# Patient Record
Sex: MALE | Race: WHITE | NOT HISPANIC OR LATINO | URBAN - METROPOLITAN AREA
[De-identification: names, ages, dates, MRNs, and addresses within clinical notes are randomized per-mention and may not be internally consistent; named-entity substitution may affect disease eponyms.]

---

## 2017-01-02 ENCOUNTER — INPATIENT (INPATIENT)
Facility: HOSPITAL | Age: 62
LOS: 2 days | Discharge: HOME | DRG: 392 | End: 2017-01-05
Attending: INTERNAL MEDICINE | Admitting: INTERNAL MEDICINE
Payer: MEDICARE

## 2017-01-02 VITALS
HEART RATE: 81 BPM | RESPIRATION RATE: 19 BRPM | HEIGHT: 77 IN | WEIGHT: 315 LBS | SYSTOLIC BLOOD PRESSURE: 142 MMHG | DIASTOLIC BLOOD PRESSURE: 88 MMHG | TEMPERATURE: 98 F | OXYGEN SATURATION: 96 %

## 2017-01-02 DIAGNOSIS — G47.33 OBSTRUCTIVE SLEEP APNEA (ADULT) (PEDIATRIC): ICD-10-CM

## 2017-01-02 DIAGNOSIS — Z96.651 PRESENCE OF RIGHT ARTIFICIAL KNEE JOINT: Chronic | ICD-10-CM

## 2017-01-02 DIAGNOSIS — M1A.9XX0 CHRONIC GOUT, UNSPECIFIED, WITHOUT TOPHUS (TOPHI): ICD-10-CM

## 2017-01-02 DIAGNOSIS — Z98.89 OTHER SPECIFIED POSTPROCEDURAL STATES: Chronic | ICD-10-CM

## 2017-01-02 DIAGNOSIS — K52.9 NONINFECTIVE GASTROENTERITIS AND COLITIS, UNSPECIFIED: ICD-10-CM

## 2017-01-02 DIAGNOSIS — K56.69 OTHER INTESTINAL OBSTRUCTION: ICD-10-CM

## 2017-01-02 DIAGNOSIS — M51.16 INTERVERTEBRAL DISC DISORDERS WITH RADICULOPATHY, LUMBAR REGION: ICD-10-CM

## 2017-01-02 LAB
ALBUMIN SERPL ELPH-MCNC: 4.1 G/DL — SIGNIFICANT CHANGE UP (ref 3.3–5)
ALP SERPL-CCNC: 86 U/L — SIGNIFICANT CHANGE UP (ref 40–120)
ALT FLD-CCNC: 30 U/L RC — SIGNIFICANT CHANGE UP (ref 10–45)
ANION GAP SERPL CALC-SCNC: 16 MMOL/L — SIGNIFICANT CHANGE UP (ref 5–17)
APPEARANCE UR: ABNORMAL
AST SERPL-CCNC: 31 U/L — SIGNIFICANT CHANGE UP (ref 10–40)
BASOPHILS # BLD AUTO: 0 K/UL — SIGNIFICANT CHANGE UP (ref 0–0.2)
BASOPHILS NFR BLD AUTO: 0.5 % — SIGNIFICANT CHANGE UP (ref 0–2)
BILIRUB SERPL-MCNC: 0.4 MG/DL — SIGNIFICANT CHANGE UP (ref 0.2–1.2)
BILIRUB UR-MCNC: NEGATIVE — SIGNIFICANT CHANGE UP
BUN SERPL-MCNC: 13 MG/DL — SIGNIFICANT CHANGE UP (ref 7–23)
CALCIUM SERPL-MCNC: 9.2 MG/DL — SIGNIFICANT CHANGE UP (ref 8.4–10.5)
CHLORIDE SERPL-SCNC: 102 MMOL/L — SIGNIFICANT CHANGE UP (ref 96–108)
CO2 SERPL-SCNC: 21 MMOL/L — LOW (ref 22–31)
COLOR SPEC: YELLOW — SIGNIFICANT CHANGE UP
COMMENT - URINE: SIGNIFICANT CHANGE UP
CREAT SERPL-MCNC: 0.7 MG/DL — SIGNIFICANT CHANGE UP (ref 0.5–1.3)
DIFF PNL FLD: ABNORMAL
EOSINOPHIL # BLD AUTO: 0.1 K/UL — SIGNIFICANT CHANGE UP (ref 0–0.5)
EOSINOPHIL NFR BLD AUTO: 1.6 % — SIGNIFICANT CHANGE UP (ref 0–6)
EPI CELLS # UR: SIGNIFICANT CHANGE UP /HPF
GAS PNL BLDV: SIGNIFICANT CHANGE UP
GLUCOSE SERPL-MCNC: 131 MG/DL — HIGH (ref 70–99)
GLUCOSE UR QL: NEGATIVE — SIGNIFICANT CHANGE UP
HCT VFR BLD CALC: 45.2 % — SIGNIFICANT CHANGE UP (ref 39–50)
HGB BLD-MCNC: 16.4 G/DL — SIGNIFICANT CHANGE UP (ref 13–17)
KETONES UR-MCNC: NEGATIVE — SIGNIFICANT CHANGE UP
LEUKOCYTE ESTERASE UR-ACNC: NEGATIVE — SIGNIFICANT CHANGE UP
LYMPHOCYTES # BLD AUTO: 1.9 K/UL — SIGNIFICANT CHANGE UP (ref 1–3.3)
LYMPHOCYTES # BLD AUTO: 22.6 % — SIGNIFICANT CHANGE UP (ref 13–44)
MCHC RBC-ENTMCNC: 31.9 PG — SIGNIFICANT CHANGE UP (ref 27–34)
MCHC RBC-ENTMCNC: 36.3 GM/DL — HIGH (ref 32–36)
MCV RBC AUTO: 87.8 FL — SIGNIFICANT CHANGE UP (ref 80–100)
MONOCYTES # BLD AUTO: 0.7 K/UL — SIGNIFICANT CHANGE UP (ref 0–0.9)
MONOCYTES NFR BLD AUTO: 8.5 % — SIGNIFICANT CHANGE UP (ref 2–14)
NEUTROPHILS # BLD AUTO: 5.6 K/UL — SIGNIFICANT CHANGE UP (ref 1.8–7.4)
NEUTROPHILS NFR BLD AUTO: 66.8 % — SIGNIFICANT CHANGE UP (ref 43–77)
NITRITE UR-MCNC: NEGATIVE — SIGNIFICANT CHANGE UP
PH UR: 6.5 — SIGNIFICANT CHANGE UP (ref 4.8–8)
PLATELET # BLD AUTO: 195 K/UL — SIGNIFICANT CHANGE UP (ref 150–400)
POTASSIUM SERPL-MCNC: 4.1 MMOL/L — SIGNIFICANT CHANGE UP (ref 3.5–5.3)
POTASSIUM SERPL-SCNC: 4.1 MMOL/L — SIGNIFICANT CHANGE UP (ref 3.5–5.3)
PROT SERPL-MCNC: 7 G/DL — SIGNIFICANT CHANGE UP (ref 6–8.3)
PROT UR-MCNC: 30 MG/DL
RBC # BLD: 5.15 M/UL — SIGNIFICANT CHANGE UP (ref 4.2–5.8)
RBC # FLD: 12.5 % — SIGNIFICANT CHANGE UP (ref 10.3–14.5)
RBC CASTS # UR COMP ASSIST: SIGNIFICANT CHANGE UP /HPF (ref 0–2)
SODIUM SERPL-SCNC: 139 MMOL/L — SIGNIFICANT CHANGE UP (ref 135–145)
SP GR SPEC: >1.03 — HIGH (ref 1.01–1.02)
UROBILINOGEN FLD QL: NEGATIVE — SIGNIFICANT CHANGE UP
WBC # BLD: 8.4 K/UL — SIGNIFICANT CHANGE UP (ref 3.8–10.5)
WBC # FLD AUTO: 8.4 K/UL — SIGNIFICANT CHANGE UP (ref 3.8–10.5)
WBC UR QL: SIGNIFICANT CHANGE UP /HPF (ref 0–5)

## 2017-01-02 PROCEDURE — 99285 EMERGENCY DEPT VISIT HI MDM: CPT | Mod: 25

## 2017-01-02 PROCEDURE — 74177 CT ABD & PELVIS W/CONTRAST: CPT | Mod: 26

## 2017-01-02 RX ORDER — ONDANSETRON 8 MG/1
4 TABLET, FILM COATED ORAL EVERY 6 HOURS
Qty: 0 | Refills: 0 | Status: DISCONTINUED | OUTPATIENT
Start: 2017-01-02 | End: 2017-01-05

## 2017-01-02 RX ORDER — SODIUM CHLORIDE 9 MG/ML
1000 INJECTION INTRAMUSCULAR; INTRAVENOUS; SUBCUTANEOUS ONCE
Qty: 0 | Refills: 0 | Status: COMPLETED | OUTPATIENT
Start: 2017-01-02 | End: 2017-01-02

## 2017-01-02 RX ORDER — MORPHINE SULFATE 50 MG/1
2 CAPSULE, EXTENDED RELEASE ORAL EVERY 4 HOURS
Qty: 0 | Refills: 0 | Status: DISCONTINUED | OUTPATIENT
Start: 2017-01-02 | End: 2017-01-05

## 2017-01-02 RX ORDER — INFLUENZA VIRUS VACCINE 15; 15; 15; 15 UG/.5ML; UG/.5ML; UG/.5ML; UG/.5ML
0.5 SUSPENSION INTRAMUSCULAR ONCE
Qty: 0 | Refills: 0 | Status: DISCONTINUED | OUTPATIENT
Start: 2017-01-02 | End: 2017-01-05

## 2017-01-02 RX ORDER — ACETAMINOPHEN 500 MG
1000 TABLET ORAL ONCE
Qty: 0 | Refills: 0 | Status: COMPLETED | OUTPATIENT
Start: 2017-01-02 | End: 2017-01-02

## 2017-01-02 RX ORDER — SODIUM CHLORIDE 9 MG/ML
2000 INJECTION, SOLUTION INTRAVENOUS
Qty: 0 | Refills: 0 | Status: DISCONTINUED | OUTPATIENT
Start: 2017-01-02 | End: 2017-01-03

## 2017-01-02 RX ORDER — METRONIDAZOLE 500 MG
TABLET ORAL
Qty: 0 | Refills: 0 | Status: DISCONTINUED | OUTPATIENT
Start: 2017-01-02 | End: 2017-01-05

## 2017-01-02 RX ORDER — METRONIDAZOLE 500 MG
500 TABLET ORAL EVERY 8 HOURS
Qty: 0 | Refills: 0 | Status: DISCONTINUED | OUTPATIENT
Start: 2017-01-02 | End: 2017-01-05

## 2017-01-02 RX ORDER — ENOXAPARIN SODIUM 100 MG/ML
40 INJECTION SUBCUTANEOUS EVERY 24 HOURS
Qty: 0 | Refills: 0 | Status: DISCONTINUED | OUTPATIENT
Start: 2017-01-02 | End: 2017-01-05

## 2017-01-02 RX ORDER — ONDANSETRON 8 MG/1
4 TABLET, FILM COATED ORAL ONCE
Qty: 0 | Refills: 0 | Status: COMPLETED | OUTPATIENT
Start: 2017-01-02 | End: 2017-01-02

## 2017-01-02 RX ORDER — METRONIDAZOLE 500 MG
500 TABLET ORAL ONCE
Qty: 0 | Refills: 0 | Status: COMPLETED | OUTPATIENT
Start: 2017-01-02 | End: 2017-01-02

## 2017-01-02 RX ORDER — GABAPENTIN 400 MG/1
1 CAPSULE ORAL
Qty: 0 | Refills: 0 | COMMUNITY

## 2017-01-02 RX ADMIN — ONDANSETRON 4 MILLIGRAM(S): 8 TABLET, FILM COATED ORAL at 08:07

## 2017-01-02 RX ADMIN — SODIUM CHLORIDE 1000 MILLILITER(S): 9 INJECTION INTRAMUSCULAR; INTRAVENOUS; SUBCUTANEOUS at 08:07

## 2017-01-02 RX ADMIN — SODIUM CHLORIDE 1000 MILLILITER(S): 9 INJECTION INTRAMUSCULAR; INTRAVENOUS; SUBCUTANEOUS at 11:12

## 2017-01-02 RX ADMIN — SODIUM CHLORIDE 100 MILLILITER(S): 9 INJECTION, SOLUTION INTRAVENOUS at 18:53

## 2017-01-02 RX ADMIN — ENOXAPARIN SODIUM 40 MILLIGRAM(S): 100 INJECTION SUBCUTANEOUS at 22:23

## 2017-01-02 RX ADMIN — Medication 100 MILLIGRAM(S): at 22:23

## 2017-01-02 RX ADMIN — Medication 100 MILLIGRAM(S): at 15:33

## 2017-01-02 RX ADMIN — Medication 400 MILLIGRAM(S): at 11:00

## 2017-01-02 NOTE — H&P ADULT. - HISTORY OF PRESENT ILLNESS
60 yo man with PMH of obesity, ASIYA on CPAP, chronic back pain a/w diarrhea and abdominal pain.  Pt reports an episode of bronchitis about 2 weeks ago for which he took an known antibiotic for 7 days.  Then 6 days ago started having diarrhea upwards of 6-7 times a day, a/w intermittent mid abdominal pain.  Has nausea but no vomiting.  Diarrhea is watery, nonbloody.  3 sons had diarrhea for 1-2 days and then pt got it.  No recent travel.  Denies fevers, chills.

## 2017-01-02 NOTE — ED ADULT NURSE NOTE - OBJECTIVE STATEMENT
Pt  morbid obese C/O diarrhoea X few days S/P pt taking antibiotics  for recent bronchitis. Pt C/O intermittent abdominal cramps . Ifeoma fever chills afebrile here denies hematuria dysuria

## 2017-01-02 NOTE — H&P ADULT. - PSH
Cholecystitis  cholecycstectomy 2011  H/O lithotripsy    Hernia  repair, left inguinal 2010  History of Total Hip Replacement    S/P hip replacement  left 2009, right 2009  S/P knee replacement  rigth 10/11  S/P Left Inguinal Hernia Repair    S/P lumbar discectomy  L3,,L4,L5  Aug 2013  S/P revision of total knee, right  12/2012  S/P tonsillectomy and adenoidectomy  as child

## 2017-01-02 NOTE — ED ADULT NURSE REASSESSMENT NOTE - NS ED NURSE REASSESS COMMENT FT1
1400 Admitting team R/O for Cdiff. Pt had no bowel  after the CT Abdomen. Ifeoma pain N/V . Pt admitted awaiting for the bed   1900 Report given to Ramana Barber in [facility]
pt states since ct no longer having diarrhea

## 2017-01-02 NOTE — ED ADULT NURSE NOTE - PMH
Gout  last attack 6 years ago, 2006  H/O: osteoarthritis    History of Obesity    Hx of insomnia    Lumbar disc disease with radiculopathy    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  Obstructive sleep apnea  severe, used CPAP few years ago and lost weight  Renal calculi

## 2017-01-02 NOTE — H&P ADULT. - ASSESSMENT
60 yo man with PMH of obesity, ASIYA, gout, HTN, chronic back pain a/w enteritis.  With recent abx use will start empiric flagyl pending Cdiff sample, though may be viral given pt's sons also had diarrhea.  Obstruction is low grade - pt looks well, abdominal exam normal - no need for NGT.

## 2017-01-02 NOTE — H&P ADULT. - PMH
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

## 2017-01-02 NOTE — H&P ADULT. - PROBLEM SELECTOR PLAN 1
Viral vs C.diff  Start on empiric flagyl - can d/c if C.diff comes back negative  C.diff sample ordered  NPO  IV fluids

## 2017-01-02 NOTE — ED PROVIDER NOTE - OBJECTIVE STATEMENT
Attending [place]: 60 y/o male s/p cholecystecomy presenting with diarrhea. pt states recently treated for bronchitis on unclear abx presenting with diarrhea. symptoms first started a few days ago. pt states has crampy abdominal pain associated with multiple episodes of diarrhea. symptoms last few days and then resolved and then began again over the weekend. no vomiting but does complain of nausea. no fevers. sick contacts at home with diarrhea. no difficulty voiding

## 2017-01-02 NOTE — ED PROVIDER NOTE - MEDICAL DECISION MAKING DETAILS
Attending [place]: 62 y/o male presenting with abdominal pain and diarrhea. no fevers. ct scan showed enteritis with dilated loops of bowel without visible obstruction. no fevers and enteritis most likely infectious. will send stool cultures. will admit for serial abdominal exams, IVF.

## 2017-01-02 NOTE — H&P ADULT. - FAMILY HISTORY
<<-----Click on this checkbox to enter Family History Family history of coronary artery disease, HLD/Angina. Fatal MI age 73.     Family history of diabetes mellitus type II, Hyperlipidemia and Hypertension. Age 86.     Family history of coronary artery disease, brother- age 50+- Coronary Artery Stents     Sibling  Still living? No  Family history of pancreatic cancer, Age at diagnosis: Age Unknown

## 2017-01-03 LAB
ANION GAP SERPL CALC-SCNC: 13 MMOL/L — SIGNIFICANT CHANGE UP (ref 5–17)
BASOPHILS # BLD AUTO: 0.02 K/UL — SIGNIFICANT CHANGE UP (ref 0–0.2)
BASOPHILS NFR BLD AUTO: 0.3 % — SIGNIFICANT CHANGE UP (ref 0–2)
BUN SERPL-MCNC: 10 MG/DL — SIGNIFICANT CHANGE UP (ref 7–23)
CALCIUM SERPL-MCNC: 8.5 MG/DL — SIGNIFICANT CHANGE UP (ref 8.4–10.5)
CHLORIDE SERPL-SCNC: 107 MMOL/L — SIGNIFICANT CHANGE UP (ref 96–108)
CO2 SERPL-SCNC: 22 MMOL/L — SIGNIFICANT CHANGE UP (ref 22–31)
CREAT SERPL-MCNC: 0.6 MG/DL — SIGNIFICANT CHANGE UP (ref 0.5–1.3)
EOSINOPHIL # BLD AUTO: 0.17 K/UL — SIGNIFICANT CHANGE UP (ref 0–0.5)
EOSINOPHIL NFR BLD AUTO: 2.7 % — SIGNIFICANT CHANGE UP (ref 0–6)
GLUCOSE SERPL-MCNC: 109 MG/DL — HIGH (ref 70–99)
HCT VFR BLD CALC: 42.6 % — SIGNIFICANT CHANGE UP (ref 39–50)
HGB BLD-MCNC: 14.4 G/DL — SIGNIFICANT CHANGE UP (ref 13–17)
IMM GRANULOCYTES NFR BLD AUTO: 0.5 % — SIGNIFICANT CHANGE UP (ref 0–1.5)
LYMPHOCYTES # BLD AUTO: 1.56 K/UL — SIGNIFICANT CHANGE UP (ref 1–3.3)
LYMPHOCYTES # BLD AUTO: 24.8 % — SIGNIFICANT CHANGE UP (ref 13–44)
MAGNESIUM SERPL-MCNC: 1.8 MG/DL — SIGNIFICANT CHANGE UP (ref 1.6–2.6)
MCHC RBC-ENTMCNC: 29.4 PG — SIGNIFICANT CHANGE UP (ref 27–34)
MCHC RBC-ENTMCNC: 33.8 GM/DL — SIGNIFICANT CHANGE UP (ref 32–36)
MCV RBC AUTO: 87.1 FL — SIGNIFICANT CHANGE UP (ref 80–100)
MONOCYTES # BLD AUTO: 0.51 K/UL — SIGNIFICANT CHANGE UP (ref 0–0.9)
MONOCYTES NFR BLD AUTO: 8.1 % — SIGNIFICANT CHANGE UP (ref 2–14)
NEUTROPHILS # BLD AUTO: 4 K/UL — SIGNIFICANT CHANGE UP (ref 1.8–7.4)
NEUTROPHILS NFR BLD AUTO: 63.6 % — SIGNIFICANT CHANGE UP (ref 43–77)
PLATELET # BLD AUTO: 192 K/UL — SIGNIFICANT CHANGE UP (ref 150–400)
POTASSIUM SERPL-MCNC: 3.5 MMOL/L — SIGNIFICANT CHANGE UP (ref 3.5–5.3)
POTASSIUM SERPL-SCNC: 3.5 MMOL/L — SIGNIFICANT CHANGE UP (ref 3.5–5.3)
RBC # BLD: 4.89 M/UL — SIGNIFICANT CHANGE UP (ref 4.2–5.8)
RBC # FLD: 12.7 % — SIGNIFICANT CHANGE UP (ref 10.3–14.5)
SODIUM SERPL-SCNC: 142 MMOL/L — SIGNIFICANT CHANGE UP (ref 135–145)
WBC # BLD: 6.29 K/UL — SIGNIFICANT CHANGE UP (ref 3.8–10.5)
WBC # FLD AUTO: 6.29 K/UL — SIGNIFICANT CHANGE UP (ref 3.8–10.5)

## 2017-01-03 RX ORDER — SODIUM CHLORIDE 9 MG/ML
1000 INJECTION, SOLUTION INTRAVENOUS
Qty: 0 | Refills: 0 | Status: DISCONTINUED | OUTPATIENT
Start: 2017-01-03 | End: 2017-01-05

## 2017-01-03 RX ADMIN — SODIUM CHLORIDE 100 MILLILITER(S): 9 INJECTION, SOLUTION INTRAVENOUS at 05:03

## 2017-01-03 RX ADMIN — ENOXAPARIN SODIUM 40 MILLIGRAM(S): 100 INJECTION SUBCUTANEOUS at 22:54

## 2017-01-03 RX ADMIN — Medication 100 MILLIGRAM(S): at 05:00

## 2017-01-03 RX ADMIN — ONDANSETRON 4 MILLIGRAM(S): 8 TABLET, FILM COATED ORAL at 19:48

## 2017-01-03 RX ADMIN — ONDANSETRON 4 MILLIGRAM(S): 8 TABLET, FILM COATED ORAL at 05:00

## 2017-01-03 RX ADMIN — ONDANSETRON 4 MILLIGRAM(S): 8 TABLET, FILM COATED ORAL at 12:32

## 2017-01-03 RX ADMIN — Medication 100 MILLIGRAM(S): at 22:53

## 2017-01-03 RX ADMIN — Medication 100 MILLIGRAM(S): at 14:02

## 2017-01-03 RX ADMIN — SODIUM CHLORIDE 100 MILLILITER(S): 9 INJECTION, SOLUTION INTRAVENOUS at 22:49

## 2017-01-04 ENCOUNTER — TRANSCRIPTION ENCOUNTER (OUTPATIENT)
Age: 62
End: 2017-01-04

## 2017-01-04 LAB
ANION GAP SERPL CALC-SCNC: 13 MMOL/L — SIGNIFICANT CHANGE UP (ref 5–17)
BAKER'S YEAST IGA QN IA: 9.3 UNITS — SIGNIFICANT CHANGE UP
BAKER'S YEAST IGA QN IA: NEGATIVE — SIGNIFICANT CHANGE UP
BAKER'S YEAST IGG QN IA: 35.3 UNITS — HIGH
BAKER'S YEAST IGG QN IA: POSITIVE
BASOPHILS # BLD AUTO: 0.02 K/UL — SIGNIFICANT CHANGE UP (ref 0–0.2)
BASOPHILS NFR BLD AUTO: 0.3 % — SIGNIFICANT CHANGE UP (ref 0–2)
BUN SERPL-MCNC: 8 MG/DL — SIGNIFICANT CHANGE UP (ref 7–23)
C DIFF BY PCR RESULT: SIGNIFICANT CHANGE UP
C DIFF TOX GENS STL QL NAA+PROBE: SIGNIFICANT CHANGE UP
CALCIUM SERPL-MCNC: 8.3 MG/DL — LOW (ref 8.4–10.5)
CHLORIDE SERPL-SCNC: 107 MMOL/L — SIGNIFICANT CHANGE UP (ref 96–108)
CO2 SERPL-SCNC: 25 MMOL/L — SIGNIFICANT CHANGE UP (ref 22–31)
CREAT SERPL-MCNC: 0.56 MG/DL — SIGNIFICANT CHANGE UP (ref 0.5–1.3)
CULTURE RESULTS: SIGNIFICANT CHANGE UP
EOSINOPHIL # BLD AUTO: 0.15 K/UL — SIGNIFICANT CHANGE UP (ref 0–0.5)
EOSINOPHIL NFR BLD AUTO: 2.3 % — SIGNIFICANT CHANGE UP (ref 0–6)
GLUCOSE SERPL-MCNC: 114 MG/DL — HIGH (ref 70–99)
HCT VFR BLD CALC: 40.8 % — SIGNIFICANT CHANGE UP (ref 39–50)
HGB BLD-MCNC: 13.9 G/DL — SIGNIFICANT CHANGE UP (ref 13–17)
IMM GRANULOCYTES NFR BLD AUTO: 0.5 % — SIGNIFICANT CHANGE UP (ref 0–1.5)
LYMPHOCYTES # BLD AUTO: 1.63 K/UL — SIGNIFICANT CHANGE UP (ref 1–3.3)
LYMPHOCYTES # BLD AUTO: 24.8 % — SIGNIFICANT CHANGE UP (ref 13–44)
MAGNESIUM SERPL-MCNC: 1.9 MG/DL — SIGNIFICANT CHANGE UP (ref 1.6–2.6)
MCHC RBC-ENTMCNC: 29.7 PG — SIGNIFICANT CHANGE UP (ref 27–34)
MCHC RBC-ENTMCNC: 34.1 GM/DL — SIGNIFICANT CHANGE UP (ref 32–36)
MCV RBC AUTO: 87.2 FL — SIGNIFICANT CHANGE UP (ref 80–100)
MONOCYTES # BLD AUTO: 0.47 K/UL — SIGNIFICANT CHANGE UP (ref 0–0.9)
MONOCYTES NFR BLD AUTO: 7.1 % — SIGNIFICANT CHANGE UP (ref 2–14)
NEUTROPHILS # BLD AUTO: 4.28 K/UL — SIGNIFICANT CHANGE UP (ref 1.8–7.4)
NEUTROPHILS NFR BLD AUTO: 65 % — SIGNIFICANT CHANGE UP (ref 43–77)
OB PNL STL: NEGATIVE — SIGNIFICANT CHANGE UP
PHOSPHATE SERPL-MCNC: 2.6 MG/DL — SIGNIFICANT CHANGE UP (ref 2.5–4.5)
PLATELET # BLD AUTO: 191 K/UL — SIGNIFICANT CHANGE UP (ref 150–400)
POTASSIUM SERPL-MCNC: 3.5 MMOL/L — SIGNIFICANT CHANGE UP (ref 3.5–5.3)
POTASSIUM SERPL-SCNC: 3.5 MMOL/L — SIGNIFICANT CHANGE UP (ref 3.5–5.3)
RBC # BLD: 4.68 M/UL — SIGNIFICANT CHANGE UP (ref 4.2–5.8)
RBC # FLD: 12.7 % — SIGNIFICANT CHANGE UP (ref 10.3–14.5)
SODIUM SERPL-SCNC: 145 MMOL/L — SIGNIFICANT CHANGE UP (ref 135–145)
SPECIMEN SOURCE: SIGNIFICANT CHANGE UP
WBC # BLD: 6.58 K/UL — SIGNIFICANT CHANGE UP (ref 3.8–10.5)
WBC # FLD AUTO: 6.58 K/UL — SIGNIFICANT CHANGE UP (ref 3.8–10.5)

## 2017-01-04 RX ORDER — AMLODIPINE BESYLATE 2.5 MG/1
5 TABLET ORAL DAILY
Qty: 0 | Refills: 0 | Status: DISCONTINUED | OUTPATIENT
Start: 2017-01-04 | End: 2017-01-05

## 2017-01-04 RX ORDER — GABAPENTIN 400 MG/1
300 CAPSULE ORAL DAILY
Qty: 0 | Refills: 0 | Status: DISCONTINUED | OUTPATIENT
Start: 2017-01-04 | End: 2017-01-05

## 2017-01-04 RX ADMIN — Medication 100 MILLIGRAM(S): at 21:46

## 2017-01-04 RX ADMIN — Medication 100 MILLIGRAM(S): at 05:38

## 2017-01-04 RX ADMIN — ENOXAPARIN SODIUM 40 MILLIGRAM(S): 100 INJECTION SUBCUTANEOUS at 21:46

## 2017-01-04 RX ADMIN — ONDANSETRON 4 MILLIGRAM(S): 8 TABLET, FILM COATED ORAL at 10:22

## 2017-01-04 RX ADMIN — GABAPENTIN 300 MILLIGRAM(S): 400 CAPSULE ORAL at 15:03

## 2017-01-04 RX ADMIN — Medication 100 MILLIGRAM(S): at 16:05

## 2017-01-04 RX ADMIN — SODIUM CHLORIDE 100 MILLILITER(S): 9 INJECTION, SOLUTION INTRAVENOUS at 16:07

## 2017-01-04 RX ADMIN — AMLODIPINE BESYLATE 5 MILLIGRAM(S): 2.5 TABLET ORAL at 15:04

## 2017-01-04 NOTE — DISCHARGE NOTE ADULT - MEDICATION SUMMARY - MEDICATIONS TO STOP TAKING
I will STOP taking the medications listed below when I get home from the hospital:    triamterene-hydroCHLOROthiazide 37.5mg-25mg oral capsule  -- 1 cap(s) by mouth once a day

## 2017-01-04 NOTE — DISCHARGE NOTE ADULT - PATIENT PORTAL LINK FT
“You can access the [facility] Patient Portal, offered by [facility], by registering with the following website: [email or web address]”

## 2017-01-04 NOTE — DISCHARGE NOTE ADULT - HOSPITAL COURSE
Attending to complete 60 yo man with PMH of obesity, ASIYA on CPAP, chronic back pain a/w diarrhea and abdominal pain.  Pt reports an episode of bronchitis about 2 weeks ago for which he took an known antibiotic for 7 days.  Then 6 days ago started having diarrhea upwards of 6-7 times a day, a/w intermittent mid abdominal pain.  Has nausea but no vomiting.  Diarrhea is watery, nonbloody.  3 sons had diarrhea for 1-2 days and then pt got it.  No recent travel.  Denies fevers, chills.  Imaging personally reviewed - CT A/P with mild enteritis and low grade SBO.	  WBC 8k  NPO, IVF, flagyl  GI and surgery consult   stool c dif neg  CXR normal   pt was dc home on PO flagyl.  f/u PCP    dx enteritis, SBO

## 2017-01-04 NOTE — DISCHARGE NOTE ADULT - SECONDARY DIAGNOSIS.
Partial small bowel obstruction Chronic gout without tophus, unspecified cause, unspecified site Essential hypertension Kidney lesion Neuropathy Obstructive sleep apnea

## 2017-01-04 NOTE — DISCHARGE NOTE ADULT - CARE PLAN
Principal Discharge DX:	Enteritis  Goal:	Enteritis resolves  Instructions for follow-up, activity and diet:	Complete course of flagyl  Low fiber diet  Follow up with gastro enterology in 1 week  Secondary Diagnosis:	Partial small bowel obstruction  Instructions for follow-up, activity and diet:	resolving  If increasing abdominal pain, worsening diarrhea, nausea/vomitig see your Gi specialist immediately  Secondary Diagnosis:	Chronic gout without tophus, unspecified cause, unspecified site  Instructions for follow-up, activity and diet:	continue Allopurinol  Secondary Diagnosis:	Essential hypertension  Instructions for follow-up, activity and diet:	continue Norvasc  Stop taking HCTZ/triamterene for now  Follow up with PMD early next week for rechecking BP and determine restarting medication  Secondary Diagnosis:	Kidney lesion  Instructions for follow-up, activity and diet:	Follow up with PMD for repeat imaging of L kidney in 6 months  Secondary Diagnosis:	Neuropathy  Instructions for follow-up, activity and diet:	continue gabapentin  Secondary Diagnosis:	Obstructive sleep apnea  Instructions for follow-up, activity and diet:	Continue CPAP at night

## 2017-01-04 NOTE — DISCHARGE NOTE ADULT - MEDICATION SUMMARY - MEDICATIONS TO TAKE
I will START or STAY ON the medications listed below when I get home from the hospital:    metroNIDAZOLE 500 mg oral tablet  -- 1 tab(s) by mouth every 8 hours  -- Indication: For Enteritis    aspirin 81 mg oral tablet  -- 1 tab(s) by mouth once a day  -- Indication: For prevention    gabapentin 300 mg oral capsule  -- 1 cap(s) by mouth once a day  -- Indication: For Neuropathy    allopurinol 100 mg oral tablet  -- 1 tab(s) by mouth once a day  -- Indication: For gout    amLODIPine 5 mg oral tablet  -- 1 tab(s) by mouth once a day  -- Indication: For Hypertension

## 2017-01-04 NOTE — DISCHARGE NOTE ADULT - PLAN OF CARE
Enteritis resolves Complete course of flagyl  Low fiber diet  Follow up with gastro enterology in 1 week resolving  If increasing abdominal pain, worsening diarrhea, nausea/vomitig see your Gi specialist immediately continue Allopurinol continue Norvasc  Stop taking HCTZ/triamterene for now  Follow up with PMD early next week for rechecking BP and determine restarting medication Follow up with PMD for repeat imaging of L kidney in 6 months continue gabapentin Continue CPAP at night

## 2017-01-04 NOTE — DISCHARGE NOTE ADULT - CARE PROVIDER_API CALL
Art Neely (DO), Internal Medicine  [address] [place] [address]  Phone: [phone]  Fax: [phone]

## 2017-01-05 VITALS
HEART RATE: 62 BPM | SYSTOLIC BLOOD PRESSURE: 156 MMHG | RESPIRATION RATE: 18 BRPM | TEMPERATURE: 98 F | DIASTOLIC BLOOD PRESSURE: 88 MMHG | OXYGEN SATURATION: 98 %

## 2017-01-05 LAB
ANION GAP SERPL CALC-SCNC: 15 MMOL/L — SIGNIFICANT CHANGE UP (ref 5–17)
AUTO DIFF PNL BLD: NEGATIVE — SIGNIFICANT CHANGE UP
BUN SERPL-MCNC: 8 MG/DL — SIGNIFICANT CHANGE UP (ref 7–23)
C-ANCA SER-ACNC: NEGATIVE — SIGNIFICANT CHANGE UP
CALCIUM SERPL-MCNC: 8.4 MG/DL — SIGNIFICANT CHANGE UP (ref 8.4–10.5)
CHLORIDE SERPL-SCNC: 108 MMOL/L — SIGNIFICANT CHANGE UP (ref 96–108)
CO2 SERPL-SCNC: 21 MMOL/L — LOW (ref 22–31)
CREAT SERPL-MCNC: 0.64 MG/DL — SIGNIFICANT CHANGE UP (ref 0.5–1.3)
CULTURE RESULTS: SIGNIFICANT CHANGE UP
GLUCOSE SERPL-MCNC: 105 MG/DL — HIGH (ref 70–99)
HCT VFR BLD CALC: 40.3 % — SIGNIFICANT CHANGE UP (ref 39–50)
HGB BLD-MCNC: 14.1 G/DL — SIGNIFICANT CHANGE UP (ref 13–17)
MCHC RBC-ENTMCNC: 30 PG — SIGNIFICANT CHANGE UP (ref 27–34)
MCHC RBC-ENTMCNC: 35 GM/DL — SIGNIFICANT CHANGE UP (ref 32–36)
MCV RBC AUTO: 85.7 FL — SIGNIFICANT CHANGE UP (ref 80–100)
P-ANCA SER-ACNC: NEGATIVE — SIGNIFICANT CHANGE UP
PLATELET # BLD AUTO: 178 K/UL — SIGNIFICANT CHANGE UP (ref 150–400)
POTASSIUM SERPL-MCNC: 3.6 MMOL/L — SIGNIFICANT CHANGE UP (ref 3.5–5.3)
POTASSIUM SERPL-SCNC: 3.6 MMOL/L — SIGNIFICANT CHANGE UP (ref 3.5–5.3)
RBC # BLD: 4.7 M/UL — SIGNIFICANT CHANGE UP (ref 4.2–5.8)
RBC # FLD: 12.8 % — SIGNIFICANT CHANGE UP (ref 10.3–14.5)
SODIUM SERPL-SCNC: 144 MMOL/L — SIGNIFICANT CHANGE UP (ref 135–145)
SPECIMEN SOURCE: SIGNIFICANT CHANGE UP
WBC # BLD: 8.15 K/UL — SIGNIFICANT CHANGE UP (ref 3.8–10.5)
WBC # FLD AUTO: 8.15 K/UL — SIGNIFICANT CHANGE UP (ref 3.8–10.5)

## 2017-01-05 PROCEDURE — 83605 ASSAY OF LACTIC ACID: CPT

## 2017-01-05 PROCEDURE — 87177 OVA AND PARASITES SMEARS: CPT

## 2017-01-05 PROCEDURE — 82330 ASSAY OF CALCIUM: CPT

## 2017-01-05 PROCEDURE — 99285 EMERGENCY DEPT VISIT HI MDM: CPT | Mod: 25

## 2017-01-05 PROCEDURE — 87045 FECES CULTURE AEROBIC BACT: CPT

## 2017-01-05 PROCEDURE — 86036 ANCA SCREEN EACH ANTIBODY: CPT

## 2017-01-05 PROCEDURE — 96374 THER/PROPH/DIAG INJ IV PUSH: CPT

## 2017-01-05 PROCEDURE — 87493 C DIFF AMPLIFIED PROBE: CPT

## 2017-01-05 PROCEDURE — 82272 OCCULT BLD FECES 1-3 TESTS: CPT

## 2017-01-05 PROCEDURE — 85027 COMPLETE CBC AUTOMATED: CPT

## 2017-01-05 PROCEDURE — 85014 HEMATOCRIT: CPT

## 2017-01-05 PROCEDURE — 82435 ASSAY OF BLOOD CHLORIDE: CPT

## 2017-01-05 PROCEDURE — 71010: CPT | Mod: 26

## 2017-01-05 PROCEDURE — 80053 COMPREHEN METABOLIC PANEL: CPT

## 2017-01-05 PROCEDURE — 84132 ASSAY OF SERUM POTASSIUM: CPT

## 2017-01-05 PROCEDURE — 84100 ASSAY OF PHOSPHORUS: CPT

## 2017-01-05 PROCEDURE — 83520 IMMUNOASSAY QUANT NOS NONAB: CPT

## 2017-01-05 PROCEDURE — 82947 ASSAY GLUCOSE BLOOD QUANT: CPT

## 2017-01-05 PROCEDURE — 87046 STOOL CULTR AEROBIC BACT EA: CPT

## 2017-01-05 PROCEDURE — 83735 ASSAY OF MAGNESIUM: CPT

## 2017-01-05 PROCEDURE — 80048 BASIC METABOLIC PNL TOTAL CA: CPT

## 2017-01-05 PROCEDURE — 74177 CT ABD & PELVIS W/CONTRAST: CPT

## 2017-01-05 PROCEDURE — 84295 ASSAY OF SERUM SODIUM: CPT

## 2017-01-05 PROCEDURE — 82803 BLOOD GASES ANY COMBINATION: CPT

## 2017-01-05 PROCEDURE — 71045 X-RAY EXAM CHEST 1 VIEW: CPT

## 2017-01-05 PROCEDURE — 81001 URINALYSIS AUTO W/SCOPE: CPT

## 2017-01-05 PROCEDURE — 94660 CPAP INITIATION&MGMT: CPT

## 2017-01-05 RX ORDER — AMLODIPINE BESYLATE 2.5 MG/1
1 TABLET ORAL
Qty: 0 | Refills: 0 | COMMUNITY

## 2017-01-05 RX ORDER — GABAPENTIN 400 MG/1
1 CAPSULE ORAL
Qty: 0 | Refills: 0 | COMMUNITY

## 2017-01-05 RX ORDER — GABAPENTIN 400 MG/1
1 CAPSULE ORAL
Qty: 0 | Refills: 0 | COMMUNITY
Start: 2017-01-05

## 2017-01-05 RX ORDER — AMLODIPINE BESYLATE 2.5 MG/1
1 TABLET ORAL
Qty: 0 | Refills: 0 | COMMUNITY
Start: 2017-01-05

## 2017-01-05 RX ORDER — METRONIDAZOLE 500 MG
1 TABLET ORAL
Qty: 21 | Refills: 0 | OUTPATIENT
Start: 2017-01-05 | End: 2017-01-12

## 2017-01-05 RX ADMIN — AMLODIPINE BESYLATE 5 MILLIGRAM(S): 2.5 TABLET ORAL at 05:33

## 2017-01-05 RX ADMIN — Medication 100 MILLIGRAM(S): at 05:27

## 2017-01-05 RX ADMIN — GABAPENTIN 300 MILLIGRAM(S): 400 CAPSULE ORAL at 12:44

## 2017-07-19 ENCOUNTER — EMERGENCY (EMERGENCY)
Facility: HOSPITAL | Age: 62
LOS: 0 days | Discharge: HOME | End: 2017-07-20
Attending: EMERGENCY MEDICINE
Payer: COMMERCIAL

## 2017-07-19 VITALS
DIASTOLIC BLOOD PRESSURE: 86 MMHG | SYSTOLIC BLOOD PRESSURE: 155 MMHG | HEIGHT: 77 IN | TEMPERATURE: 98 F | WEIGHT: 315 LBS | RESPIRATION RATE: 19 BRPM | OXYGEN SATURATION: 94 % | HEART RATE: 91 BPM

## 2017-07-19 DIAGNOSIS — Z96.651 PRESENCE OF RIGHT ARTIFICIAL KNEE JOINT: Chronic | ICD-10-CM

## 2017-07-19 DIAGNOSIS — Z98.89 OTHER SPECIFIED POSTPROCEDURAL STATES: Chronic | ICD-10-CM

## 2017-07-19 PROCEDURE — 99284 EMERGENCY DEPT VISIT MOD MDM: CPT

## 2017-07-20 VITALS
WEIGHT: 315 LBS | RESPIRATION RATE: 16 BRPM | TEMPERATURE: 97 F | OXYGEN SATURATION: 97 % | DIASTOLIC BLOOD PRESSURE: 70 MMHG | SYSTOLIC BLOOD PRESSURE: 145 MMHG | HEIGHT: 77 IN | HEART RATE: 76 BPM

## 2017-07-20 VITALS
OXYGEN SATURATION: 98 % | DIASTOLIC BLOOD PRESSURE: 80 MMHG | SYSTOLIC BLOOD PRESSURE: 135 MMHG | HEART RATE: 77 BPM | RESPIRATION RATE: 17 BRPM | TEMPERATURE: 98 F

## 2017-07-20 DIAGNOSIS — S82.892S OTHER FRACTURE OF LEFT LOWER LEG, SEQUELA: ICD-10-CM

## 2017-07-20 DIAGNOSIS — Z96.651 PRESENCE OF RIGHT ARTIFICIAL KNEE JOINT: Chronic | ICD-10-CM
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
## 2017-07-20 DIAGNOSIS — M10.9 GOUT, UNSPECIFIED: ICD-10-CM

## 2017-07-20 DIAGNOSIS — M25.572 PAIN IN LEFT ANKLE AND JOINTS OF LEFT FOOT: ICD-10-CM

## 2017-07-20 DIAGNOSIS — I10 ESSENTIAL (PRIMARY) HYPERTENSION: ICD-10-CM

## 2017-07-20 DIAGNOSIS — Z79.82 LONG TERM (CURRENT) USE OF ASPIRIN: ICD-10-CM

## 2017-07-20 DIAGNOSIS — Z87.442 PERSONAL HISTORY OF URINARY CALCULI: ICD-10-CM

## 2017-07-20 DIAGNOSIS — Z98.89 OTHER SPECIFIED POSTPROCEDURAL STATES: Chronic | ICD-10-CM

## 2017-07-20 PROCEDURE — 93971 EXTREMITY STUDY: CPT | Mod: 26

## 2017-07-20 PROCEDURE — 99283 EMERGENCY DEPT VISIT LOW MDM: CPT | Mod: 57

## 2017-07-20 PROCEDURE — 73610 X-RAY EXAM OF ANKLE: CPT | Mod: 26,LT

## 2017-07-20 RX ORDER — IBUPROFEN 200 MG
600 TABLET ORAL ONCE
Qty: 0 | Refills: 0 | Status: COMPLETED | OUTPATIENT
Start: 2017-07-20 | End: 2017-07-20

## 2017-07-20 RX ADMIN — Medication 600 MILLIGRAM(S): at 04:55

## 2017-07-20 RX ADMIN — Medication 600 MILLIGRAM(S): at 05:28

## 2017-07-20 NOTE — ED ADULT NURSE NOTE - ADDITIONAL PRINTED INSTRUCTIONS GIVEN
discharged home, instructed to follow up with Dr Mayers, verbalized understanding of instructions discharged home, instructions given by SLADE Berry

## 2017-07-20 NOTE — ED PROCEDURE NOTE - NS ED PERI VASCULAR NEG
no cyanosis of extremity/no swelling/capillary refill time < 2 seconds/no paresthesia/fingers/toes warm to touch

## 2017-07-20 NOTE — ED PROCEDURE NOTE - CPROC ED POST PROC CARE GUIDE1
Keep the cast/splint/dressing clean and dry./Instructed patient/caregiver to follow-up with primary care physician./Verbal/written post procedure instructions were given to patient/caregiver./Instructed patient/caregiver regarding signs and symptoms of infection./Elevate the injured extremity as instructed.

## 2017-07-20 NOTE — ED ADULT NURSE NOTE - OBJECTIVE STATEMENT
Patient stated that the was here last night, discharged this morning and received a call to come back because the left ankle is broken.  Patient stated that he is unable to feel pain since previous surgeries.

## 2017-07-20 NOTE — ED PROVIDER NOTE - OBJECTIVE STATEMENT
61 years old male is called back for abnormal xray of left ankle which was done this morning. Pt sts he was here last night for left ankle pain had xray of left ankle xray and discharged without splint this morning. Pt sts he started pain and swelling of left ankle for about 6 to 7 days but he does not remember any trauma to the left ankle. Xray of left ankle showed subacute avulsion fx of tibial styloid with soft tissue swelling.

## 2017-07-20 NOTE — ED PROVIDER NOTE - MEDICAL DECISION MAKING DETAILS
Patient with marked arthritis of left ankle, no acute fracture or dislocation, US neg for DVT, recommend repeat in 1 week.  Will also refer to additional ortho and podiatry.  Patient declined pain med in ER and outpatient.  Does not want cane or crutches, able to walk.  Discussed results and outcome of today's visit with the patient.  Patient advised to please follow up with their primary care doctor within the next 24 hours and return to the Emergency Department for worsening symptoms or any other concerns.  Patient advised that their doctor may call  to follow up on the specific results of the tests performed today in the emergency department.   Patient appears well on discharge. Ortho and podiatry follow ups given. Patient with marked arthritis of left ankle, no acute fracture or dislocation (discussed with radiology on call, medial ankle appears to have callous formation, possibly old fracture), US neg for DVT, recommend repeat in 1 week.  Will also refer to additional ortho and podiatry.  Patient declined pain med in ER and outpatient.  Does not want cane or crutches, able to walk.  Discussed results and outcome of today's visit with the patient.  Patient advised to please follow up with their primary care doctor within the next 24 hours and return to the Emergency Department for worsening symptoms or any other concerns.  Patient advised that their doctor may call  to follow up on the specific results of the tests performed today in the emergency department.   Patient appears well on discharge. Ortho and podiatry follow ups given.

## 2017-07-20 NOTE — ED PROVIDER NOTE - MEDICAL DECISION MAKING DETAILS
hx, exam, x ray of left ankle done 5:00 am this morning, posterior left ankle splint and crutches hx, exam, x ray of left ankle done 5:00 am this morning, posterior left ankle splint and crutches. ( SLADE Eaton did not see the pt ) hx, exam, x ray of left ankle done 5:00 am this morning, posterior left ankle splint and crutches. ( SLADE Eaton did not see the pt ) Distal left fib lateral mal.

## 2017-07-20 NOTE — ED PROVIDER NOTE - CONSTITUTIONAL, MLM
normal... Well appearing, well nourished, awake, alert, oriented to person, place, time/situation and in no apparent distress. Speaking in clear full sentences

## 2017-07-20 NOTE — ED PROVIDER NOTE - OBJECTIVE STATEMENT
Pertinent PMH/PSH/FHx/SHx and Review of Systems contained within:  Patient presents to the ED for left ankle and calf pain x6 days.  Significant history of arthritis as described below.  Patient reports sensation of tightness in the left calf since the pain in ankle started.  Denies any falls or injuries, patient is ambulatory and has not taken any medication for pain.  Patient already talked to his orthopedist who explained that he does not do ankles.    Relevant PMHx/SHx/SOCHx/FAMH:  Arthritis, gout (no flares for years), HTN, obesity, BL hip replacements, right knee replacements (3x in 2 years), abdominal hernia repair  PMD: Dr. Jaime, orthopedist Dr Kiran  Smokes cigars daily    ROS: No fever/chills, No headache/photophobia/eye pain/changes in vision, No ear pain/sore throat/dysphagia, No chest pain/palpitations, no SOB/cough/wheeze/stridor, No abdominal pain, No N/V/D/melena, no dysuria/frequency/discharge, No neck/back pain, no rash, no changes in neurological status/function.

## 2017-07-21 DIAGNOSIS — M19.90 UNSPECIFIED OSTEOARTHRITIS, UNSPECIFIED SITE: ICD-10-CM

## 2017-07-21 DIAGNOSIS — Z79.82 LONG TERM (CURRENT) USE OF ASPIRIN: ICD-10-CM

## 2017-07-21 DIAGNOSIS — S82.892S OTHER FRACTURE OF LEFT LOWER LEG, SEQUELA: ICD-10-CM
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
## 2017-07-21 DIAGNOSIS — M25.572 PAIN IN LEFT ANKLE AND JOINTS OF LEFT FOOT: ICD-10-CM

## 2017-07-21 DIAGNOSIS — M51.9 UNSPECIFIED THORACIC, THORACOLUMBAR AND LUMBOSACRAL INTERVERTEBRAL DISC DISORDER: ICD-10-CM

## 2017-07-21 DIAGNOSIS — Z96.643 PRESENCE OF ARTIFICIAL HIP JOINT, BILATERAL: ICD-10-CM

## 2017-07-21 DIAGNOSIS — I10 ESSENTIAL (PRIMARY) HYPERTENSION: ICD-10-CM
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
## 2017-07-21 DIAGNOSIS — N20.0 CALCULUS OF KIDNEY: ICD-10-CM

## 2017-07-21 DIAGNOSIS — M10.9 GOUT, UNSPECIFIED: ICD-10-CM

## 2017-07-21 DIAGNOSIS — G62.9 POLYNEUROPATHY, UNSPECIFIED: ICD-10-CM

## 2017-07-21 DIAGNOSIS — G47.33 OBSTRUCTIVE SLEEP APNEA (ADULT) (PEDIATRIC): ICD-10-CM

## 2017-08-28 ENCOUNTER — OUTPATIENT (OUTPATIENT)
Dept: OUTPATIENT SERVICES | Facility: HOSPITAL | Age: 62
LOS: 1 days | Discharge: ANOTHER HOSPITAL | End: 2017-08-28
Payer: COMMERCIAL

## 2017-08-28 ENCOUNTER — INPATIENT (INPATIENT)
Facility: HOSPITAL | Age: 62
LOS: 7 days | Discharge: HOME | DRG: 236 | End: 2017-09-05
Attending: THORACIC SURGERY (CARDIOTHORACIC VASCULAR SURGERY) | Admitting: THORACIC SURGERY (CARDIOTHORACIC VASCULAR SURGERY)
Payer: MEDICARE

## 2017-08-28 VITALS
OXYGEN SATURATION: 96 % | HEART RATE: 68 BPM | DIASTOLIC BLOOD PRESSURE: 85 MMHG | WEIGHT: 315 LBS | TEMPERATURE: 98 F | HEIGHT: 77 IN | RESPIRATION RATE: 20 BRPM | SYSTOLIC BLOOD PRESSURE: 139 MMHG

## 2017-08-28 DIAGNOSIS — Z98.89 OTHER SPECIFIED POSTPROCEDURAL STATES: Chronic | ICD-10-CM

## 2017-08-28 DIAGNOSIS — Z96.651 PRESENCE OF RIGHT ARTIFICIAL KNEE JOINT: Chronic | ICD-10-CM

## 2017-08-28 DIAGNOSIS — I25.10 ATHEROSCLEROTIC HEART DISEASE OF NATIVE CORONARY ARTERY WITHOUT ANGINA PECTORIS: ICD-10-CM

## 2017-08-28 DIAGNOSIS — S82.899A OTHER FRACTURE OF UNSPECIFIED LOWER LEG, INITIAL ENCOUNTER FOR CLOSED FRACTURE: ICD-10-CM

## 2017-08-28 DIAGNOSIS — R94.31 ABNORMAL ELECTROCARDIOGRAM [ECG] [EKG]: ICD-10-CM

## 2017-08-28 LAB
ALBUMIN SERPL ELPH-MCNC: 4 G/DL — SIGNIFICANT CHANGE UP (ref 3.3–5)
ALP SERPL-CCNC: 68 U/L — SIGNIFICANT CHANGE UP (ref 40–120)
ALT FLD-CCNC: 21 U/L RC — SIGNIFICANT CHANGE UP (ref 10–45)
ANION GAP SERPL CALC-SCNC: 13 MMOL/L — SIGNIFICANT CHANGE UP (ref 5–17)
APPEARANCE UR: CLEAR — SIGNIFICANT CHANGE UP
APTT BLD: 27.8 SEC — SIGNIFICANT CHANGE UP (ref 27.5–37.4)
AST SERPL-CCNC: 16 U/L — SIGNIFICANT CHANGE UP (ref 10–40)
BILIRUB SERPL-MCNC: 0.3 MG/DL — SIGNIFICANT CHANGE UP (ref 0.2–1.2)
BILIRUB UR-MCNC: NEGATIVE — SIGNIFICANT CHANGE UP
BLD GP AB SCN SERPL QL: NEGATIVE — SIGNIFICANT CHANGE UP
BUN SERPL-MCNC: 12 MG/DL — SIGNIFICANT CHANGE UP (ref 7–23)
BUN SERPL-MCNC: 12 MG/DL — SIGNIFICANT CHANGE UP (ref 7–23)
CALCIUM SERPL-MCNC: 9.2 MG/DL — SIGNIFICANT CHANGE UP (ref 8.4–10.5)
CALCIUM SERPL-MCNC: 9.3 MG/DL — SIGNIFICANT CHANGE UP (ref 8.4–10.5)
CHLORIDE SERPL-SCNC: 102 MMOL/L — SIGNIFICANT CHANGE UP (ref 96–108)
CHLORIDE SERPL-SCNC: 104 MMOL/L — SIGNIFICANT CHANGE UP (ref 98–107)
CO2 SERPL-SCNC: 26 MMOL/L — SIGNIFICANT CHANGE UP (ref 22–31)
CO2 SERPL-SCNC: 29 MMOL/L — SIGNIFICANT CHANGE UP (ref 22–31)
COLOR SPEC: YELLOW — SIGNIFICANT CHANGE UP
CREAT SERPL-MCNC: 0.61 MG/DL — SIGNIFICANT CHANGE UP (ref 0.5–1.3)
CREAT SERPL-MCNC: 0.63 MG/DL — SIGNIFICANT CHANGE UP (ref 0.5–1.3)
DIFF PNL FLD: ABNORMAL
GLUCOSE SERPL-MCNC: 111 MG/DL — HIGH (ref 70–99)
GLUCOSE SERPL-MCNC: 114 MG/DL — HIGH (ref 70–99)
GLUCOSE UR QL: NEGATIVE — SIGNIFICANT CHANGE UP
HBA1C BLD-MCNC: 5.9 % — HIGH (ref 4–5.6)
HCT VFR BLD CALC: 44.8 % — SIGNIFICANT CHANGE UP (ref 39–50)
HCT VFR BLD CALC: 45 % — SIGNIFICANT CHANGE UP (ref 39–50)
HGB BLD-MCNC: 15.4 G/DL — SIGNIFICANT CHANGE UP (ref 13–17)
HGB BLD-MCNC: 15.6 G/DL — SIGNIFICANT CHANGE UP (ref 13–17)
INR BLD: 1.06 RATIO — SIGNIFICANT CHANGE UP (ref 0.88–1.16)
KETONES UR-MCNC: NEGATIVE — SIGNIFICANT CHANGE UP
LEUKOCYTE ESTERASE UR-ACNC: NEGATIVE — SIGNIFICANT CHANGE UP
MCHC RBC-ENTMCNC: 29.1 PG — SIGNIFICANT CHANGE UP (ref 27–34)
MCHC RBC-ENTMCNC: 30.8 PG — SIGNIFICANT CHANGE UP (ref 27–34)
MCHC RBC-ENTMCNC: 34.2 % — SIGNIFICANT CHANGE UP (ref 32–36)
MCHC RBC-ENTMCNC: 34.9 GM/DL — SIGNIFICANT CHANGE UP (ref 32–36)
MCV RBC AUTO: 85.1 FL — SIGNIFICANT CHANGE UP (ref 80–100)
MCV RBC AUTO: 88.1 FL — SIGNIFICANT CHANGE UP (ref 80–100)
NITRITE UR-MCNC: NEGATIVE — SIGNIFICANT CHANGE UP
NRBC # FLD: 0 — SIGNIFICANT CHANGE UP
NT-PROBNP SERPL-SCNC: 71 PG/ML — SIGNIFICANT CHANGE UP (ref 0–300)
PH UR: 7 — SIGNIFICANT CHANGE UP (ref 5–8)
PLATELET # BLD AUTO: 179 K/UL — SIGNIFICANT CHANGE UP (ref 150–400)
PLATELET # BLD AUTO: 202 K/UL — SIGNIFICANT CHANGE UP (ref 150–400)
PMV BLD: 9.8 FL — SIGNIFICANT CHANGE UP (ref 7–13)
POTASSIUM SERPL-MCNC: 3.7 MMOL/L — SIGNIFICANT CHANGE UP (ref 3.5–5.3)
POTASSIUM SERPL-MCNC: 4.2 MMOL/L — SIGNIFICANT CHANGE UP (ref 3.5–5.3)
POTASSIUM SERPL-SCNC: 3.7 MMOL/L — SIGNIFICANT CHANGE UP (ref 3.5–5.3)
POTASSIUM SERPL-SCNC: 4.2 MMOL/L — SIGNIFICANT CHANGE UP (ref 3.5–5.3)
PROT SERPL-MCNC: 6.8 G/DL — SIGNIFICANT CHANGE UP (ref 6–8.3)
PROT UR-MCNC: SIGNIFICANT CHANGE UP
PROTHROM AB SERPL-ACNC: 11.6 SEC — SIGNIFICANT CHANGE UP (ref 9.8–12.7)
RBC # BLD: 5.09 M/UL — SIGNIFICANT CHANGE UP (ref 4.2–5.8)
RBC # BLD: 5.29 M/UL — SIGNIFICANT CHANGE UP (ref 4.2–5.8)
RBC # FLD: 12.2 % — SIGNIFICANT CHANGE UP (ref 10.3–14.5)
RBC # FLD: 12.7 % — SIGNIFICANT CHANGE UP (ref 10.3–14.5)
RH IG SCN BLD-IMP: POSITIVE — SIGNIFICANT CHANGE UP
SODIUM SERPL-SCNC: 141 MMOL/L — SIGNIFICANT CHANGE UP (ref 135–145)
SODIUM SERPL-SCNC: 146 MMOL/L — HIGH (ref 135–145)
SP GR SPEC: 1.02 — SIGNIFICANT CHANGE UP (ref 1.01–1.02)
UROBILINOGEN FLD QL: NEGATIVE — SIGNIFICANT CHANGE UP
WBC # BLD: 7.4 K/UL — SIGNIFICANT CHANGE UP (ref 3.8–10.5)
WBC # BLD: 9.4 K/UL — SIGNIFICANT CHANGE UP (ref 3.8–10.5)
WBC # FLD AUTO: 7.4 K/UL — SIGNIFICANT CHANGE UP (ref 3.8–10.5)
WBC # FLD AUTO: 9.4 K/UL — SIGNIFICANT CHANGE UP (ref 3.8–10.5)

## 2017-08-28 PROCEDURE — 93010 ELECTROCARDIOGRAM REPORT: CPT

## 2017-08-28 PROCEDURE — 93880 EXTRACRANIAL BILAT STUDY: CPT | Mod: 26

## 2017-08-28 RX ORDER — CHLORHEXIDINE GLUCONATE 213 G/1000ML
1 SOLUTION TOPICAL ONCE
Qty: 0 | Refills: 0 | Status: COMPLETED | OUTPATIENT
Start: 2017-08-28 | End: 2017-08-28

## 2017-08-28 RX ORDER — AMLODIPINE BESYLATE 2.5 MG/1
10 TABLET ORAL DAILY
Qty: 0 | Refills: 0 | Status: DISCONTINUED | OUTPATIENT
Start: 2017-08-28 | End: 2017-08-29

## 2017-08-28 RX ORDER — SODIUM CHLORIDE 9 MG/ML
3 INJECTION INTRAMUSCULAR; INTRAVENOUS; SUBCUTANEOUS EVERY 8 HOURS
Qty: 0 | Refills: 0 | Status: DISCONTINUED | OUTPATIENT
Start: 2017-08-28 | End: 2017-08-29

## 2017-08-28 RX ORDER — CHLORHEXIDINE GLUCONATE 213 G/1000ML
30 SOLUTION TOPICAL ONCE
Qty: 0 | Refills: 0 | Status: COMPLETED | OUTPATIENT
Start: 2017-08-28 | End: 2017-08-29

## 2017-08-28 RX ORDER — ASPIRIN/CALCIUM CARB/MAGNESIUM 324 MG
81 TABLET ORAL DAILY
Qty: 0 | Refills: 0 | Status: DISCONTINUED | OUTPATIENT
Start: 2017-08-28 | End: 2017-08-29

## 2017-08-28 RX ORDER — ALLOPURINOL 300 MG
100 TABLET ORAL DAILY
Qty: 0 | Refills: 0 | Status: DISCONTINUED | OUTPATIENT
Start: 2017-08-28 | End: 2017-08-29

## 2017-08-28 RX ORDER — METOPROLOL TARTRATE 50 MG
25 TABLET ORAL
Qty: 0 | Refills: 0 | Status: DISCONTINUED | OUTPATIENT
Start: 2017-08-28 | End: 2017-08-29

## 2017-08-28 RX ORDER — SODIUM CHLORIDE 9 MG/ML
3 INJECTION INTRAMUSCULAR; INTRAVENOUS; SUBCUTANEOUS EVERY 8 HOURS
Qty: 0 | Refills: 0 | Status: DISCONTINUED | OUTPATIENT
Start: 2017-08-28 | End: 2017-09-12

## 2017-08-28 RX ORDER — CEFUROXIME AXETIL 250 MG
1500 TABLET ORAL ONCE
Qty: 0 | Refills: 0 | Status: DISCONTINUED | OUTPATIENT
Start: 2017-08-28 | End: 2017-08-29

## 2017-08-28 RX ADMIN — Medication 25 MILLIGRAM(S): at 18:37

## 2017-08-28 RX ADMIN — SODIUM CHLORIDE 3 MILLILITER(S): 9 INJECTION INTRAMUSCULAR; INTRAVENOUS; SUBCUTANEOUS at 22:31

## 2017-08-28 RX ADMIN — CHLORHEXIDINE GLUCONATE 1 APPLICATION(S): 213 SOLUTION TOPICAL at 22:30

## 2017-08-28 NOTE — H&P ADULT - NSHPREVIEWOFSYSTEMS_GEN_ALL_CORE
Review of Systems  GENERAL:  Fevers[] chills[] sweats[] fatigue[] weight loss[] weight gain []                                        NEURO:  parathesias [] seizures []  syncope []  confusion []                                                                                  EYES: glasses[]  blurry vision[]  discharge[] pain[] glaucoma []                                                                            ENMT:  difficulty hearing []  vertigo[]  dysphagia[] epistaxis[] recent dental work []                                      CV:  chest pain[] palpitations[] STEVENS [] diaphoresis [] edema[]                                                                                             RESPIRATORY:  wheezing[] SOB[] cough [] sputum[] hemoptysis[]                                                                    GI:  nausea[]  vomiting []  diarrhea[] constipation [] melena []                                                                        : hematuria[ ]  dysuria[ ] urgency[] incontinence[]                                                                                              MUSCULOSKELETAL  arthritis[ ]  joint swelling [ ] muscle weakness [ ]                                                                  SKIN/BREAST:  rash[ ] itching [ ]  hair loss[ ] masses[ ]                                                                                                PSYCH:  dementia [ ] depression [ ] anxiety[ ]                                                                                                                  HEME/LYMPH:  bruises easily[ ] enlarged lymph nodes[ ] tender lymph nodes[ ]                                                 ENDOCRINE:  cold intolerance[ ] heat intolerance[ ] polydipsia[ ] Review of Systems  GENERAL:  Fevers[] chills[] sweats[] fatigue[] weight loss[] weight gain []                                        NEURO:  parathesias [X] seizures []  syncope []  confusion []                                                                                  EYES: glasses[]  blurry vision[]  discharge[] pain[] glaucoma []                                                                            ENMT:  difficulty hearing []  vertigo[]  dysphagia[] epistaxis[] recent dental work []                                      CV:  chest pain[] palpitations[] STEVENS [] diaphoresis [] edema[]                                                                                             RESPIRATORY:  wheezing[] SOB[] cough [] sputum[] hemoptysis[]                                                                    GI:  nausea[]  vomiting []  diarrhea[] constipation [] melena []                                                                        : hematuria[ ]  dysuria[ ] urgency[] incontinence[]                                                                                              MUSCULOSKELETAL  arthritis[ ]  joint swelling [ ] muscle weakness [ ]                                                                  SKIN/BREAST:  rash[ ] itching [ ]  hair loss[ ] masses[ ]                                                                                                PSYCH:  dementia [ ] depression [ ] anxiety[ ]                                                                                                                  HEME/LYMPH:  bruises easily[ ] enlarged lymph nodes[ ] tender lymph nodes[ ]                                                 ENDOCRINE:  cold intolerance[ ] heat intolerance[ ] polydipsia[ ]

## 2017-08-28 NOTE — H&P ADULT - NSHPLABSRESULTS_GEN_ALL_CORE
LABS:                        15.4   7.40  )-----------( 179      ( 28 Aug 2017 10:00 )             45.0     08-28    146<H>  |  104  |  12  ----------------------------<  111<H>  4.2   |  29  |  0.63    Ca    9.2      28 Aug 2017 10:00    Cardiac Cath:

## 2017-08-28 NOTE — H&P CARDIOLOGY - ATTENDING COMMENTS
Patient seen and examined.  Agree with above.   -Cath with multivessel CAD  -Transfer to [facility] for CABG evaluation    Raeann Peacock MD  [facility]  [address], [address]  [place] [address]  office: [phone]  pager: [phone]

## 2017-08-28 NOTE — H&P ADULT - PROBLEM SELECTOR PLAN 1
[facility]   2. Cardiac surgery Pre op Work up   3. TTE  4. Carotid Duplex Study   5. Continue with BB   6. Continue with ASA   7. NPO after midnight   8. Plan for Cardiac Surgery in AM with Dr. Lopez

## 2017-08-28 NOTE — H&P CARDIOLOGY - NEGATIVE CARDIOVASCULAR SYMPTOMS
no claudication/no peripheral edema/no paroxysmal nocturnal dyspnea/no palpitations/no orthopnea/no chest pain/no dyspnea on exertion

## 2017-08-28 NOTE — H&P ADULT - NSHPSOCIALHISTORY_GEN_ALL_CORE
Smoker: [ ] Yes  [ ] No        PACK YEARS:                         WHEN QUIT?  ETOH use: [ ] Yes  [ ] No              FREQUENCY / QUANTITY:  Ilicit Drug use:  [ ] Yes  [ ] No  Occupation:  Live with:  Assist device use: Smoker: [ X] Yes  [ ] No        PACK YEARS: 3-5 cigarets per day             WHEN QUIT?  ETOH use: [ ] Yes  [ ] No              FREQUENCY / QUANTITY:  Ilicit Drug use:  [ ] Yes  [ ] No  Occupation:  Live with:  Assist device use: Smoker: [ X] Yes  [ ] No        PACK YEARS: 3-5 cigars per day X 43 yrs            WHEN QUIT?  ETOH use: [X ] Yes  [ ] No              FREQUENCY / QUANTITY:  Ilicit Drug use:  [ ] Yes  [X ] No  Occupation: Retired    Live with: Spouse   Assist device use: Ambulating with Crutches Smoker: [ X] Yes  [ ] No        PACK YEARS: 3-5 cigars per day X 43 yrs            WHEN QUIT?  ETOH use: [X ] Yes  [ ] No              FREQUENCY / QUANTITY:  Ilicit Drug use:  [ ] Yes  [X ] No  Occupation: Retired    Live with: Spouse   Assist device use: Ambulating with Crutches / LLE cast in place

## 2017-08-28 NOTE — H&P CARDIOLOGY - HISTORY OF PRESENT ILLNESS
62 y/o M w/ PMH of HTN, HLD and ASIYA on CPAP presents for cardiac catheretization. Pt's echocardiogram shows dilate LV size and normal LV function(EF 54%). Pt states that he has been asymptomatic. Pt will have cardiac angiogram to r/o CAD. Pt denies N/V/D, fevers, chills, cough, palpitations, chest pain, substernal distress, syncope, dyspnea on exertion, orthopnea, nocturnal paroxysmal dyspnea, edema, cyanosis, heart murmurs, varicosities, phlebitis, claudication.

## 2017-08-28 NOTE — H&P ADULT - NSHPPHYSICALEXAM_GEN_ALL_CORE
PHYSICAL EXAM  Vital Signs Last 24 Hrs  T(C): 36.8 (28 Aug 2017 16:40), Max: 36.8 (28 Aug 2017 16:40)  T(F): 98.3 (28 Aug 2017 16:40), Max: 98.3 (28 Aug 2017 16:40)  HR: 68 (28 Aug 2017 16:40) (68 - 68)  BP: 139/85 (28 Aug 2017 16:40) (139/85 - 139/85)  BP(mean): --  RR: 20 (28 Aug 2017 16:40) (20 - 20)  SpO2: 96% (28 Aug 2017 16:40) (96% - 96%)    General: Well nourished, well developed, no acute distress.                                                         Neuro: Normal exam oriented to person/place & time with no focal motor or sensory  deficits.                    Eyes: Normal exam of conjunctiva & lids, pupils equally reactive.   ENT: Normal exam of nasal/oral mucosa with absence of cyanosis.   Neck: Normal exam of jugular veins, trachea & thyroid.   Chest: Normal lung exam with good air movement absence of wheezes, rales, or rhonchi:                                                                          CV:  Auscultation: normal [ ] S3[ ] S4[ ] Irregular [ ] Rub[ ] Clicks[ ]  Murmurs none:[ ]systolic [ ]  diastolic [ ] holosystolic [ ]  Carotids: No Bruits[ ] Other____________ Abdominal Aorta: normal [ ] nonpalpable[ ]                                                                         GI: Normal exam of abdomen, liver & spleen with no noted masses or tenderness.  (+) BS X 4 Quadrants, Nontender / Non Distended.                   Extremities: Normal no evidence of cyanosis or deformity Edema: none[ ]trace[ ]1+[ ]2+[ ]3+[ ]4+[ ]  Lower Extremity Pulses: Right[ ] Left[ ]Varicosities[ ]  SKIN : Normal exam to inspection & palpation. PHYSICAL EXAM  Vital Signs Last 24 Hrs  T(C): 36.8 (28 Aug 2017 16:40), Max: 36.8 (28 Aug 2017 16:40)  T(F): 98.3 (28 Aug 2017 16:40), Max: 98.3 (28 Aug 2017 16:40)  HR: 68 (28 Aug 2017 16:40) (68 - 68)  BP: 139/85 (28 Aug 2017 16:40) (139/85 - 139/85)  BP(mean): --  RR: 20 (28 Aug 2017 16:40) (20 - 20)  SpO2: 96% (28 Aug 2017 16:40) (96% - 96%)    General: Well nourished, well developed, no acute distress.                                                         Neuro: Normal exam oriented to person/place & time with no focal motor or sensory deficits.                    Eyes: Normal exam of conjunctiva & lids, pupils equally reactive.   ENT: Normal exam of nasal/oral mucosa with absence of cyanosis.   Neck: Normal exam of jugular veins, trachea & thyroid.   Chest: Normal lung exam with good air movement absence of wheezes, rales, or rhonchi:                                                                          CV:  Auscultation: normal [X ] S3[ ] S4[ ] Irregular [ ] Rub[ ] Clicks[ ]  Murmurs none:[ ]systolic [ ]  diastolic [ ] holosystolic [ ]  Carotids: No Bruits[X ] Other____________ Abdominal Aorta: normal [+ ] nonpalpable[ ]  + Abdominal hernia present                                             GI: Normal exam of abdomen, liver & spleen with no noted masses or tenderness.  (+) BS X 4 Quadrants, Nontender / Non Distended.                   Extremities: Normal no evidence of cyanosis or deformity Edema: none[X]trace[ ]1+[ ]2+[ ]3+[ ]4+[ ]; LLE with cast in place   Lower Extremity Pulses: Right[+2 ] Left[ GASTON ]Varicosities[x ]  SKIN : Normal exam to inspection & palpation.

## 2017-08-28 NOTE — H&P CARDIOLOGY - RS GEN PE MLT RESP DETAILS PC
clear to auscultation bilaterally/no chest wall tenderness/respirations non-labored/good air movement/breath sounds equal/airway patent

## 2017-08-28 NOTE — H&P ADULT - PMH
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

## 2017-08-28 NOTE — H&P ADULT - HISTORY OF PRESENT ILLNESS
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 History of Present Illness:  62 y/o M w/ PMH of HTN, HLD, current smoker and ASIYA on CPAP presented to [facility] 8/28 for cardiac catheretization 2/2 to echocardiogram finding of dilate LV size and normal LV function (EF 54%). Pt states that he has been asymptomatic. Pt will have cardiac angiogram to r/o CAD. Pt denies N/V/D, fevers, chills, cough, palpitations, chest pain, substernal distress, syncope, dyspnea on exertion, orthopnea, nocturnal paroxysmal dyspnea, edema, cyanosis, heart murmurs, varicosities, phlebitis, claudication. Cardiac Cath finding revealed multivessel CAD.  Patient transferred to [facility] 8/28 forfurther management and cardiac surgery evaluation with Dr. Lopez History of Present Illness:  60 y/o M w/ PMH of HTN, HLD, current smoker and ASIYA on CPAP, Left ankle fx,  presented to [facility] 8/28 for cardiac catheretization 2/2 to echocardiogram finding of dilate LV size and normal LV function (EF 54%). Pt states that he has been asymptomatic. Pt will have cardiac angiogram to r/o CAD. Pt denies N/V/D, fevers, chills, cough, palpitations, chest pain, substernal distress, syncope, dyspnea on exertion, orthopnea, nocturnal paroxysmal dyspnea, edema, cyanosis, heart murmurs, varicosities, phlebitis, claudication. Cardiac Cath finding revealed multivessel CAD.  Patient transferred to [facility] 8/28 for further management and cardiac surgery evaluation with Dr. Lopez History of Present Illness:  62 y/o M w/ PMH of HTN, HLD, current smoker and ASIYA on CPAP, Left ankle fx w/ LLE cast (7/20/17), presented to [facility] 8/28 for cardiac catheretization 2/2 to echocardiogram finding of dilate LV size and normal LV function (EF 54%). Pt states that he has been asymptomatic. Pt will have cardiac angiogram to r/o CAD. Pt denies N/V/D, fevers, chills, cough, palpitations, chest pain, substernal distress, syncope, dyspnea on exertion, orthopnea, nocturnal paroxysmal dyspnea, edema, cyanosis, heart murmurs, varicosities, phlebitis, claudication. Cardiac Cath finding revealed multivessel CAD.  Patient transferred to [facility] 8/28 for further management and cardiac surgery evaluation with Dr. Lopez

## 2017-08-28 NOTE — H&P ADULT - PROBLEM SELECTOR PLAN 2
1. Ortho Consult Called for LLE cast; As per PA to call [facility] [phone] for possible cast removal post op.   2. Pain management

## 2017-08-29 ENCOUNTER — APPOINTMENT (OUTPATIENT)
Dept: CARDIOTHORACIC SURGERY | Facility: HOSPITAL | Age: 62
End: 2017-08-29

## 2017-08-29 LAB
ALBUMIN SERPL ELPH-MCNC: 3.6 G/DL — SIGNIFICANT CHANGE UP (ref 3.3–5)
ALP SERPL-CCNC: 67 U/L — SIGNIFICANT CHANGE UP (ref 40–120)
ALT FLD-CCNC: 74 U/L RC — HIGH (ref 10–45)
ANION GAP SERPL CALC-SCNC: 13 MMOL/L — SIGNIFICANT CHANGE UP (ref 5–17)
APTT BLD: 24.7 SEC — LOW (ref 27.5–37.4)
AST SERPL-CCNC: 150 U/L — HIGH (ref 10–40)
BASOPHILS # BLD AUTO: 0 K/UL — SIGNIFICANT CHANGE UP (ref 0–0.2)
BASOPHILS NFR BLD AUTO: 0 % — SIGNIFICANT CHANGE UP (ref 0–2)
BILIRUB SERPL-MCNC: 1 MG/DL — SIGNIFICANT CHANGE UP (ref 0.2–1.2)
BUN SERPL-MCNC: 12 MG/DL — SIGNIFICANT CHANGE UP (ref 7–23)
CALCIUM SERPL-MCNC: 8.3 MG/DL — LOW (ref 8.4–10.5)
CHLORIDE SERPL-SCNC: 103 MMOL/L — SIGNIFICANT CHANGE UP (ref 96–108)
CK MB BLD-MCNC: 7.8 % — HIGH (ref 0–3.5)
CK MB CFR SERPL CALC: 32.6 NG/ML — HIGH (ref 0–6.7)
CK SERPL-CCNC: 419 U/L — HIGH (ref 30–200)
CO2 SERPL-SCNC: 26 MMOL/L — SIGNIFICANT CHANGE UP (ref 22–31)
CREAT SERPL-MCNC: 0.57 MG/DL — SIGNIFICANT CHANGE UP (ref 0.5–1.3)
EOSINOPHIL # BLD AUTO: 0.1 K/UL — SIGNIFICANT CHANGE UP (ref 0–0.5)
EOSINOPHIL NFR BLD AUTO: 0.7 % — SIGNIFICANT CHANGE UP (ref 0–6)
FIBRINOGEN PPP-MCNC: 591 MG/DL — HIGH (ref 310–510)
GAS PNL BLDA: SIGNIFICANT CHANGE UP
GLUCOSE SERPL-MCNC: 122 MG/DL — HIGH (ref 70–99)
HCT VFR BLD CALC: 41 % — SIGNIFICANT CHANGE UP (ref 39–50)
HGB BLD-MCNC: 14.4 G/DL — SIGNIFICANT CHANGE UP (ref 13–17)
INR BLD: 1.15 RATIO — SIGNIFICANT CHANGE UP (ref 0.88–1.16)
LYMPHOCYTES # BLD AUTO: 1 K/UL — SIGNIFICANT CHANGE UP (ref 1–3.3)
LYMPHOCYTES # BLD AUTO: 6.7 % — LOW (ref 13–44)
MCHC RBC-ENTMCNC: 30.9 PG — SIGNIFICANT CHANGE UP (ref 27–34)
MCHC RBC-ENTMCNC: 35.1 GM/DL — SIGNIFICANT CHANGE UP (ref 32–36)
MCV RBC AUTO: 88.3 FL — SIGNIFICANT CHANGE UP (ref 80–100)
MONOCYTES # BLD AUTO: 1.2 K/UL — HIGH (ref 0–0.9)
MONOCYTES NFR BLD AUTO: 7.8 % — SIGNIFICANT CHANGE UP (ref 2–14)
MRSA PCR RESULT.: SIGNIFICANT CHANGE UP
NEUTROPHILS # BLD AUTO: 12.9 K/UL — HIGH (ref 1.8–7.4)
NEUTROPHILS NFR BLD AUTO: 84.7 % — HIGH (ref 43–77)
PLATELET # BLD AUTO: 141 K/UL — LOW (ref 150–400)
POTASSIUM SERPL-MCNC: 4.1 MMOL/L — SIGNIFICANT CHANGE UP (ref 3.5–5.3)
POTASSIUM SERPL-SCNC: 4.1 MMOL/L — SIGNIFICANT CHANGE UP (ref 3.5–5.3)
PROT SERPL-MCNC: 5.7 G/DL — LOW (ref 6–8.3)
PROTHROM AB SERPL-ACNC: 12.5 SEC — SIGNIFICANT CHANGE UP (ref 9.8–12.7)
RBC # BLD: 4.65 M/UL — SIGNIFICANT CHANGE UP (ref 4.2–5.8)
RBC # FLD: 12.1 % — SIGNIFICANT CHANGE UP (ref 10.3–14.5)
S AUREUS DNA NOSE QL NAA+PROBE: DETECTED
SODIUM SERPL-SCNC: 142 MMOL/L — SIGNIFICANT CHANGE UP (ref 135–145)
TROPONIN T SERPL-MCNC: 0.29 NG/ML — HIGH (ref 0–0.06)
TSH SERPL-MCNC: 1.3 UIU/ML — SIGNIFICANT CHANGE UP (ref 0.27–4.2)
WBC # BLD: 15.2 K/UL — HIGH (ref 3.8–10.5)
WBC # FLD AUTO: 15.2 K/UL — HIGH (ref 3.8–10.5)

## 2017-08-29 PROCEDURE — 71010: CPT | Mod: 26

## 2017-08-29 PROCEDURE — 93306 TTE W/DOPPLER COMPLETE: CPT | Mod: 26

## 2017-08-29 PROCEDURE — 33508 ENDOSCOPIC VEIN HARVEST: CPT

## 2017-08-29 PROCEDURE — 33533 CABG ARTERIAL SINGLE: CPT

## 2017-08-29 PROCEDURE — 94010 BREATHING CAPACITY TEST: CPT | Mod: 26

## 2017-08-29 PROCEDURE — 93010 ELECTROCARDIOGRAM REPORT: CPT

## 2017-08-29 PROCEDURE — 33518 CABG ARTERY-VEIN TWO: CPT

## 2017-08-29 RX ORDER — PANTOPRAZOLE SODIUM 20 MG/1
40 TABLET, DELAYED RELEASE ORAL DAILY
Qty: 0 | Refills: 0 | Status: DISCONTINUED | OUTPATIENT
Start: 2017-08-29 | End: 2017-08-30

## 2017-08-29 RX ORDER — ALBUMIN HUMAN 25 %
250 VIAL (ML) INTRAVENOUS ONCE
Qty: 0 | Refills: 0 | Status: COMPLETED | OUTPATIENT
Start: 2017-08-29 | End: 2017-08-29

## 2017-08-29 RX ORDER — POTASSIUM CHLORIDE 20 MEQ
10 PACKET (EA) ORAL
Qty: 0 | Refills: 0 | Status: DISCONTINUED | OUTPATIENT
Start: 2017-08-29 | End: 2017-08-30

## 2017-08-29 RX ORDER — SODIUM CHLORIDE 9 MG/ML
1000 INJECTION, SOLUTION INTRAVENOUS ONCE
Qty: 0 | Refills: 0 | Status: COMPLETED | OUTPATIENT
Start: 2017-08-29 | End: 2017-08-29

## 2017-08-29 RX ORDER — MEPERIDINE HYDROCHLORIDE 50 MG/ML
25 INJECTION INTRAMUSCULAR; INTRAVENOUS; SUBCUTANEOUS ONCE
Qty: 0 | Refills: 0 | Status: DISCONTINUED | OUTPATIENT
Start: 2017-08-29 | End: 2017-08-29

## 2017-08-29 RX ORDER — HYDROMORPHONE HYDROCHLORIDE 2 MG/ML
0.5 INJECTION INTRAMUSCULAR; INTRAVENOUS; SUBCUTANEOUS ONCE
Qty: 0 | Refills: 0 | Status: DISCONTINUED | OUTPATIENT
Start: 2017-08-29 | End: 2017-08-29

## 2017-08-29 RX ORDER — DEXMEDETOMIDINE HYDROCHLORIDE IN 0.9% SODIUM CHLORIDE 4 UG/ML
0.2 INJECTION INTRAVENOUS
Qty: 200 | Refills: 0 | Status: DISCONTINUED | OUTPATIENT
Start: 2017-08-29 | End: 2017-08-29

## 2017-08-29 RX ORDER — POTASSIUM CHLORIDE 20 MEQ
10 PACKET (EA) ORAL
Qty: 0 | Refills: 0 | Status: COMPLETED | OUTPATIENT
Start: 2017-08-29 | End: 2017-08-29

## 2017-08-29 RX ORDER — ASPIRIN/CALCIUM CARB/MAGNESIUM 324 MG
325 TABLET ORAL ONCE
Qty: 0 | Refills: 0 | Status: COMPLETED | OUTPATIENT
Start: 2017-08-29 | End: 2017-08-29

## 2017-08-29 RX ORDER — ACETAMINOPHEN 500 MG
1000 TABLET ORAL ONCE
Qty: 0 | Refills: 0 | Status: COMPLETED | OUTPATIENT
Start: 2017-08-29 | End: 2017-08-29

## 2017-08-29 RX ORDER — SODIUM CHLORIDE 9 MG/ML
500 INJECTION, SOLUTION INTRAVENOUS ONCE
Qty: 0 | Refills: 0 | Status: COMPLETED | OUTPATIENT
Start: 2017-08-29 | End: 2017-08-29

## 2017-08-29 RX ORDER — INSULIN HUMAN 100 [IU]/ML
2 INJECTION, SOLUTION SUBCUTANEOUS
Qty: 100 | Refills: 0 | Status: DISCONTINUED | OUTPATIENT
Start: 2017-08-29 | End: 2017-08-30

## 2017-08-29 RX ORDER — NOREPINEPHRINE BITARTRATE/D5W 8 MG/250ML
0.02 PLASTIC BAG, INJECTION (ML) INTRAVENOUS
Qty: 8 | Refills: 0 | Status: DISCONTINUED | OUTPATIENT
Start: 2017-08-29 | End: 2017-08-30

## 2017-08-29 RX ORDER — ASPIRIN/CALCIUM CARB/MAGNESIUM 324 MG
325 TABLET ORAL DAILY
Qty: 0 | Refills: 0 | Status: DISCONTINUED | OUTPATIENT
Start: 2017-08-30 | End: 2017-09-05

## 2017-08-29 RX ORDER — ASPIRIN/CALCIUM CARB/MAGNESIUM 324 MG
325 TABLET ORAL DAILY
Qty: 0 | Refills: 0 | Status: DISCONTINUED | OUTPATIENT
Start: 2017-08-29 | End: 2017-08-29

## 2017-08-29 RX ORDER — POTASSIUM CHLORIDE 20 MEQ
10 PACKET (EA) ORAL ONCE
Qty: 0 | Refills: 0 | Status: DISCONTINUED | OUTPATIENT
Start: 2017-08-29 | End: 2017-08-30

## 2017-08-29 RX ORDER — CEFUROXIME AXETIL 250 MG
1500 TABLET ORAL EVERY 8 HOURS
Qty: 0 | Refills: 0 | Status: COMPLETED | OUTPATIENT
Start: 2017-08-29 | End: 2017-08-31

## 2017-08-29 RX ORDER — SODIUM CHLORIDE 9 MG/ML
1000 INJECTION, SOLUTION INTRAVENOUS
Qty: 0 | Refills: 0 | Status: DISCONTINUED | OUTPATIENT
Start: 2017-08-29 | End: 2017-09-04

## 2017-08-29 RX ORDER — METOCLOPRAMIDE HCL 10 MG
10 TABLET ORAL EVERY 8 HOURS
Qty: 0 | Refills: 0 | Status: COMPLETED | OUTPATIENT
Start: 2017-08-29 | End: 2017-08-30

## 2017-08-29 RX ADMIN — CHLORHEXIDINE GLUCONATE 30 MILLILITER(S): 213 SOLUTION TOPICAL at 07:23

## 2017-08-29 RX ADMIN — SODIUM CHLORIDE 2000 MILLILITER(S): 9 INJECTION, SOLUTION INTRAVENOUS at 21:00

## 2017-08-29 RX ADMIN — Medication 125 MILLILITER(S): at 21:16

## 2017-08-29 RX ADMIN — Medication 5 MICROGRAM(S)/KG/MIN: at 22:27

## 2017-08-29 RX ADMIN — Medication 100 MILLIEQUIVALENT(S): at 22:00

## 2017-08-29 RX ADMIN — HYDROMORPHONE HYDROCHLORIDE 0.5 MILLIGRAM(S): 2 INJECTION INTRAMUSCULAR; INTRAVENOUS; SUBCUTANEOUS at 21:00

## 2017-08-29 RX ADMIN — Medication 125 MILLILITER(S): at 22:27

## 2017-08-29 RX ADMIN — HYDROMORPHONE HYDROCHLORIDE 0.5 MILLIGRAM(S): 2 INJECTION INTRAMUSCULAR; INTRAVENOUS; SUBCUTANEOUS at 19:25

## 2017-08-29 RX ADMIN — Medication 400 MILLIGRAM(S): at 19:08

## 2017-08-29 RX ADMIN — Medication 1000 MILLIGRAM(S): at 19:25

## 2017-08-29 RX ADMIN — SODIUM CHLORIDE 3000 MILLILITER(S): 9 INJECTION, SOLUTION INTRAVENOUS at 19:11

## 2017-08-29 RX ADMIN — Medication 100 MILLIEQUIVALENT(S): at 22:40

## 2017-08-29 RX ADMIN — AMLODIPINE BESYLATE 10 MILLIGRAM(S): 2.5 TABLET ORAL at 05:20

## 2017-08-29 RX ADMIN — HYDROMORPHONE HYDROCHLORIDE 0.5 MILLIGRAM(S): 2 INJECTION INTRAMUSCULAR; INTRAVENOUS; SUBCUTANEOUS at 19:08

## 2017-08-29 RX ADMIN — PANTOPRAZOLE SODIUM 40 MILLIGRAM(S): 20 TABLET, DELAYED RELEASE ORAL at 22:28

## 2017-08-29 RX ADMIN — Medication 25 MILLIGRAM(S): at 05:20

## 2017-08-29 RX ADMIN — Medication 10 MILLIGRAM(S): at 22:28

## 2017-08-29 RX ADMIN — Medication 100 MILLIEQUIVALENT(S): at 17:30

## 2017-08-29 RX ADMIN — Medication 100 MILLIEQUIVALENT(S): at 17:06

## 2017-08-29 RX ADMIN — HYDROMORPHONE HYDROCHLORIDE 0.5 MILLIGRAM(S): 2 INJECTION INTRAMUSCULAR; INTRAVENOUS; SUBCUTANEOUS at 20:45

## 2017-08-29 RX ADMIN — SODIUM CHLORIDE 3 MILLILITER(S): 9 INJECTION INTRAMUSCULAR; INTRAVENOUS; SUBCUTANEOUS at 05:17

## 2017-08-29 RX ADMIN — Medication 100 MILLIGRAM(S): at 22:27

## 2017-08-29 NOTE — AIRWAY REMOVAL NOTE  ADULT & PEDS - ARTIFICAL AIRWAY REMOVAL COMMENTS
Written order for extubation verified. The patient was identified by full name and birth date compared to the identification band. Present during the procedure was Yecenia Richmond RN.

## 2017-08-30 DIAGNOSIS — G47.33 OBSTRUCTIVE SLEEP APNEA (ADULT) (PEDIATRIC): ICD-10-CM

## 2017-08-30 DIAGNOSIS — Z95.1 PRESENCE OF AORTOCORONARY BYPASS GRAFT: ICD-10-CM

## 2017-08-30 DIAGNOSIS — I25.10 ATHEROSCLEROTIC HEART DISEASE OF NATIVE CORONARY ARTERY WITHOUT ANGINA PECTORIS: ICD-10-CM

## 2017-08-30 DIAGNOSIS — F17.200 NICOTINE DEPENDENCE, UNSPECIFIED, UNCOMPLICATED: ICD-10-CM

## 2017-08-30 DIAGNOSIS — I10 ESSENTIAL (PRIMARY) HYPERTENSION: ICD-10-CM

## 2017-08-30 DIAGNOSIS — J95.89 OTHER POSTPROCEDURAL COMPLICATIONS AND DISORDERS OF RESPIRATORY SYSTEM, NOT ELSEWHERE CLASSIFIED: ICD-10-CM

## 2017-08-30 LAB
ALBUMIN SERPL ELPH-MCNC: 3.6 G/DL — SIGNIFICANT CHANGE UP (ref 3.3–5)
ALP SERPL-CCNC: 65 U/L — SIGNIFICANT CHANGE UP (ref 40–120)
ALT FLD-CCNC: 63 U/L RC — HIGH (ref 10–45)
ANION GAP SERPL CALC-SCNC: 11 MMOL/L — SIGNIFICANT CHANGE UP (ref 5–17)
APTT BLD: 23.2 SEC — LOW (ref 27.5–37.4)
AST SERPL-CCNC: 80 U/L — HIGH (ref 10–40)
BILIRUB SERPL-MCNC: 0.8 MG/DL — SIGNIFICANT CHANGE UP (ref 0.2–1.2)
BUN SERPL-MCNC: 12 MG/DL — SIGNIFICANT CHANGE UP (ref 7–23)
CALCIUM SERPL-MCNC: 8.3 MG/DL — LOW (ref 8.4–10.5)
CHLORIDE SERPL-SCNC: 102 MMOL/L — SIGNIFICANT CHANGE UP (ref 96–108)
CK MB BLD-MCNC: 2.1 % — SIGNIFICANT CHANGE UP (ref 0–3.5)
CK MB BLD-MCNC: 3.2 % — SIGNIFICANT CHANGE UP (ref 0–3.5)
CK MB CFR SERPL CALC: 36.6 NG/ML — HIGH (ref 0–6.7)
CK MB CFR SERPL CALC: 39.6 NG/ML — HIGH (ref 0–6.7)
CK SERPL-CCNC: 1241 U/L — HIGH (ref 30–200)
CK SERPL-CCNC: 1759 U/L — HIGH (ref 30–200)
CO2 SERPL-SCNC: 25 MMOL/L — SIGNIFICANT CHANGE UP (ref 22–31)
CREAT SERPL-MCNC: 0.56 MG/DL — SIGNIFICANT CHANGE UP (ref 0.5–1.3)
GAS PNL BLDA: SIGNIFICANT CHANGE UP
GLUCOSE SERPL-MCNC: 131 MG/DL — HIGH (ref 70–99)
HCT VFR BLD CALC: 38.8 % — LOW (ref 39–50)
HGB BLD-MCNC: 13.4 G/DL — SIGNIFICANT CHANGE UP (ref 13–17)
INR BLD: 1.14 RATIO — SIGNIFICANT CHANGE UP (ref 0.88–1.16)
MCHC RBC-ENTMCNC: 30.6 PG — SIGNIFICANT CHANGE UP (ref 27–34)
MCHC RBC-ENTMCNC: 34.6 GM/DL — SIGNIFICANT CHANGE UP (ref 32–36)
MCV RBC AUTO: 88.5 FL — SIGNIFICANT CHANGE UP (ref 80–100)
PHOSPHATE SERPL-MCNC: 3.4 MG/DL — SIGNIFICANT CHANGE UP (ref 2.5–4.5)
PLATELET # BLD AUTO: 144 K/UL — LOW (ref 150–400)
POTASSIUM SERPL-MCNC: 4.5 MMOL/L — SIGNIFICANT CHANGE UP (ref 3.5–5.3)
POTASSIUM SERPL-SCNC: 4.5 MMOL/L — SIGNIFICANT CHANGE UP (ref 3.5–5.3)
PROT SERPL-MCNC: 5.8 G/DL — LOW (ref 6–8.3)
PROTHROM AB SERPL-ACNC: 12.4 SEC — SIGNIFICANT CHANGE UP (ref 9.8–12.7)
RBC # BLD: 4.39 M/UL — SIGNIFICANT CHANGE UP (ref 4.2–5.8)
RBC # FLD: 12.2 % — SIGNIFICANT CHANGE UP (ref 10.3–14.5)
SODIUM SERPL-SCNC: 138 MMOL/L — SIGNIFICANT CHANGE UP (ref 135–145)
TROPONIN T SERPL-MCNC: 0.29 NG/ML — HIGH (ref 0–0.06)
TROPONIN T SERPL-MCNC: 0.41 NG/ML — HIGH (ref 0–0.06)
WBC # BLD: 10.7 K/UL — HIGH (ref 3.8–10.5)
WBC # FLD AUTO: 10.7 K/UL — HIGH (ref 3.8–10.5)

## 2017-08-30 PROCEDURE — 71010: CPT | Mod: 26,77

## 2017-08-30 PROCEDURE — 93010 ELECTROCARDIOGRAM REPORT: CPT

## 2017-08-30 PROCEDURE — 71010: CPT | Mod: 26

## 2017-08-30 PROCEDURE — 99233 SBSQ HOSP IP/OBS HIGH 50: CPT

## 2017-08-30 RX ORDER — ACETAMINOPHEN 500 MG
1000 TABLET ORAL ONCE
Qty: 0 | Refills: 0 | Status: COMPLETED | OUTPATIENT
Start: 2017-08-30 | End: 2017-08-30

## 2017-08-30 RX ORDER — PANTOPRAZOLE SODIUM 20 MG/1
40 TABLET, DELAYED RELEASE ORAL
Qty: 0 | Refills: 0 | Status: DISCONTINUED | OUTPATIENT
Start: 2017-08-31 | End: 2017-09-05

## 2017-08-30 RX ORDER — ALBUMIN HUMAN 25 %
250 VIAL (ML) INTRAVENOUS ONCE
Qty: 0 | Refills: 0 | Status: COMPLETED | OUTPATIENT
Start: 2017-08-30 | End: 2017-08-30

## 2017-08-30 RX ORDER — INSULIN LISPRO 100/ML
VIAL (ML) SUBCUTANEOUS
Qty: 0 | Refills: 0 | Status: DISCONTINUED | OUTPATIENT
Start: 2017-08-30 | End: 2017-09-04

## 2017-08-30 RX ORDER — HYDROMORPHONE HYDROCHLORIDE 2 MG/ML
0.5 INJECTION INTRAMUSCULAR; INTRAVENOUS; SUBCUTANEOUS ONCE
Qty: 0 | Refills: 0 | Status: DISCONTINUED | OUTPATIENT
Start: 2017-08-30 | End: 2017-08-30

## 2017-08-30 RX ORDER — ATORVASTATIN CALCIUM 80 MG/1
40 TABLET, FILM COATED ORAL AT BEDTIME
Qty: 0 | Refills: 0 | Status: DISCONTINUED | OUTPATIENT
Start: 2017-08-30 | End: 2017-09-05

## 2017-08-30 RX ORDER — GLUCAGON INJECTION, SOLUTION 0.5 MG/.1ML
1 INJECTION, SOLUTION SUBCUTANEOUS ONCE
Qty: 0 | Refills: 0 | Status: DISCONTINUED | OUTPATIENT
Start: 2017-08-30 | End: 2017-09-04

## 2017-08-30 RX ORDER — INSULIN LISPRO 100/ML
VIAL (ML) SUBCUTANEOUS AT BEDTIME
Qty: 0 | Refills: 0 | Status: DISCONTINUED | OUTPATIENT
Start: 2017-08-30 | End: 2017-09-04

## 2017-08-30 RX ORDER — POTASSIUM CHLORIDE 20 MEQ
10 PACKET (EA) ORAL
Qty: 0 | Refills: 0 | Status: COMPLETED | OUTPATIENT
Start: 2017-08-30 | End: 2017-08-30

## 2017-08-30 RX ORDER — ENOXAPARIN SODIUM 100 MG/ML
60 INJECTION SUBCUTANEOUS ONCE
Qty: 0 | Refills: 0 | Status: COMPLETED | OUTPATIENT
Start: 2017-08-30 | End: 2017-08-30

## 2017-08-30 RX ORDER — ALLOPURINOL 300 MG
100 TABLET ORAL DAILY
Qty: 0 | Refills: 0 | Status: DISCONTINUED | OUTPATIENT
Start: 2017-08-30 | End: 2017-09-05

## 2017-08-30 RX ORDER — OXYCODONE AND ACETAMINOPHEN 5; 325 MG/1; MG/1
2 TABLET ORAL EVERY 4 HOURS
Qty: 0 | Refills: 0 | Status: DISCONTINUED | OUTPATIENT
Start: 2017-08-30 | End: 2017-09-05

## 2017-08-30 RX ORDER — ENOXAPARIN SODIUM 100 MG/ML
30 INJECTION SUBCUTANEOUS EVERY 12 HOURS
Qty: 0 | Refills: 0 | Status: DISCONTINUED | OUTPATIENT
Start: 2017-08-31 | End: 2017-09-02

## 2017-08-30 RX ORDER — DEXTROSE 50 % IN WATER 50 %
12.5 SYRINGE (ML) INTRAVENOUS ONCE
Qty: 0 | Refills: 0 | Status: DISCONTINUED | OUTPATIENT
Start: 2017-08-30 | End: 2017-09-04

## 2017-08-30 RX ORDER — METOPROLOL TARTRATE 50 MG
25 TABLET ORAL EVERY 12 HOURS
Qty: 0 | Refills: 0 | Status: DISCONTINUED | OUTPATIENT
Start: 2017-08-30 | End: 2017-09-01

## 2017-08-30 RX ORDER — DEXTROSE 50 % IN WATER 50 %
1 SYRINGE (ML) INTRAVENOUS ONCE
Qty: 0 | Refills: 0 | Status: DISCONTINUED | OUTPATIENT
Start: 2017-08-30 | End: 2017-09-04

## 2017-08-30 RX ORDER — DEXTROSE 50 % IN WATER 50 %
25 SYRINGE (ML) INTRAVENOUS ONCE
Qty: 0 | Refills: 0 | Status: DISCONTINUED | OUTPATIENT
Start: 2017-08-30 | End: 2017-09-04

## 2017-08-30 RX ORDER — DOCUSATE SODIUM 100 MG
100 CAPSULE ORAL THREE TIMES A DAY
Qty: 0 | Refills: 0 | Status: DISCONTINUED | OUTPATIENT
Start: 2017-08-30 | End: 2017-09-05

## 2017-08-30 RX ORDER — OXYCODONE AND ACETAMINOPHEN 5; 325 MG/1; MG/1
1 TABLET ORAL EVERY 4 HOURS
Qty: 0 | Refills: 0 | Status: DISCONTINUED | OUTPATIENT
Start: 2017-08-30 | End: 2017-09-05

## 2017-08-30 RX ORDER — MUPIROCIN 20 MG/G
1 OINTMENT TOPICAL
Qty: 0 | Refills: 0 | Status: DISCONTINUED | OUTPATIENT
Start: 2017-08-30 | End: 2017-09-05

## 2017-08-30 RX ORDER — SODIUM CHLORIDE 9 MG/ML
1000 INJECTION, SOLUTION INTRAVENOUS
Qty: 0 | Refills: 0 | Status: DISCONTINUED | OUTPATIENT
Start: 2017-08-30 | End: 2017-09-04

## 2017-08-30 RX ADMIN — Medication 325 MILLIGRAM(S): at 00:18

## 2017-08-30 RX ADMIN — Medication 100 MILLIGRAM(S): at 21:27

## 2017-08-30 RX ADMIN — OXYCODONE AND ACETAMINOPHEN 2 TABLET(S): 5; 325 TABLET ORAL at 10:22

## 2017-08-30 RX ADMIN — ENOXAPARIN SODIUM 60 MILLIGRAM(S): 100 INJECTION SUBCUTANEOUS at 06:48

## 2017-08-30 RX ADMIN — Medication 125 MILLILITER(S): at 05:00

## 2017-08-30 RX ADMIN — Medication 100 MILLIEQUIVALENT(S): at 02:30

## 2017-08-30 RX ADMIN — OXYCODONE AND ACETAMINOPHEN 2 TABLET(S): 5; 325 TABLET ORAL at 21:27

## 2017-08-30 RX ADMIN — Medication 100 MILLIGRAM(S): at 13:19

## 2017-08-30 RX ADMIN — Medication 10 MILLIGRAM(S): at 05:13

## 2017-08-30 RX ADMIN — Medication 100 MILLIEQUIVALENT(S): at 08:33

## 2017-08-30 RX ADMIN — HYDROMORPHONE HYDROCHLORIDE 0.5 MILLIGRAM(S): 2 INJECTION INTRAMUSCULAR; INTRAVENOUS; SUBCUTANEOUS at 08:45

## 2017-08-30 RX ADMIN — Medication 100 MILLIGRAM(S): at 05:12

## 2017-08-30 RX ADMIN — Medication 1000 MILLIGRAM(S): at 05:05

## 2017-08-30 RX ADMIN — OXYCODONE AND ACETAMINOPHEN 2 TABLET(S): 5; 325 TABLET ORAL at 18:45

## 2017-08-30 RX ADMIN — Medication 10 MILLIGRAM(S): at 21:27

## 2017-08-30 RX ADMIN — Medication 10 MILLIGRAM(S): at 13:19

## 2017-08-30 RX ADMIN — HYDROMORPHONE HYDROCHLORIDE 0.5 MILLIGRAM(S): 2 INJECTION INTRAMUSCULAR; INTRAVENOUS; SUBCUTANEOUS at 01:00

## 2017-08-30 RX ADMIN — Medication 25 MILLIGRAM(S): at 09:54

## 2017-08-30 RX ADMIN — HYDROMORPHONE HYDROCHLORIDE 0.5 MILLIGRAM(S): 2 INJECTION INTRAMUSCULAR; INTRAVENOUS; SUBCUTANEOUS at 07:00

## 2017-08-30 RX ADMIN — Medication 325 MILLIGRAM(S): at 11:36

## 2017-08-30 RX ADMIN — OXYCODONE AND ACETAMINOPHEN 2 TABLET(S): 5; 325 TABLET ORAL at 13:14

## 2017-08-30 RX ADMIN — Medication 400 MILLIGRAM(S): at 04:36

## 2017-08-30 RX ADMIN — Medication 400 MILLIGRAM(S): at 15:51

## 2017-08-30 RX ADMIN — OXYCODONE AND ACETAMINOPHEN 2 TABLET(S): 5; 325 TABLET ORAL at 22:20

## 2017-08-30 RX ADMIN — Medication 25 MILLIGRAM(S): at 17:52

## 2017-08-30 RX ADMIN — Medication 1: at 12:34

## 2017-08-30 RX ADMIN — ATORVASTATIN CALCIUM 40 MILLIGRAM(S): 80 TABLET, FILM COATED ORAL at 21:26

## 2017-08-30 RX ADMIN — OXYCODONE AND ACETAMINOPHEN 2 TABLET(S): 5; 325 TABLET ORAL at 17:53

## 2017-08-30 RX ADMIN — OXYCODONE AND ACETAMINOPHEN 2 TABLET(S): 5; 325 TABLET ORAL at 09:54

## 2017-08-30 RX ADMIN — Medication 100 MILLIEQUIVALENT(S): at 00:30

## 2017-08-30 RX ADMIN — HYDROMORPHONE HYDROCHLORIDE 0.5 MILLIGRAM(S): 2 INJECTION INTRAMUSCULAR; INTRAVENOUS; SUBCUTANEOUS at 01:15

## 2017-08-30 RX ADMIN — HYDROMORPHONE HYDROCHLORIDE 0.5 MILLIGRAM(S): 2 INJECTION INTRAMUSCULAR; INTRAVENOUS; SUBCUTANEOUS at 06:40

## 2017-08-30 RX ADMIN — Medication 100 MILLIGRAM(S): at 11:36

## 2017-08-30 RX ADMIN — Medication 1000 MILLIGRAM(S): at 16:05

## 2017-08-30 RX ADMIN — OXYCODONE AND ACETAMINOPHEN 2 TABLET(S): 5; 325 TABLET ORAL at 12:26

## 2017-08-30 RX ADMIN — Medication 125 MILLILITER(S): at 05:11

## 2017-08-30 RX ADMIN — Medication 100 MILLIEQUIVALENT(S): at 01:30

## 2017-08-30 RX ADMIN — HYDROMORPHONE HYDROCHLORIDE 0.5 MILLIGRAM(S): 2 INJECTION INTRAMUSCULAR; INTRAVENOUS; SUBCUTANEOUS at 09:00

## 2017-08-30 NOTE — ANESTHESIA FOLLOW-UP NOTE - NSEVALATIONFT_GEN_ALL_CORE
pt slightly confused Facial edema on nonrebreather  Medications adjustments by staff in /65  HR80 SAT 96
126/67 HR90 [id number]

## 2017-08-30 NOTE — PROGRESS NOTE ADULT - SUBJECTIVE AND OBJECTIVE BOX
FRANCO RIVERO  MRN#: [id number]  Subjective:  Patient was seen and evaluate on AM rounds offering no specific complaints at this time.    OBJECTIVE:  ICU Vital Signs Last 24 Hrs  T(C): 36.9 (30 Aug 2017 13:10), Max: 37.3 (30 Aug 2017 01:00)  T(F): 98.5 (30 Aug 2017 13:10), Max: 99.1 (30 Aug 2017 01:00)  HR: 77 (30 Aug 2017 13:37) (59 - 80)  BP: 122/66 (30 Aug 2017 13:37) (117/61 - 122/66)  BP(mean): 82 (30 Aug 2017 10:00) (82 - 82)  ABP: 138/54 (30 Aug 2017 09:00) (86/51 - 144/62)  ABP(mean): 75 (30 Aug 2017 09:00) (58 - 88)  RR: 20 (30 Aug 2017 13:37) (12 - 28)  SpO2: 95% (30 Aug 2017 13:37) (94% - 100%)      08-29 @ 07:01  -  08-30 @ 07:00  --------------------------------------------------------  IN: 4703.2 mL / OUT: 1390 mL / NET: 3313.2 mL    08-30 @ 07:01 - 08-30 @ 13:50  --------------------------------------------------------  IN: 183 mL / OUT: 270 mL / NET: -87 mL    PHYSICAL EXAM:Daily     Daily   General: WN/WD NAD    HEENT:     + NCAT  + EOMI  - Conjuctival edema   - Icterus   - Thrush   - ETT  - NGT/OGT  Neck:         + FROM    + JVD     - Nodes     - Masses    + Mid-line trachea   - Tracheostomy  Chest:         - Sternal click  - Sternal drainage  + Pacing wires  + Chest tubes  - SubQ emphysema  Lungs:          + CTA   - Rhonchi    - Rales    - Wheezing     - Decreased BS   - Dullness R L  Cardiac:       + S1 + S2    + RRR   - Irregular   - S3  - S4    - Murmurs   - Rub   - Hamman’s sign   Abdomen:    + BS     + Soft    + Non-tender     - Distended    - Organomegaly  - PEG  Extremities:   - Cyanosis U/L   - Clubbing  U/L  - LE/UE Edema   + Capillary refill    + Pulses   Neuro:        + Awake   +  Alert   - Confused   - Lethargic   - Sedated   - Generalized Weakness  Skin:        - Rashes    - Erythema   + Normal incisions   + IV sites intact  - Sacral decubitus    HOSPITAL MEDICATIONS: All mediciations reviewed and analyzed  MEDICATIONS  (STANDING):  sodium chloride 0.45%. 1000 milliLiter(s) (10 mL/Hr) IV Continuous <Continuous>  metoclopramide Injectable 10 milliGRAM(s) IV Push every 8 hours  aspirin enteric coated 325 milliGRAM(s) Oral daily  cefuroxime  IVPB 1500 milliGRAM(s) IV Intermittent every 8 hours  allopurinol 100 milliGRAM(s) Oral daily  atorvastatin 40 milliGRAM(s) Oral at bedtime  docusate sodium 100 milliGRAM(s) Oral three times a day  insulin lispro (HumaLOG) corrective regimen sliding scale   SubCutaneous three times a day before meals  insulin lispro (HumaLOG) corrective regimen sliding scale   SubCutaneous at bedtime  metoprolol 25 milliGRAM(s) Oral every 12 hours    MEDICATIONS  (PRN):  oxyCODONE    5 mG/acetaminophen 325 mG 1 Tablet(s) Oral every 4 hours PRN Moderate Pain (4 - 6)  oxyCODONE    5 mG/acetaminophen 325 mG 2 Tablet(s) Oral every 4 hours PRN Severe Pain (7 - 10)    LABS: All Lab data reviewed and analyzed                        13.4   10.7  )-----------( 144      ( 30 Aug 2017 01:23 )             38.8    08-30    138  |  102  |  12  ----------------------------<  131<H>  4.5   |  25  |  0.56    Ca    8.3<L>      30 Aug 2017 01:23  Phos  3.4     08-30    TPro  5.8<L>  /  Alb  3.6  /  TBili  0.8  /  DBili  x   /  AST  80<H>  /  ALT  63<H>  /  AlkPhos  65  08-30    PT/INR - ( 30 Aug 2017 01:23 )   PT: 12.4 sec;   INR: 1.14 ratio         PTT - ( 30 Aug 2017 01:23 )  PTT:23.2 sec LIVER FUNCTIONS - ( 30 Aug 2017 01:23 )  Alb: 3.6 g/dL / Pro: 5.8 g/dL / ALK PHOS: 65 U/L / ALT: 63 U/L RC / AST: 80 U/L / GGT: x           RADIOLOGY: - Reviewed and analyzed

## 2017-08-30 NOTE — CONSULT NOTE ADULT - ASSESSMENT
60 y/o M w/ PMH of HTN, HLD, current smoker and ASIYA on CPAP, Left ankle fx w/ LLE cast (7/20/17), presented to [facility] 8/28 for cardiac catheretization which revealed severe CAD now s/p 3V CABG

## 2017-08-30 NOTE — PROGRESS NOTE ADULT - ASSESSMENT
62 y/o M w/ PMH of HTN, HLD, current smoker and ASIYA on CPAP, Left ankle fx w/ LLE cast (7/20/17), presented to [facility] 8/28 for cardiac catheretization 2/2 to echocardiogram finding of dilate LV size and normal LV function (EF 54%). Cardiac Cath finding revealed multivessel CAD.  Patient transferred to [facility] 8/28 for further management and cardiac surgery evaluation with Dr. Lopez     8/29/17 S/P CABG x 3 w LIMA  Post op insulin infusion started and weaned, hgb A1c 5.9, extubated POD#0.  8/30 POD #1 on beta blocker, transferred to SDU

## 2017-08-30 NOTE — CONSULT NOTE ADULT - SUBJECTIVE AND OBJECTIVE BOX
Patient is a 61y old  Male who presents with a chief complaint of "I'm here for heart surgery" (28 Aug 2017 17:30)      HPI:  History of Present Illness:  60 y/o M w/ PMH of HTN, HLD, current smoker and ASIYA on CPAP, Left ankle fx w/ LLE cast (17), presented to [facility]  for cardiac catheretization / to echocardiogram finding of dilated LV size and normal LV function (EF 54%).. He had a cardiac angiogram to r/o CAD which revealed multivessel CAD.  Patient transferred to [facility]  for further management and cardiac surgery evaluation with Dr. Lopez. 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  Renal calculi  Obstructive sleep apnea: severe, used CPAP few years ago and lost weight  H/O: osteoarthritis  Lumbar disc disease with radiculopathy  History of Obesity  Gout: last attack 6 years ago,   S/P lumbar discectomy: L3,,L4,L5  Aug 2013  S/P revision of total knee, right: 2012  H/O lithotripsy  S/P knee replacement: rigth 10/11  S/P hip replacement: left , right   S/P tonsillectomy and adenoidectomy: as child  Hernia: repair, left inguinal   Cholecystitis: cholecycstectomy   History of Total Hip Replacement  S/P Left Inguinal Hernia Repair      Review of Systems:   CONSTITUTIONAL: No fever, weight loss, or fatigue  EYES: No eye pain, visual disturbances, or discharge  ENMT:  No difficulty hearing, tinnitus, vertigo; No sinus or throat pain  NECK: No pain or stiffness  RESPIRATORY: + cough, wheezing, chills or hemoptysis; No shortness of breath  CARDIOVASCULAR: + incisional chest pain, no palpitations, dizziness, or leg swelling  GASTROINTESTINAL: No abdominal or epigastric pain. No nausea, vomiting, or hematemesis  GENITOURINARY: No dysuria, frequency, hematuria, or incontinence  NEUROLOGICAL: No headaches, memory loss, loss of strength, numbness, or tremors  SKIN: No itching, burning, rashes, or lesions   LYMPH NODES: No enlarged glands  ENDOCRINE: No heat or cold intolerance; No hair loss  MUSCULOSKELETAL: No joint pain or swelling; No muscle, back, or extremity pain  HEME/LYMPH: No easy bruising, or bleeding gums  ALLERGY AND IMMUNOLOGIC: No hives or eczema    Allergies    No Known Allergies    Social History: + active smoker  no IVDA  lives with family  social ETOH    FAMILY HISTORY:  Family history of coronary artery disease: brother- age 50+- Coronary Artery Stents  Family history of pancreatic cancer (Sibling)  Family history of diabetes mellitus type II: Hyperlipidemia and Hypertension. Age 86.  Family history of coronary artery disease: HLD/Angina. Fatal MI age 73.      MEDICATIONS  (STANDING):  sodium chloride 0.45%. 1000 milliLiter(s) (10 mL/Hr) IV Continuous <Continuous>  metoclopramide Injectable 10 milliGRAM(s) IV Push every 8 hours  aspirin enteric coated 325 milliGRAM(s) Oral daily  cefuroxime  IVPB 1500 milliGRAM(s) IV Intermittent every 8 hours  allopurinol 100 milliGRAM(s) Oral daily  atorvastatin 40 milliGRAM(s) Oral at bedtime  docusate sodium 100 milliGRAM(s) Oral three times a day  dextrose 5%. 1000 milliLiter(s) (50 mL/Hr) IV Continuous <Continuous>  dextrose 50% Injectable 12.5 Gram(s) IV Push once  dextrose 50% Injectable 25 Gram(s) IV Push once  dextrose 50% Injectable 25 Gram(s) IV Push once  insulin lispro (HumaLOG) corrective regimen sliding scale   SubCutaneous three times a day before meals  insulin lispro (HumaLOG) corrective regimen sliding scale   SubCutaneous at bedtime  metoprolol 25 milliGRAM(s) Oral every 12 hours  mupirocin 2% Ointment 1 Application(s) Topical two times a day    MEDICATIONS  (PRN):  dextrose Gel 1 Dose(s) Oral once PRN Blood Glucose LESS THAN 70 milliGRAM(s)/deciLiter  glucagon  Injectable 1 milliGRAM(s) IntraMuscular once PRN Glucose <70 milliGRAM(s)/deciLiter  oxyCODONE    5 mG/acetaminophen 325 mG 1 Tablet(s) Oral every 4 hours PRN Moderate Pain (4 - 6)  oxyCODONE    5 mG/acetaminophen 325 mG 2 Tablet(s) Oral every 4 hours PRN Severe Pain (7 - 10)      CAPILLARY BLOOD GLUCOSE  131 (30 Aug 2017 16:49)  165 (30 Aug 2017 12:32)  123 (30 Aug 2017 08:00)  121 (30 Aug 2017 07:00)  116 (30 Aug 2017 06:00)  113 (30 Aug 2017 05:00)  128 (30 Aug 2017 04:00)  127 (30 Aug 2017 03:00)  126 (30 Aug 2017 02:00)  134 (30 Aug 2017 01:00)  128 (30 Aug 2017 00:00)  138 (29 Aug 2017 23:00)  145 (29 Aug 2017 22:00)  136 (29 Aug 2017 21:00)  142 (29 Aug 2017 20:00)  145 (29 Aug 2017 19:00)        I&O's Summary    29 Aug 2017 07:01  -  30 Aug 2017 07:00  --------------------------------------------------------  IN: 4703.2 mL / OUT: 1390 mL / NET: 3313.2 mL    30 Aug 2017 07:01  -  30 Aug 2017 18:27  --------------------------------------------------------  IN: 383 mL / OUT: 500 mL / NET: -117 mL        PHYSICAL EXAM:  GENERAL: NAD, well-developed lying flat in bed  HEAD:  Atraumatic, Normocephalic  EYES: EOMI, PERRLA, conjunctiva and sclera clear  NECK: Supple, No JVD  CHEST/LUNG: decreased breath sounds bases bilaterally; No wheeze  positive for central sternal incision  chest tubes and drains noted  HEART: Regular rate and rhythm; No murmurs, rubs, or gallops distant HS   ABDOMEN: Soft, Nontender, Nondistended; Bowel sounds present  EXTREMITIES:  + edema, No clubbing or cyanosis  PSYCH: AAOx3  NEUROLOGY: non-focal  SKIN: No rashes or lesions    LABS:                        13.4   10.7  )-----------( 144      ( 30 Aug 2017 01:23 )             38.8     08-30    138  |  102  |  12  ----------------------------<  131<H>  4.5   |  25  |  0.56    Ca    8.3<L>      30 Aug 2017 01:23  Phos  3.4     08-30    TPro  5.8<L>  /  Alb  3.6  /  TBili  0.8  /  DBili  x   /  AST  80<H>  /  ALT  63<H>  /  AlkPhos  65  08-30    PT/INR - ( 30 Aug 2017 01:23 )   PT: 12.4 sec;   INR: 1.14 ratio         PTT - ( 30 Aug 2017 01:23 )  PTT:23.2 sec  CARDIAC MARKERS ( 30 Aug 2017 09:27 )  x     / 0.29 ng/mL / 1759 U/L / x     / 36.6 ng/mL  CARDIAC MARKERS ( 30 Aug 2017 00:27 )  x     / 0.41 ng/mL / 1241 U/L / x     / 39.6 ng/mL  CARDIAC MARKERS ( 29 Aug 2017 17:20 )  x     / 0.29 ng/mL / 419 U/L / x     / 32.6 ng/mL      Urinalysis Basic - ( 28 Aug 2017 22:36 )    Color: Yellow / Appearance: Clear / S.019 / pH: x  Gluc: x / Ketone: Negative  / Bili: Negative / Urobili: Negative   Blood: x / Protein: Trace / Nitrite: Negative   Leuk Esterase: Negative / RBC: >50 /HPF / WBC 2-5 /HPF   Sq Epi: x / Non Sq Epi: x / Bacteria: x        RADIOLOGY & ADDITIONAL TESTS:    Imaging Personally Reviewed:    Consultant(s) Notes Reviewed:      Care Discussed with Consultants/Other Providers:

## 2017-08-30 NOTE — PROGRESS NOTE ADULT - PROBLEM SELECTOR PLAN 1
Asa, Statin, B-blocker, Chest PT,  Incentive spirometry, wound care and assessment.  Ambulate   Shower pod #5

## 2017-08-30 NOTE — PHYSICAL THERAPY INITIAL EVALUATION ADULT - ADDITIONAL COMMENTS
Pt lives w/ son who works 5d/wk in a pvt house w/ 3 steps to enter and 1 flt to bedroom. Recently fx ankle - bed placed on 1st floor. Amb w/ bilat ax crutches with difficulty maintaining NWB status on LLE. Cast planned to be d/c during next MD visit 9/7/17.

## 2017-08-30 NOTE — PHYSICAL THERAPY INITIAL EVALUATION ADULT - PERTINENT HX OF CURRENT PROBLEM, REHAB EVAL
62 y/o M w/ PMH of HTN, HLD, current smoker and ASIYA on CPAP, Left ankle fx w/ LLE cast (7/20/17), presented to [facility] 8/28 for cardiac catheretization 2/2 to echocardiogram finding of dilate LV size and normal LV function (EF 54%).; S/p Cath= Multii vessel  dx. s/p C3L

## 2017-08-30 NOTE — PROGRESS NOTE ADULT - SUBJECTIVE AND OBJECTIVE BOX
Subjective:  pt seen and examined, no complaints on exam.  extubated    S/P C 3 L, POD 1    sodium chloride 0.45%. 1000 milliLiter(s) IV Continuous <Continuous>  pantoprazole  Injectable 40 milliGRAM(s) IV Push daily  potassium chloride  10 mEq/50 mL IVPB 10 milliEquivalent(s) IV Intermittent every 1 hour  potassium chloride  10 mEq/50 mL IVPB 10 milliEquivalent(s) IV Intermittent every 1 hour  potassium chloride  10 mEq/50 mL IVPB 10 milliEquivalent(s) IV Intermittent once  norepinephrine Infusion 0.016 MICROgram(s)/kG/Min IV Continuous <Continuous>  insulin Infusion 2 Unit(s)/Hr IV Continuous <Continuous>  metoclopramide Injectable 10 milliGRAM(s) IV Push every 8 hours  aspirin enteric coated 325 milliGRAM(s) Oral daily  cefuroxime  IVPB 1500 milliGRAM(s) IV Intermittent every 8 hours  allopurinol 100 milliGRAM(s) Oral daily  atorvastatin 40 milliGRAM(s) Oral at bedtime  docusate sodium 100 milliGRAM(s) Oral three times a day                            13.4   10.7  )-----------( 144      ( 30 Aug 2017 01:23 )             38.8       Hemoglobin: 13.4 g/dL (08-30 @ 01:23)  Hemoglobin: 14.4 g/dL (08-29 @ 17:20)  Hemoglobin: 15.6 g/dL (08-28 @ 22:09)  Hemoglobin: 15.4 g/dL (08-28 @ 10:00)      08-30    138  |  102  |  12  ----------------------------<  131<H>  4.5   |  25  |  0.56    Ca    8.3<L>      30 Aug 2017 01:23  Phos  3.4     08-30    TPro  5.8<L>  /  Alb  3.6  /  TBili  0.8  /  DBili  x   /  AST  80<H>  /  ALT  63<H>  /  AlkPhos  65  08-30    Creatinine Trend: 0.56<--, 0.57<--, 0.61<--, 0.63<--    COAGS: PT/INR - ( 30 Aug 2017 01:23 )   PT: 12.4 sec;   INR: 1.14 ratio         PTT - ( 30 Aug 2017 01:23 )  PTT:23.2 sec    CARDIAC MARKERS ( 30 Aug 2017 00:27 )  x     / 0.41 ng/mL / 1241 U/L / x     / 39.6 ng/mL  CARDIAC MARKERS ( 29 Aug 2017 17:20 )  x     / 0.29 ng/mL / 419 U/L / x     / 32.6 ng/mL        T(C): 37.2 (08-30-17 @ 05:00), Max: 37.3 (08-30-17 @ 01:00)  HR: 71 (08-30-17 @ 06:00) (59 - 73)  BP: --  RR: 14 (08-30-17 @ 06:00) (12 - 28)  SpO2: 97% (08-30-17 @ 06:00) (96% - 100%)  Wt(kg): --    I&O's Summary    29 Aug 2017 07:01  -  30 Aug 2017 07:00  --------------------------------------------------------  IN: 4700.2 mL / OUT: 1290 mL / NET: 3410.2 mL      Lymphatic: No lymphadenopathy , no edema  Cardiovascular: Normal S1 S2, No JVD, No murmurs , Peripheral pulses palpable 2+ bilaterally  Respiratory: Lungs clear to auscultation, normal effort 	  Gastrointestinal:  Soft, Non-tender, + BS	  Skin: No rashes, No ecchymoses, No cyanosis, warm to touch  Musculoskeletal: Normal range of motion, normal strength  Psychiatry:  Mood & affect appropriate    TELEMETRY: 	  NSR   ECG:  	  RADIOLOGY:   DIAGNOSTIC TESTING:  [ ] Echocardiogram:  [ ]  Catheterization:  [ ] Stress Test:    OTHER: 	        ASSESSMENT/PLAN: 	61y Male s/p C3L, PMH HTN HLD current smoker and ASIYA on CPAP, L ankle fracture with LLL cast. Now hemodynamically stable, not requiring vasodilatory/inotropic support, on insulin gtt only.  Extubated in NAD.    POD 1     GI / DVT prophylaxis.   keep K>4, mag >2.0   pain management  BB as bp / hr can tolerate  Physical therapy follow up   chest tube per CTS  low dose lasix daily once BP can tolerate  D/W Dr Prado

## 2017-08-30 NOTE — PROGRESS NOTE ADULT - SUBJECTIVE AND OBJECTIVE BOX
VITAL SIGNS    Telemetry:      Vital Signs Last 24 Hrs  T(C): 36.8 (08-30-17 @ 11:04), Max: 37.3 (08-30-17 @ 01:00)  T(F): 98.3 (08-30-17 @ 11:04), Max: 99.1 (08-30-17 @ 01:00)  HR: 72 (08-30-17 @ 11:04) (59 - 80)  BP: 117/62 (08-30-17 @ 11:04) (117/61 - 117/62)  RR: 18 (08-30-17 @ 11:04) (12 - 28)  SpO2: 95% (08-30-17 @ 11:04) (94% - 100%)                   08-29 @ 07:01  -  08-30 @ 07:00  --------------------------------------------------------  IN: 4703.2 mL / OUT: 1390 mL / NET: 3313.2 mL    08-30 @ 07:01  -  08-30 @ 11:33  --------------------------------------------------------  IN: 183 mL / OUT: 270 mL / NET: -87 mL          Daily     Daily         CAPILLARY BLOOD GLUCOSE  123 (30 Aug 2017 08:00)  121 (30 Aug 2017 07:00)  116 (30 Aug 2017 06:00)  113 (30 Aug 2017 05:00)  128 (30 Aug 2017 04:00)  127 (30 Aug 2017 03:00)  126 (30 Aug 2017 02:00)  134 (30 Aug 2017 01:00)  128 (30 Aug 2017 00:00)  138 (29 Aug 2017 23:00)  145 (29 Aug 2017 22:00)  136 (29 Aug 2017 21:00)  142 (29 Aug 2017 20:00)  145 (29 Aug 2017 19:00)  116 (29 Aug 2017 18:00)  124 (29 Aug 2017 17:00)              Drains:     MS         [  ] Drainage:                 L Pleural  [  ]  Drainage:                R Pleural  [  ]  Drainage:    Pacing Wires        [  ]   Settings:                                  Isolated  [  ]    Coumadin    [ ] YES          [  ]      NO         Reason:                               PHYSICAL EXAM        Neurology: alert and oriented x 3, nonfocal, no gross deficits    CV :    Sternal Wound :  CDI , Stable    Lungs:    Abdomen: soft, nontender, nondistended, positive bowel sounds, last bowel movement     :               Extremities:               sodium chloride 0.45%. 1000 milliLiter(s) IV Continuous <Continuous>  metoclopramide Injectable 10 milliGRAM(s) IV Push every 8 hours  aspirin enteric coated 325 milliGRAM(s) Oral daily  cefuroxime  IVPB 1500 milliGRAM(s) IV Intermittent every 8 hours  allopurinol 100 milliGRAM(s) Oral daily  atorvastatin 40 milliGRAM(s) Oral at bedtime  docusate sodium 100 milliGRAM(s) Oral three times a day  dextrose 5%. 1000 milliLiter(s) IV Continuous <Continuous>  dextrose Gel 1 Dose(s) Oral once PRN  dextrose 50% Injectable 12.5 Gram(s) IV Push once  dextrose 50% Injectable 25 Gram(s) IV Push once  dextrose 50% Injectable 25 Gram(s) IV Push once  glucagon  Injectable 1 milliGRAM(s) IntraMuscular once PRN  insulin lispro (HumaLOG) corrective regimen sliding scale   SubCutaneous three times a day before meals  insulin lispro (HumaLOG) corrective regimen sliding scale   SubCutaneous at bedtime  oxyCODONE    5 mG/acetaminophen 325 mG 1 Tablet(s) Oral every 4 hours PRN  oxyCODONE    5 mG/acetaminophen 325 mG 2 Tablet(s) Oral every 4 hours PRN  metoprolol 25 milliGRAM(s) Oral every 12 hours                              Physical Therapy Rec:   Home  [  ]   Home w/ PT  [  ]  Rehab  [  ]    Discussed with Cardiothoracic Team at AM rounds. im ok    VITAL SIGNS    Telemetry:  NSR 70    Vital Signs Last 24 Hrs  T(C): 36.8 (08-30-17 @ 11:04), Max: 37.3 (08-30-17 @ 01:00)  T(F): 98.3 (08-30-17 @ 11:04), Max: 99.1 (08-30-17 @ 01:00)  HR: 72 (08-30-17 @ 11:04) (59 - 80)  BP: 117/62 (08-30-17 @ 11:04) (117/61 - 117/62)  RR: 18 (08-30-17 @ 11:04) (12 - 28)  SpO2: 95% (08-30-17 @ 11:04) (94% - 100%)                   08-29 @ 07:01  -  08-30 @ 07:00  --------------------------------------------------------  IN: 4703.2 mL / OUT: 1390 mL / NET: 3313.2 mL    08-30 @ 07:01  -  08-30 @ 11:33  --------------------------------------------------------  IN: 183 mL / OUT: 270 mL / NET: -87 mL          Daily     Daily         CAPILLARY BLOOD GLUCOSE  123 (30 Aug 2017 08:00)  121 (30 Aug 2017 07:00)  116 (30 Aug 2017 06:00)  113 (30 Aug 2017 05:00)  128 (30 Aug 2017 04:00)  127 (30 Aug 2017 03:00)  126 (30 Aug 2017 02:00)  134 (30 Aug 2017 01:00)  128 (30 Aug 2017 00:00)  138 (29 Aug 2017 23:00)  145 (29 Aug 2017 22:00)  136 (29 Aug 2017 21:00)  142 (29 Aug 2017 20:00)  145 (29 Aug 2017 19:00)  116 (29 Aug 2017 18:00)  124 (29 Aug 2017 17:00)              Drains:     MS         [ x ] Drainage:     #1 50, #2 5,             L Pleural  [x  ]  Drainage:           5     R Pleural  [  ]  Drainage:    Pacing Wires        [  x]   Settings:         vvi 40                         Isolated  [  ]    Coumadin    [ ] YES          [x  ]      NO                                     PHYSICAL EXAM        Neurology: alert and oriented x 3, nonfocal, no gross deficits    CV :    Sternal Wound :  CDI , Stable    Lungs:    Abdomen: soft, nontender, nondistended, positive bowel sounds, last bowel movement     :               Extremities:               sodium chloride 0.45%. 1000 milliLiter(s) IV Continuous <Continuous>  metoclopramide Injectable 10 milliGRAM(s) IV Push every 8 hours  aspirin enteric coated 325 milliGRAM(s) Oral daily  cefuroxime  IVPB 1500 milliGRAM(s) IV Intermittent every 8 hours  allopurinol 100 milliGRAM(s) Oral daily  atorvastatin 40 milliGRAM(s) Oral at bedtime  docusate sodium 100 milliGRAM(s) Oral three times a day  dextrose 5%. 1000 milliLiter(s) IV Continuous <Continuous>  dextrose Gel 1 Dose(s) Oral once PRN  dextrose 50% Injectable 12.5 Gram(s) IV Push once  dextrose 50% Injectable 25 Gram(s) IV Push once  dextrose 50% Injectable 25 Gram(s) IV Push once  glucagon  Injectable 1 milliGRAM(s) IntraMuscular once PRN  insulin lispro (HumaLOG) corrective regimen sliding scale   SubCutaneous three times a day before meals  insulin lispro (HumaLOG) corrective regimen sliding scale   SubCutaneous at bedtime  oxyCODONE    5 mG/acetaminophen 325 mG 1 Tablet(s) Oral every 4 hours PRN  oxyCODONE    5 mG/acetaminophen 325 mG 2 Tablet(s) Oral every 4 hours PRN  metoprolol 25 milliGRAM(s) Oral every 12 hours                              Physical Therapy Rec:   Home  [  ]   Home w/ PT  [  ]  Rehab  [  ]    Discussed with Cardiothoracic Team at AM rounds. im ok    VITAL SIGNS    Telemetry:  NSR 70    Vital Signs Last 24 Hrs  T(C): 36.8 (08-30-17 @ 11:04), Max: 37.3 (08-30-17 @ 01:00)  T(F): 98.3 (08-30-17 @ 11:04), Max: 99.1 (08-30-17 @ 01:00)  HR: 72 (08-30-17 @ 11:04) (59 - 80)  BP: 117/62 (08-30-17 @ 11:04) (117/61 - 117/62)  RR: 18 (08-30-17 @ 11:04) (12 - 28)  SpO2: 95% (08-30-17 @ 11:04) (94% - 100%)                   08-29 @ 07:01  -  08-30 @ 07:00  --------------------------------------------------------  IN: 4703.2 mL / OUT: 1390 mL / NET: 3313.2 mL    08-30 @ 07:01  -  08-30 @ 11:33  --------------------------------------------------------  IN: 183 mL / OUT: 270 mL / NET: -87 mL          Daily     Daily         CAPILLARY BLOOD GLUCOSE  123 (30 Aug 2017 08:00)  121 (30 Aug 2017 07:00)  116 (30 Aug 2017 06:00)  113 (30 Aug 2017 05:00)  128 (30 Aug 2017 04:00)  127 (30 Aug 2017 03:00)  126 (30 Aug 2017 02:00)  134 (30 Aug 2017 01:00)  128 (30 Aug 2017 00:00)  138 (29 Aug 2017 23:00)  145 (29 Aug 2017 22:00)  136 (29 Aug 2017 21:00)  142 (29 Aug 2017 20:00)  145 (29 Aug 2017 19:00)  116 (29 Aug 2017 18:00)  124 (29 Aug 2017 17:00)              Drains:     MS         [ x ] Drainage:     #1 50, #2 5,             L Pleural  [x  ]  Drainage:           5     R Pleural  [  ]  Drainage:    Pacing Wires        [  x]   Settings:         vvi 40                         Isolated  [  ]    Coumadin    [ ] YES          [x  ]      NO                                     PHYSICAL EXAM        Neurology: alert and oriented x 3, nonfocal, no gross deficits    CV : S1 S2 , RRR     Sternal Wound :  CDI , Stable    Lungs: cta, diminished bilat, MS ct x2 - lws, L pl x 1    Abdomen: soft, nontender, nondistended, positive bowel sounds, last bowel movement preop    :           amaro-sbd    Extremities:     L cast trace edema, ionc dressing cdi          sodium chloride 0.45%. 1000 milliLiter(s) IV Continuous <Continuous>  metoclopramide Injectable 10 milliGRAM(s) IV Push every 8 hours  aspirin enteric coated 325 milliGRAM(s) Oral daily  cefuroxime  IVPB 1500 milliGRAM(s) IV Intermittent every 8 hours  allopurinol 100 milliGRAM(s) Oral daily  atorvastatin 40 milliGRAM(s) Oral at bedtime  docusate sodium 100 milliGRAM(s) Oral three times a day  dextrose 5%. 1000 milliLiter(s) IV Continuous <Continuous>  dextrose Gel 1 Dose(s) Oral once PRN  dextrose 50% Injectable 12.5 Gram(s) IV Push once  dextrose 50% Injectable 25 Gram(s) IV Push once  dextrose 50% Injectable 25 Gram(s) IV Push once  glucagon  Injectable 1 milliGRAM(s) IntraMuscular once PRN  insulin lispro (HumaLOG) corrective regimen sliding scale   SubCutaneous three times a day before meals  insulin lispro (HumaLOG) corrective regimen sliding scale   SubCutaneous at bedtime  oxyCODONE    5 mG/acetaminophen 325 mG 1 Tablet(s) Oral every 4 hours PRN  oxyCODONE    5 mG/acetaminophen 325 mG 2 Tablet(s) Oral every 4 hours PRN  metoprolol 25 milliGRAM(s) Oral every 12 hours                              Physical Therapy Rec:   Home  [  ]   Home w/ PT  [  ]  Rehab  [  ]    Discussed with Cardiothoracic Team at AM rounds. im ok    VITAL SIGNS    Telemetry:  NSR 70    Vital Signs Last 24 Hrs  T(C): 36.8 (08-30-17 @ 11:04), Max: 37.3 (08-30-17 @ 01:00)  T(F): 98.3 (08-30-17 @ 11:04), Max: 99.1 (08-30-17 @ 01:00)  HR: 72 (08-30-17 @ 11:04) (59 - 80)  BP: 117/62 (08-30-17 @ 11:04) (117/61 - 117/62)  RR: 18 (08-30-17 @ 11:04) (12 - 28)  SpO2: 95% (08-30-17 @ 11:04) (94% - 100%)                   08-29 @ 07:01  -  08-30 @ 07:00  --------------------------------------------------------  IN: 4703.2 mL / OUT: 1390 mL / NET: 3313.2 mL    08-30 @ 07:01  -  08-30 @ 11:33  --------------------------------------------------------  IN: 183 mL / OUT: 270 mL / NET: -87 mL          Daily     Daily         CAPILLARY BLOOD GLUCOSE  123 (30 Aug 2017 08:00)  121 (30 Aug 2017 07:00)  116 (30 Aug 2017 06:00)  113 (30 Aug 2017 05:00)  128 (30 Aug 2017 04:00)  127 (30 Aug 2017 03:00)  126 (30 Aug 2017 02:00)  134 (30 Aug 2017 01:00)  128 (30 Aug 2017 00:00)  138 (29 Aug 2017 23:00)  145 (29 Aug 2017 22:00)  136 (29 Aug 2017 21:00)  142 (29 Aug 2017 20:00)  145 (29 Aug 2017 19:00)  116 (29 Aug 2017 18:00)  124 (29 Aug 2017 17:00)              Drains:     MS         [ x ] Drainage:     #1 50, #2 5,             L Pleural  [x  ]  Drainage:           5     R Pleural  [  ]  Drainage:    Pacing Wires        [  x]   Settings:         vvi 40                         Isolated  [  ]    Coumadin    [ ] YES          [x  ]      NO                                     PHYSICAL EXAM        Neurology: alert and oriented x 3, nonfocal, no gross deficits    CV : S1 S2 , RRR     Sternal Wound :  CDI , Stable    Lungs: cta, diminished bilat, MS ct x2 - lws, L pl x 1    Abdomen: soft, nontender, nondistended, positive bowel sounds, last bowel movement preop    :           amaro out DTV 6pm    Extremities:     L cast trace edema, ionc dressing cdi          sodium chloride 0.45%. 1000 milliLiter(s) IV Continuous <Continuous>  metoclopramide Injectable 10 milliGRAM(s) IV Push every 8 hours  aspirin enteric coated 325 milliGRAM(s) Oral daily  cefuroxime  IVPB 1500 milliGRAM(s) IV Intermittent every 8 hours  allopurinol 100 milliGRAM(s) Oral daily  atorvastatin 40 milliGRAM(s) Oral at bedtime  docusate sodium 100 milliGRAM(s) Oral three times a day  dextrose 5%. 1000 milliLiter(s) IV Continuous <Continuous>  dextrose Gel 1 Dose(s) Oral once PRN  dextrose 50% Injectable 12.5 Gram(s) IV Push once  dextrose 50% Injectable 25 Gram(s) IV Push once  dextrose 50% Injectable 25 Gram(s) IV Push once  glucagon  Injectable 1 milliGRAM(s) IntraMuscular once PRN  insulin lispro (HumaLOG) corrective regimen sliding scale   SubCutaneous three times a day before meals  insulin lispro (HumaLOG) corrective regimen sliding scale   SubCutaneous at bedtime  oxyCODONE    5 mG/acetaminophen 325 mG 1 Tablet(s) Oral every 4 hours PRN  oxyCODONE    5 mG/acetaminophen 325 mG 2 Tablet(s) Oral every 4 hours PRN  metoprolol 25 milliGRAM(s) Oral every 12 hours                              Physical Therapy Rec:   Home  [  ]   Home w/ PT  [  ]  Rehab  [  ]    Discussed with Cardiothoracic Team at AM rounds.

## 2017-08-30 NOTE — PROGRESS NOTE ADULT - ATTENDING COMMENTS
Agree with above assessment and plan as outlined above.    - Cont supportive care per CTS    Ravinder Prado MD, FACC  [facility], [facility]  [address] [place] [address]  PHONE:  [phone]  BEEPER : [phone]

## 2017-08-30 NOTE — PROGRESS NOTE ADULT - PROBLEM SELECTOR PLAN 1
61 y.o. M s/p C3L, PMH HTN HLD current smoker and ASIYA on CPAP, L ankle fracture with LLL cast. Now hemodynamically stable, not requiring vasodilatory/inotropic support, on insulin gtt only.  Extubated in NAD.   CAD s/p CABG, continue ASA and statin for graft prophylaxis. Initiate/continue beta-blocker for Afib prophylaxis.   Prophylaxis: DVT-venodynes; GI-Protonix  Pain management  Glucose control  Resume appropriate home medications as tolerated.

## 2017-08-30 NOTE — PROGRESS NOTE ADULT - SUBJECTIVE AND OBJECTIVE BOX
Operation: POD #1 C3L   Narrative: 61 year old M,  resting comfortably in bed, no acute distress. Extubated     Vital Signs Last 24 Hrs  T(C): 37.3 (30 Aug 2017 01:00), Max: 37.3 (30 Aug 2017 01:00)  T(F): 99.1 (30 Aug 2017 01:00), Max: 99.1 (30 Aug 2017 01:00)  HR: 71 (30 Aug 2017 04:00) (59 - 73)  BP: 152/90 (29 Aug 2017 05:14) (152/90 - 152/90)  BP(mean): --  RR: 15 (30 Aug 2017 04:00) (12 - 28)  SpO2: 97% (30 Aug 2017 04:00) (96% - 100%)      08-28 @ 07:01  -  08-29 @ 07:00  --------------------------------------------------------  IN:    Oral Fluid: 360 mL  Total IN: 360 mL    OUT:    Voided: 200 mL  Total OUT: 200 mL    Total NET: 160 mL      08-29 @ 07:01  -  08-30 @ 04:36  --------------------------------------------------------  IN:    Albumin 5%  - 250 mL: 500 mL    dexmedetomidine Infusion: 176.2 mL    insulin Infusion: 36 mL    IV PiggyBack: 450 mL    Lactated Ringers IV Bolus: 1500 mL    norepinephrine Infusion: 42 mL    sodium chloride 0.45%.: 110 mL  Total IN: 2814.2 mL    OUT:    Chest Tube: 105 mL    Chest Tube: 35 mL    Chest Tube: 85 mL    Indwelling Catheter - Urethral: 750 mL  Total OUT: 975 mL    Total NET: 1839.2 mL    LABS:                       13.4   10.7  )-----------( 144      ( 30 Aug 2017 01:23 )             38.8     08-30    138  |  102  |  12  ----------------------------<  131<H>  4.5   |  25  |  0.56    Ca    8.3<L>      30 Aug 2017 01:23  Phos  3.4     08-30    TPro  5.8<L>  /  Alb  3.6  /  TBili  0.8  /  DBili  x   /  AST  80<H>  /  ALT  63<H>  /  AlkPhos  65  08-30    PT/INR - ( 30 Aug 2017 01:23 )   PT: 12.4 sec;   INR: 1.14 ratio       PTT - ( 30 Aug 2017 01:23 )  PTT:23.2 sec  ABG - ( 30 Aug 2017 00:22 )  pH: 7.41  /  pCO2: 43    /  pO2: 79    / HCO3: 27    / Base Excess: 2.3   /  SaO2: 97        MEDICATIONS  (STANDING):  sodium chloride 0.45%. 1000 milliLiter(s) (10 mL/Hr) IV Continuous <Continuous>  pantoprazole  Injectable 40 milliGRAM(s) IV Push daily  potassium chloride  10 mEq/50 mL IVPB 10 milliEquivalent(s) IV Intermittent every 1 hour  potassium chloride  10 mEq/50 mL IVPB 10 milliEquivalent(s) IV Intermittent every 1 hour  potassium chloride  10 mEq/50 mL IVPB 10 milliEquivalent(s) IV Intermittent once  norepinephrine Infusion 0.016 MICROgram(s)/kG/Min (5 mL/Hr) IV Continuous <Continuous>  insulin Infusion 2 Unit(s)/Hr (2 mL/Hr) IV Continuous <Continuous>  metoclopramide Injectable 10 milliGRAM(s) IV Push every 8 hours  aspirin enteric coated 325 milliGRAM(s) Oral daily  cefuroxime  IVPB 1500 milliGRAM(s) IV Intermittent every 8 hours  allopurinol 100 milliGRAM(s) Oral daily  atorvastatin 40 milliGRAM(s) Oral at bedtime  acetaminophen  IVPB. 1000 milliGRAM(s) IV Intermittent once    MEDICATIONS  (PRN):    PHYSICAL EXAM:  General: A+Ox3, in NAD, follows commands, no focal deficits noted  Cardiovascular: S1, S2, RRR. Epicardial wires attached to EPM - VVI 50  Lungs: CTA   Abdomen: Soft, Non-distended, non-tender,  positive BS  Extremeties: Warm, well perfused, palpable/dopplerable distal pulses, no edema    Incision: Sternal dressing clean, dry, intact. Leg incision with ace wrap clean, dry, intact. Groin incision clean, dry, intact.  Lines: RIJ intro L radial Susana   Drains: Wilson catheter. Mediastinal and pleural chest tubes with sero-sang drainage, to low wall suction, no air leak.     RADIOLOGY & ADDITIONAL TESTS:    ADDITIONAL DATA:   Does the patient have a history of CHF? No  -If yes, systolic or diastolic  -Pre-operative EF: 54%     Extubation within 24 hours? Yes    Does the patient have a history of kidney disease? No  -Give CKD stage if applicable: 0.6  -Patient's baseline Creatinine    Did the patient receive transfusion of blood and/or products? No  -If yes, indication: Intraop acute blood loss    Shayy-operative antibiotics discontinued within 48 hours of CTU admission? Yes  -Name/date/time of discontinue: Marleni 8/31 06:00       Patient care discussed on morning interdisciplinary rounds with CTS team.   61 y.o. M s/p C3L, PMH HTN HLD current smoker and ASIYA on CPAP, L ankle fracture with LLL cast. Now hemodynamically stable, not requiring vasodilatory/inotropic support, on insulin gtt only.  Extubated in NAD.   CAD s/p CABG, continue ASA and statin for graft prophylaxis. Initiate/continue beta-blocker for Afib prophylaxis.   Prophylaxis: DVT-venodynes; GI-Protonix  Pain management  Glucose control  Resume appropriate home medications as tolerated.

## 2017-08-31 LAB
ANION GAP SERPL CALC-SCNC: 13 MMOL/L — SIGNIFICANT CHANGE UP (ref 5–17)
APTT BLD: 28.2 SEC — SIGNIFICANT CHANGE UP (ref 27.5–37.4)
BASOPHILS # BLD AUTO: 0 K/UL — SIGNIFICANT CHANGE UP (ref 0–0.2)
BASOPHILS NFR BLD AUTO: 0 % — SIGNIFICANT CHANGE UP (ref 0–2)
BUN SERPL-MCNC: 8 MG/DL — SIGNIFICANT CHANGE UP (ref 7–23)
CALCIUM SERPL-MCNC: 8.6 MG/DL — SIGNIFICANT CHANGE UP (ref 8.4–10.5)
CHLORIDE SERPL-SCNC: 100 MMOL/L — SIGNIFICANT CHANGE UP (ref 96–108)
CO2 SERPL-SCNC: 24 MMOL/L — SIGNIFICANT CHANGE UP (ref 22–31)
CREAT SERPL-MCNC: 0.44 MG/DL — LOW (ref 0.5–1.3)
EOSINOPHIL # BLD AUTO: 0.1 K/UL — SIGNIFICANT CHANGE UP (ref 0–0.5)
EOSINOPHIL NFR BLD AUTO: 0.7 % — SIGNIFICANT CHANGE UP (ref 0–6)
GLUCOSE SERPL-MCNC: 148 MG/DL — HIGH (ref 70–99)
HCT VFR BLD CALC: 36.6 % — LOW (ref 39–50)
HGB BLD-MCNC: 12.3 G/DL — LOW (ref 13–17)
INR BLD: 1.27 RATIO — HIGH (ref 0.88–1.16)
LYMPHOCYTES # BLD AUTO: 1.2 K/UL — SIGNIFICANT CHANGE UP (ref 1–3.3)
LYMPHOCYTES # BLD AUTO: 10.7 % — LOW (ref 13–44)
MCHC RBC-ENTMCNC: 30.1 PG — SIGNIFICANT CHANGE UP (ref 27–34)
MCHC RBC-ENTMCNC: 33.6 GM/DL — SIGNIFICANT CHANGE UP (ref 32–36)
MCV RBC AUTO: 89.4 FL — SIGNIFICANT CHANGE UP (ref 80–100)
MONOCYTES # BLD AUTO: 1.2 K/UL — HIGH (ref 0–0.9)
MONOCYTES NFR BLD AUTO: 10.8 % — SIGNIFICANT CHANGE UP (ref 2–14)
NEUTROPHILS # BLD AUTO: 8.7 K/UL — HIGH (ref 1.8–7.4)
NEUTROPHILS NFR BLD AUTO: 77.8 % — HIGH (ref 43–77)
PLATELET # BLD AUTO: 130 K/UL — LOW (ref 150–400)
POTASSIUM SERPL-MCNC: 4.3 MMOL/L — SIGNIFICANT CHANGE UP (ref 3.5–5.3)
POTASSIUM SERPL-SCNC: 4.3 MMOL/L — SIGNIFICANT CHANGE UP (ref 3.5–5.3)
PROTHROM AB SERPL-ACNC: 13.9 SEC — HIGH (ref 9.8–12.7)
RBC # BLD: 4.09 M/UL — LOW (ref 4.2–5.8)
RBC # FLD: 12.3 % — SIGNIFICANT CHANGE UP (ref 10.3–14.5)
SODIUM SERPL-SCNC: 137 MMOL/L — SIGNIFICANT CHANGE UP (ref 135–145)
WBC # BLD: 11.2 K/UL — HIGH (ref 3.8–10.5)
WBC # FLD AUTO: 11.2 K/UL — HIGH (ref 3.8–10.5)

## 2017-08-31 PROCEDURE — 71010: CPT | Mod: 26

## 2017-08-31 RX ORDER — IPRATROPIUM BROMIDE 0.2 MG/ML
500 SOLUTION, NON-ORAL INHALATION ONCE
Qty: 0 | Refills: 0 | Status: COMPLETED | OUTPATIENT
Start: 2017-08-31 | End: 2017-08-31

## 2017-08-31 RX ORDER — HYDROMORPHONE HYDROCHLORIDE 2 MG/ML
0.5 INJECTION INTRAMUSCULAR; INTRAVENOUS; SUBCUTANEOUS ONCE
Qty: 0 | Refills: 0 | Status: DISCONTINUED | OUTPATIENT
Start: 2017-08-31 | End: 2017-08-31

## 2017-08-31 RX ORDER — ACETAMINOPHEN 500 MG
1000 TABLET ORAL ONCE
Qty: 0 | Refills: 0 | Status: COMPLETED | OUTPATIENT
Start: 2017-08-31 | End: 2017-08-31

## 2017-08-31 RX ORDER — POLYETHYLENE GLYCOL 3350 17 G/17G
17 POWDER, FOR SOLUTION ORAL DAILY
Qty: 0 | Refills: 0 | Status: DISCONTINUED | OUTPATIENT
Start: 2017-08-31 | End: 2017-09-05

## 2017-08-31 RX ADMIN — HYDROMORPHONE HYDROCHLORIDE 0.5 MILLIGRAM(S): 2 INJECTION INTRAMUSCULAR; INTRAVENOUS; SUBCUTANEOUS at 04:11

## 2017-08-31 RX ADMIN — OXYCODONE AND ACETAMINOPHEN 2 TABLET(S): 5; 325 TABLET ORAL at 09:21

## 2017-08-31 RX ADMIN — Medication 325 MILLIGRAM(S): at 11:26

## 2017-08-31 RX ADMIN — OXYCODONE AND ACETAMINOPHEN 2 TABLET(S): 5; 325 TABLET ORAL at 15:11

## 2017-08-31 RX ADMIN — MUPIROCIN 1 APPLICATION(S): 20 OINTMENT TOPICAL at 21:46

## 2017-08-31 RX ADMIN — ENOXAPARIN SODIUM 30 MILLIGRAM(S): 100 INJECTION SUBCUTANEOUS at 05:53

## 2017-08-31 RX ADMIN — Medication 500 MICROGRAM(S): at 17:33

## 2017-08-31 RX ADMIN — Medication 25 MILLIGRAM(S): at 05:52

## 2017-08-31 RX ADMIN — HYDROMORPHONE HYDROCHLORIDE 0.5 MILLIGRAM(S): 2 INJECTION INTRAMUSCULAR; INTRAVENOUS; SUBCUTANEOUS at 01:23

## 2017-08-31 RX ADMIN — POLYETHYLENE GLYCOL 3350 17 GRAM(S): 17 POWDER, FOR SOLUTION ORAL at 11:26

## 2017-08-31 RX ADMIN — Medication 1: at 07:31

## 2017-08-31 RX ADMIN — Medication 1: at 11:26

## 2017-08-31 RX ADMIN — OXYCODONE AND ACETAMINOPHEN 2 TABLET(S): 5; 325 TABLET ORAL at 10:04

## 2017-08-31 RX ADMIN — Medication 100 MILLIGRAM(S): at 13:44

## 2017-08-31 RX ADMIN — ATORVASTATIN CALCIUM 40 MILLIGRAM(S): 80 TABLET, FILM COATED ORAL at 21:46

## 2017-08-31 RX ADMIN — OXYCODONE AND ACETAMINOPHEN 2 TABLET(S): 5; 325 TABLET ORAL at 19:17

## 2017-08-31 RX ADMIN — ENOXAPARIN SODIUM 30 MILLIGRAM(S): 100 INJECTION SUBCUTANEOUS at 17:33

## 2017-08-31 RX ADMIN — Medication 100 MILLIGRAM(S): at 21:46

## 2017-08-31 RX ADMIN — PANTOPRAZOLE SODIUM 40 MILLIGRAM(S): 20 TABLET, DELAYED RELEASE ORAL at 05:53

## 2017-08-31 RX ADMIN — Medication 25 MILLIGRAM(S): at 17:33

## 2017-08-31 RX ADMIN — Medication 100 MILLIGRAM(S): at 05:52

## 2017-08-31 RX ADMIN — Medication 500 MICROGRAM(S): at 10:08

## 2017-08-31 RX ADMIN — OXYCODONE AND ACETAMINOPHEN 2 TABLET(S): 5; 325 TABLET ORAL at 19:45

## 2017-08-31 RX ADMIN — Medication 100 MILLIGRAM(S): at 11:26

## 2017-08-31 RX ADMIN — Medication 1000 MILLIGRAM(S): at 12:30

## 2017-08-31 RX ADMIN — Medication 100 MILLIGRAM(S): at 05:53

## 2017-08-31 RX ADMIN — MUPIROCIN 1 APPLICATION(S): 20 OINTMENT TOPICAL at 05:53

## 2017-08-31 RX ADMIN — OXYCODONE AND ACETAMINOPHEN 2 TABLET(S): 5; 325 TABLET ORAL at 23:28

## 2017-08-31 RX ADMIN — HYDROMORPHONE HYDROCHLORIDE 0.5 MILLIGRAM(S): 2 INJECTION INTRAMUSCULAR; INTRAVENOUS; SUBCUTANEOUS at 02:00

## 2017-08-31 RX ADMIN — Medication 400 MILLIGRAM(S): at 12:14

## 2017-08-31 RX ADMIN — OXYCODONE AND ACETAMINOPHEN 2 TABLET(S): 5; 325 TABLET ORAL at 13:44

## 2017-08-31 NOTE — PROGRESS NOTE ADULT - PROBLEM SELECTOR PLAN 1
Asa, Statin, B-blocker, Chest PT,  Incentive spirometry, wound care and assessment.  Ambulate   Phys therapy as vinod daily

## 2017-08-31 NOTE — PROGRESS NOTE ADULT - ATTENDING COMMENTS
Agree with above assessment and plan as outlined above.    - Cont supportive care per CTS    Ravinder Prado MD, FACC  [facility], [facility]  [address] [place] [address]  PHONE:  [phone]  BEEPER : [phone] Patient seen and examined, agree with above assessment and plan as transcribed above.    - Cont ASA/Statin/ BB  - Progressing well    Ravinder Prado MD, FACC  [facility], [facility]  [address] [place] [address]  PHONE:  [phone]  BEEPER : [phone]

## 2017-08-31 NOTE — PROGRESS NOTE ADULT - ASSESSMENT
62 y/o M w/ PMH of HTN, HLD, current smoker and ASIYA on CPAP, Left ankle fx w/ LLE cast (7/20/17), presented to [facility] 8/28 for cardiac catheretization 2/2 to echocardiogram finding of dilate LV size and normal LV function (EF 54%). Cardiac Cath finding revealed multivessel CAD.     8/29/17 S/P CABG x 3 w LIMA  Post op insulin infusion started and weaned, hgb A1c 5.9, extubated POD#0.  8/30 POD #1 on beta blocker, transferred to SDU  8/31   remove chest tube,  keep sdu, isolate pw

## 2017-08-31 NOTE — PROGRESS NOTE ADULT - SUBJECTIVE AND OBJECTIVE BOX
Subjective:  pt seen and examined, no complaints on exam.  extubated    S/P C 3 L, POD 2    sodium chloride 0.45%. 1000 milliLiter(s) IV Continuous <Continuous>  aspirin enteric coated 325 milliGRAM(s) Oral daily  allopurinol 100 milliGRAM(s) Oral daily  atorvastatin 40 milliGRAM(s) Oral at bedtime  docusate sodium 100 milliGRAM(s) Oral three times a day  pantoprazole    Tablet 40 milliGRAM(s) Oral before breakfast  enoxaparin Injectable 30 milliGRAM(s) SubCutaneous every 12 hours  dextrose 5%. 1000 milliLiter(s) IV Continuous <Continuous>  dextrose Gel 1 Dose(s) Oral once PRN  dextrose 50% Injectable 12.5 Gram(s) IV Push once  dextrose 50% Injectable 25 Gram(s) IV Push once  dextrose 50% Injectable 25 Gram(s) IV Push once  glucagon  Injectable 1 milliGRAM(s) IntraMuscular once PRN  insulin lispro (HumaLOG) corrective regimen sliding scale   SubCutaneous three times a day before meals  insulin lispro (HumaLOG) corrective regimen sliding scale   SubCutaneous at bedtime  oxyCODONE    5 mG/acetaminophen 325 mG 1 Tablet(s) Oral every 4 hours PRN  oxyCODONE    5 mG/acetaminophen 325 mG 2 Tablet(s) Oral every 4 hours PRN  metoprolol 25 milliGRAM(s) Oral every 12 hours  mupirocin 2% Ointment 1 Application(s) Topical two times a day                            12.3   11.2  )-----------( 130      ( 31 Aug 2017 03:44 )             36.6       Hemoglobin: 12.3 g/dL (08-31 @ 03:44)  Hemoglobin: 13.4 g/dL (08-30 @ 01:23)  Hemoglobin: 14.4 g/dL (08-29 @ 17:20)  Hemoglobin: 15.6 g/dL (08-28 @ 22:09)  Hemoglobin: 15.4 g/dL (08-28 @ 10:00)      08-31    137  |  100  |  8   ----------------------------<  148<H>  4.3   |  24  |  0.44<L>    Ca    8.6      31 Aug 2017 03:44  Phos  3.4     08-30    TPro  5.8<L>  /  Alb  3.6  /  TBili  0.8  /  DBili  x   /  AST  80<H>  /  ALT  63<H>  /  AlkPhos  65  08-30    Creatinine Trend: 0.44<--, 0.56<--, 0.57<--, 0.61<--, 0.63<--    COAGS: PT/INR - ( 31 Aug 2017 03:44 )   PT: 13.9 sec;   INR: 1.27 ratio         PTT - ( 31 Aug 2017 03:44 )  PTT:28.2 sec    CARDIAC MARKERS ( 30 Aug 2017 09:27 )  x     / 0.29 ng/mL / 1759 U/L / x     / 36.6 ng/mL  CARDIAC MARKERS ( 30 Aug 2017 00:27 )  x     / 0.41 ng/mL / 1241 U/L / x     / 39.6 ng/mL  CARDIAC MARKERS ( 29 Aug 2017 17:20 )  x     / 0.29 ng/mL / 419 U/L / x     / 32.6 ng/mL        T(C): 36.9 (08-31-17 @ 05:00), Max: 37.1 (08-30-17 @ 07:00)  HR: 75 (08-31-17 @ 06:13) (69 - 84)  BP: 127/66 (08-31-17 @ 05:00) (114/68 - 127/66)  RR: 18 (08-31-17 @ 05:00) (12 - 20)  SpO2: 95% (08-31-17 @ 06:13) (92% - 98%)  Wt(kg): --    I&O's Summary    29 Aug 2017 07:01  -  30 Aug 2017 07:00  --------------------------------------------------------  IN: 4703.2 mL / OUT: 1390 mL / NET: 3313.2 mL    30 Aug 2017 07:01  -  31 Aug 2017 06:52  --------------------------------------------------------  IN: 1023 mL / OUT: 1200 mL / NET: -177 mL          Lymphatic: No lymphadenopathy , no edema  Cardiovascular: Normal S1 S2, No JVD, No murmurs , Peripheral pulses palpable 2+ bilaterally  Respiratory: Lungs clear to auscultation, normal effort 	  Gastrointestinal:  Soft, Non-tender, + BS	  Skin: No rashes, No ecchymoses, No cyanosis, warm to touch  Musculoskeletal: Normal range of motion, normal strength  Psychiatry:  Mood & affect appropriate    TELEMETRY: 	  NSR       ASSESSMENT/PLAN: 	61y Male s/p C3L, PMH HTN HLD current smoker and ASIYA on CPAP, L ankle fracture with LLL cast. Now hemodynamically stable, not requiring vasodilatory/inotropic support, on insulin gtt only.  Extubated in NAD.    POD 2      GI / DVT prophylaxis.   keep K>4, mag >2.0   pain management  BB as bp / hr can tolerate  asa, statin,  glycemic control   Physical therapy follow up    D/W Dr Prado Subjective:  pt seen and examined, no complaints on exam.  extubated    S/P C 3 L,     sodium chloride 0.45%. 1000 milliLiter(s) IV Continuous <Continuous>  aspirin enteric coated 325 milliGRAM(s) Oral daily  allopurinol 100 milliGRAM(s) Oral daily  atorvastatin 40 milliGRAM(s) Oral at bedtime  docusate sodium 100 milliGRAM(s) Oral three times a day  pantoprazole    Tablet 40 milliGRAM(s) Oral before breakfast  enoxaparin Injectable 30 milliGRAM(s) SubCutaneous every 12 hours  dextrose 5%. 1000 milliLiter(s) IV Continuous <Continuous>  dextrose Gel 1 Dose(s) Oral once PRN  dextrose 50% Injectable 12.5 Gram(s) IV Push once  dextrose 50% Injectable 25 Gram(s) IV Push once  dextrose 50% Injectable 25 Gram(s) IV Push once  glucagon  Injectable 1 milliGRAM(s) IntraMuscular once PRN  insulin lispro (HumaLOG) corrective regimen sliding scale   SubCutaneous three times a day before meals  insulin lispro (HumaLOG) corrective regimen sliding scale   SubCutaneous at bedtime  oxyCODONE    5 mG/acetaminophen 325 mG 1 Tablet(s) Oral every 4 hours PRN  oxyCODONE    5 mG/acetaminophen 325 mG 2 Tablet(s) Oral every 4 hours PRN  metoprolol 25 milliGRAM(s) Oral every 12 hours  mupirocin 2% Ointment 1 Application(s) Topical two times a day                            12.3   11.2  )-----------( 130      ( 31 Aug 2017 03:44 )             36.6       Hemoglobin: 12.3 g/dL (08-31 @ 03:44)  Hemoglobin: 13.4 g/dL (08-30 @ 01:23)  Hemoglobin: 14.4 g/dL (08-29 @ 17:20)  Hemoglobin: 15.6 g/dL (08-28 @ 22:09)  Hemoglobin: 15.4 g/dL (08-28 @ 10:00)      08-31    137  |  100  |  8   ----------------------------<  148<H>  4.3   |  24  |  0.44<L>    Ca    8.6      31 Aug 2017 03:44  Phos  3.4     08-30    TPro  5.8<L>  /  Alb  3.6  /  TBili  0.8  /  DBili  x   /  AST  80<H>  /  ALT  63<H>  /  AlkPhos  65  08-30    Creatinine Trend: 0.44<--, 0.56<--, 0.57<--, 0.61<--, 0.63<--    COAGS: PT/INR - ( 31 Aug 2017 03:44 )   PT: 13.9 sec;   INR: 1.27 ratio         PTT - ( 31 Aug 2017 03:44 )  PTT:28.2 sec    CARDIAC MARKERS ( 30 Aug 2017 09:27 )  x     / 0.29 ng/mL / 1759 U/L / x     / 36.6 ng/mL  CARDIAC MARKERS ( 30 Aug 2017 00:27 )  x     / 0.41 ng/mL / 1241 U/L / x     / 39.6 ng/mL  CARDIAC MARKERS ( 29 Aug 2017 17:20 )  x     / 0.29 ng/mL / 419 U/L / x     / 32.6 ng/mL        T(C): 36.9 (08-31-17 @ 05:00), Max: 37.1 (08-30-17 @ 07:00)  HR: 75 (08-31-17 @ 06:13) (69 - 84)  BP: 127/66 (08-31-17 @ 05:00) (114/68 - 127/66)  RR: 18 (08-31-17 @ 05:00) (12 - 20)  SpO2: 95% (08-31-17 @ 06:13) (92% - 98%)  Wt(kg): --    I&O's Summary    29 Aug 2017 07:01  -  30 Aug 2017 07:00  --------------------------------------------------------  IN: 4703.2 mL / OUT: 1390 mL / NET: 3313.2 mL    30 Aug 2017 07:01  -  31 Aug 2017 06:52  --------------------------------------------------------  IN: 1023 mL / OUT: 1200 mL / NET: -177 mL          Lymphatic: No lymphadenopathy , no edema  Cardiovascular: Normal S1 S2, No JVD, No murmurs , Peripheral pulses palpable 2+ bilaterally  Respiratory: Lungs clear to auscultation, normal effort 	  Gastrointestinal:  Soft, Non-tender, + BS	  Skin: No rashes, No ecchymoses, No cyanosis, warm to touch  Musculoskeletal: Normal range of motion, normal strength  Psychiatry:  Mood & affect appropriate    TELEMETRY: 	  NSR       ASSESSMENT/PLAN: 	61y Male s/p C3L, PMH HTN HLD current smoker and ASIYA on CPAP, L ankle fracture with LLL cast. Now hemodynamically stable, not requiring vasodilatory/inotropic support, on insulin gtt only.  Extubated in NAD.    POD 2      GI / DVT prophylaxis.   keep K>4, mag >2.0   pain management  BB as bp / hr can tolerate  asa, statin,  glycemic control   Physical therapy follow up    D/W Dr Prado

## 2017-08-31 NOTE — PROGRESS NOTE ADULT - SUBJECTIVE AND OBJECTIVE BOX
VITAL SIGNS    Telemetry:  SR  70-90    Vital Signs Last 24 Hrs  T(C): 36.7 (17 @ 07:16), Max: 36.9 (17 @ 13:10)  T(F): 98 (17 @ 07:16), Max: 98.5 (17 @ 13:10)  HR: 78 (17 @ 07:16) (70 - 84)  BP: 133/69 (17 @ 07:16) (114/68 - 133/69)  RR: 18 (17 @ 07:16) (16 - 20)  SpO2: 94% (17 @ 07:16) (92% - 98%)                     Daily Weight in k.1 (31 Aug 2017 07:01)        CAPILLARY BLOOD GLUCOSE  160 (31 Aug 2017 07:19)  146 (30 Aug 2017 21:53)  131 (30 Aug 2017 16:49)  165 (30 Aug 2017 12:32)              Drains:                L Pleural  [ x ]   Pacing Wires                               Isolated  [ x ]    Coumadin        [ x ]      NO                            PHYSICAL EXAM    Neurology: alert and oriented x 3, moves allextremities with no defecits  CV :  RRR  Sternal Wound :  CDI , Stable + PW  Lungs:   CTA B/L  Abdomen: soft, nontender, nondistended, positive bowel sounds, last bowel movement   Extremities:     rt leg ace,    plus one rt leg pedal edema  lt leg with cast,   toes warm

## 2017-08-31 NOTE — PROGRESS NOTE ADULT - SUBJECTIVE AND OBJECTIVE BOX
Patient is a 61y old  Male who presents with a chief complaint of "I'm here for heart surgery" (28 Aug 2017 17:30)      SUBJECTIVE / OVERNIGHT EVENTS: No nausea, vomiting or diarrhea, no fever or chills, no dizziness or chest pain, no dysuria or hematuria, no joint pain or swelling  less pain today    MEDICATIONS  (STANDING):  sodium chloride 0.45%. 1000 milliLiter(s) (10 mL/Hr) IV Continuous <Continuous>  aspirin enteric coated 325 milliGRAM(s) Oral daily  allopurinol 100 milliGRAM(s) Oral daily  atorvastatin 40 milliGRAM(s) Oral at bedtime  docusate sodium 100 milliGRAM(s) Oral three times a day  pantoprazole    Tablet 40 milliGRAM(s) Oral before breakfast  enoxaparin Injectable 30 milliGRAM(s) SubCutaneous every 12 hours  dextrose 5%. 1000 milliLiter(s) (50 mL/Hr) IV Continuous <Continuous>  dextrose 50% Injectable 12.5 Gram(s) IV Push once  dextrose 50% Injectable 25 Gram(s) IV Push once  dextrose 50% Injectable 25 Gram(s) IV Push once  insulin lispro (HumaLOG) corrective regimen sliding scale   SubCutaneous three times a day before meals  insulin lispro (HumaLOG) corrective regimen sliding scale   SubCutaneous at bedtime  metoprolol 25 milliGRAM(s) Oral every 12 hours  mupirocin 2% Ointment 1 Application(s) Topical two times a day  polyethylene glycol 3350 17 Gram(s) Oral daily  ipratropium  for Nebulization. 500 MICROGram(s) Nebulizer once    MEDICATIONS  (PRN):  dextrose Gel 1 Dose(s) Oral once PRN Blood Glucose LESS THAN 70 milliGRAM(s)/deciLiter  glucagon  Injectable 1 milliGRAM(s) IntraMuscular once PRN Glucose <70 milliGRAM(s)/deciLiter  oxyCODONE    5 mG/acetaminophen 325 mG 1 Tablet(s) Oral every 4 hours PRN Moderate Pain (4 - 6)  oxyCODONE    5 mG/acetaminophen 325 mG 2 Tablet(s) Oral every 4 hours PRN Severe Pain (7 - 10)        CAPILLARY BLOOD GLUCOSE  143 (31 Aug 2017 16:31)  161 (31 Aug 2017 11:15)  160 (31 Aug 2017 07:19)  146 (30 Aug 2017 21:53)        I&O's Summary    30 Aug 2017 07:01  -  31 Aug 2017 07:00  --------------------------------------------------------  IN: 1023 mL / OUT: 1200 mL / NET: -177 mL    31 Aug 2017 07:01  -  31 Aug 2017 17:17  --------------------------------------------------------  IN: 480 mL / OUT: 600 mL / NET: -120 mL      PHYSICAL EXAM:  GENERAL: NAD, well-developed lying flat in bed  HEAD:  Atraumatic, Normocephalic  EYES: EOMI, PERRLA, conjunctiva and sclera clear  NECK: Supple, No JVD  CHEST/LUNG: decreased breath sounds bases bilaterally; No wheeze  positive for central sternal incision  chest tubes and drains noted  HEART: Regular rate and rhythm; No murmurs, rubs, or gallops distant HS   ABDOMEN: Soft, Nontender, Nondistended; Bowel sounds present  EXTREMITIES:  + edema, No clubbing or cyanosis  PSYCH: AAOx3  NEUROLOGY: non-focal  SKIN: No rashes or lesions    LABS:                        12.3   11.2  )-----------( 130      ( 31 Aug 2017 03:44 )             36.6     08-31    137  |  100  |  8   ----------------------------<  148<H>  4.3   |  24  |  0.44<L>    Ca    8.6      31 Aug 2017 03:44  Phos  3.4     08-30    TPro  5.8<L>  /  Alb  3.6  /  TBili  0.8  /  DBili  x   /  AST  80<H>  /  ALT  63<H>  /  AlkPhos  65  08-30    PT/INR - ( 31 Aug 2017 03:44 )   PT: 13.9 sec;   INR: 1.27 ratio         PTT - ( 31 Aug 2017 03:44 )  PTT:28.2 sec  CARDIAC MARKERS ( 30 Aug 2017 09:27 )  x     / 0.29 ng/mL / 1759 U/L / x     / 36.6 ng/mL  CARDIAC MARKERS ( 30 Aug 2017 00:27 )  x     / 0.41 ng/mL / 1241 U/L / x     / 39.6 ng/mL  CARDIAC MARKERS ( 29 Aug 2017 17:20 )  x     / 0.29 ng/mL / 419 U/L / x     / 32.6 ng/mL            Consultant(s) Notes Reviewed:      Care Discussed with Consultants/Other Providers:    Contact Number, Dr Enciso [phone]

## 2017-09-01 DIAGNOSIS — K59.00 CONSTIPATION, UNSPECIFIED: ICD-10-CM

## 2017-09-01 LAB
ANION GAP SERPL CALC-SCNC: 12 MMOL/L — SIGNIFICANT CHANGE UP (ref 5–17)
APTT BLD: 27.3 SEC — LOW (ref 27.5–37.4)
BUN SERPL-MCNC: 9 MG/DL — SIGNIFICANT CHANGE UP (ref 7–23)
CALCIUM SERPL-MCNC: 8.9 MG/DL — SIGNIFICANT CHANGE UP (ref 8.4–10.5)
CHLORIDE SERPL-SCNC: 101 MMOL/L — SIGNIFICANT CHANGE UP (ref 96–108)
CO2 SERPL-SCNC: 27 MMOL/L — SIGNIFICANT CHANGE UP (ref 22–31)
CREAT SERPL-MCNC: 0.56 MG/DL — SIGNIFICANT CHANGE UP (ref 0.5–1.3)
GLUCOSE SERPL-MCNC: 145 MG/DL — HIGH (ref 70–99)
HCT VFR BLD CALC: 35.4 % — LOW (ref 39–50)
HGB BLD-MCNC: 12.3 G/DL — LOW (ref 13–17)
INR BLD: 1.21 RATIO — HIGH (ref 0.88–1.16)
MCHC RBC-ENTMCNC: 30.8 PG — SIGNIFICANT CHANGE UP (ref 27–34)
MCHC RBC-ENTMCNC: 34.7 GM/DL — SIGNIFICANT CHANGE UP (ref 32–36)
MCV RBC AUTO: 88.7 FL — SIGNIFICANT CHANGE UP (ref 80–100)
PLATELET # BLD AUTO: 134 K/UL — LOW (ref 150–400)
POTASSIUM SERPL-MCNC: 3.9 MMOL/L — SIGNIFICANT CHANGE UP (ref 3.5–5.3)
POTASSIUM SERPL-SCNC: 3.9 MMOL/L — SIGNIFICANT CHANGE UP (ref 3.5–5.3)
PROTHROM AB SERPL-ACNC: 13.1 SEC — HIGH (ref 9.8–12.7)
RBC # BLD: 3.99 M/UL — LOW (ref 4.2–5.8)
RBC # FLD: 12.2 % — SIGNIFICANT CHANGE UP (ref 10.3–14.5)
SODIUM SERPL-SCNC: 140 MMOL/L — SIGNIFICANT CHANGE UP (ref 135–145)
WBC # BLD: 11.2 K/UL — HIGH (ref 3.8–10.5)
WBC # FLD AUTO: 11.2 K/UL — HIGH (ref 3.8–10.5)

## 2017-09-01 PROCEDURE — 71010: CPT | Mod: 26

## 2017-09-01 RX ORDER — FUROSEMIDE 40 MG
40 TABLET ORAL DAILY
Qty: 0 | Refills: 0 | Status: DISCONTINUED | OUTPATIENT
Start: 2017-09-01 | End: 2017-09-05

## 2017-09-01 RX ORDER — POTASSIUM CHLORIDE 20 MEQ
20 PACKET (EA) ORAL DAILY
Qty: 0 | Refills: 0 | Status: DISCONTINUED | OUTPATIENT
Start: 2017-09-01 | End: 2017-09-05

## 2017-09-01 RX ORDER — METOPROLOL TARTRATE 50 MG
50 TABLET ORAL
Qty: 0 | Refills: 0 | Status: DISCONTINUED | OUTPATIENT
Start: 2017-09-01 | End: 2017-09-05

## 2017-09-01 RX ORDER — POTASSIUM CHLORIDE 20 MEQ
40 PACKET (EA) ORAL ONCE
Qty: 0 | Refills: 0 | Status: COMPLETED | OUTPATIENT
Start: 2017-09-01 | End: 2017-09-01

## 2017-09-01 RX ORDER — LIDOCAINE 4 G/100G
1 CREAM TOPICAL DAILY
Qty: 0 | Refills: 0 | Status: DISCONTINUED | OUTPATIENT
Start: 2017-09-01 | End: 2017-09-05

## 2017-09-01 RX ORDER — POTASSIUM CHLORIDE 20 MEQ
20 PACKET (EA) ORAL ONCE
Qty: 0 | Refills: 0 | Status: COMPLETED | OUTPATIENT
Start: 2017-09-01 | End: 2017-09-01

## 2017-09-01 RX ORDER — IPRATROPIUM BROMIDE 0.2 MG/ML
500 SOLUTION, NON-ORAL INHALATION ONCE
Qty: 0 | Refills: 0 | Status: COMPLETED | OUTPATIENT
Start: 2017-09-01 | End: 2017-09-01

## 2017-09-01 RX ORDER — ZOLPIDEM TARTRATE 10 MG/1
5 TABLET ORAL AT BEDTIME
Qty: 0 | Refills: 0 | Status: DISCONTINUED | OUTPATIENT
Start: 2017-09-01 | End: 2017-09-05

## 2017-09-01 RX ORDER — METOPROLOL TARTRATE 50 MG
50 TABLET ORAL
Qty: 0 | Refills: 0 | Status: DISCONTINUED | OUTPATIENT
Start: 2017-09-01 | End: 2017-09-01

## 2017-09-01 RX ADMIN — Medication 100 MILLIGRAM(S): at 05:35

## 2017-09-01 RX ADMIN — Medication 40 MILLIGRAM(S): at 23:06

## 2017-09-01 RX ADMIN — OXYCODONE AND ACETAMINOPHEN 2 TABLET(S): 5; 325 TABLET ORAL at 11:51

## 2017-09-01 RX ADMIN — MUPIROCIN 1 APPLICATION(S): 20 OINTMENT TOPICAL at 05:35

## 2017-09-01 RX ADMIN — OXYCODONE AND ACETAMINOPHEN 2 TABLET(S): 5; 325 TABLET ORAL at 16:17

## 2017-09-01 RX ADMIN — OXYCODONE AND ACETAMINOPHEN 2 TABLET(S): 5; 325 TABLET ORAL at 03:49

## 2017-09-01 RX ADMIN — Medication 20 MILLIEQUIVALENT(S): at 11:50

## 2017-09-01 RX ADMIN — POLYETHYLENE GLYCOL 3350 17 GRAM(S): 17 POWDER, FOR SOLUTION ORAL at 11:53

## 2017-09-01 RX ADMIN — OXYCODONE AND ACETAMINOPHEN 2 TABLET(S): 5; 325 TABLET ORAL at 03:18

## 2017-09-01 RX ADMIN — Medication 100 MILLIGRAM(S): at 11:52

## 2017-09-01 RX ADMIN — ENOXAPARIN SODIUM 30 MILLIGRAM(S): 100 INJECTION SUBCUTANEOUS at 18:31

## 2017-09-01 RX ADMIN — ZOLPIDEM TARTRATE 5 MILLIGRAM(S): 10 TABLET ORAL at 23:06

## 2017-09-01 RX ADMIN — OXYCODONE AND ACETAMINOPHEN 2 TABLET(S): 5; 325 TABLET ORAL at 15:47

## 2017-09-01 RX ADMIN — OXYCODONE AND ACETAMINOPHEN 2 TABLET(S): 5; 325 TABLET ORAL at 00:00

## 2017-09-01 RX ADMIN — OXYCODONE AND ACETAMINOPHEN 2 TABLET(S): 5; 325 TABLET ORAL at 20:33

## 2017-09-01 RX ADMIN — Medication 50 MILLIGRAM(S): at 18:45

## 2017-09-01 RX ADMIN — OXYCODONE AND ACETAMINOPHEN 2 TABLET(S): 5; 325 TABLET ORAL at 12:20

## 2017-09-01 RX ADMIN — OXYCODONE AND ACETAMINOPHEN 2 TABLET(S): 5; 325 TABLET ORAL at 07:29

## 2017-09-01 RX ADMIN — Medication 500 MICROGRAM(S): at 14:55

## 2017-09-01 RX ADMIN — ENOXAPARIN SODIUM 30 MILLIGRAM(S): 100 INJECTION SUBCUTANEOUS at 05:34

## 2017-09-01 RX ADMIN — Medication 20 MILLIEQUIVALENT(S): at 23:07

## 2017-09-01 RX ADMIN — LIDOCAINE 1 PATCH: 4 CREAM TOPICAL at 13:45

## 2017-09-01 RX ADMIN — MUPIROCIN 1 APPLICATION(S): 20 OINTMENT TOPICAL at 18:31

## 2017-09-01 RX ADMIN — Medication 25 MILLIGRAM(S): at 05:35

## 2017-09-01 RX ADMIN — ATORVASTATIN CALCIUM 40 MILLIGRAM(S): 80 TABLET, FILM COATED ORAL at 23:06

## 2017-09-01 RX ADMIN — Medication 325 MILLIGRAM(S): at 11:52

## 2017-09-01 RX ADMIN — Medication 100 MILLIGRAM(S): at 23:06

## 2017-09-01 RX ADMIN — PANTOPRAZOLE SODIUM 40 MILLIGRAM(S): 20 TABLET, DELAYED RELEASE ORAL at 05:35

## 2017-09-01 RX ADMIN — OXYCODONE AND ACETAMINOPHEN 2 TABLET(S): 5; 325 TABLET ORAL at 19:48

## 2017-09-01 RX ADMIN — Medication 40 MILLIEQUIVALENT(S): at 04:02

## 2017-09-01 RX ADMIN — Medication 100 MILLIGRAM(S): at 11:51

## 2017-09-01 RX ADMIN — Medication 0: at 23:05

## 2017-09-01 RX ADMIN — OXYCODONE AND ACETAMINOPHEN 2 TABLET(S): 5; 325 TABLET ORAL at 07:55

## 2017-09-01 NOTE — PROGRESS NOTE ADULT - SUBJECTIVE AND OBJECTIVE BOX
Patient is a 61y old  Male who presents with a chief complaint of "I'm here for heart surgery" (28 Aug 2017 17:30)      SUBJECTIVE / OVERNIGHT EVENTS: No nausea, vomiting or diarrhea, no fever or chills, no dizziness or chest pain, no dysuria or hematuria, no joint pain or swelling    improved overall per patient     MEDICATIONS  (STANDING):  sodium chloride 0.45%. 1000 milliLiter(s) (10 mL/Hr) IV Continuous <Continuous>  aspirin enteric coated 325 milliGRAM(s) Oral daily  allopurinol 100 milliGRAM(s) Oral daily  atorvastatin 40 milliGRAM(s) Oral at bedtime  docusate sodium 100 milliGRAM(s) Oral three times a day  pantoprazole    Tablet 40 milliGRAM(s) Oral before breakfast  enoxaparin Injectable 30 milliGRAM(s) SubCutaneous every 12 hours  dextrose 5%. 1000 milliLiter(s) (50 mL/Hr) IV Continuous <Continuous>  dextrose 50% Injectable 12.5 Gram(s) IV Push once  dextrose 50% Injectable 25 Gram(s) IV Push once  dextrose 50% Injectable 25 Gram(s) IV Push once  insulin lispro (HumaLOG) corrective regimen sliding scale   SubCutaneous three times a day before meals  insulin lispro (HumaLOG) corrective regimen sliding scale   SubCutaneous at bedtime  metoprolol 25 milliGRAM(s) Oral every 12 hours  mupirocin 2% Ointment 1 Application(s) Topical two times a day  polyethylene glycol 3350 17 Gram(s) Oral daily  lidocaine   Patch 1 Patch Transdermal daily    MEDICATIONS  (PRN):  dextrose Gel 1 Dose(s) Oral once PRN Blood Glucose LESS THAN 70 milliGRAM(s)/deciLiter  glucagon  Injectable 1 milliGRAM(s) IntraMuscular once PRN Glucose <70 milliGRAM(s)/deciLiter  oxyCODONE    5 mG/acetaminophen 325 mG 1 Tablet(s) Oral every 4 hours PRN Moderate Pain (4 - 6)  oxyCODONE    5 mG/acetaminophen 325 mG 2 Tablet(s) Oral every 4 hours PRN Severe Pain (7 - 10)        CAPILLARY BLOOD GLUCOSE  151 (01 Sep 2017 12:00)  129 (01 Sep 2017 07:28)  148 (31 Aug 2017 21:33)  143 (31 Aug 2017 16:31)        I&O's Summary    31 Aug 2017 07:01  -  01 Sep 2017 07:00  --------------------------------------------------------  IN: 830 mL / OUT: 1625 mL / NET: -795 mL      PHYSICAL EXAM:  GENERAL: NAD, well-developed lying flat in bed  HEAD:  Atraumatic, Normocephalic  EYES: EOMI, PERRLA, conjunctiva and sclera clear  NECK: Supple, No JVD  CHEST/LUNG: decreased breath sounds bases bilaterally; No wheeze  positive for central sternal incision  chest tubes and drains removed  HEART: Regular rate and rhythm; No murmurs, rubs, or gallops distant HS   ABDOMEN: Soft, Nontender, Nondistended; Bowel sounds present  EXTREMITIES:  + edema, No clubbing or cyanosis  PSYCH: AAOx3  NEUROLOGY: non-focal  SKIN: No rashes or lesions    LABS:                        12.3   11.2  )-----------( 134      ( 01 Sep 2017 02:24 )             35.4     09-01    140  |  101  |  9   ----------------------------<  145<H>  3.9   |  27  |  0.56    Ca    8.9      01 Sep 2017 02:24      PT/INR - ( 01 Sep 2017 02:24 )   PT: 13.1 sec;   INR: 1.21 ratio         PTT - ( 01 Sep 2017 02:24 )  PTT:27.3 sec            Consultant(s) Notes Reviewed:      Care Discussed with Consultants/Other Providers:    Contact Number, Dr Enciso [phone]

## 2017-09-01 NOTE — PROGRESS NOTE ADULT - SUBJECTIVE AND OBJECTIVE BOX
VITAL SIGNS    Telemetry:  NSR  80    Vital Signs Last 24 Hrs  T(C): 36.8 (09-01-17 @ 07:28), Max: 37.1 (08-31-17 @ 15:33)  T(F): 98.2 (09-01-17 @ 07:28), Max: 98.8 (08-31-17 @ 15:33)  HR: 70 (09-01-17 @ 07:28) (66 - 83)  BP: 131/73 (09-01-17 @ 07:28) (115/58 - 133/70)  RR: 18 (09-01-17 @ 07:28) (16 - 20)  SpO2: 95% (09-01-17 @ 07:28) (92% - 96%)            CAPILLARY BLOOD GLUCOSE  129 (01 Sep 2017 07:28)  148 (31 Aug 2017 21:33)  143 (31 Aug 2017 16:31)  161 (31 Aug 2017 11:15)              Drains:  none  Pacing Wires                                  Isolated  [x  ]    Coumadin             [ x]      NO                                 PHYSICAL EXAM    Neurology: alert and oriented x 3, moves all extremities with no defecits, with Lt foot cast   toes warm  CV :  RRR  Sternal Wound :  CDI , Stable+PW  Lungs:  b/l diminished throughout  Abdomen: soft, nontender, nondistended, positive bowel sounds, needs bm  Extremities:     Lt cast intacr   rt svg sites benign   trace pedal edema

## 2017-09-01 NOTE — PROGRESS NOTE ADULT - PROBLEM SELECTOR PLAN 1
Asa, Statin,Chest PT,  Incentive spirometry  Ambulate NWB lt leg  Phys therapy as vinod daily Asa, Statin,Chest PT,  Incentive spirometry  Ambulate NWB lt leg  Phys therapy as vinod daily,  hopeful rehab for tues

## 2017-09-01 NOTE — PROGRESS NOTE ADULT - SUBJECTIVE AND OBJECTIVE BOX
Subjective: No CP/SOB      MEDICATIONS  (STANDING):  sodium chloride 0.45%. 1000 milliLiter(s) (10 mL/Hr) IV Continuous <Continuous>  aspirin enteric coated 325 milliGRAM(s) Oral daily  allopurinol 100 milliGRAM(s) Oral daily  atorvastatin 40 milliGRAM(s) Oral at bedtime  docusate sodium 100 milliGRAM(s) Oral three times a day  pantoprazole    Tablet 40 milliGRAM(s) Oral before breakfast  enoxaparin Injectable 30 milliGRAM(s) SubCutaneous every 12 hours  insulin lispro (HumaLOG) corrective regimen sliding scale   SubCutaneous three times a day before meals  insulin lispro (HumaLOG) corrective regimen sliding scale   SubCutaneous at bedtime  metoprolol 25 milliGRAM(s) Oral every 12 hours  mupirocin 2% Ointment 1 Application(s) Topical two times a day  polyethylene glycol 3350 17 Gram(s) Oral daily  potassium chloride    Tablet ER 20 milliEquivalent(s) Oral once      LABS:                        12.3   11.2  )-----------( 134      ( 01 Sep 2017 02:24 )             35.4     Hemoglobin: 12.3 g/dL (09-01 @ 02:24)  Hemoglobin: 12.3 g/dL (08-31 @ 03:44)  Hemoglobin: 13.4 g/dL (08-30 @ 01:23)  Hemoglobin: 14.4 g/dL (08-29 @ 17:20)  Hemoglobin: 15.6 g/dL (08-28 @ 22:09)    09-01    140  |  101  |  9   ----------------------------<  145<H>  3.9   |  27  |  0.56    Ca    8.9      01 Sep 2017 02:24      Creatinine Trend: 0.56<--, 0.44<--, 0.56<--, 0.57<--, 0.61<--, 0.63<--   PT/INR - ( 01 Sep 2017 02:24 )   PT: 13.1 sec;   INR: 1.21 ratio      PTT - ( 01 Sep 2017 02:24 )  PTT:27.3 sec  CARDIAC MARKERS ( 30 Aug 2017 09:27 )  x     / 0.29 ng/mL / 1759 U/L / x     / 36.6 ng/mL  CARDIAC MARKERS ( 30 Aug 2017 00:27 )  x     / 0.41 ng/mL / 1241 U/L / x     / 39.6 ng/mL  CARDIAC MARKERS ( 29 Aug 2017 17:20 )  x     / 0.29 ng/mL / 419 U/L / x     / 32.6 ng/mL    PHYSICAL EXAM  Vital Signs Last 24 Hrs  T(C): 36.8 (01 Sep 2017 07:28), Max: 37.1 (31 Aug 2017 15:33)  T(F): 98.2 (01 Sep 2017 07:28), Max: 98.8 (31 Aug 2017 15:33)  HR: 70 (01 Sep 2017 07:28) (66 - 83)  BP: 131/73 (01 Sep 2017 07:28) (115/58 - 133/70)  BP(mean): 82 (31 Aug 2017 19:58) (80 - 84)  RR: 18 (01 Sep 2017 07:28) (16 - 20)  SpO2: 95% (01 Sep 2017 07:28) (92% - 96%)      Lymphatic: No lymphadenopathy , no edema  Cardiovascular: Normal S1 S2, No JVD, No murmurs , Peripheral pulses palpable 2+ bilaterally  Respiratory: Lungs clear to auscultation, normal effort 	  Gastrointestinal:  Soft, Non-tender, + BS	  Skin: No rashes, No ecchymoses, No cyanosis, warm to touch  Musculoskeletal: Normal range of motion, normal strength      TELEMETRY: 	  NSR       ASSESSMENT/PLAN: 	61y Male HTN, HLD, ASIYA on CPAP, L ankle Fx with cast admitted for elective cardiac cath, found with Multivessel CAD s/p C3L on 8/29, preserved LV function    -- On Asa, Zocor, Lopressor  --DC planning for ELISSA next week  --f/u with primary Cardiologist Dr. CONCHA Ferrari after DC    Tasneem Heredia PAOdalisC  [facility]  [address], [address]   [place] [address]  office [phone]  pager [phone]

## 2017-09-01 NOTE — PROGRESS NOTE ADULT - ATTENDING COMMENTS
CARDIOLOGY ATTENDING    Patient seen and examined. Agree with above. Doing well post-CABG. Continue current medical therapy.

## 2017-09-01 NOTE — PROGRESS NOTE ADULT - ASSESSMENT
62 y/o M w/ PMH of HTN, HLD, current smoker and ASIYA on CPAP, Left ankle fx w/ LLE cast (7/20/17), presented to [facility] 8/28 for cardiac catheretization 2/2 to echocardiogram finding of dilate LV size and normal LV function (EF 54%). Cardiac Cath finding revealed multivessel CAD.     8/29/17 S/P CABG x 3 w LIMA  Post op insulin infusion started and weaned, hgb A1c 5.9, extubated POD#0.  8/30 POD #1 on beta blocker, transferred to SDU  8/31   remove chest tube,  sdu  9/1    trf too floor OOB to chair, lop 50 q12,  No BM, + BS 62 y/o M w/ PMH of HTN, HLD, current smoker and ASIYA on CPAP, Left ankle fx w/ LLE cast (7/20/17), presented to [facility] 8/28 for cardiac catheretization 2/2 to echocardiogram finding of dilate LV size and normal LV function (EF 54%). Cardiac Cath finding revealed multivessel CAD.     8/29/17 S/P CABG x 3 w LIMA  Post op insulin infusion started and weaned, hgb A1c 5.9, extubated POD#0.  8/30 POD #1 on beta blocker, transferred to SDU  8/31   remove chest tube,  sdu  9/1    trf too floor OOB to chair, lop 50 q12,  No BM, + BS, D/W   care coordinator  rehab plan for tues

## 2017-09-02 LAB
ANION GAP SERPL CALC-SCNC: 15 MMOL/L — SIGNIFICANT CHANGE UP (ref 5–17)
BUN SERPL-MCNC: 8 MG/DL — SIGNIFICANT CHANGE UP (ref 7–23)
CALCIUM SERPL-MCNC: 8.7 MG/DL — SIGNIFICANT CHANGE UP (ref 8.4–10.5)
CHLORIDE SERPL-SCNC: 99 MMOL/L — SIGNIFICANT CHANGE UP (ref 96–108)
CO2 SERPL-SCNC: 24 MMOL/L — SIGNIFICANT CHANGE UP (ref 22–31)
CREAT SERPL-MCNC: 0.57 MG/DL — SIGNIFICANT CHANGE UP (ref 0.5–1.3)
GLUCOSE SERPL-MCNC: 129 MG/DL — HIGH (ref 70–99)
HCT VFR BLD CALC: 35.6 % — LOW (ref 39–50)
HGB BLD-MCNC: 12.9 G/DL — LOW (ref 13–17)
MCHC RBC-ENTMCNC: 32.4 PG — SIGNIFICANT CHANGE UP (ref 27–34)
MCHC RBC-ENTMCNC: 36.2 GM/DL — HIGH (ref 32–36)
MCV RBC AUTO: 89.5 FL — SIGNIFICANT CHANGE UP (ref 80–100)
PLATELET # BLD AUTO: 178 K/UL — SIGNIFICANT CHANGE UP (ref 150–400)
POTASSIUM SERPL-MCNC: 3.7 MMOL/L — SIGNIFICANT CHANGE UP (ref 3.5–5.3)
POTASSIUM SERPL-SCNC: 3.7 MMOL/L — SIGNIFICANT CHANGE UP (ref 3.5–5.3)
RBC # BLD: 3.97 M/UL — LOW (ref 4.2–5.8)
RBC # FLD: 12.4 % — SIGNIFICANT CHANGE UP (ref 10.3–14.5)
SODIUM SERPL-SCNC: 138 MMOL/L — SIGNIFICANT CHANGE UP (ref 135–145)
WBC # BLD: 10.9 K/UL — HIGH (ref 3.8–10.5)
WBC # FLD AUTO: 10.9 K/UL — HIGH (ref 3.8–10.5)

## 2017-09-02 RX ORDER — ENOXAPARIN SODIUM 100 MG/ML
40 INJECTION SUBCUTANEOUS
Qty: 0 | Refills: 0 | Status: DISCONTINUED | OUTPATIENT
Start: 2017-09-02 | End: 2017-09-05

## 2017-09-02 RX ORDER — SENNA PLUS 8.6 MG/1
2 TABLET ORAL AT BEDTIME
Qty: 0 | Refills: 0 | Status: DISCONTINUED | OUTPATIENT
Start: 2017-09-02 | End: 2017-09-05

## 2017-09-02 RX ADMIN — Medication 325 MILLIGRAM(S): at 12:43

## 2017-09-02 RX ADMIN — OXYCODONE AND ACETAMINOPHEN 2 TABLET(S): 5; 325 TABLET ORAL at 03:54

## 2017-09-02 RX ADMIN — OXYCODONE AND ACETAMINOPHEN 2 TABLET(S): 5; 325 TABLET ORAL at 17:40

## 2017-09-02 RX ADMIN — POLYETHYLENE GLYCOL 3350 17 GRAM(S): 17 POWDER, FOR SOLUTION ORAL at 12:43

## 2017-09-02 RX ADMIN — Medication 100 MILLIGRAM(S): at 12:43

## 2017-09-02 RX ADMIN — OXYCODONE AND ACETAMINOPHEN 2 TABLET(S): 5; 325 TABLET ORAL at 04:39

## 2017-09-02 RX ADMIN — ZOLPIDEM TARTRATE 5 MILLIGRAM(S): 10 TABLET ORAL at 23:05

## 2017-09-02 RX ADMIN — ATORVASTATIN CALCIUM 40 MILLIGRAM(S): 80 TABLET, FILM COATED ORAL at 23:05

## 2017-09-02 RX ADMIN — Medication 50 MILLIGRAM(S): at 05:45

## 2017-09-02 RX ADMIN — ENOXAPARIN SODIUM 40 MILLIGRAM(S): 100 INJECTION SUBCUTANEOUS at 17:39

## 2017-09-02 RX ADMIN — SENNA PLUS 2 TABLET(S): 8.6 TABLET ORAL at 23:03

## 2017-09-02 RX ADMIN — MUPIROCIN 1 APPLICATION(S): 20 OINTMENT TOPICAL at 17:46

## 2017-09-02 RX ADMIN — Medication 20 MILLIEQUIVALENT(S): at 12:44

## 2017-09-02 RX ADMIN — LIDOCAINE 1 PATCH: 4 CREAM TOPICAL at 12:12

## 2017-09-02 RX ADMIN — OXYCODONE AND ACETAMINOPHEN 2 TABLET(S): 5; 325 TABLET ORAL at 09:15

## 2017-09-02 RX ADMIN — OXYCODONE AND ACETAMINOPHEN 2 TABLET(S): 5; 325 TABLET ORAL at 13:40

## 2017-09-02 RX ADMIN — Medication 100 MILLIGRAM(S): at 12:44

## 2017-09-02 RX ADMIN — Medication 100 MILLIGRAM(S): at 23:05

## 2017-09-02 RX ADMIN — Medication 1: at 17:39

## 2017-09-02 RX ADMIN — ENOXAPARIN SODIUM 30 MILLIGRAM(S): 100 INJECTION SUBCUTANEOUS at 05:45

## 2017-09-02 RX ADMIN — PANTOPRAZOLE SODIUM 40 MILLIGRAM(S): 20 TABLET, DELAYED RELEASE ORAL at 05:46

## 2017-09-02 RX ADMIN — OXYCODONE AND ACETAMINOPHEN 2 TABLET(S): 5; 325 TABLET ORAL at 12:41

## 2017-09-02 RX ADMIN — LIDOCAINE 1 PATCH: 4 CREAM TOPICAL at 01:40

## 2017-09-02 RX ADMIN — Medication 100 MILLIGRAM(S): at 05:46

## 2017-09-02 RX ADMIN — OXYCODONE AND ACETAMINOPHEN 2 TABLET(S): 5; 325 TABLET ORAL at 18:45

## 2017-09-02 RX ADMIN — MUPIROCIN 1 APPLICATION(S): 20 OINTMENT TOPICAL at 05:45

## 2017-09-02 RX ADMIN — OXYCODONE AND ACETAMINOPHEN 2 TABLET(S): 5; 325 TABLET ORAL at 23:20

## 2017-09-02 RX ADMIN — Medication 40 MILLIGRAM(S): at 05:46

## 2017-09-02 RX ADMIN — Medication 0: at 23:04

## 2017-09-02 RX ADMIN — OXYCODONE AND ACETAMINOPHEN 2 TABLET(S): 5; 325 TABLET ORAL at 08:23

## 2017-09-02 RX ADMIN — Medication 50 MILLIGRAM(S): at 17:44

## 2017-09-02 NOTE — PROGRESS NOTE ADULT - PROBLEM SELECTOR PLAN 4
pulmonary follow up as outpatient  continue CPAP at night

## 2017-09-02 NOTE — PROGRESS NOTE ADULT - SUBJECTIVE AND OBJECTIVE BOX
Hello , im coughing up stuff    VITAL SIGNS    Telemetry:  NSR 60    Vital Signs Last 24 Hrs  T(C): 37 (17 @ 05:15), Max: 37 (17 @ 05:15)  T(F): 98.6 (17 @ 05:15), Max: 98.6 (17 @ 05:15)  HR: 75 (17 @ 05:56) (66 - 80)  BP: 131/81 (17 @ 05:15) (121/71 - 145/80)  RR: 20 (17 @ 05:15) (19 - 20)  SpO2: 96% (17 @ 05:56) (93% - 96%)                    @ 07:01  -   @ 07:00  --------------------------------------------------------  IN: 320 mL / OUT: 500 mL / NET: -180 mL          Daily     Daily Weight in k.2 (02 Sep 2017 07:59)        CAPILLARY BLOOD GLUCOSE  130 (02 Sep 2017 11:41)  115 (02 Sep 2017 07:59)  120 (01 Sep 2017 21:22)  140 (01 Sep 2017 16:32)                  Pacing Wires        [x  ]   Settings:     vvi                             Isolated  [  ]    Coumadin    [ ] YES          [x  ]      NO                                   PHYSICAL EXAM        Neurology: alert and oriented x 3, nonfocal, no gross deficits    CV : S1 S2 , RRR     Sternal Wound :  CDI , Stable    Lungs: Bilat rhonchi    Abdomen: soft, nontender, nondistended, positive bowel sounds, last bowel movement -    :           voiding    Extremities:      trace    edema, negative calve tenderness,   leg incision cdi  R          sodium chloride 0.45%. 1000 milliLiter(s) IV Continuous <Continuous>  aspirin enteric coated 325 milliGRAM(s) Oral daily  allopurinol 100 milliGRAM(s) Oral daily  atorvastatin 40 milliGRAM(s) Oral at bedtime  docusate sodium 100 milliGRAM(s) Oral three times a day  pantoprazole    Tablet 40 milliGRAM(s) Oral before breakfast  dextrose 5%. 1000 milliLiter(s) IV Continuous <Continuous>  dextrose Gel 1 Dose(s) Oral once PRN  dextrose 50% Injectable 12.5 Gram(s) IV Push once  dextrose 50% Injectable 25 Gram(s) IV Push once  dextrose 50% Injectable 25 Gram(s) IV Push once  glucagon  Injectable 1 milliGRAM(s) IntraMuscular once PRN  insulin lispro (HumaLOG) corrective regimen sliding scale   SubCutaneous three times a day before meals  insulin lispro (HumaLOG) corrective regimen sliding scale   SubCutaneous at bedtime  oxyCODONE    5 mG/acetaminophen 325 mG 1 Tablet(s) Oral every 4 hours PRN  oxyCODONE    5 mG/acetaminophen 325 mG 2 Tablet(s) Oral every 4 hours PRN  mupirocin 2% Ointment 1 Application(s) Topical two times a day  polyethylene glycol 3350 17 Gram(s) Oral daily  lidocaine   Patch 1 Patch Transdermal daily  metoprolol 50 milliGRAM(s) Oral two times a day  furosemide    Tablet 40 milliGRAM(s) Oral daily  potassium chloride    Tablet ER 20 milliEquivalent(s) Oral daily  zolpidem 5 milliGRAM(s) Oral at bedtime PRN  enoxaparin Injectable 40 milliGRAM(s) SubCutaneous <User Schedule>  senna 2 Tablet(s) Oral at bedtime                              Physical Therapy Rec:   Home  [  ]   Home w/ PT  [  ]  Rehab  [  x]    Discussed with Cardiothoracic Team at AM rounds.

## 2017-09-02 NOTE — PROGRESS NOTE ADULT - PROBLEM SELECTOR PLAN 3
outpatient follow up with ortho   as per patient cast due to be removed 7th september  d/w cardiothoracic surgery service
outpatient follow up with ortho   as per patient cast due to be removed 7th september  d/w cardiothoracic surgery service
outpatient follow up with ortho

## 2017-09-02 NOTE — PROGRESS NOTE ADULT - ATTENDING COMMENTS
Patient seen and examined, agree with above assessment and plan as transcribed above.    - Progressing well  - Cont supportive care per CTS    Ravinder Prado MD, FACC  [facility], [facility]  [address] [place] [address]  PHONE:  [phone]  BEEPER : [phone]

## 2017-09-02 NOTE — PROGRESS NOTE ADULT - ASSESSMENT
60 y/o M w/ PMH of HTN, HLD, current smoker and ASIYA on CPAP, Left ankle fx w/ LLE cast (7/20/17), presented to [facility] 8/28 for cardiac catheretization 2/2 to echocardiogram finding of dilate LV size and normal LV function (EF 54%). Cardiac Cath finding revealed multivessel CAD.     8/29/17 S/P CABG x 3 w LIMA  Post op insulin infusion started and weaned, hgb A1c 5.9, extubated POD#0.  8/30 POD #1 on beta blocker, transferred to SDU  8/31   remove chest tube,  sdu  9/1    trf too floor OOB to chair, lop 50 q12,  No BM, + BS, D/W   care coordinator  rehab plan for tues  DVT prophylaxis bid

## 2017-09-02 NOTE — PROGRESS NOTE ADULT - ASSESSMENT
62 y/o M w/ PMH of HTN, HLD, current smoker and ASIYA on CPAP, Left ankle fx w/ LLE cast (7/20/17), presented to [facility] 8/28 for cardiac catheretization which revealed severe CAD now s/p 3V CABG

## 2017-09-02 NOTE — PROGRESS NOTE ADULT - PROBLEM SELECTOR PLAN 1
Asa, Statin,Chest PT,  Incentive spirometry  Ambulate NWB lt leg  Phys therapy as vinod daily,  hopeful rehab for tues

## 2017-09-02 NOTE — PROGRESS NOTE ADULT - PROBLEM SELECTOR PLAN 5
resources mentioned  family at bedside  abstinence from smoking reinforced
resources mentioned  family at bedside  abstinence from smoking reinforced
resources mentioned  will continue to reinforce abstinence from smoking

## 2017-09-02 NOTE — PROGRESS NOTE ADULT - SUBJECTIVE AND OBJECTIVE BOX
Subjective: pt seen and examined, no complaints on exam.      sodium chloride 0.45%. 1000 milliLiter(s) IV Continuous <Continuous>  aspirin enteric coated 325 milliGRAM(s) Oral daily  allopurinol 100 milliGRAM(s) Oral daily  atorvastatin 40 milliGRAM(s) Oral at bedtime  docusate sodium 100 milliGRAM(s) Oral three times a day  pantoprazole    Tablet 40 milliGRAM(s) Oral before breakfast  enoxaparin Injectable 30 milliGRAM(s) SubCutaneous every 12 hours  dextrose 5%. 1000 milliLiter(s) IV Continuous <Continuous>  dextrose Gel 1 Dose(s) Oral once PRN  dextrose 50% Injectable 12.5 Gram(s) IV Push once  dextrose 50% Injectable 25 Gram(s) IV Push once  dextrose 50% Injectable 25 Gram(s) IV Push once  glucagon  Injectable 1 milliGRAM(s) IntraMuscular once PRN  insulin lispro (HumaLOG) corrective regimen sliding scale   SubCutaneous three times a day before meals  insulin lispro (HumaLOG) corrective regimen sliding scale   SubCutaneous at bedtime  oxyCODONE    5 mG/acetaminophen 325 mG 1 Tablet(s) Oral every 4 hours PRN  oxyCODONE    5 mG/acetaminophen 325 mG 2 Tablet(s) Oral every 4 hours PRN  mupirocin 2% Ointment 1 Application(s) Topical two times a day  polyethylene glycol 3350 17 Gram(s) Oral daily  lidocaine   Patch 1 Patch Transdermal daily  metoprolol 50 milliGRAM(s) Oral two times a day  furosemide    Tablet 40 milliGRAM(s) Oral daily  potassium chloride    Tablet ER 20 milliEquivalent(s) Oral daily  zolpidem 5 milliGRAM(s) Oral at bedtime PRN                            12.9   10.9  )-----------( 178      ( 02 Sep 2017 05:35 )             35.6       Hemoglobin: 12.9 g/dL (09-02 @ 05:35)  Hemoglobin: 12.3 g/dL (09-01 @ 02:24)  Hemoglobin: 12.3 g/dL (08-31 @ 03:44)  Hemoglobin: 13.4 g/dL (08-30 @ 01:23)  Hemoglobin: 14.4 g/dL (08-29 @ 17:20)      09-02    138  |  99  |  8   ----------------------------<  129<H>  3.7   |  24  |  0.57    Ca    8.7      02 Sep 2017 05:35      Creatinine Trend: 0.57<--, 0.56<--, 0.44<--, 0.56<--, 0.57<--, 0.61<--    COAGS:     CARDIAC MARKERS ( 30 Aug 2017 09:27 )  x     / 0.29 ng/mL / 1759 U/L / x     / 36.6 ng/mL        T(C): 37 (09-02-17 @ 05:15), Max: 37 (09-02-17 @ 05:15)  HR: 75 (09-02-17 @ 05:56) (66 - 80)  BP: 131/81 (09-02-17 @ 05:15) (121/71 - 145/80)  RR: 20 (09-02-17 @ 05:15) (18 - 20)  SpO2: 96% (09-02-17 @ 05:56) (93% - 96%)  Wt(kg): --    I&O's Summary    31 Aug 2017 07:01  -  01 Sep 2017 07:00  --------------------------------------------------------  IN: 830 mL / OUT: 1625 mL / NET: -795 mL    01 Sep 2017 07:01  -  02 Sep 2017 06:20  --------------------------------------------------------  IN: 120 mL / OUT: 500 mL / NET: -380 mL        Lymphatic: No lymphadenopathy , no edema  Cardiovascular: Normal S1 S2, No JVD, No murmurs , Peripheral pulses palpable 2+ bilaterally  Respiratory: Lungs clear to auscultation, normal effort 	  Gastrointestinal:  Soft, Non-tender, + BS	  Skin: No rashes, No ecchymoses, No cyanosis, warm to touch  Musculoskeletal: Normal range of motion, normal strength      TELEMETRY: 	  NSR       ASSESSMENT/PLAN: 	61y Male HTN, HLD, ASIYA on CPAP, L ankle Fx with cast admitted for elective cardiac cath, found with Multivessel CAD s/p C3L pod # 4    -- On Asa, Zocor, Lopressor  --DC planning for ELISSA next week  --f/u with primary Cardiologist Dr. CONCHA Ferrari after DC  -  GI / DVT prophylaxis.  - keep K>4, mag >2.0   - keep net neg with lasix , daily weight noted   - pain management   D/W Dr Prado

## 2017-09-02 NOTE — PROGRESS NOTE ADULT - SUBJECTIVE AND OBJECTIVE BOX
Patient is a 61y old  Male who presents with a chief complaint of "I'm here for heart surgery" (28 Aug 2017 17:30)      SUBJECTIVE / OVERNIGHT EVENTS: No nausea, vomiting or diarrhea, no fever or chills, no dizziness or chest pain, no dysuria or hematuria, no joint pain or swelling    MEDICATIONS  (STANDING):  sodium chloride 0.45%. 1000 milliLiter(s) (10 mL/Hr) IV Continuous <Continuous>  aspirin enteric coated 325 milliGRAM(s) Oral daily  allopurinol 100 milliGRAM(s) Oral daily  atorvastatin 40 milliGRAM(s) Oral at bedtime  docusate sodium 100 milliGRAM(s) Oral three times a day  pantoprazole    Tablet 40 milliGRAM(s) Oral before breakfast  enoxaparin Injectable 30 milliGRAM(s) SubCutaneous every 12 hours  dextrose 5%. 1000 milliLiter(s) (50 mL/Hr) IV Continuous <Continuous>  dextrose 50% Injectable 12.5 Gram(s) IV Push once  dextrose 50% Injectable 25 Gram(s) IV Push once  dextrose 50% Injectable 25 Gram(s) IV Push once  insulin lispro (HumaLOG) corrective regimen sliding scale   SubCutaneous three times a day before meals  insulin lispro (HumaLOG) corrective regimen sliding scale   SubCutaneous at bedtime  mupirocin 2% Ointment 1 Application(s) Topical two times a day  polyethylene glycol 3350 17 Gram(s) Oral daily  lidocaine   Patch 1 Patch Transdermal daily  metoprolol 50 milliGRAM(s) Oral two times a day  furosemide    Tablet 40 milliGRAM(s) Oral daily  potassium chloride    Tablet ER 20 milliEquivalent(s) Oral daily    MEDICATIONS  (PRN):  dextrose Gel 1 Dose(s) Oral once PRN Blood Glucose LESS THAN 70 milliGRAM(s)/deciLiter  glucagon  Injectable 1 milliGRAM(s) IntraMuscular once PRN Glucose <70 milliGRAM(s)/deciLiter  oxyCODONE    5 mG/acetaminophen 325 mG 1 Tablet(s) Oral every 4 hours PRN Moderate Pain (4 - 6)  oxyCODONE    5 mG/acetaminophen 325 mG 2 Tablet(s) Oral every 4 hours PRN Severe Pain (7 - 10)  zolpidem 5 milliGRAM(s) Oral at bedtime PRN Insomnia        CAPILLARY BLOOD GLUCOSE  115 (02 Sep 2017 07:59)  120 (01 Sep 2017 21:22)  140 (01 Sep 2017 16:32)  151 (01 Sep 2017 12:00)        I&O's Summary    01 Sep 2017 07:01  -  02 Sep 2017 07:00  --------------------------------------------------------  IN: 320 mL / OUT: 500 mL / NET: -180 mL    PHYSICAL EXAM:  GENERAL: NAD, well-developed lying flat in bed  HEAD:  Atraumatic, Normocephalic  EYES: EOMI, PERRLA, conjunctiva and sclera clear  NECK: Supple, No JVD  CHEST/LUNG: decreased breath sounds bases bilaterally; No wheeze  positive for central sternal incision  chest tubes and drains removed  HEART: Regular rate and rhythm; No murmurs, rubs, or gallops distant HS   ABDOMEN: Soft, Nontender, Nondistended; Bowel sounds present  EXTREMITIES:  + edema, No clubbing or cyanosis  PSYCH: AAOx3  NEUROLOGY: non-focal  SKIN: No rashes or lesions    LABS:                        12.9   10.9  )-----------( 178      ( 02 Sep 2017 05:35 )             35.6     09-02    138  |  99  |  8   ----------------------------<  129<H>  3.7   |  24  |  0.57    Ca    8.7      02 Sep 2017 05:35      PT/INR - ( 01 Sep 2017 02:24 )   PT: 13.1 sec;   INR: 1.21 ratio         PTT - ( 01 Sep 2017 02:24 )  PTT:27.3 sec            Consultant(s) Notes Reviewed:      Care Discussed with Consultants/Other Providers:    Contact Number, Dr Enciso [phone]

## 2017-09-03 LAB
ANION GAP SERPL CALC-SCNC: 11 MMOL/L — SIGNIFICANT CHANGE UP (ref 5–17)
BASOPHILS # BLD AUTO: 0 K/UL — SIGNIFICANT CHANGE UP (ref 0–0.2)
BASOPHILS NFR BLD AUTO: 0.2 % — SIGNIFICANT CHANGE UP (ref 0–2)
BUN SERPL-MCNC: 11 MG/DL — SIGNIFICANT CHANGE UP (ref 7–23)
CALCIUM SERPL-MCNC: 9 MG/DL — SIGNIFICANT CHANGE UP (ref 8.4–10.5)
CHLORIDE SERPL-SCNC: 98 MMOL/L — SIGNIFICANT CHANGE UP (ref 96–108)
CO2 SERPL-SCNC: 31 MMOL/L — SIGNIFICANT CHANGE UP (ref 22–31)
CREAT SERPL-MCNC: 0.68 MG/DL — SIGNIFICANT CHANGE UP (ref 0.5–1.3)
EOSINOPHIL # BLD AUTO: 0.3 K/UL — SIGNIFICANT CHANGE UP (ref 0–0.5)
EOSINOPHIL NFR BLD AUTO: 3.5 % — SIGNIFICANT CHANGE UP (ref 0–6)
GLUCOSE SERPL-MCNC: 114 MG/DL — HIGH (ref 70–99)
HCT VFR BLD CALC: 36.3 % — LOW (ref 39–50)
HGB BLD-MCNC: 12 G/DL — LOW (ref 13–17)
LYMPHOCYTES # BLD AUTO: 1.6 K/UL — SIGNIFICANT CHANGE UP (ref 1–3.3)
LYMPHOCYTES # BLD AUTO: 17.2 % — SIGNIFICANT CHANGE UP (ref 13–44)
MCHC RBC-ENTMCNC: 30 PG — SIGNIFICANT CHANGE UP (ref 27–34)
MCHC RBC-ENTMCNC: 33.2 GM/DL — SIGNIFICANT CHANGE UP (ref 32–36)
MCV RBC AUTO: 90.2 FL — SIGNIFICANT CHANGE UP (ref 80–100)
MONOCYTES # BLD AUTO: 0.9 K/UL — SIGNIFICANT CHANGE UP (ref 0–0.9)
MONOCYTES NFR BLD AUTO: 9.4 % — SIGNIFICANT CHANGE UP (ref 2–14)
NEUTROPHILS # BLD AUTO: 6.5 K/UL — SIGNIFICANT CHANGE UP (ref 1.8–7.4)
NEUTROPHILS NFR BLD AUTO: 69.8 % — SIGNIFICANT CHANGE UP (ref 43–77)
PLATELET # BLD AUTO: 209 K/UL — SIGNIFICANT CHANGE UP (ref 150–400)
POTASSIUM SERPL-MCNC: 3.7 MMOL/L — SIGNIFICANT CHANGE UP (ref 3.5–5.3)
POTASSIUM SERPL-SCNC: 3.7 MMOL/L — SIGNIFICANT CHANGE UP (ref 3.5–5.3)
RBC # BLD: 4.02 M/UL — LOW (ref 4.2–5.8)
RBC # FLD: 12.5 % — SIGNIFICANT CHANGE UP (ref 10.3–14.5)
SODIUM SERPL-SCNC: 140 MMOL/L — SIGNIFICANT CHANGE UP (ref 135–145)
WBC # BLD: 9.4 K/UL — SIGNIFICANT CHANGE UP (ref 3.8–10.5)
WBC # FLD AUTO: 9.4 K/UL — SIGNIFICANT CHANGE UP (ref 3.8–10.5)

## 2017-09-03 RX ORDER — POTASSIUM CHLORIDE 20 MEQ
20 PACKET (EA) ORAL
Qty: 0 | Refills: 0 | Status: COMPLETED | OUTPATIENT
Start: 2017-09-03 | End: 2017-09-03

## 2017-09-03 RX ADMIN — Medication 50 MILLIGRAM(S): at 06:45

## 2017-09-03 RX ADMIN — OXYCODONE AND ACETAMINOPHEN 2 TABLET(S): 5; 325 TABLET ORAL at 03:00

## 2017-09-03 RX ADMIN — OXYCODONE AND ACETAMINOPHEN 2 TABLET(S): 5; 325 TABLET ORAL at 07:55

## 2017-09-03 RX ADMIN — MUPIROCIN 1 APPLICATION(S): 20 OINTMENT TOPICAL at 06:44

## 2017-09-03 RX ADMIN — OXYCODONE AND ACETAMINOPHEN 2 TABLET(S): 5; 325 TABLET ORAL at 03:45

## 2017-09-03 RX ADMIN — LIDOCAINE 1 PATCH: 4 CREAM TOPICAL at 12:04

## 2017-09-03 RX ADMIN — OXYCODONE AND ACETAMINOPHEN 2 TABLET(S): 5; 325 TABLET ORAL at 21:00

## 2017-09-03 RX ADMIN — Medication 100 MILLIGRAM(S): at 12:03

## 2017-09-03 RX ADMIN — OXYCODONE AND ACETAMINOPHEN 2 TABLET(S): 5; 325 TABLET ORAL at 16:50

## 2017-09-03 RX ADMIN — POLYETHYLENE GLYCOL 3350 17 GRAM(S): 17 POWDER, FOR SOLUTION ORAL at 12:05

## 2017-09-03 RX ADMIN — Medication 50 MILLIGRAM(S): at 17:23

## 2017-09-03 RX ADMIN — Medication 40 MILLIGRAM(S): at 06:45

## 2017-09-03 RX ADMIN — Medication 20 MILLIEQUIVALENT(S): at 10:24

## 2017-09-03 RX ADMIN — Medication 325 MILLIGRAM(S): at 12:04

## 2017-09-03 RX ADMIN — OXYCODONE AND ACETAMINOPHEN 2 TABLET(S): 5; 325 TABLET ORAL at 16:20

## 2017-09-03 RX ADMIN — ENOXAPARIN SODIUM 40 MILLIGRAM(S): 100 INJECTION SUBCUTANEOUS at 17:23

## 2017-09-03 RX ADMIN — OXYCODONE AND ACETAMINOPHEN 2 TABLET(S): 5; 325 TABLET ORAL at 00:05

## 2017-09-03 RX ADMIN — LIDOCAINE 1 PATCH: 4 CREAM TOPICAL at 00:15

## 2017-09-03 RX ADMIN — Medication 100 MILLIGRAM(S): at 12:04

## 2017-09-03 RX ADMIN — PANTOPRAZOLE SODIUM 40 MILLIGRAM(S): 20 TABLET, DELAYED RELEASE ORAL at 06:45

## 2017-09-03 RX ADMIN — OXYCODONE AND ACETAMINOPHEN 2 TABLET(S): 5; 325 TABLET ORAL at 20:23

## 2017-09-03 RX ADMIN — OXYCODONE AND ACETAMINOPHEN 2 TABLET(S): 5; 325 TABLET ORAL at 12:31

## 2017-09-03 RX ADMIN — OXYCODONE AND ACETAMINOPHEN 2 TABLET(S): 5; 325 TABLET ORAL at 12:01

## 2017-09-03 RX ADMIN — ATORVASTATIN CALCIUM 40 MILLIGRAM(S): 80 TABLET, FILM COATED ORAL at 23:02

## 2017-09-03 RX ADMIN — Medication 100 MILLIGRAM(S): at 06:44

## 2017-09-03 RX ADMIN — Medication 20 MILLIEQUIVALENT(S): at 12:03

## 2017-09-03 RX ADMIN — ZOLPIDEM TARTRATE 5 MILLIGRAM(S): 10 TABLET ORAL at 23:02

## 2017-09-03 RX ADMIN — MUPIROCIN 1 APPLICATION(S): 20 OINTMENT TOPICAL at 17:23

## 2017-09-03 RX ADMIN — ENOXAPARIN SODIUM 40 MILLIGRAM(S): 100 INJECTION SUBCUTANEOUS at 06:52

## 2017-09-03 NOTE — PROGRESS NOTE ADULT - SUBJECTIVE AND OBJECTIVE BOX
Subjective: pt seen and examined, no chest pain , sob on exam     sodium chloride 0.45%. 1000 milliLiter(s) IV Continuous <Continuous>  aspirin enteric coated 325 milliGRAM(s) Oral daily  allopurinol 100 milliGRAM(s) Oral daily  atorvastatin 40 milliGRAM(s) Oral at bedtime  docusate sodium 100 milliGRAM(s) Oral three times a day  pantoprazole    Tablet 40 milliGRAM(s) Oral before breakfast  dextrose 5%. 1000 milliLiter(s) IV Continuous <Continuous>  dextrose Gel 1 Dose(s) Oral once PRN  dextrose 50% Injectable 12.5 Gram(s) IV Push once  dextrose 50% Injectable 25 Gram(s) IV Push once  dextrose 50% Injectable 25 Gram(s) IV Push once  glucagon  Injectable 1 milliGRAM(s) IntraMuscular once PRN  insulin lispro (HumaLOG) corrective regimen sliding scale   SubCutaneous three times a day before meals  insulin lispro (HumaLOG) corrective regimen sliding scale   SubCutaneous at bedtime  oxyCODONE    5 mG/acetaminophen 325 mG 1 Tablet(s) Oral every 4 hours PRN  oxyCODONE    5 mG/acetaminophen 325 mG 2 Tablet(s) Oral every 4 hours PRN  mupirocin 2% Ointment 1 Application(s) Topical two times a day  polyethylene glycol 3350 17 Gram(s) Oral daily  lidocaine   Patch 1 Patch Transdermal daily  metoprolol 50 milliGRAM(s) Oral two times a day  furosemide    Tablet 40 milliGRAM(s) Oral daily  potassium chloride    Tablet ER 20 milliEquivalent(s) Oral daily  zolpidem 5 milliGRAM(s) Oral at bedtime PRN  enoxaparin Injectable 40 milliGRAM(s) SubCutaneous <User Schedule>  senna 2 Tablet(s) Oral at bedtime                            12.9   10.9  )-----------( 178      ( 02 Sep 2017 05:35 )             35.6       Hemoglobin: 12.9 g/dL (09-02 @ 05:35)  Hemoglobin: 12.3 g/dL (09-01 @ 02:24)  Hemoglobin: 12.3 g/dL (08-31 @ 03:44)  Hemoglobin: 13.4 g/dL (08-30 @ 01:23)  Hemoglobin: 14.4 g/dL (08-29 @ 17:20)      09-02    138  |  99  |  8   ----------------------------<  129<H>  3.7   |  24  |  0.57    Ca    8.7      02 Sep 2017 05:35      Creatinine Trend: 0.57<--, 0.56<--, 0.44<--, 0.56<--, 0.57<--, 0.61<--    COAGS:           T(C): 36.7 (09-02-17 @ 20:36), Max: 36.7 (09-02-17 @ 20:36)  HR: 77 (09-02-17 @ 23:16) (63 - 85)  BP: 113/71 (09-02-17 @ 20:36) (113/71 - 147/85)  RR: 18 (09-02-17 @ 20:36) (17 - 18)  SpO2: 97% (09-02-17 @ 23:16) (96% - 98%)  Wt(kg): --    I&O's Summary    01 Sep 2017 07:01  -  02 Sep 2017 07:00  --------------------------------------------------------  IN: 320 mL / OUT: 500 mL / NET: -180 mL    02 Sep 2017 07:01  -  03 Sep 2017 05:54  --------------------------------------------------------  IN: 560 mL / OUT: 700 mL / NET: -140 mL        Lymphatic: No lymphadenopathy , no edema  Cardiovascular: Normal S1 S2, No JVD, No murmurs , Peripheral pulses palpable 2+ bilaterally  Respiratory: Lungs clear to auscultation, normal effort 	  Gastrointestinal:  Soft, Non-tender, + BS	  Skin: No rashes, No ecchymoses, No cyanosis, warm to touch  Musculoskeletal: Normal range of motion, normal strength      TELEMETRY: 	  NSR       ASSESSMENT/PLAN: 	61y Male HTN, HLD, ASIYA on CPAP, L ankle Fx with cast admitted for elective cardiac cath, found with Multivessel CAD s/p C3L pod # 5    -- On Asa, Zocor, Lopressor  --DC planning for ELISSA next week  --f/u with primary Cardiologist Dr. CONCHA Ferrari after DC  -  GI / DVT prophylaxis.  - keep K>4, mag >2.0   - keep net neg with lasix po   - tele stable ,   D/W Dr Prado

## 2017-09-03 NOTE — PROGRESS NOTE ADULT - ASSESSMENT
60 y/o M w/ PMH of HTN, HLD, current smoker and ASIYA on CPAP, Left ankle fx w/ LLE cast (7/20/17), presented to [facility] 8/28 for cardiac catheretization 2/2 to echocardiogram finding of dilate LV size and normal LV function (EF 54%). Cardiac Cath finding revealed multivessel CAD.     8/29/17 S/P CABG x 3 w LIMA  Post op insulin infusion started and weaned, hgb A1c 5.9, extubated POD#0.  8/30 POD #1 on beta blocker, transferred to SDU  8/31   remove chest tube,  sdu  9/1    trf too floor OOB to chair, lop 50 q12,  No BM, + BS, D/W   care coordinator  rehab plan for tues  DVT prophylaxis bid  9/3 pw out

## 2017-09-03 NOTE — PROGRESS NOTE ADULT - ATTENDING COMMENTS
Patient seen and examined, agree with above assessment and plan as transcribed above.    -Progressing well, cont care per CTS    Ravinder Prado MD, FACC  [facility], [facility]  [address] [place] [address]  PHONE:  [phone]  BEEPER : [phone]

## 2017-09-03 NOTE — PROGRESS NOTE ADULT - SUBJECTIVE AND OBJECTIVE BOX
VITAL SIGNS    Telemetry:  SR 60-80   Vital Signs Last 24 Hrs  T(C): 36.6 (17 @ 14:00), Max: 36.7 (17 @ 20:36)  T(F): 97.9 (17 @ 14:00), Max: 98.1 (17 @ 20:36)  HR: 80 (17 @ 14:00) (63 - 85)  BP: 136/74 (17 @ 14:00) (113/71 - 147/85)  RR: 18 (17 @ 14:00) (18 - 18)  SpO2: 95% (17 @ 14:00) (95% - 97%)             @ 07:01  -   @ 07:00  --------------------------------------------------------  IN: 760 mL / OUT: 920 mL / NET: -160 mL     @ 07:01  -   @ 16:06  --------------------------------------------------------  IN: 1060 mL / OUT: 0 mL / NET: 1060 mL       Daily     Daily Weight in k.6 (03 Sep 2017 08:41)  Admit Wt: Drug Dosing Weight  Height (cm): 195.58 (28 Aug 2017 16:40)  Weight (kg): 167.1 (28 Aug 2017 16:40)  BMI (kg/m2): 43.7 (28 Aug 2017 16:40)  BSA (m2): 2.9 (28 Aug 2017 16:40)      CAPILLARY BLOOD GLUCOSE  126 (03 Sep 2017 11:31)  109 (03 Sep 2017 07:39)  124 (02 Sep 2017 21:53)  153 (02 Sep 2017 16:24)              MEDICATIONS  sodium chloride 0.45%. 1000 milliLiter(s) IV Continuous <Continuous>  aspirin enteric coated 325 milliGRAM(s) Oral daily  allopurinol 100 milliGRAM(s) Oral daily  atorvastatin 40 milliGRAM(s) Oral at bedtime  docusate sodium 100 milliGRAM(s) Oral three times a day  pantoprazole    Tablet 40 milliGRAM(s) Oral before breakfast  dglucagon  Injectable 1 milliGRAM(s) IntraMuscular once PRN  insulin lispro (HumaLOG) corrective regimen sliding scale   SubCutaneous three times a day before meals  insulin lispro (HumaLOG) corrective regimen sliding scale   SubCutaneous at bedtime  oxyCODONE    5 mG/acetaminophen 325 mG 1 Tablet(s) Oral every 4 hours PRN  oxyCODONE    5 mG/acetaminophen 325 mG 2 Tablet(s) Oral every 4 hours PRN  mupirocin 2% Ointment 1 Application(s) Topical two times a day  polyethylene glycol 3350 17 Gram(s) Oral daily  lidocaine   Patch 1 Patch Transdermal daily  metoprolol 50 milliGRAM(s) Oral two times a day  furosemide    Tablet 40 milliGRAM(s) Oral daily  potassium chloride    Tablet ER 20 milliEquivalent(s) Oral daily  zolpidem 5 milliGRAM(s) Oral at bedtime PRN  enoxaparin Injectable 40 milliGRAM(s) SubCutaneous <User Schedule>  senna 2 Tablet(s) Oral at bedtime      PHYSICAL EXAM    Subjective: "HELLO"  Neurology: alert and oriented x 3, nonfocal, no gross deficits  CV : s1s1 reg no MRGS  Sternal Wound :  CDI , Stable  +pw  Lungs: CTA, equal chest expansion  Abdomen obese: soft, nontender, nondistended, positive bowel sounds, last bowel movement 9/3/17   :    voids  Extremities:   Rt EVG CDI, no edema,  left leg in hard cast  +dp b/l , no calt tenderness b/l , warm and perfused b/l    LABS      140  |  98  |  11  ----------------------------<  114<H>  3.7   |  31  |  0.68    Ca    9.0      03 Sep 2017 05:46                                   12.0   9.4   )-----------( 209      ( 03 Sep 2017 05:46 )             36.3                   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  Renal calculi  Obstructive sleep apnea: severe, used CPAP few years ago and lost weight  H/O: osteoarthritis  Lumbar disc disease with radiculopathy  History of Obesity  Gout: last attack 6 years ago,   S/P lumbar discectomy: L3,,L4,L5  Aug 2013  S/P revision of total knee, right: 2012  H/O lithotripsy  S/P knee replacement: [facility] 10/11  S/P hip replacement: left , right   S/P tonsillectomy and adenoidectomy: as child  Hernia: repair, left inguinal   Cholecystitis: cholecycstectomy   History of Total Hip Replacement  S/P Left Inguinal Hernia Repair       Physical Therapy Rec:   Home  [  ]   Home w/ PT  [  ]  Rehab  [x  ]

## 2017-09-04 LAB
ANION GAP SERPL CALC-SCNC: 11 MMOL/L — SIGNIFICANT CHANGE UP (ref 5–17)
BUN SERPL-MCNC: 14 MG/DL — SIGNIFICANT CHANGE UP (ref 7–23)
CALCIUM SERPL-MCNC: 8.7 MG/DL — SIGNIFICANT CHANGE UP (ref 8.4–10.5)
CHLORIDE SERPL-SCNC: 99 MMOL/L — SIGNIFICANT CHANGE UP (ref 96–108)
CO2 SERPL-SCNC: 29 MMOL/L — SIGNIFICANT CHANGE UP (ref 22–31)
CREAT SERPL-MCNC: 0.6 MG/DL — SIGNIFICANT CHANGE UP (ref 0.5–1.3)
GLUCOSE SERPL-MCNC: 119 MG/DL — HIGH (ref 70–99)
HCT VFR BLD CALC: 36.9 % — LOW (ref 39–50)
HGB BLD-MCNC: 12.6 G/DL — LOW (ref 13–17)
MCHC RBC-ENTMCNC: 30.7 PG — SIGNIFICANT CHANGE UP (ref 27–34)
MCHC RBC-ENTMCNC: 34.2 GM/DL — SIGNIFICANT CHANGE UP (ref 32–36)
MCV RBC AUTO: 89.7 FL — SIGNIFICANT CHANGE UP (ref 80–100)
PLATELET # BLD AUTO: 208 K/UL — SIGNIFICANT CHANGE UP (ref 150–400)
POTASSIUM SERPL-MCNC: 3.5 MMOL/L — SIGNIFICANT CHANGE UP (ref 3.5–5.3)
POTASSIUM SERPL-SCNC: 3.5 MMOL/L — SIGNIFICANT CHANGE UP (ref 3.5–5.3)
RBC # BLD: 4.11 M/UL — LOW (ref 4.2–5.8)
RBC # FLD: 12.4 % — SIGNIFICANT CHANGE UP (ref 10.3–14.5)
SODIUM SERPL-SCNC: 139 MMOL/L — SIGNIFICANT CHANGE UP (ref 135–145)
WBC # BLD: 9.7 K/UL — SIGNIFICANT CHANGE UP (ref 3.8–10.5)
WBC # FLD AUTO: 9.7 K/UL — SIGNIFICANT CHANGE UP (ref 3.8–10.5)

## 2017-09-04 RX ORDER — POTASSIUM CHLORIDE 20 MEQ
20 PACKET (EA) ORAL
Qty: 0 | Refills: 0 | Status: COMPLETED | OUTPATIENT
Start: 2017-09-04 | End: 2017-09-04

## 2017-09-04 RX ADMIN — OXYCODONE AND ACETAMINOPHEN 2 TABLET(S): 5; 325 TABLET ORAL at 08:01

## 2017-09-04 RX ADMIN — ENOXAPARIN SODIUM 40 MILLIGRAM(S): 100 INJECTION SUBCUTANEOUS at 05:36

## 2017-09-04 RX ADMIN — MUPIROCIN 1 APPLICATION(S): 20 OINTMENT TOPICAL at 18:16

## 2017-09-04 RX ADMIN — OXYCODONE AND ACETAMINOPHEN 2 TABLET(S): 5; 325 TABLET ORAL at 07:22

## 2017-09-04 RX ADMIN — MUPIROCIN 1 APPLICATION(S): 20 OINTMENT TOPICAL at 05:36

## 2017-09-04 RX ADMIN — OXYCODONE AND ACETAMINOPHEN 2 TABLET(S): 5; 325 TABLET ORAL at 11:45

## 2017-09-04 RX ADMIN — Medication 20 MILLIEQUIVALENT(S): at 11:46

## 2017-09-04 RX ADMIN — Medication 20 MILLIEQUIVALENT(S): at 10:23

## 2017-09-04 RX ADMIN — Medication 100 MILLIGRAM(S): at 05:36

## 2017-09-04 RX ADMIN — OXYCODONE AND ACETAMINOPHEN 2 TABLET(S): 5; 325 TABLET ORAL at 02:12

## 2017-09-04 RX ADMIN — ATORVASTATIN CALCIUM 40 MILLIGRAM(S): 80 TABLET, FILM COATED ORAL at 22:25

## 2017-09-04 RX ADMIN — PANTOPRAZOLE SODIUM 40 MILLIGRAM(S): 20 TABLET, DELAYED RELEASE ORAL at 05:36

## 2017-09-04 RX ADMIN — LIDOCAINE 1 PATCH: 4 CREAM TOPICAL at 11:46

## 2017-09-04 RX ADMIN — OXYCODONE AND ACETAMINOPHEN 2 TABLET(S): 5; 325 TABLET ORAL at 12:15

## 2017-09-04 RX ADMIN — ENOXAPARIN SODIUM 40 MILLIGRAM(S): 100 INJECTION SUBCUTANEOUS at 18:17

## 2017-09-04 RX ADMIN — ZOLPIDEM TARTRATE 5 MILLIGRAM(S): 10 TABLET ORAL at 23:00

## 2017-09-04 RX ADMIN — OXYCODONE AND ACETAMINOPHEN 2 TABLET(S): 5; 325 TABLET ORAL at 17:00

## 2017-09-04 RX ADMIN — OXYCODONE AND ACETAMINOPHEN 2 TABLET(S): 5; 325 TABLET ORAL at 16:17

## 2017-09-04 RX ADMIN — Medication 40 MILLIGRAM(S): at 05:36

## 2017-09-04 RX ADMIN — Medication 50 MILLIGRAM(S): at 18:17

## 2017-09-04 RX ADMIN — OXYCODONE AND ACETAMINOPHEN 2 TABLET(S): 5; 325 TABLET ORAL at 22:25

## 2017-09-04 RX ADMIN — Medication 100 MILLIGRAM(S): at 11:46

## 2017-09-04 RX ADMIN — LIDOCAINE 1 PATCH: 4 CREAM TOPICAL at 01:00

## 2017-09-04 RX ADMIN — Medication 325 MILLIGRAM(S): at 11:47

## 2017-09-04 RX ADMIN — OXYCODONE AND ACETAMINOPHEN 2 TABLET(S): 5; 325 TABLET ORAL at 03:00

## 2017-09-04 RX ADMIN — OXYCODONE AND ACETAMINOPHEN 2 TABLET(S): 5; 325 TABLET ORAL at 22:55

## 2017-09-04 RX ADMIN — Medication 50 MILLIGRAM(S): at 05:36

## 2017-09-04 NOTE — PROGRESS NOTE ADULT - PROBLEM SELECTOR PLAN 1
Asa, Statin,Chest PT,  Incentive spirometry  Ambulate NWB lt leg  Phys therapy as vinod daily,  hopeful rehab for tues at [facility]

## 2017-09-04 NOTE — PROGRESS NOTE ADULT - ASSESSMENT
62 y/o M w/ PMH of HTN, HLD, current smoker and ASIYA on CPAP, Left ankle fx w/ LLE cast (7/20/17), presented to [facility] 8/28 for cardiac catheretization 2/2 to echocardiogram finding of dilate LV size and normal LV function (EF 54%). Cardiac Cath finding revealed multivessel CAD.     8/29/17 S/P CABG x 3 w LIMA  Post op insulin infusion started and weaned, hgb A1c 5.9, extubated POD#0.  8/30 POD #1 on beta blocker, transferred to SDU  8/31   remove chest tube,  sdu  9/1    trf too floor OOB to chair, lop 50 q12,  No BM, + BS, D/W   care coordinator  rehab plan for tues  DVT prophylaxis bid  9/3 pw out

## 2017-09-04 NOTE — PROGRESS NOTE ADULT - SUBJECTIVE AND OBJECTIVE BOX
VITAL SIGNS    Telemetry:  [id number]  Vital Signs Last 24 Hrs  T(C): 36.7 (17 @ 05:34), Max: 36.8 (17 @ 20:29)  T(F): 98.1 (17 @ 05:34), Max: 98.2 (17 @ 20:29)  HR: 69 (17 @ 05:34) (69 - 83)  BP: 126/79 (17 @ 05:34) (126/79 - 136/74)  RR: 18 (17 @ 05:34) (18 - 18)  SpO2: 98% (17 @ 05:34) (95% - 98%)             @ 07:01  -   @ 07:00  --------------------------------------------------------  IN: 1280 mL / OUT: 725 mL / NET: 555 mL     @ 07:01  -   @ 12:48  --------------------------------------------------------  IN: 120 mL / OUT: 350 mL / NET: -230 mL       Daily     Daily Weight in k.2 (04 Sep 2017 08:00)  Admit Wt: Drug Dosing Weight  Height (cm): 195.58 (28 Aug 2017 16:40)  Weight (kg): 167.1 (28 Aug 2017 16:40)  BMI (kg/m2): 43.7 (28 Aug 2017 16:40)  BSA (m2): 2.9 (28 Aug 2017 16:40)      CAPILLARY BLOOD GLUCOSE  139 (04 Sep 2017 11:00)  116 (04 Sep 2017 07:00)  129 (03 Sep 2017 21:31)  136 (03 Sep 2017 16:22)              MEDICATIONS  sodium chloride 0.45%. 1000 milliLiter(s) IV Continuous <Continuous>  aspirin enteric coated 325 milliGRAM(s) Oral daily  allopurinol 100 milliGRAM(s) Oral daily  atorvastatin 40 milliGRAM(s) Oral at bedtime  docusate sodium 100 milliGRAM(s) Oral three times a day  pantoprazole    Tablet 40 milliGRAM(s) Oral before breakfast  glucagon  Injectable 1 milliGRAM(s) IntraMuscular once PRN  insulin lispro (HumaLOG) corrective regimen sliding scale   SubCutaneous three times a day before meals  insulin lispro (HumaLOG) corrective regimen sliding scale   SubCutaneous at bedtime  oxyCODONE    5 mG/acetaminophen 325 mG 1 Tablet(s) Oral every 4 hours PRN  oxyCODONE    5 mG/acetaminophen 325 mG 2 Tablet(s) Oral every 4 hours PRN  mupirocin 2% Ointment 1 Application(s) Topical two times a day  polyethylene glycol 3350 17 Gram(s) Oral daily  lidocaine   Patch 1 Patch Transdermal daily  metoprolol 50 milliGRAM(s) Oral two times a day  furosemide    Tablet 40 milliGRAM(s) Oral daily  potassium chloride    Tablet ER 20 milliEquivalent(s) Oral daily  zolpidem 5 milliGRAM(s) Oral at bedtime PRN  enoxaparin Injectable 40 milliGRAM(s) SubCutaneous <User Schedule>  senna 2 Tablet(s) Oral at bedtime      PHYSICAL EXAM    Subjective: "HELLO"   Neurology: alert and oriented x 3, nonfocal, no gross deficits  CV : s1s1 reg no MRGS  Sternal Wound :  CDI , Stable  Lungs: CTA, equal chest expansion  Abdomen:  obese soft, nontender, nondistended, positive bowel sounds, last bowel movement 9/3/17   :    voids  Extremities:  left leg hard cast present  Rt EVG CDI +2 BLLE   edema,  +dp b/l , no calt tenderness b/l , warm and perfused b/l    LABS      139  |  99  |  14  ----------------------------<  119<H>  3.5   |  29  |  0.60    Ca    8.7      04 Sep 2017 05:35                                   12.6   9.7   )-----------( 208      ( 04 Sep 2017 05:35 )             36.9                   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  Renal calculi  Obstructive sleep apnea: severe, used CPAP few years ago and lost weight  H/O: osteoarthritis  Lumbar disc disease with radiculopathy  History of Obesity  Gout: last attack 6 years ago,   S/P lumbar discectomy: L3,,L4,L5  Aug 2013  S/P revision of total knee, right: 2012  H/O lithotripsy  S/P knee replacement: [facility] 10/11  S/P hip replacement: left , right   S/P tonsillectomy and adenoidectomy: as child  Hernia: repair, left inguinal   Cholecystitis: cholecycstectomy   History of Total Hip Replacement  S/P Left Inguinal Hernia Repair       Physical Therapy Rec:   Home  [  ]   Home w/ PT  [  ]  Rehab  [  ]

## 2017-09-04 NOTE — PROGRESS NOTE ADULT - SUBJECTIVE AND OBJECTIVE BOX
Subjective: pt seen and examined, no chest pain     sodium chloride 0.45%. 1000 milliLiter(s) IV Continuous <Continuous>  aspirin enteric coated 325 milliGRAM(s) Oral daily  allopurinol 100 milliGRAM(s) Oral daily  atorvastatin 40 milliGRAM(s) Oral at bedtime  docusate sodium 100 milliGRAM(s) Oral three times a day  pantoprazole    Tablet 40 milliGRAM(s) Oral before breakfast  dextrose 5%. 1000 milliLiter(s) IV Continuous <Continuous>  dextrose Gel 1 Dose(s) Oral once PRN  dextrose 50% Injectable 12.5 Gram(s) IV Push once  dextrose 50% Injectable 25 Gram(s) IV Push once  dextrose 50% Injectable 25 Gram(s) IV Push once  glucagon  Injectable 1 milliGRAM(s) IntraMuscular once PRN  insulin lispro (HumaLOG) corrective regimen sliding scale   SubCutaneous three times a day before meals  insulin lispro (HumaLOG) corrective regimen sliding scale   SubCutaneous at bedtime  oxyCODONE    5 mG/acetaminophen 325 mG 1 Tablet(s) Oral every 4 hours PRN  oxyCODONE    5 mG/acetaminophen 325 mG 2 Tablet(s) Oral every 4 hours PRN  mupirocin 2% Ointment 1 Application(s) Topical two times a day  polyethylene glycol 3350 17 Gram(s) Oral daily  lidocaine   Patch 1 Patch Transdermal daily  metoprolol 50 milliGRAM(s) Oral two times a day  furosemide    Tablet 40 milliGRAM(s) Oral daily  potassium chloride    Tablet ER 20 milliEquivalent(s) Oral daily  zolpidem 5 milliGRAM(s) Oral at bedtime PRN  enoxaparin Injectable 40 milliGRAM(s) SubCutaneous <User Schedule>  senna 2 Tablet(s) Oral at bedtime                            12.0   9.4   )-----------( 209      ( 03 Sep 2017 05:46 )             36.3       Hemoglobin: 12.0 g/dL (09-03 @ 05:46)  Hemoglobin: 12.9 g/dL (09-02 @ 05:35)  Hemoglobin: 12.3 g/dL (09-01 @ 02:24)  Hemoglobin: 12.3 g/dL (08-31 @ 03:44)      09-03    140  |  98  |  11  ----------------------------<  114<H>  3.7   |  31  |  0.68    Ca    9.0      03 Sep 2017 05:46      Creatinine Trend: 0.68<--, 0.57<--, 0.56<--, 0.44<--, 0.56<--, 0.57<--    COAGS:     T(C): 36.7 (09-04-17 @ 05:34), Max: 36.8 (09-03-17 @ 20:29)  HR: 69 (09-04-17 @ 05:34) (64 - 83)  BP: 126/79 (09-04-17 @ 05:34) (117/66 - 136/74)  RR: 18 (09-04-17 @ 05:34) (18 - 18)  SpO2: 98% (09-04-17 @ 05:34) (95% - 98%)  Wt(kg): --    I&O's Summary    02 Sep 2017 07:01  -  03 Sep 2017 07:00  --------------------------------------------------------  IN: 760 mL / OUT: 920 mL / NET: -160 mL    03 Sep 2017 07:01  -  04 Sep 2017 05:36  --------------------------------------------------------  IN: 1180 mL / OUT: 550 mL / NET: 630 mL        Lymphatic: No lymphadenopathy , no edema  Cardiovascular: Normal S1 S2, No JVD, No murmurs , Peripheral pulses palpable 2+ bilaterally  Respiratory: Lungs clear to auscultation, normal effort 	  Gastrointestinal:  Soft, Non-tender, + BS	  Skin: No rashes, No ecchymoses, No cyanosis, warm to touch  Musculoskeletal: Normal range of motion, normal strength      TELEMETRY: 	  NSR       ASSESSMENT/PLAN: 	61y Male HTN, HLD, ASIYA on CPAP, L ankle Fx with cast admitted for elective cardiac cath, found with Multivessel CAD s/p C3L pod # 6    -- On Asa, Zocor, Lopressor  - -  hr / bp stable on lopressor 50 q 12  --DC planning for ELISSA next week  --f/u with primary Cardiologist Dr. CONCHA Ferrari after DC  -  GI / DVT prophylaxis.  - keep K>4, mag >2.0   - keep net neg with lasix po   - tele stable ,   D/W Dr Prado

## 2017-09-04 NOTE — PROGRESS NOTE ADULT - ATTENDING COMMENTS
Agree with above assessment and plan as outlined above.    - D/C planning per CTS    Ravinder Prado MD, FACC  [facility], [facility]  [address] [place] [address]  PHONE:  [phone]  BEEPER : [phone]

## 2017-09-05 ENCOUNTER — TRANSCRIPTION ENCOUNTER (OUTPATIENT)
Age: 62
End: 2017-09-05

## 2017-09-05 VITALS — WEIGHT: 315 LBS

## 2017-09-05 LAB
ANION GAP SERPL CALC-SCNC: 13 MMOL/L — SIGNIFICANT CHANGE UP (ref 5–17)
BUN SERPL-MCNC: 13 MG/DL — SIGNIFICANT CHANGE UP (ref 7–23)
CALCIUM SERPL-MCNC: 8.9 MG/DL — SIGNIFICANT CHANGE UP (ref 8.4–10.5)
CHLORIDE SERPL-SCNC: 100 MMOL/L — SIGNIFICANT CHANGE UP (ref 96–108)
CO2 SERPL-SCNC: 27 MMOL/L — SIGNIFICANT CHANGE UP (ref 22–31)
CREAT SERPL-MCNC: 0.45 MG/DL — LOW (ref 0.5–1.3)
GLUCOSE SERPL-MCNC: 128 MG/DL — HIGH (ref 70–99)
HCT VFR BLD CALC: 36.8 % — LOW (ref 39–50)
HGB BLD-MCNC: 12.7 G/DL — LOW (ref 13–17)
MCHC RBC-ENTMCNC: 31 PG — SIGNIFICANT CHANGE UP (ref 27–34)
MCHC RBC-ENTMCNC: 34.5 GM/DL — SIGNIFICANT CHANGE UP (ref 32–36)
MCV RBC AUTO: 89.9 FL — SIGNIFICANT CHANGE UP (ref 80–100)
PLATELET # BLD AUTO: 246 K/UL — SIGNIFICANT CHANGE UP (ref 150–400)
POTASSIUM SERPL-MCNC: 3.6 MMOL/L — SIGNIFICANT CHANGE UP (ref 3.5–5.3)
POTASSIUM SERPL-SCNC: 3.6 MMOL/L — SIGNIFICANT CHANGE UP (ref 3.5–5.3)
RBC # BLD: 4.1 M/UL — LOW (ref 4.2–5.8)
RBC # FLD: 12.3 % — SIGNIFICANT CHANGE UP (ref 10.3–14.5)
SODIUM SERPL-SCNC: 140 MMOL/L — SIGNIFICANT CHANGE UP (ref 135–145)
WBC # BLD: 9.6 K/UL — SIGNIFICANT CHANGE UP (ref 3.8–10.5)
WBC # FLD AUTO: 9.6 K/UL — SIGNIFICANT CHANGE UP (ref 3.8–10.5)

## 2017-09-05 RX ORDER — LIDOCAINE 4 G/100G
1 CREAM TOPICAL
Qty: 0 | Refills: 0 | COMMUNITY
Start: 2017-09-05

## 2017-09-05 RX ORDER — POTASSIUM CHLORIDE 20 MEQ
1 PACKET (EA) ORAL
Qty: 0 | Refills: 0 | COMMUNITY
Start: 2017-09-05

## 2017-09-05 RX ORDER — ASPIRIN/CALCIUM CARB/MAGNESIUM 324 MG
1 TABLET ORAL
Qty: 0 | Refills: 0 | COMMUNITY
Start: 2017-09-05

## 2017-09-05 RX ORDER — FUROSEMIDE 40 MG
1 TABLET ORAL
Qty: 0 | Refills: 0 | COMMUNITY
Start: 2017-09-05

## 2017-09-05 RX ORDER — SENNA PLUS 8.6 MG/1
2 TABLET ORAL
Qty: 0 | Refills: 0 | COMMUNITY
Start: 2017-09-05

## 2017-09-05 RX ORDER — POLYETHYLENE GLYCOL 3350 17 G/17G
17 POWDER, FOR SOLUTION ORAL
Qty: 0 | Refills: 0 | COMMUNITY
Start: 2017-09-05

## 2017-09-05 RX ORDER — ATORVASTATIN CALCIUM 80 MG/1
1 TABLET, FILM COATED ORAL
Qty: 0 | Refills: 0 | COMMUNITY
Start: 2017-09-05

## 2017-09-05 RX ORDER — LOSARTAN POTASSIUM 100 MG/1
1 TABLET, FILM COATED ORAL
Qty: 0 | Refills: 0 | COMMUNITY

## 2017-09-05 RX ORDER — DOCUSATE SODIUM 100 MG
1 CAPSULE ORAL
Qty: 0 | Refills: 0 | COMMUNITY
Start: 2017-09-05

## 2017-09-05 RX ORDER — OXYCODONE HYDROCHLORIDE 5 MG/1
2 TABLET ORAL
Qty: 0 | Refills: 0 | COMMUNITY
Start: 2017-09-05

## 2017-09-05 RX ORDER — METOPROLOL TARTRATE 50 MG
1 TABLET ORAL
Qty: 0 | Refills: 0 | COMMUNITY
Start: 2017-09-05

## 2017-09-05 RX ORDER — PANTOPRAZOLE SODIUM 20 MG/1
1 TABLET, DELAYED RELEASE ORAL
Qty: 0 | Refills: 0 | COMMUNITY
Start: 2017-09-05

## 2017-09-05 RX ORDER — ZOLPIDEM TARTRATE 10 MG/1
1 TABLET ORAL
Qty: 0 | Refills: 0 | COMMUNITY
Start: 2017-09-05

## 2017-09-05 RX ORDER — AMLODIPINE BESYLATE 2.5 MG/1
1 TABLET ORAL
Qty: 0 | Refills: 0 | COMMUNITY

## 2017-09-05 RX ADMIN — MUPIROCIN 1 APPLICATION(S): 20 OINTMENT TOPICAL at 06:14

## 2017-09-05 RX ADMIN — Medication 100 MILLIGRAM(S): at 09:44

## 2017-09-05 RX ADMIN — ENOXAPARIN SODIUM 40 MILLIGRAM(S): 100 INJECTION SUBCUTANEOUS at 06:14

## 2017-09-05 RX ADMIN — OXYCODONE AND ACETAMINOPHEN 2 TABLET(S): 5; 325 TABLET ORAL at 03:42

## 2017-09-05 RX ADMIN — OXYCODONE AND ACETAMINOPHEN 2 TABLET(S): 5; 325 TABLET ORAL at 10:30

## 2017-09-05 RX ADMIN — Medication 40 MILLIGRAM(S): at 06:14

## 2017-09-05 RX ADMIN — Medication 325 MILLIGRAM(S): at 09:44

## 2017-09-05 RX ADMIN — LIDOCAINE 1 PATCH: 4 CREAM TOPICAL at 12:57

## 2017-09-05 RX ADMIN — PANTOPRAZOLE SODIUM 40 MILLIGRAM(S): 20 TABLET, DELAYED RELEASE ORAL at 06:14

## 2017-09-05 RX ADMIN — Medication 20 MILLIEQUIVALENT(S): at 09:45

## 2017-09-05 RX ADMIN — OXYCODONE AND ACETAMINOPHEN 2 TABLET(S): 5; 325 TABLET ORAL at 09:38

## 2017-09-05 RX ADMIN — LIDOCAINE 1 PATCH: 4 CREAM TOPICAL at 00:05

## 2017-09-05 RX ADMIN — OXYCODONE AND ACETAMINOPHEN 2 TABLET(S): 5; 325 TABLET ORAL at 03:12

## 2017-09-05 RX ADMIN — Medication 50 MILLIGRAM(S): at 06:14

## 2017-09-05 NOTE — DISCHARGE NOTE ADULT - PATIENT PORTAL LINK FT
“You can access the Follow[employer] Patient Portal, offered by [facility], by registering with the following website: [email or web address]”

## 2017-09-05 NOTE — PROGRESS NOTE ADULT - PROBLEM SELECTOR PROBLEM 2
CAD (coronary artery disease)
Constipation
S/P CABG x 3

## 2017-09-05 NOTE — PROGRESS NOTE ADULT - SUBJECTIVE AND OBJECTIVE BOX
Subjective: pt seen and examined, no complaints on exam    aspirin enteric coated 325 milliGRAM(s) Oral daily  allopurinol 100 milliGRAM(s) Oral daily  atorvastatin 40 milliGRAM(s) Oral at bedtime  docusate sodium 100 milliGRAM(s) Oral three times a day  pantoprazole    Tablet 40 milliGRAM(s) Oral before breakfast  oxyCODONE    5 mG/acetaminophen 325 mG 1 Tablet(s) Oral every 4 hours PRN  oxyCODONE    5 mG/acetaminophen 325 mG 2 Tablet(s) Oral every 4 hours PRN  mupirocin 2% Ointment 1 Application(s) Topical two times a day  polyethylene glycol 3350 17 Gram(s) Oral daily  lidocaine   Patch 1 Patch Transdermal daily  metoprolol 50 milliGRAM(s) Oral two times a day  furosemide    Tablet 40 milliGRAM(s) Oral daily  potassium chloride    Tablet ER 20 milliEquivalent(s) Oral daily  zolpidem 5 milliGRAM(s) Oral at bedtime PRN  enoxaparin Injectable 40 milliGRAM(s) SubCutaneous <User Schedule>  senna 2 Tablet(s) Oral at bedtime                            12.6   9.7   )-----------( 208      ( 04 Sep 2017 05:35 )             36.9       Hemoglobin: 12.6 g/dL (09-04 @ 05:35)  Hemoglobin: 12.0 g/dL (09-03 @ 05:46)  Hemoglobin: 12.9 g/dL (09-02 @ 05:35)  Hemoglobin: 12.3 g/dL (09-01 @ 02:24)      09-04    139  |  99  |  14  ----------------------------<  119<H>  3.5   |  29  |  0.60    Ca    8.7      04 Sep 2017 05:35      Creatinine Trend: 0.60<--, 0.68<--, 0.57<--, 0.56<--, 0.44<--, 0.56<--    COAGS:     T(C): 37.1 (09-04-17 @ 20:30), Max: 37.1 (09-04-17 @ 20:30)  HR: 77 (09-04-17 @ 22:44) (69 - 77)  BP: 124/70 (09-04-17 @ 20:30) (124/70 - 130/85)  RR: 18 (09-04-17 @ 20:30) (18 - 18)  SpO2: 98% (09-04-17 @ 22:44) (97% - 98%)  Wt(kg): --    I&O's Summary    03 Sep 2017 07:01  -  04 Sep 2017 07:00  --------------------------------------------------------  IN: 1280 mL / OUT: 725 mL / NET: 555 mL    04 Sep 2017 07:01  -  05 Sep 2017 04:49  --------------------------------------------------------  IN: 600 mL / OUT: 500 mL / NET: 100 mL                Lymphatic: No lymphadenopathy , no edema  Cardiovascular: Normal S1 S2, No JVD, No murmurs , Peripheral pulses palpable 2+ bilaterally  Respiratory: Lungs clear to auscultation, normal effort 	  Gastrointestinal:  Soft, Non-tender, + BS	  Skin: No rashes, No ecchymoses, No cyanosis, warm to touch  Musculoskeletal: Normal range of motion, normal strength      TELEMETRY: 	  NSR       ASSESSMENT/PLAN: 	61y Male HTN, HLD, ASIYA on CPAP, L ankle Fx with cast admitted for elective cardiac cath, found with Multivessel CAD s/p C3L pod # 7    -- On Asa, Zocor, Lopressor  --  hr / bp stable on lopressor 50 q 12  --DC planning for ELISSA next week  --f/u with primary Cardiologist Dr. CONCHA Ferrari after DC  -  GI / DVT prophylaxis.  - keep K>4, mag >2.0   - daily lasix , fluid status stable.  - tele stable ,   D/W Dr Prado

## 2017-09-05 NOTE — DISCHARGE NOTE ADULT - NS AS ACTIVITY OBS
Walking-Indoors allowed/No Heavy lifting/straining/Showering allowed/Sex allowed/Walking-Outdoors allowed/Stairs allowed

## 2017-09-05 NOTE — DISCHARGE NOTE ADULT - OTHER SIGNIFICANT FINDINGS
VSS - Tele: SR 60-80  Cor: S1S2  lungs - CTA  MT inc. CDI  Abd: soft nt/nd +BM +BM  ext: p. edema b/l r leg inc CDI  lle Cast intact

## 2017-09-05 NOTE — DISCHARGE NOTE ADULT - MEDICATION SUMMARY - MEDICATIONS TO TAKE
I will START or STAY ON the medications listed below when I get home from the hospital:    aspirin 325 mg oral delayed release tablet  -- 1 tab(s) by mouth once a day  -- Indication: For Anti-platelet    acetaminophen-oxycodone 325 mg-5 mg oral tablet  -- 2 tab(s) by mouth every 4 hours, As needed, Severe Pain (7 - 10)  -- Indication: For pain med    enoxaparin 40 mg/0.4 mL injectable solution  -- 40 milligram(s) injectable once a day  patient must be on LOVENOX for the rest of his life  -- Indication: For dvt prophylaxis    allopurinol 100 mg oral tablet  -- 1 tab(s) by mouth once a day  -- Indication: For gout    atorvastatin 40 mg oral tablet  -- 1 tab(s) by mouth once a day (at bedtime)  -- Indication: For hi cholesterol    zolpidem 5 mg oral tablet  -- 1 tab(s) by mouth once a day (at bedtime), As needed, Insomnia  -- Indication: For insomnia    metoprolol tartrate 50 mg oral tablet  -- 1 tab(s) by mouth 2 times a day  -- Indication: For Cardiac medication    lidocaine 5% topical film  -- Apply on skin to affected area once a day  on 12hrs off 12hrs  to lower back  -- Indication: For pain     furosemide 40 mg oral tablet  -- 1 tab(s) by mouth once a day  -- Indication: For water pill    senna oral tablet  -- 2 tab(s) by mouth once a day (at bedtime)  -- Indication: For fiber    docusate sodium 100 mg oral capsule  -- 1 cap(s) by mouth 3 times a day  -- Indication: For Stool softener    polyethylene glycol 3350 oral powder for reconstitution  -- 17 gram(s) by mouth once a day  -- Indication: For laxative    potassium chloride 20 mEq oral tablet, extended release  -- 1 tab(s) by mouth once a day  -- Indication: For potassium supplement    pantoprazole 40 mg oral delayed release tablet  -- 1 tab(s) by mouth once a day (before a meal)  -- Indication: For reflux

## 2017-09-05 NOTE — DISCHARGE NOTE ADULT - MEDICATION SUMMARY - MEDICATIONS TO STOP TAKING
I will STOP taking the medications listed below when I get home from the hospital:    aspirin 81 mg oral tablet  -- 1 tab(s) by mouth once a day    amLODIPine 10 mg oral tablet  -- 1 tab(s) by mouth once a day    losartan 100 mg oral tablet  -- 1 tab(s) by mouth once a day

## 2017-09-05 NOTE — DISCHARGE NOTE ADULT - CARE PROVIDER_API CALL
Dwain Lopez), Surgery; Thoracic and Cardiac Surgery  [address] [place] [address]  Phone: [phone]  Fax: [phone]    Ravinder Prado), Cardiovascular Disease  [address] [place] [address]  Phone: [phone]  Fax: [phone]

## 2017-09-05 NOTE — DISCHARGE NOTE ADULT - ADDITIONAL INSTRUCTIONS
physical therapy  follow up appts as above  shower daily-cover cast  CAST IS DUE TO BE REMOVED BY ORTHO ON 9/7/17 - THURSDAY

## 2017-09-05 NOTE — DISCHARGE NOTE ADULT - CARE PLAN
Principal Discharge DX:	S/P CABG x 3  Goal:	full recovery  Instructions for follow-up, activity and diet:	follow up appt with DR. Dwain Lopez the week you arrive home from rehab- call [phone]  follow up appt with Dr. Ravinder Prado, cardiologist when you arrive home from rehab   physical therapy  regular no added salt, low cholesterol diet Principal Discharge DX:	S/P CABG x 3  Goal:	full recovery  Instructions for follow-up, activity and diet:	follow up appt with DR. Dwain Lopez the week you arrive home from rehab- call [phone]  follow up appt with Dr. Ravinder Prado, cardiologist when you arrive home from rehab   physical therapy  regular no added salt, low cholesterol diet

## 2017-09-05 NOTE — PROGRESS NOTE ADULT - PROBLEM SELECTOR PROBLEM 1
Essential hypertension
S/P CABG x 3
CAD (coronary artery disease)
Other pulmonary insufficiency, not elsewhere classified, following trauma and surgery
S/P CABG x 3

## 2017-09-05 NOTE — PROGRESS NOTE ADULT - ATTENDING COMMENTS
Agree with above assessment and plan as outlined above.    - D/C planning per CTS    Ravinder Prado MD, FACC  [facility], [facility]  [address] [place] [address]  PHONE:  [phone]  BEEPER : [phone]

## 2017-09-05 NOTE — DISCHARGE NOTE ADULT - PLAN OF CARE
full recovery follow up appt with DR. Dwain Lopez the week you arrive home from rehab- call [phone]  follow up appt with Dr. Ravinder Prado, cardiologist when you arrive home from rehab   physical therapy  regular no added salt, low cholesterol diet

## 2017-09-05 NOTE — DISCHARGE NOTE ADULT - HOSPITAL COURSE
62 y/o M w/ PMH of HTN, HLD, current smoker and ASIYA on CPAP, Left ankle fx w/ LLE cast (7/20/17), presented to [facility] 8/28 for cardiac catheretization 2/2 to echocardiogram finding of dilate LV size and normal LV function (EF 54%). Pt states that he has been asymptomatic. Cardiac Cath finding revealed multivessel CAD.  Patient transferred to [facility] 8/28 for further management and cardiac surgery evaluation with Dr. Lopez     On 8/29/17, pt. underwent CABG x 3 w/ LIMA   pre-op L ankle fracture- w/ application of Cast by ortho which can be removed on 9/7/17 by ortho  pt. obese w/ neuropathy - & needs to be on DVT prophylaxis - Lovenox 40mg SQ daily for the rest of his life with his hi rish for DVT  diuresed post-op, beta blockers increased as tolerated  discharged to [facility] 9/5/17-

## 2017-09-05 NOTE — PROGRESS NOTE ADULT - ASSESSMENT
60 y/o M w/ PMH of HTN, HLD, current smoker and ASIYA on CPAP, Left ankle fx w/ LLE cast (7/20/17), presented to [facility] 8/28 for cardiac catheretization 2/2 to echocardiogram finding of dilate LV size and normal LV function (EF 54%). Cardiac Cath finding revealed multivessel CAD.     8/29/17 S/P CABG x 3 w LIMA  Post op insulin infusion started and weaned, hgb A1c 5.9, extubated POD#0.  8/30 POD #1 on beta blocker, transferred to SDU  8/31   remove chest tube,  sdu  9/1    trf too floor OOB to chair, lop 50 q12,  No BM, + BS, D/W   care coordinator  rehab plan for tues  DVT prophylaxis bid  9/3 pw out   d/c to [facility] this am

## 2017-09-05 NOTE — PROGRESS NOTE ADULT - SUBJECTIVE AND OBJECTIVE BOX
VITAL SIGNS-Tele:  SR 60-80  T(C): 36.6 (17 @ 05:29), Max: 37.1 (17 @ 20:30)  HR: 79 (17 @ 05:52) (67 - 79)  BP: 128/84 (17 @ 05:29) (124/70 - 130/85)  SpO2: 97% (17 @ 05:52) (96% - 98%)          @ 07:01  -   @ 07:00  --------------------------------------------------------  IN: 600 mL / OUT: 650 mL / NET: -50 mL    Daily     Daily Weight in k (05 Sep 2017 08:00)    CAPILLARY BLOOD GLUCOSE  123 (05 Sep 2017 07:00)  139 (04 Sep 2017 11:00)      PHYSICAL EXAM:  Neurology: alert and oriented x 3, nonfocal, no gross deficits  CV : S1S2  Sternal Wound :  CDI , Stable  Lungs: CTA  Abdomen: soft, nontender, nondistended, positive bowel sounds, last bowel movement         Extremities: +P. edema b/l - R > L -   RLE CDI- L LE cast - intact - toes warm & mobile          aspirin enteric coated 325 milliGRAM(s) Oral daily  allopurinol 100 milliGRAM(s) Oral daily  atorvastatin 40 milliGRAM(s) Oral at bedtime  docusate sodium 100 milliGRAM(s) Oral three times a day  pantoprazole    Tablet 40 milliGRAM(s) Oral before breakfast  oxyCODONE    5 mG/acetaminophen 325 mG 1 Tablet(s) Oral every 4 hours PRN  mupirocin 2% Ointment 1 Application(s) Topical two times a day  polyethylene glycol 3350 17 Gram(s) Oral daily  lidocaine   Patch 1 Patch Transdermal daily  metoprolol 50 milliGRAM(s) Oral two times a day  furosemide    Tablet 40 milliGRAM(s) Oral daily  potassium chloride    Tablet ER 20 milliEquivalent(s) Oral daily  zolpidem 5 milliGRAM(s) Oral at bedtime PRN  enoxaparin Injectable 40 milliGRAM(s) SubCutaneous <User Schedule>  senna 2 Tablet(s) Oral at bedtime      Physical Therapy Rec:   Home  [  ]   Home w/ PT  [  ]  Rehab  [  ]  Discussed with Cardiothoracic Team at AM rounds.

## 2017-09-07 NOTE — CHART NOTE - NSCHARTNOTEFT_GEN_A_CORE
Pt BHAKTI in [facility] on 9/7/17 for cast removal of left foot.  Cast removed.  Skin clean and intact, mildly moist on left heel without skin breakdown.  Pt to be WBAT in CAM boot at all times.  Pt able to air out skin and remove CAM boot while in bed; pt also instructed to only try to bear weight on LLE with PT as pt is likely deconditioned LLE.  Pt understood and agree with above instructions.

## 2017-10-11 ENCOUNTER — RECORD ABSTRACTING (OUTPATIENT)
Age: 62
End: 2017-10-11

## 2017-10-11 DIAGNOSIS — Z83.49 FAMILY HISTORY OF OTHER ENDOCRINE, NUTRITIONAL AND METABOLIC DISEASES: ICD-10-CM

## 2017-10-11 DIAGNOSIS — Z87.891 PERSONAL HISTORY OF NICOTINE DEPENDENCE: ICD-10-CM

## 2017-10-11 DIAGNOSIS — Z83.3 FAMILY HISTORY OF DIABETES MELLITUS: ICD-10-CM

## 2017-10-11 DIAGNOSIS — Z78.9 OTHER SPECIFIED HEALTH STATUS: ICD-10-CM

## 2017-10-11 PROBLEM — Z09 POSTOP CHECK: Status: ACTIVE | Noted: 2017-10-11

## 2017-10-11 RX ORDER — ALLOPURINOL 100 MG/1
100 TABLET ORAL DAILY
Refills: 0 | Status: ACTIVE | COMMUNITY

## 2017-10-16 ENCOUNTER — APPOINTMENT (OUTPATIENT)
Dept: CARDIOTHORACIC SURGERY | Facility: CLINIC | Age: 62
End: 2017-10-16
Payer: COMMERCIAL

## 2017-10-16 VITALS
OXYGEN SATURATION: 96 % | HEIGHT: 77 IN | BODY MASS INDEX: 37.19 KG/M2 | HEART RATE: 57 BPM | RESPIRATION RATE: 15 BRPM | SYSTOLIC BLOOD PRESSURE: 136 MMHG | WEIGHT: 315 LBS | TEMPERATURE: 97.5 F | DIASTOLIC BLOOD PRESSURE: 88 MMHG

## 2017-10-16 DIAGNOSIS — Z09 ENCOUNTER FOR FOLLOW-UP EXAMINATION AFTER COMPLETED TREATMENT FOR CONDITIONS OTHER THAN MALIGNANT NEOPLASM: ICD-10-CM

## 2017-10-16 PROCEDURE — 99024 POSTOP FOLLOW-UP VISIT: CPT

## 2017-10-16 RX ORDER — ENOXAPARIN SODIUM 100 MG/ML
40 INJECTION SUBCUTANEOUS DAILY
Refills: 0 | Status: DISCONTINUED | COMMUNITY
End: 2017-10-16

## 2017-10-16 RX ORDER — ZOLPIDEM TARTRATE 5 MG/1
5 TABLET, FILM COATED ORAL
Refills: 0 | Status: DISCONTINUED | COMMUNITY
End: 2017-10-16

## 2017-10-16 RX ORDER — SENNOSIDES 8.6 MG TABLETS 8.6 MG/1
TABLET ORAL DAILY
Refills: 0 | Status: DISCONTINUED | COMMUNITY
End: 2017-10-16

## 2017-10-16 RX ORDER — POTASSIUM CHLORIDE 1500 MG/1
20 TABLET, FILM COATED, EXTENDED RELEASE ORAL DAILY
Refills: 0 | Status: DISCONTINUED | COMMUNITY
End: 2017-10-16

## 2017-10-16 RX ORDER — DOCUSATE SODIUM 100 MG/1
100 CAPSULE, LIQUID FILLED ORAL 3 TIMES DAILY
Refills: 0 | Status: DISCONTINUED | COMMUNITY
End: 2017-10-16

## 2017-10-16 RX ORDER — FUROSEMIDE 40 MG/1
40 TABLET ORAL DAILY
Refills: 0 | Status: DISCONTINUED | COMMUNITY
End: 2017-10-16

## 2017-10-16 RX ORDER — LORATADINE 10 MG
17 TABLET,DISINTEGRATING ORAL DAILY
Refills: 0 | Status: DISCONTINUED | COMMUNITY
End: 2017-10-16

## 2017-10-16 RX ORDER — OXYCODONE HYDROCHLORIDE AND ACETAMINOPHEN 5; 325 MG/1; MG/1
5-325 TABLET ORAL EVERY 4 HOURS
Refills: 0 | Status: DISCONTINUED | COMMUNITY
End: 2017-10-16

## 2017-10-16 RX ORDER — LIDOCAINE 5% 700 MG/1
5 PATCH TOPICAL
Refills: 0 | Status: DISCONTINUED | COMMUNITY
End: 2017-10-16

## 2017-10-16 RX ORDER — PANTOPRAZOLE 40 MG/1
40 TABLET, DELAYED RELEASE ORAL DAILY
Refills: 0 | Status: DISCONTINUED | COMMUNITY
End: 2017-10-16

## 2017-10-19 PROCEDURE — 94002 VENT MGMT INPAT INIT DAY: CPT

## 2017-10-19 PROCEDURE — 82550 ASSAY OF CK (CPK): CPT

## 2017-10-19 PROCEDURE — 86900 BLOOD TYPING SEROLOGIC ABO: CPT

## 2017-10-19 PROCEDURE — 94640 AIRWAY INHALATION TREATMENT: CPT

## 2017-10-19 PROCEDURE — C1889: CPT

## 2017-10-19 PROCEDURE — 81001 URINALYSIS AUTO W/SCOPE: CPT

## 2017-10-19 PROCEDURE — 80053 COMPREHEN METABOLIC PANEL: CPT

## 2017-10-19 PROCEDURE — 82803 BLOOD GASES ANY COMBINATION: CPT

## 2017-10-19 PROCEDURE — 94010 BREATHING CAPACITY TEST: CPT

## 2017-10-19 PROCEDURE — 82565 ASSAY OF CREATININE: CPT

## 2017-10-19 PROCEDURE — 80048 BASIC METABOLIC PNL TOTAL CA: CPT

## 2017-10-19 PROCEDURE — 93005 ELECTROCARDIOGRAM TRACING: CPT

## 2017-10-19 PROCEDURE — 82330 ASSAY OF CALCIUM: CPT

## 2017-10-19 PROCEDURE — C1769: CPT

## 2017-10-19 PROCEDURE — 87641 MR-STAPH DNA AMP PROBE: CPT

## 2017-10-19 PROCEDURE — 86850 RBC ANTIBODY SCREEN: CPT

## 2017-10-19 PROCEDURE — 93880 EXTRACRANIAL BILAT STUDY: CPT

## 2017-10-19 PROCEDURE — 94660 CPAP INITIATION&MGMT: CPT

## 2017-10-19 PROCEDURE — 82435 ASSAY OF BLOOD CHLORIDE: CPT

## 2017-10-19 PROCEDURE — 86891 AUTOLOGOUS BLOOD OP SALVAGE: CPT

## 2017-10-19 PROCEDURE — 84295 ASSAY OF SERUM SODIUM: CPT

## 2017-10-19 PROCEDURE — 85610 PROTHROMBIN TIME: CPT

## 2017-10-19 PROCEDURE — P9045: CPT

## 2017-10-19 PROCEDURE — 83605 ASSAY OF LACTIC ACID: CPT

## 2017-10-19 PROCEDURE — 82947 ASSAY GLUCOSE BLOOD QUANT: CPT

## 2017-10-19 PROCEDURE — 82553 CREATINE MB FRACTION: CPT

## 2017-10-19 PROCEDURE — C1751: CPT

## 2017-10-19 PROCEDURE — 83880 ASSAY OF NATRIURETIC PEPTIDE: CPT

## 2017-10-19 PROCEDURE — 85014 HEMATOCRIT: CPT

## 2017-10-19 PROCEDURE — 86901 BLOOD TYPING SEROLOGIC RH(D): CPT

## 2017-10-19 PROCEDURE — 97162 PT EVAL MOD COMPLEX 30 MIN: CPT

## 2017-10-19 PROCEDURE — 85384 FIBRINOGEN ACTIVITY: CPT

## 2017-10-19 PROCEDURE — 87640 STAPH A DNA AMP PROBE: CPT

## 2017-10-19 PROCEDURE — 84443 ASSAY THYROID STIM HORMONE: CPT

## 2017-10-19 PROCEDURE — 97530 THERAPEUTIC ACTIVITIES: CPT

## 2017-10-19 PROCEDURE — 71045 X-RAY EXAM CHEST 1 VIEW: CPT

## 2017-10-19 PROCEDURE — 84132 ASSAY OF SERUM POTASSIUM: CPT

## 2017-10-19 PROCEDURE — 84484 ASSAY OF TROPONIN QUANT: CPT

## 2017-10-19 PROCEDURE — 93306 TTE W/DOPPLER COMPLETE: CPT

## 2017-10-19 PROCEDURE — P9047: CPT

## 2017-10-19 PROCEDURE — 84100 ASSAY OF PHOSPHORUS: CPT

## 2017-10-19 PROCEDURE — 85730 THROMBOPLASTIN TIME PARTIAL: CPT

## 2017-10-19 PROCEDURE — 85027 COMPLETE CBC AUTOMATED: CPT

## 2017-10-19 PROCEDURE — P9016: CPT

## 2017-10-19 PROCEDURE — 31720 CLEARANCE OF AIRWAYS: CPT

## 2017-10-19 PROCEDURE — 86923 COMPATIBILITY TEST ELECTRIC: CPT

## 2018-07-26 PROBLEM — Z78.9 ALCOHOL USE: Status: ACTIVE | Noted: 2017-10-11

## 2018-09-08 ENCOUNTER — EMERGENCY (EMERGENCY)
Facility: HOSPITAL | Age: 63
LOS: 1 days | Discharge: HOME | End: 2018-09-08
Attending: EMERGENCY MEDICINE
Payer: COMMERCIAL

## 2018-09-08 VITALS
SYSTOLIC BLOOD PRESSURE: 142 MMHG | HEART RATE: 64 BPM | TEMPERATURE: 98 F | RESPIRATION RATE: 18 BRPM | OXYGEN SATURATION: 98 % | DIASTOLIC BLOOD PRESSURE: 78 MMHG

## 2018-09-08 VITALS
DIASTOLIC BLOOD PRESSURE: 81 MMHG | WEIGHT: 315 LBS | OXYGEN SATURATION: 97 % | HEIGHT: 77 IN | SYSTOLIC BLOOD PRESSURE: 145 MMHG | HEART RATE: 74 BPM | RESPIRATION RATE: 18 BRPM | TEMPERATURE: 97 F

## 2018-09-08 DIAGNOSIS — Z98.89 OTHER SPECIFIED POSTPROCEDURAL STATES: Chronic | ICD-10-CM

## 2018-09-08 DIAGNOSIS — Z96.651 PRESENCE OF RIGHT ARTIFICIAL KNEE JOINT: Chronic | ICD-10-CM

## 2018-09-08 LAB
ALBUMIN SERPL ELPH-MCNC: 3.8 G/DL — SIGNIFICANT CHANGE UP (ref 3.3–5)
ALP SERPL-CCNC: 77 U/L — SIGNIFICANT CHANGE UP (ref 40–120)
ALT FLD-CCNC: 18 U/L — SIGNIFICANT CHANGE UP (ref 10–45)
ANION GAP SERPL CALC-SCNC: 13 MMOL/L — SIGNIFICANT CHANGE UP (ref 5–17)
APPEARANCE UR: CLEAR — SIGNIFICANT CHANGE UP
APTT BLD: 26.5 SEC — LOW (ref 27.5–37.4)
AST SERPL-CCNC: 22 U/L — SIGNIFICANT CHANGE UP (ref 10–40)
BACTERIA # UR AUTO: NEGATIVE — SIGNIFICANT CHANGE UP
BASOPHILS # BLD AUTO: 0 K/UL — SIGNIFICANT CHANGE UP (ref 0–0.2)
BASOPHILS NFR BLD AUTO: 0.3 % — SIGNIFICANT CHANGE UP (ref 0–2)
BILIRUB SERPL-MCNC: 0.4 MG/DL — SIGNIFICANT CHANGE UP (ref 0.2–1.2)
BILIRUB UR-MCNC: NEGATIVE — SIGNIFICANT CHANGE UP
BUN SERPL-MCNC: 16 MG/DL — SIGNIFICANT CHANGE UP (ref 7–23)
CALCIUM SERPL-MCNC: 9.3 MG/DL — SIGNIFICANT CHANGE UP (ref 8.4–10.5)
CHLORIDE SERPL-SCNC: 105 MMOL/L — SIGNIFICANT CHANGE UP (ref 96–108)
CO2 SERPL-SCNC: 25 MMOL/L — SIGNIFICANT CHANGE UP (ref 22–31)
COLOR SPEC: YELLOW — SIGNIFICANT CHANGE UP
CREAT SERPL-MCNC: 0.65 MG/DL — SIGNIFICANT CHANGE UP (ref 0.5–1.3)
DIFF PNL FLD: ABNORMAL
EOSINOPHIL # BLD AUTO: 0.1 K/UL — SIGNIFICANT CHANGE UP (ref 0–0.5)
EOSINOPHIL NFR BLD AUTO: 1.6 % — SIGNIFICANT CHANGE UP (ref 0–6)
EPI CELLS # UR: 2 /HPF — SIGNIFICANT CHANGE UP
GLUCOSE SERPL-MCNC: 128 MG/DL — HIGH (ref 70–99)
GLUCOSE UR QL: ABNORMAL
HCT VFR BLD CALC: 43.2 % — SIGNIFICANT CHANGE UP (ref 39–50)
HCT VFR BLD CALC: 44.7 % — SIGNIFICANT CHANGE UP (ref 39–50)
HGB BLD-MCNC: 14.4 G/DL — SIGNIFICANT CHANGE UP (ref 13–17)
HGB BLD-MCNC: 15 G/DL — SIGNIFICANT CHANGE UP (ref 13–17)
HYALINE CASTS # UR AUTO: 5 /LPF — HIGH (ref 0–2)
INR BLD: 1.05 RATIO — SIGNIFICANT CHANGE UP (ref 0.88–1.16)
KETONES UR-MCNC: NEGATIVE — SIGNIFICANT CHANGE UP
LEUKOCYTE ESTERASE UR-ACNC: ABNORMAL
LYMPHOCYTES # BLD AUTO: 1.6 K/UL — SIGNIFICANT CHANGE UP (ref 1–3.3)
LYMPHOCYTES # BLD AUTO: 19.7 % — SIGNIFICANT CHANGE UP (ref 13–44)
MCHC RBC-ENTMCNC: 28.7 PG — SIGNIFICANT CHANGE UP (ref 27–34)
MCHC RBC-ENTMCNC: 28.8 PG — SIGNIFICANT CHANGE UP (ref 27–34)
MCHC RBC-ENTMCNC: 33.4 GM/DL — SIGNIFICANT CHANGE UP (ref 32–36)
MCHC RBC-ENTMCNC: 33.7 GM/DL — SIGNIFICANT CHANGE UP (ref 32–36)
MCV RBC AUTO: 85.5 FL — SIGNIFICANT CHANGE UP (ref 80–100)
MCV RBC AUTO: 85.8 FL — SIGNIFICANT CHANGE UP (ref 80–100)
MONOCYTES # BLD AUTO: 0.7 K/UL — SIGNIFICANT CHANGE UP (ref 0–0.9)
MONOCYTES NFR BLD AUTO: 9.3 % — SIGNIFICANT CHANGE UP (ref 2–14)
NEUTROPHILS # BLD AUTO: 5.5 K/UL — SIGNIFICANT CHANGE UP (ref 1.8–7.4)
NEUTROPHILS NFR BLD AUTO: 69.1 % — SIGNIFICANT CHANGE UP (ref 43–77)
NITRITE UR-MCNC: NEGATIVE — SIGNIFICANT CHANGE UP
OB PNL STL: POSITIVE
PH UR: 6 — SIGNIFICANT CHANGE UP (ref 5–8)
PLATELET # BLD AUTO: 179 K/UL — SIGNIFICANT CHANGE UP (ref 150–400)
PLATELET # BLD AUTO: 185 K/UL — SIGNIFICANT CHANGE UP (ref 150–400)
POTASSIUM SERPL-MCNC: 4.1 MMOL/L — SIGNIFICANT CHANGE UP (ref 3.5–5.3)
POTASSIUM SERPL-SCNC: 4.1 MMOL/L — SIGNIFICANT CHANGE UP (ref 3.5–5.3)
PROT SERPL-MCNC: 6.4 G/DL — SIGNIFICANT CHANGE UP (ref 6–8.3)
PROT UR-MCNC: ABNORMAL
PROTHROM AB SERPL-ACNC: 11.5 SEC — SIGNIFICANT CHANGE UP (ref 9.8–12.7)
RBC # BLD: 5.03 M/UL — SIGNIFICANT CHANGE UP (ref 4.2–5.8)
RBC # BLD: 5.23 M/UL — SIGNIFICANT CHANGE UP (ref 4.2–5.8)
RBC # FLD: 12.9 % — SIGNIFICANT CHANGE UP (ref 10.3–14.5)
RBC # FLD: 13.1 % — SIGNIFICANT CHANGE UP (ref 10.3–14.5)
RBC CASTS # UR COMP ASSIST: 4 /HPF — SIGNIFICANT CHANGE UP (ref 0–4)
SODIUM SERPL-SCNC: 143 MMOL/L — SIGNIFICANT CHANGE UP (ref 135–145)
SP GR SPEC: 1.03 — HIGH (ref 1.01–1.02)
UROBILINOGEN FLD QL: NEGATIVE — SIGNIFICANT CHANGE UP
WBC # BLD: 7.9 K/UL — SIGNIFICANT CHANGE UP (ref 3.8–10.5)
WBC # BLD: 8.5 K/UL — SIGNIFICANT CHANGE UP (ref 3.8–10.5)
WBC # FLD AUTO: 7.9 K/UL — SIGNIFICANT CHANGE UP (ref 3.8–10.5)
WBC # FLD AUTO: 8.5 K/UL — SIGNIFICANT CHANGE UP (ref 3.8–10.5)
WBC UR QL: 25 /HPF — HIGH (ref 0–5)

## 2018-09-08 PROCEDURE — 74176 CT ABD & PELVIS W/O CONTRAST: CPT

## 2018-09-08 PROCEDURE — 85610 PROTHROMBIN TIME: CPT

## 2018-09-08 PROCEDURE — 85027 COMPLETE CBC AUTOMATED: CPT

## 2018-09-08 PROCEDURE — 99284 EMERGENCY DEPT VISIT MOD MDM: CPT | Mod: 25

## 2018-09-08 PROCEDURE — 81001 URINALYSIS AUTO W/SCOPE: CPT

## 2018-09-08 PROCEDURE — 80053 COMPREHEN METABOLIC PANEL: CPT

## 2018-09-08 PROCEDURE — 99284 EMERGENCY DEPT VISIT MOD MDM: CPT | Mod: GC

## 2018-09-08 PROCEDURE — 87086 URINE CULTURE/COLONY COUNT: CPT

## 2018-09-08 PROCEDURE — 85730 THROMBOPLASTIN TIME PARTIAL: CPT

## 2018-09-08 PROCEDURE — 96374 THER/PROPH/DIAG INJ IV PUSH: CPT

## 2018-09-08 PROCEDURE — 82272 OCCULT BLD FECES 1-3 TESTS: CPT

## 2018-09-08 PROCEDURE — 74176 CT ABD & PELVIS W/O CONTRAST: CPT | Mod: 26

## 2018-09-08 RX ORDER — OXYCODONE HYDROCHLORIDE 5 MG/1
10 TABLET ORAL ONCE
Qty: 0 | Refills: 0 | Status: DISCONTINUED | OUTPATIENT
Start: 2018-09-08 | End: 2018-09-08

## 2018-09-08 RX ORDER — TAMSULOSIN HYDROCHLORIDE 0.4 MG/1
1 CAPSULE ORAL
Qty: 7 | Refills: 0 | OUTPATIENT
Start: 2018-09-08 | End: 2018-09-14

## 2018-09-08 RX ORDER — KETOROLAC TROMETHAMINE 30 MG/ML
15 SYRINGE (ML) INJECTION ONCE
Qty: 0 | Refills: 0 | Status: DISCONTINUED | OUTPATIENT
Start: 2018-09-08 | End: 2018-09-08

## 2018-09-08 RX ADMIN — OXYCODONE HYDROCHLORIDE 10 MILLIGRAM(S): 5 TABLET ORAL at 20:25

## 2018-09-08 RX ADMIN — Medication 15 MILLIGRAM(S): at 20:25

## 2018-09-08 NOTE — CONSULT NOTE ADULT - SUBJECTIVE AND OBJECTIVE BOX
Urology PA Consult Note    HPI:  This is a 62y old Male with PMHx of CAD  s/p CABG , ASIYA on CPAP, gout, HTN, bilateral hip replacements, chronic back pain, who was sent to the ER for hematuria.  He undergoes check ups at his PMD every 2 months since his bypass surgery, and was found to have hematuria 2 weeks ago on urinalysis.  He had the test repeated yesterday, which showed the same result.  He was also having pain on the left side for the past week.  Given these symptoms, his PMD sent him to the ER to rule out a kidney stone.  Denies fevers/chills, N/V, CP, SOB, changes in urinary habits, dysuria, gross hematuria.  Currently having some discomfort, 5/10.  Of note, he has had a lithotripsy about 15 years ago for a kidney stone.    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  Renal calculi  Obstructive sleep apnea: severe, used CPAP few years ago and lost weight  H/O: osteoarthritis  Lumbar disc disease with radiculopathy  History of Obesity  Gout: last attack 6 years ago,   S/P lumbar discectomy: L3,,L4,L5  Aug 2013  S/P revision of total knee, right: 2012  H/O lithotripsy  S/P knee replacement: rig 10/11  S/P hip replacement: left , right   S/P tonsillectomy and adenoidectomy: as child  Hernia: repair, left inguinal   Cholecystitis: cholecycstectomy   History of Total Hip Replacement  S/P Left Inguinal Hernia Repair      FAMILY HISTORY:  Family history of coronary artery disease: brother- age 50+- Coronary Artery Stents  Family history of pancreatic cancer (Sibling)  Family history of diabetes mellitus type II: Hyperlipidemia and Hypertension. Age 86.  Family history of coronary artery disease: HLD/Angina. Fatal MI age 73.      SOCIAL HISTORY:   +current smoker, cigars, 45 years        Allergies    No Known Allergies        REVIEW OF SYSTEMS: Pertinent positives and negatives as stated in HPI, otherwise negative    Vital signs  T(C): 36.6 (18 @ 18:09), Max: 36.6 (18 @ 18:09)  HR: 64 (18 @ 18:09)  BP: 142/78 (18 @ 18:09)  SpO2: 98% (18 @ 18:09)  Wt(kg): --    I&O's Summary      Physical Exam  Gen: NAD, A+Ox3  Abd: Soft, NT/ND, obese, +well healed sternal scar  Back: no CVAT bilaterally  Ext: No edema present b/l    LABS:                            14.4   7.9   )-----------( 185      ( 08 Sep 2018 14:33 )             43.2         143  |  105  |  16  ----------------------------<  128<H>  4.1   |  25  |  0.65    Ca    9.3      08 Sep 2018 14:33    TPro  6.4  /  Alb  3.8  /  TBili  0.4  /  DBili  x   /  AST  22  /  ALT  18  /  AlkPhos  77      PT/INR - ( 08 Sep 2018 14:33 )   PT: 11.5 sec;   INR: 1.05 ratio         PTT - ( 08 Sep 2018 14:33 )  PTT:26.5 sec  Urinalysis Basic - ( 08 Sep 2018 14:33 )    Color: Yellow / Appearance: Clear / S.031 / pH: x  Gluc: x / Ketone: Negative  / Bili: Negative / Urobili: Negative   Blood: x / Protein: 30 mg/dL / Nitrite: Negative   Leuk Esterase: Moderate / RBC: 4 /hpf / WBC 25 /hpf   Sq Epi: x / Non Sq Epi: 2 /hpf / Bacteria: Negative        Urine culture: pending results    Imaging:    CT: Urology PA Consult Note    HPI:  This is a 62y old Male with PMHx of CAD  s/p CABG , ASIYA on CPAP, gout, HTN, bilateral hip replacements, chronic back pain, who was sent to the ER for hematuria.  He undergoes check ups at his PMD every 2 months since his bypass surgery, and was found to have hematuria 2 weeks ago on urinalysis.  He had the test repeated yesterday, which showed the same result.  He was also having pain on the left side for the past week.  Given these symptoms, his PMD sent him to the ER to rule out a kidney stone.  Admits to BRBPR today.  Denies fevers/chills, N/V, CP, SOB, changes in urinary habits, dysuria, gross hematuria.  Currently having some discomfort, 5/10.  Of note, he has had a lithotripsy about 15 years ago for a kidney stone.    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  Renal calculi  Obstructive sleep apnea: severe, used CPAP few years ago and lost weight  H/O: osteoarthritis  Lumbar disc disease with radiculopathy  History of Obesity  Gout: last attack 6 years ago,   S/P lumbar discectomy: L3,,L4,L5  Aug 2013  S/P revision of total knee, right: 2012  H/O lithotripsy  S/P knee replacement: [facility] 10/11  S/P hip replacement: left , right   S/P tonsillectomy and adenoidectomy: as child  Hernia: repair, left inguinal   Cholecystitis: cholecycstectomy   History of Total Hip Replacement  S/P Left Inguinal Hernia Repair      FAMILY HISTORY:  Family history of coronary artery disease: brother- age 50+- Coronary Artery Stents  Family history of pancreatic cancer (Sibling)  Family history of diabetes mellitus type II: Hyperlipidemia and Hypertension. Age 86.  Family history of coronary artery disease: HLD/Angina. Fatal MI age 73.      SOCIAL HISTORY:   +current smoker, cigars, 45 years        Allergies    No Known Allergies        REVIEW OF SYSTEMS: Pertinent positives and negatives as stated in HPI, otherwise negative    Vital signs  T(C): 36.6 (18 @ 18:09), Max: 36.6 (18 @ 18:09)  HR: 64 (18 @ 18:09)  BP: 142/78 (18 @ 18:09)  SpO2: 98% (18 @ 18:09)  Wt(kg): --    I&O's Summary      Physical Exam  Gen: NAD, A+Ox3  Abd: Soft, NT/ND, obese, +well healed sternal scar  Back: no CVAT bilaterally  Ext: No edema present b/l    LABS:                            14.4   7.9   )-----------( 185      ( 08 Sep 2018 14:33 )             43.2         143  |  105  |  16  ----------------------------<  128<H>  4.1   |  25  |  0.65    Ca    9.3      08 Sep 2018 14:33    TPro  6.4  /  Alb  3.8  /  TBili  0.4  /  DBili  x   /  AST  22  /  ALT  18  /  AlkPhos  77      PT/INR - ( 08 Sep 2018 14:33 )   PT: 11.5 sec;   INR: 1.05 ratio         PTT - ( 08 Sep 2018 14:33 )  PTT:26.5 sec  Urinalysis Basic - ( 08 Sep 2018 14:33 )    Color: Yellow / Appearance: Clear / S.031 / pH: x  Gluc: x / Ketone: Negative  / Bili: Negative / Urobili: Negative   Blood: x / Protein: 30 mg/dL / Nitrite: Negative   Leuk Esterase: Moderate / RBC: 4 /hpf / WBC 25 /hpf   Sq Epi: x / Non Sq Epi: 2 /hpf / Bacteria: Negative        Urine culture: pending results    Imaging:    CT: Left hydronephrosis with a 2.1 cm obstructing calculus in the left   ureteropelvic junction. Limited evaluation of the distal ureter secondary   to streak artifact from bilateral hip hardware.

## 2018-09-08 NOTE — ED ADULT TRIAGE NOTE - CHIEF COMPLAINT QUOTE
urine test shows hematuria and sent by pmd denies any urinary symptoms   today had BRBPR in bowel movement x 1 this morning on asa 325

## 2018-09-08 NOTE — ED ADULT NURSE NOTE - NSIMPLEMENTINTERV_GEN_ALL_ED
Implemented All Universal Safety Interventions:  [place] to call system. Call bell, personal items and telephone within reach. Instruct patient to call for assistance. Room bathroom lighting operational. Non-slip footwear when patient is off stretcher. Physically safe environment: no spills, clutter or unnecessary equipment. Stretcher in lowest position, wheels locked, appropriate side rails in place.

## 2018-09-08 NOTE — ED PROVIDER NOTE - MEDICAL DECISION MAKING DETAILS
Attending Eliseo Penny DO: Pt sent in by his doctor for hematuria x 2 tests, left flank pain and 1 episode of brbpr today. Hx of GIb in the past. No chest pain, no sob, no ab pain, no urinary symptoms, no fevers. PE: well appearing, obese, nad, MMM, lungs clear, ab soft nt/nd, no edema, left CVA ttp. A/P: concern for kidney stone as cause of hematuria and left flank pain. BRBPR likely separate issue but concerning for GIB. Will check labs, ct ab/pel, analgesia, trend cbc and reevaluate.

## 2018-09-08 NOTE — ED PROVIDER NOTE - OBJECTIVE STATEMENT
62M PMH CAD (s/p CABG x3 w/LIMA 9/2017), HTN, HLD, ASIYA, gout, bilateral hip replacement/R-knee replacement/low back surgery w/chronic back pain who presents being sent by his doctor for hematuria and with reported episode of BRBPR. Patient states he got general lab work and urine test during cardiothoracic outpatient follow-up ~3 weeks ago and was told that he had blood in his urine and he requested repeat test. He repeated the urinalysis yesterday which resulted in more blood in the urine, so he was recommended to come to the ED for evaluation and concern for possible kidney stone. Patient does endorse L side back pain for 2 days that is dull but strong and does not radiate. He states that after a BM today he noted red blood on his stool and when he wiped. This is the first time this has happened. Endorsed a history of GI bleed many years ago but was told studies "just showed that he was straining" w/o other complications. 62M PMH CAD (s/p CABG x3 w/LIMA 9/2017), HTN, HLD, ASIYA, gout, bilateral hip replacement/R-knee replacement/low back surgery w/chronic back pain who presents being sent by his doctor for hematuria and with reported episode of BRBPR. Patient states he got general lab work and urine test during cardiothoracic outpatient follow-up ~3 weeks ago and was told that he had blood in his urine and he requested repeat test. He repeated the urinalysis yesterday which resulted in more blood in the urine, so he was recommended to come to the ED for evaluation and concern for possible kidney stone. Patient does endorse L side back pain for 2 days that is dull but strong and does not radiate. He states that after a BM today he noted red blood on his stool and when he wiped. This is the first time this has happened. Endorsed a history of GI bleed many years ago but was told EGD/colonoscopy "just showed that he was straining" w/o other source, has had colonic polyps. Denies dysuria, urinary frequency, fevers, chills, weight loss, abdominal pain, diarrhea, constipation, N/V, CP, palpitations, LE swelling, dizziness, light-headedness.  PMD: Dr. Elaine Jaime  Cards: Dr. Palma

## 2018-09-08 NOTE — ED PROVIDER NOTE - CARE PLAN
Principal Discharge DX:	Renal stone  Assessment and plan of treatment:	Thank you for visiting our Emergency Department.    Your diagnosis: kidney stone    Discharge instructions:    - Please follow-up with your Primary Care Doctor in 2-3 days.    - Please make an appointment with a gastroenterologist as soon as you can.    - Please make an appointment with a urologist doctor in 2-3 days.    - Take any prescribed medications as instructed: Tamsulosin 0.4mg by mouth once a day     - Others:    - Be sure to return to the ED if you develop new or worsening symptoms.    - Specific signs and symptoms to be vigilant of: continued or greater rectal bleeding, lightheadedness, continued or increased urinary bleeding Principal Discharge DX:	Renal stone  Assessment and plan of treatment:	Thank you for visiting our Emergency Department.    Your diagnosis: kidney stone    Discharge instructions:    - Please follow-up with your Primary Care Doctor in 2-3 days.    - Please make an appointment with a gastroenterologist as soon as you can.    - Please make an appointment with a urologist doctor in 2-3 days.    - Take any prescribed medications as instructed: Tamsulosin 0.4mg by mouth once a day     - Others:    - Be sure to return to the ED if you develop new or worsening symptoms.    - Specific signs and symptoms to be vigilant of: continued or greater rectal bleeding, lightheadedness, continued or increased urinary bleeding  Secondary Diagnosis:	Rectal bleeding

## 2018-09-08 NOTE — ED PROVIDER NOTE - FAMILY HISTORY
Family history of coronary artery disease, HLD/Angina. Fatal MI age 73.     Family history of diabetes mellitus type II, Hyperlipidemia and Hypertension. Age 86.     Family history of coronary artery disease, brother- age 50+- Coronary Artery Stents     Sibling  Still living? No  Family history of pancreatic cancer, Age at diagnosis: Age Unknown

## 2018-09-08 NOTE — ED PROVIDER NOTE - PROGRESS NOTE DETAILS
Jose Juan Spears: CT scan reviewed, no active diverticulitis. CBC x2 stable, H/H wnl. Pain controlled. Will give return precautions, close GI follow up and d/c.

## 2018-09-08 NOTE — CONSULT NOTE ADULT - ASSESSMENT
62 year old male with left renal calculus    -PO hydration 2-3L/d  -pain control with PO toradol as needed for 4 days, percocet  -return to ER if fevers, or intractable pain develop  -f/u in office for definitive stone management with either Dr Baljit Oneal or Dr Lucia Capps  -case d/w [employer] [place]

## 2018-09-08 NOTE — ED PROVIDER NOTE - PMH
Ankle fracture    CAD (coronary artery disease)    Essential hypertension    Gout  last attack 6 years ago, 2006  H/O: osteoarthritis    History of Obesity    Lumbar disc disease with radiculopathy    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  Obstructive sleep apnea  severe, used CPAP few years ago and lost weight  Renal calculi

## 2018-09-08 NOTE — ED ADULT NURSE REASSESSMENT NOTE - NS ED NURSE REASSESS COMMENT FT1
Received a 62 y.o male from [facility], aaox4 ambulatory with 1 assist p/w c/o Hematuria from Urinalysis, h/o obesity, HTN, CAD, kidney stone and lithotripsy. Patient offered pain meds but refused, claimed that he doesn't have pain at the moment. Labs, UA and CT scan resulted, patient pending Urology consult. No stat orders noted, will continue to monitor.

## 2018-09-08 NOTE — ED ADULT NURSE NOTE - OBJECTIVE STATEMENT
Male 62 years old with history of CAD, s/p CABG and HTN was sent by his MD for blood in urine as per urinalysis, Pt reports he had blood works and urinalysis as part of his general check up 3 weeks ago and was told that he has blood in urine. Yesterday urinalysis was repeated and more blood in urine and today he had one episode of bright red blood per rectum. Denies chest pain, sob, N/V, dizziness, fever and chills. Labs obtained. Will monitor.

## 2018-09-08 NOTE — ED PROVIDER NOTE - PLAN OF CARE
Thank you for visiting our Emergency Department.    Your diagnosis: kidney stone    Discharge instructions:    - Please follow-up with your Primary Care Doctor in 2-3 days.    - Please make an appointment with a gastroenterologist as soon as you can.    - Please make an appointment with a urologist doctor in 2-3 days.    - Take any prescribed medications as instructed: Tamsulosin 0.4mg by mouth once a day     - Others:    - Be sure to return to the ED if you develop new or worsening symptoms.    - Specific signs and symptoms to be vigilant of: continued or greater rectal bleeding, lightheadedness, continued or increased urinary bleeding

## 2018-09-09 LAB
CULTURE RESULTS: NO GROWTH — SIGNIFICANT CHANGE UP
SPECIMEN SOURCE: SIGNIFICANT CHANGE UP

## 2018-09-12 ENCOUNTER — APPOINTMENT (OUTPATIENT)
Dept: UROLOGY | Facility: CLINIC | Age: 63
End: 2018-09-12

## 2018-09-14 ENCOUNTER — APPOINTMENT (OUTPATIENT)
Dept: UROLOGY | Facility: CLINIC | Age: 63
End: 2018-09-14
Payer: COMMERCIAL

## 2018-09-14 VITALS
WEIGHT: 315 LBS | HEIGHT: 77 IN | DIASTOLIC BLOOD PRESSURE: 76 MMHG | TEMPERATURE: 97.8 F | OXYGEN SATURATION: 98 % | BODY MASS INDEX: 37.19 KG/M2 | SYSTOLIC BLOOD PRESSURE: 126 MMHG | HEART RATE: 75 BPM

## 2018-09-14 DIAGNOSIS — Z87.891 PERSONAL HISTORY OF NICOTINE DEPENDENCE: ICD-10-CM

## 2018-09-14 PROCEDURE — 99204 OFFICE O/P NEW MOD 45 MIN: CPT

## 2018-09-14 RX ORDER — METOPROLOL TARTRATE 50 MG/1
50 TABLET, FILM COATED ORAL TWICE DAILY
Refills: 0 | Status: COMPLETED | COMMUNITY
End: 2018-09-14

## 2018-09-14 RX ORDER — LOSARTAN POTASSIUM 50 MG/1
50 TABLET, FILM COATED ORAL
Refills: 0 | Status: ACTIVE | COMMUNITY
Start: 2018-09-14

## 2018-09-18 ENCOUNTER — APPOINTMENT (OUTPATIENT)
Dept: UROLOGY | Facility: CLINIC | Age: 63
End: 2018-09-18
Payer: COMMERCIAL

## 2018-09-18 PROCEDURE — 99214 OFFICE O/P EST MOD 30 MIN: CPT

## 2018-09-26 LAB — BACTERIA UR CULT: NORMAL

## 2018-10-11 ENCOUNTER — OUTPATIENT (OUTPATIENT)
Dept: OUTPATIENT SERVICES | Facility: HOSPITAL | Age: 63
LOS: 1 days | End: 2018-10-11
Payer: COMMERCIAL

## 2018-10-11 VITALS
WEIGHT: 315 LBS | HEART RATE: 73 BPM | TEMPERATURE: 97 F | RESPIRATION RATE: 16 BRPM | OXYGEN SATURATION: 98 % | HEIGHT: 76 IN | DIASTOLIC BLOOD PRESSURE: 72 MMHG | SYSTOLIC BLOOD PRESSURE: 130 MMHG

## 2018-10-11 DIAGNOSIS — Z95.1 PRESENCE OF AORTOCORONARY BYPASS GRAFT: Chronic | ICD-10-CM

## 2018-10-11 DIAGNOSIS — N20.0 CALCULUS OF KIDNEY: Chronic | ICD-10-CM

## 2018-10-11 DIAGNOSIS — N20.0 CALCULUS OF KIDNEY: ICD-10-CM

## 2018-10-11 DIAGNOSIS — G47.33 OBSTRUCTIVE SLEEP APNEA (ADULT) (PEDIATRIC): ICD-10-CM

## 2018-10-11 DIAGNOSIS — I10 ESSENTIAL (PRIMARY) HYPERTENSION: ICD-10-CM

## 2018-10-11 DIAGNOSIS — Z96.651 PRESENCE OF RIGHT ARTIFICIAL KNEE JOINT: Chronic | ICD-10-CM

## 2018-10-11 DIAGNOSIS — G62.9 POLYNEUROPATHY, UNSPECIFIED: Chronic | ICD-10-CM

## 2018-10-11 DIAGNOSIS — R19.5 OTHER FECAL ABNORMALITIES: ICD-10-CM

## 2018-10-11 DIAGNOSIS — Z98.89 OTHER SPECIFIED POSTPROCEDURAL STATES: Chronic | ICD-10-CM

## 2018-10-11 DIAGNOSIS — Z98.890 OTHER SPECIFIED POSTPROCEDURAL STATES: Chronic | ICD-10-CM

## 2018-10-11 DIAGNOSIS — I25.10 ATHEROSCLEROTIC HEART DISEASE OF NATIVE CORONARY ARTERY WITHOUT ANGINA PECTORIS: ICD-10-CM

## 2018-10-11 LAB
APPEARANCE UR: CLEAR — SIGNIFICANT CHANGE UP
BACTERIA # UR AUTO: SIGNIFICANT CHANGE UP
BILIRUB UR-MCNC: NEGATIVE — SIGNIFICANT CHANGE UP
BLD GP AB SCN SERPL QL: NEGATIVE — SIGNIFICANT CHANGE UP
BLOOD UR QL VISUAL: NEGATIVE — SIGNIFICANT CHANGE UP
BUN SERPL-MCNC: 15 MG/DL — SIGNIFICANT CHANGE UP (ref 7–23)
CALCIUM SERPL-MCNC: 9 MG/DL — SIGNIFICANT CHANGE UP (ref 8.4–10.5)
CHLORIDE SERPL-SCNC: 101 MMOL/L — SIGNIFICANT CHANGE UP (ref 98–107)
CO2 SERPL-SCNC: 24 MMOL/L — SIGNIFICANT CHANGE UP (ref 22–31)
COLOR SPEC: YELLOW — SIGNIFICANT CHANGE UP
CREAT SERPL-MCNC: 0.59 MG/DL — SIGNIFICANT CHANGE UP (ref 0.5–1.3)
GLUCOSE SERPL-MCNC: 178 MG/DL — HIGH (ref 70–99)
GLUCOSE UR-MCNC: NEGATIVE — SIGNIFICANT CHANGE UP
HCT VFR BLD CALC: 45.3 % — SIGNIFICANT CHANGE UP (ref 39–50)
HGB BLD-MCNC: 15.2 G/DL — SIGNIFICANT CHANGE UP (ref 13–17)
KETONES UR-MCNC: NEGATIVE — SIGNIFICANT CHANGE UP
LEUKOCYTE ESTERASE UR-ACNC: NEGATIVE — SIGNIFICANT CHANGE UP
MCHC RBC-ENTMCNC: 29 PG — SIGNIFICANT CHANGE UP (ref 27–34)
MCHC RBC-ENTMCNC: 33.6 % — SIGNIFICANT CHANGE UP (ref 32–36)
MCV RBC AUTO: 86.3 FL — SIGNIFICANT CHANGE UP (ref 80–100)
MUCOUS THREADS # UR AUTO: SIGNIFICANT CHANGE UP
NITRITE UR-MCNC: NEGATIVE — SIGNIFICANT CHANGE UP
NRBC # FLD: 0 — SIGNIFICANT CHANGE UP
PH UR: 5.5 — SIGNIFICANT CHANGE UP (ref 5–8)
PLATELET # BLD AUTO: 201 K/UL — SIGNIFICANT CHANGE UP (ref 150–400)
PMV BLD: 10.5 FL — SIGNIFICANT CHANGE UP (ref 7–13)
POTASSIUM SERPL-MCNC: 3.4 MMOL/L — LOW (ref 3.5–5.3)
POTASSIUM SERPL-SCNC: 3.4 MMOL/L — LOW (ref 3.5–5.3)
PROT UR-MCNC: 20 — SIGNIFICANT CHANGE UP
RBC # BLD: 5.25 M/UL — SIGNIFICANT CHANGE UP (ref 4.2–5.8)
RBC # FLD: 13.2 % — SIGNIFICANT CHANGE UP (ref 10.3–14.5)
RBC CASTS # UR COMP ASSIST: SIGNIFICANT CHANGE UP (ref 0–?)
RH IG SCN BLD-IMP: POSITIVE — SIGNIFICANT CHANGE UP
SODIUM SERPL-SCNC: 141 MMOL/L — SIGNIFICANT CHANGE UP (ref 135–145)
SP GR SPEC: 1.03 — SIGNIFICANT CHANGE UP (ref 1–1.04)
SQUAMOUS # UR AUTO: SIGNIFICANT CHANGE UP
UROBILINOGEN FLD QL: NORMAL — SIGNIFICANT CHANGE UP
WBC # BLD: 6.67 K/UL — SIGNIFICANT CHANGE UP (ref 3.8–10.5)
WBC # FLD AUTO: 6.67 K/UL — SIGNIFICANT CHANGE UP (ref 3.8–10.5)
WBC UR QL: HIGH (ref 0–?)

## 2018-10-11 PROCEDURE — 93010 ELECTROCARDIOGRAM REPORT: CPT

## 2018-10-11 RX ORDER — ENOXAPARIN SODIUM 100 MG/ML
40 INJECTION SUBCUTANEOUS
Qty: 0 | Refills: 0 | COMMUNITY

## 2018-10-11 RX ORDER — HYDROCHLOROTHIAZIDE 25 MG
0 TABLET ORAL
Qty: 0 | Refills: 0 | COMMUNITY

## 2018-10-11 RX ORDER — TRIAMTERENE 100 MG/1
0 CAPSULE ORAL
Qty: 0 | Refills: 0 | COMMUNITY

## 2018-10-11 NOTE — H&P PST ADULT - PSH
Cholecystitis  cholecycstectomy 2011  H/O lithotripsy    Hernia  repair, left inguinal 2010  History of Total Hip Replacement    Kidney stone  s/w ESWL pt unsure site  Neuropathy  Bilateral Feet since 2012  S/P CABG x 3  8/29/17  S/P hip replacement  left 2009, right 2009  S/P knee replacement  right x 3  10/11,2012, 2014  S/P Left Inguinal Hernia Repair    S/P lumbar discectomy  L3,,L4,L5  Aug 2013  S/P lumbar laminectomy  Fusion L3-L5 2012  S/P revision of total knee, right  12/2012  S/P tonsillectomy and adenoidectomy  as child

## 2018-10-11 NOTE — H&P PST ADULT - PROBLEM SELECTOR PLAN 3
Dr Medrano to provide cardiac clearance Dr Medrano to provide cardiac clearance  ASA plan as per Dr Palma Dr Medrano to provide cardiac clearance  ASA plan as per Dr Palma  pt with appt 10/17/18

## 2018-10-11 NOTE — H&P PST ADULT - PROBLEM SELECTOR PLAN 1
Cystoscopy Left retrograde , Left Percutaneous Lithotomy    Pre op instructions reviewed with pt including Pepcid and Hibiclens ; pt appears to have a good understanding of pre op instructions    Pt to Dr Jaime for pre op m/c

## 2018-10-11 NOTE — H&P PST ADULT - HISTORY OF PRESENT ILLNESS
Pt is a 62 y.o. male ; pt states 9/18 ; a w/u was done ; hematuria was noted ;  a c/t scan was done " There is a large stone in the left kidney ' pt to surgeon ; pt now presents for Cystoscopy , Left retrograde , Left percutaneous Lithotomy

## 2018-10-11 NOTE — H&P PST ADULT - NEGATIVE NEUROLOGICAL SYMPTOMS
no loss of sensation/no syncope/no transient paralysis/no focal seizures/no weakness/no paresthesias/no generalized seizures

## 2018-10-11 NOTE — H&P PST ADULT - PMH
Ankle fracture    CAD (coronary artery disease)  s/p cabg x3  Essential hypertension    Gout  last attack 6 years ago, 2006  H/O: osteoarthritis    History of Obesity    Hypercholesterolemia    Lumbar disc disease with radiculopathy    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  Obstructive sleep apnea  severe, used CPAP few years ago and lost weight  ASIYA (obstructive sleep apnea)  initially dx 1997 ; last 2016  cpap hs  Renal calculi    Stool finding  pt reports intermittent bleeding stool ; Appt scheduled with GI 10/16/18

## 2018-10-12 LAB — SPECIMEN SOURCE: SIGNIFICANT CHANGE UP

## 2018-10-13 LAB — BACTERIA UR CULT: SIGNIFICANT CHANGE UP

## 2018-10-16 PROBLEM — E78.00 PURE HYPERCHOLESTEROLEMIA, UNSPECIFIED: Chronic | Status: ACTIVE | Noted: 2018-10-11

## 2018-10-23 ENCOUNTER — TRANSCRIPTION ENCOUNTER (OUTPATIENT)
Age: 63
End: 2018-10-23

## 2018-10-24 ENCOUNTER — APPOINTMENT (OUTPATIENT)
Dept: UROLOGY | Facility: HOSPITAL | Age: 63
End: 2018-10-24

## 2018-10-24 ENCOUNTER — INPATIENT (INPATIENT)
Facility: HOSPITAL | Age: 63
LOS: 5 days | Discharge: HOME | End: 2018-10-30
Attending: UROLOGY | Admitting: UROLOGY
Payer: MEDICARE

## 2018-10-24 VITALS
OXYGEN SATURATION: 97 % | SYSTOLIC BLOOD PRESSURE: 123 MMHG | DIASTOLIC BLOOD PRESSURE: 65 MMHG | WEIGHT: 315 LBS | HEART RATE: 61 BPM | TEMPERATURE: 98 F | HEIGHT: 76 IN | RESPIRATION RATE: 18 BRPM

## 2018-10-24 DIAGNOSIS — Z98.890 OTHER SPECIFIED POSTPROCEDURAL STATES: Chronic | ICD-10-CM

## 2018-10-24 DIAGNOSIS — Z96.651 PRESENCE OF RIGHT ARTIFICIAL KNEE JOINT: Chronic | ICD-10-CM

## 2018-10-24 DIAGNOSIS — G62.9 POLYNEUROPATHY, UNSPECIFIED: Chronic | ICD-10-CM

## 2018-10-24 DIAGNOSIS — N20.0 CALCULUS OF KIDNEY: Chronic | ICD-10-CM

## 2018-10-24 DIAGNOSIS — Z98.89 OTHER SPECIFIED POSTPROCEDURAL STATES: Chronic | ICD-10-CM

## 2018-10-24 DIAGNOSIS — N20.0 CALCULUS OF KIDNEY: ICD-10-CM

## 2018-10-24 DIAGNOSIS — Z95.1 PRESENCE OF AORTOCORONARY BYPASS GRAFT: Chronic | ICD-10-CM

## 2018-10-24 LAB
BASE EXCESS BLDA CALC-SCNC: -0.4 MMOL/L — SIGNIFICANT CHANGE UP
BUN SERPL-MCNC: 15 MG/DL — SIGNIFICANT CHANGE UP (ref 7–23)
CA-I BLDA-SCNC: 1.19 MMOL/L — SIGNIFICANT CHANGE UP (ref 1.15–1.29)
CALCIUM SERPL-MCNC: 8.8 MG/DL — SIGNIFICANT CHANGE UP (ref 8.4–10.5)
CHLORIDE SERPL-SCNC: 104 MMOL/L — SIGNIFICANT CHANGE UP (ref 98–107)
CO2 SERPL-SCNC: 22 MMOL/L — SIGNIFICANT CHANGE UP (ref 22–31)
CREAT SERPL-MCNC: 0.65 MG/DL — SIGNIFICANT CHANGE UP (ref 0.5–1.3)
GLUCOSE BLDA-MCNC: 114 MG/DL — HIGH (ref 70–99)
GLUCOSE SERPL-MCNC: 141 MG/DL — HIGH (ref 70–99)
HCO3 BLDA-SCNC: 24 MMOL/L — SIGNIFICANT CHANGE UP (ref 22–26)
HCT VFR BLD CALC: 40.3 % — SIGNIFICANT CHANGE UP (ref 39–50)
HCT VFR BLDA CALC: 41.4 % — SIGNIFICANT CHANGE UP (ref 39–51)
HGB BLD-MCNC: 13.6 G/DL — SIGNIFICANT CHANGE UP (ref 13–17)
HGB BLDA-MCNC: 13.5 G/DL — SIGNIFICANT CHANGE UP (ref 13–17)
MCHC RBC-ENTMCNC: 29.2 PG — SIGNIFICANT CHANGE UP (ref 27–34)
MCHC RBC-ENTMCNC: 33.7 % — SIGNIFICANT CHANGE UP (ref 32–36)
MCV RBC AUTO: 86.5 FL — SIGNIFICANT CHANGE UP (ref 80–100)
NRBC # FLD: 0 — SIGNIFICANT CHANGE UP
PCO2 BLDA: 41 MMHG — SIGNIFICANT CHANGE UP (ref 35–48)
PH BLDA: 7.38 PH — SIGNIFICANT CHANGE UP (ref 7.35–7.45)
PLATELET # BLD AUTO: 173 K/UL — SIGNIFICANT CHANGE UP (ref 150–400)
PMV BLD: 10.2 FL — SIGNIFICANT CHANGE UP (ref 7–13)
PO2 BLDA: 303 MMHG — HIGH (ref 83–108)
POTASSIUM BLDA-SCNC: 4.1 MMOL/L — SIGNIFICANT CHANGE UP (ref 3.4–4.5)
POTASSIUM SERPL-MCNC: 4.3 MMOL/L — SIGNIFICANT CHANGE UP (ref 3.5–5.3)
POTASSIUM SERPL-SCNC: 4.3 MMOL/L — SIGNIFICANT CHANGE UP (ref 3.5–5.3)
RBC # BLD: 4.66 M/UL — SIGNIFICANT CHANGE UP (ref 4.2–5.8)
RBC # FLD: 13.2 % — SIGNIFICANT CHANGE UP (ref 10.3–14.5)
RH IG SCN BLD-IMP: POSITIVE — SIGNIFICANT CHANGE UP
SAO2 % BLDA: 99.6 % — HIGH (ref 95–99)
SODIUM BLDA-SCNC: 137 MMOL/L — SIGNIFICANT CHANGE UP (ref 136–146)
SODIUM SERPL-SCNC: 140 MMOL/L — SIGNIFICANT CHANGE UP (ref 135–145)
WBC # BLD: 9.5 K/UL — SIGNIFICANT CHANGE UP (ref 3.8–10.5)
WBC # FLD AUTO: 9.5 K/UL — SIGNIFICANT CHANGE UP (ref 3.8–10.5)

## 2018-10-24 PROCEDURE — 71045 X-RAY EXAM CHEST 1 VIEW: CPT | Mod: 26

## 2018-10-24 RX ORDER — ACETAMINOPHEN 500 MG
650 TABLET ORAL EVERY 6 HOURS
Qty: 0 | Refills: 0 | Status: DISCONTINUED | OUTPATIENT
Start: 2018-10-24 | End: 2018-10-30

## 2018-10-24 RX ORDER — SODIUM CHLORIDE 9 MG/ML
1000 INJECTION, SOLUTION INTRAVENOUS
Qty: 0 | Refills: 0 | Status: DISCONTINUED | OUTPATIENT
Start: 2018-10-24 | End: 2018-10-25

## 2018-10-24 RX ORDER — HEPARIN SODIUM 5000 [USP'U]/ML
5000 INJECTION INTRAVENOUS; SUBCUTANEOUS EVERY 8 HOURS
Qty: 0 | Refills: 0 | Status: DISCONTINUED | OUTPATIENT
Start: 2018-10-24 | End: 2018-10-30

## 2018-10-24 RX ORDER — OXYCODONE HYDROCHLORIDE 5 MG/1
5 TABLET ORAL EVERY 4 HOURS
Qty: 0 | Refills: 0 | Status: DISCONTINUED | OUTPATIENT
Start: 2018-10-24 | End: 2018-10-30

## 2018-10-24 RX ORDER — ONDANSETRON 8 MG/1
4 TABLET, FILM COATED ORAL ONCE
Qty: 0 | Refills: 0 | Status: COMPLETED | OUTPATIENT
Start: 2018-10-24 | End: 2018-10-24

## 2018-10-24 RX ORDER — CEFAZOLIN SODIUM 1 G
2000 VIAL (EA) INJECTION EVERY 8 HOURS
Qty: 0 | Refills: 0 | Status: DISCONTINUED | OUTPATIENT
Start: 2018-10-24 | End: 2018-10-27

## 2018-10-24 RX ORDER — HYDROMORPHONE HYDROCHLORIDE 2 MG/ML
0.5 INJECTION INTRAMUSCULAR; INTRAVENOUS; SUBCUTANEOUS
Qty: 0 | Refills: 0 | Status: DISCONTINUED | OUTPATIENT
Start: 2018-10-24 | End: 2018-10-25

## 2018-10-24 RX ORDER — OXYCODONE HYDROCHLORIDE 5 MG/1
10 TABLET ORAL EVERY 4 HOURS
Qty: 0 | Refills: 0 | Status: DISCONTINUED | OUTPATIENT
Start: 2018-10-24 | End: 2018-10-30

## 2018-10-24 RX ADMIN — Medication 100 MILLIGRAM(S): at 22:10

## 2018-10-24 RX ADMIN — ONDANSETRON 4 MILLIGRAM(S): 8 TABLET, FILM COATED ORAL at 16:41

## 2018-10-24 RX ADMIN — OXYCODONE HYDROCHLORIDE 5 MILLIGRAM(S): 5 TABLET ORAL at 23:52

## 2018-10-24 RX ADMIN — OXYCODONE HYDROCHLORIDE 5 MILLIGRAM(S): 5 TABLET ORAL at 19:50

## 2018-10-24 RX ADMIN — SODIUM CHLORIDE 150 MILLILITER(S): 9 INJECTION, SOLUTION INTRAVENOUS at 22:10

## 2018-10-24 RX ADMIN — OXYCODONE HYDROCHLORIDE 5 MILLIGRAM(S): 5 TABLET ORAL at 23:22

## 2018-10-24 RX ADMIN — HYDROMORPHONE HYDROCHLORIDE 0.5 MILLIGRAM(S): 2 INJECTION INTRAMUSCULAR; INTRAVENOUS; SUBCUTANEOUS at 17:30

## 2018-10-24 RX ADMIN — HYDROMORPHONE HYDROCHLORIDE 0.5 MILLIGRAM(S): 2 INJECTION INTRAMUSCULAR; INTRAVENOUS; SUBCUTANEOUS at 18:00

## 2018-10-24 RX ADMIN — HYDROMORPHONE HYDROCHLORIDE 0.5 MILLIGRAM(S): 2 INJECTION INTRAMUSCULAR; INTRAVENOUS; SUBCUTANEOUS at 17:00

## 2018-10-24 RX ADMIN — SODIUM CHLORIDE 150 MILLILITER(S): 9 INJECTION, SOLUTION INTRAVENOUS at 16:15

## 2018-10-24 RX ADMIN — SODIUM CHLORIDE 30 MILLILITER(S): 9 INJECTION, SOLUTION INTRAVENOUS at 09:48

## 2018-10-24 RX ADMIN — HYDROMORPHONE HYDROCHLORIDE 0.5 MILLIGRAM(S): 2 INJECTION INTRAMUSCULAR; INTRAVENOUS; SUBCUTANEOUS at 16:40

## 2018-10-24 RX ADMIN — HEPARIN SODIUM 5000 UNIT(S): 5000 INJECTION INTRAVENOUS; SUBCUTANEOUS at 22:11

## 2018-10-24 NOTE — BRIEF OPERATIVE NOTE - PROCEDURE
<<-----Click on this checkbox to enter Procedure Percutaneous nephrolithotomy  10/24/2018  left, retrograde pyelogram, antegrade nephrostogram, nephrostomy tube placement, cystoscopy, left ureteral stent placement  Active  [email or web address]

## 2018-10-24 NOTE — ASU PREOP CHECKLIST - SELECT TESTS ORDERED
CBC/BMP/urine culture/Urinalysis/Type and Screen Type and Screen/Urinalysis/urine culture/EKG/BMP/CBC CBC/BMP/urine culture, CT/EKG/Urinalysis/Type and Screen

## 2018-10-24 NOTE — CHART NOTE - NSCHARTNOTEFT_GEN_A_CORE
Post-operative Check    SUBJECTIVE: No acute events in the post-operative period.  Patient complaining of mild flank pain, controlled with current pain regimen.  Wilson draining with clear/lópez urine.  Nephrostomy tube w/ dark red output.  Denies cp/sob/nausea/emesis.      OBJECTIVE:  T(C): 36.5 (10-24-18 @ 19:00), Max: 36.5 (10-24-18 @ 08:53)  HR: 68 (10-24-18 @ 19:00) (60 - 78)  BP: 113/55 (10-24-18 @ 19:00) (94/54 - 123/65)  RR: 16 (10-24-18 @ 19:00) (12 - 20)  SpO2: 97% (10-24-18 @ 19:00) (96% - 100%)      10-24-18 @ 07:01  -  10-24-18 @ 19:39  --------------------------------------------------------  IN: 650 mL / OUT: 235 mL / NET: 415 mL        Physical Exam:   - Constitutional: AOx3, NAD  - CV: normotensive, regular rate   - Respiratory: nonlabored  - Abdomen: soft, appropriately tender, nondistended.  Flank incision c/d/i.    - : Wilson draining clear yellow urine.  Nephrostomy tube w/ dark red output.      ASSESSMENT:   FRANCO RIVERO is a 62y Male POD#0 from Percutaneous nephrolithotomy, left RPG, ureteral stent placement, nephrostomy tube.      PLAN:  - F/U AM labs  - F/U PACU and AM CXR  - Wilson and NT to gravity, monitor color and output  - Ancef 2g q8h  - Pain management  - Regular diet/LR @ 150    - Appropriate for transfer to the floor

## 2018-10-25 ENCOUNTER — TRANSCRIPTION ENCOUNTER (OUTPATIENT)
Age: 63
End: 2018-10-25

## 2018-10-25 DIAGNOSIS — M1A.9XX0 CHRONIC GOUT, UNSPECIFIED, WITHOUT TOPHUS (TOPHI): ICD-10-CM

## 2018-10-25 DIAGNOSIS — M51.16 INTERVERTEBRAL DISC DISORDERS WITH RADICULOPATHY, LUMBAR REGION: ICD-10-CM

## 2018-10-25 DIAGNOSIS — I25.10 ATHEROSCLEROTIC HEART DISEASE OF NATIVE CORONARY ARTERY WITHOUT ANGINA PECTORIS: ICD-10-CM

## 2018-10-25 DIAGNOSIS — I10 ESSENTIAL (PRIMARY) HYPERTENSION: ICD-10-CM

## 2018-10-25 DIAGNOSIS — N20.0 CALCULUS OF KIDNEY: ICD-10-CM

## 2018-10-25 DIAGNOSIS — G47.33 OBSTRUCTIVE SLEEP APNEA (ADULT) (PEDIATRIC): ICD-10-CM

## 2018-10-25 DIAGNOSIS — M25.472 EFFUSION, LEFT ANKLE: ICD-10-CM

## 2018-10-25 LAB
BUN SERPL-MCNC: 16 MG/DL — SIGNIFICANT CHANGE UP (ref 7–23)
CALCIUM SERPL-MCNC: 8.6 MG/DL — SIGNIFICANT CHANGE UP (ref 8.4–10.5)
CHLORIDE SERPL-SCNC: 102 MMOL/L — SIGNIFICANT CHANGE UP (ref 98–107)
CO2 SERPL-SCNC: 20 MMOL/L — LOW (ref 22–31)
CREAT SERPL-MCNC: 0.69 MG/DL — SIGNIFICANT CHANGE UP (ref 0.5–1.3)
GLUCOSE SERPL-MCNC: 168 MG/DL — HIGH (ref 70–99)
HCT VFR BLD CALC: 38.4 % — LOW (ref 39–50)
HGB BLD-MCNC: 13.1 G/DL — SIGNIFICANT CHANGE UP (ref 13–17)
MCHC RBC-ENTMCNC: 29 PG — SIGNIFICANT CHANGE UP (ref 27–34)
MCHC RBC-ENTMCNC: 34.1 % — SIGNIFICANT CHANGE UP (ref 32–36)
MCV RBC AUTO: 85 FL — SIGNIFICANT CHANGE UP (ref 80–100)
NRBC # FLD: 0 — SIGNIFICANT CHANGE UP
PLATELET # BLD AUTO: 182 K/UL — SIGNIFICANT CHANGE UP (ref 150–400)
PMV BLD: 10.5 FL — SIGNIFICANT CHANGE UP (ref 7–13)
POTASSIUM SERPL-MCNC: 4.3 MMOL/L — SIGNIFICANT CHANGE UP (ref 3.5–5.3)
POTASSIUM SERPL-SCNC: 4.3 MMOL/L — SIGNIFICANT CHANGE UP (ref 3.5–5.3)
RBC # BLD: 4.52 M/UL — SIGNIFICANT CHANGE UP (ref 4.2–5.8)
RBC # FLD: 12.6 % — SIGNIFICANT CHANGE UP (ref 10.3–14.5)
SODIUM SERPL-SCNC: 138 MMOL/L — SIGNIFICANT CHANGE UP (ref 135–145)
WBC # BLD: 14.99 K/UL — HIGH (ref 3.8–10.5)
WBC # FLD AUTO: 14.99 K/UL — HIGH (ref 3.8–10.5)

## 2018-10-25 PROCEDURE — 73610 X-RAY EXAM OF ANKLE: CPT | Mod: 26,LT

## 2018-10-25 PROCEDURE — 74176 CT ABD & PELVIS W/O CONTRAST: CPT | Mod: 26

## 2018-10-25 PROCEDURE — 99223 1ST HOSP IP/OBS HIGH 75: CPT

## 2018-10-25 RX ORDER — ASPIRIN/CALCIUM CARB/MAGNESIUM 324 MG
325 TABLET ORAL DAILY
Qty: 0 | Refills: 0 | Status: DISCONTINUED | OUTPATIENT
Start: 2018-10-25 | End: 2018-10-30

## 2018-10-25 RX ORDER — SODIUM CHLORIDE 9 MG/ML
1000 INJECTION, SOLUTION INTRAVENOUS
Qty: 0 | Refills: 0 | Status: DISCONTINUED | OUTPATIENT
Start: 2018-10-25 | End: 2018-10-25

## 2018-10-25 RX ORDER — SENNA PLUS 8.6 MG/1
2 TABLET ORAL AT BEDTIME
Qty: 0 | Refills: 0 | Status: DISCONTINUED | OUTPATIENT
Start: 2018-10-25 | End: 2018-10-30

## 2018-10-25 RX ORDER — TAMSULOSIN HYDROCHLORIDE 0.4 MG/1
0.4 CAPSULE ORAL AT BEDTIME
Qty: 0 | Refills: 0 | Status: DISCONTINUED | OUTPATIENT
Start: 2018-10-25 | End: 2018-10-30

## 2018-10-25 RX ORDER — LOSARTAN POTASSIUM 100 MG/1
100 TABLET, FILM COATED ORAL DAILY
Qty: 0 | Refills: 0 | Status: DISCONTINUED | OUTPATIENT
Start: 2018-10-25 | End: 2018-10-25

## 2018-10-25 RX ORDER — AMLODIPINE BESYLATE 2.5 MG/1
10 TABLET ORAL DAILY
Qty: 0 | Refills: 0 | Status: DISCONTINUED | OUTPATIENT
Start: 2018-10-25 | End: 2018-10-29

## 2018-10-25 RX ORDER — LABETALOL HCL 100 MG
200 TABLET ORAL
Qty: 0 | Refills: 0 | Status: DISCONTINUED | OUTPATIENT
Start: 2018-10-25 | End: 2018-10-29

## 2018-10-25 RX ORDER — DOCUSATE SODIUM 100 MG
100 CAPSULE ORAL THREE TIMES A DAY
Qty: 0 | Refills: 0 | Status: DISCONTINUED | OUTPATIENT
Start: 2018-10-25 | End: 2018-10-30

## 2018-10-25 RX ORDER — ATORVASTATIN CALCIUM 80 MG/1
40 TABLET, FILM COATED ORAL AT BEDTIME
Qty: 0 | Refills: 0 | Status: DISCONTINUED | OUTPATIENT
Start: 2018-10-25 | End: 2018-10-30

## 2018-10-25 RX ORDER — ALLOPURINOL 300 MG
100 TABLET ORAL DAILY
Qty: 0 | Refills: 0 | Status: DISCONTINUED | OUTPATIENT
Start: 2018-10-25 | End: 2018-10-30

## 2018-10-25 RX ORDER — LOSARTAN POTASSIUM 100 MG/1
100 TABLET, FILM COATED ORAL DAILY
Qty: 0 | Refills: 0 | Status: DISCONTINUED | OUTPATIENT
Start: 2018-10-25 | End: 2018-10-29

## 2018-10-25 RX ADMIN — SODIUM CHLORIDE 150 MILLILITER(S): 9 INJECTION, SOLUTION INTRAVENOUS at 05:50

## 2018-10-25 RX ADMIN — HEPARIN SODIUM 5000 UNIT(S): 5000 INJECTION INTRAVENOUS; SUBCUTANEOUS at 05:50

## 2018-10-25 RX ADMIN — LOSARTAN POTASSIUM 100 MILLIGRAM(S): 100 TABLET, FILM COATED ORAL at 07:23

## 2018-10-25 RX ADMIN — Medication 100 MILLIGRAM(S): at 15:03

## 2018-10-25 RX ADMIN — OXYCODONE HYDROCHLORIDE 10 MILLIGRAM(S): 5 TABLET ORAL at 23:07

## 2018-10-25 RX ADMIN — OXYCODONE HYDROCHLORIDE 10 MILLIGRAM(S): 5 TABLET ORAL at 12:44

## 2018-10-25 RX ADMIN — HEPARIN SODIUM 5000 UNIT(S): 5000 INJECTION INTRAVENOUS; SUBCUTANEOUS at 21:45

## 2018-10-25 RX ADMIN — OXYCODONE HYDROCHLORIDE 10 MILLIGRAM(S): 5 TABLET ORAL at 15:55

## 2018-10-25 RX ADMIN — ATORVASTATIN CALCIUM 40 MILLIGRAM(S): 80 TABLET, FILM COATED ORAL at 21:45

## 2018-10-25 RX ADMIN — SODIUM CHLORIDE 75 MILLILITER(S): 9 INJECTION, SOLUTION INTRAVENOUS at 07:23

## 2018-10-25 RX ADMIN — AMLODIPINE BESYLATE 10 MILLIGRAM(S): 2.5 TABLET ORAL at 07:23

## 2018-10-25 RX ADMIN — Medication 325 MILLIGRAM(S): at 11:53

## 2018-10-25 RX ADMIN — Medication 200 MILLIGRAM(S): at 07:25

## 2018-10-25 RX ADMIN — OXYCODONE HYDROCHLORIDE 10 MILLIGRAM(S): 5 TABLET ORAL at 04:08

## 2018-10-25 RX ADMIN — HEPARIN SODIUM 5000 UNIT(S): 5000 INJECTION INTRAVENOUS; SUBCUTANEOUS at 15:03

## 2018-10-25 RX ADMIN — Medication 200 MILLIGRAM(S): at 18:31

## 2018-10-25 RX ADMIN — Medication 100 MILLIGRAM(S): at 11:53

## 2018-10-25 RX ADMIN — OXYCODONE HYDROCHLORIDE 10 MILLIGRAM(S): 5 TABLET ORAL at 04:38

## 2018-10-25 RX ADMIN — OXYCODONE HYDROCHLORIDE 10 MILLIGRAM(S): 5 TABLET ORAL at 11:51

## 2018-10-25 RX ADMIN — OXYCODONE HYDROCHLORIDE 10 MILLIGRAM(S): 5 TABLET ORAL at 22:37

## 2018-10-25 RX ADMIN — TAMSULOSIN HYDROCHLORIDE 0.4 MILLIGRAM(S): 0.4 CAPSULE ORAL at 21:45

## 2018-10-25 RX ADMIN — Medication 100 MILLIGRAM(S): at 21:44

## 2018-10-25 RX ADMIN — OXYCODONE HYDROCHLORIDE 10 MILLIGRAM(S): 5 TABLET ORAL at 15:04

## 2018-10-25 RX ADMIN — Medication 100 MILLIGRAM(S): at 05:51

## 2018-10-25 RX ADMIN — Medication 10 MILLIGRAM(S): at 07:25

## 2018-10-25 NOTE — PROGRESS NOTE ADULT - SUBJECTIVE AND OBJECTIVE BOX
ANESTHESIA POSTOP CHECK    62y Male POSTOP DAY 1     Vital Signs Last 24 Hrs  T(C): 36.6 (25 Oct 2018 09:38), Max: 36.7 (25 Oct 2018 01:17)  T(F): 97.8 (25 Oct 2018 09:38), Max: 98 (25 Oct 2018 01:17)  HR: 72 (25 Oct 2018 09:38) (60 - 88)  BP: 123/52 (25 Oct 2018 09:38) (94/54 - 139/68)  BP(mean): 67 (24 Oct 2018 19:00) (64 - 70)  RR: 18 (25 Oct 2018 09:38) (12 - 20)  SpO2: 96% (25 Oct 2018 09:38) (96% - 100%)  I&O's Summary    24 Oct 2018 07:01  -  25 Oct 2018 07:00  --------------------------------------------------------  IN: 800 mL / OUT: 1808 mL / NET: -1008 mL    25 Oct 2018 07:01  -  25 Oct 2018 11:33  --------------------------------------------------------  IN: 0 mL / OUT: 575 mL / NET: -575 mL        [ x ] NO APPARENT ANESTHESIA COMPLICATIONS      Comments:

## 2018-10-25 NOTE — DISCHARGE NOTE ADULT - PLAN OF CARE
removal as above; drink plenty of fluids; do not take any of your blood pressure meds until you f/u with your PMD; as above; drink plenty of fluids; do not take any of your blood pressure meds until you f/u with your PMD; empty the pouch around your neph tube as needed; avoid soaking pouch when you wash, as it will fall off

## 2018-10-25 NOTE — DISCHARGE NOTE ADULT - INSTRUCTIONS
Make a follow up appointment with Dr. Miranda. Call him if you develop a fever, or if there is redness, swelling, drainage or pain at the left flank nephrostomy tube site. Drink plenty of liquids. Try to quit smoking as vinod Make a follow up appointment with Dr. Miranda. Call him if you develop a fever, or if there is redness, swelling, drainage or pain at the left flank nephrostomy tube site.   Continue pouch to left nephrostomy as instructed.  Drink plenty of liquids. Try to quit smoking

## 2018-10-25 NOTE — DISCHARGE NOTE ADULT - NS AS DC FOLLOWUP STROKE INST
Smoking Cessation/cysto, percutaneous stone extraction, nephrostomy tube, kidney stones, quitting smoking./Influenza vaccination ([facility] Pub Date: August 7, 2015) cysto, flomax, percocet, quit smoking, aspirin, percutaneous stone extraction, nephrostomy tube, kidney stones, quitting smoking./Smoking Cessation/Influenza vaccination ([facility] Pub Date: August 7, 2015)

## 2018-10-25 NOTE — CONSULT NOTE ADULT - ASSESSMENT
62M morbidly obese, CAD s/p 3vCABG 8/29/17 (EF 54%), HTN, HLD, prediabetes, gout, ventral hernia, ASIAY (CPAP), chronic back pain s/p surgery complicated by neuropathy, kidney stones, found to have hematuria, w/u revealed large stone.  Now s/p L percutaneous nephrolithotomy, L ureteral stent, NT on 10/24/18.

## 2018-10-25 NOTE — DISCHARGE NOTE ADULT - PATIENT PORTAL LINK FT
You can access the [facility] Patient Portal, offered by [facility], by registering with the following website: [email or web address]

## 2018-10-25 NOTE — DISCHARGE NOTE ADULT - CARE PLAN
Principal Discharge DX:	Kidney stone  Goal:	removal  Assessment and plan of treatment:	as above; drink plenty of fluids; do not take any of your blood pressure meds until you f/u with your PMD; Principal Discharge DX:	Kidney stone  Goal:	removal  Assessment and plan of treatment:	as above; drink plenty of fluids; do not take any of your blood pressure meds until you f/u with your PMD; empty the pouch around your neph tube as needed; avoid soaking pouch when you wash, as it will fall off

## 2018-10-25 NOTE — DISCHARGE NOTE ADULT - NS AS ACTIVITY OBS
driving if not using pain meds/No Heavy lifting/straining/Stairs allowed/Showering allowed/Walking-Indoors allowed/Walking-Outdoors allowed

## 2018-10-25 NOTE — CONSULT NOTE ADULT - SUBJECTIVE AND OBJECTIVE BOX
Patient is a 62y old  Male who presents with a chief complaint of left renal calculus (25 Oct 2018 14:47)      HPI:      History limited due to: [ ] Dementia  [ ] Delirium  [ ] Condition    Pain Symptoms if applicable:             	                         none	   mild         moderate         severe  Pain:	                            0	    1-3	     4-6	         7-10  Location:	  Modifying factors:	  Associated symptoms:	    Function: [ ] Independent  [ ] Assistance  [ ] Total care  [ ] Non-ambulatory    Allergies    No Known Allergies    Intolerances        HOME MEDICATIONS: [ ] Reviewed    MEDICATIONS  (STANDING):  allopurinol 100 milliGRAM(s) Oral daily  amLODIPine   Tablet 10 milliGRAM(s) Oral daily  aspirin enteric coated 325 milliGRAM(s) Oral daily  atorvastatin 40 milliGRAM(s) Oral at bedtime  ceFAZolin   IVPB 2000 milliGRAM(s) IV Intermittent every 8 hours  dextrose 5% + sodium chloride 0.45%. 1000 milliLiter(s) (75 mL/Hr) IV Continuous <Continuous>  docusate sodium 100 milliGRAM(s) Oral three times a day  heparin  Injectable 5000 Unit(s) SubCutaneous every 8 hours  labetalol 200 milliGRAM(s) Oral two times a day  losartan 100 milliGRAM(s) Oral daily  senna 2 Tablet(s) Oral at bedtime  tamsulosin 0.4 milliGRAM(s) Oral at bedtime    MEDICATIONS  (PRN):  acetaminophen   Tablet .. 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  Renal calculi  Obstructive sleep apnea: severe, used CPAP few years ago and lost weight  H/O: osteoarthritis  Lumbar disc disease with radiculopathy  History of Obesity  Gout: last attack 6 years ago, 2006  Neuropathy: Bilateral Feet since 2012  Kidney stone: s/w ESWL pt unsure site  S/P lumbar laminectomy: Fusion L3-L5 2012  S/P CABG x 3: 8/29/17  S/P lumbar discectomy: L3,,L4,L5  Aug 2013  S/P revision of total knee, right: 12/2012  H/O lithotripsy  S/P knee replacement: right x 3  10/11,2012, 2014  S/P hip replacement: left 2009, right 2009  S/P tonsillectomy and adenoidectomy: as child  Hernia: repair, left inguinal 2010  Cholecystitis: cholecycstectomy 2011  History of Total Hip Replacement  S/P Left Inguinal Hernia Repair  [ ] Reviewed     SOCIAL HISTORY:  Residence: [ ] [facility]  [ ] SNF  [ ] Community  [ ] Substance abuse:   [ ] Tobacco:   [ ] Alcohol use:     FAMILY HISTORY:  Family history of coronary artery disease: brother- age 50+- Coronary Artery Stents  Family history of pancreatic cancer (Sibling)  Family history of diabetes mellitus type II: Hyperlipidemia and Hypertension. Age 86.  Family history of coronary artery disease: HLD/Angina. Fatal MI age 73.  [ ] No pertinent family history in first degree relatives     REVIEW OF SYSTEMS:    CONSTITUTIONAL: No fever, weight loss, or fatigue  EYES: No eye pain, visual disturbances, or discharge  ENMT:  No difficulty hearing, tinnitus, vertigo; No sinus or throat pain  NECK: No pain or stiffness  BREASTS: No pain, masses, or nipple discharge  RESPIRATORY: No cough, wheezing, chills or hemoptysis; No shortness of breath  CARDIOVASCULAR: No chest pain, palpitations, dizziness, or leg swelling  GASTROINTESTINAL: No abdominal or epigastric pain. No nausea, vomiting, or hematemesis; No diarrhea or constipation. No melena or hematochezia.  GENITOURINARY: No dysuria, frequency, hematuria, or incontinence  NEUROLOGICAL: No headaches, memory loss, loss of strength, numbness, or tremors  SKIN: No itching, burning, rashes, or lesions   LYMPH NODES: No enlarged glands  ENDOCRINE: No heat or cold intolerance; No hair loss  MUSCULOSKELETAL: No muscle or back pain  PSYCHIATRIC: No depression, anxiety, mood swings, or difficulty sleeping  HEME/LYMPH: No easy bruising, or bleeding gums  ALLERGY AND IMMUNOLOGIC: No hives or eczema    [  ] All other ROS negative  [  ] Unable to obtain due to poor mental status    Vital Signs Last 24 Hrs  T(C): 37.1 (25 Oct 2018 14:50), Max: 37.1 (25 Oct 2018 14:50)  T(F): 98.8 (25 Oct 2018 14:50), Max: 98.8 (25 Oct 2018 14:50)  HR: 78 (25 Oct 2018 14:50) (60 - 88)  BP: 119/64 (25 Oct 2018 14:50) (101/53 - 139/68)  BP(mean): 67 (24 Oct 2018 19:00) (64 - 67)  RR: 18 (25 Oct 2018 14:50) (14 - 20)  SpO2: 98% (25 Oct 2018 14:50) (96% - 100%)    PHYSICAL EXAM:  GENERAL: morbidly obese WM, sitting up in chair, NAD  HEAD:  Atraumatic, Normocephalic  EYES: EOMI, PERRLA, conjunctiva and sclera clear  ENMT: Moist mucous membranes  NECK: Supple, No JVD  RESPIRATORY: Clear to auscultation bilaterally; No rales, rhonchi, wheezing, or rubs  CARDIOVASCULAR: Regular rate and rhythm; No murmurs, rubs, or gallops  GASTROINTESTINAL: obese, Soft, Nontender, Nondistended; periumbilical hernia  GENITOURINARY: L flank NT  EXTREMITIES:  2+ Peripheral Pulses, No clubbing, cyanosis, or edema  NERVOUS SYSTEM:  Alert & Oriented X3; Moving all 4 extremities; No gross sensory deficits  HEME/LYMPH: No lymphadenopathy noted  SKIN: No rashes or lesions  PSYCH: calm, appropriate    LABS:             13.1   14.99 )-----------( 182      ( 25 Oct 2018 05:35 )             38.4     138  |  102  |  16  ----------------------------<  168<H>  4.3   |  20<L>  |  0.69    Ca    8.6      25 Oct 2018 05:35    RADIOLOGY & ADDITIONAL STUDIES:    EKG:   Personally Reviewed:  [ ] YES     Imaging:   Personally Reviewed:  [ ] YES               Consultant(s) notes reviewed:    Care Discussed with Consultant(s)/Other Providers: urology re overall care Patient is a 62y old  Male who presents with a chief complaint of left renal calculus (25 Oct 2018 14:47)    HPI: 62M morbidly obese, CAD s/p 3vCABG 8/29/17 (EF 54%), HTN, HLD, prediabetes, gout, ventral hernia, ASIYA (CPAP), chronic back pain s/p surgery complicated by neuropathy, kidney stones, found to have hematuria, w/u revealed large stone.  Now s/p L percutaneous nephrolithotomy, L ureteral stent, NT on 10/24/18.  Reports he didn't sleep well d/t chronic back pain. Feels discomfort from stent.  No chest pain, SOB, nausea, vomiting.  +flatus, small BMx2.  Has neuropathy below ankles bilaterally.  Son reports pt's L medial ankle swollen past week, looks like it did when he had a fracture in the past, pt reports he doesn't feel pain bec of neuropathy.  Pt also reports been having BRBPR since the summer, has been evaluated by several providers, referred to specialist for high risk colonoscopy because of obesity.    Allergies:  No Known Allergies    HOME MEDICATIONS: [X ] Reviewed  · 	labetalol 200 mg oral tablet: 1 tab(s) orally 2 times a day  · 	losartan 100 mg oral tablet: 1 tab(s) orally once a day AM  · 	amLODIPine 10 mg oral tablet: 1 tab(s) orally once a day AM  · 	atorvastatin 40 mg oral tablet: 1 tab(s) orally once a day AM  · 	allopurinol 100 mg oral tablet: 1 tab(s) orally once a day AM  · 	tamsulosin 0.4 mg oral capsule: 1 cap(s) orally once a day PM  · 	Ecotrin 325 mg oral delayed release tablet: 1 tab(s) orally once a day AM  · 	Multiple Vitamins oral tablet: 1 tab(s) orally once a day last dose 10/16/18  · 	calcium: 750 milligram(s) orally once a day    MEDICATIONS  (STANDING):  allopurinol 100 milliGRAM(s) Oral daily  amLODIPine   Tablet 10 milliGRAM(s) Oral daily  aspirin enteric coated 325 milliGRAM(s) Oral daily  atorvastatin 40 milliGRAM(s) Oral at bedtime  ceFAZolin   IVPB 2000 milliGRAM(s) IV Intermittent every 8 hours  dextrose 5% + sodium chloride 0.45%. 1000 milliLiter(s) (75 mL/Hr) IV Continuous <Continuous>  docusate sodium 100 milliGRAM(s) Oral three times a day  heparin  Injectable 5000 Unit(s) SubCutaneous every 8 hours  labetalol 200 milliGRAM(s) Oral two times a day  losartan 100 milliGRAM(s) Oral daily  senna 2 Tablet(s) Oral at bedtime  tamsulosin 0.4 milliGRAM(s) Oral at bedtime    MEDICATIONS  (PRN):  acetaminophen   Tablet .. 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  Renal calculi  Obstructive sleep apnea: severe, used CPAP few years ago and lost weight  H/O: osteoarthritis  Lumbar disc disease with radiculopathy  History of Obesity  Gout: last attack 6 years ago, 2006  Neuropathy: Bilateral Feet since 2012  Kidney stone: s/w ESWL pt unsure site  S/P lumbar laminectomy: Fusion L3-L5 2012  S/P CABG x 3: 8/29/17  S/P lumbar discectomy: L3,,L4,L5  Aug 2013  S/P revision of total knee, right: 12/2012  H/O lithotripsy  S/P knee replacement: right x 3  10/11,2012, 2014  S/P hip replacement: left 2009, right 2009  S/P tonsillectomy and adenoidectomy: as child  Hernia: repair, left inguinal 2010  Cholecystitis: cholecycstectomy 2011  History of Total Hip Replacement  S/P Left Inguinal Hernia Repair    SOCIAL HISTORY:  , retired, 3 sons;  smokes cigars for 40 years, up to 7 per day;  no alcohol since 2nd son born    FAMILY HISTORY:  Family history of coronary artery disease: brother- age 50+- Coronary Artery Stents  Family history of pancreatic cancer (Sibling)  Family history of diabetes mellitus type II: Hyperlipidemia and Hypertension. Age 86.  Family history of coronary artery disease: HLD/Angina. Fatal MI age 73.    REVIEW OF SYSTEMS:  CONSTITUTIONAL: No fever, weight loss, or fatigue  EYES: No eye pain, visual disturbances, or discharge  ENMT:  No difficulty hearing, tinnitus, vertigo; No sinus or throat pain  NECK: No pain or stiffness  BREASTS: No pain, masses, or nipple discharge  RESPIRATORY: No cough, wheezing, chills or hemoptysis; No shortness of breath  CARDIOVASCULAR: No chest pain, palpitations, dizziness, or leg swelling  GASTROINTESTINAL: No nausea, vomiting, or hematemesis; No diarrhea or constipation. per HPI  GENITOURINARY: per HPI  NEUROLOGICAL: No headaches, memory loss, loss of strength, numbness, or tremors  SKIN: No itching, burning, rashes, or lesions   LYMPH NODES: No enlarged glands  ENDOCRINE: No heat or cold intolerance; No hair loss  MUSCULOSKELETAL: chronic back pain  PSYCHIATRIC: No depression, anxiety, mood swings, or difficulty sleeping  HEME/LYMPH: No easy bruising, or bleeding gums  ALLERGY AND IMMUNOLOGIC: No hives or eczema  [ x ] All other ROS negative  [  ] Unable to obtain due to poor mental status    Vital Signs Last 24 Hrs  T(C): 37.1 (25 Oct 2018 14:50), Max: 37.1 (25 Oct 2018 14:50)  T(F): 98.8 (25 Oct 2018 14:50), Max: 98.8 (25 Oct 2018 14:50)  HR: 78 (25 Oct 2018 14:50) (60 - 88)  BP: 119/64 (25 Oct 2018 14:50) (101/53 - 139/68)  BP(mean): 67 (24 Oct 2018 19:00) (64 - 67)  RR: 18 (25 Oct 2018 14:50) (14 - 20)  SpO2: 98% (25 Oct 2018 14:50) (96% - 100%)    PHYSICAL EXAM:  GENERAL: morbidly obese WM, sitting up in chair, NAD  HEAD:  Atraumatic, Normocephalic  EYES: EOMI, PERRLA, conjunctiva and sclera clear  ENMT: Moist mucous membranes  NECK: Supple, No JVD  RESPIRATORY: Clear to auscultation bilaterally; No rales, rhonchi, wheezing, or rubs  CARDIOVASCULAR: Regular rate and rhythm; No murmurs, rubs, or gallops  GASTROINTESTINAL: obese, Soft, Nontender, Nondistended; periumbilical hernia  GENITOURINARY: L flank NT  EXTREMITIES:  2+ Peripheral Pulses, No clubbing, cyanosis, or edema  NERVOUS SYSTEM:  Alert & Oriented X3; Moving all 4 extremities; No gross sensory deficits  HEME/LYMPH: No lymphadenopathy noted  SKIN: No rashes or lesions  PSYCH: calm, appropriate    LABS:             13.1   14.99 )-----------( 182      ( 25 Oct 2018 05:35 )             38.4     138  |  102  |  16  ----------------------------<  168<H>  4.3   |  20<L>  |  0.69    Ca    8.6      25 Oct 2018 05:35    RADIOLOGY & ADDITIONAL STUDIES:    EKG:   Personally Reviewed:  [ ] YES     Imaging:   Personally Reviewed:  [ ] YES               Consultant(s) notes reviewed:    Care Discussed with Consultant(s)/Other Providers: urology re overall care

## 2018-10-25 NOTE — DISCHARGE NOTE ADULT - CONDITIONS AT DISCHARGE
Pt. is afebrile and offers no complaints. In no acute distress. Left flank dressing: clean, dry and intact. Pt is ambulating  , tolerating diet well, and voiding in adequate amounts.

## 2018-10-25 NOTE — DISCHARGE NOTE ADULT - HOSPITAL COURSE
Pt had L. PSE/ stent 6 days ago; urine leak discovered on nephrostogram; because of anesthesia issues pt went back to OR to have stent removed and placement of nephro-uret tube; neph tube is capped today; voiding well; labs stable; b/p was low, now normal so pt told to hold HTN meds until he sees his PMD; home today Pt had L. PSE/ stent 6 days ago; urine leak discovered on nephrostogram; because of anesthesia issues pt went back to OR to have stent removed and placement of nephro-uret tube; neph tube is capped today; voiding well; labs stable; b/p was low, now normal so pt told to hold HTN meds until he sees his PMD; home today with neph tube pouched

## 2018-10-25 NOTE — PROGRESS NOTE ADULT - SUBJECTIVE AND OBJECTIVE BOX
Subjective  C/o abdominal gassy pains and belching overnight  No fevers or chills  Tolerating PO intake  Pain in L flank controlled on PO medication  Post op CXR with clear lung fields and normal parenchymal markings    Objective  Vital signs  T(F): , Max: 98 (10-25-18 @ 01:17)  HR: 77 (10-25-18 @ 05:47)  BP: 123/63 (10-25-18 @ 05:47)  SpO2: 97% (10-25-18 @ 05:47)  Wilson 1425 - pink tinged urine   - pink tinged urine    Gen: NAD  Abd: Mildly distended, tympanitic to percussion  : L flank NT in place, incision c/d/i    Labs  10-25 @ 05:35  WBC 4.52    / Hct 38.4  / SCr (pending)     10-24 @ 16:30  WBC 9.50  / Hct 40.3  / SCr 0.65     CXR final read pending

## 2018-10-25 NOTE — DISCHARGE NOTE ADULT - MEDICATION SUMMARY - MEDICATIONS TO TAKE
I will START or STAY ON the medications listed below when I get home from the hospital:    calcium  -- 750 milligram(s) by mouth once a day  -- Indication: For home med    Ecotrin 325 mg oral delayed release tablet  -- 1 tab(s) by mouth once a day AM  -- Indication: For same    Percocet 5/325 oral tablet  -- 1-2 tab(s) by mouth every 6 hours, As Needed MDD:8  -- Caution federal law prohibits the transfer of this drug to any person other  than the person for whom it was prescribed.  May cause drowsiness.  Alcohol may intensify this effect.  Use care when operating dangerous machinery.  This prescription cannot be refilled.  This product contains acetaminophen.  Do not use  with any other product containing acetaminophen to prevent possible liver damage.  Using more of this medication than prescribed may cause serious breathing problems.    -- Indication: For As needed    losartan 100 mg oral tablet  -- 1 tab(s) by mouth once a day AM  -- Indication: For home med    tamsulosin 0.4 mg oral capsule  -- 1 cap(s) by mouth once a day PM  -- Indication: For same    allopurinol 100 mg oral tablet  -- 1 tab(s) by mouth once a day AM  -- Indication: For same    atorvastatin 40 mg oral tablet  -- 1 tab(s) by mouth once a day AM  -- Indication: For same    labetalol 200 mg oral tablet  -- 1 tab(s) by mouth 2 times a day  -- Indication: For same    amLODIPine 10 mg oral tablet  -- 1 tab(s) by mouth once a day AM  -- Indication: For same    trimethoprim 100 mg oral tablet  -- 1 tab(s) by mouth once a day  -- Indication: For Antibiotic    Multiple Vitamins oral tablet  -- 1 tab(s) by mouth once a day last dose 10/16/18  -- Indication: For home med

## 2018-10-25 NOTE — DISCHARGE NOTE ADULT - CARE PROVIDER_API CALL
Gadiel Miranda), Urology  [address] [place] [address]  Phone: [phone]  Fax: [phone]

## 2018-10-26 DIAGNOSIS — Z29.9 ENCOUNTER FOR PROPHYLACTIC MEASURES, UNSPECIFIED: ICD-10-CM

## 2018-10-26 DIAGNOSIS — T83.022A DISPLACEMENT OF NEPHROSTOMY CATHETER, INITIAL ENCOUNTER: ICD-10-CM

## 2018-10-26 LAB
BUN SERPL-MCNC: 19 MG/DL — SIGNIFICANT CHANGE UP (ref 7–23)
CALCIUM SERPL-MCNC: 8.7 MG/DL — SIGNIFICANT CHANGE UP (ref 8.4–10.5)
CHLORIDE SERPL-SCNC: 102 MMOL/L — SIGNIFICANT CHANGE UP (ref 98–107)
CO2 SERPL-SCNC: 24 MMOL/L — SIGNIFICANT CHANGE UP (ref 22–31)
CREAT SERPL-MCNC: 0.83 MG/DL — SIGNIFICANT CHANGE UP (ref 0.5–1.3)
GLUCOSE SERPL-MCNC: 183 MG/DL — HIGH (ref 70–99)
HCT VFR BLD CALC: 35.4 % — LOW (ref 39–50)
HGB BLD-MCNC: 11.9 G/DL — LOW (ref 13–17)
MCHC RBC-ENTMCNC: 28.7 PG — SIGNIFICANT CHANGE UP (ref 27–34)
MCHC RBC-ENTMCNC: 33.6 % — SIGNIFICANT CHANGE UP (ref 32–36)
MCV RBC AUTO: 85.3 FL — SIGNIFICANT CHANGE UP (ref 80–100)
NRBC # FLD: 0 — SIGNIFICANT CHANGE UP
PLATELET # BLD AUTO: 162 K/UL — SIGNIFICANT CHANGE UP (ref 150–400)
PMV BLD: 10.1 FL — SIGNIFICANT CHANGE UP (ref 7–13)
POTASSIUM SERPL-MCNC: 3.6 MMOL/L — SIGNIFICANT CHANGE UP (ref 3.5–5.3)
POTASSIUM SERPL-SCNC: 3.6 MMOL/L — SIGNIFICANT CHANGE UP (ref 3.5–5.3)
RBC # BLD: 4.15 M/UL — LOW (ref 4.2–5.8)
RBC # FLD: 13.2 % — SIGNIFICANT CHANGE UP (ref 10.3–14.5)
SODIUM SERPL-SCNC: 139 MMOL/L — SIGNIFICANT CHANGE UP (ref 135–145)
WBC # BLD: 9.5 K/UL — SIGNIFICANT CHANGE UP (ref 3.8–10.5)
WBC # FLD AUTO: 9.5 K/UL — SIGNIFICANT CHANGE UP (ref 3.8–10.5)

## 2018-10-26 PROCEDURE — 99232 SBSQ HOSP IP/OBS MODERATE 35: CPT

## 2018-10-26 PROCEDURE — 50431 NJX PX NFROSGRM &/URTRGRM: CPT | Mod: LT

## 2018-10-26 RX ORDER — ONDANSETRON 8 MG/1
4 TABLET, FILM COATED ORAL ONCE
Qty: 0 | Refills: 0 | Status: COMPLETED | OUTPATIENT
Start: 2018-10-26 | End: 2018-10-26

## 2018-10-26 RX ADMIN — Medication 100 MILLIGRAM(S): at 06:29

## 2018-10-26 RX ADMIN — Medication 100 MILLIGRAM(S): at 13:47

## 2018-10-26 RX ADMIN — LOSARTAN POTASSIUM 100 MILLIGRAM(S): 100 TABLET, FILM COATED ORAL at 05:00

## 2018-10-26 RX ADMIN — OXYCODONE HYDROCHLORIDE 10 MILLIGRAM(S): 5 TABLET ORAL at 14:27

## 2018-10-26 RX ADMIN — TAMSULOSIN HYDROCHLORIDE 0.4 MILLIGRAM(S): 0.4 CAPSULE ORAL at 21:00

## 2018-10-26 RX ADMIN — Medication 100 MILLIGRAM(S): at 13:56

## 2018-10-26 RX ADMIN — OXYCODONE HYDROCHLORIDE 10 MILLIGRAM(S): 5 TABLET ORAL at 13:47

## 2018-10-26 RX ADMIN — OXYCODONE HYDROCHLORIDE 10 MILLIGRAM(S): 5 TABLET ORAL at 04:51

## 2018-10-26 RX ADMIN — OXYCODONE HYDROCHLORIDE 10 MILLIGRAM(S): 5 TABLET ORAL at 05:21

## 2018-10-26 RX ADMIN — ONDANSETRON 4 MILLIGRAM(S): 8 TABLET, FILM COATED ORAL at 06:29

## 2018-10-26 RX ADMIN — AMLODIPINE BESYLATE 10 MILLIGRAM(S): 2.5 TABLET ORAL at 05:00

## 2018-10-26 RX ADMIN — Medication 200 MILLIGRAM(S): at 05:00

## 2018-10-26 RX ADMIN — Medication 325 MILLIGRAM(S): at 13:47

## 2018-10-26 RX ADMIN — HEPARIN SODIUM 5000 UNIT(S): 5000 INJECTION INTRAVENOUS; SUBCUTANEOUS at 06:31

## 2018-10-26 NOTE — PROGRESS NOTE ADULT - PROBLEM SELECTOR PROBLEM 2
Coronary artery disease involving native coronary artery of native heart without angina pectoris Renal calculi

## 2018-10-26 NOTE — PROGRESS NOTE ADULT - PROBLEM SELECTOR PLAN 5
L ankle xray showing Chronic osseous fragmentation adjacent to medial malleolar margin. No dislocations or acute appearing fractures continue home bipap.

## 2018-10-26 NOTE — PROGRESS NOTE ADULT - PROBLEM SELECTOR PROBLEM 7
Lumbar disc disease with radiculopathy Chronic gout without tophus, unspecified cause, unspecified site

## 2018-10-26 NOTE — PROGRESS NOTE ADULT - PROBLEM SELECTOR PLAN 1
s/p L percutaneous nephrolithotomy, L ureteral stent, NT on 10/24/18  As per urology note- NT removal under fluoroscopic guidance to avoid stent dislodgement  On pain Mx- Oxy IR 5 mg/10mg PRN  Continue Cefazolin as per urology Xray Nephrostogram which showed extravasation of contrast medial to the left kidney   DW Urology - plan for  repositioning of tube

## 2018-10-26 NOTE — PROGRESS NOTE ADULT - PROBLEM SELECTOR PLAN 6
c/w allopurinol L ankle xray showing Chronic osseous fragmentation adjacent to medial malleolar margin. No dislocations or acute appearing fractures  Bilateral leg swelling- recommend Leg elevation

## 2018-10-26 NOTE — PROGRESS NOTE ADULT - SUBJECTIVE AND OBJECTIVE BOX
Interventional radiology:    The patient was brought to the interventional radiology suite for placement of a nephro-ureteral stent. Given the comorbidities the patient was not a candidate for sedation and would require general anesthesia. The patient was unable to be intubated and deemed not a safe candidate at this time. The procedure was aborted.

## 2018-10-26 NOTE — CHART NOTE - NSCHARTNOTEFT_GEN_A_CORE
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  Renal calculi  Obstructive sleep apnea: severe, used CPAP few years ago and lost weight  H/O: osteoarthritis  Lumbar disc disease with radiculopathy  History of Obesity  Gout: last attack 6 years ago, 2006  Neuropathy: Bilateral Feet since 2012  Kidney stone: s/w ESWL pt unsure site  S/P lumbar laminectomy: Fusion L3-L5 2012  S/P CABG x 3: 8/29/17  S/P lumbar discectomy: L3,,L4,L5  Aug 2013  S/P revision of total knee, right: 12/2012  H/O lithotripsy  S/P knee replacement: right x 3  10/11,2012, 2014  S/P hip replacement: left 2009, right 2009  S/P tonsillectomy and adenoidectomy: as child  Hernia: repair, left inguinal 2010  Cholecystitis: cholecycstectomy 2011  History of Total Hip Replacement  S/P Left Inguinal Hernia Repair        Pertinent Labs:                            11.9   9.50  )-----------( 162      ( 26 Oct 2018 10:19 )             35.4       10-26    139  |  102  |  19  ----------------------------<  183<H>  3.6   |  24  |  0.83    Ca    8.7      26 Oct 2018 10:19            Patient and Family aware:   [ x ]Y   [  ]N

## 2018-10-26 NOTE — PROGRESS NOTE ADULT - PROBLEM SELECTOR PROBLEM 3
Essential hypertension Coronary artery disease involving native coronary artery of native heart without angina pectoris

## 2018-10-26 NOTE — PHYSICAL THERAPY INITIAL EVALUATION ADULT - PERTINENT HX OF CURRENT PROBLEM, REHAB EVAL
62M morbidly obese, CAD s/p 3vCABG 8/29/17 (EF 54%), HTN, HLD, prediabetes, gout, ventral hernia, ASIYA (CPAP), chronic back pain s/p surgery complicated by neuropathy, kidney stones, found to have hematuria, w/u revealed large stone.  Now s/p L percutaneous nephrolithotomy, L ureteral stent, NT on 10/24/18

## 2018-10-26 NOTE — PROGRESS NOTE ADULT - SUBJECTIVE AND OBJECTIVE BOX
Subjective  Reporting some urgency incontinence related to stent and mild nausea  Ankle X-rays without any evidence of acute fracture or dislocation  ambulating without difficulty  PVR 13 cc yesterday    Objective  Vital signs  T(F): , Max: 98.8 (10-25-18 @ 14:50)  HR: 74 (10-26-18 @ 04:55)  BP: 130/69 (10-26-18 @ 04:55)  SpO2: 98% (10-26-18 @ 04:55)  void 750,     Gen: NAD  Abd: Soft, non-tender, non-distended  : NT secure, draining clear pink urine    Labs      10-25 @ 05:35    WBC 14.99 / Hct 38.4  / SCr 0.69     10-24 @ 16:30    WBC 9.50  / Hct 40.3  / SCr 0.65       < from: CT Abdomen and Pelvis No Cont (10.25.18 @ 16:36) >    IMPRESSION: Left-sided percutaneous nephrostomy and left ureteral stent   placement with postoperative changes including a moderate left renal   subcapsular hematoma. No residual calculi in left kidney or ureter.     < end of copied text >

## 2018-10-26 NOTE — PROGRESS NOTE ADULT - PROBLEM SELECTOR PLAN 2
c/w  mg, lipitor 40 mg s/p L percutaneous nephrolithotomy, L ureteral stent, NT on 10/24/18  On pain Mx- Oxy IR 5 mg/10mg PRN  Continue Cefazolin as per urology

## 2018-10-27 LAB
BUN SERPL-MCNC: 14 MG/DL — SIGNIFICANT CHANGE UP (ref 7–23)
CALCIUM SERPL-MCNC: 9.1 MG/DL — SIGNIFICANT CHANGE UP (ref 8.4–10.5)
CHLORIDE SERPL-SCNC: 99 MMOL/L — SIGNIFICANT CHANGE UP (ref 98–107)
CO2 SERPL-SCNC: 23 MMOL/L — SIGNIFICANT CHANGE UP (ref 22–31)
CREAT SERPL-MCNC: 0.64 MG/DL — SIGNIFICANT CHANGE UP (ref 0.5–1.3)
GLUCOSE SERPL-MCNC: 181 MG/DL — HIGH (ref 70–99)
HCT VFR BLD CALC: 36.8 % — LOW (ref 39–50)
HGB BLD-MCNC: 12.6 G/DL — LOW (ref 13–17)
MCHC RBC-ENTMCNC: 28.8 PG — SIGNIFICANT CHANGE UP (ref 27–34)
MCHC RBC-ENTMCNC: 34.2 % — SIGNIFICANT CHANGE UP (ref 32–36)
MCV RBC AUTO: 84 FL — SIGNIFICANT CHANGE UP (ref 80–100)
NRBC # FLD: 0 — SIGNIFICANT CHANGE UP
PLATELET # BLD AUTO: 172 K/UL — SIGNIFICANT CHANGE UP (ref 150–400)
PMV BLD: 10.5 FL — SIGNIFICANT CHANGE UP (ref 7–13)
POTASSIUM SERPL-MCNC: 4.5 MMOL/L — SIGNIFICANT CHANGE UP (ref 3.5–5.3)
POTASSIUM SERPL-SCNC: 4.5 MMOL/L — SIGNIFICANT CHANGE UP (ref 3.5–5.3)
RBC # BLD: 4.38 M/UL — SIGNIFICANT CHANGE UP (ref 4.2–5.8)
RBC # FLD: 13 % — SIGNIFICANT CHANGE UP (ref 10.3–14.5)
SODIUM SERPL-SCNC: 137 MMOL/L — SIGNIFICANT CHANGE UP (ref 135–145)
WBC # BLD: 9.7 K/UL — SIGNIFICANT CHANGE UP (ref 3.8–10.5)
WBC # FLD AUTO: 9.7 K/UL — SIGNIFICANT CHANGE UP (ref 3.8–10.5)

## 2018-10-27 PROCEDURE — 99232 SBSQ HOSP IP/OBS MODERATE 35: CPT

## 2018-10-27 RX ORDER — TRIMETHOPRIM HYDROCHLORIDE 50 MG/5ML
100 SOLUTION ORAL DAILY
Qty: 0 | Refills: 0 | Status: DISCONTINUED | OUTPATIENT
Start: 2018-10-27 | End: 2018-10-30

## 2018-10-27 RX ORDER — BENZOCAINE AND MENTHOL 5; 1 G/100ML; G/100ML
1 LIQUID ORAL
Qty: 0 | Refills: 0 | Status: DISCONTINUED | OUTPATIENT
Start: 2018-10-27 | End: 2018-10-30

## 2018-10-27 RX ADMIN — Medication 100 MILLIGRAM(S): at 22:10

## 2018-10-27 RX ADMIN — Medication 100 MILLIGRAM(S): at 13:33

## 2018-10-27 RX ADMIN — Medication 100 MILLIGRAM(S): at 02:04

## 2018-10-27 RX ADMIN — HEPARIN SODIUM 5000 UNIT(S): 5000 INJECTION INTRAVENOUS; SUBCUTANEOUS at 05:30

## 2018-10-27 RX ADMIN — TRIMETHOPRIM HYDROCHLORIDE 100 MILLIGRAM(S): 50 SOLUTION ORAL at 13:33

## 2018-10-27 RX ADMIN — OXYCODONE HYDROCHLORIDE 10 MILLIGRAM(S): 5 TABLET ORAL at 23:33

## 2018-10-27 RX ADMIN — ATORVASTATIN CALCIUM 40 MILLIGRAM(S): 80 TABLET, FILM COATED ORAL at 22:10

## 2018-10-27 RX ADMIN — BENZOCAINE AND MENTHOL 1 LOZENGE: 5; 1 LIQUID ORAL at 14:15

## 2018-10-27 RX ADMIN — Medication 200 MILLIGRAM(S): at 18:06

## 2018-10-27 RX ADMIN — HEPARIN SODIUM 5000 UNIT(S): 5000 INJECTION INTRAVENOUS; SUBCUTANEOUS at 22:09

## 2018-10-27 RX ADMIN — LOSARTAN POTASSIUM 100 MILLIGRAM(S): 100 TABLET, FILM COATED ORAL at 05:30

## 2018-10-27 RX ADMIN — OXYCODONE HYDROCHLORIDE 10 MILLIGRAM(S): 5 TABLET ORAL at 18:06

## 2018-10-27 RX ADMIN — OXYCODONE HYDROCHLORIDE 10 MILLIGRAM(S): 5 TABLET ORAL at 03:04

## 2018-10-27 RX ADMIN — TAMSULOSIN HYDROCHLORIDE 0.4 MILLIGRAM(S): 0.4 CAPSULE ORAL at 22:10

## 2018-10-27 RX ADMIN — OXYCODONE HYDROCHLORIDE 10 MILLIGRAM(S): 5 TABLET ORAL at 18:50

## 2018-10-27 RX ADMIN — Medication 325 MILLIGRAM(S): at 13:33

## 2018-10-27 RX ADMIN — AMLODIPINE BESYLATE 10 MILLIGRAM(S): 2.5 TABLET ORAL at 05:30

## 2018-10-27 RX ADMIN — HEPARIN SODIUM 5000 UNIT(S): 5000 INJECTION INTRAVENOUS; SUBCUTANEOUS at 13:34

## 2018-10-27 RX ADMIN — Medication 200 MILLIGRAM(S): at 05:30

## 2018-10-27 RX ADMIN — Medication 100 MILLIGRAM(S): at 05:30

## 2018-10-27 RX ADMIN — SENNA PLUS 2 TABLET(S): 8.6 TABLET ORAL at 22:09

## 2018-10-27 RX ADMIN — OXYCODONE HYDROCHLORIDE 10 MILLIGRAM(S): 5 TABLET ORAL at 04:04

## 2018-10-27 NOTE — PROGRESS NOTE ADULT - SUBJECTIVE AND OBJECTIVE BOX
Patient is a 62y old  Male who presents with a chief complaint of removal of left kidney stone (26 Oct 2018 06:01)      SUBJECTIVE / OVERNIGHT EVENTS: Pt seen and examined by me  Reviewed events from yesterday-Pt went to IR, unable to be intubated, procedure not performed.  Pt received 100mg hydrocortisone and 10mg of dexamethasone at 11pm, transferred back to the floor in stable condition    MEDICATIONS  (STANDING):  allopurinol 100 milliGRAM(s) Oral daily  amLODIPine   Tablet 10 milliGRAM(s) Oral daily  aspirin enteric coated 325 milliGRAM(s) Oral daily  atorvastatin 40 milliGRAM(s) Oral at bedtime  ceFAZolin   IVPB 2000 milliGRAM(s) IV Intermittent every 8 hours  docusate sodium 100 milliGRAM(s) Oral three times a day  heparin  Injectable 5000 Unit(s) SubCutaneous every 8 hours  labetalol 200 milliGRAM(s) Oral two times a day  losartan 100 milliGRAM(s) Oral daily  senna 2 Tablet(s) Oral at bedtime  tamsulosin 0.4 milliGRAM(s) Oral at bedtime    MEDICATIONS  (PRN):  acetaminophen   Tablet .. 650 milliGRAM(s) Oral every 6 hours PRN Mild Pain (1 - 3)  oxyCODONE    IR 5 milliGRAM(s) Oral every 4 hours PRN Moderate Pain (4 - 6)  oxyCODONE    IR 10 milliGRAM(s) Oral every 4 hours PRN Severe Pain (7 - 10)      CAPILLARY BLOOD GLUCOSE        I&O's Summary    25 Oct 2018 07:01  -  26 Oct 2018 07:00  --------------------------------------------------------  IN: 440 mL / OUT: 2930 mL / NET: -2490 mL        PHYSICAL EXAM:  GENERAL: NAD, well-developed, obese  HEAD:  Atraumatic, Normocephalic  EYES: EOMI, PERRLA, conjunctiva and sclera clear  NECK: Supple, No JVD  CHEST/LUNG: Clear to auscultation bilaterally; No wheeze  HEART: Regular rate and rhythm; No murmurs, rubs, or gallops  ABDOMEN: Soft, Nontender, Nondistended; Bowel sounds present, NT secure, draining clear pink urine ,tenderness and fullness around left tube   EXTREMITIES:  2+ Peripheral Pulses, No clubbing, cyanosis. Grade II edema  PSYCH: AAOx3  NEUROLOGY: non-focal  SKIN: No rashes or lesions    LABS:                                        12.6   9.70  )-----------( 172      ( 27 Oct 2018 06:43 )             36.8       10-27    137  |  99  |  14  ----------------------------<  181<H>  4.5   |  23  |  0.64    Ca    9.1      27 Oct 2018 06:43        RADIOLOGY & ADDITIONAL TESTS:    Imaging Personally Reviewed:    Consultant(s) Notes Reviewed:      Care Discussed with Consultants/Other Providers: Urology Patient is a 62y old  Male who presents with a chief complaint of removal of left kidney stone (26 Oct 2018 06:01)      SUBJECTIVE / OVERNIGHT EVENTS: Pt seen and examined by me  Reviewed events from yesterday-Pt went to IR, unable to be intubated, procedure not performed.  Pt received 100mg hydrocortisone and 10mg of dexamethasone at 11pm, transferred back to the floor in stable condition  This AM, pt feels well. Pain in left flank less compared to yesterday, still with Left  flank fullness  Had a BM this AM     MEDICATIONS  (STANDING):  allopurinol 100 milliGRAM(s) Oral daily  amLODIPine   Tablet 10 milliGRAM(s) Oral daily  aspirin enteric coated 325 milliGRAM(s) Oral daily  atorvastatin 40 milliGRAM(s) Oral at bedtime  ceFAZolin   IVPB 2000 milliGRAM(s) IV Intermittent every 8 hours  docusate sodium 100 milliGRAM(s) Oral three times a day  heparin  Injectable 5000 Unit(s) SubCutaneous every 8 hours  labetalol 200 milliGRAM(s) Oral two times a day  losartan 100 milliGRAM(s) Oral daily  senna 2 Tablet(s) Oral at bedtime  tamsulosin 0.4 milliGRAM(s) Oral at bedtime    MEDICATIONS  (PRN):  acetaminophen   Tablet .. 650 milliGRAM(s) Oral every 6 hours PRN Mild Pain (1 - 3)  oxyCODONE    IR 5 milliGRAM(s) Oral every 4 hours PRN Moderate Pain (4 - 6)  oxyCODONE    IR 10 milliGRAM(s) Oral every 4 hours PRN Severe Pain (7 - 10)      CAPILLARY BLOOD GLUCOSE        I&O's Summary    25 Oct 2018 07:01  -  26 Oct 2018 07:00  --------------------------------------------------------  IN: 440 mL / OUT: 2930 mL / NET: -2490 mL        PHYSICAL EXAM:  GENERAL: NAD, well-developed, obese  HEAD:  Atraumatic, Normocephalic  EYES: EOMI, PERRLA, conjunctiva and sclera clear  NECK: Supple, No JVD  CHEST/LUNG: Clear to auscultation bilaterally; No wheeze  HEART: Regular rate and rhythm; No murmurs, rubs, or gallops  ABDOMEN: Soft, Nontender, Nondistended; supraumbilical hernia present  Bowel sounds present, NT secure, draining clear pink urine ,tenderness and fullness around left tube   EXTREMITIES:  2+ Peripheral Pulses, No clubbing, cyanosis. Grade II edema  PSYCH: AAOx3  NEUROLOGY: non-focal  SKIN: No rashes or lesions    LABS:                                        12.6   9.70  )-----------( 172      ( 27 Oct 2018 06:43 )             36.8       10-27    137  |  99  |  14  ----------------------------<  181<H>  4.5   |  23  |  0.64    Ca    9.1      27 Oct 2018 06:43        RADIOLOGY & ADDITIONAL TESTS:    Imaging Personally Reviewed:    Consultant(s) Notes Reviewed:      Care Discussed with Consultants/Other Providers: Urology

## 2018-10-27 NOTE — PROGRESS NOTE ADULT - PROBLEM SELECTOR PLAN 6
L ankle xray showing Chronic osseous fragmentation adjacent to medial malleolar margin. No dislocations or acute appearing fractures  Bilateral leg swelling- recommend Leg elevation

## 2018-10-27 NOTE — PROGRESS NOTE ADULT - PROBLEM SELECTOR PLAN 1
Xray Nephrostogram which showed extravasation of contrast medial to the left kidney   IR procedure cancelled as pt was unable to be intubated as per notes  Plan for  OR on 10/29

## 2018-10-27 NOTE — PROGRESS NOTE ADULT - PROBLEM SELECTOR PLAN 2
s/p L percutaneous nephrolithotomy, L ureteral stent, NT on 10/24/18  On pain Mx- Oxy IR 5 mg/10mg PRN  Continue Cefazolin as per urology

## 2018-10-27 NOTE — PROGRESS NOTE ADULT - SUBJECTIVE AND OBJECTIVE BOX
Overnight events:  Pt went to IR, unable to be intubated, procedure not performed.  Pt received 100mg hydrocortisone and 10mg of dexamethasone at 11pm, transferred back to the floor in stable condition    Subjective:  Pt denies SOB, c/o pain at NT site, tolerating diet, was OOB yesterday    Objective:    Vital signs  T(C): , Max: 37 (10-27-18 @ 05:23)  HR: 69 (10-27-18 @ 05:23)  BP: 145/74 (10-27-18 @ 05:23)  SpO2: 97% (10-27-18 @ 05:23)  Wt(kg): --    Output   Void: 1300  NT: 420 punch      Gen: NAD  Abd: obese, soft, nontender, left NT dressing changed      Labs: pending                         Imaging:  Xray Nephrostogram Existing Access, Left (10.26.18 @ 11:41) >  IMPRESSION:  Extravasation of contrast medial to the left kidney as described.  Contrast within the urinary bladder.

## 2018-10-27 NOTE — PROGRESS NOTE ADULT - PROBLEM SELECTOR PLAN 1
- continue Ancef  - monitor I/Os  - pain control  - OOB/Amb  - DVT PPx  - Incentive Spirometry  - PT recs no needs  - Pre-op for 10/29

## 2018-10-28 ENCOUNTER — TRANSCRIPTION ENCOUNTER (OUTPATIENT)
Age: 63
End: 2018-10-28

## 2018-10-28 PROCEDURE — 99232 SBSQ HOSP IP/OBS MODERATE 35: CPT

## 2018-10-28 RX ORDER — SODIUM CHLORIDE 9 MG/ML
1000 INJECTION, SOLUTION INTRAVENOUS
Qty: 0 | Refills: 0 | Status: DISCONTINUED | OUTPATIENT
Start: 2018-10-28 | End: 2018-10-29

## 2018-10-28 RX ADMIN — Medication 100 MILLIGRAM(S): at 21:22

## 2018-10-28 RX ADMIN — HEPARIN SODIUM 5000 UNIT(S): 5000 INJECTION INTRAVENOUS; SUBCUTANEOUS at 05:33

## 2018-10-28 RX ADMIN — ATORVASTATIN CALCIUM 40 MILLIGRAM(S): 80 TABLET, FILM COATED ORAL at 21:22

## 2018-10-28 RX ADMIN — OXYCODONE HYDROCHLORIDE 10 MILLIGRAM(S): 5 TABLET ORAL at 22:42

## 2018-10-28 RX ADMIN — BENZOCAINE AND MENTHOL 1 LOZENGE: 5; 1 LIQUID ORAL at 13:48

## 2018-10-28 RX ADMIN — Medication 100 MILLIGRAM(S): at 13:40

## 2018-10-28 RX ADMIN — Medication 325 MILLIGRAM(S): at 13:40

## 2018-10-28 RX ADMIN — OXYCODONE HYDROCHLORIDE 10 MILLIGRAM(S): 5 TABLET ORAL at 23:15

## 2018-10-28 RX ADMIN — OXYCODONE HYDROCHLORIDE 10 MILLIGRAM(S): 5 TABLET ORAL at 00:30

## 2018-10-28 RX ADMIN — Medication 200 MILLIGRAM(S): at 18:37

## 2018-10-28 RX ADMIN — SENNA PLUS 2 TABLET(S): 8.6 TABLET ORAL at 21:22

## 2018-10-28 RX ADMIN — TRIMETHOPRIM HYDROCHLORIDE 100 MILLIGRAM(S): 50 SOLUTION ORAL at 13:40

## 2018-10-28 RX ADMIN — Medication 200 MILLIGRAM(S): at 05:33

## 2018-10-28 RX ADMIN — Medication 100 MILLIGRAM(S): at 05:33

## 2018-10-28 RX ADMIN — HEPARIN SODIUM 5000 UNIT(S): 5000 INJECTION INTRAVENOUS; SUBCUTANEOUS at 13:40

## 2018-10-28 RX ADMIN — LOSARTAN POTASSIUM 100 MILLIGRAM(S): 100 TABLET, FILM COATED ORAL at 05:33

## 2018-10-28 RX ADMIN — TAMSULOSIN HYDROCHLORIDE 0.4 MILLIGRAM(S): 0.4 CAPSULE ORAL at 21:22

## 2018-10-28 RX ADMIN — AMLODIPINE BESYLATE 10 MILLIGRAM(S): 2.5 TABLET ORAL at 05:33

## 2018-10-28 RX ADMIN — Medication 100 MILLIGRAM(S): at 13:41

## 2018-10-28 RX ADMIN — HEPARIN SODIUM 5000 UNIT(S): 5000 INJECTION INTRAVENOUS; SUBCUTANEOUS at 21:22

## 2018-10-28 NOTE — PROGRESS NOTE ADULT - PROBLEM SELECTOR PLAN 1
- continue Ancef  - monitor I/Os  - pain control  - OOB/Amb  - DVT PPx  - Incentive Spirometry  - PT recs no needs  - f/u T&S  - check color  NPO after midnight  IVF  Consent to be obtained  Type & Screen

## 2018-10-28 NOTE — PROGRESS NOTE ADULT - PROBLEM SELECTOR PLAN 2
s/p L percutaneous nephrolithotomy, L ureteral stent, NT on 10/24/18  On pain Mx- Oxy IR 5 mg/10mg PRN  Continue Cefazolin as per urology s/p L percutaneous nephrolithotomy, L ureteral stent, NT on 10/24/18  On pain Mx- Oxy IR 5 mg/10mg PRN  Continue Bactrim as per urology

## 2018-10-28 NOTE — PROGRESS NOTE ADULT - SUBJECTIVE AND OBJECTIVE BOX
Patient is a 62y old  Male who presents with a chief complaint of removal of left kidney stone (26 Oct 2018 06:01)      SUBJECTIVE / OVERNIGHT EVENTS: Pt seen and examined by me            MEDICATIONS  (STANDING):  allopurinol 100 milliGRAM(s) Oral daily  amLODIPine   Tablet 10 milliGRAM(s) Oral daily  aspirin enteric coated 325 milliGRAM(s) Oral daily  atorvastatin 40 milliGRAM(s) Oral at bedtime  docusate sodium 100 milliGRAM(s) Oral three times a day  heparin  Injectable 5000 Unit(s) SubCutaneous every 8 hours  labetalol 200 milliGRAM(s) Oral two times a day  losartan 100 milliGRAM(s) Oral daily  senna 2 Tablet(s) Oral at bedtime  tamsulosin 0.4 milliGRAM(s) Oral at bedtime  trimethoprim    Tablet 100 milliGRAM(s) Oral daily    MEDICATIONS  (PRN):  acetaminophen   Tablet .. 650 milliGRAM(s) Oral every 6 hours PRN Mild Pain (1 - 3)  benzocaine 15 mG/menthol 3.6 mG Lozenge 1 Lozenge Oral four times a day PRN Sore Throat  oxyCODONE    IR 5 milliGRAM(s) Oral every 4 hours PRN Moderate Pain (4 - 6)  oxyCODONE    IR 10 milliGRAM(s) Oral every 4 hours PRN Severe Pain (7 - 10)      CAPILLARY BLOOD GLUCOSE    I&O's Summary    25 Oct 2018 07:01  -  26 Oct 2018 07:00  --------------------------------------------------------  IN: 440 mL / OUT: 2930 mL / NET: -2490 mL        PHYSICAL EXAM:  GENERAL: NAD, well-developed, obese  HEAD:  Atraumatic, Normocephalic  EYES: EOMI, PERRLA, conjunctiva and sclera clear  NECK: Supple, No JVD  CHEST/LUNG: Clear to auscultation bilaterally; No wheeze  HEART: Regular rate and rhythm; No murmurs, rubs, or gallops  ABDOMEN: Soft, Nontender, Nondistended; supraumbilical hernia present  Bowel sounds present, NT secure, draining dark red urine,tenderness and fullness around left tube   EXTREMITIES:  2+ Peripheral Pulses, No clubbing, cyanosis. Grade II edema  PSYCH: AAOx3  NEUROLOGY: non-focal  SKIN: No rashes or lesions    LABS:                                        12.6   9.70  )-----------( 172      ( 27 Oct 2018 06:43 )             36.8       10-27    137  |  99  |  14  ----------------------------<  181<H>  4.5   |  23  |  0.64    Ca    9.1      27 Oct 2018 06:43        RADIOLOGY & ADDITIONAL TESTS:    Imaging Personally Reviewed:    Consultant(s) Notes Reviewed:      Care Discussed with Consultants/Other Providers: Urology Patient is a 62y old  Male who presents with a chief complaint of removal of left kidney stone (26 Oct 2018 06:01)      SUBJECTIVE / OVERNIGHT EVENTS: Pt seen and examined by me  pain in left flank - improving  Still with fullness  and swelling around left tube      MEDICATIONS  (STANDING):  allopurinol 100 milliGRAM(s) Oral daily  amLODIPine   Tablet 10 milliGRAM(s) Oral daily  aspirin enteric coated 325 milliGRAM(s) Oral daily  atorvastatin 40 milliGRAM(s) Oral at bedtime  docusate sodium 100 milliGRAM(s) Oral three times a day  heparin  Injectable 5000 Unit(s) SubCutaneous every 8 hours  labetalol 200 milliGRAM(s) Oral two times a day  losartan 100 milliGRAM(s) Oral daily  senna 2 Tablet(s) Oral at bedtime  tamsulosin 0.4 milliGRAM(s) Oral at bedtime  trimethoprim    Tablet 100 milliGRAM(s) Oral daily    MEDICATIONS  (PRN):  acetaminophen   Tablet .. 650 milliGRAM(s) Oral every 6 hours PRN Mild Pain (1 - 3)  benzocaine 15 mG/menthol 3.6 mG Lozenge 1 Lozenge Oral four times a day PRN Sore Throat  oxyCODONE    IR 5 milliGRAM(s) Oral every 4 hours PRN Moderate Pain (4 - 6)  oxyCODONE    IR 10 milliGRAM(s) Oral every 4 hours PRN Severe Pain (7 - 10)      Vital Signs Last 24 Hrs  T(C): 36.4 (28 Oct 2018 10:46), Max: 36.8 (27 Oct 2018 17:00)  T(F): 97.6 (28 Oct 2018 10:46), Max: 98.3 (27 Oct 2018 17:00)  HR: 65 (28 Oct 2018 10:46) (63 - 75)  BP: 119/62 (28 Oct 2018 10:46) (107/50 - 164/73)  BP(mean): --  RR: 18 (28 Oct 2018 10:46) (18 - 18)  SpO2: 99% (28 Oct 2018 10:46) (97% - 99%)  CAPILLARY BLOOD GLUCOSE    I&O's Summary    25 Oct 2018 07:01  -  26 Oct 2018 07:00  --------------------------------------------------------  IN: 440 mL / OUT: 2930 mL / NET: -2490 mL        PHYSICAL EXAM:  GENERAL: NAD, well-developed, obese  HEAD:  Atraumatic, Normocephalic  EYES: EOMI, PERRLA, conjunctiva and sclera clear  NECK: Supple, No JVD  CHEST/LUNG: Clear to auscultation bilaterally; No wheeze  HEART: Regular rate and rhythm; No murmurs, rubs, or gallops  ABDOMEN: Soft, Nontender, Nondistended; supraumbilical hernia present  Bowel sounds present, NT secure, draining dark red urine, mild tenderness and fullness around left tube   EXTREMITIES:  2+ Peripheral Pulses, No clubbing, cyanosis. Grade 1 edema  PSYCH: AAOx3  NEUROLOGY: non-focal  SKIN: No rashes or lesions    LABS:                                              12.6   9.70  )-----------( 172      ( 27 Oct 2018 06:43 )             36.8     10-27    137  |  99  |  14  ----------------------------<  181<H>  4.5   |  23  |  0.64    Ca    9.1      27 Oct 2018 06:43        RADIOLOGY & ADDITIONAL TESTS:    Imaging Personally Reviewed:    Consultant(s) Notes Reviewed:      Care Discussed with Consultants/Other Providers: Urology

## 2018-10-28 NOTE — PROGRESS NOTE ADULT - SUBJECTIVE AND OBJECTIVE BOX
Subjective  No overnight events. Pt doing well on CPAP overnight. States he was OOB to chair yesterday. Urine remains dark red.     Objective    Vital signs  T(F): , Max: 98.3 (10-27-18 @ 17:00)  HR: 63 (10-28-18 @ 05:31)  BP: 154/70 (10-28-18 @ 05:31)  SpO2: 99% (10-28-18 @ 05:31)  Wt(kg): --    Output     10-27 @ 07:01  -  10-28 @ 07:00  --------------------------------------------------------  IN: 0 mL / OUT: 2740 mL / NET: -2740 mL        Gen: NAD  Back: dressing saturated, NT w/dark translucent red output    Labs      10-27 @ 06:43    WBC 9.70  / Hct 36.8  / SCr 0.64     10-26 @ 10:19    WBC 9.50  / Hct 35.4  / SCr 0.83     Imaging    Urology Preop Note    Diagnosis: left kidney urine leak  Procedure: exchange of NT for nephU stent  Surgeon: Ruth                          12.6   9.70  )-----------( 172      ( 27 Oct 2018 06:43 )             36.8       10-27    137  |  99  |  14  ----------------------------<  181<H>  4.5   |  23  |  0.64    Ca    9.1      27 Oct 2018 06:43    UCx: Culture - Urine (10.11.18 @ 11:07)    Culture - Urine:   NO GROWTH AT 24 HOURS    Specimen Source: URINE MIDSTREAM    EKG: < from: 12 Lead ECG (10.11.18 @ 09:52) >    Diagnosis Line Normal sinus rhythm  Non-specific intra-ventricular conduction delay  Nonspecific ST and T wave abnormality  Borderline ECG    < end of copied text >    CXR:  < from: Xray Chest 1 View- PORTABLE-Urgent (10.24.18 @ 21:39) >  INTERPRETATION:     Heart size and the mediastinum cannot be accurately evaluated on this   projection. Median sternotomy sutures are again seen.  There are low lung volumes. The lungs are clear. No pleural effusion or   pneumothorax is seen.    < end of copied text >

## 2018-10-28 NOTE — PROGRESS NOTE ADULT - PROBLEM SELECTOR PLAN 1
Xray Nephrostogram which showed extravasation of contrast medial to the left kidney   IR procedure cancelled as pt was unable to be intubated as per notes  Plan for  OR on 10/29 Xray Nephrostogram which showed extravasation of contrast medial to the left kidney. IR procedure cancelled as pt was unable to be intubated as per notes  Plan for  OR on 10/29

## 2018-10-29 LAB
BLD GP AB SCN SERPL QL: NEGATIVE — SIGNIFICANT CHANGE UP
BUN SERPL-MCNC: 16 MG/DL — SIGNIFICANT CHANGE UP (ref 7–23)
CALCIUM SERPL-MCNC: 8.6 MG/DL — SIGNIFICANT CHANGE UP (ref 8.4–10.5)
CHLORIDE SERPL-SCNC: 102 MMOL/L — SIGNIFICANT CHANGE UP (ref 98–107)
CO2 SERPL-SCNC: 26 MMOL/L — SIGNIFICANT CHANGE UP (ref 22–31)
CREAT SERPL-MCNC: 0.73 MG/DL — SIGNIFICANT CHANGE UP (ref 0.5–1.3)
GLUCOSE SERPL-MCNC: 109 MG/DL — HIGH (ref 70–99)
HCT VFR BLD CALC: 34.8 % — LOW (ref 39–50)
HGB BLD-MCNC: 11.7 G/DL — LOW (ref 13–17)
MCHC RBC-ENTMCNC: 28.8 PG — SIGNIFICANT CHANGE UP (ref 27–34)
MCHC RBC-ENTMCNC: 33.6 % — SIGNIFICANT CHANGE UP (ref 32–36)
MCV RBC AUTO: 85.7 FL — SIGNIFICANT CHANGE UP (ref 80–100)
NRBC # FLD: 0 — SIGNIFICANT CHANGE UP
PLATELET # BLD AUTO: 180 K/UL — SIGNIFICANT CHANGE UP (ref 150–400)
PMV BLD: 10.7 FL — SIGNIFICANT CHANGE UP (ref 7–13)
POTASSIUM SERPL-MCNC: 3.8 MMOL/L — SIGNIFICANT CHANGE UP (ref 3.5–5.3)
POTASSIUM SERPL-SCNC: 3.8 MMOL/L — SIGNIFICANT CHANGE UP (ref 3.5–5.3)
RBC # BLD: 4.06 M/UL — LOW (ref 4.2–5.8)
RBC # FLD: 13.3 % — SIGNIFICANT CHANGE UP (ref 10.3–14.5)
RH IG SCN BLD-IMP: POSITIVE — SIGNIFICANT CHANGE UP
SODIUM SERPL-SCNC: 139 MMOL/L — SIGNIFICANT CHANGE UP (ref 135–145)
WBC # BLD: 8.6 K/UL — SIGNIFICANT CHANGE UP (ref 3.8–10.5)
WBC # FLD AUTO: 8.6 K/UL — SIGNIFICANT CHANGE UP (ref 3.8–10.5)

## 2018-10-29 PROCEDURE — 52332 CYSTOSCOPY AND TREATMENT: CPT | Mod: LT,58

## 2018-10-29 PROCEDURE — 99233 SBSQ HOSP IP/OBS HIGH 50: CPT

## 2018-10-29 RX ORDER — SODIUM CHLORIDE 9 MG/ML
1000 INJECTION, SOLUTION INTRAVENOUS
Qty: 0 | Refills: 0 | Status: DISCONTINUED | OUTPATIENT
Start: 2018-10-29 | End: 2018-10-30

## 2018-10-29 RX ORDER — OXYCODONE HYDROCHLORIDE 5 MG/1
5 TABLET ORAL ONCE
Qty: 0 | Refills: 0 | Status: DISCONTINUED | OUTPATIENT
Start: 2018-10-29 | End: 2018-10-29

## 2018-10-29 RX ORDER — FENTANYL CITRATE 50 UG/ML
50 INJECTION INTRAVENOUS
Qty: 0 | Refills: 0 | Status: DISCONTINUED | OUTPATIENT
Start: 2018-10-29 | End: 2018-10-29

## 2018-10-29 RX ORDER — ALBUMIN HUMAN 25 %
50 VIAL (ML) INTRAVENOUS ONCE
Qty: 0 | Refills: 0 | Status: COMPLETED | OUTPATIENT
Start: 2018-10-29 | End: 2018-10-29

## 2018-10-29 RX ORDER — HYDROMORPHONE HYDROCHLORIDE 2 MG/ML
0.5 INJECTION INTRAMUSCULAR; INTRAVENOUS; SUBCUTANEOUS ONCE
Qty: 0 | Refills: 0 | Status: DISCONTINUED | OUTPATIENT
Start: 2018-10-29 | End: 2018-10-29

## 2018-10-29 RX ORDER — ONDANSETRON 8 MG/1
4 TABLET, FILM COATED ORAL ONCE
Qty: 0 | Refills: 0 | Status: DISCONTINUED | OUTPATIENT
Start: 2018-10-29 | End: 2018-10-29

## 2018-10-29 RX ADMIN — ATORVASTATIN CALCIUM 40 MILLIGRAM(S): 80 TABLET, FILM COATED ORAL at 21:41

## 2018-10-29 RX ADMIN — HEPARIN SODIUM 5000 UNIT(S): 5000 INJECTION INTRAVENOUS; SUBCUTANEOUS at 21:41

## 2018-10-29 RX ADMIN — HYDROMORPHONE HYDROCHLORIDE 0.5 MILLIGRAM(S): 2 INJECTION INTRAMUSCULAR; INTRAVENOUS; SUBCUTANEOUS at 15:51

## 2018-10-29 RX ADMIN — HYDROMORPHONE HYDROCHLORIDE 0.5 MILLIGRAM(S): 2 INJECTION INTRAMUSCULAR; INTRAVENOUS; SUBCUTANEOUS at 15:36

## 2018-10-29 RX ADMIN — OXYCODONE HYDROCHLORIDE 10 MILLIGRAM(S): 5 TABLET ORAL at 21:20

## 2018-10-29 RX ADMIN — HEPARIN SODIUM 5000 UNIT(S): 5000 INJECTION INTRAVENOUS; SUBCUTANEOUS at 05:00

## 2018-10-29 RX ADMIN — TAMSULOSIN HYDROCHLORIDE 0.4 MILLIGRAM(S): 0.4 CAPSULE ORAL at 21:41

## 2018-10-29 RX ADMIN — AMLODIPINE BESYLATE 10 MILLIGRAM(S): 2.5 TABLET ORAL at 05:00

## 2018-10-29 RX ADMIN — TRIMETHOPRIM HYDROCHLORIDE 100 MILLIGRAM(S): 50 SOLUTION ORAL at 19:27

## 2018-10-29 RX ADMIN — LOSARTAN POTASSIUM 100 MILLIGRAM(S): 100 TABLET, FILM COATED ORAL at 05:00

## 2018-10-29 RX ADMIN — SODIUM CHLORIDE 50 MILLILITER(S): 9 INJECTION, SOLUTION INTRAVENOUS at 21:42

## 2018-10-29 RX ADMIN — Medication 100 MILLIGRAM(S): at 19:29

## 2018-10-29 RX ADMIN — Medication 325 MILLIGRAM(S): at 19:27

## 2018-10-29 RX ADMIN — Medication 100 MILLIGRAM(S): at 05:00

## 2018-10-29 RX ADMIN — Medication 100 MILLILITER(S): at 12:45

## 2018-10-29 RX ADMIN — Medication 200 MILLIGRAM(S): at 05:00

## 2018-10-29 RX ADMIN — Medication 100 MILLIGRAM(S): at 21:41

## 2018-10-29 RX ADMIN — SENNA PLUS 2 TABLET(S): 8.6 TABLET ORAL at 21:41

## 2018-10-29 RX ADMIN — HEPARIN SODIUM 5000 UNIT(S): 5000 INJECTION INTRAVENOUS; SUBCUTANEOUS at 14:02

## 2018-10-29 RX ADMIN — OXYCODONE HYDROCHLORIDE 10 MILLIGRAM(S): 5 TABLET ORAL at 20:21

## 2018-10-29 RX ADMIN — Medication 100 MILLILITER(S): at 13:15

## 2018-10-29 RX ADMIN — SODIUM CHLORIDE 150 MILLILITER(S): 9 INJECTION, SOLUTION INTRAVENOUS at 12:45

## 2018-10-29 NOTE — BRIEF OPERATIVE NOTE - PROCEDURE
<<-----Click on this checkbox to enter Procedure Cystoscopy and ureteroscopy with retrograde pyelography  10/29/2018  LDavid nephroureteral stent placement, removed existing nephrostomy tube  Active  [id number]

## 2018-10-29 NOTE — PROGRESS NOTE ADULT - PROBLEM SELECTOR PROBLEM 7
Chronic gout without tophus, unspecified cause, unspecified site Lumbar disc disease with radiculopathy

## 2018-10-29 NOTE — BRIEF OPERATIVE NOTE - OPERATION/FINDINGS
Cystoscopy, with L. RPG, replacement of existing stent with 28cm x 8.3Fr nephroureteral stent, visualized and location examined under fluoroscopy
see dictated report

## 2018-10-29 NOTE — PROGRESS NOTE ADULT - PROBLEM SELECTOR PLAN 1
c/w  mg, lipitor 40 mg has been stable on labetalol, losartan, amlodipine --> hypotensive post op, s/p dose albumin, d/c losartan 100, amlodipine 10, labetalol 200 bid, f/u bp closely

## 2018-10-29 NOTE — PROGRESS NOTE ADULT - PROBLEM SELECTOR PLAN 9
Lovenox  PT- no skilled needs
Lovenox  FU PT

## 2018-10-29 NOTE — PROGRESS NOTE ADULT - PROBLEM SELECTOR PLAN 4
s/p L percutaneous nephrolithotomy, L ureteral stent, NT on 10/24/18 -> Nephrostogram extravasation of contrast medial to the left kidney --> now s/p L  nephroureteral stent - management per urology  Oxy IR 5 mg/10mg PRN pain;  Bactrim as per urology

## 2018-10-29 NOTE — PROGRESS NOTE ADULT - SUBJECTIVE AND OBJECTIVE BOX
Subjective  Reporting urinary frequency overnight x 4-5    Objective  Vital signs  T(F): , Max: 98.4 (10-28-18 @ 14:40)  HR: 61 (10-29-18 @ 04:57)  BP: 128/69 (10-29-18 @ 04:57)  SpO2: 99% (10-29-18 @ 04:57)  Void 1200   - clear yellow     Gen: NAD  Abd: Soft, non-tender  : NT draining clear yellow urine

## 2018-10-29 NOTE — PROGRESS NOTE ADULT - PROBLEM SELECTOR PLAN 2
has been stable on labetalol, losartan, amlodipine --> hypotensive post op, s/p dose albumin, d/c losartan 100, amlodipine 10, labetalol 200 bid and restart as needed c/w  mg, lipitor 40 mg

## 2018-10-29 NOTE — PROGRESS NOTE ADULT - SUBJECTIVE AND OBJECTIVE BOX
Patient is a 62y old  Male who presents with a chief complaint of left renal calculus (29 Oct 2018 06:45)    SUBJECTIVE / OVERNIGHT EVENTS:  s/p OR, bp down to 80s, given albumin and bolus.    Pt seen in PACU, brother at bedside.  Lying on stretcher, c/o chronic back pain, worse than usual because of positioning.  Denies pain elsewhere, SOB, nausea.  Had headache when bp low but now resolved.    MEDICATIONS  (STANDING):  allopurinol 100 milliGRAM(s) Oral daily  amLODIPine   Tablet 10 milliGRAM(s) Oral daily  aspirin enteric coated 325 milliGRAM(s) Oral daily  atorvastatin 40 milliGRAM(s) Oral at bedtime  docusate sodium 100 milliGRAM(s) Oral three times a day  heparin  Injectable 5000 Unit(s) SubCutaneous every 8 hours  labetalol 200 milliGRAM(s) Oral two times a day  lactated ringers. 1000 milliLiter(s) (150 mL/Hr) IV Continuous <Continuous>  losartan 100 milliGRAM(s) Oral daily  senna 2 Tablet(s) Oral at bedtime  tamsulosin 0.4 milliGRAM(s) Oral at bedtime  trimethoprim    Tablet 100 milliGRAM(s) Oral daily    MEDICATIONS  (PRN):  acetaminophen   Tablet .. 650 milliGRAM(s) Oral every 6 hours PRN Mild Pain (1 - 3)  benzocaine 15 mG/menthol 3.6 mG Lozenge 1 Lozenge Oral four times a day PRN Sore Throat  fentaNYL    Injectable 50 MICROGram(s) IV Push every 10 minutes PRN Severe Pain (7 - 10)  ondansetron Injectable 4 milliGRAM(s) IV Push once PRN Nausea and/or Vomiting  oxyCODONE    IR 5 milliGRAM(s) Oral every 4 hours PRN Moderate Pain (4 - 6)  oxyCODONE    IR 10 milliGRAM(s) Oral every 4 hours PRN Severe Pain (7 - 10)  oxyCODONE    IR 5 milliGRAM(s) Oral once PRN Moderate Pain (4 - 6)    I&O's Summary    28 Oct 2018 07:01  -  29 Oct 2018 07:00  --------------------------------------------------------  IN: 0 mL / OUT: 3325 mL / NET: -3325 mL    29 Oct 2018 07:01  -  29 Oct 2018 16:32  --------------------------------------------------------  IN: 600 mL / OUT: 1470 mL / NET: -870 mL    Vital Signs Last 24 Hrs  T(C): 36.4 (29 Oct 2018 14:00), Max: 37 (29 Oct 2018 11:55)  T(F): 97.5 (29 Oct 2018 14:00), Max: 98.6 (29 Oct 2018 11:55)  HR: 70 (29 Oct 2018 16:00) (61 - 76)  BP: 106/43 (29 Oct 2018 16:00) (73/31 - 132/66)  RR: 18 (29 Oct 2018 16:00) (16 - 24)  SpO2: 95% (29 Oct 2018 16:00) (94% - 99%)    PHYSICAL EXAM:  GENERAL: morbidly obese WM, lying on stretcher, NAD  HEAD:  Atraumatic, Normocephalic  EYES: EOMI, PERRLA, conjunctiva and sclera clear  NECK: Supple, No JVD  CHEST/LUNG: dec BS bases  HEART: Regular rate and rhythm; No murmurs, rubs, or gallops  ABDOMEN: obese, Soft, Nontender, Nondistended; Bowel sounds present; NT  EXTREMITIES:  2+ Peripheral Pulses, No clubbing, cyanosis  PSYCH: AAOx3  NEUROLOGY: non-focal  SKIN: No rashes or lesions    LABS:             11.7   8.60  )-----------( 180      ( 29 Oct 2018 05:11 )             34.8     139  |  102  |  16  ----------------------------<  109<H>  3.8   |  26  |  0.73    Ca    8.6      29 Oct 2018 05:11    RADIOLOGY & ADDITIONAL TESTS:    Imaging Personally Reviewed:    Consultant(s) Notes Reviewed:      Care Discussed with Consultants/Other Providers: urology re overall care

## 2018-10-29 NOTE — PROGRESS NOTE ADULT - PROBLEM SELECTOR PLAN 1
- continue Ancef  - monitor I/Os  - pain control  - OOB/Amb  - DVT PPx  - Incentive Spirometry  - PT recs no needs  - f/u T&S  - check color  - NPO, IVF for OR today  - Consent in chart  -Type & Screen

## 2018-10-29 NOTE — PROGRESS NOTE ADULT - SUBJECTIVE AND OBJECTIVE BOX
Post-operative Check    SUBJECTIVE: Patient was hypotensive in PACU to low 80s systolic, received 25% albumin and 500cc bolus with adequate resuscitation.    NT draining clear light watermelon to gravity.    Patient endorses L. flank/back pain but denies CP/SOB/N/V.     OBJECTIVE:  T(C): 36.4 (10-29-18 @ 14:00), Max: 37 (10-29-18 @ 11:55)  HR: 70 (10-29-18 @ 16:00) (61 - 76)  BP: 106/43 (10-29-18 @ 16:00) (73/31 - 132/66)  RR: 18 (10-29-18 @ 16:00) (16 - 24)  SpO2: 95% (10-29-18 @ 16:00) (94% - 99%)      10-28-18 @ 07:01  -  10-29-18 @ 07:00  --------------------------------------------------------  IN: 0 mL / OUT: 3325 mL / NET: -3325 mL    10-29-18 @ 07:01  -  10-29-18 @ 16:20  --------------------------------------------------------  IN: 600 mL / OUT: 1470 mL / NET: -870 mL        Physical Exam:   - Constitutional: AOx3, NAD, Morbidly obese  - CV: normotensive, regular rate   - Respiratory: nonlabored  - Abdomen: soft, nontender, nondistended  - : NT draining clear watermelon to gravity    ASSESSMENT:   FRANCO RIVERO is a 62y Male POD#0 from cystoscopy, nephroureteral stent placement.  Hypotensive in PACU, received albumin with adequate resuscitation.      PLAN:  - Pain management  - Follow UOP/NT output  - Check color  - Keept NT to gravity  - Cap tomorrow  - C/W TMP  - Plan to follow-up with Dr. Miranda in office 1 week  - Appropriate for transfer to the floor

## 2018-10-29 NOTE — PROGRESS NOTE ADULT - PROBLEM SELECTOR PLAN 6
L ankle xray showing Chronic osseous fragmentation adjacent to medial malleolar margin. No dislocations or acute appearing fractures  Bilateral leg swelling- recommend Leg elevation c/w allopurinol

## 2018-10-29 NOTE — PRE-OP CHECKLIST - INTERNAL PROSTHESES
yes(specify)/titanium bilat hips, Right knee, back w/ 2 rods & 6 screws yes(specify)/titanium bilat hips, Right knee, back w/ 2 rods & 6 screws, left ureteral stent

## 2018-10-29 NOTE — PROGRESS NOTE ADULT - PROBLEM SELECTOR PLAN 5
s/p L percutaneous nephrolithotomy, L ureteral stent, NT on 10/24/18 -> Nephrostogram extravasation of contrast medial to the left kidney --> now s/p L  nephroureteral stent - management per urology  On pain Mx- Oxy IR 5 mg/10mg PRN  Continue Bactrim as per urology L ankle xray showing Chronic osseous fragmentation adjacent to medial malleolar margin. No dislocations or acute appearing fractures  Bilateral leg swelling- recommend Leg elevation

## 2018-10-30 VITALS
TEMPERATURE: 98 F | DIASTOLIC BLOOD PRESSURE: 59 MMHG | OXYGEN SATURATION: 98 % | HEART RATE: 80 BPM | SYSTOLIC BLOOD PRESSURE: 123 MMHG | RESPIRATION RATE: 17 BRPM

## 2018-10-30 LAB
BUN SERPL-MCNC: 10 MG/DL — SIGNIFICANT CHANGE UP (ref 7–23)
CALCIUM SERPL-MCNC: 8.7 MG/DL — SIGNIFICANT CHANGE UP (ref 8.4–10.5)
CHLORIDE SERPL-SCNC: 102 MMOL/L — SIGNIFICANT CHANGE UP (ref 98–107)
CO2 SERPL-SCNC: 26 MMOL/L — SIGNIFICANT CHANGE UP (ref 22–31)
CREAT SERPL-MCNC: 0.86 MG/DL — SIGNIFICANT CHANGE UP (ref 0.5–1.3)
GLUCOSE SERPL-MCNC: 110 MG/DL — HIGH (ref 70–99)
HCT VFR BLD CALC: 34.3 % — LOW (ref 39–50)
HGB BLD-MCNC: 11.4 G/DL — LOW (ref 13–17)
MCHC RBC-ENTMCNC: 28.8 PG — SIGNIFICANT CHANGE UP (ref 27–34)
MCHC RBC-ENTMCNC: 33.2 % — SIGNIFICANT CHANGE UP (ref 32–36)
MCV RBC AUTO: 86.6 FL — SIGNIFICANT CHANGE UP (ref 80–100)
NRBC # FLD: 0 — SIGNIFICANT CHANGE UP
PLATELET # BLD AUTO: 171 K/UL — SIGNIFICANT CHANGE UP (ref 150–400)
PMV BLD: 10.6 FL — SIGNIFICANT CHANGE UP (ref 7–13)
POTASSIUM SERPL-MCNC: 3.9 MMOL/L — SIGNIFICANT CHANGE UP (ref 3.5–5.3)
POTASSIUM SERPL-SCNC: 3.9 MMOL/L — SIGNIFICANT CHANGE UP (ref 3.5–5.3)
RBC # BLD: 3.96 M/UL — LOW (ref 4.2–5.8)
RBC # FLD: 13.2 % — SIGNIFICANT CHANGE UP (ref 10.3–14.5)
SODIUM SERPL-SCNC: 140 MMOL/L — SIGNIFICANT CHANGE UP (ref 135–145)
WBC # BLD: 8.31 K/UL — SIGNIFICANT CHANGE UP (ref 3.8–10.5)
WBC # FLD AUTO: 8.31 K/UL — SIGNIFICANT CHANGE UP (ref 3.8–10.5)

## 2018-10-30 PROCEDURE — 99233 SBSQ HOSP IP/OBS HIGH 50: CPT

## 2018-10-30 RX ORDER — TRIMETHOPRIM HYDROCHLORIDE 50 MG/5ML
1 SOLUTION ORAL
Qty: 10 | Refills: 0
Start: 2018-10-30 | End: 2018-11-08

## 2018-10-30 RX ORDER — SODIUM CHLORIDE 9 MG/ML
1000 INJECTION, SOLUTION INTRAVENOUS
Qty: 0 | Refills: 0 | Status: DISCONTINUED | OUTPATIENT
Start: 2018-10-30 | End: 2018-10-30

## 2018-10-30 RX ADMIN — Medication 100 MILLIGRAM(S): at 12:23

## 2018-10-30 RX ADMIN — OXYCODONE HYDROCHLORIDE 10 MILLIGRAM(S): 5 TABLET ORAL at 05:31

## 2018-10-30 RX ADMIN — OXYCODONE HYDROCHLORIDE 10 MILLIGRAM(S): 5 TABLET ORAL at 01:45

## 2018-10-30 RX ADMIN — Medication 325 MILLIGRAM(S): at 12:22

## 2018-10-30 RX ADMIN — TRIMETHOPRIM HYDROCHLORIDE 100 MILLIGRAM(S): 50 SOLUTION ORAL at 12:23

## 2018-10-30 RX ADMIN — OXYCODONE HYDROCHLORIDE 10 MILLIGRAM(S): 5 TABLET ORAL at 06:30

## 2018-10-30 RX ADMIN — OXYCODONE HYDROCHLORIDE 10 MILLIGRAM(S): 5 TABLET ORAL at 12:22

## 2018-10-30 RX ADMIN — Medication 100 MILLIGRAM(S): at 05:31

## 2018-10-30 RX ADMIN — OXYCODONE HYDROCHLORIDE 10 MILLIGRAM(S): 5 TABLET ORAL at 00:56

## 2018-10-30 RX ADMIN — HEPARIN SODIUM 5000 UNIT(S): 5000 INJECTION INTRAVENOUS; SUBCUTANEOUS at 05:31

## 2018-10-30 NOTE — PROGRESS NOTE ADULT - PROVIDER SPECIALTY LIST ADULT
Anesthesia
Hospitalist
Intervent Radiology
Urology
Hospitalist

## 2018-10-30 NOTE — PROGRESS NOTE ADULT - REASON FOR ADMISSION
left renal calculus
removal of left kidney stone
left renal calculus
left renal calculus
kidney stone
kidney stone

## 2018-10-30 NOTE — PROGRESS NOTE ADULT - PROBLEM SELECTOR PLAN 1
- continue Trimethoprim  - monitor I/Os  - pain control  - OOB/Amb  - DVT PPx  - Incentive Spirometry  - PT recs no needs  - Reg diet  - d/c home today with pain medications and stool softeners, f/u in office in 1 week for NU removal.  -Type & Screen

## 2018-10-30 NOTE — PROGRESS NOTE ADULT - PROBLEM SELECTOR PLAN 1
has been stable on labetalol, losartan, amlodipine --> hypotensive post op, s/p dose albumin, d/c losartan 100, amlodipine 10, labetalol 200 bid --> bp 120s today, d/w pt to monitor bp and slowly add back meds a bp tolerates

## 2018-10-30 NOTE — PROGRESS NOTE ADULT - SUBJECTIVE AND OBJECTIVE BOX
Subjective  Reporting improving flank pain, no nausea, vomiting, fevers or chills    Objective  Vital signs  T(F): , Max: 99.7 (10-30-18 @ 05:26)  HR: 74 (10-30-18 @ 05:26)  BP: 121/51 (10-30-18 @ 05:26)  SpO2: 98% (10-30-18 @ 05:26)  Void 1025,  - clear pink    Gen: NAD  Abd: Soft, non-tender, non-distended, flank mildly TTP  : NT secure and draining as above    Labs  10-29 @ 05:11  WBC 8.60  / Hct 34.8  / SCr 0.73

## 2018-10-30 NOTE — PROGRESS NOTE ADULT - SUBJECTIVE AND OBJECTIVE BOX
Patient is a 63y old  Male who presents with a chief complaint of left renal calculus (30 Oct 2018 07:08)    SUBJECTIVE / OVERNIGHT EVENTS:  C/o fluid leaking around NT.  No other complaints.  No chest pain, SOB, nausea, vomiting.  +flatus/BM.  Back pain better now that sitting up in chair.    MEDICATIONS  (STANDING):  allopurinol 100 milliGRAM(s) Oral daily  aspirin enteric coated 325 milliGRAM(s) Oral daily  atorvastatin 40 milliGRAM(s) Oral at bedtime  dextrose 5% + sodium chloride 0.45%. 1000 milliLiter(s) (75 mL/Hr) IV Continuous <Continuous>  docusate sodium 100 milliGRAM(s) Oral three times a day  heparin  Injectable 5000 Unit(s) SubCutaneous every 8 hours  senna 2 Tablet(s) Oral at bedtime  tamsulosin 0.4 milliGRAM(s) Oral at bedtime  trimethoprim    Tablet 100 milliGRAM(s) Oral daily    MEDICATIONS  (PRN):  acetaminophen   Tablet .. 650 milliGRAM(s) Oral every 6 hours PRN Mild Pain (1 - 3)  benzocaine 15 mG/menthol 3.6 mG Lozenge 1 Lozenge Oral four times a day PRN Sore Throat  oxyCODONE    IR 5 milliGRAM(s) Oral every 4 hours PRN Moderate Pain (4 - 6)  oxyCODONE    IR 10 milliGRAM(s) Oral every 4 hours PRN Severe Pain (7 - 10)    Vital Signs Last 24 Hrs  T(C): 36.6 (30 Oct 2018 09:28), Max: 37.6 (30 Oct 2018 02:02)  T(F): 97.9 (30 Oct 2018 09:28), Max: 99.7 (30 Oct 2018 05:26)  HR: 80 (30 Oct 2018 09:28) (63 - 81)  BP: 123/59 (30 Oct 2018 09:28) (73/31 - 136/66)  RR: 17 (30 Oct 2018 09:28) (16 - 24)  SpO2: 98% (30 Oct 2018 09:28) (94% - 99%)    PHYSICAL EXAM:  GENERAL: morbidly obese WM, sitting up in chair, NAD  HEAD:  Atraumatic, Normocephalic  EYES: EOMI, PERRLA, conjunctiva and sclera clear  NECK: Supple, No JVD  CHEST/LUNG: clear  HEART: Regular rate and rhythm; No murmurs, rubs, or gallops  ABDOMEN: obese, Soft, Nontender, Nondistended; Bowel sounds present; L NT  EXTREMITIES:  2+ Peripheral Pulses, No clubbing, cyanosis  PSYCH: AAOx3  NEUROLOGY: non-focal  SKIN: No rashes or lesions    LABS:             11.4   8.31  )-----------( 171      ( 30 Oct 2018 05:53 )             34.3     140  |  102  |  10  ----------------------------<  110<H>  3.9   |  26  |  0.86    Ca    8.7      30 Oct 2018 05:53    RADIOLOGY & ADDITIONAL TESTS:    Imaging Personally Reviewed:    Consultant(s) Notes Reviewed:      Care Discussed with Consultants/Other Providers: urology re overall care

## 2018-10-30 NOTE — PROGRESS NOTE ADULT - ASSESSMENT
62M morbidly obese, CAD s/p 3vCABG 8/29/17 (EF 54%), HTN, HLD, prediabetes, gout, ventral hernia, ASIYA (CPAP), chronic back pain s/p surgery complicated by neuropathy, kidney stones, found to have hematuria, w/u revealed large stone.  Now s/p L percutaneous nephrolithotomy, L ureteral stent, NT on 10/24/18.
62M morbidly obese, CAD s/p 3vCABG 8/29/17 (EF 54%), HTN, HLD, prediabetes, gout, ventral hernia, ASIYA (CPAP), chronic back pain s/p surgery complicated by neuropathy, kidney stones, found to have hematuria, w/u revealed large stone.  Now s/p L percutaneous nephrolithotomy, L ureteral stent, NT on 10/24/18.  Now s/p cysto, L RPG, replacement of L ureteral stent with L nephroureteral stent
This is a 61 y/o M POD #2 s/p Left percutaneous supine nephrolithotomy, cystoscopy.    - zofran x 1 for nausea  - continue Ancef  - monitor I/Os  - TOV, PVR  - dulcolax suppository  - pain control  - NT removal under fluoroscopic guidance to avoid stent dislodgement  - OOB/Amb  - DVT PPx  - Incentive Spirometry  - PT prior to discharge to assess gait stability; patient reports fall 2 weeks ago but ankle x-rays unremarkable.
This is a 61 y/o M s/p Left percutaneous supine nephrolithotomy, cystoscopy, now aborted IR procedure on 10/27 due to difficulty with ventilation, now planned for OR on 10/29 to reposition left nephrostomy tube
This is a 61 y/o M s/p Left percutaneous supine nephrolithotomy, cystoscopy, now aborted IR procedure on 10/27 due to difficulty with ventilation, now planned for OR on 10/29 to reposition left nephrostomy tube
This is a 63 y/o M POD #1 s/p Left percutaneous supine nephrolithotomy, cystoscopy.    - f/u AM BMP, Hct stable  - continue Ancef  - monitor I/Os  - TOV, PVR  - dulcolax suppository  - pain control  - keep NT in place to gravity drainage, monitor urine color  - OOB/Amb  - DVT PPx  - Incentive Spirometry
This is a 63 y/o M POD #3 s/p Left percutaneous supine nephrolithotomy, cystoscopy, aborted IR procedure, for OR on 10/29
This is a 63 y/o M s/p Left percutaneous supine nephrolithotomy, cystoscopy, aborted IR procedure, for OR on 10/29
62M morbidly obese, CAD s/p 3vCABG 8/29/17 (EF 54%), HTN, HLD, prediabetes, gout, ventral hernia, ASIYA (CPAP), chronic back pain s/p surgery complicated by neuropathy, kidney stones, found to have hematuria, w/u revealed large stone.  Now s/p L percutaneous nephrolithotomy, L ureteral stent, NT on 10/24/18.  Now s/p cysto, L RPG, replacement of L ureteral stent with L nephroureteral stent

## 2018-10-30 NOTE — PROGRESS NOTE ADULT - ATTENDING COMMENTS
PT seen and examined agree with assessment and plan
PT seen and examined agree with assessment and plan
Patient seen and examined, agree with plan.
Patient seen and examined, agree with plan. Pre-op for tomorrow.
PT seen and examined agree with assessment and plan

## 2018-10-31 ENCOUNTER — FORM ENCOUNTER (OUTPATIENT)
Age: 63
End: 2018-10-31

## 2018-11-01 ENCOUNTER — OUTPATIENT (OUTPATIENT)
Dept: OUTPATIENT SERVICES | Facility: HOSPITAL | Age: 63
LOS: 1 days | End: 2018-11-01
Payer: COMMERCIAL

## 2018-11-01 ENCOUNTER — APPOINTMENT (OUTPATIENT)
Dept: RADIOLOGY | Facility: IMAGING CENTER | Age: 63
End: 2018-11-01
Payer: COMMERCIAL

## 2018-11-01 ENCOUNTER — APPOINTMENT (OUTPATIENT)
Dept: UROLOGY | Facility: CLINIC | Age: 63
End: 2018-11-01
Payer: COMMERCIAL

## 2018-11-01 DIAGNOSIS — Z98.890 OTHER SPECIFIED POSTPROCEDURAL STATES: Chronic | ICD-10-CM

## 2018-11-01 DIAGNOSIS — N20.0 CALCULUS OF KIDNEY: Chronic | ICD-10-CM

## 2018-11-01 DIAGNOSIS — Z98.89 OTHER SPECIFIED POSTPROCEDURAL STATES: Chronic | ICD-10-CM

## 2018-11-01 DIAGNOSIS — E04.1 NONTOXIC SINGLE THYROID NODULE: ICD-10-CM

## 2018-11-01 DIAGNOSIS — Z95.1 PRESENCE OF AORTOCORONARY BYPASS GRAFT: Chronic | ICD-10-CM

## 2018-11-01 DIAGNOSIS — G62.9 POLYNEUROPATHY, UNSPECIFIED: Chronic | ICD-10-CM

## 2018-11-01 DIAGNOSIS — Z96.651 PRESENCE OF RIGHT ARTIFICIAL KNEE JOINT: Chronic | ICD-10-CM

## 2018-11-01 DIAGNOSIS — R22.1 LOCALIZED SWELLING, MASS AND LUMP, NECK: ICD-10-CM

## 2018-11-01 LAB — STONE ANALYSIS-IMP: SIGNIFICANT CHANGE UP

## 2018-11-01 PROCEDURE — 74425 UROGRAPHY ANTEGRADE RS&I: CPT | Mod: 26

## 2018-11-01 PROCEDURE — 74018 RADEX ABDOMEN 1 VIEW: CPT | Mod: 26

## 2018-11-01 PROCEDURE — 74018 RADEX ABDOMEN 1 VIEW: CPT

## 2018-11-09 ENCOUNTER — RX RENEWAL (OUTPATIENT)
Age: 63
End: 2018-11-09

## 2018-11-11 ENCOUNTER — OUTPATIENT (OUTPATIENT)
Dept: OUTPATIENT SERVICES | Facility: HOSPITAL | Age: 63
LOS: 1 days | End: 2018-11-11
Payer: COMMERCIAL

## 2018-11-11 DIAGNOSIS — Z96.651 PRESENCE OF RIGHT ARTIFICIAL KNEE JOINT: Chronic | ICD-10-CM

## 2018-11-11 DIAGNOSIS — N20.0 CALCULUS OF KIDNEY: Chronic | ICD-10-CM

## 2018-11-11 DIAGNOSIS — Z98.89 OTHER SPECIFIED POSTPROCEDURAL STATES: Chronic | ICD-10-CM

## 2018-11-11 DIAGNOSIS — Z98.890 OTHER SPECIFIED POSTPROCEDURAL STATES: Chronic | ICD-10-CM

## 2018-11-11 DIAGNOSIS — G62.9 POLYNEUROPATHY, UNSPECIFIED: Chronic | ICD-10-CM

## 2018-11-11 DIAGNOSIS — Z95.1 PRESENCE OF AORTOCORONARY BYPASS GRAFT: Chronic | ICD-10-CM

## 2018-11-11 PROCEDURE — 74425 UROGRAPHY ANTEGRADE RS&I: CPT

## 2018-11-11 PROCEDURE — 51600 INJECTION FOR BLADDER X-RAY: CPT

## 2018-11-12 ENCOUNTER — FORM ENCOUNTER (OUTPATIENT)
Age: 63
End: 2018-11-12

## 2018-11-13 ENCOUNTER — APPOINTMENT (OUTPATIENT)
Dept: UROLOGY | Facility: CLINIC | Age: 63
End: 2018-11-13
Payer: COMMERCIAL

## 2018-11-13 ENCOUNTER — OUTPATIENT (OUTPATIENT)
Dept: OUTPATIENT SERVICES | Facility: HOSPITAL | Age: 63
LOS: 1 days | End: 2018-11-13
Payer: COMMERCIAL

## 2018-11-13 ENCOUNTER — APPOINTMENT (OUTPATIENT)
Dept: ULTRASOUND IMAGING | Facility: IMAGING CENTER | Age: 63
End: 2018-11-13
Payer: COMMERCIAL

## 2018-11-13 DIAGNOSIS — G62.9 POLYNEUROPATHY, UNSPECIFIED: Chronic | ICD-10-CM

## 2018-11-13 DIAGNOSIS — Z98.89 OTHER SPECIFIED POSTPROCEDURAL STATES: Chronic | ICD-10-CM

## 2018-11-13 DIAGNOSIS — N20.0 CALCULUS OF KIDNEY: Chronic | ICD-10-CM

## 2018-11-13 DIAGNOSIS — Z00.8 ENCOUNTER FOR OTHER GENERAL EXAMINATION: ICD-10-CM

## 2018-11-13 DIAGNOSIS — Z96.651 PRESENCE OF RIGHT ARTIFICIAL KNEE JOINT: Chronic | ICD-10-CM

## 2018-11-13 DIAGNOSIS — Z95.1 PRESENCE OF AORTOCORONARY BYPASS GRAFT: Chronic | ICD-10-CM

## 2018-11-13 DIAGNOSIS — Z98.890 OTHER SPECIFIED POSTPROCEDURAL STATES: Chronic | ICD-10-CM

## 2018-11-13 PROCEDURE — 76770 US EXAM ABDO BACK WALL COMP: CPT | Mod: 26

## 2018-11-13 PROCEDURE — 76770 US EXAM ABDO BACK WALL COMP: CPT

## 2018-11-13 PROCEDURE — 99024 POSTOP FOLLOW-UP VISIT: CPT

## 2018-11-20 DIAGNOSIS — N20.0 CALCULUS OF KIDNEY: ICD-10-CM

## 2018-12-06 ENCOUNTER — APPOINTMENT (OUTPATIENT)
Dept: GASTROENTEROLOGY | Facility: CLINIC | Age: 63
End: 2018-12-06
Payer: COMMERCIAL

## 2018-12-06 VITALS
TEMPERATURE: 98.7 F | HEART RATE: 80 BPM | OXYGEN SATURATION: 95 % | SYSTOLIC BLOOD PRESSURE: 153 MMHG | HEIGHT: 77 IN | DIASTOLIC BLOOD PRESSURE: 72 MMHG | BODY MASS INDEX: 37.19 KG/M2 | WEIGHT: 315 LBS

## 2018-12-06 DIAGNOSIS — M51.16 INTERVERTEBRAL DISC DISORDERS WITH RADICULOPATHY, LUMBAR REGION: ICD-10-CM

## 2018-12-06 DIAGNOSIS — I25.10 ATHEROSCLEROTIC HEART DISEASE OF NATIVE CORONARY ARTERY W/OUT ANGINA PECTORIS: ICD-10-CM

## 2018-12-06 DIAGNOSIS — Z86.69 PERSONAL HISTORY OF OTHER DISEASES OF THE NERVOUS SYSTEM AND SENSE ORGANS: ICD-10-CM

## 2018-12-06 DIAGNOSIS — Z86.39 PERSONAL HISTORY OF OTHER ENDOCRINE, NUTRITIONAL AND METABOLIC DISEASE: ICD-10-CM

## 2018-12-06 DIAGNOSIS — Z87.39 PERSONAL HISTORY OF OTHER DISEASES OF THE MUSCULOSKELETAL SYSTEM AND CONNECTIVE TISSUE: ICD-10-CM

## 2018-12-06 DIAGNOSIS — M10.9 GOUT, UNSPECIFIED: ICD-10-CM

## 2018-12-06 DIAGNOSIS — Z95.1 PRESENCE OF AORTOCORONARY BYPASS GRAFT: ICD-10-CM

## 2018-12-06 DIAGNOSIS — Z87.81 PERSONAL HISTORY OF (HEALED) TRAUMATIC FRACTURE: ICD-10-CM

## 2018-12-06 DIAGNOSIS — Z82.49 FAMILY HISTORY OF ISCHEMIC HEART DISEASE AND OTHER DISEASES OF THE CIRCULATORY SYSTEM: ICD-10-CM

## 2018-12-06 DIAGNOSIS — E78.5 HYPERLIPIDEMIA, UNSPECIFIED: ICD-10-CM

## 2018-12-06 DIAGNOSIS — N20.0 CALCULUS OF KIDNEY: ICD-10-CM

## 2018-12-06 DIAGNOSIS — K62.5 HEMORRHAGE OF ANUS AND RECTUM: ICD-10-CM

## 2018-12-06 DIAGNOSIS — I10 ESSENTIAL (PRIMARY) HYPERTENSION: ICD-10-CM

## 2018-12-06 PROCEDURE — 99204 OFFICE O/P NEW MOD 45 MIN: CPT

## 2019-02-20 ENCOUNTER — OUTPATIENT (OUTPATIENT)
Dept: OUTPATIENT SERVICES | Facility: HOSPITAL | Age: 64
LOS: 1 days | End: 2019-02-20
Payer: COMMERCIAL

## 2019-02-20 ENCOUNTER — RESULT REVIEW (OUTPATIENT)
Age: 64
End: 2019-02-20

## 2019-02-20 DIAGNOSIS — Z98.89 OTHER SPECIFIED POSTPROCEDURAL STATES: Chronic | ICD-10-CM

## 2019-02-20 DIAGNOSIS — Z96.651 PRESENCE OF RIGHT ARTIFICIAL KNEE JOINT: Chronic | ICD-10-CM

## 2019-02-20 DIAGNOSIS — N20.0 CALCULUS OF KIDNEY: Chronic | ICD-10-CM

## 2019-02-20 DIAGNOSIS — Z95.1 PRESENCE OF AORTOCORONARY BYPASS GRAFT: Chronic | ICD-10-CM

## 2019-02-20 DIAGNOSIS — Z98.890 OTHER SPECIFIED POSTPROCEDURAL STATES: Chronic | ICD-10-CM

## 2019-02-20 DIAGNOSIS — K62.5 HEMORRHAGE OF ANUS AND RECTUM: ICD-10-CM

## 2019-02-20 DIAGNOSIS — G62.9 POLYNEUROPATHY, UNSPECIFIED: Chronic | ICD-10-CM

## 2019-02-20 PROCEDURE — 45380 COLONOSCOPY AND BIOPSY: CPT

## 2019-02-20 PROCEDURE — 88305 TISSUE EXAM BY PATHOLOGIST: CPT

## 2019-02-20 PROCEDURE — 88305 TISSUE EXAM BY PATHOLOGIST: CPT | Mod: 26

## 2019-05-04 NOTE — DISCHARGE NOTE ADULT - NS AS DC FOLLOWUP STROKE INST
[employer] [facility] website at [email or web address] Remember, [facility] Smoking Cessation/Influenza vaccination ([facility] Pub Date: August 19, 2014) Smoking Cessation

## 2019-09-25 ENCOUNTER — APPOINTMENT (OUTPATIENT)
Dept: ORTHOPEDIC SURGERY | Facility: CLINIC | Age: 64
End: 2019-09-25
Payer: COMMERCIAL

## 2019-09-25 VITALS
HEIGHT: 77 IN | HEART RATE: 74 BPM | DIASTOLIC BLOOD PRESSURE: 84 MMHG | BODY MASS INDEX: 37.19 KG/M2 | SYSTOLIC BLOOD PRESSURE: 135 MMHG | WEIGHT: 315 LBS

## 2019-09-25 PROCEDURE — 73564 X-RAY EXAM KNEE 4 OR MORE: CPT | Mod: LT

## 2019-09-25 PROCEDURE — 99213 OFFICE O/P EST LOW 20 MIN: CPT

## 2019-09-25 PROCEDURE — 72170 X-RAY EXAM OF PELVIS: CPT

## 2019-09-25 NOTE — ADDENDUM
[FreeTextEntry1] : I, Richardson Chow, acted solely as a scribe for Dr. Harsh Hebert on 09/25/2019  .\par  \par All medical record entries made by the scribe were at my, Dr. Harsh Hebert, direction and personally dictated by me on 09/25/2019. I have reviewed the chart and agree that the record accurately reflects my personal performance of the history, physical exam, assessment and plan. I have also personally directed, reviewed, and agreed with the chart.\par

## 2019-09-25 NOTE — PHYSICAL EXAM
[de-identified] : Well-nourished, in no acute distress\par Alert and oriented to time, place and person\par Skin: no lesions discoloration\par Respirations: unlabored\par Cardiac: no leg swelling\par Lymphatic: no groin adenopathy\par left knee: no effusion, flexion 0-110, tender medial joint line\par left hip: tender left greater trochanter\par  [de-identified] : AP, notch standing, lateral and sunrise Xrays of the left knee taken in the office today demonstrates degenerative narrowing medial compartment, traction spur superior and inferior radha patella, possible loose body posterior intercondylar zone. \par \par AP Pelvis and AP/Lateral Xrays of the hip taken in the office today demonstrates left hip satisfactory appearance right and left cementless total hip arthroplasty. Left hip heterotopic ossification. \par \par

## 2019-09-25 NOTE — HISTORY OF PRESENT ILLNESS
[de-identified] : 62 yo male seen for new complaints left knee and left hip. Past surgical history right tkr and revision ,  2011, 2012, 20143. Right and left hip replacement May 2009. Few months ago spontaneous onset constant pain diffusely through left knee worse at night. No swelling or locking. No treatment to date. Also c/o intermittent pain left buttock radiating to mid and lateral left thigh, triggered in bed. No groin or thigh pain when ambulating. Takes Aleve but does not provide much relief. Takes Tylenol at night

## 2019-09-25 NOTE — DISCUSSION/SUMMARY
[de-identified] : s/p bilateral hip replacement, and right TKR. Bursitis of the left hip and arthritis left knee, with pain. We discussed the nature of the condition and treatment options. I elucidated the conservative treatment options including weight loss, injections, pain medication, and low impact exercises. \par \par \par Impression:\par bursitis left hip and OA left knee\par Plan:\par ultrasound guided injection in left hip and left knee with Dr. Zepeda.  Milo \par \par \par \par

## 2019-09-25 NOTE — CONSULT LETTER
[Dear  ___] : Dear  [unfilled], [Please see my note below.] : Please see my note below. [Sincerely,] : Sincerely, [FreeTextEntry3] : Dr. Hebert

## 2019-09-27 ENCOUNTER — APPOINTMENT (OUTPATIENT)
Dept: ORTHOPEDIC SURGERY | Facility: CLINIC | Age: 64
End: 2019-09-27
Payer: COMMERCIAL

## 2019-09-27 VITALS — HEIGHT: 77 IN | BODY MASS INDEX: 37.19 KG/M2 | WEIGHT: 315 LBS

## 2019-09-27 PROCEDURE — 20610 DRAIN/INJ JOINT/BURSA W/O US: CPT | Mod: LT

## 2019-09-27 PROCEDURE — 99204 OFFICE O/P NEW MOD 45 MIN: CPT | Mod: 25

## 2019-09-27 NOTE — HISTORY OF PRESENT ILLNESS
[de-identified] : Dustin is a 63M who presents with left knee and hip pain.  He is referred by Dr. Hebert.  Patient has a history of bilateral hip replacement and right knee replacement s/p multiple revisions, most recently in 2014.  For the past 2-3 motnhs he has started to have left lateral hip pain that radiates to the lateral knee.  Pain is worse with walking and lying down.  Pain in the knee is mostly lateral.  He has intermittent swelling.  No locking or instability.  He has also started to have left knee pain which is worse with weightbearing.  He has constant numbness in his feet which pre dates his pain.  Denies weakness of the foot.\par

## 2019-09-27 NOTE — PHYSICAL EXAM
[de-identified] : Constitutional: Well-nourished, well-developed, No acute distress\par Respiratory:  Good respiratory effort, no SOB\par Lymphatic: No regional lymphadenopathy, no lymphedema\par Psychiatric: Pleasant and normal affect, alert and oriented x3\par Skin: Clean dry and intact B/L UE/LE\par Musculoskeletal: normal except where as noted in regional exam\par \par \par left Knee:\par APPEARANCE: no marked deformities, no swelling or malalignment\par POSITIVE TENDERNESS:  lateral joint line\par NONTENDER: retinacula b/l, patellar & quadriceps tendons, MCL/LCL, ITB at the lateral femoral condyle & Gerdy's tubercle, pes bursa. \par ROM: pain with end range flexion/extension. \par RESISTIVE TESTING: painless resisted knee flex/ext. \par SPECIAL TESTS: stable v/v stress. painless grind. neg Lachman's. neg ant/post drawer. \par NEURO: Normal sensation of LE, DTRs 2+/4 patella and achilles\par PULSES: 2+ DP/PT pulses\par \par left Hip:\par APPEARANCE: no marked deformities, + surgical incision scar well healed\par POSITIVE TENDERNESS: left greater trochanter, TFL, ITB\par NONTENDER: gluteal region, ischium/proximal hamstring region, hip flexor region, sartorius and pubic symphysis. \par ROM: full & painless. \par SPECIAL TESTS: neg SEAN/FADIR, +lateral hip pain painless loaded flexion & scouring\par \par B/L Ankles: No asymmetry, malalignment, or swelling, Full ROM, 5/5 strength in DF/PF/Inv/Ev, Joints stable\par \par \par  [de-identified] : Patient comes to today's visit with  imaging already performed.  I reviewed the images in detail with the patient and discussed the findings as highlighted below. \par \par 4 views of the left knee and  were reviewed today \par Imaging: AP, notch standing, lateral and sunrise Xrays of the left knee taken in the office today demonstrates degenerative narrowing medial compartment, traction spur superior and inferior radha patella, possible loose body posterior intercondylar zone. \par \par 3 views of the left hip\par AP Pelvis and AP/Lateral Xrays of the hip taken in the office today demonstrates left hip satisfactory appearance right and left cementless total hip arthroplasty. Left hip heterotopic ossification

## 2019-09-27 NOTE — CONSULT LETTER
[Dear  ___] : Dear  [unfilled], [Consult Closing:] : Thank you very much for allowing me to participate in the care of this patient.  If you have any questions, please do not hesitate to contact me. [Sincerely,] : Sincerely, [FreeTextEntry1] : Please see clinic note from 9/27/19. [FreeTextEntry3] : Jen Zepeda MD, EdM\par Sports Medicine [facility]\par

## 2019-09-27 NOTE — DISCUSSION/SUMMARY
[de-identified] : Dustin presents with left knee and hip pain.  Xrays demonstrate knee osteoarthritis and left lateral hip heterotopic ossification. I discussed the treatment of degenerative arthritis with the patient at length today.  I discussed the use of corticosteroid injection and the use of hyaluronic acid injections.  The risks and benefits of each treatment options was discussed and all questions were answered. The patient opted for left knee injection today.  I will see him back next week for possible lateral hip/trochanteric bursa injection.  I have also prescribed him voltaren gel to use on the  painful area.\par \par Jen Zepeda MD, EdM\par Sports Medicine [facility]\par

## 2019-09-27 NOTE — REASON FOR VISIT
[Initial Consultation] : an initial consultation for [Knee Osteoarthritis] : knee osteoarthritis [Other: ____] : [unfilled]

## 2019-09-27 NOTE — PROCEDURE
[de-identified] : Injection: left knee joint.\par Indication: Osteoarthritis.\par \par A discussion was had with the patient regarding this procedure and all questions were answered. All risks, benefits and alternatives were discussed. These included but were not limited to bleeding, infection, allergic reaction and reaccumulation of fluid. A timeout was done to ensure correct side and pt agreed to the procedure.  Chlorhexidine was used to sterilize and prep the area in the lateral joint line aspect of the knee. Ethyl chloride spray was then used as a topical anesthetic. A 22-gauge needle was used to inject 4cc of 1% lidocaine without epinephrine and 1cc of 40mg/ml methylprednisolone into the knee. A sterile bandage was then applied. The patient tolerated the procedure well.\par

## 2019-09-30 ENCOUNTER — APPOINTMENT (OUTPATIENT)
Dept: ORTHOPEDIC SURGERY | Facility: CLINIC | Age: 64
End: 2019-09-30
Payer: COMMERCIAL

## 2019-09-30 VITALS
SYSTOLIC BLOOD PRESSURE: 170 MMHG | WEIGHT: 315 LBS | HEART RATE: 76 BPM | HEIGHT: 77 IN | DIASTOLIC BLOOD PRESSURE: 85 MMHG | BODY MASS INDEX: 37.19 KG/M2

## 2019-09-30 DIAGNOSIS — M70.72 OTHER BURSITIS OF HIP, LEFT HIP: ICD-10-CM

## 2019-09-30 DIAGNOSIS — M70.62 TROCHANTERIC BURSITIS, LEFT HIP: ICD-10-CM

## 2019-09-30 PROCEDURE — 20551 NJX 1 TENDON ORIGIN/INSJ: CPT | Mod: LT

## 2019-09-30 PROCEDURE — 99214 OFFICE O/P EST MOD 30 MIN: CPT | Mod: 25

## 2019-09-30 RX ORDER — MULTIVITAMIN
TABLET ORAL
Refills: 0 | Status: ACTIVE | COMMUNITY

## 2019-09-30 NOTE — DISCUSSION/SUMMARY
[de-identified] : Dustin presents for follow up of lateral hip pain.  Treatment options discussed today and patient elected to proceed with ultrasound guided trochanteric bursa cortisone injection.  Procedure was performed today without complication.  Diagnostic lidocaine injection also performed to lateral thigh calcification.  Patient to monitor symptoms, if helpful can consider steroid injection to lateral thigh.  Follow up in 3-4 months for repeat knee or hip injections.\par \par Jen Zepeda MD, EdM\par Sports Medicine [facility]\par

## 2019-09-30 NOTE — PROCEDURE
[de-identified] : Ultrasound Guided Injection \par Indication for ultrasound guidance: Ensure placement within a deep greater trochanteric bursa\par \par Utlizing the [employer] II Edge portable ultrasound machine, the Curvilinear 30cm 5-2 MHz transducer, sterile probe cover and sterile ultrasound gel, ultrasound guidance with the probe in the longitudinal axis, utilizing an in-plane approach, was used for the following injection:\par \par \par Injection:left Hip.\par Indication: Trochanteric Bursitis.\par \par A discussion was had with the patient regarding this procedure and all questions were answered. All risks, benefits and alternatives were discussed. These included but were not limited to bleeding, infection, and allergic reaction. Chlorehexidine was used to sterilize and prep the area in the lateral aspect of the hip. A timeout was done to ensure correct side and pt agreed to the procedure. A 22-gauge 2" needle was used to inject 2cc of 1% lidocaine and 1cc of 40mg/ml methylprednisolone into the greater trochanteric bursa using a needling technique. A sterile bandage was then applied. The patient tolerated the procedure well and there were no complications. \par \par Injection:left lateral thigh, diagnostic\par Indication: thigh pain and calcificatoin\par \par A discussion was had with the patient regarding this procedure and all questions were answered. All risks, benefits and alternatives were discussed. These included but were not limited to bleeding, infection, and allergic reaction. Chlorehexidine was used to sterilize and prep the area in the lateral aspect of the hip. A timeout was done to ensure correct side and pt agreed to the procedure. A 22-gauge 2" needle was used to inject 4cc of 1% lidocaine and 1cc into the lateral thigh calcification using a needling technique. A sterile bandage was then applied. The patient tolerated the procedure well and there were no complications. \par

## 2019-09-30 NOTE — PHYSICAL EXAM
[de-identified] : Constitutional: Well-nourished, well-developed, No acute distress\par Respiratory:  Good respiratory effort, no SOB\par Lymphatic: No regional lymphadenopathy, no lymphedema\par Psychiatric: Pleasant and normal affect, alert and oriented x3\par Skin: Clean dry and intact B/L UE/LE\par Musculoskeletal: normal except where as noted in regional exam\par \par \par left Knee:\par APPEARANCE: no marked deformities, no swelling or malalignment\par POSITIVE TENDERNESS:  lateral joint line\par NONTENDER: retinacula b/l, patellar & quadriceps tendons, MCL/LCL, ITB at the lateral femoral condyle & Gerdy's tubercle, pes bursa. \par ROM: pain with end range flexion/extension. \par RESISTIVE TESTING: painless resisted knee flex/ext. \par SPECIAL TESTS: stable v/v stress. painless grind. neg Lachman's. neg ant/post drawer. \par NEURO: Normal sensation of LE, DTRs 2+/4 patella and achilles\par PULSES: 2+ DP/PT pulses\par \par left Hip:\par APPEARANCE: no marked deformities, + surgical incision scar well healed\par POSITIVE TENDERNESS: left greater trochanter, TFL, ITB\par NONTENDER: gluteal region, ischium/proximal hamstring region, hip flexor region, sartorius and pubic symphysis. \par ROM: full & painless. \par SPECIAL TESTS: neg SEAN/FADIR, +lateral hip pain painless loaded flexion & scouring\par \par B/L Ankles: No asymmetry, malalignment, or swelling, Full ROM, 5/5 strength in DF/PF/Inv/Ev, Joints stable\par \par \par

## 2019-09-30 NOTE — HISTORY OF PRESENT ILLNESS
[de-identified] : Dustin presents for follow up of left lateral hip pain.  He was last seen in clinic on 9/27, at which time I performed an intra articular left knee cortisone injection.  Overall, his knee pain has improved.  He continues to have pain at the lateral hip and thigh.  Today he is here to discuss treatment for the heterotopic calcification seen on his xrays at those locations. Pain is worse with direct pressure or walking. Better with rest.  He denies new or worsening symptoms.

## 2019-10-16 ENCOUNTER — APPOINTMENT (OUTPATIENT)
Dept: ORTHOPEDIC SURGERY | Facility: CLINIC | Age: 64
End: 2019-10-16
Payer: COMMERCIAL

## 2019-10-16 DIAGNOSIS — M79.652 PAIN IN LEFT THIGH: ICD-10-CM

## 2019-10-16 PROCEDURE — 99214 OFFICE O/P EST MOD 30 MIN: CPT | Mod: 25

## 2019-10-16 PROCEDURE — 96372 THER/PROPH/DIAG INJ SC/IM: CPT | Mod: 59

## 2019-10-16 PROCEDURE — 20611 DRAIN/INJ JOINT/BURSA W/US: CPT | Mod: LT

## 2019-10-17 PROBLEM — M79.652 PAIN OF LEFT LATERAL UPPER THIGH: Status: ACTIVE | Noted: 2019-10-17

## 2019-10-17 NOTE — HISTORY OF PRESENT ILLNESS
[de-identified] : Dustin is here for follow up of left knee pain.  I gave him a cortisone injection on 9/27/19.  It helped for a few days, but the pain returned.  He is here today to try synvisc injection for the knee.  He had a lateral hip injection on 9/30 which has helped his lateral hip pain.  No new injury or trauma.  During his last visit, I also idd a diagnostic injection into an area of calcification into his lateral thigh.  He says he had temporary relief from the injections.

## 2019-10-17 NOTE — PROCEDURE
[de-identified] : Ultrasound Guided Injection \par Indication for ultrasound guidance: Ensure placement within the knee joint\par \par Utlizing the [employer] portable ultrasound machine, the Linear 6cm 13-6 MHz transducer and sterile ultrasound gel, ultrasound guidance with the probe in the short axis, utilizing an in plane approach, was used for the following injection:\par \par Injection: left knee joint.\par Indication: Osteoarthritis.\par \par A discussion was had with the patient regarding this procedure and all questions were answered. All risks, benefits and alternatives were discussed. These included but were not limited to bleeding, infection, and allergic reaction. Chlorhexidine  was used to sterilize and prep the area in the superolateral aspect of the knee. A  25-gauge needle was used to inject 3mL of 1% lidocaine without epi, and 18 gauge needle was inserted and approximately 15cc of yellow fluid was aspirated, the syringes were switched and 6 cc of synvisc one into the knee with ease. A sterile bandage was then applied. The patient tolerated the procedure well and there were no complications. \par \par Injection:left  lateral thigh \par Indication: heterotopic ossification\par \par A discussion was had with the patient regarding this procedure and all questions were answered. All risks, benefits and alternatives were discussed. These included but were not limited to bleeding, infection, and allergic reaction. Chlorhexidine  was used to sterilize and prep the area in the lateral thigh. A 22-gauge needle was used to inject 1cc of 1% of  lidocaine without epi and 1cc of depomedrol. A sterile bandage was then applied. The patient tolerated the procedure well and there were no complications. \par \par

## 2019-10-17 NOTE — PHYSICAL EXAM
[de-identified] : Constitutional: Well-nourished, well-developed, No acute distress\par Respiratory:  Good respiratory effort, no SOB\par Lymphatic: No regional lymphadenopathy, no lymphedema\par Psychiatric: Pleasant and normal affect, alert and oriented x3\par Skin: Clean dry and intact B/L UE/LE\par Musculoskeletal: normal except where as noted in regional exam\par left Knee:\par APPEARANCE: no marked deformities, 2+ effusion\par POSITIVE TENDERNESS:  medial and lateral joint lines\par NONTENDER: retinacula b/l, patellar & quadriceps tendons, MCL/LCL, ITB at the lateral femoral condyle & Gerdy's tubercle, pes bursa. \par ROM limited in end range flexion and lacks 10 degrees extension\par SPECIAL TESTS: stable v/v stress. painless grind. neg Lachman's. neg ant/post drawer\par NEURO: Normal sensation of LE, DTRs 2+/4 patella and achilles\par PULSES: 2+ DP/PT pulses\par B/L Ankles: No asymmetry, malalignment, or swelling, Full ROM, 5/5 strength in DF/PF/Inv/Ev, Joints stable\par \par \par \par

## 2019-10-17 NOTE — DISCUSSION/SUMMARY
[de-identified] : Dustin presents with significant knee and thigh pain.  I have aspirated fluid from the knee and will send it for testing.  Viscosupplementation injected as well.  I also injected a small amount of lidocaine and steroid into the area of calcification in the lateral thigh.  Follow up as needed for repeat injections.\par \par Jen Zepeda MD, EdM\par Sports Medicine PM&R\par

## 2019-10-18 LAB
B PERT IGG+IGM PNL SER: ABNORMAL
COLOR FLD: NORMAL
EOSINOPHIL # FLD MANUAL: 1 %
FLUID INTAKE SUBSTANCE CLASS: NORMAL
LYMPHOCYTES # FLD MANUAL: 41 %
MONOS+MACROS NFR FLD MANUAL: 36 %
NEUTS SEG # FLD MANUAL: 22 %
RBC # FLD MANUAL: 8000 /UL
SYCRY CLARITY: ABNORMAL
SYCRY COLOR: ABNORMAL
SYCRY ID: ABNORMAL
SYCRY TUBE: NORMAL
TOTAL CELLS COUNTED FLD: 162 /UL
TUBE TYPE: NORMAL

## 2019-10-25 ENCOUNTER — RX RENEWAL (OUTPATIENT)
Age: 64
End: 2019-10-25

## 2019-10-31 DIAGNOSIS — M25.562 PAIN IN LEFT KNEE: ICD-10-CM

## 2019-10-31 DIAGNOSIS — G89.29 PAIN IN LEFT KNEE: ICD-10-CM

## 2019-10-31 LAB — BACTERIA FLD CULT: NORMAL

## 2019-11-01 PROBLEM — M25.562 CHRONIC PAIN OF LEFT KNEE: Status: ACTIVE | Noted: 2019-11-01

## 2019-11-20 ENCOUNTER — APPOINTMENT (OUTPATIENT)
Dept: ORTHOPEDIC SURGERY | Facility: CLINIC | Age: 64
End: 2019-11-20
Payer: COMMERCIAL

## 2019-11-20 PROCEDURE — 99213 OFFICE O/P EST LOW 20 MIN: CPT

## 2019-11-20 NOTE — ADDENDUM
[FreeTextEntry1] : I, Richardson Chow, acted solely as a scribe for Dr. Harsh Hebert on 11/20/2019  .\par  \par All medical record entries made by the scribe were at my, Dr. Harsh Hebert, direction and personally dictated by me on 11/20/2019. I have reviewed the chart and agree that the record accurately reflects my personal performance of the history, physical exam, assessment and plan. I have also personally directed, reviewed, and agreed with the chart.\par

## 2019-11-20 NOTE — CONSULT LETTER
[Dear  ___] : Dear  [unfilled], [Sincerely,] : Sincerely, [Please see my note below.] : Please see my note below. [FreeTextEntry3] : Dr. Hebert

## 2019-11-20 NOTE — PHYSICAL EXAM
[de-identified] : Well-nourished, in no acute distress\par Alert and oriented to time, place and person\par Skin: no lesions discoloration\par Respirations: unlabored\par Cardiac: no leg swelling\par Lymphatic: no groin adenopathy\par left knee: effusion 1+, ROM  degrees, ligaments intact \par  [de-identified] : xrays 9/2019\par AP, notch standing, lateral and sunrise Xrays of the left knee taken in the office today demonstrates degenerative narrowing medial compartment, traction spur superior and inferior pole patella, possible loose body posterior intercondylar zone. \par \par AP Pelvis and AP/Lateral Xrays of the hip taken in the office today demonstrates left hip satisfactory appearance right and left cementless total hip arthroplasty. Left hip heterotopic ossification. \par \par

## 2019-11-20 NOTE — HISTORY OF PRESENT ILLNESS
[de-identified] : 62 yo male seen for chronic spontaneous onset intermittent left knee pain, unresponsive to medical management with Dr. Zepeda including cortisone and gel injections. Pain is worse at night. No swelling or locking left knee. Aleve and tylenol do not provide relief.  Past surgical history right total knee replacement  and revision ,  2011, 2012, 2014. Right and left hip replacement May 2009.

## 2019-11-20 NOTE — DISCUSSION/SUMMARY
[de-identified] : s/p bilateral hip replacement, and right total knee replacement . Left knee pain and OA. We discussed the nature of the condition and treatment options. I elucidated the conservative treatment options including weight loss, injections, pain medication, and low impact exercises. The patient is a candidate for surgery based on imaging and quality of life. We discussed risks, benefits and alternatives to surgery.\par \par We are going to check the patient's circulation before being approved for surgery. \par \par 1) I reviewed the plan of care as well as a model of a knee implant equivalent to the one that will be used for their total knee  joint replacement\par 2) The patient agreed to the plan of care as well as the use of implants for their total knee replacement.\par \par The patient was seen in my office regarding left knee pain. As a part of that visit, I recommended treatment with a ROM knee orthosis due to restrictions my patient is experiencing with functional limitations of daily activities. \par \par This ROM knee orthosis, in conjunction with other previously prescribed regimens will assist in overall treatment of my patient's condition. \par \par Dr. Hebert\par \par \par \par

## 2019-11-20 NOTE — REASON FOR VISIT
[Initial Visit] : an initial visit for [Knee Pain] : knee pain [Hip Pain] : hip pain [Follow-Up Visit] : a follow-up visit for

## 2019-12-03 ENCOUNTER — OUTPATIENT (OUTPATIENT)
Dept: OUTPATIENT SERVICES | Facility: HOSPITAL | Age: 64
LOS: 1 days | End: 2019-12-03
Payer: COMMERCIAL

## 2019-12-03 VITALS
DIASTOLIC BLOOD PRESSURE: 76 MMHG | HEART RATE: 70 BPM | TEMPERATURE: 98 F | HEIGHT: 75 IN | WEIGHT: 315 LBS | SYSTOLIC BLOOD PRESSURE: 136 MMHG | OXYGEN SATURATION: 98 % | RESPIRATION RATE: 18 BRPM

## 2019-12-03 DIAGNOSIS — Z98.89 OTHER SPECIFIED POSTPROCEDURAL STATES: Chronic | ICD-10-CM

## 2019-12-03 DIAGNOSIS — Z98.890 OTHER SPECIFIED POSTPROCEDURAL STATES: Chronic | ICD-10-CM

## 2019-12-03 DIAGNOSIS — Z01.818 ENCOUNTER FOR OTHER PREPROCEDURAL EXAMINATION: ICD-10-CM

## 2019-12-03 DIAGNOSIS — Z95.1 PRESENCE OF AORTOCORONARY BYPASS GRAFT: Chronic | ICD-10-CM

## 2019-12-03 DIAGNOSIS — M17.12 UNILATERAL PRIMARY OSTEOARTHRITIS, LEFT KNEE: ICD-10-CM

## 2019-12-03 DIAGNOSIS — Z96.651 PRESENCE OF RIGHT ARTIFICIAL KNEE JOINT: Chronic | ICD-10-CM

## 2019-12-03 DIAGNOSIS — G62.9 POLYNEUROPATHY, UNSPECIFIED: Chronic | ICD-10-CM

## 2019-12-03 DIAGNOSIS — N20.0 CALCULUS OF KIDNEY: Chronic | ICD-10-CM

## 2019-12-03 LAB
ALBUMIN SERPL ELPH-MCNC: 3.9 G/DL — SIGNIFICANT CHANGE UP (ref 3.3–5)
ALP SERPL-CCNC: 83 U/L — SIGNIFICANT CHANGE UP (ref 30–120)
ALT FLD-CCNC: 34 U/L DA — SIGNIFICANT CHANGE UP (ref 10–60)
ANION GAP SERPL CALC-SCNC: 8 MMOL/L — SIGNIFICANT CHANGE UP (ref 5–17)
APTT BLD: 28.1 SEC — LOW (ref 28.5–37)
AST SERPL-CCNC: 21 U/L — SIGNIFICANT CHANGE UP (ref 10–40)
BILIRUB SERPL-MCNC: 0.8 MG/DL — SIGNIFICANT CHANGE UP (ref 0.2–1.2)
BLD GP AB SCN SERPL QL: SIGNIFICANT CHANGE UP
BUN SERPL-MCNC: 14 MG/DL — SIGNIFICANT CHANGE UP (ref 7–23)
CALCIUM SERPL-MCNC: 9.3 MG/DL — SIGNIFICANT CHANGE UP (ref 8.4–10.5)
CHLORIDE SERPL-SCNC: 102 MMOL/L — SIGNIFICANT CHANGE UP (ref 96–108)
CO2 SERPL-SCNC: 29 MMOL/L — SIGNIFICANT CHANGE UP (ref 22–31)
CREAT SERPL-MCNC: 0.75 MG/DL — SIGNIFICANT CHANGE UP (ref 0.5–1.3)
GLUCOSE SERPL-MCNC: 130 MG/DL — HIGH (ref 70–99)
HCT VFR BLD CALC: 47.4 % — SIGNIFICANT CHANGE UP (ref 39–50)
HGB BLD-MCNC: 16.6 G/DL — SIGNIFICANT CHANGE UP (ref 13–17)
INR BLD: 1.09 RATIO — SIGNIFICANT CHANGE UP (ref 0.88–1.16)
MCHC RBC-ENTMCNC: 30.5 PG — SIGNIFICANT CHANGE UP (ref 27–34)
MCHC RBC-ENTMCNC: 35 GM/DL — SIGNIFICANT CHANGE UP (ref 32–36)
MCV RBC AUTO: 87 FL — SIGNIFICANT CHANGE UP (ref 80–100)
MRSA PCR RESULT.: SIGNIFICANT CHANGE UP
NRBC # BLD: 0 /100 WBCS — SIGNIFICANT CHANGE UP (ref 0–0)
PLATELET # BLD AUTO: 206 K/UL — SIGNIFICANT CHANGE UP (ref 150–400)
POTASSIUM SERPL-MCNC: 4.3 MMOL/L — SIGNIFICANT CHANGE UP (ref 3.5–5.3)
POTASSIUM SERPL-SCNC: 4.3 MMOL/L — SIGNIFICANT CHANGE UP (ref 3.5–5.3)
PROT SERPL-MCNC: 7.4 G/DL — SIGNIFICANT CHANGE UP (ref 6–8.3)
PROTHROM AB SERPL-ACNC: 11.9 SEC — SIGNIFICANT CHANGE UP (ref 10–12.9)
RBC # BLD: 5.45 M/UL — SIGNIFICANT CHANGE UP (ref 4.2–5.8)
RBC # FLD: 12.8 % — SIGNIFICANT CHANGE UP (ref 10.3–14.5)
S AUREUS DNA NOSE QL NAA+PROBE: DETECTED
SODIUM SERPL-SCNC: 139 MMOL/L — SIGNIFICANT CHANGE UP (ref 135–145)
WBC # BLD: 9.27 K/UL — SIGNIFICANT CHANGE UP (ref 3.8–10.5)
WBC # FLD AUTO: 9.27 K/UL — SIGNIFICANT CHANGE UP (ref 3.8–10.5)

## 2019-12-03 PROCEDURE — 93005 ELECTROCARDIOGRAM TRACING: CPT

## 2019-12-03 PROCEDURE — 93010 ELECTROCARDIOGRAM REPORT: CPT

## 2019-12-03 PROCEDURE — G0463: CPT

## 2019-12-03 RX ORDER — TAMSULOSIN HYDROCHLORIDE 0.4 MG/1
1 CAPSULE ORAL
Qty: 0 | Refills: 0 | DISCHARGE

## 2019-12-03 RX ORDER — ALLOPURINOL 300 MG
1 TABLET ORAL
Qty: 0 | Refills: 0 | DISCHARGE

## 2019-12-03 NOTE — H&P PST ADULT - HISTORY OF PRESENT ILLNESS
65yo male patient with approximately 2.5 months of progressively worsening left knee pain. He had had Cortisone and gel injections w/o improvement. He rates the pain at 6/10, usually at night when in bed. He is taking Diclofenac with only partial relief. He had an MRI and he was told that he has no cartilage and his knee is "bone on bone" He was told that TKR is recommended and presents today for PSTs.

## 2019-12-03 NOTE — H&P PST ADULT - PRO ANTICIPATED DISCH DISP
Called patient back and she was informed of the message below, patient verbalized understanding and will call us if she doesn't get better.   
Patient would like a call back from [facility]ara regarding medication for sinus infection.  
Script for Amox sent to pharmacy, if not better,see  
Tyron I called the patient back and stated she now is worse with the Sinus pressure, and she is blowing out yellow-greenish discharge. Patient denies fever. Patient is requesting a Z-sonia and she stated that works great for her when she gets these Sinus infection. Patient using [facility] on [address]   
unsure

## 2019-12-03 NOTE — H&P PST ADULT - ASSESSMENT
63yo male patient scheduled for surgery on 12/19/19. He will obtain Medical and Cardiac Clearances. He will be NPO as per Anesthesia and will take Labetolol, Losartan and Amlodipine on AM of surgery with a sip of water. He will hold Ecotrin 325mg or reduce to 81mg for one week prior to surgery if approved by his Cardiologist. All pre-op instructions reviewed with patient. He will meet with Respiratory therapy today. He denies any metal allergies.

## 2019-12-03 NOTE — H&P PST ADULT - NSICDXPASTSURGICALHX_GEN_ALL_CORE_FT
PAST SURGICAL HISTORY:  Cholecystitis cholecycstectomy 2010    H/O lithotripsy     Hernia repair, left inguinal 2010    History of Total Hip Replacement 2009- bilateral THR    Kidney stone s/w ESWL pt unsure site    Neuropathy Bilateral Feet since 2012    S/P CABG x 3 8/29/17    S/P hip replacement left 2009, right 2009    S/P knee replacement 2011- right x 3  - revisions in 2012 & 2014    S/P Left Inguinal Hernia Repair     S/P lumbar discectomy 2012- ,L5  Aug 2013    S/P lumbar laminectomy Fusion L3-L5 2012    S/P revision of total knee, right 12/2012    S/P tonsillectomy and adenoidectomy as child PAST SURGICAL HISTORY:  Cholecystitis cholecycstectomy 2010    H/O lithotripsy x1    Hernia repair, left inguinal 2010    History of Total Hip Replacement 2009- bilateral THR    Kidney stone s/w ESWL pt unsure site    Neuropathy Bilateral Feet since 2012    S/P CABG x 3 8/29/17    S/P knee replacement 2011- right x 3  - revisions in 2012 & 2014    S/P Left Inguinal Hernia Repair     S/P lumbar discectomy 2012- ,L5  Aug 2013    S/P lumbar laminectomy Fusion L3-L5 2012    S/P revision of total knee, right x2- 2012 & 2014    S/P tonsillectomy and adenoidectomy as child

## 2019-12-03 NOTE — H&P PST ADULT - MUSCULOSKELETAL
details… detailed exam no joint warmth/diminished strength/left knee/no calf tenderness/no joint swelling/no joint erythema/decreased ROM/decreased ROM due to pain

## 2019-12-03 NOTE — H&P PST ADULT - NSICDXPASTMEDICALHX_GEN_ALL_CORE_FT
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

## 2019-12-03 NOTE — H&P PST ADULT - NSICDXPROBLEM_GEN_ALL_CORE_FT
PROBLEM DIAGNOSES  Problem: Primary osteoarthritis of left knee  Assessment and Plan: LEFT Total Knee Replacement on 12/19/19.

## 2019-12-03 NOTE — H&P PST ADULT - NSICDXFAMILYHX_GEN_ALL_CORE_FT
FAMILY HISTORY:  Family history of coronary artery disease, HLD/Angina. Fatal MI age 73.  Family history of coronary artery disease, brother- age 50+- Coronary Artery Stents  Family history of diabetes mellitus type II, mother- - hyperlipidemia and Hypertension.    Sibling  Still living? No  Family history of pancreatic cancer, Age at diagnosis: Age Unknown

## 2019-12-04 PROBLEM — I25.10 ATHEROSCLEROTIC HEART DISEASE OF NATIVE CORONARY ARTERY WITHOUT ANGINA PECTORIS: Chronic | Status: ACTIVE | Noted: 2017-08-28

## 2019-12-04 PROBLEM — G47.33 OBSTRUCTIVE SLEEP APNEA (ADULT) (PEDIATRIC): Chronic | Status: ACTIVE | Noted: 2018-10-11

## 2019-12-04 PROBLEM — K56.0 PARALYTIC ILEUS: Chronic | Status: ACTIVE | Noted: 2019-12-03

## 2019-12-04 RX ORDER — MUPIROCIN 20 MG/G
1 OINTMENT TOPICAL
Qty: 1 | Refills: 0
Start: 2019-12-04 | End: 2019-12-08

## 2019-12-04 NOTE — PROGRESS NOTE ADULT - SUBJECTIVE AND OBJECTIVE BOX
Nose culture positive for MSSA. Mupirocin ointment 2% escribed and sent to patient's pharmacy and to be used twice a day for 5 consecutive days. Called patient and treatment protocol reviewed. Questions answered and he verbalized an understanding.

## 2019-12-05 ENCOUNTER — OUTPATIENT (OUTPATIENT)
Dept: OUTPATIENT SERVICES | Facility: HOSPITAL | Age: 64
LOS: 1 days | End: 2019-12-05
Payer: COMMERCIAL

## 2019-12-05 ENCOUNTER — APPOINTMENT (OUTPATIENT)
Dept: ULTRASOUND IMAGING | Facility: HOSPITAL | Age: 64
End: 2019-12-05
Payer: COMMERCIAL

## 2019-12-05 DIAGNOSIS — M17.12 UNILATERAL PRIMARY OSTEOARTHRITIS, LEFT KNEE: ICD-10-CM

## 2019-12-05 DIAGNOSIS — G62.9 POLYNEUROPATHY, UNSPECIFIED: Chronic | ICD-10-CM

## 2019-12-05 DIAGNOSIS — Z98.89 OTHER SPECIFIED POSTPROCEDURAL STATES: Chronic | ICD-10-CM

## 2019-12-05 DIAGNOSIS — Z96.651 PRESENCE OF RIGHT ARTIFICIAL KNEE JOINT: Chronic | ICD-10-CM

## 2019-12-05 DIAGNOSIS — Z98.890 OTHER SPECIFIED POSTPROCEDURAL STATES: Chronic | ICD-10-CM

## 2019-12-05 DIAGNOSIS — Z95.1 PRESENCE OF AORTOCORONARY BYPASS GRAFT: Chronic | ICD-10-CM

## 2019-12-05 DIAGNOSIS — N20.0 CALCULUS OF KIDNEY: Chronic | ICD-10-CM

## 2019-12-05 DIAGNOSIS — M25.562 PAIN IN LEFT KNEE: ICD-10-CM

## 2019-12-05 PROCEDURE — 93923 UPR/LXTR ART STDY 3+ LVLS: CPT | Mod: 26

## 2019-12-05 PROCEDURE — 93923 UPR/LXTR ART STDY 3+ LVLS: CPT

## 2019-12-09 ENCOUNTER — OTHER (OUTPATIENT)
Age: 64
End: 2019-12-09

## 2019-12-16 ENCOUNTER — OUTPATIENT (OUTPATIENT)
Dept: OUTPATIENT SERVICES | Facility: HOSPITAL | Age: 64
LOS: 1 days | End: 2019-12-16
Payer: COMMERCIAL

## 2019-12-16 VITALS
OXYGEN SATURATION: 96 % | HEIGHT: 77 IN | DIASTOLIC BLOOD PRESSURE: 88 MMHG | TEMPERATURE: 98 F | HEART RATE: 66 BPM | SYSTOLIC BLOOD PRESSURE: 124 MMHG | WEIGHT: 315 LBS

## 2019-12-16 DIAGNOSIS — G62.9 POLYNEUROPATHY, UNSPECIFIED: Chronic | ICD-10-CM

## 2019-12-16 DIAGNOSIS — N20.0 CALCULUS OF KIDNEY: Chronic | ICD-10-CM

## 2019-12-16 DIAGNOSIS — Z96.651 PRESENCE OF RIGHT ARTIFICIAL KNEE JOINT: Chronic | ICD-10-CM

## 2019-12-16 DIAGNOSIS — Z98.890 OTHER SPECIFIED POSTPROCEDURAL STATES: Chronic | ICD-10-CM

## 2019-12-16 DIAGNOSIS — Z98.89 OTHER SPECIFIED POSTPROCEDURAL STATES: Chronic | ICD-10-CM

## 2019-12-16 DIAGNOSIS — I25.10 ATHEROSCLEROTIC HEART DISEASE OF NATIVE CORONARY ARTERY WITHOUT ANGINA PECTORIS: ICD-10-CM

## 2019-12-16 DIAGNOSIS — Z95.1 PRESENCE OF AORTOCORONARY BYPASS GRAFT: Chronic | ICD-10-CM

## 2019-12-16 LAB
ALBUMIN SERPL ELPH-MCNC: 4.1 G/DL — SIGNIFICANT CHANGE UP (ref 3.3–5)
ALP SERPL-CCNC: 79 U/L — SIGNIFICANT CHANGE UP (ref 40–120)
ALT FLD-CCNC: 29 U/L — SIGNIFICANT CHANGE UP (ref 10–45)
ANION GAP SERPL CALC-SCNC: 13 MMOL/L — SIGNIFICANT CHANGE UP (ref 5–17)
AST SERPL-CCNC: 17 U/L — SIGNIFICANT CHANGE UP (ref 10–40)
BILIRUB SERPL-MCNC: 0.5 MG/DL — SIGNIFICANT CHANGE UP (ref 0.2–1.2)
BUN SERPL-MCNC: 11 MG/DL — SIGNIFICANT CHANGE UP (ref 7–23)
CALCIUM SERPL-MCNC: 9.7 MG/DL — SIGNIFICANT CHANGE UP (ref 8.4–10.5)
CHLORIDE SERPL-SCNC: 105 MMOL/L — SIGNIFICANT CHANGE UP (ref 96–108)
CO2 SERPL-SCNC: 22 MMOL/L — SIGNIFICANT CHANGE UP (ref 22–31)
CREAT SERPL-MCNC: 0.52 MG/DL — SIGNIFICANT CHANGE UP (ref 0.5–1.3)
GLUCOSE SERPL-MCNC: 130 MG/DL — HIGH (ref 70–99)
HCT VFR BLD CALC: 44.4 % — SIGNIFICANT CHANGE UP (ref 39–50)
HGB BLD-MCNC: 15.5 G/DL — SIGNIFICANT CHANGE UP (ref 13–17)
MCHC RBC-ENTMCNC: 30.3 PG — SIGNIFICANT CHANGE UP (ref 27–34)
MCHC RBC-ENTMCNC: 34.9 GM/DL — SIGNIFICANT CHANGE UP (ref 32–36)
MCV RBC AUTO: 86.7 FL — SIGNIFICANT CHANGE UP (ref 80–100)
NRBC # BLD: 0 /100 WBCS — SIGNIFICANT CHANGE UP (ref 0–0)
PLATELET # BLD AUTO: 175 K/UL — SIGNIFICANT CHANGE UP (ref 150–400)
POTASSIUM SERPL-MCNC: 3.7 MMOL/L — SIGNIFICANT CHANGE UP (ref 3.5–5.3)
POTASSIUM SERPL-SCNC: 3.7 MMOL/L — SIGNIFICANT CHANGE UP (ref 3.5–5.3)
PROT SERPL-MCNC: 6.8 G/DL — SIGNIFICANT CHANGE UP (ref 6–8.3)
RBC # BLD: 5.12 M/UL — SIGNIFICANT CHANGE UP (ref 4.2–5.8)
RBC # FLD: 12.9 % — SIGNIFICANT CHANGE UP (ref 10.3–14.5)
SODIUM SERPL-SCNC: 140 MMOL/L — SIGNIFICANT CHANGE UP (ref 135–145)
WBC # BLD: 8.15 K/UL — SIGNIFICANT CHANGE UP (ref 3.8–10.5)
WBC # FLD AUTO: 8.15 K/UL — SIGNIFICANT CHANGE UP (ref 3.8–10.5)

## 2019-12-16 PROCEDURE — 99152 MOD SED SAME PHYS/QHP 5/>YRS: CPT

## 2019-12-16 PROCEDURE — 85027 COMPLETE CBC AUTOMATED: CPT

## 2019-12-16 PROCEDURE — 99153 MOD SED SAME PHYS/QHP EA: CPT

## 2019-12-16 PROCEDURE — C1887: CPT

## 2019-12-16 PROCEDURE — C1760: CPT

## 2019-12-16 PROCEDURE — C1894: CPT

## 2019-12-16 PROCEDURE — 93459 L HRT ART/GRFT ANGIO: CPT

## 2019-12-16 PROCEDURE — C1769: CPT

## 2019-12-16 PROCEDURE — 93005 ELECTROCARDIOGRAM TRACING: CPT

## 2019-12-16 PROCEDURE — 80053 COMPREHEN METABOLIC PANEL: CPT

## 2019-12-16 PROCEDURE — 93010 ELECTROCARDIOGRAM REPORT: CPT

## 2019-12-16 PROCEDURE — 93459 L HRT ART/GRFT ANGIO: CPT | Mod: 26

## 2019-12-16 RX ORDER — ACETAMINOPHEN 500 MG
650 TABLET ORAL ONCE
Refills: 0 | Status: COMPLETED | OUTPATIENT
Start: 2019-12-16 | End: 2019-12-16

## 2019-12-16 RX ADMIN — Medication 650 MILLIGRAM(S): at 15:50

## 2019-12-16 RX ADMIN — Medication 650 MILLIGRAM(S): at 18:25

## 2019-12-16 NOTE — H&P CARDIOLOGY - HISTORY OF PRESENT ILLNESS
This is a 63 yo man with history of CAD/ CABG 2017, ASIYA/ CPAP, OA, morbid obesity presents as outpatient for cardiac cath. Pt is anticipating left knee surgery and had abnormal stress test Dec 11th.   Pt denies chest pain/ SOB. Ambulates with cane.   denies implanted cardiac monitors       Dr. Elkin sweet referring cardiologist

## 2019-12-16 NOTE — H&P CARDIOLOGY - PSH
Cholecystitis  cholecycstectomy 2010  H/O lithotripsy  x1  Hernia  repair, left inguinal 2010  History of Total Hip Replacement  2009- bilateral THR  Kidney stone  s/w ESWL pt unsure site  Neuropathy  Bilateral Feet since 2012  S/P CABG x 3  8/29/17  S/P knee replacement  2011- right x 3  - revisions in 2012 & 2014  S/P Left Inguinal Hernia Repair    S/P lumbar discectomy  2012- ,L5  Aug 2013  S/P lumbar laminectomy  Fusion L3-L5 2012  S/P revision of total knee, right  x2- 2012 & 2014  S/P tonsillectomy and adenoidectomy  as child

## 2019-12-18 ENCOUNTER — TRANSCRIPTION ENCOUNTER (OUTPATIENT)
Age: 64
End: 2019-12-18

## 2019-12-18 ENCOUNTER — APPOINTMENT (OUTPATIENT)
Dept: ORTHOPEDIC SURGERY | Facility: CLINIC | Age: 64
End: 2019-12-18
Payer: COMMERCIAL

## 2019-12-18 VITALS
HEIGHT: 77 IN | BODY MASS INDEX: 37.19 KG/M2 | SYSTOLIC BLOOD PRESSURE: 161 MMHG | HEART RATE: 73 BPM | WEIGHT: 315 LBS | DIASTOLIC BLOOD PRESSURE: 89 MMHG

## 2019-12-18 DIAGNOSIS — M17.12 UNILATERAL PRIMARY OSTEOARTHRITIS, LEFT KNEE: ICD-10-CM

## 2019-12-18 PROCEDURE — 99024 POSTOP FOLLOW-UP VISIT: CPT

## 2019-12-18 PROCEDURE — 73560 X-RAY EXAM OF KNEE 1 OR 2: CPT | Mod: LT

## 2019-12-18 NOTE — HISTORY OF PRESENT ILLNESS
[de-identified] : 62 yo male presents for preop skin check left total knee replacement 12/19/2019. Seen for chronic spontaneous onset intermittent left knee pain, unresponsive to medical management with Dr. Zepeda including cortisone and gel injections. Pain is worse at night. No swelling or locking left knee. Aleve and tylenol do not provide relief.  Past surgical history right total knee replacement  and revision ,  2011, 2012, 2014. Right and left hip replacement May 2009.

## 2019-12-18 NOTE — ADDENDUM
[FreeTextEntry1] : I, Richardson Chow, acted solely as a scribe for Dr. Harsh Hebert on 12/18/2019  .\par  \par All medical record entries made by the scribe were at my, Dr. Harsh Hebert, direction and personally dictated by me on 12/18/2019. I have reviewed the chart and agree that the record accurately reflects my personal performance of the history, physical exam, assessment and plan. I have also personally directed, reviewed, and agreed with the chart.

## 2019-12-18 NOTE — REASON FOR VISIT
[Follow-Up Visit] : a follow-up visit for [Initial Visit] : an initial visit for [Hip Pain] : hip pain [Knee Pain] : knee pain

## 2019-12-18 NOTE — DISCUSSION/SUMMARY
[de-identified] : s/p bilateral hip replacement, and right total knee replacement . Left knee pain and OA. We discussed the nature of the condition and treatment options. I elucidated the conservative treatment options including weight loss, injections, pain medication, and low impact exercises. The patient is a candidate for surgery based on imaging and quality of life. We discussed risks, benefits and alternatives to surgery.\par \par We are going to check the patient's circulation before being approved for surgery. \par \par 1) I reviewed the plan of care as well as a model of a knee implant equivalent to the one that will be used for their total knee  joint replacement\par 2) The patient agreed to the plan of care as well as the use of implants for their total knee replacement.\par \par The patient was seen in my office regarding left knee pain. As a part of that visit, I recommended treatment with a ROM knee orthosis due to restrictions my patient is experiencing with functional limitations of daily activities. \par \par This ROM knee orthosis, in conjunction with other previously prescribed regimens will assist in overall treatment of my patient's condition. \par \par Dr. Hebert\par \par \par \par

## 2019-12-18 NOTE — PHYSICAL EXAM
[de-identified] : Well-nourished, in no acute distress\par Alert and oriented to time, place and person\par Skin: no lesions discoloration\par Respirations: unlabored\par Cardiac: no leg swelling\par Lymphatic: no groin adenopathy\par left knee: surgical site skin intact [de-identified] : AP, notch standing, lateral and sunrise Xrays of the left knee taken in the office today demonstrates patellofemoral degenerative changes\par \par arterial studies report RADHA satisfactory for total knee

## 2019-12-19 ENCOUNTER — APPOINTMENT (OUTPATIENT)
Dept: ORTHOPEDIC SURGERY | Facility: HOSPITAL | Age: 64
End: 2019-12-19

## 2019-12-19 ENCOUNTER — INPATIENT (INPATIENT)
Facility: HOSPITAL | Age: 64
LOS: 1 days | Discharge: NURSING, REHAB OR LONG-TERM CARE | DRG: 470 | End: 2019-12-21
Attending: ORTHOPAEDIC SURGERY | Admitting: ORTHOPAEDIC SURGERY
Payer: MEDICARE

## 2019-12-19 ENCOUNTER — TRANSCRIPTION ENCOUNTER (OUTPATIENT)
Age: 64
End: 2019-12-19

## 2019-12-19 ENCOUNTER — RESULT REVIEW (OUTPATIENT)
Age: 64
End: 2019-12-19

## 2019-12-19 VITALS
SYSTOLIC BLOOD PRESSURE: 137 MMHG | HEART RATE: 64 BPM | DIASTOLIC BLOOD PRESSURE: 70 MMHG | OXYGEN SATURATION: 98 % | HEIGHT: 77 IN | WEIGHT: 315 LBS | RESPIRATION RATE: 20 BRPM | TEMPERATURE: 98 F

## 2019-12-19 DIAGNOSIS — G62.9 POLYNEUROPATHY, UNSPECIFIED: Chronic | ICD-10-CM

## 2019-12-19 DIAGNOSIS — M17.12 UNILATERAL PRIMARY OSTEOARTHRITIS, LEFT KNEE: ICD-10-CM

## 2019-12-19 DIAGNOSIS — Z96.651 PRESENCE OF RIGHT ARTIFICIAL KNEE JOINT: Chronic | ICD-10-CM

## 2019-12-19 DIAGNOSIS — N20.0 CALCULUS OF KIDNEY: Chronic | ICD-10-CM

## 2019-12-19 DIAGNOSIS — Z98.890 OTHER SPECIFIED POSTPROCEDURAL STATES: Chronic | ICD-10-CM

## 2019-12-19 DIAGNOSIS — Z98.89 OTHER SPECIFIED POSTPROCEDURAL STATES: Chronic | ICD-10-CM

## 2019-12-19 DIAGNOSIS — Z95.1 PRESENCE OF AORTOCORONARY BYPASS GRAFT: Chronic | ICD-10-CM

## 2019-12-19 LAB
ANION GAP SERPL CALC-SCNC: 12 MMOL/L — SIGNIFICANT CHANGE UP (ref 5–17)
BUN SERPL-MCNC: 14 MG/DL — SIGNIFICANT CHANGE UP (ref 7–23)
CALCIUM SERPL-MCNC: 8.6 MG/DL — SIGNIFICANT CHANGE UP (ref 8.4–10.5)
CHLORIDE SERPL-SCNC: 104 MMOL/L — SIGNIFICANT CHANGE UP (ref 96–108)
CO2 SERPL-SCNC: 20 MMOL/L — LOW (ref 22–31)
CREAT SERPL-MCNC: 0.82 MG/DL — SIGNIFICANT CHANGE UP (ref 0.5–1.3)
GLUCOSE SERPL-MCNC: 168 MG/DL — HIGH (ref 70–99)
POTASSIUM SERPL-MCNC: 4.7 MMOL/L — SIGNIFICANT CHANGE UP (ref 3.5–5.3)
POTASSIUM SERPL-SCNC: 4.7 MMOL/L — SIGNIFICANT CHANGE UP (ref 3.5–5.3)
SODIUM SERPL-SCNC: 136 MMOL/L — SIGNIFICANT CHANGE UP (ref 135–145)

## 2019-12-19 PROCEDURE — 73562 X-RAY EXAM OF KNEE 3: CPT | Mod: 26,LT

## 2019-12-19 PROCEDURE — 27447 TOTAL KNEE ARTHROPLASTY: CPT | Mod: LT

## 2019-12-19 PROCEDURE — 27447 TOTAL KNEE ARTHROPLASTY: CPT | Mod: AS,RT

## 2019-12-19 PROCEDURE — 99222 1ST HOSP IP/OBS MODERATE 55: CPT

## 2019-12-19 PROCEDURE — 88311 DECALCIFY TISSUE: CPT | Mod: 26

## 2019-12-19 PROCEDURE — 88305 TISSUE EXAM BY PATHOLOGIST: CPT | Mod: 26

## 2019-12-19 RX ORDER — SODIUM CHLORIDE 9 MG/ML
1000 INJECTION, SOLUTION INTRAVENOUS
Refills: 0 | Status: DISCONTINUED | OUTPATIENT
Start: 2019-12-19 | End: 2019-12-21

## 2019-12-19 RX ORDER — MAGNESIUM HYDROXIDE 400 MG/1
30 TABLET, CHEWABLE ORAL DAILY
Refills: 0 | Status: DISCONTINUED | OUTPATIENT
Start: 2019-12-19 | End: 2019-12-21

## 2019-12-19 RX ORDER — ONDANSETRON 8 MG/1
4 TABLET, FILM COATED ORAL EVERY 6 HOURS
Refills: 0 | Status: DISCONTINUED | OUTPATIENT
Start: 2019-12-19 | End: 2019-12-21

## 2019-12-19 RX ORDER — CEFAZOLIN SODIUM 1 G
2000 VIAL (EA) INJECTION ONCE
Refills: 0 | Status: DISCONTINUED | OUTPATIENT
Start: 2019-12-19 | End: 2019-12-19

## 2019-12-19 RX ORDER — ONDANSETRON 8 MG/1
4 TABLET, FILM COATED ORAL ONCE
Refills: 0 | Status: DISCONTINUED | OUTPATIENT
Start: 2019-12-19 | End: 2019-12-19

## 2019-12-19 RX ORDER — APIXABAN 2.5 MG/1
2.5 TABLET, FILM COATED ORAL EVERY 12 HOURS
Refills: 0 | Status: DISCONTINUED | OUTPATIENT
Start: 2019-12-20 | End: 2019-12-21

## 2019-12-19 RX ORDER — CEFAZOLIN SODIUM 1 G
3000 VIAL (EA) INJECTION ONCE
Refills: 0 | Status: COMPLETED | OUTPATIENT
Start: 2019-12-19 | End: 2019-12-19

## 2019-12-19 RX ORDER — ACETAMINOPHEN 500 MG
1000 TABLET ORAL EVERY 8 HOURS
Refills: 0 | Status: DISCONTINUED | OUTPATIENT
Start: 2019-12-20 | End: 2019-12-21

## 2019-12-19 RX ORDER — HYDROMORPHONE HYDROCHLORIDE 2 MG/ML
0.5 INJECTION INTRAMUSCULAR; INTRAVENOUS; SUBCUTANEOUS
Refills: 0 | Status: DISCONTINUED | OUTPATIENT
Start: 2019-12-19 | End: 2019-12-19

## 2019-12-19 RX ORDER — PANTOPRAZOLE SODIUM 20 MG/1
40 TABLET, DELAYED RELEASE ORAL
Refills: 0 | Status: DISCONTINUED | OUTPATIENT
Start: 2019-12-19 | End: 2019-12-21

## 2019-12-19 RX ORDER — POLYETHYLENE GLYCOL 3350 17 G/17G
17 POWDER, FOR SOLUTION ORAL DAILY
Refills: 0 | Status: DISCONTINUED | OUTPATIENT
Start: 2019-12-19 | End: 2019-12-21

## 2019-12-19 RX ORDER — ACETAMINOPHEN 500 MG
1000 TABLET ORAL EVERY 6 HOURS
Refills: 0 | Status: COMPLETED | OUTPATIENT
Start: 2019-12-19 | End: 2019-12-20

## 2019-12-19 RX ORDER — ALLOPURINOL 300 MG
100 TABLET ORAL AT BEDTIME
Refills: 0 | Status: DISCONTINUED | OUTPATIENT
Start: 2019-12-19 | End: 2019-12-21

## 2019-12-19 RX ORDER — LABETALOL HCL 100 MG
200 TABLET ORAL
Refills: 0 | Status: DISCONTINUED | OUTPATIENT
Start: 2019-12-19 | End: 2019-12-21

## 2019-12-19 RX ORDER — APREPITANT 80 MG/1
40 CAPSULE ORAL ONCE
Refills: 0 | Status: COMPLETED | OUTPATIENT
Start: 2019-12-19 | End: 2019-12-19

## 2019-12-19 RX ORDER — OXYCODONE HYDROCHLORIDE 5 MG/1
5 TABLET ORAL
Refills: 0 | Status: DISCONTINUED | OUTPATIENT
Start: 2019-12-19 | End: 2019-12-21

## 2019-12-19 RX ORDER — ATORVASTATIN CALCIUM 80 MG/1
40 TABLET, FILM COATED ORAL AT BEDTIME
Refills: 0 | Status: DISCONTINUED | OUTPATIENT
Start: 2019-12-19 | End: 2019-12-21

## 2019-12-19 RX ORDER — LOSARTAN POTASSIUM 100 MG/1
100 TABLET, FILM COATED ORAL DAILY
Refills: 0 | Status: DISCONTINUED | OUTPATIENT
Start: 2019-12-21 | End: 2019-12-21

## 2019-12-19 RX ORDER — SODIUM CHLORIDE 9 MG/ML
1000 INJECTION, SOLUTION INTRAVENOUS
Refills: 0 | Status: DISCONTINUED | OUTPATIENT
Start: 2019-12-19 | End: 2019-12-19

## 2019-12-19 RX ORDER — OXYCODONE HYDROCHLORIDE 5 MG/1
10 TABLET ORAL
Refills: 0 | Status: DISCONTINUED | OUTPATIENT
Start: 2019-12-19 | End: 2019-12-21

## 2019-12-19 RX ORDER — ACETAMINOPHEN 500 MG
1000 TABLET ORAL ONCE
Refills: 0 | Status: COMPLETED | OUTPATIENT
Start: 2019-12-19 | End: 2019-12-19

## 2019-12-19 RX ORDER — HYDROMORPHONE HYDROCHLORIDE 2 MG/ML
0.5 INJECTION INTRAMUSCULAR; INTRAVENOUS; SUBCUTANEOUS
Refills: 0 | Status: DISCONTINUED | OUTPATIENT
Start: 2019-12-19 | End: 2019-12-21

## 2019-12-19 RX ORDER — CHLORHEXIDINE GLUCONATE 213 G/1000ML
1 SOLUTION TOPICAL ONCE
Refills: 0 | Status: COMPLETED | OUTPATIENT
Start: 2019-12-19 | End: 2019-12-19

## 2019-12-19 RX ORDER — CELECOXIB 200 MG/1
100 CAPSULE ORAL EVERY 12 HOURS
Refills: 0 | Status: DISCONTINUED | OUTPATIENT
Start: 2019-12-19 | End: 2019-12-21

## 2019-12-19 RX ORDER — ASPIRIN/CALCIUM CARB/MAGNESIUM 324 MG
81 TABLET ORAL DAILY
Refills: 0 | Status: DISCONTINUED | OUTPATIENT
Start: 2019-12-20 | End: 2019-12-21

## 2019-12-19 RX ORDER — CEFAZOLIN SODIUM 1 G
3000 VIAL (EA) INJECTION EVERY 8 HOURS
Refills: 0 | Status: COMPLETED | OUTPATIENT
Start: 2019-12-19 | End: 2019-12-20

## 2019-12-19 RX ORDER — TRANEXAMIC ACID 100 MG/ML
1000 INJECTION, SOLUTION INTRAVENOUS ONCE
Refills: 0 | Status: DISCONTINUED | OUTPATIENT
Start: 2019-12-19 | End: 2019-12-19

## 2019-12-19 RX ORDER — AMLODIPINE BESYLATE 2.5 MG/1
10 TABLET ORAL DAILY
Refills: 0 | Status: DISCONTINUED | OUTPATIENT
Start: 2019-12-21 | End: 2019-12-21

## 2019-12-19 RX ORDER — SENNA PLUS 8.6 MG/1
2 TABLET ORAL AT BEDTIME
Refills: 0 | Status: DISCONTINUED | OUTPATIENT
Start: 2019-12-19 | End: 2019-12-21

## 2019-12-19 RX ADMIN — HYDROMORPHONE HYDROCHLORIDE 0.5 MILLIGRAM(S): 2 INJECTION INTRAMUSCULAR; INTRAVENOUS; SUBCUTANEOUS at 20:30

## 2019-12-19 RX ADMIN — Medication 400 MILLIGRAM(S): at 20:57

## 2019-12-19 RX ADMIN — HYDROMORPHONE HYDROCHLORIDE 0.5 MILLIGRAM(S): 2 INJECTION INTRAMUSCULAR; INTRAVENOUS; SUBCUTANEOUS at 19:00

## 2019-12-19 RX ADMIN — APREPITANT 40 MILLIGRAM(S): 80 CAPSULE ORAL at 13:33

## 2019-12-19 RX ADMIN — CHLORHEXIDINE GLUCONATE 1 APPLICATION(S): 213 SOLUTION TOPICAL at 13:33

## 2019-12-19 RX ADMIN — CELECOXIB 100 MILLIGRAM(S): 200 CAPSULE ORAL at 23:03

## 2019-12-19 RX ADMIN — SODIUM CHLORIDE 100 MILLILITER(S): 9 INJECTION, SOLUTION INTRAVENOUS at 19:07

## 2019-12-19 RX ADMIN — HYDROMORPHONE HYDROCHLORIDE 0.5 MILLIGRAM(S): 2 INJECTION INTRAMUSCULAR; INTRAVENOUS; SUBCUTANEOUS at 23:03

## 2019-12-19 RX ADMIN — HYDROMORPHONE HYDROCHLORIDE 0.5 MILLIGRAM(S): 2 INJECTION INTRAMUSCULAR; INTRAVENOUS; SUBCUTANEOUS at 19:15

## 2019-12-19 RX ADMIN — HYDROMORPHONE HYDROCHLORIDE 0.5 MILLIGRAM(S): 2 INJECTION INTRAMUSCULAR; INTRAVENOUS; SUBCUTANEOUS at 20:10

## 2019-12-19 RX ADMIN — HYDROMORPHONE HYDROCHLORIDE 0.5 MILLIGRAM(S): 2 INJECTION INTRAMUSCULAR; INTRAVENOUS; SUBCUTANEOUS at 23:33

## 2019-12-19 RX ADMIN — Medication 200 MILLIGRAM(S): at 23:03

## 2019-12-19 NOTE — DISCHARGE NOTE PROVIDER - NSDCFUSCHEDAPPT_GEN_ALL_CORE_FT
FRANCO RIVERO ; 12/31/2019 ; [facility] FRANCO RIVERO ; 12/31/2019 ; [facility] FRANCO RIVERO ; 12/31/2019 ; [facility] FRANCO RIVERO ; 12/31/2019 ; [facility] [address] [facility] FRANCO RIVERO ; 12/31/2019 ; NPP [facility]

## 2019-12-19 NOTE — DISCHARGE NOTE PROVIDER - NSDCACTIVITY_GEN_ALL_CORE
Walking - Outdoors allowed/Showering allowed/Stairs allowed/Walking - Indoors allowed Showering allowed/Walking - Outdoors allowed/Do not make important decisions/No heavy lifting/straining/Stairs allowed/Walking - Indoors allowed

## 2019-12-19 NOTE — PRE-OP CHECKLIST - RESPIRATORY RATE (BREATHS/MIN)
[facility] Cardiology H&P    Admitting Cardiologist: Dr. Sheryle Bonus    Primary Cardiologist: None     Primary Care Physician: Tata Warren    Subjective:     Janice Hadley is a 64 y.o. initially presented to [facility] Office early this afternoon with c/o of \"chest tightness\" that has been on-going for \"3-4\" days in which the patient felt \"it was time to go see a doctor. \" After his initial workup, he was directly admitted to [facility] for continued management. Upon presentation to this facility, the patient c/o of chest tightness and pressure for 3 to 4 days and \"aching\" left arm with no other associated symptoms. Per the [facility] office record the patient was diaphoretic, however, this is not the case upon presentation here. The patient reports no acute event in which the pain began, is not reproducable, and states that he was able to continue working at his job in the [employer]. It is noted that the patient was evaluated by Dr. Mariela Adams on 8/31/17 outpatient for chest pain in which the patient was to undergo a stress test and TTE, however, the patient states he never \"went back\" due to work schedule conflicts. Initial 12-lead ECG revealed sinus rhythm with questionable minimal st elevation in the lateral leads. However, his 12-lead ECG on 8/31/17 revealed similar morphology. Past Medical History:   Diagnosis Date    Acquired cyst of kidney     Arthritis     generalized     Balanoposthitis     BPH (benign prostatic hypertrophy)     Chronic pain     back     Claustrophobia     Depression     Dysuria     GERD (gastroesophageal reflux disease)     managed with medication     High cholesterol     Hypertension     managed with medication     Impotence of organic origin     Obesity (BMI 30-39. 9)     BMI 40.1    Obesity (BMI 30-39. 9)     Other testicular hypofunction     Tinnitus of both ears     Type 2 diabetes mellitus (HCC)     type 2, adverage glucose 150-200, symptomatic is unknown     Unspecified adverse effect of anesthesia     woke up in middle of procedure x 2    Unspecified sleep apnea     CPAP compliant    Wears dentures     uppers      Past Surgical History:   Procedure Laterality Date    HX ANKLE FRACTURE TX  5 yrs ago    right with pinning    HX CARPAL TUNNEL RELEASE  over 10 yrs ago    bilateral    HX CIRCUMCISION        Current Facility-Administered Medications   Medication Dose Route Frequency    albuterol (PROVENTIL HFA, VENTOLIN HFA, PROAIR HFA) inhaler 1 Puff  1 Puff Inhalation Q4H PRN    [START ON 4/20/2018] amLODIPine (NORVASC) tablet 5 mg  5 mg Oral 7am    atorvastatin (LIPITOR) tablet 40 mg  40 mg Oral QHS    . PHARMACY TO SUBSTITUTE PER PROTOCOL    Per Protocol    gabapentin (NEURONTIN) capsule 600 mg  600 mg Oral QID    glipiZIDE (GLUCOTROL) tablet 10 mg  10 mg Oral ACB&D    [START ON 4/20/2018] lisinopril (PRINIVIL, ZESTRIL) tablet 20 mg  20 mg Oral DAILY    oxyCODONE IR (OXY-IR) immediate release tablet 30 mg  30 mg Oral Q6H PRN    [START ON 4/20/2018] pantoprazole (PROTONIX) tablet 40 mg  40 mg Oral DAILY    [START ON 4/20/2018] tamsulosin (FLOMAX) capsule 0.4 mg  0.4 mg Oral DAILY    tiZANidine (ZANAFLEX) tablet 4 mg  4 mg Oral Q6H PRN    0.9% sodium chloride infusion  75 mL/hr IntraVENous CONTINUOUS    sodium chloride (NS) flush 5-10 mL  5-10 mL IntraVENous Q8H    sodium chloride (NS) flush 5-10 mL  5-10 mL IntraVENous PRN    [START ON 4/20/2018] aspirin chewable tablet 81 mg  81 mg Oral DAILY    nitroglycerin (NITROBID) 2 % ointment 1 Inch  1 Inch Topical Q6H    nitroglycerin (NITROSTAT) tablet 0.4 mg  0.4 mg SubLINGual Q5MIN PRN    morphine injection 2 mg  2 mg IntraVENous Q4H PRN     No Known Allergies   Social History   Substance Use Topics    Smoking status: Former Smoker     Packs/day: 3.00     Years: 20.00     Quit date: 10/5/1988    Smokeless tobacco: Never Used    Alcohol use Yes      Comment: social Family History   Problem Relation Age of Onset    Diabetes Sister     Diabetes Brother     Diabetes Sister     Diabetes Mother     Stroke Mother     Hypertension Mother     Diabetes Father     Hypertension Father     Stroke Father         Review of Systems  Gen: Denies fever, chills, malaise or fatigue. Appetite good. HEENT: Denies frequent headaches, dizzyness, visual disturbances, Neck pain or swallowing difficulty  Lungs: + shortness of breath \"at times\"  Cardiovascular: as above  GI: Denies hememesis, dark tarry stools, No prior Hx of GI bleed, Denies constipation  : Denies dysuria, no complaints of frequency, nocturia  Heme: No prior bleeding disorders, no prior Cancer  Neuro: Denies prior CVA, TIA. +neuropathic pain BLE r/t DM  Endocrine: + diabetes  Psychiatric: +anxiety    Family history: Father several CVAs,  of lymphoma    Objective:     Visit Vitals    BP (!) 151/97 (BP 1 Location: Left arm, BP Patient Position: Sitting)    Pulse 79    Temp 98.1 °F (36.7 °C)    Resp 18    SpO2 92%     General:Alert, cooperative, mildly anxious, appears stated age  Head: Normocephalic, without obvious abnormality, atraumatic. Eyes: Conjunctivae/corneas clear. PERRL, EOMs intact  Nose: Nares normal. Septum midline. Mucosa normal. No drainage or sinus tenderness. Throat: Lips, mucosa, and tongue normal. Teeth and gums normal.   Neck: Supple, symmetrical, trachea midline,  no carotid bruit and no JVD. Lungs: Clear to auscultation bilaterally. Chest wall: No tenderness or deformity. Heart: Regular rate and rhythm, S1, S2 normal, no murmur, click, rub or gallop. Abdomen: Soft, non-tender. Bowel sounds normal. No masses, No organomegaly. Extremities: Extremities normal, atraumatic, no cyanosis or edema. Pulses: 2+ and symmetric all extremities.     Skin: Skin color, texture, turgor normal. No rashes or lesions  Lymph nodes: Cervical, supraclavicular, and axillary nodes normal  Neurologic: No focal deficits identified                 ECG: as above    Data Review:    From [facility] Office 4/19/18 @ 13:51  WBC 7.5  Hgb 14.8  HCT 44.6  .0    Glucose 284  BUN 14  Cr 0.6  Na  133  CL 98.2  K 4.1  CO2 26  Calcium 9.4    CK-MB 1.1  Troponin < 0.05        Assessment / Plan     Principal Problem:    Chest pain, unspecified (8/31/2017)  -- CP workup, serial cardiac enzymes, add IV heparin, check D-dimer, and cardiac cath in AM.    Active Problems:    Obesity (BMI 30-39.9) (12/19/2015) --strongly encourage dietary modification      Overview: BMI 40.1      GERD (gastroesophageal reflux disease) () --- continue home medication      Overview: managed with medication        Type 2 diabetes mellitus ([facility] [address] (12/19/2015) -- Continue home medication, Metformin--hold for cath, add SSI coverage  and Glipizide, obtain HgbA1c      Overview: type 2,      High cholesterol () - on atorvastatin-check profile in am      Essential hypertension, benign (8/31/2017) - continue ACE and CCB, will add BB      Chronic low back pain (4/19/2018) -- continue home medication      Tamie Alarcon NP    ATTENDING ADDENDUM:    Patient seen and examined by me. Agree with above note by physician extender. Key findings are:  No CHF or palpitations, but recurrent SSCP, atypical and typical features, with dynamic ST and T wave changes inferiorly worrisome for underlying CAD. NO prior cath. Trop negative x 1. ECG with flat ST's upon arrival to 3rd floor. Has an element of GERD and musculoskeletal sounding CP, but multiple risk factors for CAD as well. CV- RRR without murmur  Lungs- Clear bilaterally  Abd- soft, nontender, nondistended  Ext- no edema    Plan: As above. The benefits and risks of left heart catheterization and possible percutaneous intervention were discussed with the patient.   Risks including but not limited to bleeding, infection, contrast allergy reaction, acute kidney injury, MI, stroke, emergent CABG and death were discussed. The patient understands the risks of the procedure and wishes to proceed.        Edmundo Stephens MD  [facility] Cardiology  Pager [phone]

## 2019-12-19 NOTE — CONSULT NOTE ADULT - PROBLEM SELECTOR RECOMMENDATION 6
normal creatinine clearance, yet on low dose maintenance xanthine oxidase inhibitor at home, continue allopurinol 100 mg PO daily

## 2019-12-19 NOTE — DISCHARGE NOTE PROVIDER - NSDCMRMEDTOKEN_GEN_ALL_CORE_FT
allopurinol 100 mg oral tablet: 1 tab(s) orally once a day (at bedtime)  amLODIPine 10 mg oral tablet: 1 tab(s) orally once a day AM  atorvastatin 40 mg oral tablet: 1 tab(s) orally once a day AM  Calcium 600+D oral tablet: 1 tab(s) orally once a day  diclofenac-misoprostol 75 mg-200 mcg oral tablet: 1 tab(s) orally 2 times a day  Ecotrin 325 mg oral delayed release tablet: 1 tab(s) orally once a day AM  labetalol 200 mg oral tablet: 1 tab(s) orally 2 times a day  losartan 100 mg oral tablet: 1 tab(s) orally once a day AM  Multiple Vitamins oral tablet: 1 tab(s) orally once a day acetaminophen 500 mg oral tablet: 2 tab(s) orally every 8 hours  allopurinol 100 mg oral tablet: 1 tab(s) orally once a day (at bedtime)  amLODIPine 10 mg oral tablet: 1 tab(s) orally once a day AM  apixaban 2.5 mg oral tablet: 1 tab(s) orally every 12 hours for 12 days.  Take w/ ecotrin 81 mg daily for 12 days  aspirin 81 mg oral delayed release tablet: 1 tab(s) orally once a day while on eliquis (12 days).  atorvastatin 40 mg oral tablet: 1 tab(s) orally once a day AM  Calcium 600+D oral tablet: 1 tab(s) orally once a day  celecoxib 100 mg oral capsule: 1 cap(s) orally every 12 hours.  take 2 hrs after ecotrin dose  Ecotrin: 162 milligram(s) orally every 12 hours for 28 days. Take 2 hrs before celebrex dose  Ecotrin 325 mg oral delayed release tablet: 1 tab(s) orally once a day AM.  Restart in 6 weeks once the course of 162mg every 12 hrs is complete  labetalol 200 mg oral tablet: 1 tab(s) orally 2 times a day  losartan 100 mg oral tablet: 1 tab(s) orally once a day AM  Multiple Vitamins oral tablet: 1 tab(s) orally once a day  oxyCODONE 10 mg oral tablet: 1 tab(s) orally every 3 hours, As needed, Moderate Pain (4 - 6)  oxyCODONE 5 mg oral tablet: 1 tab(s) orally every 3 hours, As needed, Mild Pain (1 - 3)  pantoprazole 40 mg oral delayed release tablet: 1 tab(s) orally once a day (before a meal)  polyethylene glycol 3350 oral powder for reconstitution: 17 gram(s) orally once a day  senna oral tablet: 2 tab(s) orally once a day (at bedtime), As needed, Constipation

## 2019-12-19 NOTE — PRE-OP CHECKLIST - BP NONINVASIVE DIASTOLIC (MM HG)
70 Heparin 5000 units SC q8h for DVT prophylaxis   General precautions she refused Heparin 5000 units SC q8h for DVT prophylaxis will place scd instead   General precautions

## 2019-12-19 NOTE — DISCHARGE NOTE PROVIDER - HOSPITAL COURSE
The patient is a 64  y.o. male with severe osteoarthritis of the left knee.  He was seen in the PST dept at [facility] and obtained the appropriate medical clearances.    After admission on 12/19/19 and receiving pre-operative parenteral prophylactic antibiotics, the patient  underwent an  uncomplicated left TKA by orthopedic surgeon Dr. Harsh Hebert.      A medical consultation from the Hospitalist service was obtained for post-operative medical co-management. Typical Physical & occupational therapy modalities post TKA were performed including ambulation training, range of motion, ADL's, and transfers. Eliquis 2.5 mg every 12 hrs, ecotrin 81 mg, and bilat venodynes, was given for DVT prophylaxis (caprini 10).    The patient had a clean appearing surgical incision with no sign of surgical site infections and had a stable neuro / vascular exam of the operated extremity.  After progression of mobility guided by the PT/ OT staff,  the patient was felt to benefit from further rehabilitative care for restoration to level of function. This was felt to best be accomplished in a rehab facility.  Discharge and Orthopedic Care instructions were delineated in the Discharge Plan and reviewed with the patient. All medications were delineated in the medication reconciliation tool and key points were reviewed with the patient. They were deemed stable from an Orthopedic & medical standpoint for discharge today.    Upon discharge from the rehab facility they will be  following up with Dr. Hebert for office  follow up Orthopedic care.

## 2019-12-19 NOTE — DISCHARGE NOTE PROVIDER - CARE PROVIDER_API CALL
BRIJESH Hebert (MD)  Orthopaedic Surgery  [address], [address]  [place] [address]  Phone: [phone]  Fax: [phone]  Established Patient  Scheduled Appointment: 12/31/2019 10:30 AM

## 2019-12-19 NOTE — DISCHARGE NOTE PROVIDER - NSDCCPCAREPLAN_GEN_ALL_CORE_FT
PRINCIPAL DISCHARGE DIAGNOSIS  Diagnosis: Primary osteoarthritis of left knee  Assessment and Plan of Treatment: left TKA   Physical Therapy/Occupational Therapy for ambulation, transfers, stairs, ADL's, Range of Motion Exercises, Isometrics.  Full weight bearing as tolerated with rolling walker  Range of Motion Goals: Flexion 120 degrees; Extension 0 degrees  Keep incision clean and dry.  Suture/prineo dressing removal 14 days after surgery at rehab facility or Surgeon's office  May shower post-op day #5 if no drainage from incision        SECONDARY DISCHARGE DIAGNOSES  Diagnosis: High cholesterol  Assessment and Plan of Treatment: atorvastatin    Diagnosis: HTN (hypertension)  Assessment and Plan of Treatment: losartan, labetolol, amlodipine    Diagnosis: Gout  Assessment and Plan of Treatment: allopurinol PRINCIPAL DISCHARGE DIAGNOSIS  Diagnosis: Primary osteoarthritis of left knee  Assessment and Plan of Treatment: left TKA   Physical Therapy/Occupational Therapy for ambulation, transfers, stairs, ADL's, Range of Motion Exercises, Isometrics.  Full weight bearing as tolerated with rolling walker  Range of Motion Goals: Flexion 120 degrees; Extension 0 degrees  Keep incision clean and dry.  Suture/prineo dressing removal 14 days after surgery at rehab facility or Surgeon's office  May shower post-op day #5 if no drainage from incision.  dvt prophylaxis :  eliquis 2.5 mg every 12 hrs + ecotrin 81 mg daily for 12 days, then ecotrin 162 mg every 12 hrs for 28 days (take 2 hrs befor celebrex), then resume ecotrin 325 mg daily thereafter for heart protection.        SECONDARY DISCHARGE DIAGNOSES  Diagnosis: High cholesterol  Assessment and Plan of Treatment: atorvastatin    Diagnosis: HTN (hypertension)  Assessment and Plan of Treatment: losartan, labetolol, amlodipine    Diagnosis: Gout  Assessment and Plan of Treatment: allopurinol

## 2019-12-19 NOTE — DISCHARGE NOTE PROVIDER - INSTRUCTIONS
regular  For Constipation :   • Increase your water intake. Drink at least 8 glasses of water daily.  • Try adding fiber to your diet by eating fruits, vegetables and foods that are rich in grains.  • If you do experience constipation, you may take an over-the-counter stool softener/laxative such as Shayy Colace, Senekot, miralax or  Milk of Magnesia.

## 2019-12-19 NOTE — PRE-OP CHECKLIST - DNR CLARIFICATION FORM COMPLETED
Health Maintenance Summary     Topic Due On Due Status Completed On    MAMMOGRAM - BREAST CANCER SCREENING Sep 6, 2019 Not Due Sep 6, 2017    Pap Smear - Cervical Cancer Screening  Aug 10, 2022 Not Due Aug 10, 2017    Colorectal Cancer Screening - Colonoscopy Mar 30, 2022 Not Due Mar 30, 2017    Immunization - TDAP Pregnancy  Hidden     IMMUNIZATION - DTaP/Tdap/Td Oct 26, 2020 Not Due Oct 26, 2010    Immunization-Influenza Sep 1, 2017 Due On Dec 9, 2011    Hepatitis C Screening Jun 19, 2010 Overdue           Patient is due for topics as listed above, she wishes to discuss with provider .       n/a

## 2019-12-19 NOTE — DISCHARGE NOTE PROVIDER - PROVIDER TOKENS
PROVIDER:[TOKEN:[[id number]],SCHEDULEDAPPT:[12/31/2019],SCHEDULEDAPPTTIME:[10:30 AM],ESTABLISHEDPATIENT:[T]]

## 2019-12-19 NOTE — CONSULT NOTE ADULT - SUBJECTIVE AND OBJECTIVE BOX
This is a 65 y/o M with PMH of HTN, Dyslipidemia CAD s/p CABG, Gout, Nephrolithiasis s/p Lithotripsy, C-PAP, PN, Morbid Obesity, and OA who presented for left total knee arthroplasty. Patient was complaining of moderate left knee pain that worsen on climbing stairs when he retires to bed, temporary relief with NSAIDs, had HA and steroid local injection with little help, seen by ortho & was scheduled for today to replace his left knee.   Routine post-op medical evaluation was requested. Patient denies any chest pain, SOB, palpitations, dizziness, headache, new paranesthesias, or focal muscle weakness.        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  Renal calculi  Obstructive sleep apnea: CPAP  H/O: osteoarthritis  Lumbar disc disease with radiculopathy  Gout  Neuropathy: Bilateral Feet since   Kidney stone: s/w ESWL pt unsure site  S/P lumbar laminectomy: Fusion L3-L5   S/P CABG x 3: 17  S/P lumbar discectomy: - ,L5  Aug 2013  S/P revision of total knee, right: x2-  &amp;   H/O lithotripsy: x1  S/P knee replacement: - right x 3  - revisions in  &amp;   S/P tonsillectomy and adenoidectomy: as child  Hernia: repair, left inguinal   Cholecystitis: cholecycstectomy   History of Total Hip Replacement: - bilateral THR  S/P Left Inguinal Hernia Repair            SOCIAL HISTORY:     , current everyday cigar smoker, social ETOH, no drug abuse.          FAMILY HISTORY:    Family history of coronary artery disease: brother- age 50+- Coronary Artery Stents  Family history of pancreatic cancer (Sibling): brother   Family history of diabetes mellitus type II: mother- - hyperlipidemia and Hypertension.  Family history of coronary artery disease: HLD/Angina. Fatal MI age 73.            MEDICATIONS  (STANDING):    acetaminophen  IVPB .. 1000 milliGRAM(s) IV Intermittent every 6 hours  ceFAZolin   IVPB 3000 milliGRAM(s) IV Intermittent every 8 hours  lactated ringers. 1000 milliLiter(s) (100 mL/Hr) IV Continuous <Continuous>            MEDICATIONS  (PRN):    HYDROmorphone  Injectable 0.5 milliGRAM(s) IV Push every 10 minutes PRN Moderate Pain (4 - 6)  ondansetron Injectable 4 milliGRAM(s) IV Push once PRN Nausea and/or Vomiting      Home Medications:    allopurinol 100 mg oral tablet: 1 tab(s) orally once a day (at bedtime) (19 Dec 2019 13:26)  amLODIPine 10 mg oral tablet: 1 tab(s) orally once a day AM (19 Dec 2019 13:26)  atorvastatin 40 mg oral tablet: 1 tab(s) orally once a day AM (19 Dec 2019 13:26)  Calcium 600+D oral tablet: 1 tab(s) orally once a day (19 Dec 2019 13:26)  diclofenac-misoprostol 75 mg-200 mcg oral tablet: 1 tab(s) orally 2 times a day (19 Dec 2019 13:26)  Ecotrin 325 mg oral delayed release tablet: 1 tab(s) orally once a day AM (19 Dec 2019 13:26)  labetalol 200 mg oral tablet: 1 tab(s) orally 2 times a day (19 Dec 2019 13:25)  losartan 100 mg oral tablet: 1 tab(s) orally once a day AM (19 Dec 2019 13:25)  Multiple Vitamins oral tablet: 1 tab(s) orally once a day (19 Dec 2019 13:26)          REVIEW OF SYSTEMS:    CONSTITUTIONAL: No fever or chills, no weight loss.  EYES: No eye pain, visual disturbances, or discharge.  ENMT:  No difficulty hearing, tinnitus, vertigo; No sinus or throat pain.  NECK: No pain or stiffness.	  RESPIRATORY: No cough, wheezing, or hemoptysis; No shortness of breath.  CARDIOVASCULAR: No chest pain, palpitations, dizziness, or leg swelling.  GASTROINTESTINAL: No abdominal pain, no nausea, vomiting, or hematemesis; No diarrhea or Change in bowel habits. No melena or hematochezia.  GENITOURINARY: No dysuria, frequency, hematuria, or incontinence.  NEUROLOGICAL: No headaches, focal muscle weakness, new numbness other than both feet, or tremors.  SKIN: No itching, burning or rashes.  MUSCULOSKELETAL: No joint swelling or pain other than surgical site, well controlled.  PSYCHIATRIC: No depression, anxiety, or agitation.  HEME/LYMPH: No easy bruising, bleeding gums, or nose bleed.  ALLERGY AND IMMUNOLOGIC: No hives or eczema        .    Vital signs:  Vital Signs Last 24 Hrs  T(C): 37.7 (19 Dec 2019 18:47), Max: 37.7 (19 Dec 2019 18:47)  T(F): 99.9 (19 Dec 2019 18:47), Max: 99.9 (19 Dec 2019 18:47)  HR: 80 (19 Dec 2019 21:30) (64 - 80)  BP: 145/67 (19 Dec 2019 21:30) (111/60 - 145/67)  BP(mean): --  RR: 21 (19 Dec 2019 21:30) (16 - 25)  SpO2: 99% (19 Dec 2019 21:30) (95% - 99%)            PHYSICAL EXAM:  		  GENERAL: NAD, well-groomed, well-developed, morbidly obese.  HEAD:  Atraumatic, Norm cephalic.  EYES: PERRLA, conjunctiva clear.  ENMT: no nasal discharge, no brooke-pharyngeal erythema or exudates, MMM.   NECK: Supple, No JVD.  NERVOUS SYSTEM:  Alert & oriented X3, neurologically intact grossly.  CHEST/LUNG: GDecrease air entry B/L, no rales, rhonchi, or wheezing.  HEART: Normal S1 & S2, no murmurs, or extra sounds.  ABDOMEN: Soft, obese, non-tender, non-distended; bowel sounds present, (+) reducible epigastric hernia, no palpable masses or organomegaly.  EXTREMITIES:  No clubbing, cyanosis, or edema.  VASCULAR: 2+ radial, PTA pulses B/L.  SKIN: No rashes or lesions.  PSYCH: normal affect & behavior.              LABs:               -    136  |  104  |  14  ----------------------------<  168<H>  4.7   |  20<L>  |  0.82    Ca    8.6      19 Dec 2019 20:10 This is a 65 y/o M with PMH of HTN, Dyslipidemia CAD s/p CABG, ASIYA on C-PAP, Gout, Nephrolithiasis s/p Lithotripsy, PN, Morbid Obesity, and OA, who presented for left total knee arthroplasty. Patient was complaining of moderate left knee pain that worsen on climbing stairs when he retires to bed, temporary relief with NSAIDs, had HA and steroid local injection with little help, seen by ortho & was scheduled for today to replace his left knee.   Routine post-op medical evaluation was requested. Patient denies any chest pain, SOB, palpitations, dizziness, headache, new paranesthesias, or focal muscle weakness.        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  Renal calculi  Obstructive sleep apnea: CPAP  H/O: osteoarthritis  Lumbar disc disease with radiculopathy  Gout  Neuropathy: Bilateral Feet since   Kidney stone: s/w ESWL pt unsure site  S/P lumbar laminectomy: Fusion L3-L5   S/P CABG x 3: 17  S/P lumbar discectomy: - ,L5  Aug 2013  S/P revision of total knee, right: x2-  &amp;   H/O lithotripsy: x1  S/P knee replacement: - right x 3  - revisions in  &amp;   S/P tonsillectomy and adenoidectomy: as child  Hernia: repair, left inguinal   Cholecystitis: cholecycstectomy   History of Total Hip Replacement: - bilateral THR  S/P Left Inguinal Hernia Repair            SOCIAL HISTORY:     , current everyday cigar smoker, social ETOH, no drug abuse.          FAMILY HISTORY:    Family history of coronary artery disease: brother- age 50+- Coronary Artery Stents  Family history of pancreatic cancer (Sibling): brother   Family history of diabetes mellitus type II: mother- - hyperlipidemia and Hypertension.  Family history of coronary artery disease: HLD/Angina. Fatal MI age 73.            MEDICATIONS  (STANDING):    acetaminophen  IVPB .. 1000 milliGRAM(s) IV Intermittent every 6 hours  ceFAZolin   IVPB 3000 milliGRAM(s) IV Intermittent every 8 hours  lactated ringers. 1000 milliLiter(s) (100 mL/Hr) IV Continuous <Continuous>            MEDICATIONS  (PRN):    HYDROmorphone  Injectable 0.5 milliGRAM(s) IV Push every 10 minutes PRN Moderate Pain (4 - 6)  ondansetron Injectable 4 milliGRAM(s) IV Push once PRN Nausea and/or Vomiting      Home Medications:    allopurinol 100 mg oral tablet: 1 tab(s) orally once a day (at bedtime) (19 Dec 2019 13:26)  amLODIPine 10 mg oral tablet: 1 tab(s) orally once a day AM (19 Dec 2019 13:26)  atorvastatin 40 mg oral tablet: 1 tab(s) orally once a day AM (19 Dec 2019 13:26)  Calcium 600+D oral tablet: 1 tab(s) orally once a day (19 Dec 2019 13:26)  diclofenac-misoprostol 75 mg-200 mcg oral tablet: 1 tab(s) orally 2 times a day (19 Dec 2019 13:26)  Ecotrin 325 mg oral delayed release tablet: 1 tab(s) orally once a day AM (19 Dec 2019 13:26)  labetalol 200 mg oral tablet: 1 tab(s) orally 2 times a day (19 Dec 2019 13:25)  losartan 100 mg oral tablet: 1 tab(s) orally once a day AM (19 Dec 2019 13:25)  Multiple Vitamins oral tablet: 1 tab(s) orally once a day (19 Dec 2019 13:26)          REVIEW OF SYSTEMS:    CONSTITUTIONAL: No fever or chills, no weight loss.  EYES: No eye pain, visual disturbances, or discharge.  ENMT:  No difficulty hearing, tinnitus, vertigo; No sinus or throat pain.  NECK: No pain or stiffness.	  RESPIRATORY: No cough, wheezing, or hemoptysis; No shortness of breath.  CARDIOVASCULAR: No chest pain, palpitations, dizziness, or leg swelling.  GASTROINTESTINAL: No abdominal pain, no nausea, vomiting, or hematemesis; No diarrhea or Change in bowel habits. No melena or hematochezia.  GENITOURINARY: No dysuria, frequency, hematuria, or incontinence.  NEUROLOGICAL: No headaches, focal muscle weakness, new numbness other than both feet, or tremors.  SKIN: No itching, burning or rashes.  MUSCULOSKELETAL: No joint swelling or pain other than surgical site, well controlled.  PSYCHIATRIC: No depression, anxiety, or agitation.  HEME/LYMPH: No easy bruising, bleeding gums, or nose bleed.  ALLERGY AND IMMUNOLOGIC: No hives or eczema        .    Vital signs:  Vital Signs Last 24 Hrs  T(C): 37.7 (19 Dec 2019 18:47), Max: 37.7 (19 Dec 2019 18:47)  T(F): 99.9 (19 Dec 2019 18:47), Max: 99.9 (19 Dec 2019 18:47)  HR: 80 (19 Dec 2019 21:30) (64 - 80)  BP: 145/67 (19 Dec 2019 21:30) (111/60 - 145/67)  BP(mean): --  RR: 21 (19 Dec 2019 21:30) (16 - 25)  SpO2: 99% (19 Dec 2019 21:30) (95% - 99%)            PHYSICAL EXAM:  		  GENERAL: NAD, well-groomed, well-developed, morbidly obese.  HEAD:  Atraumatic, Norm cephalic.  EYES: PERRLA, conjunctiva clear.  ENMT: no nasal discharge, no brooke-pharyngeal erythema or exudates, MMM.   NECK: Supple, No JVD.  NERVOUS SYSTEM:  Alert & oriented X3, neurologically intact grossly.  CHEST/LUNG: GDecrease air entry B/L, no rales, rhonchi, or wheezing.  HEART: Normal S1 & S2, no murmurs, or extra sounds.  ABDOMEN: Soft, obese, non-tender, non-distended; bowel sounds present, (+) reducible epigastric hernia, no palpable masses or organomegaly.  EXTREMITIES:  No clubbing, cyanosis, or edema.  VASCULAR: 2+ radial, PTA pulses B/L.  SKIN: No rashes or lesions.  PSYCH: normal affect & behavior.              LABs:               12-    136  |  104  |  14  ----------------------------<  168<H>  4.7   |  20<L>  |  0.82    Ca    8.6      19 Dec 2019 20:10

## 2019-12-19 NOTE — DISCHARGE NOTE PROVIDER - NSDCCPTREATMENT_GEN_ALL_CORE_FT
PRINCIPAL PROCEDURE  Procedure: Left total knee arthroplasty  Findings and Treatment: osteoarthritis left knee

## 2019-12-20 ENCOUNTER — TRANSCRIPTION ENCOUNTER (OUTPATIENT)
Age: 64
End: 2019-12-20

## 2019-12-20 DIAGNOSIS — E66.01 MORBID (SEVERE) OBESITY DUE TO EXCESS CALORIES: ICD-10-CM

## 2019-12-20 DIAGNOSIS — M10.9 GOUT, UNSPECIFIED: ICD-10-CM

## 2019-12-20 DIAGNOSIS — G47.33 OBSTRUCTIVE SLEEP APNEA (ADULT) (PEDIATRIC): ICD-10-CM

## 2019-12-20 DIAGNOSIS — I25.10 ATHEROSCLEROTIC HEART DISEASE OF NATIVE CORONARY ARTERY WITHOUT ANGINA PECTORIS: ICD-10-CM

## 2019-12-20 DIAGNOSIS — I10 ESSENTIAL (PRIMARY) HYPERTENSION: ICD-10-CM

## 2019-12-20 DIAGNOSIS — M17.12 UNILATERAL PRIMARY OSTEOARTHRITIS, LEFT KNEE: ICD-10-CM

## 2019-12-20 DIAGNOSIS — Z29.9 ENCOUNTER FOR PROPHYLACTIC MEASURES, UNSPECIFIED: ICD-10-CM

## 2019-12-20 DIAGNOSIS — E78.5 HYPERLIPIDEMIA, UNSPECIFIED: ICD-10-CM

## 2019-12-20 LAB
ANION GAP SERPL CALC-SCNC: 11 MMOL/L — SIGNIFICANT CHANGE UP (ref 5–17)
BUN SERPL-MCNC: 12 MG/DL — SIGNIFICANT CHANGE UP (ref 7–23)
CALCIUM SERPL-MCNC: 8.7 MG/DL — SIGNIFICANT CHANGE UP (ref 8.4–10.5)
CHLORIDE SERPL-SCNC: 101 MMOL/L — SIGNIFICANT CHANGE UP (ref 96–108)
CO2 SERPL-SCNC: 23 MMOL/L — SIGNIFICANT CHANGE UP (ref 22–31)
CREAT SERPL-MCNC: 0.71 MG/DL — SIGNIFICANT CHANGE UP (ref 0.5–1.3)
GLUCOSE SERPL-MCNC: 164 MG/DL — HIGH (ref 70–99)
HCT VFR BLD CALC: 42.7 % — SIGNIFICANT CHANGE UP (ref 39–50)
HGB BLD-MCNC: 14.8 G/DL — SIGNIFICANT CHANGE UP (ref 13–17)
MAGNESIUM SERPL-MCNC: 1.7 MG/DL — SIGNIFICANT CHANGE UP (ref 1.6–2.6)
MCHC RBC-ENTMCNC: 29.9 PG — SIGNIFICANT CHANGE UP (ref 27–34)
MCHC RBC-ENTMCNC: 34.7 GM/DL — SIGNIFICANT CHANGE UP (ref 32–36)
MCV RBC AUTO: 86.3 FL — SIGNIFICANT CHANGE UP (ref 80–100)
NRBC # BLD: 0 /100 WBCS — SIGNIFICANT CHANGE UP (ref 0–0)
PLATELET # BLD AUTO: 220 K/UL — SIGNIFICANT CHANGE UP (ref 150–400)
POTASSIUM SERPL-MCNC: 4.2 MMOL/L — SIGNIFICANT CHANGE UP (ref 3.5–5.3)
POTASSIUM SERPL-SCNC: 4.2 MMOL/L — SIGNIFICANT CHANGE UP (ref 3.5–5.3)
RBC # BLD: 4.95 M/UL — SIGNIFICANT CHANGE UP (ref 4.2–5.8)
RBC # FLD: 12.4 % — SIGNIFICANT CHANGE UP (ref 10.3–14.5)
SODIUM SERPL-SCNC: 135 MMOL/L — SIGNIFICANT CHANGE UP (ref 135–145)
WBC # BLD: 17.74 K/UL — HIGH (ref 3.8–10.5)
WBC # FLD AUTO: 17.74 K/UL — HIGH (ref 3.8–10.5)

## 2019-12-20 PROCEDURE — 99233 SBSQ HOSP IP/OBS HIGH 50: CPT

## 2019-12-20 RX ORDER — ASPIRIN/CALCIUM CARB/MAGNESIUM 324 MG
1 TABLET ORAL
Qty: 0 | Refills: 0 | DISCHARGE
Start: 2019-12-20

## 2019-12-20 RX ORDER — TRAZODONE HCL 50 MG
0 TABLET ORAL
Qty: 0 | Refills: 0 | DISCHARGE

## 2019-12-20 RX ORDER — ACETAMINOPHEN 500 MG
2 TABLET ORAL
Qty: 0 | Refills: 0 | DISCHARGE
Start: 2019-12-20

## 2019-12-20 RX ORDER — ASPIRIN/CALCIUM CARB/MAGNESIUM 324 MG
1 TABLET ORAL
Qty: 0 | Refills: 0 | DISCHARGE

## 2019-12-20 RX ORDER — CELECOXIB 200 MG/1
1 CAPSULE ORAL
Qty: 0 | Refills: 0 | DISCHARGE
Start: 2019-12-20

## 2019-12-20 RX ORDER — DICLOFENAC SODIUM/MISOPROSTOL 50 MG-200
1 TABLET,IMMEDIATE,DELAY RELEASE,BIPHASE ORAL
Qty: 0 | Refills: 0 | DISCHARGE

## 2019-12-20 RX ORDER — ASPIRIN/CALCIUM CARB/MAGNESIUM 324 MG
162 TABLET ORAL
Qty: 0 | Refills: 0 | DISCHARGE

## 2019-12-20 RX ORDER — APIXABAN 2.5 MG/1
1 TABLET, FILM COATED ORAL
Qty: 0 | Refills: 0 | DISCHARGE
Start: 2019-12-20

## 2019-12-20 RX ORDER — PANTOPRAZOLE SODIUM 20 MG/1
1 TABLET, DELAYED RELEASE ORAL
Qty: 0 | Refills: 0 | DISCHARGE
Start: 2019-12-20

## 2019-12-20 RX ORDER — OXYCODONE HYDROCHLORIDE 5 MG/1
1 TABLET ORAL
Qty: 0 | Refills: 0 | DISCHARGE
Start: 2019-12-20

## 2019-12-20 RX ORDER — POLYETHYLENE GLYCOL 3350 17 G/17G
17 POWDER, FOR SOLUTION ORAL
Qty: 0 | Refills: 0 | DISCHARGE
Start: 2019-12-20

## 2019-12-20 RX ORDER — SENNA PLUS 8.6 MG/1
2 TABLET ORAL
Qty: 0 | Refills: 0 | DISCHARGE
Start: 2019-12-20

## 2019-12-20 RX ADMIN — OXYCODONE HYDROCHLORIDE 10 MILLIGRAM(S): 5 TABLET ORAL at 15:30

## 2019-12-20 RX ADMIN — OXYCODONE HYDROCHLORIDE 10 MILLIGRAM(S): 5 TABLET ORAL at 06:50

## 2019-12-20 RX ADMIN — OXYCODONE HYDROCHLORIDE 10 MILLIGRAM(S): 5 TABLET ORAL at 02:07

## 2019-12-20 RX ADMIN — Medication 200 MILLIGRAM(S): at 06:50

## 2019-12-20 RX ADMIN — Medication 1000 MILLIGRAM(S): at 09:30

## 2019-12-20 RX ADMIN — CELECOXIB 100 MILLIGRAM(S): 200 CAPSULE ORAL at 21:22

## 2019-12-20 RX ADMIN — Medication 1000 MILLIGRAM(S): at 15:30

## 2019-12-20 RX ADMIN — OXYCODONE HYDROCHLORIDE 10 MILLIGRAM(S): 5 TABLET ORAL at 14:44

## 2019-12-20 RX ADMIN — APIXABAN 2.5 MILLIGRAM(S): 2.5 TABLET, FILM COATED ORAL at 21:22

## 2019-12-20 RX ADMIN — Medication 1000 MILLIGRAM(S): at 23:00

## 2019-12-20 RX ADMIN — OXYCODONE HYDROCHLORIDE 10 MILLIGRAM(S): 5 TABLET ORAL at 18:47

## 2019-12-20 RX ADMIN — Medication 400 MILLIGRAM(S): at 08:59

## 2019-12-20 RX ADMIN — Medication 200 MILLIGRAM(S): at 06:51

## 2019-12-20 RX ADMIN — Medication 1000 MILLIGRAM(S): at 22:58

## 2019-12-20 RX ADMIN — OXYCODONE HYDROCHLORIDE 10 MILLIGRAM(S): 5 TABLET ORAL at 23:30

## 2019-12-20 RX ADMIN — CELECOXIB 100 MILLIGRAM(S): 200 CAPSULE ORAL at 11:00

## 2019-12-20 RX ADMIN — Medication 400 MILLIGRAM(S): at 02:08

## 2019-12-20 RX ADMIN — Medication 200 MILLIGRAM(S): at 18:44

## 2019-12-20 RX ADMIN — OXYCODONE HYDROCHLORIDE 10 MILLIGRAM(S): 5 TABLET ORAL at 11:10

## 2019-12-20 RX ADMIN — APIXABAN 2.5 MILLIGRAM(S): 2.5 TABLET, FILM COATED ORAL at 08:58

## 2019-12-20 RX ADMIN — Medication 81 MILLIGRAM(S): at 11:10

## 2019-12-20 RX ADMIN — Medication 100 MILLIGRAM(S): at 21:22

## 2019-12-20 RX ADMIN — CELECOXIB 100 MILLIGRAM(S): 200 CAPSULE ORAL at 21:23

## 2019-12-20 RX ADMIN — OXYCODONE HYDROCHLORIDE 10 MILLIGRAM(S): 5 TABLET ORAL at 02:37

## 2019-12-20 RX ADMIN — Medication 1000 MILLIGRAM(S): at 14:44

## 2019-12-20 RX ADMIN — OXYCODONE HYDROCHLORIDE 10 MILLIGRAM(S): 5 TABLET ORAL at 07:20

## 2019-12-20 RX ADMIN — OXYCODONE HYDROCHLORIDE 10 MILLIGRAM(S): 5 TABLET ORAL at 19:28

## 2019-12-20 RX ADMIN — CELECOXIB 100 MILLIGRAM(S): 200 CAPSULE ORAL at 10:29

## 2019-12-20 RX ADMIN — OXYCODONE HYDROCHLORIDE 10 MILLIGRAM(S): 5 TABLET ORAL at 22:58

## 2019-12-20 RX ADMIN — PANTOPRAZOLE SODIUM 40 MILLIGRAM(S): 20 TABLET, DELAYED RELEASE ORAL at 06:50

## 2019-12-20 RX ADMIN — SODIUM CHLORIDE 125 MILLILITER(S): 9 INJECTION, SOLUTION INTRAVENOUS at 10:30

## 2019-12-20 RX ADMIN — OXYCODONE HYDROCHLORIDE 10 MILLIGRAM(S): 5 TABLET ORAL at 11:40

## 2019-12-20 RX ADMIN — ATORVASTATIN CALCIUM 40 MILLIGRAM(S): 80 TABLET, FILM COATED ORAL at 21:22

## 2019-12-20 NOTE — CONSULT NOTE ADULT - ASSESSMENT
65y/o over weight. Seen at [facility] telemetry  History HTN, gout, OA, high cholesterol, cigar smoker, lower extremity neuropathy, sleep apnea  Father  of CAD  S/P kidney stones with surgery  S/P left hernia surgery  S/P right knee surgery with revisions  S/P gall-bladder surgery  S/P B/L hip surgery  S/P T&A  S/P lower back surgery  17 S/P CABG x3 by Dr. Lopez at [facility]  19 TLS Echocardiogram compatible with enlarged LV and EF-45-50%  19 s/p Cardiac catheterization by Dr. Lugo at [facility] compatible with patent grafts    Admitted and on 19 s/p left knee surgery  Earlier today had 13-beat asymptomatic run of NSVT  Again recent cardiac catheterization with patent grafts  WBC-17.74    Plan:  - Would continue to monitor for at least another 24 hours  - Check Mag and TSH  - Continue Labetalol  - Continue Amlodipine, Losartan for blood pressure  - Also on ASA, Atorvastatin
Pt stable postop left TKR.  Hx ASIYA on cpap.
Asymptomatic post-op 63 y/o M with PMH of HTN, Dyslipidemia CAD s/p CABG, ASIYA on C-PAP, Gout, Nephrolithiasis s/p Lithotripsy, PN, Morbid Obesity, and OA.

## 2019-12-20 NOTE — PHYSICAL THERAPY INITIAL EVALUATION ADULT - GAIT DEVIATIONS NOTED, PT EVAL
decreased terrie/decreased step length/decreased stride length/decreased velocity of limb motion/decreased weight-shifting ability

## 2019-12-20 NOTE — DISCHARGE NOTE NURSING/CASE MANAGEMENT/SOCIAL WORK - PATIENT PORTAL LINK FT
You can access the FollowMyHealth Patient Portal offered by [facility] by registering at the following website: [email or web address]. By joining [email or web address] FollowMyHealth portal, you will also be able to view your health information using other applications (apps) compatible with our system.

## 2019-12-20 NOTE — CONSULT NOTE ADULT - PROBLEM SELECTOR RECOMMENDATION 2
continue meds
on C-PAP at home, doesn't recall his C-PAP sittings, started him empirically on C-PAP 8 cm H2O FIO2 0.21, pulmonary consult with Dr. Arango was called

## 2019-12-20 NOTE — CONSULT NOTE ADULT - PROBLEM SELECTOR RECOMMENDATION 3
watch bp
s/p CABG, has coronary angio 3 days ago showed patency of the all 3 grafts, continue ASA & Labetalol, in addition to Atorvastatin.

## 2019-12-20 NOTE — PHYSICAL THERAPY INITIAL EVALUATION ADULT - ADDITIONAL COMMENTS
Pt lives with son in a house w/ 3 steps to enter with rail, 15 steps inside to bedroom with rail.  Pt has straight cane and rolling walker

## 2019-12-20 NOTE — OCCUPATIONAL THERAPY INITIAL EVALUATION ADULT - ADDITIONAL COMMENTS
3 GRIFFIN with 1 HR, 14 steps to bedroom + RW, commode, hip kit, stall shower with grab bars, SAC 3 GRIFFIN with 1 HR, 14 steps to bedroom + RW, commode, hip kit, stall shower with grab bars, SAC hip kit

## 2019-12-20 NOTE — PROGRESS NOTE ADULT - SUBJECTIVE AND OBJECTIVE BOX
Procedure: Left TKR  POD #: 1  S: Pt without complaints. No SOB,CP, N/V. Tolerated Diet well.   Pain comfortable  on  Interval Rx.   No BM yet, + flatus, No abdominal pain. +voiding  Pain Rx:  acetaminophen   Tablet .. 1000 milliGRAM(s) Oral every 8 hours  acetaminophen  IVPB .. 1000 milliGRAM(s) IV Intermittent every 6 hours  celecoxib 100 milliGRAM(s) Oral every 12 hours  HYDROmorphone  Injectable 0.5 milliGRAM(s) IV Push every 3 hours PRN  ondansetron Injectable 4 milliGRAM(s) IV Push every 6 hours PRN  oxyCODONE    IR 5 milliGRAM(s) Oral every 3 hours PRN  oxyCODONE    IR 10 milliGRAM(s) Oral every 3 hours PRN    O: General: On exam, No Apparent Distress  Vital Signs Last 24 Hrs  T(C): 36.6 (20 Dec 2019 03:06), Max: 37.7 (19 Dec 2019 18:47)  T(F): 97.9 (20 Dec 2019 03:06), Max: 99.9 (19 Dec 2019 18:47)  HR: 80 (20 Dec 2019 06:49) (64 - 85)  BP: 138/72 (20 Dec 2019 06:49) (111/60 - 145/67)  BP(mean): --  RR: 20 (20 Dec 2019 03:06) (16 - 25)  SpO2: 96% (20 Dec 2019 03:06) (95% - 99%)    Lungs: BS clear bilat.  Heart: RRR no murmur  Abdomen: + BS, soft , benign exam     Ext -- left knee dressing dry.  hemovac in place  Neurologic:  Has sensation over feet & toes bilat. Full AROM bilat feet & toes. EHL/AT = 5/5  Vascular: Feet toes warm, pink. DP = 2+. No calf tenderness bilat..  VTEP: On Bilat. Venodynes + apixaban 2.5 milliGRAM(s) Oral every 12 hours  aspirin enteric coated 81 milliGRAM(s) Oral daily    I&O's Detail    19 Dec 2019 07:01  -  20 Dec 2019 07:00  --------------------------------------------------------  IN:    IV PiggyBack: 100 mL    lactated ringers.: 1600 mL    Oral Fluid: 570 mL  Total IN: 2270 mL    OUT:    Accordian: 300 mL    Estimated Blood Loss: 200 mL    Voided: 1450 mL  Total OUT: 1950 mL    Total NET: 320 mL    Late surgery-- no PT yesterday--to start today.  Labs yesterday noted.  Hospitalist input noted.  Labs Today:       12-19    136  |  104  |  14  ----------------------------<  168<H>  4.7   |  20<L>  |  0.82    Ca    8.6      19 Dec 2019 20:10        Primary Orthopedic Assessment:  • Stable from Orthopedic perspective  • Neuro motor exam stable  • Labs: CBC / Chem stable      Plan:   • Continue:  PT/OT/WBAT with assistance of a walker/Ice to knee/ Knee ROM         Incentive spirometry encouraged   • Continue DVT prophylaxis as prescribed, including use of compression devices and ankle pumps  • Continue Pain Rx  • Plans per Medicine / Anesthesia / Cardiology  • Discharge planning – anticipated discharge is Subacute Rehab facility when medically stable & cleared by PT/OT (lives alone, multple medical comorbities.

## 2019-12-20 NOTE — CONSULT NOTE ADULT - SUBJECTIVE AND OBJECTIVE BOX
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  Renal calculi  Obstructive sleep apnea: CPAP  H/O: osteoarthritis  Lumbar disc disease with radiculopathy  Gout  Neuropathy: Bilateral Feet since   Kidney stone: s/w ESWL pt unsure site  S/P lumbar laminectomy: Fusion L3-L5   S/P CABG x 3: 17  S/P lumbar discectomy: - ,L5  Aug 2013  S/P revision of total knee, right: x2-  &amp;   H/O lithotripsy: x1  S/P knee replacement: - right x 3  - revisions in  &amp;   S/P tonsillectomy and adenoidectomy: as child  Hernia: repair, left inguinal   Cholecystitis: cholecycstectomy   History of Total Hip Replacement: - bilateral THR  S/P Left Inguinal Hernia Repair    Allergies    No Known Allergies    Intolerances      FAMILY HISTORY/social: smokes 2 cigars per day for many yrs: denies etoh; Sanitation.  Family history of coronary artery disease: brother- age 50+- Coronary Artery Stents  Family history of pancreatic cancer (Sibling): brother   Family history of diabetes mellitus type II: mother- - hyperlipidemia and Hypertension.  Family history of coronary artery disease: HLD/Angina. Fatal MI age 73.      Review of Systems:  CONSTITUTIONAL: No fever, chills, or fatigue  EYES: No eye pain, visual disturbances, or discharge  ENMT:  No difficulty hearing, tinnitus, vertigo; No sinus or throat pain  NECK: No pain or stiffness  RESPIRATORY: No cough, wheezing, chills or hemoptysis; No shortness of breath  CARDIOVASCULAR: No chest pain, palpitations, dizziness, or leg swelling  GASTROINTESTINAL: No abdominal or epigastric pain. No nausea, vomiting, or hematemesis; No diarrhea or constipation. No melena or hematochezia.  GENITOURINARY: No dysuria, frequency, hematuria, or incontinence  NEUROLOGICAL: No headaches, memory loss, loss of strength, numbness, or tremors  SKIN: No itching, burning, rashes, or lesions   MUSCULOSKELETAL: left knee joint pain No muscle, back, or extremity pain  PSYCHIATRIC: No depression, anxiety, mood swings, or difficulty sleeping      Medications:    labetalol 200 milliGRAM(s) Oral two times a day      acetaminophen   Tablet .. 1000 milliGRAM(s) Oral every 8 hours  acetaminophen  IVPB .. 1000 milliGRAM(s) IV Intermittent every 6 hours  celecoxib 100 milliGRAM(s) Oral every 12 hours  HYDROmorphone  Injectable 0.5 milliGRAM(s) IV Push every 3 hours PRN  ondansetron Injectable 4 milliGRAM(s) IV Push every 6 hours PRN  oxyCODONE    IR 5 milliGRAM(s) Oral every 3 hours PRN  oxyCODONE    IR 10 milliGRAM(s) Oral every 3 hours PRN      apixaban 2.5 milliGRAM(s) Oral every 12 hours  aspirin enteric coated 81 milliGRAM(s) Oral daily    aluminum hydroxide/magnesium hydroxide/simethicone Suspension 30 milliLiter(s) Oral four times a day PRN  magnesium hydroxide Suspension 30 milliLiter(s) Oral daily PRN  pantoprazole    Tablet 40 milliGRAM(s) Oral before breakfast  polyethylene glycol 3350 17 Gram(s) Oral daily  senna 2 Tablet(s) Oral at bedtime PRN      allopurinol 100 milliGRAM(s) Oral at bedtime  atorvastatin 40 milliGRAM(s) Oral at bedtime    lactated ringers. 1000 milliLiter(s) IV Continuous <Continuous>                ICU Vital Signs Last 24 Hrs  T(C): 36.6 (20 Dec 2019 03:06), Max: 37.7 (19 Dec 2019 18:47)  T(F): 97.9 (20 Dec 2019 03:06), Max: 99.9 (19 Dec 2019 18:47)  HR: 80 (20 Dec 2019 06:49) (64 - 85)  BP: 138/72 (20 Dec 2019 06:49) (111/60 - 145/67)  BP(mean): --  ABP: --  ABP(mean): --  RR: 20 (20 Dec 2019 03:06) (16 - 25)  SpO2: 96% (20 Dec 2019 03:06) (95% - 99%)    Vital Signs Last 24 Hrs  T(C): 36.6 (20 Dec 2019 03:06), Max: 37.7 (19 Dec 2019 18:47)  T(F): 97.9 (20 Dec 2019 03:06), Max: 99.9 (19 Dec 2019 18:47)  HR: 80 (20 Dec 2019 06:49) (64 - 85)  BP: 138/72 (20 Dec 2019 06:49) (111/60 - 145/67)  BP(mean): --  RR: 20 (20 Dec 2019 03:06) (16 - 25)  SpO2: 96% (20 Dec 2019 03:06) (95% - 99%)        I&O's Detail    19 Dec 2019 07:01  -  20 Dec 2019 07:00  --------------------------------------------------------  IN:    IV PiggyBack: 100 mL    lactated ringers.: 1600 mL    Oral Fluid: 570 mL  Total IN: 2270 mL    OUT:    Accordian: 300 mL    Estimated Blood Loss: 200 mL    Voided: 1450 mL  Total OUT: 1950 mL    Total NET: 320 mL            LABS:    -    136  |  104  |  14  ----------------------------<  168<H>  4.7   |  20<L>  |  0.82    Ca    8.6      19 Dec 2019 20:10            CAPILLARY BLOOD GLUCOSE            CULTURES:      Physical Examination:    General: No acute distress.  obese male    HEENT: Pupils equal, reactive to light.  Symmetric.    PULM: Clear to auscultation bilaterally, no significant sputum production    CVS: Regular rate and rhythm, no murmurs, rubs, or gallops    ABD: Soft, nondistended, nontender, normoactive bowel sounds, no masses  midline hernia    EXT: No edema, nontender calves, left knee bandaged with drain    SKIN: Warm and well perfused, no rashes noted.    NEURO: Alert, oriented, interactive, nonfocal    RADIOLOGY: ***    CRITICAL CARE TIME SPENT: ***

## 2019-12-20 NOTE — CONSULT NOTE ADULT - SUBJECTIVE AND OBJECTIVE BOX
CARDIOLOGY CONSULT NOTE    Patient is a 64y Male with a known history of :  Morbid obesity (E66.01)  Need for prophylactic measure (Z29.9)  Gout (M10.9)  Dyslipidemia (E78.5)  HTN (hypertension) (I10)  CAD (coronary artery disease) (I25.10)  ASIYA (obstructive sleep apnea) (G47.33)  Primary osteoarthritis of left knee (M17.12)    HPI:      REVIEW OF SYSTEMS:    CONSTITUTIONAL: No fever, weight loss, or fatigue  EYES: No eye pain, visual disturbances, or discharge  ENMT:  No difficulty hearing, tinnitus, vertigo; No sinus or throat pain  NECK: No pain or stiffness  BREASTS: No pain, masses, or nipple discharge  RESPIRATORY: No cough, wheezing, chills or hemoptysis; No shortness of breath  CARDIOVASCULAR: No chest pain, palpitations, dizziness, or leg swelling  GASTROINTESTINAL: No abdominal or epigastric pain. No nausea, vomiting, or hematemesis; No diarrhea or constipation. No melena or hematochezia.  GENITOURINARY: No dysuria, frequency, hematuria, or incontinence  NEUROLOGICAL: No headaches, memory loss, loss of strength, numbness, or tremors  SKIN: No itching, burning, rashes, or lesions   LYMPH NODES: No enlarged glands  ENDOCRINE: No heat or cold intolerance; No hair loss  MUSCULOSKELETAL: No joint pain or swelling; No muscle, back, or extremity pain  PSYCHIATRIC: No depression, anxiety, mood swings, or difficulty sleeping  HEME/LYMPH: No easy bruising, or bleeding gums  ALLERGY AND IMMUNOLOGIC: No hives or eczema    MEDICATIONS  (STANDING):  acetaminophen   Tablet .. 1000 milliGRAM(s) Oral every 8 hours  allopurinol 100 milliGRAM(s) Oral at bedtime  apixaban 2.5 milliGRAM(s) Oral every 12 hours  aspirin enteric coated 81 milliGRAM(s) Oral daily  atorvastatin 40 milliGRAM(s) Oral at bedtime  celecoxib 100 milliGRAM(s) Oral every 12 hours  labetalol 200 milliGRAM(s) Oral two times a day  lactated ringers. 1000 milliLiter(s) (125 mL/Hr) IV Continuous <Continuous>  pantoprazole    Tablet 40 milliGRAM(s) Oral before breakfast  polyethylene glycol 3350 17 Gram(s) Oral daily    MEDICATIONS  (PRN):  aluminum hydroxide/magnesium hydroxide/simethicone Suspension 30 milliLiter(s) Oral four times a day PRN Indigestion  HYDROmorphone  Injectable 0.5 milliGRAM(s) IV Push every 3 hours PRN Severe Pain (7 - 10)  magnesium hydroxide Suspension 30 milliLiter(s) Oral daily PRN Constipation  ondansetron Injectable 4 milliGRAM(s) IV Push every 6 hours PRN Nausea and/or Vomiting  oxyCODONE    IR 5 milliGRAM(s) Oral every 3 hours PRN Mild Pain (1 - 3)  oxyCODONE    IR 10 milliGRAM(s) Oral every 3 hours PRN Moderate Pain (4 - 6)  senna 2 Tablet(s) Oral at bedtime PRN Constipation      ALLERGIES: No Known Allergies      FAMILY HISTORY:  Family history of coronary artery disease: brother- age 50+- Coronary Artery Stents  Family history of pancreatic cancer (Sibling): brother   Family history of diabetes mellitus type II: mother- - hyperlipidemia and Hypertension.  Family history of coronary artery disease: HLD/Angina. Fatal MI age 73.      Social History:  Alochol:   Smoking:   Drug Use:   Marital Status:     I&O's Detail    19 Dec 2019 07:01  -  20 Dec 2019 07:00  --------------------------------------------------------  IN:    IV PiggyBack: 100 mL    lactated ringers.: 1600 mL    Oral Fluid: 570 mL  Total IN: 2270 mL    OUT:    Accordian: 300 mL    Estimated Blood Loss: 200 mL    Voided: 1450 mL  Total OUT: 1950 mL    Total NET: 320 mL      20 Dec 2019 07:01  -  20 Dec 2019 19:20  --------------------------------------------------------  IN:  Total IN: 0 mL    OUT:    Accordian: 260 mL  Total OUT: 260 mL    Total NET: -260 mL          PHYSICAL EXAMINATION:  -----------------------------  T(C): 36.7 (19 @ 15:14), Max: 36.7 (19 @ 15:14)  HR: 68 (19 @ 17:46) (66 - 85)  BP: 112/68 (19 @ 17:46) (97/49 - 145/67)  RR: 16 (19 @ 15:14) (16 - 25)  SpO2: 97% (19 @ 15:14) (94% - 99%)  Wt(kg): --     @ 07:01  -   @ 07:00  --------------------------------------------------------  IN:    IV PiggyBack: 100 mL    lactated ringers.: 1600 mL    Oral Fluid: 570 mL  Total IN: 2270 mL    OUT:    Accordian: 300 mL    Estimated Blood Loss: 200 mL    Voided: 1450 mL  Total OUT: 1950 mL    Total NET: 320 mL       @ 07:01  -   @ 19:20  --------------------------------------------------------  IN:  Total IN: 0 mL    OUT:    Accordian: 260 mL  Total OUT: 260 mL    Total NET: -260 mL            Constitutional: well developed, normal appearance, well groomed, well nourished, no deformities and no acute distress.   Eyes: the conjunctiva exhibited no abnormalities and the eyelids demonstrated no xanthelasmas.   HEENT: normal oral mucosa, no oral pallor and no oral cyanosis.   Neck: normal jugular venous A waves present, normal jugular venous V waves present and no jugular venous blanton A waves.   Pulmonary: no respiratory distress, normal respiratory rhythm and effort, no accessory muscle use and lungs were clear to auscultation bilaterally. Anteriorly  Cardiovascular: distant sounds, heart rate and rhythm were normal, normal S1 and S2 and no murmur, gallop, rub, heave or thrill are present.    Musculoskeletal: the gait could not be assessed.   Extremities: no clubbing of the fingernails, no localized cyanosis, no petechial hemorrhages and no ischemic changes.   Skin: normal skin color and pigmentation, no rash, no venous stasis, no skin lesions, no skin ulcer and no xanthoma was observed.   Psychiatric: oriented to person, place, and time, the affect was normal, the mood was normal and not feeling anxious.     LABS:   --------      135  |  101  |  12  ----------------------------<  164<H>  4.2   |  23  |  0.71    Ca    8.7      20 Dec 2019 07:13                           14.8   17.74 )-----------( 220      ( 20 Dec 2019 08:09 )             42.7                   RADIOLOGY:  -----------------        EC19 NSR, LAD, ILBBB

## 2019-12-20 NOTE — PROGRESS NOTE ADULT - ASSESSMENT
Asymptomatic post-op 65 y/o M with PMH of HTN, Dyslipidemia CAD s/p CABG, ASIYA on C-PAP, Gout, Nephrolithiasis s/p Lithotripsy, PN, Morbid Obesity, and OA.      Primary osteoarthritis of left knee.   s/p left total knee arthroplasty, asymptomatic post-op, pain control, bowel regimen, IVF hydration, I & O monitoring, and incentive spirometry. PT & OT consults, mobilization & weight bearing as per orthopedic surgery team.    ASIYA (obstructive sleep apnea).    C-PAP at home, doesn't recall his C-PAP sittings, started him empirically on C-PAP 8 cm H2O FIO2 0.21, pulmonary consult with Dr. Arango. Recs appreciated    CAD (coronary artery disease).  s/p CABG, has coronary angio 3 days ago showed patency of the all 3 grafts, continue ASA & Labetalol, in addition to Atorvastatin.   Patient should be ON FULL DOSE ASA    HTN (hypertension).   continue on Amlodipine and Labetalol with hold parameters, resume Losartan on POD2.     Dyslipidemia.   continue Lipitor 40 mg PO at bedtime.     Gout.   normal creatinine clearance, yet on low dose maintenance xanthine oxidase inhibitor at home, continue allopurinol 100 mg PO daily.      diet: advance as tolerated  dvt ppx: as per ortho

## 2019-12-20 NOTE — CONSULT NOTE ADULT - PROBLEM SELECTOR RECOMMENDATION 9
cpap hs
s/p left total knee arthroplasty, asymptomatic post-op, pain control, bowel regimen, IVF hydration, I & O monitoring, and incentive spirometry. PT & OT consults, mobilization & weight bearing as per orthopedic surgery team.

## 2019-12-20 NOTE — PHYSICAL THERAPY INITIAL EVALUATION ADULT - RANGE OF MOTION EXAMINATION, REHAB EVAL
Left LE ROM was WFL (within functional limits)/left knee flexion 50 deg in sitting/deficits as listed below

## 2019-12-20 NOTE — PROGRESS NOTE ADULT - SUBJECTIVE AND OBJECTIVE BOX
Discharge medication calendar:  (ASA 325mg Qday preop)  Eliquis 2.5mg q12h + ASA EC 81mg Qday x 12 days then ASA EC 162mg q12h x 4 weeks then resume ASA 325mg Qday  APAPTIC  Celecoxib 100mg q12h x 2-3 weeks ONLY  Pantoprazole 40mg QAM x 6 weeks  Narcotic PRN  Docusate 100mg TID while taking narcotic  Miralax, Senna, or Bisacodyl PRN for treatment of constipation

## 2019-12-20 NOTE — PROGRESS NOTE ADULT - SUBJECTIVE AND OBJECTIVE BOX
Patient seen and examined at bedside. reports doing well has concerns about his opoid use.    reports he has a hx of ileus x 2 and is concerned about his opoids.        Review of Systems:  General:denies fever chills, headache, weakness  HEENT: denies blurry vision,diffculty swallowing,  Cardiovascular: denies chest pain  ,palpitatins  Pulmonary:denies shortness of breath, cough, wheezing  Gastrointestinal: denies abdominal pain, constipation, diarrhra  : denies hematuria, dysuria  Neurological: denies weakness, numbness , tingling, dizziness  skin: denies skin rash  Psychiatrical: denies mood disturbances          Objective:  Vitals  T(C): 36.5 (12-20-19 @ 08:06), Max: 37.7 (12-19-19 @ 18:47)  HR: 72 (12-20-19 @ 08:06) (64 - 85)  BP: 126/72 (12-20-19 @ 08:06) (111/60 - 145/67)  RR: 16 (12-20-19 @ 08:06) (16 - 25)  SpO2: 94% (12-20-19 @ 08:06) (94% - 99%)    Physical Exam:  General: comfortable, no acute distress, well nourished  HEENT: no LAD, trachea midline  Cardiovascular: normal s1s2, no mrg  Pulmonary: CTA Bilaterally, no wheezing , rhonchi  Gastrointestinal: soft non tender non distended, no masses felt  Muscloskeletal: no lower extremity edema  Neurological: CN II-12 intact. No focal weakness  Psychiatrical: normal mood, cooperative    Labs:                          14.8   17.74 )-----------( 220      ( 20 Dec 2019 08:09 )             42.7     12-20    135  |  101  |  12  ----------------------------<  164<H>  4.2   |  23  |  0.71    Ca    8.7      20 Dec 2019 07:13              Active Medications  MEDICATIONS  (STANDING):  acetaminophen   Tablet .. 1000 milliGRAM(s) Oral every 8 hours  allopurinol 100 milliGRAM(s) Oral at bedtime  apixaban 2.5 milliGRAM(s) Oral every 12 hours  aspirin enteric coated 81 milliGRAM(s) Oral daily  atorvastatin 40 milliGRAM(s) Oral at bedtime  celecoxib 100 milliGRAM(s) Oral every 12 hours  labetalol 200 milliGRAM(s) Oral two times a day  lactated ringers. 1000 milliLiter(s) (125 mL/Hr) IV Continuous <Continuous>  pantoprazole    Tablet 40 milliGRAM(s) Oral before breakfast  polyethylene glycol 3350 17 Gram(s) Oral daily    MEDICATIONS  (PRN):  aluminum hydroxide/magnesium hydroxide/simethicone Suspension 30 milliLiter(s) Oral four times a day PRN Indigestion  HYDROmorphone  Injectable 0.5 milliGRAM(s) IV Push every 3 hours PRN Severe Pain (7 - 10)  magnesium hydroxide Suspension 30 milliLiter(s) Oral daily PRN Constipation  ondansetron Injectable 4 milliGRAM(s) IV Push every 6 hours PRN Nausea and/or Vomiting  oxyCODONE    IR 5 milliGRAM(s) Oral every 3 hours PRN Mild Pain (1 - 3)  oxyCODONE    IR 10 milliGRAM(s) Oral every 3 hours PRN Moderate Pain (4 - 6)  senna 2 Tablet(s) Oral at bedtime PRN Constipation

## 2019-12-20 NOTE — CONSULT NOTE ADULT - PROBLEM SELECTOR RECOMMENDATION 4
dvt prophylaxis  ambulate
controlled, continue on Amlodipine and Labetalol with hold parameters, resume Losartan on POD2.

## 2019-12-20 NOTE — OCCUPATIONAL THERAPY INITIAL EVALUATION ADULT - ANTICIPATED DISCHARGE DISPOSITION, OT EVAL
Pt would benefit from continued OT in inpatient rehabilitation facility to optimize pt's function and safety. SW made aware./rehabilitation facility

## 2019-12-21 VITALS
HEART RATE: 74 BPM | DIASTOLIC BLOOD PRESSURE: 63 MMHG | SYSTOLIC BLOOD PRESSURE: 99 MMHG | OXYGEN SATURATION: 96 % | TEMPERATURE: 98 F | RESPIRATION RATE: 16 BRPM

## 2019-12-21 DIAGNOSIS — I47.1 SUPRAVENTRICULAR TACHYCARDIA: ICD-10-CM

## 2019-12-21 LAB
ANION GAP SERPL CALC-SCNC: 7 MMOL/L — SIGNIFICANT CHANGE UP (ref 5–17)
BUN SERPL-MCNC: 12 MG/DL — SIGNIFICANT CHANGE UP (ref 7–23)
CALCIUM SERPL-MCNC: 8.3 MG/DL — LOW (ref 8.4–10.5)
CHLORIDE SERPL-SCNC: 104 MMOL/L — SIGNIFICANT CHANGE UP (ref 96–108)
CO2 SERPL-SCNC: 28 MMOL/L — SIGNIFICANT CHANGE UP (ref 22–31)
CREAT SERPL-MCNC: 0.58 MG/DL — SIGNIFICANT CHANGE UP (ref 0.5–1.3)
GLUCOSE SERPL-MCNC: 120 MG/DL — HIGH (ref 70–99)
HCT VFR BLD CALC: 38.1 % — LOW (ref 39–50)
HGB BLD-MCNC: 13 G/DL — SIGNIFICANT CHANGE UP (ref 13–17)
MCHC RBC-ENTMCNC: 30 PG — SIGNIFICANT CHANGE UP (ref 27–34)
MCHC RBC-ENTMCNC: 34.1 GM/DL — SIGNIFICANT CHANGE UP (ref 32–36)
MCV RBC AUTO: 87.8 FL — SIGNIFICANT CHANGE UP (ref 80–100)
NRBC # BLD: 0 /100 WBCS — SIGNIFICANT CHANGE UP (ref 0–0)
PLATELET # BLD AUTO: 191 K/UL — SIGNIFICANT CHANGE UP (ref 150–400)
POTASSIUM SERPL-MCNC: 3.8 MMOL/L — SIGNIFICANT CHANGE UP (ref 3.5–5.3)
POTASSIUM SERPL-SCNC: 3.8 MMOL/L — SIGNIFICANT CHANGE UP (ref 3.5–5.3)
RBC # BLD: 4.34 M/UL — SIGNIFICANT CHANGE UP (ref 4.2–5.8)
RBC # FLD: 12.9 % — SIGNIFICANT CHANGE UP (ref 10.3–14.5)
SODIUM SERPL-SCNC: 139 MMOL/L — SIGNIFICANT CHANGE UP (ref 135–145)
TSH SERPL-MCNC: 0.53 UIU/ML — SIGNIFICANT CHANGE UP (ref 0.27–4.2)
WBC # BLD: 11.79 K/UL — HIGH (ref 3.8–10.5)
WBC # FLD AUTO: 11.79 K/UL — HIGH (ref 3.8–10.5)

## 2019-12-21 PROCEDURE — C1776: CPT

## 2019-12-21 PROCEDURE — 93970 EXTREMITY STUDY: CPT | Mod: 26

## 2019-12-21 PROCEDURE — 97161 PT EVAL LOW COMPLEX 20 MIN: CPT

## 2019-12-21 PROCEDURE — 88311 DECALCIFY TISSUE: CPT

## 2019-12-21 PROCEDURE — 83735 ASSAY OF MAGNESIUM: CPT

## 2019-12-21 PROCEDURE — 97116 GAIT TRAINING THERAPY: CPT

## 2019-12-21 PROCEDURE — C1889: CPT

## 2019-12-21 PROCEDURE — 88305 TISSUE EXAM BY PATHOLOGIST: CPT

## 2019-12-21 PROCEDURE — 94660 CPAP INITIATION&MGMT: CPT

## 2019-12-21 PROCEDURE — 97530 THERAPEUTIC ACTIVITIES: CPT

## 2019-12-21 PROCEDURE — 93970 EXTREMITY STUDY: CPT

## 2019-12-21 PROCEDURE — 85027 COMPLETE CBC AUTOMATED: CPT

## 2019-12-21 PROCEDURE — 73562 X-RAY EXAM OF KNEE 3: CPT

## 2019-12-21 PROCEDURE — C1713: CPT

## 2019-12-21 PROCEDURE — 97165 OT EVAL LOW COMPLEX 30 MIN: CPT

## 2019-12-21 PROCEDURE — 36415 COLL VENOUS BLD VENIPUNCTURE: CPT

## 2019-12-21 PROCEDURE — 80048 BASIC METABOLIC PNL TOTAL CA: CPT

## 2019-12-21 PROCEDURE — 97535 SELF CARE MNGMENT TRAINING: CPT

## 2019-12-21 PROCEDURE — 84443 ASSAY THYROID STIM HORMONE: CPT

## 2019-12-21 RX ORDER — MAGNESIUM SULFATE 500 MG/ML
2 VIAL (ML) INJECTION ONCE
Refills: 0 | Status: COMPLETED | OUTPATIENT
Start: 2019-12-21 | End: 2019-12-21

## 2019-12-21 RX ADMIN — AMLODIPINE BESYLATE 10 MILLIGRAM(S): 2.5 TABLET ORAL at 06:01

## 2019-12-21 RX ADMIN — Medication 1000 MILLIGRAM(S): at 06:01

## 2019-12-21 RX ADMIN — APIXABAN 2.5 MILLIGRAM(S): 2.5 TABLET, FILM COATED ORAL at 09:09

## 2019-12-21 RX ADMIN — Medication 200 MILLIGRAM(S): at 06:01

## 2019-12-21 RX ADMIN — CELECOXIB 100 MILLIGRAM(S): 200 CAPSULE ORAL at 09:09

## 2019-12-21 RX ADMIN — OXYCODONE HYDROCHLORIDE 10 MILLIGRAM(S): 5 TABLET ORAL at 03:46

## 2019-12-21 RX ADMIN — OXYCODONE HYDROCHLORIDE 10 MILLIGRAM(S): 5 TABLET ORAL at 12:50

## 2019-12-21 RX ADMIN — OXYCODONE HYDROCHLORIDE 10 MILLIGRAM(S): 5 TABLET ORAL at 04:30

## 2019-12-21 RX ADMIN — OXYCODONE HYDROCHLORIDE 10 MILLIGRAM(S): 5 TABLET ORAL at 09:52

## 2019-12-21 RX ADMIN — CELECOXIB 100 MILLIGRAM(S): 200 CAPSULE ORAL at 09:10

## 2019-12-21 RX ADMIN — Medication 1000 MILLIGRAM(S): at 06:02

## 2019-12-21 RX ADMIN — OXYCODONE HYDROCHLORIDE 10 MILLIGRAM(S): 5 TABLET ORAL at 09:10

## 2019-12-21 RX ADMIN — LOSARTAN POTASSIUM 100 MILLIGRAM(S): 100 TABLET, FILM COATED ORAL at 06:01

## 2019-12-21 RX ADMIN — PANTOPRAZOLE SODIUM 40 MILLIGRAM(S): 20 TABLET, DELAYED RELEASE ORAL at 06:01

## 2019-12-21 RX ADMIN — Medication 50 GRAM(S): at 10:09

## 2019-12-21 RX ADMIN — OXYCODONE HYDROCHLORIDE 10 MILLIGRAM(S): 5 TABLET ORAL at 13:28

## 2019-12-21 RX ADMIN — Medication 81 MILLIGRAM(S): at 12:50

## 2019-12-21 NOTE — PROGRESS NOTE ADULT - SUBJECTIVE AND OBJECTIVE BOX
PULMONARY/CRITICAL CARE  Had episode SVT while exercising. Denies palpitations, chest pain, sob.   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  Renal calculi  Obstructive sleep apnea: CPAP  H/O: osteoarthritis  Lumbar disc disease with radiculopathy  Gout  Neuropathy: Bilateral Feet since   Kidney stone: s/w ESWL pt unsure site  S/P lumbar laminectomy: Fusion L3-L5   S/P CABG x 3: 17  S/P lumbar discectomy: - ,L5  Aug 2013  S/P revision of total knee, right: x2-  &amp;   H/O lithotripsy: x1  S/P knee replacement: - right x 3  - revisions in  &amp;   S/P tonsillectomy and adenoidectomy: as child  Hernia: repair, left inguinal   Cholecystitis: cholecycstectomy   History of Total Hip Replacement: - bilateral THR  S/P Left Inguinal Hernia Repair    Allergies    No Known Allergies    Intolerances      FAMILY HISTORY/social: smokes 2 cigars per day for many yrs: denies etoh; Sanitation.  Family history of coronary artery disease: brother- age 50+- Coronary Artery Stents  Family history of pancreatic cancer (Sibling): brother   Family history of diabetes mellitus type II: mother- - hyperlipidemia and Hypertension.  Family history of coronary artery disease: HLD/Angina. Fatal MI age 73.            Medications:    labetalol 200 milliGRAM(s) Oral two times a day      acetaminophen   Tablet .. 1000 milliGRAM(s) Oral every 8 hours  acetaminophen  IVPB .. 1000 milliGRAM(s) IV Intermittent every 6 hours  celecoxib 100 milliGRAM(s) Oral every 12 hours  HYDROmorphone  Injectable 0.5 milliGRAM(s) IV Push every 3 hours PRN  ondansetron Injectable 4 milliGRAM(s) IV Push every 6 hours PRN  oxyCODONE    IR 5 milliGRAM(s) Oral every 3 hours PRN  oxyCODONE    IR 10 milliGRAM(s) Oral every 3 hours PRN      apixaban 2.5 milliGRAM(s) Oral every 12 hours  aspirin enteric coated 81 milliGRAM(s) Oral daily    aluminum hydroxide/magnesium hydroxide/simethicone Suspension 30 milliLiter(s) Oral four times a day PRN  magnesium hydroxide Suspension 30 milliLiter(s) Oral daily PRN  pantoprazole    Tablet 40 milliGRAM(s) Oral before breakfast  polyethylene glycol 3350 17 Gram(s) Oral daily  senna 2 Tablet(s) Oral at bedtime PRN      allopurinol 100 milliGRAM(s) Oral at bedtime  atorvastatin 40 milliGRAM(s) Oral at bedtime    lactated ringers. 1000 milliLiter(s) IV Continuous <Continuous>                ICU Vital Signs Last 24 Hrs  T(C): 36.6 (20 Dec 2019 03:06), Max: 37.7 (19 Dec 2019 18:47)  T(F): 97.9 (20 Dec 2019 03:06), Max: 99.9 (19 Dec 2019 18:47)  HR: 80 (20 Dec 2019 06:49) (64 - 85)  BP: 138/72 (20 Dec 2019 06:49) (111/60 - 145/67)  BP(mean): --  ABP: --  ABP(mean): --  RR: 20 (20 Dec 2019 03:06) (16 - 25)  SpO2: 96% (20 Dec 2019 03:06) (95% - 99%)    Vital Signs Last 24 Hrs  T(C): 36.6 (20 Dec 2019 03:06), Max: 37.7 (19 Dec 2019 18:47)  T(F): 97.9 (20 Dec 2019 03:06), Max: 99.9 (19 Dec 2019 18:47)  HR: 80 (20 Dec 2019 06:49) (64 - 85)  BP: 138/72 (20 Dec 2019 06:49) (111/60 - 145/67)  BP(mean): --  RR: 20 (20 Dec 2019 03:06) (16 - 25)  SpO2: 96% (20 Dec 2019 03:06) (95% - 99%)        I&O's Detail    19 Dec 2019 07:01  -  20 Dec 2019 07:00  --------------------------------------------------------  IN:    IV PiggyBack: 100 mL    lactated ringers.: 1600 mL    Oral Fluid: 570 mL  Total IN: 2270 mL    OUT:    Accordian: 300 mL    Estimated Blood Loss: 200 mL    Voided: 1450 mL  Total OUT: 1950 mL    Total NET: 320 mL            LABS:        136  |  104  |  14  ----------------------------<  168<H>  4.7   |  20<L>  |  0.82    Ca    8.6      19 Dec 2019 20:10            CAPILLARY BLOOD GLUCOSE            CULTURES:      Physical Examination:    General: No acute distress.  obese male    HEENT: Pupils equal, reactive to light.  Symmetric.    PULM: Clear to auscultation bilaterally, no significant sputum production    CVS: Regular rate and rhythm, no murmurs, rubs, or gallops    ABD: Soft, nondistended, nontender, normoactive bowel sounds, no masses  midline hernia    EXT: No edema, nontender calves, left knee bandaged with drain    SKIN: Warm and well perfused, no rashes noted.    NEURO: Alert, oriented, interactive, nonfocal    RADIOLOGY: ***    CRITICAL CARE TIME SPENT: ***

## 2019-12-21 NOTE — PROGRESS NOTE ADULT - ASSESSMENT
63y/o over weight. Seen at [facility] telemetry  History HTN, gout, OA, high cholesterol, cigar smoker, lower extremity neuropathy, sleep apnea  Father  of CAD  S/P kidney stones with surgery  S/P left hernia surgery  S/P right knee surgery with revisions  S/P gall-bladder surgery  S/P B/L hip surgery  S/P T&A  S/P lower back surgery  17 S/P CABG x3 by Dr. Lopez at [facility]  19 TLS Echocardiogram compatible with enlarged LV and EF-45-50%  19 s/p Cardiac catheterization by Dr. Lugo at [facility] compatible with patent grafts    Admitted and on 19 s/p left knee surgery  Earlier today had 13-beat asymptomatic run of NSVT  Again recent cardiac catheterization with patent grafts  WBC-17.74    19  No cardiac complaints  No further, documented significant arrhythmias  Has been ambulating with physical therapy  Mag-normal    Plan:  - Continue Labetalol  - Continue Amlodipine, Losartan for blood pressure  - On ASA, Atorvastatin  - Patient given copy of yesterday's asymptomatic NSVT  - Patient stable from a cardiac stand-point for discharge to re-hab  - Follow-up routine with his cardiologist

## 2019-12-21 NOTE — PROGRESS NOTE ADULT - ASSESSMENT
Asymptomatic post-op 65 y/o M with PMH of HTN, Dyslipidemia CAD s/p CABG, ASIYA on C-PAP, Gout, Nephrolithiasis s/p Lithotripsy, PN, Morbid Obesity, and OA.      Primary osteoarthritis of left knee.   s/p left total knee arthroplasty, asymptomatic post-op, pain control, bowel regimen, IVF hydration, I & O monitoring, and incentive spirometry. PT & OT consults, mobilization & weight bearing as per orthopedic surgery team.    ASIYA (obstructive sleep apnea).    C-PAP at home, doesn't recall his C-PAP sittings, started him empirically on C-PAP 8 cm H2O FIO2 0.21, pulmonary consult with Dr. Arango. Recs appreciated    CAD (coronary artery disease).  s/p CABG, has coronary angio 3 days ago showed patency of the all 3 grafts, continue ASA & Labetalol, in addition to Atorvastatin.   Patient should be ON FULL DOSE ASA    SVT  appreciarte cards recs  d/c ready;;outpatient cards followup    HTN (hypertension).   continue on Amlodipine and Labetalol with hold parameters, resume Losartan on POD2.     Dyslipidemia.   continue Lipitor 40 mg PO at bedtime.     Gout.   normal creatinine clearance, yet on low dose maintenance xanthine oxidase inhibitor at home, continue allopurinol 100 mg PO daily.      diet: advance as tolerated  dvt ppx: as per ortho    Patient medically stable for dc

## 2019-12-21 NOTE — PROGRESS NOTE ADULT - SUBJECTIVE AND OBJECTIVE BOX
POST OPERATIVE DAY #: 2    64y Male  s/p  Left TKA                     SUBJECTIVE: Patient seen and examined at bedside.     Pain:  well controlled     Pain scale:  5 /10  Denies CP, SOB, N/V/D, weakness, numbness   No new complains     OBJECTIVE:     Vital Signs Last 24 Hrs  T(C): 36.8 (21 Dec 2019 08:13), Max: 37.2 (20 Dec 2019 23:30)  T(F): 98.3 (21 Dec 2019 08:13), Max: 98.9 (20 Dec 2019 23:30)  HR: 64 (21 Dec 2019 08:13) (64 - 85)  BP: 111/67 (21 Dec 2019 08:13) (111/67 - 139/75)  BP(mean): --  RR: 18 (21 Dec 2019 08:13) (15 - 18)  SpO2: 95% (21 Dec 2019 08:13) (95% - 97%)    Affected extremity: LLE         Dressing: changed                    HV removed         Sensation: intact to light touch          Motor exam:   5/ 5 Tibialis Anterior/Gastrocnemius-Soleus, EHL/FHL         warm, well-perfused; capillary refill < 3 seconds         negative calf tenderness B/L LE    LABS:                        13.0   11.79 )-----------( 191      ( 21 Dec 2019 07:43 )             38.1     12-21    139  |  104  |  12  ----------------------------<  120<H>  3.8   |  28  |  0.58    Ca    8.3<L>      21 Dec 2019 07:43  Mg     1.7     12-20        I&O's Detail    20 Dec 2019 07:01  -  21 Dec 2019 07:00  --------------------------------------------------------  IN:  Total IN: 0 mL    OUT:    Accordian: 470 mL    Voided: 600 mL  Total OUT: 1070 mL    Total NET: -1070 mL          MEDICATIONS:      Pain management:  acetaminophen   Tablet .. 1000 milliGRAM(s) Oral every 8 hours  celecoxib 100 milliGRAM(s) Oral every 12 hours  HYDROmorphone  Injectable 0.5 milliGRAM(s) IV Push every 3 hours PRN  ondansetron Injectable 4 milliGRAM(s) IV Push every 6 hours PRN  oxyCODONE    IR 5 milliGRAM(s) Oral every 3 hours PRN  oxyCODONE    IR 10 milliGRAM(s) Oral every 3 hours PRN    DVT prophylaxis:   apixaban 2.5 milliGRAM(s) Oral every 12 hours  aspirin enteric coated 81 milliGRAM(s) Oral daily      RADIOLOGY & ADDITIONAL STUDIES:    ASSESSMENT AND PLAN:   - SVTs: cardiology is following recommending another day of observation   - Analgesic pain control  - DVT prophylaxis: ASA 81 mg daily   Eliquis2.5mg twice a day  SCDs       - Pain Management: Celebrex 100mg twice a day x 21 days   - PT/OT: Weight Bearing Status:  Weight bearing as tolerated, OOBTC       -  Incentive spirometry  - Advance diet as tolerated  - Hospitalist is following  -  Follow up labs  -  Disposition: Home      sun

## 2019-12-21 NOTE — PROGRESS NOTE ADULT - SUBJECTIVE AND OBJECTIVE BOX
Patient seen and examined at bedside  yesterday evening patient had episode of svt.  seen by cards.      Review of Systems:  General:denies fever chills, headache, weakness  HEENT: denies blurry vision,diffculty swallowing,  Cardiovascular: denies chest pain  ,palpitatins  Pulmonary:denies shortness of breath, cough, wheezing  Gastrointestinal: denies abdominal pain, constipation, diarrhra  : denies hematuria, dysuria  Neurological: denies weakness, numbness , tingling, dizziness  skin: denies skin rash  Psychiatrical: denies mood disturbances          Objective:  Vitals  T(C): 36.3 (12-21-19 @ 11:34), Max: 37.2 (12-20-19 @ 23:30)  HR: 68 (12-21-19 @ 11:34) (64 - 85)  BP: 101/64 (12-21-19 @ 11:34) (101/64 - 139/75)  RR: 18 (12-21-19 @ 11:34) (15 - 18)  SpO2: 97% (12-21-19 @ 11:34) (95% - 97%)    Physical Exam:  General: comfortable, no acute distress, well nourished  HEENT: no LAD, trachea midline  Cardiovascular: normal s1s2, no mrg  Pulmonary: CTA Bilaterally, no wheezing , rhonchi  Gastrointestinal: soft non tender non distended, no masses felt  Muscloskeletal: no lower extremity edema  Neurological: CN II-12 intact. No focal weakness  Psychiatrical: normal mood, cooperative    Labs:                          13.0   11.79 )-----------( 191      ( 21 Dec 2019 07:43 )             38.1     12-21    139  |  104  |  12  ----------------------------<  120<H>  3.8   |  28  |  0.58    Ca    8.3<L>      21 Dec 2019 07:43  Mg     1.7     12-20              Active Medications  MEDICATIONS  (STANDING):  acetaminophen   Tablet .. 1000 milliGRAM(s) Oral every 8 hours  allopurinol 100 milliGRAM(s) Oral at bedtime  amLODIPine   Tablet 10 milliGRAM(s) Oral daily  apixaban 2.5 milliGRAM(s) Oral every 12 hours  aspirin enteric coated 81 milliGRAM(s) Oral daily  atorvastatin 40 milliGRAM(s) Oral at bedtime  celecoxib 100 milliGRAM(s) Oral every 12 hours  labetalol 200 milliGRAM(s) Oral two times a day  lactated ringers. 1000 milliLiter(s) (125 mL/Hr) IV Continuous <Continuous>  losartan 100 milliGRAM(s) Oral daily  pantoprazole    Tablet 40 milliGRAM(s) Oral before breakfast  polyethylene glycol 3350 17 Gram(s) Oral daily    MEDICATIONS  (PRN):  aluminum hydroxide/magnesium hydroxide/simethicone Suspension 30 milliLiter(s) Oral four times a day PRN Indigestion  bisacodyl Suppository 10 milliGRAM(s) Rectal daily PRN If no bowel movement by postoperative day #2  HYDROmorphone  Injectable 0.5 milliGRAM(s) IV Push every 3 hours PRN Severe Pain (7 - 10)  magnesium hydroxide Suspension 30 milliLiter(s) Oral daily PRN Constipation  ondansetron Injectable 4 milliGRAM(s) IV Push every 6 hours PRN Nausea and/or Vomiting  oxyCODONE    IR 5 milliGRAM(s) Oral every 3 hours PRN Mild Pain (1 - 3)  oxyCODONE    IR 10 milliGRAM(s) Oral every 3 hours PRN Moderate Pain (4 - 6)  senna 2 Tablet(s) Oral at bedtime PRN Constipation

## 2019-12-21 NOTE — PROGRESS NOTE ADULT - SUBJECTIVE AND OBJECTIVE BOX
Patient is a 64y Male with a known history of :  SVT (supraventricular tachycardia) (I47.1)  Morbid obesity (E66.01)  Need for prophylactic measure (Z29.9)  Gout (M10.9)  Dyslipidemia (E78.5)  HTN (hypertension) (I10)  CAD (coronary artery disease) (I25.10)  ASIYA (obstructive sleep apnea) (G47.33)  Primary osteoarthritis of left knee (M17.12)    HPI:      REVIEW OF SYSTEMS:    CONSTITUTIONAL: No fever, weight loss, or fatigue  EYES: No eye pain, visual disturbances, or discharge  ENMT:  No difficulty hearing, tinnitus, vertigo; No sinus or throat pain  NECK: No pain or stiffness  BREASTS: No pain, masses, or nipple discharge  RESPIRATORY: No cough, wheezing, chills or hemoptysis; No shortness of breath  CARDIOVASCULAR: No chest pain, palpitations, dizziness, or leg swelling  GASTROINTESTINAL: No abdominal or epigastric pain. No nausea, vomiting, or hematemesis; No diarrhea or constipation. No melena or hematochezia.  GENITOURINARY: No dysuria, frequency, hematuria, or incontinence  NEUROLOGICAL: No headaches, memory loss, loss of strength, numbness, or tremors  SKIN: No itching, burning, rashes, or lesions   LYMPH NODES: No enlarged glands  ENDOCRINE: No heat or cold intolerance; No hair loss  MUSCULOSKELETAL: No joint pain or swelling; No muscle, back, or extremity pain  PSYCHIATRIC: No depression, anxiety, mood swings, or difficulty sleeping  HEME/LYMPH: No easy bruising, or bleeding gums  ALLERGY AND IMMUNOLOGIC: No hives or eczema    MEDICATIONS  (STANDING):  acetaminophen   Tablet .. 1000 milliGRAM(s) Oral every 8 hours  allopurinol 100 milliGRAM(s) Oral at bedtime  amLODIPine   Tablet 10 milliGRAM(s) Oral daily  apixaban 2.5 milliGRAM(s) Oral every 12 hours  aspirin enteric coated 81 milliGRAM(s) Oral daily  atorvastatin 40 milliGRAM(s) Oral at bedtime  celecoxib 100 milliGRAM(s) Oral every 12 hours  labetalol 200 milliGRAM(s) Oral two times a day  lactated ringers. 1000 milliLiter(s) (125 mL/Hr) IV Continuous <Continuous>  losartan 100 milliGRAM(s) Oral daily  pantoprazole    Tablet 40 milliGRAM(s) Oral before breakfast  polyethylene glycol 3350 17 Gram(s) Oral daily    MEDICATIONS  (PRN):  aluminum hydroxide/magnesium hydroxide/simethicone Suspension 30 milliLiter(s) Oral four times a day PRN Indigestion  bisacodyl Suppository 10 milliGRAM(s) Rectal daily PRN If no bowel movement by postoperative day #2  HYDROmorphone  Injectable 0.5 milliGRAM(s) IV Push every 3 hours PRN Severe Pain (7 - 10)  magnesium hydroxide Suspension 30 milliLiter(s) Oral daily PRN Constipation  ondansetron Injectable 4 milliGRAM(s) IV Push every 6 hours PRN Nausea and/or Vomiting  oxyCODONE    IR 5 milliGRAM(s) Oral every 3 hours PRN Mild Pain (1 - 3)  oxyCODONE    IR 10 milliGRAM(s) Oral every 3 hours PRN Moderate Pain (4 - 6)  senna 2 Tablet(s) Oral at bedtime PRN Constipation      ALLERGIES: No Known Allergies      FAMILY HISTORY:  Family history of coronary artery disease: brother- age 50+- Coronary Artery Stents  Family history of pancreatic cancer (Sibling): brother   Family history of diabetes mellitus type II: mother- - hyperlipidemia and Hypertension.  Family history of coronary artery disease: HLD/Angina. Fatal MI age 73.      Social history:  Alochol:   Smoking:   Drug Use:   Marital Status:     PHYSICAL EXAMINATION:  -----------------------------  T(C): 36.3 (19 @ 11:34), Max: 37.2 (19 @ 23:30)  HR: 68 (19 @ 11:34) (64 - 85)  BP: 101/64 (19 @ 11:34) (101/64 - 139/75)  RR: 18 (19 @ 11:34) (15 - 18)  SpO2: 97% (19 @ 11:34) (95% - 97%)  Wt(kg): --     @ 07:01  -   @ 07:00  --------------------------------------------------------  IN:  Total IN: 0 mL    OUT:    Accordian: 470 mL    Voided: 600 mL  Total OUT: 1070 mL    Total NET: -1070 mL            Constitutional: well developed, normal appearance, well groomed, well nourished, no deformities and no acute distress.   Eyes: the conjunctiva exhibited no abnormalities and the eyelids demonstrated no xanthelasmas.   HEENT: normal oral mucosa, no oral pallor and no oral cyanosis.   Neck: normal jugular venous A waves present, normal jugular venous V waves present and no jugular venous blanton A waves.   Pulmonary: no respiratory distress, normal respiratory rhythm and effort, no accessory muscle use and lungs were clear to auscultation bilaterally. Anteriorly  Cardiovascular: heart rate and rhythm were normal, normal S1 and S2 and no murmur, gallop, rub, heave or thrill are present.    Musculoskeletal: the gait could not be assessed.   Extremities: no clubbing of the fingernails, no localized cyanosis, no petechial hemorrhages and no ischemic changes.   Skin: normal skin color and pigmentation, no rash, no venous stasis, no skin lesions, no skin ulcer and no xanthoma was observed.   Psychiatric: oriented to person, place, and time, the affect was normal, the mood was normal and not feeling anxious.     LABS:   --------      139  |  104  |  12  ----------------------------<  120<H>  3.8   |  28  |  0.58    Ca    8.3<L>      21 Dec 2019 07:43  Mg     1.7     12-20                           13.0   11.79 )-----------( 191      ( 21 Dec 2019 07:43 )             38.1                   Radiology:

## 2019-12-26 ENCOUNTER — FORM ENCOUNTER (OUTPATIENT)
Age: 64
End: 2019-12-26

## 2019-12-27 ENCOUNTER — OUTPATIENT (OUTPATIENT)
Dept: OUTPATIENT SERVICES | Facility: HOSPITAL | Age: 64
LOS: 1 days | End: 2019-12-27
Payer: COMMERCIAL

## 2019-12-27 DIAGNOSIS — Z96.651 PRESENCE OF RIGHT ARTIFICIAL KNEE JOINT: Chronic | ICD-10-CM

## 2019-12-27 DIAGNOSIS — Z98.89 OTHER SPECIFIED POSTPROCEDURAL STATES: Chronic | ICD-10-CM

## 2019-12-27 DIAGNOSIS — I82.409 ACUTE EMBOLISM AND THROMBOSIS OF UNSPECIFIED DEEP VEINS OF UNSPECIFIED LOWER EXTREMITY: ICD-10-CM

## 2019-12-27 DIAGNOSIS — Z98.890 OTHER SPECIFIED POSTPROCEDURAL STATES: Chronic | ICD-10-CM

## 2019-12-27 DIAGNOSIS — G62.9 POLYNEUROPATHY, UNSPECIFIED: Chronic | ICD-10-CM

## 2019-12-27 DIAGNOSIS — Z95.1 PRESENCE OF AORTOCORONARY BYPASS GRAFT: Chronic | ICD-10-CM

## 2019-12-27 DIAGNOSIS — N20.0 CALCULUS OF KIDNEY: Chronic | ICD-10-CM

## 2019-12-27 PROCEDURE — 93970 EXTREMITY STUDY: CPT

## 2019-12-31 ENCOUNTER — APPOINTMENT (OUTPATIENT)
Dept: ORTHOPEDIC SURGERY | Facility: CLINIC | Age: 64
End: 2019-12-31
Payer: COMMERCIAL

## 2019-12-31 PROCEDURE — 73562 X-RAY EXAM OF KNEE 3: CPT | Mod: LT

## 2019-12-31 PROCEDURE — 99024 POSTOP FOLLOW-UP VISIT: CPT

## 2019-12-31 NOTE — HISTORY OF PRESENT ILLNESS
[de-identified] : first postop left total knee replacement 12/19/2019 [de-identified] : well nourished and no distress\par left knee: incision healed, sutures intact, PROM 5/90 pain free, ligaments intact  Neurovascularly intact  [de-identified] : 64 year old male  presents s/p left total knee replacement. Improvement in preoperative pain. Denies fever, chills, groin pain. \par  experiences  pain lateral left thigh,in area of drian exit . States that doppler test is negative. Undergoing PT. Finished a course of oxycontin. Will start taking Tylenol or Tramadol [de-identified] : sutures removed. PT. FU 2 weeks  [de-identified] : AP, notch standing, lateral and sunrise Xrays of the left knee taken in the office today demonstrates satisfactory appearance, left total knee components and alignment.  [de-identified] : left total knee replacement

## 2020-01-15 ENCOUNTER — APPOINTMENT (OUTPATIENT)
Dept: ORTHOPEDIC SURGERY | Facility: CLINIC | Age: 65
End: 2020-01-15
Payer: COMMERCIAL

## 2020-01-15 PROCEDURE — 99024 POSTOP FOLLOW-UP VISIT: CPT

## 2020-01-15 NOTE — ADDENDUM
[FreeTextEntry1] : This note was written by Shaunna Massey on 1/15/19 acting solely as a scribe for Dr. Zay Hebert.\par \par All medical record entries made by the Scribe were at my, Dr. Zay Hebert, direction and personally dictated by me on 1/15/19. I have personally reviewed the chart and agree that the record accurately reflects my personal performance of the history, physical exam, assessment and plan.\par

## 2020-01-28 ENCOUNTER — OUTPATIENT (OUTPATIENT)
Dept: OUTPATIENT SERVICES | Facility: HOSPITAL | Age: 65
LOS: 1 days | End: 2020-01-28
Payer: COMMERCIAL

## 2020-01-28 ENCOUNTER — APPOINTMENT (OUTPATIENT)
Dept: MRI IMAGING | Facility: CLINIC | Age: 65
End: 2020-01-28
Payer: COMMERCIAL

## 2020-01-28 DIAGNOSIS — Z98.89 OTHER SPECIFIED POSTPROCEDURAL STATES: Chronic | ICD-10-CM

## 2020-01-28 DIAGNOSIS — Z96.652 PRESENCE OF LEFT ARTIFICIAL KNEE JOINT: ICD-10-CM

## 2020-01-28 DIAGNOSIS — Z96.651 PRESENCE OF RIGHT ARTIFICIAL KNEE JOINT: Chronic | ICD-10-CM

## 2020-01-28 DIAGNOSIS — Z98.890 OTHER SPECIFIED POSTPROCEDURAL STATES: Chronic | ICD-10-CM

## 2020-01-28 DIAGNOSIS — Z00.8 ENCOUNTER FOR OTHER GENERAL EXAMINATION: ICD-10-CM

## 2020-01-28 DIAGNOSIS — N20.0 CALCULUS OF KIDNEY: Chronic | ICD-10-CM

## 2020-01-28 DIAGNOSIS — G62.9 POLYNEUROPATHY, UNSPECIFIED: Chronic | ICD-10-CM

## 2020-01-28 DIAGNOSIS — Z95.1 PRESENCE OF AORTOCORONARY BYPASS GRAFT: Chronic | ICD-10-CM

## 2020-01-28 PROCEDURE — 73718 MRI LOWER EXTREMITY W/O DYE: CPT | Mod: 26,LT

## 2020-01-28 PROCEDURE — 73718 MRI LOWER EXTREMITY W/O DYE: CPT

## 2020-01-29 DIAGNOSIS — M51.36 OTHER INTERVERTEBRAL DISC DEGENERATION, LUMBAR REGION: ICD-10-CM

## 2020-02-27 ENCOUNTER — OUTPATIENT (OUTPATIENT)
Dept: OUTPATIENT SERVICES | Facility: HOSPITAL | Age: 65
LOS: 1 days | End: 2020-02-27
Payer: COMMERCIAL

## 2020-02-27 ENCOUNTER — APPOINTMENT (OUTPATIENT)
Dept: MRI IMAGING | Facility: CLINIC | Age: 65
End: 2020-02-27
Payer: COMMERCIAL

## 2020-02-27 DIAGNOSIS — Z95.1 PRESENCE OF AORTOCORONARY BYPASS GRAFT: Chronic | ICD-10-CM

## 2020-02-27 DIAGNOSIS — Z98.890 OTHER SPECIFIED POSTPROCEDURAL STATES: Chronic | ICD-10-CM

## 2020-02-27 DIAGNOSIS — Z98.89 OTHER SPECIFIED POSTPROCEDURAL STATES: Chronic | ICD-10-CM

## 2020-02-27 DIAGNOSIS — Z96.651 PRESENCE OF RIGHT ARTIFICIAL KNEE JOINT: Chronic | ICD-10-CM

## 2020-02-27 DIAGNOSIS — G62.9 POLYNEUROPATHY, UNSPECIFIED: Chronic | ICD-10-CM

## 2020-02-27 DIAGNOSIS — N20.0 CALCULUS OF KIDNEY: Chronic | ICD-10-CM

## 2020-02-27 DIAGNOSIS — Z00.8 ENCOUNTER FOR OTHER GENERAL EXAMINATION: ICD-10-CM

## 2020-02-27 PROCEDURE — 72158 MRI LUMBAR SPINE W/O & W/DYE: CPT

## 2020-02-27 PROCEDURE — 72158 MRI LUMBAR SPINE W/O & W/DYE: CPT | Mod: 26

## 2020-02-27 PROCEDURE — A9585: CPT

## 2020-03-10 NOTE — BRIEF OPERATIVE NOTE - CONDITION POST OP
Form received. Dr Sales singed form. Form sent back to medical release in red folder.   stable to pacu

## 2020-04-29 ENCOUNTER — APPOINTMENT (OUTPATIENT)
Dept: ORTHOPEDIC SURGERY | Facility: CLINIC | Age: 65
End: 2020-04-29
Payer: COMMERCIAL

## 2020-04-29 DIAGNOSIS — M17.12 UNILATERAL PRIMARY OSTEOARTHRITIS, LEFT KNEE: ICD-10-CM

## 2020-04-29 PROCEDURE — 99213 OFFICE O/P EST LOW 20 MIN: CPT | Mod: 95

## 2020-04-29 NOTE — DISCUSSION/SUMMARY
[de-identified] : impression;\par -left knee popliteal pain \par -erythema legs\par \par differential BAkers cyst, referred pain spine\par rule out venous insufficiency legs\par \par plan.patient will contact pcp re vascular referral\par follow up after physiatry  followup re spine

## 2020-04-29 NOTE — HISTORY OF PRESENT ILLNESS
[de-identified] : left total knee dec 2019\par doing well until recent onset intermittent pulling pain left knee popliteal fossa at night in bed and after sitting\par pain and symptom free when walking or negotiating stairs\par reports concurrent chronic low back pain with intermittent bilateral buttock and post leg radiation\par in the past after pain management injection back popliteal pain improved for 1-2 days\par also ":redness' left greater than right ant leg\par months ago pcp treated with ?tapering course  of steroid with no improvement\par no fever chills

## 2020-04-29 NOTE — REASON FOR VISIT
[Home] : at home, [unfilled] , at the time of the visit. [Medical Office: ([facility])___] : at the medical office located in  [Patient] : the patient [Follow-Up Visit] : a follow-up visit for [Knee Pain] : knee pain

## 2020-04-29 NOTE — PHYSICAL EXAM
[de-identified] : no distress\par alert and oriented times three\par left knee incision healed no erythema\par mild erythema pre tibial distal half left greater than right leg\par arom left  knee 5/90

## 2020-05-06 ENCOUNTER — APPOINTMENT (OUTPATIENT)
Dept: ORTHOPEDIC SURGERY | Facility: CLINIC | Age: 65
End: 2020-05-06

## 2020-08-31 ENCOUNTER — APPOINTMENT (OUTPATIENT)
Dept: HUMAN REPRODUCTION | Facility: CLINIC | Age: 65
End: 2020-08-31

## 2020-12-08 NOTE — PROGRESS NOTE ADULT - SUBJECTIVE AND OBJECTIVE BOX
Patient is a 62y old  Male who presents with a chief complaint of removal of left kidney stone (26 Oct 2018 06:01)      SUBJECTIVE / OVERNIGHT EVENTS: INCOMPLETE     MEDICATIONS  (STANDING):  allopurinol 100 milliGRAM(s) Oral daily  amLODIPine   Tablet 10 milliGRAM(s) Oral daily  aspirin enteric coated 325 milliGRAM(s) Oral daily  atorvastatin 40 milliGRAM(s) Oral at bedtime  ceFAZolin   IVPB 2000 milliGRAM(s) IV Intermittent every 8 hours  docusate sodium 100 milliGRAM(s) Oral three times a day  heparin  Injectable 5000 Unit(s) SubCutaneous every 8 hours  labetalol 200 milliGRAM(s) Oral two times a day  losartan 100 milliGRAM(s) Oral daily  senna 2 Tablet(s) Oral at bedtime  tamsulosin 0.4 milliGRAM(s) Oral at bedtime    MEDICATIONS  (PRN):  acetaminophen   Tablet .. 650 milliGRAM(s) Oral every 6 hours PRN Mild Pain (1 - 3)  oxyCODONE    IR 5 milliGRAM(s) Oral every 4 hours PRN Moderate Pain (4 - 6)  oxyCODONE    IR 10 milliGRAM(s) Oral every 4 hours PRN Severe Pain (7 - 10)      MEDICATIONS  (STANDING):  allopurinol 100 milliGRAM(s) Oral daily  amLODIPine   Tablet 10 milliGRAM(s) Oral daily  aspirin enteric coated 325 milliGRAM(s) Oral daily  atorvastatin 40 milliGRAM(s) Oral at bedtime  ceFAZolin   IVPB 2000 milliGRAM(s) IV Intermittent every 8 hours  docusate sodium 100 milliGRAM(s) Oral three times a day  heparin  Injectable 5000 Unit(s) SubCutaneous every 8 hours  labetalol 200 milliGRAM(s) Oral two times a day  losartan 100 milliGRAM(s) Oral daily  senna 2 Tablet(s) Oral at bedtime  tamsulosin 0.4 milliGRAM(s) Oral at bedtime    MEDICATIONS  (PRN):  acetaminophen   Tablet .. 650 milliGRAM(s) Oral every 6 hours PRN Mild Pain (1 - 3)  oxyCODONE    IR 5 milliGRAM(s) Oral every 4 hours PRN Moderate Pain (4 - 6)  oxyCODONE    IR 10 milliGRAM(s) Oral every 4 hours PRN Severe Pain (7 - 10)      CAPILLARY BLOOD GLUCOSE        I&O's Summary    25 Oct 2018 07:01  -  26 Oct 2018 07:00  --------------------------------------------------------  IN: 440 mL / OUT: 2930 mL / NET: -2490 mL        PHYSICAL EXAM:  GENERAL: NAD, well-developed  HEAD:  Atraumatic, Normocephalic  EYES: EOMI, PERRLA, conjunctiva and sclera clear  NECK: Supple, No JVD  CHEST/LUNG: Clear to auscultation bilaterally; No wheeze  HEART: Regular rate and rhythm; No murmurs, rubs, or gallops  ABDOMEN: Soft, Nontender, Nondistended; Bowel sounds present  EXTREMITIES:  2+ Peripheral Pulses, No clubbing, cyanosis, or edema  PSYCH: AAOx3  NEUROLOGY: non-focal  SKIN: No rashes or lesions    LABS:                        13.1   14.99 )-----------( 182      ( 25 Oct 2018 05:35 )             38.4     10-25    138  |  102  |  16  ----------------------------<  168<H>  4.3   |  20<L>  |  0.69    Ca    8.6      25 Oct 2018 05:35                RADIOLOGY & ADDITIONAL TESTS:    Imaging Personally Reviewed:    Consultant(s) Notes Reviewed:      Care Discussed with Consultants/Other Providers: yes Patient is a 62y old  Male who presents with a chief complaint of removal of left kidney stone (26 Oct 2018 06:01)      SUBJECTIVE / OVERNIGHT EVENTS: Pt seen and examined by me Son at bedside  c/o Pain and fullness around left nephrostomy tube   Had Xray Nephrostogram which showed extravasation of contrast medial to the left kidney   C/o LE edema         MEDICATIONS  (STANDING):  allopurinol 100 milliGRAM(s) Oral daily  amLODIPine   Tablet 10 milliGRAM(s) Oral daily  aspirin enteric coated 325 milliGRAM(s) Oral daily  atorvastatin 40 milliGRAM(s) Oral at bedtime  ceFAZolin   IVPB 2000 milliGRAM(s) IV Intermittent every 8 hours  docusate sodium 100 milliGRAM(s) Oral three times a day  heparin  Injectable 5000 Unit(s) SubCutaneous every 8 hours  labetalol 200 milliGRAM(s) Oral two times a day  losartan 100 milliGRAM(s) Oral daily  senna 2 Tablet(s) Oral at bedtime  tamsulosin 0.4 milliGRAM(s) Oral at bedtime    MEDICATIONS  (PRN):  acetaminophen   Tablet .. 650 milliGRAM(s) Oral every 6 hours PRN Mild Pain (1 - 3)  oxyCODONE    IR 5 milliGRAM(s) Oral every 4 hours PRN Moderate Pain (4 - 6)  oxyCODONE    IR 10 milliGRAM(s) Oral every 4 hours PRN Severe Pain (7 - 10)      MEDICATIONS  (STANDING):  allopurinol 100 milliGRAM(s) Oral daily  amLODIPine   Tablet 10 milliGRAM(s) Oral daily  aspirin enteric coated 325 milliGRAM(s) Oral daily  atorvastatin 40 milliGRAM(s) Oral at bedtime  ceFAZolin   IVPB 2000 milliGRAM(s) IV Intermittent every 8 hours  docusate sodium 100 milliGRAM(s) Oral three times a day  heparin  Injectable 5000 Unit(s) SubCutaneous every 8 hours  labetalol 200 milliGRAM(s) Oral two times a day  losartan 100 milliGRAM(s) Oral daily  senna 2 Tablet(s) Oral at bedtime  tamsulosin 0.4 milliGRAM(s) Oral at bedtime    MEDICATIONS  (PRN):  acetaminophen   Tablet .. 650 milliGRAM(s) Oral every 6 hours PRN Mild Pain (1 - 3)  oxyCODONE    IR 5 milliGRAM(s) Oral every 4 hours PRN Moderate Pain (4 - 6)  oxyCODONE    IR 10 milliGRAM(s) Oral every 4 hours PRN Severe Pain (7 - 10)      CAPILLARY BLOOD GLUCOSE        I&O's Summary    25 Oct 2018 07:01  -  26 Oct 2018 07:00  --------------------------------------------------------  IN: 440 mL / OUT: 2930 mL / NET: -2490 mL        PHYSICAL EXAM:  GENERAL: NAD, well-developed  HEAD:  Atraumatic, Normocephalic  EYES: EOMI, PERRLA, conjunctiva and sclera clear  NECK: Supple, No JVD  CHEST/LUNG: Clear to auscultation bilaterally; No wheeze  HEART: Regular rate and rhythm; No murmurs, rubs, or gallops  ABDOMEN: Soft, Nontender, Nondistended; Bowel sounds present, NT secure, draining clear pink urine ,tenderness and fullness around left tube   EXTREMITIES:  2+ Peripheral Pulses, No clubbing, cyanosis. Grade II edema  PSYCH: AAOx3  NEUROLOGY: non-focal  SKIN: No rashes or lesions    LABS:                                    11.9   9.50  )-----------( 162      ( 26 Oct 2018 10:19 )             35.4       10-26    139  |  102  |  19  ----------------------------<  183<H>  3.6   |  24  |  0.83    Ca    8.7      26 Oct 2018 10:19            RADIOLOGY & ADDITIONAL TESTS:    Imaging Personally Reviewed:    Consultant(s) Notes Reviewed:      Care Discussed with Consultants/Other Providers: Urology

## 2020-12-08 NOTE — CONSULT NOTE ADULT - PROBLEM SELECTOR PROBLEM 3
[Dear  ___] : Dear  [unfilled], [Courtesy Letter:] : I had the pleasure of seeing your patient, [unfilled], in my office today. [Please see my note below.] : Please see my note below. [Consult Closing:] : Thank you very much for allowing me to participate in the care of this patient.  If you have any questions, please do not hesitate to contact me. [DrTyshawn  ___] : Dr. MARY [FreeTextEntry3] : Respectfully,\par \par Alli Hurtado M.D., FACS\par  CAD (coronary artery disease)

## 2021-03-04 ENCOUNTER — EMERGENCY (EMERGENCY)
Facility: HOSPITAL | Age: 66
LOS: 1 days | Discharge: HOME | End: 2021-03-04
Attending: EMERGENCY MEDICINE
Payer: COMMERCIAL

## 2021-03-04 VITALS
WEIGHT: 315 LBS | DIASTOLIC BLOOD PRESSURE: 100 MMHG | SYSTOLIC BLOOD PRESSURE: 179 MMHG | OXYGEN SATURATION: 97 % | HEIGHT: 77 IN | TEMPERATURE: 98 F | RESPIRATION RATE: 20 BRPM | HEART RATE: 79 BPM

## 2021-03-04 VITALS
HEART RATE: 64 BPM | RESPIRATION RATE: 16 BRPM | TEMPERATURE: 99 F | DIASTOLIC BLOOD PRESSURE: 72 MMHG | SYSTOLIC BLOOD PRESSURE: 149 MMHG | OXYGEN SATURATION: 99 %

## 2021-03-04 DIAGNOSIS — Z95.1 PRESENCE OF AORTOCORONARY BYPASS GRAFT: Chronic | ICD-10-CM

## 2021-03-04 DIAGNOSIS — N20.0 CALCULUS OF KIDNEY: Chronic | ICD-10-CM

## 2021-03-04 DIAGNOSIS — Z98.89 OTHER SPECIFIED POSTPROCEDURAL STATES: Chronic | ICD-10-CM

## 2021-03-04 DIAGNOSIS — Z98.890 OTHER SPECIFIED POSTPROCEDURAL STATES: Chronic | ICD-10-CM

## 2021-03-04 DIAGNOSIS — Z96.651 PRESENCE OF RIGHT ARTIFICIAL KNEE JOINT: Chronic | ICD-10-CM

## 2021-03-04 DIAGNOSIS — G62.9 POLYNEUROPATHY, UNSPECIFIED: Chronic | ICD-10-CM

## 2021-03-04 LAB
ALBUMIN SERPL ELPH-MCNC: 3.9 G/DL — SIGNIFICANT CHANGE UP (ref 3.3–5)
ALP SERPL-CCNC: 99 U/L — SIGNIFICANT CHANGE UP (ref 40–120)
ALT FLD-CCNC: 30 U/L — SIGNIFICANT CHANGE UP (ref 10–45)
ANION GAP SERPL CALC-SCNC: 13 MMOL/L — SIGNIFICANT CHANGE UP (ref 5–17)
APPEARANCE UR: CLEAR — SIGNIFICANT CHANGE UP
AST SERPL-CCNC: 33 U/L — SIGNIFICANT CHANGE UP (ref 10–40)
BACTERIA # UR AUTO: ABNORMAL
BASE EXCESS BLDV CALC-SCNC: 2.2 MMOL/L — HIGH (ref -2–2)
BASOPHILS # BLD AUTO: 0.03 K/UL — SIGNIFICANT CHANGE UP (ref 0–0.2)
BASOPHILS NFR BLD AUTO: 0.5 % — SIGNIFICANT CHANGE UP (ref 0–2)
BILIRUB SERPL-MCNC: 0.4 MG/DL — SIGNIFICANT CHANGE UP (ref 0.2–1.2)
BILIRUB UR-MCNC: NEGATIVE — SIGNIFICANT CHANGE UP
BUN SERPL-MCNC: 13 MG/DL — SIGNIFICANT CHANGE UP (ref 7–23)
CA-I SERPL-SCNC: 1.14 MMOL/L — SIGNIFICANT CHANGE UP (ref 1.12–1.3)
CALCIUM SERPL-MCNC: 9.4 MG/DL — SIGNIFICANT CHANGE UP (ref 8.4–10.5)
CHLORIDE BLDV-SCNC: 109 MMOL/L — HIGH (ref 96–108)
CHLORIDE SERPL-SCNC: 105 MMOL/L — SIGNIFICANT CHANGE UP (ref 96–108)
CO2 BLDV-SCNC: 28 MMOL/L — SIGNIFICANT CHANGE UP (ref 22–30)
CO2 SERPL-SCNC: 23 MMOL/L — SIGNIFICANT CHANGE UP (ref 22–31)
COLOR SPEC: SIGNIFICANT CHANGE UP
CREAT SERPL-MCNC: 0.54 MG/DL — SIGNIFICANT CHANGE UP (ref 0.5–1.3)
DIFF PNL FLD: NEGATIVE — SIGNIFICANT CHANGE UP
EOSINOPHIL # BLD AUTO: 0.1 K/UL — SIGNIFICANT CHANGE UP (ref 0–0.5)
EOSINOPHIL NFR BLD AUTO: 1.8 % — SIGNIFICANT CHANGE UP (ref 0–6)
EPI CELLS # UR: 0 /HPF — SIGNIFICANT CHANGE UP
GAS PNL BLDV: 139 MMOL/L — SIGNIFICANT CHANGE UP (ref 135–145)
GAS PNL BLDV: SIGNIFICANT CHANGE UP
GLUCOSE BLDV-MCNC: 110 MG/DL — HIGH (ref 70–99)
GLUCOSE SERPL-MCNC: 108 MG/DL — HIGH (ref 70–99)
GLUCOSE UR QL: NEGATIVE — SIGNIFICANT CHANGE UP
HCO3 BLDV-SCNC: 27 MMOL/L — SIGNIFICANT CHANGE UP (ref 21–29)
HCT VFR BLD CALC: 42.2 % — SIGNIFICANT CHANGE UP (ref 39–50)
HCT VFR BLDA CALC: 46 % — SIGNIFICANT CHANGE UP (ref 39–50)
HGB BLD CALC-MCNC: 14.9 G/DL — SIGNIFICANT CHANGE UP (ref 13–17)
HGB BLD-MCNC: 14.3 G/DL — SIGNIFICANT CHANGE UP (ref 13–17)
HYALINE CASTS # UR AUTO: 3 /LPF — HIGH (ref 0–2)
IMM GRANULOCYTES NFR BLD AUTO: 0.9 % — SIGNIFICANT CHANGE UP (ref 0–1.5)
KETONES UR-MCNC: NEGATIVE — SIGNIFICANT CHANGE UP
LACTATE BLDV-MCNC: 2.2 MMOL/L — HIGH (ref 0.7–2)
LEUKOCYTE ESTERASE UR-ACNC: ABNORMAL
LYMPHOCYTES # BLD AUTO: 1.28 K/UL — SIGNIFICANT CHANGE UP (ref 1–3.3)
LYMPHOCYTES # BLD AUTO: 22.4 % — SIGNIFICANT CHANGE UP (ref 13–44)
MCHC RBC-ENTMCNC: 29.2 PG — SIGNIFICANT CHANGE UP (ref 27–34)
MCHC RBC-ENTMCNC: 33.9 GM/DL — SIGNIFICANT CHANGE UP (ref 32–36)
MCV RBC AUTO: 86.1 FL — SIGNIFICANT CHANGE UP (ref 80–100)
MONOCYTES # BLD AUTO: 0.5 K/UL — SIGNIFICANT CHANGE UP (ref 0–0.9)
MONOCYTES NFR BLD AUTO: 8.8 % — SIGNIFICANT CHANGE UP (ref 2–14)
NEUTROPHILS # BLD AUTO: 3.75 K/UL — SIGNIFICANT CHANGE UP (ref 1.8–7.4)
NEUTROPHILS NFR BLD AUTO: 65.6 % — SIGNIFICANT CHANGE UP (ref 43–77)
NITRITE UR-MCNC: NEGATIVE — SIGNIFICANT CHANGE UP
NRBC # BLD: 0 /100 WBCS — SIGNIFICANT CHANGE UP (ref 0–0)
PCO2 BLDV: 44 MMHG — SIGNIFICANT CHANGE UP (ref 35–50)
PH BLDV: 7.41 — SIGNIFICANT CHANGE UP (ref 7.35–7.45)
PH UR: 6 — SIGNIFICANT CHANGE UP (ref 5–8)
PLATELET # BLD AUTO: 227 K/UL — SIGNIFICANT CHANGE UP (ref 150–400)
PO2 BLDV: 39 MMHG — SIGNIFICANT CHANGE UP (ref 25–45)
POTASSIUM BLDV-SCNC: 3.8 MMOL/L — SIGNIFICANT CHANGE UP (ref 3.5–5.3)
POTASSIUM SERPL-MCNC: 4.2 MMOL/L — SIGNIFICANT CHANGE UP (ref 3.5–5.3)
POTASSIUM SERPL-SCNC: 4.2 MMOL/L — SIGNIFICANT CHANGE UP (ref 3.5–5.3)
PROT SERPL-MCNC: 6.8 G/DL — SIGNIFICANT CHANGE UP (ref 6–8.3)
PROT UR-MCNC: ABNORMAL
RBC # BLD: 4.9 M/UL — SIGNIFICANT CHANGE UP (ref 4.2–5.8)
RBC # FLD: 12.7 % — SIGNIFICANT CHANGE UP (ref 10.3–14.5)
RBC CASTS # UR COMP ASSIST: 1 /HPF — SIGNIFICANT CHANGE UP (ref 0–4)
SAO2 % BLDV: 78 % — SIGNIFICANT CHANGE UP (ref 67–88)
SODIUM SERPL-SCNC: 141 MMOL/L — SIGNIFICANT CHANGE UP (ref 135–145)
SP GR SPEC: 1.02 — SIGNIFICANT CHANGE UP (ref 1.01–1.02)
UROBILINOGEN FLD QL: NEGATIVE — SIGNIFICANT CHANGE UP
WBC # BLD: 5.71 K/UL — SIGNIFICANT CHANGE UP (ref 3.8–10.5)
WBC # FLD AUTO: 5.71 K/UL — SIGNIFICANT CHANGE UP (ref 3.8–10.5)
WBC UR QL: 63 /HPF — HIGH (ref 0–5)

## 2021-03-04 PROCEDURE — 96376 TX/PRO/DX INJ SAME DRUG ADON: CPT

## 2021-03-04 PROCEDURE — 85025 COMPLETE CBC W/AUTO DIFF WBC: CPT

## 2021-03-04 PROCEDURE — 87186 SC STD MICRODIL/AGAR DIL: CPT

## 2021-03-04 PROCEDURE — 96375 TX/PRO/DX INJ NEW DRUG ADDON: CPT

## 2021-03-04 PROCEDURE — 82330 ASSAY OF CALCIUM: CPT

## 2021-03-04 PROCEDURE — 85018 HEMOGLOBIN: CPT

## 2021-03-04 PROCEDURE — 80053 COMPREHEN METABOLIC PANEL: CPT

## 2021-03-04 PROCEDURE — 82803 BLOOD GASES ANY COMBINATION: CPT

## 2021-03-04 PROCEDURE — 83605 ASSAY OF LACTIC ACID: CPT

## 2021-03-04 PROCEDURE — 81001 URINALYSIS AUTO W/SCOPE: CPT

## 2021-03-04 PROCEDURE — 84295 ASSAY OF SERUM SODIUM: CPT

## 2021-03-04 PROCEDURE — 82947 ASSAY GLUCOSE BLOOD QUANT: CPT

## 2021-03-04 PROCEDURE — 96374 THER/PROPH/DIAG INJ IV PUSH: CPT

## 2021-03-04 PROCEDURE — 99284 EMERGENCY DEPT VISIT MOD MDM: CPT | Mod: 25

## 2021-03-04 PROCEDURE — 87077 CULTURE AEROBIC IDENTIFY: CPT

## 2021-03-04 PROCEDURE — 74176 CT ABD & PELVIS W/O CONTRAST: CPT

## 2021-03-04 PROCEDURE — 87086 URINE CULTURE/COLONY COUNT: CPT

## 2021-03-04 PROCEDURE — 73110 X-RAY EXAM OF WRIST: CPT

## 2021-03-04 PROCEDURE — 73110 X-RAY EXAM OF WRIST: CPT | Mod: 26,RT

## 2021-03-04 PROCEDURE — 82435 ASSAY OF BLOOD CHLORIDE: CPT

## 2021-03-04 PROCEDURE — 73130 X-RAY EXAM OF HAND: CPT | Mod: 26,RT

## 2021-03-04 PROCEDURE — 84132 ASSAY OF SERUM POTASSIUM: CPT

## 2021-03-04 PROCEDURE — 73130 X-RAY EXAM OF HAND: CPT

## 2021-03-04 PROCEDURE — 99284 EMERGENCY DEPT VISIT MOD MDM: CPT | Mod: GC

## 2021-03-04 PROCEDURE — 85014 HEMATOCRIT: CPT

## 2021-03-04 PROCEDURE — 74176 CT ABD & PELVIS W/O CONTRAST: CPT | Mod: 26,MA

## 2021-03-04 RX ORDER — CEFPODOXIME PROXETIL 100 MG
200 TABLET ORAL ONCE
Refills: 0 | Status: DISCONTINUED | OUTPATIENT
Start: 2021-03-04 | End: 2021-03-04

## 2021-03-04 RX ORDER — CEFPODOXIME PROXETIL 100 MG
1 TABLET ORAL
Qty: 20 | Refills: 0
Start: 2021-03-04 | End: 2021-03-13

## 2021-03-04 RX ORDER — SODIUM CHLORIDE 9 MG/ML
750 INJECTION, SOLUTION INTRAVENOUS ONCE
Refills: 0 | Status: COMPLETED | OUTPATIENT
Start: 2021-03-04 | End: 2021-03-04

## 2021-03-04 RX ORDER — SODIUM CHLORIDE 9 MG/ML
250 INJECTION, SOLUTION INTRAVENOUS ONCE
Refills: 0 | Status: COMPLETED | OUTPATIENT
Start: 2021-03-04 | End: 2021-03-04

## 2021-03-04 RX ORDER — KETOROLAC TROMETHAMINE 30 MG/ML
15 SYRINGE (ML) INJECTION ONCE
Refills: 0 | Status: DISCONTINUED | OUTPATIENT
Start: 2021-03-04 | End: 2021-03-04

## 2021-03-04 RX ORDER — CEFTRIAXONE 500 MG/1
1000 INJECTION, POWDER, FOR SOLUTION INTRAMUSCULAR; INTRAVENOUS ONCE
Refills: 0 | Status: COMPLETED | OUTPATIENT
Start: 2021-03-04 | End: 2021-03-04

## 2021-03-04 RX ADMIN — Medication 15 MILLIGRAM(S): at 16:50

## 2021-03-04 RX ADMIN — CEFTRIAXONE 100 MILLIGRAM(S): 500 INJECTION, POWDER, FOR SOLUTION INTRAMUSCULAR; INTRAVENOUS at 16:49

## 2021-03-04 RX ADMIN — Medication 15 MILLIGRAM(S): at 19:04

## 2021-03-04 RX ADMIN — SODIUM CHLORIDE 250 MILLILITER(S): 9 INJECTION, SOLUTION INTRAVENOUS at 15:00

## 2021-03-04 RX ADMIN — SODIUM CHLORIDE 750 MILLILITER(S): 9 INJECTION, SOLUTION INTRAVENOUS at 19:04

## 2021-03-04 NOTE — ED ADULT NURSE NOTE - NSIMPLEMENTINTERV_GEN_ALL_ED
Implemented All Fall with Harm Risk Interventions:  [place] to call system. Call bell, personal items and telephone within reach. Instruct patient to call for assistance. Room bathroom lighting operational. Non-slip footwear when patient is off stretcher. Physically safe environment: no spills, clutter or unnecessary equipment. Stretcher in lowest position, wheels locked, appropriate side rails in place. Provide visual cue, wrist band, yellow gown, etc. Monitor gait and stability. Monitor for mental status changes and reorient to person, place, and time. Review medications for side effects contributing to fall risk. Reinforce activity limits and safety measures with patient and family. Provide visual clues: red socks.

## 2021-03-04 NOTE — ED PROVIDER NOTE - PHYSICAL EXAMINATION
CONSTITUTIONAL: NAD. Awake and conversive, cooperative with exam  EYES: EOMI, conjunctiva and sclera clear  ENMT: MMM  RESPIRATORY: Normal respiratory effort, CTAB, no wheezes, rales, or rhonchi  CARDIOVASCULAR: RRR, normal S1 and S2, no peripheral edema  ABDOMEN: Soft, non-distended, no TTP, no rebound/guarding, +R sided CVA tenderness   MUSCULOSKELETAL: No joint swelling or tenderness  NEURO: following commands, moving all extremities spontaneously  PSYCH: appropriate affect

## 2021-03-04 NOTE — ED PROVIDER NOTE - OBJECTIVE STATEMENT
64yo M PMH ASIYA, HTN, CAD, nephrolithiasis presenting with lower abd pain L>R onset yesterday night. No N/V/D. Normal appetite. No F/C/NS. Tested positive for COVID a couple of days ago. No changes in BM. 64yo M PMH ASIYA, HTN, CAD, nephrolithiasis presenting with lower abd pain L>R onset yesterday night. No N/V/D. Normal appetite. No F/C/NS. Tested positive for COVID a couple of days ago. No changes in BM. Does endorse increased frequency of urination and dark urine. Pain is 8/10 in intensity, intermittent in nature.

## 2021-03-04 NOTE — ED PROVIDER NOTE - PATIENT PORTAL LINK FT
You can access the FollowMyHealth Patient Portal offered by [facility] by registering at the following website: [email or web address]. By joining [employer]’s FollowMyHealth portal, you will also be able to view your health information using other applications (apps) compatible with our system. You can access the FollowMyHealth Patient Portal offered by [facility] by registering at the following website: [email or web address]. By joining [employer]’s FollowMyHealth portal, you will also be able to view your health information using other applications (apps) compatible with our system.

## 2021-03-04 NOTE — ED PROVIDER NOTE - PMH
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

## 2021-03-04 NOTE — ED PROVIDER NOTE - FAMILY HISTORY
Family history of coronary artery disease, HLD/Angina. Fatal MI age 73.     Family history of diabetes mellitus type II, mother- - hyperlipidemia and Hypertension.     Family history of coronary artery disease, brother- age 50+- Coronary Artery Stents     Sibling  Still living? No  Family history of pancreatic cancer, Age at diagnosis: Age Unknown

## 2021-03-04 NOTE — ED PROVIDER NOTE - NSFOLLOWUPCLINICS_GEN_ALL_ED_FT
[facility]  [address] [place] [address]  Phone: [phone]  Fax:   Follow Up Time:

## 2021-03-04 NOTE — ED ADULT NURSE NOTE - OBJECTIVE STATEMENT
65 year old male Hx of kidney stones, HTN, ASIYA and recent + Covid test presents to the ED complaining of back pain. Pt states that it feels similar to previous kidney stones. Pt breathing is labored when talking, 22 respirations/minute, Pt sating 100% on RA. Pt complains of L sided back pain being worse than right.  Pt also complains of right hand/wrist pain. Pt is A&O x 4, VSS, afebrile, ambulating independently. Pt denies fever, chills, NVD, SOB, or chest pain. IV placed, labs drawn, bed in low position, safety measures in place. Urine collected via clean catch method. 20G IV placed in left hand.

## 2021-03-04 NOTE — ED PROVIDER NOTE - NSFOLLOWUPINSTRUCTIONS_ED_ALL_ED_FT
(1) Follow up with your primary care physician as discussed. In addition, we did not find evidence of a life threatening illness on your testing here today, but listed below are the specialists that will be necessary to see as an outpatient to continue the workup.  Please call the numbers listed below or [phone] to set up the necessary appointments.  (2) Immediately seek care at your nearest emergency room if your worsen, persist, or do not resolve   (3) Take Tylenol (up to 1000mg or 1 g)  and/or Motrin (up to 600mg) up to every 6 hours as needed for pain.   (4) Take the prescribed medication as instructed:  - Cefpodoxime 1 tablet two times per day for the next 10 days  (5) If you had an IV (intravenous) line placed, it was removed. Sometimes, after IV removal, that area can be tender for a few days; if it develops redness and swelling, those could be signs of infection; in which case, return to the Emergency Department for assessment.

## 2021-03-04 NOTE — ED PROVIDER NOTE - CLINICAL SUMMARY MEDICAL DECISION MAKING FREE TEXT BOX
See Attending Note See Attending Note    66yo M PMH ASIYA, HTN, CAD, nephrolithiasis presenting with lower abd pain L>R onset yesterday night. Will obtain CT abd/pelvis non contrast to assess for stone, obtain basic blood work and reassess.

## 2021-03-04 NOTE — ED PROVIDER NOTE - ATTENDING CONTRIBUTION TO CARE
63 yo man with history of CAD/ CABG 2017, ASIYA/ CPAP, OA, morbid obesity presents with back pain feels like prior kidney stone that needed intervention. with dark urine, frequency, vss, pain control, renal colic work up, l sided pain.

## 2021-03-06 NOTE — ED POST DISCHARGE NOTE - OTHER COMMUNICATION
3/7/21: ucx pansensitive to cephalosporins, DCd on cefpodoxime. appropriate care given. no need for callback at this time - Liang Thakur PA-C

## 2021-03-12 ENCOUNTER — INPATIENT (INPATIENT)
Facility: HOSPITAL | Age: 66
LOS: 11 days | Discharge: HOME | DRG: 291 | End: 2021-03-24
Attending: STUDENT IN AN ORGANIZED HEALTH CARE EDUCATION/TRAINING PROGRAM | Admitting: HOSPITALIST
Payer: MEDICARE

## 2021-03-12 VITALS
WEIGHT: 315 LBS | OXYGEN SATURATION: 96 % | SYSTOLIC BLOOD PRESSURE: 189 MMHG | DIASTOLIC BLOOD PRESSURE: 98 MMHG | RESPIRATION RATE: 19 BRPM | HEIGHT: 77 IN | HEART RATE: 97 BPM | TEMPERATURE: 98 F

## 2021-03-12 DIAGNOSIS — Z95.1 PRESENCE OF AORTOCORONARY BYPASS GRAFT: Chronic | ICD-10-CM

## 2021-03-12 DIAGNOSIS — G62.9 POLYNEUROPATHY, UNSPECIFIED: Chronic | ICD-10-CM

## 2021-03-12 DIAGNOSIS — Z98.890 OTHER SPECIFIED POSTPROCEDURAL STATES: Chronic | ICD-10-CM

## 2021-03-12 DIAGNOSIS — Z96.651 PRESENCE OF RIGHT ARTIFICIAL KNEE JOINT: Chronic | ICD-10-CM

## 2021-03-12 DIAGNOSIS — Z98.89 OTHER SPECIFIED POSTPROCEDURAL STATES: Chronic | ICD-10-CM

## 2021-03-12 DIAGNOSIS — N20.0 CALCULUS OF KIDNEY: Chronic | ICD-10-CM

## 2021-03-12 LAB
ALBUMIN SERPL ELPH-MCNC: 4.1 G/DL — SIGNIFICANT CHANGE UP (ref 3.3–5)
ALP SERPL-CCNC: 91 U/L — SIGNIFICANT CHANGE UP (ref 40–120)
ALT FLD-CCNC: 54 U/L — HIGH (ref 10–45)
ANION GAP SERPL CALC-SCNC: 13 MMOL/L — SIGNIFICANT CHANGE UP (ref 5–17)
AST SERPL-CCNC: 57 U/L — HIGH (ref 10–40)
BASOPHILS # BLD AUTO: 0.07 K/UL — SIGNIFICANT CHANGE UP (ref 0–0.2)
BASOPHILS NFR BLD AUTO: 0.7 % — SIGNIFICANT CHANGE UP (ref 0–2)
BILIRUB SERPL-MCNC: 0.4 MG/DL — SIGNIFICANT CHANGE UP (ref 0.2–1.2)
BUN SERPL-MCNC: 17 MG/DL — SIGNIFICANT CHANGE UP (ref 7–23)
CALCIUM SERPL-MCNC: 10.3 MG/DL — SIGNIFICANT CHANGE UP (ref 8.4–10.5)
CHLORIDE SERPL-SCNC: 106 MMOL/L — SIGNIFICANT CHANGE UP (ref 96–108)
CO2 SERPL-SCNC: 21 MMOL/L — LOW (ref 22–31)
CREAT SERPL-MCNC: 0.57 MG/DL — SIGNIFICANT CHANGE UP (ref 0.5–1.3)
EOSINOPHIL # BLD AUTO: 0.18 K/UL — SIGNIFICANT CHANGE UP (ref 0–0.5)
EOSINOPHIL NFR BLD AUTO: 1.9 % — SIGNIFICANT CHANGE UP (ref 0–6)
GLUCOSE SERPL-MCNC: 119 MG/DL — HIGH (ref 70–99)
HCT VFR BLD CALC: 43.7 % — SIGNIFICANT CHANGE UP (ref 39–50)
HGB BLD-MCNC: 14.6 G/DL — SIGNIFICANT CHANGE UP (ref 13–17)
IMM GRANULOCYTES NFR BLD AUTO: 0.9 % — SIGNIFICANT CHANGE UP (ref 0–1.5)
LYMPHOCYTES # BLD AUTO: 1.99 K/UL — SIGNIFICANT CHANGE UP (ref 1–3.3)
LYMPHOCYTES # BLD AUTO: 20.9 % — SIGNIFICANT CHANGE UP (ref 13–44)
MAGNESIUM SERPL-MCNC: 1.7 MG/DL — SIGNIFICANT CHANGE UP (ref 1.6–2.6)
MCHC RBC-ENTMCNC: 29.4 PG — SIGNIFICANT CHANGE UP (ref 27–34)
MCHC RBC-ENTMCNC: 33.4 GM/DL — SIGNIFICANT CHANGE UP (ref 32–36)
MCV RBC AUTO: 88.1 FL — SIGNIFICANT CHANGE UP (ref 80–100)
MONOCYTES # BLD AUTO: 0.77 K/UL — SIGNIFICANT CHANGE UP (ref 0–0.9)
MONOCYTES NFR BLD AUTO: 8.1 % — SIGNIFICANT CHANGE UP (ref 2–14)
NEUTROPHILS # BLD AUTO: 6.41 K/UL — SIGNIFICANT CHANGE UP (ref 1.8–7.4)
NEUTROPHILS NFR BLD AUTO: 67.5 % — SIGNIFICANT CHANGE UP (ref 43–77)
NRBC # BLD: 0 /100 WBCS — SIGNIFICANT CHANGE UP (ref 0–0)
NT-PROBNP SERPL-SCNC: 121 PG/ML — SIGNIFICANT CHANGE UP (ref 0–300)
PHOSPHATE SERPL-MCNC: 3.4 MG/DL — SIGNIFICANT CHANGE UP (ref 2.5–4.5)
PLATELET # BLD AUTO: 198 K/UL — SIGNIFICANT CHANGE UP (ref 150–400)
POTASSIUM SERPL-MCNC: 4.1 MMOL/L — SIGNIFICANT CHANGE UP (ref 3.5–5.3)
POTASSIUM SERPL-SCNC: 4.1 MMOL/L — SIGNIFICANT CHANGE UP (ref 3.5–5.3)
PROT SERPL-MCNC: 7.1 G/DL — SIGNIFICANT CHANGE UP (ref 6–8.3)
RBC # BLD: 4.96 M/UL — SIGNIFICANT CHANGE UP (ref 4.2–5.8)
RBC # FLD: 13.2 % — SIGNIFICANT CHANGE UP (ref 10.3–14.5)
SODIUM SERPL-SCNC: 140 MMOL/L — SIGNIFICANT CHANGE UP (ref 135–145)
TROPONIN T, HIGH SENSITIVITY RESULT: 15 NG/L — SIGNIFICANT CHANGE UP (ref 0–51)
WBC # BLD: 9.51 K/UL — SIGNIFICANT CHANGE UP (ref 3.8–10.5)
WBC # FLD AUTO: 9.51 K/UL — SIGNIFICANT CHANGE UP (ref 3.8–10.5)

## 2021-03-12 PROCEDURE — 93010 ELECTROCARDIOGRAM REPORT: CPT | Mod: GC

## 2021-03-12 PROCEDURE — 99285 EMERGENCY DEPT VISIT HI MDM: CPT | Mod: CS,GC

## 2021-03-12 PROCEDURE — 71045 X-RAY EXAM CHEST 1 VIEW: CPT | Mod: 26

## 2021-03-12 NOTE — ED PROVIDER NOTE - OBJECTIVE STATEMENT
66yo M PMH ASIYA, HTN, CAD triple bypass, nephrolithiasis p/w b/l leg swelling x 2 days. pt states he was recently DC from hospital after having renal stones and UTI, was treated with cefpodoxime. states he has never had b/l leg swelling, also endorsing trouble sleeping flat past 2 days. Covid+ day 14  pt denies any fever, chills, dizziness, cp, palpitations, wheezing, abdominal pain, n/v/d, dysuria, hematuria  has not followed up with cardiology since 12/2019

## 2021-03-12 NOTE — ED ADULT NURSE NOTE - OBJECTIVE STATEMENT
66 yo male with a PMH of ASIYA, HLD, CAD s/p cabg x 3, HTN, neuropathy, renal calculi, gout, morbid obesity presents to the ED via waiting room from home complaining of medical evaluation. Patient states that he has been noticing B/L LE swelling for the past two days, worsening today accompanied with increased SOB. States that he has been unable to sleep due to increase SOB, has been sleeping in his recliner. Daughter in law is an NP and told patient to go to the ER after seeing patient's legs swelling. Upon exam, +2 pitting edema in B/L extremities. 66 yo male with a PMH of ASIAY, HLD, CAD s/p cabg x 3, HTN, neuropathy, renal calculi, gout, morbid obesity presents to the ED via waiting room from home complaining of medical evaluation. Patient states that he has been noticing B/L LE swelling for the past two days, worsening today accompanied with increased SOB. States that he has been unable to sleep due to increase SOB, has been sleeping in his recliner. Daughter in law is an NP and told patient to go to the ER after seeing patient's legs swelling. Upon exam, +2 pitting edema in B/L extremities. States he chronically feels tingling in LE. Placed on cardiac monitor, MD Colindres at bedside evaluating patient. Was recently seen here for dysuria last week and found to have renal calculi--finished antibiotic today. Denies headache, dizziness, vision changes, chest pain,  abdominal pain, nausea, vomiting, diarrhea, fevers, chills, dysuria, hematuria, recent illness travel or fall.

## 2021-03-12 NOTE — ED PROVIDER NOTE - NS ED ROS FT
Constitutional: No fever, chills.  Eyes:  No visual changes  ENMT:  No neck pain  Cardiac:  No chest pain  Respiratory:  No cough, +SOB  GI:  No nausea, vomiting, diarrhea, abdominal pain.  :  No dysuria, hematuria  MS:  No back pain.  Neuro:  No headache or lightheadedness  Skin:  No skin rash  Endocrine: No history of thyroid disease or diabetes.  Except as documented in the HPI,  all other systems are negative.

## 2021-03-12 NOTE — ED PROVIDER NOTE - PHYSICAL EXAMINATION
Vital signs reviewed  GENERAL: Patient nontoxic appearing, NAD. Obese male   HEAD: NCAT  EYES: Anicteric  ENT: MMM  NECK: Supple, non tender  RESPIRATORY: Normal respiratory effort. Coarse b/l  CARDIOVASCULAR: Regular rate and rhythm  ABDOMEN: Soft. Nondistended. Nontender. No guarding or rebound. No CVA tenderness.  MUSCULOSKELETAL/EXTREMITIES: Brisk cap refill. 2+ radial pulses. b/l 2+ edema 2+DP pulse   SKIN:  Warm and dry  NEURO: AAOx3. No gross FND.  PSYCHIATRIC: Cooperative. Affect appropriate.

## 2021-03-12 NOTE — ED PROVIDER NOTE - PROGRESS NOTE DETAILS
endorsed to me to admit for heart failure pending pe study. study does not find pe. will admit for further care. -Stanley Madera MD-

## 2021-03-12 NOTE — ED PROVIDER NOTE - CARE PLAN
Principal Discharge DX:	Acute congestive heart failure, unspecified heart failure type  Secondary Diagnosis:	COVID-19

## 2021-03-12 NOTE — ED PROVIDER NOTE - CLINICAL SUMMARY MEDICAL DECISION MAKING FREE TEXT BOX
64yo M PMH ASIYA, HTN, CAD triple bypass, nephrolithiasis p/w b/l leg swelling x 2 days. pt states he was recently DC from hospital after having renal stones and UTI, was treated with cefpodoxime. states he has never had b/l leg swelling, also endorsing trouble sleeping flat past 2 days. elevated bp, 2+ pitting edema coarse breath sounds. likely nephrotic syndrome vs chf. likely admission for further testing. will assess with labs, xray. 66yo M PMH ASIYA, HTN, CAD triple bypass, nephrolithiasis p/w b/l leg swelling x 2 days. pt states he was recently DC from hospital after having renal stones and UTI, was treated with cefpodoxime. states he has never had b/l leg swelling, also endorsing trouble sleeping flat past 2 days. elevated bp, 2+ pitting edema coarse breath sounds. likely nephrotic syndrome vs chf. likely admission for further testing. will assess with labs, xray.    CHEYENNE Randolph MD: Pt is a 66 y/o male with PMH ASIYA, HTN, CAD triple bypass, nephrolithiasis p/w b/l leg swelling, orthopnea and D on E x 2 days. Pt states he was recently DC from hospital after having renal stones and UTI, also recent covid, was treated with cefpodoxime. states he has never had b/l leg swelling, also endorsing trouble sleeping flat past 2 days. Covid+ day 14. Pt denies any fever, chills, dizziness, cp, palpitations, wheezing, abdominal pain, n/v/d, dysuria, hematuria. Ddx includes, however, is not limited to: CHF, ACS, BRIELLE, DVT/PE other. Plan: basic labs, trop, probnp, d-dimer, CXR, LE dopplers, likely TBA for cardiac w/u

## 2021-03-12 NOTE — ED PROVIDER NOTE - ATTENDING CONTRIBUTION TO CARE
I saw and evaluated patient with resident. I discussed H+P and MDM with resident. I agree with the statements made by the resident unless otherwise noted.    The care of this patient was in support of the [facility] countermeasures to Covid-19.

## 2021-03-13 DIAGNOSIS — I50.9 HEART FAILURE, UNSPECIFIED: ICD-10-CM

## 2021-03-13 DIAGNOSIS — R60.0 LOCALIZED EDEMA: ICD-10-CM

## 2021-03-13 DIAGNOSIS — R82.81 PYURIA: ICD-10-CM

## 2021-03-13 DIAGNOSIS — G47.33 OBSTRUCTIVE SLEEP APNEA (ADULT) (PEDIATRIC): ICD-10-CM

## 2021-03-13 DIAGNOSIS — E78.00 PURE HYPERCHOLESTEROLEMIA, UNSPECIFIED: ICD-10-CM

## 2021-03-13 DIAGNOSIS — I25.10 ATHEROSCLEROTIC HEART DISEASE OF NATIVE CORONARY ARTERY WITHOUT ANGINA PECTORIS: ICD-10-CM

## 2021-03-13 DIAGNOSIS — I10 ESSENTIAL (PRIMARY) HYPERTENSION: ICD-10-CM

## 2021-03-13 DIAGNOSIS — M1A.9XX0 CHRONIC GOUT, UNSPECIFIED, WITHOUT TOPHUS (TOPHI): ICD-10-CM

## 2021-03-13 DIAGNOSIS — Z29.9 ENCOUNTER FOR PROPHYLACTIC MEASURES, UNSPECIFIED: ICD-10-CM

## 2021-03-13 LAB
APPEARANCE UR: CLEAR — SIGNIFICANT CHANGE UP
BACTERIA # UR AUTO: NEGATIVE — SIGNIFICANT CHANGE UP
BILIRUB UR-MCNC: NEGATIVE — SIGNIFICANT CHANGE UP
COLOR SPEC: SIGNIFICANT CHANGE UP
D DIMER BLD IA.RAPID-MCNC: 400 NG/ML DDU — HIGH
DIFF PNL FLD: NEGATIVE — SIGNIFICANT CHANGE UP
EPI CELLS # UR: 1 /HPF — SIGNIFICANT CHANGE UP
GLUCOSE UR QL: NEGATIVE — SIGNIFICANT CHANGE UP
HYALINE CASTS # UR AUTO: 1 /LPF — SIGNIFICANT CHANGE UP (ref 0–2)
KETONES UR-MCNC: NEGATIVE — SIGNIFICANT CHANGE UP
LEUKOCYTE ESTERASE UR-ACNC: ABNORMAL
NITRITE UR-MCNC: NEGATIVE — SIGNIFICANT CHANGE UP
PH UR: 6 — SIGNIFICANT CHANGE UP (ref 5–8)
PROT UR-MCNC: SIGNIFICANT CHANGE UP
RBC CASTS # UR COMP ASSIST: 1 /HPF — SIGNIFICANT CHANGE UP (ref 0–4)
SARS-COV-2 RNA SPEC QL NAA+PROBE: DETECTED
SP GR SPEC: 1.02 — SIGNIFICANT CHANGE UP (ref 1.01–1.02)
UROBILINOGEN FLD QL: ABNORMAL
WBC UR QL: 22 /HPF — HIGH (ref 0–5)

## 2021-03-13 PROCEDURE — 99223 1ST HOSP IP/OBS HIGH 75: CPT

## 2021-03-13 PROCEDURE — 71275 CT ANGIOGRAPHY CHEST: CPT | Mod: 26,MA

## 2021-03-13 RX ORDER — LABETALOL HCL 100 MG
200 TABLET ORAL
Refills: 0 | Status: DISCONTINUED | OUTPATIENT
Start: 2021-03-13 | End: 2021-03-16

## 2021-03-13 RX ORDER — LIDOCAINE 4 G/100G
1 CREAM TOPICAL ONCE
Refills: 0 | Status: COMPLETED | OUTPATIENT
Start: 2021-03-13 | End: 2021-03-13

## 2021-03-13 RX ORDER — ASPIRIN/CALCIUM CARB/MAGNESIUM 324 MG
1 TABLET ORAL
Qty: 0 | Refills: 0 | DISCHARGE

## 2021-03-13 RX ORDER — ALLOPURINOL 300 MG
100 TABLET ORAL AT BEDTIME
Refills: 0 | Status: DISCONTINUED | OUTPATIENT
Start: 2021-03-13 | End: 2021-03-24

## 2021-03-13 RX ORDER — ASPIRIN/CALCIUM CARB/MAGNESIUM 324 MG
325 TABLET ORAL DAILY
Refills: 0 | Status: DISCONTINUED | OUTPATIENT
Start: 2021-03-13 | End: 2021-03-20

## 2021-03-13 RX ORDER — ATORVASTATIN CALCIUM 80 MG/1
40 TABLET, FILM COATED ORAL AT BEDTIME
Refills: 0 | Status: DISCONTINUED | OUTPATIENT
Start: 2021-03-13 | End: 2021-03-24

## 2021-03-13 RX ORDER — ASPIRIN/CALCIUM CARB/MAGNESIUM 324 MG
162 TABLET ORAL
Qty: 0 | Refills: 0 | DISCHARGE

## 2021-03-13 RX ORDER — ACETAMINOPHEN 500 MG
650 TABLET ORAL ONCE
Refills: 0 | Status: COMPLETED | OUTPATIENT
Start: 2021-03-13 | End: 2021-03-13

## 2021-03-13 RX ORDER — LOSARTAN POTASSIUM 100 MG/1
100 TABLET, FILM COATED ORAL DAILY
Refills: 0 | Status: DISCONTINUED | OUTPATIENT
Start: 2021-03-13 | End: 2021-03-24

## 2021-03-13 RX ORDER — FUROSEMIDE 40 MG
40 TABLET ORAL ONCE
Refills: 0 | Status: COMPLETED | OUTPATIENT
Start: 2021-03-13 | End: 2021-03-13

## 2021-03-13 RX ORDER — GABAPENTIN 400 MG/1
300 CAPSULE ORAL THREE TIMES A DAY
Refills: 0 | Status: DISCONTINUED | OUTPATIENT
Start: 2021-03-13 | End: 2021-03-24

## 2021-03-13 RX ORDER — FUROSEMIDE 40 MG
40 TABLET ORAL
Refills: 0 | Status: DISCONTINUED | OUTPATIENT
Start: 2021-03-13 | End: 2021-03-17

## 2021-03-13 RX ORDER — AMLODIPINE BESYLATE 2.5 MG/1
10 TABLET ORAL DAILY
Refills: 0 | Status: DISCONTINUED | OUTPATIENT
Start: 2021-03-13 | End: 2021-03-24

## 2021-03-13 RX ORDER — ENOXAPARIN SODIUM 100 MG/ML
40 INJECTION SUBCUTANEOUS EVERY 12 HOURS
Refills: 0 | Status: DISCONTINUED | OUTPATIENT
Start: 2021-03-13 | End: 2021-03-24

## 2021-03-13 RX ADMIN — ENOXAPARIN SODIUM 40 MILLIGRAM(S): 100 INJECTION SUBCUTANEOUS at 17:30

## 2021-03-13 RX ADMIN — GABAPENTIN 300 MILLIGRAM(S): 400 CAPSULE ORAL at 22:31

## 2021-03-13 RX ADMIN — ATORVASTATIN CALCIUM 40 MILLIGRAM(S): 80 TABLET, FILM COATED ORAL at 22:31

## 2021-03-13 RX ADMIN — Medication 100 MILLIGRAM(S): at 22:32

## 2021-03-13 RX ADMIN — Medication 1 TABLET(S): at 16:45

## 2021-03-13 RX ADMIN — AMLODIPINE BESYLATE 10 MILLIGRAM(S): 2.5 TABLET ORAL at 11:44

## 2021-03-13 RX ADMIN — Medication 200 MILLIGRAM(S): at 17:30

## 2021-03-13 RX ADMIN — Medication 40 MILLIGRAM(S): at 17:30

## 2021-03-13 RX ADMIN — Medication 325 MILLIGRAM(S): at 11:42

## 2021-03-13 RX ADMIN — LIDOCAINE 1 PATCH: 4 CREAM TOPICAL at 17:24

## 2021-03-13 RX ADMIN — LOSARTAN POTASSIUM 100 MILLIGRAM(S): 100 TABLET, FILM COATED ORAL at 11:42

## 2021-03-13 RX ADMIN — Medication 40 MILLIGRAM(S): at 11:43

## 2021-03-13 RX ADMIN — Medication 650 MILLIGRAM(S): at 06:53

## 2021-03-13 RX ADMIN — LIDOCAINE 1 PATCH: 4 CREAM TOPICAL at 14:30

## 2021-03-13 RX ADMIN — GABAPENTIN 300 MILLIGRAM(S): 400 CAPSULE ORAL at 11:43

## 2021-03-13 RX ADMIN — Medication 40 MILLIGRAM(S): at 03:43

## 2021-03-13 NOTE — H&P ADULT - PROBLEM SELECTOR PLAN 1
-overall picture most concerning for CHF exacerbation, r/o post COVID syndrome  -lasix 40 iv bid  -check TTE  -if unrevealing consider CT chest  -c/w home betablocker and antiHTN regimen  -o2 prn radiology results/return to ED if symptoms worsen, persist or questions arise/need for outpatient follow-up/lab results

## 2021-03-13 NOTE — H&P ADULT - ASSESSMENT
65M w/ PMHx of CAD s/p CABG, HTN, HLD, morbid obesity, ASIYA on CPAP, nephrolithiasis c/b UTI, recent COVID19 infection, chronic back pain s/p multiple surgeries p/w increasing STEVENS and bilateral LE edema x1. Given cardiac hx and symptoms of rapid weight gain, edema, and orthopnea need to r/o CHF exacerbation triggered by recent covid vs recent UTI. BNP is low but can be falsely negative in obese patients. Will obtain TTE. Check spot urine protein/creatinine though doubt nephrotic syndrome given minimal protein on UA.

## 2021-03-13 NOTE — H&P ADULT - PROBLEM SELECTOR PLAN 2
-suspect CHF as above, also consider dependent edema in setting of pt sleeping in chair vs due to ca channel blocker  -diuresis as above  -obtain dopplers to r/o dvt  -check spot urine/protein ratio to r/o nephrotic syndrome

## 2021-03-13 NOTE — H&P ADULT - HISTORY OF PRESENT ILLNESS
65M w/ PMHx of CAD s/p CABG, HTN, HLD, morbid obesity, ASIYA on CPAP, nephrolithiasis c/b UTI, recent COVID19 infection, chronic back pain s/p multiple surgeries p/w increasing STEVENS and bilateral LE edema x1 wk. Per patient tested positive for COVID19 as outpt on 2/27 and has been self quarantining. He had significant cough early in his course which has since improved. For the last week, he has been noting worsening STEVENS. Normally lives sedentary lifestyle but over the last few days more difficulty carrying laundry upstairs. Additionally noted orthopnea despite use of CPAP and has been sleeping in chair for last 5 days. During first week of COVID infection, pt had decreased PO and diarrhea w/ resultant 8lb weight loss. Subsequently has had 10lb weight gain concomitant w/ swelling. Also of note, on 3/4 was seen in ER for abdominal pain, diagnosed w/ UTI and discharged on cefpodoximie which he completed yesterday. Denies fevers, chills, dysuria or hematuria.    In the ER, troponin neg, ECG w/o ischemic changes. CTPE negative. Noted to have LE edema, given lasix and admitted to medicine for further management.

## 2021-03-13 NOTE — PATIENT PROFILE ADULT - NSPRESCRALCFREQ_GEN_A_NUR
Dami contacted our office, appears he sent through a transmission as he felt he may be out of rhythm. Transmission reviewed on carelink, AF burden <0.1%, 1 AHR episode.         Discussed findings with patient, he appreciated information. He confirms that he is still on his amiodarone. Discussed for him to continue to monitor his symptoms as he does report being under a lot of stress at home. He appreciated follow up and will contact our office if he feels palpitations worsen.  
Never

## 2021-03-13 NOTE — H&P ADULT - NSICDXPASTMEDICALHX_GEN_ALL_CORE_FT
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    Obstructive sleep apnea CPAP    ASIYA (obstructive sleep apnea) initially dx 1997 ; CPAP    Paralytic ileus post op THR 2009 & post op laminectomy    Renal calculi     Umbilical hernia without obstruction and without gangrene

## 2021-03-13 NOTE — H&P ADULT - NSICDXPASTSURGICALHX_GEN_ALL_CORE_FT
PAST SURGICAL HISTORY:  Cholecystitis cholecycstectomy 2010    H/O lithotripsy x1    Hernia repair, left inguinal 2010    History of Total Hip Replacement 2009- bilateral THR    Kidney stone s/w ESWL pt unsure site    Neuropathy Bilateral Feet since 2012    S/P CABG x 3 8/29/17    S/P knee replacement 2011- right x 3  - revisions in 2012 & 2014    S/P Left Inguinal Hernia Repair     S/P lumbar discectomy 2012- ,L5  Aug 2013    S/P lumbar laminectomy Fusion L3-L5 2012    S/P revision of total knee, right x2- 2012 & 2014    S/P tonsillectomy and adenoidectomy as child

## 2021-03-13 NOTE — H&P ADULT - NSHPREVIEWOFSYSTEMS_GEN_ALL_CORE
Review of Systems:   CONSTITUTIONAL: No fever, +weight loss and subsequent weight gain  EYES: No eye pain, visual disturbances, or discharge  ENMT:  No difficulty hearing, tinnitus, vertigo; No sinus or throat pain  RESPIRATORY: +SOB. +cough, No wheezing, chills or hemoptysis  CARDIOVASCULAR: No chest pain, palpitations, dizziness, +leg swelling  GASTROINTESTINAL: +abdominal or epigastric pain. No nausea, vomiting, or hematemesis; +diarrhea or constipation. No melena or hematochezia.  GENITOURINARY: No dysuria, frequency, hematuria, or incontinence  NEUROLOGICAL: No headaches, memory loss, loss of strength, numbness, or tremors  SKIN: No itching, burning, rashes, or lesions   LYMPH NODES: No enlarged glands  ENDOCRINE: No heat or cold intolerance; No hair loss  MUSCULOSKELETAL: No joint pain or swelling; No muscle, +back pain  PSYCHIATRIC: No depression, anxiety, mood swings, or difficulty sleeping  HEME/LYMPH: No easy bruising, or bleeding gums

## 2021-03-13 NOTE — H&P ADULT - NSHPLABSRESULTS_GEN_ALL_CORE
LABS:                         14.6   9.51  )-----------( 198      ( 12 Mar 2021 23:22 )             43.7     -    140  |  106  |  17  ----------------------------<  119<H>  4.1   |  21<L>  |  0.57    Ca    10.3      12 Mar 2021 23:22  Phos  3.4     -  Mg     1.7         TPro  7.1  /  Alb  4.1  /  TBili  0.4  /  DBili  x   /  AST  57<H>  /  ALT  54<H>  /  AlkPhos  91            Urinalysis Basic - ( 13 Mar 2021 01:18 )    Color: Light Yellow / Appearance: Clear / S.024 / pH: x  Gluc: x / Ketone: Negative  / Bili: Negative / Urobili: 2 mg/dL   Blood: x / Protein: Trace / Nitrite: Negative   Leuk Esterase: Moderate / RBC: 1 /hpf / WBC 22 /HPF   Sq Epi: x / Non Sq Epi: 1 /hpf / Bacteria: Negative        Serum Pro-Brain Natriuretic Peptide: 121 pg/mL ( @ 23:22)    trop 15  Records reviewed from prior hospitalization.  Labs reviewed remarkable for cr wnl, trace protein on UA;  EKG personally reviewed - NSR, incomplete LBBB similar to prior  CXR personally reviewed - low lung vol, otherwise clear

## 2021-03-13 NOTE — H&P ADULT - NSHPPHYSICALEXAM_GEN_ALL_CORE
Vital Signs Last 24 Hrs  T(C): 36.6 (13 Mar 2021 07:54), Max: 36.8 (12 Mar 2021 22:35)  T(F): 97.8 (13 Mar 2021 07:54), Max: 98.2 (12 Mar 2021 22:35)  HR: 74 (13 Mar 2021 07:54) (71 - 97)  BP: 140/78 (13 Mar 2021 07:54) (140/78 - 189/98)  BP(mean): --  RR: 18 (13 Mar 2021 07:54) (16 - 21)  SpO2: 95% (13 Mar 2021 07:54) (95% - 99%)    PHYSICAL EXAM:  CONSTITUTIONAL: NAD, well-developed, well-groomed, obese  EYES: PERRLA; conjunctiva and sclera clear  ENMT: Moist oral mucosa, no pharyngeal injection or exudates; normal dentition  NECK: Supple, no palpable masses; no thyromegaly  RESPIRATORY: Normal respiratory effort; lungs are clear to auscultation bilaterally  CARDIOVASCULAR: Regular rate and rhythm, normal S1 and S2, no murmur/rub/gallop, limited by habitus; +2 bilateral tense pitting edema, Peripheral pulses are 2+ bilaterally  ABDOMEN: Nontender to palpation, normoactive bowel sounds, no rebound/guarding; No hepatosplenomegaly +reducible hernia  MUSCULOSKELETAL:  Normal gait; no clubbing or cyanosis of digits; no joint swelling or tenderness to palpation  PSYCH: A+O to person, place, and time; affect appropriate  NEUROLOGY: CN 2-12 are intact and symmetric; no gross sensory deficits   SKIN: No rashes; +venous skin changes of LLE

## 2021-03-14 LAB
A1C WITH ESTIMATED AVERAGE GLUCOSE RESULT: 6.5 % — HIGH (ref 4–5.6)
ANION GAP SERPL CALC-SCNC: 14 MMOL/L — SIGNIFICANT CHANGE UP (ref 5–17)
BUN SERPL-MCNC: 16 MG/DL — SIGNIFICANT CHANGE UP (ref 7–23)
CALCIUM SERPL-MCNC: 9.8 MG/DL — SIGNIFICANT CHANGE UP (ref 8.4–10.5)
CHLORIDE SERPL-SCNC: 101 MMOL/L — SIGNIFICANT CHANGE UP (ref 96–108)
CO2 SERPL-SCNC: 24 MMOL/L — SIGNIFICANT CHANGE UP (ref 22–31)
CREAT SERPL-MCNC: 0.56 MG/DL — SIGNIFICANT CHANGE UP (ref 0.5–1.3)
CULTURE RESULTS: SIGNIFICANT CHANGE UP
ESTIMATED AVERAGE GLUCOSE: 140 MG/DL — HIGH (ref 68–114)
GLUCOSE SERPL-MCNC: 124 MG/DL — HIGH (ref 70–99)
HCV AB S/CO SERPL IA: 0.05 S/CO — SIGNIFICANT CHANGE UP (ref 0–0.99)
HCV AB SERPL-IMP: SIGNIFICANT CHANGE UP
MAGNESIUM SERPL-MCNC: 1.7 MG/DL — SIGNIFICANT CHANGE UP (ref 1.6–2.6)
PHOSPHATE SERPL-MCNC: 4 MG/DL — SIGNIFICANT CHANGE UP (ref 2.5–4.5)
POTASSIUM SERPL-MCNC: 3.5 MMOL/L — SIGNIFICANT CHANGE UP (ref 3.5–5.3)
POTASSIUM SERPL-SCNC: 3.5 MMOL/L — SIGNIFICANT CHANGE UP (ref 3.5–5.3)
SODIUM SERPL-SCNC: 139 MMOL/L — SIGNIFICANT CHANGE UP (ref 135–145)
SPECIMEN SOURCE: SIGNIFICANT CHANGE UP

## 2021-03-14 PROCEDURE — 99233 SBSQ HOSP IP/OBS HIGH 50: CPT

## 2021-03-14 RX ORDER — ACETAMINOPHEN 500 MG
650 TABLET ORAL ONCE
Refills: 0 | Status: COMPLETED | OUTPATIENT
Start: 2021-03-14 | End: 2021-03-14

## 2021-03-14 RX ORDER — MAGNESIUM SULFATE 500 MG/ML
2 VIAL (ML) INJECTION ONCE
Refills: 0 | Status: COMPLETED | OUTPATIENT
Start: 2021-03-14 | End: 2021-03-14

## 2021-03-14 RX ORDER — NYSTATIN CREAM 100000 [USP'U]/G
1 CREAM TOPICAL ONCE
Refills: 0 | Status: COMPLETED | OUTPATIENT
Start: 2021-03-14 | End: 2021-03-14

## 2021-03-14 RX ORDER — POTASSIUM CHLORIDE 20 MEQ
40 PACKET (EA) ORAL ONCE
Refills: 0 | Status: COMPLETED | OUTPATIENT
Start: 2021-03-14 | End: 2021-03-14

## 2021-03-14 RX ORDER — LIDOCAINE 4 G/100G
1 CREAM TOPICAL ONCE
Refills: 0 | Status: COMPLETED | OUTPATIENT
Start: 2021-03-14 | End: 2021-03-14

## 2021-03-14 RX ADMIN — Medication 100 MILLIGRAM(S): at 21:19

## 2021-03-14 RX ADMIN — Medication 50 GRAM(S): at 09:22

## 2021-03-14 RX ADMIN — Medication 650 MILLIGRAM(S): at 20:44

## 2021-03-14 RX ADMIN — ATORVASTATIN CALCIUM 40 MILLIGRAM(S): 80 TABLET, FILM COATED ORAL at 21:19

## 2021-03-14 RX ADMIN — Medication 200 MILLIGRAM(S): at 06:30

## 2021-03-14 RX ADMIN — Medication 650 MILLIGRAM(S): at 09:20

## 2021-03-14 RX ADMIN — Medication 650 MILLIGRAM(S): at 20:13

## 2021-03-14 RX ADMIN — AMLODIPINE BESYLATE 10 MILLIGRAM(S): 2.5 TABLET ORAL at 06:30

## 2021-03-14 RX ADMIN — Medication 1 TABLET(S): at 14:49

## 2021-03-14 RX ADMIN — LOSARTAN POTASSIUM 100 MILLIGRAM(S): 100 TABLET, FILM COATED ORAL at 06:30

## 2021-03-14 RX ADMIN — ENOXAPARIN SODIUM 40 MILLIGRAM(S): 100 INJECTION SUBCUTANEOUS at 17:12

## 2021-03-14 RX ADMIN — ENOXAPARIN SODIUM 40 MILLIGRAM(S): 100 INJECTION SUBCUTANEOUS at 06:29

## 2021-03-14 RX ADMIN — LIDOCAINE 1 PATCH: 4 CREAM TOPICAL at 20:12

## 2021-03-14 RX ADMIN — GABAPENTIN 300 MILLIGRAM(S): 400 CAPSULE ORAL at 21:20

## 2021-03-14 RX ADMIN — Medication 325 MILLIGRAM(S): at 14:49

## 2021-03-14 RX ADMIN — Medication 40 MILLIEQUIVALENT(S): at 11:01

## 2021-03-14 RX ADMIN — GABAPENTIN 300 MILLIGRAM(S): 400 CAPSULE ORAL at 06:30

## 2021-03-14 RX ADMIN — NYSTATIN CREAM 1 APPLICATION(S): 100000 CREAM TOPICAL at 09:22

## 2021-03-14 RX ADMIN — Medication 40 MILLIGRAM(S): at 17:12

## 2021-03-14 RX ADMIN — Medication 200 MILLIGRAM(S): at 17:13

## 2021-03-14 RX ADMIN — Medication 40 MILLIGRAM(S): at 06:29

## 2021-03-14 RX ADMIN — LIDOCAINE 1 PATCH: 4 CREAM TOPICAL at 03:29

## 2021-03-14 RX ADMIN — Medication 650 MILLIGRAM(S): at 11:03

## 2021-03-14 RX ADMIN — GABAPENTIN 300 MILLIGRAM(S): 400 CAPSULE ORAL at 14:49

## 2021-03-14 NOTE — PROGRESS NOTE ADULT - PROBLEM SELECTOR PLAN 2
-HF vs pHTN related vs venous stasis vs vte. Less likely from norvasc as it is a chronic medication. Continue for now.   -diuresis as above  -obtain dopplers to r/o dvt

## 2021-03-14 NOTE — PROGRESS NOTE ADULT - SUBJECTIVE AND OBJECTIVE BOX
HOSPITALIST NOTE    Dr. Toney Levine DO  Attending Physician  Division of Hospital Medicine  [facility]  Pager:  [phone]    SUBJECTIVE  Dyspnea slightly better.  LE edema improved.  No other complaints.    REVIEW OF SYSTEMS  12 point review of systems negative except for above.     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    Renal calculi    Obstructive sleep apnea  CPAP    H/O: osteoarthritis    Lumbar disc disease with radiculopathy    Gout    Neuropathy  Bilateral Feet since     Kidney stone  s/w ESWL pt unsure site    S/P lumbar laminectomy  Fusion L3-L5     S/P CABG x 3  17    S/P lumbar discectomy  - ,L5  Aug 2013    S/P revision of total knee, right  x2-  &amp;     H/O lithotripsy  x1    S/P knee replacement  2011- right x 3  - revisions in  &amp;     S/P tonsillectomy and adenoidectomy  as child    Hernia  repair, left inguinal 2010    Cholecystitis  cholecycstectomy 2010    History of Total Hip Replacement  - bilateral THR    S/P Left Inguinal Hernia Repair        MEDICATIONS  (STANDING):  allopurinol 100 milliGRAM(s) Oral at bedtime  amLODIPine   Tablet 10 milliGRAM(s) Oral daily  labetalol 200 milliGRAM(s) Oral two times a day  losartan 100 milliGRAM(s) Oral daily  aspirin enteric coated 325 milliGRAM(s) Oral daily  atorvastatin 40 milliGRAM(s) Oral at bedtime  calcium carbonate 1250 mG  + Vitamin D (OsCal 500 + D) 1 Tablet(s) Oral daily  enoxaparin Injectable 40 milliGRAM(s) SubCutaneous every 12 hours  furosemide   Injectable 40 milliGRAM(s) IV Push two times a day  gabapentin 300 milliGRAM(s) Oral three times a day    MEDICATIONS  (PRN):      Allergies    No Known Allergies    Intolerances        T(C): 36.7 (21 @ 04:40), Max: 36.7 (21 @ 21:08)  T(F): 98 (21 @ 04:40), Max: 98 (21 @ 21:08)  HR: 69 (21 @ 09:32) (69 - 81)  BP: 131/72 (21 @ 04:40) (128/74 - 138/87)  ABP: --  ABP(mean): --  RR: 18 (21 @ 04:40) (18 - 22)  SpO2: 97% (21 @ 09:32) (95% - 98%)      CONSTITUTIONAL: No acute distress.   HEENT:  Conjunctiva clear B/L.  Moist oral mucosa.   Cardiovascular: RRR with no murmurs. No JVD noted. 1+ lower extremity edema B/L. Extremities are warm and well perfused. Radial pulses 2+ B/L. Dorsalis pedis pulses 2+ B/L.    Respiratory: Lungs CTAB. No wrr. No accessory muscle use.   Gastrointestinal:  Soft, nontender. Non-distended. Non-rigid. No CVA tenderness B/L.  MSK:  No joint swelling. No joint erythema B/L. No midline spinal tenderness.  Neurologic:  Alert and awake. Oriented x3. Moving all extremities. Following commands. Making eye contact.    Skin: Chronic venous stasis changes b/l in LE.  Psych:  Normal affect. Normal Mood.     LABS                        14.6   9.51  )-----------( 198      ( 12 Mar 2021 23:22 )             43.7     03-14    139  |  101  |  16  ----------------------------<  124<H>  3.5   |  24  |  0.56    Ca    9.8      14 Mar 2021 07:03  Phos  4.0     -14  Mg     1.7     -14    TPro  7.1  /  Alb  4.1  /  TBili  0.4  /  DBili  x   /  AST  57<H>  /  ALT  54<H>  /  AlkPhos  91  03-12      Urinalysis Basic - ( 13 Mar 2021 01:18 )    Color: Light Yellow / Appearance: Clear / S.024 / pH: x  Gluc: x / Ketone: Negative  / Bili: Negative / Urobili: 2 mg/dL   Blood: x / Protein: Trace / Nitrite: Negative   Leuk Esterase: Moderate / RBC: 1 /hpf / WBC 22 /HPF   Sq Epi: x / Non Sq Epi: 1 /hpf / Bacteria: Negative        ADDITIONAL STUDIES PERSONALLY REVIEWED    Our team discussed the case with all consults    All consultant contribution to care greatly appreciated.

## 2021-03-14 NOTE — PROGRESS NOTE ADULT - ASSESSMENT
65M w/ PMHx of CAD s/p CABG, HTN, HLD, morbid obesity, ASIYA on CPAP, nephrolithiasis c/b UTI, recent COVID19 infection, chronic back pain s/p multiple surgeries p/w increasing STEVENS and bilateral LE edema x1.

## 2021-03-14 NOTE — PROGRESS NOTE ADULT - PROBLEM SELECTOR PLAN 4
-pt just complete course of abx  -f/u repeat cxs -pt just complete course of abx  -ucx negative.  -monitor off abx.

## 2021-03-15 LAB
ALBUMIN SERPL ELPH-MCNC: 3.9 G/DL — SIGNIFICANT CHANGE UP (ref 3.3–5)
ALP SERPL-CCNC: 83 U/L — SIGNIFICANT CHANGE UP (ref 40–120)
ALT FLD-CCNC: 45 U/L — SIGNIFICANT CHANGE UP (ref 10–45)
ANION GAP SERPL CALC-SCNC: 15 MMOL/L — SIGNIFICANT CHANGE UP (ref 5–17)
AST SERPL-CCNC: 52 U/L — HIGH (ref 10–40)
BILIRUB SERPL-MCNC: 0.7 MG/DL — SIGNIFICANT CHANGE UP (ref 0.2–1.2)
BUN SERPL-MCNC: 17 MG/DL — SIGNIFICANT CHANGE UP (ref 7–23)
CALCIUM SERPL-MCNC: 9.7 MG/DL — SIGNIFICANT CHANGE UP (ref 8.4–10.5)
CHLORIDE SERPL-SCNC: 102 MMOL/L — SIGNIFICANT CHANGE UP (ref 96–108)
CO2 SERPL-SCNC: 23 MMOL/L — SIGNIFICANT CHANGE UP (ref 22–31)
CREAT SERPL-MCNC: 0.57 MG/DL — SIGNIFICANT CHANGE UP (ref 0.5–1.3)
GLUCOSE SERPL-MCNC: 120 MG/DL — HIGH (ref 70–99)
HCT VFR BLD CALC: 44.5 % — SIGNIFICANT CHANGE UP (ref 39–50)
HGB BLD-MCNC: 14.4 G/DL — SIGNIFICANT CHANGE UP (ref 13–17)
MCHC RBC-ENTMCNC: 28.7 PG — SIGNIFICANT CHANGE UP (ref 27–34)
MCHC RBC-ENTMCNC: 32.4 GM/DL — SIGNIFICANT CHANGE UP (ref 32–36)
MCV RBC AUTO: 88.8 FL — SIGNIFICANT CHANGE UP (ref 80–100)
NRBC # BLD: 0 /100 WBCS — SIGNIFICANT CHANGE UP (ref 0–0)
PLATELET # BLD AUTO: 184 K/UL — SIGNIFICANT CHANGE UP (ref 150–400)
POTASSIUM SERPL-MCNC: 3.6 MMOL/L — SIGNIFICANT CHANGE UP (ref 3.5–5.3)
POTASSIUM SERPL-SCNC: 3.6 MMOL/L — SIGNIFICANT CHANGE UP (ref 3.5–5.3)
PROT SERPL-MCNC: 6.7 G/DL — SIGNIFICANT CHANGE UP (ref 6–8.3)
RBC # BLD: 5.01 M/UL — SIGNIFICANT CHANGE UP (ref 4.2–5.8)
RBC # FLD: 13.2 % — SIGNIFICANT CHANGE UP (ref 10.3–14.5)
SARS-COV-2 RNA SPEC QL NAA+PROBE: SIGNIFICANT CHANGE UP
SODIUM SERPL-SCNC: 140 MMOL/L — SIGNIFICANT CHANGE UP (ref 135–145)
WBC # BLD: 6.7 K/UL — SIGNIFICANT CHANGE UP (ref 3.8–10.5)
WBC # FLD AUTO: 6.7 K/UL — SIGNIFICANT CHANGE UP (ref 3.8–10.5)

## 2021-03-15 PROCEDURE — 99233 SBSQ HOSP IP/OBS HIGH 50: CPT

## 2021-03-15 PROCEDURE — 93970 EXTREMITY STUDY: CPT | Mod: 26

## 2021-03-15 PROCEDURE — 93306 TTE W/DOPPLER COMPLETE: CPT | Mod: 26

## 2021-03-15 RX ORDER — ACETAMINOPHEN 500 MG
1000 TABLET ORAL ONCE
Refills: 0 | Status: DISCONTINUED | OUTPATIENT
Start: 2021-03-15 | End: 2021-03-15

## 2021-03-15 RX ORDER — IBUPROFEN 200 MG
400 TABLET ORAL ONCE
Refills: 0 | Status: COMPLETED | OUTPATIENT
Start: 2021-03-15 | End: 2021-03-15

## 2021-03-15 RX ORDER — ACETAMINOPHEN 500 MG
650 TABLET ORAL ONCE
Refills: 0 | Status: COMPLETED | OUTPATIENT
Start: 2021-03-15 | End: 2021-03-15

## 2021-03-15 RX ADMIN — Medication 400 MILLIGRAM(S): at 12:00

## 2021-03-15 RX ADMIN — GABAPENTIN 300 MILLIGRAM(S): 400 CAPSULE ORAL at 05:57

## 2021-03-15 RX ADMIN — Medication 40 MILLIGRAM(S): at 18:30

## 2021-03-15 RX ADMIN — Medication 325 MILLIGRAM(S): at 13:45

## 2021-03-15 RX ADMIN — ENOXAPARIN SODIUM 40 MILLIGRAM(S): 100 INJECTION SUBCUTANEOUS at 18:31

## 2021-03-15 RX ADMIN — Medication 650 MILLIGRAM(S): at 21:41

## 2021-03-15 RX ADMIN — GABAPENTIN 300 MILLIGRAM(S): 400 CAPSULE ORAL at 21:12

## 2021-03-15 RX ADMIN — Medication 200 MILLIGRAM(S): at 18:31

## 2021-03-15 RX ADMIN — Medication 650 MILLIGRAM(S): at 21:11

## 2021-03-15 RX ADMIN — LOSARTAN POTASSIUM 100 MILLIGRAM(S): 100 TABLET, FILM COATED ORAL at 05:57

## 2021-03-15 RX ADMIN — Medication 1 TABLET(S): at 13:45

## 2021-03-15 RX ADMIN — Medication 40 MILLIGRAM(S): at 05:56

## 2021-03-15 RX ADMIN — Medication 100 MILLIGRAM(S): at 21:12

## 2021-03-15 RX ADMIN — Medication 400 MILLIGRAM(S): at 11:11

## 2021-03-15 RX ADMIN — Medication 200 MILLIGRAM(S): at 05:57

## 2021-03-15 RX ADMIN — ATORVASTATIN CALCIUM 40 MILLIGRAM(S): 80 TABLET, FILM COATED ORAL at 21:12

## 2021-03-15 RX ADMIN — GABAPENTIN 300 MILLIGRAM(S): 400 CAPSULE ORAL at 13:44

## 2021-03-15 RX ADMIN — ENOXAPARIN SODIUM 40 MILLIGRAM(S): 100 INJECTION SUBCUTANEOUS at 05:57

## 2021-03-15 RX ADMIN — AMLODIPINE BESYLATE 10 MILLIGRAM(S): 2.5 TABLET ORAL at 05:57

## 2021-03-15 NOTE — PROGRESS NOTE ADULT - SUBJECTIVE AND OBJECTIVE BOX
Patient is a 65y old  Male who presents with a chief complaint of LE edema and shortness of breath (14 Mar 2021 12:55)         SUBJECTIVE / OVERNIGHT EVENTS: c/o back pain - patient states he usually takes tylenol alternating with motrin at home.       MEDICATIONS  (STANDING):  allopurinol 100 milliGRAM(s) Oral at bedtime  amLODIPine   Tablet 10 milliGRAM(s) Oral daily  aspirin enteric coated 325 milliGRAM(s) Oral daily  atorvastatin 40 milliGRAM(s) Oral at bedtime  calcium carbonate 1250 mG  + Vitamin D (OsCal 500 + D) 1 Tablet(s) Oral daily  enoxaparin Injectable 40 milliGRAM(s) SubCutaneous every 12 hours  furosemide   Injectable 40 milliGRAM(s) IV Push two times a day  gabapentin 300 milliGRAM(s) Oral three times a day  labetalol 200 milliGRAM(s) Oral two times a day  losartan 100 milliGRAM(s) Oral daily    MEDICATIONS  (PRN):      Vital Signs Last 24 Hrs  T(C): 36.4 (15 Mar 2021 12:20), Max: 36.6 (14 Mar 2021 20:45)  T(F): 97.6 (15 Mar 2021 12:20), Max: 97.9 (14 Mar 2021 20:45)  HR: 70 (15 Mar 2021 15:40) (65 - 70)  BP: 121/73 (15 Mar 2021 12:20) (118/65 - 135/79)  BP(mean): --  RR: 18 (15 Mar 2021 15:40) (18 - 18)  SpO2: 96% (15 Mar 2021 15:40) (96% - 100%)  CAPILLARY BLOOD GLUCOSE        I&O's Summary    14 Mar 2021 07:01  -  15 Mar 2021 07:00  --------------------------------------------------------  IN: 1140 mL / OUT: 1276 mL / NET: -136 mL    15 Mar 2021 07:01  -  15 Mar 2021 17:27  --------------------------------------------------------  IN: 540 mL / OUT: 0 mL / NET: 540 mL        PHYSICAL EXAM:  GENERAL: NAD, breathing normal, obese  HEAD:  Atraumatic, Normocephalic  EYES: conjunctiva and sclera clear  NECK: supple, No JVD  CHEST/LUNG: CTA b/l  HEART: S1 S2 RRR  ABDOMEN: +BS Soft, NT/ND  EXTREMITIES:  2+ DP Pulses, No c/c. 1+b/l LE edema  NEUROLOGY: AAOx3, no facial droop, no focal deficits   SKIN: No rashes or lesions    LABS:                        14.4   6.70  )-----------( 184      ( 15 Mar 2021 06:48 )             44.5     03-15    140  |  102  |  17  ----------------------------<  120<H>  3.6   |  23  |  0.57    Ca    9.7      15 Mar 2021 06:45  Phos  4.0     03-14  Mg     1.7     03-14    TPro  6.7  /  Alb  3.9  /  TBili  0.7  /  DBili  x   /  AST  52<H>  /  ALT  45  /  AlkPhos  83  03-15              RADIOLOGY & ADDITIONAL TESTS:    Imaging Personally Reviewed:  Consultant(s) Notes Reviewed:    Care Discussed with Consultants/Other Providers:

## 2021-03-15 NOTE — PROGRESS NOTE ADULT - PROBLEM SELECTOR PLAN 2
-HF vs pHTN related vs venous stasis vs vte. Less likely from norvasc as it is a chronic medication. Continue for now.  TTE - poor study despite definity   -diuresis as above  no DVT on LE doopplers

## 2021-03-16 LAB
ANION GAP SERPL CALC-SCNC: 16 MMOL/L — SIGNIFICANT CHANGE UP (ref 5–17)
ANION GAP SERPL CALC-SCNC: 17 MMOL/L — SIGNIFICANT CHANGE UP (ref 5–17)
BUN SERPL-MCNC: 17 MG/DL — SIGNIFICANT CHANGE UP (ref 7–23)
BUN SERPL-MCNC: 21 MG/DL — SIGNIFICANT CHANGE UP (ref 7–23)
CALCIUM SERPL-MCNC: 10.1 MG/DL — SIGNIFICANT CHANGE UP (ref 8.4–10.5)
CALCIUM SERPL-MCNC: 10.1 MG/DL — SIGNIFICANT CHANGE UP (ref 8.4–10.5)
CHLORIDE SERPL-SCNC: 100 MMOL/L — SIGNIFICANT CHANGE UP (ref 96–108)
CHLORIDE SERPL-SCNC: 100 MMOL/L — SIGNIFICANT CHANGE UP (ref 96–108)
CO2 SERPL-SCNC: 22 MMOL/L — SIGNIFICANT CHANGE UP (ref 22–31)
CO2 SERPL-SCNC: 23 MMOL/L — SIGNIFICANT CHANGE UP (ref 22–31)
CREAT SERPL-MCNC: 0.57 MG/DL — SIGNIFICANT CHANGE UP (ref 0.5–1.3)
CREAT SERPL-MCNC: 0.68 MG/DL — SIGNIFICANT CHANGE UP (ref 0.5–1.3)
GLUCOSE SERPL-MCNC: 109 MG/DL — HIGH (ref 70–99)
GLUCOSE SERPL-MCNC: 219 MG/DL — HIGH (ref 70–99)
MAGNESIUM SERPL-MCNC: 1.7 MG/DL — SIGNIFICANT CHANGE UP (ref 1.6–2.6)
NT-PROBNP SERPL-SCNC: 30 PG/ML — SIGNIFICANT CHANGE UP (ref 0–300)
PHOSPHATE SERPL-MCNC: 3.9 MG/DL — SIGNIFICANT CHANGE UP (ref 2.5–4.5)
POTASSIUM SERPL-MCNC: 3.9 MMOL/L — SIGNIFICANT CHANGE UP (ref 3.5–5.3)
POTASSIUM SERPL-MCNC: 4.3 MMOL/L — SIGNIFICANT CHANGE UP (ref 3.5–5.3)
POTASSIUM SERPL-SCNC: 3.9 MMOL/L — SIGNIFICANT CHANGE UP (ref 3.5–5.3)
POTASSIUM SERPL-SCNC: 4.3 MMOL/L — SIGNIFICANT CHANGE UP (ref 3.5–5.3)
SARS-COV-2 IGG SERPL QL IA: POSITIVE
SARS-COV-2 IGM SERPL IA-ACNC: 8.37 INDEX — HIGH
SODIUM SERPL-SCNC: 139 MMOL/L — SIGNIFICANT CHANGE UP (ref 135–145)
SODIUM SERPL-SCNC: 139 MMOL/L — SIGNIFICANT CHANGE UP (ref 135–145)

## 2021-03-16 PROCEDURE — 99223 1ST HOSP IP/OBS HIGH 75: CPT

## 2021-03-16 PROCEDURE — 99233 SBSQ HOSP IP/OBS HIGH 50: CPT

## 2021-03-16 PROCEDURE — 99223 1ST HOSP IP/OBS HIGH 75: CPT | Mod: GC,25

## 2021-03-16 PROCEDURE — 76604 US EXAM CHEST: CPT | Mod: 26

## 2021-03-16 RX ORDER — OXYCODONE HYDROCHLORIDE 5 MG/1
5 TABLET ORAL EVERY 6 HOURS
Refills: 0 | Status: DISCONTINUED | OUTPATIENT
Start: 2021-03-16 | End: 2021-03-23

## 2021-03-16 RX ORDER — CARVEDILOL PHOSPHATE 80 MG/1
6.25 CAPSULE, EXTENDED RELEASE ORAL EVERY 12 HOURS
Refills: 0 | Status: DISCONTINUED | OUTPATIENT
Start: 2021-03-17 | End: 2021-03-17

## 2021-03-16 RX ORDER — ACETAMINOPHEN 500 MG
650 TABLET ORAL ONCE
Refills: 0 | Status: COMPLETED | OUTPATIENT
Start: 2021-03-16 | End: 2021-03-16

## 2021-03-16 RX ORDER — MAGNESIUM SULFATE 500 MG/ML
1 VIAL (ML) INJECTION ONCE
Refills: 0 | Status: COMPLETED | OUTPATIENT
Start: 2021-03-16 | End: 2021-03-16

## 2021-03-16 RX ADMIN — ENOXAPARIN SODIUM 40 MILLIGRAM(S): 100 INJECTION SUBCUTANEOUS at 05:28

## 2021-03-16 RX ADMIN — Medication 100 MILLIGRAM(S): at 21:50

## 2021-03-16 RX ADMIN — OXYCODONE HYDROCHLORIDE 5 MILLIGRAM(S): 5 TABLET ORAL at 11:18

## 2021-03-16 RX ADMIN — GABAPENTIN 300 MILLIGRAM(S): 400 CAPSULE ORAL at 05:27

## 2021-03-16 RX ADMIN — Medication 40 MILLIGRAM(S): at 05:27

## 2021-03-16 RX ADMIN — Medication 200 MILLIGRAM(S): at 05:27

## 2021-03-16 RX ADMIN — OXYCODONE HYDROCHLORIDE 5 MILLIGRAM(S): 5 TABLET ORAL at 12:00

## 2021-03-16 RX ADMIN — OXYCODONE HYDROCHLORIDE 5 MILLIGRAM(S): 5 TABLET ORAL at 17:40

## 2021-03-16 RX ADMIN — LOSARTAN POTASSIUM 100 MILLIGRAM(S): 100 TABLET, FILM COATED ORAL at 05:27

## 2021-03-16 RX ADMIN — Medication 650 MILLIGRAM(S): at 05:25

## 2021-03-16 RX ADMIN — Medication 200 MILLIGRAM(S): at 17:40

## 2021-03-16 RX ADMIN — Medication 100 GRAM(S): at 23:09

## 2021-03-16 RX ADMIN — GABAPENTIN 300 MILLIGRAM(S): 400 CAPSULE ORAL at 13:55

## 2021-03-16 RX ADMIN — ENOXAPARIN SODIUM 40 MILLIGRAM(S): 100 INJECTION SUBCUTANEOUS at 17:21

## 2021-03-16 RX ADMIN — Medication 650 MILLIGRAM(S): at 06:01

## 2021-03-16 RX ADMIN — ATORVASTATIN CALCIUM 40 MILLIGRAM(S): 80 TABLET, FILM COATED ORAL at 21:50

## 2021-03-16 RX ADMIN — AMLODIPINE BESYLATE 10 MILLIGRAM(S): 2.5 TABLET ORAL at 05:27

## 2021-03-16 RX ADMIN — Medication 40 MILLIGRAM(S): at 17:21

## 2021-03-16 RX ADMIN — Medication 325 MILLIGRAM(S): at 11:18

## 2021-03-16 RX ADMIN — OXYCODONE HYDROCHLORIDE 5 MILLIGRAM(S): 5 TABLET ORAL at 23:43

## 2021-03-16 RX ADMIN — Medication 1 TABLET(S): at 11:19

## 2021-03-16 RX ADMIN — GABAPENTIN 300 MILLIGRAM(S): 400 CAPSULE ORAL at 21:49

## 2021-03-16 RX ADMIN — OXYCODONE HYDROCHLORIDE 5 MILLIGRAM(S): 5 TABLET ORAL at 23:09

## 2021-03-16 NOTE — DIETITIAN INITIAL EVALUATION ADULT. - PERTINENT MEDS FT
MEDICATIONS  (STANDING):  allopurinol 100 milliGRAM(s) Oral at bedtime  amLODIPine   Tablet 10 milliGRAM(s) Oral daily  aspirin enteric coated 325 milliGRAM(s) Oral daily  atorvastatin 40 milliGRAM(s) Oral at bedtime  calcium carbonate 1250 mG  + Vitamin D (OsCal 500 + D) 1 Tablet(s) Oral daily  enoxaparin Injectable 40 milliGRAM(s) SubCutaneous every 12 hours  furosemide   Injectable 40 milliGRAM(s) IV Push two times a day  gabapentin 300 milliGRAM(s) Oral three times a day  labetalol 200 milliGRAM(s) Oral two times a day  losartan 100 milliGRAM(s) Oral daily    MEDICATIONS  (PRN):

## 2021-03-16 NOTE — DIETITIAN INITIAL EVALUATION ADULT. - OTHER INFO
Intake : >75%  Denies nausea/vomit :  Denies difficulty chewing /swallow :  Denies diarrhea/constipation:  Last BM : today  NKFA  IBW +/- 10%= 208pounds  Ht: 77"  Ht taken from pt  Dosing ht: 195.6cm  Usual Weight PTA: 395pounds  Dosing wt: 172.5kg  BMI: 46.2  BMI calculated using wt from flow sheet  BMI calculated using ht from pt  wt used to calculate needs: IBW+10%  Education Provided : N/A at this visit-pt previously provided STAR ed. refused need for reinforcement.   pressure injury: no pressure injury  edema: +1 left foot, right foot, left ankle, right ankle

## 2021-03-16 NOTE — CONSULT NOTE ADULT - ATTENDING COMMENTS
Agree with above    65 year old male with multiple comorbidities who comes in for hypoxic respiratory failure likely secondary to decompensated heart failure. Appears volume overloaded on exam (crackles at the bases, and 2+ pitting edema) and ultrasound continues to shows scattered B-lines suggestive of pulmonary edema. Agree with aggressive diuresis. TTE not helpful as windows are not visible given body habitus and likely due to hx of CABG. There is concern for pulmonary hypertension but given volume overload would not recommend an other aggressive diagnostic test at this time. If patient does not improve in the next couple of days, it may be reasonable to obtain a LETICIA.     Thank you for your consult. We will continue to follow the patient's care with you. Agree with above    65 year old male with multiple comorbidities who comes in for hypoxic respiratory failure likely secondary to decompensated heart failure. Appears volume overloaded on exam (crackles at the bases, and 2+ pitting edema) and ultrasound continues to shows scattered B-lines suggestive of pulmonary edema. Agree with aggressive diuresis. TTE not helpful as windows are not visible given body habitus and likely due to hx of CABG. There is concern for pulmonary hypertension but given volume overload would not recommend an other aggressive diagnostic test at this time. If patient does not improve in the next couple of days, it may be reasonable to obtain a LETICIA.     Getting outpatient records to determine the patient's PAP therapy (CPAP vs Bilevel) would be helpful in this case.     Thank you for your consult. We will continue to follow the patient's care with you.

## 2021-03-16 NOTE — DIETITIAN INITIAL EVALUATION ADULT. - PROBLEM SELECTOR PLAN 1
-overall picture most concerning for CHF exacerbation, r/o post COVID syndrome  -lasix 40 iv bid  -check TTE  -if unrevealing consider CT chest  -c/w home betablocker and antiHTN regimen  -o2 prn

## 2021-03-16 NOTE — PROGRESS NOTE ADULT - SUBJECTIVE AND OBJECTIVE BOX
Patient is a 65y old  Male who presents with a chief complaint of LE edema and shortness of breath (16 Mar 2021 15:26)        SUBJECTIVE / OVERNIGHT EVENTS: c/o back pain normally treated at home with alternating tylenol and motrin      MEDICATIONS  (STANDING):  allopurinol 100 milliGRAM(s) Oral at bedtime  amLODIPine   Tablet 10 milliGRAM(s) Oral daily  aspirin enteric coated 325 milliGRAM(s) Oral daily  atorvastatin 40 milliGRAM(s) Oral at bedtime  calcium carbonate 1250 mG  + Vitamin D (OsCal 500 + D) 1 Tablet(s) Oral daily  enoxaparin Injectable 40 milliGRAM(s) SubCutaneous every 12 hours  furosemide   Injectable 40 milliGRAM(s) IV Push two times a day  gabapentin 300 milliGRAM(s) Oral three times a day  labetalol 200 milliGRAM(s) Oral two times a day  losartan 100 milliGRAM(s) Oral daily    MEDICATIONS  (PRN):  oxyCODONE    IR 5 milliGRAM(s) Oral every 6 hours PRN Severe Pain (7 - 10)      Vital Signs Last 24 Hrs  T(C): 36.6 (16 Mar 2021 11:51), Max: 36.6 (15 Mar 2021 21:16)  T(F): 97.9 (16 Mar 2021 11:51), Max: 97.9 (16 Mar 2021 11:51)  HR: 64 (16 Mar 2021 15:18) (64 - 77)  BP: 145/69 (16 Mar 2021 11:51) (130/66 - 145/69)  BP(mean): --  RR: 18 (16 Mar 2021 11:51) (18 - 18)  SpO2: 97% (16 Mar 2021 15:18) (95% - 98%)  CAPILLARY BLOOD GLUCOSE        I&O's Summary    15 Mar 2021 07:01  -  16 Mar 2021 07:00  --------------------------------------------------------  IN: 1620 mL / OUT: 150 mL / NET: 1470 mL    16 Mar 2021 07:01  -  16 Mar 2021 15:59  --------------------------------------------------------  IN: 660 mL / OUT: 0 mL / NET: 660 mL        PHYSICAL EXAM:  GENERAL: NAD, breathing normal, obese  HEAD:  Atraumatic, Normocephalic  EYES: conjunctiva and sclera clear  NECK: supple, No JVD  CHEST/LUNG: CTA b/l  HEART: S1 S2 RRR  ABDOMEN: +BS Soft, NT/ND  EXTREMITIES:  2+ DP Pulses, No c/c. 1+b/l LE edema  NEUROLOGY: AAOx3, no facial droop, no focal deficits   SKIN: No rashes or lesions    LABS:                        14.4   6.70  )-----------( 184      ( 15 Mar 2021 06:48 )             44.5     03-16    139  |  100  |  17  ----------------------------<  109<H>  3.9   |  23  |  0.57    Ca    10.1      16 Mar 2021 07:14    TPro  6.7  /  Alb  3.9  /  TBili  0.7  /  DBili  x   /  AST  52<H>  /  ALT  45  /  AlkPhos  83  03-15              RADIOLOGY & ADDITIONAL TESTS:    Imaging Personally Reviewed: TTE reviewed - poor study unable to evaluate LV function, RV function  Consultant(s) Notes Reviewed:    Care Discussed with Consultants/Other Providers:

## 2021-03-16 NOTE — DIETITIAN INITIAL EVALUATION ADULT. - ADD RECOMMEND
change diet to Consistent Carbohydrate/DASH diet. monitor need for diet ed reinforcement. placed sticker for BMI>40.

## 2021-03-16 NOTE — CONSULT NOTE ADULT - ASSESSMENT
Assessment  65M with obesity, history of CABG and sleep-disordered breathing presented with 5 days worsening leg edema and orthopnea. Decompensated heart failure (unclear what kind) and improving with diuretics.   Unclear what cardiac function is now, but imaging evidence is consistent with pulmonary hypertension give large PA. He is at risk given underlying left heart disease and sleep disordered breathing (high risk to have nocturnal hypoventilation. He is on treatment at home though does not know what kind, so he may be adequately treated for his sleep apnea.   He has improved significantly with diuretics. There is no evidence of pneumonia on his CT so doubt and lingering covid effects.     Likely decompensated HF only, but may be symptomatic from pulmonary hypertension. When euvolemic would consider RHC +/- LETICIA to assess cardiac function and Echo tech and myself could not obtain any satisfactory windows.     Recs    Shortness of breath  Likely due to decompensated heart failure and fluid overload.   Would also consider pulmonary hypertension.     Continue diuresis.   When patient is closer to euvolemic, if he is still symptomatic, would consider left and right heart cath to assess filling pressures and LV function.     Sleep Disordered Breathing  Patient says he has regular follow up with his internist. He does not know much about his machine or settings. Would get outside records from his PCP.   I offered to have patient's family bring in his home machine but he says the machine here is preferable right now.   Continue current CPAP 8cm H2O with CFlex of 2  Can follow up with us, but seems like he is receiving appropriate care already.  Assessment  65M with obesity, history of CABG and sleep-disordered breathing presented with 5 days worsening leg edema and orthopnea. Decompensated heart failure (unclear what kind) and improving with diuretics.   Unclear what cardiac function is now, but imaging evidence is consistent with pulmonary hypertension give large PA. He is at risk given underlying left heart disease and sleep disordered breathing (high risk to have nocturnal hypoventilation. He is on treatment at home though does not know what kind, so he may be adequately treated for his sleep apnea.   His serum bicarbonate is not elevated so highly doubt he has awake hypoventilation. He says he feels comfortable with current empiric CPAP of 8cm H2O    He has improved significantly with diuretics. There is no evidence of pneumonia on his CT so doubt and lingering covid effects.     Likely decompensated HF only, but may be symptomatic from pulmonary hypertension. When euvolemic would consider RHC +/- LETICIA to assess cardiac function and Echo tech and myself could not obtain any satisfactory windows.     Recs    Shortness of breath  Likely due to decompensated heart failure and fluid overload.   Would also consider pulmonary hypertension.     Continue diuresis.   When patient is closer to euvolemic, if he is still symptomatic, would consider left and right heart cath to assess filling pressures and LV function.     Sleep Disordered Breathing  Patient says he has regular follow up with his internist. He does not know much about his machine or settings. Would get outside records from his PCP.   I offered to have patient's family bring in his home machine but he says the machine here is preferable right now.   Continue current CPAP 8cm H2O with CFlex of 2  Can follow up with us, but seems like he is receiving appropriate care already.

## 2021-03-17 DIAGNOSIS — I50.21 ACUTE SYSTOLIC (CONGESTIVE) HEART FAILURE: ICD-10-CM

## 2021-03-17 LAB
ANION GAP SERPL CALC-SCNC: 16 MMOL/L — SIGNIFICANT CHANGE UP (ref 5–17)
APPEARANCE UR: CLEAR — SIGNIFICANT CHANGE UP
BILIRUB UR-MCNC: NEGATIVE — SIGNIFICANT CHANGE UP
BUN SERPL-MCNC: 16 MG/DL — SIGNIFICANT CHANGE UP (ref 7–23)
CALCIUM SERPL-MCNC: 10 MG/DL — SIGNIFICANT CHANGE UP (ref 8.4–10.5)
CHLORIDE SERPL-SCNC: 101 MMOL/L — SIGNIFICANT CHANGE UP (ref 96–108)
CO2 SERPL-SCNC: 24 MMOL/L — SIGNIFICANT CHANGE UP (ref 22–31)
COLOR SPEC: COLORLESS — SIGNIFICANT CHANGE UP
CREAT SERPL-MCNC: 0.61 MG/DL — SIGNIFICANT CHANGE UP (ref 0.5–1.3)
DIFF PNL FLD: NEGATIVE — SIGNIFICANT CHANGE UP
GLUCOSE SERPL-MCNC: 109 MG/DL — HIGH (ref 70–99)
GLUCOSE UR QL: NEGATIVE — SIGNIFICANT CHANGE UP
KETONES UR-MCNC: NEGATIVE — SIGNIFICANT CHANGE UP
LEUKOCYTE ESTERASE UR-ACNC: NEGATIVE — SIGNIFICANT CHANGE UP
MAGNESIUM SERPL-MCNC: 2 MG/DL — SIGNIFICANT CHANGE UP (ref 1.6–2.6)
NITRITE UR-MCNC: NEGATIVE — SIGNIFICANT CHANGE UP
PH UR: 6 — SIGNIFICANT CHANGE UP (ref 5–8)
PHOSPHATE SERPL-MCNC: 3.4 MG/DL — SIGNIFICANT CHANGE UP (ref 2.5–4.5)
POTASSIUM SERPL-MCNC: 3.9 MMOL/L — SIGNIFICANT CHANGE UP (ref 3.5–5.3)
POTASSIUM SERPL-SCNC: 3.9 MMOL/L — SIGNIFICANT CHANGE UP (ref 3.5–5.3)
PROT UR-MCNC: NEGATIVE — SIGNIFICANT CHANGE UP
SODIUM SERPL-SCNC: 141 MMOL/L — SIGNIFICANT CHANGE UP (ref 135–145)
SP GR SPEC: 1.01 — LOW (ref 1.01–1.02)
UROBILINOGEN FLD QL: NEGATIVE — SIGNIFICANT CHANGE UP

## 2021-03-17 PROCEDURE — 99233 SBSQ HOSP IP/OBS HIGH 50: CPT | Mod: GC

## 2021-03-17 PROCEDURE — 99233 SBSQ HOSP IP/OBS HIGH 50: CPT

## 2021-03-17 PROCEDURE — 93010 ELECTROCARDIOGRAM REPORT: CPT

## 2021-03-17 RX ORDER — CARVEDILOL PHOSPHATE 80 MG/1
6.25 CAPSULE, EXTENDED RELEASE ORAL ONCE
Refills: 0 | Status: COMPLETED | OUTPATIENT
Start: 2021-03-17 | End: 2021-03-17

## 2021-03-17 RX ORDER — CARVEDILOL PHOSPHATE 80 MG/1
12.5 CAPSULE, EXTENDED RELEASE ORAL EVERY 12 HOURS
Refills: 0 | Status: DISCONTINUED | OUTPATIENT
Start: 2021-03-18 | End: 2021-03-24

## 2021-03-17 RX ORDER — FUROSEMIDE 40 MG
80 TABLET ORAL EVERY 8 HOURS
Refills: 0 | Status: DISCONTINUED | OUTPATIENT
Start: 2021-03-17 | End: 2021-03-18

## 2021-03-17 RX ADMIN — GABAPENTIN 300 MILLIGRAM(S): 400 CAPSULE ORAL at 05:51

## 2021-03-17 RX ADMIN — Medication 100 MILLIGRAM(S): at 21:08

## 2021-03-17 RX ADMIN — Medication 325 MILLIGRAM(S): at 13:06

## 2021-03-17 RX ADMIN — LOSARTAN POTASSIUM 100 MILLIGRAM(S): 100 TABLET, FILM COATED ORAL at 05:51

## 2021-03-17 RX ADMIN — Medication 80 MILLIGRAM(S): at 21:09

## 2021-03-17 RX ADMIN — CARVEDILOL PHOSPHATE 6.25 MILLIGRAM(S): 80 CAPSULE, EXTENDED RELEASE ORAL at 05:51

## 2021-03-17 RX ADMIN — OXYCODONE HYDROCHLORIDE 5 MILLIGRAM(S): 5 TABLET ORAL at 17:41

## 2021-03-17 RX ADMIN — OXYCODONE HYDROCHLORIDE 5 MILLIGRAM(S): 5 TABLET ORAL at 05:50

## 2021-03-17 RX ADMIN — Medication 1 TABLET(S): at 13:06

## 2021-03-17 RX ADMIN — ATORVASTATIN CALCIUM 40 MILLIGRAM(S): 80 TABLET, FILM COATED ORAL at 21:09

## 2021-03-17 RX ADMIN — ENOXAPARIN SODIUM 40 MILLIGRAM(S): 100 INJECTION SUBCUTANEOUS at 05:52

## 2021-03-17 RX ADMIN — GABAPENTIN 300 MILLIGRAM(S): 400 CAPSULE ORAL at 21:08

## 2021-03-17 RX ADMIN — Medication 40 MILLIGRAM(S): at 05:52

## 2021-03-17 RX ADMIN — OXYCODONE HYDROCHLORIDE 5 MILLIGRAM(S): 5 TABLET ORAL at 23:30

## 2021-03-17 RX ADMIN — OXYCODONE HYDROCHLORIDE 5 MILLIGRAM(S): 5 TABLET ORAL at 11:32

## 2021-03-17 RX ADMIN — AMLODIPINE BESYLATE 10 MILLIGRAM(S): 2.5 TABLET ORAL at 05:51

## 2021-03-17 RX ADMIN — GABAPENTIN 300 MILLIGRAM(S): 400 CAPSULE ORAL at 13:06

## 2021-03-17 RX ADMIN — ENOXAPARIN SODIUM 40 MILLIGRAM(S): 100 INJECTION SUBCUTANEOUS at 17:09

## 2021-03-17 RX ADMIN — CARVEDILOL PHOSPHATE 6.25 MILLIGRAM(S): 80 CAPSULE, EXTENDED RELEASE ORAL at 17:09

## 2021-03-17 RX ADMIN — CARVEDILOL PHOSPHATE 6.25 MILLIGRAM(S): 80 CAPSULE, EXTENDED RELEASE ORAL at 19:47

## 2021-03-17 NOTE — PROGRESS NOTE ADULT - SUBJECTIVE AND OBJECTIVE BOX
HISTORY OF PRESENT ILLNESS:  65yMale with a history of Severe morbid obesity, ASIYA, HTN, CAD s/p CABG 2018, No reported prior HF admissions,  presenting with STEVENS and LE edema x 1 wk.       ============================================================  Interval Events   - Net+  - Not symptomatically improved     ============================================================      Allergies    No Known Allergies    Intolerances    	    MEDICATIONS:  amLODIPine   Tablet 10 milliGRAM(s) Oral daily  aspirin enteric coated 325 milliGRAM(s) Oral daily  enoxaparin Injectable 40 milliGRAM(s) SubCutaneous every 12 hours  furosemide   Injectable 40 milliGRAM(s) IV Push two times a day  labetalol 200 milliGRAM(s) Oral two times a day  losartan 100 milliGRAM(s) Oral daily        gabapentin 300 milliGRAM(s) Oral three times a day  oxyCODONE    IR 5 milliGRAM(s) Oral every 6 hours PRN      allopurinol 100 milliGRAM(s) Oral at bedtime  atorvastatin 40 milliGRAM(s) Oral at bedtime    calcium carbonate 1250 mG  + Vitamin D (OsCal 500 + D) 1 Tablet(s) Oral daily      PAST MEDICAL & SURGICAL HISTORY:  Umbilical hernia without obstruction and without gangrene    Paralytic ileus  post op THR  &amp; post op laminectomy    Class 3 severe obesity with body mass index (BMI) of 45.0 to 49.9 in adult    ASIYA (obstructive sleep apnea)  initially dx  ; CPAP    Hypercholesterolemia    Ankle fracture    CAD (coronary artery disease)  2017- s/p cabg x3    Essential hypertension    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    Renal calculi    Obstructive sleep apnea  CPAP    H/O: osteoarthritis    Lumbar disc disease with radiculopathy    Gout    Neuropathy  Bilateral Feet since     Kidney stone  s/w ESWL pt unsure site    S/P lumbar laminectomy  Fusion L3-L5     S/P CABG x 3  17    S/P lumbar discectomy  - ,L5  Aug 2013    S/P revision of total knee, right  x2-  &amp;     H/O lithotripsy  x1    S/P knee replacement  2011- right x 3  - revisions in  &amp;     S/P tonsillectomy and adenoidectomy  as child    Hernia  repair, left inguinal 2010    Cholecystitis  cholecycstectomy 2010    History of Total Hip Replacement  2009- bilateral THR    S/P Left Inguinal Hernia Repair        FAMILY HISTORY:  Family history of coronary artery disease  brother- age 50+- Coronary Artery Stents    Family history of pancreatic cancer (Sibling)  brother     Family history of diabetes mellitus type II  mother- - hyperlipidemia and Hypertension.    Family history of coronary artery disease  HLD/Angina. Fatal MI age 73.      Mother - HTN      SOCIAL HISTORY:    [ x] Non-smoker  [x] No Illicit Drug Use  [ x] No Excess EtOH Use      REVIEW OF SYSTEMS: (Unless + Before Symptom, it is negative)  Constitutional: [] Fever, []Fatigue, []Weight Changes  Eyes:  []Recent Vision Changes, []Eye Pain  ENT: []Congestion, []Sore Throat  Endocrine: []Excess Sweating, []Temperature Intolerance  Cardiovascular:  []Chest Pain, []Palpitations, [x]Shortness of Breath, []Pre-syncope, []Syncope,[x] LE Swelling  Respiratory:[] Cough, []Congestion,[] Wheezing  Gastrointestinal: [] Abdominal Pain,[] Nausea, []Vomiting  Genitourinary: []Dysuria,[] hematuria  Musculoskeletal: []Joint Pain, []Hip/Knee Injury  Neurologic: []Headaches,[] Imbalance, []Weakness  Skin: []Rashes, []hematoma, []purprura    ================================    PHYSICAL EXAM:  T(C): 36.6 (21 @ 11:51), Max: 36.6 (03-15-21 @ 21:16)  HR: 64 (21 @ 15:18) (64 - 77)  BP: 145/69 (21 @ 11:51) (130/66 - 145/69)  RR: 18 (21 @ 11:51) (18 - 18)  SpO2: 97% (21 @ 15:18) (95% - 98%)  Wt(kg): --  I&O's Summary    15 Mar 2021 07:  -  16 Mar 2021 07:00  --------------------------------------------------------  IN: 1620 mL / OUT: 150 mL / NET: 1470 mL    16 Mar 2021 07:  -  16 Mar 2021 17:37  --------------------------------------------------------  IN: 660 mL / OUT: 0 mL / NET: 660 mL      Appearance: Normal; NAD	  Psychiatry: AOx3; Normal Mood/Affect  HEENT:   Normal oral mucosa, EOMI	  Lymphatic: No lymphadenopathy  Cardiovascular: Normal S1 S2, No JVD, No murmurs, +Edema  Respiratory: Lungs clear to auscultation, no use of accessory muscles	  Gastrointestinal:  Soft, Non-tender	  Skin: No rashes, No ecchymoses, No cyanosis	  Neurologic: Non-focal, No Focal Deficits  Extremities: Normal range of motion, No clubbing, cyanosis  Vascular: Peripheral pulses palpable 2+ bilaterally, no prominent varicosities    ============================    LABS:	   Labs [id number] @ 17:37	    CBC Full  -  ( 15 Mar 2021 06:48 )  WBC Count : 6.70 K/uL  Hemoglobin : 14.4 g/dL  Hematocrit : 44.5 %  Platelet Count - Automated : 184 K/uL  Mean Cell Volume : 88.8 fl  Mean Cell Hemoglobin : 28.7 pg  Mean Cell Hemoglobin Concentration : 32.4 gm/dL  Auto Neutrophil # : x  Auto Lymphocyte # : x  Auto Monocyte # : x  Auto Eosinophil # : x  Auto Basophil # : x  Auto Neutrophil % : x  Auto Lymphocyte % : x  Auto Monocyte % : x  Auto Eosinophil % : x  Auto Basophil % : x    03-16    139  |  100  |  17  ----------------------------<  109<H>  3.9   |  23  |  0.57  03-15    140  |  102  |  17  ----------------------------<  120<H>  3.6   |  23  |  0.57    Ca    10.1      16 Mar 2021 07:14  Ca    9.7      15 Mar 2021 06:45    TPro  6.7  /  Alb  3.9  /  TBili  0.7  /  DBili  x   /  AST  52<H>  /  ALT  45  /  AlkPhos  83  03-15        =========================================================================  ASSESSMENT:  65yMale with a history of Severe morbid obesity, ASIYA, HTN, CAD s/p CABG , No reported prior HF admissions,  presenting with STEVENS and LE edema x 1 wk.     IMPRESSION:  dCHF - unspecified type due to poor quality echo images in setting of large body habitus    Recommendations  - Continue Lasix 40 IV BID -> Change to Lasix 80 IV TID.  - Please inquire with radiology if exceeds wt limit for cardiac mri (patient amenable - if given mild anxiolytic)  - Continue rest of current medications  - Utilize thigh cuff  - Close Is/Os   - Will follow      Please call with questions.     Jamar Moreno MD, [facility]  [phone]

## 2021-03-17 NOTE — PROGRESS NOTE ADULT - ASSESSMENT
65M with obesity, history of CABG and sleep-disordered breathing presented with 5 days worsening leg edema and orthopnea. Decompensated heart failure (unclear what kind) and improving with diuretics.   Unclear what cardiac function is now, but imaging evidence is consistent with pulmonary hypertension give large PA. He is at risk given underlying left heart disease and sleep disordered breathing (high risk to have nocturnal hypoventilation. He is on treatment at home though does not know what kind, so he may be adequately treated for his sleep apnea.   His serum bicarbonate is not elevated so highly doubt he has awake hypoventilation. He says he feels comfortable with current empiric CPAP of 8cm H2O  He has improved significantly with diuretics. There is no evidence of pneumonia on his CT so doubt and lingering covid effects.    #  -     Erlin Brown PGY-5  Pulmonary/Critical Care Fellow  Pager: [phone] (MARLENA) [phone] (NS)  Pulmonary Spectra #[id number] (NS) / [phone] (MARLENA) 65M with obesity, history of CABG and sleep-disordered breathing presented with 5 days worsening leg edema and orthopnea. Decompensated heart failure (unclear what kind) and improving with diuretics.   Unclear what cardiac function is now, but imaging evidence is consistent with pulmonary hypertension give large PA. He is at risk given underlying left heart disease and sleep disordered breathing (high risk to have nocturnal hypoventilation. He is on treatment at home though does not know what kind, so he may be adequately treated for his sleep apnea.   His serum bicarbonate is not elevated so highly doubt he has awake hypoventilation. He says he feels comfortable with current empiric CPAP of 8cm H2O  He has improved significantly with diuretics. There is no evidence of pneumonia on his CT so doubt and lingering covid effects.    #SOB  - likely related to pulmonary edema and heart failure - describes symptoms of orthopnea, however is not hypoxic and asymptomatic on mild exertion  - continue diuresis per cardiology    #ASIYA  - appears well controlled  - continue CPAP 8    Erlin Brown PGY-5  Pulmonary/Critical Care Fellow  Pager: [phone] (MAR[place]NA) [phone] (NS)  Pulmonary Spectra #[id number] (NS) / [phone] (MARLENA) 65M with obesity, history of CABG and sleep-disordered breathing presented with 5 days worsening leg edema and orthopnea. Decompensated heart failure (unclear what kind) and improving with diuretics.   Unclear what cardiac function is now, but imaging evidence is consistent with pulmonary hypertension give large PA. He is at risk given underlying left heart disease and sleep disordered breathing (high risk to have nocturnal hypoventilation. He is on treatment at home though does not know what kind, so he may be adequately treated for his sleep apnea.   His serum bicarbonate is not elevated so highly doubt he has awake hypoventilation. He says he feels comfortable with current empiric CPAP of 8cm H2O  He has improved significantly with diuretics. There is no evidence of pneumonia on his CT so doubt and lingering covid effects.    #SOB  - likely related to pulmonary edema and heart failure - describes symptoms of orthopnea, however is not hypoxic and asymptomatic on mild exertion  - continue diuresis per cardiology    #ASIYA  - appears well controlled  - continue CPAP 8  - f/u with outpatient sleep physician    Pulmonary will sign off. Please call with any questions.    Erlin Brown PGY-5  Pulmonary/Critical Care Fellow  Pager: [phone] (MAR[place]NA) [phone] (NS)  Pulmonary Spectra #[id number] (NS) / [phone] (MARLENA)

## 2021-03-17 NOTE — PROGRESS NOTE ADULT - SUBJECTIVE AND OBJECTIVE BOX
Subjective/Overnight Events:      14 point ROS negative except as noted above      OBJECTIVE:  ICU Vital Signs Last 24 Hrs  T(C): 36.5 (17 Mar 2021 07:00), Max: 36.7 (16 Mar 2021 20:11)  T(F): 97.7 (17 Mar 2021 07:00), Max: 98 (16 Mar 2021 20:11)  HR: 71 (17 Mar 2021 07:00) (63 - 77)  BP: 153/84 (17 Mar 2021 07:00) (141/68 - 164/90)  BP(mean): --  ABP: --  ABP(mean): --  RR: 18 (17 Mar 2021 07:00) (18 - 18)  SpO2: 95% (17 Mar 2021 07:00) (95% - 97%)        03-16 @ 07:01  -  03-17 @ 07:00  --------------------------------------------------------  IN: 1440 mL / OUT: 650 mL / NET: 790 mL      CAPILLARY BLOOD GLUCOSE          PHYSICAL EXAM:  General: awake and alert, nontoxic appearing *** lying in bed  HEENT: NC/AT, EOMI b/l, conjunctiva normal, MMM  Lymph Nodes: no cervical LAD  Neck: supple. full range of motion  Respiratory: CTA b/l, no w/r/c, appears comfortable on ***, no conversational dyspnea or accessory muscle use  Cardiovascular: S1 S2 present, RRR, no m/r/g  Abdomen: soft, NT/ND, +BS  Extremities: no c/c/e  Skin: no rashes or lesions noted  Neurological: AAOx3, no focal deficits  Psychiatry: calm, cooperative    LINES:     HOSPITAL MEDICATIONS:  Standing Meds:  allopurinol 100 milliGRAM(s) Oral at bedtime  amLODIPine   Tablet 10 milliGRAM(s) Oral daily  aspirin enteric coated 325 milliGRAM(s) Oral daily  atorvastatin 40 milliGRAM(s) Oral at bedtime  calcium carbonate 1250 mG  + Vitamin D (OsCal 500 + D) 1 Tablet(s) Oral daily  carvedilol 6.25 milliGRAM(s) Oral every 12 hours  enoxaparin Injectable 40 milliGRAM(s) SubCutaneous every 12 hours  furosemide   Injectable 40 milliGRAM(s) IV Push two times a day  gabapentin 300 milliGRAM(s) Oral three times a day  losartan 100 milliGRAM(s) Oral daily      PRN Meds:  oxyCODONE    IR 5 milliGRAM(s) Oral every 6 hours PRN      LABS:    Hgb Trend: 14.4<--, 14.6<--  03-17    141  |  101  |  16  ----------------------------<  109<H>  3.9   |  24  |  0.61    Ca    10.0      17 Mar 2021 06:58  Phos  3.4     03-17  Mg     2.0     03-17      Creatinine Trend: 0.61<--, 0.68<--, 0.57<--, 0.57<--, 0.56<--, 0.57<--            MICROBIOLOGY:       RADIOLOGY:  [ ] Reviewed and interpreted by me    PULMONARY FUNCTION TESTS:    EKG: Subjective/Overnight Events:  No overnight events  Still has orthopnea, but able to ambulate without dyspnea  Complains of some dull chest pain  Remains on room air  LE edema improved  Denies f/c, SOB at rest, cough, sputum production, hemoptysis, pleuritic pain    14 point ROS negative except as noted above      OBJECTIVE:  ICU Vital Signs Last 24 Hrs  T(C): 36.5 (17 Mar 2021 07:00), Max: 36.7 (16 Mar 2021 20:11)  T(F): 97.7 (17 Mar 2021 07:00), Max: 98 (16 Mar 2021 20:11)  HR: 71 (17 Mar 2021 07:00) (63 - 77)  BP: 153/84 (17 Mar 2021 07:00) (141/68 - 164/90)  BP(mean): --  ABP: --  ABP(mean): --  RR: 18 (17 Mar 2021 07:00) (18 - 18)  SpO2: 95% (17 Mar 2021 07:00) (95% - 97%)        03-16 @ 07:01  -  03-17 @ 07:00  --------------------------------------------------------  IN: 1440 mL / OUT: 650 mL / NET: 790 mL      CAPILLARY BLOOD GLUCOSE          PHYSICAL EXAM:  General: awake and alert, nontoxic appearing male sitting up in bed  HEENT: NC/AT, EOMI b/l, conjunctiva normal, MMM  Lymph Nodes: no cervical LAD  Neck: supple, full range of motion  Respiratory: CTA b/l, no w/r/c, appears comfortable on room air, no conversational dyspnea or accessory muscle use  Cardiovascular: S1 S2 present, RRR, no m/r/g  Abdomen: soft, NT/ND, +BS  Extremities: no c/c, trace LE edema b/l  Skin: no rashes or lesions noted  Neurological: AAOx3, no focal deficits  Psychiatry: calm, cooperative    LINES:     HOSPITAL MEDICATIONS:  Standing Meds:  allopurinol 100 milliGRAM(s) Oral at bedtime  amLODIPine   Tablet 10 milliGRAM(s) Oral daily  aspirin enteric coated 325 milliGRAM(s) Oral daily  atorvastatin 40 milliGRAM(s) Oral at bedtime  calcium carbonate 1250 mG  + Vitamin D (OsCal 500 + D) 1 Tablet(s) Oral daily  carvedilol 6.25 milliGRAM(s) Oral every 12 hours  enoxaparin Injectable 40 milliGRAM(s) SubCutaneous every 12 hours  furosemide   Injectable 40 milliGRAM(s) IV Push two times a day  gabapentin 300 milliGRAM(s) Oral three times a day  losartan 100 milliGRAM(s) Oral daily      PRN Meds:  oxyCODONE    IR 5 milliGRAM(s) Oral every 6 hours PRN      LABS:    Hgb Trend: 14.4<--, 14.6<--  03-17    141  |  101  |  16  ----------------------------<  109<H>  3.9   |  24  |  0.61    Ca    10.0      17 Mar 2021 06:58  Phos  3.4     03-17  Mg     2.0     03-17      Creatinine Trend: 0.61<--, 0.68<--, 0.57<--, 0.57<--, 0.56<--, 0.57<--            MICROBIOLOGY:       RADIOLOGY:  [ ] Reviewed and interpreted by me    PULMONARY FUNCTION TESTS:    EKG:

## 2021-03-17 NOTE — PROGRESS NOTE ADULT - SUBJECTIVE AND OBJECTIVE BOX
Patient is a 65y old  Male who presents with a chief complaint of LE edema and shortness of breath (17 Mar 2021 16:46)        SUBJECTIVE / OVERNIGHT EVENTS: no acute complaints       MEDICATIONS  (STANDING):  allopurinol 100 milliGRAM(s) Oral at bedtime  amLODIPine   Tablet 10 milliGRAM(s) Oral daily  aspirin enteric coated 325 milliGRAM(s) Oral daily  atorvastatin 40 milliGRAM(s) Oral at bedtime  calcium carbonate 1250 mG  + Vitamin D (OsCal 500 + D) 1 Tablet(s) Oral daily  carvedilol 6.25 milliGRAM(s) Oral every 12 hours  enoxaparin Injectable 40 milliGRAM(s) SubCutaneous every 12 hours  furosemide   Injectable 80 milliGRAM(s) IV Push every 8 hours  gabapentin 300 milliGRAM(s) Oral three times a day  losartan 100 milliGRAM(s) Oral daily    MEDICATIONS  (PRN):  oxyCODONE    IR 5 milliGRAM(s) Oral every 6 hours PRN Severe Pain (7 - 10)      Vital Signs Last 24 Hrs  T(C): 36.5 (17 Mar 2021 11:49), Max: 36.7 (16 Mar 2021 20:11)  T(F): 97.7 (17 Mar 2021 11:49), Max: 98 (16 Mar 2021 20:11)  HR: 66 (17 Mar 2021 11:49) (63 - 75)  BP: 178/99 (17 Mar 2021 11:49) (141/68 - 178/99)  BP(mean): --  RR: 18 (17 Mar 2021 11:49) (18 - 18)  SpO2: 97% (17 Mar 2021 11:49) (95% - 97%)  CAPILLARY BLOOD GLUCOSE        I&O's Summary    16 Mar 2021 07:01  -  17 Mar 2021 07:00  --------------------------------------------------------  IN: 1440 mL / OUT: 650 mL / NET: 790 mL    17 Mar 2021 07:01  -  17 Mar 2021 18:26  --------------------------------------------------------  IN: 600 mL / OUT: 1000 mL / NET: -400 mL          PHYSICAL EXAM:  GENERAL: NAD, breathing normal, obese  HEAD:  Atraumatic, Normocephalic  EYES: conjunctiva and sclera clear  NECK: supple, No JVD  CHEST/LUNG: CTA b/l  HEART: S1 S2 RRR  ABDOMEN: +BS Soft, NT/ND  EXTREMITIES:  2+ DP Pulses, No c/c. 1+b/l LE edema  NEUROLOGY: AAOx3, no facial droop, no focal deficits   SKIN: No rashes or lesions    LABS:    03-17    141  |  101  |  16  ----------------------------<  109<H>  3.9   |  24  |  0.61    Ca    10.0      17 Mar 2021 06:58  Phos  3.4     03-17  Mg     2.0     03-17                RADIOLOGY & ADDITIONAL TESTS:    Imaging Personally Reviewed:  Consultant(s) Notes Reviewed:    Care Discussed with Consultants/Other Providers:

## 2021-03-17 NOTE — PROGRESS NOTE ADULT - ASSESSMENT
65M w/ PMHx of CAD s/p CABG, HTN, HLD, morbid obesity, ASIYA on CPAP, nephrolithiasis c/b UTI, recent COVID19 infection, chronic back pain s/p multiple surgeries p/w acute systolic heart failure

## 2021-03-18 ENCOUNTER — TRANSCRIPTION ENCOUNTER (OUTPATIENT)
Age: 66
End: 2021-03-18

## 2021-03-18 LAB
ANION GAP SERPL CALC-SCNC: 16 MMOL/L — SIGNIFICANT CHANGE UP (ref 5–17)
BUN SERPL-MCNC: 21 MG/DL — SIGNIFICANT CHANGE UP (ref 7–23)
CALCIUM SERPL-MCNC: 9.9 MG/DL — SIGNIFICANT CHANGE UP (ref 8.4–10.5)
CHLORIDE SERPL-SCNC: 98 MMOL/L — SIGNIFICANT CHANGE UP (ref 96–108)
CO2 SERPL-SCNC: 24 MMOL/L — SIGNIFICANT CHANGE UP (ref 22–31)
CREAT SERPL-MCNC: 0.67 MG/DL — SIGNIFICANT CHANGE UP (ref 0.5–1.3)
GLUCOSE SERPL-MCNC: 125 MG/DL — HIGH (ref 70–99)
MAGNESIUM SERPL-MCNC: 1.8 MG/DL — SIGNIFICANT CHANGE UP (ref 1.6–2.6)
POTASSIUM SERPL-MCNC: 3.8 MMOL/L — SIGNIFICANT CHANGE UP (ref 3.5–5.3)
POTASSIUM SERPL-SCNC: 3.8 MMOL/L — SIGNIFICANT CHANGE UP (ref 3.5–5.3)
SARS-COV-2 RNA SPEC QL NAA+PROBE: SIGNIFICANT CHANGE UP
SODIUM SERPL-SCNC: 138 MMOL/L — SIGNIFICANT CHANGE UP (ref 135–145)

## 2021-03-18 PROCEDURE — 99232 SBSQ HOSP IP/OBS MODERATE 35: CPT

## 2021-03-18 PROCEDURE — 99232 SBSQ HOSP IP/OBS MODERATE 35: CPT | Mod: 25

## 2021-03-18 PROCEDURE — 99233 SBSQ HOSP IP/OBS HIGH 50: CPT

## 2021-03-18 RX ORDER — ALPRAZOLAM 0.25 MG
0.5 TABLET ORAL ONCE
Refills: 0 | Status: DISCONTINUED | OUTPATIENT
Start: 2021-03-18 | End: 2021-03-19

## 2021-03-18 RX ORDER — POTASSIUM CHLORIDE 20 MEQ
20 PACKET (EA) ORAL ONCE
Refills: 0 | Status: COMPLETED | OUTPATIENT
Start: 2021-03-18 | End: 2021-03-18

## 2021-03-18 RX ORDER — BUMETANIDE 0.25 MG/ML
3 INJECTION INTRAMUSCULAR; INTRAVENOUS EVERY 12 HOURS
Refills: 0 | Status: DISCONTINUED | OUTPATIENT
Start: 2021-03-18 | End: 2021-03-20

## 2021-03-18 RX ORDER — BUMETANIDE 0.25 MG/ML
3 INJECTION INTRAMUSCULAR; INTRAVENOUS EVERY 12 HOURS
Refills: 0 | Status: DISCONTINUED | OUTPATIENT
Start: 2021-03-18 | End: 2021-03-18

## 2021-03-18 RX ORDER — MAGNESIUM SULFATE 500 MG/ML
1 VIAL (ML) INJECTION ONCE
Refills: 0 | Status: COMPLETED | OUTPATIENT
Start: 2021-03-18 | End: 2021-03-18

## 2021-03-18 RX ADMIN — Medication 20 MILLIEQUIVALENT(S): at 08:45

## 2021-03-18 RX ADMIN — CARVEDILOL PHOSPHATE 12.5 MILLIGRAM(S): 80 CAPSULE, EXTENDED RELEASE ORAL at 06:47

## 2021-03-18 RX ADMIN — ENOXAPARIN SODIUM 40 MILLIGRAM(S): 100 INJECTION SUBCUTANEOUS at 06:51

## 2021-03-18 RX ADMIN — Medication 100 MILLIGRAM(S): at 21:12

## 2021-03-18 RX ADMIN — Medication 80 MILLIGRAM(S): at 06:50

## 2021-03-18 RX ADMIN — Medication 1 TABLET(S): at 13:35

## 2021-03-18 RX ADMIN — ATORVASTATIN CALCIUM 40 MILLIGRAM(S): 80 TABLET, FILM COATED ORAL at 21:11

## 2021-03-18 RX ADMIN — GABAPENTIN 300 MILLIGRAM(S): 400 CAPSULE ORAL at 21:11

## 2021-03-18 RX ADMIN — Medication 100 GRAM(S): at 08:45

## 2021-03-18 RX ADMIN — ENOXAPARIN SODIUM 40 MILLIGRAM(S): 100 INJECTION SUBCUTANEOUS at 17:33

## 2021-03-18 RX ADMIN — GABAPENTIN 300 MILLIGRAM(S): 400 CAPSULE ORAL at 06:47

## 2021-03-18 RX ADMIN — OXYCODONE HYDROCHLORIDE 5 MILLIGRAM(S): 5 TABLET ORAL at 17:32

## 2021-03-18 RX ADMIN — LOSARTAN POTASSIUM 100 MILLIGRAM(S): 100 TABLET, FILM COATED ORAL at 06:47

## 2021-03-18 RX ADMIN — OXYCODONE HYDROCHLORIDE 5 MILLIGRAM(S): 5 TABLET ORAL at 18:02

## 2021-03-18 RX ADMIN — BUMETANIDE 124 MILLIGRAM(S): 0.25 INJECTION INTRAMUSCULAR; INTRAVENOUS at 17:34

## 2021-03-18 RX ADMIN — AMLODIPINE BESYLATE 10 MILLIGRAM(S): 2.5 TABLET ORAL at 06:47

## 2021-03-18 RX ADMIN — OXYCODONE HYDROCHLORIDE 5 MILLIGRAM(S): 5 TABLET ORAL at 23:11

## 2021-03-18 RX ADMIN — Medication 325 MILLIGRAM(S): at 13:36

## 2021-03-18 RX ADMIN — OXYCODONE HYDROCHLORIDE 5 MILLIGRAM(S): 5 TABLET ORAL at 00:10

## 2021-03-18 RX ADMIN — CARVEDILOL PHOSPHATE 12.5 MILLIGRAM(S): 80 CAPSULE, EXTENDED RELEASE ORAL at 17:34

## 2021-03-18 RX ADMIN — GABAPENTIN 300 MILLIGRAM(S): 400 CAPSULE ORAL at 13:34

## 2021-03-18 RX ADMIN — OXYCODONE HYDROCHLORIDE 5 MILLIGRAM(S): 5 TABLET ORAL at 07:10

## 2021-03-18 RX ADMIN — OXYCODONE HYDROCHLORIDE 5 MILLIGRAM(S): 5 TABLET ORAL at 06:46

## 2021-03-18 RX ADMIN — OXYCODONE HYDROCHLORIDE 5 MILLIGRAM(S): 5 TABLET ORAL at 23:38

## 2021-03-18 NOTE — DISCHARGE NOTE PROVIDER - DETAILS OF MALNUTRITION DIAGNOSIS/DIAGNOSES
This patient has been assessed with a concern for Malnutrition and was treated during this hospitalization for the following Nutrition diagnosis/diagnoses:     -  03/16/2021: Morbid obesity (BMI > 40)   This patient has been assessed with a concern for Malnutrition and was treated during this hospitalization for the following Nutrition diagnosis/diagnoses:     -  03/16/2021: Morbid obesity (BMI > 40)    This patient has been assessed with a concern for Malnutrition and was treated during this hospitalization for the following Nutrition diagnosis/diagnoses:     -  03/16/2021: Morbid obesity (BMI > 40)   This patient has been assessed with a concern for Malnutrition and was treated during this hospitalization for the following Nutrition diagnosis/diagnoses:     -  03/16/2021: Morbid obesity (BMI > 40)    This patient has been assessed with a concern for Malnutrition and was treated during this hospitalization for the following Nutrition diagnosis/diagnoses:     -  03/16/2021: Morbid obesity (BMI > 40)    This patient has been assessed with a concern for Malnutrition and was treated during this hospitalization for the following Nutrition diagnosis/diagnoses:     -  03/16/2021: Morbid obesity (BMI > 40)   This patient has been assessed with a concern for Malnutrition and was treated during this hospitalization for the following Nutrition diagnosis/diagnoses:     -  03/16/2021: Morbid obesity (BMI > 40)    This patient has been assessed with a concern for Malnutrition and was treated during this hospitalization for the following Nutrition diagnosis/diagnoses:     -  03/16/2021: Morbid obesity (BMI > 40)    This patient has been assessed with a concern for Malnutrition and was treated during this hospitalization for the following Nutrition diagnosis/diagnoses:     -  03/16/2021: Morbid obesity (BMI > 40)    This patient has been assessed with a concern for Malnutrition and was treated during this hospitalization for the following Nutrition diagnosis/diagnoses:     -  03/16/2021: Morbid obesity (BMI > 40)   This patient has been assessed with a concern for Malnutrition and was treated during this hospitalization for the following Nutrition diagnosis/diagnoses:     -  03/16/2021: Morbid obesity (BMI > 40)    This patient has been assessed with a concern for Malnutrition and was treated during this hospitalization for the following Nutrition diagnosis/diagnoses:     -  03/16/2021: Morbid obesity (BMI > 40)    This patient has been assessed with a concern for Malnutrition and was treated during this hospitalization for the following Nutrition diagnosis/diagnoses:     -  03/16/2021: Morbid obesity (BMI > 40)    This patient has been assessed with a concern for Malnutrition and was treated during this hospitalization for the following Nutrition diagnosis/diagnoses:     -  03/16/2021: Morbid obesity (BMI > 40)    This patient has been assessed with a concern for Malnutrition and was treated during this hospitalization for the following Nutrition diagnosis/diagnoses:     -  03/16/2021: Morbid obesity (BMI > 40)   This patient has been assessed with a concern for Malnutrition and was treated during this hospitalization for the following Nutrition diagnosis/diagnoses:     -  03/16/2021: Morbid obesity (BMI > 40)    This patient has been assessed with a concern for Malnutrition and was treated during this hospitalization for the following Nutrition diagnosis/diagnoses:     -  03/16/2021: Morbid obesity (BMI > 40)    This patient has been assessed with a concern for Malnutrition and was treated during this hospitalization for the following Nutrition diagnosis/diagnoses:     -  03/16/2021: Morbid obesity (BMI > 40)    This patient has been assessed with a concern for Malnutrition and was treated during this hospitalization for the following Nutrition diagnosis/diagnoses:     -  03/16/2021: Morbid obesity (BMI > 40)    This patient has been assessed with a concern for Malnutrition and was treated during this hospitalization for the following Nutrition diagnosis/diagnoses:     -  03/16/2021: Morbid obesity (BMI > 40)    This patient has been assessed with a concern for Malnutrition and was treated during this hospitalization for the following Nutrition diagnosis/diagnoses:     -  03/16/2021: Morbid obesity (BMI > 40)   This patient has been assessed with a concern for Malnutrition and was treated during this hospitalization for the following Nutrition diagnosis/diagnoses:     -  03/16/2021: Morbid obesity (BMI > 40)    This patient has been assessed with a concern for Malnutrition and was treated during this hospitalization for the following Nutrition diagnosis/diagnoses:     -  03/16/2021: Morbid obesity (BMI > 40)    This patient has been assessed with a concern for Malnutrition and was treated during this hospitalization for the following Nutrition diagnosis/diagnoses:     -  03/16/2021: Morbid obesity (BMI > 40)    This patient has been assessed with a concern for Malnutrition and was treated during this hospitalization for the following Nutrition diagnosis/diagnoses:     -  03/16/2021: Morbid obesity (BMI > 40)    This patient has been assessed with a concern for Malnutrition and was treated during this hospitalization for the following Nutrition diagnosis/diagnoses:     -  03/16/2021: Morbid obesity (BMI > 40)    This patient has been assessed with a concern for Malnutrition and was treated during this hospitalization for the following Nutrition diagnosis/diagnoses:     -  03/16/2021: Morbid obesity (BMI > 40)    This patient has been assessed with a concern for Malnutrition and was treated during this hospitalization for the following Nutrition diagnosis/diagnoses:     -  03/16/2021: Morbid obesity (BMI > 40)

## 2021-03-18 NOTE — DISCHARGE NOTE PROVIDER - PROVIDER TOKENS
PROVIDER:[TOKEN:[[id number]],SCHEDULEDAPPT:[03/30/2021],SCHEDULEDAPPTTIME:[03:00 PM]] PROVIDER:[TOKEN:[[id number]],SCHEDULEDAPPT:[03/30/2021],SCHEDULEDAPPTTIME:[03:00 PM]],PROVIDER:[TOKEN:[[id number]]]

## 2021-03-18 NOTE — DISCHARGE NOTE PROVIDER - CARE PROVIDERS DIRECT ADDRESSES
,DirectAddress_Unknown ,DirectAddress_Unknown,[email or web address]

## 2021-03-18 NOTE — PROGRESS NOTE ADULT - SUBJECTIVE AND OBJECTIVE BOX
Patient is a 65y old  Male who presents with a chief complaint of LE edema and shortness of breath (18 Mar 2021 13:33)        SUBJECTIVE / OVERNIGHT EVENTS: states he is having more episodes of acute sob when laying flat which does not occur while on cpap      MEDICATIONS  (STANDING):  allopurinol 100 milliGRAM(s) Oral at bedtime  amLODIPine   Tablet 10 milliGRAM(s) Oral daily  aspirin enteric coated 325 milliGRAM(s) Oral daily  atorvastatin 40 milliGRAM(s) Oral at bedtime  buMETAnide IVPB 3 milliGRAM(s) IV Intermittent every 12 hours  calcium carbonate 1250 mG  + Vitamin D (OsCal 500 + D) 1 Tablet(s) Oral daily  carvedilol 12.5 milliGRAM(s) Oral every 12 hours  enoxaparin Injectable 40 milliGRAM(s) SubCutaneous every 12 hours  gabapentin 300 milliGRAM(s) Oral three times a day  losartan 100 milliGRAM(s) Oral daily    MEDICATIONS  (PRN):  oxyCODONE    IR 5 milliGRAM(s) Oral every 6 hours PRN Severe Pain (7 - 10)      Vital Signs Last 24 Hrs  T(C): 36.6 (18 Mar 2021 12:24), Max: 36.7 (18 Mar 2021 04:57)  T(F): 97.9 (18 Mar 2021 12:24), Max: 98 (18 Mar 2021 04:57)  HR: 69 (18 Mar 2021 12:24) (61 - 70)  BP: 104/68 (18 Mar 2021 12:24) (104/68 - 147/77)  BP(mean): --  RR: 18 (18 Mar 2021 12:24) (18 - 18)  SpO2: 93% (18 Mar 2021 12:24) (93% - 98%)  CAPILLARY BLOOD GLUCOSE        I&O's Summary    17 Mar 2021 07:01  -  18 Mar 2021 07:00  --------------------------------------------------------  IN: 840 mL / OUT: 3525 mL / NET: -2685 mL    18 Mar 2021 07:01  -  18 Mar 2021 16:29  --------------------------------------------------------  IN: 460 mL / OUT: 1000 mL / NET: -540 mL      PHYSICAL EXAM:  GENERAL: NAD, breathing normal, obese  HEAD:  Atraumatic, Normocephalic  EYES: conjunctiva and sclera clear  NECK: supple, No JVD  CHEST/LUNG: CTA b/l  HEART: S1 S2 RRR  ABDOMEN: +BS Soft, NT/ND  EXTREMITIES:  2+ DP Pulses, No c/c. 1+ to trace b/l LE edema  NEUROLOGY: AAOx3, no facial droop, moving all extremities  SKIN: No rashes or lesions, tenderness of left sided back muscles       LABS:        138  |  98  |  21  ----------------------------<  125<H>  3.8   |  24  |  0.67    Ca    9.9      18 Mar 2021 06:24  Phos  3.4     17  Mg     1.8                 Urinalysis Basic - ( 17 Mar 2021 22:21 )    Color: Colorless / Appearance: Clear / S.009 / pH: x  Gluc: x / Ketone: Negative  / Bili: Negative / Urobili: Negative   Blood: x / Protein: Negative / Nitrite: Negative   Leuk Esterase: Negative / RBC: x / WBC x   Sq Epi: x / Non Sq Epi: x / Bacteria: x        RADIOLOGY & ADDITIONAL TESTS:    Imaging Personally Reviewed: EKG tracing reviewed - incomplete LBBB unchanged from prior  Consultant(s) Notes Reviewed:    Care Discussed with Consultants/Other Providers: d/w Dr. Moreno - wants to attempt cardiac MR

## 2021-03-18 NOTE — DISCHARGE NOTE PROVIDER - NSDCCPCAREPLAN_GEN_ALL_CORE_FT
PRINCIPAL DISCHARGE DIAGNOSIS  Diagnosis: Diastolic CHF, acute on chronic  Assessment and Plan of Treatment: Weigh yourself daily.  If you gain 3lbs in 3 days, or 5lbs in a week call your Health Care Provider.  Do not eat or drink foods containing more than 2000mg of salt (sodium) in your diet every day.  Call your Health Care Provider if you have any swelling or increased swelling in your feet, ankles, and/or stomach.  Take all of your medication as directed.  If you become dizzy call your Health Care Provider.        SECONDARY DISCHARGE DIAGNOSES  Diagnosis: Urinary tract infection with pyuria  Assessment and Plan of Treatment: completed antibiotics    Diagnosis: CAD (coronary artery disease)  Assessment and Plan of Treatment: Coronary artery disease is a condition where the arteries the supply the heart muscle get clogges with fatty deposits & puts you at risk for a heart attack  Call your doctor if you have any new pain, pressure, or discomfort in the center of your chest, pain, tingling or discomfort in arms, back, neck, jaw, or stomach, shortness of breath, nausea, vomiting, burping or heartburn, sweating, cold and clammy skin, racing or abnormal heartbeat for more than 10 minutes or if they keep coming & going.  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

## 2021-03-18 NOTE — DISCHARGE NOTE PROVIDER - NSDCMRMEDTOKEN_GEN_ALL_CORE_FT
acetaminophen 500 mg oral tablet: 2 tab(s) orally every 8 hours  allopurinol 100 mg oral tablet: 1 tab(s) orally once a day (at bedtime)  amLODIPine 10 mg oral tablet: 1 tab(s) orally once a day AM  Aspirin Enteric Coated 325 mg oral delayed release tablet: 1 tab(s) orally once a day  atorvastatin 40 mg oral tablet: 1 tab(s) orally once a day AM  Calcium 600+D oral tablet: 1 tab(s) orally once a day  gabapentin 300 mg oral tablet: 1 tab(s) orally 3 times a day  labetalol 200 mg oral tablet: 1 tab(s) orally 2 times a day  losartan 100 mg oral tablet: 1 tab(s) orally once a day AM  Multiple Vitamins oral tablet: 1 tab(s) orally once a day   acetaminophen 500 mg oral tablet: 2 tab(s) orally every 8 hours, As Needed  allopurinol 100 mg oral tablet: 1 tab(s) orally once a day (at bedtime)  amLODIPine 10 mg oral tablet: 1 tab(s) orally once a day AM  aspirin 81 mg oral delayed release tablet: 1 tab(s) orally once a day  atorvastatin 40 mg oral tablet: 1 tab(s) orally once a day AM  bumetanide 2 mg oral tablet: 1 tab(s) orally once a day   Calcium 600+D oral tablet: 1 tab(s) orally once a day  carvedilol 12.5 mg oral tablet: 1 tab(s) orally every 12 hours  gabapentin 300 mg oral tablet: 1 tab(s) orally 3 times a day  losartan 100 mg oral tablet: 1 tab(s) orally once a day AM  Multiple Vitamins oral tablet: 1 tab(s) orally once a day

## 2021-03-18 NOTE — PROVIDER CONTACT NOTE (OTHER) - ACTION/TREATMENT ORDERED:
Pt has a hx of COVID-19 14 days ago.  Admitted with no symptoms of COVID-19, meets isolation discontinuation as per guidelines.
No orders at this time.

## 2021-03-18 NOTE — DISCHARGE NOTE PROVIDER - HOSPITAL COURSE
65M w/ PMHx of CAD s/p CABG, HTN, HLD, morbid obesity, ASIYA on CPAP, nephrolithiasis c/b UTI, recent COVID19 infection, chronic back pain s/p multiple surgeries p/w dyspnea      Nutritional Assessment:  · Nutritional Assessment  This patient has been assessed with a concern for Malnutrition and has been determined to have a diagnosis/diagnoses of Morbid obesity (BMI > 40).    This patient is being managed with:   Diet DASH/TLC-  Sodium & Cholesterol Restricted  Entered: Mar 13 2021  9:54AM     Problem/Plan - 1:  ·  Problem: Acute systolic heart failure.  Plan: poor TTE here - reviewed previous TTE 2019 with outpatient cardiologist - at that time EF 45%. low BNP. CT chest w/ clear lungs but Blines on POCUS lung by pulmonary   Cardiology and Pulmonary consult appreciated   Pulmonary hypertension is definitely a consideration given ASIYA. No PE on CT imaging. unable to visualize on TTE. unable to complete cardiac MRI due to size. MUGA with preserved EF and L/R wall motion. Primary issue pHTN?  Benefit to performing cardiac cath? Pending cards f/u.   Changed to bumex po per HF team   Coreg for hf optimization    C/w losartan 100 milliGRAM(s) Oral daily.   per  cardiology discharge on daily bumex PO with outpt follow up      Problem/Plan - 2:  ·  Problem: Lower extremity edema.  Plan: -HF vs pHTN related vs venous stasis vs vte. Less likely from norvasc as it is a chronic medication. Continue for now.  TTE - poor study despite Definity. MUGA scan as above.  Diuresis as above  no DVT on LE dopplers.      Problem/Plan - 3:  ·  Problem: Coronary artery disease involving native coronary artery of native heart without angina pectoris.  Plan: -c/w coreg as above, ASA, statin  -discussed with outpatient cardiologist - no clear indication for asa 325mg qd and can decrease to 81mg qd.      Problem/Plan - 4:  ·  Problem: Pyuria.  Plan: -completed course of abx  -ucx negative.  -monitor off abx.      Problem/Plan - 5:  ·  Problem: ASIYA (obstructive sleep apnea).  Plan: -pt unsure of settings  -Tolerating cpap qhs at 10.   -pulmonary consult appreciated.      Problem/Plan - 6:  Problem: Hypercholesterolemia. Plan: -c/w statin.     Problem/Plan - 7:  ·  Problem: Essential hypertension.  Plan: -c/w coreg, losartan, norvasc  monitor bp.      Problem/Plan - 8:  ·  Problem: Chronic gout without tophus, unspecified cause, unspecified site.  Plan: -c/w allopurinol.      Problem/Plan - 9:  ·  Problem: Prophylactic measure.  Plan: dvt proph - lovenox ppx    Discharged home.  Problem/Plan - 1:  ·  Problem: Acute systolic heart failure.  Plan: poor TTE here - reviewed previous TTE 2019 with outpatient cardiologist - at that time EF 45%. low BNP. CT chest w/ clear lungs but Blines on POCUS lung by pulmonary.   Cardiology and Pulmonary consult appreciated   Pulmonary hypertension is definitely a consideration given ASIYA. No PE on CT imaging. unable to visualize on TTE. unable to complete cardiac MRI due to size. MUGA with preserved EF and L/R wall motion.    Cards does not feel there is a benefit to cardiac cath as it will not .  Changed to bumex po per HF team.   Coreg for hf optimization    C/w losartan 100 milliGRAM(s) Oral daily.   Overall, symptoms likely related to multiple issues. PHTN is a possibility but ASIYA, pulmonary edema, and overall physical deconditioning at play.       Problem/Plan - 2:  ·  Problem: Lower extremity edema.  Plan: -HF vs pHTN related vs venous stasis vs vte. Less likely from norvasc as it is a chronic medication. Continue for now.  TTE - poor study despite Definity. MUGA scan as above.  Diuresis as above  no DVT on LE dopplers.      Problem/Plan - 3:  ·  Problem: Coronary artery disease involving native coronary artery of native heart without angina pectoris.  Plan: -c/w coreg as above, ASA, statin  -discussed with outpatient cardiologist - no clear indication for asa 325mg qd and can decrease to 81mg qd.      Problem/Plan - 4:  ·  Problem: Pyuria.  Plan: -completed course of abx  -ucx negative.  -monitor off abx.      Problem/Plan - 5:  ·  Problem: ASIYA (obstructive sleep apnea).  Plan: -pt unsure of settings  -Tolerating cpap qhs at 10.   -pulmonary consult appreciated.      Problem/Plan - 6:  Problem: Hypercholesterolemia. Plan: -c/w statin.     Problem/Plan - 7:  ·  Problem: Essential hypertension.  Plan: -c/w coreg, losartan, norvasc  monitor bp.      Problem/Plan - 8:  ·  Problem: Chronic gout without tophus, unspecified cause, unspecified site.  Plan: -c/w allopurinol.      Problem/Plan - 9:  ·  Problem: Prophylactic measure.  Plan: dvt proph - lovenox ppx    Cleared by all services for d/c.   Medically optimized for d/c home w/ close outpt f/u w/ cards and pulm.  D/c time spent: 85 minutes.

## 2021-03-18 NOTE — CHART NOTE - NSCHARTNOTEFT_GEN_A_CORE
:  Sukhwinder Leigh MD    INDICATION:    Leg Swelling (R60.0)    PROCEDURE:  [X] LIMITED ECHO  [X] LIMITED CHEST  [ ] LIMITED RETROPERITONEAL  [ ] LIMITED ABDOMINAL  [X] LIMITED DVT  [ ] NEEDLE GUIDANCE VASCULAR  [ ] NEEDLE GUIDANCE THORACENTESIS  [ ] NEEDLE GUIDANCE PARACENTESIS  [ ] NEEDLE GUIDANCE PERICARDIOCENTESIS  [ ] OTHER    FINDINGS:  Anterior A line pattern with posterior B lines.   Unable to obtain any satisfactory cardiac windows to asses.     The bilateral leg veins were examined at the common femoral vein, saphenous vein, and profunda and found to be compressible.   The bilateral popliteal veins are compressible.     INTERPRETATION:  Pulmonary edema.   Unable to assess cardiac function.   No DVT.     Images stored in [employer]
Chart/Event Note  [facility] [id number] 347 D1  FRANCO RIVERO, 65y, Male  [id number]    Reason for Notification:   Notified by RN and Telemetry that the above patient had a run of wide complex tachycardia on monitor for 10 beats. Patient has had previous runs of wide complex throughout their admission. Pt seen and examined at bedside, NAD, denies CP, SOB, palpitations, dizziness.    HPI:  65M w/ PMHx of CAD s/p CABG, HTN, HLD, morbid obesity, ASIYA on CPAP, nephrolithiasis c/b UTI, recent COVID19 infection, chronic back pain s/p multiple surgeries p/w increasing STEVENS and bilateral LE edema x1 wk. Per patient tested positive for COVID19 as outpt on 2/27 and has been self quarantining. He had significant cough early in his course which has since improved. For the last week, he has been noting worsening STEVENS. Normally lives sedentary lifestyle but over the last few days more difficulty carrying laundry upstairs. Additionally noted orthopnea despite use of CPAP and has been sleeping in chair for last 5 days. During first week of COVID infection, pt had decreased PO and diarrhea w/ resultant 8lb weight loss. Subsequently has had 10lb weight gain concomitant w/ swelling. Also of note, on 3/4 was seen in ER for abdominal pain, diagnosed w/ UTI and discharged on cefpodoximie which he completed yesterday. Denies fevers, chills, dysuria or hematuria.    In the ER, troponin neg, ECG w/o ischemic changes. CTPE negative. Noted to have LE edema, given lasix and admitted to medicine for further management. (13 Mar 2021 10:37)      Review of Systems:  Constitutional: No fever, chills, or fatigue.  Neurologic: No headache, dizziness, vision/speech changes, numbness, or weakness.  Respiratory: No cough, wheezing, dyspnea, or shortness of breath.  Cardiovascular: No chest pain, pressure, or palpitations.   GI: No abdominal pain, nausea, vomiting, diarrhea, constipation.   : No dysuria, burning, frequency, incontinence, or retention.   Skin: No itching, burning, rashes, or lesions .  Musculoskeletal: No joint pain or swelling.   Psychiatric: No depression, anxiety, or mood swings.    T(C): 36.6 (03-18-21 @ 12:24), Max: 36.7 (03-18-21 @ 04:57)  HR: 69 (03-18-21 @ 12:24) (61 - 70)  BP: 104/68 (03-18-21 @ 12:24) (104/68 - 147/77)  RR: 18 (03-18-21 @ 12:24) (18 - 18)  SpO2: 93% (03-18-21 @ 12:24) (93% - 98%)         PHYSICAL EXAM:  GENERAL: NAD, breathing normal, obese  HEAD:  Atraumatic, Normocephalic  EYES: conjunctiva and sclera clear  NECK: supple, No JVD  CHEST/LUNG: CTA b/l  HEART: S1 S2 RRR  ABDOMEN: +BS Soft, NT/ND  EXTREMITIES:  2+ DP Pulses, No c/c. 1+b/l LE edema  NEUROLOGY: AAOx3, no facial droop, no focal deficits   SKIN: No rashes or lesions    Medical Decision Making/Assessment/Plan:  65M w/ PMHx of CAD s/p CABG, HTN, HLD, morbid obesity, ASIYA on CPAP, nephrolithiasis c/b UTI, recent COVID19 infection, chronic back pain s/p multiple surgeries p/w acute systolic heart failure, with multiple runs of wide complex tachycardia.     - Diagnosis: Wide Complex Tachycardia     1) Potassium/magnesium supplemented   2) Continue cardiac monitoring.   3) Coreg increased yesterday - may need to increase again if continue to have WCT    Discussed above with Dr Richard and in agreement.  Aislinn Celis, MARNI-c  Department of Medicine   #[id number]
FRANCO RIVERO    Notified by RN patient with  6 beats of wide complex   Patient asymptomatic  vital signs as ordered   Vital Signs Last 24 Hrs  T(C): 36.6 (16 Mar 2021 11:51), Max: 36.6 (15 Mar 2021 21:16)  T(F): 97.9 (16 Mar 2021 11:51), Max: 97.9 (16 Mar 2021 11:51)  HR: 64 (16 Mar 2021 15:18) (64 - 77)  BP: 145/69 (16 Mar 2021 11:51) (130/66 - 145/69)  BP(mean): --  RR: 18 (16 Mar 2021 11:51) (18 - 18)  SpO2: 97% (16 Mar 2021 15:18) (95% - 98%)  03-16    139  |  100  |  17  ----------------------------<  109<H>  3.9   |  23  |  0.57    Ca    10.1      16 Mar 2021 07:14    TPro  6.7  /  Alb  3.9  /  TBili  0.7  /  DBili  x   /  AST  52<H>  /  ALT  45  /  AlkPhos  83  03-15      HPI:  65M w/ PMHx of CAD s/p CABG, HTN, HLD, morbid obesity, ASIYA on CPAP, nephrolithiasis c/b UTI, recent COVID19 infection, chronic back pain s/p multiple surgeries p/w increasing STEVENS and bilateral LE edema x1 wk. Per patient tested positive for COVID19 as outpt on 2/27 and has been self quarantining. He had significant cough early in his course which has since improved. For the last week, he has been noting worsening STEVENS. Normally lives sedentary lifestyle but over the last few days more difficulty carrying laundry upstairs. Additionally noted orthopnea despite use of CPAP and has been sleeping in chair for last 5 days. During first week of COVID infection, pt had decreased PO and diarrhea w/ resultant 8lb weight loss. Subsequently has had 10lb weight gain concomitant w/ swelling. Also of note, on 3/4 was seen in ER for abdominal pain, diagnosed w/ UTI and discharged on cefpodoximie which he completed yesterday. Denies fevers, chills, dysuria or hematuria.    In the ER, troponin neg, ECG w/o ischemic changes. CTPE negative. Noted to have LE edema, given lasix and admitted to medicine for further management. (13 Mar 2021 10:37)     # Wide complex   Interventions taken    Monitor on tele   monitor lytes  monitor vital signs   Follow uo with Dr. Richard   will sign off to night ACP                         Mya Davenport NP-C
Pulmonary consult called house will see patient
Cardiology consult called spoke with Dr. Guerrero.
RD CHF education chart note    Patient was visited by RD for CHF education. Heart failure education provided to the patient in detail. Discussed heart failure nutrition therapy, sodium and fluid intake, importance of diet adherence, daily weights monitoring with the patient. Reinforced importance of weight gain parameters and importance of contacting MD’s about weight changes. Provided handouts on heart failure nutrition therapy, reading heart healthy nutrition labels, heart healthy shopping tips and low sodium food list. Patient verbalized understanding demonstrated by teach back method. RD contact information left with patient for any future questioning.

## 2021-03-18 NOTE — PROGRESS NOTE ADULT - SUBJECTIVE AND OBJECTIVE BOX
HISTORY OF PRESENT ILLNESS:  65yMale with a history of Severe morbid obesity, ASIYA, HTN, CAD s/p CABG 2018, No reported prior HF admissions,  presenting with STEVENS and LE edema x 1 wk.       ============================================================  Interval Events   - Net (-)2L  - Breathing - not improved, but le edema improved    ============================================================      Allergies    No Known Allergies    Intolerances    	    MEDICATIONS:  amLODIPine   Tablet 10 milliGRAM(s) Oral daily  aspirin enteric coated 325 milliGRAM(s) Oral daily  enoxaparin Injectable 40 milliGRAM(s) SubCutaneous every 12 hours  furosemide   Injectable 40 milliGRAM(s) IV Push two times a day  labetalol 200 milliGRAM(s) Oral two times a day  losartan 100 milliGRAM(s) Oral daily        gabapentin 300 milliGRAM(s) Oral three times a day  oxyCODONE    IR 5 milliGRAM(s) Oral every 6 hours PRN      allopurinol 100 milliGRAM(s) Oral at bedtime  atorvastatin 40 milliGRAM(s) Oral at bedtime    calcium carbonate 1250 mG  + Vitamin D (OsCal 500 + D) 1 Tablet(s) Oral daily      PAST MEDICAL & SURGICAL HISTORY:  Umbilical hernia without obstruction and without gangrene    Paralytic ileus  post op THR  &amp; post op laminectomy    Class 3 severe obesity with body mass index (BMI) of 45.0 to 49.9 in adult    ASIYA (obstructive sleep apnea)  initially dx  ; CPAP    Hypercholesterolemia    Ankle fracture    CAD (coronary artery disease)  2017- s/p cabg x3    Essential hypertension    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    Renal calculi    Obstructive sleep apnea  CPAP    H/O: osteoarthritis    Lumbar disc disease with radiculopathy    Gout    Neuropathy  Bilateral Feet since     Kidney stone  s/w ESWL pt unsure site    S/P lumbar laminectomy  Fusion L3-L5     S/P CABG x 3  17    S/P lumbar discectomy  - ,L5  Aug 2013    S/P revision of total knee, right  x2-  &amp;     H/O lithotripsy  x1    S/P knee replacement  - right x 3  - revisions in  &amp;     S/P tonsillectomy and adenoidectomy  as child    Hernia  repair, left inguinal 2010    Cholecystitis  cholecycstectomy     History of Total Hip Replacement  - bilateral THR    S/P Left Inguinal Hernia Repair        FAMILY HISTORY:  Family history of coronary artery disease  brother- age 50+- Coronary Artery Stents    Family history of pancreatic cancer (Sibling)  brother     Family history of diabetes mellitus type II  mother- - hyperlipidemia and Hypertension.    Family history of coronary artery disease  HLD/Angina. Fatal MI age 73.      Mother - HTN      SOCIAL HISTORY:    [ x] Non-smoker  [x] No Illicit Drug Use  [ x] No Excess EtOH Use      REVIEW OF SYSTEMS: (Unless + Before Symptom, it is negative)  Constitutional: [] Fever, []Fatigue, []Weight Changes  Eyes:  []Recent Vision Changes, []Eye Pain  ENT: []Congestion, []Sore Throat  Endocrine: []Excess Sweating, []Temperature Intolerance  Cardiovascular:  []Chest Pain, []Palpitations, [x]Shortness of Breath, []Pre-syncope, []Syncope,[x] LE Swelling  Respiratory:[] Cough, []Congestion,[] Wheezing  Gastrointestinal: [] Abdominal Pain,[] Nausea, []Vomiting  Genitourinary: []Dysuria,[] hematuria  Musculoskeletal: []Joint Pain, []Hip/Knee Injury  Neurologic: []Headaches,[] Imbalance, []Weakness  Skin: []Rashes, []hematoma, []purprura    ================================    PHYSICAL EXAM:  T(C): 36.6 (21 @ 11:51), Max: 36.6 (03-15-21 @ 21:16)  HR: 64 (21 @ 15:18) (64 - 77)  BP: 145/69 (21 @ 11:51) (130/66 - 145/69)  RR: 18 (21 @ 11:51) (18 - 18)  SpO2: 97% (21 @ 15:18) (95% - 98%)  Wt(kg): --  I&O's Summary    15 Mar 2021 07:  -  16 Mar 2021 07:00  --------------------------------------------------------  IN: 1620 mL / OUT: 150 mL / NET: 1470 mL    16 Mar 2021 07:  -  16 Mar 2021 17:37  --------------------------------------------------------  IN: 660 mL / OUT: 0 mL / NET: 660 mL      Appearance: Normal; NAD	  Psychiatry: AOx3; Normal Mood/Affect  HEENT:   Normal oral mucosa, EOMI	  Lymphatic: No lymphadenopathy  Cardiovascular: Normal S1 S2, No JVD, No murmurs, +Edema  Respiratory: Lungs clear to auscultation, no use of accessory muscles	  Gastrointestinal:  Soft, Non-tender	  Skin: No rashes, No ecchymoses, No cyanosis	  Neurologic: Non-focal, No Focal Deficits  Extremities: Normal range of motion, No clubbing, cyanosis  Vascular: Peripheral pulses palpable 2+ bilaterally, no prominent varicosities    ============================    LABS:	   Labs [id number]    CBC Full  -  ( 15 Mar 2021 06:48 )  WBC Count : 6.70 K/uL  Hemoglobin : 14.4 g/dL  Hematocrit : 44.5 %  Platelet Count - Automated : 184 K/uL  Mean Cell Volume : 88.8 fl  Mean Cell Hemoglobin : 28.7 pg  Mean Cell Hemoglobin Concentration : 32.4 gm/dL  Auto Neutrophil # : x  Auto Lymphocyte # : x  Auto Monocyte # : x  Auto Eosinophil # : x  Auto Basophil # : x  Auto Neutrophil % : x  Auto Lymphocyte % : x  Auto Monocyte % : x  Auto Eosinophil % : x  Auto Basophil % : x        139  |  100  |  17  ----------------------------<  109<H>  3.9   |  23  |  0.57  03-15    140  |  102  |  17  ----------------------------<  120<H>  3.6   |  23  |  0.57    Ca    10.1      16 Mar 2021 07:14  Ca    9.7      15 Mar 2021 06:45    TPro  6.7  /  Alb  3.9  /  TBili  0.7  /  DBili  x   /  AST  52<H>  /  ALT  45  /  AlkPhos  83  03-15        =========================================================================  ASSESSMENT:  65yMale with a history of Severe morbid obesity, ASIYA, HTN, CAD s/p CABG , No reported prior HF admissions,  presenting with STEVENS and LE edema x 1 wk.     IMPRESSION:  dCHF - unspecified type due to poor quality echo images in setting of large body habitus    Recommendations  - Continue Lasix 40 IV BID -> Change to Bumex TID for one more day. And then re-assess  - Monitor Tele - WCT overnight  - Please inquire with radiology if exceeds wt limit for cardiac mri (patient amenable - if given mild anxiolytic)  - Continue rest of current medications  - Utilize thigh cuff  - Close Is/Os   - Will follow      Please call with questions.     Jamar Moreno MD, [facility]  [phone]

## 2021-03-18 NOTE — DISCHARGE NOTE PROVIDER - NSDCFUADDAPPT_GEN_ALL_CORE_FT
Please follow up with your cardiologist, Dr Farfan on March 29 at 10am after discharge for continued monitoring and management. If you are unable to keep this appointment, please call the office to reschedule, [phone] Please follow up with your cardiologist, Dr Farfan on March 30 at 3 pm after discharge for continued monitoring and management. If you are unable to keep this appointment, please call the office to reschedule, [phone] Please follow up with your cardiologist, Dr Farfan on March 30 at 3 pm after discharge for continued monitoring and management. If you are unable to keep this appointment, please call the office to reschedule, [phone]    ALSO FOLLOW UP WITH PULMONARY MEDICINE. PLEASE MAKE THE APPOINTMENT.

## 2021-03-18 NOTE — DISCHARGE NOTE PROVIDER - CARE PROVIDER_API CALL
Adolph Shankar A  CARDIOVASCULAR DISEASE  [address] [place] [address]  Phone: [phone]  Fax: [phone]  Scheduled Appointment: 03/30/2021 03:00 PM   Adolph Shankar  CARDIOVASCULAR DISEASE  [address] [place] [address]  Phone: [phone]  Fax: [phone]  Scheduled Appointment: 03/30/2021 03:00 PM    Maki Cantu)  Critical Care Medicine; Pulmonary Disease  [address] [facility], [place] [address]  Phone: [phone]  Fax: [phone]  Follow Up Time:

## 2021-03-18 NOTE — PROVIDER CONTACT NOTE (OTHER) - SITUATION
patent had 6 beats wide complex c no palpations v/s stable
Previous history of ectopy. On IV lasix and supplemented by Potassium and Magnesium. Patient asymptomatic.

## 2021-03-19 DIAGNOSIS — I50.33 ACUTE ON CHRONIC DIASTOLIC (CONGESTIVE) HEART FAILURE: ICD-10-CM

## 2021-03-19 DIAGNOSIS — G47.33 OBSTRUCTIVE SLEEP APNEA (ADULT) (PEDIATRIC): ICD-10-CM

## 2021-03-19 LAB
ANION GAP SERPL CALC-SCNC: 17 MMOL/L — SIGNIFICANT CHANGE UP (ref 5–17)
BUN SERPL-MCNC: 25 MG/DL — HIGH (ref 7–23)
CALCIUM SERPL-MCNC: 10.1 MG/DL — SIGNIFICANT CHANGE UP (ref 8.4–10.5)
CHLORIDE SERPL-SCNC: 100 MMOL/L — SIGNIFICANT CHANGE UP (ref 96–108)
CO2 SERPL-SCNC: 22 MMOL/L — SIGNIFICANT CHANGE UP (ref 22–31)
CREAT SERPL-MCNC: 0.7 MG/DL — SIGNIFICANT CHANGE UP (ref 0.5–1.3)
GLUCOSE SERPL-MCNC: 113 MG/DL — HIGH (ref 70–99)
HCT VFR BLD CALC: 46.3 % — SIGNIFICANT CHANGE UP (ref 39–50)
HGB BLD-MCNC: 15.5 G/DL — SIGNIFICANT CHANGE UP (ref 13–17)
MAGNESIUM SERPL-MCNC: 2.1 MG/DL — SIGNIFICANT CHANGE UP (ref 1.6–2.6)
MCHC RBC-ENTMCNC: 29.4 PG — SIGNIFICANT CHANGE UP (ref 27–34)
MCHC RBC-ENTMCNC: 33.5 GM/DL — SIGNIFICANT CHANGE UP (ref 32–36)
MCV RBC AUTO: 87.9 FL — SIGNIFICANT CHANGE UP (ref 80–100)
NRBC # BLD: 0 /100 WBCS — SIGNIFICANT CHANGE UP (ref 0–0)
PLATELET # BLD AUTO: 193 K/UL — SIGNIFICANT CHANGE UP (ref 150–400)
POTASSIUM SERPL-MCNC: 3.9 MMOL/L — SIGNIFICANT CHANGE UP (ref 3.5–5.3)
POTASSIUM SERPL-SCNC: 3.9 MMOL/L — SIGNIFICANT CHANGE UP (ref 3.5–5.3)
RBC # BLD: 5.27 M/UL — SIGNIFICANT CHANGE UP (ref 4.2–5.8)
RBC # FLD: 13 % — SIGNIFICANT CHANGE UP (ref 10.3–14.5)
SODIUM SERPL-SCNC: 139 MMOL/L — SIGNIFICANT CHANGE UP (ref 135–145)
WBC # BLD: 6.73 K/UL — SIGNIFICANT CHANGE UP (ref 3.8–10.5)
WBC # FLD AUTO: 6.73 K/UL — SIGNIFICANT CHANGE UP (ref 3.8–10.5)

## 2021-03-19 PROCEDURE — 99233 SBSQ HOSP IP/OBS HIGH 50: CPT

## 2021-03-19 RX ORDER — POTASSIUM CHLORIDE 20 MEQ
20 PACKET (EA) ORAL ONCE
Refills: 0 | Status: COMPLETED | OUTPATIENT
Start: 2021-03-19 | End: 2021-03-19

## 2021-03-19 RX ORDER — ALPRAZOLAM 0.25 MG
0.5 TABLET ORAL ONCE
Refills: 0 | Status: DISCONTINUED | OUTPATIENT
Start: 2021-03-19 | End: 2021-03-19

## 2021-03-19 RX ADMIN — Medication 325 MILLIGRAM(S): at 13:50

## 2021-03-19 RX ADMIN — ENOXAPARIN SODIUM 40 MILLIGRAM(S): 100 INJECTION SUBCUTANEOUS at 05:36

## 2021-03-19 RX ADMIN — Medication 100 MILLIGRAM(S): at 21:13

## 2021-03-19 RX ADMIN — GABAPENTIN 300 MILLIGRAM(S): 400 CAPSULE ORAL at 21:13

## 2021-03-19 RX ADMIN — ENOXAPARIN SODIUM 40 MILLIGRAM(S): 100 INJECTION SUBCUTANEOUS at 18:15

## 2021-03-19 RX ADMIN — CARVEDILOL PHOSPHATE 12.5 MILLIGRAM(S): 80 CAPSULE, EXTENDED RELEASE ORAL at 05:18

## 2021-03-19 RX ADMIN — OXYCODONE HYDROCHLORIDE 5 MILLIGRAM(S): 5 TABLET ORAL at 05:17

## 2021-03-19 RX ADMIN — BUMETANIDE 124 MILLIGRAM(S): 0.25 INJECTION INTRAMUSCULAR; INTRAVENOUS at 05:19

## 2021-03-19 RX ADMIN — Medication 0.5 MILLIGRAM(S): at 11:48

## 2021-03-19 RX ADMIN — Medication 1 TABLET(S): at 13:50

## 2021-03-19 RX ADMIN — OXYCODONE HYDROCHLORIDE 5 MILLIGRAM(S): 5 TABLET ORAL at 05:55

## 2021-03-19 RX ADMIN — OXYCODONE HYDROCHLORIDE 5 MILLIGRAM(S): 5 TABLET ORAL at 18:15

## 2021-03-19 RX ADMIN — AMLODIPINE BESYLATE 10 MILLIGRAM(S): 2.5 TABLET ORAL at 05:18

## 2021-03-19 RX ADMIN — OXYCODONE HYDROCHLORIDE 5 MILLIGRAM(S): 5 TABLET ORAL at 23:49

## 2021-03-19 RX ADMIN — LOSARTAN POTASSIUM 100 MILLIGRAM(S): 100 TABLET, FILM COATED ORAL at 05:18

## 2021-03-19 RX ADMIN — Medication 20 MILLIEQUIVALENT(S): at 09:53

## 2021-03-19 RX ADMIN — GABAPENTIN 300 MILLIGRAM(S): 400 CAPSULE ORAL at 13:49

## 2021-03-19 RX ADMIN — BUMETANIDE 124 MILLIGRAM(S): 0.25 INJECTION INTRAMUSCULAR; INTRAVENOUS at 18:18

## 2021-03-19 RX ADMIN — ATORVASTATIN CALCIUM 40 MILLIGRAM(S): 80 TABLET, FILM COATED ORAL at 21:13

## 2021-03-19 RX ADMIN — OXYCODONE HYDROCHLORIDE 5 MILLIGRAM(S): 5 TABLET ORAL at 23:19

## 2021-03-19 RX ADMIN — OXYCODONE HYDROCHLORIDE 5 MILLIGRAM(S): 5 TABLET ORAL at 18:45

## 2021-03-19 RX ADMIN — GABAPENTIN 300 MILLIGRAM(S): 400 CAPSULE ORAL at 05:19

## 2021-03-19 RX ADMIN — Medication 0.5 MILLIGRAM(S): at 10:42

## 2021-03-19 RX ADMIN — CARVEDILOL PHOSPHATE 12.5 MILLIGRAM(S): 80 CAPSULE, EXTENDED RELEASE ORAL at 18:16

## 2021-03-19 NOTE — CONSULT NOTE ADULT - PROBLEM SELECTOR PROBLEM 1
Acute congestive heart failure, unspecified heart failure type Acute on chronic diastolic heart failure

## 2021-03-19 NOTE — PROGRESS NOTE ADULT - SUBJECTIVE AND OBJECTIVE BOX
Patient is a 65y old  Male who presents with a chief complaint of LE edema and shortness of breath (19 Mar 2021 17:32)        SUBJECTIVE / OVERNIGHT EVENTS: states still having intermittent episodes of sob when laying down that resolves with sitting up.       MEDICATIONS  (STANDING):  allopurinol 100 milliGRAM(s) Oral at bedtime  amLODIPine   Tablet 10 milliGRAM(s) Oral daily  aspirin enteric coated 325 milliGRAM(s) Oral daily  atorvastatin 40 milliGRAM(s) Oral at bedtime  buMETAnide IVPB 3 milliGRAM(s) IV Intermittent every 12 hours  calcium carbonate 1250 mG  + Vitamin D (OsCal 500 + D) 1 Tablet(s) Oral daily  carvedilol 12.5 milliGRAM(s) Oral every 12 hours  enoxaparin Injectable 40 milliGRAM(s) SubCutaneous every 12 hours  gabapentin 300 milliGRAM(s) Oral three times a day  losartan 100 milliGRAM(s) Oral daily    MEDICATIONS  (PRN):  oxyCODONE    IR 5 milliGRAM(s) Oral every 6 hours PRN Severe Pain (7 - 10)      Vital Signs Last 24 Hrs  T(C): 36.3 (19 Mar 2021 12:53), Max: 36.7 (18 Mar 2021 20:20)  T(F): 97.3 (19 Mar 2021 12:53), Max: 98 (18 Mar 2021 20:20)  HR: 78 (19 Mar 2021 12:53) (64 - 95)  BP: 114/73 (19 Mar 2021 12:53) (114/73 - 159/88)  BP(mean): --  RR: 20 (19 Mar 2021 12:53) (18 - 20)  SpO2: 94% (19 Mar 2021 12:53) (94% - 99%)  CAPILLARY BLOOD GLUCOSE        I&O's Summary    18 Mar 2021 07:01  -  19 Mar 2021 07:00  --------------------------------------------------------  IN: 820 mL / OUT: 2900 mL / NET: -2080 mL    19 Mar 2021 07:01  -  19 Mar 2021 19:11  --------------------------------------------------------  IN: 580 mL / OUT: 900 mL / NET: -320 mL      PHYSICAL EXAM:  GENERAL: NAD, breathing normal, obese  HEAD:  Atraumatic, Normocephalic  EYES: conjunctiva and sclera clear  NECK: supple, No JVD  CHEST/LUNG: CTA b/l  HEART: S1 S2 RRR  ABDOMEN: +BS Soft, NT/ND  EXTREMITIES:  2+ DP Pulses, No c/c. 1+ to trace b/l LE edema  NEUROLOGY: AAOx3, no facial droop, moving all extremities  SKIN: No rashes or lesions, tenderness of left sided back muscles     LABS:                        15.5   6.73  )-----------( 193      ( 19 Mar 2021 07:00 )             46.3     03-19    139  |  100  |  25<H>  ----------------------------<  113<H>  3.9   |  22  |  0.70    Ca    10.1      19 Mar 2021 07:00  Mg     2.1     03-19            Urinalysis Basic - ( 17 Mar 2021 22:21 )    Color: Colorless / Appearance: Clear / S.009 / pH: x  Gluc: x / Ketone: Negative  / Bili: Negative / Urobili: Negative   Blood: x / Protein: Negative / Nitrite: Negative   Leuk Esterase: Negative / RBC: x / WBC x   Sq Epi: x / Non Sq Epi: x / Bacteria: x        RADIOLOGY & ADDITIONAL TESTS:    Imaging Personally Reviewed: cardiac MRI ordered - unable to complete due to size  Consultant(s) Notes Reviewed:    Care Discussed with Consultants/Other Providers: d/w Dr. Moreno regarding diuretics and plan of care

## 2021-03-19 NOTE — CONSULT NOTE ADULT - PROBLEM SELECTOR RECOMMENDATION 9
-pt with orthopnea and LE edema on admission suggesting both right and left sided HF, however ddx also includes pHTN / OHS further c/b recent COVID infection  -low suspicion for ischemic event  -currently, pt appears euvolemic although assessment is difficult and has no signs of contraction on labs on current diuretic regimen  -agree w/ Bumex 3mg IV BID for now  -monitor i/o's, daily weights, and daily renal function  -would switch to daily oral diuretic if any evidence of renal dysfunction  -would obtain RHC/LHC after weekend of diuresis  -no advanced HF needs at this time.  If advanced therapies are needed after hemodynamic assessment, please give us a call -pt with orthopnea and LE edema on admission suggesting both right and left sided HF, however ddx also includes pHTN / OHS further c/b recent COVID infection  -low suspicion for ischemic event  -pt appears euvolemic w/ pro BNP of 30, however no signs of contraction on labs on current diuretic regimen  -can continue w/ Bumex 3mg IV BID for now  -monitor i/o's, daily weights, and daily renal function  -would switch to daily oral diuretic if any evidence of renal dysfunction  -would obtain RHC/LHC after weekend of diuresis  -no advanced HF needs at this time.  If advanced therapies are needed after hemodynamic assessment, please give us a call -pt with orthopnea and LE edema on admission suggesting both right and left sided HF, however ddx also includes pHTN / OHS further c/b recent COVID infection  - looks close to euvolemia. Monitor i/o's, daily weights, and daily renal function  - Consider switching bumex to 2mg PO BID  - TTE limited. Consider LETICIA or RHC to further assess filling pressures given pt's symptoms and limited physical exam.   - STEVENS is likely multifactorial in setting of obesity, HFpEF and deconditioning.  - -no advanced HF needs at this time.  If advanced therapies are needed after hemodynamic assessment, please give us a call

## 2021-03-19 NOTE — CONSULT NOTE ADULT - ASSESSMENT
65M w/ PMHx of CAD s/p CABG, HTN, HLD, morbid obesity, ASIYA on CPAP, nephrolithiasis c/b UTI, recent COVID19 infection, chronic back pain s/p multiple surgeries p/w increasing STEVENS and bilateral LE edema for one week. 65M w/ PMHx of CAD s/p CABG, HTN, HLD, morbid obesity, ASIYA on CPAP, chronic HFpEF, nephrolithiasis c/b UTI, recent COVID19 infection, chronic back pain s/p multiple surgeries p/w increasing STEVENS and bilateral LE edema for one week.

## 2021-03-19 NOTE — CONSULT NOTE ADULT - SUBJECTIVE AND OBJECTIVE BOX
Patient seen and evaluated at bedside    Chief Complaint: dyspnea    HPI:  65M w/ PMHx of CAD s/p CABG, HTN, HLD, morbid obesity, ASIYA on CPAP, nephrolithiasis c/b UTI, recent COVID19 infection, chronic back pain s/p multiple surgeries p/w increasing STEVENS and bilateral LE edema x1 wk. Per patient tested positive for COVID19 as outpt on  and has been self quarantining. He had significant cough early in his course which has since improved. For the last week, he has been noting worsening STEVENS. Normally lives sedentary lifestyle but over the last few days more difficulty carrying laundry upstairs. Additionally noted orthopnea despite use of CPAP and has been sleeping in chair for last 5 days. During first week of COVID infection, pt had decreased PO and diarrhea w/ resultant 8lb weight loss. Subsequently has had 10lb weight gain concomitant w/ swelling. Also of note, on 3/4 was seen in ER for abdominal pain, diagnosed w/ UTI and discharged on cefpodoximie which he completed yesterday. Denies fevers, chills, dysuria or hematuria.    In the ER, troponin neg, ECG w/o ischemic changes. CTPE negative. Noted to have LE edema, given lasix and admitted to medicine for further management. (13 Mar 2021 10:37)    While admitted, pt has been on increasing dosing of diuretics with improvement in LE edema however reports of ongoing orthopnea.  TTE with difficult windows and unable to obtain MRI 2/2 obesity.  At time of interview, pt was sleeping comfortably in flat / supine position.  HF consulted for evaluation of HF.      PMHx:   Umbilical hernia without obstruction and without gangrene    Paralytic ileus    Class 3 severe obesity with body mass index (BMI) of 45.0 to 49.9 in adult    ASIYA (obstructive sleep apnea)    Stool finding    Hypercholesterolemia    Ankle fracture    CAD (coronary artery disease)    Essential hypertension    Neuropathy    Hx of insomnia    Renal calculi    Obstructive sleep apnea    H/O: osteoarthritis    Lumbar disc disease with radiculopathy    History of Obesity    Gout        PSHx:   Neuropathy    Kidney stone    S/P lumbar laminectomy    S/P CABG x 3    S/P lumbar discectomy    S/P revision of total knee, right    H/O lithotripsy    H/O renal calculi    S/P knee replacement    S/P hip replacement    S/P tonsillectomy and adenoidectomy    Hernia    H/O lymphadenopathy    Cholecystitis    History of Total Hip Replacement    S/P Left Inguinal Hernia Repair        Allergies:  No Known Allergies      Home Meds: reviewed    Current Medications:   allopurinol 100 milliGRAM(s) Oral at bedtime  amLODIPine   Tablet 10 milliGRAM(s) Oral daily  aspirin enteric coated 325 milliGRAM(s) Oral daily  atorvastatin 40 milliGRAM(s) Oral at bedtime  buMETAnide IVPB 3 milliGRAM(s) IV Intermittent every 12 hours  calcium carbonate 1250 mG  + Vitamin D (OsCal 500 + D) 1 Tablet(s) Oral daily  carvedilol 12.5 milliGRAM(s) Oral every 12 hours  enoxaparin Injectable 40 milliGRAM(s) SubCutaneous every 12 hours  gabapentin 300 milliGRAM(s) Oral three times a day  losartan 100 milliGRAM(s) Oral daily  oxyCODONE    IR 5 milliGRAM(s) Oral every 6 hours PRN      FAMILY HISTORY:  Family history of coronary artery disease  brother- age 50+- Coronary Artery Stents    Family history of pancreatic cancer (Sibling)  brother     Family history of diabetes mellitus type II  mother- - hyperlipidemia and Hypertension.    Family history of coronary artery disease  HLD/Angina. Fatal MI age 73.        Social History:  Smoking History: denies  Alcohol Use: denies  Drug Use: denies    REVIEW OF SYSTEMS:  Constitutional:     [x ] negative [ ] fevers [ ] chills [ ] weight loss [ ] weight gain  HEENT:                  [x ] negative [ ] dry eyes [ ] eye irritation [ ] postnasal drip [ ] nasal congestion  CV:                         [ x] negative  [ ] chest pain [ ] orthopnea [ ] palpitations [ ] murmur  Resp:                     [x ] negative [ ] cough [ ] shortness of breath [ ] dyspnea [ ] wheezing [ ] sputum [ ]hemoptysis  GI:                          [ x] negative [ ] nausea [ ] vomiting [ ] diarrhea [ ] constipation [ ] abd pain [ ] dysphagia   :                        [ x] negative [ ] dysuria [ ] nocturia [ ] hematuria [ ] increased urinary frequency  Musculoskeletal: [x ] negative [ ] back pain [ ] myalgias [ ] arthralgias [ ] fracture  Skin:                       [ x] negative [ ] rash [ ] itch  Neurological:        [ x] negative [ ] headache [ ] dizziness [ ] syncope [ ] weakness [ ] numbness  Psychiatric:           [ x] negative [ ] anxiety [ ] depression  Endocrine:            [ x] negative [ ] diabetes [ ] thyroid problem  Heme/Lymph:      [ x] negative [ ] anemia [ ] bleeding problem  Allergic/Immune: [ x] negative [ ] itchy eyes [ ] nasal discharge [ ] hives [ ] angioedema    [ x] All other systems negative  [ ] Unable to assess ROS due to      Physical Exam:  T(F): 97.3 (-), Max: 98 (-18)  HR: 78 () (64 - 95)  BP: 114/73 () (114/73 - 159/88)  RR: 20 (-)  SpO2: 94% ()  GENERAL: No acute distress, well-developed  HEAD:  Atraumatic, Normocephalic  ENT: EOMI, PERRLA, conjunctiva and sclera clear, Neck supple, No JVD, moist mucosa  CHEST/LUNG: Clear to auscultation bilaterally; No wheeze, equal breath sounds bilaterally   BACK: No spinal tenderness  HEART: Regular rate and rhythm; No murmurs, rubs, or gallops  ABDOMEN: Soft, Nontender, Nondistended; Bowel sounds present  EXTREMITIES:  No clubbing, cyanosis, or edema  PSYCH: Nl behavior, nl affect  NEUROLOGY: AAOx3, non-focal, cranial nerves intact  SKIN: Normal color, No rashes or lesions  LINES:    Cardiovascular Diagnostic Testing:    ECG: Personally reviewed: NSR w/o ischemic changes    Echo: Personally reviewed: poor study    Imaging:    CXR: Personally reviewed    Labs: Personally reviewed                        15.5   6.73  )-----------( 193      ( 19 Mar 2021 07:00 )             46.3         139  |  100  |  25<H>  ----------------------------<  113<H>  3.9   |  22  |  0.70    Ca    10.1      19 Mar 2021 07:00  Mg     2.1             Serum Pro-Brain Natriuretic Peptide: 30 pg/mL ( @ 07:14)  Serum Pro-Brain Natriuretic Peptide: 121 pg/mL ( @ 23:22)        
  ============================================================  CHIEF COMPLAINT/REASON FOR CONSULT:  Patient is a 65y old  Male who presents with a chief complaint of LE edema and shortness of breath (16 Mar 2021 15:58)      HISTORY OF PRESENT ILLNESS:  65yMale with a history of Severe morbid obesity, ASIYA, HTN, CAD s/p CABG , No reported prior HF admissions,  presenting with STEVENS and LE edema x 1 wk.       ============================================================  Interval Events   -   -     ============================================================      Allergies    No Known Allergies    Intolerances    	    MEDICATIONS:  amLODIPine   Tablet 10 milliGRAM(s) Oral daily  aspirin enteric coated 325 milliGRAM(s) Oral daily  enoxaparin Injectable 40 milliGRAM(s) SubCutaneous every 12 hours  furosemide   Injectable 40 milliGRAM(s) IV Push two times a day  labetalol 200 milliGRAM(s) Oral two times a day  losartan 100 milliGRAM(s) Oral daily        gabapentin 300 milliGRAM(s) Oral three times a day  oxyCODONE    IR 5 milliGRAM(s) Oral every 6 hours PRN      allopurinol 100 milliGRAM(s) Oral at bedtime  atorvastatin 40 milliGRAM(s) Oral at bedtime    calcium carbonate 1250 mG  + Vitamin D (OsCal 500 + D) 1 Tablet(s) Oral daily      PAST MEDICAL & SURGICAL HISTORY:  Umbilical hernia without obstruction and without gangrene    Paralytic ileus  post op THR  &amp; post op laminectomy    Class 3 severe obesity with body mass index (BMI) of 45.0 to 49.9 in adult    ASIYA (obstructive sleep apnea)  initially dx  ; CPAP    Hypercholesterolemia    Ankle fracture    CAD (coronary artery disease)  2017- s/p cabg x3    Essential hypertension    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    Renal calculi    Obstructive sleep apnea  CPAP    H/O: osteoarthritis    Lumbar disc disease with radiculopathy    Gout    Neuropathy  Bilateral Feet since     Kidney stone  s/w ESWL pt unsure site    S/P lumbar laminectomy  Fusion L3-L5     S/P CABG x 3  17    S/P lumbar discectomy  - ,L5  Aug 2013    S/P revision of total knee, right  x2-  &amp;     H/O lithotripsy  x1    S/P knee replacement  2011- right x 3  - revisions in  &amp;     S/P tonsillectomy and adenoidectomy  as child    Hernia  repair, left inguinal 2010    Cholecystitis  cholecycstectomy 2010    History of Total Hip Replacement  - bilateral THR    S/P Left Inguinal Hernia Repair        FAMILY HISTORY:  Family history of coronary artery disease  brother- age 50+- Coronary Artery Stents    Family history of pancreatic cancer (Sibling)  brother     Family history of diabetes mellitus type II  mother- - hyperlipidemia and Hypertension.    Family history of coronary artery disease  HLD/Angina. Fatal MI age 73.      Mother - HTN      SOCIAL HISTORY:    [ x] Non-smoker  [x] No Illicit Drug Use  [ x] No Excess EtOH Use      REVIEW OF SYSTEMS: (Unless + Before Symptom, it is negative)  Constitutional: [] Fever, []Fatigue, []Weight Changes  Eyes:  []Recent Vision Changes, []Eye Pain  ENT: []Congestion, []Sore Throat  Endocrine: []Excess Sweating, []Temperature Intolerance  Cardiovascular:  []Chest Pain, []Palpitations, [x]Shortness of Breath, []Pre-syncope, []Syncope,[x] LE Swelling  Respiratory:[] Cough, []Congestion,[] Wheezing  Gastrointestinal: [] Abdominal Pain,[] Nausea, []Vomiting  Genitourinary: []Dysuria,[] hematuria  Musculoskeletal: []Joint Pain, []Hip/Knee Injury  Neurologic: []Headaches,[] Imbalance, []Weakness  Skin: []Rashes, []hematoma, []purprura    ================================    PHYSICAL EXAM:  T(C): 36.6 (21 @ 11:51), Max: 36.6 (03-15-21 @ 21:16)  HR: 64 (21 @ 15:18) (64 - 77)  BP: 145/69 (21 @ 11:51) (130/66 - 145/69)  RR: 18 (21 @ 11:51) (18 - 18)  SpO2: 97% (21 @ 15:18) (95% - 98%)  Wt(kg): --  I&O's Summary    15 Mar 2021 07:  -  16 Mar 2021 07:00  --------------------------------------------------------  IN: 1620 mL / OUT: 150 mL / NET: 1470 mL    16 Mar 2021 07:01  -  16 Mar 2021 17:37  --------------------------------------------------------  IN: 660 mL / OUT: 0 mL / NET: 660 mL      Appearance: Normal; NAD	  Psychiatry: AOx3; Normal Mood/Affect  HEENT:   Normal oral mucosa, EOMI	  Lymphatic: No lymphadenopathy  Cardiovascular: Normal S1 S2, No JVD, No murmurs, +Edema  Respiratory: Lungs clear to auscultation, no use of accessory muscles	  Gastrointestinal:  Soft, Non-tender	  Skin: No rashes, No ecchymoses, No cyanosis	  Neurologic: Non-focal, No Focal Deficits  Extremities: Normal range of motion, No clubbing, cyanosis  Vascular: Peripheral pulses palpable 2+ bilaterally, no prominent varicosities    ============================    LABS:	   Labs [id number] @ 17:37	    CBC Full  -  ( 15 Mar 2021 06:48 )  WBC Count : 6.70 K/uL  Hemoglobin : 14.4 g/dL  Hematocrit : 44.5 %  Platelet Count - Automated : 184 K/uL  Mean Cell Volume : 88.8 fl  Mean Cell Hemoglobin : 28.7 pg  Mean Cell Hemoglobin Concentration : 32.4 gm/dL  Auto Neutrophil # : x  Auto Lymphocyte # : x  Auto Monocyte # : x  Auto Eosinophil # : x  Auto Basophil # : x  Auto Neutrophil % : x  Auto Lymphocyte % : x  Auto Monocyte % : x  Auto Eosinophil % : x  Auto Basophil % : x    03-16    139  |  100  |  17  ----------------------------<  109<H>  3.9   |  23  |  0.57  -15    140  |  102  |  17  ----------------------------<  120<H>  3.6   |  23  |  0.57    Ca    10.1      16 Mar 2021 07:14  Ca    9.7      15 Mar 2021 06:45    TPro  6.7  /  Alb  3.9  /  TBili  0.7  /  DBili  x   /  AST  52<H>  /  ALT  45  /  AlkPhos  83  03-15        =========================================================================  ASSESSMENT:  65yMale with a history of Severe morbid obesity, ASIYA, HTN, CAD s/p CABG , No reported prior HF admissions,  presenting with STEVENS and LE edema x 1 wk.     IMPRESSION:  dCHF - unspecified type due to poor quality echo images in setting of large body habitus    Recommendations  - Continue Lasix 40 IV BID  - Continue rest of current medications  - He is not amenable to Cardiac MRI for evaluation of LV function  - Utilize thigh cuff  - Close Is/Os   - Will follow      Please call with questions.     Jamar Moreno MD, [facility]  [phone]                                                                                                        Total time spent in face-to-face encounter was 80 minutes. > 50 minutes spent in counseling and coordination of care addressing all medical issues listed above. All labs, imaging, consultant reports, and any relevant outside medical records personally reviewed in order to evaluate and manage the patient medically as well as provide coordination of care amongst providers.
  CHIEF COMPLAINT:  shortness of breath    HPI:  65M w/ PMHx of CAD s/p CABG, HTN, HLD, morbid obesity, ASIYA on CPAP, nephrolithiasis c/b UTI, recent COVID19 infection, chronic back pain s/p multiple surgeries p/w increasing STEVENS and bilateral LE edema x1 wk. He had orthopnea over the same period of time and had to sleep sitting up in a recliner.   He has had no chest pain or other symptoms.   Had mild cough and malaise with covid diagnosis in February but no fever or pneumonia. That resolved before these symptoms started.       Has about 20 year history of sleep apnea - severe by his report - and had many sleep studies done over that period of time though he does not know exactly where. He has a machine at home- not sure of settings or if CPAP/BIPAP or where he got the machine from.   Says he had a study about 4-5 years ago and based on the results of that study got a new machine with possibly new settings.     PAST MEDICAL & SURGICAL HISTORY:  Umbilical hernia without obstruction and without gangrene    Paralytic ileus  post op THR  &amp; post op laminectomy    Class 3 severe obesity with body mass index (BMI) of 45.0 to 49.9 in adult    ASIYA (obstructive sleep apnea)  initially dx  ; CPAP    Hypercholesterolemia    Ankle fracture    CAD (coronary artery disease)  2017- s/p cabg x3    Essential hypertension    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    Renal calculi    Obstructive sleep apnea  CPAP    H/O: osteoarthritis    Lumbar disc disease with radiculopathy    Gout    Neuropathy  Bilateral Feet since 2012    Kidney stone  s/w ESWL pt unsure site    S/P lumbar laminectomy  Fusion L3-L5     S/P CABG x 3  17    S/P lumbar discectomy  - ,L5  Aug 2013    S/P revision of total knee, right  x2-  &amp;     H/O lithotripsy  x1    S/P knee replacement  2011- right x 3  - revisions in  &amp;     S/P tonsillectomy and adenoidectomy  as child    Hernia  repair, left inguinal 2010    Cholecystitis  cholecycstectomy 2010    History of Total Hip Replacement  - bilateral THR    S/P Left Inguinal Hernia Repair        FAMILY HISTORY:  Family history of coronary artery disease  brother- age 50+- Coronary Artery Stents    Family history of pancreatic cancer (Sibling)  brother     Family history of diabetes mellitus type II  mother- - hyperlipidemia and Hypertension.    Family history of coronary artery disease  HLD/Angina. Fatal MI age 73.        SOCIAL HISTORY:  Smoking: [ ] Never Smoked [ ] Former Smoker (__ packs x ___ years) [X ] Current Smoker of cigars  Substance Use: [ ] Never Used [ ] Used ____  EtOH Use:X  Marital Status: [ ] Single [X]  [ ]  [ ]     Allergies    No Known Allergies    Intolerances        HOME MEDICATIONS:  Home Medications:  acetaminophen 500 mg oral tablet: 2 tab(s) orally every 8 hours (13 Mar 2021 09:59)  allopurinol 100 mg oral tablet: 1 tab(s) orally once a day (at bedtime) (13 Mar 2021 09:59)  amLODIPine 10 mg oral tablet: 1 tab(s) orally once a day AM (13 Mar 2021 09:59)  Aspirin Enteric Coated 325 mg oral delayed release tablet: 1 tab(s) orally once a day (13 Mar 2021 09:59)  atorvastatin 40 mg oral tablet: 1 tab(s) orally once a day AM (13 Mar 2021 09:59)  Calcium 600+D oral tablet: 1 tab(s) orally once a day (13 Mar 2021 09:59)  gabapentin 300 mg oral tablet: 1 tab(s) orally 3 times a day (13 Mar 2021 09:59)  labetalol 200 mg oral tablet: 1 tab(s) orally 2 times a day (13 Mar 2021 09:59)  losartan 100 mg oral tablet: 1 tab(s) orally once a day AM (13 Mar 2021 09:59)  Multiple Vitamins oral tablet: 1 tab(s) orally once a day (13 Mar 2021 09:59)      REVIEW OF SYSTEMS:  Constitutional: [X ] negative [ ] fevers [ ] chills [ ] weight loss [ ] weight gain  HEENT: [X ] negative [ ] dry eyes [ ] eye irritation [ ] postnasal drip [ ] nasal congestion  CV: [ ] negative  [X ] edema [X ] orthopnea [ ] palpitations [ ] murmur  Resp: [ ] negative [ ] cough [X ] shortness of breath [ ] dyspnea [ ] wheezing [ ] sputum [ ] hemoptysis  GI: [X ] negative [ ] nausea [ ] vomiting [ ] diarrhea [ ] constipation [ ] abd pain [ ] dysphagia   : [ X] negative [ ] dysuria [ ] nocturia [ ] hematuria [ ] increased urinary frequency  Musculoskeletal: [ X] negative [ ] back pain [ ] myalgias [ ] arthralgias [ ] fracture  Skin: [X ] negative [ ] rash [ ] itch  Neurological: X[ ] negative [ ] headache [ ] dizziness [ ] syncope [ ] weakness [ ] numbness  Psychiatric: [X ] negative [ ] anxiety [ ] depression  Endocrine: [ X] negative [ ] diabetes [ ] thyroid problem  Hematologic/Lymphatic: [ ] negative [ ] anemia [ ] bleeding problem  Allergic/Immunologic: [ ] negative [ ] itchy eyes [ ] nasal discharge [ ] hives [ ] angioedema  [ X] All other systems negative  [ ] Unable to assess ROS because ________    OBJECTIVE:  ICU Vital Signs Last 24 Hrs  T(C): 36.6 (16 Mar 2021 11:51), Max: 36.6 (15 Mar 2021 21:16)  T(F): 97.9 (16 Mar 2021 11:51), Max: 97.9 (16 Mar 2021 11:51)  HR: 64 (16 Mar 2021 15:18) (64 - 77)  BP: 145/69 (16 Mar 2021 11:51) (130/66 - 145/69)  BP(mean): --  ABP: --  ABP(mean): --  RR: 18 (16 Mar 2021 11:51) (18 - 18)  SpO2: 97% (16 Mar 2021 15:18) (95% - 98%)        03-15 @ 07:  -  16 @ 07:00  --------------------------------------------------------  IN: 1620 mL / OUT: 150 mL / NET: 1470 mL     @ 07:  -   @ 15:52  --------------------------------------------------------  IN: 660 mL / OUT: 0 mL / NET: 660 mL      CAPILLARY BLOOD GLUCOSE          PHYSICAL EXAM:  General: obese man, in no distress  Respiratory: normal effort on room air. Distant breath sounds but no wheezing. Hard to say if crackles.  Cardiovascular: B/L LE edema, normal sounds   Abdomen: obese, NT, ND  Skin: Some LE chronic venous stasis changes.   Neurological: Awake, alert, oriented, no focal deficits    HOSPITAL MEDICATIONS:  Standing Meds:  allopurinol 100 milliGRAM(s) Oral at bedtime  amLODIPine   Tablet 10 milliGRAM(s) Oral daily  aspirin enteric coated 325 milliGRAM(s) Oral daily  atorvastatin 40 milliGRAM(s) Oral at bedtime  calcium carbonate 1250 mG  + Vitamin D (OsCal 500 + D) 1 Tablet(s) Oral daily  enoxaparin Injectable 40 milliGRAM(s) SubCutaneous every 12 hours  furosemide   Injectable 40 milliGRAM(s) IV Push two times a day  gabapentin 300 milliGRAM(s) Oral three times a day  labetalol 200 milliGRAM(s) Oral two times a day  losartan 100 milliGRAM(s) Oral daily      PRN Meds:  oxyCODONE    IR 5 milliGRAM(s) Oral every 6 hours PRN    LABS:             14.4   6.70  )-----------( 184      ( 15 Mar 2021 06:48 )             44.5     Hgb Trend: 14.4<--, 14.6<--  03-16    139  |  100  |  17  ----------------------------<  109<H>  3.9   |  23  |  0.57    Ca    10.1      16 Mar 2021 07:14    TPro  6.7  /  Alb  3.9  /  TBili  0.7  /  DBili  x   /  AST  52<H>  /  ALT  45  /  AlkPhos  83  03-15    Creatinine Trend: 0.57<--, 0.57<--, 0.56<--, 0.57<--, 0.54<--

## 2021-03-19 NOTE — PROGRESS NOTE ADULT - SUBJECTIVE AND OBJECTIVE BOX
HISTORY OF PRESENT ILLNESS:  65yMale with a history of Severe morbid obesity, ASIYA, HTN, CAD s/p CABG 2018, No reported prior HF admissions,  presenting with STEVENS and LE edema x 1 wk.       ============================================================  Interval Events   - Net (-)2.5L  - Breathing - not improved; still orthopneic and PND, but le edema improved    ============================================================      Allergies    No Known Allergies    Intolerances    	    MEDICATIONS:  amLODIPine   Tablet 10 milliGRAM(s) Oral daily  aspirin enteric coated 325 milliGRAM(s) Oral daily  enoxaparin Injectable 40 milliGRAM(s) SubCutaneous every 12 hours  furosemide   Injectable 40 milliGRAM(s) IV Push two times a day  labetalol 200 milliGRAM(s) Oral two times a day  losartan 100 milliGRAM(s) Oral daily        gabapentin 300 milliGRAM(s) Oral three times a day  oxyCODONE    IR 5 milliGRAM(s) Oral every 6 hours PRN      allopurinol 100 milliGRAM(s) Oral at bedtime  atorvastatin 40 milliGRAM(s) Oral at bedtime    calcium carbonate 1250 mG  + Vitamin D (OsCal 500 + D) 1 Tablet(s) Oral daily      PAST MEDICAL & SURGICAL HISTORY:  Umbilical hernia without obstruction and without gangrene    Paralytic ileus  post op THR  &amp; post op laminectomy    Class 3 severe obesity with body mass index (BMI) of 45.0 to 49.9 in adult    ASIYA (obstructive sleep apnea)  initially dx  ; CPAP    Hypercholesterolemia    Ankle fracture    CAD (coronary artery disease)  - s/p cabg x3    Essential hypertension    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    Renal calculi    Obstructive sleep apnea  CPAP    H/O: osteoarthritis    Lumbar disc disease with radiculopathy    Gout    Neuropathy  Bilateral Feet since     Kidney stone  s/w ESWL pt unsure site    S/P lumbar laminectomy  Fusion L3-L5     S/P CABG x 3  17    S/P lumbar discectomy  - ,L5  Aug 2013    S/P revision of total knee, right  x2-  &amp;     H/O lithotripsy  x1    S/P knee replacement  2011- right x 3  - revisions in  &amp;     S/P tonsillectomy and adenoidectomy  as child    Hernia  repair, left inguinal 2010    Cholecystitis  cholecycstectomy 2010    History of Total Hip Replacement  2009- bilateral THR    S/P Left Inguinal Hernia Repair        FAMILY HISTORY:  Family history of coronary artery disease  brother- age 50+- Coronary Artery Stents    Family history of pancreatic cancer (Sibling)  brother     Family history of diabetes mellitus type II  mother- - hyperlipidemia and Hypertension.    Family history of coronary artery disease  HLD/Angina. Fatal MI age 73.      Mother - HTN      SOCIAL HISTORY:    [ x] Non-smoker  [x] No Illicit Drug Use  [ x] No Excess EtOH Use      REVIEW OF SYSTEMS: (Unless + Before Symptom, it is negative)  Constitutional: [] Fever, []Fatigue, []Weight Changes  Eyes:  []Recent Vision Changes, []Eye Pain  ENT: []Congestion, []Sore Throat  Endocrine: []Excess Sweating, []Temperature Intolerance  Cardiovascular:  []Chest Pain, []Palpitations, [x]Shortness of Breath, []Pre-syncope, []Syncope,[x] LE Swelling  Respiratory:[] Cough, []Congestion,[] Wheezing  Gastrointestinal: [] Abdominal Pain,[] Nausea, []Vomiting  Genitourinary: []Dysuria,[] hematuria  Musculoskeletal: []Joint Pain, []Hip/Knee Injury  Neurologic: []Headaches,[] Imbalance, []Weakness  Skin: []Rashes, []hematoma, []purprura    ================================    PHYSICAL EXAM:  T(C): 36.6 (21 @ 11:51), Max: 36.6 (03-15-21 @ 21:16)  HR: 64 (21 @ 15:18) (64 - 77)  BP: 145/69 (21 @ 11:51) (130/66 - 145/69)  RR: 18 (21 @ 11:51) (18 - 18)  SpO2: 97% (-21 @ 15:18) (95% - 98%)  Wt(kg): --  I&O's Summary    15 Mar 2021 07:  -  16 Mar 2021 07:00  --------------------------------------------------------  IN: 1620 mL / OUT: 150 mL / NET: 1470 mL    16 Mar 2021 07:01  -  16 Mar 2021 17:37  --------------------------------------------------------  IN: 660 mL / OUT: 0 mL / NET: 660 mL      Appearance: Normal; NAD	  Psychiatry: AOx3; Normal Mood/Affect  HEENT:   Normal oral mucosa, EOMI	  Lymphatic: No lymphadenopathy  Cardiovascular: Normal S1 S2, No JVD, No murmurs, +Edema  Respiratory: Lungs clear to auscultation, no use of accessory muscles	  Gastrointestinal:  Soft, Non-tender	  Skin: No rashes, No ecchymoses, No cyanosis	  Neurologic: Non-focal, No Focal Deficits  Extremities: Normal range of motion, No clubbing, cyanosis  Vascular: Peripheral pulses palpable 2+ bilaterally, no prominent varicosities    ============================    LABS:	   Labs [id number]    CBC Full  -  ( 15 Mar 2021 06:48 )  WBC Count : 6.70 K/uL  Hemoglobin : 14.4 g/dL  Hematocrit : 44.5 %  Platelet Count - Automated : 184 K/uL  Mean Cell Volume : 88.8 fl  Mean Cell Hemoglobin : 28.7 pg  Mean Cell Hemoglobin Concentration : 32.4 gm/dL  Auto Neutrophil # : x  Auto Lymphocyte # : x  Auto Monocyte # : x  Auto Eosinophil # : x  Auto Basophil # : x  Auto Neutrophil % : x  Auto Lymphocyte % : x  Auto Monocyte % : x  Auto Eosinophil % : x  Auto Basophil % : x        139  |  100  |  17  ----------------------------<  109<H>  3.9   |  23  |  0.57  03-15    140  |  102  |  17  ----------------------------<  120<H>  3.6   |  23  |  0.57    Ca    10.1      16 Mar 2021 07:14  Ca    9.7      15 Mar 2021 06:45    TPro  6.7  /  Alb  3.9  /  TBili  0.7  /  DBili  x   /  AST  52<H>  /  ALT  45  /  AlkPhos  83  03-15        =========================================================================  ASSESSMENT:  65yMale with a history of Severe morbid obesity, ASIYA, HTN, CAD s/p CABG , No reported prior HF admissions,  presenting with STEVENS and LE edema x 1 wk.     IMPRESSION:  dCHF - unspecified type due to poor quality echo images in setting of large body habitus    Recommendations  - Bumex TID           - Still orthopneic despite improved edema           - Unclear volume status given morbid obesity          - Pulmonary feels strongly it is dCHF          - Unable to fit into Cardiac MRI despite attempting various strategies          - Will have CHF come by to help with assessment and determine if RHC necessary given orthopneic and PND symptoms   - Monitor Tele - WCT overnight  - Continue rest of current medications - would d/c amloipine and start hydralazine as we do not know LVEF  - Utilize thigh cuff  - Close Is/Os   - Will be off service - attending service to follow       Please call with questions.     Jamar Moreno MD, FACC [facility]

## 2021-03-19 NOTE — CONSULT NOTE ADULT - ATTENDING COMMENTS
66 y/o male with pmh remarkable for HFpEF, CAD s/p CABG, HTN, DLP, morbid obesity ASIYA on CPAP and recent Covid-19 infection admitted with SOB and LE edema. He was diuresed with some improvement of symptoms but he states he remains SOB especially when trying to lay down.   Clinically close to euvolemia, warm extremities.   STEVENS likely multifactorial in setting of HFpEF, obesity/?OHS and deconditioning. Unfortunately, TTE was very limited. Primary team planning for R/L cardiac cath which is reasonable given patient's symptoms and limited physical exam.   We are available if there are any further questions/concerns.

## 2021-03-20 LAB
ANION GAP SERPL CALC-SCNC: 16 MMOL/L — SIGNIFICANT CHANGE UP (ref 5–17)
BUN SERPL-MCNC: 26 MG/DL — HIGH (ref 7–23)
CALCIUM SERPL-MCNC: 9.6 MG/DL — SIGNIFICANT CHANGE UP (ref 8.4–10.5)
CHLORIDE SERPL-SCNC: 100 MMOL/L — SIGNIFICANT CHANGE UP (ref 96–108)
CO2 SERPL-SCNC: 22 MMOL/L — SIGNIFICANT CHANGE UP (ref 22–31)
CREAT SERPL-MCNC: 0.68 MG/DL — SIGNIFICANT CHANGE UP (ref 0.5–1.3)
GLUCOSE SERPL-MCNC: 118 MG/DL — HIGH (ref 70–99)
MAGNESIUM SERPL-MCNC: 1.8 MG/DL — SIGNIFICANT CHANGE UP (ref 1.6–2.6)
POTASSIUM SERPL-MCNC: 4.3 MMOL/L — SIGNIFICANT CHANGE UP (ref 3.5–5.3)
POTASSIUM SERPL-SCNC: 4.3 MMOL/L — SIGNIFICANT CHANGE UP (ref 3.5–5.3)
SODIUM SERPL-SCNC: 138 MMOL/L — SIGNIFICANT CHANGE UP (ref 135–145)

## 2021-03-20 PROCEDURE — 99232 SBSQ HOSP IP/OBS MODERATE 35: CPT

## 2021-03-20 RX ORDER — MAGNESIUM SULFATE 500 MG/ML
1 VIAL (ML) INJECTION ONCE
Refills: 0 | Status: COMPLETED | OUTPATIENT
Start: 2021-03-20 | End: 2021-03-20

## 2021-03-20 RX ORDER — BUMETANIDE 0.25 MG/ML
2 INJECTION INTRAMUSCULAR; INTRAVENOUS
Refills: 0 | Status: DISCONTINUED | OUTPATIENT
Start: 2021-03-20 | End: 2021-03-24

## 2021-03-20 RX ORDER — ASPIRIN/CALCIUM CARB/MAGNESIUM 324 MG
81 TABLET ORAL DAILY
Refills: 0 | Status: DISCONTINUED | OUTPATIENT
Start: 2021-03-20 | End: 2021-03-24

## 2021-03-20 RX ORDER — BUMETANIDE 0.25 MG/ML
2 INJECTION INTRAMUSCULAR; INTRAVENOUS EVERY 12 HOURS
Refills: 0 | Status: DISCONTINUED | OUTPATIENT
Start: 2021-03-20 | End: 2021-03-20

## 2021-03-20 RX ADMIN — CARVEDILOL PHOSPHATE 12.5 MILLIGRAM(S): 80 CAPSULE, EXTENDED RELEASE ORAL at 05:17

## 2021-03-20 RX ADMIN — OXYCODONE HYDROCHLORIDE 5 MILLIGRAM(S): 5 TABLET ORAL at 17:20

## 2021-03-20 RX ADMIN — AMLODIPINE BESYLATE 10 MILLIGRAM(S): 2.5 TABLET ORAL at 05:18

## 2021-03-20 RX ADMIN — Medication 1 TABLET(S): at 11:07

## 2021-03-20 RX ADMIN — OXYCODONE HYDROCHLORIDE 5 MILLIGRAM(S): 5 TABLET ORAL at 23:51

## 2021-03-20 RX ADMIN — LOSARTAN POTASSIUM 100 MILLIGRAM(S): 100 TABLET, FILM COATED ORAL at 05:17

## 2021-03-20 RX ADMIN — OXYCODONE HYDROCHLORIDE 5 MILLIGRAM(S): 5 TABLET ORAL at 23:21

## 2021-03-20 RX ADMIN — OXYCODONE HYDROCHLORIDE 5 MILLIGRAM(S): 5 TABLET ORAL at 10:22

## 2021-03-20 RX ADMIN — Medication 325 MILLIGRAM(S): at 11:07

## 2021-03-20 RX ADMIN — GABAPENTIN 300 MILLIGRAM(S): 400 CAPSULE ORAL at 05:17

## 2021-03-20 RX ADMIN — GABAPENTIN 300 MILLIGRAM(S): 400 CAPSULE ORAL at 21:09

## 2021-03-20 RX ADMIN — Medication 100 GRAM(S): at 10:16

## 2021-03-20 RX ADMIN — BUMETANIDE 124 MILLIGRAM(S): 0.25 INJECTION INTRAMUSCULAR; INTRAVENOUS at 05:18

## 2021-03-20 RX ADMIN — BUMETANIDE 2 MILLIGRAM(S): 0.25 INJECTION INTRAMUSCULAR; INTRAVENOUS at 17:21

## 2021-03-20 RX ADMIN — ENOXAPARIN SODIUM 40 MILLIGRAM(S): 100 INJECTION SUBCUTANEOUS at 05:18

## 2021-03-20 RX ADMIN — Medication 100 MILLIGRAM(S): at 21:09

## 2021-03-20 RX ADMIN — GABAPENTIN 300 MILLIGRAM(S): 400 CAPSULE ORAL at 14:20

## 2021-03-20 RX ADMIN — CARVEDILOL PHOSPHATE 12.5 MILLIGRAM(S): 80 CAPSULE, EXTENDED RELEASE ORAL at 17:20

## 2021-03-20 RX ADMIN — ENOXAPARIN SODIUM 40 MILLIGRAM(S): 100 INJECTION SUBCUTANEOUS at 17:21

## 2021-03-20 RX ADMIN — OXYCODONE HYDROCHLORIDE 5 MILLIGRAM(S): 5 TABLET ORAL at 18:20

## 2021-03-20 RX ADMIN — ATORVASTATIN CALCIUM 40 MILLIGRAM(S): 80 TABLET, FILM COATED ORAL at 21:09

## 2021-03-20 RX ADMIN — OXYCODONE HYDROCHLORIDE 5 MILLIGRAM(S): 5 TABLET ORAL at 11:00

## 2021-03-20 NOTE — PROGRESS NOTE ADULT - SUBJECTIVE AND OBJECTIVE BOX
Patient is a 65y old  Male who presents with a chief complaint of LE edema and shortness of breath (19 Mar 2021 19:10)        SUBJECTIVE / OVERNIGHT EVENTS: still having episodes of sob when laying down improved with sitting up but episodes more brief in duration?      MEDICATIONS  (STANDING):  allopurinol 100 milliGRAM(s) Oral at bedtime  amLODIPine   Tablet 10 milliGRAM(s) Oral daily  aspirin enteric coated 81 milliGRAM(s) Oral daily  atorvastatin 40 milliGRAM(s) Oral at bedtime  buMETAnide 2 milliGRAM(s) Oral every 12 hours  calcium carbonate 1250 mG  + Vitamin D (OsCal 500 + D) 1 Tablet(s) Oral daily  carvedilol 12.5 milliGRAM(s) Oral every 12 hours  enoxaparin Injectable 40 milliGRAM(s) SubCutaneous every 12 hours  gabapentin 300 milliGRAM(s) Oral three times a day  losartan 100 milliGRAM(s) Oral daily    MEDICATIONS  (PRN):  oxyCODONE    IR 5 milliGRAM(s) Oral every 6 hours PRN Severe Pain (7 - 10)      Vital Signs Last 24 Hrs  T(C): 36.7 (20 Mar 2021 11:21), Max: 36.8 (19 Mar 2021 20:20)  T(F): 98 (20 Mar 2021 11:21), Max: 98.3 (19 Mar 2021 20:20)  HR: 72 (20 Mar 2021 11:21) (62 - 72)  BP: 103/68 (20 Mar 2021 11:21) (103/68 - 129/75)  BP(mean): --  RR: 18 (20 Mar 2021 11:21) (18 - 18)  SpO2: 96% (20 Mar 2021 11:21) (95% - 100%)  CAPILLARY BLOOD GLUCOSE        I&O's Summary    19 Mar 2021 07:01  -  20 Mar 2021 07:00  --------------------------------------------------------  IN: 1240 mL / OUT: 2630 mL / NET: -1390 mL    20 Mar 2021 07:01  -  20 Mar 2021 17:06  --------------------------------------------------------  IN: 600 mL / OUT: 900 mL / NET: -300 mL        PHYSICAL EXAM:  GENERAL: NAD, breathing normal, obese  HEAD:  Atraumatic, Normocephalic  EYES: conjunctiva and sclera clear  NECK: supple, No JVD  CHEST/LUNG: CTA b/l  HEART: S1 S2 RRR  ABDOMEN: +BS Soft, NT/ND  EXTREMITIES:  2+ DP Pulses, No c/c. 1+ to trace b/l LE edema  NEUROLOGY: AAOx3, no facial droop, moving all extremities  SKIN: No rashes or lesions, tenderness of left sided back muscles     LABS:                        15.5   6.73  )-----------( 193      ( 19 Mar 2021 07:00 )             46.3     03-20    138  |  100  |  26<H>  ----------------------------<  118<H>  4.3   |  22  |  0.68    Ca    9.6      20 Mar 2021 06:27  Mg     1.8     03-20                RADIOLOGY & ADDITIONAL TESTS:    Imaging Personally Reviewed:  Consultant(s) Notes Reviewed:    Care Discussed with Consultants/Other Providers:

## 2021-03-20 NOTE — PROGRESS NOTE ADULT - SUBJECTIVE AND OBJECTIVE BOX
Mr. Colvin reports significant improvement in dyspnea and lower extremity edema since being admitted to the hospital however he notes two distinct episodes of dyspnea today.    He also provides some additional history that in the beginning of March he significantly increased his fluid intake due to a kidney infection as instructed by his healthcare providers.    Vital Signs Last 24 Hrs  T(C): 36.7 (20 Mar 2021 11:21), Max: 36.8 (19 Mar 2021 20:20)  T(F): 98 (20 Mar 2021 11:21), Max: 98.3 (19 Mar 2021 20:20)  HR: 72 (20 Mar 2021 11:21) (62 - 72)  BP: 103/68 (20 Mar 2021 11:21) (103/68 - 129/75)  RR: 18 (20 Mar 2021 11:21) (18 - 18)  SpO2: 96% (20 Mar 2021 11:21) (95% - 100%)    Awake, alert, comfortable  Distant heart sounds  CTAB  S/NT  Trace MCKENZIE    03-20    138  |  100  |  26<H>  ----------------------------<  118<H>  4.3   |  22  |  0.68    Ca    9.6      20 Mar 2021 06:27  Mg     1.8     03-20    Telemetry  NSR 60-80    MEDICATIONS  (STANDING):  allopurinol 100 milliGRAM(s) Oral at bedtime  amLODIPine   Tablet 10 milliGRAM(s) Oral daily  aspirin enteric coated 81 milliGRAM(s) Oral daily  atorvastatin 40 milliGRAM(s) Oral at bedtime  buMETAnide 2 milliGRAM(s) Oral two times a day  calcium carbonate 1250 mG  + Vitamin D (OsCal 500 + D) 1 Tablet(s) Oral daily  carvedilol 12.5 milliGRAM(s) Oral every 12 hours  enoxaparin Injectable 40 milliGRAM(s) SubCutaneous every 12 hours  gabapentin 300 milliGRAM(s) Oral three times a day  losartan 100 milliGRAM(s) Oral daily    Volume overload and dyspnea  CAD s/p CABG 2018  ASIYA  Morbid obesity  HTN    Consider MUGA for evaluation of LVF depending on table weight limit  Continue O>I with close monitoring of renal function and electrolytes  Low NaCl diet

## 2021-03-20 NOTE — PROGRESS NOTE ADULT - PROBLEM SELECTOR PLAN 2
-HF vs pHTN related vs venous stasis vs vte. Less likely from norvasc as it is a chronic medication. Continue for now.  TTE - poor study despite definity   -diuresis as above  no DVT on LE doopplers -HF vs pHTN related vs venous stasis vs vte. Less likely from norvasc as it is a chronic medication. Continue for now.  TTE - poor study despite definity  -diuresis as above  no DVT on LE doopplers

## 2021-03-21 LAB
ANION GAP SERPL CALC-SCNC: 15 MMOL/L — SIGNIFICANT CHANGE UP (ref 5–17)
BUN SERPL-MCNC: 26 MG/DL — HIGH (ref 7–23)
CALCIUM SERPL-MCNC: 9.7 MG/DL — SIGNIFICANT CHANGE UP (ref 8.4–10.5)
CHLORIDE SERPL-SCNC: 99 MMOL/L — SIGNIFICANT CHANGE UP (ref 96–108)
CO2 SERPL-SCNC: 24 MMOL/L — SIGNIFICANT CHANGE UP (ref 22–31)
CREAT SERPL-MCNC: 0.66 MG/DL — SIGNIFICANT CHANGE UP (ref 0.5–1.3)
GLUCOSE SERPL-MCNC: 117 MG/DL — HIGH (ref 70–99)
MAGNESIUM SERPL-MCNC: 2 MG/DL — SIGNIFICANT CHANGE UP (ref 1.6–2.6)
POTASSIUM SERPL-MCNC: 3.8 MMOL/L — SIGNIFICANT CHANGE UP (ref 3.5–5.3)
POTASSIUM SERPL-SCNC: 3.8 MMOL/L — SIGNIFICANT CHANGE UP (ref 3.5–5.3)
SODIUM SERPL-SCNC: 138 MMOL/L — SIGNIFICANT CHANGE UP (ref 135–145)

## 2021-03-21 PROCEDURE — 99232 SBSQ HOSP IP/OBS MODERATE 35: CPT

## 2021-03-21 RX ADMIN — CARVEDILOL PHOSPHATE 12.5 MILLIGRAM(S): 80 CAPSULE, EXTENDED RELEASE ORAL at 05:36

## 2021-03-21 RX ADMIN — OXYCODONE HYDROCHLORIDE 5 MILLIGRAM(S): 5 TABLET ORAL at 17:31

## 2021-03-21 RX ADMIN — OXYCODONE HYDROCHLORIDE 5 MILLIGRAM(S): 5 TABLET ORAL at 08:54

## 2021-03-21 RX ADMIN — CARVEDILOL PHOSPHATE 12.5 MILLIGRAM(S): 80 CAPSULE, EXTENDED RELEASE ORAL at 17:16

## 2021-03-21 RX ADMIN — ATORVASTATIN CALCIUM 40 MILLIGRAM(S): 80 TABLET, FILM COATED ORAL at 21:43

## 2021-03-21 RX ADMIN — GABAPENTIN 300 MILLIGRAM(S): 400 CAPSULE ORAL at 13:01

## 2021-03-21 RX ADMIN — BUMETANIDE 2 MILLIGRAM(S): 0.25 INJECTION INTRAMUSCULAR; INTRAVENOUS at 05:36

## 2021-03-21 RX ADMIN — OXYCODONE HYDROCHLORIDE 5 MILLIGRAM(S): 5 TABLET ORAL at 09:00

## 2021-03-21 RX ADMIN — BUMETANIDE 2 MILLIGRAM(S): 0.25 INJECTION INTRAMUSCULAR; INTRAVENOUS at 17:12

## 2021-03-21 RX ADMIN — GABAPENTIN 300 MILLIGRAM(S): 400 CAPSULE ORAL at 05:36

## 2021-03-21 RX ADMIN — Medication 100 MILLIGRAM(S): at 21:43

## 2021-03-21 RX ADMIN — ENOXAPARIN SODIUM 40 MILLIGRAM(S): 100 INJECTION SUBCUTANEOUS at 05:35

## 2021-03-21 RX ADMIN — Medication 81 MILLIGRAM(S): at 10:15

## 2021-03-21 RX ADMIN — OXYCODONE HYDROCHLORIDE 5 MILLIGRAM(S): 5 TABLET ORAL at 17:11

## 2021-03-21 RX ADMIN — LOSARTAN POTASSIUM 100 MILLIGRAM(S): 100 TABLET, FILM COATED ORAL at 05:36

## 2021-03-21 RX ADMIN — ENOXAPARIN SODIUM 40 MILLIGRAM(S): 100 INJECTION SUBCUTANEOUS at 17:11

## 2021-03-21 RX ADMIN — GABAPENTIN 300 MILLIGRAM(S): 400 CAPSULE ORAL at 21:43

## 2021-03-21 RX ADMIN — Medication 1 TABLET(S): at 10:15

## 2021-03-21 RX ADMIN — OXYCODONE HYDROCHLORIDE 5 MILLIGRAM(S): 5 TABLET ORAL at 23:11

## 2021-03-21 RX ADMIN — AMLODIPINE BESYLATE 10 MILLIGRAM(S): 2.5 TABLET ORAL at 05:36

## 2021-03-21 NOTE — PROGRESS NOTE ADULT - SUBJECTIVE AND OBJECTIVE BOX
Patient is a 65y old  Male who presents with a chief complaint of Shortness of breath (20 Mar 2021 17:23)        SUBJECTIVE / OVERNIGHT EVENTS: still have acute sob episodes though duration very brief now - appears improving.       MEDICATIONS  (STANDING):  allopurinol 100 milliGRAM(s) Oral at bedtime  amLODIPine   Tablet 10 milliGRAM(s) Oral daily  aspirin enteric coated 81 milliGRAM(s) Oral daily  atorvastatin 40 milliGRAM(s) Oral at bedtime  buMETAnide 2 milliGRAM(s) Oral two times a day  calcium carbonate 1250 mG  + Vitamin D (OsCal 500 + D) 1 Tablet(s) Oral daily  carvedilol 12.5 milliGRAM(s) Oral every 12 hours  enoxaparin Injectable 40 milliGRAM(s) SubCutaneous every 12 hours  gabapentin 300 milliGRAM(s) Oral three times a day  losartan 100 milliGRAM(s) Oral daily    MEDICATIONS  (PRN):  oxyCODONE    IR 5 milliGRAM(s) Oral every 6 hours PRN Severe Pain (7 - 10)      Vital Signs Last 24 Hrs  T(C): 36.6 (21 Mar 2021 12:55), Max: 36.9 (20 Mar 2021 20:29)  T(F): 97.9 (21 Mar 2021 12:55), Max: 98.5 (20 Mar 2021 20:29)  HR: 63 (21 Mar 2021 15:36) (59 - 69)  BP: 107/66 (21 Mar 2021 12:55) (107/66 - 136/73)  BP(mean): --  RR: 19 (21 Mar 2021 12:55) (18 - 19)  SpO2: 97% (21 Mar 2021 15:36) (95% - 100%)  CAPILLARY BLOOD GLUCOSE        I&O's Summary    20 Mar 2021 07:01  -  21 Mar 2021 07:00  --------------------------------------------------------  IN: 1460 mL / OUT: 3480 mL / NET: -2020 mL    21 Mar 2021 07:01  -  21 Mar 2021 17:09  --------------------------------------------------------  IN: 600 mL / OUT: 1300 mL / NET: -700 mL          PHYSICAL EXAM:  GENERAL: NAD, breathing normal, obese  HEAD:  Atraumatic, Normocephalic  EYES: conjunctiva and sclera clear  NECK: supple, No JVD  CHEST/LUNG: CTA b/l  HEART: S1 S2 RRR  ABDOMEN: +BS Soft, NT/ND  EXTREMITIES:  2+ DP Pulses, No c/c. 1+ to trace b/l LE edema  NEUROLOGY: AAOx3, no facial droop, moving all extremities  SKIN: No rashes or lesions, tenderness of left sided back muscles improved but still pain with movement     LABS:    03-21    138  |  99  |  26<H>  ----------------------------<  117<H>  3.8   |  24  |  0.66    Ca    9.7      21 Mar 2021 06:54  Mg     2.0     03-21                RADIOLOGY & ADDITIONAL TESTS:    Imaging Personally Reviewed:  Consultant(s) Notes Reviewed:    Care Discussed with Consultants/Other Providers:

## 2021-03-21 NOTE — PROGRESS NOTE ADULT - ATTENDING COMMENTS
Patient seen and examined, data and imaging personally reviewed by me.  History as noted.  Agree with plan as outlined above.
Dr. [facility]illo[facility]  [phone]
[facility]  [phone]
[facility]illo[facility]  [phone]
[facility]  [phone]
Dr. [facility]illo[facility]  [phone]
[facility]  [phone]
attempted to call outpatient cardiologist multiple times - Dr. Adolph Shankar - still have not received call back.   [facility]. [facility]  [phone]

## 2021-03-21 NOTE — PROGRESS NOTE ADULT - PROBLEM SELECTOR PLAN 2
-HF vs pHTN related vs venous stasis vs vte. Less likely from norvasc as it is a chronic medication. Continue for now.  TTE - poor study despite definity  -diuresis as above  no DVT on LE dopplers

## 2021-03-22 LAB
ANION GAP SERPL CALC-SCNC: 15 MMOL/L — SIGNIFICANT CHANGE UP (ref 5–17)
BUN SERPL-MCNC: 26 MG/DL — HIGH (ref 7–23)
CALCIUM SERPL-MCNC: 9.7 MG/DL — SIGNIFICANT CHANGE UP (ref 8.4–10.5)
CHLORIDE SERPL-SCNC: 100 MMOL/L — SIGNIFICANT CHANGE UP (ref 96–108)
CO2 SERPL-SCNC: 25 MMOL/L — SIGNIFICANT CHANGE UP (ref 22–31)
CREAT SERPL-MCNC: 0.68 MG/DL — SIGNIFICANT CHANGE UP (ref 0.5–1.3)
GLUCOSE SERPL-MCNC: 114 MG/DL — HIGH (ref 70–99)
MAGNESIUM SERPL-MCNC: 1.9 MG/DL — SIGNIFICANT CHANGE UP (ref 1.6–2.6)
POTASSIUM SERPL-MCNC: 3.7 MMOL/L — SIGNIFICANT CHANGE UP (ref 3.5–5.3)
POTASSIUM SERPL-SCNC: 3.7 MMOL/L — SIGNIFICANT CHANGE UP (ref 3.5–5.3)
SODIUM SERPL-SCNC: 140 MMOL/L — SIGNIFICANT CHANGE UP (ref 135–145)

## 2021-03-22 PROCEDURE — 99233 SBSQ HOSP IP/OBS HIGH 50: CPT

## 2021-03-22 PROCEDURE — 78472 GATED HEART PLANAR SINGLE: CPT | Mod: 26

## 2021-03-22 RX ORDER — POTASSIUM CHLORIDE 20 MEQ
40 PACKET (EA) ORAL ONCE
Refills: 0 | Status: COMPLETED | OUTPATIENT
Start: 2021-03-22 | End: 2021-03-22

## 2021-03-22 RX ORDER — MAGNESIUM SULFATE 500 MG/ML
1 VIAL (ML) INJECTION ONCE
Refills: 0 | Status: COMPLETED | OUTPATIENT
Start: 2021-03-22 | End: 2021-03-22

## 2021-03-22 RX ADMIN — OXYCODONE HYDROCHLORIDE 5 MILLIGRAM(S): 5 TABLET ORAL at 23:07

## 2021-03-22 RX ADMIN — ATORVASTATIN CALCIUM 40 MILLIGRAM(S): 80 TABLET, FILM COATED ORAL at 21:20

## 2021-03-22 RX ADMIN — CARVEDILOL PHOSPHATE 12.5 MILLIGRAM(S): 80 CAPSULE, EXTENDED RELEASE ORAL at 19:06

## 2021-03-22 RX ADMIN — OXYCODONE HYDROCHLORIDE 5 MILLIGRAM(S): 5 TABLET ORAL at 12:28

## 2021-03-22 RX ADMIN — OXYCODONE HYDROCHLORIDE 5 MILLIGRAM(S): 5 TABLET ORAL at 01:00

## 2021-03-22 RX ADMIN — BUMETANIDE 2 MILLIGRAM(S): 0.25 INJECTION INTRAMUSCULAR; INTRAVENOUS at 19:06

## 2021-03-22 RX ADMIN — BUMETANIDE 2 MILLIGRAM(S): 0.25 INJECTION INTRAMUSCULAR; INTRAVENOUS at 05:45

## 2021-03-22 RX ADMIN — Medication 100 MILLIGRAM(S): at 21:20

## 2021-03-22 RX ADMIN — Medication 40 MILLIEQUIVALENT(S): at 09:16

## 2021-03-22 RX ADMIN — GABAPENTIN 300 MILLIGRAM(S): 400 CAPSULE ORAL at 05:46

## 2021-03-22 RX ADMIN — Medication 100 GRAM(S): at 09:16

## 2021-03-22 RX ADMIN — Medication 1 TABLET(S): at 14:01

## 2021-03-22 RX ADMIN — Medication 81 MILLIGRAM(S): at 12:27

## 2021-03-22 RX ADMIN — ENOXAPARIN SODIUM 40 MILLIGRAM(S): 100 INJECTION SUBCUTANEOUS at 19:06

## 2021-03-22 RX ADMIN — AMLODIPINE BESYLATE 10 MILLIGRAM(S): 2.5 TABLET ORAL at 05:45

## 2021-03-22 RX ADMIN — ENOXAPARIN SODIUM 40 MILLIGRAM(S): 100 INJECTION SUBCUTANEOUS at 05:45

## 2021-03-22 RX ADMIN — CARVEDILOL PHOSPHATE 12.5 MILLIGRAM(S): 80 CAPSULE, EXTENDED RELEASE ORAL at 05:45

## 2021-03-22 RX ADMIN — OXYCODONE HYDROCHLORIDE 5 MILLIGRAM(S): 5 TABLET ORAL at 13:30

## 2021-03-22 RX ADMIN — OXYCODONE HYDROCHLORIDE 5 MILLIGRAM(S): 5 TABLET ORAL at 06:27

## 2021-03-22 RX ADMIN — OXYCODONE HYDROCHLORIDE 5 MILLIGRAM(S): 5 TABLET ORAL at 23:37

## 2021-03-22 RX ADMIN — OXYCODONE HYDROCHLORIDE 5 MILLIGRAM(S): 5 TABLET ORAL at 05:51

## 2021-03-22 RX ADMIN — LOSARTAN POTASSIUM 100 MILLIGRAM(S): 100 TABLET, FILM COATED ORAL at 05:46

## 2021-03-22 RX ADMIN — GABAPENTIN 300 MILLIGRAM(S): 400 CAPSULE ORAL at 21:20

## 2021-03-22 RX ADMIN — GABAPENTIN 300 MILLIGRAM(S): 400 CAPSULE ORAL at 14:01

## 2021-03-22 NOTE — PROGRESS NOTE ADULT - ASSESSMENT
65M w/ PMHx of CAD s/p CABG, HTN, HLD, morbid obesity, ASIYA on CPAP, nephrolithiasis c/b UTI, recent COVID19 infection, chronic back pain s/p multiple surgeries p/w dyspnea

## 2021-03-22 NOTE — PROGRESS NOTE ADULT - PROBLEM SELECTOR PLAN 2
-HF vs pHTN related vs venous stasis vs vte. Less likely from norvasc as it is a chronic medication. Continue for now.  TTE - poor study despite Definity. MUGA scan as above.  Diuresis as above  no DVT on LE dopplers

## 2021-03-22 NOTE — PROGRESS NOTE ADULT - SUBJECTIVE AND OBJECTIVE BOX
HOSPITALIST NOTE    Dr. Toney Levine DO  Attending Physician  Division of Hospital Medicine  [facility]  Pager:  [phone]    SUBJECTIVE  STEVENS. Not at rest.    REVIEW OF SYSTEMS  12 point review of systems negative except for above.     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    Renal calculi    Obstructive sleep apnea  CPAP    H/O: osteoarthritis    Lumbar disc disease with radiculopathy    Gout    Neuropathy  Bilateral Feet since 2012    Kidney stone  s/w ESWL pt unsure site    S/P lumbar laminectomy  Fusion L3-L5 2012    S/P CABG x 3  8/29/17    S/P lumbar discectomy  2012- ,L5  Aug 2013    S/P revision of total knee, right  x2- 2012 &amp; 2014    H/O lithotripsy  x1    S/P knee replacement  2011- right x 3  - revisions in 2012 &amp; 2014    S/P tonsillectomy and adenoidectomy  as child    Hernia  repair, left inguinal 2010    Cholecystitis  cholecycstectomy 2010    History of Total Hip Replacement  2009- bilateral THR    S/P Left Inguinal Hernia Repair        MEDICATIONS  (STANDING):  allopurinol 100 milliGRAM(s) Oral at bedtime  amLODIPine   Tablet 10 milliGRAM(s) Oral daily  aspirin enteric coated 81 milliGRAM(s) Oral daily  atorvastatin 40 milliGRAM(s) Oral at bedtime  buMETAnide 2 milliGRAM(s) Oral two times a day  calcium carbonate 1250 mG  + Vitamin D (OsCal 500 + D) 1 Tablet(s) Oral daily  carvedilol 12.5 milliGRAM(s) Oral every 12 hours  enoxaparin Injectable 40 milliGRAM(s) SubCutaneous every 12 hours  gabapentin 300 milliGRAM(s) Oral three times a day  losartan 100 milliGRAM(s) Oral daily    MEDICATIONS  (PRN):  oxyCODONE    IR 5 milliGRAM(s) Oral every 6 hours PRN Severe Pain (7 - 10)      Allergies    No Known Allergies    Intolerances        T(C): 36.4 (03-22-21 @ 12:03), Max: 36.7 (03-21-21 @ 20:52)  T(F): 97.5 (03-22-21 @ 12:03), Max: 98 (03-21-21 @ 20:52)  HR: 61 (03-22-21 @ 12:03) (58 - 69)  BP: 109/67 (03-22-21 @ 12:03) (106/68 - 141/66)  ABP: --  ABP(mean): --  RR: 18 (03-22-21 @ 12:03) (18 - 18)  SpO2: 96% (03-22-21 @ 12:03) (95% - 99%)      CONSTITUTIONAL: No acute distress.   HEENT:  Conjunctiva clear B/L.  Moist oral mucosa.   Cardiovascular: RRR with no murmurs. No JVD noted. 1+ lower extremity edema B/L. Extremities are warm and well perfused. Radial pulses 2+ B/L. Dorsalis pedis pulses 2+ B/L.    Respiratory: Dec bs at bases. No wrr. No accessory muscle use.   Gastrointestinal:  Soft, nontender. Non-distended. Non-rigid. No CVA tenderness B/L.  MSK:  No joint swelling. No joint erythema B/L. No midline spinal tenderness.  Neurologic:  Alert and awake. Oriented x3. Moving all extremities. Following commands. Making eye contact.    Skin:  No rashes noted. No skin erythema noted.   Psych:  Normal affect. Normal Mood.     LABS    03-22    140  |  100  |  26<H>  ----------------------------<  114<H>  3.7   |  25  |  0.68    Ca    9.7      22 Mar 2021 06:47  Mg     1.9     03-22            ADDITIONAL STUDIES PERSONALLY REVIEWED    Our team discussed the case with all consults    All consultant contribution to care greatly appreciated.

## 2021-03-23 LAB
ANION GAP SERPL CALC-SCNC: 18 MMOL/L — HIGH (ref 5–17)
BUN SERPL-MCNC: 27 MG/DL — HIGH (ref 7–23)
CALCIUM SERPL-MCNC: 9.6 MG/DL — SIGNIFICANT CHANGE UP (ref 8.4–10.5)
CHLORIDE SERPL-SCNC: 98 MMOL/L — SIGNIFICANT CHANGE UP (ref 96–108)
CO2 SERPL-SCNC: 23 MMOL/L — SIGNIFICANT CHANGE UP (ref 22–31)
CREAT SERPL-MCNC: 0.69 MG/DL — SIGNIFICANT CHANGE UP (ref 0.5–1.3)
GLUCOSE SERPL-MCNC: 101 MG/DL — HIGH (ref 70–99)
HCT VFR BLD CALC: 45.7 % — SIGNIFICANT CHANGE UP (ref 39–50)
HGB BLD-MCNC: 15.5 G/DL — SIGNIFICANT CHANGE UP (ref 13–17)
MAGNESIUM SERPL-MCNC: 1.9 MG/DL — SIGNIFICANT CHANGE UP (ref 1.6–2.6)
MCHC RBC-ENTMCNC: 29.6 PG — SIGNIFICANT CHANGE UP (ref 27–34)
MCHC RBC-ENTMCNC: 33.9 GM/DL — SIGNIFICANT CHANGE UP (ref 32–36)
MCV RBC AUTO: 87.4 FL — SIGNIFICANT CHANGE UP (ref 80–100)
NRBC # BLD: 0 /100 WBCS — SIGNIFICANT CHANGE UP (ref 0–0)
PLATELET # BLD AUTO: 189 K/UL — SIGNIFICANT CHANGE UP (ref 150–400)
POTASSIUM SERPL-MCNC: 3.5 MMOL/L — SIGNIFICANT CHANGE UP (ref 3.5–5.3)
POTASSIUM SERPL-SCNC: 3.5 MMOL/L — SIGNIFICANT CHANGE UP (ref 3.5–5.3)
RBC # BLD: 5.23 M/UL — SIGNIFICANT CHANGE UP (ref 4.2–5.8)
RBC # FLD: 12.9 % — SIGNIFICANT CHANGE UP (ref 10.3–14.5)
SODIUM SERPL-SCNC: 139 MMOL/L — SIGNIFICANT CHANGE UP (ref 135–145)
WBC # BLD: 7.31 K/UL — SIGNIFICANT CHANGE UP (ref 3.8–10.5)
WBC # FLD AUTO: 7.31 K/UL — SIGNIFICANT CHANGE UP (ref 3.8–10.5)

## 2021-03-23 PROCEDURE — 99233 SBSQ HOSP IP/OBS HIGH 50: CPT

## 2021-03-23 PROCEDURE — 99232 SBSQ HOSP IP/OBS MODERATE 35: CPT

## 2021-03-23 RX ORDER — POTASSIUM CHLORIDE 20 MEQ
40 PACKET (EA) ORAL ONCE
Refills: 0 | Status: COMPLETED | OUTPATIENT
Start: 2021-03-23 | End: 2021-03-23

## 2021-03-23 RX ORDER — MAGNESIUM SULFATE 500 MG/ML
1 VIAL (ML) INJECTION ONCE
Refills: 0 | Status: COMPLETED | OUTPATIENT
Start: 2021-03-23 | End: 2021-03-23

## 2021-03-23 RX ORDER — OXYCODONE HYDROCHLORIDE 5 MG/1
5 TABLET ORAL EVERY 6 HOURS
Refills: 0 | Status: DISCONTINUED | OUTPATIENT
Start: 2021-03-24 | End: 2021-03-24

## 2021-03-23 RX ADMIN — Medication 1 TABLET(S): at 13:11

## 2021-03-23 RX ADMIN — OXYCODONE HYDROCHLORIDE 5 MILLIGRAM(S): 5 TABLET ORAL at 12:20

## 2021-03-23 RX ADMIN — ENOXAPARIN SODIUM 40 MILLIGRAM(S): 100 INJECTION SUBCUTANEOUS at 05:21

## 2021-03-23 RX ADMIN — Medication 81 MILLIGRAM(S): at 11:26

## 2021-03-23 RX ADMIN — BUMETANIDE 2 MILLIGRAM(S): 0.25 INJECTION INTRAMUSCULAR; INTRAVENOUS at 05:20

## 2021-03-23 RX ADMIN — GABAPENTIN 300 MILLIGRAM(S): 400 CAPSULE ORAL at 13:11

## 2021-03-23 RX ADMIN — CARVEDILOL PHOSPHATE 12.5 MILLIGRAM(S): 80 CAPSULE, EXTENDED RELEASE ORAL at 05:20

## 2021-03-23 RX ADMIN — Medication 100 GRAM(S): at 09:42

## 2021-03-23 RX ADMIN — ENOXAPARIN SODIUM 40 MILLIGRAM(S): 100 INJECTION SUBCUTANEOUS at 17:42

## 2021-03-23 RX ADMIN — OXYCODONE HYDROCHLORIDE 5 MILLIGRAM(S): 5 TABLET ORAL at 11:26

## 2021-03-23 RX ADMIN — Medication 100 MILLIGRAM(S): at 21:54

## 2021-03-23 RX ADMIN — AMLODIPINE BESYLATE 10 MILLIGRAM(S): 2.5 TABLET ORAL at 05:20

## 2021-03-23 RX ADMIN — GABAPENTIN 300 MILLIGRAM(S): 400 CAPSULE ORAL at 21:55

## 2021-03-23 RX ADMIN — BUMETANIDE 2 MILLIGRAM(S): 0.25 INJECTION INTRAMUSCULAR; INTRAVENOUS at 17:42

## 2021-03-23 RX ADMIN — OXYCODONE HYDROCHLORIDE 5 MILLIGRAM(S): 5 TABLET ORAL at 23:20

## 2021-03-23 RX ADMIN — OXYCODONE HYDROCHLORIDE 5 MILLIGRAM(S): 5 TABLET ORAL at 05:25

## 2021-03-23 RX ADMIN — CARVEDILOL PHOSPHATE 12.5 MILLIGRAM(S): 80 CAPSULE, EXTENDED RELEASE ORAL at 17:42

## 2021-03-23 RX ADMIN — LOSARTAN POTASSIUM 100 MILLIGRAM(S): 100 TABLET, FILM COATED ORAL at 05:20

## 2021-03-23 RX ADMIN — GABAPENTIN 300 MILLIGRAM(S): 400 CAPSULE ORAL at 05:20

## 2021-03-23 RX ADMIN — OXYCODONE HYDROCHLORIDE 5 MILLIGRAM(S): 5 TABLET ORAL at 05:51

## 2021-03-23 RX ADMIN — ATORVASTATIN CALCIUM 40 MILLIGRAM(S): 80 TABLET, FILM COATED ORAL at 21:54

## 2021-03-23 RX ADMIN — Medication 40 MILLIEQUIVALENT(S): at 09:42

## 2021-03-23 NOTE — PROGRESS NOTE ADULT - SUBJECTIVE AND OBJECTIVE BOX
SUBJ:    Home Medications:  acetaminophen 500 mg oral tablet: 2 tab(s) orally every 8 hours (13 Mar 2021 09:59)  allopurinol 100 mg oral tablet: 1 tab(s) orally once a day (at bedtime) (13 Mar 2021 09:59)  amLODIPine 10 mg oral tablet: 1 tab(s) orally once a day AM (13 Mar 2021 09:59)  Aspirin Enteric Coated 325 mg oral delayed release tablet: 1 tab(s) orally once a day (13 Mar 2021 09:59)  atorvastatin 40 mg oral tablet: 1 tab(s) orally once a day AM (13 Mar 2021 09:59)  Calcium 600+D oral tablet: 1 tab(s) orally once a day (13 Mar 2021 09:59)  gabapentin 300 mg oral tablet: 1 tab(s) orally 3 times a day (13 Mar 2021 09:59)  labetalol 200 mg oral tablet: 1 tab(s) orally 2 times a day (13 Mar 2021 09:59)  losartan 100 mg oral tablet: 1 tab(s) orally once a day AM (13 Mar 2021 09:59)  Multiple Vitamins oral tablet: 1 tab(s) orally once a day (13 Mar 2021 09:59)      MEDICATIONS  (STANDING):  allopurinol 100 milliGRAM(s) Oral at bedtime  amLODIPine   Tablet 10 milliGRAM(s) Oral daily  aspirin enteric coated 81 milliGRAM(s) Oral daily  atorvastatin 40 milliGRAM(s) Oral at bedtime  buMETAnide 2 milliGRAM(s) Oral two times a day  calcium carbonate 1250 mG  + Vitamin D (OsCal 500 + D) 1 Tablet(s) Oral daily  carvedilol 12.5 milliGRAM(s) Oral every 12 hours  enoxaparin Injectable 40 milliGRAM(s) SubCutaneous every 12 hours  gabapentin 300 milliGRAM(s) Oral three times a day  losartan 100 milliGRAM(s) Oral daily    MEDICATIONS  (PRN):  oxyCODONE    IR 5 milliGRAM(s) Oral every 6 hours PRN Severe Pain (7 - 10)      Vital Signs Last 24 Hrs  T(C): 36.8 (23 Mar 2021 12:36), Max: 36.8 (22 Mar 2021 20:54)  T(F): 98.2 (23 Mar 2021 12:36), Max: 98.3 (22 Mar 2021 20:54)  HR: 79 (23 Mar 2021 17:06) (61 - 79)  BP: 106/68 (23 Mar 2021 17:06) (103/64 - 131/79)  BP(mean): --  RR: 18 (23 Mar 2021 12:36) (18 - 18)  SpO2: 96% (23 Mar 2021 15:37) (94% - 97%)    REVIEW OF SYSTEMS:  As per HPI, otherwise unremarkable.     PHYSICAL EXAM:  · CONSTITUTIONAL: Well-developed, well nourished      · EYES: EOMI; PERRL; no drainage or redness  · NECK: No bruits; no thyromegaly or nodules  ·RESPIRATORY:   airway patent; breath sounds equal; good air movement; respirations non-labored; clear to auscultation bilaterally; no chest wall tenderness; no intercostal retractions; no rales,rhonchi or wheeze  · CARDIOVASCULAR: regular rate and rhythm  no rub  no murmur  normal PMI  . GASTROINTESTINAL:  no distention; no masses palpable; bowel sounds normal  · EXTREMITIES: No cyanosis, clubbing or edema  · VASCULAR:  Equal and normal pulses (radial, femoral)  ·NEUROLOGICAL:  Alert and oriented x 3; sensation intact; deep reflexes intact; cranial nerves intact; no spontaneous movement; superficial reflexes intact; normal strength  · SKIN: No lesions; no rash  . LYMPH NODES: No lymphadedenopathy  · MUSCULOSKELETAL:  No calf tenderness  no joint swelling	    TELEMETRY:    ECG:    TTE:    LABS:  CBC Full  -  ( 23 Mar 2021 07:23 )  WBC Count : 7.31 K/uL  RBC Count : 5.23 M/uL  Hemoglobin : 15.5 g/dL  Hematocrit : 45.7 %  Platelet Count - Automated : 189 K/uL  Mean Cell Volume : 87.4 fl  Mean Cell Hemoglobin : 29.6 pg  Mean Cell Hemoglobin Concentration : 33.9 gm/dL  Auto Neutrophil # : x  Auto Lymphocyte # : x  Auto Monocyte # : x  Auto Eosinophil # : x  Auto Basophil # : x  Auto Neutrophil % : x  Auto Lymphocyte % : x  Auto Monocyte % : x  Auto Eosinophil % : x  Auto Basophil % : x    03-23    139  |  98  |  27<H>  ----------------------------<  101<H>  3.5   |  23  |  0.69    Ca    9.6      23 Mar 2021 07:16  Mg     1.9     03-23            RADIOLOGY & ADDITIONAL STUDIES:    IMPRESSION AND PLAN:        Robby Bustamante MD, MPH, RANDEE, RPVI, FACC  Cardiovascular Specialist   [facility] ([facility])  [facility]  c: [phone] (text preferred and/or call)  e: [email or web address]  For all [facility] Cardiology and Cardiovascular Surgery on-service contact/call information, go to [email or web address] and use "[employer]" to login.  For outpatient Cardiology appointments, call  [phone] to arrange with a colleague; I do not have outpatient Cardiology clinic.   SUBJ:  Reports mild shortness of breath while laying flat requiring him to sit up occurring 3-4 times today which is somewhat increased from 1-2 times the last few days but overall clinically much improved. Not requiring supplemental oxygen. MUGA scan demonstrating normal EF with good right and left wall motion. Tolerating PO diuretics in the setting of normal pBNP.     Home Medications:  acetaminophen 500 mg oral tablet: 2 tab(s) orally every 8 hours (13 Mar 2021 09:59)  allopurinol 100 mg oral tablet: 1 tab(s) orally once a day (at bedtime) (13 Mar 2021 09:59)  amLODIPine 10 mg oral tablet: 1 tab(s) orally once a day AM (13 Mar 2021 09:59)  Aspirin Enteric Coated 325 mg oral delayed release tablet: 1 tab(s) orally once a day (13 Mar 2021 09:59)  atorvastatin 40 mg oral tablet: 1 tab(s) orally once a day AM (13 Mar 2021 09:59)  Calcium 600+D oral tablet: 1 tab(s) orally once a day (13 Mar 2021 09:59)  gabapentin 300 mg oral tablet: 1 tab(s) orally 3 times a day (13 Mar 2021 09:59)  labetalol 200 mg oral tablet: 1 tab(s) orally 2 times a day (13 Mar 2021 09:59)  losartan 100 mg oral tablet: 1 tab(s) orally once a day AM (13 Mar 2021 09:59)  Multiple Vitamins oral tablet: 1 tab(s) orally once a day (13 Mar 2021 09:59)    MEDICATIONS  (STANDING):  allopurinol 100 milliGRAM(s) Oral at bedtime  amLODIPine   Tablet 10 milliGRAM(s) Oral daily  aspirin enteric coated 81 milliGRAM(s) Oral daily  atorvastatin 40 milliGRAM(s) Oral at bedtime  buMETAnide 2 milliGRAM(s) Oral two times a day  calcium carbonate 1250 mG  + Vitamin D (OsCal 500 + D) 1 Tablet(s) Oral daily  carvedilol 12.5 milliGRAM(s) Oral every 12 hours  enoxaparin Injectable 40 milliGRAM(s) SubCutaneous every 12 hours  gabapentin 300 milliGRAM(s) Oral three times a day  losartan 100 milliGRAM(s) Oral daily    MEDICATIONS  (PRN):  oxyCODONE    IR 5 milliGRAM(s) Oral every 6 hours PRN Severe Pain (7 - 10)    Vital Signs Last 24 Hrs  T(C): 36.8 (23 Mar 2021 12:36), Max: 36.8 (22 Mar 2021 20:54)  T(F): 98.2 (23 Mar 2021 12:36), Max: 98.3 (22 Mar 2021 20:54)  HR: 79 (23 Mar 2021 17:06) (61 - 79)  BP: 106/68 (23 Mar 2021 17:06) (103/64 - 131/79)  RR: 18 (23 Mar 2021 12:36) (18 - 18)  SpO2: 96% (23 Mar 2021 15:37) (94% - 97%)    REVIEW OF SYSTEMS:  As per HPI, otherwise unremarkable.     PHYSICAL EXAM:  · CONSTITUTIONAL: Well-developed, well nourished      · EYES:; no drainage or redness  · NECK: supple  ·RESPIRATORY: respirations non-labored; clear to auscultation bilaterally;  · CARDIOVASCULAR: regular rate and rhythm   . GASTROINTESTINAL:  no distention; no masses palpable  · EXTREMITIES: No cyanosis, clubbing. Trace edema  · VASCULAR:  Equal and normal pulses (radial, femoral)  ·NEUROLOGICAL:  Alert and oriented x 3  · SKIN: No lesions; no rash  . LYMPH NODES: No lymphadedenopathy  · MUSCULOSKELETAL:  no joint swelling    MUGA 3/22/2021  IMPRESSION: Normal gated blood pool study.  Normal resting left ventricular ejection fraction of 57 % and normal right and left ventricular wall motion.    LABS:  CBC Full  -  ( 23 Mar 2021 07:23 )  WBC Count : 7.31 K/uL  RBC Count : 5.23 M/uL  Hemoglobin : 15.5 g/dL  Hematocrit : 45.7 %  Platelet Count - Automated : 189 K/uL  Mean Cell Volume : 87.4 fl  Mean Cell Hemoglobin : 29.6 pg  Mean Cell Hemoglobin Concentration : 33.9 gm/dL    03-23    139  |  98  |  27<H>  ----------------------------<  101<H>  3.5   |  23  |  0.69    Ca    9.6      23 Mar 2021 07:16  Mg     1.9     03-23    IMPRESSION AND PLAN:  65 year old male with past medical history of morbid obesity, ASIYA, HTN, CAD s/p CABG 2017, HFpEF presenting with acute COVID-19 infection complicated by decompensated acute on chronic diastolic heart failure.     1) Acute on chronic diastolic heart failure.   Decompensated on admission but now improved with diuresis   MUGA EF 57%; unable to get adequate images by ECHO  Admission pBNP 121 decreased to 30 with diuresis (misleadingly low)  Improved/resolved lower extremity edema, saturating well on RA, nearly resolved orthopnea.   Although varying information, he reports symptoms starting during COVID-19 infection  CT chest unremarkable    ·	Continue PO bumetanide 2 mg daily or BID for discharge.  ·	Risk factor modification  ·	COVID-19 recovery and post infection management.   ·	Given significant improvement overall with diuresis and likely HFpEF in the setting of COVID-19, I do not think proceeding with a RHC will  as they will likely be mildly elevated and continue to decrease with diuresis of the next few days/weeks.     2) HTN   Well controlled.     ·	Continue medical management with amlodipine 10 mg daily, carvedilol 12.5 mg BID, losartan 100 mg daily, and diuresis.     3) CAD   s/p CABG 2017  Asymptomatic     ·	Continue medical management with amlodipine 10 mg daily, carvedilol 12.5 mg BID, losartan 100 mg daily, aspirin 81 mg daily, atorvastatin 40 mg nightly.     4) ASIYA   Home CPAP    ·	Continue CPAP nightly.     5) COVID-19   Recent acute infection   Antibodies now present   Not requiring supplemental oxygen     ·	Supportive care    Acceptable for discharge tomorrow or in the near future as no further testing planned and not reuqiring any medical support.     Robby Bustamante MD, MPH, RANDEE, RPVI, FACC  Cardiovascular Specialist   [facility] ([facility])  [facility]  c: [phone] (text preferred and/or call)  e: [email or web address]  For all [facility] Cardiology and Cardiovascular Surgery on-service contact/call information, go to [email or web address] and use "[employer]" to login.  For outpatient Cardiology appointments, call  [phone] to arrange with a colleague; I do not have outpatient Cardiology clinic.

## 2021-03-23 NOTE — PROGRESS NOTE ADULT - SUBJECTIVE AND OBJECTIVE BOX
HOSPITALIST NOTE    Dr. Toney Levine DO  Attending Physician  Division of Hospital Medicine  [facility]  Pager:  [phone]    SUBJECTIVE  STEVENS. Some transient orthopnea.     REVIEW OF SYSTEMS  12 point review of systems negative except for above.     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    Renal calculi    Obstructive sleep apnea  CPAP    H/O: osteoarthritis    Lumbar disc disease with radiculopathy    Gout    Neuropathy  Bilateral Feet since 2012    Kidney stone  s/w ESWL pt unsure site    S/P lumbar laminectomy  Fusion L3-L5 2012    S/P CABG x 3  8/29/17    S/P lumbar discectomy  2012- ,L5  Aug 2013    S/P revision of total knee, right  x2- 2012 &amp; 2014    H/O lithotripsy  x1    S/P knee replacement  2011- right x 3  - revisions in 2012 &amp; 2014    S/P tonsillectomy and adenoidectomy  as child    Hernia  repair, left inguinal 2010    Cholecystitis  cholecycstectomy 2010    History of Total Hip Replacement  2009- bilateral THR    S/P Left Inguinal Hernia Repair        MEDICATIONS  (STANDING):  allopurinol 100 milliGRAM(s) Oral at bedtime  amLODIPine   Tablet 10 milliGRAM(s) Oral daily  aspirin enteric coated 81 milliGRAM(s) Oral daily  atorvastatin 40 milliGRAM(s) Oral at bedtime  buMETAnide 2 milliGRAM(s) Oral two times a day  calcium carbonate 1250 mG  + Vitamin D (OsCal 500 + D) 1 Tablet(s) Oral daily  carvedilol 12.5 milliGRAM(s) Oral every 12 hours  enoxaparin Injectable 40 milliGRAM(s) SubCutaneous every 12 hours  gabapentin 300 milliGRAM(s) Oral three times a day  losartan 100 milliGRAM(s) Oral daily    MEDICATIONS  (PRN):  oxyCODONE    IR 5 milliGRAM(s) Oral every 6 hours PRN Severe Pain (7 - 10)      Allergies    No Known Allergies    Intolerances        T(C): 36.8 (03-23-21 @ 12:36), Max: 36.8 (03-22-21 @ 20:54)  T(F): 98.2 (03-23-21 @ 12:36), Max: 98.3 (03-22-21 @ 20:54)  HR: 61 (03-23-21 @ 12:36) (61 - 69)  BP: 104/65 (03-23-21 @ 12:36) (103/64 - 131/79)  ABP: --  ABP(mean): --  RR: 18 (03-23-21 @ 12:36) (18 - 18)  SpO2: 95% (03-23-21 @ 12:36) (94% - 97%)    CONSTITUTIONAL: No acute distress.   HEENT:  Conjunctiva clear B/L.  Moist oral mucosa.   Cardiovascular: RRR with no murmurs. No JVD noted. 1+ lower extremity edema B/L. Extremities are warm and well perfused. Radial pulses 2+ B/L. Dorsalis pedis pulses 2+ B/L.    Respiratory: Dec bs at bases. No wrr. No accessory muscle use.   Gastrointestinal:  Soft, nontender. Non-distended. Non-rigid. No CVA tenderness B/L.  MSK:  No joint swelling. No joint erythema B/L. No midline spinal tenderness.  Neurologic:  Alert and awake. Oriented x3. Moving all extremities. Following commands. Making eye contact.    Skin:  No rashes noted. No skin erythema noted.   Psych:  Normal affect. Normal Mood.     LABS                        15.5   7.31  )-----------( 189      ( 23 Mar 2021 07:23 )             45.7     03-23    139  |  98  |  27<H>  ----------------------------<  101<H>  3.5   |  23  |  0.69    Ca    9.6      23 Mar 2021 07:16  Mg     1.9     03-23            ADDITIONAL STUDIES PERSONALLY REVIEWED    Our team discussed the case with all consults    All consultant contribution to care greatly appreciated.

## 2021-03-24 ENCOUNTER — TRANSCRIPTION ENCOUNTER (OUTPATIENT)
Age: 66
End: 2021-03-24

## 2021-03-24 VITALS
TEMPERATURE: 98 F | DIASTOLIC BLOOD PRESSURE: 70 MMHG | OXYGEN SATURATION: 94 % | SYSTOLIC BLOOD PRESSURE: 113 MMHG | HEART RATE: 60 BPM | RESPIRATION RATE: 19 BRPM

## 2021-03-24 LAB
ANION GAP SERPL CALC-SCNC: 16 MMOL/L — SIGNIFICANT CHANGE UP (ref 5–17)
BUN SERPL-MCNC: 29 MG/DL — HIGH (ref 7–23)
CALCIUM SERPL-MCNC: 9.3 MG/DL — SIGNIFICANT CHANGE UP (ref 8.4–10.5)
CHLORIDE SERPL-SCNC: 100 MMOL/L — SIGNIFICANT CHANGE UP (ref 96–108)
CO2 SERPL-SCNC: 24 MMOL/L — SIGNIFICANT CHANGE UP (ref 22–31)
CREAT SERPL-MCNC: 0.68 MG/DL — SIGNIFICANT CHANGE UP (ref 0.5–1.3)
GLUCOSE SERPL-MCNC: 118 MG/DL — HIGH (ref 70–99)
MAGNESIUM SERPL-MCNC: 1.8 MG/DL — SIGNIFICANT CHANGE UP (ref 1.6–2.6)
POTASSIUM SERPL-MCNC: 3.6 MMOL/L — SIGNIFICANT CHANGE UP (ref 3.5–5.3)
POTASSIUM SERPL-SCNC: 3.6 MMOL/L — SIGNIFICANT CHANGE UP (ref 3.5–5.3)
SODIUM SERPL-SCNC: 140 MMOL/L — SIGNIFICANT CHANGE UP (ref 135–145)

## 2021-03-24 PROCEDURE — 81003 URINALYSIS AUTO W/O SCOPE: CPT

## 2021-03-24 PROCEDURE — 85379 FIBRIN DEGRADATION QUANT: CPT

## 2021-03-24 PROCEDURE — 83036 HEMOGLOBIN GLYCOSYLATED A1C: CPT

## 2021-03-24 PROCEDURE — 86769 SARS-COV-2 COVID-19 ANTIBODY: CPT

## 2021-03-24 PROCEDURE — 84100 ASSAY OF PHOSPHORUS: CPT

## 2021-03-24 PROCEDURE — A9560: CPT

## 2021-03-24 PROCEDURE — 93005 ELECTROCARDIOGRAM TRACING: CPT

## 2021-03-24 PROCEDURE — U0005: CPT

## 2021-03-24 PROCEDURE — 85025 COMPLETE CBC W/AUTO DIFF WBC: CPT

## 2021-03-24 PROCEDURE — 71045 X-RAY EXAM CHEST 1 VIEW: CPT

## 2021-03-24 PROCEDURE — 78472 GATED HEART PLANAR SINGLE: CPT

## 2021-03-24 PROCEDURE — 84484 ASSAY OF TROPONIN QUANT: CPT

## 2021-03-24 PROCEDURE — 86803 HEPATITIS C AB TEST: CPT

## 2021-03-24 PROCEDURE — 87086 URINE CULTURE/COLONY COUNT: CPT

## 2021-03-24 PROCEDURE — 93970 EXTREMITY STUDY: CPT

## 2021-03-24 PROCEDURE — 81001 URINALYSIS AUTO W/SCOPE: CPT

## 2021-03-24 PROCEDURE — 80053 COMPREHEN METABOLIC PANEL: CPT

## 2021-03-24 PROCEDURE — 85027 COMPLETE CBC AUTOMATED: CPT

## 2021-03-24 PROCEDURE — 99239 HOSP IP/OBS DSCHRG MGMT >30: CPT

## 2021-03-24 PROCEDURE — U0003: CPT

## 2021-03-24 PROCEDURE — 83880 ASSAY OF NATRIURETIC PEPTIDE: CPT

## 2021-03-24 PROCEDURE — 71275 CT ANGIOGRAPHY CHEST: CPT

## 2021-03-24 PROCEDURE — 94660 CPAP INITIATION&MGMT: CPT

## 2021-03-24 PROCEDURE — 80048 BASIC METABOLIC PNL TOTAL CA: CPT

## 2021-03-24 PROCEDURE — 99285 EMERGENCY DEPT VISIT HI MDM: CPT

## 2021-03-24 PROCEDURE — 93306 TTE W/DOPPLER COMPLETE: CPT

## 2021-03-24 PROCEDURE — 83735 ASSAY OF MAGNESIUM: CPT

## 2021-03-24 RX ORDER — CARVEDILOL PHOSPHATE 80 MG/1
1 CAPSULE, EXTENDED RELEASE ORAL
Qty: 60 | Refills: 0
Start: 2021-03-24 | End: 2021-04-22

## 2021-03-24 RX ORDER — POTASSIUM CHLORIDE 20 MEQ
40 PACKET (EA) ORAL ONCE
Refills: 0 | Status: COMPLETED | OUTPATIENT
Start: 2021-03-24 | End: 2021-03-24

## 2021-03-24 RX ORDER — BUMETANIDE 0.25 MG/ML
1 INJECTION INTRAMUSCULAR; INTRAVENOUS
Qty: 30 | Refills: 0
Start: 2021-03-24 | End: 2021-04-22

## 2021-03-24 RX ORDER — BUMETANIDE 0.25 MG/ML
1 INJECTION INTRAMUSCULAR; INTRAVENOUS
Qty: 0 | Refills: 0 | DISCHARGE
Start: 2021-03-24 | End: 2021-04-22

## 2021-03-24 RX ORDER — LABETALOL HCL 100 MG
1 TABLET ORAL
Qty: 0 | Refills: 0 | DISCHARGE

## 2021-03-24 RX ORDER — MAGNESIUM SULFATE 500 MG/ML
1 VIAL (ML) INJECTION ONCE
Refills: 0 | Status: COMPLETED | OUTPATIENT
Start: 2021-03-24 | End: 2021-03-24

## 2021-03-24 RX ORDER — ASPIRIN/CALCIUM CARB/MAGNESIUM 324 MG
1 TABLET ORAL
Qty: 0 | Refills: 0 | DISCHARGE
Start: 2021-03-24

## 2021-03-24 RX ADMIN — Medication 100 GRAM(S): at 08:38

## 2021-03-24 RX ADMIN — OXYCODONE HYDROCHLORIDE 5 MILLIGRAM(S): 5 TABLET ORAL at 00:05

## 2021-03-24 RX ADMIN — GABAPENTIN 300 MILLIGRAM(S): 400 CAPSULE ORAL at 06:14

## 2021-03-24 RX ADMIN — OXYCODONE HYDROCHLORIDE 5 MILLIGRAM(S): 5 TABLET ORAL at 06:16

## 2021-03-24 RX ADMIN — AMLODIPINE BESYLATE 10 MILLIGRAM(S): 2.5 TABLET ORAL at 06:14

## 2021-03-24 RX ADMIN — GABAPENTIN 300 MILLIGRAM(S): 400 CAPSULE ORAL at 14:10

## 2021-03-24 RX ADMIN — ENOXAPARIN SODIUM 40 MILLIGRAM(S): 100 INJECTION SUBCUTANEOUS at 06:15

## 2021-03-24 RX ADMIN — Medication 40 MILLIEQUIVALENT(S): at 08:38

## 2021-03-24 RX ADMIN — CARVEDILOL PHOSPHATE 12.5 MILLIGRAM(S): 80 CAPSULE, EXTENDED RELEASE ORAL at 06:16

## 2021-03-24 RX ADMIN — Medication 1 TABLET(S): at 14:11

## 2021-03-24 RX ADMIN — LOSARTAN POTASSIUM 100 MILLIGRAM(S): 100 TABLET, FILM COATED ORAL at 06:14

## 2021-03-24 RX ADMIN — Medication 81 MILLIGRAM(S): at 14:11

## 2021-03-24 RX ADMIN — BUMETANIDE 2 MILLIGRAM(S): 0.25 INJECTION INTRAMUSCULAR; INTRAVENOUS at 06:15

## 2021-03-24 NOTE — PROGRESS NOTE ADULT - PROBLEM SELECTOR PLAN 3
-c/w coreg as above, ASA, statin  -discussed with outpatient cardiologist - no clear indication for asa 325mg qd and can decrease to 81mg qd
-c/w coreg as above, ASA, statin  -discussed with outpatient cardiologist - no clear indication for asa 325mg qd and can decrease to 81mg qd
-c/w labetalol, ASA, statin  -patient states his cardiologist wants him on the high dose aspirin.
-c/w coreg as above, ASA, statin  -discussed with outpatient cardiologist - no clear indication for asa 325mg qd and can decrease to 81mg qd
-c/w labetalol, ASA, statin  -patient states his cardiologist wants him on the high dose aspirin - will discuss with outpatient cardiologist  TTE was a poor study - inpatient cards evaluation
-c/w coreg as above, ASA, statin  -discussed with outpatient cardiologist - no clear indication for asa 325mg qd and can decrease to 81mg qd
-c/w coreg as above, ASA, statin  -discussed with outpatient cardiologist - no clear indication for asa 325mg qd and can decrease to 81mg qd  TTE was a poor study - inpatient cards evaluation appreciated - suggesting cardiac MRI though I believe he may not fit in circumference of MRI - will attempt with premedication
-c/w coreg as above, ASA, statin  -discussed with outpatient cardiologist - no clear indication for asa 325mg qd and can decrease to 81mg qd  TTE was a poor study - inpatient cards evaluation appreciated - suggesting cardiac MRI though I believe he exceeds weight limit - will clarify - can get it outpatient
-c/w labetalol, ASA, statin  -patient states his cardiologist wants him on the high dose aspirin - have called to discuss care with outpatient cardiologist multiple times - have not received call back  TTE was a poor study - inpatient cards evaluation as above

## 2021-03-24 NOTE — PROGRESS NOTE ADULT - PROBLEM SELECTOR PROBLEM 2
Lower extremity edema

## 2021-03-24 NOTE — PROGRESS NOTE ADULT - PROBLEM SELECTOR PROBLEM 1
Acute congestive heart failure, unspecified heart failure type
Acute systolic heart failure
Acute congestive heart failure, unspecified heart failure type
Acute congestive heart failure, unspecified heart failure type
Acute systolic heart failure

## 2021-03-24 NOTE — PROGRESS NOTE ADULT - PROBLEM SELECTOR PLAN 5
-pt unsure of settings  -empiric CPAP at night at 8 cm h2o.  pulmonary consult called
-pt unsure of settings  -empiric CPAP at night at 8 cm h2o.  pulmonary consult appreciated
-pt unsure of settings  -Tolerating cpap qhs at 10.   -pulmonary consult appreciated
-pt unsure of settings  -empiric CPAP at night increased settings to 10  pulmonary consult appreciated
-pt unsure of settings  -empiric CPAP at night at 8 cm h2o. ?benefit from increasing to 10  pulmonary consult appreciated
-pt unsure of settings  -empiric CPAP at night at 8 cm h2o.
-pt unsure of settings  -Tolerating cpap qhs at 10.   -pulmonary consult appreciated
-pt unsure of settings  -empiric CPAP at night at 8 cm h2o. ?benefit from increasing to 10  pulmonary consult appreciated
-pt unsure of settings  -Tolerating cpap qhs at 10.   -pulmonary consult appreciated
-pt unsure of settings  -empiric CPAP at night at 8 cm h2o.
-pt unsure of settings  -empiric CPAP at night at 8 cm h2o. ?benefit from increasing to 10  pulmonary consult appreciated

## 2021-03-24 NOTE — PROGRESS NOTE ADULT - PROBLEM SELECTOR PROBLEM 5
ASIYA (obstructive sleep apnea)

## 2021-03-24 NOTE — PROGRESS NOTE ADULT - PROBLEM SELECTOR PROBLEM 6
Hypercholesterolemia

## 2021-03-24 NOTE — PROGRESS NOTE ADULT - PROBLEM SELECTOR PLAN 7
-c/w labetalol, losartan, norvasc
-c/w coreg, losartan, norvasc
-c/w labetalol, losartan, norvasc
-c/w labetalol, losartan, norvasc
-c/w coreg, losartan, norvasc  monitor bp
-c/w coreg, losartan, norvasc  monitor bp
-c/w coreg, losartan, norvasc
-c/w coreg, losartan, norvasc  monitor bp
-c/w coreg, losartan, norvasc  monitor bp
-c/w coreg, losartan, norvasc
-c/w coreg, losartan, norvasc

## 2021-03-24 NOTE — PROGRESS NOTE ADULT - PROBLEM SELECTOR PLAN 1
poor TTE here - reviewed previous TTE 2019 with outpatient cardiologist - at that time EF 45%. low BNP. CT chest w/ clear lungs but Blines on POCUS lung by pulmonary   cardiology and Pulmonary consult appreciated   Pulmonary hypertension is definitely a consideration given ASIYA. No PE on CT imaging. unable to visualize on TTE.  -c/w lasix 40 iv bid for now   5beats wct - changed labetalol to coreg - will uptitrate as tolerated   -c/w losartan 100 milliGRAM(s) Oral daily
poor TTE here - reviewed previous TTE 2019 with outpatient cardiologist - at that time EF 45%. low BNP. CT chest w/ clear lungs but Blines on POCUS lung by pulmonary   cardiology and Pulmonary consult appreciated   Pulmonary hypertension is definitely a consideration given ASIYA. No PE on CT imaging. unable to visualize on TTE. unable to complete cardiac MRI due to size. MUGA with preserved EF and L/R wall motion.   Benefit to performing cardiac cath? Pending cards f/u.   Changed to bumex po per HF team   Coreg for hf optimization and ectopy suppression.   C/w losartan 100 milliGRAM(s) Oral daily
-admitting team had clinical suspicion for new onset HF exacerbation. BNP low but obesity can cause low BNP. CT chest w/ clear lungs but right sided disease possible. Pulmonary hypertension is definitely a consideration given ASIYA. No PE on CT imaging.   -lasix 40 iv bid  -check TTE  -labetalol 200 milliGRAM(s) Oral two times a day -- if true HF, can switch to GDMT.   -losartan 100 milliGRAM(s) Oral daily
poor TTE here - reviewed previous TTE 2019 with outpatient cardiologist - at that time EF 45%. low BNP. CT chest w/ clear lungs but Blines on POCUS lung by pulmonary   Cardiology and Pulmonary consult appreciated   Pulmonary hypertension is definitely a consideration given ASIYA. No PE on CT imaging. unable to visualize on TTE. unable to complete cardiac MRI due to size. MUGA with preserved EF and L/R wall motion. Primary issue pHTN?  Benefit to performing cardiac cath? Pending cards f/u.   Changed to bumex po per HF team   Coreg for hf optimization    C/w losartan 100 milliGRAM(s) Oral daily
poor TTE here - reviewed previous TTE 2019 with outpatient cardiologist - at that time EF 45%. low BNP. CT chest w/ clear lungs but Blines on POCUS lung by pulmonary   cardiology and Pulmonary consult appreciated   Pulmonary hypertension is definitely a consideration given ASIYA. No PE on CT imaging. unable to visualize on TTE. unable to complete cardiac MRI due to size. now recommending MUGA - will need to call for weight limit for machine. if unable, will likely need RHC  changed to bumex po per HF team   monitor on po bumex per cardiology and plan for possible RHC on monday.   5beats wct - changed labetalol to coreg and uptitrated to 12.5 mg bid - no further ectopy on telemetry  -c/w losartan 100 milliGRAM(s) Oral daily
poor TTE here - reviewed previous TTE 2019 with outpatient cardiologist - at that time EF 45%. low BNP. CT chest w/ clear lungs but Blines on POCUS lung by pulmonary.   Cardiology and Pulmonary consult appreciated   Pulmonary hypertension is definitely a consideration given ASIYA. No PE on CT imaging. unable to visualize on TTE. unable to complete cardiac MRI due to size. MUGA with preserved EF and L/R wall motion.    Cards does not feel there is a benefit to cardiac cath as it will not .  Changed to bumex po per HF team.   Coreg for hf optimization    C/w losartan 100 milliGRAM(s) Oral daily
poor TTE here - reviewed previous TTE 2019 with outpatient cardiologist - at that time EF 45%. low BNP. CT chest w/ clear lungs but Blines on POCUS lung by pulmonary   cardiology and Pulmonary consult appreciated   Pulmonary hypertension is definitely a consideration given ASIYA. No PE on CT imaging. unable to visualize on TTE.  changed to bumex per cardiology - 3mg iv bid  5beats wct - changed labetalol to coreg - will uptitrating as tolerated   -c/w losartan 100 milliGRAM(s) Oral daily
-admitting team had clinical suspicion for new onset HF exacerbation. BNP low but obesity can cause low BNP. CT chest w/ clear lungs but right sided disease possible. Pulmonary hypertension is definitely a consideration given ASIYA. No PE on CT imaging.   -c/w lasix 40 iv bid for now   TTE poor study   Cards consult  -c/w labetalol for now  -losartan 100 milliGRAM(s) Oral daily
-admitting team had clinical suspicion for new onset HF exacerbation. BNP low but obesity can cause low BNP. CT chest w/ clear lungs but right sided disease possible. Pulmonary hypertension is definitely a consideration given ASIYA. No PE on CT imaging.   -c/w lasix 40 iv bid for now  -check TTE  -labetalol 200 milliGRAM(s) Oral two times a day -- if true HF, can switch to GDMT.   -losartan 100 milliGRAM(s) Oral daily
poor TTE here - reviewed previous TTE 2019 with outpatient cardiologist - at that time EF 45%. low BNP. CT chest w/ clear lungs but Blines on POCUS lung by pulmonary   cardiology and Pulmonary consult appreciated   Pulmonary hypertension is definitely a consideration given ASIYA. No PE on CT imaging. unable to visualize on TTE. unable to complete cardiac MRI due to size  changed to bumex per cardiology - 3mg iv bid  5beats wct - changed labetalol to coreg - will uptitrating as tolerated   -c/w losartan 100 milliGRAM(s) Oral daily
poor TTE here - reviewed previous TTE 2019 with outpatient cardiologist - at that time EF 45%. low BNP. CT chest w/ clear lungs but Blines on POCUS lung by pulmonary   cardiology and Pulmonary consult appreciated   Pulmonary hypertension is definitely a consideration given ASIYA. No PE on CT imaging. unable to visualize on TTE. unable to complete cardiac MRI due to size  changed to bumex po per HF team   monitor on po bumex per cardiology and plan for possible RHC on monday.   5beats wct - changed labetalol to coreg and uptitrated to 12.5 mg bid - no further ectopy on telemetry  -c/w losartan 100 milliGRAM(s) Oral daily

## 2021-03-24 NOTE — PROGRESS NOTE ADULT - REASON FOR ADMISSION
LE edema and shortness of breath
Shortness of breath
LE edema and shortness of breath
no

## 2021-03-24 NOTE — PROGRESS NOTE ADULT - PROBLEM SELECTOR PLAN 6
-c/w statin

## 2021-03-24 NOTE — PROGRESS NOTE ADULT - PROBLEM SELECTOR PLAN 8
-c/w allopurinol

## 2021-03-24 NOTE — PROGRESS NOTE ADULT - NUTRITIONAL ASSESSMENT
This patient has been assessed with a concern for Malnutrition and has been determined to have a diagnosis/diagnoses of Morbid obesity (BMI > 40).    This patient is being managed with:   Diet DASH/TLC-  Sodium & Cholesterol Restricted  Entered: Mar 13 2021  9:54AM    

## 2021-03-24 NOTE — PROGRESS NOTE ADULT - PROBLEM SELECTOR PLAN 9
dvt proph - lovenox ppx    D/w med np and cards.
dvt proph - lovenox ppx
-lovenox ppx
dvt proph - lovenox ppx    D/w med np.
dvt proph-lovenox ppx

## 2021-03-24 NOTE — DISCHARGE NOTE NURSING/CASE MANAGEMENT/SOCIAL WORK - NSDCFUADDAPPT_GEN_ALL_CORE_FT
Please follow up with your cardiologist, Dr Farfan on March 30 at 3 pm after discharge for continued monitoring and management. If you are unable to keep this appointment, please call the office to reschedule, [phone]

## 2021-03-24 NOTE — DISCHARGE NOTE NURSING/CASE MANAGEMENT/SOCIAL WORK - PATIENT PORTAL LINK FT
You can access the FollowMyHealth Patient Portal offered by [facility] by registering at the following website: [email or web address]. By joining [facility]’s FollowMyHealth portal, you will also be able to view your health information using other applications (apps) compatible with our system.

## 2021-03-24 NOTE — PROGRESS NOTE ADULT - SUBJECTIVE AND OBJECTIVE BOX
HOSPITALIST NOTE    Dr. Toney Levine DO  Attending Physician  Division of Hospital Medicine  [facility]  Pager:  [phone]    SUBJECTIVE  No acute complaints    REVIEW OF SYSTEMS  12 point review of systems negative except for above.     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    Renal calculi    Obstructive sleep apnea  CPAP    H/O: osteoarthritis    Lumbar disc disease with radiculopathy    Gout    Neuropathy  Bilateral Feet since 2012    Kidney stone  s/w ESWL pt unsure site    S/P lumbar laminectomy  Fusion L3-L5 2012    S/P CABG x 3  8/29/17    S/P lumbar discectomy  2012- ,L5  Aug 2013    S/P revision of total knee, right  x2- 2012 &amp; 2014    H/O lithotripsy  x1    S/P knee replacement  2011- right x 3  - revisions in 2012 &amp; 2014    S/P tonsillectomy and adenoidectomy  as child    Hernia  repair, left inguinal 2010    Cholecystitis  cholecycstectomy 2010    History of Total Hip Replacement  2009- bilateral THR    S/P Left Inguinal Hernia Repair        MEDICATIONS  (STANDING):  allopurinol 100 milliGRAM(s) Oral at bedtime  amLODIPine   Tablet 10 milliGRAM(s) Oral daily  aspirin enteric coated 81 milliGRAM(s) Oral daily  atorvastatin 40 milliGRAM(s) Oral at bedtime  buMETAnide 2 milliGRAM(s) Oral two times a day  calcium carbonate 1250 mG  + Vitamin D (OsCal 500 + D) 1 Tablet(s) Oral daily  carvedilol 12.5 milliGRAM(s) Oral every 12 hours  enoxaparin Injectable 40 milliGRAM(s) SubCutaneous every 12 hours  gabapentin 300 milliGRAM(s) Oral three times a day  losartan 100 milliGRAM(s) Oral daily    MEDICATIONS  (PRN):      Allergies    No Known Allergies    Intolerances        T(C): 36.6 (03-24-21 @ 12:47), Max: 36.8 (03-23-21 @ 20:58)  T(F): 97.8 (03-24-21 @ 12:47), Max: 98.3 (03-23-21 @ 20:58)  HR: 60 (03-24-21 @ 12:47) (58 - 79)  BP: 113/70 (03-24-21 @ 12:47) (105/64 - 159/92)  ABP: --  ABP(mean): --  RR: 19 (03-24-21 @ 12:47) (18 - 19)  SpO2: 94% (03-24-21 @ 12:47) (94% - 99%)    CONSTITUTIONAL: No acute distress.   HEENT:  Conjunctiva clear B/L.  Moist oral mucosa.   Cardiovascular: RRR with no murmurs. No JVD noted. Trace lower extremity edema B/L. Extremities are warm and well perfused. Radial pulses 2+ B/L. Dorsalis pedis pulses 2+ B/L.    Respiratory: Dec bs at bases. No wrr. No accessory muscle use.   Gastrointestinal:  Soft, nontender. Non-distended. Non-rigid.   Neurologic:  Alert and awake. Moving all extremities. Following commands. Making eye contact.    Skin:  No rashes noted. No skin erythema noted.   Psych:  Normal affect. Normal Mood.       LABS                        15.5   7.31  )-----------( 189      ( 23 Mar 2021 07:23 )             45.7     03-24    140  |  100  |  29<H>  ----------------------------<  118<H>  3.6   |  24  |  0.68    Ca    9.3      24 Mar 2021 06:40  Mg     1.8     03-24            ADDITIONAL STUDIES PERSONALLY REVIEWED    Our team discussed the case with all consults    All consultant contribution to care greatly appreciated.

## 2021-03-24 NOTE — PROGRESS NOTE ADULT - PROVIDER SPECIALTY LIST ADULT
Cardiology
Pulmonology
Hospitalist

## 2021-04-01 ENCOUNTER — APPOINTMENT (OUTPATIENT)
Dept: CARDIOLOGY | Facility: CLINIC | Age: 66
End: 2021-04-01

## 2021-04-01 RX ORDER — HYALURONATE SODIUM 20 MG/2 ML
20 SYRINGE (ML) INTRAARTICULAR
Qty: 1 | Refills: 0 | Status: DISCONTINUED | COMMUNITY
Start: 2019-10-04 | End: 2021-04-01

## 2021-04-01 RX ORDER — ASPIRIN ENTERIC COATED TABLETS 81 MG 81 MG/1
81 TABLET, DELAYED RELEASE ORAL
Refills: 0 | Status: ACTIVE | COMMUNITY
Start: 2021-04-01

## 2021-04-01 RX ORDER — POLYETHYLENE GLYCOL 3350 17 G/17G
17 POWDER, FOR SOLUTION ORAL
Qty: 238 | Refills: 0 | Status: DISCONTINUED | COMMUNITY
Start: 2018-12-06 | End: 2021-04-01

## 2021-04-01 RX ORDER — DICLOFENAC SODIUM 10 MG/G
1 GEL TOPICAL DAILY
Qty: 100 | Refills: 0 | Status: DISCONTINUED | COMMUNITY
Start: 2019-09-27 | End: 2021-04-01

## 2021-04-01 RX ORDER — OXYCODONE 5 MG/1
5 TABLET ORAL
Qty: 20 | Refills: 0 | Status: DISCONTINUED | COMMUNITY
Start: 2019-12-31 | End: 2021-04-01

## 2021-04-01 RX ORDER — DICLOFENAC SODIUM AND MISOPROSTOL 75; 200 MG/1; UG/1
75-0.2 TABLET, DELAYED RELEASE ORAL TWICE DAILY
Qty: 60 | Refills: 3 | Status: DISCONTINUED | COMMUNITY
Start: 2019-09-25 | End: 2021-04-01

## 2021-04-01 RX ORDER — PREDNISONE 10 MG/1
10 TABLET ORAL DAILY
Qty: 20 | Refills: 0 | Status: DISCONTINUED | COMMUNITY
Start: 2019-10-21 | End: 2021-04-01

## 2021-04-01 RX ORDER — TAMSULOSIN HYDROCHLORIDE 0.4 MG/1
0.4 CAPSULE ORAL
Qty: 90 | Refills: 3 | Status: DISCONTINUED | COMMUNITY
Start: 2018-09-14 | End: 2021-04-01

## 2021-04-01 RX ORDER — ASPIRIN 325 MG/1
325 TABLET, COATED ORAL DAILY
Refills: 0 | Status: DISCONTINUED | COMMUNITY
End: 2021-04-01

## 2021-04-01 RX ORDER — ATORVASTATIN CALCIUM 40 MG/1
40 TABLET, FILM COATED ORAL
Refills: 0 | Status: DISCONTINUED | COMMUNITY
End: 2021-04-01

## 2021-04-01 RX ORDER — BUMETANIDE 2 MG/1
2 TABLET ORAL DAILY
Refills: 0 | Status: ACTIVE | COMMUNITY
Start: 2021-04-01

## 2021-04-01 RX ORDER — ATORVASTATIN CALCIUM 40 MG/1
40 TABLET, FILM COATED ORAL
Qty: 30 | Refills: 0 | Status: ACTIVE | COMMUNITY

## 2021-04-01 RX ORDER — PREDNISONE 20 MG/1
20 TABLET ORAL
Qty: 10 | Refills: 0 | Status: DISCONTINUED | COMMUNITY
Start: 2019-10-18 | End: 2021-04-01

## 2021-04-01 RX ORDER — LABETALOL HYDROCHLORIDE 300 MG/1
300 TABLET, FILM COATED ORAL
Refills: 0 | Status: DISCONTINUED | COMMUNITY
Start: 2018-09-14 | End: 2021-04-01

## 2021-04-01 RX ORDER — AMLODIPINE BESYLATE 10 MG/1
10 TABLET ORAL DAILY
Qty: 30 | Refills: 1 | Status: ACTIVE | COMMUNITY

## 2021-04-01 RX ORDER — OXYCODONE 5 MG/1
5 TABLET ORAL EVERY 6 HOURS
Qty: 28 | Refills: 0 | Status: DISCONTINUED | COMMUNITY
Start: 2019-11-01 | End: 2021-04-01

## 2021-04-01 RX ORDER — HYLAN G-F 20 16MG/2ML
48 SYRINGE (ML) INTRAARTICULAR ONCE
Qty: 1 | Refills: 0 | Status: DISCONTINUED | COMMUNITY
Start: 2019-10-11 | End: 2021-04-01

## 2021-04-01 RX ORDER — CARVEDILOL 12.5 MG/1
12.5 TABLET, FILM COATED ORAL TWICE DAILY
Qty: 60 | Refills: 5 | Status: ACTIVE | COMMUNITY
Start: 2021-04-01

## 2021-04-08 ENCOUNTER — EMERGENCY (EMERGENCY)
Facility: HOSPITAL | Age: 66
LOS: 1 days | Discharge: HOME | End: 2021-04-08
Attending: EMERGENCY MEDICINE
Payer: MEDICARE

## 2021-04-08 VITALS
OXYGEN SATURATION: 99 % | TEMPERATURE: 98 F | SYSTOLIC BLOOD PRESSURE: 136 MMHG | HEART RATE: 72 BPM | RESPIRATION RATE: 18 BRPM | DIASTOLIC BLOOD PRESSURE: 74 MMHG

## 2021-04-08 VITALS
DIASTOLIC BLOOD PRESSURE: 96 MMHG | WEIGHT: 315 LBS | TEMPERATURE: 98 F | SYSTOLIC BLOOD PRESSURE: 157 MMHG | HEIGHT: 77 IN | OXYGEN SATURATION: 97 % | HEART RATE: 76 BPM | RESPIRATION RATE: 22 BRPM

## 2021-04-08 DIAGNOSIS — Z96.651 PRESENCE OF RIGHT ARTIFICIAL KNEE JOINT: Chronic | ICD-10-CM

## 2021-04-08 DIAGNOSIS — Z95.1 PRESENCE OF AORTOCORONARY BYPASS GRAFT: Chronic | ICD-10-CM

## 2021-04-08 DIAGNOSIS — N20.0 CALCULUS OF KIDNEY: Chronic | ICD-10-CM

## 2021-04-08 DIAGNOSIS — G62.9 POLYNEUROPATHY, UNSPECIFIED: Chronic | ICD-10-CM

## 2021-04-08 DIAGNOSIS — Z98.89 OTHER SPECIFIED POSTPROCEDURAL STATES: Chronic | ICD-10-CM

## 2021-04-08 DIAGNOSIS — Z98.890 OTHER SPECIFIED POSTPROCEDURAL STATES: Chronic | ICD-10-CM

## 2021-04-08 LAB
ALBUMIN SERPL ELPH-MCNC: 4.4 G/DL — SIGNIFICANT CHANGE UP (ref 3.3–5)
ALP SERPL-CCNC: 94 U/L — SIGNIFICANT CHANGE UP (ref 40–120)
ALT FLD-CCNC: 42 U/L — SIGNIFICANT CHANGE UP (ref 10–45)
ANION GAP SERPL CALC-SCNC: 14 MMOL/L — SIGNIFICANT CHANGE UP (ref 5–17)
AST SERPL-CCNC: 28 U/L — SIGNIFICANT CHANGE UP (ref 10–40)
BASOPHILS # BLD AUTO: 0.04 K/UL — SIGNIFICANT CHANGE UP (ref 0–0.2)
BASOPHILS NFR BLD AUTO: 0.6 % — SIGNIFICANT CHANGE UP (ref 0–2)
BILIRUB SERPL-MCNC: 0.5 MG/DL — SIGNIFICANT CHANGE UP (ref 0.2–1.2)
BUN SERPL-MCNC: 12 MG/DL — SIGNIFICANT CHANGE UP (ref 7–23)
CALCIUM SERPL-MCNC: 9.9 MG/DL — SIGNIFICANT CHANGE UP (ref 8.4–10.5)
CHLORIDE SERPL-SCNC: 104 MMOL/L — SIGNIFICANT CHANGE UP (ref 96–108)
CO2 SERPL-SCNC: 23 MMOL/L — SIGNIFICANT CHANGE UP (ref 22–31)
CREAT SERPL-MCNC: 0.57 MG/DL — SIGNIFICANT CHANGE UP (ref 0.5–1.3)
EOSINOPHIL # BLD AUTO: 0.21 K/UL — SIGNIFICANT CHANGE UP (ref 0–0.5)
EOSINOPHIL NFR BLD AUTO: 3.1 % — SIGNIFICANT CHANGE UP (ref 0–6)
GLUCOSE SERPL-MCNC: 141 MG/DL — HIGH (ref 70–99)
HCT VFR BLD CALC: 44 % — SIGNIFICANT CHANGE UP (ref 39–50)
HGB BLD-MCNC: 15.4 G/DL — SIGNIFICANT CHANGE UP (ref 13–17)
IMM GRANULOCYTES NFR BLD AUTO: 0.7 % — SIGNIFICANT CHANGE UP (ref 0–1.5)
LYMPHOCYTES # BLD AUTO: 1.48 K/UL — SIGNIFICANT CHANGE UP (ref 1–3.3)
LYMPHOCYTES # BLD AUTO: 22.2 % — SIGNIFICANT CHANGE UP (ref 13–44)
MAGNESIUM SERPL-MCNC: 1.8 MG/DL — SIGNIFICANT CHANGE UP (ref 1.6–2.6)
MCHC RBC-ENTMCNC: 29.6 PG — SIGNIFICANT CHANGE UP (ref 27–34)
MCHC RBC-ENTMCNC: 35 GM/DL — SIGNIFICANT CHANGE UP (ref 32–36)
MCV RBC AUTO: 84.6 FL — SIGNIFICANT CHANGE UP (ref 80–100)
MONOCYTES # BLD AUTO: 0.53 K/UL — SIGNIFICANT CHANGE UP (ref 0–0.9)
MONOCYTES NFR BLD AUTO: 7.9 % — SIGNIFICANT CHANGE UP (ref 2–14)
NEUTROPHILS # BLD AUTO: 4.37 K/UL — SIGNIFICANT CHANGE UP (ref 1.8–7.4)
NEUTROPHILS NFR BLD AUTO: 65.5 % — SIGNIFICANT CHANGE UP (ref 43–77)
NRBC # BLD: 0 /100 WBCS — SIGNIFICANT CHANGE UP (ref 0–0)
NT-PROBNP SERPL-SCNC: 121 PG/ML — SIGNIFICANT CHANGE UP (ref 0–300)
PLATELET # BLD AUTO: 187 K/UL — SIGNIFICANT CHANGE UP (ref 150–400)
POTASSIUM SERPL-MCNC: 4.3 MMOL/L — SIGNIFICANT CHANGE UP (ref 3.5–5.3)
POTASSIUM SERPL-SCNC: 4.3 MMOL/L — SIGNIFICANT CHANGE UP (ref 3.5–5.3)
PROT SERPL-MCNC: 7.1 G/DL — SIGNIFICANT CHANGE UP (ref 6–8.3)
RBC # BLD: 5.2 M/UL — SIGNIFICANT CHANGE UP (ref 4.2–5.8)
RBC # FLD: 12.6 % — SIGNIFICANT CHANGE UP (ref 10.3–14.5)
SARS-COV-2 RNA SPEC QL NAA+PROBE: SIGNIFICANT CHANGE UP
SODIUM SERPL-SCNC: 141 MMOL/L — SIGNIFICANT CHANGE UP (ref 135–145)
TROPONIN T, HIGH SENSITIVITY RESULT: 15 NG/L — SIGNIFICANT CHANGE UP (ref 0–51)
TROPONIN T, HIGH SENSITIVITY RESULT: 16 NG/L — SIGNIFICANT CHANGE UP (ref 0–51)
WBC # BLD: 6.68 K/UL — SIGNIFICANT CHANGE UP (ref 3.8–10.5)
WBC # FLD AUTO: 6.68 K/UL — SIGNIFICANT CHANGE UP (ref 3.8–10.5)

## 2021-04-08 PROCEDURE — 84484 ASSAY OF TROPONIN QUANT: CPT

## 2021-04-08 PROCEDURE — 85025 COMPLETE CBC W/AUTO DIFF WBC: CPT

## 2021-04-08 PROCEDURE — 99284 EMERGENCY DEPT VISIT MOD MDM: CPT | Mod: 25

## 2021-04-08 PROCEDURE — U0003: CPT

## 2021-04-08 PROCEDURE — 83880 ASSAY OF NATRIURETIC PEPTIDE: CPT

## 2021-04-08 PROCEDURE — 83735 ASSAY OF MAGNESIUM: CPT

## 2021-04-08 PROCEDURE — 80053 COMPREHEN METABOLIC PANEL: CPT

## 2021-04-08 PROCEDURE — 99284 EMERGENCY DEPT VISIT MOD MDM: CPT

## 2021-04-08 PROCEDURE — 71045 X-RAY EXAM CHEST 1 VIEW: CPT

## 2021-04-08 PROCEDURE — 93005 ELECTROCARDIOGRAM TRACING: CPT

## 2021-04-08 PROCEDURE — 71045 X-RAY EXAM CHEST 1 VIEW: CPT | Mod: 26

## 2021-04-08 PROCEDURE — 93010 ELECTROCARDIOGRAM REPORT: CPT | Mod: GC

## 2021-04-08 PROCEDURE — 99285 EMERGENCY DEPT VISIT HI MDM: CPT | Mod: GC

## 2021-04-08 PROCEDURE — 96374 THER/PROPH/DIAG INJ IV PUSH: CPT

## 2021-04-08 RX ORDER — BUMETANIDE 0.25 MG/ML
2 INJECTION INTRAMUSCULAR; INTRAVENOUS ONCE
Refills: 0 | Status: COMPLETED | OUTPATIENT
Start: 2021-04-08 | End: 2021-04-08

## 2021-04-08 RX ADMIN — BUMETANIDE 2 MILLIGRAM(S): 0.25 INJECTION INTRAMUSCULAR; INTRAVENOUS at 11:41

## 2021-04-08 NOTE — ED PROVIDER NOTE - NSFOLLOWUPINSTRUCTIONS_ED_ALL_ED_FT
- Please follow up with your Primary Care Doctor within 48 hours. Bring your results from today.    - Please follow up with your Cardiologist within one month. Bring your results from today.    - Continue taking your Bumex as directed. Take an additional pill if you gain 3lbs in one day or 5lbs in one week.     - Be sure to return to the ED if you develop new, worsening, or any other distressing.

## 2021-04-08 NOTE — CONSULT NOTE ADULT - SUBJECTIVE AND OBJECTIVE BOX
MRN-[id number]    CHIEF COMPLAINT:  Patient is a 65y old  Male who presents with a chief complaint of     HISTORY OF PRESENT ILLNESS:  FRANCO RIVERO is a 65y Male patient with past medical history of *** presenting with ***.     Allergies    No Known Allergies    Intolerances    	    PAST MEDICAL & SURGICAL HISTORY:  Gout    Lumbar disc disease with radiculopathy    H/O: osteoarthritis    Obstructive sleep apnea  CPAP    Renal calculi    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    Essential hypertension    CAD (coronary artery disease)  - s/p cabg x3    Ankle fracture    Hypercholesterolemia    ASIYA (obstructive sleep apnea)  initially dx  ; CPAP    Class 3 severe obesity with body mass index (BMI) of 45.0 to 49.9 in adult    Paralytic ileus  post op THR  &amp; post op laminectomy    Umbilical hernia without obstruction and without gangrene    S/P Left Inguinal Hernia Repair    History of Total Hip Replacement  - bilateral THR    Cholecystitis  cholecycstectomy 2010    Hernia  repair, left inguinal 2010    S/P tonsillectomy and adenoidectomy  as child    S/P knee replacement  - right x 3  - revisions in  &amp;     H/O lithotripsy  x1    S/P revision of total knee, right  x2-  &amp;     S/P lumbar discectomy  - ,L5  Aug 2013    S/P CABG x 3  17    S/P lumbar laminectomy  Fusion L3-L5     Kidney stone  s/w ESWL pt unsure site    Neuropathy  Bilateral Feet since         FAMILY HISTORY:  Family history of coronary artery disease  HLD/Angina. Fatal MI age 73.    Family history of diabetes mellitus type II  mother- - hyperlipidemia and Hypertension.    Family history of pancreatic cancer (Sibling)  brother     Family history of coronary artery disease  brother- age 50+- Coronary Artery Stents        SOCIAL HISTORY:    [ ] Non-smoker  [ ] Smoker  [ ] Alcohol    REVIEW OF SYSTEMS:  CONSTITUTIONAL: No fever, weight loss, or fatigue  EYES: No eye pain, visual disturbances, or discharge  ENMT:  No difficulty hearing, tinnitus, vertigo; No sinus or throat pain  NECK: No pain or stiffness  RESPIRATORY: No cough, wheezing, chills or hemoptysis; No Shortness of Breath  CARDIOVASCULAR: No chest pain, palpitations, passing out, dizziness, or leg swelling  GASTROINTESTINAL: No abdominal or epigastric pain. No nausea, vomiting, or hematemesis; No diarrhea or constipation. No melena or hematochezia.  GENITOURINARY: No dysuria, frequency, hematuria, or incontinence  NEUROLOGICAL: No headaches, memory loss, loss of strength, numbness, or tremors  SKIN: No itching, burning, rashes, or lesions   LYMPH Nodes: No enlarged glands  ENDOCRINE: No heat or cold intolerance; No hair loss  MUSCULOSKELETAL: No joint pain or swelling; No muscle, back, or extremity pain  PSYCHIATRIC: No depression, anxiety, mood swings, or difficulty sleeping  HEME/LYMPH: No easy bruising, or bleeding gums  ALLERY AND IMMUNOLOGIC: No hives or eczema	    [ ] All others negative	  [ ] Unable to obtain    I&O's Summary      PHYSICAL EXAM:  Vital Signs Last 24 Hrs  T(C): 36.4 (2021 11:44), Max: 36.4 (2021 08:20)  T(F): 97.6 (2021 11:44), Max: 97.6 (2021 08:20)  HR: 72 (2021 11:44) (72 - 77)  BP: 136/74 (2021 11:44) (136/74 - 157/96)  BP(mean): --  RR: 18 (2021 11:44) (18 - 22)  SpO2: 99% (2021 11:44) (97% - 100%)  Appearance: Normal	  HEENT:   Normal oral mucosa, PERRL, EOMI	  Lymphatic: No lymphadenopathy  Cardiovascular: Normal S1 S2, No JVD, No murmurs, No edema  Respiratory: Lungs clear to auscultation	  Psychiatry: A & O x 3, Mood & affect appropriate  Gastrointestinal:  Soft, Non-tender, + BS	  Skin: No rashes, No ecchymoses, No cyanosis	  Neurologic: Non-focal  Extremities: Normal range of motion, No clubbing, cyanosis or edema  Vascular: Peripheral pulses palpable 2+ bilaterally    MEDICATIONS:  MEDICATIONS  (STANDING):    MEDICATIONS  (PRN):      Home Medications:  acetaminophen 500 mg oral tablet: 2 tab(s) orally every 8 hours, As Needed (24 Mar 2021 11:40)  allopurinol 100 mg oral tablet: 1 tab(s) orally once a day (at bedtime) (13 Mar 2021 09:59)  amLODIPine 10 mg oral tablet: 1 tab(s) orally once a day AM (13 Mar 2021 09:59)  aspirin 81 mg oral delayed release tablet: 1 tab(s) orally once a day (24 Mar 2021 11:40)  atorvastatin 40 mg oral tablet: 1 tab(s) orally once a day AM (13 Mar 2021 09:59)  Calcium 600+D oral tablet: 1 tab(s) orally once a day (13 Mar 2021 09:59)  gabapentin 300 mg oral tablet: 1 tab(s) orally 3 times a day (13 Mar 2021 09:59)  losartan 100 mg oral tablet: 1 tab(s) orally once a day AM (13 Mar 2021 09:59)  Multiple Vitamins oral tablet: 1 tab(s) orally once a day (13 Mar 2021 09:59)      LABS:	 	  CBC Full  -  ( 2021 09:23 )  WBC Count : 6.68 K/uL  Hemoglobin : 15.4 g/dL  Hematocrit : 44.0 %  Platelet Count - Automated : 187 K/uL  Mean Cell Volume : 84.6 fl  Mean Cell Hemoglobin : 29.6 pg  Mean Cell Hemoglobin Concentration : 35.0 gm/dL  Auto Neutrophil # : 4.37 K/uL  Auto Lymphocyte # : 1.48 K/uL  Auto Monocyte # : 0.53 K/uL  Auto Eosinophil # : 0.21 K/uL  Auto Basophil # : 0.04 K/uL  Auto Neutrophil % : 65.5 %  Auto Lymphocyte % : 22.2 %  Auto Monocyte % : 7.9 %  Auto Eosinophil % : 3.1 %  Auto Basophil % : 0.6 %    08    141  |  104  |  12  ----------------------------<  141<H>  4.3   |  23  |  0.57    Ca    9.9      2021 09:23  Mg     1.8         TPro  7.1  /  Alb  4.4  /  TBili  0.5  /  DBili  x   /  AST  28  /  ALT  42  /  AlkPhos  94  08            proBNP:   Lipid Profile:   HgA1c:   TSH:     TELEMETRY: 	    ECG:  	  RADIOLOGY:  OTHER: 	    CARDIAC TESTING/STUDIES:    [ ] Echocardiogram:  [ ]  Catheterization:  [ ] Stress Test:  	  	  ASSESSMENT/PLAN: 	    Robby Bustamante MD, MPH, RANDEE, RPVI, FACC  Cardiovascular Specialist   [facility] ([facility]  [facility]  c: [phone] (text preferred and/or call)  e: [email or web address]  For all [facility] Cardiology and Cardiovascular Surgery on-service contact/call information, go to [email or web address] and use "[email or web address]" to login.  For outpatient Cardiology appointments, call  [phone] to arrange with a colleague; I do not have outpatient Cardiology clinic.   MRN-[id number]    CHIEF COMPLAINT:  Dyspnea    HISTORY OF PRESENT ILLNESS:  FRANCO RIVERO is a 65 year old male with past medical history of morbid obesity, ASIYA using CPAP, HTN, CAD s/p CABG 2017, recent COVID-19 infection with Ab, HFpEF presenting with mild decompensated diastolic heart failure in the setting of diuretic cessation after 2 weeks of treatment on his discharge. He was stable throughout the past two weeks but became symptomatic after stopping diuretics, specifically orthopnea while laying flat at night. In the ED, workup is overall negative other than mildly elevated pBNP relatively speaking.  pBNP 121 which is elevated for him and diuresed down to pBNP 30 prior admission. Given 2 mg IV bumetanide with excellent response and is at his baseline. HsT negative. ECG non-ischemic. Cardiology consulted for further management.     Allergies  No Known Allergies	    PAST MEDICAL & SURGICAL HISTORY:  Gout  Lumbar disc disease with radiculopathy  H/O: osteoarthritis  Obstructive sleep apnea  CPAP  Renal calcul  Neuropathy  BLE - secondary to L Spine surger  Essential hypertension  CAD (coronary artery disease)  - s/p cabg x3  Ankle fracture  Hypercholesterolemia  ASIYA (obstructive sleep apnea)  initially dx  ; CPAP  Class 3 severe obesity with body mass index (BMI) of 45.0 to 49.9 in adult  Paralytic ileus  post op THR  &amp; post op laminectomy  Umbilical hernia without obstruction and without gangrene  S/P Left Inguinal Hernia Repair  History of Total Hip Replacement  - bilateral THR  Cholecystitis  cholecycstectomy 2010  Hernia  repair, left inguinal 2010  S/P tonsillectomy and adenoidectomy  as child  S/P knee replacement  - right x 3  - revisions in  &amp;   H/O lithotripsy  x1  S/P revision of total knee, right  x2-  &amp; 2014  S/P lumbar discectomy  - ,L5  Aug 2013  S/P CABG x 3  17  S/P lumbar laminectomy  Fusion L3-L5   Kidney stone  s/w ESWL pt unsure sie  Neuropathy  Bilateral Feet since     FAMILY HISTORY:  Family history of coronary artery disease  HLD/Angina. Fatal MI age 73.    Family history of diabetes mellitus type II  mother- - hyperlipidemia and Hypertension.    Family history of pancreatic cancer (Sibling)  brother     Family history of coronary artery disease  brother- age 50+- Coronary Artery Stents    SOCIAL HISTORY:    Non-smoker    REVIEW OF SYSTEMS:  CONSTITUTIONAL: No fever, weight loss, or fatigue  EYES: No eye pain, visual disturbances, or discharge  ENMT:  No difficulty hearing, tinnitus, vertigo; No sinus or throat pain  NECK: No pain or stiffness  RESPIRATORY: No cough, wheezing, chills or hemoptysis; POSITIVE Shortness of Breath  CARDIOVASCULAR: No chest pain, palpitations, passing out, dizziness, or leg swelling  GASTROINTESTINAL: No abdominal or epigastric pain. No nausea, vomiting, or hematemesis; No diarrhea or constipation. No melena or hematochezia.  GENITOURINARY: No dysuria, frequency, hematuria, or incontinence  NEUROLOGICAL: No headaches, memory loss, loss of strength, numbness, or tremors  SKIN: No itching, burning, rashes, or lesions   LYMPH Nodes: No enlarged glands  ENDOCRINE: No heat or cold intolerance; No hair loss  MUSCULOSKELETAL: No joint pain or swelling; No muscle, back, or extremity pain  PSYCHIATRIC: No depression, anxiety, mood swings, or difficulty sleeping  HEME/LYMPH: No easy bruising, or bleeding gums  ALLERY AND IMMUNOLOGIC: No hives or eczema	    PHYSICAL EXAM:  Vital Signs Last 24 Hrs  T(C): 36.4 (2021 11:44), Max: 36.4 (2021 08:20)  T(F): 97.6 (2021 11:44), Max: 97.6 (2021 08:20)  HR: 72 (2021 11:44) (72 - 77)  BP: 136/74 (2021 11:44) (136/74 - 157/96)  RR: 18 (2021 11:44) (18 - 22)  SpO2: 99% (2021 11:44) (97% - 100%)    PHYSICAL EXAM:  · CONSTITUTIONAL: Well-developed  · EYES:; no drainage or redness  · NECK: supple  ·RESPIRATORY: respirations non-labored; clear to auscultation bilaterally.  · CARDIOVASCULAR: regular rate and rhythm   . GASTROINTESTINAL:  no distention; no masses palpable  · EXTREMITIES: No cyanosis, clubbing. Trace edema  · VASCULAR:  Equal and normal pulses (radial)  ·NEUROLOGICAL:  Alert and oriented x 3  · SKIN: No lesions; no rash  . LYMPH NODES: No lymphadedenopathy  · MUSCULOSKELETAL:  no joint swelling    Home Medications:  acetaminophen 500 mg oral tablet: 2 tab(s) orally every 8 hours, As Needed (24 Mar 2021 11:40)  allopurinol 100 mg oral tablet: 1 tab(s) orally once a day (at bedtime) (13 Mar 2021 09:59)  amLODIPine 10 mg oral tablet: 1 tab(s) orally once a day AM (13 Mar 2021 09:59)  aspirin 81 mg oral delayed release tablet: 1 tab(s) orally once a day (24 Mar 2021 11:40)  atorvastatin 40 mg oral tablet: 1 tab(s) orally once a day AM (13 Mar 2021 09:59)  Calcium 600+D oral tablet: 1 tab(s) orally once a day (13 Mar 2021 09:59)  gabapentin 300 mg oral tablet: 1 tab(s) orally 3 times a day (13 Mar 2021 09:59)  losartan 100 mg oral tablet: 1 tab(s) orally once a day AM (13 Mar 2021 09:59)  Multiple Vitamins oral tablet: 1 tab(s) orally once a day (13 Mar 2021 09:59)    LABS  CBC Full  -  ( 2021 09:23 )  WBC Count : 6.68 K/uL  Hemoglobin : 15.4 g/dL  Hematocrit : 44.0 %  Platelet Count - Automated : 187 K/uL  Mean Cell Volume : 84.6 fl  Mean Cell Hemoglobin : 29.6 pg  Mean Cell Hemoglobin Concentration : 35.0 gm/dL  Auto Neutrophil # : 4.37 K/uL  Auto Lymphocyte # : 1.48 K/uL  Auto Monocyte # : 0.53 K/uL  Auto Eosinophil # : 0.21 K/uL  Auto Basophil # : 0.04 K/uL  Auto Neutrophil % : 65.5 %  Auto Lymphocyte % : 22.2 %  Auto Monocyte % : 7.9 %  Auto Eosinophil % : 3.1 %  Auto Basophil % : 0.6 %        141  |  104  |  12  ----------------------------<  141<H>  4.3   |  23  |  0.57    Ca    9.9      2021 09:23  Mg     1.8         TPro  7.1  /  Alb  4.4  /  TBili  0.5  /  DBili  x   /  AST  28  /  ALT  42  /  AlkPhos  94      proBNP: 121    Telemetry 2021  NSR       [id number]  NSR, non-specific intra-ventricular conduction delay.     CARDIAC TESTING/STUDIES:    MUGA 3/22/2021  IMPRESSION: Normal gated blood pool study.  Normal resting left ventricular ejection fraction of 57 % and normal right and left ventricular wall motion.    ASSESSMENT/PLAN: 	  65 year old male with past medical history of morbid obesity, ASIYA, HTN, CAD s/p CABG 2017, HFpEF presenting with acute COVID-19 infection complicated by decompensated acute on chronic diastolic heart failure.     1) Acute on chronic diastolic heart failure, mild  Stopped diuresis after 2 weeks at the discretion of discharge instruction prior admission  MUGA EF 57%; unable to get adequate images by ECHO  Presenting pBNP 121 similar to prior admission, saturating well on RA. Comfortable after IV diuresis in ED.     ·	Continue PO bumetanide 2 mg daily moving forward. Discussed patient direction diuresis and he will take an addition dose i nthe afternoon if he gains >3 lbs in 24 hours or >5 lbs within one week.   ·	Risk factor modification.  ·	Strongly encourage 30 minutes of moderate-intensity exercise daily equivalent to 3 mi/hr of brisk walking as well as a heart healthy diet such as a vegan or Mediterranean diet which will substantially reduce cardiovascular events.    2) HTN   Well controlled.     ·	Continue medical management with amlodipine 10 mg daily, carvedilol 12.5 mg BID, losartan 100 mg daily, and diuresis.     3) CAD   s/p CABG 2017  Asymptomatic   Cardiac enzymes negative   ECG non-ischemic     ·	Continue medical management with amlodipine 10 mg daily, carvedilol 12.5 mg BID, losartan 100 mg daily, aspirin 81 mg daily, atorvastatin 40 mg nightly.     4) ASIYA   Home CPAP    ·	Continue CPAP nightly.     Acceptable for discharge now. All questions answered. Refill of bumetanide called in.     Robby Bustamante MD, MPH, RANDEE, RPVI, FACC  Cardiovascular Specialist   [facility]  [facility]  c: [phone] (text preferred and/or call)  e: [email or web address]  For all [facility] Cardiology and Cardiovascular Surgery on-service contact/call information, go to [email or web address] and use "[email or web address]" to login.  For outpatient Cardiology appointments, call  [phone] to arrange with a colleague; I do not have outpatient Cardiology clinic.

## 2021-04-08 NOTE — ED PROVIDER NOTE - OBJECTIVE STATEMENT
65y Male with a history of Severe morbid obesity, ASIYA (CPAP qhs), HTN, CAD s/p CABG 2018, HFrEF (recent TTE EF 45%), recent admission 3/2021 for HF exacerbation presents to the ED with STEVENS, orthopnea, bilateral lower ext swelling, 8lb wt gain in past two days in the context of stopping his home Bumex for unclear reasons. Pt states he had one short episode R sided chest discomfort yesterday when laying down that went away after a few minutes. Denies any fevers/chills, uri sxs, cough, abd pain, n/v/d, urinary sxs, rash.       Acute systolic heart failure.  Plan: poor TTE here - reviewed previous TTE 2019 with outpatient cardiologist - at that time EF 45%. low BNP. CT chest w/ clear lungs but Blines on POCUS lung by pulmonary.   Cardiology and Pulmonary consult appreciated   Pulmonary hypertension is definitely a consideration given ASIYA. No PE on CT imaging. unable to visualize on TTE. unable to complete cardiac MRI due to size. MUGA with preserved EF and L/R wall motion.    Cards does not feel there is a benefit to cardiac cath as it will not .  Changed to bumex po per HF team.   Coreg for hf optimization    C/w losartan 100 milliGRAM(s) Oral daily.   Overall, symptoms likely related to multiple issues. PHTN is a possibility but ASIYA, pulmonary edema, and overall physical deconditioning at play.

## 2021-04-08 NOTE — ED PROVIDER NOTE - PROGRESS NOTE DETAILS
Patient evaluated by Cardiology discussed with patient and states he is feeling better with diuresis and would like to go home. Declined CDU at this time. Dr. Bustamante called in refill for Bumex at pharmacy and to continue and follow up with his Cardiology Dr. Edwards. RGUJRAL Karen, PGY2: pt feels better after bumex/urinating, cards saw pt, he's ok to be dc'd

## 2021-04-08 NOTE — ED ADULT NURSE NOTE - OBJECTIVE STATEMENT
Pt is a 64 yo male with the co diff breathing for the past two days. Pt states he was recently admitted to the hospital , and discharged home. Pt was told to stopped taking his "water pill" on tuesday, unsure of the name of the medication. He states since he stopped the water pill he has had an eight pound weight gain. He states he is unable to lay flat at night and he has to sleep in his recliner. Pt had a brief episode of CP last night which is now resolved. Pt denies any CP currently, no cough fever or chills no N/V/D no other complaints at this time.

## 2021-04-08 NOTE — ED CLERICAL - NS ED CLERK NOTE PRE-ARRIVAL INFORMATION; ADDITIONAL PRE-ARRIVAL INFORMATION
This patient is enrolled in the Heart Failure STAR readmission reduction initiative and has active care navigation. This patient can be followed up by the care navigation team within 24 hours.  To arrange close follow-up or to obtain additional clinical information, please call the contact number above. For patients followed by the NS cardiology heart failure team, please call the on call the HF office ([phone]) to speak with the HF NP during general business hours, otherwise call the cardiomyopathy attending ([phone]) for ALL PATIENTS PRIOR to decision for admission or observation.  Consider CDU for management of CHF exacerbations per guidelines

## 2021-04-08 NOTE — ED ADULT NURSE REASSESSMENT NOTE - NS ED NURSE REASSESS COMMENT FT1
Pt awaiting cards consult and then will be transferred to the CDU area. Pt had 1000 cc of urine output. He denies any complaints at this time

## 2021-04-08 NOTE — ED PROVIDER NOTE - PHYSICAL EXAMINATION
GENERAL: no acute distress, non-toxic appearing  HEENT: normal conjunctiva  CARDIAC: regular rate and regular rhythm, warm perfused extremities 2+ equal pulses bilat upper ext  PULM: mostly clear lungs bilaterally posterior approach ?very mild lower lung crackles, sats 99% on RA while sitting up, RR 19 with no incr wob  GI: abdomen obese, soft, nontender  : no CVA tenderness, no suprapubic tenderness  NEURO: alert and oriented x 3, normal speech, moving all extremities without lateralization  MSK: mild 1+ pitting edema to prox tib bilat  SKIN: mild chronic venous insufficiency skin changes bilat lower ext  PSYCH: appropriate mood and affect

## 2021-04-08 NOTE — CHART NOTE - NSCHARTNOTEFT_GEN_A_CORE
Chart reviewed.  The patient is clinically stable for discharge.  Discussed discharge plan with team, the patient came to ED fro leg swelling and SOB, the patient seen by cardiology and cleared for discharge home.   met with patient, introduced self.  Confirmed demographics. Discussed role of , medical plan of care and discharge needs. Patient verbalized understanding. Pt states independent in ADLs prior to hospitalization. The patient declined to identify caregiver and gave son Wilton [phone] as ER contact . information given to patient. The patient states family will provide transportation. Discussed discharge plan the patient  offered HF home health care services, patient declined services stating he will follow up with his cardiologist.  Team aware.   will remain available

## 2021-04-08 NOTE — ED PROVIDER NOTE - ATTENDING CONTRIBUTION TO CARE
RGUJRAL 64yo male hx listed discharged from the hospital 2 weeks ago presents with weight gain and orthopnea. States he was told to only take bumex for 2 weeks and since stopping gained 8 pounds in 2 days, + orthopnea. No cough, URI, f/c.   On exam, Patient is awake,alert,oriented x 3. Patient is well appearing and in no acute distress. Patient's chest is clear to ausculation, +s1s2. Abdomen is soft nd/nt +BS. B/L LE 1+ edema b/l.   Check labs, Xray chest.  Reviewed discharge summary, states to take bumex daily. Possible communication error. Cards consulted, pt updated with plan.

## 2021-04-08 NOTE — ED ADULT TRIAGE NOTE - CHIEF COMPLAINT QUOTE
bilateral lower extremity swelling after stopping diuretic on Tuesday, with weight gain   +orthopnea & STEVENS  states recent admission & was on diuretics x 1 month and directed to stop on Tuesday.

## 2021-04-08 NOTE — ED PROVIDER NOTE - CLINICAL SUMMARY MEDICAL DECISION MAKING FREE TEXT BOX
Concern for HF exacerbation (systolic HF with component of RHF) likely 2/2 med nonadherence for unclear reasons, will r/o ischemia. Will send labs, biomarkers, ekg, cxr, diuretics, possible cdu candidate. No oxygen requirements needed at this time.

## 2021-04-08 NOTE — ED PROVIDER NOTE - PATIENT PORTAL LINK FT
You can access the FollowMyHealth Patient Portal offered by [facility] by registering at the following website: [email or web address]. By joining [facility]’s FollowMyHealth portal, you will also be able to view your health information using other applications (apps) compatible with our system.

## 2021-04-29 ENCOUNTER — NON-APPOINTMENT (OUTPATIENT)
Age: 66
End: 2021-04-29

## 2021-04-29 ENCOUNTER — APPOINTMENT (OUTPATIENT)
Dept: ORTHOPEDIC SURGERY | Facility: CLINIC | Age: 66
End: 2021-04-29
Payer: MEDICARE

## 2021-04-29 VITALS — HEIGHT: 77 IN | BODY MASS INDEX: 37.19 KG/M2 | WEIGHT: 315 LBS

## 2021-04-29 PROCEDURE — 20605 DRAIN/INJ JOINT/BURSA W/O US: CPT | Mod: RT

## 2021-04-29 PROCEDURE — 99214 OFFICE O/P EST MOD 30 MIN: CPT | Mod: 25

## 2021-04-29 PROCEDURE — 20550 NJX 1 TENDON SHEATH/LIGAMENT: CPT | Mod: 59,F8

## 2021-04-29 NOTE — END OF VISIT
[FreeTextEntry3] : All medical record entries made by the Scribe were at my,  Dr. Charlie Esquivel MD., direction and personally dictated by me on 04/29/2021. I have personally reviewed the chart and agree that the record accurately reflects my personal performance of the history, physical exam, assessment and plan.\par \par

## 2021-04-29 NOTE — HISTORY OF PRESENT ILLNESS
[Right] : right hand dominant [FreeTextEntry1] : Pt is a 64 y/o male c/o right radial wrist pain and right ring finger pain and triggering x 2 months.  He denies injury.  The wrist is tender to touch.  He has pain with all ROM of the hand.  He had an xray on March 4, 2021 which was negative for fracture.  He also c/o right ring finger pain and triggering for 2 months.  The finger locks whenever he  anything.  He has tenderness over the A1 pulley.\par He notes that he was hospitalized for CHF which resulted from having Covid-19 in March.

## 2021-04-29 NOTE — PHYSICAL EXAM
[de-identified] : Patient is WDWN, alert, and in no acute distress. Breathing is unlabored. He is grossly oriented to person, place, \par and time.\par \par Right Wrist: There is tenderness over the first dorsal compartment at the level of the radial styloid. Swelling is localized to the  area. There are no visible deformities. The ROM is full in all planes with no crepitus. There is no joint instability on  provocative testing. Sensation is normal. Range of Motion: Pain is elicited with resisted radial deviation of the wrist.  Tests/Signs: Finkelstein's test is positive.	\par \par Right hand: There is A1 pulley tenderness in the ring finger. Full arc of motion in the fingers, and all intrinsic and extrinsic hand  muscles 5/5. No joint instability on provocative testing, sensation is intact to light touch, and no skin lesions or  discoloration.	 [de-identified] : EXAM: HAND RIGHT (MINIMUM 3 VIEWS) - 3/4/21\par \par IMPRESSION: No acute fractures or dislocations.\par \par RESHMA JOSEPH MD; Resident Radiology\par This document has been electronically signed.\par NORA PARK MD; Attending Radiologist\par This document has been electronically signed. Mar 4 2021 5:34PM

## 2021-04-29 NOTE — DISCUSSION/SUMMARY
[FreeTextEntry1] :   The patient wishes to proceed with a cortisone injection at this time (1). The skin was prepped with alcohol and sprayed with  Ethyl Chloride. An injection of 0.5 cc 1% Lidocaine without epinephrine, 0.25 cc Kenalog 40 mg, and 0.25 cc  Dexamethasone was administered into the right first extensor compartment. The patient tolerated the procedure well. Rest  and apply ice.   	\par \par   The patient wishes to proceed with a cortisone injection at this time (1). The skin was prepped with alcohol and sprayed with  Ethyl Chloride. An injection of 0.5 cc 1% Lidocaine without epinephrine, 0.25 cc Kenalog 40mg, and 0.25 cc  Dexamethasone was administered into the flexor tendon sheath of the right ring finger. The patient tolerated the procedure well.  Apply ice. 	\par \par Advised to utilize thumb spica brace.\par \par Patient can continue activities as tolerated. All questions answered, understanding verbalized. Patient in agreement with plan of care.

## 2021-04-29 NOTE — ADDENDUM
[FreeTextEntry1] : I, Massiel Alvarez wrote this note acting as a scribe for Dr. Charlie Esquivel on Apr 29, 2021.\par \par

## 2021-08-12 NOTE — ANESTHESIA FOLLOW-UP NOTE - NSINTERVIEW_GEN_ALL_CORE
Interviewed and evaluated electronic Render In Strict Bullet Format?: No Detail Level: Zone Initiate Treatment: cephalexin 500 mg capsule:  Take one tablet by mouth twice daily x 5 days\\n\\nPatient advised to wait until surgical site is completely healed before starting silagen scar cream.

## 2021-08-19 ENCOUNTER — APPOINTMENT (OUTPATIENT)
Dept: ORTHOPEDIC SURGERY | Facility: CLINIC | Age: 66
End: 2021-08-19
Payer: MEDICARE

## 2021-08-19 VITALS — HEIGHT: 77 IN | BODY MASS INDEX: 37.19 KG/M2 | WEIGHT: 315 LBS

## 2021-08-19 PROCEDURE — 99213 OFFICE O/P EST LOW 20 MIN: CPT | Mod: 25

## 2021-08-19 PROCEDURE — 20550 NJX 1 TENDON SHEATH/LIGAMENT: CPT | Mod: RT

## 2021-08-19 RX ORDER — GLIMEPIRIDE 1 MG/1
1 TABLET ORAL
Refills: 0 | Status: ACTIVE | COMMUNITY

## 2021-08-19 RX ORDER — MAGNESIUM CITRATE
POWDER (GRAM) MISCELLANEOUS
Refills: 0 | Status: ACTIVE | COMMUNITY

## 2021-08-19 NOTE — ADDENDUM
[FreeTextEntry1] : I, Massiel Alvarez wrote this note acting as a scribe for Dr. Charlie Esquivel on Aug 19, 2021.

## 2021-08-19 NOTE — DISCUSSION/SUMMARY
[FreeTextEntry1] : The underlying pathophysiology was reviewed with the patient. Discussed at length the nature of the patient’s condition. 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 The skin was prepped with alcohol and sprayed with Ethyl Chloride. An injection of 0.5 cc 1% Lidocaine without epinephrine, 0.25 cc Kenalog 40 mg, and 0.25 cc Dexamethasone was administered into the right first extensor compartment. The patient tolerated the procedure well. Rest and apply ice. \par \par Patient was advised to utilize a thumb spica wrist brace.\par Topical analgesics as needed. \par NSAIDs as tolerated. \par \par Patient can continue activities as tolerated. All questions answered, understanding verbalized. Patient in agreement with plan of care. Follow up as needed.

## 2021-08-19 NOTE — PHYSICAL EXAM
[de-identified] : Patient is WDWN, alert, and in no acute distress. Breathing is unlabored. He is grossly oriented to person, place, and time.\par \par Right Wrist: \par There is tenderness over the first dorsal compartment at the level of the radial styloid. Swelling is localized to the area. There are no visible deformities. The ROM is full in all planes with no crepitus. There is no joint instability on provocative testing. Sensation is normal.\par Range of Motion: Pain is elicited with resisted radial deviation of the wrist. \par Tests/Signs: Finkelstein's test is positive.	 [de-identified] : no imaging today

## 2021-08-19 NOTE — END OF VISIT
[FreeTextEntry3] : All medical record entries made by the Scribe were at my,  Dr. Charlie Esquivel MD., direction and personally dictated by me on 08/19/2021. I have personally reviewed the chart and agree that the record accurately reflects my personal performance of the history, physical exam, assessment and plan.

## 2021-10-04 NOTE — PHYSICAL THERAPY INITIAL EVALUATION ADULT - ORIENTATION, REHAB EVAL
Patient is a 36y old  Female who presents with a chief complaint of chest pain    HPI:  Patient complains of chest pain, at rest and exertion associated with occasions of dyspnea. No change in the ECG or Troponin.  At one point she was told having fibromyalgia.  patient with hx of diabetes and a chronic chest pain presented to ED for the new types of chest pain, she used to see Dr Christian until 2019, in multiple visits always had a complaint of chest pain, at one point in 2018 she did stress echo in about 6.5 minutes Piyush protocol and test resulted normal, she also had a pharmacologic nuclear stress test in [facility] resulted abnormal for possible LAD territory ischemia, but along with an artifact, Dr Christian tried to do a CCTA on her, but her HR never dropped sufficiently to reach the proper low HR for the CCTA, then 2 years later presented to ED with another periods of chest pain, precordial retrosternal frequent or almost persistent, not associated with arrhythmia or abnormal cardiac enzyme or ECG change, again doing CCTA was not possible, she is not able to do treadmill nuclear stress test and Adenosine stress test already was reported abnormal in the past.    Patient is admitted to have the cardiac Cath.      PAST MEDICAL & SURGICAL HISTORY:  Neuropathy  right lower extremity, vaginal    Type 1 diabetes    Degenerative disc disease, thoracic    Urinary tract infection, recurrent    Gastroesophageal reflux disease, esophagitis presence not specified    Intractable headache, unspecified chronicity pattern, unspecified headache type    Major depressive disorder with single episode, in full remission  previously treated with zyprexa, celexa and lexapro    Tremor of right hand    Tachycardia    Spinal stenosis    No significant past surgical history        PREVIOUS DIAGNOSTIC TESTING:      ECHO  FINDINGS: < from: Transthoracic Echocardiogram (03.21.18 @ 05:29) >   1. Left ventricular ejection fraction, by visual estimation,is 55 to   60%.    < end of copied text >    Echocardiographies in the office resulted normal as well    STRESS TEST  FINDINGS: < from: NM Nuclear Stress Pharmacologic Multiple (03.20.18 @ 10:29) >  1. IV Adenosine Dual Isotope Study which was negative with respect to   symptoms and EKG changes.  2. Myocardial perfusion imaging reveals a moderate in size mild to   moderate in severity distal anterior, anteroapical defect with complete   reversibility which may be consistent with ischemia in the distribution   of the mid to distal left anterior descending coronary artery  3. Gated imaging reveals mild global hypokinesia with normal thickening   with ejection fraction borderline low at 50%  Recommendation:  Medical therapy.    Risk factor modification.    Compare to two-dimensional echo with Doppler.    In lieu of quality of thestudy with marked hepatobiliary uptake which   may accentuate the inferior wall resulting in a false positive anterior   wall defect with consider CTA.    < end of copied text >        MEDICATIONS  (STANDING):    MEDICATIONS  (PRN):      FAMILY HISTORY:  No pertinent family history in first degree relatives        SOCIAL HISTORY:  CIGARETTES: no  ALCOHOL: no  DRUGS: no                      REVIEW OF SYSTEMS:  CONSTITUTIONAL: No distress, Looks stable  NECK: No pain   RESPIRATORY: No cough, wheezing, but occasions of shortness of breath  CARDIOVASCULAR: chest pain, SOB, no palpitations, leg swelling  GASTROINTESTINAL: No abdominal or epigastric pain. No nausea, vomiting, or hematemesis;  No melena.  NEUROLOGICAL: No dizziness, headaches,   SKIN: No itching, burning, rashes, or lesions   ENDOCRINE: No heat or cold intolerance  MUSCULOSKELETAL: multiple joints pain, No  swelling;  PSYCHIATRIC: anxiety  ALLERGY: No hives, itching, rash          Vital Signs Last 24 Hrs  T(C): 35.8 (04 Oct 2021 03:35), Max: 37.1 (04 Oct 2021 01:33)  T(F): 96.4 (04 Oct 2021 03:35), Max: 98.8 (04 Oct 2021 01:33)  HR: 76 (04 Oct 2021 09:30) (76 - 109)  BP: 120/87 (04 Oct 2021 03:35) (102/52 - 136/68)  BP(mean): 77 (04 Oct 2021 01:33) (77 - 77)  RR: 18 (04 Oct 2021 09:30) (18 - 20)  SpO2: 100% (04 Oct 2021 09:30) (99% - 100%)                      PHYSICAL EXAM:  GENERAL: No distress,  HEAD:  Atraumatic, Normocephalic  NECK: Supple, No JVD, No Bruit  NERVOUS SYSTEM:  Alert, Awake, Oriented to time, place, person; Normal memory and speech; Normal motor Strength 5/5 B/L upper and lower extremities  CHEST/LUNG: Normal air entry to lung base bilaterally; No wheeze, crackle, rales, rhonchi  HEART: Regular heart beat, S1, A2, P2, No S3, No gallop, No murmur  ABDOMEN: Soft, Non tender, Non distended; Bowel sounds present  EXTREMITIES:  2+ Peripheral Pulses, No clubbing, No edema  SKIN: No rashes or lesions    TELEMETRY:    ECG: < from: 12 Lead ECG (10.03.21 @ 16:07) >  Sinus tachycardia  Rightward axis    < end of copied text >      I&O's Detail      LABS:                        12.0   8.96  )-----------( 411      ( 03 Oct 2021 12:05 )             37.8     10-03    136  |  100  |  8<L>  ----------------------------<  131<H>  4.8   |  20  |  0.6<L>    Ca    9.7      03 Oct 2021 12:05  Mg     1.8     10-03    TPro  8.0  /  Alb  4.4  /  TBili  <0.2  /  DBili  x   /  AST  39  /  ALT  53<H>  /  AlkPhos  124<H>  10-03    CARDIAC MARKERS ( 03 Oct 2021 16:10 )  x     / <0.01 ng/mL / x     / x     / x      CARDIAC MARKERS ( 03 Oct 2021 12:05 )  x     / <0.01 ng/mL / x     / x     / x              I&O's Summary      RADIOLOGY & ADDITIONAL STUDIES: < from: Xray Chest 1 View- PORTABLE-Urgent (Xray Chest 1 View- PORTABLE-Urgent .) (10.03.21 @ 13:48) >    Cardiac/mediastinum/hilum: Unremarkable.    Lung parenchyma/Pleura: Low lung volumes. No acute pulmonary dilatation, pleural effusion, or pneumothorax.    Skeleton/soft tissues: No acute osseous abnormality.    Impression:    1. Low lung volumes, without radiographic evidence of acute cardiopulmonary disease.    < end of copied text >  
oriented to person, place, time and situation

## 2021-12-22 NOTE — ED ADULT NURSE REASSESSMENT NOTE - NS ED NURSE REASSESS COMMENT FT1
Patient in gold 6. Pt A&O x 4, VSS, NSR on cardiac monitor. Respirations are labored at baseline. Pt has +2 pitting edema in bilateral lower extremities. Bed in lowest position, side rails up and call bell in reach. Patient instructed to ring call bell if anything is needed. Otc Regimen: I recommended a broad spectrum sunscreen with a SPF of 30 or higher. I explained that SPF 30 sunscreens block approximately 97 percent of the sun's harmful rays. Sunscreens should be applied at least 15 minutes prior to expected sun exposure and then every 2 hours after that as long as sun exposure continues.\\n Detail Level: Zone

## 2022-02-07 ENCOUNTER — APPOINTMENT (OUTPATIENT)
Dept: ORTHOPEDIC SURGERY | Facility: CLINIC | Age: 67
End: 2022-02-07
Payer: MEDICARE

## 2022-02-07 PROCEDURE — 99213 OFFICE O/P EST LOW 20 MIN: CPT | Mod: 25

## 2022-02-07 PROCEDURE — 20550 NJX 1 TENDON SHEATH/LIGAMENT: CPT | Mod: RT

## 2022-02-07 NOTE — HISTORY OF PRESENT ILLNESS
[Right] : right hand dominant [FreeTextEntry1] : Pt is a 67 y/o male c/o right radial wrist pain x 6 months. He returns on 8/19/21 complaining of right wrist and thumb pain. He was previously treated on 4/29/21 and 8/19/21 with cortisone injections into the right first dorsal compartment due to De Quervain's Tendonitis as well as into the right ring finger secondary to trigger finger solely on 4/29/21. He returns on 2/7/22 reports to have stiffness to the ring, middle and index finger. He additionally complains of right radial wrist pain once again and would like another injection today. He also complains of numbness to the digits of the right hand. He states he has a history of neuropathy to the feet bilaterally.

## 2022-02-07 NOTE — PHYSICAL EXAM
[de-identified] : Patient is WDWN, alert, and in no acute distress. Breathing is unlabored. He is grossly oriented to person, place, and time.\par \par Right Wrist: \par There is tenderness over the first dorsal compartment at the level of the radial styloid. Swelling is localized to the area. There are no visible deformities. The ROM is full in all planes with no crepitus. There is no joint instability on provocative testing.\par Range of Motion: Pain is elicited with resisted radial deviation of the wrist. \par Tests/Signs: Finkelstein's test is positive. \par \par Right hand:\par There is A1 pulley tenderness in the index, middle and ring fingers. Full arc of motion in the fingers, and all intrinsic and extrinsic hand muscles 5/5. No joint instability on provocative testing, diminished sensation, and no skin lesions or discoloration. \par Phalen's Test: Negative\par Tinel's Test: Negative [de-identified] : no imaging today

## 2022-02-07 NOTE — END OF VISIT
[FreeTextEntry3] : All medical record entries made by the Scribe were at my,  Dr. Charlie Esquivel MD., direction and personally dictated by me on 02/07/2022. I have personally reviewed the chart and agree that the record accurately reflects my personal performance of the history, physical exam, assessment and plan.

## 2022-02-07 NOTE — ADDENDUM
[FreeTextEntry1] : I, Massiel Alvarez wrote this note acting as a scribe for Dr. Charlie Esquivel on Feb 07, 2022.

## 2022-02-07 NOTE — DISCUSSION/SUMMARY
[FreeTextEntry1] : The underlying pathophysiology was reviewed with the patient. Discussed at length the nature of the patient’s condition. 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 The right ring finger symptoms appear secondary to trigger finger. \par \par With regard to the right wrist, I have recommended a repeat cortisone injection. He understands that as this is his third injection, further treatment options include surgical release. Details of the surgical procedure were briefly discussed. \par The patient wishes to proceed with a cortisone injection at this time. The skin was prepped with alcohol and sprayed with Ethyl Chloride. An injection of 0.5 cc 1% Lidocaine without epinephrine, 0.25 cc Kenalog 40 mg, and 0.25 cc Dexamethasone was administered into the right first extensor compartment (3/3). The patient tolerated the procedure well. Rest and apply ice. \par \par With regard to the right ring finger, I have recommended a repeat cortisone injection to the flexor tendon sheath. He understands that lifetime he may receive 3 injections to the ring finger and if he does not get long term relief from the injections, I would recommend surgical release in the long term. \par The patient wishes to proceed with a cortisone injection at this time (2/3). The skin was prepped with alcohol and sprayed with Ethyl Chloride. An injection of 0.5 cc 1% Lidocaine without epinephrine, 0.25 cc Kenalog 40mg, and 0.25 cc Dexamethasone was administered into the flexor tendon sheath of the right ring finger. The patient tolerated the procedure well. Apply ice. \par \par All questions answered, understanding verbalized. Patient in agreement with plan of care. Follow up as needed, according to his symptoms.

## 2022-02-13 ENCOUNTER — EMERGENCY (EMERGENCY)
Facility: HOSPITAL | Age: 67
LOS: 1 days | Discharge: HOME | End: 2022-02-13
Attending: STUDENT IN AN ORGANIZED HEALTH CARE EDUCATION/TRAINING PROGRAM
Payer: MEDICARE

## 2022-02-13 VITALS — OXYGEN SATURATION: 95 % | RESPIRATION RATE: 20 BRPM

## 2022-02-13 VITALS — WEIGHT: 315 LBS | HEIGHT: 77 IN

## 2022-02-13 DIAGNOSIS — Z98.890 OTHER SPECIFIED POSTPROCEDURAL STATES: Chronic | ICD-10-CM

## 2022-02-13 DIAGNOSIS — Z98.89 OTHER SPECIFIED POSTPROCEDURAL STATES: Chronic | ICD-10-CM

## 2022-02-13 DIAGNOSIS — G62.9 POLYNEUROPATHY, UNSPECIFIED: Chronic | ICD-10-CM

## 2022-02-13 DIAGNOSIS — Z96.651 PRESENCE OF RIGHT ARTIFICIAL KNEE JOINT: Chronic | ICD-10-CM

## 2022-02-13 DIAGNOSIS — Z95.1 PRESENCE OF AORTOCORONARY BYPASS GRAFT: Chronic | ICD-10-CM

## 2022-02-13 DIAGNOSIS — N20.0 CALCULUS OF KIDNEY: Chronic | ICD-10-CM

## 2022-02-13 LAB
ALBUMIN SERPL ELPH-MCNC: 4.6 G/DL — SIGNIFICANT CHANGE UP (ref 3.3–5)
ALP SERPL-CCNC: 101 U/L — SIGNIFICANT CHANGE UP (ref 40–120)
ALT FLD-CCNC: 26 U/L — SIGNIFICANT CHANGE UP (ref 10–45)
ANION GAP SERPL CALC-SCNC: 17 MMOL/L — SIGNIFICANT CHANGE UP (ref 5–17)
AST SERPL-CCNC: 23 U/L — SIGNIFICANT CHANGE UP (ref 10–40)
BASOPHILS # BLD AUTO: 0.05 K/UL — SIGNIFICANT CHANGE UP (ref 0–0.2)
BASOPHILS NFR BLD AUTO: 0.6 % — SIGNIFICANT CHANGE UP (ref 0–2)
BILIRUB SERPL-MCNC: 0.6 MG/DL — SIGNIFICANT CHANGE UP (ref 0.2–1.2)
BUN SERPL-MCNC: 19 MG/DL — SIGNIFICANT CHANGE UP (ref 7–23)
CALCIUM SERPL-MCNC: 9.9 MG/DL — SIGNIFICANT CHANGE UP (ref 8.4–10.5)
CHLORIDE SERPL-SCNC: 100 MMOL/L — SIGNIFICANT CHANGE UP (ref 96–108)
CO2 SERPL-SCNC: 26 MMOL/L — SIGNIFICANT CHANGE UP (ref 22–31)
CREAT SERPL-MCNC: 0.68 MG/DL — SIGNIFICANT CHANGE UP (ref 0.5–1.3)
EOSINOPHIL # BLD AUTO: 0.12 K/UL — SIGNIFICANT CHANGE UP (ref 0–0.5)
EOSINOPHIL NFR BLD AUTO: 1.3 % — SIGNIFICANT CHANGE UP (ref 0–6)
GLUCOSE SERPL-MCNC: 140 MG/DL — HIGH (ref 70–99)
HCT VFR BLD CALC: 44.3 % — SIGNIFICANT CHANGE UP (ref 39–50)
HGB BLD-MCNC: 15.5 G/DL — SIGNIFICANT CHANGE UP (ref 13–17)
IMM GRANULOCYTES NFR BLD AUTO: 1.6 % — HIGH (ref 0–1.5)
LYMPHOCYTES # BLD AUTO: 1.58 K/UL — SIGNIFICANT CHANGE UP (ref 1–3.3)
LYMPHOCYTES # BLD AUTO: 17.7 % — SIGNIFICANT CHANGE UP (ref 13–44)
MCHC RBC-ENTMCNC: 30.2 PG — SIGNIFICANT CHANGE UP (ref 27–34)
MCHC RBC-ENTMCNC: 35 GM/DL — SIGNIFICANT CHANGE UP (ref 32–36)
MCV RBC AUTO: 86.2 FL — SIGNIFICANT CHANGE UP (ref 80–100)
MONOCYTES # BLD AUTO: 0.66 K/UL — SIGNIFICANT CHANGE UP (ref 0–0.9)
MONOCYTES NFR BLD AUTO: 7.4 % — SIGNIFICANT CHANGE UP (ref 2–14)
NEUTROPHILS # BLD AUTO: 6.36 K/UL — SIGNIFICANT CHANGE UP (ref 1.8–7.4)
NEUTROPHILS NFR BLD AUTO: 71.4 % — SIGNIFICANT CHANGE UP (ref 43–77)
NRBC # BLD: 0 /100 WBCS — SIGNIFICANT CHANGE UP (ref 0–0)
NT-PROBNP SERPL-SCNC: 63 PG/ML — SIGNIFICANT CHANGE UP (ref 0–300)
PLATELET # BLD AUTO: 204 K/UL — SIGNIFICANT CHANGE UP (ref 150–400)
POTASSIUM SERPL-MCNC: 4 MMOL/L — SIGNIFICANT CHANGE UP (ref 3.5–5.3)
POTASSIUM SERPL-SCNC: 4 MMOL/L — SIGNIFICANT CHANGE UP (ref 3.5–5.3)
PROT SERPL-MCNC: 7.4 G/DL — SIGNIFICANT CHANGE UP (ref 6–8.3)
RBC # BLD: 5.14 M/UL — SIGNIFICANT CHANGE UP (ref 4.2–5.8)
RBC # FLD: 12.6 % — SIGNIFICANT CHANGE UP (ref 10.3–14.5)
SODIUM SERPL-SCNC: 143 MMOL/L — SIGNIFICANT CHANGE UP (ref 135–145)
TROPONIN T, HIGH SENSITIVITY RESULT: 15 NG/L — SIGNIFICANT CHANGE UP (ref 0–51)
TROPONIN T, HIGH SENSITIVITY RESULT: 17 NG/L — SIGNIFICANT CHANGE UP (ref 0–51)
WBC # BLD: 8.91 K/UL — SIGNIFICANT CHANGE UP (ref 3.8–10.5)
WBC # FLD AUTO: 8.91 K/UL — SIGNIFICANT CHANGE UP (ref 3.8–10.5)

## 2022-02-13 PROCEDURE — 70450 CT HEAD/BRAIN W/O DYE: CPT | Mod: MA

## 2022-02-13 PROCEDURE — 99285 EMERGENCY DEPT VISIT HI MDM: CPT | Mod: 25,GC

## 2022-02-13 PROCEDURE — 83880 ASSAY OF NATRIURETIC PEPTIDE: CPT

## 2022-02-13 PROCEDURE — 73610 X-RAY EXAM OF ANKLE: CPT

## 2022-02-13 PROCEDURE — 90471 IMMUNIZATION ADMIN: CPT

## 2022-02-13 PROCEDURE — 93010 ELECTROCARDIOGRAM REPORT: CPT | Mod: 59,GC

## 2022-02-13 PROCEDURE — 72125 CT NECK SPINE W/O DYE: CPT | Mod: MA

## 2022-02-13 PROCEDURE — 73030 X-RAY EXAM OF SHOULDER: CPT

## 2022-02-13 PROCEDURE — 70450 CT HEAD/BRAIN W/O DYE: CPT | Mod: 26,MA

## 2022-02-13 PROCEDURE — 73610 X-RAY EXAM OF ANKLE: CPT | Mod: 26,RT

## 2022-02-13 PROCEDURE — 71046 X-RAY EXAM CHEST 2 VIEWS: CPT | Mod: 26

## 2022-02-13 PROCEDURE — 72125 CT NECK SPINE W/O DYE: CPT | Mod: 26,MA

## 2022-02-13 PROCEDURE — 80053 COMPREHEN METABOLIC PANEL: CPT

## 2022-02-13 PROCEDURE — 29515 APPLICATION SHORT LEG SPLINT: CPT | Mod: RT

## 2022-02-13 PROCEDURE — 73020 X-RAY EXAM OF SHOULDER: CPT

## 2022-02-13 PROCEDURE — 84484 ASSAY OF TROPONIN QUANT: CPT

## 2022-02-13 PROCEDURE — 36415 COLL VENOUS BLD VENIPUNCTURE: CPT

## 2022-02-13 PROCEDURE — 90715 TDAP VACCINE 7 YRS/> IM: CPT

## 2022-02-13 PROCEDURE — 99285 EMERGENCY DEPT VISIT HI MDM: CPT | Mod: 25

## 2022-02-13 PROCEDURE — 73030 X-RAY EXAM OF SHOULDER: CPT | Mod: 26,LT

## 2022-02-13 PROCEDURE — 93005 ELECTROCARDIOGRAM TRACING: CPT | Mod: XU

## 2022-02-13 PROCEDURE — 29515 APPLICATION SHORT LEG SPLINT: CPT | Mod: GC

## 2022-02-13 PROCEDURE — 85025 COMPLETE CBC W/AUTO DIFF WBC: CPT

## 2022-02-13 PROCEDURE — 71046 X-RAY EXAM CHEST 2 VIEWS: CPT

## 2022-02-13 RX ORDER — ACETAMINOPHEN 500 MG
975 TABLET ORAL ONCE
Refills: 0 | Status: COMPLETED | OUTPATIENT
Start: 2022-02-13 | End: 2022-02-13

## 2022-02-13 RX ORDER — IBUPROFEN 200 MG
600 TABLET ORAL ONCE
Refills: 0 | Status: COMPLETED | OUTPATIENT
Start: 2022-02-13 | End: 2022-02-13

## 2022-02-13 RX ORDER — TETANUS TOXOID, REDUCED DIPHTHERIA TOXOID AND ACELLULAR PERTUSSIS VACCINE, ADSORBED 5; 2.5; 8; 8; 2.5 [IU]/.5ML; [IU]/.5ML; UG/.5ML; UG/.5ML; UG/.5ML
0.5 SUSPENSION INTRAMUSCULAR ONCE
Refills: 0 | Status: COMPLETED | OUTPATIENT
Start: 2022-02-13 | End: 2022-02-13

## 2022-02-13 RX ADMIN — TETANUS TOXOID, REDUCED DIPHTHERIA TOXOID AND ACELLULAR PERTUSSIS VACCINE, ADSORBED 0.5 MILLILITER(S): 5; 2.5; 8; 8; 2.5 SUSPENSION INTRAMUSCULAR at 09:56

## 2022-02-13 RX ADMIN — Medication 600 MILLIGRAM(S): at 09:56

## 2022-02-13 RX ADMIN — Medication 975 MILLIGRAM(S): at 09:55

## 2022-02-13 NOTE — ED PROVIDER NOTE - NSFOLLOWUPINSTRUCTIONS_ED_ALL_ED_FT
No signs of emergency medical condition on today's workup.  Presumptive diagnosis made, but further evaluation may be required by your primary care doctor or specialist for a definitive diagnosis.  Therefore, follow up as directed and if symptoms change/worsen or any emergency conditions, please return to the ER.   you were seen in the ED today after a fall.   you had blood work and imaging performed with findings of a chronic ankle fracture.   please follow up with orthopedics.   follow up with your cardiologist.   continue taking all prescribed home medications.   return to the ED for any new or worsening symptoms.       Shortness of breath (dyspnea) means you have trouble breathing and could indicate a medical problem. Causes include lung disease, heart disease, low amount of red blood cells (anemia), poor physical fitness, being overweight, smoking, etc. Your health care provider today may not be able to find a cause for your shortness of breath after your exam. In this case, it is important to have a follow-up exam with your primary care physician as instructed. If medicines were prescribed, take them as directed for the full length of time directed. Refrain from tobacco products.    SEEK IMMEDIATE MEDICAL CARE IF YOU HAVE ANY OF THE FOLLOWING SYMPTOMS: worsening shortness of breath, chest pain, back pain, abdominal pain, fever, coughing up blood, lightheadedness/dizziness.

## 2022-02-13 NOTE — ED PROVIDER NOTE - MUSCULOSKELETAL, MLM
Spine appears normal, no midline spinal tenderness or step offs, abrasion over the L shoulder pain with ranging, no specific point tenderness, mild ttp of the R medial malleolus no pain

## 2022-02-13 NOTE — ED PROVIDER NOTE - ATTENDING CONTRIBUTION TO CARE
ambulatory sat of 95 percent  66 M w/ ambulatory sat of 95 percent  66 M w/ hx of HTN, HLD, CAD, ASIYA s/p fall from standing, reports he slipped and tripped over the shoelace and fell forwards hitting the L shoulder and forehead no LOC on ASA daily, ambulatory since, w/ L shoulder pain, R ankle pain and abrasion on the forehead, pt also reports inc sob w/ sleeping in a recliner 2/2 orthopnea. Pt denies CP, no lightheadedness no dizziness. On exam, pt is awake and alert I have reviewed the triage vital signs. Const: no acute distress, Well-developed, Eyes: no conjunctival injection and no scleral icterus ENMT: approx 3 cm abrasion on the L forehead, EOMI Moist mucus membranes, CVS: +S1/S2, radial/DP pulse 2+ bilaterally RESP: Unlabored respiratory effort, Clear to auscultation bilaterally GI: Nontender/Nondistended soft abdomen, no CVA tenderness MSK: Extremities w/o deformity , tenderness along the medial malleolus Psych: Awake, Alert, & Orientedx3;  Appropriate mood and affect, cooperative Plan for labs imaging likely ankle sprain as pt ambulatory w/ pain in the R ankle, able to range the L shoulder, unlikely humerus fx pt w/ delta trop stable minimally elevated probnp pt to have outpt follow up w/ PCP for further management he reports he feels comfortable going home.

## 2022-02-13 NOTE — ED PROVIDER NOTE - CLINICAL SUMMARY MEDICAL DECISION MAKING FREE TEXT BOX
67yo M presenting s/p mechanical fall landing on the L side, head strike no LOC, with L shoulder pain and R ankle pain. also with several weeks STEVENS and orthopnea. abrasion on the L forehead and over the shoulder. STEVENS and orthopnea for several weeks. will obtain labs, XRs, analgesia, and reassess.

## 2022-02-13 NOTE — ED PROVIDER NOTE - PROGRESS NOTE DETAILS
elian puckett pgy3: chronic appearing fracture of the R ankle, placed in air cast, nml pro-bnp, clear cxr. will rpt trop. if neg, nml ambulatory sat will dc home with ortho and cardiology follow up.

## 2022-02-13 NOTE — ED PROVIDER NOTE - NSFOLLOWUPCLINICS_GEN_ALL_ED_FT
[facility] Orthopedic Surgery  Orthopedic Surgery  [address], [address] & [address]th [place] [address]  Phone: [phone]  Fax:

## 2022-02-13 NOTE — ED ADULT NURSE NOTE - OBJECTIVE STATEMENT
66M aaox4 ambulatory came as walk in in the ED c/o left shoulder pain and rt knee/foot pain s/p tripped and fell while walking. Denies LOC, Not on any blood thinners. Able to walk after the fall, on exam, tenderness on the rt foot, abrasion in the left hand and left shoulder and left forehead area. Limited ROM in left shoulder. VS WDL.

## 2022-02-13 NOTE — ED PROVIDER NOTE - DOMESTIC TRAVEL HIGH RISK QUESTION
Telephone call to patient  Left voice message to please return my call in regards unable to reach  Referral to Barrie Mooney office   Pt have not respond to Dr Barrie Edwards office or my self
No

## 2022-02-13 NOTE — ED PROVIDER NOTE - PATIENT PORTAL LINK FT
You can access the FollowMyHealth Patient Portal offered by [facility] by registering at the following website: [email or web address]. By joining [facility]’s FollowMyHealth portal, you will also be able to view your health information using other applications (apps) compatible with our system.

## 2022-02-13 NOTE — ED PROVIDER NOTE - OBJECTIVE STATEMENT
67yo M Hx of HTN, HLD, CAD, ASIYA presenting with complaints of fall. was walking in the [place], wearing work boots and the shoelaces weren't 65yo M Hx of HTN, HLD, CAD, ASIYA presenting with complaints of fall. was walking in the [place]i, wearing work boots and the shoelaces weren't tied, his foot came out of his shoe causing him to stumble on the curb and fell forward landing on the left shoulder and his forehead. no LOC, on asa daily. with 65yo M Hx of HTN, HLD, CAD, ASIYA presenting with complaints of fall. was walking in the [place], wearing work boots and the shoelaces weren't tied, his foot came out of his shoe causing him to stumble on the curb and fell forward landing on the left shoulder and his forehead. no LOC, on asa daily. able to stand up and has been ambulatory since. with L shoulder pain, R ankle pain and an abrasion on the left forehead.     pt also states that for the past several weeks he has been feeling more SOB, having to sleep in a recliner at night due to orthopnea. compliant with his medications. has a cardiologist.

## 2022-02-14 ENCOUNTER — APPOINTMENT (OUTPATIENT)
Dept: ORTHOPEDIC SURGERY | Facility: CLINIC | Age: 67
End: 2022-02-14
Payer: MEDICARE

## 2022-02-14 VITALS — WEIGHT: 315 LBS | HEIGHT: 77 IN | BODY MASS INDEX: 37.19 KG/M2

## 2022-02-14 DIAGNOSIS — S93.491A SPRAIN OF OTHER LIGAMENT OF RIGHT ANKLE, INITIAL ENCOUNTER: ICD-10-CM

## 2022-02-14 PROCEDURE — 99213 OFFICE O/P EST LOW 20 MIN: CPT

## 2022-02-14 PROCEDURE — 99203 OFFICE O/P NEW LOW 30 MIN: CPT

## 2022-02-14 NOTE — DISCUSSION/SUMMARY
[de-identified] : 67 y/o male with right ankle sprain. \par \par The patient presents with an acute inversion injury to the right ankle with an injury to the medial and lateral ligamentous complex.  Patient has peripheral neuropathy, but continued pain through the ligamentous complex.  X-rays suggest that there is significant degenerative joint disease within the tibiotalar joint as well as some chronic calcifications of the medial and lateral ligamentous complex.  Patient also has associated plantar and calcaneal heel spurs.  \par \par Nonoperative modalities of treatment include relative rest from impact loading exercise, NSAID's/tylenol prn, cold compress for swelling control, compressive wraps/bracing, gradual return to weight bearing and load bearing exercise with or without the guidance of physical therapy for balance/proprioceptive/strength training.\par \par Recommendation: Removable splint x2 weeks. Ice/Tylenol/NSAIDs p.r.n.. Weightbearing as tolerated.\par \par Follow up as needed

## 2022-02-14 NOTE — ADDENDUM
[FreeTextEntry1] : This note was written by Nuha Roland on 02/14/2022 acting solely as a scribe for Dr. Eliseo Torres.\par \par All medical record entries made by the Scribe were at my, Dr. Eliseo Torres, direction and personally dictated by me on 02/14/2022. I have personally reviewed the chart and agree that the record accurately reflects my personal performance of the history, physical exam, assessment and plan.

## 2022-02-14 NOTE — PHYSICAL EXAM
[de-identified] : Oriented to time, place, person\par Mood: Normal\par Affect: Normal\par Appearance: Healthy, well appearing, no acute distress.\par Gait: antalgic\par Assistive Devices: Splint, ace wrap\par \par Right Ankle exam:\par \par Skin: Clean, dry, intact\par Inspection: No obvious malalignment, moderate swelling, mild effusion\par Pulses: 2+ DP/PT pulses \par ROM: neutral degrees of dorsiflexion, 15 degrees of plantarflexion, normal subtalar motion.\par Tenderness: +tenderness over the lateral malleolus, no CFL + ATFL no PTFL pain. No medial malleolus pain, + deltoid ligament pain. No proximal fibular pain. No heel pain.\par Stability: Negative anterior drawer, negative posterior drawer.\par Strength: Intact TA/GS/EHL\par Neuro: Diminished throughout [de-identified] : Images were reviewed from ER dated 2.13.2022.\par \par 3 views of the right ankle were obtained that show no acute fracture or dislocation. There is significant degenerative change seen to the mid foot and moderate tibiotalar joint DJD. There is no gross malalignment. Ankle mortise is intact. There is plantar bone spur, calcaneal heel spur.  Calcifications within the medial/lateral ligamentous complex.

## 2022-02-14 NOTE — HISTORY OF PRESENT ILLNESS
[de-identified] : 66 year old male Hx bilateral feet neuropathy presents today for evaluation of right ankle following inversion injury on 2/13/22. He tripped on a curb and inverted his ankle. He was seen at ER and was told that he has an old fx that never healed. Takes Tylenol for pain. \par \par The patient's past medical history, past surgical history, medications and allergies were reviewed by me today with the patient and documented accordingly. In addition, the patient's family and social history, which were noncontributory to this visit, were reviewed also.

## 2022-05-14 ENCOUNTER — APPOINTMENT (OUTPATIENT)
Dept: RADIOLOGY | Facility: HOSPITAL | Age: 67
End: 2022-05-14
Payer: MEDICARE

## 2022-05-14 ENCOUNTER — OUTPATIENT (OUTPATIENT)
Dept: OUTPATIENT SERVICES | Facility: HOSPITAL | Age: 67
LOS: 1 days | End: 2022-05-14
Payer: COMMERCIAL

## 2022-05-14 DIAGNOSIS — Z98.890 OTHER SPECIFIED POSTPROCEDURAL STATES: Chronic | ICD-10-CM

## 2022-05-14 DIAGNOSIS — Z98.89 OTHER SPECIFIED POSTPROCEDURAL STATES: Chronic | ICD-10-CM

## 2022-05-14 DIAGNOSIS — Z95.1 PRESENCE OF AORTOCORONARY BYPASS GRAFT: Chronic | ICD-10-CM

## 2022-05-14 DIAGNOSIS — G62.9 POLYNEUROPATHY, UNSPECIFIED: Chronic | ICD-10-CM

## 2022-05-14 DIAGNOSIS — Z00.8 ENCOUNTER FOR OTHER GENERAL EXAMINATION: ICD-10-CM

## 2022-05-14 DIAGNOSIS — N20.0 CALCULUS OF KIDNEY: Chronic | ICD-10-CM

## 2022-05-14 DIAGNOSIS — Z96.651 PRESENCE OF RIGHT ARTIFICIAL KNEE JOINT: Chronic | ICD-10-CM

## 2022-05-14 PROCEDURE — 72100 X-RAY EXAM L-S SPINE 2/3 VWS: CPT

## 2022-05-14 PROCEDURE — 72100 X-RAY EXAM L-S SPINE 2/3 VWS: CPT | Mod: 26

## 2022-06-17 ENCOUNTER — TRANSCRIPTION ENCOUNTER (OUTPATIENT)
Age: 67
End: 2022-06-17

## 2022-06-18 ENCOUNTER — RESULT REVIEW (OUTPATIENT)
Age: 67
End: 2022-06-18

## 2022-06-18 ENCOUNTER — TRANSCRIPTION ENCOUNTER (OUTPATIENT)
Age: 67
End: 2022-06-18

## 2022-06-18 ENCOUNTER — INPATIENT (INPATIENT)
Facility: HOSPITAL | Age: 67
LOS: 2 days | Discharge: HOME | DRG: 336 | End: 2022-06-21
Attending: SURGERY | Admitting: SURGERY
Payer: MEDICARE

## 2022-06-18 VITALS
OXYGEN SATURATION: 97 % | WEIGHT: 315 LBS | HEIGHT: 77 IN | SYSTOLIC BLOOD PRESSURE: 149 MMHG | RESPIRATION RATE: 18 BRPM | TEMPERATURE: 97 F | DIASTOLIC BLOOD PRESSURE: 85 MMHG | HEART RATE: 83 BPM

## 2022-06-18 DIAGNOSIS — Z98.89 OTHER SPECIFIED POSTPROCEDURAL STATES: Chronic | ICD-10-CM

## 2022-06-18 DIAGNOSIS — N20.0 CALCULUS OF KIDNEY: Chronic | ICD-10-CM

## 2022-06-18 DIAGNOSIS — Z98.890 OTHER SPECIFIED POSTPROCEDURAL STATES: Chronic | ICD-10-CM

## 2022-06-18 DIAGNOSIS — G62.9 POLYNEUROPATHY, UNSPECIFIED: Chronic | ICD-10-CM

## 2022-06-18 DIAGNOSIS — K56.609 UNSPECIFIED INTESTINAL OBSTRUCTION, UNSPECIFIED AS TO PARTIAL VERSUS COMPLETE OBSTRUCTION: ICD-10-CM

## 2022-06-18 DIAGNOSIS — Z96.651 PRESENCE OF RIGHT ARTIFICIAL KNEE JOINT: Chronic | ICD-10-CM

## 2022-06-18 DIAGNOSIS — Z95.1 PRESENCE OF AORTOCORONARY BYPASS GRAFT: Chronic | ICD-10-CM

## 2022-06-18 LAB
ALBUMIN SERPL ELPH-MCNC: 4.5 G/DL — SIGNIFICANT CHANGE UP (ref 3.3–5)
ALP SERPL-CCNC: 112 U/L — SIGNIFICANT CHANGE UP (ref 40–120)
ALT FLD-CCNC: 19 U/L — SIGNIFICANT CHANGE UP (ref 10–45)
ANION GAP SERPL CALC-SCNC: 17 MMOL/L — SIGNIFICANT CHANGE UP (ref 5–17)
APTT BLD: 24.3 SEC — LOW (ref 27.5–35.5)
AST SERPL-CCNC: 20 U/L — SIGNIFICANT CHANGE UP (ref 10–40)
BASOPHILS # BLD AUTO: 0.03 K/UL — SIGNIFICANT CHANGE UP (ref 0–0.2)
BASOPHILS NFR BLD AUTO: 0.2 % — SIGNIFICANT CHANGE UP (ref 0–2)
BILIRUB SERPL-MCNC: 0.5 MG/DL — SIGNIFICANT CHANGE UP (ref 0.2–1.2)
BLD GP AB SCN SERPL QL: NEGATIVE — SIGNIFICANT CHANGE UP
BUN SERPL-MCNC: 16 MG/DL — SIGNIFICANT CHANGE UP (ref 7–23)
CALCIUM SERPL-MCNC: 9.7 MG/DL — SIGNIFICANT CHANGE UP (ref 8.4–10.5)
CHLORIDE SERPL-SCNC: 97 MMOL/L — SIGNIFICANT CHANGE UP (ref 96–108)
CO2 SERPL-SCNC: 22 MMOL/L — SIGNIFICANT CHANGE UP (ref 22–31)
CREAT SERPL-MCNC: 0.57 MG/DL — SIGNIFICANT CHANGE UP (ref 0.5–1.3)
EGFR: 108 ML/MIN/1.73M2 — SIGNIFICANT CHANGE UP
EOSINOPHIL # BLD AUTO: 0.06 K/UL — SIGNIFICANT CHANGE UP (ref 0–0.5)
EOSINOPHIL NFR BLD AUTO: 0.5 % — SIGNIFICANT CHANGE UP (ref 0–6)
GAS PNL BLDV: SIGNIFICANT CHANGE UP
GLUCOSE BLDC GLUCOMTR-MCNC: 145 MG/DL — HIGH (ref 70–99)
GLUCOSE SERPL-MCNC: 179 MG/DL — HIGH (ref 70–99)
HCT VFR BLD CALC: 43.4 % — SIGNIFICANT CHANGE UP (ref 39–50)
HGB BLD-MCNC: 14.2 G/DL — SIGNIFICANT CHANGE UP (ref 13–17)
IMM GRANULOCYTES NFR BLD AUTO: 0.5 % — SIGNIFICANT CHANGE UP (ref 0–1.5)
INR BLD: 0.99 RATIO — SIGNIFICANT CHANGE UP (ref 0.88–1.16)
LIDOCAIN IGE QN: 23 U/L — SIGNIFICANT CHANGE UP (ref 7–60)
LYMPHOCYTES # BLD AUTO: 1.26 K/UL — SIGNIFICANT CHANGE UP (ref 1–3.3)
LYMPHOCYTES # BLD AUTO: 10.2 % — LOW (ref 13–44)
MCHC RBC-ENTMCNC: 28.9 PG — SIGNIFICANT CHANGE UP (ref 27–34)
MCHC RBC-ENTMCNC: 32.7 GM/DL — SIGNIFICANT CHANGE UP (ref 32–36)
MCV RBC AUTO: 88.4 FL — SIGNIFICANT CHANGE UP (ref 80–100)
MONOCYTES # BLD AUTO: 0.61 K/UL — SIGNIFICANT CHANGE UP (ref 0–0.9)
MONOCYTES NFR BLD AUTO: 4.9 % — SIGNIFICANT CHANGE UP (ref 2–14)
NEUTROPHILS # BLD AUTO: 10.39 K/UL — HIGH (ref 1.8–7.4)
NEUTROPHILS NFR BLD AUTO: 83.7 % — HIGH (ref 43–77)
NRBC # BLD: 0 /100 WBCS — SIGNIFICANT CHANGE UP (ref 0–0)
PLATELET # BLD AUTO: 237 K/UL — SIGNIFICANT CHANGE UP (ref 150–400)
POTASSIUM SERPL-MCNC: 3.9 MMOL/L — SIGNIFICANT CHANGE UP (ref 3.5–5.3)
POTASSIUM SERPL-SCNC: 3.9 MMOL/L — SIGNIFICANT CHANGE UP (ref 3.5–5.3)
PROT SERPL-MCNC: 7.4 G/DL — SIGNIFICANT CHANGE UP (ref 6–8.3)
PROTHROM AB SERPL-ACNC: 11.5 SEC — SIGNIFICANT CHANGE UP (ref 10.5–13.4)
RBC # BLD: 4.91 M/UL — SIGNIFICANT CHANGE UP (ref 4.2–5.8)
RBC # FLD: 13.3 % — SIGNIFICANT CHANGE UP (ref 10.3–14.5)
RH IG SCN BLD-IMP: POSITIVE — SIGNIFICANT CHANGE UP
SARS-COV-2 RNA SPEC QL NAA+PROBE: SIGNIFICANT CHANGE UP
SODIUM SERPL-SCNC: 136 MMOL/L — SIGNIFICANT CHANGE UP (ref 135–145)
WBC # BLD: 12.41 K/UL — HIGH (ref 3.8–10.5)
WBC # FLD AUTO: 12.41 K/UL — HIGH (ref 3.8–10.5)

## 2022-06-18 PROCEDURE — 74177 CT ABD & PELVIS W/CONTRAST: CPT | Mod: 26,MA

## 2022-06-18 PROCEDURE — 88305 TISSUE EXAM BY PATHOLOGIST: CPT | Mod: 26

## 2022-06-18 PROCEDURE — 99285 EMERGENCY DEPT VISIT HI MDM: CPT | Mod: GC

## 2022-06-18 RX ORDER — ACETAMINOPHEN 500 MG
1000 TABLET ORAL ONCE
Refills: 0 | Status: COMPLETED | OUTPATIENT
Start: 2022-06-18 | End: 2022-06-18

## 2022-06-18 RX ORDER — ONDANSETRON 8 MG/1
4 TABLET, FILM COATED ORAL ONCE
Refills: 0 | Status: COMPLETED | OUTPATIENT
Start: 2022-06-18 | End: 2022-06-18

## 2022-06-18 RX ORDER — HYDROMORPHONE HYDROCHLORIDE 2 MG/ML
1 INJECTION INTRAMUSCULAR; INTRAVENOUS; SUBCUTANEOUS ONCE
Refills: 0 | Status: DISCONTINUED | OUTPATIENT
Start: 2022-06-18 | End: 2022-06-18

## 2022-06-18 RX ORDER — BENZOCAINE AND MENTHOL 5; 1 G/100ML; G/100ML
1 LIQUID ORAL DAILY
Refills: 0 | Status: DISCONTINUED | OUTPATIENT
Start: 2022-06-18 | End: 2022-06-21

## 2022-06-18 RX ORDER — HEPARIN SODIUM 5000 [USP'U]/ML
5000 INJECTION INTRAVENOUS; SUBCUTANEOUS EVERY 8 HOURS
Refills: 0 | Status: DISCONTINUED | OUTPATIENT
Start: 2022-06-18 | End: 2022-06-18

## 2022-06-18 RX ORDER — LABETALOL HCL 100 MG
10 TABLET ORAL ONCE
Refills: 0 | Status: COMPLETED | OUTPATIENT
Start: 2022-06-18 | End: 2022-06-18

## 2022-06-18 RX ORDER — GABAPENTIN 400 MG/1
1 CAPSULE ORAL
Qty: 0 | Refills: 0 | DISCHARGE

## 2022-06-18 RX ORDER — SODIUM CHLORIDE 9 MG/ML
1000 INJECTION INTRAMUSCULAR; INTRAVENOUS; SUBCUTANEOUS ONCE
Refills: 0 | Status: COMPLETED | OUTPATIENT
Start: 2022-06-18 | End: 2022-06-18

## 2022-06-18 RX ORDER — SODIUM CHLORIDE 9 MG/ML
1000 INJECTION, SOLUTION INTRAVENOUS
Refills: 0 | Status: DISCONTINUED | OUTPATIENT
Start: 2022-06-18 | End: 2022-06-18

## 2022-06-18 RX ORDER — ONDANSETRON 8 MG/1
8 TABLET, FILM COATED ORAL ONCE
Refills: 0 | Status: DISCONTINUED | OUTPATIENT
Start: 2022-06-18 | End: 2022-06-18

## 2022-06-18 RX ORDER — FENTANYL CITRATE 50 UG/ML
50 INJECTION INTRAVENOUS
Refills: 0 | Status: DISCONTINUED | OUTPATIENT
Start: 2022-06-18 | End: 2022-06-18

## 2022-06-18 RX ORDER — HYDROMORPHONE HYDROCHLORIDE 2 MG/ML
0.5 INJECTION INTRAMUSCULAR; INTRAVENOUS; SUBCUTANEOUS ONCE
Refills: 0 | Status: DISCONTINUED | OUTPATIENT
Start: 2022-06-18 | End: 2022-06-18

## 2022-06-18 RX ORDER — SODIUM CHLORIDE 9 MG/ML
1000 INJECTION, SOLUTION INTRAVENOUS
Refills: 0 | Status: DISCONTINUED | OUTPATIENT
Start: 2022-06-18 | End: 2022-06-20

## 2022-06-18 RX ORDER — SODIUM CHLORIDE 9 MG/ML
1000 INJECTION, SOLUTION INTRAVENOUS ONCE
Refills: 0 | Status: COMPLETED | OUTPATIENT
Start: 2022-06-18 | End: 2022-06-18

## 2022-06-18 RX ORDER — DEXTROSE 50 % IN WATER 50 %
25 SYRINGE (ML) INTRAVENOUS ONCE
Refills: 0 | Status: DISCONTINUED | OUTPATIENT
Start: 2022-06-18 | End: 2022-06-20

## 2022-06-18 RX ORDER — HEPARIN SODIUM 5000 [USP'U]/ML
5000 INJECTION INTRAVENOUS; SUBCUTANEOUS EVERY 8 HOURS
Refills: 0 | Status: DISCONTINUED | OUTPATIENT
Start: 2022-06-18 | End: 2022-06-21

## 2022-06-18 RX ORDER — DEXTROSE 50 % IN WATER 50 %
12.5 SYRINGE (ML) INTRAVENOUS ONCE
Refills: 0 | Status: DISCONTINUED | OUTPATIENT
Start: 2022-06-18 | End: 2022-06-20

## 2022-06-18 RX ORDER — INSULIN LISPRO 100/ML
VIAL (ML) SUBCUTANEOUS EVERY 6 HOURS
Refills: 0 | Status: DISCONTINUED | OUTPATIENT
Start: 2022-06-18 | End: 2022-06-20

## 2022-06-18 RX ORDER — FENTANYL CITRATE 50 UG/ML
25 INJECTION INTRAVENOUS
Refills: 0 | Status: DISCONTINUED | OUTPATIENT
Start: 2022-06-18 | End: 2022-06-18

## 2022-06-18 RX ORDER — ACETAMINOPHEN 500 MG
1000 TABLET ORAL ONCE
Refills: 0 | Status: COMPLETED | OUTPATIENT
Start: 2022-06-18 | End: 2022-06-19

## 2022-06-18 RX ORDER — GLUCAGON INJECTION, SOLUTION 0.5 MG/.1ML
1 INJECTION, SOLUTION SUBCUTANEOUS ONCE
Refills: 0 | Status: DISCONTINUED | OUTPATIENT
Start: 2022-06-18 | End: 2022-06-20

## 2022-06-18 RX ORDER — METOPROLOL TARTRATE 50 MG
5 TABLET ORAL EVERY 6 HOURS
Refills: 0 | Status: DISCONTINUED | OUTPATIENT
Start: 2022-06-18 | End: 2022-06-18

## 2022-06-18 RX ADMIN — HYDROMORPHONE HYDROCHLORIDE 1 MILLIGRAM(S): 2 INJECTION INTRAMUSCULAR; INTRAVENOUS; SUBCUTANEOUS at 03:03

## 2022-06-18 RX ADMIN — SODIUM CHLORIDE 1000 MILLILITER(S): 9 INJECTION INTRAMUSCULAR; INTRAVENOUS; SUBCUTANEOUS at 03:14

## 2022-06-18 RX ADMIN — Medication 5 MILLIGRAM(S): at 06:15

## 2022-06-18 RX ADMIN — HYDROMORPHONE HYDROCHLORIDE 0.5 MILLIGRAM(S): 2 INJECTION INTRAMUSCULAR; INTRAVENOUS; SUBCUTANEOUS at 04:46

## 2022-06-18 RX ADMIN — Medication 400 MILLIGRAM(S): at 06:05

## 2022-06-18 RX ADMIN — SODIUM CHLORIDE 100 MILLILITER(S): 9 INJECTION, SOLUTION INTRAVENOUS at 17:02

## 2022-06-18 RX ADMIN — Medication 400 MILLIGRAM(S): at 17:56

## 2022-06-18 RX ADMIN — ONDANSETRON 4 MILLIGRAM(S): 8 TABLET, FILM COATED ORAL at 03:03

## 2022-06-18 RX ADMIN — SODIUM CHLORIDE 1000 MILLILITER(S): 9 INJECTION, SOLUTION INTRAVENOUS at 06:05

## 2022-06-18 RX ADMIN — BENZOCAINE AND MENTHOL 1 LOZENGE: 5; 1 LIQUID ORAL at 22:14

## 2022-06-18 RX ADMIN — SODIUM CHLORIDE 100 MILLILITER(S): 9 INJECTION, SOLUTION INTRAVENOUS at 06:04

## 2022-06-18 RX ADMIN — HEPARIN SODIUM 5000 UNIT(S): 5000 INJECTION INTRAVENOUS; SUBCUTANEOUS at 19:11

## 2022-06-18 RX ADMIN — Medication 10 MILLIGRAM(S): at 16:09

## 2022-06-18 RX ADMIN — Medication 10 MILLIGRAM(S): at 12:52

## 2022-06-18 RX ADMIN — SODIUM CHLORIDE 100 MILLILITER(S): 9 INJECTION, SOLUTION INTRAVENOUS at 12:52

## 2022-06-18 RX ADMIN — SODIUM CHLORIDE 100 MILLILITER(S): 9 INJECTION, SOLUTION INTRAVENOUS at 19:11

## 2022-06-18 RX ADMIN — SODIUM CHLORIDE 100 MILLILITER(S): 9 INJECTION, SOLUTION INTRAVENOUS at 22:15

## 2022-06-18 NOTE — ED PROVIDER NOTE - PHYSICAL EXAMINATION
Gen: uncomfortable appearing, non-toxic appearing  Head: normal appearing  HEENT: normal conjunctiva, oral mucosa moist  Lung: no respiratory distress, speaking in full sentences, CTA b/l     CV: regular rate and rhythm, no murmurs  Abd: hard hernia unable to be reduced, non distended, tender over hernia site   MSK: no visible deformities  Neuro: No focal deficits, AAOx3  Skin: Warm, no skin changes  Psych: normal affect

## 2022-06-18 NOTE — H&P ADULT - ASSESSMENT
66M w/ pmh HTN, HLD, CAD s/p CABG (2017) incarcerated ventral hernia containing two loops of bowel in closed loop configuration.    - Admit to General Surgery, Dr. Gallo  - OR for Open Ventral Hernia Repair   - NPO/IVF  - NGT decompression  - DVT ppx: HSQ  - Discussed with Dr. Sabino Baker, PGY4  Red Surgery  [id number] with any questions

## 2022-06-18 NOTE — ED PROVIDER NOTE - CLINICAL SUMMARY MEDICAL DECISION MAKING FREE TEXT BOX
65y/o M w/ h/o HTN, HLD, CAD, ASIYA, ventral hernia p/w abdominal pain a/w vomiting and unable to pass gas concern for incarcerated or strangulated hernia causing bowel obstruction. plan preop labs, CTAP, pain control, antiemetics and surgery consult.

## 2022-06-18 NOTE — BRIEF OPERATIVE NOTE - OPERATION/FINDINGS
s/p repair of incarcerated ventral hernia (primary) omentectomy of incarcerated omentum, visualized bowel appeared viable

## 2022-06-18 NOTE — ED ADULT NURSE REASSESSMENT NOTE - NS ED NURSE REASSESS COMMENT FT1
To OR Denies any discomfort. Resp even and nonlab NGT in place NPO since 9pm Belonging locked in OR closet in ED Valuables in drop safe

## 2022-06-18 NOTE — CHART NOTE - NSCHARTNOTEFT_GEN_A_CORE
POST-OPERATIVE NOTE    Subjective:  Patient is s/p ventral hernia repair, and omentectomy of incarcerted omentum.     Vital Signs Last 24 Hrs  T(C): 36.5 (18 Jun 2022 17:00), Max: 36.8 (18 Jun 2022 06:15)  T(F): 97.7 (18 Jun 2022 17:00), Max: 98.2 (18 Jun 2022 06:15)  HR: 84 (18 Jun 2022 17:30) (72 - 88)  BP: 121/59 (18 Jun 2022 17:30) (114/57 - 179/82)  BP(mean): 84 (18 Jun 2022 17:30) (82 - 123)  RR: 14 (18 Jun 2022 17:30) (12 - 22)  SpO2: 97% (18 Jun 2022 17:30) (93% - 99%)  I&O's Detail    18 Jun 2022 07:01  -  18 Jun 2022 18:50  --------------------------------------------------------  IN:    Lactated Ringers: 600 mL  Total IN: 600 mL    OUT:    Bulb (mL): 40 mL    Nasogastric/Oral tube (mL): 300 mL    Voided (mL): 850 mL  Total OUT: 1190 mL    Total NET: -590 mL        heparin   Injectable 5000    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      Essential hypertension      CAD (coronary artery disease)  2017- s/p cabg x3      Ankle fracture      Hypercholesterolemia      ASIYA (obstructive sleep apnea)  initially dx 1997 ; CPAP      Class 3 severe obesity with body mass index (BMI) of 45.0 to 49.9 in adult      Paralytic ileus  post op THR 2009 &amp; post op laminectomy      Umbilical hernia without obstruction and without gangrene      S/P Left Inguinal Hernia Repair      History of Total Hip Replacement  2009- bilateral THR      Cholecystitis  cholecycstectomy 2010      Hernia  repair, left inguinal 2010      S/P tonsillectomy and adenoidectomy  as child      S/P knee replacement  2011- right x 3  - revisions in 2012 &amp; 2014      H/O lithotripsy  x1      S/P revision of total knee, right  x2- 2012 &amp; 2014      S/P lumbar discectomy  2012- ,L5  Aug 2013      S/P CABG x 3  8/29/17      S/P lumbar laminectomy  Fusion L3-L5 2012      Kidney stone  s/w ESWL pt unsure site      Neuropathy  Bilateral Feet since 2012            Physical Exam:  General: NAD, resting comfortably in bed. NG tube present.   Pulmonary: Nonlabored breathing, no respiratory distress  Cardiovascular: NSR  Abdominal: soft, NT/ND. Drain present in midline.   Extremities: WWP      LABS:                        14.2   12.41 )-----------( 237      ( 18 Jun 2022 03:45 )             43.4     06-18    136  |  97  |  16  ----------------------------<  179<H>  3.9   |  22  |  0.57    Ca    9.7      18 Jun 2022 03:45    TPro  7.4  /  Alb  4.5  /  TBili  0.5  /  DBili  x   /  AST  20  /  ALT  19  /  AlkPhos  112  06-18    PT/INR - ( 18 Jun 2022 05:06 )   PT: 11.5 sec;   INR: 0.99 ratio         PTT - ( 18 Jun 2022 05:06 )  PTT:24.3 sec  CAPILLARY BLOOD GLUCOSE          Radiology and Additional Studies:    Assessment:  The patient is a 66y Male who is now several hours post-op from a ventral hernia repair.     Plan:  - Pain control as needed  - DVT ppx  - OOB and ambulating as tolerated  - F/u AM labs  - no amaro-passed tOV  - NPO with NG tube

## 2022-06-18 NOTE — ED PROVIDER NOTE - NS ED MD TWO NIGHTS YN
Patient attended Phase 2 Cardiac Rehab Exercise Session. Further documentation will be completed in Cardiac Science/[employer] System and will be scanned into the medical record upon discharge.    
Yes

## 2022-06-18 NOTE — ED PROVIDER NOTE - ATTENDING CONTRIBUTION TO CARE
66 M w/ hx of spine sx w/ latefi May 5 here w/ abd pain   hernia unable to be reduced   no overlying skin changes  pt cholecystectomy and hernia repair in the past 66 M w/ hx of spine sx w/ latefi May 5 here w/ abd pain   hernia unable to be reduced   no overlying skin changes  pt cholecystectomy and hernia repair in the past  pt uncomfortable, vomitinig in the room, sx consulted as hernia firm, concern for incarceration. sx requesting ct scan- closed loop obstruction unable to be reduced at bedside pt tba to sx service for further management

## 2022-06-18 NOTE — ED PROVIDER NOTE - PROGRESS NOTE DETAILS
sx consulted for incarcerated hernia, pt actively vomiting in the ER, concern for hernia causing bowel obstruction at this time, pt not passing gas, pain started at 6PM w/ bulge in the anterior abdomen sx aware of ct findings, discussing w/ radiology whether hernia reducible or not

## 2022-06-18 NOTE — ED ADULT TRIAGE NOTE - CHIEF COMPLAINT QUOTE
pt with known hernia; presents with hard abdominal bulge; painful; nausea; unable to reduce; on 5/5/22 and 5/18/22 had back surgery

## 2022-06-18 NOTE — ED PROVIDER NOTE - NS ED ROS FT
Constitutional: no fevers no chills.   CV: no chest pain, no palpitations   Respiratory: no shortness of breath, no cough   GI: + abdominal pain, + nausea+ vomiting   Neuro: no headache, no weakness, no numbness  all other ROS is negative except as documented as HPI.

## 2022-06-18 NOTE — ED ADULT NURSE NOTE - OBJECTIVE STATEMENT
Pt is a 66 yr old male with pmh of HTN, kidney stones, HLD, ASIYA on CPAP, DM no insulin, abd hernia, and a spinal fusion in May coming from home for abd pain. Pt states he has had an abd hernia- and while in his spinal fusion surgery they pushed the hernia back in. Pt states starting today the hernia came back out, and became hard and painful. Pt is endorsing nausea. Pt was told while he was in rehab ([facility] post surgery) that he had a kidney infection and was sent abx for five days which he finished. Pt is a/ox 3- has a left lower quadrant hernia that is hard and fixed. Pt is ambulatory with a cane ever since the surgery. Pt vitals stable.

## 2022-06-18 NOTE — ED PROVIDER NOTE - OBJECTIVE STATEMENT
65y/o M w/ h/o HTN, HLD, CAD, ASIYA, ventral hernia p/w abdominal pain, pt states that he was eating dinner and suddenly had pain at the site of his hernia. Normal soft and able to be reduced but not unable to be reduced. Now had multiple episodes of vomiting and unable to pass gas. Denies fevers, chills, headache, chest pain, cough, sob, hemoptysis, changes in diet, diarrhea, dysuria, hematuria, numbness, tingling.

## 2022-06-18 NOTE — H&P ADULT - ATTENDING COMMENTS
date of service 6/18/22    pt seen and examined  pt chart and imaging reviewed in detail  agree with note above  66M pmhx morbid obesity and recent spine sx with incarcerated ventral hernia with sbo unable to be reduced in ER  admit to red team surgery  npo, ivf, ngt  d/w pt & family r/b/a of emergent xlap, rpr of incarcerated ventral hernia, possible bowel resection, possible mesh  consent signed in chart  await OR .

## 2022-06-19 LAB
A1C WITH ESTIMATED AVERAGE GLUCOSE RESULT: 6 % — HIGH (ref 4–5.6)
ANION GAP SERPL CALC-SCNC: 13 MMOL/L — SIGNIFICANT CHANGE UP (ref 5–17)
BUN SERPL-MCNC: 10 MG/DL — SIGNIFICANT CHANGE UP (ref 7–23)
CALCIUM SERPL-MCNC: 8.7 MG/DL — SIGNIFICANT CHANGE UP (ref 8.4–10.5)
CHLORIDE SERPL-SCNC: 104 MMOL/L — SIGNIFICANT CHANGE UP (ref 96–108)
CO2 SERPL-SCNC: 25 MMOL/L — SIGNIFICANT CHANGE UP (ref 22–31)
CREAT SERPL-MCNC: 0.53 MG/DL — SIGNIFICANT CHANGE UP (ref 0.5–1.3)
EGFR: 111 ML/MIN/1.73M2 — SIGNIFICANT CHANGE UP
ESTIMATED AVERAGE GLUCOSE: 126 MG/DL — HIGH (ref 68–114)
GLUCOSE BLDC GLUCOMTR-MCNC: 123 MG/DL — HIGH (ref 70–99)
GLUCOSE BLDC GLUCOMTR-MCNC: 127 MG/DL — HIGH (ref 70–99)
GLUCOSE SERPL-MCNC: 130 MG/DL — HIGH (ref 70–99)
HCT VFR BLD CALC: 37.1 % — LOW (ref 39–50)
HGB BLD-MCNC: 12.3 G/DL — LOW (ref 13–17)
MAGNESIUM SERPL-MCNC: 2.1 MG/DL — SIGNIFICANT CHANGE UP (ref 1.6–2.6)
MCHC RBC-ENTMCNC: 28.9 PG — SIGNIFICANT CHANGE UP (ref 27–34)
MCHC RBC-ENTMCNC: 33.2 GM/DL — SIGNIFICANT CHANGE UP (ref 32–36)
MCV RBC AUTO: 87.3 FL — SIGNIFICANT CHANGE UP (ref 80–100)
NRBC # BLD: 0 /100 WBCS — SIGNIFICANT CHANGE UP (ref 0–0)
PHOSPHATE SERPL-MCNC: 3 MG/DL — SIGNIFICANT CHANGE UP (ref 2.5–4.5)
PLATELET # BLD AUTO: 211 K/UL — SIGNIFICANT CHANGE UP (ref 150–400)
POTASSIUM SERPL-MCNC: 3.6 MMOL/L — SIGNIFICANT CHANGE UP (ref 3.5–5.3)
POTASSIUM SERPL-SCNC: 3.6 MMOL/L — SIGNIFICANT CHANGE UP (ref 3.5–5.3)
RBC # BLD: 4.25 M/UL — SIGNIFICANT CHANGE UP (ref 4.2–5.8)
RBC # FLD: 13.3 % — SIGNIFICANT CHANGE UP (ref 10.3–14.5)
SODIUM SERPL-SCNC: 142 MMOL/L — SIGNIFICANT CHANGE UP (ref 135–145)
WBC # BLD: 9.82 K/UL — SIGNIFICANT CHANGE UP (ref 3.8–10.5)
WBC # FLD AUTO: 9.82 K/UL — SIGNIFICANT CHANGE UP (ref 3.8–10.5)

## 2022-06-19 RX ORDER — BENZOCAINE 10 %
1 GEL (GRAM) MUCOUS MEMBRANE ONCE
Refills: 0 | Status: DISCONTINUED | OUTPATIENT
Start: 2022-06-19 | End: 2022-06-19

## 2022-06-19 RX ORDER — ONDANSETRON 8 MG/1
4 TABLET, FILM COATED ORAL ONCE
Refills: 0 | Status: COMPLETED | OUTPATIENT
Start: 2022-06-19 | End: 2022-06-19

## 2022-06-19 RX ORDER — ONDANSETRON 8 MG/1
4 TABLET, FILM COATED ORAL ONCE
Refills: 0 | Status: DISCONTINUED | OUTPATIENT
Start: 2022-06-19 | End: 2022-06-19

## 2022-06-19 RX ORDER — PANTOPRAZOLE SODIUM 20 MG/1
40 TABLET, DELAYED RELEASE ORAL EVERY 12 HOURS
Refills: 0 | Status: DISCONTINUED | OUTPATIENT
Start: 2022-06-19 | End: 2022-06-21

## 2022-06-19 RX ORDER — INFLUENZA VIRUS VACCINE 15; 15; 15; 15 UG/.5ML; UG/.5ML; UG/.5ML; UG/.5ML
0.7 SUSPENSION INTRAMUSCULAR ONCE
Refills: 0 | Status: DISCONTINUED | OUTPATIENT
Start: 2022-06-19 | End: 2022-06-21

## 2022-06-19 RX ORDER — ACETAMINOPHEN 500 MG
1000 TABLET ORAL ONCE
Refills: 0 | Status: COMPLETED | OUTPATIENT
Start: 2022-06-19 | End: 2022-06-19

## 2022-06-19 RX ORDER — POTASSIUM CHLORIDE 20 MEQ
10 PACKET (EA) ORAL
Refills: 0 | Status: COMPLETED | OUTPATIENT
Start: 2022-06-19 | End: 2022-06-19

## 2022-06-19 RX ORDER — BENZOCAINE AND MENTHOL 5; 1 G/100ML; G/100ML
1 LIQUID ORAL THREE TIMES A DAY
Refills: 0 | Status: DISCONTINUED | OUTPATIENT
Start: 2022-06-19 | End: 2022-06-21

## 2022-06-19 RX ORDER — TETRACAINE/BENZOCAINE/BUTAMBEN 2%-14%-2%
1 OINTMENT (GRAM) TOPICAL ONCE
Refills: 0 | Status: COMPLETED | OUTPATIENT
Start: 2022-06-19 | End: 2022-06-19

## 2022-06-19 RX ORDER — KETOROLAC TROMETHAMINE 30 MG/ML
15 SYRINGE (ML) INJECTION ONCE
Refills: 0 | Status: DISCONTINUED | OUTPATIENT
Start: 2022-06-19 | End: 2022-06-19

## 2022-06-19 RX ORDER — ACETAMINOPHEN 500 MG
1000 TABLET ORAL ONCE
Refills: 0 | Status: DISCONTINUED | OUTPATIENT
Start: 2022-06-19 | End: 2022-06-21

## 2022-06-19 RX ADMIN — BENZOCAINE AND MENTHOL 1 LOZENGE: 5; 1 LIQUID ORAL at 22:31

## 2022-06-19 RX ADMIN — Medication 100 MILLIEQUIVALENT(S): at 15:59

## 2022-06-19 RX ADMIN — HEPARIN SODIUM 5000 UNIT(S): 5000 INJECTION INTRAVENOUS; SUBCUTANEOUS at 12:35

## 2022-06-19 RX ADMIN — BENZOCAINE AND MENTHOL 1 LOZENGE: 5; 1 LIQUID ORAL at 12:35

## 2022-06-19 RX ADMIN — PANTOPRAZOLE SODIUM 40 MILLIGRAM(S): 20 TABLET, DELAYED RELEASE ORAL at 20:29

## 2022-06-19 RX ADMIN — Medication 15 MILLIGRAM(S): at 23:01

## 2022-06-19 RX ADMIN — Medication 400 MILLIGRAM(S): at 15:49

## 2022-06-19 RX ADMIN — HEPARIN SODIUM 5000 UNIT(S): 5000 INJECTION INTRAVENOUS; SUBCUTANEOUS at 04:03

## 2022-06-19 RX ADMIN — ONDANSETRON 4 MILLIGRAM(S): 8 TABLET, FILM COATED ORAL at 16:26

## 2022-06-19 RX ADMIN — Medication 1 SPRAY(S): at 16:26

## 2022-06-19 RX ADMIN — Medication 100 MILLIEQUIVALENT(S): at 20:28

## 2022-06-19 RX ADMIN — Medication 400 MILLIGRAM(S): at 01:38

## 2022-06-19 RX ADMIN — Medication 100 MILLIEQUIVALENT(S): at 22:55

## 2022-06-19 RX ADMIN — Medication 15 MILLIGRAM(S): at 23:16

## 2022-06-19 RX ADMIN — HEPARIN SODIUM 5000 UNIT(S): 5000 INJECTION INTRAVENOUS; SUBCUTANEOUS at 20:29

## 2022-06-19 NOTE — PROGRESS NOTE ADULT - SUBJECTIVE AND OBJECTIVE BOX
RED Surgery Progress Note    SUBJECTIVE:   Patient seen and examined at bedside with surgical team.   No acute events overnight.     OBJECTIVE:    Vital Signs Last 24 Hrs  T(C): 37.1 (18 Jun 2022 23:21), Max: 37.4 (18 Jun 2022 18:30)  T(F): 98.7 (18 Jun 2022 23:21), Max: 99.4 (18 Jun 2022 18:30)  HR: 83 (18 Jun 2022 23:21) (72 - 88)  BP: 108/76 (18 Jun 2022 23:21) (108/76 - 179/82)  BP(mean): 84 (18 Jun 2022 17:30) (82 - 123)  RR: 18 (18 Jun 2022 23:21) (12 - 22)  SpO2: 95% (18 Jun 2022 23:21) (93% - 99%)    MEDICATIONS  (STANDING):  benzocaine 15 mG/menthol 3.6 mG Lozenge 1 Lozenge Oral daily  dextrose 5%. 1000 milliLiter(s) (100 mL/Hr) IV Continuous <Continuous>  dextrose 50% Injectable 25 Gram(s) IV Push once  dextrose 50% Injectable 12.5 Gram(s) IV Push once  dextrose 50% Injectable 25 Gram(s) IV Push once  glucagon  Injectable 1 milliGRAM(s) IntraMuscular once  heparin   Injectable 5000 Unit(s) SubCutaneous every 8 hours  influenza  Vaccine (HIGH DOSE) 0.7 milliLiter(s) IntraMuscular once  insulin lispro (ADMELOG) corrective regimen sliding scale   SubCutaneous every 6 hours  lactated ringers. 1000 milliLiter(s) (100 mL/Hr) IV Continuous <Continuous>    MEDICATIONS  (PRN):      PHYSICAL EXAM:  General: NAD, resting comfortably in bed. NG tube present.   Pulmonary: Nonlabored breathing, no respiratory distress  Cardiovascular: NSR  Abdominal: soft, NT/ND. Drain present in midline.   Extremities: WWP    LABS:                        14.2   12.41 )-----------( 237      ( 18 Jun 2022 03:45 )             43.4     06-18    136  |  97  |  16  ----------------------------<  179<H>  3.9   |  22  |  0.57    Ca    9.7      18 Jun 2022 03:45    TPro  7.4  /  Alb  4.5  /  TBili  0.5  /  DBili  x   /  AST  20  /  ALT  19  /  AlkPhos  112  06-18    PT/INR - ( 18 Jun 2022 05:06 )   PT: 11.5 sec;   INR: 0.99 ratio         PTT - ( 18 Jun 2022 05:06 )  PTT:24.3 sec  LIVER FUNCTIONS - ( 18 Jun 2022 03:45 )  Alb: 4.5 g/dL / Pro: 7.4 g/dL / ALK PHOS: 112 U/L / ALT: 19 U/L / AST: 20 U/L / GGT: x

## 2022-06-19 NOTE — PROGRESS NOTE ADULT - ASSESSMENT
66M w/ pmh HTN, HLD, CAD s/p CABG (2017) incarcerated ventral hernia containing two loops of bowel in closed loop configuration. Now s/p repair of incarcerated ventral hernia 6/18.     Plan:   - NPO/NGT/IVF  - pain control   - dvt ppx  - OOB, ambulate as tolerated   - f/u am labs      Red Surgery  [id number]

## 2022-06-20 LAB
ANION GAP SERPL CALC-SCNC: 15 MMOL/L — SIGNIFICANT CHANGE UP (ref 5–17)
BUN SERPL-MCNC: 12 MG/DL — SIGNIFICANT CHANGE UP (ref 7–23)
CALCIUM SERPL-MCNC: 9 MG/DL — SIGNIFICANT CHANGE UP (ref 8.4–10.5)
CHLORIDE SERPL-SCNC: 102 MMOL/L — SIGNIFICANT CHANGE UP (ref 96–108)
CO2 SERPL-SCNC: 25 MMOL/L — SIGNIFICANT CHANGE UP (ref 22–31)
CREAT SERPL-MCNC: 0.62 MG/DL — SIGNIFICANT CHANGE UP (ref 0.5–1.3)
EGFR: 105 ML/MIN/1.73M2 — SIGNIFICANT CHANGE UP
GLUCOSE BLDC GLUCOMTR-MCNC: 113 MG/DL — HIGH (ref 70–99)
GLUCOSE BLDC GLUCOMTR-MCNC: 114 MG/DL — HIGH (ref 70–99)
GLUCOSE BLDC GLUCOMTR-MCNC: 123 MG/DL — HIGH (ref 70–99)
GLUCOSE BLDC GLUCOMTR-MCNC: 136 MG/DL — HIGH (ref 70–99)
GLUCOSE BLDC GLUCOMTR-MCNC: 138 MG/DL — HIGH (ref 70–99)
GLUCOSE SERPL-MCNC: 117 MG/DL — HIGH (ref 70–99)
HCT VFR BLD CALC: 38.3 % — LOW (ref 39–50)
HGB BLD-MCNC: 12.5 G/DL — LOW (ref 13–17)
MAGNESIUM SERPL-MCNC: 2 MG/DL — SIGNIFICANT CHANGE UP (ref 1.6–2.6)
MCHC RBC-ENTMCNC: 28.8 PG — SIGNIFICANT CHANGE UP (ref 27–34)
MCHC RBC-ENTMCNC: 32.6 GM/DL — SIGNIFICANT CHANGE UP (ref 32–36)
MCV RBC AUTO: 88.2 FL — SIGNIFICANT CHANGE UP (ref 80–100)
NRBC # BLD: 0 /100 WBCS — SIGNIFICANT CHANGE UP (ref 0–0)
PHOSPHATE SERPL-MCNC: 3.1 MG/DL — SIGNIFICANT CHANGE UP (ref 2.5–4.5)
PLATELET # BLD AUTO: 199 K/UL — SIGNIFICANT CHANGE UP (ref 150–400)
POTASSIUM SERPL-MCNC: 3.3 MMOL/L — LOW (ref 3.5–5.3)
POTASSIUM SERPL-SCNC: 3.3 MMOL/L — LOW (ref 3.5–5.3)
RBC # BLD: 4.34 M/UL — SIGNIFICANT CHANGE UP (ref 4.2–5.8)
RBC # FLD: 13.5 % — SIGNIFICANT CHANGE UP (ref 10.3–14.5)
SODIUM SERPL-SCNC: 142 MMOL/L — SIGNIFICANT CHANGE UP (ref 135–145)
WBC # BLD: 8.76 K/UL — SIGNIFICANT CHANGE UP (ref 3.8–10.5)
WBC # FLD AUTO: 8.76 K/UL — SIGNIFICANT CHANGE UP (ref 3.8–10.5)

## 2022-06-20 RX ORDER — DEXTROSE 50 % IN WATER 50 %
12.5 SYRINGE (ML) INTRAVENOUS ONCE
Refills: 0 | Status: DISCONTINUED | OUTPATIENT
Start: 2022-06-20 | End: 2022-06-21

## 2022-06-20 RX ORDER — GLUCAGON INJECTION, SOLUTION 0.5 MG/.1ML
1 INJECTION, SOLUTION SUBCUTANEOUS ONCE
Refills: 0 | Status: DISCONTINUED | OUTPATIENT
Start: 2022-06-20 | End: 2022-06-21

## 2022-06-20 RX ORDER — HYDROMORPHONE HYDROCHLORIDE 2 MG/ML
0.25 INJECTION INTRAMUSCULAR; INTRAVENOUS; SUBCUTANEOUS ONCE
Refills: 0 | Status: DISCONTINUED | OUTPATIENT
Start: 2022-06-20 | End: 2022-06-20

## 2022-06-20 RX ORDER — INSULIN LISPRO 100/ML
VIAL (ML) SUBCUTANEOUS
Refills: 0 | Status: DISCONTINUED | OUTPATIENT
Start: 2022-06-20 | End: 2022-06-21

## 2022-06-20 RX ORDER — DIAZEPAM 5 MG
2 TABLET ORAL ONCE
Refills: 0 | Status: DISCONTINUED | OUTPATIENT
Start: 2022-06-20 | End: 2022-06-20

## 2022-06-20 RX ORDER — DEXTROSE 50 % IN WATER 50 %
25 SYRINGE (ML) INTRAVENOUS ONCE
Refills: 0 | Status: DISCONTINUED | OUTPATIENT
Start: 2022-06-20 | End: 2022-06-21

## 2022-06-20 RX ORDER — SODIUM CHLORIDE 9 MG/ML
1000 INJECTION, SOLUTION INTRAVENOUS ONCE
Refills: 0 | Status: COMPLETED | OUTPATIENT
Start: 2022-06-20 | End: 2022-06-20

## 2022-06-20 RX ORDER — DEXTROSE 50 % IN WATER 50 %
15 SYRINGE (ML) INTRAVENOUS ONCE
Refills: 0 | Status: DISCONTINUED | OUTPATIENT
Start: 2022-06-20 | End: 2022-06-21

## 2022-06-20 RX ORDER — DEXTROSE MONOHYDRATE, SODIUM CHLORIDE, AND POTASSIUM CHLORIDE 50; .745; 4.5 G/1000ML; G/1000ML; G/1000ML
1000 INJECTION, SOLUTION INTRAVENOUS
Refills: 0 | Status: DISCONTINUED | OUTPATIENT
Start: 2022-06-20 | End: 2022-06-21

## 2022-06-20 RX ORDER — ZOLPIDEM TARTRATE 10 MG/1
5 TABLET ORAL AT BEDTIME
Refills: 0 | Status: DISCONTINUED | OUTPATIENT
Start: 2022-06-20 | End: 2022-06-21

## 2022-06-20 RX ORDER — ACETAMINOPHEN 500 MG
1000 TABLET ORAL ONCE
Refills: 0 | Status: COMPLETED | OUTPATIENT
Start: 2022-06-20 | End: 2022-06-20

## 2022-06-20 RX ORDER — SODIUM CHLORIDE 9 MG/ML
1000 INJECTION, SOLUTION INTRAVENOUS
Refills: 0 | Status: DISCONTINUED | OUTPATIENT
Start: 2022-06-20 | End: 2022-06-21

## 2022-06-20 RX ORDER — INSULIN LISPRO 100/ML
VIAL (ML) SUBCUTANEOUS AT BEDTIME
Refills: 0 | Status: DISCONTINUED | OUTPATIENT
Start: 2022-06-20 | End: 2022-06-21

## 2022-06-20 RX ORDER — POTASSIUM CHLORIDE 20 MEQ
10 PACKET (EA) ORAL
Refills: 0 | Status: COMPLETED | OUTPATIENT
Start: 2022-06-20 | End: 2022-06-20

## 2022-06-20 RX ORDER — KETOROLAC TROMETHAMINE 30 MG/ML
15 SYRINGE (ML) INJECTION ONCE
Refills: 0 | Status: DISCONTINUED | OUTPATIENT
Start: 2022-06-20 | End: 2022-06-20

## 2022-06-20 RX ADMIN — HEPARIN SODIUM 5000 UNIT(S): 5000 INJECTION INTRAVENOUS; SUBCUTANEOUS at 05:04

## 2022-06-20 RX ADMIN — Medication 15 MILLIGRAM(S): at 22:57

## 2022-06-20 RX ADMIN — ZOLPIDEM TARTRATE 5 MILLIGRAM(S): 10 TABLET ORAL at 22:58

## 2022-06-20 RX ADMIN — SODIUM CHLORIDE 1000 MILLILITER(S): 9 INJECTION, SOLUTION INTRAVENOUS at 06:15

## 2022-06-20 RX ADMIN — DEXTROSE MONOHYDRATE, SODIUM CHLORIDE, AND POTASSIUM CHLORIDE 125 MILLILITER(S): 50; .745; 4.5 INJECTION, SOLUTION INTRAVENOUS at 11:37

## 2022-06-20 RX ADMIN — PANTOPRAZOLE SODIUM 40 MILLIGRAM(S): 20 TABLET, DELAYED RELEASE ORAL at 18:59

## 2022-06-20 RX ADMIN — PANTOPRAZOLE SODIUM 40 MILLIGRAM(S): 20 TABLET, DELAYED RELEASE ORAL at 05:04

## 2022-06-20 RX ADMIN — HEPARIN SODIUM 5000 UNIT(S): 5000 INJECTION INTRAVENOUS; SUBCUTANEOUS at 11:47

## 2022-06-20 RX ADMIN — Medication 100 MILLIEQUIVALENT(S): at 18:57

## 2022-06-20 RX ADMIN — Medication 15 MILLIGRAM(S): at 23:12

## 2022-06-20 RX ADMIN — Medication 100 MILLIEQUIVALENT(S): at 18:59

## 2022-06-20 RX ADMIN — Medication 400 MILLIGRAM(S): at 22:57

## 2022-06-20 RX ADMIN — Medication 1000 MILLIGRAM(S): at 23:12

## 2022-06-20 RX ADMIN — BENZOCAINE AND MENTHOL 1 LOZENGE: 5; 1 LIQUID ORAL at 05:05

## 2022-06-20 RX ADMIN — Medication 100 MILLIEQUIVALENT(S): at 11:37

## 2022-06-20 RX ADMIN — HEPARIN SODIUM 5000 UNIT(S): 5000 INJECTION INTRAVENOUS; SUBCUTANEOUS at 19:45

## 2022-06-20 NOTE — PROGRESS NOTE ADULT - SUBJECTIVE AND OBJECTIVE BOX
Surgery Progress Note    Subjective:     Patient seen and examined at bedside.  POD 2. VSS, AF. No acute events overnight.     OBJECTIVE:     T(C): 36.8 (06-20-22 @ 00:20), Max: 37.5 (06-19-22 @ 20:04)  HR: 76 (06-20-22 @ 00:20) (61 - 79)  BP: 130/68 (06-20-22 @ 00:20) (128/72 - 162/82)  RR: 18 (06-20-22 @ 00:20) (18 - 18)  SpO2: 96% (06-20-22 @ 00:20) (64% - 97%)  Wt(kg): --    I&O's Detail    18 Jun 2022 07:01  -  19 Jun 2022 07:00  --------------------------------------------------------  IN:    Lactated Ringers: 1800 mL  Total IN: 1800 mL    OUT:    Bulb (mL): 55 mL    Nasogastric/Oral tube (mL): 800 mL    Voided (mL): 1650 mL  Total OUT: 2505 mL    Total NET: -705 mL      19 Jun 2022 07:01  -  20 Jun 2022 03:28  --------------------------------------------------------  IN:    Lactated Ringers: 1200 mL  Total IN: 1200 mL    OUT:    Nasogastric/Oral tube (mL): 1800 mL    Oral Fluid: 0 mL    Voided (mL): 1025 mL  Total OUT: 2825 mL    Total NET: -1625 mL          PHYSICAL EXAM:    General: NAD, resting comfortably in bed. NG tube present.   Pulmonary: Nonlabored breathing, no respiratory distress  Cardiovascular: NSR  Abdominal: soft, NT/ND. Drain present in midline.   Extremities: WWP      MEDICATIONS  (STANDING):  acetaminophen   IVPB .. 1000 milliGRAM(s) IV Intermittent once  benzocaine 15 mG/menthol 3.6 mG Lozenge 1 Lozenge Oral daily  benzocaine 15 mG/menthol 3.6 mG Lozenge 1 Lozenge Oral three times a day  dextrose 5%. 1000 milliLiter(s) (100 mL/Hr) IV Continuous <Continuous>  dextrose 50% Injectable 25 Gram(s) IV Push once  dextrose 50% Injectable 12.5 Gram(s) IV Push once  dextrose 50% Injectable 25 Gram(s) IV Push once  glucagon  Injectable 1 milliGRAM(s) IntraMuscular once  heparin   Injectable 5000 Unit(s) SubCutaneous every 8 hours  influenza  Vaccine (HIGH DOSE) 0.7 milliLiter(s) IntraMuscular once  insulin lispro (ADMELOG) corrective regimen sliding scale   SubCutaneous every 6 hours  lactated ringers. 1000 milliLiter(s) (100 mL/Hr) IV Continuous <Continuous>  pantoprazole  Injectable 40 milliGRAM(s) IV Push every 12 hours    MEDICATIONS  (PRN):      LABS:                          12.3   9.82  )-----------( 211      ( 19 Jun 2022 07:50 )             37.1     06-19    142  |  104  |  10  ----------------------------<  130<H>  3.6   |  25  |  0.53    Ca    8.7      19 Jun 2022 07:16  Phos  3.0     06-19  Mg     2.1     06-19    TPro  7.4  /  Alb  4.5  /  TBili  0.5  /  DBili  x   /  AST  20  /  ALT  19  /  AlkPhos  112  06-18    PT/INR - ( 18 Jun 2022 05:06 )   PT: 11.5 sec;   INR: 0.99 ratio         PTT - ( 18 Jun 2022 05:06 )  PTT:24.3 sec           Surgery Progress Note    Subjective:     Patient seen and examined at bedside.  POD 2. VSS, AF. No acute events overnight. NGT output repleated this morning.     OBJECTIVE:     T(C): 36.8 (06-20-22 @ 00:20), Max: 37.5 (06-19-22 @ 20:04)  HR: 76 (06-20-22 @ 00:20) (61 - 79)  BP: 130/68 (06-20-22 @ 00:20) (128/72 - 162/82)  RR: 18 (06-20-22 @ 00:20) (18 - 18)  SpO2: 96% (06-20-22 @ 00:20) (64% - 97%)  Wt(kg): --    I&O's Detail    18 Jun 2022 07:01  -  19 Jun 2022 07:00  --------------------------------------------------------  IN:    Lactated Ringers: 1800 mL  Total IN: 1800 mL    OUT:    Bulb (mL): 55 mL    Nasogastric/Oral tube (mL): 800 mL    Voided (mL): 1650 mL  Total OUT: 2505 mL    Total NET: -705 mL      19 Jun 2022 07:01  -  20 Jun 2022 03:28  --------------------------------------------------------  IN:    Lactated Ringers: 1200 mL  Total IN: 1200 mL    OUT:    Nasogastric/Oral tube (mL): 1800 mL    Oral Fluid: 0 mL    Voided (mL): 1025 mL  Total OUT: 2825 mL    Total NET: -1625 mL          PHYSICAL EXAM:    General: NAD, resting comfortably in bed. NG tube present.   Pulmonary: Nonlabored breathing, no respiratory distress  Cardiovascular: NSR  Abdominal: soft, NT/ND. Drain present in midline.   Extremities: WWP      MEDICATIONS  (STANDING):  acetaminophen   IVPB .. 1000 milliGRAM(s) IV Intermittent once  benzocaine 15 mG/menthol 3.6 mG Lozenge 1 Lozenge Oral daily  benzocaine 15 mG/menthol 3.6 mG Lozenge 1 Lozenge Oral three times a day  dextrose 5%. 1000 milliLiter(s) (100 mL/Hr) IV Continuous <Continuous>  dextrose 50% Injectable 25 Gram(s) IV Push once  dextrose 50% Injectable 12.5 Gram(s) IV Push once  dextrose 50% Injectable 25 Gram(s) IV Push once  glucagon  Injectable 1 milliGRAM(s) IntraMuscular once  heparin   Injectable 5000 Unit(s) SubCutaneous every 8 hours  influenza  Vaccine (HIGH DOSE) 0.7 milliLiter(s) IntraMuscular once  insulin lispro (ADMELOG) corrective regimen sliding scale   SubCutaneous every 6 hours  lactated ringers. 1000 milliLiter(s) (100 mL/Hr) IV Continuous <Continuous>  pantoprazole  Injectable 40 milliGRAM(s) IV Push every 12 hours    MEDICATIONS  (PRN):      LABS:                          12.3   9.82  )-----------( 211      ( 19 Jun 2022 07:50 )             37.1     06-19    142  |  104  |  10  ----------------------------<  130<H>  3.6   |  25  |  0.53    Ca    8.7      19 Jun 2022 07:16  Phos  3.0     06-19  Mg     2.1     06-19    TPro  7.4  /  Alb  4.5  /  TBili  0.5  /  DBili  x   /  AST  20  /  ALT  19  /  AlkPhos  112  06-18    PT/INR - ( 18 Jun 2022 05:06 )   PT: 11.5 sec;   INR: 0.99 ratio         PTT - ( 18 Jun 2022 05:06 )  PTT:24.3 sec

## 2022-06-20 NOTE — PROGRESS NOTE ADULT - ASSESSMENT
66M w/ pmh HTN, HLD, CAD s/p CABG (2017) incarcerated ventral hernia containing two loops of bowel in closed loop configuration. Now s/p repair of incarcerated ventral hernia 6/18.     Plan:   - NPO/NGT/IVF  - pain control   - dvt ppx  - OOB, ambulate as tolerated   - f/u am labs      [facility] Surgery  [id number]  66M w/ pmh HTN, HLD, CAD s/p CABG (2017) incarcerated ventral hernia containing two loops of bowel in closed loop configuration. Now s/p repair of incarcerated ventral hernia 6/18.     Plan:   - NPO/NGT/IVF  - Clamp trial today  - pain control   - dvt ppx  - OOB, ambulate as tolerated   - f/u am labs      Red Surgery  [id number]

## 2022-06-20 NOTE — PROGRESS NOTE ADULT - ATTENDING COMMENTS
pt seen and examined  agree with above  pod 1 s/p xlap, AGUEDA, reduction and repair of incarcerated ventral hernia, omentectomy  resting in chair, NAD  deniea any gi fxn yet  softly distended, NT no r/g  celestina ss  f/u labs in am  oob to ambulate  cont npo, ngt, ivf  await return of gi fxn   d/w pt @ bedside in detail
pt seen and examined  agree with above  pod 2 s/p xlap, AGUEDA, reduction and repair of incarcerated ventral hernia, omentectomy  resting in chair, NAD  denies any gi fxn yet  softly distended, NT no r/g  celestina ss  f/u labs in am  oob to ambulate  cont npo, ngt, ivf  trial of ngt clamping  await return of gi fxn   d/w pt @ bedside in detail .

## 2022-06-21 ENCOUNTER — TRANSCRIPTION ENCOUNTER (OUTPATIENT)
Age: 67
End: 2022-06-21

## 2022-06-21 VITALS
DIASTOLIC BLOOD PRESSURE: 69 MMHG | TEMPERATURE: 98 F | HEART RATE: 60 BPM | RESPIRATION RATE: 18 BRPM | OXYGEN SATURATION: 98 % | SYSTOLIC BLOOD PRESSURE: 143 MMHG

## 2022-06-21 LAB
ANION GAP SERPL CALC-SCNC: 12 MMOL/L — SIGNIFICANT CHANGE UP (ref 5–17)
BUN SERPL-MCNC: 7 MG/DL — SIGNIFICANT CHANGE UP (ref 7–23)
CALCIUM SERPL-MCNC: 9.1 MG/DL — SIGNIFICANT CHANGE UP (ref 8.4–10.5)
CHLORIDE SERPL-SCNC: 101 MMOL/L — SIGNIFICANT CHANGE UP (ref 96–108)
CO2 SERPL-SCNC: 24 MMOL/L — SIGNIFICANT CHANGE UP (ref 22–31)
CREAT SERPL-MCNC: 0.56 MG/DL — SIGNIFICANT CHANGE UP (ref 0.5–1.3)
EGFR: 109 ML/MIN/1.73M2 — SIGNIFICANT CHANGE UP
GLUCOSE BLDC GLUCOMTR-MCNC: 123 MG/DL — HIGH (ref 70–99)
GLUCOSE BLDC GLUCOMTR-MCNC: 124 MG/DL — HIGH (ref 70–99)
GLUCOSE SERPL-MCNC: 119 MG/DL — HIGH (ref 70–99)
HCT VFR BLD CALC: 38.5 % — LOW (ref 39–50)
HGB BLD-MCNC: 12.6 G/DL — LOW (ref 13–17)
MAGNESIUM SERPL-MCNC: 1.9 MG/DL — SIGNIFICANT CHANGE UP (ref 1.6–2.6)
MCHC RBC-ENTMCNC: 29 PG — SIGNIFICANT CHANGE UP (ref 27–34)
MCHC RBC-ENTMCNC: 32.7 GM/DL — SIGNIFICANT CHANGE UP (ref 32–36)
MCV RBC AUTO: 88.5 FL — SIGNIFICANT CHANGE UP (ref 80–100)
NRBC # BLD: 0 /100 WBCS — SIGNIFICANT CHANGE UP (ref 0–0)
PHOSPHATE SERPL-MCNC: 3.9 MG/DL — SIGNIFICANT CHANGE UP (ref 2.5–4.5)
PLATELET # BLD AUTO: 189 K/UL — SIGNIFICANT CHANGE UP (ref 150–400)
POTASSIUM SERPL-MCNC: 3.6 MMOL/L — SIGNIFICANT CHANGE UP (ref 3.5–5.3)
POTASSIUM SERPL-SCNC: 3.6 MMOL/L — SIGNIFICANT CHANGE UP (ref 3.5–5.3)
RBC # BLD: 4.35 M/UL — SIGNIFICANT CHANGE UP (ref 4.2–5.8)
RBC # FLD: 13.2 % — SIGNIFICANT CHANGE UP (ref 10.3–14.5)
SODIUM SERPL-SCNC: 137 MMOL/L — SIGNIFICANT CHANGE UP (ref 135–145)
WBC # BLD: 8.53 K/UL — SIGNIFICANT CHANGE UP (ref 3.8–10.5)
WBC # FLD AUTO: 8.53 K/UL — SIGNIFICANT CHANGE UP (ref 3.8–10.5)

## 2022-06-21 PROCEDURE — 85025 COMPLETE CBC W/AUTO DIFF WBC: CPT

## 2022-06-21 PROCEDURE — 85730 THROMBOPLASTIN TIME PARTIAL: CPT

## 2022-06-21 PROCEDURE — 80053 COMPREHEN METABOLIC PANEL: CPT

## 2022-06-21 PROCEDURE — 82962 GLUCOSE BLOOD TEST: CPT

## 2022-06-21 PROCEDURE — 99285 EMERGENCY DEPT VISIT HI MDM: CPT | Mod: 25

## 2022-06-21 PROCEDURE — 36415 COLL VENOUS BLD VENIPUNCTURE: CPT

## 2022-06-21 PROCEDURE — 86850 RBC ANTIBODY SCREEN: CPT

## 2022-06-21 PROCEDURE — 85014 HEMATOCRIT: CPT

## 2022-06-21 PROCEDURE — 96375 TX/PRO/DX INJ NEW DRUG ADDON: CPT

## 2022-06-21 PROCEDURE — 83735 ASSAY OF MAGNESIUM: CPT

## 2022-06-21 PROCEDURE — 86901 BLOOD TYPING SEROLOGIC RH(D): CPT

## 2022-06-21 PROCEDURE — 85027 COMPLETE CBC AUTOMATED: CPT

## 2022-06-21 PROCEDURE — 85610 PROTHROMBIN TIME: CPT

## 2022-06-21 PROCEDURE — 96374 THER/PROPH/DIAG INJ IV PUSH: CPT | Mod: XU

## 2022-06-21 PROCEDURE — 82330 ASSAY OF CALCIUM: CPT

## 2022-06-21 PROCEDURE — 96376 TX/PRO/DX INJ SAME DRUG ADON: CPT

## 2022-06-21 PROCEDURE — 85018 HEMOGLOBIN: CPT

## 2022-06-21 PROCEDURE — 82803 BLOOD GASES ANY COMBINATION: CPT

## 2022-06-21 PROCEDURE — C9399: CPT

## 2022-06-21 PROCEDURE — 84100 ASSAY OF PHOSPHORUS: CPT

## 2022-06-21 PROCEDURE — 87635 SARS-COV-2 COVID-19 AMP PRB: CPT

## 2022-06-21 PROCEDURE — 80048 BASIC METABOLIC PNL TOTAL CA: CPT

## 2022-06-21 PROCEDURE — 88305 TISSUE EXAM BY PATHOLOGIST: CPT

## 2022-06-21 PROCEDURE — 74177 CT ABD & PELVIS W/CONTRAST: CPT | Mod: MA

## 2022-06-21 PROCEDURE — 83605 ASSAY OF LACTIC ACID: CPT

## 2022-06-21 PROCEDURE — 82947 ASSAY GLUCOSE BLOOD QUANT: CPT

## 2022-06-21 PROCEDURE — 83690 ASSAY OF LIPASE: CPT

## 2022-06-21 PROCEDURE — 84132 ASSAY OF SERUM POTASSIUM: CPT

## 2022-06-21 PROCEDURE — 82435 ASSAY OF BLOOD CHLORIDE: CPT

## 2022-06-21 PROCEDURE — 84295 ASSAY OF SERUM SODIUM: CPT

## 2022-06-21 PROCEDURE — 83036 HEMOGLOBIN GLYCOSYLATED A1C: CPT

## 2022-06-21 PROCEDURE — 86900 BLOOD TYPING SEROLOGIC ABO: CPT

## 2022-06-21 RX ORDER — POTASSIUM CHLORIDE 20 MEQ
40 PACKET (EA) ORAL ONCE
Refills: 0 | Status: COMPLETED | OUTPATIENT
Start: 2022-06-21 | End: 2022-06-21

## 2022-06-21 RX ORDER — ZOLPIDEM TARTRATE 10 MG/1
1 TABLET ORAL
Qty: 0 | Refills: 0 | DISCHARGE
Start: 2022-06-21

## 2022-06-21 RX ORDER — ACETAMINOPHEN 500 MG
3 TABLET ORAL
Qty: 0 | Refills: 0 | DISCHARGE
Start: 2022-06-21

## 2022-06-21 RX ORDER — ACETAMINOPHEN 500 MG
975 TABLET ORAL EVERY 6 HOURS
Refills: 0 | Status: DISCONTINUED | OUTPATIENT
Start: 2022-06-21 | End: 2022-06-21

## 2022-06-21 RX ADMIN — Medication 975 MILLIGRAM(S): at 12:00

## 2022-06-21 RX ADMIN — Medication 975 MILLIGRAM(S): at 05:55

## 2022-06-21 RX ADMIN — PANTOPRAZOLE SODIUM 40 MILLIGRAM(S): 20 TABLET, DELAYED RELEASE ORAL at 05:55

## 2022-06-21 RX ADMIN — HEPARIN SODIUM 5000 UNIT(S): 5000 INJECTION INTRAVENOUS; SUBCUTANEOUS at 12:00

## 2022-06-21 RX ADMIN — HEPARIN SODIUM 5000 UNIT(S): 5000 INJECTION INTRAVENOUS; SUBCUTANEOUS at 04:34

## 2022-06-21 RX ADMIN — Medication 40 MILLIEQUIVALENT(S): at 12:04

## 2022-06-21 NOTE — PROGRESS NOTE ADULT - ASSESSMENT
66M w/ pmh HTN, HLD, CAD s/p CABG (2017) incarcerated ventral hernia containing two loops of bowel in closed loop configuration. Now s/p repair of incarcerated ventral hernia 6/18.     Plan:   - CLD  - pain control   - dvt ppx  - OOB, ambulate as tolerated   - f/u am labs      Red Surgery  [id number]

## 2022-06-21 NOTE — DISCHARGE NOTE NURSING/CASE MANAGEMENT/SOCIAL WORK - NSDCVIVACCINE_GEN_ALL_CORE_FT
Tdap; 13-Feb-2022 09:56; Benoit Kessler (RN); [employer]; [id number] (Exp. Date: 09-Sep-2023); IntraMuscular; Deltoid Right.; 0.5 milliLiter(s); VIS (VIS Published: 09-May-2013, VIS Presented: 13-Feb-2022);

## 2022-06-21 NOTE — DISCHARGE NOTE PROVIDER - NSDCCPTREATMENT_GEN_ALL_CORE_FT
PRINCIPAL PROCEDURE  Procedure: Ventral hernia repair  Findings and Treatment: recover from surgery- outpatient follow up with surgeon

## 2022-06-21 NOTE — PROGRESS NOTE ADULT - SUBJECTIVE AND OBJECTIVE BOX
Surgery Progress Note    Subjective:     Patient seen and examined at bedside. VSS, AF. Passing stool, no flatus.     OBJECTIVE:     T(C): 36.7 (06-20-22 @ 23:04), Max: 37 (06-20-22 @ 10:41)  HR: 69 (06-20-22 @ 23:04) (69 - 85)  BP: 150/79 (06-20-22 @ 23:04) (129/71 - 150/79)  RR: 18 (06-20-22 @ 23:04) (18 - 18)  SpO2: 96% (06-20-22 @ 23:04) (95% - 97%)  Wt(kg): --    I&O's Detail    19 Jun 2022 07:01  -  20 Jun 2022 07:00  --------------------------------------------------------  IN:    Lactated Ringers: 2400 mL    Lactated Ringers Bolus: 1000 mL  Total IN: 3400 mL    OUT:    Bulb (mL): 45 mL    Nasogastric/Oral tube (mL): 2050 mL    Oral Fluid: 0 mL    Voided (mL): 1525 mL  Total OUT: 3620 mL    Total NET: -220 mL      20 Jun 2022 07:01  -  21 Jun 2022 01:57  --------------------------------------------------------  IN:    dextrose 5% + sodium chloride 0.45% w/ Additives: 1500 mL    Oral Fluid: 200 mL  Total IN: 1700 mL    OUT:    Bulb (mL): 50 mL    Voided (mL): 1100 mL  Total OUT: 1150 mL    Total NET: 550 mL          PHYSICAL EXAM:    General: NAD, resting comfortably in bed.   Pulmonary: Nonlabored breathing, no respiratory distress  Cardiovascular: NSR  Abdominal: soft, NT/ND. Drain present in midline.   Extremities: WWP      MEDICATIONS  (STANDING):  acetaminophen   IVPB .. 1000 milliGRAM(s) IV Intermittent once  benzocaine 15 mG/menthol 3.6 mG Lozenge 1 Lozenge Oral three times a day  benzocaine 15 mG/menthol 3.6 mG Lozenge 1 Lozenge Oral daily  dextrose 5% + sodium chloride 0.45% with potassium chloride 20 mEq/L 1000 milliLiter(s) (125 mL/Hr) IV Continuous <Continuous>  dextrose 5%. 1000 milliLiter(s) (50 mL/Hr) IV Continuous <Continuous>  dextrose 5%. 1000 milliLiter(s) (100 mL/Hr) IV Continuous <Continuous>  dextrose 50% Injectable 25 Gram(s) IV Push once  dextrose 50% Injectable 12.5 Gram(s) IV Push once  dextrose 50% Injectable 25 Gram(s) IV Push once  glucagon  Injectable 1 milliGRAM(s) IntraMuscular once  heparin   Injectable 5000 Unit(s) SubCutaneous every 8 hours  influenza  Vaccine (HIGH DOSE) 0.7 milliLiter(s) IntraMuscular once  insulin lispro (ADMELOG) corrective regimen sliding scale   SubCutaneous three times a day before meals  insulin lispro (ADMELOG) corrective regimen sliding scale   SubCutaneous at bedtime  pantoprazole  Injectable 40 milliGRAM(s) IV Push every 12 hours    MEDICATIONS  (PRN):  dextrose Oral Gel 15 Gram(s) Oral once PRN Blood Glucose LESS THAN 70 milliGRAM(s)/deciliter  zolpidem 5 milliGRAM(s) Oral at bedtime PRN Insomnia      LABS:                          12.5   8.76  )-----------( 199      ( 20 Jun 2022 07:23 )             38.3     06-20    142  |  102  |  12  ----------------------------<  117<H>  3.3<L>   |  25  |  0.62    Ca    9.0      20 Jun 2022 07:21  Phos  3.1     06-20  Mg     2.0     06-20

## 2022-06-21 NOTE — DISCHARGE NOTE PROVIDER - NSDCCPCAREPLAN_GEN_ALL_CORE_FT
PRINCIPAL DISCHARGE DIAGNOSIS  Diagnosis: Bowel obstruction  Assessment and Plan of Treatment: You had a repair of abdominal hernia.    WOUND CARE: Staples will be removed at follow up office visit.  You will be discharged with a PAUL drain. You will need to empty it and record outputs accurately. This will be taught to you by the nursing staff. Please do not remove the PAUL drain. It will be removed in the office. Please bring to the office accurate records of output.   BATHING: Please do not submerge wound underwater. You may shower and/or sponge bathe. Please pat wound dry after showering.    ACTIVITY: No heavy lifting anything more than 10-15lbs or straining. Otherwise, you may return to your usual level of physical activity. If you are taking narcotic pain medication (such as oxycodone or Percocet), do NOT drive a car, operate machinery or make important decisions.  NOTIFY YOUR SURGEON IF: You have any excessive bleeding or pus draining from your wound, any fever (over 100.4 F) or chills, persistent nausea/vomiting with inability to tolerate food or liquids, persistent diarrhea, or if your pain is not controlled on your discharge pain medications.  FOLLOW-UP:  1. Please call to make a follow-up appointment with Dr. Gallo in 1-2 weeks upon discharge from the hospital - Please call immediately upon discharge to schedule the appointment  2. Please follow up with your primary care physician in one week regarding your hospitalization.       PRINCIPAL DISCHARGE DIAGNOSIS  Diagnosis: Bowel obstruction  Assessment and Plan of Treatment: You had a repair of abdominal hernia.    WOUND CARE: Staples will be removed at follow up office visit.  You will be discharged with a PAUL drain. You will need to empty it and record outputs accurately. This will be taught to you by the nursing staff. Please do not remove the PAUL drain. It will be removed in the office. Please bring to the office accurate records of output.   BATHING: Please do not submerge wound underwater. You may shower and/or sponge bathe. Please pat wound dry after showering.    ACTIVITY: No heavy lifting anything more than 10-15lbs or straining. Otherwise, you may return to your usual level of physical activity. do NOT drive a car, operate machinery or make important decisions until after follow up appointment.  NOTIFY YOUR SURGEON IF: You have any excessive bleeding or pus draining from your wound, any fever (over 100.4 F) or chills, persistent nausea/vomiting with inability to tolerate food or liquids, persistent diarrhea, or if your pain is not controlled on your discharge pain medications.  FOLLOW-UP:  1. Please call to make a follow-up appointment with Dr. Gallo in 1-2 weeks upon discharge from the hospital - Please call immediately upon discharge to schedule the appointment  2. Please follow up with your primary care physician in one week regarding your hospitalization.  Please take tylenol max 1000mg every 6 hours around the clock for the next 2 days then can change to as needed for post op pain          PRINCIPAL DISCHARGE DIAGNOSIS  Diagnosis: Bowel obstruction  Assessment and Plan of Treatment: You had a repair of abdominal hernia.    WOUND CARE: Staples will be removed at follow up office visit.  You will be discharged with a PAUL drain. You will need to empty it and record outputs accurately. This will be taught to you by the nursing staff. Please do not remove the PAUL drain. It will be removed in the office. Please bring to the office accurate records of output.   BATHING: Please do not submerge wound underwater. You may shower and/or sponge bathe. Please pat wound dry after showering.   remove dressing prior to showering. Let soap and water run over incision, pat dry after and replace dry gauze with paper tape  ACTIVITY: No heavy lifting anything more than 10-15lbs or straining. Otherwise, you may return to your usual level of physical activity. do NOT drive a car, operate machinery or make important decisions until after follow up appointment.  NOTIFY YOUR SURGEON IF: You have any excessive bleeding or pus draining from your wound, any fever (over 100.4 F) or chills, persistent nausea/vomiting with inability to tolerate food or liquids, persistent diarrhea, or if your pain is not controlled on your discharge pain medications.  FOLLOW-UP:  1. Please call to make a follow-up appointment with Dr. Gallo in 1-2 weeks upon discharge from the hospital - Please call immediately upon discharge to schedule the appointment  2. Please follow up with your primary care physician in one week regarding your hospitalization.  Please take tylenol max 1000mg every 6 hours around the clock for the next 2 days then can change to as needed for post op pain

## 2022-06-21 NOTE — DISCHARGE NOTE PROVIDER - NSDCMRMEDTOKEN_GEN_ALL_CORE_FT
allopurinol 100 mg oral tablet: 1 tab(s) orally once a day (at bedtime)  amLODIPine 10 mg oral tablet: 1 tab(s) orally once a day AM  aspirin 81 mg oral delayed release tablet: 1 tab(s) orally once a day  atorvastatin 40 mg oral tablet: 1 tab(s) orally once a day AM  bumetanide 2 mg oral tablet: 1 tab(s) orally once a day   Calcium 600+D oral tablet: 1 tab(s) orally once a day  carvedilol 12.5 mg oral tablet: 1 tab(s) orally every 12 hours  gabapentin 300 mg oral tablet: 1 tab(s) orally every 8 hours  glimepiride 1 mg oral tablet: 1 tab(s) orally once a day  losartan 100 mg oral tablet: 1 tab(s) orally once a day AM  Multiple Vitamins oral tablet: 1 tab(s) orally once a day   acetaminophen 325 mg oral tablet: 3 tab(s) orally every 6 hours  allopurinol 100 mg oral tablet: 1 tab(s) orally once a day (at bedtime)  amLODIPine 10 mg oral tablet: 1 tab(s) orally once a day AM  aspirin 81 mg oral delayed release tablet: 1 tab(s) orally once a day  atorvastatin 40 mg oral tablet: 1 tab(s) orally once a day AM  bumetanide 2 mg oral tablet: 1 tab(s) orally once a day   Calcium 600+D oral tablet: 1 tab(s) orally once a day  carvedilol 12.5 mg oral tablet: 1 tab(s) orally every 12 hours  gabapentin 300 mg oral tablet: 1 tab(s) orally every 8 hours  glimepiride 1 mg oral tablet: 1 tab(s) orally once a day  losartan 100 mg oral tablet: 1 tab(s) orally once a day AM  Multiple Vitamins oral tablet: 1 tab(s) orally once a day  zolpidem 5 mg oral tablet: 1 tab(s) orally once a day (at bedtime), As needed, Insomnia

## 2022-06-21 NOTE — DISCHARGE NOTE NURSING/CASE MANAGEMENT/SOCIAL WORK - PATIENT PORTAL LINK FT
You can access the FollowMyHealth Patient Portal offered by [facility] by registering at the following website: [email or web address]. By joining [facility]’s FollowMyHealth portal, you will also be able to view your health information using other applications (apps) compatible with our system.

## 2022-06-21 NOTE — DISCHARGE NOTE PROVIDER - NSCORESITESY/N_GEN_A_CORE_RD
Pt alert and oriented. Walking stand by assist. hgb 8.0, then 9.1. Reassess tomorrow morning. Monitor pt overnight per MD. ALNIE rocephin, start PO amoxicillin. Cont plan of care.    No

## 2022-06-21 NOTE — DISCHARGE NOTE PROVIDER - NSDCFUSCHEDAPPT_GEN_ALL_CORE_FT
Nehemiah Ritter  [facility] Physician [facility]  UROLOGY [address]  Scheduled Appointment: 07/07/2022

## 2022-06-21 NOTE — DISCHARGE NOTE PROVIDER - NSDCACTIVITY_GEN_ALL_CORE
[facility] CATH LAB; YZ ECHO    Anesthesia Start: 3133 Anesthesia Stop: 3796    Procedure: SEY SOPHIE Diagnosis:     Scheduled Providers: MARU Israel CRNA; Arie Brown MD Responsible Provider: Arie Brown 
Walking - Indoors allowed/No heavy lifting/straining/Walking - Outdoors allowed

## 2022-06-21 NOTE — DISCHARGE NOTE PROVIDER - CARE PROVIDER_API CALL
Blanco Gallo)  Surgery  [address], [address]  [place] [address]  Phone: [phone]  Fax: [phone]  Follow Up Time: 1 week

## 2022-06-21 NOTE — DISCHARGE NOTE PROVIDER - HOSPITAL COURSE
66M w/ pmh HTN, HLD, CAD s/p CABG (2017), ASIYA, ventral hernia p/w abdominal pain. In ED, noted to have WBC 12, Lactate 3.4, CT demonstrating incarcerated ventral hernia containing two loops of bowel in closed loop configuration. He was admitted to surgery, NPO with IVF and NGT.  On 6/18 he underwent a primary repair of incarcerated ventral hernia.  The patient tolerated the procedure well (see operative report for full details). Postoperatively, he remained NPO with NGT.  He passed clamp trial and amaro removed.  He voided well on his own and had return of bowel function.  NGT was removed and diet was advanced as tolerated.  Pain was controlled with an oral regimen. On day of discharge, the patient was tolerating diet, ambulating well and pain controlled. He will follow up with Dr. Gallo in 1 week.           66M w/ pmh HTN, HLD, CAD s/p CABG (2017), ASIYA, ventral hernia p/w abdominal pain. In ED, noted to have WBC 12, Lactate 3.4, CT demonstrating incarcerated ventral hernia containing two loops of bowel in closed loop configuration. He was admitted to surgery, NPO with IVF and NGT.  On 6/18 he underwent a primary repair of incarcerated ventral hernia.  The patient tolerated the procedure well (see operative report for full details). Postoperatively, he remained NPO with NGT.  He passed clamp trial and amaro removed.  He voided well on his own and had return of bowel function.  NGT was removed and diet was advanced as tolerated.  Pain was controlled with an oral regimen. On day of discharge, the patient was tolerating diet, ambulating well and pain controlled. He will follow up with Dr. Glalo in 1 week.      Patient underwent drain teaching prior to discharge and felt comfortably taking care of drain

## 2022-06-21 NOTE — DISCHARGE NOTE NURSING/CASE MANAGEMENT/SOCIAL WORK - NSDCPEFALRISK_GEN_ALL_CORE
For information on Fall & Injury Prevention, visit: [email or web address] OR  [email or web address] OR  https://www.cdc.gov/steadi/patient.html

## 2022-06-30 LAB — SURGICAL PATHOLOGY STUDY: SIGNIFICANT CHANGE UP

## 2022-07-07 ENCOUNTER — APPOINTMENT (OUTPATIENT)
Dept: UROLOGY | Facility: CLINIC | Age: 67
End: 2022-07-07

## 2022-07-07 VITALS
WEIGHT: 315 LBS | HEART RATE: 72 BPM | HEIGHT: 77 IN | BODY MASS INDEX: 37.19 KG/M2 | SYSTOLIC BLOOD PRESSURE: 135 MMHG | DIASTOLIC BLOOD PRESSURE: 82 MMHG | TEMPERATURE: 98 F

## 2022-07-07 DIAGNOSIS — N13.8 BENIGN PROSTATIC HYPERPLASIA WITH LOWER URINARY TRACT SYMPMS: ICD-10-CM

## 2022-07-07 DIAGNOSIS — N40.1 BENIGN PROSTATIC HYPERPLASIA WITH LOWER URINARY TRACT SYMPMS: ICD-10-CM

## 2022-07-07 PROCEDURE — 99203 OFFICE O/P NEW LOW 30 MIN: CPT

## 2022-07-07 NOTE — H&P ADULT. - MS EXT PE MLT D E PC
Provider Recommendation Follow Up:   Reached patient/caregiver. Informed of provider's recommendations. Patient verbalized understanding and agrees with the plan.     Nelli JACOBS RN  [facility]    no cyanosis/no clubbing/normal

## 2022-07-08 LAB
APPEARANCE: CLEAR
BACTERIA: NEGATIVE
BILIRUBIN URINE: NEGATIVE
BLOOD URINE: NEGATIVE
COLOR: YELLOW
GLUCOSE QUALITATIVE U: NEGATIVE
HYALINE CASTS: 1 /LPF
KETONES URINE: NEGATIVE
LEUKOCYTE ESTERASE URINE: ABNORMAL
MICROSCOPIC-UA: NORMAL
NITRITE URINE: NEGATIVE
PH URINE: 6
PROTEIN URINE: NORMAL
RED BLOOD CELLS URINE: 3 /HPF
SPECIFIC GRAVITY URINE: 1.02
SQUAMOUS EPITHELIAL CELLS: 0 /HPF
UROBILINOGEN URINE: NORMAL
WHITE BLOOD CELLS URINE: 144 /HPF

## 2022-07-08 NOTE — LETTER BODY
[FreeTextEntry1] : Elaine Jaime, \par [address]\[place] [address]\par \par Dear Dr. Jaime,\par \par Dustin Colvin presents to the office today.  He is a 66-year-old man who is here after recent spine surgery.  He has undergone multiple orthopedic procedures also including bilateral hip and knee replacements.  There is a second stage portion of his spine surgery that is still currently pending.  He also developed a ventral hernia that required emergent repair recently in June and at that time he underwent a CT scan that had shown the presence of a left renal stone.  He is here now today in follow-up on this.\par \par The patient does have prior history of kidney stones.  He had undergone surgery here years ago to manage them.  At baseline he does not have any significant urinary complaints including urgency or frequency outside of some frequency following him taking his diuretic medication.  He has nocturia x1 and denies hesitancy or sensation of incomplete bladder emptying.  He denies hematuria or dysuria.  There is no current symptom of flank pain, fever, chill, nausea or vomiting.\par \par I reviewed his recently performed CT scan at the time of his presentation with the ventral hernia.  This shows the presence of what appears to be either 1 fairly linear appearing stone in the left kidney versus 2 adjacent stones.  There was no evidence of hydronephrosis in either kidney.  The stones are nonobstructive.  There is no evidence of any solid renal lesion.\par \par Stones at this time are nonobstructive and did not require any immediate intervention.  We discussed stone prevention strategy today in detail including the importance of hydrating well in order to keep the urine dilute and prevent stone crystallization. I would recommend a urine output of approximately 2.5 L per day meaning drinking approximately 3-3.5 L per day. We also discussed the importance of the low-sodium diet and reducing the level of calcium in the urine. Low oxalate diet was also discussed including try to minimize oxalate rich beverages such as cranberry juice or tea. I've also discussed the common foods with high oxalate levels including green leafy few vegetables. While I did not discourage entirely excluding these elements from the diet, I would recommend in moderation. Lastly, I discussed using citrus and the diet as citric acid is a known preventer of stone crystallization.  I would recommend that he follow-up with me in 1 year for ultrasound of the kidneys to reassess the stone burden to help determine if any intervention is needed in the future.  If he does develop acute onset of left-sided flank pain I asked him to contact me for evaluation at that time.\par \par Sincerely,\par \par \par \par \par Nehemiah Ritter MD, FACS\par Chief of Urology, [facility]\par  of Urology\par Director of Robotic and Laparoscopic Surgery\par [facility]\par \par [facility]\par [address] [place] [address]\[place] [address]\par P: [phone]\par F: [phone]\par [facility]\par

## 2022-07-11 ENCOUNTER — APPOINTMENT (OUTPATIENT)
Dept: ORTHOPEDIC SURGERY | Facility: CLINIC | Age: 67
End: 2022-07-11

## 2022-07-11 VITALS — BODY MASS INDEX: 37.19 KG/M2 | WEIGHT: 315 LBS | HEIGHT: 77 IN

## 2022-07-11 DIAGNOSIS — Z96.642 PRESENCE OF LEFT ARTIFICIAL HIP JOINT: ICD-10-CM

## 2022-07-11 DIAGNOSIS — Z96.652 PRESENCE OF LEFT ARTIFICIAL KNEE JOINT: ICD-10-CM

## 2022-07-11 DIAGNOSIS — Z96.641 PRESENCE OF RIGHT ARTIFICIAL HIP JOINT: ICD-10-CM

## 2022-07-11 DIAGNOSIS — Z96.651 PRESENCE OF RIGHT ARTIFICIAL KNEE JOINT: ICD-10-CM

## 2022-07-11 LAB — BACTERIA UR CULT: ABNORMAL

## 2022-07-11 PROCEDURE — 73502 X-RAY EXAM HIP UNI 2-3 VIEWS: CPT | Mod: RT

## 2022-07-11 PROCEDURE — 73562 X-RAY EXAM OF KNEE 3: CPT | Mod: RT

## 2022-07-11 PROCEDURE — 99214 OFFICE O/P EST MOD 30 MIN: CPT

## 2022-07-11 NOTE — HISTORY OF PRESENT ILLNESS
[de-identified] : Patient presents complaining of pain right leg.  Patient status post remote right total hip replacement left total hip replacement left total knee replacement and revision right total knee replacement.  In May 2022 underwent extension of prior lumbar decompression and fusion.  Preoperative symptoms consisted of left lower extremity symptoms.  Patient reports that when he awoke from surgery his preoperative left-sided symptoms had resolved completely but he began to but he began to experience chronic numbness and pain involving entire right leg.  He was advised by his treating neurosurgeon that symptoms were from the right hip.  Patient denies any groin pain.

## 2022-07-11 NOTE — PHYSICAL EXAM
[de-identified] : Constitutional:Well nourished , well developed and in no acute distress\par Psychiatric: Alert and oriented to time place and person.Appropriate affect \par Skin:Head, neck, arms and lower extremities:no lesions or discoloration\par HEENT: Normocephalic, EOM intact, Nasal septum midline,\par Respiratory: Unlabored respirations,no audible wheezing ,no tachypnea, no cyanosis\par Cardiovascular: no leg swelling  no ankle edema no JVD, pulse regular\par Vascular: no calf or thigh tenderness, \par Peripheral pulses; intact\par Lymphatics:No groin adenopathy,no lymphedema lower  or upper extremities\par Right knee healed incision no effusion flexion 0/115 ligaments intact\par Right hip pain-free and satisfactory passive range of motion [de-identified] : X-ray AP pelvis right hip AP lateral satisfactory postop appearance right total hip replacement\par X-ray right knee 3 views demonstrate satisfactory appearance revision right total knee components

## 2022-07-11 NOTE — DISCUSSION/SUMMARY
[de-identified] : Impression given onset of right leg symptoms immediately after lumbar surgery extensive involvement of entire right leg and absence of symptom provocation by passive motion of right knee or right hip combined with satisfactory postop x-ray appearance patient symptoms likely on the basis of underlying neurologic lesion.\par Plan pain management consultation, neurology consultation

## 2022-07-29 ENCOUNTER — APPOINTMENT (OUTPATIENT)
Dept: PAIN MANAGEMENT | Facility: CLINIC | Age: 67
End: 2022-07-29

## 2022-07-29 VITALS — BODY MASS INDEX: 37.19 KG/M2 | HEIGHT: 77 IN | WEIGHT: 315 LBS

## 2022-07-29 DIAGNOSIS — M54.50 LOW BACK PAIN, UNSPECIFIED: ICD-10-CM

## 2022-07-29 PROCEDURE — 99203 OFFICE O/P NEW LOW 30 MIN: CPT

## 2022-07-29 RX ORDER — CYCLOBENZAPRINE HYDROCHLORIDE 10 MG/1
10 TABLET, FILM COATED ORAL TWICE DAILY
Qty: 60 | Refills: 1 | Status: COMPLETED | COMMUNITY
Start: 2022-07-29 | End: 2022-09-27

## 2022-07-29 RX ORDER — GABAPENTIN 300 MG/1
300 CAPSULE ORAL 3 TIMES DAILY
Qty: 90 | Refills: 1 | Status: ACTIVE | COMMUNITY
Start: 2022-07-29 | End: 1900-01-01

## 2022-07-29 NOTE — DISCUSSION/SUMMARY
[de-identified] : After discussing various treatment options with the patient including but not limited to oral medications, physical therapy, exercise modalities as well as interventional spinal injections, we have decided with the following plan:\par \par - Continue home exercises, stretching, activity modification, physical therapy, and conservative care.\par - Follow-up as needed.\par - Recommend Gabapentin 300mg TID. Recommend to titrate up gabapentin slowly - first taking one pill at night for 3 days, then increasing to twice a day for another 3 days, and then increasing to 3 times a day. Pt instructed not to drive or operate heavy machinery while on medications.\par - Recommend Cyclobenzaprine 10mg BID PRN for muscle spasms and to assist with pain relief.\par

## 2022-07-29 NOTE — PHYSICAL EXAM
[de-identified] : Constitutional; Appears well, no apparent distress\par Ability to communicate: Normal \par Respiratory: non-labored breathing\par Skin: No rash noted\par Head: Normocephalic, atraumatic\par Neck: no visible thyroid enlargement\par Eyes: Extraocular movements intact\par Neurologic: Alert and oriented x3\par Psychiatric: normal mood, affect and behavior \par \par  [] : light touch intact throughout both lower extremities

## 2022-07-29 NOTE — HISTORY OF PRESENT ILLNESS
[9] : 9 [Constant] : constant [Household chores] : household chores [Sleep] : sleep [Nothing helps with pain getting better] : Nothing helps with pain getting better [Sitting] : sitting [Standing] : standing [Walking] : walking [Bending forward] : bending forward [] : no [FreeTextEntry1] : Rt thigh  [FreeTextEntry6] : numbness ,spasms  [FreeTextEntry7] : rt thigh

## 2022-09-29 ENCOUNTER — APPOINTMENT (OUTPATIENT)
Dept: ORTHOPEDIC SURGERY | Facility: CLINIC | Age: 67
End: 2022-09-29

## 2022-09-29 VITALS — WEIGHT: 315 LBS | HEIGHT: 77 IN | BODY MASS INDEX: 37.19 KG/M2

## 2022-09-29 DIAGNOSIS — M62.838 OTHER MUSCLE SPASM: ICD-10-CM

## 2022-09-29 PROCEDURE — 99203 OFFICE O/P NEW LOW 30 MIN: CPT

## 2022-09-29 NOTE — DISCUSSION/SUMMARY
[de-identified] : 66m with RLE muscle spasms, likely radicular in origin and not of inguinal hernia or superficial lipoma origin. \par 1) home exercises as tolerated\par 2) advised pt to keep his upcoming consult with a spine surgeon\par 3) rtc prn\par \par Entered by Mirlande Nguyen acting as scribe.\par \par

## 2022-09-29 NOTE — REASON FOR VISIT
Impression: S/P PPVx 25G OD - 1 Day. Encounter for surgical aftercare following surgery on a sense organ  Z48.810. Plan: The surgical eye(s) is improving well. Continue to follow current drop plan and post operative instructions. Recommend artificial tears throughout post operative period. RTC for scheduled follow up.  --Continue Ofloxacin 0.3%--Taper Prednisolone acetate 1% QID x 1 wk, TID x 1 wk, BID x 1wk, QD x 1wk, then d/c [FreeTextEntry2] : new injury/ right thigh

## 2022-09-29 NOTE — HISTORY OF PRESENT ILLNESS
[de-identified] : 09/29/2022 Mr. FRANCO RIVERO,  66 year old  male , presents today for right leg. Reports that he has a right inguinal hernia. c/o numbness over the right anterior and medial thigh. Stated that he has consulted with a surgeon re: hernia. Possible lipoma over the the anterior/medial proximal thigh close to the groin. Reviewed recent MRI results from [facility] of the pelvis, lumbosacral plexus showing a right inguinal hernia and a superficial lipoma.  Reports muscle spasms over the legs. \par \par hx of b/l PRAVEEN, TKA\par hx of lumbar fusion\par  [] : no [FreeTextEntry1] : right thigh [FreeTextEntry5] :  FRANCO RIVERO is a 66 year male who is here today for numbness in the right thigh and a lump in the right side groin area. No pain, just discomfort.  [FreeTextEntry6] : numbness  [de-identified] : femur CT

## 2022-09-29 NOTE — PHYSICAL EXAM
[Bilateral] : knee bilaterally [4___] : hamstring 4[unfilled]/5 [] : no extensor lag [FreeTextEntry3] : healed surgical scars

## 2022-10-21 NOTE — PATIENT PROFILE ADULT - NSTRANSFERBELONGINGSDISPO_GEN_A_NUR
10/21/2022         RE: Jagdeep Johnson  [address]  [facility] [address]        Dear Colleague,    Thank you for referring your patient, Jagdeep Johnson, to the [facility] [place]. Please see a copy of my visit note below.    Jagdeep Johnson was seen in the Allergy Clinic at [facility].    Jagdeep Johnson is a 11 year old male being seen today for ongoing evaluation of Allergic rhinitis due to dust mite.    Since the last visit the patient has developed a eczematous reaction around his mouth.  His mother gave him Elidel which provided benefit.    He typically sees Dr. Esparza and did have a positive skin test to cat, dog, dust mite and grass.  She has friends of hers that go to [employer] and received sublingual immunotherapy and wanted to discuss that further.  He is currently taking Xyzal with good results.  He does not use Flonase.  Allergy shots were discussed with him at the last appointment which they would like to hear more.  Symptoms include rhinorrhea and sometimes vomiting when he has significant drainage.    More recently has had eczematous rash around his mouth which is responding to the Elidel ointment which is mother provided to him.    Past Medical History:   Diagnosis Date     Phimosis 6/9/2016    Foreskin not retractable at 5 year Well Child Check; parents will call if pain with urination or other symptoms-- no steroid cream at this time.  Plan is to wait until 7-8 years old and reassess then.      Family History   Problem Relation Age of Onset     Depression Mother         SI at age 16     Ankylosing Spondylitis Father      Celiac Disease Maternal Grandmother      Hypertension Maternal Grandmother      Depression Maternal Grandmother      Anxiety Disorder Maternal Grandmother      Thyroid Disease Maternal Grandmother      Obesity Maternal Grandmother      Hyperlipidemia Maternal Grandfather      Asthma Maternal Grandfather       Obesity Maternal Grandfather      Osteoporosis Paternal Grandmother      Hypertension Paternal Grandfather      Hyperlipidemia Paternal Grandfather      Obesity Paternal Grandfather      History reviewed. No pertinent surgical history.      Current Outpatient Medications:      levocetirizine (XYZAL) 5 MG tablet, Take 1 tablet (5 mg) by mouth every evening, Disp: 30 tablet, Rfl: 3     pimecrolimus (ELIDEL) 1 % external cream, Apply topically 2 times daily as needed (eczema), Disp: 60 g, Rfl: 4     PIMECROLIMUS EX, , Disp: , Rfl:   No Known Allergies      EXAM:   /53   Pulse 104   Wt 41.1 kg (90 lb 9.7 oz)   SpO2 95%   BMI 19.15 kg/m      Constitutional:       General: He is not in acute distress.     Appearance: Normal appearance. He is not ill-appearing.   HENT:      Head: Normocephalic and atraumatic.      Nose: Nose normal. No congestion or rhinorrhea.      Mouth/Throat:      Mouth: Mucous membranes are moist.      Pharynx: Oropharynx is clear. No posterior oropharyngeal erythema.   Eyes:      General:         Right eye: No discharge.         Left eye: No discharge.   Cardiovascular:      Rate and Rhythm: Normal rate and regular rhythm.      Heart sounds: Normal heart sounds.   Pulmonary:      Effort: Pulmonary effort is normal.      Breath sounds: Normal breath sounds. No wheezing or rhonchi.   Skin:     General: Skin is warm.      Findings: No erythema or rash.   Neurological:      General: No focal deficit present.      Mental Status: He is alert. Mental status is at baseline.   Psychiatric:         Mood and Affect: Mood normal.         Behavior: Behavior normal.     ASSESSMENT/PLAN:  Jagdeep Johnson is a 11 year old male seen today for an eczematous rash on the mouth which is now healed after using his mother's Elidel.  Also has questions about immunotherapy and sublingual immunotherapy.    1.  Had a lengthy discussion about immunotherapy.  If interested in sublingual immunotherapy did not  recommend lacrosse.  May see Dr. Porras who also offers this therapy at times from what I understand.  Allergy shots were also discussed in detail.  She like to meet with Dr. Esparza to discuss these details further after she returns from leave.    He may continue with Xyzal at this time.    2.  Did prescribe generic Elidel for his rash on his face which intermittently will flare.  Prefer to avoid topical steroids for the facial rash.    Follow-up with Dr. Esparza after she returns from leave.      Thank you for allowing me to participate in the care of Jagdeep Johnson.      I spent 25 minutes on the date of the encounter doing chart review, history and exam, documentation and further coordination as noted above exclusive of separately reported interpretations    Kyaw Dempsey MD  Allergy/Immunology  [facility]      Again, thank you for allowing me to participate in the care of your patient.        Sincerely,        Kyaw Dempsey MD     with patient

## 2022-10-21 NOTE — ED ADULT TRIAGE NOTE - WEIGHT IN KG
Pt placed on portable telemetry monitoring box # [id number].  Ability to visualize pt's rhythm confirmed with of telemetry.  NSR with a HR of 74 noted.     170.1

## 2022-10-31 NOTE — H&P CARDIOLOGY - NS PRO PASSIVE SMOKE EXP
DATE OF SERVICE: 10/31/2022    Radiation Therapy Episodes       Active Episodes       Radiation Therapy: IMRT                   Radiation Treatments         Plan Last Treated On Elapsed Days Fractions Treated Prescribed Fraction Dose (cGy) Prescribed Total Dose (cGy)    Prostate 10/31/2022 0 @ [id number] 1 of 28 180 5,040                  Reference Point Last Treated On Elapsed Days Most Recent Session Dose (cGy) Total Dose (cGy)    Prostate 10/31/2022 0 @ [id number] 180 180    Prostate_CP 10/31/2022 0 @ [id number] 185 185                            First Visit Simple Simulation: Called by [employer] machine to verify treatment parameters including:  treatment site, treatment dose, and treatment setup prior to first treatment. Image derived shifts reviewed in all appropriate plains.  Shifts approved.  Patient treated.    I have personally reviewed the relevant data, performed the target localization, and determined all relevant factors for this patient’s simulation.  
Unknown

## 2022-11-29 NOTE — ED ADULT NURSE NOTE - COVID-19 RESULT DATE/TIME
Post-Care Instructions: I reviewed with the patient in detail post-care instructions. Patient is to wear sunprotection, and avoid picking at any of the treated lesions. Pt may apply Vaseline to crusted or scabbing areas.
Spray Paint Technique: No
Billing Information: Bill by [employer]
Spray Paint Text: The liquid nitrogen was applied to the skin utilizing a spray paint frosting technique.
Render Post-Care Instructions In Note?: yes
Consent: The patient's consent was obtained including but not limited to risks of crusting, scabbing, blistering, scarring, darker or lighter pigmentary change, recurrence, incomplete removal and infection. The patient understands that the procedure is cosmetic in nature and is not covered by insurance.
18-Mar-2021 15:27
Detail Level: Detailed
Price (Use Numbers Only, No Special Characters Or $): 190.40

## 2022-12-10 ENCOUNTER — OUTPATIENT (OUTPATIENT)
Dept: OUTPATIENT SERVICES | Facility: HOSPITAL | Age: 67
LOS: 1 days | End: 2022-12-10
Payer: MEDICARE

## 2022-12-10 DIAGNOSIS — G62.9 POLYNEUROPATHY, UNSPECIFIED: Chronic | ICD-10-CM

## 2022-12-10 DIAGNOSIS — Z96.651 PRESENCE OF RIGHT ARTIFICIAL KNEE JOINT: Chronic | ICD-10-CM

## 2022-12-10 DIAGNOSIS — Z98.89 OTHER SPECIFIED POSTPROCEDURAL STATES: Chronic | ICD-10-CM

## 2022-12-10 DIAGNOSIS — Z98.890 OTHER SPECIFIED POSTPROCEDURAL STATES: Chronic | ICD-10-CM

## 2022-12-10 DIAGNOSIS — Z11.52 ENCOUNTER FOR SCREENING FOR COVID-19: ICD-10-CM

## 2022-12-10 DIAGNOSIS — Z95.1 PRESENCE OF AORTOCORONARY BYPASS GRAFT: Chronic | ICD-10-CM

## 2022-12-10 DIAGNOSIS — N20.0 CALCULUS OF KIDNEY: Chronic | ICD-10-CM

## 2022-12-10 LAB — SARS-COV-2 RNA SPEC QL NAA+PROBE: SIGNIFICANT CHANGE UP

## 2022-12-10 PROCEDURE — U0003: CPT

## 2022-12-10 PROCEDURE — C9803: CPT

## 2022-12-10 PROCEDURE — U0005: CPT

## 2022-12-12 ENCOUNTER — OUTPATIENT (OUTPATIENT)
Dept: OUTPATIENT SERVICES | Facility: HOSPITAL | Age: 67
LOS: 1 days | End: 2022-12-12
Payer: MEDICARE

## 2022-12-12 ENCOUNTER — RESULT REVIEW (OUTPATIENT)
Age: 67
End: 2022-12-12

## 2022-12-12 ENCOUNTER — APPOINTMENT (OUTPATIENT)
Dept: RADIOLOGY | Facility: HOSPITAL | Age: 67
End: 2022-12-12

## 2022-12-12 DIAGNOSIS — Z95.1 PRESENCE OF AORTOCORONARY BYPASS GRAFT: Chronic | ICD-10-CM

## 2022-12-12 DIAGNOSIS — Z98.89 OTHER SPECIFIED POSTPROCEDURAL STATES: Chronic | ICD-10-CM

## 2022-12-12 DIAGNOSIS — Z98.890 OTHER SPECIFIED POSTPROCEDURAL STATES: Chronic | ICD-10-CM

## 2022-12-12 DIAGNOSIS — Z96.651 PRESENCE OF RIGHT ARTIFICIAL KNEE JOINT: Chronic | ICD-10-CM

## 2022-12-12 DIAGNOSIS — G62.9 POLYNEUROPATHY, UNSPECIFIED: Chronic | ICD-10-CM

## 2022-12-12 DIAGNOSIS — N20.0 CALCULUS OF KIDNEY: Chronic | ICD-10-CM

## 2022-12-12 DIAGNOSIS — M48.062 SPINAL STENOSIS, LUMBAR REGION WITH NEUROGENIC CLAUDICATION: ICD-10-CM

## 2022-12-12 PROCEDURE — 72132 CT LUMBAR SPINE W/DYE: CPT | Mod: MH

## 2022-12-12 PROCEDURE — 62304 MYELOGRAPHY LUMBAR INJECTION: CPT

## 2022-12-12 PROCEDURE — 72132 CT LUMBAR SPINE W/DYE: CPT | Mod: 26,MH

## 2023-02-02 ENCOUNTER — APPOINTMENT (OUTPATIENT)
Dept: ORTHOPEDIC SURGERY | Facility: CLINIC | Age: 68
End: 2023-02-02
Payer: MEDICARE

## 2023-02-02 VITALS — WEIGHT: 315 LBS | BODY MASS INDEX: 37.19 KG/M2 | HEIGHT: 77 IN

## 2023-02-02 PROCEDURE — 99213 OFFICE O/P EST LOW 20 MIN: CPT

## 2023-02-02 NOTE — HISTORY OF PRESENT ILLNESS
[Right] : right hand dominant [FreeTextEntry1] : Pt is a 66 y/o male with bilateral hand numbness and tingling. He has had symptoms for many years and notes he does also have cervical spine pain.  He was recently sent for EMGs, which he has available for my review today. He complains of numbness and tingling at all five digits bilaterally along the median and ulnar nerve distribution. He states he is woken up at night by his symptoms and has to wring out his hands to relieve the numbness. He additionally complains of pain at the right thumb and ring finger. He notes triggering and locking of the digits. Finally, he has complaints of recurrent radial right wrist pain. He has been treated in the past with 3 cortisone injections at the right first dorsal compartment. \par \par He has a medical history of Type II Diabetes.

## 2023-02-02 NOTE — PHYSICAL EXAM
[de-identified] : Patient is WDWN, alert, and in no acute distress. Breathing is unlabored. He is grossly oriented to person, place, and time.\par \par Right Wrist/Hand:\par Tenderness noted over the A1 pulley of the right thumb and ring finger, with triggering.\par Full ROM with decreased sensation along median nerve and ulnar nerve distribution. \par Tests/Signs: Tinel's sign is negative over carpal tunnel, Phalen's test is positive. \par Tinel's Test at the right elbow is positive.\par \par Left Wrist: \par No tenderness, edema, or deformities. Full ROM with decreased sensation along median nerve and ulnar nerve distribution. \par Tests/Signs: Tinel's sign is negative over carpal tunnel, Phalen's test is positive.\par Tinel's Test at the left elbow is positive. [de-identified] : EMGs REVIEWED FROM 1/10/2023\par Evidence of severe bilateral carpal tunnel syndrome. Also evidence of severe right and moderate left ulnar neuropathy, not further localized. 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

## 2023-02-02 NOTE — END OF VISIT
[FreeTextEntry3] : All medical record entries made by the Scribe were at my,  Dr. Charlie Esquivel MD., direction and personally dictated by me on 02/02/2023. I have personally reviewed the chart and agree that the record accurately reflects my personal performance of the history, physical exam, assessment and plan.

## 2023-02-02 NOTE — DISCUSSION/SUMMARY
[FreeTextEntry1] : The underlying pathophysiology was reviewed with the patient. EMGs were reviewed with the patient. Discussed at length the nature of the patient’s condition. \par \par With regard to the bilateral hand numbness and tingling, given evidence of severe carpal and cubital tunnel seen on EMGs, I recommended surgical release of the median and ulnar nerves. He has opted to first undergo surgical release of the more symptomatic right side.\par \par With regard to the right wrist, given his recurrent symptoms and lack of response to 3 prior injections at the first dorsal compartment, I recommended surgical decompression.\par \par Finally, with regard to the right thumb and ring finger, as he has continued symptoms and will already be undergoing surgery as mentioned above, I recommended concomitant release of the right thumb and ring finger. Additionally, as he has a history of trigger fingers and is a diabetic, I advised release of the right index, middle and little fingers as well as he will already be in the OR.\par \par The patient wishes to proceed with RIGHT carpal tunnel release, RIGHT cubital tunnel release, surgical release of the RIGHT first dorsal compartment and surgical release of the RIGHT thumb, index, middle, ring and little fingers at this time. The risks and benefits were reviewed with the patient. All of his questions were answered. He will meet with our surgical scheduler.

## 2023-02-02 NOTE — ADDENDUM
[FreeTextEntry1] : I, Massiel Alvarez wrote this note acting as a scribe for Dr. Charlie Esquivel on Feb 02, 2023.

## 2023-02-16 ENCOUNTER — OUTPATIENT (OUTPATIENT)
Dept: OUTPATIENT SERVICES | Facility: HOSPITAL | Age: 68
LOS: 1 days | End: 2023-02-16
Payer: MEDICARE

## 2023-02-16 VITALS
RESPIRATION RATE: 18 BRPM | TEMPERATURE: 98 F | HEIGHT: 76 IN | WEIGHT: 315 LBS | SYSTOLIC BLOOD PRESSURE: 111 MMHG | HEART RATE: 72 BPM | DIASTOLIC BLOOD PRESSURE: 72 MMHG | OXYGEN SATURATION: 97 %

## 2023-02-16 DIAGNOSIS — Z98.890 OTHER SPECIFIED POSTPROCEDURAL STATES: Chronic | ICD-10-CM

## 2023-02-16 DIAGNOSIS — G56.03 CARPAL TUNNEL SYNDROME, BILATERAL UPPER LIMBS: ICD-10-CM

## 2023-02-16 DIAGNOSIS — N20.0 CALCULUS OF KIDNEY: Chronic | ICD-10-CM

## 2023-02-16 DIAGNOSIS — Z01.818 ENCOUNTER FOR OTHER PREPROCEDURAL EXAMINATION: ICD-10-CM

## 2023-02-16 DIAGNOSIS — Z98.1 ARTHRODESIS STATUS: Chronic | ICD-10-CM

## 2023-02-16 DIAGNOSIS — M65.4 RADIAL STYLOID TENOSYNOVITIS [DE QUERVAIN]: ICD-10-CM

## 2023-02-16 DIAGNOSIS — G56.21 LESION OF ULNAR NERVE, RIGHT UPPER LIMB: ICD-10-CM

## 2023-02-16 DIAGNOSIS — Z98.89 OTHER SPECIFIED POSTPROCEDURAL STATES: Chronic | ICD-10-CM

## 2023-02-16 DIAGNOSIS — G56.01 CARPAL TUNNEL SYNDROME, RIGHT UPPER LIMB: ICD-10-CM

## 2023-02-16 DIAGNOSIS — M65.30 TRIGGER FINGER, UNSPECIFIED FINGER: ICD-10-CM

## 2023-02-16 DIAGNOSIS — Z95.1 PRESENCE OF AORTOCORONARY BYPASS GRAFT: Chronic | ICD-10-CM

## 2023-02-16 DIAGNOSIS — Z96.643 PRESENCE OF ARTIFICIAL HIP JOINT, BILATERAL: Chronic | ICD-10-CM

## 2023-02-16 DIAGNOSIS — Z90.49 ACQUIRED ABSENCE OF OTHER SPECIFIED PARTS OF DIGESTIVE TRACT: Chronic | ICD-10-CM

## 2023-02-16 DIAGNOSIS — G62.9 POLYNEUROPATHY, UNSPECIFIED: Chronic | ICD-10-CM

## 2023-02-16 DIAGNOSIS — Z96.653 PRESENCE OF ARTIFICIAL KNEE JOINT, BILATERAL: Chronic | ICD-10-CM

## 2023-02-16 DIAGNOSIS — Z96.651 PRESENCE OF RIGHT ARTIFICIAL KNEE JOINT: Chronic | ICD-10-CM

## 2023-02-16 LAB
A1C WITH ESTIMATED AVERAGE GLUCOSE RESULT: 7.2 % — HIGH (ref 4–5.6)
ALBUMIN SERPL ELPH-MCNC: 4 G/DL — SIGNIFICANT CHANGE UP (ref 3.3–5)
ALP SERPL-CCNC: 114 U/L — SIGNIFICANT CHANGE UP (ref 30–120)
ALT FLD-CCNC: 36 U/L DA — SIGNIFICANT CHANGE UP (ref 10–60)
ANION GAP SERPL CALC-SCNC: 11 MMOL/L — SIGNIFICANT CHANGE UP (ref 5–17)
AST SERPL-CCNC: 22 U/L — SIGNIFICANT CHANGE UP (ref 10–40)
BILIRUB SERPL-MCNC: 1.3 MG/DL — HIGH (ref 0.2–1.2)
BUN SERPL-MCNC: 15 MG/DL — SIGNIFICANT CHANGE UP (ref 7–23)
CALCIUM SERPL-MCNC: 9.8 MG/DL — SIGNIFICANT CHANGE UP (ref 8.4–10.5)
CHLORIDE SERPL-SCNC: 99 MMOL/L — SIGNIFICANT CHANGE UP (ref 96–108)
CO2 SERPL-SCNC: 29 MMOL/L — SIGNIFICANT CHANGE UP (ref 22–31)
CREAT SERPL-MCNC: 0.77 MG/DL — SIGNIFICANT CHANGE UP (ref 0.5–1.3)
EGFR: 98 ML/MIN/1.73M2 — SIGNIFICANT CHANGE UP
ESTIMATED AVERAGE GLUCOSE: 160 MG/DL — HIGH (ref 68–114)
GLUCOSE SERPL-MCNC: 163 MG/DL — HIGH (ref 70–99)
HCT VFR BLD CALC: 48.2 % — SIGNIFICANT CHANGE UP (ref 39–50)
HGB BLD-MCNC: 17.1 G/DL — HIGH (ref 13–17)
MCHC RBC-ENTMCNC: 30 PG — SIGNIFICANT CHANGE UP (ref 27–34)
MCHC RBC-ENTMCNC: 35.5 GM/DL — SIGNIFICANT CHANGE UP (ref 32–36)
MCV RBC AUTO: 84.6 FL — SIGNIFICANT CHANGE UP (ref 80–100)
NRBC # BLD: 0 /100 WBCS — SIGNIFICANT CHANGE UP (ref 0–0)
PLATELET # BLD AUTO: 251 K/UL — SIGNIFICANT CHANGE UP (ref 150–400)
POTASSIUM SERPL-MCNC: 3.7 MMOL/L — SIGNIFICANT CHANGE UP (ref 3.5–5.3)
POTASSIUM SERPL-SCNC: 3.7 MMOL/L — SIGNIFICANT CHANGE UP (ref 3.5–5.3)
PROT SERPL-MCNC: 7.6 G/DL — SIGNIFICANT CHANGE UP (ref 6–8.3)
RBC # BLD: 5.7 M/UL — SIGNIFICANT CHANGE UP (ref 4.2–5.8)
RBC # FLD: 13.4 % — SIGNIFICANT CHANGE UP (ref 10.3–14.5)
SODIUM SERPL-SCNC: 139 MMOL/L — SIGNIFICANT CHANGE UP (ref 135–145)
WBC # BLD: 10.4 K/UL — SIGNIFICANT CHANGE UP (ref 3.8–10.5)
WBC # FLD AUTO: 10.4 K/UL — SIGNIFICANT CHANGE UP (ref 3.8–10.5)

## 2023-02-16 PROCEDURE — 36415 COLL VENOUS BLD VENIPUNCTURE: CPT

## 2023-02-16 PROCEDURE — 93010 ELECTROCARDIOGRAM REPORT: CPT

## 2023-02-16 PROCEDURE — 93005 ELECTROCARDIOGRAM TRACING: CPT

## 2023-02-16 PROCEDURE — 85027 COMPLETE CBC AUTOMATED: CPT

## 2023-02-16 PROCEDURE — 80053 COMPREHEN METABOLIC PANEL: CPT

## 2023-02-16 PROCEDURE — 83036 HEMOGLOBIN GLYCOSYLATED A1C: CPT

## 2023-02-16 PROCEDURE — G0463: CPT

## 2023-02-16 RX ORDER — GABAPENTIN 400 MG/1
1 CAPSULE ORAL
Qty: 0 | Refills: 0 | DISCHARGE

## 2023-02-16 NOTE — H&P PST ADULT - REASON FOR ADMISSION
"I am having my right carpal tunnel and right cubital tunnel and all the fingers on my right hand trigger fingers released"

## 2023-02-16 NOTE — H&P PST ADULT - HISTORY OF PRESENT ILLNESS
68 yo male reports right hand pain and numbness in all 5 fingers and all  68 yo male reports right hand pain and numbness in all 5 right fingers and all 5 right trigger fingers.  He is scheduled for right carpal tunnel release/right cubital tunnel release/right dequervain's release/right 1st 2nd 3rd 4th 5th fingers trigger release on 2/24/2023 @ [facility].

## 2023-02-16 NOTE — H&P PST ADULT - NSICDXPROCEDURE_GEN_ALL_CORE_FT
PROCEDURES:  Release, carpal tunnel, open 16-Feb-2023 08:01:57  Dianna Lombardi  Release, cubital tunnel 16-Feb-2023 08:02:10  Dianna Lombardi  Release of multiple trigger fingers 16-Feb-2023 08:02:40  Dianna Lombardi

## 2023-02-16 NOTE — H&P PST ADULT - ASSESSMENT
68 yo male is scheduled for right carpal tunnel release/right cubital tunnel release/right dequervain's release/right 1st 2nd 3rd 4th 5th fingers trigger release on 2/24/2023

## 2023-02-16 NOTE — H&P PST ADULT - NSICDXPASTSURGICALHX_GEN_ALL_CORE_FT
PAST SURGICAL HISTORY:  H/O lithotripsy x1    History of bilateral hip replacements     History of bilateral knee replacement     History of cholecystectomy     History of hernia repair     History of lumbar fusion     History of lymph node biopsy     History of Total Hip Replacement 2009- bilateral THR    Kidney stone s/w ESWL pt unsure site    Neuropathy Bilateral Feet since 2012    S/P CABG x 3 8/29/17    S/P Left Inguinal Hernia Repair     S/P lumbar discectomy 2012- ,L5  Aug 2013    S/P lumbar laminectomy Fusion L3-L5 2012    S/P revision of total knee, right x2- 2012 & 2014    S/P tonsillectomy and adenoidectomy as child

## 2023-02-16 NOTE — H&P PST ADULT - MUSCULOSKELETAL COMMENTS
pain right elbow to right wrist and hand; numbness all 5 fingers, all 5 right trigger fingers bilateral hand pain, worse on right; trigger fingers all right fingers

## 2023-02-16 NOTE — H&P PST ADULT - PROBLEM SELECTOR PLAN 1
Right carpal tunnel release/right cubital tunnel release/right dequervain's release/ right 1st 2nd 3rd 4th 5th fingers trigger release is planned for 2/24/2023  Covid testing scheduled for 2/22/2023 @ [facility]  Diagnostic testing performed  Medical and cardiac clearances requested  Pre op instructions were reviewed  Best wishes offered

## 2023-02-16 NOTE — H&P PST ADULT - NSICDXPASTMEDICALHX_GEN_ALL_CORE_FT
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    Obstructive sleep apnea CPAP    ASIYA (obstructive sleep apnea) initially dx 1997 ; CPAP    Paralytic ileus post op THR 2009 & post op laminectomy    Renal calculi     Type 2 diabetes mellitus      PAST MEDICAL HISTORY:  CAD (coronary artery disease) 2017- s/p cabg x3    Cubital tunnel syndrome, right     Essential hypertension     Gout     H/O: osteoarthritis     Hypercholesterolemia     Lumbar disc disease with radiculopathy     Morbid obesity with body mass index (BMI) of 40.0 or higher     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

## 2023-02-22 ENCOUNTER — OUTPATIENT (OUTPATIENT)
Dept: OUTPATIENT SERVICES | Facility: HOSPITAL | Age: 68
LOS: 1 days | End: 2023-02-22
Payer: MEDICARE

## 2023-02-22 DIAGNOSIS — Z01.818 ENCOUNTER FOR OTHER PREPROCEDURAL EXAMINATION: ICD-10-CM

## 2023-02-22 DIAGNOSIS — Z98.89 OTHER SPECIFIED POSTPROCEDURAL STATES: Chronic | ICD-10-CM

## 2023-02-22 DIAGNOSIS — M65.30 TRIGGER FINGER, UNSPECIFIED FINGER: ICD-10-CM

## 2023-02-22 DIAGNOSIS — Z98.890 OTHER SPECIFIED POSTPROCEDURAL STATES: Chronic | ICD-10-CM

## 2023-02-22 DIAGNOSIS — N20.0 CALCULUS OF KIDNEY: Chronic | ICD-10-CM

## 2023-02-22 DIAGNOSIS — Z98.1 ARTHRODESIS STATUS: Chronic | ICD-10-CM

## 2023-02-22 DIAGNOSIS — G56.03 CARPAL TUNNEL SYNDROME, BILATERAL UPPER LIMBS: ICD-10-CM

## 2023-02-22 DIAGNOSIS — G62.9 POLYNEUROPATHY, UNSPECIFIED: Chronic | ICD-10-CM

## 2023-02-22 DIAGNOSIS — Z96.643 PRESENCE OF ARTIFICIAL HIP JOINT, BILATERAL: Chronic | ICD-10-CM

## 2023-02-22 DIAGNOSIS — Z95.1 PRESENCE OF AORTOCORONARY BYPASS GRAFT: Chronic | ICD-10-CM

## 2023-02-22 DIAGNOSIS — Z20.828 CONTACT WITH AND (SUSPECTED) EXPOSURE TO OTHER VIRAL COMMUNICABLE DISEASES: ICD-10-CM

## 2023-02-22 DIAGNOSIS — Z90.49 ACQUIRED ABSENCE OF OTHER SPECIFIED PARTS OF DIGESTIVE TRACT: Chronic | ICD-10-CM

## 2023-02-22 DIAGNOSIS — Z96.651 PRESENCE OF RIGHT ARTIFICIAL KNEE JOINT: Chronic | ICD-10-CM

## 2023-02-22 DIAGNOSIS — Z96.653 PRESENCE OF ARTIFICIAL KNEE JOINT, BILATERAL: Chronic | ICD-10-CM

## 2023-02-22 DIAGNOSIS — M65.4 RADIAL STYLOID TENOSYNOVITIS [DE QUERVAIN]: ICD-10-CM

## 2023-02-22 LAB — SARS-COV-2 RNA SPEC QL NAA+PROBE: SIGNIFICANT CHANGE UP

## 2023-02-22 PROCEDURE — U0003: CPT

## 2023-02-22 PROCEDURE — U0005: CPT

## 2023-02-23 ENCOUNTER — TRANSCRIPTION ENCOUNTER (OUTPATIENT)
Age: 68
End: 2023-02-23

## 2023-02-23 NOTE — ASU PATIENT PROFILE, ADULT - NSICDXPASTMEDICALHX_GEN_ALL_CORE_FT
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

## 2023-02-24 ENCOUNTER — OUTPATIENT (OUTPATIENT)
Dept: OUTPATIENT SERVICES | Facility: HOSPITAL | Age: 68
LOS: 1 days | End: 2023-02-24
Payer: MEDICARE

## 2023-02-24 ENCOUNTER — TRANSCRIPTION ENCOUNTER (OUTPATIENT)
Age: 68
End: 2023-02-24

## 2023-02-24 ENCOUNTER — APPOINTMENT (OUTPATIENT)
Dept: ORTHOPEDIC SURGERY | Facility: HOSPITAL | Age: 68
End: 2023-02-24

## 2023-02-24 VITALS
HEART RATE: 65 BPM | DIASTOLIC BLOOD PRESSURE: 64 MMHG | TEMPERATURE: 98 F | WEIGHT: 315 LBS | OXYGEN SATURATION: 99 % | SYSTOLIC BLOOD PRESSURE: 134 MMHG | HEIGHT: 77 IN | RESPIRATION RATE: 20 BRPM

## 2023-02-24 VITALS
OXYGEN SATURATION: 96 % | RESPIRATION RATE: 18 BRPM | DIASTOLIC BLOOD PRESSURE: 64 MMHG | HEART RATE: 60 BPM | SYSTOLIC BLOOD PRESSURE: 110 MMHG

## 2023-02-24 DIAGNOSIS — Z98.890 OTHER SPECIFIED POSTPROCEDURAL STATES: Chronic | ICD-10-CM

## 2023-02-24 DIAGNOSIS — G62.9 POLYNEUROPATHY, UNSPECIFIED: Chronic | ICD-10-CM

## 2023-02-24 DIAGNOSIS — Z96.653 PRESENCE OF ARTIFICIAL KNEE JOINT, BILATERAL: Chronic | ICD-10-CM

## 2023-02-24 DIAGNOSIS — M65.4 RADIAL STYLOID TENOSYNOVITIS [DE QUERVAIN]: ICD-10-CM

## 2023-02-24 DIAGNOSIS — N20.0 CALCULUS OF KIDNEY: Chronic | ICD-10-CM

## 2023-02-24 DIAGNOSIS — Z95.1 PRESENCE OF AORTOCORONARY BYPASS GRAFT: Chronic | ICD-10-CM

## 2023-02-24 DIAGNOSIS — Z01.818 ENCOUNTER FOR OTHER PREPROCEDURAL EXAMINATION: ICD-10-CM

## 2023-02-24 DIAGNOSIS — M65.30 TRIGGER FINGER, UNSPECIFIED FINGER: ICD-10-CM

## 2023-02-24 DIAGNOSIS — Z98.1 ARTHRODESIS STATUS: Chronic | ICD-10-CM

## 2023-02-24 DIAGNOSIS — Z96.643 PRESENCE OF ARTIFICIAL HIP JOINT, BILATERAL: Chronic | ICD-10-CM

## 2023-02-24 DIAGNOSIS — Z96.651 PRESENCE OF RIGHT ARTIFICIAL KNEE JOINT: Chronic | ICD-10-CM

## 2023-02-24 DIAGNOSIS — G56.03 CARPAL TUNNEL SYNDROME, BILATERAL UPPER LIMBS: ICD-10-CM

## 2023-02-24 DIAGNOSIS — Z90.49 ACQUIRED ABSENCE OF OTHER SPECIFIED PARTS OF DIGESTIVE TRACT: Chronic | ICD-10-CM

## 2023-02-24 DIAGNOSIS — Z98.89 OTHER SPECIFIED POSTPROCEDURAL STATES: Chronic | ICD-10-CM

## 2023-02-24 LAB — GLUCOSE BLDC GLUCOMTR-MCNC: 169 MG/DL — HIGH (ref 70–99)

## 2023-02-24 PROCEDURE — 64721 CARPAL TUNNEL SURGERY: CPT | Mod: RT

## 2023-02-24 PROCEDURE — 64718 REVISE ULNAR NERVE AT ELBOW: CPT | Mod: RT

## 2023-02-24 PROCEDURE — 26055 INCISE FINGER TENDON SHEATH: CPT | Mod: F7

## 2023-02-24 PROCEDURE — 25000 INCISION OF TENDON SHEATH: CPT | Mod: RT

## 2023-02-24 PROCEDURE — 82962 GLUCOSE BLOOD TEST: CPT

## 2023-02-24 PROCEDURE — 26055 INCISE FINGER TENDON SHEATH: CPT | Mod: F9

## 2023-02-24 RX ORDER — HYDROMORPHONE HYDROCHLORIDE 2 MG/ML
0.25 INJECTION INTRAMUSCULAR; INTRAVENOUS; SUBCUTANEOUS
Refills: 0 | Status: DISCONTINUED | OUTPATIENT
Start: 2023-02-24 | End: 2023-02-24

## 2023-02-24 RX ORDER — SODIUM CHLORIDE 9 MG/ML
1000 INJECTION, SOLUTION INTRAVENOUS
Refills: 0 | Status: DISCONTINUED | OUTPATIENT
Start: 2023-02-24 | End: 2023-02-24

## 2023-02-24 RX ORDER — ONDANSETRON 8 MG/1
4 TABLET, FILM COATED ORAL ONCE
Refills: 0 | Status: DISCONTINUED | OUTPATIENT
Start: 2023-02-24 | End: 2023-02-24

## 2023-02-24 RX ORDER — HYDROMORPHONE HYDROCHLORIDE 2 MG/ML
0.5 INJECTION INTRAMUSCULAR; INTRAVENOUS; SUBCUTANEOUS
Refills: 0 | Status: DISCONTINUED | OUTPATIENT
Start: 2023-02-24 | End: 2023-02-24

## 2023-02-24 RX ORDER — OXYCODONE AND ACETAMINOPHEN 5; 325 MG/1; MG/1
1 TABLET ORAL
Qty: 5 | Refills: 0
Start: 2023-02-24

## 2023-02-24 RX ORDER — IBUPROFEN 200 MG
1 TABLET ORAL
Qty: 30 | Refills: 0
Start: 2023-02-24

## 2023-02-24 RX ORDER — APREPITANT 80 MG/1
40 CAPSULE ORAL ONCE
Refills: 0 | Status: COMPLETED | OUTPATIENT
Start: 2023-02-24 | End: 2023-02-24

## 2023-02-24 RX ORDER — OXYCODONE AND ACETAMINOPHEN 5; 325 MG/1; MG/1
1 TABLET ORAL ONCE
Refills: 0 | Status: DISCONTINUED | OUTPATIENT
Start: 2023-02-24 | End: 2023-02-24

## 2023-02-24 RX ORDER — CHLORHEXIDINE GLUCONATE 213 G/1000ML
1 SOLUTION TOPICAL ONCE
Refills: 0 | Status: COMPLETED | OUTPATIENT
Start: 2023-02-24 | End: 2023-02-24

## 2023-02-24 RX ORDER — CEFAZOLIN SODIUM 1 G
3000 VIAL (EA) INJECTION ONCE
Refills: 0 | Status: COMPLETED | OUTPATIENT
Start: 2023-02-24 | End: 2023-02-24

## 2023-02-24 RX ADMIN — CHLORHEXIDINE GLUCONATE 1 APPLICATION(S): 213 SOLUTION TOPICAL at 07:17

## 2023-02-24 RX ADMIN — APREPITANT 40 MILLIGRAM(S): 80 CAPSULE ORAL at 07:16

## 2023-02-24 NOTE — ASU DISCHARGE PLAN (ADULT/PEDIATRIC) - NS MD DC FALL RISK RISK
For information on Fall & Injury Prevention, visit: [email or web address] OR  [email or web address] OR  https://www.cdc.gov/steadi/patient.html

## 2023-02-24 NOTE — ASU DISCHARGE PLAN (ADULT/PEDIATRIC) - PROCEDURE
right cubital tunnel release, right carpal tunnel release, right trigger finger release, right dequervains release

## 2023-02-24 NOTE — ASU DISCHARGE PLAN (ADULT/PEDIATRIC) - CARE PROVIDER_API CALL
Charlie Esquivel)  Orthopaedic Surgery  [address]  [place] [address]  Phone: [phone]  Fax: [phone]  Follow Up Time:

## 2023-02-27 PROBLEM — G56.01 CARPAL TUNNEL SYNDROME, RIGHT UPPER LIMB: Chronic | Status: ACTIVE | Noted: 2023-02-16

## 2023-02-27 PROBLEM — G56.21 LESION OF ULNAR NERVE, RIGHT UPPER LIMB: Chronic | Status: ACTIVE | Noted: 2023-02-16

## 2023-02-27 PROBLEM — E66.01 MORBID (SEVERE) OBESITY DUE TO EXCESS CALORIES: Chronic | Status: ACTIVE | Noted: 2023-02-16

## 2023-02-27 PROBLEM — M65.30 TRIGGER FINGER, UNSPECIFIED FINGER: Chronic | Status: ACTIVE | Noted: 2023-02-16

## 2023-02-27 PROBLEM — E11.9 TYPE 2 DIABETES MELLITUS WITHOUT COMPLICATIONS: Chronic | Status: ACTIVE | Noted: 2023-02-16

## 2023-03-02 ENCOUNTER — APPOINTMENT (OUTPATIENT)
Dept: ORTHOPEDIC SURGERY | Facility: CLINIC | Age: 68
End: 2023-03-02
Payer: MEDICARE

## 2023-03-02 PROCEDURE — 99024 POSTOP FOLLOW-UP VISIT: CPT

## 2023-03-02 NOTE — HISTORY OF PRESENT ILLNESS
[de-identified] : s/p right carpal tunnel release, right cubital tunnel release and anterior transposition of ulnar nerve, right de Quervain's decompression and decompression of right thumb, index, long, ring, and little trigger fingers. [de-identified] : The patient is a 67 year male who returns for the 1st postoperative visit after undergoing right carpal tunnel release, right cubital tunnel release and anterior transposition of ulnar nerve, right de Quervain's decompression and decompression of right thumb, index, long, ring, and little trigger fingers at [facility]. The surgery was on 02/24/2023. The patient is recovering at home. He reports mild postoperative pain. [de-identified] : Patient is WDWN, alert, and in no acute distress. Breathing is unlabored. He is grossly oriented to person, place, and time.\par \par Incision sites are healing well. There are no signs of infection. Sutures will remain in place. Normal amount of postoperative edema and tenderness present.\par Digital motion is full\par Sensation along the median nerve distribution is improved.\par Sensation along the ulnar nerve distribution is improved. [de-identified] : no imaging today [de-identified] : At this time, the sutures will remain in place for another week as I told him it is too premature to remove them today. He was instructed on local wound care and protection of the hand. He will not get the hand wet while the sutures in place. He was instructed on ROM exercises of the digits and to use the hand as tolerated for ADLs.\par Follow up in 10 days for suture removal.

## 2023-03-02 NOTE — END OF VISIT
[FreeTextEntry3] : All medical record entries made by the Scribe were at my,  Dr. Charlie Esquivel MD., direction and personally dictated by me on 03/02/2023. I have personally reviewed the chart and agree that the record accurately reflects my personal performance of the history, physical exam, assessment and plan.

## 2023-03-02 NOTE — ADDENDUM
[FreeTextEntry1] : I, Massiel Alvarez wrote this note acting as a scribe for Dr. Charlie Esquivel on Mar 02, 2023.

## 2023-03-09 NOTE — ED PROVIDER NOTE - PRO INTERPRETER NEED 2
I have personally provided the amount of critical care time documented below excluding time spent on separate procedures. English

## 2023-03-13 ENCOUNTER — APPOINTMENT (OUTPATIENT)
Dept: ORTHOPEDIC SURGERY | Facility: CLINIC | Age: 68
End: 2023-03-13
Payer: MEDICARE

## 2023-03-13 DIAGNOSIS — M65.341 TRIGGER FINGER, RIGHT RING FINGER: ICD-10-CM

## 2023-03-13 DIAGNOSIS — M65.4 RADIAL STYLOID TENOSYNOVITIS [DE QUERVAIN]: ICD-10-CM

## 2023-03-13 PROCEDURE — 99024 POSTOP FOLLOW-UP VISIT: CPT

## 2023-03-14 NOTE — ADDENDUM
[FreeTextEntry1] : I, Massiel Alvarez wrote this note acting as a scribe for Dr. Charlie Esquivel on Mar 13, 2023.

## 2023-03-14 NOTE — HISTORY OF PRESENT ILLNESS
[de-identified] : s/p right carpal tunnel release, right cubital tunnel release and anterior transposition of ulnar nerve, right de Quervain's decompression and decompression of right thumb, index, long, ring, and little trigger fingers. [de-identified] : The patient is a 67 year male who returns for the 2nd postoperative visit after undergoing right carpal tunnel release, right cubital tunnel release and anterior transposition of ulnar nerve, right de Quervain's decompression and decompression of right thumb, index, long, ring, and little trigger fingers at [facility]. The surgery was on 02/24/2023. He presents for suture removal. He is doing well overall with no major complaints at this point in time. He denies pain and did not have to take medication for pain postoperatively. He does however report residual numbness at the right ring and little fingers along the ulnar nerve distribution.\par \par He has complaints of numbness without pain to the left hand, notably at the left index and left ring and little fingers. \par \par He is a Diabetic but reports his blood sugar and A1c are well controlled.  [de-identified] : Patient is WDWN, alert, and in no acute distress. Breathing is unlabored. He is grossly oriented to person, place, and time.\par \par Right Hand/Elbow:\par Incision sites are healing well. There are no signs of infection. Sutures were removed. Normal amount of postoperative edema and tenderness present.\par Digital motion is full.\par Sensation along the median nerve distribution is improved.\par Sensation along the ulnar nerve distribution is improved albeit with mild residual numbness at the right ring and little fingers. \par \par Left Hand/Elbow:\par He has numbness and tingling along both the median and ulnar nerve distributions, notably at the left index and left ring and little fingers, respectively.\par Tenderness over the A1 pulleys of the thumb, index, middle, ring and little fingers. [de-identified] : no imaging today [de-identified] : With regard to the right hand and elbow, the sutures were removed. He was instructed on local wound care and when to begin to massage the scar with vitamin E oil. Gentle range of motion, stretching, and strengthening exercises were encouraged. Patient should actively work on gradually increasing use of the hand, as tolerated. Follow up in 6 weeks, if needed.\par \par With regard to the left hand, he has opted to proceed with surgery given the persistence and severity of his symptoms. \par \par The patient wishes to proceed with LEFT carpal tunnel and cubital tunnel release as well as surgical release of the LEFT thumb, index, middle, ring and little fingers at this time. The risks and benefits were reviewed with the patient. All of his questions were answered. He will meet with our surgical scheduler.

## 2023-03-14 NOTE — END OF VISIT
[FreeTextEntry3] : All medical record entries made by the Scribe were at my,  Dr. Charlie Esquivel MD., direction and personally dictated by me on 03/13/2023. I have personally reviewed the chart and agree that the record accurately reflects my personal performance of the history, physical exam, assessment and plan.

## 2023-03-16 ENCOUNTER — TRANSCRIPTION ENCOUNTER (OUTPATIENT)
Age: 68
End: 2023-03-16

## 2023-03-16 NOTE — ASU PATIENT PROFILE, ADULT - FALL HARM RISK - HARM RISK INTERVENTIONS

## 2023-03-17 ENCOUNTER — OUTPATIENT (OUTPATIENT)
Dept: OUTPATIENT SERVICES | Facility: HOSPITAL | Age: 68
LOS: 1 days | End: 2023-03-17
Payer: MEDICARE

## 2023-03-17 ENCOUNTER — TRANSCRIPTION ENCOUNTER (OUTPATIENT)
Age: 68
End: 2023-03-17

## 2023-03-17 ENCOUNTER — APPOINTMENT (OUTPATIENT)
Dept: ORTHOPEDIC SURGERY | Facility: HOSPITAL | Age: 68
End: 2023-03-17

## 2023-03-17 VITALS
OXYGEN SATURATION: 99 % | RESPIRATION RATE: 20 BRPM | TEMPERATURE: 97 F | WEIGHT: 315 LBS | HEART RATE: 65 BPM | SYSTOLIC BLOOD PRESSURE: 132 MMHG | DIASTOLIC BLOOD PRESSURE: 66 MMHG | HEIGHT: 77 IN

## 2023-03-17 VITALS
HEART RATE: 78 BPM | DIASTOLIC BLOOD PRESSURE: 92 MMHG | OXYGEN SATURATION: 97 % | SYSTOLIC BLOOD PRESSURE: 158 MMHG | RESPIRATION RATE: 18 BRPM | TEMPERATURE: 98 F

## 2023-03-17 DIAGNOSIS — Z98.89 OTHER SPECIFIED POSTPROCEDURAL STATES: Chronic | ICD-10-CM

## 2023-03-17 DIAGNOSIS — M65.30 TRIGGER FINGER, UNSPECIFIED FINGER: ICD-10-CM

## 2023-03-17 DIAGNOSIS — N20.0 CALCULUS OF KIDNEY: Chronic | ICD-10-CM

## 2023-03-17 DIAGNOSIS — Z98.1 ARTHRODESIS STATUS: Chronic | ICD-10-CM

## 2023-03-17 DIAGNOSIS — Z90.49 ACQUIRED ABSENCE OF OTHER SPECIFIED PARTS OF DIGESTIVE TRACT: Chronic | ICD-10-CM

## 2023-03-17 DIAGNOSIS — Z98.890 OTHER SPECIFIED POSTPROCEDURAL STATES: Chronic | ICD-10-CM

## 2023-03-17 DIAGNOSIS — G62.9 POLYNEUROPATHY, UNSPECIFIED: Chronic | ICD-10-CM

## 2023-03-17 DIAGNOSIS — G56.03 CARPAL TUNNEL SYNDROM,BILATERAL UPPER LIMBS: ICD-10-CM

## 2023-03-17 DIAGNOSIS — Z96.651 PRESENCE OF RIGHT ARTIFICIAL KNEE JOINT: Chronic | ICD-10-CM

## 2023-03-17 DIAGNOSIS — G56.03 CARPAL TUNNEL SYNDROME, BILATERAL UPPER LIMBS: ICD-10-CM

## 2023-03-17 DIAGNOSIS — Z95.1 PRESENCE OF AORTOCORONARY BYPASS GRAFT: Chronic | ICD-10-CM

## 2023-03-17 DIAGNOSIS — Z96.643 PRESENCE OF ARTIFICIAL HIP JOINT, BILATERAL: Chronic | ICD-10-CM

## 2023-03-17 DIAGNOSIS — Z96.653 PRESENCE OF ARTIFICIAL KNEE JOINT, BILATERAL: Chronic | ICD-10-CM

## 2023-03-17 LAB — GLUCOSE BLDC GLUCOMTR-MCNC: 160 MG/DL — HIGH (ref 70–99)

## 2023-03-17 PROCEDURE — 64718 REVISE ULNAR NERVE AT ELBOW: CPT | Mod: 79,LT

## 2023-03-17 PROCEDURE — 26055 INCISE FINGER TENDON SHEATH: CPT | Mod: 79,F2

## 2023-03-17 PROCEDURE — 64718 REVISE ULNAR NERVE AT ELBOW: CPT | Mod: AS,79,LT

## 2023-03-17 PROCEDURE — 64721 CARPAL TUNNEL SURGERY: CPT | Mod: 79,LT

## 2023-03-17 RX ORDER — ONDANSETRON 8 MG/1
4 TABLET, FILM COATED ORAL ONCE
Refills: 0 | Status: DISCONTINUED | OUTPATIENT
Start: 2023-03-17 | End: 2023-03-17

## 2023-03-17 RX ORDER — IBUPROFEN 200 MG
1 TABLET ORAL
Qty: 30 | Refills: 0
Start: 2023-03-17

## 2023-03-17 RX ORDER — HYDROMORPHONE HYDROCHLORIDE 2 MG/ML
0.5 INJECTION INTRAMUSCULAR; INTRAVENOUS; SUBCUTANEOUS
Refills: 0 | Status: DISCONTINUED | OUTPATIENT
Start: 2023-03-17 | End: 2023-03-17

## 2023-03-17 RX ORDER — SODIUM CHLORIDE 9 MG/ML
1000 INJECTION, SOLUTION INTRAVENOUS
Refills: 0 | Status: DISCONTINUED | OUTPATIENT
Start: 2023-03-17 | End: 2023-03-17

## 2023-03-17 RX ORDER — OXYCODONE HYDROCHLORIDE 5 MG/1
5 TABLET ORAL ONCE
Refills: 0 | Status: DISCONTINUED | OUTPATIENT
Start: 2023-03-17 | End: 2023-03-17

## 2023-03-17 RX ORDER — CEFAZOLIN SODIUM 1 G
3000 VIAL (EA) INJECTION ONCE
Refills: 0 | Status: COMPLETED | OUTPATIENT
Start: 2023-03-17 | End: 2023-03-17

## 2023-03-17 RX ORDER — OXYCODONE AND ACETAMINOPHEN 5; 325 MG/1; MG/1
1 TABLET ORAL
Qty: 10 | Refills: 0
Start: 2023-03-17

## 2023-03-17 RX ORDER — APREPITANT 80 MG/1
40 CAPSULE ORAL ONCE
Refills: 0 | Status: COMPLETED | OUTPATIENT
Start: 2023-03-17 | End: 2023-03-17

## 2023-03-17 RX ORDER — CHLORHEXIDINE GLUCONATE 213 G/1000ML
1 SOLUTION TOPICAL ONCE
Refills: 0 | Status: COMPLETED | OUTPATIENT
Start: 2023-03-17 | End: 2023-03-17

## 2023-03-17 RX ADMIN — CHLORHEXIDINE GLUCONATE 1 APPLICATION(S): 213 SOLUTION TOPICAL at 09:29

## 2023-03-17 RX ADMIN — SODIUM CHLORIDE 75 MILLILITER(S): 9 INJECTION, SOLUTION INTRAVENOUS at 12:05

## 2023-03-17 RX ADMIN — APREPITANT 40 MILLIGRAM(S): 80 CAPSULE ORAL at 09:29

## 2023-03-17 NOTE — ASU DISCHARGE PLAN (ADULT/PEDIATRIC) - NS MD DC FALL RISK RISK
For information on Fall & Injury Prevention, visit: [email or web address] OR  [email or web address] OR  https://www.cdc.gov/steadi/patient.html

## 2023-03-17 NOTE — BRIEF OPERATIVE NOTE - NSICDXBRIEFPOSTOP_GEN_ALL_CORE_FT
POST-OP DIAGNOSIS:  Carpal tunnel syndrome, left 17-Mar-2023 11:50:20  Shaunna Heath  Cubital tunnel syndrome, left 17-Mar-2023 11:50:31  Shaunna Heath  Left trigger finger 17-Mar-2023 11:50:46  Shaunna Heath

## 2023-03-17 NOTE — ASU DISCHARGE PLAN (ADULT/PEDIATRIC) - CARE PROVIDER_API CALL
Charlie Esquivel)  Orthopaedic Surgery  [address]  [place] [address]  Phone: [phone]  Fax: [phone]  Follow Up Time:

## 2023-03-17 NOTE — BRIEF OPERATIVE NOTE - NSICDXBRIEFPREOP_GEN_ALL_CORE_FT
PRE-OP DIAGNOSIS:  Carpal tunnel syndrome 17-Mar-2023 11:49:32  Shaunna Heath  Cubital tunnel syndrome, left 17-Mar-2023 11:49:43  Shaunna Heath  Trigger finger 17-Mar-2023 11:49:55  Shaunna Heath  Trigger finger 17-Mar-2023 11:49:55  Shaunna Heath

## 2023-03-17 NOTE — BRIEF OPERATIVE NOTE - NSICDXBRIEFPROCEDURE_GEN_ALL_CORE_FT
PROCEDURES:  Carpal tunnel release 17-Mar-2023 11:45:50  Shaunna Heath  Cubital tunnel release 17-Mar-2023 11:46:02  Shaunna Heath  Trigger finger release 17-Mar-2023 11:46:39 x 5 Shaunna Heath

## 2023-03-17 NOTE — ASU DISCHARGE PLAN (ADULT/PEDIATRIC) - DISCHARGE TO
Discharge Instructions for Tympanoplasty  Your child had a procedure called tympanoplasty to repair a damaged eardrum. Here's what you need to know about home care following this procedure.  What to expect    Recovery After Procedural Sedation (Adult)  You have been given medicine by vein to make you sleep during your surgery. This may have included both a pain medicine and sleeping medicine. Most of the effects have worn off. But you may still have some drowsiness for the next 6 to 8 hours.  Home care  Follow these guidelines when you get home:  For the next 8 hours, you should be watched by a responsible adult. This person should make sure your condition is not getting worse.  Don't drink any alcohol for the next 24 hours.  Don't drive, operate dangerous machinery, or make important business or personal decisions during the next 24 hours.  Note: Your healthcare provider may tell you not to take any medicine by mouth for pain or sleep in the next 4 hours. These medicines may react with the medicines you were given in the hospital. This could cause a much stronger response than usual.  Follow-up care  Follow up with your healthcare provider if you are not alert and back to your usual level of activity within 12 hours.  When to seek medical advice  Call your healthcare provider right away if any of these occur:  Drowsiness gets worse  Weakness or dizziness gets worse  Repeated vomiting  You can't be awakened   Date Last Reviewed: 10/18/2016  · © 4814-1118 [employer]. [address] [place] [id number]. All rights reserved. This information is not intended as a substitute for professional medical care. Always follow your healthcare professional's instructions.rainage from the ear.  · Numbness of the outer part of the ear. This will return to normal.  · Pain in the jaw. Change in or loss of taste. This will also return to normal.  Ear care  · Don't let your child lie flat for the first 24  hours.  · Discourage your child from blowing his or her nose. Dont allow your child to hold the nose closed.  · Show your child how to sneeze with the mouth open.  · Allow your child to shower as necessary, starting 3 days after surgery. A tub bath is allowed as long as your child doesnt put his or her head in the water.  · Keep the ear dry. You can place a cotton ball dabbed with a small amount of petroleum jelly in the outer ear to keep water out during a bath or shower.  · Give your child medicine exactly as directed.  Activity  · Make sure your child avoids activities that involve heavy lifting and straining.  · Get your doctor's permission before allowing your child to fly in a plane or before swimming.  Follow-up care  · Make follow-up appointments as directed by our staff.  · Ask your doctor when your child may return to school.     When to call your healthcare provider  Call your child's surgeon right away if your child has any of the following:  · Increased redness or swelling around the ear  · Dizziness  · Drainage from the ear with an odor or increased drainage  · Ongoing headache  · Seeing double or blurry vision  · Fever of 100.4°F (38°C) or higher, or as directed by your child's surgeon  · Weak muscles of the face  · Unusual eye movements  · Ringing in the ears   Date Last Reviewed: 1/1/2017  © 5906-5578 [employer]. [address] [place] [address], [place] [address]. All rights reserved. This information is not intended as a substitute for professional medical care. Always follow your healthcare professional's instructions.         Home

## 2023-03-17 NOTE — ASU DISCHARGE PLAN (ADULT/PEDIATRIC) - ASU DC SPECIAL INSTRUCTIONSFT
Elevate  Left  arm in sling daily when up & walking.  Elevate the  hand/arm above heart level on pillow/blankets when lying down.  Pad the neck strap with athletic sock/collared shirt.  Apply ice packs to top of  Left   hand for 20 min on and off  Keep bandage clean, dry , & intact until seen in office  May open & close the fingers of the operated arm every hour for exercise.  Call the Dr.  for fever, severe pain, fall or hand injury.  Call for an appointment for office visit  in 7- 10 days.

## 2023-03-19 ENCOUNTER — INPATIENT (INPATIENT)
Facility: HOSPITAL | Age: 68
LOS: 5 days | Discharge: HOME HEALTH | DRG: 291 | End: 2023-03-25
Attending: STUDENT IN AN ORGANIZED HEALTH CARE EDUCATION/TRAINING PROGRAM | Admitting: STUDENT IN AN ORGANIZED HEALTH CARE EDUCATION/TRAINING PROGRAM
Payer: MEDICARE

## 2023-03-19 VITALS
TEMPERATURE: 97 F | DIASTOLIC BLOOD PRESSURE: 74 MMHG | OXYGEN SATURATION: 96 % | WEIGHT: 315 LBS | RESPIRATION RATE: 22 BRPM | SYSTOLIC BLOOD PRESSURE: 126 MMHG | HEIGHT: 77 IN | HEART RATE: 74 BPM

## 2023-03-19 DIAGNOSIS — G62.9 POLYNEUROPATHY, UNSPECIFIED: Chronic | ICD-10-CM

## 2023-03-19 DIAGNOSIS — Z98.89 OTHER SPECIFIED POSTPROCEDURAL STATES: Chronic | ICD-10-CM

## 2023-03-19 DIAGNOSIS — N20.0 CALCULUS OF KIDNEY: Chronic | ICD-10-CM

## 2023-03-19 DIAGNOSIS — Z96.643 PRESENCE OF ARTIFICIAL HIP JOINT, BILATERAL: Chronic | ICD-10-CM

## 2023-03-19 DIAGNOSIS — Z98.1 ARTHRODESIS STATUS: Chronic | ICD-10-CM

## 2023-03-19 DIAGNOSIS — R06.00 DYSPNEA, UNSPECIFIED: ICD-10-CM

## 2023-03-19 DIAGNOSIS — Z98.890 OTHER SPECIFIED POSTPROCEDURAL STATES: Chronic | ICD-10-CM

## 2023-03-19 DIAGNOSIS — Z96.651 PRESENCE OF RIGHT ARTIFICIAL KNEE JOINT: Chronic | ICD-10-CM

## 2023-03-19 DIAGNOSIS — Z95.1 PRESENCE OF AORTOCORONARY BYPASS GRAFT: Chronic | ICD-10-CM

## 2023-03-19 DIAGNOSIS — Z96.653 PRESENCE OF ARTIFICIAL KNEE JOINT, BILATERAL: Chronic | ICD-10-CM

## 2023-03-19 DIAGNOSIS — Z90.49 ACQUIRED ABSENCE OF OTHER SPECIFIED PARTS OF DIGESTIVE TRACT: Chronic | ICD-10-CM

## 2023-03-19 LAB
ALBUMIN SERPL ELPH-MCNC: 4.3 G/DL — SIGNIFICANT CHANGE UP (ref 3.3–5)
ALP SERPL-CCNC: 100 U/L — SIGNIFICANT CHANGE UP (ref 40–120)
ALT FLD-CCNC: 19 U/L — SIGNIFICANT CHANGE UP (ref 10–45)
ANION GAP SERPL CALC-SCNC: 14 MMOL/L — SIGNIFICANT CHANGE UP (ref 5–17)
APPEARANCE UR: CLEAR — SIGNIFICANT CHANGE UP
APTT BLD: 24.8 SEC — LOW (ref 27.5–35.5)
AST SERPL-CCNC: 17 U/L — SIGNIFICANT CHANGE UP (ref 10–40)
BACTERIA # UR AUTO: ABNORMAL
BASE EXCESS BLDV CALC-SCNC: 4.5 MMOL/L — HIGH (ref -2–3)
BASOPHILS # BLD AUTO: 0.09 K/UL — SIGNIFICANT CHANGE UP (ref 0–0.2)
BASOPHILS NFR BLD AUTO: 0.9 % — SIGNIFICANT CHANGE UP (ref 0–2)
BILIRUB SERPL-MCNC: 0.3 MG/DL — SIGNIFICANT CHANGE UP (ref 0.2–1.2)
BILIRUB UR-MCNC: NEGATIVE — SIGNIFICANT CHANGE UP
BUN SERPL-MCNC: 21 MG/DL — SIGNIFICANT CHANGE UP (ref 7–23)
CA-I SERPL-SCNC: 1.2 MMOL/L — SIGNIFICANT CHANGE UP (ref 1.15–1.33)
CALCIUM SERPL-MCNC: 9.4 MG/DL — SIGNIFICANT CHANGE UP (ref 8.4–10.5)
CHLORIDE BLDV-SCNC: 102 MMOL/L — SIGNIFICANT CHANGE UP (ref 96–108)
CHLORIDE SERPL-SCNC: 101 MMOL/L — SIGNIFICANT CHANGE UP (ref 96–108)
CO2 BLDV-SCNC: 32 MMOL/L — HIGH (ref 22–26)
CO2 SERPL-SCNC: 27 MMOL/L — SIGNIFICANT CHANGE UP (ref 22–31)
COLOR SPEC: COLORLESS — SIGNIFICANT CHANGE UP
CREAT SERPL-MCNC: 0.65 MG/DL — SIGNIFICANT CHANGE UP (ref 0.5–1.3)
DIFF PNL FLD: NEGATIVE — SIGNIFICANT CHANGE UP
EGFR: 103 ML/MIN/1.73M2 — SIGNIFICANT CHANGE UP
EOSINOPHIL # BLD AUTO: 0.12 K/UL — SIGNIFICANT CHANGE UP (ref 0–0.5)
EOSINOPHIL NFR BLD AUTO: 1.2 % — SIGNIFICANT CHANGE UP (ref 0–6)
EPI CELLS # UR: 0 /HPF — SIGNIFICANT CHANGE UP
FLUAV AG NPH QL: SIGNIFICANT CHANGE UP
FLUBV AG NPH QL: SIGNIFICANT CHANGE UP
GAS PNL BLDV: 138 MMOL/L — SIGNIFICANT CHANGE UP (ref 136–145)
GAS PNL BLDV: SIGNIFICANT CHANGE UP
GAS PNL BLDV: SIGNIFICANT CHANGE UP
GLUCOSE BLDV-MCNC: 118 MG/DL — HIGH (ref 70–99)
GLUCOSE SERPL-MCNC: 120 MG/DL — HIGH (ref 70–99)
GLUCOSE UR QL: NEGATIVE — SIGNIFICANT CHANGE UP
HCO3 BLDV-SCNC: 31 MMOL/L — HIGH (ref 22–29)
HCT VFR BLD CALC: 44.7 % — SIGNIFICANT CHANGE UP (ref 39–50)
HCT VFR BLDA CALC: 47 % — SIGNIFICANT CHANGE UP (ref 39–51)
HGB BLD CALC-MCNC: 15.6 G/DL — SIGNIFICANT CHANGE UP (ref 12.6–17.4)
HGB BLD-MCNC: 15.3 G/DL — SIGNIFICANT CHANGE UP (ref 13–17)
HYALINE CASTS # UR AUTO: 4 /LPF — HIGH (ref 0–2)
IMM GRANULOCYTES NFR BLD AUTO: 0.9 % — SIGNIFICANT CHANGE UP (ref 0–0.9)
INR BLD: 0.91 RATIO — SIGNIFICANT CHANGE UP (ref 0.88–1.16)
KETONES UR-MCNC: NEGATIVE — SIGNIFICANT CHANGE UP
LACTATE BLDV-MCNC: 1.9 MMOL/L — SIGNIFICANT CHANGE UP (ref 0.5–2)
LEUKOCYTE ESTERASE UR-ACNC: ABNORMAL
LYMPHOCYTES # BLD AUTO: 2.44 K/UL — SIGNIFICANT CHANGE UP (ref 1–3.3)
LYMPHOCYTES # BLD AUTO: 23.5 % — SIGNIFICANT CHANGE UP (ref 13–44)
MCHC RBC-ENTMCNC: 29.9 PG — SIGNIFICANT CHANGE UP (ref 27–34)
MCHC RBC-ENTMCNC: 34.2 GM/DL — SIGNIFICANT CHANGE UP (ref 32–36)
MCV RBC AUTO: 87.5 FL — SIGNIFICANT CHANGE UP (ref 80–100)
MONOCYTES # BLD AUTO: 0.8 K/UL — SIGNIFICANT CHANGE UP (ref 0–0.9)
MONOCYTES NFR BLD AUTO: 7.7 % — SIGNIFICANT CHANGE UP (ref 2–14)
NEUTROPHILS # BLD AUTO: 6.84 K/UL — SIGNIFICANT CHANGE UP (ref 1.8–7.4)
NEUTROPHILS NFR BLD AUTO: 65.8 % — SIGNIFICANT CHANGE UP (ref 43–77)
NITRITE UR-MCNC: NEGATIVE — SIGNIFICANT CHANGE UP
NRBC # BLD: 0 /100 WBCS — SIGNIFICANT CHANGE UP (ref 0–0)
NT-PROBNP SERPL-SCNC: 315 PG/ML — HIGH (ref 0–300)
PCO2 BLDV: 51 MMHG — SIGNIFICANT CHANGE UP (ref 42–55)
PH BLDV: 7.39 — SIGNIFICANT CHANGE UP (ref 7.32–7.43)
PH UR: 6 — SIGNIFICANT CHANGE UP (ref 5–8)
PLATELET # BLD AUTO: 227 K/UL — SIGNIFICANT CHANGE UP (ref 150–400)
PO2 BLDV: 39 MMHG — SIGNIFICANT CHANGE UP (ref 25–45)
POTASSIUM BLDV-SCNC: 3.7 MMOL/L — SIGNIFICANT CHANGE UP (ref 3.5–5.1)
POTASSIUM SERPL-MCNC: 3.7 MMOL/L — SIGNIFICANT CHANGE UP (ref 3.5–5.3)
POTASSIUM SERPL-SCNC: 3.7 MMOL/L — SIGNIFICANT CHANGE UP (ref 3.5–5.3)
PROT SERPL-MCNC: 7 G/DL — SIGNIFICANT CHANGE UP (ref 6–8.3)
PROT UR-MCNC: NEGATIVE — SIGNIFICANT CHANGE UP
PROTHROM AB SERPL-ACNC: 10.6 SEC — SIGNIFICANT CHANGE UP (ref 10.5–13.4)
RBC # BLD: 5.11 M/UL — SIGNIFICANT CHANGE UP (ref 4.2–5.8)
RBC # FLD: 14 % — SIGNIFICANT CHANGE UP (ref 10.3–14.5)
RBC CASTS # UR COMP ASSIST: 1 /HPF — SIGNIFICANT CHANGE UP (ref 0–4)
RSV RNA NPH QL NAA+NON-PROBE: SIGNIFICANT CHANGE UP
SAO2 % BLDV: 72.4 % — SIGNIFICANT CHANGE UP (ref 67–88)
SARS-COV-2 RNA SPEC QL NAA+PROBE: SIGNIFICANT CHANGE UP
SODIUM SERPL-SCNC: 142 MMOL/L — SIGNIFICANT CHANGE UP (ref 135–145)
SP GR SPEC: 1.01 — SIGNIFICANT CHANGE UP (ref 1.01–1.02)
TROPONIN T, HIGH SENSITIVITY RESULT: 14 NG/L — SIGNIFICANT CHANGE UP (ref 0–51)
TROPONIN T, HIGH SENSITIVITY RESULT: 19 NG/L — SIGNIFICANT CHANGE UP (ref 0–51)
TSH SERPL-MCNC: 2.15 UIU/ML — SIGNIFICANT CHANGE UP (ref 0.27–4.2)
UROBILINOGEN FLD QL: NEGATIVE — SIGNIFICANT CHANGE UP
WBC # BLD: 10.38 K/UL — SIGNIFICANT CHANGE UP (ref 3.8–10.5)
WBC # FLD AUTO: 10.38 K/UL — SIGNIFICANT CHANGE UP (ref 3.8–10.5)
WBC UR QL: 11 /HPF — HIGH (ref 0–5)

## 2023-03-19 PROCEDURE — 71275 CT ANGIOGRAPHY CHEST: CPT | Mod: 26,MA

## 2023-03-19 PROCEDURE — 71046 X-RAY EXAM CHEST 2 VIEWS: CPT | Mod: 26

## 2023-03-19 PROCEDURE — 99285 EMERGENCY DEPT VISIT HI MDM: CPT | Mod: CS

## 2023-03-19 PROCEDURE — 74177 CT ABD & PELVIS W/CONTRAST: CPT | Mod: 26,MA

## 2023-03-19 RX ORDER — FUROSEMIDE 40 MG
60 TABLET ORAL DAILY
Refills: 0 | Status: DISCONTINUED | OUTPATIENT
Start: 2023-03-19 | End: 2023-03-20

## 2023-03-19 RX ORDER — KETOROLAC TROMETHAMINE 30 MG/ML
30 SYRINGE (ML) INJECTION ONCE
Refills: 0 | Status: DISCONTINUED | OUTPATIENT
Start: 2023-03-19 | End: 2023-03-19

## 2023-03-19 RX ORDER — MORPHINE SULFATE 50 MG/1
4 CAPSULE, EXTENDED RELEASE ORAL ONCE
Refills: 0 | Status: DISCONTINUED | OUTPATIENT
Start: 2023-03-19 | End: 2023-03-19

## 2023-03-19 RX ORDER — CEFTRIAXONE 500 MG/1
1000 INJECTION, POWDER, FOR SOLUTION INTRAMUSCULAR; INTRAVENOUS ONCE
Refills: 0 | Status: COMPLETED | OUTPATIENT
Start: 2023-03-19 | End: 2023-03-19

## 2023-03-19 RX ADMIN — CEFTRIAXONE 100 MILLIGRAM(S): 500 INJECTION, POWDER, FOR SOLUTION INTRAMUSCULAR; INTRAVENOUS at 22:19

## 2023-03-19 RX ADMIN — Medication 30 MILLIGRAM(S): at 20:32

## 2023-03-19 RX ADMIN — Medication 60 MILLIGRAM(S): at 18:37

## 2023-03-19 RX ADMIN — MORPHINE SULFATE 4 MILLIGRAM(S): 50 CAPSULE, EXTENDED RELEASE ORAL at 22:19

## 2023-03-19 NOTE — ED PROVIDER NOTE - PROGRESS NOTE DETAILS
Jossie Chau, PGY1, MD: Reviewed all results, no evidence of congestive heart failure or ACS based on EKG, chest x-ray, CT chest, troponins, BNP.  No evidence of pulmonary embolus on CTA chest.  Patient with incidental finding of left renal mass, this was discussed with patient.  Patient still feels dyspneic with chest pressure,  concerning for anginal equivalent.  Will admit for stress test and echo and further work-up of this left renal mass.  Concern for possible malignancy.

## 2023-03-19 NOTE — ED PROVIDER NOTE - PHYSICAL EXAMINATION
GENERAL: appears dyspneic, non-toxic appearing  HEAD: normocephalic, atraumatic  HEENT: normal conjunctiva, oral mucosa moist, no JVD  CARDIAC: regular rate and rhythm, no appreciable murmurs, 2+ pulses in UE/LE b/l  PULM: normal breath sounds, clear to ascultation bilaterally, slightly decreased in the bases, no rales, rhonchi, wheezing  GI: obese abdomen, abdomen nondistended, soft, nontender, no guarding, rebound tenderness  : no CVA tenderness b/l   NEURO:  AAOx3  MSK: trace peripheral edema, no calf tenderness b/l  SKIN: well-perfused, extremities warm, no visible rashes  PSYCH: appropriate mood and affect

## 2023-03-19 NOTE — ED PROVIDER NOTE - CLINICAL SUMMARY MEDICAL DECISION MAKING FREE TEXT BOX
67-year-old male history of triple bypass surgery, hypertension, hyperlipidemia, diabetes, CHF on 2.5 mg of Bumex twice daily presenting for evaluation of chest pressure onset last night associated with shortness of breath, dyspnea on exertion, 6 pound weight gain in the last 24 hours, and 1 episode of anuria after bumex last night.  Patient also reports that he has 2 kidney stones and has associated left back pain from his kidney stones.  Patient is afebrile, hemodynamically stable with visible dyspnea, decreased breath sounds at the bases, trace peripheral edema, no significant CVA tenderness, no abdominal tenderness, no visible JVD at this time.    Concern for acute CHF exacerbation secondary to possible ACS given chest pressure versus infection versus metabolic abnormality versus increased sodium intake versus increased need for higher doses of medication per.  Concern for possible BRIELLE in the setting of kidney stone and Bumex.  We will get congestive heart failure work-up, give 60 of Lasix, get UA and culture, get chest x-ray.  Anticipate patient may need to be admitted for further management. 67-year-old male morbidly obese , history of triple bypass surgery, hypertension, hyperlipidemia, diabetes, CHF on 2.5 mg of Bumex twice daily presenting for evaluation of chest pressure onset last night associated with shortness of breath, dyspnea on exertion, 6 pound weight gain in the last 24 hours, and 1 episode of anuria after bumex last night.  Patient also reports that he has 2 kidney stones and has associated left back pain from his kidney stones.  Patient is afebrile, hemodynamically stable with visible dyspnea, decreased breath sounds at the bases, trace peripheral edema, no significant CVA tenderness, no abdominal tenderness, no visible JVD at this time.    Concern for acute CHF exacerbation secondary to possible ACS given chest pressure versus infection versus metabolic abnormality versus increased sodium intake versus increased need for higher doses of medication per.  Concern for possible BRIELLE in the setting of kidney stone and Bumex.  We will get congestive heart failure work-up, give 60 of Lasix, get UA and culture, get chest x-ray. low suspicious for PE ,but if chest xray is neg will obtain CTA,   Anticipate patient may need to be admitted for further management. will check ua for hematuria to r/o stones ZR

## 2023-03-19 NOTE — ED ADULT TRIAGE NOTE - PAIN: PRESENCE, MLM
complains of pain/discomfort Pt c/o paranoid delusions in the context of people watching him; pt expressed frustration towards ineffective medication regime

## 2023-03-19 NOTE — ED ADULT NURSE REASSESSMENT NOTE - NS ED NURSE REASSESS COMMENT FT1
Report received from AURELIA Dow. Patient c/o pain to mid upper back. Will notify provider for pain medication. Patient pending CT scan. Repeat troponin drawn and sent to lab.

## 2023-03-19 NOTE — ED PROVIDER NOTE - CARE PLAN
1 Principal Discharge DX:	Dyspnea  Secondary Diagnosis:	Acute UTI  Secondary Diagnosis:	Chest pain  Secondary Diagnosis:	Left renal mass

## 2023-03-19 NOTE — ED ADULT TRIAGE NOTE - CHIEF COMPLAINT QUOTE
chest pressure, L back pain (hx renal stone), decreased urinary output after taking 3 doses of diuretic, hx open heart surg 2017 - started on diuretic, noticed 5 lb wt gain

## 2023-03-19 NOTE — ED ADULT NURSE NOTE - ED STAT RN HANDOFF DETAILS
Received report on pt. Waiting MD reeval and dispo Received report on pt. waiting for inpt bed assignment

## 2023-03-19 NOTE — ED PROVIDER NOTE - OBJECTIVE STATEMENT
67-year-old male history of triple bypass surgery, hypertension, hyperlipidemia, diabetes, CHF on 2.5 mg of Bumex twice daily, on ASA, presenting for evaluation of chest pressure onset last night associated with shortness of breath, dyspnea on exertion, 6 pound weight gain in the last 24 hours, and 1 episode of anuria after bumex last night.  Patient also reports that he has 2 kidney stones and has associated left back pain from his kidney stones.  Patient states that normally he urinates massive amounts after Bumex, last night he took his Bumex as directed and did not have any urination.  Patient took his Bumex today at 3 PM with good urine output, no difficulty with urine output, no dysuria, no hematuria.  Patient denies any fevers, chills, nausea, vomiting, abdominal pain, diarrhea.

## 2023-03-20 DIAGNOSIS — G47.33 OBSTRUCTIVE SLEEP APNEA (ADULT) (PEDIATRIC): ICD-10-CM

## 2023-03-20 DIAGNOSIS — I25.10 ATHEROSCLEROTIC HEART DISEASE OF NATIVE CORONARY ARTERY WITHOUT ANGINA PECTORIS: ICD-10-CM

## 2023-03-20 DIAGNOSIS — I50.9 HEART FAILURE, UNSPECIFIED: ICD-10-CM

## 2023-03-20 DIAGNOSIS — R34 ANURIA AND OLIGURIA: ICD-10-CM

## 2023-03-20 DIAGNOSIS — R07.89 OTHER CHEST PAIN: ICD-10-CM

## 2023-03-20 DIAGNOSIS — Z00.00 ENCOUNTER FOR GENERAL ADULT MEDICAL EXAMINATION WITHOUT ABNORMAL FINDINGS: ICD-10-CM

## 2023-03-20 DIAGNOSIS — N28.89 OTHER SPECIFIED DISORDERS OF KIDNEY AND URETER: ICD-10-CM

## 2023-03-20 DIAGNOSIS — R06.02 SHORTNESS OF BREATH: ICD-10-CM

## 2023-03-20 DIAGNOSIS — Z98.890 OTHER SPECIFIED POSTPROCEDURAL STATES: ICD-10-CM

## 2023-03-20 DIAGNOSIS — E11.9 TYPE 2 DIABETES MELLITUS WITHOUT COMPLICATIONS: ICD-10-CM

## 2023-03-20 DIAGNOSIS — I10 ESSENTIAL (PRIMARY) HYPERTENSION: ICD-10-CM

## 2023-03-20 DIAGNOSIS — N20.0 CALCULUS OF KIDNEY: ICD-10-CM

## 2023-03-20 LAB
GLUCOSE BLDC GLUCOMTR-MCNC: 114 MG/DL — HIGH (ref 70–99)
GLUCOSE BLDC GLUCOMTR-MCNC: 119 MG/DL — HIGH (ref 70–99)
GLUCOSE BLDC GLUCOMTR-MCNC: 120 MG/DL — HIGH (ref 70–99)
GLUCOSE BLDC GLUCOMTR-MCNC: 122 MG/DL — HIGH (ref 70–99)

## 2023-03-20 PROCEDURE — 99223 1ST HOSP IP/OBS HIGH 75: CPT

## 2023-03-20 PROCEDURE — 76770 US EXAM ABDO BACK WALL COMP: CPT | Mod: 26

## 2023-03-20 RX ORDER — DEXTROSE 50 % IN WATER 50 %
25 SYRINGE (ML) INTRAVENOUS ONCE
Refills: 0 | Status: DISCONTINUED | OUTPATIENT
Start: 2023-03-20 | End: 2023-03-25

## 2023-03-20 RX ORDER — ENOXAPARIN SODIUM 100 MG/ML
40 INJECTION SUBCUTANEOUS EVERY 24 HOURS
Refills: 0 | Status: DISCONTINUED | OUTPATIENT
Start: 2023-03-20 | End: 2023-03-25

## 2023-03-20 RX ORDER — OXYCODONE HYDROCHLORIDE 5 MG/1
5 TABLET ORAL EVERY 4 HOURS
Refills: 0 | Status: DISCONTINUED | OUTPATIENT
Start: 2023-03-20 | End: 2023-03-25

## 2023-03-20 RX ORDER — SODIUM CHLORIDE 9 MG/ML
1000 INJECTION, SOLUTION INTRAVENOUS
Refills: 0 | Status: DISCONTINUED | OUTPATIENT
Start: 2023-03-20 | End: 2023-03-25

## 2023-03-20 RX ORDER — CEFTRIAXONE 500 MG/1
1000 INJECTION, POWDER, FOR SOLUTION INTRAMUSCULAR; INTRAVENOUS EVERY 24 HOURS
Refills: 0 | Status: COMPLETED | OUTPATIENT
Start: 2023-03-20 | End: 2023-03-21

## 2023-03-20 RX ORDER — INSULIN LISPRO 100/ML
VIAL (ML) SUBCUTANEOUS AT BEDTIME
Refills: 0 | Status: DISCONTINUED | OUTPATIENT
Start: 2023-03-20 | End: 2023-03-25

## 2023-03-20 RX ORDER — ATORVASTATIN CALCIUM 80 MG/1
40 TABLET, FILM COATED ORAL AT BEDTIME
Refills: 0 | Status: DISCONTINUED | OUTPATIENT
Start: 2023-03-20 | End: 2023-03-25

## 2023-03-20 RX ORDER — SENNA PLUS 8.6 MG/1
2 TABLET ORAL AT BEDTIME
Refills: 0 | Status: DISCONTINUED | OUTPATIENT
Start: 2023-03-20 | End: 2023-03-22

## 2023-03-20 RX ORDER — ALLOPURINOL 300 MG
100 TABLET ORAL AT BEDTIME
Refills: 0 | Status: DISCONTINUED | OUTPATIENT
Start: 2023-03-20 | End: 2023-03-25

## 2023-03-20 RX ORDER — CARVEDILOL PHOSPHATE 80 MG/1
12.5 CAPSULE, EXTENDED RELEASE ORAL EVERY 12 HOURS
Refills: 0 | Status: DISCONTINUED | OUTPATIENT
Start: 2023-03-20 | End: 2023-03-25

## 2023-03-20 RX ORDER — GLUCAGON INJECTION, SOLUTION 0.5 MG/.1ML
1 INJECTION, SOLUTION SUBCUTANEOUS ONCE
Refills: 0 | Status: DISCONTINUED | OUTPATIENT
Start: 2023-03-20 | End: 2023-03-25

## 2023-03-20 RX ORDER — DEXTROSE 50 % IN WATER 50 %
12.5 SYRINGE (ML) INTRAVENOUS ONCE
Refills: 0 | Status: DISCONTINUED | OUTPATIENT
Start: 2023-03-20 | End: 2023-03-25

## 2023-03-20 RX ORDER — INSULIN LISPRO 100/ML
VIAL (ML) SUBCUTANEOUS
Refills: 0 | Status: DISCONTINUED | OUTPATIENT
Start: 2023-03-20 | End: 2023-03-25

## 2023-03-20 RX ORDER — AMLODIPINE BESYLATE 2.5 MG/1
10 TABLET ORAL DAILY
Refills: 0 | Status: DISCONTINUED | OUTPATIENT
Start: 2023-03-20 | End: 2023-03-25

## 2023-03-20 RX ORDER — GABAPENTIN 400 MG/1
300 CAPSULE ORAL
Refills: 0 | Status: DISCONTINUED | OUTPATIENT
Start: 2023-03-20 | End: 2023-03-25

## 2023-03-20 RX ORDER — LANOLIN ALCOHOL/MO/W.PET/CERES
3 CREAM (GRAM) TOPICAL AT BEDTIME
Refills: 0 | Status: DISCONTINUED | OUTPATIENT
Start: 2023-03-20 | End: 2023-03-25

## 2023-03-20 RX ORDER — DEXTROSE 50 % IN WATER 50 %
15 SYRINGE (ML) INTRAVENOUS ONCE
Refills: 0 | Status: DISCONTINUED | OUTPATIENT
Start: 2023-03-20 | End: 2023-03-25

## 2023-03-20 RX ORDER — TAMSULOSIN HYDROCHLORIDE 0.4 MG/1
0.4 CAPSULE ORAL AT BEDTIME
Refills: 0 | Status: DISCONTINUED | OUTPATIENT
Start: 2023-03-20 | End: 2023-03-25

## 2023-03-20 RX ORDER — ACETAMINOPHEN 500 MG
650 TABLET ORAL EVERY 6 HOURS
Refills: 0 | Status: DISCONTINUED | OUTPATIENT
Start: 2023-03-20 | End: 2023-03-25

## 2023-03-20 RX ORDER — LOSARTAN POTASSIUM 100 MG/1
100 TABLET, FILM COATED ORAL DAILY
Refills: 0 | Status: DISCONTINUED | OUTPATIENT
Start: 2023-03-20 | End: 2023-03-25

## 2023-03-20 RX ORDER — ASPIRIN/CALCIUM CARB/MAGNESIUM 324 MG
81 TABLET ORAL DAILY
Refills: 0 | Status: DISCONTINUED | OUTPATIENT
Start: 2023-03-20 | End: 2023-03-25

## 2023-03-20 RX ORDER — OXYCODONE HYDROCHLORIDE 5 MG/1
2.5 TABLET ORAL EVERY 4 HOURS
Refills: 0 | Status: DISCONTINUED | OUTPATIENT
Start: 2023-03-20 | End: 2023-03-25

## 2023-03-20 RX ORDER — ACETAMINOPHEN 500 MG
650 TABLET ORAL EVERY 6 HOURS
Refills: 0 | Status: DISCONTINUED | OUTPATIENT
Start: 2023-03-20 | End: 2023-03-20

## 2023-03-20 RX ORDER — POLYETHYLENE GLYCOL 3350 17 G/17G
17 POWDER, FOR SOLUTION ORAL AT BEDTIME
Refills: 0 | Status: DISCONTINUED | OUTPATIENT
Start: 2023-03-20 | End: 2023-03-25

## 2023-03-20 RX ORDER — BUMETANIDE 0.25 MG/ML
2 INJECTION INTRAMUSCULAR; INTRAVENOUS
Refills: 0 | Status: DISCONTINUED | OUTPATIENT
Start: 2023-03-20 | End: 2023-03-23

## 2023-03-20 RX ADMIN — Medication 1 TABLET(S): at 11:21

## 2023-03-20 RX ADMIN — CARVEDILOL PHOSPHATE 12.5 MILLIGRAM(S): 80 CAPSULE, EXTENDED RELEASE ORAL at 17:09

## 2023-03-20 RX ADMIN — OXYCODONE HYDROCHLORIDE 5 MILLIGRAM(S): 5 TABLET ORAL at 13:44

## 2023-03-20 RX ADMIN — CEFTRIAXONE 100 MILLIGRAM(S): 500 INJECTION, POWDER, FOR SOLUTION INTRAMUSCULAR; INTRAVENOUS at 22:22

## 2023-03-20 RX ADMIN — Medication 650 MILLIGRAM(S): at 17:05

## 2023-03-20 RX ADMIN — BUMETANIDE 2 MILLIGRAM(S): 0.25 INJECTION INTRAMUSCULAR; INTRAVENOUS at 06:25

## 2023-03-20 RX ADMIN — AMLODIPINE BESYLATE 10 MILLIGRAM(S): 2.5 TABLET ORAL at 06:25

## 2023-03-20 RX ADMIN — GABAPENTIN 300 MILLIGRAM(S): 400 CAPSULE ORAL at 11:20

## 2023-03-20 RX ADMIN — Medication 0: at 07:31

## 2023-03-20 RX ADMIN — TAMSULOSIN HYDROCHLORIDE 0.4 MILLIGRAM(S): 0.4 CAPSULE ORAL at 22:13

## 2023-03-20 RX ADMIN — Medication 81 MILLIGRAM(S): at 11:20

## 2023-03-20 RX ADMIN — ENOXAPARIN SODIUM 40 MILLIGRAM(S): 100 INJECTION SUBCUTANEOUS at 06:26

## 2023-03-20 RX ADMIN — OXYCODONE HYDROCHLORIDE 5 MILLIGRAM(S): 5 TABLET ORAL at 16:36

## 2023-03-20 RX ADMIN — GABAPENTIN 300 MILLIGRAM(S): 400 CAPSULE ORAL at 17:04

## 2023-03-20 RX ADMIN — BUMETANIDE 2 MILLIGRAM(S): 0.25 INJECTION INTRAMUSCULAR; INTRAVENOUS at 17:08

## 2023-03-20 RX ADMIN — Medication 650 MILLIGRAM(S): at 09:36

## 2023-03-20 RX ADMIN — LOSARTAN POTASSIUM 100 MILLIGRAM(S): 100 TABLET, FILM COATED ORAL at 06:25

## 2023-03-20 RX ADMIN — Medication 650 MILLIGRAM(S): at 07:50

## 2023-03-20 RX ADMIN — GABAPENTIN 300 MILLIGRAM(S): 400 CAPSULE ORAL at 23:09

## 2023-03-20 RX ADMIN — Medication 100 MILLIGRAM(S): at 22:13

## 2023-03-20 RX ADMIN — OXYCODONE HYDROCHLORIDE 2.5 MILLIGRAM(S): 5 TABLET ORAL at 17:04

## 2023-03-20 RX ADMIN — ATORVASTATIN CALCIUM 40 MILLIGRAM(S): 80 TABLET, FILM COATED ORAL at 22:13

## 2023-03-20 RX ADMIN — CARVEDILOL PHOSPHATE 12.5 MILLIGRAM(S): 80 CAPSULE, EXTENDED RELEASE ORAL at 06:25

## 2023-03-20 RX ADMIN — GABAPENTIN 300 MILLIGRAM(S): 400 CAPSULE ORAL at 06:25

## 2023-03-20 NOTE — H&P ADULT - ATTENDING COMMENTS
The patient is a 60y Female complaining of dizziness.
67 y M PW: worsening SOB w chest pressure, decreased UOP despite bumex    On arrival to ED VS: Afebrile, HR: 74, BP: 126/74, saturating 96% on RA   Significant labs: trop x 2 negative, pro-, UA+ moderate leuk esterase and WBC 11   Imaging:  CTPE and AP: No evidence pulmonary embolism.3.2 x 2.2 cm indeterminate exophytic lesion arising from lower pole right kidney coronal 4-62 which appears slightly larger compared to prior exam. Presumed neoplastic etiology until proven otherwise.   CXR personally reviewed: no acute infiltrates, congestion, no pleural effusions  EKG personally reviewed: NSR, LAD, no ST elevation, similar to prior     Admitted for further management     #HFpEF exacerbation   Patient w more pedal edema than baseline R below knee> L above shin . Patient w UOp+ after IV lasix   -Check TTE, strict Is/Os, daily weights   -Assess volume status in AM, will order bumex IV 2 mg BID for now, HF consult    Resume home meds  Rest as above

## 2023-03-20 NOTE — PROGRESS NOTE ADULT - PROBLEM SELECTOR PLAN 4
SBPs 112-126  c/w home carvedilol, losartan, amlodipine, SBPs appropriate  c/w home carvedilol, losartan, amlodipine

## 2023-03-20 NOTE — PROGRESS NOTE ADULT - PROBLEM SELECTOR PLAN 3
Pt w/ ASIYA, at home on CPAP  Does not know settings   -> Will attempt CPAP w/ setting of 5   -> Encouraged pt to obtain settings Pt w/ ASIYA, at home on CPAP  Does not know settings   -> Will attempt CPAP w/ setting of 5

## 2023-03-20 NOTE — PHYSICAL THERAPY INITIAL EVALUATION ADULT - BALANCE DISTURBANCE, IDENTIFIED IMPAIRMENT CONTRIBUTE, REHAB EVAL
Dec endurance/impaired motor control/abnormal muscle tone/pain/impaired postural control/decreased sensation/impaired sensory feedback/decreased strength

## 2023-03-20 NOTE — ED ADULT NURSE REASSESSMENT NOTE - NS ED NURSE REASSESS COMMENT FT1
patient provided with hospital bed, placed in position of comfort. Call bell within reach. Pending bed assignment

## 2023-03-20 NOTE — H&P ADULT - NSHPREVIEWOFSYSTEMS_GEN_ALL_CORE
REVIEW OF SYSTEMS:    CONSTITUTIONAL: No weakness, fevers or chills  EYES/ENT: No visual changes;  No vertigo or throat pain   NECK: No pain or stiffness  RESPIRATORY: + slight cough w/ clear sputum, chest tightness, SOB, No wheezing, hemoptysis;    CARDIOVASCULAR: + chest tightness, orthopnea, 6 pound weight gain,   GASTROINTESTINAL: No abdominal or epigastric pain. No nausea, vomiting, or hematemesis; No diarrhea or constipation. No melena or hematochezia.  GENITOURINARY: + Oliguria. No dysuria, frequency or hematuria  NEUROLOGICAL: No numbness or weakness  SKIN: No itching, rashes

## 2023-03-20 NOTE — PROGRESS NOTE ADULT - PROBLEM SELECTOR PROBLEM 9
For Your Well-Being      Preparing the Skin Before Surgery: Chlorhexidine Soap      Preparing the skin before surgery decreases the risk of infection. The steps below outline what you need to do.  Do not shave any body parts (legs, underarms, or genitals) for at least 2 days before surgery. Men may shave their faces.  Remove all jewelry the night before your surgery.  Remove all makeup, including lipstick and all nail polish (fingers and toes).  Follow protocol A or B below, as indicated by your doctor:  [x]  A. Use product first on:9-26-22           (the night before surgery)           Use produce next on: 9-27-22             (the morning of surgery)  []  B. Use product first on:             (48 hours before surgery)            Use product next on:              (24 hours before surgery)  If desired, shower or bathe at least 1 hour before using this special soap.  Use only freshly laundered towels and bed sheets, put on freshly laundered clothes after showering.  Do not apply any lotion, powder, perfume, makeup, nail polish, hair gel, hairspray or deodorant after bathing.  Breast-feeding:  Wash nipples thoroughly with water before breast-feeding.  INSTRUCTIONS:    1. Wet your body and hair with warm water.    2.  Wash your hair and face as you regularly do. Rinse thoroughly. These steps are done first so that the Chlorhexidine soap isn't washed off by your shampoo or face wash.    3.  Turn the water off.    4.  Apply the Chlorhexidine soap to your entire body from your chin down using a clean washcloth or sponges.    5.  Avoid contact with your eyes, ears, and mouth.    6.  Be sure to include under your armpits, behind your knees, between skin folds, under fingernails, and your groin area (Where legs meet the body). The soap will not bubble or lather very much, and that is fine.     7.   Leave soap on your body for 3 minutes.    8.   Rinse thoroughly with warm water. Do not wash with any other soap or  cleanser.    9.   Dry your body with a clean, freshly laundered towel.    10.  Put on freshly laundered clothes that are comfortable and loose fitting.       Examples of Chlorhexidine 4% soap include:    1) Chlorhexidine 4% Sponge packets - Use directly from the package. You do not need to add water. Use the soft side of the sponge only. Use 2-4 sponges per shower.     2) Chlorhexidine 4% - 4 oz bottle - use 2  oz or 1/2 oz of the bottle per shower.    Kidney stone

## 2023-03-20 NOTE — PROGRESS NOTE ADULT - PROBLEM SELECTOR PLAN 7
CT findings with 3.2 x 2.2 cm indeterminate exophytic lesion arising from lower pole right   kidney coronal 4-62 which appears slightly larger compared to prior exam, suspicious for malignancy.  - Urology consult in AM for further mass workup

## 2023-03-20 NOTE — PROGRESS NOTE ADULT - SUBJECTIVE AND OBJECTIVE BOX
PROGRESS NOTE:   Authored by Chris Patel    Patient is a 67y old  Male who presents with a chief complaint of Shortness of breath (20 Mar 2023 07:22)      SUBJECTIVE / OVERNIGHT EVENTS:    ADDITIONAL REVIEW OF SYSTEMS:    MEDICATIONS  (STANDING):  allopurinol 100 milliGRAM(s) Oral at bedtime  amLODIPine   Tablet 10 milliGRAM(s) Oral daily  aspirin enteric coated 81 milliGRAM(s) Oral daily  atorvastatin 40 milliGRAM(s) Oral at bedtime  buMETAnide Injectable 2 milliGRAM(s) IV Push two times a day  carvedilol 12.5 milliGRAM(s) Oral every 12 hours  cefTRIAXone   IVPB 1000 milliGRAM(s) IV Intermittent every 24 hours  dextrose 5%. 1000 milliLiter(s) (100 mL/Hr) IV Continuous <Continuous>  dextrose 5%. 1000 milliLiter(s) (50 mL/Hr) IV Continuous <Continuous>  dextrose 50% Injectable 25 Gram(s) IV Push once  dextrose 50% Injectable 12.5 Gram(s) IV Push once  dextrose 50% Injectable 25 Gram(s) IV Push once  enoxaparin Injectable 40 milliGRAM(s) SubCutaneous every 24 hours  gabapentin 300 milliGRAM(s) Oral four times a day  glucagon  Injectable 1 milliGRAM(s) IntraMuscular once  insulin lispro (ADMELOG) corrective regimen sliding scale   SubCutaneous three times a day before meals  insulin lispro (ADMELOG) corrective regimen sliding scale   SubCutaneous at bedtime  losartan 100 milliGRAM(s) Oral daily  multivitamin 1 Tablet(s) Oral daily  tamsulosin 0.4 milliGRAM(s) Oral at bedtime    MEDICATIONS  (PRN):  acetaminophen     Tablet .. 650 milliGRAM(s) Oral every 6 hours PRN Temp greater or equal to 38C (100.4F), Mild Pain (1 - 3)  dextrose Oral Gel 15 Gram(s) Oral once PRN Blood Glucose LESS THAN 70 milliGRAM(s)/deciliter  melatonin 3 milliGRAM(s) Oral at bedtime PRN Insomnia  oxycodone    5 mG/acetaminophen 325 mG 1 Tablet(s) Oral every 6 hours PRN Severe Pain (7 - 10)      CAPILLARY BLOOD GLUCOSE      POCT Blood Glucose.: 120 mg/dL (20 Mar 2023 07:25)    I&O's Summary      PHYSICAL EXAM:  Vital Signs Last 24 Hrs  T(C): 36.8 (20 Mar 2023 06:25), Max: 36.8 (20 Mar 2023 06:25)  T(F): 98.3 (20 Mar 2023 06:25), Max: 98.3 (20 Mar 2023 06:25)  HR: 62 (20 Mar 2023 06:25) (60 - 90)  BP: 100/72 (20 Mar 2023 06:25) (100/72 - 135/76)  BP(mean): --  RR: 18 (20 Mar 2023 06:25) (18 - 22)  SpO2: 98% (20 Mar 2023 06:25) (96% - 100%)    Parameters below as of 20 Mar 2023 06:25  Patient On (Oxygen Delivery Method): room air      GENERAL: NAD, lying comfortably in bed  HEAD: Atraumatic, normocephalic  EYES: EOMI b/l PERRLA b/l, conjunctiva and sclera clear  NECK: Supple, No JVD, No LAD  RESPIRATORY: Normal respiratory effort; lungs are clear to auscultation bilaterally  CARDIOVASCULAR: Regular rate and rhythm, normal S1 and S2, no murmur/rub/gallop; No lower extremity edema; Peripheral pulses are 2+ bilaterally  ABDOMEN: Nontender to palpation, normoactive bowel sounds, no rebound/guarding; No hepatosplenomegaly  MUSCLOSKELETAL: no clubbing or cyanosis of digits; no joint swelling or tenderness to palpation  NEURO: Non focal   PSYCH: A+O to person, place, and time; affect appropriate    LABS:                        15.3   10.38 )-----------( 227      ( 19 Mar 2023 17:55 )             44.7     03-    142  |  101  |  21  ----------------------------<  120<H>  3.7   |  27  |  0.65    Ca    9.4      19 Mar 2023 17:55    TPro  7.0  /  Alb  4.3  /  TBili  0.3  /  DBili  x   /  AST  17  /  ALT  19  /  AlkPhos  100  03-19    PT/INR - ( 19 Mar 2023 17:55 )   PT: 10.6 sec;   INR: 0.91 ratio         PTT - ( 19 Mar 2023 17:55 )  PTT:24.8 sec      Urinalysis Basic - ( 19 Mar 2023 17:56 )    Color: Colorless / Appearance: Clear / S.010 / pH: x  Gluc: x / Ketone: Negative  / Bili: Negative / Urobili: Negative   Blood: x / Protein: Negative / Nitrite: Negative   Leuk Esterase: Moderate / RBC: 1 /hpf / WBC 11 /HPF   Sq Epi: x / Non Sq Epi: 0 /hpf / Bacteria: Few          Tele Reviewed:    RADIOLOGY & ADDITIONAL TESTS:  Results Reviewed:   Imaging Personally Reviewed:  Electrocardiogram Personally Reviewed:    COORDINATION OF CARE:  Care Discussed with Consultants/Other Providers [Y/N]:  Prior or Outpatient Records Reviewed [Y/N]:   PROGRESS NOTE:   Authored by Chris Patel    Patient is a 67y old  Male who presents with a chief complaint of Shortness of breath (20 Mar 2023 07:22)      SUBJECTIVE / OVERNIGHT EVENTS:  JAM  67-year-old current smoker M w significant PMH CAD s/p CABG, CHF on 2 mg of Bumex twice daily presenting for worsening shortness of breath since Saturday evening.  S    PT recommends sub acute rehab.  O  T(F): 98.3 (20 Mar 2023 06:25), Max: 98.3 (20 Mar 2023 06:25)  HR: 62 (60 - 90)  BP: 100/72 (100/72 - 135/76)  RR: 18 (18 - 22)  SpO2: 98% RA (96% - 100%)  PE  CBC and CMP unremarkable with exception of elevated pro BNP at 315. Venous bicrab high 31 and CO2 32.  U/A moderate leukocyte esterase. 11WBC, Few bacteria.  RVP negative  CT Angio Chest and AbPelvis: No evidence pulmonary embolism.3.2 x 2.2 cm indeterminate exophytic lesion arising from lower pole right kidney coronal 4-62 which appears slightly larger compared to prior exam. Presumed neoplastic etiology until proven otherwise.   CXR: no acute infiltrates, congestion, no pleural effusions  EKG (month old): NSR, LAD, no ST elevation, similar to prior   A  67-year-old male w hx HTN, HLD, DM, Gout, carpal tunnel, CAD s/p CABG,  CHF on 2 mg of Bumex twice daily, ASIYA on CPAP, presenting for chest pressure since Saturday evening associated with shortness of breath, worsening dyspnea on exertion, worsening orthopnea, 6 pound weight gain in the last 24 hours, slight cough w/ clear sputum, and oliguria, admitted for further cardiac evaluation. Most likely diagnosis is CHF exacerbation. Other differentials to consider ACS angina but less likley with unremarkable EKG.  P  Problem 1: Shortness of breath.   Plan: Acute worsening SOB with substernal chest tightness, worsening orthopnea, 6 pound weight gain, slight cough w. clear sputum production concerning for acute decompensated HF vs angina vs diuretic resistance. Less likely acute ischemic process vs PNA.   - Prior TTE , very suboptimal  - MUGA scan 3/2021 w/ normal resting LV EF of 57 % and normal right and left ventricular wall motion  - CTA Chest in ED w/o significant findings   - Pro-, no troponin elevation or ischemic EKG changes  -> Ordered TTE  -> Daily weights, strict I/O  -> c/w bumex 2 IV BID for now  -> Heart failure consult in AM.     Problem/Plan - 2:  ·  Problem: Decreased urination.   ·  Plan: Pt w/ reported decreased urination w/o dysuria, hematuria.   CT A/P w/o evidence of obstruction   UA weakly positive w/ 11WBC and few bacteria  S/p ceftriaxone in ED   -> Serial bladder scans  -> c/w ceftriaxone   -> obtain bladder and renal US  -> start flomax  -> f/u UCx.     Problem/Plan - 3:  ·  Problem: Obstructive sleep apnea.   ·  Plan: Pt w/ ASIYA, at home on CPAP  Does not know settings   -> Will attempt CPAP w/ setting of 5   -> Encouraged pt to obtain settings.     Problem/Plan - 4:  ·  Problem: Hypertension.   ·  Plan: SBPs 112-126  c/w home carvedilol, losartan, amlodipine,     Problem/Plan - 5:  ·  Problem: Type 2 diabetes mellitus.   ·  Plan: c/w neuropathy. A1c 7.2 2023  - hold home glimepiride   - ISS premeal and qhs  - c/w gabapentin.     Problem/Plan - 6:  ·  Problem: CAD (coronary artery disease).   ·  Plan: Pt w/ CAD s/p CABG   c/w ASA, amlodipine, coreg, losartan,     Problem/Plan - 7:  ·  Problem: Renal mass.   ·  Plan: CT findings with 3.2 x 2.2 cm indeterminate exophytic lesion arising from lower pole right   kidney coronal 4-62 which appears slightly larger compared to prior exam, suspicious for malignancy.  - Urology consult in AM for further mass workup.     Problem/Plan - 8:  ·  Problem: S/P carpal tunnel release.   ·  Plan: s/p carpal tunnel release on 3/17  - recovering appropriately   - c/w percocet PRN for severe pain.     Problem/Plan - 9:  ·  Problem: Kidney stone.   ·  Plan: Pt w/ prior hx of nephrolithiasis with recurrent symptoms of L flank/back pain.  CT w/ small non-obstructing stone  Monitor for symptom improvement.     Problem/Plan - 10:  ·  Problem: Healthcare maintenance.   ·  Plan; Gout prevention: C/w allopurinol   C/w home multivitamins  Diet: consistent carb  DVT: lovenox   Dispo: TBD.    ADDITIONAL REVIEW OF SYSTEMS:    MEDICATIONS  (STANDING):  allopurinol 100 milliGRAM(s) Oral at bedtime  amLODIPine   Tablet 10 milliGRAM(s) Oral daily  aspirin enteric coated 81 milliGRAM(s) Oral daily  atorvastatin 40 milliGRAM(s) Oral at bedtime  buMETAnide Injectable 2 milliGRAM(s) IV Push two times a day  carvedilol 12.5 milliGRAM(s) Oral every 12 hours  cefTRIAXone   IVPB 1000 milliGRAM(s) IV Intermittent every 24 hours  dextrose 5%. 1000 milliLiter(s) (100 mL/Hr) IV Continuous <Continuous>  dextrose 5%. 1000 milliLiter(s) (50 mL/Hr) IV Continuous <Continuous>  dextrose 50% Injectable 25 Gram(s) IV Push once  dextrose 50% Injectable 12.5 Gram(s) IV Push once  dextrose 50% Injectable 25 Gram(s) IV Push once  enoxaparin Injectable 40 milliGRAM(s) SubCutaneous every 24 hours  gabapentin 300 milliGRAM(s) Oral four times a day  glucagon  Injectable 1 milliGRAM(s) IntraMuscular once  insulin lispro (ADMELOG) corrective regimen sliding scale   SubCutaneous three times a day before meals  insulin lispro (ADMELOG) corrective regimen sliding scale   SubCutaneous at bedtime  losartan 100 milliGRAM(s) Oral daily  multivitamin 1 Tablet(s) Oral daily  tamsulosin 0.4 milliGRAM(s) Oral at bedtime    MEDICATIONS  (PRN):  acetaminophen     Tablet .. 650 milliGRAM(s) Oral every 6 hours PRN Temp greater or equal to 38C (100.4F), Mild Pain (1 - 3)  dextrose Oral Gel 15 Gram(s) Oral once PRN Blood Glucose LESS THAN 70 milliGRAM(s)/deciliter  melatonin 3 milliGRAM(s) Oral at bedtime PRN Insomnia  oxycodone    5 mG/acetaminophen 325 mG 1 Tablet(s) Oral every 6 hours PRN Severe Pain (7 - 10)      CAPILLARY BLOOD GLUCOSE      POCT Blood Glucose.: 120 mg/dL (20 Mar 2023 07:25)    I&O's Summary      PHYSICAL EXAM:  Vital Signs Last 24 Hrs  T(C): 36.8 (20 Mar 2023 06:25), Max: 36.8 (20 Mar 2023 06:25)  T(F): 98.3 (20 Mar 2023 06:25), Max: 98.3 (20 Mar 2023 06:25)  HR: 62 (20 Mar 2023 06:25) (60 - 90)  BP: 100/72 (20 Mar 2023 06:25) (100/72 - 135/76)  BP(mean): --  RR: 18 (20 Mar 2023 06:25) (18 - 22)  SpO2: 98% (20 Mar 2023 06:25) (96% - 100%)    Parameters below as of 20 Mar 2023 06:25  Patient On (Oxygen Delivery Method): room air      GENERAL: NAD, lying comfortably in bed  HEAD: Atraumatic, normocephalic  EYES: EOMI b/l PERRLA b/l, conjunctiva and sclera clear  NECK: Supple, No JVD, No LAD  RESPIRATORY: Normal respiratory effort; lungs are clear to auscultation bilaterally  CARDIOVASCULAR: Regular rate and rhythm, normal S1 and S2, no murmur/rub/gallop; No lower extremity edema; Peripheral pulses are 2+ bilaterally  ABDOMEN: Nontender to palpation, normoactive bowel sounds, no rebound/guarding; No hepatosplenomegaly  MUSCLOSKELETAL: no clubbing or cyanosis of digits; no joint swelling or tenderness to palpation  NEURO: Non focal   PSYCH: A+O to person, place, and time; affect appropriate    LABS:                        15.3   10.38 )-----------( 227      ( 19 Mar 2023 17:55 )             44.7     03-    142  |  101  |  21  ----------------------------<  120<H>  3.7   |  27  |  0.65    Ca    9.4      19 Mar 2023 17:55    TPro  7.0  /  Alb  4.3  /  TBili  0.3  /  DBili  x   /  AST  17  /  ALT  19  /  AlkPhos  100  03-19    PT/INR - ( 19 Mar 2023 17:55 )   PT: 10.6 sec;   INR: 0.91 ratio         PTT - ( 19 Mar 2023 17:55 )  PTT:24.8 sec      Urinalysis Basic - ( 19 Mar 2023 17:56 )    Color: Colorless / Appearance: Clear / S.010 / pH: x  Gluc: x / Ketone: Negative  / Bili: Negative / Urobili: Negative   Blood: x / Protein: Negative / Nitrite: Negative   Leuk Esterase: Moderate / RBC: 1 /hpf / WBC 11 /HPF   Sq Epi: x / Non Sq Epi: 0 /hpf / Bacteria: Few          Tele Reviewed:    RADIOLOGY & ADDITIONAL TESTS:  Results Reviewed:   Imaging Personally Reviewed:  Electrocardiogram Personally Reviewed:    COORDINATION OF CARE:  Care Discussed with Consultants/Other Providers [Y/N]:  Prior or Outpatient Records Reviewed [Y/N]:   PROGRESS NOTE:   Authored by Chris Patel    Patient is a 67y old  Male who presents with a chief complaint of Shortness of breath (20 Mar 2023 07:22)      SUBJECTIVE / OVERNIGHT EVENTS:  Over the weekend had decreased urine output that is now in the morning more improved. Bumex dose was not changed, but did have percocet and ibuprofen added post carpal tunnel syndrome surgery on Friday where he was under general anesthesia.  Laying supine worsens shortness of breath and chest pressure not pain.  PT recommends sub acute rehab.    MEDICATIONS  (STANDING):  allopurinol 100 milliGRAM(s) Oral at bedtime  amLODIPine   Tablet 10 milliGRAM(s) Oral daily  aspirin enteric coated 81 milliGRAM(s) Oral daily  atorvastatin 40 milliGRAM(s) Oral at bedtime  buMETAnide Injectable 2 milliGRAM(s) IV Push two times a day  carvedilol 12.5 milliGRAM(s) Oral every 12 hours  cefTRIAXone   IVPB 1000 milliGRAM(s) IV Intermittent every 24 hours  dextrose 5%. 1000 milliLiter(s) (100 mL/Hr) IV Continuous <Continuous>  dextrose 5%. 1000 milliLiter(s) (50 mL/Hr) IV Continuous <Continuous>  dextrose 50% Injectable 25 Gram(s) IV Push once  dextrose 50% Injectable 12.5 Gram(s) IV Push once  dextrose 50% Injectable 25 Gram(s) IV Push once  enoxaparin Injectable 40 milliGRAM(s) SubCutaneous every 24 hours  gabapentin 300 milliGRAM(s) Oral four times a day  glucagon  Injectable 1 milliGRAM(s) IntraMuscular once  insulin lispro (ADMELOG) corrective regimen sliding scale   SubCutaneous three times a day before meals  insulin lispro (ADMELOG) corrective regimen sliding scale   SubCutaneous at bedtime  losartan 100 milliGRAM(s) Oral daily  multivitamin 1 Tablet(s) Oral daily  tamsulosin 0.4 milliGRAM(s) Oral at bedtime    MEDICATIONS  (PRN):  acetaminophen     Tablet .. 650 milliGRAM(s) Oral every 6 hours PRN Temp greater or equal to 38C (100.4F), Mild Pain (1 - 3)  dextrose Oral Gel 15 Gram(s) Oral once PRN Blood Glucose LESS THAN 70 milliGRAM(s)/deciliter  melatonin 3 milliGRAM(s) Oral at bedtime PRN Insomnia  oxycodone    5 mG/acetaminophen 325 mG 1 Tablet(s) Oral every 6 hours PRN Severe Pain (7 - 10)      CAPILLARY BLOOD GLUCOSE      POCT Blood Glucose.: 120 mg/dL (20 Mar 2023 07:25)    I&O's Summary      PHYSICAL EXAM:  Vital Signs Last 24 Hrs  T(C): 36.8 (20 Mar 2023 06:25), Max: 36.8 (20 Mar 2023 06:25)  T(F): 98.3 (20 Mar 2023 06:25), Max: 98.3 (20 Mar 2023 06:25)  HR: 62 (20 Mar 2023 06:25) (60 - 90)  BP: 100/72 (20 Mar 2023 06:25) (100/72 - 135/76)  BP(mean): --  RR: 18 (20 Mar 2023 06:25) (18 - 22)  SpO2: 98% (20 Mar 2023 06:25) (96% - 100%)    Parameters below as of 20 Mar 2023 06:25  Patient On (Oxygen Delivery Method): room air      GENERAL: NAD, lying comfortably in bed. Obese man breathing heavy.  HEAD: Atraumatic, normocephalic  EYES: EOMI b/l, conjunctiva and sclera clear  NECK: Supple. Difficult to assess JVD  RESPIRATORY: Normal respiratory effort but is breathing heavy; lungs with crackles in the bases b/l  CARDIOVASCULAR: Difficult to assess cardiac ausculatation; extremity 2+ pitting edema up to knee  ABDOMEN: Soft, Nontender to palpation, normoactive bowel sounds, no rebound/guarding  MUSCLOSKELETAL: no clubbing or cyanosis of digits; no joint swelling or tenderness to palpation  NEURO: Non focal   PSYCH: A+O to person, place, and time; affect appropriate  SKIN: Chest scar from previous CABG    LABS:                        15.3   10.38 )-----------( 227      ( 19 Mar 2023 17:55 )             44.7     03-19    142  |  101  |  21  ----------------------------<  120<H>  3.7   |  27  |  0.65    Ca    9.4      19 Mar 2023 17:55    TPro  7.0  /  Alb  4.3  /  TBili  0.3  /  DBili  x   /  AST  17  /  ALT  19  /  AlkPhos  100  03-19    PT/INR - ( 19 Mar 2023 17:55 )   PT: 10.6 sec;   INR: 0.91 ratio         PTT - ( 19 Mar 2023 17:55 )  PTT:24.8 sec      Urinalysis Basic - ( 19 Mar 2023 17:56 )    Color: Colorless / Appearance: Clear / S.010 / pH: x  Gluc: x / Ketone: Negative  / Bili: Negative / Urobili: Negative   Blood: x / Protein: Negative / Nitrite: Negative   Leuk Esterase: Moderate / RBC: 1 /hpf / WBC 11 /HPF   Sq Epi: x / Non Sq Epi: 0 /hpf / Bacteria: Few    RVP negative  CT Angio Chest and AbPelvis: No evidence pulmonary embolism.3.2 x 2.2 cm indeterminate exophytic lesion arising from lower pole right kidney coronal 4-62 which appears slightly larger compared to prior exam. Presumed neoplastic etiology until proven otherwise.   CXR: no acute infiltrates, congestion, no pleural effusions  EKG (month old): NSR, LAD, no ST elevation, similar to prior

## 2023-03-20 NOTE — PHYSICAL THERAPY INITIAL EVALUATION ADULT - PERTINENT HX OF CURRENT PROBLEM, REHAB EVAL
pt is a 67-year-old male w hx HTN, HLD, DM, Gout, carpal tunnel, CAD s/p CABG, CHF on 2 mg of Bumex twice daily, ASIYA on CPAP, presenting for worsening shortness of breath since Saturday evening associated with substernal chest tightness, worsening dyspnea on exertion, worsening orthopnea, 6 pound weight gain in the last 24 hours, slight cough w. clear sputum,  and oliguria.  Patient also reports that he had 2 kidney stones in the past, and currently has left flank pain similar to prior kidney stone.  Patient states that normally he urinates 5-6 times after Bumex dose, but Saturday afternoon, did not have any urination after taking his 3PM Bumex. On Sunday, pt had good urine output after both bumex doses, but still reduced from known amount of output. Pt denies hx of BPH or prior urinary retention, no dysuria, no hematuria. Of note, pt had left carpal tunnel release and trigger release surgery on March 17. Patient denies any F/C/N/V, abd pain, constipation, diarrhea. Pt currently smokes 2-3 cigars per day. CT Abd/pelvis/angio chest: No evidence pulmonary embolism. 3.2 x 2.2 cm indeterminate exophytic lesion arising from lower pole right kidney coronal 4-62 which appears slightly larger compared to prior exam.  Presumed neoplastic etiology until proven otherwise. Further evaluation and follow-up required.

## 2023-03-20 NOTE — PHYSICAL THERAPY INITIAL EVALUATION ADULT - IMPAIRMENTS CONTRIBUTING TO GAIT DEVIATIONS, PT EVAL
Dec endurance/impaired balance/impaired motor control/abnormal muscle tone/narrow base of support/pain/impaired postural control/decreased sensation/impaired sensory feedback/decreased strength

## 2023-03-20 NOTE — CONSULT NOTE ADULT - NS ATTEND AMEND GEN_ALL_CORE FT
Seen and examined  note edited  small renal mass - he prefers surgery given that his wife had kidney cancer and perished quickly from disease recurrence  discussed his habitus and comorbidities put him at risk of morbidity from radical surgery  will expidite outpatient f/u

## 2023-03-20 NOTE — H&P ADULT - NSHPPHYSICALEXAM_GEN_ALL_CORE
VITALS:   T(C): 36.7 (03-19-23 @ 20:14), Max: 36.7 (03-19-23 @ 20:14)  HR: 72 (03-19-23 @ 22:18) (72 - 90)  BP: 112/79 (03-19-23 @ 22:18) (112/79 - 126/90)  RR: 19 (03-19-23 @ 22:18) (19 - 22)  SpO2: 100% (03-19-23 @ 22:18) (96% - 100%)    GENERAL: NAD, sitting up in bed  HEAD:  Atraumatic, Normocephalic  EYES: EOMI, PERRLA, conjunctiva and sclera clear  ENT: Moist mucous membranes  NECK: Supple, Difficult to evaluate JVD due to body habitus   CHEST/LUNG: Clear to auscultation bilaterally; No rales, rhonchi, wheezing, or rubs. Unlabored respirations  BACK: + CVA tenderness L side  HEART: Regular rate and rhythm; No murmurs, rubs, or gallops  ABDOMEN: BSx4; Soft, nontender, nondistended  EXTREMITIES:  2+ pitting edema L>R  NERVOUS SYSTEM:  A&Ox3, no focal deficits   Psych: Normal speech, normal behavior, normal affect

## 2023-03-20 NOTE — PROGRESS NOTE ADULT - PROBLEM SELECTOR PLAN 6
Pt w/ CAD s/p CABG   c/w ASA, amlodipine, coreg, losartan, Pt w/ CAD s/p CABG   c/w ASA, amlodipine, coreg, losartan

## 2023-03-20 NOTE — PROGRESS NOTE ADULT - PROBLEM SELECTOR PLAN 1
Acute worsening SOB with substernal chest tightness, worsening orthopnea, 6 pound weight gain, slight cough w. clear sputum production concerning for acute decompensated HF vs angina vs diuretic resistance. Less likely acute ischemic process vs PNA.   - Prior TTE 2021, very suboptimal  - MUGA scan 3/2021 w/ normal resting LV EF of 57 % and normal right and left ventricular wall motion  - CTA Chest in ED w/o significant findings   - Pro-, no troponin elevation or ischemic EKG changes  -> Ordered TTE  -> Daily weights, strict I/O  -> c/w bumex 2 IV BID for now  -> Heart failure consult in AM Acute worsening SOB with substernal chest tightness, worsening orthopnea, 6 pound weight gain, slight cough w. clear sputum production concerning for acute decompensated HF vs angina vs diuretic resistance. Less likely acute ischemic process vs PNA.   - Prior TTE 2021, very suboptimal  - MUGA scan 3/2021 w/ normal resting LV EF of 57 % and normal right and left ventricular wall motion  - CTA Chest in ED w/o significant findings   - Pro-, no troponin elevation or ischemic EKG changes but is obese BMI  -> Ordered TTE and ECG  -> Daily weights, strict I/O, VBG  -> c/w bumex 2 IV BID for now  -> Heart failure consult in AM  -> switch percocet to oxycodone and tylenol

## 2023-03-20 NOTE — PHYSICAL THERAPY INITIAL EVALUATION ADULT - PLANNED THERAPY INTERVENTIONS, PT EVAL
Stairs: GOAL: Pt will ascend/desend 6 stairs with HR (I) to return to PLOF in 2 weeks./gait training/strengthening

## 2023-03-20 NOTE — PATIENT PROFILE ADULT - FALL HARM RISK - HARM RISK INTERVENTIONS

## 2023-03-20 NOTE — PROGRESS NOTE ADULT - PROBLEM SELECTOR PLAN 7
CT findings with 3.2 x 2.2 cm indeterminate exophytic lesion arising from lower pole right   kidney coronal 4-62 which appears slightly larger compared to prior exam, suspicious for malignancy.  - Urology consult in AM for further mass workup CT findings with 3.2 x 2.2 cm indeterminate exophytic lesion arising from lower pole right kidney coronal 4-62 which appears slightly larger compared to prior exam, suspicious for malignancy. Renal US findings consistent w/ RCC.  - findings discussed w/ patient  - urology consulted for further workup/intervention i.e. surgical removal of mass  - heme/onc consult if pathology obtained c/w malignancy

## 2023-03-20 NOTE — H&P ADULT - HISTORY OF PRESENT ILLNESS
67-year-old male w hx HTN, HLD, DM, Gout, carpal tunnel, CAD s/p CABG,  CHF on 2 mg of Bumex twice daily, ASIYA on CPAP, presenting for worsening shortness of breath since Saturday evening associated with substernal chest tightness, worsening dyspnea on exertion, worsening orthopnea, 6 pound weight gain in the last 24 hours, slight cough w. clear sputum,  and oliguria.  Patient also reports that he had 2 kidney stones in the past, and currently has left flank pain similar to prior kidney stone.  Patient states that normally he urinates 5-6 times after Bumex dose, but afternoon, did not have any urination after taking his 3PM Bumex. On Sunday, pt had good urine output after both bumex doses, but still reduced from known amount of output. Pt denies hx of BPH or prior urinary retention, no dysuria, no hematuria. Of note, pt had left carpal tunnel release and trigger release surgery on March 17. Patient denies any F/C/N/V, abd pain, constipation, diarrhea. Pt currently smokes 2-3 cigars per day.     s/p lasix 60 in ED 67-year-old male w hx HTN, HLD, DM, Gout, carpal tunnel, CAD s/p CABG, CHF on 2 mg of Bumex twice daily, ASIYA on CPAP, presenting for worsening shortness of breath since Saturday evening associated with substernal chest tightness, worsening dyspnea on exertion, worsening orthopnea, 6 pound weight gain in the last 24 hours, slight cough w. clear sputum,  and oliguria.  Patient also reports that he had 2 kidney stones in the past, and currently has left flank pain similar to prior kidney stone.  Patient states that normally he urinates 5-6 times after Bumex dose, but Saturday afternoon, did not have any urination after taking his 3PM Bumex. On Sunday, pt had good urine output after both bumex doses, but still reduced from known amount of output. Pt denies hx of BPH or prior urinary retention, no dysuria, no hematuria. Of note, pt had left carpal tunnel release and trigger release surgery on March 17. Patient denies any F/C/N/V, abd pain, constipation, diarrhea. Pt currently smokes 2-3 cigars per day.     s/p lasix 60 in ED

## 2023-03-20 NOTE — PROGRESS NOTE ADULT - PROBLEM SELECTOR PLAN 8
s/p carpal tunnel release on 3/17  - recovering appropriately   - c/w percocet PRN for severe pain s/p carpal tunnel release on 3/17  - recovering appropriately   - oxycodone for severe pain and tylenol for mild pain

## 2023-03-20 NOTE — PROGRESS NOTE ADULT - PROBLEM SELECTOR PLAN 2
Pt w/ reported decreased urination w/o dysuria, hematuria.   CT A/P w/o evidence of obstruction   UA weakly positive w/ 11WBC and few bacteria  S/p ceftriaxone in ED   -> Serial bladder scans  -> c/w ceftriaxone   -> obtain bladder and renal US  -> start flomax  -> f/u UCx Pt w/ reported decreased urination w/o dysuria, hematuria. Improving.  CT A/P w/o evidence of obstruction   UA weakly positive w/ 11WBC and few bacteria  Renal US w/o hydronephrosis or obstruction  S/p ceftriaxone in ED  - f/u UCx  - c/w ceftriaxone  - start flomax

## 2023-03-20 NOTE — H&P ADULT - PROBLEM SELECTOR PLAN 7
CT findings with 3.2 x 2.2 cm indeterminate exophytic lesion arising from lower pole right   kidney coronal 4-62 which appears slightly larger compared to prior exam, suspicious for malignancy.  - Further workup pending improvement and stabilization of above symptoms CT findings with 3.2 x 2.2 cm indeterminate exophytic lesion arising from lower pole right   kidney coronal 4-62 which appears slightly larger compared to prior exam, suspicious for malignancy.  - Urology consult in AM for further mass workup

## 2023-03-20 NOTE — ED ADULT NURSE REASSESSMENT NOTE - INV PAIN INTERVENTIONS-NUMBER SCALE
pt took tylenol as not due for percocet at present time due to order time frame/single medication modality

## 2023-03-20 NOTE — PROGRESS NOTE ADULT - PROBLEM SELECTOR PLAN 8
s/p carpal tunnel release on 3/17  - recovering appropriately   - c/w percocet PRN for severe pain s/p carpal tunnel release on 3/17  - recovering appropriately   - oxy 2.5/5mg Q4H PRN for moderate/severe pain; hold home percocet

## 2023-03-20 NOTE — PROGRESS NOTE ADULT - ATTENDING COMMENTS
67M with PMH CAD s/p CABG, presumed dCHF on bumex, HTN, HLD, DM, gout, carpal tunnel, ASIYA on CPAP, p/w SOB with associated CP, orthopnea and weight gain after undergoing carpal tunnel release, a/w acute on chronic diastolic HF. Found on imaging to have R rneal exophytic mass most c/f malignancy.     #acute on chronic diastolic HF   #UTI  #renal mass   #CAD s/p CABG  #HTN  #DM     - suspect pt may have becomes overloaded after surgical process in the postanesthesia phase with decreased bladder activity  - c/w bumex 2 mg IV BID - consider extra dosing this evening if UOP not adequate. check TTE, strict I/Os, daily weights    - UA positive, c/w CTX, f/u Ucx   - CT showing exophytic renal mass, confirmed on US with c/f renal cell ca - will consult urology and IR for possible bx   - c/w ASA, statin  - c/w BB and ARB, c/w norvasc

## 2023-03-20 NOTE — PHYSICAL THERAPY INITIAL EVALUATION ADULT - ACTIVE RANGE OF MOTION EXAMINATION, REHAB EVAL
LUE not assessed due to NWB status/Right UE Active ROM was WNL (within normal limits)/bilateral  lower extremity Active ROM was WFL (within functional limits)

## 2023-03-20 NOTE — H&P ADULT - PROBLEM SELECTOR PLAN 10
Gout prevention: C/w allopurinol   C/w home multivitamins  Diet: consistent carb  DVT: lovenox   Dispo: TBD

## 2023-03-20 NOTE — PROGRESS NOTE ADULT - PROBLEM SELECTOR PLAN 5
c/w neuropathy. A1c 7.2 02/2023  - hold home glimepiride   - ISS premeal and qhs  - c/w gabapentin Patient endorses neuropathy in lower extremities. A1c 7.2 02/2023  - hold home glimepiride   - ISS premeal and qhs  - c/w gabapentin

## 2023-03-20 NOTE — H&P ADULT - PROBLEM SELECTOR PLAN 2
Pt w/ reported decreased urination w/o dysuria, hematuria.   CT A/P w/o evidence of obstruction   UA weakly positive w/ 11WBC and few bacteria  S/p ceftriaxone in ED   -> Attempt bladder scan or straight cath to evaluate urinary retention  -> c/w ceftriaxone   -> obtain bladder and renal US  -> start flomax  -> f/u UCx Pt w/ reported decreased urination w/o dysuria, hematuria.   CT A/P w/o evidence of obstruction   UA weakly positive w/ 11WBC and few bacteria  S/p ceftriaxone in ED   -> Serial bladder scans  -> c/w ceftriaxone   -> obtain bladder and renal US  -> start flomax  -> f/u UCx

## 2023-03-20 NOTE — PROGRESS NOTE ADULT - PROBLEM SELECTOR PLAN 6
Pt w/ CAD s/p CABG   c/w ASA, amlodipine, coreg, losartan, Pt w/ CAD s/p CABG   c/w ASA, amlodipine, coreg, losartan  -> Lipid panel, HbA1c

## 2023-03-20 NOTE — PROGRESS NOTE ADULT - PROBLEM SELECTOR PLAN 1
Acute worsening SOB with substernal chest tightness, worsening orthopnea, 6 pound weight gain, slight cough w. clear sputum production concerning for acute decompensated HF vs angina vs diuretic resistance. Less likely acute ischemic process vs PNA.   - Prior TTE 2021, very suboptimal  - MUGA scan 3/2021 w/ normal resting LV EF of 57 % and normal right and left ventricular wall motion  - CTA Chest in ED w/o significant findings   - Pro-, no troponin elevation or ischemic EKG changes  -> Ordered TTE  -> Daily weights, strict I/O  -> c/w bumex 2 IV BID for now  -> Heart failure consult in AM Acute worsening SOB with substernal chest tightness, worsening orthopnea, 6 pound weight gain, slight cough w. clear sputum production concerning for acute decompensated HF vs angina vs diuretic resistance. Of note recently underwent procedure (fluid retention may be postanesthesia effects), also started percocet which has a Class C interaction w/ diuretics (may reduce efficacy). Less likely acute ischemic process vs PNA.   - Prior TTE 2021, very suboptimal  - MUGA scan 3/2021 w/ normal resting LV EF of 57 % and normal right and left ventricular wall motion  - CTA Chest in ED w/o significant findings   - Pro-, no troponin elevation or ischemic EKG changes  - f/u TTE  - c/w bumex 2 IV BID for now  - Daily weights, strict I/O

## 2023-03-20 NOTE — H&P ADULT - NSHPLABSRESULTS_GEN_ALL_CORE
LABS:                        15.3   10.38 )-----------( 227      ( 19 Mar 2023 17:55 )             44.7     -    142  |  101  |  21  ----------------------------<  120<H>  3.7   |  27  |  0.65    Ca    9.4      19 Mar 2023 17:55    TPro  7.0  /  Alb  4.3  /  TBili  0.3  /  DBili  x   /  AST  17  /  ALT  19  /  AlkPhos  100  -    PT/INR - ( 19 Mar 2023 17:55 )   PT: 10.6 sec;   INR: 0.91 ratio         PTT - ( 19 Mar 2023 17:55 )  PTT:24.8 sec      Urinalysis Basic - ( 19 Mar 2023 17:56 )    Color: Colorless / Appearance: Clear / S.010 / pH: x  Gluc: x / Ketone: Negative  / Bili: Negative / Urobili: Negative   Blood: x / Protein: Negative / Nitrite: Negative   Leuk Esterase: Moderate / RBC: 1 /hpf / WBC 11 /HPF   Sq Epi: x / Non Sq Epi: 0 /hpf / Bacteria: Few      CTA Chest + CT A+P:   IMPRESSION:  No evidence pulmonary embolism.    3.2 x 2.2 cm indeterminate exophytic lesion arising from lower pole right   kidney coronal 4-62 which appears slightly larger compared to prior exam.   Presumed neoplastic etiology until proven otherwise. Further evaluation   and follow-up required.    Please refer to detailed findings otherwise described above.    EKG w/ NSR, no concern for acute ischemic changes

## 2023-03-20 NOTE — PROVIDER CONTACT NOTE (OTHER) - ASSESSMENT
pt a&ox3, VSS. asymptomatic. pt just told by MD about possible cancer diagnosis. pt anxious d/t this. HR now NSR 60's, other VSS.

## 2023-03-20 NOTE — PATIENT PROFILE ADULT - NSPROGENBLOODRESTRICT_GEN_A_NUR
Ventricular Rate : 84  Atrial Rate : 84  P-R Interval : 144  QRS Duration : 80  Q-T Interval : 350  QTC Calculation(Bazett) : 413  P Axis : 20  R Axis : 4  T Axis : 18  Diagnosis : Normal sinus rhythm  Normal ECG  When compared with ECG of 25-FEB-2020 22:35,  No significant change was found  Confirmed by ALCON RANKIN ([facility])YANETH ([id number]) on 3/9/2020 11:48:44 AM   none

## 2023-03-20 NOTE — PROGRESS NOTE ADULT - PROBLEM SELECTOR PLAN 10
Gout prevention: C/w allopurinol   C/w home multivitamins  Diet: consistent carb  DVT: lovenox   Dispo: TBD Gout prevention: C/w allopurinol   C/w home multivitamins  Diet: consistent carb  DVT: lovenox   Dispo: continue mgmt on floor    Code Status: FULL CODE

## 2023-03-20 NOTE — PROGRESS NOTE ADULT - ASSESSMENT
67-year-old male w hx HTN, HLD, DM, Gout, carpal tunnel, CAD s/p CABG,  CHF on 2 mg of Bumex twice daily, ASIYA on CPAP, presenting for chest pressure since Saturday evening associated with shortness of breath, worsening dyspnea on exertion, worsening orthopnea, 6 pound weight gain in the last 24 hours, slight cough w. clear sputum, and oliguria, admitted for further cardiac evaluation.  67-year-old male w hx HTN, HLD, DM, Gout, carpal tunnel, CAD s/p CABG,  CHF on 2 mg of Bumex twice daily, ASIYA on CPAP, presenting for chest pressure since Saturday evening associated with shortness of breath, worsening dyspnea on exertion, worsening orthopnea, 6 pound weight gain in the last 24 hours, slight cough w. clear sputum, and oliguria, admitted for further cardiac evaluation. Most likely diagnosis is CHF exacerbation diastolic dysfunction dilation due to diuresis resistance post surgical anesthesia. Other differentials to consider ACS angina but less likley with dyspnea and pressure presentation as opposed to pain.

## 2023-03-20 NOTE — PROGRESS NOTE ADULT - SUBJECTIVE AND OBJECTIVE BOX
Internal Medicine   Jose Albrecht | PGY-1    OVERNIGHT EVENTS: No acute overnight events.    SUBJECTIVE: Notes increasing urge to urinate compared to Saturday. Not short of breath at rest. Ongoing orthopnea. Endorses chest "tightness", no linda chest pain. Is concerned about renal mass on CT as he notes his wife passed away of renal liposarcoma.     MEDICATIONS  (STANDING):  allopurinol 100 milliGRAM(s) Oral at bedtime  amLODIPine   Tablet 10 milliGRAM(s) Oral daily  aspirin enteric coated 81 milliGRAM(s) Oral daily  atorvastatin 40 milliGRAM(s) Oral at bedtime  buMETAnide Injectable 2 milliGRAM(s) IV Push two times a day  carvedilol 12.5 milliGRAM(s) Oral every 12 hours  cefTRIAXone   IVPB 1000 milliGRAM(s) IV Intermittent every 24 hours  dextrose 5%. 1000 milliLiter(s) (100 mL/Hr) IV Continuous <Continuous>  dextrose 5%. 1000 milliLiter(s) (50 mL/Hr) IV Continuous <Continuous>  dextrose 50% Injectable 25 Gram(s) IV Push once  dextrose 50% Injectable 12.5 Gram(s) IV Push once  dextrose 50% Injectable 25 Gram(s) IV Push once  enoxaparin Injectable 40 milliGRAM(s) SubCutaneous every 24 hours  gabapentin 300 milliGRAM(s) Oral four times a day  glucagon  Injectable 1 milliGRAM(s) IntraMuscular once  insulin lispro (ADMELOG) corrective regimen sliding scale   SubCutaneous three times a day before meals  insulin lispro (ADMELOG) corrective regimen sliding scale   SubCutaneous at bedtime  losartan 100 milliGRAM(s) Oral daily  multivitamin 1 Tablet(s) Oral daily  polyethylene glycol 3350 17 Gram(s) Oral at bedtime  senna 2 Tablet(s) Oral at bedtime  tamsulosin 0.4 milliGRAM(s) Oral at bedtime    MEDICATIONS  (PRN):  acetaminophen     Tablet .. 650 milliGRAM(s) Oral every 6 hours PRN Temp greater or equal to 38C (100.4F), Mild Pain (1 - 3)  dextrose Oral Gel 15 Gram(s) Oral once PRN Blood Glucose LESS THAN 70 milliGRAM(s)/deciliter  melatonin 3 milliGRAM(s) Oral at bedtime PRN Insomnia  oxyCODONE    IR 2.5 milliGRAM(s) Oral every 4 hours PRN Moderate Pain (4 - 6)  oxyCODONE    IR 5 milliGRAM(s) Oral every 4 hours PRN Severe Pain (7 - 10)    VITALS:  T(F): 97.6 (03-20-23 @ 19:40), Max: 98.3 (03-20-23 @ 06:25)  HR: 64 (03-20-23 @ 19:40) (60 - 72)  BP: 127/77 (03-20-23 @ 19:40) (100/72 - 144/95)  BP(mean): --  RR: 18 (03-20-23 @ 19:40) (17 - 20)  SpO2: 95% (03-20-23 @ 19:40) (95% - 100%)    PHYSICAL EXAM:   GENERAL: NAD, lying in bed comfortably, large habitus  HEAD: Atraumatic, normocephalic  EYES: EOMI, conjunctiva and sclera clear, no nystagmus noted  ENT: Moist mucous membranes  CHEST/LUNG: Clear to auscultation bilaterally though auscultation limited due to habitus; no rales, rhonchi, wheezing, or rubs; unlabored respirations  HEART: Regular rate and rhythm; no murmurs, rubs, or gallops, normal S1/S2  ABDOMEN: Normal bowel sounds; soft, nontender, nondistended  EXTREMITIES: 1+/2+ pitting edema in bilateral legs; no clubbing or cyanosis  MSK: No gross deformities noted   NEURO: No focal deficits   SKIN: No rashes or lesions  PSYCH: Normal mood, affect     LABS:                      15.3   10.38 )-----------( 227      ( 19 Mar 2023 17:55 )             44.7     03-19  142  |  101  |  21  ----------------------------<  120<H>  3.7   |  27  |  0.65    Ca    9.4      19 Mar 2023 17:55    TPro  7.0  /  Alb  4.3  /  TBili  0.3  /  DBili  x   /  AST  17  /  ALT  19  /  AlkPhos  100  03-19    PT/INR - ( 19 Mar 2023 17:55 )   PT: 10.6 sec;   INR: 0.91 ratio    PTT - ( 19 Mar 2023 17:55 )  PTT:24.8 sec    Creatinine Trend: 0.65<--    I&O's Summary  20 Mar 2023 07:01  -  20 Mar 2023 20:59  --------------------------------------------------------  IN: 350 mL / OUT: 300 mL / NET: 50 mL    RADIOLOGY:  < from: US Kidney and Bladder (03.20.23 @ 13:14) >  IMPRESSION:  Right kidney lower pole 2.8 cm solid mass, corresponding to finding on   prior CT.  Findings are most consistent with renal cell carcinoma.    Nonobstructing bilateral renal stones, no hydronephrosis.  < end of copied text >

## 2023-03-20 NOTE — H&P ADULT - ASSESSMENT
67-year-old male w hx HTN, HLD, DM, Gout, carpal tunnel, CAD s/p CABG,  CHF on 2 mg of Bumex twice daily, ASIYA on CPAP, presenting for chest pressure since Saturday evening associated with shortness of breath, worsening dyspnea on exertion, worsening orthopnea, 6 pound weight gain in the last 24 hours, slight cough w. clear sputum, and oliguria, admitted for further cardiac evaluation.

## 2023-03-20 NOTE — PROGRESS NOTE ADULT - PROBLEM SELECTOR PLAN 2
Pt w/ reported decreased urination w/o dysuria, hematuria.   CT A/P w/o evidence of obstruction   UA weakly positive w/ 11WBC and few bacteria  S/p ceftriaxone in ED   -> Serial bladder scans  -> c/w ceftriaxone   -> obtain bladder and renal US  -> start flomax  -> f/u UCx

## 2023-03-20 NOTE — H&P ADULT - NSHPSOCIALHISTORY_GEN_ALL_CORE
Pt lives alone, uses cane to ambulate. Has 3 sons, one of whom lives across the street and helps provide care. Smokes 3 cigars daily.

## 2023-03-20 NOTE — PHYSICAL THERAPY INITIAL EVALUATION ADULT - NSPTDISCHREC_GEN_A_CORE
PT recommending [facility], If pt d/c home would require home PT, assist with ALL mobility, & DME: shower chair + grab bars, owns RW and SAC, currently staying on 1st floor, 3 steps to enter, sleeping in electric recliner on 1st floor/[facility]acute Rehab

## 2023-03-20 NOTE — CONSULT NOTE ADULT - ASSESSMENT
67-year-old male w hx HTN, HLD, DM, Gout, carpal tunnel, CAD s/p CABG, CHF on 2 mg of Bumex twice daily, ASIYA on CPA admitted for worsening shortness of breath concerning for acute decompensated HF vs angina vs diuretic resistance. Incidentally found to have ~3cm R lower pole solid renal mass on CT and US c/f renal carcinoma.  consulted.     Recs:  management of medical and cardiac issues  needs optimization for procedure  will likely not perform partial R nephrectomy during this hospitalization, but if able to be optimized for procedure inpatient may help expedite procedure date 67-year-old male w hx HTN, HLD, DM, Gout, carpal tunnel, CAD s/p CABG, CHF on 2 mg of Bumex twice daily, ASIYA on CPA admitted for worsening shortness of breath concerning for acute decompensated HF vs angina vs diuretic resistance. Incidentally found to have ~3cm R lower pole solid renal mass on CT and US c/f renal carcinoma.  consulted.     Recs:  Management of medical and cardiac issues  Discussed multiple options for small renal mass - observation vs biopsy vs cryo vs removal  His wife had kidney cancer s/p surgery, and perished of disease - he prefers to be aggressive  Discussed procedure likely not possible as inpatient but will arrange for expedited outpatient follow-up  Patient's wife had surgery with Dr Miranda - would consider re-referral to Dr Miranda

## 2023-03-20 NOTE — H&P ADULT - PROBLEM SELECTOR PLAN 9
Pt w/ prior hx of nephrolithiasis with recurrent symptoms of L flank/back pain.  CT w/ small non-obstructing stone  Monitor for symptom improvement

## 2023-03-20 NOTE — H&P ADULT - PROBLEM SELECTOR PLAN 3
Pt w/ ASIYA, at home on CPAP  Does not know settings   -> Will attempt CPAP w/ setting of 5   -> Encouraged pt to obtain settings

## 2023-03-20 NOTE — CONSULT NOTE ADULT - SUBJECTIVE AND OBJECTIVE BOX
HPI: 67-year-old male w hx HTN, HLD, DM, Gout, carpal tunnel, CAD s/p CABG, CHF on 2 mg of Bumex twice daily, ASIYA on CPA admitted for worsening shortness of breath concerning for acute decompensated HF vs angina vs diuretic resistance. Incidentally found to have ~3cm R lower pole solid renal mass on CT and US c/f renal carcinoma.  consulted. Pt seen and examined with attending Dr. Isma Gunn. Pt reports that he has seen a urologist in the past for stones, but has not been actively following up with urologist now. Has history of microscopic hematuria, but denies gross hematuria. Voiding without difficulty. + tobacco use history for ~40yrs, 2-3 cigars per day. Patient denies any F/C/N/V, abd pain, constipation, diarrhea or other acute complaints. Pt would opt to have renal lesion removed ASAP.    PAST MEDICAL & SURGICAL HISTORY:  Gout  Lumbar disc disease with radiculopathy  H/O: osteoarthritis  Obstructive sleep apnea  CPAP  Renal calculi  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  Essential hypertension  CAD (coronary artery disease)  - s/p cabg x3  Hypercholesterolemia  ASIYA (obstructive sleep apnea)  initially dx  ; CPAP  Paralytic ileus  post op THR  &amp; post op laminectomy  Type 2 diabetes mellitus  Right carpal tunnel syndrome  Cubital tunnel syndrome, right  Trigger finger of right hand  Morbid obesity with body mass index (BMI) of 40.0 or higher  S/P Left Inguinal Hernia Repair  History of Total Hip Replacement  - bilateral THR  S/P tonsillectomy and adenoidectomy  as child  H/O lithotripsy  x1  S/P revision of total knee, right  x2-  &amp;   S/P lumbar discectomy  - ,L5  Aug 2013  S/P CABG x 3  17  S/P lumbar laminectomy  Fusion L3-L5   Kidney stone  s/w ESWL pt unsure site  Neuropathy  Bilateral Feet since   History of bilateral hip replacements  History of hernia repair  History of lumbar fusion  History of cholecystectomy  History of bilateral knee replacement  History of lymph node biopsy      MEDICATIONS  (STANDING):  allopurinol 100 milliGRAM(s) Oral at bedtime  amLODIPine   Tablet 10 milliGRAM(s) Oral daily  aspirin enteric coated 81 milliGRAM(s) Oral daily  atorvastatin 40 milliGRAM(s) Oral at bedtime  buMETAnide Injectable 2 milliGRAM(s) IV Push two times a day  carvedilol 12.5 milliGRAM(s) Oral every 12 hours  cefTRIAXone   IVPB 1000 milliGRAM(s) IV Intermittent every 24 hours  dextrose 5%. 1000 milliLiter(s) (100 mL/Hr) IV Continuous <Continuous>  dextrose 5%. 1000 milliLiter(s) (50 mL/Hr) IV Continuous <Continuous>  dextrose 50% Injectable 25 Gram(s) IV Push once  dextrose 50% Injectable 12.5 Gram(s) IV Push once  dextrose 50% Injectable 25 Gram(s) IV Push once  enoxaparin Injectable 40 milliGRAM(s) SubCutaneous every 24 hours  gabapentin 300 milliGRAM(s) Oral four times a day  glucagon  Injectable 1 milliGRAM(s) IntraMuscular once  insulin lispro (ADMELOG) corrective regimen sliding scale   SubCutaneous three times a day before meals  insulin lispro (ADMELOG) corrective regimen sliding scale   SubCutaneous at bedtime  losartan 100 milliGRAM(s) Oral daily  multivitamin 1 Tablet(s) Oral daily  polyethylene glycol 3350 17 Gram(s) Oral at bedtime  senna 2 Tablet(s) Oral at bedtime  tamsulosin 0.4 milliGRAM(s) Oral at bedtime    MEDICATIONS  (PRN):  acetaminophen     Tablet .. 650 milliGRAM(s) Oral every 6 hours PRN Temp greater or equal to 38C (100.4F), Mild Pain (1 - 3)  dextrose Oral Gel 15 Gram(s) Oral once PRN Blood Glucose LESS THAN 70 milliGRAM(s)/deciliter  melatonin 3 milliGRAM(s) Oral at bedtime PRN Insomnia  oxyCODONE    IR 2.5 milliGRAM(s) Oral every 4 hours PRN Moderate Pain (4 - 6)  oxyCODONE    IR 5 milliGRAM(s) Oral every 4 hours PRN Severe Pain (7 - 10)      FAMILY HISTORY:  Family history of coronary artery disease  HLD/Angina. Fatal MI age 73.    Family history of diabetes mellitus type II  mother- - hyperlipidemia and Hypertension.    Family history of pancreatic cancer (Sibling)  brother     Family history of coronary artery disease  brother- age 50+- Coronary Artery Stents        Allergies    No Known Allergies    Intolerances        SOCIAL HISTORY:    REVIEW OF SYSTEMS: Otherwise negative as stated in HPI    Physical Exam  Vital signs  T(C): 36.7 (23 @ 15:56), Max: 36.8 (23 @ 06:25)  HR: 66 (23 @ 15:56)  BP: 144/95 (23 @ 15:56)  SpO2: 97% (23 @ 15:56)    Output  OUT:    Voided (mL): 300 mL  Total OUT: 300 mL    Total NET: -300 mL      Gen: NAD  Pulm: No respiratory distress  Abd: soft, obese.    LABS:                        15.3   10.38 )-----------( 227      ( 19 Mar 2023 17:55 )             44.7         142  |  101  |  21  ----------------------------<  120<H>  3.7   |  27  |  0.65    Ca    9.4      19 Mar 2023 17:55    TPro  7.0  /  Alb  4.3  /  TBili  0.3  /  DBili  x   /  AST  17  /  ALT  19  /  AlkPhos  100      PT/INR - ( 19 Mar 2023 17:55 )   PT: 10.6 sec;   INR: 0.91 ratio    PTT - ( 19 Mar 2023 17:55 )  PTT:24.8 sec    Urinalysis Basic - ( 19 Mar 2023 17:56 )  Color: Colorless / Appearance: Clear / S.010 / pH: x  Gluc: x / Ketone: Negative  / Bili: Negative / Urobili: Negative   Blood: x / Protein: Negative / Nitrite: Negative   Leuk Esterase: Moderate / RBC: 1 /hpf / WBC 11 /HPF   Sq Epi: x / Non Sq Epi: 0 /hpf / Bacteria: Few    Urine Cx: pending    RADIOLOGY:  < from: CT Abdomen and Pelvis w/ IV Cont (23 @ 21:59) >    ACC: [id number] EXAM:  CT ABDOMEN AND PELVIS IC   ORDERED BY:  MARYLU MELGAR     ACC: [id number] EXAM:  CT ANGIO CHEST PULM ART [facility]   ORDERED BY:  MARYLU MELGAR     PROCEDURE DATE:  2023          INTERPRETATION:  CLINICAL INFORMATION:SOB/STEVENS. Back pain.      COMPARISON: CT abdomen 2022 and CT chest 2021    CONTRAST/COMPLICATIONS:  IV Contrast: Omnipaque 350 (accession [id number]), IV contrast documented   in unlinked concurrent exam (accession [id number])  90 cc administered   10   cc discarded  Oral Contrast: NONE  Complications: None reported at time of study completion    PROCEDURE:  CT Angiography of the Chest was performed followed by portal venous phase   imaging of the Abdomen and Pelvis.  Sagittal and coronal reformats were performed as well as 3D (MIP)   reconstructions.    FINDINGS:  CHEST:  Sternal retention wires in place.  LUNGS AND LARGE AIRWAYS: Patent central airways. No pulmonary nodules.  PLEURA: No pleural effusion.  VESSELS: No thoracic aortic aneurysm or dissection. No acute aortic   syndrome. No evidence of PE.  HEART: Heart size is normal. No pericardial effusion. Coronary artery   calcification present.  MEDIASTINUM AND GERONIMO: Stable appearing multiple small mediastinal lymph   nodes are present most pronounced within the right paratracheal region..   Largest measuring 2.0 x 2.0 cm.  CHEST WALL AND LOWER NECK: Within normal limits.  Osseous structures demonstrate degenerative changes but no lytic or   blastic process.    ABDOMEN AND PELVIS:  LIVER: Within normal limits.  BILE DUCTS: Normal caliber.  GALLBLADDER: Cholecystectomy.  SPLEEN: Within normal limits.  PANCREAS: Within normal limits.  ADRENALS: Within normal limits.  KIDNEYS/URETERS: No hydronephrosis. Tiny nonobstructing stone lower pole   left kidney. 3.2 x 2.2 cm indeterminate exophytic lesion arising from   lower pole right kidney coronal 4-62 which appears slightly larger   compared to prior exam.    BLADDER: Within normal limits.  REPRODUCTIVE ORGANS: Prostate within normal limits.    BOWEL: No bowel obstruction. Appendix is normal.  PERITONEUM: No ascites.  VESSELS: Atherosclerotic changes. No abdominal aortic aneurysm or   dissection.  RETROPERITONEUM/LYMPH NODES: No lymphadenopathy.  ABDOMINAL WALL: Within normal limits.  BONES: Degenerative changes. No lytic or blastic process. Streak artifact   from postsurgical changes lumbosacral spine and bilateral hip replacement   limits evaluation of the lower abdomen and pelvis. Bilateral posterior   fixation screws present at L3-S1. Prior laminectomy L3,L4 and L5.    IMPRESSION:  No evidence pulmonary embolism.    3.2 x 2.2 cm indeterminate exophytic lesion arising from lower pole right   kidney coronal 4-62 which appears slightly larger compared to prior exam.   Presumed neoplastic etiology until proven otherwise. Further evaluation   and follow-up required.    Please refer to detailed findings otherwise described above.    --- End of Report ---             CATIE RAMIREZ MD; Attending Radiologist  This document has been electronically signed. Mar 19 2023 10:27PM    < from: US Kidney and Bladder (23 @ 13:14) >  ACC: [id number] EXAM:  US KIDNEYS AND BLADDER   ORDERED BY: HOMERO ANGLIN     PROCEDURE DATE:  2023          INTERPRETATION:  CLINICAL INFORMATION: Urinary retention. Creatinine 0.7.    COMPARISON: CT abdomen pelvis 3/19/2023.    TECHNIQUE: Sonography of the kidneys and bladder.    FINDINGS:  Right kidney: 15.2 cm. Lower pole 2.8 x 2.5 x 2.6 cm solid mass with   internal vascularity. Interpolar region nonobstructing 0.9 cm renal   callus. No hydronephrosis.    Left kidney: 12.9 cm. Lower pole nonobstructing 1.2 cm renal calculus. No   renal mass or hydronephrosis.    Urinary bladder: Underdistended.    IMPRESSION:    Right kidney lower pole 2.8 cm solid mass, corresponding to finding on   prior CT.  Findings are most consistent with renal cell carcinoma.    Nonobstructing bilateral renal stones, no hydronephrosis.    --- End of Report ---    < end of copied text >

## 2023-03-20 NOTE — PHYSICAL THERAPY INITIAL EVALUATION ADULT - ADDITIONAL COMMENTS
pt lives alone in a private home with 3 GRIFFIN with HR, 12 stairs inside with HR to go up and 12 stairs with HR to go to basement. Pt has walk in shower and has not gone up or down stairs since surgeries on March 17th. pt spends all of his time on 1st level in an automatic recliner chair, which he has been sleeping in. Pt ambulates with cane for the past year. Son and wife live across the street that assist with grocery shopping and laundry/ ADLs throughout the week.

## 2023-03-20 NOTE — H&P ADULT - PROBLEM SELECTOR PLAN 1
Acute worsening SOB with substernal chest tightness, worsening orthopnea, 6 pound weight gain, slight cough w. clear sputum production concerning for acute decompensated HF vs angina vs diuretic resistance. Less likely acute ischemic process vs PNA.   - Prior TTE 2021, very suboptimal  - MUGA scan 3/2021 w/ normal resting LV EF of 57 % and normal right and left ventricular wall motion  - CTA Chest in ED w/o significant findings   - Pro-, no troponin elevation or ischemic EKG changes  -> Ordered TTE  -> Daily weights, strict I/O  -> c/w bumex 2 BID for now Acute worsening SOB with substernal chest tightness, worsening orthopnea, 6 pound weight gain, slight cough w. clear sputum production concerning for acute decompensated HF vs angina vs diuretic resistance. Less likely acute ischemic process vs PNA.   - Prior TTE 2021, very suboptimal  - MUGA scan 3/2021 w/ normal resting LV EF of 57 % and normal right and left ventricular wall motion  - CTA Chest in ED w/o significant findings   - Pro-, no troponin elevation or ischemic EKG changes  -> Ordered TTE  -> Daily weights, strict I/O  -> c/w bumex 2 IV BID for now  -> Heart failure consult in AM

## 2023-03-20 NOTE — ED ADULT NURSE REASSESSMENT NOTE - COMFORT CARE
pt made aware being moved to ed holding area while waiting for in pt bed assignment/plan of care explained/wait time explained

## 2023-03-20 NOTE — PROGRESS NOTE ADULT - PROBLEM SELECTOR PLAN 3
Pt w/ ASIYA, at home on CPAP  Does not know settings   -> Will attempt CPAP w/ setting of 5   -> Encouraged pt to obtain settings Pt w/ ASIYA, at home on CPAP  Does not know settings   - continue CPAP 5  - f/u home settings w/ patient

## 2023-03-21 DIAGNOSIS — I50.9 HEART FAILURE, UNSPECIFIED: ICD-10-CM

## 2023-03-21 DIAGNOSIS — N39.0 URINARY TRACT INFECTION, SITE NOT SPECIFIED: ICD-10-CM

## 2023-03-21 DIAGNOSIS — Z29.9 ENCOUNTER FOR PROPHYLACTIC MEASURES, UNSPECIFIED: ICD-10-CM

## 2023-03-21 DIAGNOSIS — E87.20 ACIDOSIS, UNSPECIFIED: ICD-10-CM

## 2023-03-21 LAB
-  AMIKACIN: SIGNIFICANT CHANGE UP
-  AMOXICILLIN/CLAVULANIC ACID: SIGNIFICANT CHANGE UP
-  AMPICILLIN/SULBACTAM: SIGNIFICANT CHANGE UP
-  AMPICILLIN: SIGNIFICANT CHANGE UP
-  AZTREONAM: SIGNIFICANT CHANGE UP
-  CEFAZOLIN: SIGNIFICANT CHANGE UP
-  CEFEPIME: SIGNIFICANT CHANGE UP
-  CEFOXITIN: SIGNIFICANT CHANGE UP
-  CEFTRIAXONE: SIGNIFICANT CHANGE UP
-  CIPROFLOXACIN: SIGNIFICANT CHANGE UP
-  ERTAPENEM: SIGNIFICANT CHANGE UP
-  GENTAMICIN: SIGNIFICANT CHANGE UP
-  IMIPENEM: SIGNIFICANT CHANGE UP
-  LEVOFLOXACIN: SIGNIFICANT CHANGE UP
-  MEROPENEM: SIGNIFICANT CHANGE UP
-  NITROFURANTOIN: SIGNIFICANT CHANGE UP
-  PIPERACILLIN/TAZOBACTAM: SIGNIFICANT CHANGE UP
-  TOBRAMYCIN: SIGNIFICANT CHANGE UP
-  TRIMETHOPRIM/SULFAMETHOXAZOLE: SIGNIFICANT CHANGE UP
A1C WITH ESTIMATED AVERAGE GLUCOSE RESULT: 7.2 % — HIGH (ref 4–5.6)
ALBUMIN SERPL ELPH-MCNC: 4.3 G/DL — SIGNIFICANT CHANGE UP (ref 3.3–5)
ALP SERPL-CCNC: 97 U/L — SIGNIFICANT CHANGE UP (ref 40–120)
ALT FLD-CCNC: 17 U/L — SIGNIFICANT CHANGE UP (ref 10–45)
ANION GAP SERPL CALC-SCNC: 14 MMOL/L — SIGNIFICANT CHANGE UP (ref 5–17)
AST SERPL-CCNC: 18 U/L — SIGNIFICANT CHANGE UP (ref 10–40)
BASE EXCESS BLDV CALC-SCNC: 4.9 MMOL/L — HIGH (ref -2–3)
BASE EXCESS BLDV CALC-SCNC: 5.8 MMOL/L — HIGH (ref -2–3)
BILIRUB SERPL-MCNC: 0.7 MG/DL — SIGNIFICANT CHANGE UP (ref 0.2–1.2)
BUN SERPL-MCNC: 13 MG/DL — SIGNIFICANT CHANGE UP (ref 7–23)
CA-I SERPL-SCNC: 1.17 MMOL/L — SIGNIFICANT CHANGE UP (ref 1.15–1.33)
CA-I SERPL-SCNC: 1.19 MMOL/L — SIGNIFICANT CHANGE UP (ref 1.15–1.33)
CALCIUM SERPL-MCNC: 9.7 MG/DL — SIGNIFICANT CHANGE UP (ref 8.4–10.5)
CHLORIDE BLDV-SCNC: 101 MMOL/L — SIGNIFICANT CHANGE UP (ref 96–108)
CHLORIDE BLDV-SCNC: 99 MMOL/L — SIGNIFICANT CHANGE UP (ref 96–108)
CHLORIDE SERPL-SCNC: 98 MMOL/L — SIGNIFICANT CHANGE UP (ref 96–108)
CHOLEST SERPL-MCNC: 187 MG/DL — SIGNIFICANT CHANGE UP
CO2 BLDV-SCNC: 32 MMOL/L — HIGH (ref 22–26)
CO2 BLDV-SCNC: 34 MMOL/L — HIGH (ref 22–26)
CO2 SERPL-SCNC: 28 MMOL/L — SIGNIFICANT CHANGE UP (ref 22–31)
CREAT SERPL-MCNC: 0.54 MG/DL — SIGNIFICANT CHANGE UP (ref 0.5–1.3)
CULTURE RESULTS: SIGNIFICANT CHANGE UP
EGFR: 109 ML/MIN/1.73M2 — SIGNIFICANT CHANGE UP
ESTIMATED AVERAGE GLUCOSE: 160 MG/DL — HIGH (ref 68–114)
GAS PNL BLDV: 137 MMOL/L — SIGNIFICANT CHANGE UP (ref 136–145)
GAS PNL BLDV: 139 MMOL/L — SIGNIFICANT CHANGE UP (ref 136–145)
GAS PNL BLDV: SIGNIFICANT CHANGE UP
GLUCOSE BLDC GLUCOMTR-MCNC: 120 MG/DL — HIGH (ref 70–99)
GLUCOSE BLDC GLUCOMTR-MCNC: 144 MG/DL — HIGH (ref 70–99)
GLUCOSE BLDC GLUCOMTR-MCNC: 146 MG/DL — HIGH (ref 70–99)
GLUCOSE BLDC GLUCOMTR-MCNC: 148 MG/DL — HIGH (ref 70–99)
GLUCOSE BLDV-MCNC: 137 MG/DL — HIGH (ref 70–99)
GLUCOSE BLDV-MCNC: 165 MG/DL — HIGH (ref 70–99)
GLUCOSE SERPL-MCNC: 136 MG/DL — HIGH (ref 70–99)
HCO3 BLDV-SCNC: 31 MMOL/L — HIGH (ref 22–29)
HCO3 BLDV-SCNC: 32 MMOL/L — HIGH (ref 22–29)
HCT VFR BLD CALC: 45.1 % — SIGNIFICANT CHANGE UP (ref 39–50)
HCT VFR BLDA CALC: 44 % — SIGNIFICANT CHANGE UP (ref 39–51)
HCT VFR BLDA CALC: 46 % — SIGNIFICANT CHANGE UP (ref 39–51)
HDLC SERPL-MCNC: 35 MG/DL — LOW
HGB BLD CALC-MCNC: 14.6 G/DL — SIGNIFICANT CHANGE UP (ref 12.6–17.4)
HGB BLD CALC-MCNC: 15.3 G/DL — SIGNIFICANT CHANGE UP (ref 12.6–17.4)
HGB BLD-MCNC: 15.3 G/DL — SIGNIFICANT CHANGE UP (ref 13–17)
LACTATE BLDV-MCNC: 1.9 MMOL/L — SIGNIFICANT CHANGE UP (ref 0.5–2)
LACTATE BLDV-MCNC: 3.1 MMOL/L — HIGH (ref 0.5–2)
LACTATE SERPL-SCNC: 1.9 MMOL/L — SIGNIFICANT CHANGE UP (ref 0.5–2)
LIPID PNL WITH DIRECT LDL SERPL: SIGNIFICANT CHANGE UP MG/DL
MAGNESIUM SERPL-MCNC: 1.8 MG/DL — SIGNIFICANT CHANGE UP (ref 1.6–2.6)
MCHC RBC-ENTMCNC: 29.4 PG — SIGNIFICANT CHANGE UP (ref 27–34)
MCHC RBC-ENTMCNC: 33.9 GM/DL — SIGNIFICANT CHANGE UP (ref 32–36)
MCV RBC AUTO: 86.7 FL — SIGNIFICANT CHANGE UP (ref 80–100)
METHOD TYPE: SIGNIFICANT CHANGE UP
NON HDL CHOLESTEROL: 152 MG/DL — HIGH
NRBC # BLD: 0 /100 WBCS — SIGNIFICANT CHANGE UP (ref 0–0)
ORGANISM # SPEC MICROSCOPIC CNT: SIGNIFICANT CHANGE UP
ORGANISM # SPEC MICROSCOPIC CNT: SIGNIFICANT CHANGE UP
PCO2 BLDV: 44 MMHG — SIGNIFICANT CHANGE UP (ref 42–55)
PCO2 BLDV: 57 MMHG — HIGH (ref 42–55)
PH BLDV: 7.36 — SIGNIFICANT CHANGE UP (ref 7.32–7.43)
PH BLDV: 7.45 — HIGH (ref 7.32–7.43)
PHOSPHATE SERPL-MCNC: 3.1 MG/DL — SIGNIFICANT CHANGE UP (ref 2.5–4.5)
PLATELET # BLD AUTO: 206 K/UL — SIGNIFICANT CHANGE UP (ref 150–400)
PO2 BLDV: 27 MMHG — SIGNIFICANT CHANGE UP (ref 25–45)
PO2 BLDV: 69 MMHG — HIGH (ref 25–45)
POTASSIUM BLDV-SCNC: 3.2 MMOL/L — LOW (ref 3.5–5.1)
POTASSIUM BLDV-SCNC: 3.6 MMOL/L — SIGNIFICANT CHANGE UP (ref 3.5–5.1)
POTASSIUM SERPL-MCNC: 3.7 MMOL/L — SIGNIFICANT CHANGE UP (ref 3.5–5.3)
POTASSIUM SERPL-SCNC: 3.7 MMOL/L — SIGNIFICANT CHANGE UP (ref 3.5–5.3)
PROT SERPL-MCNC: 6.8 G/DL — SIGNIFICANT CHANGE UP (ref 6–8.3)
RBC # BLD: 5.2 M/UL — SIGNIFICANT CHANGE UP (ref 4.2–5.8)
RBC # FLD: 13.6 % — SIGNIFICANT CHANGE UP (ref 10.3–14.5)
SAO2 % BLDV: 49.8 % — LOW (ref 67–88)
SAO2 % BLDV: 96.4 % — HIGH (ref 67–88)
SODIUM SERPL-SCNC: 140 MMOL/L — SIGNIFICANT CHANGE UP (ref 135–145)
SPECIMEN SOURCE: SIGNIFICANT CHANGE UP
TRIGL SERPL-MCNC: 505 MG/DL — HIGH
WBC # BLD: 7.58 K/UL — SIGNIFICANT CHANGE UP (ref 3.8–10.5)
WBC # FLD AUTO: 7.58 K/UL — SIGNIFICANT CHANGE UP (ref 3.8–10.5)

## 2023-03-21 PROCEDURE — 93306 TTE W/DOPPLER COMPLETE: CPT | Mod: 26

## 2023-03-21 PROCEDURE — 99232 SBSQ HOSP IP/OBS MODERATE 35: CPT | Mod: GC

## 2023-03-21 RX ORDER — CHLORHEXIDINE GLUCONATE 213 G/1000ML
1 SOLUTION TOPICAL
Refills: 0 | Status: DISCONTINUED | OUTPATIENT
Start: 2023-03-21 | End: 2023-03-25

## 2023-03-21 RX ORDER — POTASSIUM CHLORIDE 20 MEQ
20 PACKET (EA) ORAL ONCE
Refills: 0 | Status: COMPLETED | OUTPATIENT
Start: 2023-03-21 | End: 2023-03-21

## 2023-03-21 RX ORDER — MAGNESIUM SULFATE 500 MG/ML
2 VIAL (ML) INJECTION ONCE
Refills: 0 | Status: COMPLETED | OUTPATIENT
Start: 2023-03-21 | End: 2023-03-21

## 2023-03-21 RX ADMIN — CARVEDILOL PHOSPHATE 12.5 MILLIGRAM(S): 80 CAPSULE, EXTENDED RELEASE ORAL at 17:15

## 2023-03-21 RX ADMIN — Medication 1 TABLET(S): at 11:31

## 2023-03-21 RX ADMIN — Medication 100 MILLIGRAM(S): at 22:08

## 2023-03-21 RX ADMIN — CARVEDILOL PHOSPHATE 12.5 MILLIGRAM(S): 80 CAPSULE, EXTENDED RELEASE ORAL at 06:37

## 2023-03-21 RX ADMIN — Medication 20 MILLIEQUIVALENT(S): at 08:54

## 2023-03-21 RX ADMIN — CHLORHEXIDINE GLUCONATE 1 APPLICATION(S): 213 SOLUTION TOPICAL at 11:43

## 2023-03-21 RX ADMIN — GABAPENTIN 300 MILLIGRAM(S): 400 CAPSULE ORAL at 23:27

## 2023-03-21 RX ADMIN — BUMETANIDE 2 MILLIGRAM(S): 0.25 INJECTION INTRAMUSCULAR; INTRAVENOUS at 17:22

## 2023-03-21 RX ADMIN — Medication 25 GRAM(S): at 08:54

## 2023-03-21 RX ADMIN — ATORVASTATIN CALCIUM 40 MILLIGRAM(S): 80 TABLET, FILM COATED ORAL at 22:08

## 2023-03-21 RX ADMIN — ENOXAPARIN SODIUM 40 MILLIGRAM(S): 100 INJECTION SUBCUTANEOUS at 06:37

## 2023-03-21 RX ADMIN — CEFTRIAXONE 100 MILLIGRAM(S): 500 INJECTION, POWDER, FOR SOLUTION INTRAMUSCULAR; INTRAVENOUS at 23:28

## 2023-03-21 RX ADMIN — AMLODIPINE BESYLATE 10 MILLIGRAM(S): 2.5 TABLET ORAL at 06:37

## 2023-03-21 RX ADMIN — GABAPENTIN 300 MILLIGRAM(S): 400 CAPSULE ORAL at 17:15

## 2023-03-21 RX ADMIN — GABAPENTIN 300 MILLIGRAM(S): 400 CAPSULE ORAL at 06:37

## 2023-03-21 RX ADMIN — TAMSULOSIN HYDROCHLORIDE 0.4 MILLIGRAM(S): 0.4 CAPSULE ORAL at 22:08

## 2023-03-21 RX ADMIN — LOSARTAN POTASSIUM 100 MILLIGRAM(S): 100 TABLET, FILM COATED ORAL at 06:37

## 2023-03-21 RX ADMIN — Medication 81 MILLIGRAM(S): at 11:31

## 2023-03-21 RX ADMIN — BUMETANIDE 2 MILLIGRAM(S): 0.25 INJECTION INTRAMUSCULAR; INTRAVENOUS at 06:38

## 2023-03-21 RX ADMIN — GABAPENTIN 300 MILLIGRAM(S): 400 CAPSULE ORAL at 11:31

## 2023-03-21 NOTE — PROGRESS NOTE ADULT - PROBLEM SELECTOR PLAN 3
Pt w/ ASIYA, at home on CPAP  Does not know settings   - continue CPAP 5  - f/u home settings w/ patient Pt w/ reported decreased urination w/o dysuria, hematuria. UA positive w/ 11WBC and few bacteria. Urine Cx grew Citrobacter koseri. Renal US w/o hydronephrosis or obstruction  S/p ceftriaxone in ED  - c/w ceftriaxone  - flomax. Pt w/ reported decreased urination w/o dysuria, hematuria. UA positive w/ 11WBC and few bacteria. Urine Cx grew Citrobacter koseri. Renal US w/o hydronephrosis or obstruction  S/p ceftriaxone in ED  - c/w ceftriaxone for 3 day course  - flomax.

## 2023-03-21 NOTE — PROGRESS NOTE ADULT - PROBLEM SELECTOR PLAN 7
CT findings with 3.2 x 2.2 cm indeterminate exophytic lesion arising from lower pole right kidney coronal 4-62 which appears slightly larger compared to prior exam, suspicious for malignancy. Renal US findings consistent w/ RCC.  - findings discussed w/ patient  - urology consulted for further workup/intervention i.e. surgical removal of mass  - heme/onc consult if pathology obtained c/w malignancy Pt w/ CAD s/p CABG   c/w ASA, amlodipine, coreg, losartan  - f/u lipid panel, A1c

## 2023-03-21 NOTE — PROGRESS NOTE ADULT - PROBLEM SELECTOR PLAN 6
Pt w/ CAD s/p CABG   c/w ASA, amlodipine, coreg, losartan SBPs appropriate  c/w home carvedilol, losartan, amlodipine

## 2023-03-21 NOTE — PROGRESS NOTE ADULT - PROBLEM SELECTOR PLAN 9
Pt w/ prior hx of nephrolithiasis with recurrent symptoms of L flank/back pain.  CT w/ small non-obstructing stone  Monitor for symptom improvement s/p carpal tunnel release on 3/17  - recovering appropriately   - oxy 2.5/5mg Q4H PRN for moderate/severe pain; hold home percocet

## 2023-03-21 NOTE — PROGRESS NOTE ADULT - PROBLEM SELECTOR PLAN 2
Pt w/ reported decreased urination w/o dysuria, hematuria. Improving.  CT A/P w/o evidence of obstruction   UA weakly positive w/ 11WBC and few bacteria  Renal US w/o hydronephrosis or obstruction  S/p ceftriaxone in ED  - f/u UCx  - c/w ceftriaxone  - start flomax Pt w/ reported decreased urination w/o dysuria, hematuria. Improving. CTAP and renal US w/o evidence of obstruction. UA weakly positive w/ 11 WBC and few bacteria.  - UCx growing citrobacter  - continue CTX (3/19-)  - continue flomax

## 2023-03-21 NOTE — PROGRESS NOTE ADULT - PROBLEM SELECTOR PLAN 4
SBPs appropriate  c/w home carvedilol, losartan, amlodipine Pt w/ ASIYA, at home on CPAP  Does not know settings   - continue CPAP 5 Pt w/ ASIYA, at home on CPAP  Does not know settings   - increase CPAP 5 to 8  - repeat VBG for lactate

## 2023-03-21 NOTE — PROGRESS NOTE ADULT - PROBLEM SELECTOR PLAN 1
Acute worsening SOB with substernal chest tightness, worsening orthopnea, 6 pound weight gain, slight cough w. clear sputum production concerning for acute decompensated HF vs angina vs diuretic resistance. Of note recently underwent procedure (fluid retention may be postanesthesia effects), also started percocet which has a Class C interaction w/ diuretics (may reduce efficacy). Less likely acute ischemic process vs PNA.   - Prior TTE 2021, very suboptimal  - MUGA scan 3/2021 w/ normal resting LV EF of 57 % and normal right and left ventricular wall motion  - CTA Chest in ED w/o significant findings   - Pro-, no troponin elevation or ischemic EKG changes  - f/u TTE  - c/w bumex 2 IV BID for now  - Daily weights, strict I/O

## 2023-03-21 NOTE — PROGRESS NOTE ADULT - SUBJECTIVE AND OBJECTIVE BOX
PROGRESS NOTE:   Authored by Chris Patel    Patient is a 67y old  Male who presents with a chief complaint of Shortness of breath (21 Mar 2023 07:17)      SUBJECTIVE / OVERNIGHT EVENTS:    ADDITIONAL REVIEW OF SYSTEMS:    MEDICATIONS  (STANDING):  allopurinol 100 milliGRAM(s) Oral at bedtime  amLODIPine   Tablet 10 milliGRAM(s) Oral daily  aspirin enteric coated 81 milliGRAM(s) Oral daily  atorvastatin 40 milliGRAM(s) Oral at bedtime  buMETAnide Injectable 2 milliGRAM(s) IV Push two times a day  carvedilol 12.5 milliGRAM(s) Oral every 12 hours  cefTRIAXone   IVPB 1000 milliGRAM(s) IV Intermittent every 24 hours  dextrose 5%. 1000 milliLiter(s) (100 mL/Hr) IV Continuous <Continuous>  dextrose 5%. 1000 milliLiter(s) (50 mL/Hr) IV Continuous <Continuous>  dextrose 50% Injectable 25 Gram(s) IV Push once  dextrose 50% Injectable 12.5 Gram(s) IV Push once  dextrose 50% Injectable 25 Gram(s) IV Push once  enoxaparin Injectable 40 milliGRAM(s) SubCutaneous every 24 hours  gabapentin 300 milliGRAM(s) Oral four times a day  glucagon  Injectable 1 milliGRAM(s) IntraMuscular once  insulin lispro (ADMELOG) corrective regimen sliding scale   SubCutaneous three times a day before meals  insulin lispro (ADMELOG) corrective regimen sliding scale   SubCutaneous at bedtime  losartan 100 milliGRAM(s) Oral daily  multivitamin 1 Tablet(s) Oral daily  polyethylene glycol 3350 17 Gram(s) Oral at bedtime  senna 2 Tablet(s) Oral at bedtime  tamsulosin 0.4 milliGRAM(s) Oral at bedtime    MEDICATIONS  (PRN):  acetaminophen     Tablet .. 650 milliGRAM(s) Oral every 6 hours PRN Temp greater or equal to 38C (100.4F), Mild Pain (1 - 3)  dextrose Oral Gel 15 Gram(s) Oral once PRN Blood Glucose LESS THAN 70 milliGRAM(s)/deciliter  melatonin 3 milliGRAM(s) Oral at bedtime PRN Insomnia  oxyCODONE    IR 2.5 milliGRAM(s) Oral every 4 hours PRN Moderate Pain (4 - 6)  oxyCODONE    IR 5 milliGRAM(s) Oral every 4 hours PRN Severe Pain (7 - 10)      CAPILLARY BLOOD GLUCOSE      POCT Blood Glucose.: 119 mg/dL (20 Mar 2023 21:30)  POCT Blood Glucose.: 122 mg/dL (20 Mar 2023 17:04)  POCT Blood Glucose.: 114 mg/dL (20 Mar 2023 11:14)    I&O's Summary    20 Mar 2023 07:01  -  21 Mar 2023 07:00  --------------------------------------------------------  IN: 350 mL / OUT: 500 mL / NET: -150 mL        PHYSICAL EXAM:  Vital Signs Last 24 Hrs  T(C): 36.6 (21 Mar 2023 05:48), Max: 36.7 (20 Mar 2023 15:56)  T(F): 97.9 (21 Mar 2023 05:48), Max: 98 (20 Mar 2023 15:56)  HR: 71 (21 Mar 2023 05:48) (59 - 71)  BP: 126/72 (21 Mar 2023 05:48) (121/78 - 144/95)  BP(mean): --  RR: 18 (21 Mar 2023 05:48) (17 - 18)  SpO2: 97% (21 Mar 2023 05:48) (95% - 99%)    Parameters below as of 21 Mar 2023 05:48  Patient On (Oxygen Delivery Method): room air      GENERAL: NAD, lying comfortably in bed  HEAD: Atraumatic, normocephalic  EYES: EOMI b/l PERRLA b/l, conjunctiva and sclera clear  NECK: Supple, No JVD, No LAD  RESPIRATORY: Normal respiratory effort; lungs are clear to auscultation bilaterally  CARDIOVASCULAR: Regular rate and rhythm, normal S1 and S2, no murmur/rub/gallop; No lower extremity edema; Peripheral pulses are 2+ bilaterally  ABDOMEN: Nontender to palpation, normoactive bowel sounds, no rebound/guarding; No hepatosplenomegaly  MUSCLOSKELETAL: no clubbing or cyanosis of digits; no joint swelling or tenderness to palpation  NEURO: Non focal   PSYCH: A+O to person, place, and time; affect appropriate    LABS:                        15.3   7.58  )-----------( 206      ( 21 Mar 2023 07:05 )             45.1     03-    142  |  101  |  21  ----------------------------<  120<H>  3.7   |  27  |  0.65    Ca    9.4      19 Mar 2023 17:55    TPro  7.0  /  Alb  4.3  /  TBili  0.3  /  DBili  x   /  AST  17  /  ALT  19  /  AlkPhos  100  03-19    PT/INR - ( 19 Mar 2023 17:55 )   PT: 10.6 sec;   INR: 0.91 ratio         PTT - ( 19 Mar 2023 17:55 )  PTT:24.8 sec      Urinalysis Basic - ( 19 Mar 2023 17:56 )    Color: Colorless / Appearance: Clear / S.010 / pH: x  Gluc: x / Ketone: Negative  / Bili: Negative / Urobili: Negative   Blood: x / Protein: Negative / Nitrite: Negative   Leuk Esterase: Moderate / RBC: 1 /hpf / WBC 11 /HPF   Sq Epi: x / Non Sq Epi: 0 /hpf / Bacteria: Few        Culture - Urine (collected 19 Mar 2023 17:56)  Source: Clean Catch Clean Catch (Midstream)  Preliminary Report (20 Mar 2023 22:10):    50,000 - 99,000 CFU/mL Citrobacter koseri    <10,000 CFU/ml Normal Urogenital raman present        Tele Reviewed:    RADIOLOGY & ADDITIONAL TESTS:  Results Reviewed:   Imaging Personally Reviewed:  Electrocardiogram Personally Reviewed:    COORDINATION OF CARE:  Care Discussed with Consultants/Other Providers [Y/N]:  Prior or Outpatient Records Reviewed [Y/N]:   PROGRESS NOTE:   Authored by Chris Patel    Patient is a 67y old  Male who presents with a chief complaint of Shortness of breath (21 Mar 2023 07:17)      SUBJECTIVE / OVERNIGHT EVENTS:  No acute overnight events.  Urology: discussed with him his options for RCC. He wants to be aggresive and get surgery done asap as his wife passed away from kidney cancer.    ADDITIONAL REVIEW OF SYSTEMS:    MEDICATIONS  (STANDING):  allopurinol 100 milliGRAM(s) Oral at bedtime  amLODIPine   Tablet 10 milliGRAM(s) Oral daily  aspirin enteric coated 81 milliGRAM(s) Oral daily  atorvastatin 40 milliGRAM(s) Oral at bedtime  buMETAnide Injectable 2 milliGRAM(s) IV Push two times a day  carvedilol 12.5 milliGRAM(s) Oral every 12 hours  cefTRIAXone   IVPB 1000 milliGRAM(s) IV Intermittent every 24 hours  dextrose 5%. 1000 milliLiter(s) (100 mL/Hr) IV Continuous <Continuous>  dextrose 5%. 1000 milliLiter(s) (50 mL/Hr) IV Continuous <Continuous>  dextrose 50% Injectable 25 Gram(s) IV Push once  dextrose 50% Injectable 12.5 Gram(s) IV Push once  dextrose 50% Injectable 25 Gram(s) IV Push once  enoxaparin Injectable 40 milliGRAM(s) SubCutaneous every 24 hours  gabapentin 300 milliGRAM(s) Oral four times a day  glucagon  Injectable 1 milliGRAM(s) IntraMuscular once  insulin lispro (ADMELOG) corrective regimen sliding scale   SubCutaneous three times a day before meals  insulin lispro (ADMELOG) corrective regimen sliding scale   SubCutaneous at bedtime  losartan 100 milliGRAM(s) Oral daily  multivitamin 1 Tablet(s) Oral daily  polyethylene glycol 3350 17 Gram(s) Oral at bedtime  senna 2 Tablet(s) Oral at bedtime  tamsulosin 0.4 milliGRAM(s) Oral at bedtime    MEDICATIONS  (PRN):  acetaminophen     Tablet .. 650 milliGRAM(s) Oral every 6 hours PRN Temp greater or equal to 38C (100.4F), Mild Pain (1 - 3)  dextrose Oral Gel 15 Gram(s) Oral once PRN Blood Glucose LESS THAN 70 milliGRAM(s)/deciliter  melatonin 3 milliGRAM(s) Oral at bedtime PRN Insomnia  oxyCODONE    IR 2.5 milliGRAM(s) Oral every 4 hours PRN Moderate Pain (4 - 6)  oxyCODONE    IR 5 milliGRAM(s) Oral every 4 hours PRN Severe Pain (7 - 10)      CAPILLARY BLOOD GLUCOSE      POCT Blood Glucose.: 119 mg/dL (20 Mar 2023 21:30)  POCT Blood Glucose.: 122 mg/dL (20 Mar 2023 17:04)  POCT Blood Glucose.: 114 mg/dL (20 Mar 2023 11:14)    I&O's Summary    20 Mar 2023 07:01  -  21 Mar 2023 07:00  --------------------------------------------------------  IN: 350 mL / OUT: 500 mL / NET: -150 mL        PHYSICAL EXAM:  Vital Signs Last 24 Hrs  T(C): 36.6 (21 Mar 2023 05:48), Max: 36.7 (20 Mar 2023 15:56)  T(F): 97.9 (21 Mar 2023 05:48), Max: 98 (20 Mar 2023 15:56)  HR: 71 (21 Mar 2023 05:48) (59 - 71)  BP: 126/72 (21 Mar 2023 05:48) (121/78 - 144/95)  BP(mean): --  RR: 18 (21 Mar 2023 05:48) (17 - 18)  SpO2: 97% (21 Mar 2023 05:48) (95% - 99%)    Parameters below as of 21 Mar 2023 05:48  Patient On (Oxygen Delivery Method): room air      GENERAL: NAD, lying comfortably in bed  HEAD: Atraumatic, normocephalic  EYES: EOMI b/l PERRLA b/l, conjunctiva and sclera clear  NECK: Supple, No JVD, No LAD  RESPIRATORY: Normal respiratory effort; lungs are clear to auscultation bilaterally  CARDIOVASCULAR: Regular rate and rhythm, normal S1 and S2, no murmur/rub/gallop; No lower extremity edema; Peripheral pulses are 2+ bilaterally  ABDOMEN: Nontender to palpation, normoactive bowel sounds, no rebound/guarding; No hepatosplenomegaly  MUSCLOSKELETAL: no clubbing or cyanosis of digits; no joint swelling or tenderness to palpation  NEURO: Non focal   PSYCH: A+O to person, place, and time; affect appropriate  SKIN: No rashes    LABS:                        15.3   7.58  )-----------( 206      ( 21 Mar 2023 07:05 )             45.1     -    142  |  101  |  21  ----------------------------<  120<H>  3.7   |  27  |  0.65    Ca    9.4      19 Mar 2023 17:55    TPro  7.0  /  Alb  4.3  /  TBili  0.3  /  DBili  x   /  AST  17  /  ALT    /  AlkPhos  100  -    PT/INR - ( 19 Mar 2023 17:55 )   PT: 10.6 sec;   INR: 0.91 ratio         PTT - ( 19 Mar 2023 17:55 )  PTT:24.8 sec      Urinalysis Basic - ( 19 Mar 2023 17:56 )    Color: Colorless / Appearance: Clear / S.010 / pH: x  Gluc: x / Ketone: Negative  / Bili: Negative / Urobili: Negative   Blood: x / Protein: Negative / Nitrite: Negative   Leuk Esterase: Moderate / RBC: 1 /hpf / WBC 11 /HPF   Sq Epi: x / Non Sq Epi: 0 /hpf / Bacteria: Few        Culture - Urine (collected 19 Mar 2023 17:56)  Source: Clean Catch Clean Catch (Midstream)  Preliminary Report (20 Mar 2023 22:10):    50,000 - 99,000 CFU/mL Citrobacter koseri    <10,000 CFU/ml Normal Urogenital raman present        Renal U/S: Right kidney lower pole 2.8 cm solid mass, corresponding to finding on prior CT. Findings are most consistent with renal cell carcinoma. Nonobstructing bilateral renal stones, no hydronephrosis.   PROGRESS NOTE:   Authored by Chris Patel    Patient is a 67y old  Male who presents with a chief complaint of Shortness of breath (21 Mar 2023 07:17)      SUBJECTIVE / OVERNIGHT EVENTS:  No acute overnight events. No new concerns/pain.  Voiding urine three time yesterday, one time overnight. Reports this is less than baseline as his baseline is he voids 4-5x every ninety minutes and 2-3x overnight.  Normal frequency of bowel but is loose stool today, was regular yesterday.  Sleeping was difficult due to noises and distractions. Eating less because food is not appetizing.  Urology: discussed with him his options for RCC. He wants to be aggresive and get surgery done asap as his wife passed away from kidney cancer.    ADDITIONAL REVIEW OF SYSTEMS:    MEDICATIONS  (STANDING):  allopurinol 100 milliGRAM(s) Oral at bedtime  amLODIPine   Tablet 10 milliGRAM(s) Oral daily  aspirin enteric coated 81 milliGRAM(s) Oral daily  atorvastatin 40 milliGRAM(s) Oral at bedtime  buMETAnide Injectable 2 milliGRAM(s) IV Push two times a day  carvedilol 12.5 milliGRAM(s) Oral every 12 hours  cefTRIAXone   IVPB 1000 milliGRAM(s) IV Intermittent every 24 hours  dextrose 5%. 1000 milliLiter(s) (100 mL/Hr) IV Continuous <Continuous>  dextrose 5%. 1000 milliLiter(s) (50 mL/Hr) IV Continuous <Continuous>  dextrose 50% Injectable 25 Gram(s) IV Push once  dextrose 50% Injectable 12.5 Gram(s) IV Push once  dextrose 50% Injectable 25 Gram(s) IV Push once  enoxaparin Injectable 40 milliGRAM(s) SubCutaneous every 24 hours  gabapentin 300 milliGRAM(s) Oral four times a day  glucagon  Injectable 1 milliGRAM(s) IntraMuscular once  insulin lispro (ADMELOG) corrective regimen sliding scale   SubCutaneous three times a day before meals  insulin lispro (ADMELOG) corrective regimen sliding scale   SubCutaneous at bedtime  losartan 100 milliGRAM(s) Oral daily  multivitamin 1 Tablet(s) Oral daily  polyethylene glycol 3350 17 Gram(s) Oral at bedtime  senna 2 Tablet(s) Oral at bedtime  tamsulosin 0.4 milliGRAM(s) Oral at bedtime    MEDICATIONS  (PRN):  acetaminophen     Tablet .. 650 milliGRAM(s) Oral every 6 hours PRN Temp greater or equal to 38C (100.4F), Mild Pain (1 - 3)  dextrose Oral Gel 15 Gram(s) Oral once PRN Blood Glucose LESS THAN 70 milliGRAM(s)/deciliter  melatonin 3 milliGRAM(s) Oral at bedtime PRN Insomnia  oxyCODONE    IR 2.5 milliGRAM(s) Oral every 4 hours PRN Moderate Pain (4 - 6)  oxyCODONE    IR 5 milliGRAM(s) Oral every 4 hours PRN Severe Pain (7 - 10)      CAPILLARY BLOOD GLUCOSE      POCT Blood Glucose.: 119 mg/dL (20 Mar 2023 21:30)  POCT Blood Glucose.: 122 mg/dL (20 Mar 2023 17:04)  POCT Blood Glucose.: 114 mg/dL (20 Mar 2023 11:14)    I&O's Summary    20 Mar 2023 07:01  -  21 Mar 2023 07:00  --------------------------------------------------------  IN: 350 mL / OUT: 500 mL / NET: -150 mL        PHYSICAL EXAM:  Vital Signs Last 24 Hrs  T(C): 36.6 (21 Mar 2023 05:48), Max: 36.7 (20 Mar 2023 15:56)  T(F): 97.9 (21 Mar 2023 05:48), Max: 98 (20 Mar 2023 15:56)  HR: 71 (21 Mar 2023 05:48) (59 - 71)  BP: 126/72 (21 Mar 2023 05:48) (121/78 - 144/95)  BP(mean): --  RR: 18 (21 Mar 2023 05:48) (17 - 18)  SpO2: 97% (21 Mar 2023 05:48) (95% - 99%)    Parameters below as of 21 Mar 2023 05:48  Patient On (Oxygen Delivery Method): room air      GENERAL: NAD, lying comfortably in bed  HEAD: Atraumatic, normocephalic  EYES: EOMI b/l, conjunctiva and sclera clear  NECK: Supple  RESPIRATORY: Normal respiratory effort; lungs are clear to auscultation bilaterally, with no rales, rhonchi, wheezes. Improvement from yesterday  CARDIOVASCULAR: Regular rate and rhythm, normal S1 and S2, no murmur/rub/gallop; Trace lower extremity edema  ABDOMEN: Nontender to palpation, normoactive bowel sounds, no rebound/guarding  MUSCLOSKELETAL: no clubbing or cyanosis of digits; no joint swelling or tenderness to palpation  NEURO: Non focal   PSYCH: A+O to person, place, and time; affect appropriate  SKIN: No rashes    LABS:                        15.3   7.58  )-----------( 206      ( 21 Mar 2023 07:05 )             45.1     03-    142  |  101  |  21  ----------------------------<  120<H>  3.7   |  27  |  0.65    Ca    9.4      19 Mar 2023 17:55    TPro  7.0  /  Alb  4.3  /  TBili  0.3  /  DBili  x   /  AST  17  /  ALT  19  /  AlkPhos  100  -19    PT/INR - ( 19 Mar 2023 17:55 )   PT: 10.6 sec;   INR: 0.91 ratio         PTT - ( 19 Mar 2023 17:55 )  PTT:24.8 sec      Urinalysis Basic - ( 19 Mar 2023 17:56 )    Color: Colorless / Appearance: Clear / S.010 / pH: x  Gluc: x / Ketone: Negative  / Bili: Negative / Urobili: Negative   Blood: x / Protein: Negative / Nitrite: Negative   Leuk Esterase: Moderate / RBC: 1 /hpf / WBC 11 /HPF   Sq Epi: x / Non Sq Epi: 0 /hpf / Bacteria: Few        Culture - Urine (collected 19 Mar 2023 17:56)  Source: Clean Catch Clean Catch (Midstream)  Preliminary Report (20 Mar 2023 22:10):    50,000 - 99,000 CFU/mL Citrobacter koseri    <10,000 CFU/ml Normal Urogenital raman present        Renal U/S: Right kidney lower pole 2.8 cm solid mass, corresponding to finding on prior CT. Findings are most consistent with renal cell carcinoma. Nonobstructing bilateral renal stones, no hydronephrosis.

## 2023-03-21 NOTE — PROGRESS NOTE ADULT - PROBLEM SELECTOR PLAN 3
Pt w/ ASIYA, at home on CPAP  Does not know settings   - continue CPAP 5  - f/u home settings w/ patient Lactate 3.1 this AM. Unclear what this represents, patient without symptoms of sepsis, L arm appears to be healing appropriately, renal mass unlikely to cause this. Repeat lactate in PM 1.9 (wnl), suspect prior number to be measurement error. Will CTM.

## 2023-03-21 NOTE — PROGRESS NOTE ADULT - ASSESSMENT
67-year-old male w hx HTN, HLD, DM, Gout, carpal tunnel, CAD s/p CABG,  CHF on 2 mg of Bumex twice daily, ASIYA on CPAP, presenting for chest pressure since Saturday evening associated with shortness of breath, worsening dyspnea on exertion, worsening orthopnea, 6 pound weight gain in the last 24 hours, slight cough w. clear sputum, and oliguria, admitted for further cardiac evaluation.  67M w/ hx CHF on home bumex 2mg BID, CAD s/p CABG, ASIYA on CPAP, carpal tunnel s/p recent surgery in L arm, HTN, HLD, DM, gout, admitted for HF exacerbation. Imaging w/ incidental findings concerning for renal malignancy.

## 2023-03-21 NOTE — PROGRESS NOTE ADULT - PROBLEM SELECTOR PLAN 5
Patient endorses neuropathy in lower extremities. A1c 7.2 02/2023  - hold home glimepiride   - ISS premeal and qhs  - c/w gabapentin Pt w/ ASIYA, uses CPAP at home. Does not know settings. On prior admission was set at 10. Trialed CPAP 5, mildly hypercarbic.  - uptitrate to CPAP 8 tonight

## 2023-03-21 NOTE — PROGRESS NOTE ADULT - PROBLEM SELECTOR PLAN 5
Patient endorses neuropathy in lower extremities. A1c 7.2 02/2023  - hold home glimepiride   - ISS premeal and qhs  - c/w gabapentin SBPs appropriate  c/w home carvedilol, losartan, amlodipine

## 2023-03-21 NOTE — PROGRESS NOTE ADULT - PROBLEM SELECTOR PLAN 2
Pt w/ reported decreased urination w/o dysuria, hematuria. Improving.  CT A/P w/o evidence of obstruction   UA weakly positive w/ 11WBC and few bacteria  Renal US w/o hydronephrosis or obstruction  S/p ceftriaxone in ED  - f/u UCx  - c/w ceftriaxone  - start flomax CT findings with 3.2 x 2.2 cm indeterminate exophytic lesion arising from lower pole right kidney coronal 4-62 which appears slightly larger compared to prior exam, suspicious for malignancy. Renal US findings consistent w/ RCC.  - findings discussed w/ patient  - urology consulted for further workup/intervention i.e. surgical removal of mass  - heme/onc consult if pathology obtained c/w malignancy

## 2023-03-21 NOTE — PROGRESS NOTE ADULT - PROBLEM SELECTOR PLAN 8
s/p carpal tunnel release on 3/17  - recovering appropriately   - oxy 2.5/5mg Q4H PRN for moderate/severe pain; hold home percocet

## 2023-03-21 NOTE — PROGRESS NOTE ADULT - PROBLEM SELECTOR PLAN 4
SBPs appropriate  c/w home carvedilol, losartan, amlodipine CT findings with 3.2 x 2.2 cm indeterminate exophytic lesion arising from lower pole right kidney coronal 4-62 which appears slightly larger compared to prior exam, suspicious for malignancy. Renal US findings consistent w/ RCC.  - findings discussed w/ patient  - urology consulted for further workup/intervention, anticipate expedited outpatient follow-up

## 2023-03-21 NOTE — PROGRESS NOTE ADULT - PROBLEM SELECTOR PLAN 7
CT findings with 3.2 x 2.2 cm indeterminate exophytic lesion arising from lower pole right kidney coronal 4-62 which appears slightly larger compared to prior exam, suspicious for malignancy. Renal US findings consistent w/ RCC.  - findings discussed w/ patient  - urology consulted for further workup/intervention i.e. surgical removal of mass  - heme/onc consult if pathology obtained c/w malignancy Patient endorses neuropathy in lower extremities. A1c 7.2 02/2023  - hold home glimepiride   - ISS premeal and qhs  - c/w gabapentin

## 2023-03-21 NOTE — PROGRESS NOTE ADULT - PROBLEM SELECTOR PLAN 10
Gout prevention: C/w allopurinol   C/w home multivitamins  Diet: consistent carb  DVT: lovenox   Dispo: continue mgmt on floor    Code Status: FULL CODE

## 2023-03-21 NOTE — PROGRESS NOTE ADULT - PROBLEM SELECTOR PLAN 1
Acute worsening SOB with substernal chest tightness, worsening orthopnea, 6 pound weight gain, slight cough w. clear sputum production concerning for acute decompensated HF vs angina vs diuretic resistance. Of note recently underwent procedure (fluid retention may be postanesthesia effects), also started percocet which has a Class C interaction w/ diuretics (may reduce efficacy). Less likely acute ischemic process vs PNA.   - Prior TTE 2021, very suboptimal  - MUGA scan 3/2021 w/ normal resting LV EF of 57 % and normal right and left ventricular wall motion  - CTA Chest in ED w/o significant findings   - Pro-, no troponin elevation or ischemic EKG changes  - f/u TTE  - c/w bumex 2 IV BID for now  - Daily weights, strict I/O Acute worsening SOB with substernal chest tightness, worsening orthopnea, 6 pound weight gain, slight cough w. clear sputum production concerning for acute decompensated HF vs angina vs diuretic resistance. Of note recently underwent procedure (fluid retention may be postanesthesia effects), also started percocet which has a Class C interaction w/ diuretics (may reduce efficacy). Less likely acute ischemic process vs PNA.   - Prior TTE 2021, very suboptimal  - MUGA scan 3/2021 w/ normal resting LV EF of 57 % and normal right and left ventricular wall motion  - CTA Chest in ED w/o significant findings   - Pro-, no troponin elevation or ischemic EKG changes  - TTE w/ mild aortic root dilation; other structures poorly visualized  - c/w bumex 2 IV BID for now  - Daily weights, strict I/O

## 2023-03-21 NOTE — PROGRESS NOTE ADULT - PROBLEM SELECTOR PLAN 8
s/p carpal tunnel release on 3/17  - recovering appropriately   - oxy 2.5/5mg Q4H PRN for moderate/severe pain; hold home percocet Pt w/ CAD s/p CABG   c/w ASA, amlodipine, coreg, losartan

## 2023-03-21 NOTE — PROGRESS NOTE ADULT - SUBJECTIVE AND OBJECTIVE BOX
Internal Medicine   Jose Albrecht | PGY-1    OVERNIGHT EVENTS: No acute overnight events.    SUBJECTIVE: No acute concerns. Orthopnea persists, states during US yesterday he was only able to tolerate laying flat for a very brief amount of time, slept partially upright last night. No SOB at rest, has been able to ambulate to bathroom without difficulty. L arm still wrapped, intact sensation and strength. No fevers, chest pain. Urinating more frequently now however not at baseline yet.    MEDICATIONS  (STANDING):  allopurinol 100 milliGRAM(s) Oral at bedtime  amLODIPine   Tablet 10 milliGRAM(s) Oral daily  aspirin enteric coated 81 milliGRAM(s) Oral daily  atorvastatin 40 milliGRAM(s) Oral at bedtime  buMETAnide Injectable 2 milliGRAM(s) IV Push two times a day  carvedilol 12.5 milliGRAM(s) Oral every 12 hours  cefTRIAXone   IVPB 1000 milliGRAM(s) IV Intermittent every 24 hours  chlorhexidine 2% Cloths 1 Application(s) Topical <User Schedule>  dextrose 5%. 1000 milliLiter(s) (100 mL/Hr) IV Continuous <Continuous>  dextrose 5%. 1000 milliLiter(s) (50 mL/Hr) IV Continuous <Continuous>  dextrose 50% Injectable 25 Gram(s) IV Push once  dextrose 50% Injectable 12.5 Gram(s) IV Push once  dextrose 50% Injectable 25 Gram(s) IV Push once  enoxaparin Injectable 40 milliGRAM(s) SubCutaneous every 24 hours  gabapentin 300 milliGRAM(s) Oral four times a day  glucagon  Injectable 1 milliGRAM(s) IntraMuscular once  insulin lispro (ADMELOG) corrective regimen sliding scale   SubCutaneous three times a day before meals  insulin lispro (ADMELOG) corrective regimen sliding scale   SubCutaneous at bedtime  losartan 100 milliGRAM(s) Oral daily  multivitamin 1 Tablet(s) Oral daily  polyethylene glycol 3350 17 Gram(s) Oral at bedtime  senna 2 Tablet(s) Oral at bedtime  tamsulosin 0.4 milliGRAM(s) Oral at bedtime    MEDICATIONS  (PRN):  acetaminophen     Tablet .. 650 milliGRAM(s) Oral every 6 hours PRN Temp greater or equal to 38C (100.4F), Mild Pain (1 - 3)  dextrose Oral Gel 15 Gram(s) Oral once PRN Blood Glucose LESS THAN 70 milliGRAM(s)/deciliter  melatonin 3 milliGRAM(s) Oral at bedtime PRN Insomnia  oxyCODONE    IR 2.5 milliGRAM(s) Oral every 4 hours PRN Moderate Pain (4 - 6)  oxyCODONE    IR 5 milliGRAM(s) Oral every 4 hours PRN Severe Pain (7 - 10)    VITALS:  T(F): 98.4 (03-21-23 @ 10:57), Max: 98.4 (03-21-23 @ 10:57)  HR: 68 (03-21-23 @ 16:09) (59 - 71)  BP: 133/77 (03-21-23 @ 10:57) (126/72 - 133/77)  BP(mean): --  RR: 18 (03-21-23 @ 10:57) (18 - 18)  SpO2: 96% (03-21-23 @ 16:09) (95% - 99%)    PHYSICAL EXAM:   GENERAL: NAD, sitting comfortably in chair, large habitus  HEAD: Atraumatic, normocephalic  EYES: EOMI, conjunctiva and sclera clear, no nystagmus noted  ENT: Moist mucous membranes  CHEST/LUNG: Limited auscultation due to habitus; clear to auscultation bilaterally; no rales, rhonchi, wheezing, or rubs; unlabored respirations  HEART: Limited auscultation due to habitus, heart sounds barely audible, no abnormalities detected  ABDOMEN: Normal bowel sounds; soft, nontender, nondistended  EXTREMITIES: Lower extremities w/ 1+ pitting edema above ankle. No clubbing, cyanosis.  MSK: L arm wrapped.  NEURO: No focal deficits   SKIN: No rashes or lesions  PSYCH: Normal mood, affect     LABS:                      15.3   7.58  )-----------( 206      ( 21 Mar 2023 07:05 )             45.1     03-21  140  |  98  |  13  ----------------------------<  136<H>  3.7   |  28  |  0.54    Ca    9.7      21 Mar 2023 07:17  Phos  3.1     03-21  Mg     1.8     03-21    TPro  6.8  /  Alb  4.3  /  TBili  0.7  /  DBili  x   /  AST  18  /  ALT  17  /  AlkPhos  97  03-21    PT/INR - ( 19 Mar 2023 17:55 )   PT: 10.6 sec;   INR: 0.91 ratio    PTT - ( 19 Mar 2023 17:55 )  PTT:24.8 sec    Creatinine Trend: 0.54<--, 0.65<--    I&O's Summary  20 Mar 2023 07:01  -  21 Mar 2023 07:00  --------------------------------------------------------  IN: 350 mL / OUT: 500 mL / NET: -150 mL    21 Mar 2023 07:01  -  21 Mar 2023 16:29  --------------------------------------------------------  IN: 600 mL / OUT: 600 mL / NET: 0 mL    RADIOLOGY:  < from: TTE with Doppler (w/Cont) (03.21.23 @ 13:43) >  Conclusions:  1. Aortic Root: 3.9 cm.  2. Endocardium not well visualized; grossly normal left  ventricular systolic function.   Endocardial visualization  enhanced with intravenous injection of Ultrasonic Enhancing  Agent (Lumason).  Regional wall motion abnormalitiies  cannot be ruled out.  3. No diastolic parameters were obtained.  4. The right ventricle is not well visualized; grossly  normal right ventricular systolic function.  < end of copied text >

## 2023-03-21 NOTE — PROGRESS NOTE ADULT - PROBLEM SELECTOR PLAN 6
Pt w/ CAD s/p CABG   c/w ASA, amlodipine, coreg, losartan Patient endorses neuropathy in lower extremities. A1c 7.2 02/2023  - hold home glimepiride   - ISS premeal and qhs  - c/w gabapentin

## 2023-03-21 NOTE — PROGRESS NOTE ADULT - ATTENDING COMMENTS
67M with PMH CAD s/p CABG, presumed dCHF on bumex, HTN, HLD, DM, gout, carpal tunnel, ASIYA on CPAP, p/w SOB with associated CP, orthopnea and weight gain after undergoing carpal tunnel release, a/w acute on chronic diastolic HF. Found on imaging to have R renal exophytic mass c/f malignancy.     endorsing better STEVENS but still with significant orthopnea; small b/l LE edema     #acute on chronic diastolic HF   #UTI  #renal mass   #CAD s/p CABG  #HTN  #DM     - c/w bumex 2 mg IV BID - consider extra dose this evening if UOP not adequate. awaiting TTE, strict I/Os, daily weights    - UA positive, Cx + citrobacter koseri --> c/w CTX x 3d   - CT showing exophytic renal mass, confirmed on US with c/f renal cell ca - urology consulted, recs appreciated: plan for quick outpt resection once medically optimized.   - c/w ASA, statin  - c/w BB and ARB, c/w norvasc

## 2023-03-22 DIAGNOSIS — E87.6 HYPOKALEMIA: ICD-10-CM

## 2023-03-22 LAB
ANION GAP SERPL CALC-SCNC: 13 MMOL/L — SIGNIFICANT CHANGE UP (ref 5–17)
BASE EXCESS BLDV CALC-SCNC: 5.1 MMOL/L — HIGH (ref -2–3)
BUN SERPL-MCNC: 13 MG/DL — SIGNIFICANT CHANGE UP (ref 7–23)
CALCIUM SERPL-MCNC: 9.1 MG/DL — SIGNIFICANT CHANGE UP (ref 8.4–10.5)
CHLORIDE SERPL-SCNC: 99 MMOL/L — SIGNIFICANT CHANGE UP (ref 96–108)
CO2 BLDV-SCNC: 29 MMOL/L — HIGH (ref 22–26)
CO2 SERPL-SCNC: 28 MMOL/L — SIGNIFICANT CHANGE UP (ref 22–31)
CREAT SERPL-MCNC: 0.48 MG/DL — LOW (ref 0.5–1.3)
EGFR: 113 ML/MIN/1.73M2 — SIGNIFICANT CHANGE UP
GAS PNL BLDV: SIGNIFICANT CHANGE UP
GLUCOSE BLDC GLUCOMTR-MCNC: 127 MG/DL — HIGH (ref 70–99)
GLUCOSE BLDC GLUCOMTR-MCNC: 147 MG/DL — HIGH (ref 70–99)
GLUCOSE BLDC GLUCOMTR-MCNC: 157 MG/DL — HIGH (ref 70–99)
GLUCOSE BLDC GLUCOMTR-MCNC: 203 MG/DL — HIGH (ref 70–99)
GLUCOSE SERPL-MCNC: 128 MG/DL — HIGH (ref 70–99)
HCO3 BLDV-SCNC: 28 MMOL/L — SIGNIFICANT CHANGE UP (ref 22–29)
HCT VFR BLD CALC: 41.2 % — SIGNIFICANT CHANGE UP (ref 39–50)
HGB BLD-MCNC: 14.1 G/DL — SIGNIFICANT CHANGE UP (ref 13–17)
MAGNESIUM SERPL-MCNC: 1.8 MG/DL — SIGNIFICANT CHANGE UP (ref 1.6–2.6)
MCHC RBC-ENTMCNC: 29.7 PG — SIGNIFICANT CHANGE UP (ref 27–34)
MCHC RBC-ENTMCNC: 34.2 GM/DL — SIGNIFICANT CHANGE UP (ref 32–36)
MCV RBC AUTO: 86.9 FL — SIGNIFICANT CHANGE UP (ref 80–100)
MRSA PCR RESULT.: SIGNIFICANT CHANGE UP
NRBC # BLD: 0 /100 WBCS — SIGNIFICANT CHANGE UP (ref 0–0)
PCO2 BLDV: 34 MMHG — LOW (ref 42–55)
PH BLDV: 7.52 — HIGH (ref 7.32–7.43)
PHOSPHATE SERPL-MCNC: 3.4 MG/DL — SIGNIFICANT CHANGE UP (ref 2.5–4.5)
PLATELET # BLD AUTO: 181 K/UL — SIGNIFICANT CHANGE UP (ref 150–400)
PO2 BLDV: 209 MMHG — HIGH (ref 25–45)
POTASSIUM SERPL-MCNC: 3.1 MMOL/L — LOW (ref 3.5–5.3)
POTASSIUM SERPL-SCNC: 3.1 MMOL/L — LOW (ref 3.5–5.3)
RBC # BLD: 4.74 M/UL — SIGNIFICANT CHANGE UP (ref 4.2–5.8)
RBC # FLD: 13.4 % — SIGNIFICANT CHANGE UP (ref 10.3–14.5)
S AUREUS DNA NOSE QL NAA+PROBE: SIGNIFICANT CHANGE UP
SAO2 % BLDV: 99.9 % — HIGH (ref 67–88)
SODIUM SERPL-SCNC: 140 MMOL/L — SIGNIFICANT CHANGE UP (ref 135–145)
WBC # BLD: 6.74 K/UL — SIGNIFICANT CHANGE UP (ref 3.8–10.5)
WBC # FLD AUTO: 6.74 K/UL — SIGNIFICANT CHANGE UP (ref 3.8–10.5)

## 2023-03-22 PROCEDURE — 99232 SBSQ HOSP IP/OBS MODERATE 35: CPT | Mod: GC

## 2023-03-22 RX ORDER — PANTOPRAZOLE SODIUM 20 MG/1
40 TABLET, DELAYED RELEASE ORAL
Refills: 0 | Status: DISCONTINUED | OUTPATIENT
Start: 2023-03-22 | End: 2023-03-25

## 2023-03-22 RX ORDER — POTASSIUM CHLORIDE 20 MEQ
20 PACKET (EA) ORAL ONCE
Refills: 0 | Status: COMPLETED | OUTPATIENT
Start: 2023-03-22 | End: 2023-03-22

## 2023-03-22 RX ORDER — MAGNESIUM SULFATE 500 MG/ML
2 VIAL (ML) INJECTION ONCE
Refills: 0 | Status: COMPLETED | OUTPATIENT
Start: 2023-03-22 | End: 2023-03-22

## 2023-03-22 RX ORDER — LIDOCAINE 4 G/100G
1 CREAM TOPICAL DAILY
Refills: 0 | Status: DISCONTINUED | OUTPATIENT
Start: 2023-03-22 | End: 2023-03-25

## 2023-03-22 RX ORDER — POTASSIUM CHLORIDE 20 MEQ
10 PACKET (EA) ORAL
Refills: 0 | Status: COMPLETED | OUTPATIENT
Start: 2023-03-22 | End: 2023-03-22

## 2023-03-22 RX ADMIN — Medication 100 MILLIEQUIVALENT(S): at 08:36

## 2023-03-22 RX ADMIN — CARVEDILOL PHOSPHATE 12.5 MILLIGRAM(S): 80 CAPSULE, EXTENDED RELEASE ORAL at 17:20

## 2023-03-22 RX ADMIN — OXYCODONE HYDROCHLORIDE 5 MILLIGRAM(S): 5 TABLET ORAL at 15:09

## 2023-03-22 RX ADMIN — Medication 1: at 08:36

## 2023-03-22 RX ADMIN — BUMETANIDE 2 MILLIGRAM(S): 0.25 INJECTION INTRAMUSCULAR; INTRAVENOUS at 14:50

## 2023-03-22 RX ADMIN — GABAPENTIN 300 MILLIGRAM(S): 400 CAPSULE ORAL at 05:16

## 2023-03-22 RX ADMIN — BUMETANIDE 2 MILLIGRAM(S): 0.25 INJECTION INTRAMUSCULAR; INTRAVENOUS at 05:16

## 2023-03-22 RX ADMIN — ENOXAPARIN SODIUM 40 MILLIGRAM(S): 100 INJECTION SUBCUTANEOUS at 05:22

## 2023-03-22 RX ADMIN — Medication 100 MILLIGRAM(S): at 22:39

## 2023-03-22 RX ADMIN — Medication 100 MILLIEQUIVALENT(S): at 11:35

## 2023-03-22 RX ADMIN — Medication 2: at 17:21

## 2023-03-22 RX ADMIN — LOSARTAN POTASSIUM 100 MILLIGRAM(S): 100 TABLET, FILM COATED ORAL at 05:16

## 2023-03-22 RX ADMIN — ATORVASTATIN CALCIUM 40 MILLIGRAM(S): 80 TABLET, FILM COATED ORAL at 22:39

## 2023-03-22 RX ADMIN — Medication 1 TABLET(S): at 11:33

## 2023-03-22 RX ADMIN — LIDOCAINE 1 PATCH: 4 CREAM TOPICAL at 17:25

## 2023-03-22 RX ADMIN — Medication 100 MILLIEQUIVALENT(S): at 09:30

## 2023-03-22 RX ADMIN — OXYCODONE HYDROCHLORIDE 5 MILLIGRAM(S): 5 TABLET ORAL at 14:57

## 2023-03-22 RX ADMIN — AMLODIPINE BESYLATE 10 MILLIGRAM(S): 2.5 TABLET ORAL at 05:16

## 2023-03-22 RX ADMIN — TAMSULOSIN HYDROCHLORIDE 0.4 MILLIGRAM(S): 0.4 CAPSULE ORAL at 22:38

## 2023-03-22 RX ADMIN — CHLORHEXIDINE GLUCONATE 1 APPLICATION(S): 213 SOLUTION TOPICAL at 05:21

## 2023-03-22 RX ADMIN — OXYCODONE HYDROCHLORIDE 5 MILLIGRAM(S): 5 TABLET ORAL at 06:51

## 2023-03-22 RX ADMIN — Medication 81 MILLIGRAM(S): at 11:33

## 2023-03-22 RX ADMIN — GABAPENTIN 300 MILLIGRAM(S): 400 CAPSULE ORAL at 17:20

## 2023-03-22 RX ADMIN — GABAPENTIN 300 MILLIGRAM(S): 400 CAPSULE ORAL at 11:33

## 2023-03-22 RX ADMIN — GABAPENTIN 300 MILLIGRAM(S): 400 CAPSULE ORAL at 23:18

## 2023-03-22 RX ADMIN — PANTOPRAZOLE SODIUM 40 MILLIGRAM(S): 20 TABLET, DELAYED RELEASE ORAL at 11:32

## 2023-03-22 RX ADMIN — CARVEDILOL PHOSPHATE 12.5 MILLIGRAM(S): 80 CAPSULE, EXTENDED RELEASE ORAL at 05:16

## 2023-03-22 RX ADMIN — OXYCODONE HYDROCHLORIDE 5 MILLIGRAM(S): 5 TABLET ORAL at 08:09

## 2023-03-22 RX ADMIN — Medication 25 GRAM(S): at 14:46

## 2023-03-22 RX ADMIN — Medication 20 MILLIEQUIVALENT(S): at 08:35

## 2023-03-22 NOTE — PROGRESS NOTE ADULT - PROBLEM SELECTOR PLAN 4
CT findings with 3.2 x 2.2 cm indeterminate exophytic lesion arising from lower pole right kidney coronal 4-62 which appears slightly larger compared to prior exam, suspicious for malignancy. Renal US findings consistent w/ RCC.  - findings discussed w/ patient  - urology consulted for further workup/intervention, anticipate expedited outpatient follow-up

## 2023-03-22 NOTE — PROGRESS NOTE ADULT - SUBJECTIVE AND OBJECTIVE BOX
PROGRESS NOTE:   Authored by Chris Patel    Patient is a 67y old  Male who presents with a chief complaint of Shortness of breath (22 Mar 2023 07:10)      SUBJECTIVE / OVERNIGHT EVENTS:    ADDITIONAL REVIEW OF SYSTEMS:    MEDICATIONS  (STANDING):  allopurinol 100 milliGRAM(s) Oral at bedtime  amLODIPine   Tablet 10 milliGRAM(s) Oral daily  aspirin enteric coated 81 milliGRAM(s) Oral daily  atorvastatin 40 milliGRAM(s) Oral at bedtime  buMETAnide Injectable 2 milliGRAM(s) IV Push two times a day  carvedilol 12.5 milliGRAM(s) Oral every 12 hours  chlorhexidine 2% Cloths 1 Application(s) Topical <User Schedule>  dextrose 5%. 1000 milliLiter(s) (100 mL/Hr) IV Continuous <Continuous>  dextrose 5%. 1000 milliLiter(s) (50 mL/Hr) IV Continuous <Continuous>  dextrose 50% Injectable 25 Gram(s) IV Push once  dextrose 50% Injectable 12.5 Gram(s) IV Push once  dextrose 50% Injectable 25 Gram(s) IV Push once  enoxaparin Injectable 40 milliGRAM(s) SubCutaneous every 24 hours  gabapentin 300 milliGRAM(s) Oral four times a day  glucagon  Injectable 1 milliGRAM(s) IntraMuscular once  insulin lispro (ADMELOG) corrective regimen sliding scale   SubCutaneous three times a day before meals  insulin lispro (ADMELOG) corrective regimen sliding scale   SubCutaneous at bedtime  losartan 100 milliGRAM(s) Oral daily  multivitamin 1 Tablet(s) Oral daily  polyethylene glycol 3350 17 Gram(s) Oral at bedtime  senna 2 Tablet(s) Oral at bedtime  tamsulosin 0.4 milliGRAM(s) Oral at bedtime    MEDICATIONS  (PRN):  acetaminophen     Tablet .. 650 milliGRAM(s) Oral every 6 hours PRN Temp greater or equal to 38C (100.4F), Mild Pain (1 - 3)  dextrose Oral Gel 15 Gram(s) Oral once PRN Blood Glucose LESS THAN 70 milliGRAM(s)/deciliter  melatonin 3 milliGRAM(s) Oral at bedtime PRN Insomnia  oxyCODONE    IR 2.5 milliGRAM(s) Oral every 4 hours PRN Moderate Pain (4 - 6)  oxyCODONE    IR 5 milliGRAM(s) Oral every 4 hours PRN Severe Pain (7 - 10)      CAPILLARY BLOOD GLUCOSE      POCT Blood Glucose.: 144 mg/dL (21 Mar 2023 21:59)  POCT Blood Glucose.: 120 mg/dL (21 Mar 2023 16:39)  POCT Blood Glucose.: 146 mg/dL (21 Mar 2023 11:54)  POCT Blood Glucose.: 148 mg/dL (21 Mar 2023 08:04)    I&O's Summary    21 Mar 2023 07:01  -  22 Mar 2023 07:00  --------------------------------------------------------  IN: 1030 mL / OUT: 2400 mL / NET: -1370 mL        PHYSICAL EXAM:  Vital Signs Last 24 Hrs  T(C): 36.8 (22 Mar 2023 05:03), Max: 36.9 (21 Mar 2023 10:57)  T(F): 98.2 (22 Mar 2023 05:03), Max: 98.4 (21 Mar 2023 10:57)  HR: 70 (22 Mar 2023 06:00) (60 - 70)  BP: 144/79 (22 Mar 2023 05:03) (120/74 - 144/79)  BP(mean): --  RR: 18 (22 Mar 2023 05:03) (18 - 18)  SpO2: 97% (22 Mar 2023 06:00) (95% - 100%)    Parameters below as of 22 Mar 2023 05:03  Patient On (Oxygen Delivery Method): BiPAP/CPAP      GENERAL: NAD, lying comfortably in bed  HEAD: Atraumatic, normocephalic  EYES: EOMI b/l PERRLA b/l, conjunctiva and sclera clear  NECK: Supple, No JVD, No LAD  RESPIRATORY: Normal respiratory effort; lungs are clear to auscultation bilaterally  CARDIOVASCULAR: Regular rate and rhythm, normal S1 and S2, no murmur/rub/gallop; No lower extremity edema; Peripheral pulses are 2+ bilaterally  ABDOMEN: Nontender to palpation, normoactive bowel sounds, no rebound/guarding; No hepatosplenomegaly  MUSCLOSKELETAL: no clubbing or cyanosis of digits; no joint swelling or tenderness to palpation  NEURO: Non focal   PSYCH: A+O to person, place, and time; affect appropriate    LABS:                        14.1   6.74  )-----------( 181      ( 22 Mar 2023 07:03 )             41.2     03-22    140  |  99  |  13  ----------------------------<  128<H>  3.1<L>   |  28  |  0.48<L>    Ca    9.1      22 Mar 2023 07:03  Phos  3.4     03-22  Mg     1.8     03-22    TPro  6.8  /  Alb  4.3  /  TBili  0.7  /  DBili  x   /  AST  18  /  ALT  17  /  AlkPhos  97  03-21              Culture - Urine (collected 19 Mar 2023 17:56)  Source: Clean Catch Clean Catch (Midstream)  Final Report (21 Mar 2023 17:50):    50,000 - 99,000 CFU/mL Citrobacter koseri    <10,000 CFU/ml Normal Urogenital raman present  Organism: Citrobacter koseri (21 Mar 2023 17:50)  Organism: Citrobacter koseri (21 Mar 2023 17:50)        Tele Reviewed:    RADIOLOGY & ADDITIONAL TESTS:  Results Reviewed:   Imaging Personally Reviewed:  Electrocardiogram Personally Reviewed:    COORDINATION OF CARE:  Care Discussed with Consultants/Other Providers [Y/N]:  Prior or Outpatient Records Reviewed [Y/N]:   PROGRESS NOTE:   Authored by Chris Patel    Patient is a 67y old  Male who presents with a chief complaint of Shortness of breath (22 Mar 2023 07:10)      SUBJECTIVE / OVERNIGHT EVENTS:  JAM  67-year-old male w hx CHF on 2 mg of Bumex twice daily, CAD s/p CABG, presenting for worsening orthopnea.  S  No acute overnight events. No new concerns.  Void  Bowel  Eat Sleep  O  T(F): 98.2, Max: 98.4  HR: 70 (60 - 70)  BP: 144/79 (120/74 - 144/79)  RR: 18 (18 - 18)  SpO2: 97% (95% - 100%)  In 1380 Out 2900. Net -1520  PE  CBC unremarkable  CMP: low potassium 3.1. Barely low creat 0.48. Lactate normal now 1.9  Alkalotic 7.52, pCO2 low 34: (resp alk, bicarb normal). pO2 high 209  no new imaging, cultures  A  67-year-old male w hx CHF on home Bumex 2mg bid, CAD s/p CABG, presenting for worsening orthopnea. Concern for acute decompensated CHF exacerbation due to diuretic resistance post carpal tunnel surgery with general anesthesia (vs less likely angina). Imaging finding concerning for renal cell carcinoma.  P  Problem 1: Acute on chronic heart failure.   ·  Plan: Acute worsening SOB with substernal chest tightness, worsening orthopnea, 6 pound weight gain, slight cough w. clear sputum production concerning for acute decompensated HF vs angina vs diuretic resistance. Of note recently underwent procedure (fluid retention may be postanesthesia effects), also started percocet which has a Class C interaction w/ diuretics (may reduce efficacy). Less likely acute ischemic process vs PNA.   - TTE w/ mild aortic root dilation; other structures poorly visualized  - c/w bumex 2 IV BID for now  - Daily weights, strict I/O.    Problem 2: UTI (urinary tract infection).   ·  Plan: Pt w/ reported decreased urination w/o dysuria, hematuria. Improving. CTAP and renal US w/o evidence of obstruction. UA weakly positive w/ 11 WBC and few bacteria.  - UCx growing citrobacter  - continue CTX (3/19-). Finish today?  - continue flomax.    Problem 3: Lactic acidosis.   ·  Plan: Lactate 3.1 this AM. Unclear what this represents, patient without symptoms of sepsis, L arm appears to be healing appropriately, renal mass unlikely to cause this. Repeat lactate in PM 1.9 (wnl), suspect prior number to be measurement error. Will CTM.  Hypokalemia  Repeat BMP.If low oral potassium.    Problem 4: Renal mass.   ·  Plan: CT findings with 3.2 x 2.2 cm indeterminate exophytic lesion arising from lower pole right kidney coronal 4-62 which appears slightly larger compared to prior exam, suspicious for malignancy. Renal US findings consistent w/ RCC.  - findings discussed w/ patient  - urology consulted for further workup/intervention, anticipate expedited outpatient follow-up.    Problem 5: Obstructive sleep apnea.   ·  Plan: Pt w/ ASIYA, uses CPAP at home. Does not know settings. On prior admission was set at 10. Trialed CPAP 5, mildly hypercarbic.  - uptitrated to CPAP 8.    Problem 6: Hypertension.  ·  Plan: SBPs appropriate  c/w home carvedilol, losartan, amlodipine.    Problem 7: Type 2 diabetes mellitus.   ·  Plan: Patient endorses neuropathy in lower extremities. A1c 7.2 02/2023  - hold home glimepiride   - ISS premeal and qhs  - c/w gabapentin.    Problem 8: CAD (coronary artery disease).   ·  Plan: Pt w/ CAD s/p CABG   c/w ASA, amlodipine, coreg, losartan.    Problem 9: S/P carpal tunnel release.   ·  Plan: s/p carpal tunnel release on 3/17  - recovering appropriately   - oxy 2.5/5mg Q4H PRN for moderate/severe pain; hold home percocet.    Problem 10: Need for prophylactic measure.   Plan; Gout prevention: C/w allopurinol   C/w home multivitamins  Diet: consistent carb  DVT: lovenox  Dispo: continue mgmt on floor  Code Status: FULL CODE.    ADDITIONAL REVIEW OF SYSTEMS:    MEDICATIONS  (STANDING):  allopurinol 100 milliGRAM(s) Oral at bedtime  amLODIPine   Tablet 10 milliGRAM(s) Oral daily  aspirin enteric coated 81 milliGRAM(s) Oral daily  atorvastatin 40 milliGRAM(s) Oral at bedtime  buMETAnide Injectable 2 milliGRAM(s) IV Push two times a day  carvedilol 12.5 milliGRAM(s) Oral every 12 hours  chlorhexidine 2% Cloths 1 Application(s) Topical <User Schedule>  dextrose 5%. 1000 milliLiter(s) (100 mL/Hr) IV Continuous <Continuous>  dextrose 5%. 1000 milliLiter(s) (50 mL/Hr) IV Continuous <Continuous>  dextrose 50% Injectable 25 Gram(s) IV Push once  dextrose 50% Injectable 12.5 Gram(s) IV Push once  dextrose 50% Injectable 25 Gram(s) IV Push once  enoxaparin Injectable 40 milliGRAM(s) SubCutaneous every 24 hours  gabapentin 300 milliGRAM(s) Oral four times a day  glucagon  Injectable 1 milliGRAM(s) IntraMuscular once  insulin lispro (ADMELOG) corrective regimen sliding scale   SubCutaneous three times a day before meals  insulin lispro (ADMELOG) corrective regimen sliding scale   SubCutaneous at bedtime  losartan 100 milliGRAM(s) Oral daily  multivitamin 1 Tablet(s) Oral daily  polyethylene glycol 3350 17 Gram(s) Oral at bedtime  senna 2 Tablet(s) Oral at bedtime  tamsulosin 0.4 milliGRAM(s) Oral at bedtime    MEDICATIONS  (PRN):  acetaminophen     Tablet .. 650 milliGRAM(s) Oral every 6 hours PRN Temp greater or equal to 38C (100.4F), Mild Pain (1 - 3)  dextrose Oral Gel 15 Gram(s) Oral once PRN Blood Glucose LESS THAN 70 milliGRAM(s)/deciliter  melatonin 3 milliGRAM(s) Oral at bedtime PRN Insomnia  oxyCODONE    IR 2.5 milliGRAM(s) Oral every 4 hours PRN Moderate Pain (4 - 6)  oxyCODONE    IR 5 milliGRAM(s) Oral every 4 hours PRN Severe Pain (7 - 10)      CAPILLARY BLOOD GLUCOSE      POCT Blood Glucose.: 144 mg/dL (21 Mar 2023 21:59)  POCT Blood Glucose.: 120 mg/dL (21 Mar 2023 16:39)  POCT Blood Glucose.: 146 mg/dL (21 Mar 2023 11:54)  POCT Blood Glucose.: 148 mg/dL (21 Mar 2023 08:04)    I&O's Summary    21 Mar 2023 07:01  -  22 Mar 2023 07:00  --------------------------------------------------------  IN: 1030 mL / OUT: 2400 mL / NET: -1370 mL        PHYSICAL EXAM:  Vital Signs Last 24 Hrs  T(C): 36.8 (22 Mar 2023 05:03), Max: 36.9 (21 Mar 2023 10:57)  T(F): 98.2 (22 Mar 2023 05:03), Max: 98.4 (21 Mar 2023 10:57)  HR: 70 (22 Mar 2023 06:00) (60 - 70)  BP: 144/79 (22 Mar 2023 05:03) (120/74 - 144/79)  BP(mean): --  RR: 18 (22 Mar 2023 05:03) (18 - 18)  SpO2: 97% (22 Mar 2023 06:00) (95% - 100%)    Parameters below as of 22 Mar 2023 05:03  Patient On (Oxygen Delivery Method): BiPAP/CPAP      GENERAL: NAD, lying comfortably in bed  HEAD: Atraumatic, normocephalic  EYES: EOMI b/l PERRLA b/l, conjunctiva and sclera clear  NECK: Supple, No JVD, No LAD  RESPIRATORY: Normal respiratory effort; lungs are clear to auscultation bilaterally  CARDIOVASCULAR: Regular rate and rhythm, normal S1 and S2, no murmur/rub/gallop; No lower extremity edema; Peripheral pulses are 2+ bilaterally  ABDOMEN: Nontender to palpation, normoactive bowel sounds, no rebound/guarding; No hepatosplenomegaly  MUSCLOSKELETAL: no clubbing or cyanosis of digits; no joint swelling or tenderness to palpation  NEURO: Non focal   PSYCH: A+O to person, place, and time; affect appropriate    LABS:                        14.1   6.74  )-----------( 181      ( 22 Mar 2023 07:03 )             41.2     03-22    140  |  99  |  13  ----------------------------<  128<H>  3.1<L>   |  28  |  0.48<L>    Ca    9.1      22 Mar 2023 07:03  Phos  3.4     03-22  Mg     1.8     03-22    TPro  6.8  /  Alb  4.3  /  TBili  0.7  /  DBili  x   /  AST  18  /  ALT  17  /  AlkPhos  97  03-21              Culture - Urine (collected 19 Mar 2023 17:56)  Source: Clean Catch Clean Catch (Midstream)  Final Report (21 Mar 2023 17:50):    50,000 - 99,000 CFU/mL Citrobacter koseri    <10,000 CFU/ml Normal Urogenital raman present  Organism: Citrobacter koseri (21 Mar 2023 17:50)  Organism: Citrobacter koseri (21 Mar 2023 17:50)        Tele Reviewed:    RADIOLOGY & ADDITIONAL TESTS:  Results Reviewed:   Imaging Personally Reviewed:  Electrocardiogram Personally Reviewed:    COORDINATION OF CARE:  Care Discussed with Consultants/Other Providers [Y/N]:  Prior or Outpatient Records Reviewed [Y/N]:   PROGRESS NOTE:   Authored by Chris Patel    Patient is a 67y old  Male who presents with a chief complaint of Shortness of breath (22 Mar 2023 07:10)      SUBJECTIVE / OVERNIGHT EVENTS:  No acute overnight events. No new concerns aside from wanting elbow bandage change.  Oxytocin was given for pain control for his back. Still awaiting lidocaine patch for back as walking produces back pain.  Able to stay flat for 1-5 minutes.  Void 3x/hr  Bowel 2x yesterday. Diarrhea occurred on 2nd bowel movement  Slept with CPAP on 6  Eating less because food not tasty. Lost about a pound.    MEDICATIONS  (STANDING):  allopurinol 100 milliGRAM(s) Oral at bedtime  amLODIPine   Tablet 10 milliGRAM(s) Oral daily  aspirin enteric coated 81 milliGRAM(s) Oral daily  atorvastatin 40 milliGRAM(s) Oral at bedtime  buMETAnide Injectable 2 milliGRAM(s) IV Push two times a day  carvedilol 12.5 milliGRAM(s) Oral every 12 hours  chlorhexidine 2% Cloths 1 Application(s) Topical <User Schedule>  dextrose 5%. 1000 milliLiter(s) (100 mL/Hr) IV Continuous <Continuous>  dextrose 5%. 1000 milliLiter(s) (50 mL/Hr) IV Continuous <Continuous>  dextrose 50% Injectable 25 Gram(s) IV Push once  dextrose 50% Injectable 12.5 Gram(s) IV Push once  dextrose 50% Injectable 25 Gram(s) IV Push once  enoxaparin Injectable 40 milliGRAM(s) SubCutaneous every 24 hours  gabapentin 300 milliGRAM(s) Oral four times a day  glucagon  Injectable 1 milliGRAM(s) IntraMuscular once  insulin lispro (ADMELOG) corrective regimen sliding scale   SubCutaneous three times a day before meals  insulin lispro (ADMELOG) corrective regimen sliding scale   SubCutaneous at bedtime  losartan 100 milliGRAM(s) Oral daily  multivitamin 1 Tablet(s) Oral daily  polyethylene glycol 3350 17 Gram(s) Oral at bedtime  senna 2 Tablet(s) Oral at bedtime  tamsulosin 0.4 milliGRAM(s) Oral at bedtime    MEDICATIONS  (PRN):  acetaminophen     Tablet .. 650 milliGRAM(s) Oral every 6 hours PRN Temp greater or equal to 38C (100.4F), Mild Pain (1 - 3)  dextrose Oral Gel 15 Gram(s) Oral once PRN Blood Glucose LESS THAN 70 milliGRAM(s)/deciliter  melatonin 3 milliGRAM(s) Oral at bedtime PRN Insomnia  oxyCODONE    IR 2.5 milliGRAM(s) Oral every 4 hours PRN Moderate Pain (4 - 6)  oxyCODONE    IR 5 milliGRAM(s) Oral every 4 hours PRN Severe Pain (7 - 10)      CAPILLARY BLOOD GLUCOSE      POCT Blood Glucose.: 144 mg/dL (21 Mar 2023 21:59)  POCT Blood Glucose.: 120 mg/dL (21 Mar 2023 16:39)  POCT Blood Glucose.: 146 mg/dL (21 Mar 2023 11:54)  POCT Blood Glucose.: 148 mg/dL (21 Mar 2023 08:04)    I&O's Summary    21 Mar 2023 07:01  -  22 Mar 2023 07:00  --------------------------------------------------------  IN: 1030 mL / OUT: 2400 mL / NET: -1370 mL        PHYSICAL EXAM:  Vital Signs Last 24 Hrs  T(C): 36.8 (22 Mar 2023 05:03), Max: 36.9 (21 Mar 2023 10:57)  T(F): 98.2 (22 Mar 2023 05:03), Max: 98.4 (21 Mar 2023 10:57)  HR: 70 (22 Mar 2023 06:00) (60 - 70)  BP: 144/79 (22 Mar 2023 05:03) (120/74 - 144/79)  BP(mean): --  RR: 18 (22 Mar 2023 05:03) (18 - 18)  SpO2: 97% (22 Mar 2023 06:00) (95% - 100%)    Parameters below as of 22 Mar 2023 05:03  Patient On (Oxygen Delivery Method): BiPAP/CPAP      GENERAL: NAD, sitting comfortably in chair  HEAD: Atraumatic, normocephalic  EYES: EOMI b/l, conjunctiva and sclera clear  NECK: Supple  RESPIRATORY: Normal respiratory effort; lungs are clear to auscultation bilaterally. No wheeze/crackle/rhonchi  CARDIOVASCULAR: Regular rate and rhythm, normal S1 and S2, no murmur/rub/gallop; No lower extremity edema now  ABDOMEN: Soft, Nondistended, Nontender to palpation, normoactive bowel sounds, no rebound/guarding  MUSCLOSKELETAL: no clubbing or cyanosis of digits; no joint swelling or tenderness to palpation  NEURO: Non focal   PSYCH: A+O to person, place, and time; affect appropriate  SKIN: No rashes or lesions    LABS:                        14.1   6.74  )-----------( 181      ( 22 Mar 2023 07:03 )             41.2     03-22    140  |  99  |  13  ----------------------------<  128<H>  3.1<L>   |  28  |  0.48<L>    Ca    9.1      22 Mar 2023 07:03  Phos  3.4     03-22  Mg     1.8     03-22    TPro  6.8  /  Alb  4.3  /  TBili  0.7  /  DBili  x   /  AST  18  /  ALT  17  /  AlkPhos  97  03-21              Culture - Urine (collected 19 Mar 2023 17:56)  Source: Clean Catch Clean Catch (Midstream)  Final Report (21 Mar 2023 17:50):    50,000 - 99,000 CFU/mL Citrobacter koseri    <10,000 CFU/ml Normal Urogenital raman present  Organism: Citrobacter koseri (21 Mar 2023 17:50)  Organism: Citrobacter koseri (21 Mar 2023 17:50)        TTE: Hard to visualize. Aortic root 3.9 cm PROGRESS NOTE:   Authored by Chris Patel, MS3    Patient is a 67y old  Male who presents with a chief complaint of Shortness of breath (22 Mar 2023 07:10)      SUBJECTIVE / OVERNIGHT EVENTS:  No acute overnight events. No new concerns aside from wanting elbow bandage change.  Oxytocin was given for pain control for his back. Still awaiting lidocaine patch for back as walking produces back pain.  Able to stay flat for 1-5 minutes.  Void 3x/hr  Bowel 2x yesterday. Diarrhea occurred on 2nd bowel movement  Slept with CPAP on 6  Eating less because food not tasty. Lost about a pound.    MEDICATIONS  (STANDING):  allopurinol 100 milliGRAM(s) Oral at bedtime  amLODIPine   Tablet 10 milliGRAM(s) Oral daily  aspirin enteric coated 81 milliGRAM(s) Oral daily  atorvastatin 40 milliGRAM(s) Oral at bedtime  buMETAnide Injectable 2 milliGRAM(s) IV Push two times a day  carvedilol 12.5 milliGRAM(s) Oral every 12 hours  chlorhexidine 2% Cloths 1 Application(s) Topical <User Schedule>  dextrose 5%. 1000 milliLiter(s) (100 mL/Hr) IV Continuous <Continuous>  dextrose 5%. 1000 milliLiter(s) (50 mL/Hr) IV Continuous <Continuous>  dextrose 50% Injectable 25 Gram(s) IV Push once  dextrose 50% Injectable 12.5 Gram(s) IV Push once  dextrose 50% Injectable 25 Gram(s) IV Push once  enoxaparin Injectable 40 milliGRAM(s) SubCutaneous every 24 hours  gabapentin 300 milliGRAM(s) Oral four times a day  glucagon  Injectable 1 milliGRAM(s) IntraMuscular once  insulin lispro (ADMELOG) corrective regimen sliding scale   SubCutaneous three times a day before meals  insulin lispro (ADMELOG) corrective regimen sliding scale   SubCutaneous at bedtime  losartan 100 milliGRAM(s) Oral daily  multivitamin 1 Tablet(s) Oral daily  polyethylene glycol 3350 17 Gram(s) Oral at bedtime  senna 2 Tablet(s) Oral at bedtime  tamsulosin 0.4 milliGRAM(s) Oral at bedtime    MEDICATIONS  (PRN):  acetaminophen     Tablet .. 650 milliGRAM(s) Oral every 6 hours PRN Temp greater or equal to 38C (100.4F), Mild Pain (1 - 3)  dextrose Oral Gel 15 Gram(s) Oral once PRN Blood Glucose LESS THAN 70 milliGRAM(s)/deciliter  melatonin 3 milliGRAM(s) Oral at bedtime PRN Insomnia  oxyCODONE    IR 2.5 milliGRAM(s) Oral every 4 hours PRN Moderate Pain (4 - 6)  oxyCODONE    IR 5 milliGRAM(s) Oral every 4 hours PRN Severe Pain (7 - 10)      CAPILLARY BLOOD GLUCOSE      POCT Blood Glucose.: 144 mg/dL (21 Mar 2023 21:59)  POCT Blood Glucose.: 120 mg/dL (21 Mar 2023 16:39)  POCT Blood Glucose.: 146 mg/dL (21 Mar 2023 11:54)  POCT Blood Glucose.: 148 mg/dL (21 Mar 2023 08:04)    I&O's Summary    21 Mar 2023 07:01  -  22 Mar 2023 07:00  --------------------------------------------------------  IN: 1030 mL / OUT: 2400 mL / NET: -1370 mL        PHYSICAL EXAM:  Vital Signs Last 24 Hrs  T(C): 36.8 (22 Mar 2023 05:03), Max: 36.9 (21 Mar 2023 10:57)  T(F): 98.2 (22 Mar 2023 05:03), Max: 98.4 (21 Mar 2023 10:57)  HR: 70 (22 Mar 2023 06:00) (60 - 70)  BP: 144/79 (22 Mar 2023 05:03) (120/74 - 144/79)  BP(mean): --  RR: 18 (22 Mar 2023 05:03) (18 - 18)  SpO2: 97% (22 Mar 2023 06:00) (95% - 100%)    Parameters below as of 22 Mar 2023 05:03  Patient On (Oxygen Delivery Method): BiPAP/CPAP      GENERAL: NAD, sitting comfortably in chair  HEAD: Atraumatic, normocephalic  EYES: EOMI b/l, conjunctiva and sclera clear  NECK: Supple  RESPIRATORY: Normal respiratory effort; lungs are clear to auscultation bilaterally. No wheeze/crackle/rhonchi  CARDIOVASCULAR: Regular rate and rhythm, normal S1 and S2, no murmur/rub/gallop; No lower extremity edema now  ABDOMEN: Soft, Nondistended, Nontender to palpation, normoactive bowel sounds, no rebound/guarding  MUSCLOSKELETAL: no clubbing or cyanosis of digits; no joint swelling or tenderness to palpation  NEURO: Non focal   PSYCH: A+O to person, place, and time; affect appropriate  SKIN: No rashes or lesions    LABS:                        14.1   6.74  )-----------( 181      ( 22 Mar 2023 07:03 )             41.2     03-22    140  |  99  |  13  ----------------------------<  128<H>  3.1<L>   |  28  |  0.48<L>    Ca    9.1      22 Mar 2023 07:03  Phos  3.4     03-22  Mg     1.8     03-22    TPro  6.8  /  Alb  4.3  /  TBili  0.7  /  DBili  x   /  AST  18  /  ALT  17  /  AlkPhos  97  03-21              Culture - Urine (collected 19 Mar 2023 17:56)  Source: Clean Catch Clean Catch (Midstream)  Final Report (21 Mar 2023 17:50):    50,000 - 99,000 CFU/mL Citrobacter koseri    <10,000 CFU/ml Normal Urogenital raman present  Organism: Citrobacter koseri (21 Mar 2023 17:50)  Organism: Citrobacter koseri (21 Mar 2023 17:50)        TTE: Hard to visualize. Aortic root 3.9 cm PROGRESS NOTE:   Authored by Chris Patel, MS3    Patient is a 67y old  Male who presents with a chief complaint of Shortness of breath (22 Mar 2023 07:10)      SUBJECTIVE / OVERNIGHT EVENTS:  No acute overnight events. No new concerns aside from wanting elbow bandage change.  Oxytocin was given for pain control for his back. Still awaiting lidocaine patch for back as walking produces back pain.  Able to stay flat for 1-5 minutes.  Void 3x/hr  Bowel 2x yesterday. Diarrhea occurred on 2nd bowel movement  Slept with CPAP on 6. Slept for 4 hours and feels refreshed  Eating less because food not tasty. Lost about a pound.    MEDICATIONS  (STANDING):  allopurinol 100 milliGRAM(s) Oral at bedtime  amLODIPine   Tablet 10 milliGRAM(s) Oral daily  aspirin enteric coated 81 milliGRAM(s) Oral daily  atorvastatin 40 milliGRAM(s) Oral at bedtime  buMETAnide Injectable 2 milliGRAM(s) IV Push two times a day  carvedilol 12.5 milliGRAM(s) Oral every 12 hours  chlorhexidine 2% Cloths 1 Application(s) Topical <User Schedule>  dextrose 5%. 1000 milliLiter(s) (100 mL/Hr) IV Continuous <Continuous>  dextrose 5%. 1000 milliLiter(s) (50 mL/Hr) IV Continuous <Continuous>  dextrose 50% Injectable 25 Gram(s) IV Push once  dextrose 50% Injectable 12.5 Gram(s) IV Push once  dextrose 50% Injectable 25 Gram(s) IV Push once  enoxaparin Injectable 40 milliGRAM(s) SubCutaneous every 24 hours  gabapentin 300 milliGRAM(s) Oral four times a day  glucagon  Injectable 1 milliGRAM(s) IntraMuscular once  insulin lispro (ADMELOG) corrective regimen sliding scale   SubCutaneous three times a day before meals  insulin lispro (ADMELOG) corrective regimen sliding scale   SubCutaneous at bedtime  losartan 100 milliGRAM(s) Oral daily  multivitamin 1 Tablet(s) Oral daily  polyethylene glycol 3350 17 Gram(s) Oral at bedtime  senna 2 Tablet(s) Oral at bedtime  tamsulosin 0.4 milliGRAM(s) Oral at bedtime    MEDICATIONS  (PRN):  acetaminophen     Tablet .. 650 milliGRAM(s) Oral every 6 hours PRN Temp greater or equal to 38C (100.4F), Mild Pain (1 - 3)  dextrose Oral Gel 15 Gram(s) Oral once PRN Blood Glucose LESS THAN 70 milliGRAM(s)/deciliter  melatonin 3 milliGRAM(s) Oral at bedtime PRN Insomnia  oxyCODONE    IR 2.5 milliGRAM(s) Oral every 4 hours PRN Moderate Pain (4 - 6)  oxyCODONE    IR 5 milliGRAM(s) Oral every 4 hours PRN Severe Pain (7 - 10)      CAPILLARY BLOOD GLUCOSE      POCT Blood Glucose.: 144 mg/dL (21 Mar 2023 21:59)  POCT Blood Glucose.: 120 mg/dL (21 Mar 2023 16:39)  POCT Blood Glucose.: 146 mg/dL (21 Mar 2023 11:54)  POCT Blood Glucose.: 148 mg/dL (21 Mar 2023 08:04)    I&O's Summary    21 Mar 2023 07:01  -  22 Mar 2023 07:00  --------------------------------------------------------  IN: 1030 mL / OUT: 2400 mL / NET: -1370 mL        PHYSICAL EXAM:  Vital Signs Last 24 Hrs  T(C): 36.8 (22 Mar 2023 05:03), Max: 36.9 (21 Mar 2023 10:57)  T(F): 98.2 (22 Mar 2023 05:03), Max: 98.4 (21 Mar 2023 10:57)  HR: 70 (22 Mar 2023 06:00) (60 - 70)  BP: 144/79 (22 Mar 2023 05:03) (120/74 - 144/79)  BP(mean): --  RR: 18 (22 Mar 2023 05:03) (18 - 18)  SpO2: 97% (22 Mar 2023 06:00) (95% - 100%)    Parameters below as of 22 Mar 2023 05:03  Patient On (Oxygen Delivery Method): BiPAP/CPAP      GENERAL: NAD, sitting comfortably in chair  HEAD: Atraumatic, normocephalic  EYES: EOMI b/l, conjunctiva and sclera clear  NECK: Supple  RESPIRATORY: Normal respiratory effort; lungs are clear to auscultation bilaterally. No wheeze/crackle/rhonchi  CARDIOVASCULAR: Regular rate and rhythm, normal S1 and S2, no murmur/rub/gallop; No lower extremity edema now  ABDOMEN: Soft, Nondistended, Nontender to palpation, normoactive bowel sounds, no rebound/guarding  MUSCLOSKELETAL: no clubbing or cyanosis of digits; no joint swelling or tenderness to palpation  NEURO: Non focal   PSYCH: A+O to person, place, and time; affect appropriate  SKIN: No rashes or lesions    LABS:                        14.1   6.74  )-----------( 181      ( 22 Mar 2023 07:03 )             41.2     03-22    140  |  99  |  13  ----------------------------<  128<H>  3.1<L>   |  28  |  0.48<L>    Ca    9.1      22 Mar 2023 07:03  Phos  3.4     03-22  Mg     1.8     03-22    TPro  6.8  /  Alb  4.3  /  TBili  0.7  /  DBili  x   /  AST  18  /  ALT  17  /  AlkPhos  97  03-21              Culture - Urine (collected 19 Mar 2023 17:56)  Source: Clean Catch Clean Catch (Midstream)  Final Report (21 Mar 2023 17:50):    50,000 - 99,000 CFU/mL Citrobacter koseri    <10,000 CFU/ml Normal Urogenital raman present  Organism: Citrobacter koseri (21 Mar 2023 17:50)  Organism: Citrobacter koseri (21 Mar 2023 17:50)        TTE: Hard to visualize. Aortic root 3.9 cm

## 2023-03-22 NOTE — PROGRESS NOTE ADULT - PROBLEM SELECTOR PLAN 10
Gout prevention: C/w allopurinol   C/w home multivitamins  Diet: consistent carb  DVT: lovenox   Dispo: continue mgmt on floor    Code Status: FULL CODE s/p carpal tunnel release on 3/17  - recovering appropriately   - oxy 2.5/5mg Q4H PRN for moderate/severe pain; hold home percocet

## 2023-03-22 NOTE — PROGRESS NOTE ADULT - ATTENDING COMMENTS
67M with PMH CAD s/p CABG, presumed dCHF on bumex, HTN, HLD, DM, gout, carpal tunnel, ASIYA on CPAP, p/w SOB with associated CP, orthopnea and weight gain after undergoing carpal tunnel release, a/w acute on chronic diastolic HF. Found on imaging to have R renal exophytic mass c/f malignancy.     #acute on chronic diastolic HF   #UTI  #renal mass   #CAD s/p CABG  #HTN  #DM     - c/w bumex 2 mg IV BID -TTE: Endocardium not well visualized; grossly normal left ventricular systolic function, strict I/Os, daily weights    - UA positive, Cx + citrobacter koseri --> c/w CTX x 3d   - CT showing exophytic renal mass, confirmed on US with c/f renal cell ca - urology consulted, recs appreciated: plan for outpt resection once medically optimized.   - c/w ASA, statin  - c/w BB and ARB, c/w norvasc  - CPAP nightly 67M with PMH CAD s/p CABG, presumed dCHF on bumex, HTN, HLD, DM, gout, carpal tunnel, ASIYA on CPAP, p/w SOB with associated CP, orthopnea and weight gain after undergoing carpal tunnel release, a/w acute on chronic diastolic HF. Found on imaging to have R renal exophytic mass c/f malignancy.     #acute on chronic diastolic HF   #UTI  #renal mass   #CAD s/p CABG  #HTN  #DM     - c/w bumex 2 mg IV BID -TTE: Endocardium not well visualized; grossly normal left ventricular systolic function, strict I/Os, daily weights    - UA positive, Cx + citrobacter koseri --> c/w CTX x 3d   - CT showing exophytic renal mass, confirmed on US with c/f renal cell ca - urology consulted, recs appreciated: plan for outpt resection once medically optimized.   - c/w ASA, statin  - c/w BB and ARB, c/w norvasc  - CPAP nightly.

## 2023-03-22 NOTE — PROGRESS NOTE ADULT - PROBLEM SELECTOR PLAN 3
Lactate 3.1 this AM. Unclear what this represents, patient without symptoms of sepsis, L arm appears to be healing appropriately, renal mass unlikely to cause this. Repeat lactate in PM 1.9 (wnl), suspect prior number to be measurement error. Will CTM. Pt with low Potassium level  - Oral Potassium given  - Repeat BMP

## 2023-03-22 NOTE — PROGRESS NOTE ADULT - PROBLEM SELECTOR PLAN 2
Pt w/ reported decreased urination w/o dysuria, hematuria. Improving. CTAP and renal US w/o evidence of obstruction. UA weakly positive w/ 11 WBC and few bacteria.  - UCx growing citrobacter  - continue CTX (3/19-)  - continue flomax Pt w/ reported decreased urination w/o dysuria, hematuria. Improving. CTAP and renal US w/o evidence of obstruction. UA weakly positive w/ 11 WBC and few bacteria.  - UCx growing citrobacter  - discontinue CTX as finished three day course  - continue flomax

## 2023-03-22 NOTE — PROGRESS NOTE ADULT - PROBLEM SELECTOR PLAN 1
Acute worsening SOB with substernal chest tightness, worsening orthopnea, 6 pound weight gain, slight cough w. clear sputum production concerning for acute decompensated HF vs angina vs diuretic resistance. Of note recently underwent procedure (fluid retention may be postanesthesia effects), also started percocet which has a Class C interaction w/ diuretics (may reduce efficacy). Less likely acute ischemic process vs PNA.   - Prior TTE 2021, very suboptimal  - MUGA scan 3/2021 w/ normal resting LV EF of 57 % and normal right and left ventricular wall motion  - CTA Chest in ED w/o significant findings   - Pro-, no troponin elevation or ischemic EKG changes  - TTE w/ mild aortic root dilation; other structures poorly visualized  - c/w bumex 2 IV BID for now  - Daily weights, strict I/O Acute worsening SOB with substernal chest tightness, worsening orthopnea, 6 pound weight gain, slight cough w. clear sputum production concerning for acute decompensated HF vs angina vs diuretic resistance. Of note recently underwent procedure (fluid retention may be postanesthesia effects), also started percocet which has a Class C interaction w/ diuretics (may reduce efficacy). Less likely acute ischemic process vs PNA.   - Prior TTE 2021, very suboptimal  - MUGA scan 3/2021 w/ normal resting LV EF of 57 % and normal right and left ventricular wall motion  - CTA Chest in ED w/o significant findings   - Pro-, no troponin elevation or ischemic EKG changes  - TTE w/ mild aortic root dilation; other structures poorly visualized  - c/w bumex 2 IV BID for now  - U/s chest  - Daily weights, strict I/O

## 2023-03-22 NOTE — PROGRESS NOTE ADULT - SUBJECTIVE AND OBJECTIVE BOX
Internal Medicine   Jose Albrecht | PGY-1    OVERNIGHT EVENTS: No acute overnight events.    SUBJECTIVE: Reports yesterday evening he was able to lay flat for ~5 mins. Overnight only urinated once. Was on CPAP 6. At ~4AM tried laying flat again and was only able to tolerate for ~1 min. Chest tightness recurs when laying flat. Denies fever, chest pain when upright, radiating pain in neck/arm, shortness of breath at rest or when ambulating to bathroom.    MEDICATIONS  (STANDING):  allopurinol 100 milliGRAM(s) Oral at bedtime  amLODIPine   Tablet 10 milliGRAM(s) Oral daily  aspirin enteric coated 81 milliGRAM(s) Oral daily  atorvastatin 40 milliGRAM(s) Oral at bedtime  buMETAnide Injectable 2 milliGRAM(s) IV Push two times a day  carvedilol 12.5 milliGRAM(s) Oral every 12 hours  chlorhexidine 2% Cloths 1 Application(s) Topical <User Schedule>  dextrose 5%. 1000 milliLiter(s) (100 mL/Hr) IV Continuous <Continuous>  dextrose 5%. 1000 milliLiter(s) (50 mL/Hr) IV Continuous <Continuous>  dextrose 50% Injectable 25 Gram(s) IV Push once  dextrose 50% Injectable 12.5 Gram(s) IV Push once  dextrose 50% Injectable 25 Gram(s) IV Push once  enoxaparin Injectable 40 milliGRAM(s) SubCutaneous every 24 hours  gabapentin 300 milliGRAM(s) Oral four times a day  glucagon  Injectable 1 milliGRAM(s) IntraMuscular once  insulin lispro (ADMELOG) corrective regimen sliding scale   SubCutaneous three times a day before meals  insulin lispro (ADMELOG) corrective regimen sliding scale   SubCutaneous at bedtime  losartan 100 milliGRAM(s) Oral daily  multivitamin 1 Tablet(s) Oral daily  pantoprazole    Tablet 40 milliGRAM(s) Oral before breakfast  polyethylene glycol 3350 17 Gram(s) Oral at bedtime  tamsulosin 0.4 milliGRAM(s) Oral at bedtime    MEDICATIONS  (PRN):  acetaminophen     Tablet .. 650 milliGRAM(s) Oral every 6 hours PRN Temp greater or equal to 38C (100.4F), Mild Pain (1 - 3)  dextrose Oral Gel 15 Gram(s) Oral once PRN Blood Glucose LESS THAN 70 milliGRAM(s)/deciliter  melatonin 3 milliGRAM(s) Oral at bedtime PRN Insomnia  oxyCODONE    IR 2.5 milliGRAM(s) Oral every 4 hours PRN Moderate Pain (4 - 6)  oxyCODONE    IR 5 milliGRAM(s) Oral every 4 hours PRN Severe Pain (7 - 10)    VITALS:  T(F): 98.1 (03-22-23 @ 10:57), Max: 98.4 (03-21-23 @ 21:39)  HR: 83 (03-22-23 @ 14:55) (60 - 83)  BP: 111/63 (03-22-23 @ 14:55) (111/63 - 144/79)  BP(mean): --  RR: 18 (03-22-23 @ 14:55) (18 - 18)  SpO2: 93% (03-22-23 @ 14:55) (93% - 100%)    PHYSICAL EXAM:   GENERAL: NAD, sitting comfortably in chair, able to ambulate to bathroom independently without issue, large habitus  HEAD: Atraumatic, normocephalic  EYES: EOMI, conjunctiva and sclera clear, no nystagmus noted  ENT: Moist mucous membranes  CHEST/LUNG: Clear to auscultation bilaterally; no rales, rhonchi, wheezing, or rubs; unlabored respirations  HEART: Difficult auscultation due to habitus; regular rate and rhythm; no murmurs, rubs, or gallops, normal S1/S2  ABDOMEN: Normal bowel sounds; soft, nontender, nondistended  EXTREMITIES: Trace-1+ pitting edema in bilateral ankles; no clubbing or cyanosis  MSK: No gross deformities noted   NEURO: No focal deficits   SKIN: No rashes or lesions  PSYCH: Normal mood, affect     LABS:                      14.1   6.74  )-----------( 181      ( 22 Mar 2023 07:03 )             41.2     03-22  140  |  99  |  13  ----------------------------<  128<H>  3.1<L>   |  28  |  0.48<L>    Ca    9.1      22 Mar 2023 07:03  Phos  3.4     03-22  Mg     1.8     03-22    TPro  6.8  /  Alb  4.3  /  TBili  0.7  /  DBili  x   /  AST  18  /  ALT  17  /  AlkPhos  97  03-21    Creatinine Trend: 0.48<--, 0.54<--, 0.65<--    I&O's Summary  21 Mar 2023 07:01  -  22 Mar 2023 07:00  --------------------------------------------------------  IN: 1030 mL / OUT: 2400 mL / NET: -1370 mL    22 Mar 2023 07:01  -  22 Mar 2023 15:51  --------------------------------------------------------  IN: 720 mL / OUT: 1300 mL / NET: -580 mL

## 2023-03-22 NOTE — PROGRESS NOTE ADULT - PROBLEM SELECTOR PLAN 5
Pt w/ ASIYA, uses CPAP at home. Does not know settings. On prior admission was set at 10. Trialed CPAP 5, mildly hypercarbic.  - uptitrate to CPAP 8 tonight Pt w/ ASIYA, uses CPAP at home. Does not know settings. On prior admission was set at 10. Trialed CPAP 5, mildly hypercarbic.  - continue CPAP 6

## 2023-03-22 NOTE — PROGRESS NOTE ADULT - PROBLEM SELECTOR PLAN 4
CT findings with 3.2 x 2.2 cm indeterminate exophytic lesion arising from lower pole right kidney coronal 4-62 which appears slightly larger compared to prior exam, suspicious for malignancy. Renal US findings consistent w/ RCC.  - findings discussed w/ patient  - urology consulted for further workup/intervention, anticipate expedited outpatient follow-up Lactate 3.1 this AM. Unclear what this represents, patient without symptoms of sepsis, L arm appears to be healing appropriately, renal mass unlikely to cause this. Repeat lactate in PM 1.9 (wnl), suspect prior number to be measurement error. Will CTM.

## 2023-03-22 NOTE — PROGRESS NOTE ADULT - PROBLEM SELECTOR PLAN 5
Pt w/ ASIYA, uses CPAP at home. Does not know settings. On prior admission was set at 10. Trialed CPAP 5, mildly hypercarbic.  - uptitrate to CPAP 8 tonight CT findings with 3.2 x 2.2 cm indeterminate exophytic lesion arising from lower pole right kidney coronal 4-62 which appears slightly larger compared to prior exam, suspicious for malignancy. Renal US findings consistent w/ RCC.  - findings discussed w/ patient  - urology consulted for further workup/intervention, anticipate expedited outpatient follow-up

## 2023-03-22 NOTE — PROGRESS NOTE ADULT - PROBLEM SELECTOR PLAN 2
Pt w/ reported decreased urination w/o dysuria, hematuria. Improving. CTAP and renal US w/o evidence of obstruction. UA weakly positive w/ 11 WBC and few bacteria.  - UCx growing citrobacter  - continue CTX (3/19-)  - continue flomax Pt w/ reported decreased urination w/o dysuria, hematuria. Improved. CTAP and renal US w/o evidence of obstruction. UA weakly positive w/ 11 WBC and few bacteria.  - UCx growing citrobacter  - s/p course of CTX (3/19-21)  - continue flomax

## 2023-03-22 NOTE — PROGRESS NOTE ADULT - PROBLEM SELECTOR PLAN 7
Patient endorses neuropathy in lower extremities. A1c 7.2 02/2023  - hold home glimepiride   - ISS premeal and qhs  - c/w gabapentin

## 2023-03-22 NOTE — PROGRESS NOTE ADULT - PROBLEM SELECTOR PLAN 3
Lactate 3.1 this AM. Unclear what this represents, patient without symptoms of sepsis, L arm appears to be healing appropriately, renal mass unlikely to cause this. Repeat lactate in PM 1.9 (wnl), suspect prior number to be measurement error. Will CTM. Lactate 3.1 3/21 AM. Unclear what this represents, patient without symptoms of sepsis, L arm appears to be healing appropriately, renal mass unlikely to cause this. Repeat lactate in PM 1.9 (wnl), suspect prior number to be measurement error. Wnl on 3/22. Will CTM.

## 2023-03-22 NOTE — PROGRESS NOTE ADULT - PROBLEM SELECTOR PLAN 1
Acute worsening SOB with substernal chest tightness, worsening orthopnea, 6 pound weight gain, slight cough w. clear sputum production concerning for acute decompensated HF vs angina vs diuretic resistance. Of note recently underwent procedure (fluid retention may be postanesthesia effects), also started percocet which has a Class C interaction w/ diuretics (may reduce efficacy). Less likely acute ischemic process vs PNA.   - Prior TTE 2021, very suboptimal  - MUGA scan 3/2021 w/ normal resting LV EF of 57 % and normal right and left ventricular wall motion  - CTA Chest in ED w/o significant findings   - Pro-, no troponin elevation or ischemic EKG changes  - TTE w/ mild aortic root dilation; other structures poorly visualized  - c/w bumex 2 IV BID for now  - Daily weights, strict I/O Acute worsening SOB with substernal chest tightness, worsening orthopnea, 6 pound weight gain, slight cough w. clear sputum production concerning for acute decompensated HF vs angina vs diuretic resistance. Of note recently underwent procedure (fluid retention may be postanesthesia effects), also started percocet which has a Class C interaction w/ diuretics (may reduce efficacy). Less likely acute ischemic process vs PNA.   - TTE 3/21 w/ mild aortic root dilation; grossly normal LVSF; other structures poorly visualized  - MUGA scan 3/2021 w/ normal resting LV EF of 57 % and normal right and left ventricular wall motion  - CTA Chest in ED w/o significant findings   - Pro-, no troponin elevation or ischemic EKG changes  - c/w bumex 2 IV BID for now  - Daily weights, strict I/O

## 2023-03-22 NOTE — PROGRESS NOTE ADULT - PROBLEM SELECTOR PLAN 6
SBPs appropriate  c/w home carvedilol, losartan, amlodipine Pt w/ ASIYA, uses CPAP at home. Does not know settings. On prior admission was set at 10. Trialed CPAP 5, mildly hypercarbic.  - uptitrate to CPAP 8 tonight Pt w/ ASIYA, uses CPAP at home. Does not know settings. On prior admission was set at 10. Trialed CPAP 5, mildly hypercarbic.  - uptitrated to CPAP 6 last night  - Decrease FiO2 from 30 to 21%

## 2023-03-22 NOTE — PROGRESS NOTE ADULT - ASSESSMENT
67M w/ hx CHF on home bumex 2mg BID, CAD s/p CABG, ASIYA on CPAP, carpal tunnel s/p recent surgery in L arm, HTN, HLD, DM, gout, admitted for HF exacerbation. Imaging w/ incidental findings concerning for renal malignancy. 67M w/ hx CHF on home bumex 2mg BID, CAD s/p CABG, ASIYA on CPAP, carpal tunnel s/p recent surgery in L arm, HTN, HLD, DM, gout, admitted for CHF exacerbation most likely due to diuretic resistance post carpal tunnel surgery with general anesthesia; and presenting with worsening orthopnea. Imaging w/ incidental findings concerning for renal cell carcinoma malignancy.

## 2023-03-22 NOTE — PROGRESS NOTE ADULT - ASSESSMENT
67M w/ hx CHF on home bumex 2mg BID, CAD s/p CABG, ASIYA on CPAP, carpal tunnel s/p recent surgery in L arm, HTN, HLD, DM, gout, admitted for HF exacerbation. Imaging w/ incidental findings concerning for renal malignancy.

## 2023-03-23 LAB
ANION GAP SERPL CALC-SCNC: 14 MMOL/L — SIGNIFICANT CHANGE UP (ref 5–17)
BASE EXCESS BLDV CALC-SCNC: 4.5 MMOL/L — HIGH (ref -2–3)
BUN SERPL-MCNC: 15 MG/DL — SIGNIFICANT CHANGE UP (ref 7–23)
CALCIUM SERPL-MCNC: 9.4 MG/DL — SIGNIFICANT CHANGE UP (ref 8.4–10.5)
CHLORIDE SERPL-SCNC: 99 MMOL/L — SIGNIFICANT CHANGE UP (ref 96–108)
CO2 BLDV-SCNC: 32 MMOL/L — HIGH (ref 22–26)
CO2 SERPL-SCNC: 26 MMOL/L — SIGNIFICANT CHANGE UP (ref 22–31)
CREAT SERPL-MCNC: 0.54 MG/DL — SIGNIFICANT CHANGE UP (ref 0.5–1.3)
EGFR: 109 ML/MIN/1.73M2 — SIGNIFICANT CHANGE UP
GAS PNL BLDV: SIGNIFICANT CHANGE UP
GLUCOSE BLDC GLUCOMTR-MCNC: 129 MG/DL — HIGH (ref 70–99)
GLUCOSE BLDC GLUCOMTR-MCNC: 153 MG/DL — HIGH (ref 70–99)
GLUCOSE BLDC GLUCOMTR-MCNC: 155 MG/DL — HIGH (ref 70–99)
GLUCOSE BLDC GLUCOMTR-MCNC: 208 MG/DL — HIGH (ref 70–99)
GLUCOSE SERPL-MCNC: 157 MG/DL — HIGH (ref 70–99)
HCO3 BLDV-SCNC: 30 MMOL/L — HIGH (ref 22–29)
HCT VFR BLD CALC: 42.2 % — SIGNIFICANT CHANGE UP (ref 39–50)
HGB BLD-MCNC: 14.4 G/DL — SIGNIFICANT CHANGE UP (ref 13–17)
MAGNESIUM SERPL-MCNC: 1.9 MG/DL — SIGNIFICANT CHANGE UP (ref 1.6–2.6)
MCHC RBC-ENTMCNC: 29.4 PG — SIGNIFICANT CHANGE UP (ref 27–34)
MCHC RBC-ENTMCNC: 34.1 GM/DL — SIGNIFICANT CHANGE UP (ref 32–36)
MCV RBC AUTO: 86.1 FL — SIGNIFICANT CHANGE UP (ref 80–100)
NRBC # BLD: 0 /100 WBCS — SIGNIFICANT CHANGE UP (ref 0–0)
NT-PROBNP SERPL-SCNC: 57 PG/ML — SIGNIFICANT CHANGE UP (ref 0–300)
PCO2 BLDV: 49 MMHG — SIGNIFICANT CHANGE UP (ref 42–55)
PH BLDV: 7.4 — SIGNIFICANT CHANGE UP (ref 7.32–7.43)
PHOSPHATE SERPL-MCNC: 3.3 MG/DL — SIGNIFICANT CHANGE UP (ref 2.5–4.5)
PLATELET # BLD AUTO: 202 K/UL — SIGNIFICANT CHANGE UP (ref 150–400)
PO2 BLDV: 37 MMHG — SIGNIFICANT CHANGE UP (ref 25–45)
POTASSIUM SERPL-MCNC: 3.4 MMOL/L — LOW (ref 3.5–5.3)
POTASSIUM SERPL-SCNC: 3.4 MMOL/L — LOW (ref 3.5–5.3)
RBC # BLD: 4.9 M/UL — SIGNIFICANT CHANGE UP (ref 4.2–5.8)
RBC # FLD: 13.6 % — SIGNIFICANT CHANGE UP (ref 10.3–14.5)
SAO2 % BLDV: 70.9 % — SIGNIFICANT CHANGE UP (ref 67–88)
SODIUM SERPL-SCNC: 139 MMOL/L — SIGNIFICANT CHANGE UP (ref 135–145)
WBC # BLD: 7.79 K/UL — SIGNIFICANT CHANGE UP (ref 3.8–10.5)
WBC # FLD AUTO: 7.79 K/UL — SIGNIFICANT CHANGE UP (ref 3.8–10.5)

## 2023-03-23 PROCEDURE — 99239 HOSP IP/OBS DSCHRG MGMT >30: CPT

## 2023-03-23 RX ORDER — POTASSIUM CHLORIDE 20 MEQ
40 PACKET (EA) ORAL ONCE
Refills: 0 | Status: COMPLETED | OUTPATIENT
Start: 2023-03-23 | End: 2023-03-23

## 2023-03-23 RX ORDER — BUMETANIDE 0.25 MG/ML
2 INJECTION INTRAMUSCULAR; INTRAVENOUS
Refills: 0 | Status: DISCONTINUED | OUTPATIENT
Start: 2023-03-23 | End: 2023-03-24

## 2023-03-23 RX ADMIN — GABAPENTIN 300 MILLIGRAM(S): 400 CAPSULE ORAL at 17:48

## 2023-03-23 RX ADMIN — CARVEDILOL PHOSPHATE 12.5 MILLIGRAM(S): 80 CAPSULE, EXTENDED RELEASE ORAL at 17:48

## 2023-03-23 RX ADMIN — Medication 2: at 11:42

## 2023-03-23 RX ADMIN — Medication 81 MILLIGRAM(S): at 11:13

## 2023-03-23 RX ADMIN — GABAPENTIN 300 MILLIGRAM(S): 400 CAPSULE ORAL at 11:13

## 2023-03-23 RX ADMIN — Medication 40 MILLIEQUIVALENT(S): at 10:22

## 2023-03-23 RX ADMIN — TAMSULOSIN HYDROCHLORIDE 0.4 MILLIGRAM(S): 0.4 CAPSULE ORAL at 22:29

## 2023-03-23 RX ADMIN — Medication 1: at 08:07

## 2023-03-23 RX ADMIN — ENOXAPARIN SODIUM 40 MILLIGRAM(S): 100 INJECTION SUBCUTANEOUS at 08:10

## 2023-03-23 RX ADMIN — Medication 100 MILLIGRAM(S): at 22:29

## 2023-03-23 RX ADMIN — GABAPENTIN 300 MILLIGRAM(S): 400 CAPSULE ORAL at 23:17

## 2023-03-23 RX ADMIN — AMLODIPINE BESYLATE 10 MILLIGRAM(S): 2.5 TABLET ORAL at 05:14

## 2023-03-23 RX ADMIN — LIDOCAINE 1 PATCH: 4 CREAM TOPICAL at 10:17

## 2023-03-23 RX ADMIN — ATORVASTATIN CALCIUM 40 MILLIGRAM(S): 80 TABLET, FILM COATED ORAL at 22:29

## 2023-03-23 RX ADMIN — CARVEDILOL PHOSPHATE 12.5 MILLIGRAM(S): 80 CAPSULE, EXTENDED RELEASE ORAL at 05:15

## 2023-03-23 RX ADMIN — BUMETANIDE 2 MILLIGRAM(S): 0.25 INJECTION INTRAMUSCULAR; INTRAVENOUS at 05:15

## 2023-03-23 RX ADMIN — PANTOPRAZOLE SODIUM 40 MILLIGRAM(S): 20 TABLET, DELAYED RELEASE ORAL at 05:14

## 2023-03-23 RX ADMIN — Medication 1 TABLET(S): at 11:13

## 2023-03-23 RX ADMIN — BUMETANIDE 2 MILLIGRAM(S): 0.25 INJECTION INTRAMUSCULAR; INTRAVENOUS at 13:16

## 2023-03-23 RX ADMIN — CHLORHEXIDINE GLUCONATE 1 APPLICATION(S): 213 SOLUTION TOPICAL at 05:17

## 2023-03-23 RX ADMIN — GABAPENTIN 300 MILLIGRAM(S): 400 CAPSULE ORAL at 05:14

## 2023-03-23 RX ADMIN — Medication 0: at 22:29

## 2023-03-23 RX ADMIN — LOSARTAN POTASSIUM 100 MILLIGRAM(S): 100 TABLET, FILM COATED ORAL at 05:16

## 2023-03-23 NOTE — PROGRESS NOTE ADULT - SUBJECTIVE AND OBJECTIVE BOX
PROGRESS NOTE:   Authored by Chris Patel    Patient is a 67y old  Male who presents with a chief complaint of Shortness of breath (23 Mar 2023 07:15)      SUBJECTIVE / OVERNIGHT EVENTS:    ADDITIONAL REVIEW OF SYSTEMS:    MEDICATIONS  (STANDING):  allopurinol 100 milliGRAM(s) Oral at bedtime  amLODIPine   Tablet 10 milliGRAM(s) Oral daily  aspirin enteric coated 81 milliGRAM(s) Oral daily  atorvastatin 40 milliGRAM(s) Oral at bedtime  buMETAnide Injectable 2 milliGRAM(s) IV Push two times a day  carvedilol 12.5 milliGRAM(s) Oral every 12 hours  chlorhexidine 2% Cloths 1 Application(s) Topical <User Schedule>  dextrose 5%. 1000 milliLiter(s) (100 mL/Hr) IV Continuous <Continuous>  dextrose 5%. 1000 milliLiter(s) (50 mL/Hr) IV Continuous <Continuous>  dextrose 50% Injectable 25 Gram(s) IV Push once  dextrose 50% Injectable 12.5 Gram(s) IV Push once  dextrose 50% Injectable 25 Gram(s) IV Push once  enoxaparin Injectable 40 milliGRAM(s) SubCutaneous every 24 hours  gabapentin 300 milliGRAM(s) Oral four times a day  glucagon  Injectable 1 milliGRAM(s) IntraMuscular once  insulin lispro (ADMELOG) corrective regimen sliding scale   SubCutaneous three times a day before meals  insulin lispro (ADMELOG) corrective regimen sliding scale   SubCutaneous at bedtime  losartan 100 milliGRAM(s) Oral daily  multivitamin 1 Tablet(s) Oral daily  pantoprazole    Tablet 40 milliGRAM(s) Oral before breakfast  polyethylene glycol 3350 17 Gram(s) Oral at bedtime  tamsulosin 0.4 milliGRAM(s) Oral at bedtime    MEDICATIONS  (PRN):  acetaminophen     Tablet .. 650 milliGRAM(s) Oral every 6 hours PRN Temp greater or equal to 38C (100.4F), Mild Pain (1 - 3)  dextrose Oral Gel 15 Gram(s) Oral once PRN Blood Glucose LESS THAN 70 milliGRAM(s)/deciliter  lidocaine   4% Patch 1 Patch Transdermal daily PRN back pain  melatonin 3 milliGRAM(s) Oral at bedtime PRN Insomnia  oxyCODONE    IR 2.5 milliGRAM(s) Oral every 4 hours PRN Moderate Pain (4 - 6)  oxyCODONE    IR 5 milliGRAM(s) Oral every 4 hours PRN Severe Pain (7 - 10)      CAPILLARY BLOOD GLUCOSE      POCT Blood Glucose.: 127 mg/dL (22 Mar 2023 21:41)  POCT Blood Glucose.: 203 mg/dL (22 Mar 2023 16:45)  POCT Blood Glucose.: 147 mg/dL (22 Mar 2023 11:43)  POCT Blood Glucose.: 157 mg/dL (22 Mar 2023 08:05)    I&O's Summary    22 Mar 2023 07:01  -  23 Mar 2023 07:00  --------------------------------------------------------  IN: 1240 mL / OUT: 2450 mL / NET: -1210 mL        PHYSICAL EXAM:  Vital Signs Last 24 Hrs  T(C): 36.1 (23 Mar 2023 04:50), Max: 36.8 (22 Mar 2023 21:17)  T(F): 96.9 (23 Mar 2023 04:50), Max: 98.3 (22 Mar 2023 21:17)  HR: 70 (23 Mar 2023 06:00) (63 - 83)  BP: 125/73 (23 Mar 2023 05:10) (109/64 - 125/73)  BP(mean): --  RR: 18 (23 Mar 2023 04:50) (18 - 18)  SpO2: 97% (23 Mar 2023 06:00) (93% - 99%)    Parameters below as of 23 Mar 2023 04:50  Patient On (Oxygen Delivery Method): BiPAP/CPAP      GENERAL: NAD, lying comfortably in bed  HEAD: Atraumatic, normocephalic  EYES: EOMI b/l PERRLA b/l, conjunctiva and sclera clear  NECK: Supple, No JVD, No LAD  RESPIRATORY: Normal respiratory effort; lungs are clear to auscultation bilaterally  CARDIOVASCULAR: Regular rate and rhythm, normal S1 and S2, no murmur/rub/gallop; No lower extremity edema; Peripheral pulses are 2+ bilaterally  ABDOMEN: Nontender to palpation, normoactive bowel sounds, no rebound/guarding; No hepatosplenomegaly  MUSCLOSKELETAL: no clubbing or cyanosis of digits; no joint swelling or tenderness to palpation  NEURO: Non focal   PSYCH: A+O to person, place, and time; affect appropriate    LABS:                        14.1   6.74  )-----------( 181      ( 22 Mar 2023 07:03 )             41.2     03-22    140  |  99  |  13  ----------------------------<  128<H>  3.1<L>   |  28  |  0.48<L>    Ca    9.1      22 Mar 2023 07:03  Phos  3.4     03-22  Mg     1.8     03-22                  Tele Reviewed:    RADIOLOGY & ADDITIONAL TESTS:  Results Reviewed:   Imaging Personally Reviewed:  Electrocardiogram Personally Reviewed:    COORDINATION OF CARE:  Care Discussed with Consultants/Other Providers [Y/N]:  Prior or Outpatient Records Reviewed [Y/N]:   PROGRESS NOTE:   Authored by Chris Patel    Patient is a 67y old  Male who presents with a chief complaint of Shortness of breath (23 Mar 2023 07:15)      SUBJECTIVE / OVERNIGHT EVENTS:  No acute overnight events.  Improvement of overall symptoms. Was able to lay flat 45mins without orthopnea and even fell asleep on room air.  No back pain, lidocaine patch fell off  Voiding appropriately after bumex  Bowel movement yesterday 5pm.      MEDICATIONS  (STANDING):  allopurinol 100 milliGRAM(s) Oral at bedtime  amLODIPine   Tablet 10 milliGRAM(s) Oral daily  aspirin enteric coated 81 milliGRAM(s) Oral daily  atorvastatin 40 milliGRAM(s) Oral at bedtime  buMETAnide Injectable 2 milliGRAM(s) IV Push two times a day  carvedilol 12.5 milliGRAM(s) Oral every 12 hours  chlorhexidine 2% Cloths 1 Application(s) Topical <User Schedule>  dextrose 5%. 1000 milliLiter(s) (100 mL/Hr) IV Continuous <Continuous>  dextrose 5%. 1000 milliLiter(s) (50 mL/Hr) IV Continuous <Continuous>  dextrose 50% Injectable 25 Gram(s) IV Push once  dextrose 50% Injectable 12.5 Gram(s) IV Push once  dextrose 50% Injectable 25 Gram(s) IV Push once  enoxaparin Injectable 40 milliGRAM(s) SubCutaneous every 24 hours  gabapentin 300 milliGRAM(s) Oral four times a day  glucagon  Injectable 1 milliGRAM(s) IntraMuscular once  insulin lispro (ADMELOG) corrective regimen sliding scale   SubCutaneous three times a day before meals  insulin lispro (ADMELOG) corrective regimen sliding scale   SubCutaneous at bedtime  losartan 100 milliGRAM(s) Oral daily  multivitamin 1 Tablet(s) Oral daily  pantoprazole    Tablet 40 milliGRAM(s) Oral before breakfast  polyethylene glycol 3350 17 Gram(s) Oral at bedtime  tamsulosin 0.4 milliGRAM(s) Oral at bedtime    MEDICATIONS  (PRN):  acetaminophen     Tablet .. 650 milliGRAM(s) Oral every 6 hours PRN Temp greater or equal to 38C (100.4F), Mild Pain (1 - 3)  dextrose Oral Gel 15 Gram(s) Oral once PRN Blood Glucose LESS THAN 70 milliGRAM(s)/deciliter  lidocaine   4% Patch 1 Patch Transdermal daily PRN back pain  melatonin 3 milliGRAM(s) Oral at bedtime PRN Insomnia  oxyCODONE    IR 2.5 milliGRAM(s) Oral every 4 hours PRN Moderate Pain (4 - 6)  oxyCODONE    IR 5 milliGRAM(s) Oral every 4 hours PRN Severe Pain (7 - 10)      CAPILLARY BLOOD GLUCOSE      POCT Blood Glucose.: 127 mg/dL (22 Mar 2023 21:41)  POCT Blood Glucose.: 203 mg/dL (22 Mar 2023 16:45)  POCT Blood Glucose.: 147 mg/dL (22 Mar 2023 11:43)  POCT Blood Glucose.: 157 mg/dL (22 Mar 2023 08:05)    I&O's Summary    22 Mar 2023 07:01  -  23 Mar 2023 07:00  --------------------------------------------------------  IN: 1240 mL / OUT: 2450 mL / NET: -1210 mL        PHYSICAL EXAM:  Vital Signs Last 24 Hrs  T(C): 36.1 (23 Mar 2023 04:50), Max: 36.8 (22 Mar 2023 21:17)  T(F): 96.9 (23 Mar 2023 04:50), Max: 98.3 (22 Mar 2023 21:17)  HR: 70 (23 Mar 2023 06:00) (63 - 83)  BP: 125/73 (23 Mar 2023 05:10) (109/64 - 125/73)  BP(mean): --  RR: 18 (23 Mar 2023 04:50) (18 - 18)  SpO2: 97% (23 Mar 2023 06:00) (93% - 99%)    Parameters below as of 23 Mar 2023 04:50  Patient On (Oxygen Delivery Method): BiPAP/CPAP      GENERAL: NAD, lying comfortably in bed  HEAD: Atraumatic, normocephalic  EYES: EOMI b/l PERRLA b/l, conjunctiva and sclera clear  NECK: Supple, No JVD, No LAD  RESPIRATORY: Normal respiratory effort; lungs are clear to auscultation bilaterally  CARDIOVASCULAR: Regular rate and rhythm, normal S1 and S2, no murmur/rub/gallop; No lower extremity edema; Peripheral pulses are 2+ bilaterally  ABDOMEN: Nontender to palpation, normoactive bowel sounds, no rebound/guarding; No hepatosplenomegaly  MUSCLOSKELETAL: no clubbing or cyanosis of digits; no joint swelling or tenderness to palpation  NEURO: Non focal   PSYCH: A+O to person, place, and time; affect appropriate    LABS:                        14.1   6.74  )-----------( 181      ( 22 Mar 2023 07:03 )             41.2     03-22    140  |  99  |  13  ----------------------------<  128<H>  3.1<L>   |  28  |  0.48<L>    Ca    9.1      22 Mar 2023 07:03  Phos  3.4     03-22  Mg     1.8     03-22                  Tele Reviewed:    RADIOLOGY & ADDITIONAL TESTS:  Results Reviewed:   Imaging Personally Reviewed:  Electrocardiogram Personally Reviewed:    COORDINATION OF CARE:  Care Discussed with Consultants/Other Providers [Y/N]:  Prior or Outpatient Records Reviewed [Y/N]:   PROGRESS NOTE:   Authored by Chris Patel    Patient is a 67y old  Male who presents with a chief complaint of Shortness of breath (23 Mar 2023 07:15)      SUBJECTIVE / OVERNIGHT EVENTS:  No acute overnight events.  Improvement of overall symptoms. Was able to lay flat 45mins without orthopnea and even fell asleep on room air.  No back pain, lidocaine patch fell off  Voiding appropriately after bumex  Bowel movement yesterday 5pm.      MEDICATIONS  (STANDING):  allopurinol 100 milliGRAM(s) Oral at bedtime  amLODIPine   Tablet 10 milliGRAM(s) Oral daily  aspirin enteric coated 81 milliGRAM(s) Oral daily  atorvastatin 40 milliGRAM(s) Oral at bedtime  buMETAnide Injectable 2 milliGRAM(s) IV Push two times a day  carvedilol 12.5 milliGRAM(s) Oral every 12 hours  chlorhexidine 2% Cloths 1 Application(s) Topical <User Schedule>  dextrose 5%. 1000 milliLiter(s) (100 mL/Hr) IV Continuous <Continuous>  dextrose 5%. 1000 milliLiter(s) (50 mL/Hr) IV Continuous <Continuous>  dextrose 50% Injectable 25 Gram(s) IV Push once  dextrose 50% Injectable 12.5 Gram(s) IV Push once  dextrose 50% Injectable 25 Gram(s) IV Push once  enoxaparin Injectable 40 milliGRAM(s) SubCutaneous every 24 hours  gabapentin 300 milliGRAM(s) Oral four times a day  glucagon  Injectable 1 milliGRAM(s) IntraMuscular once  insulin lispro (ADMELOG) corrective regimen sliding scale   SubCutaneous three times a day before meals  insulin lispro (ADMELOG) corrective regimen sliding scale   SubCutaneous at bedtime  losartan 100 milliGRAM(s) Oral daily  multivitamin 1 Tablet(s) Oral daily  pantoprazole    Tablet 40 milliGRAM(s) Oral before breakfast  polyethylene glycol 3350 17 Gram(s) Oral at bedtime  tamsulosin 0.4 milliGRAM(s) Oral at bedtime    MEDICATIONS  (PRN):  acetaminophen     Tablet .. 650 milliGRAM(s) Oral every 6 hours PRN Temp greater or equal to 38C (100.4F), Mild Pain (1 - 3)  dextrose Oral Gel 15 Gram(s) Oral once PRN Blood Glucose LESS THAN 70 milliGRAM(s)/deciliter  lidocaine   4% Patch 1 Patch Transdermal daily PRN back pain  melatonin 3 milliGRAM(s) Oral at bedtime PRN Insomnia  oxyCODONE    IR 2.5 milliGRAM(s) Oral every 4 hours PRN Moderate Pain (4 - 6)  oxyCODONE    IR 5 milliGRAM(s) Oral every 4 hours PRN Severe Pain (7 - 10)      CAPILLARY BLOOD GLUCOSE      POCT Blood Glucose.: 127 mg/dL (22 Mar 2023 21:41)  POCT Blood Glucose.: 203 mg/dL (22 Mar 2023 16:45)  POCT Blood Glucose.: 147 mg/dL (22 Mar 2023 11:43)  POCT Blood Glucose.: 157 mg/dL (22 Mar 2023 08:05)    I&O's Summary    22 Mar 2023 07:01  -  23 Mar 2023 07:00  --------------------------------------------------------  IN: 1240 mL / OUT: 2450 mL / NET: -1210 mL        PHYSICAL EXAM:  Vital Signs Last 24 Hrs  T(C): 36.1 (23 Mar 2023 04:50), Max: 36.8 (22 Mar 2023 21:17)  T(F): 96.9 (23 Mar 2023 04:50), Max: 98.3 (22 Mar 2023 21:17)  HR: 70 (23 Mar 2023 06:00) (63 - 83)  BP: 125/73 (23 Mar 2023 05:10) (109/64 - 125/73)  BP(mean): --  RR: 18 (23 Mar 2023 04:50) (18 - 18)  SpO2: 97% (23 Mar 2023 06:00) (93% - 99%)    Parameters below as of 23 Mar 2023 04:50  Patient On (Oxygen Delivery Method): BiPAP/CPAP      GENERAL: NAD, obese man, sitting comfortably in chair  HEAD: Atraumatic, normocephalic  EYES: EOMI b/l, conjunctiva and sclera clear  NECK: Supple  RESPIRATORY: Normal respiratory effort; lungs are clear to auscultation bilaterally. No wheeze, rales, rhonchi  CARDIOVASCULAR: Regular rate and rhythm, normal S1 and S2, no murmur/rub/gallop; No lower extremity edema  ABDOMEN: Soft, Nondistended, Nontender to palpation, normoactive bowel sounds, no rebound/guarding  MUSCLOSKELETAL: no clubbing or cyanosis of digits; no joint swelling or tenderness to palpation  NEURO: Non focal   PSYCH: A+O to person, place, and time; affect appropriate    LABS:                        14.1   6.74  )-----------( 181      ( 22 Mar 2023 07:03 )             41.2     03-22    140  |  99  |  13  ----------------------------<  128<H>  3.1<L>   |  28  |  0.48<L>    Ca    9.1      22 Mar 2023 07:03  Phos  3.4     03-22  Mg     1.8     03-22                  Tele Reviewed:    RADIOLOGY & ADDITIONAL TESTS:  Results Reviewed:   Imaging Personally Reviewed:  Electrocardiogram Personally Reviewed:    COORDINATION OF CARE:  Care Discussed with Consultants/Other Providers [Y/N]:  Prior or Outpatient Records Reviewed [Y/N]:   PROGRESS NOTE:   Authored by Chris Patel, MS3    Patient is a 67y old  Male who presents with a chief complaint of Shortness of breath (23 Mar 2023 07:15)      SUBJECTIVE / OVERNIGHT EVENTS:  No acute overnight events.  Improvement of overall symptoms. Was able to lay flat 45mins without orthopnea and even fell asleep on room air.  No back pain, lidocaine patch fell off  Voiding appropriately after bumex  Bowel movement yesterday 5pm.      MEDICATIONS  (STANDING):  allopurinol 100 milliGRAM(s) Oral at bedtime  amLODIPine   Tablet 10 milliGRAM(s) Oral daily  aspirin enteric coated 81 milliGRAM(s) Oral daily  atorvastatin 40 milliGRAM(s) Oral at bedtime  buMETAnide Injectable 2 milliGRAM(s) IV Push two times a day  carvedilol 12.5 milliGRAM(s) Oral every 12 hours  chlorhexidine 2% Cloths 1 Application(s) Topical <User Schedule>  dextrose 5%. 1000 milliLiter(s) (100 mL/Hr) IV Continuous <Continuous>  dextrose 5%. 1000 milliLiter(s) (50 mL/Hr) IV Continuous <Continuous>  dextrose 50% Injectable 25 Gram(s) IV Push once  dextrose 50% Injectable 12.5 Gram(s) IV Push once  dextrose 50% Injectable 25 Gram(s) IV Push once  enoxaparin Injectable 40 milliGRAM(s) SubCutaneous every 24 hours  gabapentin 300 milliGRAM(s) Oral four times a day  glucagon  Injectable 1 milliGRAM(s) IntraMuscular once  insulin lispro (ADMELOG) corrective regimen sliding scale   SubCutaneous three times a day before meals  insulin lispro (ADMELOG) corrective regimen sliding scale   SubCutaneous at bedtime  losartan 100 milliGRAM(s) Oral daily  multivitamin 1 Tablet(s) Oral daily  pantoprazole    Tablet 40 milliGRAM(s) Oral before breakfast  polyethylene glycol 3350 17 Gram(s) Oral at bedtime  tamsulosin 0.4 milliGRAM(s) Oral at bedtime    MEDICATIONS  (PRN):  acetaminophen     Tablet .. 650 milliGRAM(s) Oral every 6 hours PRN Temp greater or equal to 38C (100.4F), Mild Pain (1 - 3)  dextrose Oral Gel 15 Gram(s) Oral once PRN Blood Glucose LESS THAN 70 milliGRAM(s)/deciliter  lidocaine   4% Patch 1 Patch Transdermal daily PRN back pain  melatonin 3 milliGRAM(s) Oral at bedtime PRN Insomnia  oxyCODONE    IR 2.5 milliGRAM(s) Oral every 4 hours PRN Moderate Pain (4 - 6)  oxyCODONE    IR 5 milliGRAM(s) Oral every 4 hours PRN Severe Pain (7 - 10)      CAPILLARY BLOOD GLUCOSE      POCT Blood Glucose.: 127 mg/dL (22 Mar 2023 21:41)  POCT Blood Glucose.: 203 mg/dL (22 Mar 2023 16:45)  POCT Blood Glucose.: 147 mg/dL (22 Mar 2023 11:43)  POCT Blood Glucose.: 157 mg/dL (22 Mar 2023 08:05)    I&O's Summary    22 Mar 2023 07:01  -  23 Mar 2023 07:00  --------------------------------------------------------  IN: 1240 mL / OUT: 2450 mL / NET: -1210 mL        PHYSICAL EXAM:  Vital Signs Last 24 Hrs  T(C): 36.1 (23 Mar 2023 04:50), Max: 36.8 (22 Mar 2023 21:17)  T(F): 96.9 (23 Mar 2023 04:50), Max: 98.3 (22 Mar 2023 21:17)  HR: 70 (23 Mar 2023 06:00) (63 - 83)  BP: 125/73 (23 Mar 2023 05:10) (109/64 - 125/73)  BP(mean): --  RR: 18 (23 Mar 2023 04:50) (18 - 18)  SpO2: 97% (23 Mar 2023 06:00) (93% - 99%)    Parameters below as of 23 Mar 2023 04:50  Patient On (Oxygen Delivery Method): BiPAP/CPAP      GENERAL: NAD, obese man, sitting comfortably in chair  HEAD: Atraumatic, normocephalic  EYES: EOMI b/l, conjunctiva and sclera clear  NECK: Supple  RESPIRATORY: Normal respiratory effort; lungs are clear to auscultation bilaterally. No wheeze, rales, rhonchi  CARDIOVASCULAR: Regular rate and rhythm, normal S1 and S2, no murmur/rub/gallop; No lower extremity edema  ABDOMEN: Soft, Nondistended, Nontender to palpation, normoactive bowel sounds, no rebound/guarding  MUSCLOSKELETAL: no clubbing or cyanosis of digits; no joint swelling or tenderness to palpation  NEURO: Non focal   PSYCH: A+O to person, place, and time; affect appropriate    LABS:                        14.1   6.74  )-----------( 181      ( 22 Mar 2023 07:03 )             41.2     03-22    140  |  99  |  13  ----------------------------<  128<H>  3.1<L>   |  28  |  0.48<L>    Ca    9.1      22 Mar 2023 07:03  Phos  3.4     03-22  Mg     1.8     03-22                  Tele Reviewed:    RADIOLOGY & ADDITIONAL TESTS:  Results Reviewed:   Imaging Personally Reviewed:  Electrocardiogram Personally Reviewed:    COORDINATION OF CARE:  Care Discussed with Consultants/Other Providers [Y/N]:  Prior or Outpatient Records Reviewed [Y/N]:

## 2023-03-23 NOTE — PROGRESS NOTE ADULT - ATTENDING COMMENTS
67M with PMH CAD s/p CABG, presumed dCHF on bumex, HTN, HLD, DM, gout, carpal tunnel, ASIYA on CPAP, p/w SOB with associated CP, orthopnea and weight gain after undergoing carpal tunnel release, a/w acute on chronic diastolic HF. Found on imaging to have R renal exophytic mass c/f malignancy.   Significantly improved orthopnea, able to tolerate laying flat >1hr at a time.     #acute on chronic diastolic HF   #UTI  #renal mass   #CAD s/p CABG  #HTN  #DM     - TTE: Endocardium not well visualized; grossly normal left ventricular systolic function. symptomatically much better, will switch to home oral bumex dose   - UA positive, Cx + citrobacter koseri --> completed CTX x 3d   - CT showing exophytic renal mass, confirmed on US with c/f renal cell ca - urology consulted, recs appreciated: plan for outpt resection once medically optimized.   - c/w ASA, statin  - c/w BB and ARB, c/w norvasc  - CPAP nightly.     plan to d/c likely tomorrow if remains stable on oral diuretics

## 2023-03-23 NOTE — DIETITIAN INITIAL EVALUATION ADULT - REASON FOR ADMISSION
Dyspnea    Per chart: 67M w/ hx CHF on home bumex 2mg BID, CAD s/p CABG, ASIYA on CPAP, carpal tunnel s/p recent surgery in L arm, HTN, HLD, DM, gout, admitted for HF exacerbation.

## 2023-03-23 NOTE — PROGRESS NOTE ADULT - PROBLEM SELECTOR PLAN 3
Lactate 3.1 3/21 AM. Unclear what this represents, patient without symptoms of sepsis, L arm appears to be healing appropriately, renal mass unlikely to cause this. Repeat lactate in PM 1.9 (wnl), suspect prior number to be measurement error. Wnl on 3/22. Will CTM.

## 2023-03-23 NOTE — PROGRESS NOTE ADULT - PROBLEM SELECTOR PLAN 7
Patient endorses neuropathy in lower extremities. A1c 7.2 02/2023  - hold home glimepiride   - ISS premeal and qhs  - c/w gabapentin Pt w/ CAD s/p CABG   c/w ASA, amlodipine, coreg, losartan

## 2023-03-23 NOTE — PROGRESS NOTE ADULT - PROBLEM SELECTOR PLAN 1
Acute worsening SOB with substernal chest tightness, worsening orthopnea, 6 pound weight gain, slight cough w. clear sputum production concerning for acute decompensated HF vs angina vs diuretic resistance. Of note recently underwent procedure (fluid retention may be postanesthesia effects), also started percocet which has a Class C interaction w/ diuretics (may reduce efficacy). Less likely acute ischemic process vs PNA.   - TTE 3/21 w/ mild aortic root dilation; grossly normal LVSF; other structures poorly visualized  - MUGA scan 3/2021 w/ normal resting LV EF of 57 % and normal right and left ventricular wall motion  - CTA Chest in ED w/o significant findings   - Pro-, no troponin elevation or ischemic EKG changes  - c/w bumex 2 IV BID for now  - Daily weights, strict I/O Acute worsening SOB with substernal chest tightness, worsening orthopnea, 6 pound weight gain, slight cough w. clear sputum production concerning for acute decompensated HF vs angina vs diuretic resistance. Of note recently underwent procedure (fluid retention may be postanesthesia effects or decreased bioavailability of PO bumex), also started percocet which has a Class C interaction w/ diuretics (may reduce efficacy). Less likely acute ischemic process vs PNA.   - TTE 3/21 w/ mild aortic root dilation; grossly normal LVSF; other structures poorly visualized  - MUGA scan 3/2021 w/ normal resting LV EF of 57 % and normal right and left ventricular wall motion  - CTA Chest in ED w/o significant findings   - Pro-, no troponin elevation or ischemic EKG changes  - transition to bumex 2mg PO BID (home dose)  - Daily weights, strict I/O

## 2023-03-23 NOTE — PROGRESS NOTE ADULT - PROBLEM SELECTOR PLAN 6
SBPs appropriate  c/w home carvedilol, losartan, amlodipine Patient endorses neuropathy in lower extremities. A1c 7.2 02/2023  - hold home glimepiride   - ISS premeal and qhs  - c/w gabapentin

## 2023-03-23 NOTE — PROGRESS NOTE ADULT - ASSESSMENT
67M w/ hx CHF on home bumex 2mg BID, CAD s/p CABG, ASIYA on CPAP, carpal tunnel s/p recent surgery in L arm, HTN, HLD, DM, gout, admitted for HF exacerbation. Imaging w/ incidental findings concerning for renal malignancy. 67M w/ hx CHF on home bumex 2mg BID, CAD s/p CABG, ASIYA on CPAP, carpal tunnel s/p recent surgery in L arm, HTN, HLD, DM, gout, admitted for CHF exacerbation most likely 2/2 diuretic resistance post general anesthesia. Imaging w/ incidental findings concerning for renal malignancy.

## 2023-03-23 NOTE — PROGRESS NOTE ADULT - PROBLEM SELECTOR PLAN 8
Pt w/ CAD s/p CABG   c/w ASA, amlodipine, coreg, losartan s/p carpal tunnel release on 3/17  - recovering appropriately   - oxy 2.5/5mg Q4H PRN for moderate/severe pain; hold home percocet

## 2023-03-23 NOTE — DIETITIAN INITIAL EVALUATION ADULT - ORAL INTAKE PTA/DIET HISTORY
Pt reports good/normal appetite and PO intake PTA; reports consuming regular foods at home and denies following any therapeutic diet. Pt confirms NKFA.

## 2023-03-23 NOTE — DIETITIAN INITIAL EVALUATION ADULT - PERTINENT LABORATORY DATA
03-23    139  |  99  |  15  ----------------------------<  157<H>  3.4<L>   |  26  |  0.54    Ca    9.4      23 Mar 2023 07:36  Phos  3.3     03-23  Mg     1.9     03-23    POCT Blood Glucose.: 208 mg/dL (03-23-23 @ 11:28)  A1C with Estimated Average Glucose Result: 7.2 % (03-21-23 @ 07:05)  A1C with Estimated Average Glucose Result: 7.2 % (02-16-23 @ 08:15)  A1C with Estimated Average Glucose Result: 6.0 % (06-19-22 @ 07:50)

## 2023-03-23 NOTE — DIETITIAN NUTRITION RISK NOTIFICATION - TREATMENT: THE FOLLOWING DIET HAS BEEN RECOMMENDED
Diet, Consistent Carbohydrate Renal w/Evening Snack:   DASH/TLC {Sodium & Cholesterol Restricted} (DASH) (03-20-23 @ 09:00) [Active]

## 2023-03-23 NOTE — DIETITIAN INITIAL EVALUATION ADULT - PERSON TAUGHT/METHOD
Reviewed CHF diet education. Discussed Na restriction, foods high in Na to avoid, reading food labels, tips for limiting Na in your diet. Reviewed daily weights, Wt gain parameters to contact MD. Discussed Na intake in relation to fluid retention, edema and Wt gain. Pt verbalized understanding and accepted provided handouts. Pt made aware RD remains available for diet education review./verbal instruction/written material/patient instructed

## 2023-03-23 NOTE — PROGRESS NOTE ADULT - PROBLEM SELECTOR PLAN 5
Pt w/ ASIYA, uses CPAP at home. Does not know settings. On prior admission was set at 10. Trialed CPAP 5, mildly hypercarbic.  - continue CPAP 6 SBPs appropriate  c/w home carvedilol, losartan, amlodipine

## 2023-03-23 NOTE — DIETITIAN INITIAL EVALUATION ADULT - ADD RECOMMEND
1) Continue current diet order as tolerated: Consistent Carbohydrate, DASH. Defer texture/consistency to SLP/team.   2) Continue providing Multivitamin daily to prevent micronutrient deficiencies pending no medical contraindications.  3) Continue to monitor PO intake, weight, labs, skin, GI status, and diet.  4) RD remains available for diet education/reinforcement PRN.  5) BMI sticker placed in chart.   6) CHF STAR sticker placed in chart.

## 2023-03-23 NOTE — DIETITIAN INITIAL EVALUATION ADULT - PERTINENT MEDS FT
MEDICATIONS  (STANDING):  allopurinol 100 milliGRAM(s) Oral at bedtime  amLODIPine   Tablet 10 milliGRAM(s) Oral daily  aspirin enteric coated 81 milliGRAM(s) Oral daily  atorvastatin 40 milliGRAM(s) Oral at bedtime  buMETAnide 2 milliGRAM(s) Oral two times a day  carvedilol 12.5 milliGRAM(s) Oral every 12 hours  chlorhexidine 2% Cloths 1 Application(s) Topical <User Schedule>  dextrose 5%. 1000 milliLiter(s) (100 mL/Hr) IV Continuous <Continuous>  dextrose 5%. 1000 milliLiter(s) (50 mL/Hr) IV Continuous <Continuous>  dextrose 50% Injectable 25 Gram(s) IV Push once  dextrose 50% Injectable 12.5 Gram(s) IV Push once  dextrose 50% Injectable 25 Gram(s) IV Push once  enoxaparin Injectable 40 milliGRAM(s) SubCutaneous every 24 hours  gabapentin 300 milliGRAM(s) Oral four times a day  glucagon  Injectable 1 milliGRAM(s) IntraMuscular once  insulin lispro (ADMELOG) corrective regimen sliding scale   SubCutaneous three times a day before meals  insulin lispro (ADMELOG) corrective regimen sliding scale   SubCutaneous at bedtime  losartan 100 milliGRAM(s) Oral daily  multivitamin 1 Tablet(s) Oral daily  pantoprazole    Tablet 40 milliGRAM(s) Oral before breakfast  polyethylene glycol 3350 17 Gram(s) Oral at bedtime  tamsulosin 0.4 milliGRAM(s) Oral at bedtime    MEDICATIONS  (PRN):  acetaminophen     Tablet .. 650 milliGRAM(s) Oral every 6 hours PRN Temp greater or equal to 38C (100.4F), Mild Pain (1 - 3)  dextrose Oral Gel 15 Gram(s) Oral once PRN Blood Glucose LESS THAN 70 milliGRAM(s)/deciliter  lidocaine   4% Patch 1 Patch Transdermal daily PRN back pain  melatonin 3 milliGRAM(s) Oral at bedtime PRN Insomnia  oxyCODONE    IR 2.5 milliGRAM(s) Oral every 4 hours PRN Moderate Pain (4 - 6)  oxyCODONE    IR 5 milliGRAM(s) Oral every 4 hours PRN Severe Pain (7 - 10)

## 2023-03-23 NOTE — PROGRESS NOTE ADULT - SUBJECTIVE AND OBJECTIVE BOX
Internal Medicine   Jose Albrecht | PGY-1    OVERNIGHT EVENTS: No acute overnight events.    SUBJECTIVE: Symptoms improved, was able to lay flat yesterday PM and this AM without feeling short of breath or chest tightness. No fevers, chest pain.    MEDICATIONS  (STANDING):  allopurinol 100 milliGRAM(s) Oral at bedtime  amLODIPine   Tablet 10 milliGRAM(s) Oral daily  aspirin enteric coated 81 milliGRAM(s) Oral daily  atorvastatin 40 milliGRAM(s) Oral at bedtime  buMETAnide 2 milliGRAM(s) Oral two times a day  carvedilol 12.5 milliGRAM(s) Oral every 12 hours  chlorhexidine 2% Cloths 1 Application(s) Topical <User Schedule>  dextrose 5%. 1000 milliLiter(s) (100 mL/Hr) IV Continuous <Continuous>  dextrose 5%. 1000 milliLiter(s) (50 mL/Hr) IV Continuous <Continuous>  dextrose 50% Injectable 25 Gram(s) IV Push once  dextrose 50% Injectable 12.5 Gram(s) IV Push once  dextrose 50% Injectable 25 Gram(s) IV Push once  enoxaparin Injectable 40 milliGRAM(s) SubCutaneous every 24 hours  gabapentin 300 milliGRAM(s) Oral four times a day  glucagon  Injectable 1 milliGRAM(s) IntraMuscular once  insulin lispro (ADMELOG) corrective regimen sliding scale   SubCutaneous three times a day before meals  insulin lispro (ADMELOG) corrective regimen sliding scale   SubCutaneous at bedtime  losartan 100 milliGRAM(s) Oral daily  multivitamin 1 Tablet(s) Oral daily  pantoprazole    Tablet 40 milliGRAM(s) Oral before breakfast  polyethylene glycol 3350 17 Gram(s) Oral at bedtime  tamsulosin 0.4 milliGRAM(s) Oral at bedtime    MEDICATIONS  (PRN):  acetaminophen     Tablet .. 650 milliGRAM(s) Oral every 6 hours PRN Temp greater or equal to 38C (100.4F), Mild Pain (1 - 3)  dextrose Oral Gel 15 Gram(s) Oral once PRN Blood Glucose LESS THAN 70 milliGRAM(s)/deciliter  lidocaine   4% Patch 1 Patch Transdermal daily PRN back pain  melatonin 3 milliGRAM(s) Oral at bedtime PRN Insomnia  oxyCODONE    IR 5 milliGRAM(s) Oral every 4 hours PRN Severe Pain (7 - 10)  oxyCODONE    IR 2.5 milliGRAM(s) Oral every 4 hours PRN Moderate Pain (4 - 6)    VITALS:  T(F): 97.5 (03-23-23 @ 11:04), Max: 98.3 (03-22-23 @ 21:17)  HR: 76 (03-23-23 @ 17:42) (64 - 76)  BP: 114/71 (03-23-23 @ 17:42) (101/65 - 125/73)  BP(mean): --  RR: 18 (03-23-23 @ 11:04) (18 - 18)  SpO2: 96% (03-23-23 @ 15:14) (95% - 99%)    PHYSICAL EXAM:   GENERAL: NAD, obese, resting comfortably in chair, alert and conversational  HEAD: Atraumatic, normocephalic  EYES: EOMI, conjunctiva and sclera clear, no nystagmus noted  ENT: Moist mucous membranes  CHEST/LUNG: Clear to auscultation bilaterally; no rales, rhonchi, wheezing, or rubs; unlabored respirations  HEART: Difficult auscultation given habitus; regular rate and rhythm; no murmurs, rubs, or gallops, normal S1/S2  ABDOMEN: Normal bowel sounds; soft, nontender, nondistended  EXTREMITIES: No pitting edema in bilateral ankles; no clubbing or cyanosis  MSK: No gross deformities noted   NEURO: No focal deficits   SKIN: No rashes or lesions  PSYCH: Normal mood, affect     LABS:                      14.4   7.79  )-----------( 202      ( 23 Mar 2023 07:34 )             42.2     03-23  139  |  99  |  15  ----------------------------<  157<H>  3.4<L>   |  26  |  0.54    Ca    9.4      23 Mar 2023 07:36  Phos  3.3     03-23  Mg     1.9     03-23    Creatinine Trend: 0.54<--, 0.48<--, 0.54<--, 0.65<--    I&O's Summary  22 Mar 2023 07:01  -  23 Mar 2023 07:00  --------------------------------------------------------  IN: 1240 mL / OUT: 2450 mL / NET: -1210 mL    23 Mar 2023 07:01  -  23 Mar 2023 19:23  --------------------------------------------------------  IN: 1480 mL / OUT: 1425 mL / NET: 55 mL

## 2023-03-23 NOTE — DIETITIAN INITIAL EVALUATION ADULT - NSFNSADHERENCEPTAFT_GEN_A_CORE
Pt denies following any therapeutic diet at home.   Per pt, at-home Medications for DM: Glimepiride.   A1c 7.2% (3/21) - indicates suboptimal glycemic control.   Per H&P: CHF on Bumex at home.

## 2023-03-23 NOTE — PROGRESS NOTE ADULT - PROBLEM SELECTOR PLAN 4
CT findings with 3.2 x 2.2 cm indeterminate exophytic lesion arising from lower pole right kidney coronal 4-62 which appears slightly larger compared to prior exam, suspicious for malignancy. Renal US findings consistent w/ RCC.  - findings discussed w/ patient  - urology consulted for further workup/intervention, anticipate expedited outpatient follow-up Pt w/ ASIYA, uses CPAP at home. Does not know settings. On prior admission was set at 10. Trialed CPAP 5, mildly hypercarbic.  - continue CPAP 6

## 2023-03-23 NOTE — PROGRESS NOTE ADULT - PROBLEM SELECTOR PLAN 3
Lactate 3.1 3/21 AM. Unclear what this represents, patient without symptoms of sepsis, L arm appears to be healing appropriately, renal mass unlikely to cause this. Repeat lactate in PM 1.9 (wnl), suspect prior number to be measurement error. Wnl on 3/22. Will CTM. Pt w/ reported decreased urination w/o dysuria, hematuria. Improved. CTAP and renal US w/o evidence of obstruction. UA weakly positive w/ 11 WBC and few bacteria.  - UCx growing citrobacter  - s/p course of CTX (3/19-21)  - continue flomax

## 2023-03-23 NOTE — PROGRESS NOTE ADULT - PROBLEM SELECTOR PLAN 4
CT findings with 3.2 x 2.2 cm indeterminate exophytic lesion arising from lower pole right kidney coronal 4-62 which appears slightly larger compared to prior exam, suspicious for malignancy. Renal US findings consistent w/ RCC.  - findings discussed w/ patient  - urology consulted for further workup/intervention, anticipate expedited outpatient follow-up CT findings with 3.2 x 2.2 cm indeterminate exophytic lesion arising from lower pole right kidney coronal 4-62 which appears slightly larger compared to prior exam, suspicious for malignancy. Renal US findings consistent w/ RCC.  - findings discussed w/ patient  - urology consulted for further workup/intervention, recommended outpatient followup    - we will attempt to arrange for expedited followup w/ urology, cardiology, PCP

## 2023-03-23 NOTE — CHART NOTE - NSCHARTNOTEFT_GEN_A_CORE
RD CHF STAR education chart note    Patient was visited by RD for CHF education. Heart failure education provided to the patient in detail. Discussed heart failure nutrition therapy, sodium and fluid intake, importance of diet adherence, and daily weight monitoring with the patient. Reinforced importance of weight gain parameters and importance of contacting MD’s about weight changes. Provided handouts on heart failure nutrition therapy, reading heart healthy nutrition labels, heart healthy shopping tips and low sodium food list. Patient verbalized understanding demonstrated by teach back method. RD contact information left with patient for any future questioning.     RD remains available upon request and will follow up per protocol.   Brooklyn Toledo RDN, CDN #[phone] / preferred TEAMS

## 2023-03-23 NOTE — PROGRESS NOTE ADULT - PROBLEM SELECTOR PLAN 2
Pt w/ reported decreased urination w/o dysuria, hematuria. Improved. CTAP and renal US w/o evidence of obstruction. UA weakly positive w/ 11 WBC and few bacteria.  - UCx growing citrobacter  - s/p course of CTX (3/19-21)  - continue flomax CT findings with 3.2 x 2.2 cm indeterminate exophytic lesion arising from lower pole right kidney coronal 4-62 which appears slightly larger compared to prior exam, suspicious for malignancy. Renal US findings consistent w/ RCC.  - findings discussed w/ patient  - urology consulted for further workup/intervention, anticipate expedited outpatient follow-up  - lidocaine patch order for back pain CT findings with 3.2 x 2.2 cm indeterminate exophytic lesion arising from lower pole right kidney coronal 4-62 which appears slightly larger compared to prior exam, suspicious for malignancy. Renal US findings consistent w/ RCC.  - findings discussed w/ patient  - urology consulted for further workup/intervention, anticipate expedited outpatient follow-up  - lidocaine patch order for back pain  - urology, cardiology (for clearance), PCP f/u CT findings with 3.2 x 2.2 cm indeterminate exophytic lesion arising from lower pole right kidney coronal 4-62 which appears slightly larger compared to prior exam, suspicious for malignancy. Renal US findings consistent w/ RCC.  - findings discussed w/ patient  - urology consulted for further workup/intervention, anticipate expedited outpatient follow-up  - lidocaine patch order for back pain  - urology, cardiology (for clearance), PCP outpatient f/u

## 2023-03-23 NOTE — DIETITIAN INITIAL EVALUATION ADULT - REASON INDICATOR FOR ASSESSMENT
Nutrition Consult for CHF STAR education.   Source: Pt, Electronic Medical Record.   Chart reviewed, events noted.

## 2023-03-23 NOTE — PROGRESS NOTE ADULT - PROBLEM SELECTOR PLAN 10
Gout prevention: C/w allopurinol   C/w home multivitamins  Diet: consistent carb  DVT: lovenox   Dispo: continue mgmt on floor    Code Status: FULL CODE Gout prevention: C/w allopurinol   C/w home multivitamins  Diet: consistent carb  DVT: lovenox   Dispo: anticipate d/c soon given improvement of sx    Code Status: FULL CODE

## 2023-03-23 NOTE — PROGRESS NOTE ADULT - PROBLEM SELECTOR PLAN 9
s/p carpal tunnel release on 3/17  - recovering appropriately   - oxy 2.5/5mg Q4H PRN for moderate/severe pain; hold home percocet Gout prevention: C/w allopurinol   C/w home multivitamins  Diet: consistent carb  DVT: lovenox   Dispo: tomorrow pending no complications to PO bumex    Code Status: FULL CODE

## 2023-03-23 NOTE — PROGRESS NOTE ADULT - PROBLEM SELECTOR PLAN 2
Pt w/ reported decreased urination w/o dysuria, hematuria. Improved. CTAP and renal US w/o evidence of obstruction. UA weakly positive w/ 11 WBC and few bacteria.  - UCx growing citrobacter  - s/p course of CTX (3/19-21)  - continue flomax

## 2023-03-23 NOTE — PROGRESS NOTE ADULT - PROBLEM SELECTOR PLAN 5
Pt w/ ASIYA, uses CPAP at home. Does not know settings. On prior admission was set at 10. Trialed CPAP 5, mildly hypercarbic.  - continue CPAP 6

## 2023-03-23 NOTE — PROGRESS NOTE ADULT - PROBLEM SELECTOR PLAN 1
Acute worsening SOB with substernal chest tightness, worsening orthopnea, 6 pound weight gain, slight cough w. clear sputum production concerning for acute decompensated HF vs angina vs diuretic resistance. Of note recently underwent procedure (fluid retention may be postanesthesia effects), also started percocet which has a Class C interaction w/ diuretics (may reduce efficacy). Less likely acute ischemic process vs PNA.   - TTE 3/21 w/ mild aortic root dilation; grossly normal LVSF; other structures poorly visualized  - MUGA scan 3/2021 w/ normal resting LV EF of 57 % and normal right and left ventricular wall motion  - CTA Chest in ED w/o significant findings   - Pro-, no troponin elevation or ischemic EKG changes  - c/w bumex 2 IV BID for now  - Daily weights, strict I/O Acute worsening SOB with substernal chest tightness, worsening orthopnea, 6 pound weight gain, slight cough w. clear sputum production concerning for acute decompensated HF vs angina vs diuretic resistance. Of note recently underwent procedure (fluid retention may be postanesthesia effects), also started percocet which has a Class C interaction w/ diuretics (may reduce efficacy). Less likely acute ischemic process vs PNA.   - TTE 3/21 w/ mild aortic root dilation; grossly normal LVSF; other structures poorly visualized  - MUGA scan 3/2021 w/ normal resting LV EF of 57 % and normal right and left ventricular wall motion  - CTA Chest in ED w/o significant findings   - Pro-, no troponin elevation or ischemic EKG changes  - change bumex 2 IV BID to PO bumex 2mg  - Daily weights, strict I/O  - 40 mg oral K given for mild hypokalemia

## 2023-03-23 NOTE — DIETITIAN INITIAL EVALUATION ADULT - OTHER INFO
Per pt, UBW: ~380 pounds   Dosing wt: 387 pounds (3/19)  Wt hx per chart in pounds (all standing wts): 376.7 (3/21), 373.9 (3/22), 375.4 (3/23).    Weight fluctuations possible in setting of fluid shifts (pt prescribed diuretic and with CHF). Pt reports fluid retention/weight gain upon admission. RD to continue to monitor weight trends as able.   Nutritionally Pertinent Meds in-house: IVF, SSI, protonix, atorvastatin, Bumex, Multivitamin.   Nutritionally Pertinent Labs: high POCT; low K; high BG - being addressed with insulin regimen and PO diet; A1c 7.2% (3/21) - indicates suboptimal glycemic control.   - Cardio: CHF on home Bumex.   - Neph: renal mass suspicious for malignancy.

## 2023-03-24 ENCOUNTER — TRANSCRIPTION ENCOUNTER (OUTPATIENT)
Age: 68
End: 2023-03-24

## 2023-03-24 DIAGNOSIS — R07.9 CHEST PAIN, UNSPECIFIED: ICD-10-CM

## 2023-03-24 LAB
ANION GAP SERPL CALC-SCNC: 12 MMOL/L — SIGNIFICANT CHANGE UP (ref 5–17)
BUN SERPL-MCNC: 16 MG/DL — SIGNIFICANT CHANGE UP (ref 7–23)
CALCIUM SERPL-MCNC: 8.9 MG/DL — SIGNIFICANT CHANGE UP (ref 8.4–10.5)
CHLORIDE SERPL-SCNC: 103 MMOL/L — SIGNIFICANT CHANGE UP (ref 96–108)
CO2 SERPL-SCNC: 26 MMOL/L — SIGNIFICANT CHANGE UP (ref 22–31)
CREAT SERPL-MCNC: 0.5 MG/DL — SIGNIFICANT CHANGE UP (ref 0.5–1.3)
EGFR: 112 ML/MIN/1.73M2 — SIGNIFICANT CHANGE UP
GLUCOSE BLDC GLUCOMTR-MCNC: 126 MG/DL — HIGH (ref 70–99)
GLUCOSE BLDC GLUCOMTR-MCNC: 143 MG/DL — HIGH (ref 70–99)
GLUCOSE BLDC GLUCOMTR-MCNC: 152 MG/DL — HIGH (ref 70–99)
GLUCOSE BLDC GLUCOMTR-MCNC: 216 MG/DL — HIGH (ref 70–99)
GLUCOSE SERPL-MCNC: 127 MG/DL — HIGH (ref 70–99)
HCT VFR BLD CALC: 39.1 % — SIGNIFICANT CHANGE UP (ref 39–50)
HGB BLD-MCNC: 13.4 G/DL — SIGNIFICANT CHANGE UP (ref 13–17)
MAGNESIUM SERPL-MCNC: 1.8 MG/DL — SIGNIFICANT CHANGE UP (ref 1.6–2.6)
MCHC RBC-ENTMCNC: 29.5 PG — SIGNIFICANT CHANGE UP (ref 27–34)
MCHC RBC-ENTMCNC: 34.3 GM/DL — SIGNIFICANT CHANGE UP (ref 32–36)
MCV RBC AUTO: 86.1 FL — SIGNIFICANT CHANGE UP (ref 80–100)
NRBC # BLD: 0 /100 WBCS — SIGNIFICANT CHANGE UP (ref 0–0)
PHOSPHATE SERPL-MCNC: 3.2 MG/DL — SIGNIFICANT CHANGE UP (ref 2.5–4.5)
PLATELET # BLD AUTO: 174 K/UL — SIGNIFICANT CHANGE UP (ref 150–400)
POTASSIUM SERPL-MCNC: 3.4 MMOL/L — LOW (ref 3.5–5.3)
POTASSIUM SERPL-SCNC: 3.4 MMOL/L — LOW (ref 3.5–5.3)
RBC # BLD: 4.54 M/UL — SIGNIFICANT CHANGE UP (ref 4.2–5.8)
RBC # FLD: 13.3 % — SIGNIFICANT CHANGE UP (ref 10.3–14.5)
SODIUM SERPL-SCNC: 141 MMOL/L — SIGNIFICANT CHANGE UP (ref 135–145)
WBC # BLD: 7.34 K/UL — SIGNIFICANT CHANGE UP (ref 3.8–10.5)
WBC # FLD AUTO: 7.34 K/UL — SIGNIFICANT CHANGE UP (ref 3.8–10.5)

## 2023-03-24 PROCEDURE — 99232 SBSQ HOSP IP/OBS MODERATE 35: CPT

## 2023-03-24 RX ORDER — POTASSIUM CHLORIDE 20 MEQ
40 PACKET (EA) ORAL ONCE
Refills: 0 | Status: COMPLETED | OUTPATIENT
Start: 2023-03-24 | End: 2023-03-24

## 2023-03-24 RX ORDER — ACETAMINOPHEN 500 MG
2 TABLET ORAL
Qty: 0 | Refills: 0 | DISCHARGE
Start: 2023-03-24

## 2023-03-24 RX ORDER — PANTOPRAZOLE SODIUM 20 MG/1
1 TABLET, DELAYED RELEASE ORAL
Qty: 0 | Refills: 0 | DISCHARGE
Start: 2023-03-24

## 2023-03-24 RX ORDER — BUMETANIDE 0.25 MG/ML
2 INJECTION INTRAMUSCULAR; INTRAVENOUS
Refills: 0 | Status: DISCONTINUED | OUTPATIENT
Start: 2023-03-25 | End: 2023-03-25

## 2023-03-24 RX ORDER — MAGNESIUM SULFATE 500 MG/ML
2 VIAL (ML) INJECTION ONCE
Refills: 0 | Status: COMPLETED | OUTPATIENT
Start: 2023-03-24 | End: 2023-03-24

## 2023-03-24 RX ORDER — BUMETANIDE 0.25 MG/ML
2 INJECTION INTRAMUSCULAR; INTRAVENOUS
Refills: 0 | Status: DISCONTINUED | OUTPATIENT
Start: 2023-03-24 | End: 2023-03-24

## 2023-03-24 RX ORDER — POTASSIUM CHLORIDE 20 MEQ
10 PACKET (EA) ORAL
Refills: 0 | Status: COMPLETED | OUTPATIENT
Start: 2023-03-24 | End: 2023-03-24

## 2023-03-24 RX ORDER — POTASSIUM CHLORIDE 20 MEQ
40 PACKET (EA) ORAL ONCE
Refills: 0 | Status: DISCONTINUED | OUTPATIENT
Start: 2023-03-24 | End: 2023-03-24

## 2023-03-24 RX ORDER — BUMETANIDE 0.25 MG/ML
1 INJECTION INTRAMUSCULAR; INTRAVENOUS
Qty: 30 | Refills: 0
Start: 2023-03-24 | End: 2023-04-22

## 2023-03-24 RX ORDER — BUMETANIDE 0.25 MG/ML
2 INJECTION INTRAMUSCULAR; INTRAVENOUS AT BEDTIME
Refills: 0 | Status: COMPLETED | OUTPATIENT
Start: 2023-03-24 | End: 2023-03-24

## 2023-03-24 RX ORDER — BUMETANIDE 0.25 MG/ML
2 INJECTION INTRAMUSCULAR; INTRAVENOUS
Qty: 60 | Refills: 0
Start: 2023-03-24 | End: 2023-04-22

## 2023-03-24 RX ORDER — CARVEDILOL PHOSPHATE 80 MG/1
1 CAPSULE, EXTENDED RELEASE ORAL
Qty: 0 | Refills: 0 | DISCHARGE
Start: 2023-03-24

## 2023-03-24 RX ORDER — BUMETANIDE 0.25 MG/ML
2 INJECTION INTRAMUSCULAR; INTRAVENOUS ONCE
Refills: 0 | Status: COMPLETED | OUTPATIENT
Start: 2023-03-24 | End: 2023-03-24

## 2023-03-24 RX ORDER — POLYETHYLENE GLYCOL 3350 17 G/17G
17 POWDER, FOR SOLUTION ORAL
Qty: 0 | Refills: 0 | DISCHARGE
Start: 2023-03-24

## 2023-03-24 RX ORDER — TAMSULOSIN HYDROCHLORIDE 0.4 MG/1
1 CAPSULE ORAL
Qty: 0 | Refills: 0 | DISCHARGE
Start: 2023-03-24

## 2023-03-24 RX ADMIN — Medication 100 MILLIGRAM(S): at 22:00

## 2023-03-24 RX ADMIN — Medication 100 MILLIEQUIVALENT(S): at 10:14

## 2023-03-24 RX ADMIN — AMLODIPINE BESYLATE 10 MILLIGRAM(S): 2.5 TABLET ORAL at 06:34

## 2023-03-24 RX ADMIN — GABAPENTIN 300 MILLIGRAM(S): 400 CAPSULE ORAL at 23:00

## 2023-03-24 RX ADMIN — GABAPENTIN 300 MILLIGRAM(S): 400 CAPSULE ORAL at 06:33

## 2023-03-24 RX ADMIN — Medication 25 GRAM(S): at 12:49

## 2023-03-24 RX ADMIN — ENOXAPARIN SODIUM 40 MILLIGRAM(S): 100 INJECTION SUBCUTANEOUS at 06:34

## 2023-03-24 RX ADMIN — Medication 100 MILLIEQUIVALENT(S): at 11:25

## 2023-03-24 RX ADMIN — TAMSULOSIN HYDROCHLORIDE 0.4 MILLIGRAM(S): 0.4 CAPSULE ORAL at 22:00

## 2023-03-24 RX ADMIN — BUMETANIDE 2 MILLIGRAM(S): 0.25 INJECTION INTRAMUSCULAR; INTRAVENOUS at 22:00

## 2023-03-24 RX ADMIN — Medication 100 MILLIEQUIVALENT(S): at 08:44

## 2023-03-24 RX ADMIN — GABAPENTIN 300 MILLIGRAM(S): 400 CAPSULE ORAL at 11:26

## 2023-03-24 RX ADMIN — LOSARTAN POTASSIUM 100 MILLIGRAM(S): 100 TABLET, FILM COATED ORAL at 06:34

## 2023-03-24 RX ADMIN — PANTOPRAZOLE SODIUM 40 MILLIGRAM(S): 20 TABLET, DELAYED RELEASE ORAL at 06:33

## 2023-03-24 RX ADMIN — Medication 2: at 16:55

## 2023-03-24 RX ADMIN — Medication 81 MILLIGRAM(S): at 11:26

## 2023-03-24 RX ADMIN — CHLORHEXIDINE GLUCONATE 1 APPLICATION(S): 213 SOLUTION TOPICAL at 07:53

## 2023-03-24 RX ADMIN — Medication 1: at 08:37

## 2023-03-24 RX ADMIN — Medication 40 MILLIEQUIVALENT(S): at 10:14

## 2023-03-24 RX ADMIN — CARVEDILOL PHOSPHATE 12.5 MILLIGRAM(S): 80 CAPSULE, EXTENDED RELEASE ORAL at 06:33

## 2023-03-24 RX ADMIN — CARVEDILOL PHOSPHATE 12.5 MILLIGRAM(S): 80 CAPSULE, EXTENDED RELEASE ORAL at 17:03

## 2023-03-24 RX ADMIN — BUMETANIDE 2 MILLIGRAM(S): 0.25 INJECTION INTRAMUSCULAR; INTRAVENOUS at 12:48

## 2023-03-24 RX ADMIN — Medication 1 TABLET(S): at 11:27

## 2023-03-24 RX ADMIN — ATORVASTATIN CALCIUM 40 MILLIGRAM(S): 80 TABLET, FILM COATED ORAL at 22:00

## 2023-03-24 RX ADMIN — BUMETANIDE 2 MILLIGRAM(S): 0.25 INJECTION INTRAMUSCULAR; INTRAVENOUS at 06:34

## 2023-03-24 RX ADMIN — GABAPENTIN 300 MILLIGRAM(S): 400 CAPSULE ORAL at 17:03

## 2023-03-24 NOTE — DISCHARGE NOTE NURSING/CASE MANAGEMENT/SOCIAL WORK - NSDCPEFALRISK_GEN_ALL_CORE
For information on Fall & Injury Prevention, visit: [email or web address] OR  [email or web address] OR  https://www.cdc.gov/steadi/patient.html

## 2023-03-24 NOTE — PROGRESS NOTE ADULT - ATTENDING COMMENTS
67M with PMH CAD s/p CABG, presumed dCHF on bumex, HTN, HLD, DM, gout, carpal tunnel, ASIYA on CPAP, p/w SOB with associated CP, orthopnea and weight gain after undergoing carpal tunnel release, a/w acute on chronic diastolic HF. Found on imaging to have R renal exophytic mass c/f malignancy.   Significantly improved orthopnea, able to tolerate laying flat >1hr at a time.     #acute on chronic diastolic HF   #UTI  #renal mass   #CAD s/p CABG  #HTN  #DM     - TTE: Endocardium not well visualized; grossly normal left ventricular systolic function. symptomatically improving  - will increase bumex to 2mg TID  - UA positive, Cx + citrobacter koseri --> completed CTX x 3d   - CT showing exophytic renal mass, confirmed on US with c/f renal cell ca - urology consulted, recs appreciated: plan for outpt resection once medically optimized.   - c/w ASA, statin  - c/w BB and ARB, c/w norvasc  - CPAP nightly    Plan to dc tomorrow if symptoms improve    D/W HS8

## 2023-03-24 NOTE — DISCHARGE NOTE PROVIDER - CARE PROVIDER_API CALL
[place] [address]  Phone: [phone]  Fax: [phone]  Established Patient  Follow Up Time: 2 weeks    Nehemiah Ritter)  Urology  [address] [place] [address], [place] [address]  Phone: [phone]  Fax: [phone]  Established Patient  Scheduled Appointment: 05/04/2023 12:00 AM   [place] [address]  Phone: [phone]  Fax: [phone]  Established Patient  Scheduled Appointment: 03/29/2023 09:00 AM    Nehemiah Ritter)  Urology  [address] [place] [address]  Phone: [phone]  Fax: [phone]  Established Patient  Scheduled Appointment: 05/04/2023 12:00 AM

## 2023-03-24 NOTE — PROGRESS NOTE ADULT - SUBJECTIVE AND OBJECTIVE BOX
Internal Medicine   Jose Albrecht | PGY-1    OVERNIGHT EVENTS: No acute overnight events.    SUBJECTIVE: No acute concerns, still able to lay flat without dyspnea. No chest tightness, though notes a sensation of fullness in his neck while on CPAP. Notes leg appears to be more swollen.    MEDICATIONS  (STANDING):  allopurinol 100 milliGRAM(s) Oral at bedtime  amLODIPine   Tablet 10 milliGRAM(s) Oral daily  aspirin enteric coated 81 milliGRAM(s) Oral daily  atorvastatin 40 milliGRAM(s) Oral at bedtime  buMETAnide 2 milliGRAM(s) Oral at bedtime  carvedilol 12.5 milliGRAM(s) Oral every 12 hours  chlorhexidine 2% Cloths 1 Application(s) Topical <User Schedule>  dextrose 5%. 1000 milliLiter(s) (100 mL/Hr) IV Continuous <Continuous>  dextrose 5%. 1000 milliLiter(s) (50 mL/Hr) IV Continuous <Continuous>  dextrose 50% Injectable 25 Gram(s) IV Push once  dextrose 50% Injectable 12.5 Gram(s) IV Push once  dextrose 50% Injectable 25 Gram(s) IV Push once  enoxaparin Injectable 40 milliGRAM(s) SubCutaneous every 24 hours  gabapentin 300 milliGRAM(s) Oral four times a day  glucagon  Injectable 1 milliGRAM(s) IntraMuscular once  insulin lispro (ADMELOG) corrective regimen sliding scale   SubCutaneous three times a day before meals  insulin lispro (ADMELOG) corrective regimen sliding scale   SubCutaneous at bedtime  losartan 100 milliGRAM(s) Oral daily  multivitamin 1 Tablet(s) Oral daily  pantoprazole    Tablet 40 milliGRAM(s) Oral before breakfast  polyethylene glycol 3350 17 Gram(s) Oral at bedtime  tamsulosin 0.4 milliGRAM(s) Oral at bedtime    MEDICATIONS  (PRN):  acetaminophen     Tablet .. 650 milliGRAM(s) Oral every 6 hours PRN Temp greater or equal to 38C (100.4F), Mild Pain (1 - 3)  dextrose Oral Gel 15 Gram(s) Oral once PRN Blood Glucose LESS THAN 70 milliGRAM(s)/deciliter  lidocaine   4% Patch 1 Patch Transdermal daily PRN back pain  melatonin 3 milliGRAM(s) Oral at bedtime PRN Insomnia  oxyCODONE    IR 2.5 milliGRAM(s) Oral every 4 hours PRN Moderate Pain (4 - 6)  oxyCODONE    IR 5 milliGRAM(s) Oral every 4 hours PRN Severe Pain (7 - 10)    VITALS:  T(F): 98.8 (03-24-23 @ 10:41), Max: 98.8 (03-24-23 @ 10:41)  HR: 76 (03-24-23 @ 12:45) (61 - 76)  BP: 107/71 (03-24-23 @ 12:45) (99/60 - 120/68)  BP(mean): --  RR: 18 (03-24-23 @ 10:41) (18 - 18)  SpO2: 97% (03-24-23 @ 10:41) (95% - 98%)    PHYSICAL EXAM:   GENERAL: NAD, sitting comfortably in chair, large habitus  HEAD: Atraumatic, normocephalic  EYES: EOMI, conjunctiva and sclera clear, no nystagmus noted  ENT: Moist mucous membranes  CHEST/LUNG: Clear to auscultation bilaterally; no rales, rhonchi, wheezing, or rubs; unlabored respirations  HEART: Auscultation limited  ABDOMEN: Normal bowel sounds; soft, nontender, nondistended  EXTREMITIES: 1+/2+ pitting edema in bilateral ankles; no clubbing or cyanosis  MSK: No gross deformities noted   NEURO: No focal deficits   SKIN: No rashes or lesions  PSYCH: Normal mood, affect     LABS:                      13.4   7.34  )-----------( 174      ( 24 Mar 2023 06:09 )             39.1     03-24  141  |  103  |  16  ----------------------------<  127<H>  3.4<L>   |  26  |  0.50    Ca    8.9      24 Mar 2023 06:09  Phos  3.2     03-24  Mg     1.8     03-24    Creatinine Trend: 0.50<--, 0.54<--, 0.48<--, 0.54<--, 0.65<--    I&O's Summary  23 Mar 2023 07:01  -  24 Mar 2023 07:00  --------------------------------------------------------  IN: 1720 mL / OUT: 1950 mL / NET: -230 mL    24 Mar 2023 07:01  -  24 Mar 2023 16:19  --------------------------------------------------------  IN: 1080 mL / OUT: 1050 mL / NET: 30 mL

## 2023-03-24 NOTE — PROGRESS NOTE ADULT - PROBLEM SELECTOR PLAN 5
Pt w/ ASIYA, uses CPAP at home. Does not know settings. On prior admission was set at 10. Trialed CPAP 5, mildly hypercarbic.  - continue CPAP 6 Pt w/ ASIYA, uses CPAP at home. Does not know settings. On prior admission was set at 10. Trialed CPAP 5, mildly hypercarbic. Patient noted sensation of neck fullness on CPAP 8  - continue CPAP 6

## 2023-03-24 NOTE — PROGRESS NOTE ADULT - PROBLEM SELECTOR PLAN 1
Acute worsening SOB with substernal chest tightness, worsening orthopnea, 6 pound weight gain, slight cough w. clear sputum production concerning for acute decompensated HF vs angina vs diuretic resistance. Of note recently underwent procedure (fluid retention may be postanesthesia effects or decreased bioavailability of PO bumex), also started percocet which has a Class C interaction w/ diuretics (may reduce efficacy). Less likely acute ischemic process vs PNA.   - TTE 3/21 w/ mild aortic root dilation; grossly normal LVSF; other structures poorly visualized  - MUGA scan 3/2021 w/ normal resting LV EF of 57 % and normal right and left ventricular wall motion  - CTA Chest in ED w/o significant findings   - Pro-, no troponin elevation or ischemic EKG changes  - transition to bumex 2mg PO BID (home dose)  - Daily weights, strict I/O Acute worsening SOB with substernal chest tightness, worsening orthopnea, 6 pound weight gain, slight cough w. clear sputum production concerning for acute decompensated HF vs angina vs diuretic resistance. Of note recently underwent procedure (fluid retention may be postanesthesia effects or decreased bioavailability of PO bumex), also started percocet which has a Class C interaction w/ diuretics (may reduce efficacy). Less likely acute ischemic process vs PNA.   - TTE 3/21 w/ mild aortic root dilation; grossly normal LVSF; other structures poorly visualized  - MUGA scan 3/2021 w/ normal resting LV EF of 57 % and normal right and left ventricular wall motion  - CTA Chest in ED w/o significant findings   - Pro-, no troponin elevation or ischemic EKG changes  - transitioned to bumex 2mg PO BID (home dose) 3/23, subsequently volume up, will give additional 2mg IV dose mid day, anticipate transition to 2mg PO TID tomorrow / on discharge  - Daily weights, strict I/O

## 2023-03-24 NOTE — DISCHARGE NOTE PROVIDER - NSDCFUSCHEDAPPT_GEN_ALL_CORE_FT
Charlie Esquivel  [facility]  ORTHOSURG [address] [facility]  Scheduled Appointment: 03/27/2023     Charlie Esquivel  [facility]  ORTHOSURG [address] [facility]  Scheduled Appointment: 03/27/2023    Nehemiah Ritter  [facility]  UROLOGY [address] [facility]  Scheduled Appointment: 05/04/2023     Nehemiah Ritter  [facility]  UROLOGY [address] [place] [address] R  Scheduled Appointment: 05/04/2023    [facility]  UROLOGY [address] [place] [address] R  Scheduled Appointment: 07/03/2023    Nehemiah Ritter  [facility]  UROLOGY [address] [place] [address] R  Scheduled Appointment: 07/03/2023

## 2023-03-24 NOTE — DISCHARGE NOTE NURSING/CASE MANAGEMENT/SOCIAL WORK - PATIENT PORTAL LINK FT
You can access the FollowMyHealth Patient Portal offered by [facility] by registering at the following website: [email or web address]. By joining [employer]’s FollowMyHealth portal, you will also be able to view your health information using other applications (apps) compatible with our system.

## 2023-03-24 NOTE — DISCHARGE NOTE PROVIDER - PROVIDER TOKENS
PROVIDER:[TOKEN:[[id number]],FOLLOWUP:[2 weeks],ESTABLISHEDPATIENT:[T]],PROVIDER:[TOKEN:[[id number]],SCHEDULEDAPPT:[05/04/2023],SCHEDULEDAPPTTIME:[12:00 AM],ESTABLISHEDPATIENT:[T]] PROVIDER:[TOKEN:[[id number]],SCHEDULEDAPPT:[03/29/2023],SCHEDULEDAPPTTIME:[09:00 AM],ESTABLISHEDPATIENT:[T]],PROVIDER:[TOKEN:[[id number]],SCHEDULEDAPPT:[05/04/2023],SCHEDULEDAPPTTIME:[12:00 AM],ESTABLISHEDPATIENT:[T]]

## 2023-03-24 NOTE — PROGRESS NOTE ADULT - PROBLEM SELECTOR PLAN 10
Gout prevention: C/w allopurinol   C/w home multivitamins  Diet: consistent carb  DVT: lovenox   Dispo: anticipate d/c soon given improvement of sx    Code Status: FULL CODE Gout prevention: C/w allopurinol   C/w home multivitamins  Diet: consistent carb  DVT: lovenox   Dispo: anticipate d/c soon pending good diuresis w/ PO bumex    Code Status: FULL CODE

## 2023-03-24 NOTE — DISCHARGE NOTE PROVIDER - NSDCCPTREATMENT_GEN_ALL_CORE_FT
PRINCIPAL PROCEDURE  Procedure: US kidney complete  Findings and Treatment: FINDINGS:  Right kidney: 15.2 cm. Lower pole 2.8 x 2.5 x 2.6 cm solid mass with   internal vascularity. Interpolar region nonobstructing 0.9 cm renal   callus. No hydronephrosis.  Left kidney: 12.9 cm. Lower pole nonobstructing 1.2 cm renal calculus. No   renal mass or hydronephrosis.  Urinary bladder: Underdistended.  IMPRESSION:  Right kidney lower pole 2.8 cm solid mass, corresponding to finding on   prior CT.  Findings are most consistent with renal cell carcinoma.  Nonobstructing bilateral renal stones, no hydronephrosis.  --- End of Report ---  MARTELL BLANCO DO; Fellow Radiology  This document has been electronically signed.  VIANCA VALADEZ MD; Attending Radiologist  This document has been electronically signed. Mar 20 2023  1:47PM        SECONDARY PROCEDURE  Procedure: CT chest w/ contrast  Findings and Treatment: FINDINGS:  CHEST:  Sternal retention wires in place.  LUNGS AND LARGE AIRWAYS: Patent central airways. No pulmonary nodules.  PLEURA: No pleural effusion.  VESSELS: No thoracic aortic aneurysm or dissection. No acute aortic   syndrome. No evidence of PE.  HEART: Heart size is normal. No pericardial effusion. Coronary artery   calcification present.  MEDIASTINUM AND GERONIMO: Stable appearing multiple small mediastinal lymph   nodes are present most pronounced within the right paratracheal region..   Largest measuring 2.0 x 2.0 cm.  CHEST WALL AND LOWER NECK: Within normal limits.  Osseous structures demonstrate degenerative changes but no lytic or   blastic process.  ABDOMEN AND PELVIS:  LIVER: Within normal limits.  BILE DUCTS: Normal caliber.  GALLBLADDER: Cholecystectomy.  SPLEEN: Within normal limits.  PANCREAS: Within normal limits.  ADRENALS: Within normal limits.  KIDNEYS/URETERS: No hydronephrosis. Tiny nonobstructing stone lower pole   left kidney. 3.2 x 2.2 cm indeterminate exophytic lesion arising from   lower pole right kidney coronal 4-62 which appears slightly larger   compared to prior exam.  BLADDER: Within normal limits.  REPRODUCTIVE ORGANS: Prostate within normal limits.  BOWEL: No bowel obstruction. Appendix is normal.  PERITONEUM: No ascites.  VESSELS: Atherosclerotic changes. No abdominal aortic aneurysm or   dissection.  RETROPERITONEUM/LYMPH NODES: No lymphadenopathy.  ABDOMINAL WALL: Within normal limits.  BONES: Degenerative changes. No lytic or blastic process. Streak artifact   from postsurgical changes lumbosacral spine and bilateral hip replacement   limits evaluation of the lower abdomen and pelvis. Bilateral posterior   fixation screws present at L3-S1. Prior laminectomy L3,L4 and L5.  IMPRESSION:  No evidence pulmonary embolism.  3.2 x 2.2 cm indeterminate exophytic lesion arising from lower pole right   kidney coronal 4-62 which appears slightly larger compared to prior exam.   Presumed neoplastic etiology until proven otherwise. Further evaluation   and follow-up required.

## 2023-03-24 NOTE — DISCHARGE NOTE PROVIDER - NSDCCPCAREPLAN_GEN_ALL_CORE_FT
PRINCIPAL DISCHARGE DIAGNOSIS  Diagnosis: Dyspnea  Assessment and Plan of Treatment: You came to the hospital with shortness of breath likely because of your heart. You had a CT angiogram of the chest which showed no blood clots in the lungs. You had difficulty laying flat and decreased urine on your home diuretic. You were started on IV bumex a medication that helps remove fluid.  Your symptoms improved. Ultrasound of your heart showed grossly normal function but the pictures had poor quallity.  Your symptoms improved with these medications and you were transitioned to a higher oral dose then your original home dose.   You should follow up with your Cardiologist within 1 week of discharge at your scheduled appointment on   If you develop shortness of breath, chest pain, palpitations or significant weight gain seek immediate medical attention.         SECONDARY DISCHARGE DIAGNOSES  Diagnosis: Acute UTI  Assessment and Plan of Treatment: You were found to have bacteria in your urine. You were given 3 days of antibiotics which completed treatment with antibiotic called ceftriaxone. You should monitor for fever, pain with urination or difficulty urinating.    Diagnosis: Left renal mass  Assessment and Plan of Treatment: You came to the hospital for shortness of breath and difficulty laying flat. You had a Ct scan of your chest, abdomen and pelvis. Your abdominal CT scan showed a kidney mass that looked concerning for cancer. You had a follow up kidney ultrasound that showed concerning findings for a type of cancer called renal cell carcinoma. You were seen by the Urology team who recommened close outpatient follow up so that you could have surgical resection of this mass.   You should follow up with your Urologist Dr. Ritter within 2 weeks of discharge. You were made an appointment on May 4th tentatively and the office was planning to get approval for an earlier appointment and will reach out to you.   Follow up with your Urologist office after discharge to get the more expedited appointment.   If you develop inability to urinate, pain with urination or difficulty urinating seek immediate medical care.    Diagnosis: Chest pain  Assessment and Plan of Treatment: You had some chest discomfort when you came to the hospital. You had an electrocardiogram that showed no significant changes from your prior one. Your blood work was negative for evidence of damage to the heart. You should follow up with your Cardiologist within 2 weeks of discharge for pre-operative evaluation for your potention mass removal by the Urology team.   If you develop recurrence of your chest pain that does not stop, severe shortness of breath or any concerning heart related symptoms please seek immediate medical care.     PRINCIPAL DISCHARGE DIAGNOSIS  Diagnosis: Dyspnea  Assessment and Plan of Treatment: You came to the hospital with shortness of breath likely because of your heart. You had a CT angiogram of the chest which showed no blood clots in the lungs. You had difficulty laying flat and decreased urine on your home diuretic. You were started on IV bumetanide to help remove fluid more effectively from your body, and your symptoms improved. Ultrasound of your heart showed grossly normal function but visualization was limited given your body's large frame.  You were transitioned back to an oral diuretic (bumetanide) regimen, at a higher dose than your prior home dose, to maintain good urine output.  You should follow up with your Cardiologist within 1 week of discharge to discuss your admission.  If you develop shortness of breath, chest pain, palpitations or significant weight gain please seek immediate medical attention.      SECONDARY DISCHARGE DIAGNOSES  Diagnosis: Left renal mass  Assessment and Plan of Treatment: You came to the hospital for shortness of breath and difficulty laying flat. You had a CT scan of your chest, abdomen and pelvis. Your abdominal CT scan showed a kidney mass that looked concerning for cancer. You had a follow up kidney ultrasound which suggested a specific type of cancer called renal cell carcinoma. Surgery/pathology is needed for definitive diagnosis, and you were seen by the Urology team who recommened close outpatient follow up for a possible surgical resection of this mass.   You should follow up with your Urologist Dr. Ritter within 2 weeks of discharge. You were scheduled for an appointment for ____ - please be sure to follow up with Dr. Leon.  If you develop inability to urinate, flank pain, pain with urination, or difficulty urinating please seek immediate medical care.    Diagnosis: Acute UTI  Assessment and Plan of Treatment: You were found to have bacteria in your urine. You were given 3 days of ceftriaxone, an antibiotic, to treat this urinary tract infection. You should monitor for fever, pain with urination, or difficulty urinating and see your doctor should any of these symptoms occur.    Diagnosis: Chest pain  Assessment and Plan of Treatment: You had some chest discomfort when you came to the hospital. You had an electrocardiogram (EKG) that showed no significant changes from your prior EKG. Your blood work was negative for evidence of damage to your heart. You should follow up with your cardiologist within 1-2 weeks of discharge for pre-operative evaluation/clearance for any potential surgical procedure that the urology team will recommend.   If you develop recurrence of your chest pain that does not stop, severe shortness of breath, or any concerning heart related symptoms please seek immediate medical care.

## 2023-03-24 NOTE — DISCHARGE NOTE PROVIDER - HOSPITAL COURSE
67-year-old male w hx HTN, HLD, DM, Gout, carpal tunnel, CAD s/p CABG, CHF on 2 mg of Bumex twice daily, ASIYA on CPAP, presenting for worsening shortness of breath since Saturday evening associated with substernal chest tightness, worsening dyspnea on exertion, worsening orthopnea, 6 pound weight gain in the last 24 hours, slight cough w. clear sputum,  and oliguria.  Patient also reports that he had 2 kidney stones in the past, and currently has left flank pain similar to prior kidney stone.  Patient states that normally he urinates 5-6 times after Bumex dose, but Saturday afternoon, did not have any urination after taking his 3PM Bumex. On Sunday, pt had good urine output after both bumex doses, but still reduced from known amount of output. Pt denies hx of BPH or prior urinary retention, no dysuria, no hematuria. Of note, pt had left carpal tunnel release and trigger release surgery on March 17.     Hospital Course:   The patient was admitted to the hospital for further workup. In the ED, CTA showed no evidence of pulmonary embolism or fluid collections. The patient had orthopnea and was started on IV diuretics. The CT a/p noted a exophytic renal mass that was localized w/o lymphadenopathy or further lesions. Renal ultrasound was done to better characterize the mass and appeared to look like renal cell carcinoma. Urology was consulted and recommended expedited follow up for surgical resection at discharge. The patient continued to have improved urine outpput with IV diuretics. TTE showed no LV dysfunction but had poor views of the myocardium. The patients orthopnea improved with continued diuresis and he was successfully transition to PO diuretics with bumex. Orthopedic surgery saw the patient to re-wrap his wrap from his nerve compression. The patient was medically optimized and ready for discharge. 67-year-old male w hx HTN, HLD, DM, Gout, carpal tunnel, CAD s/p CABG, CHF on 2 mg of Bumex twice daily, ASIYA on CPAP, presenting for worsening shortness of breath since Saturday evening associated with substernal chest tightness, worsening dyspnea on exertion, worsening orthopnea, 6 pound weight gain in the last 24 hours, slight cough w. clear sputum,  and oliguria.  Patient also reports that he had 2 kidney stones in the past, and currently has left flank pain similar to prior kidney stone.  Patient states that normally he urinates 5-6 times after Bumex dose, but Saturday afternoon, did not have any urination after taking his 3PM Bumex. On Sunday, pt had good urine output after both bumex doses, but still reduced from known amount of output. Pt denies hx of BPH or prior urinary retention, no dysuria, no hematuria. Of note, pt had left carpal tunnel release and trigger release surgery on March 17.     Hospital Course:   The patient was admitted to the hospital for further workup. In the ED, CTA showed no evidence of pulmonary embolism or fluid collections. The patient had orthopnea and was started on IV diuretics. The CT a/p noted an exophytic renal mass that was localized w/o lymphadenopathy or further lesions. Renal ultrasound was done to better characterize the mass which appeared to look like renal cell carcinoma. Urology was consulted and recommended expedited follow up for surgical resection at discharge. The patient continued to have improved urine output with IV diuretics. TTE showed no LV dysfunction but had poor views of the myocardium. The patient's orthopnea improved with continued diuresis and he was successfully transitioned to PO diuretics with bumex. Orthopedic surgery saw the patient to re-dress his post-surgical L arm dressing. The patient was medically optimized and is now ready for discharge with outpatient urology, cardiology, and PCP followup.

## 2023-03-24 NOTE — DISCHARGE NOTE NURSING/CASE MANAGEMENT/SOCIAL WORK - NSDCVIVACCINE_GEN_ALL_CORE_FT
Tdap; 13-Feb-2022 09:56; Benoit Kessler (RN); [employer]; [id number] (Exp. Date: 09-Sep-2023); IntraMuscular; Deltoid Right.; 0.5 milliLiter(s); VIS (VIS Published: 09-May-2013, VIS Presented: 13-Feb-2022);

## 2023-03-24 NOTE — DISCHARGE NOTE PROVIDER - NSDCMRMEDTOKEN_GEN_ALL_CORE_FT
acetaminophen 325 mg oral tablet: 2 tab(s) orally every 6 hours, As needed, Temp greater or equal to 38C (100.4F), Mild Pain (1 - 3)  allopurinol 100 mg oral tablet: 1 tab(s) orally once a day (at bedtime)  amLODIPine 10 mg oral tablet: 1 tab(s) orally once a day AM  aspirin 81 mg oral delayed release tablet: 1 tab(s) orally once a day  atorvastatin 40 mg oral tablet: 1 tab(s) orally once a day AM  bumetanide 2 mg oral tablet: 2 tab(s) orally once a day in the morning   bumetanide 2 mg oral tablet: 1 tab(s) orally once (at bedtime)   Calcium 600+D oral tablet: 1 tab(s) orally once a day  carvedilol 12.5 mg oral tablet: 1 tab(s) orally every 12 hours  gabapentin 300 mg oral tablet: 1 tab(s) orally every 6 hours  glimepiride 1 mg oral tablet: 1 tab(s) orally once a day  losartan 100 mg oral tablet: 1 tab(s) orally once a day AM  Multiple Vitamins oral tablet: 1 tab(s) orally once a day  pantoprazole 40 mg oral delayed release tablet: 1 tab(s) orally once a day (before a meal)  polyethylene glycol 3350 oral powder for reconstitution: 17 gram(s) orally once a day (at bedtime)  tamsulosin 0.4 mg oral capsule: 1 cap(s) orally once a day (at bedtime)  tiZANidine 2 mg oral tablet: 1 tab(s) orally 2 times a day, As Needed   acetaminophen 325 mg oral tablet: 2 tab(s) orally every 6 hours, As needed, Temp greater or equal to 38C (100.4F), Mild Pain (1 - 3)  allopurinol 100 mg oral tablet: 1 tab(s) orally once a day (at bedtime)  amLODIPine 10 mg oral tablet: 1 tab(s) orally once a day AM  aspirin 81 mg oral delayed release tablet: 1 tab(s) orally once a day  atorvastatin 40 mg oral tablet: 1 tab(s) orally once a day AM  bumetanide 2 mg oral tablet: 2 tab(s) orally once a day in the morning   bumetanide 2 mg oral tablet: 1 tab(s) orally once (at bedtime)   bumetanide 2 mg oral tablet: 2 tab(s) orally every other day Take 2 tabs every other morning alternating days with taking 1 tab every other morning. MDD: 3 tabs  Calcium 600+D oral tablet: 1 tab(s) orally once a day  carvedilol 12.5 mg oral tablet: 1 tab(s) orally every 12 hours  gabapentin 300 mg oral tablet: 1 tab(s) orally every 6 hours  glimepiride 1 mg oral tablet: 1 tab(s) orally once a day  losartan 100 mg oral tablet: 1 tab(s) orally once a day AM  Multiple Vitamins oral tablet: 1 tab(s) orally once a day  pantoprazole 40 mg oral delayed release tablet: 1 tab(s) orally once a day (before a meal)  polyethylene glycol 3350 oral powder for reconstitution: 17 gram(s) orally once a day (at bedtime)  tamsulosin 0.4 mg oral capsule: 1 cap(s) orally once a day (at bedtime)  tiZANidine 2 mg oral tablet: 1 tab(s) orally 2 times a day, As Needed

## 2023-03-24 NOTE — PROGRESS NOTE ADULT - PROBLEM SELECTOR PLAN 4
CT findings with 3.2 x 2.2 cm indeterminate exophytic lesion arising from lower pole right kidney coronal 4-62 which appears slightly larger compared to prior exam, suspicious for malignancy. Renal US findings consistent w/ RCC.  - findings discussed w/ patient  - urology consulted for further workup/intervention, recommended outpatient followup    - we will attempt to arrange for expedited followup w/ urology, cardiology, PCP

## 2023-03-25 VITALS
HEART RATE: 68 BPM | DIASTOLIC BLOOD PRESSURE: 76 MMHG | SYSTOLIC BLOOD PRESSURE: 120 MMHG | RESPIRATION RATE: 18 BRPM | OXYGEN SATURATION: 96 % | TEMPERATURE: 98 F

## 2023-03-25 LAB
ANION GAP SERPL CALC-SCNC: 12 MMOL/L — SIGNIFICANT CHANGE UP (ref 5–17)
BUN SERPL-MCNC: 16 MG/DL — SIGNIFICANT CHANGE UP (ref 7–23)
CALCIUM SERPL-MCNC: 9.4 MG/DL — SIGNIFICANT CHANGE UP (ref 8.4–10.5)
CHLORIDE SERPL-SCNC: 100 MMOL/L — SIGNIFICANT CHANGE UP (ref 96–108)
CO2 SERPL-SCNC: 27 MMOL/L — SIGNIFICANT CHANGE UP (ref 22–31)
CREAT SERPL-MCNC: 0.55 MG/DL — SIGNIFICANT CHANGE UP (ref 0.5–1.3)
EGFR: 109 ML/MIN/1.73M2 — SIGNIFICANT CHANGE UP
GLUCOSE BLDC GLUCOMTR-MCNC: 139 MG/DL — HIGH (ref 70–99)
GLUCOSE BLDC GLUCOMTR-MCNC: 142 MG/DL — HIGH (ref 70–99)
GLUCOSE BLDC GLUCOMTR-MCNC: 147 MG/DL — HIGH (ref 70–99)
GLUCOSE SERPL-MCNC: 132 MG/DL — HIGH (ref 70–99)
HCT VFR BLD CALC: 40.5 % — SIGNIFICANT CHANGE UP (ref 39–50)
HGB BLD-MCNC: 13.7 G/DL — SIGNIFICANT CHANGE UP (ref 13–17)
MAGNESIUM SERPL-MCNC: 1.6 MG/DL — SIGNIFICANT CHANGE UP (ref 1.6–2.6)
MCHC RBC-ENTMCNC: 29.4 PG — SIGNIFICANT CHANGE UP (ref 27–34)
MCHC RBC-ENTMCNC: 33.8 GM/DL — SIGNIFICANT CHANGE UP (ref 32–36)
MCV RBC AUTO: 86.9 FL — SIGNIFICANT CHANGE UP (ref 80–100)
NRBC # BLD: 0 /100 WBCS — SIGNIFICANT CHANGE UP (ref 0–0)
PHOSPHATE SERPL-MCNC: 3.1 MG/DL — SIGNIFICANT CHANGE UP (ref 2.5–4.5)
PLATELET # BLD AUTO: 183 K/UL — SIGNIFICANT CHANGE UP (ref 150–400)
POTASSIUM SERPL-MCNC: 3.4 MMOL/L — LOW (ref 3.5–5.3)
POTASSIUM SERPL-SCNC: 3.4 MMOL/L — LOW (ref 3.5–5.3)
RBC # BLD: 4.66 M/UL — SIGNIFICANT CHANGE UP (ref 4.2–5.8)
RBC # FLD: 13.4 % — SIGNIFICANT CHANGE UP (ref 10.3–14.5)
SODIUM SERPL-SCNC: 139 MMOL/L — SIGNIFICANT CHANGE UP (ref 135–145)
WBC # BLD: 7.9 K/UL — SIGNIFICANT CHANGE UP (ref 3.8–10.5)
WBC # FLD AUTO: 7.9 K/UL — SIGNIFICANT CHANGE UP (ref 3.8–10.5)

## 2023-03-25 PROCEDURE — 76770 US EXAM ABDO BACK WALL COMP: CPT

## 2023-03-25 PROCEDURE — 84132 ASSAY OF SERUM POTASSIUM: CPT

## 2023-03-25 PROCEDURE — 82435 ASSAY OF BLOOD CHLORIDE: CPT

## 2023-03-25 PROCEDURE — 83735 ASSAY OF MAGNESIUM: CPT

## 2023-03-25 PROCEDURE — 80061 LIPID PANEL: CPT

## 2023-03-25 PROCEDURE — 85014 HEMATOCRIT: CPT

## 2023-03-25 PROCEDURE — 82330 ASSAY OF CALCIUM: CPT

## 2023-03-25 PROCEDURE — 96374 THER/PROPH/DIAG INJ IV PUSH: CPT

## 2023-03-25 PROCEDURE — 99239 HOSP IP/OBS DSCHRG MGMT >30: CPT

## 2023-03-25 PROCEDURE — 84443 ASSAY THYROID STIM HORMONE: CPT

## 2023-03-25 PROCEDURE — 71275 CT ANGIOGRAPHY CHEST: CPT | Mod: MA

## 2023-03-25 PROCEDURE — 97530 THERAPEUTIC ACTIVITIES: CPT

## 2023-03-25 PROCEDURE — 82947 ASSAY GLUCOSE BLOOD QUANT: CPT

## 2023-03-25 PROCEDURE — 87186 SC STD MICRODIL/AGAR DIL: CPT

## 2023-03-25 PROCEDURE — 71046 X-RAY EXAM CHEST 2 VIEWS: CPT

## 2023-03-25 PROCEDURE — 36415 COLL VENOUS BLD VENIPUNCTURE: CPT

## 2023-03-25 PROCEDURE — 83880 ASSAY OF NATRIURETIC PEPTIDE: CPT

## 2023-03-25 PROCEDURE — 81001 URINALYSIS AUTO W/SCOPE: CPT

## 2023-03-25 PROCEDURE — 85730 THROMBOPLASTIN TIME PARTIAL: CPT

## 2023-03-25 PROCEDURE — 80048 BASIC METABOLIC PNL TOTAL CA: CPT

## 2023-03-25 PROCEDURE — 82962 GLUCOSE BLOOD TEST: CPT

## 2023-03-25 PROCEDURE — 83036 HEMOGLOBIN GLYCOSYLATED A1C: CPT

## 2023-03-25 PROCEDURE — 87086 URINE CULTURE/COLONY COUNT: CPT

## 2023-03-25 PROCEDURE — 87077 CULTURE AEROBIC IDENTIFY: CPT

## 2023-03-25 PROCEDURE — 84484 ASSAY OF TROPONIN QUANT: CPT

## 2023-03-25 PROCEDURE — 74177 CT ABD & PELVIS W/CONTRAST: CPT | Mod: MA

## 2023-03-25 PROCEDURE — 85018 HEMOGLOBIN: CPT

## 2023-03-25 PROCEDURE — 97116 GAIT TRAINING THERAPY: CPT

## 2023-03-25 PROCEDURE — 83605 ASSAY OF LACTIC ACID: CPT

## 2023-03-25 PROCEDURE — 85027 COMPLETE CBC AUTOMATED: CPT

## 2023-03-25 PROCEDURE — 85610 PROTHROMBIN TIME: CPT

## 2023-03-25 PROCEDURE — 87637 SARSCOV2&INF A&B&RSV AMP PRB: CPT

## 2023-03-25 PROCEDURE — 87640 STAPH A DNA AMP PROBE: CPT

## 2023-03-25 PROCEDURE — 85025 COMPLETE CBC W/AUTO DIFF WBC: CPT

## 2023-03-25 PROCEDURE — 80053 COMPREHEN METABOLIC PANEL: CPT

## 2023-03-25 PROCEDURE — 97162 PT EVAL MOD COMPLEX 30 MIN: CPT

## 2023-03-25 PROCEDURE — 96375 TX/PRO/DX INJ NEW DRUG ADDON: CPT

## 2023-03-25 PROCEDURE — 93306 TTE W/DOPPLER COMPLETE: CPT

## 2023-03-25 PROCEDURE — 94660 CPAP INITIATION&MGMT: CPT

## 2023-03-25 PROCEDURE — 84295 ASSAY OF SERUM SODIUM: CPT

## 2023-03-25 PROCEDURE — 99285 EMERGENCY DEPT VISIT HI MDM: CPT

## 2023-03-25 PROCEDURE — 84100 ASSAY OF PHOSPHORUS: CPT

## 2023-03-25 PROCEDURE — 87641 MR-STAPH DNA AMP PROBE: CPT

## 2023-03-25 PROCEDURE — 82803 BLOOD GASES ANY COMBINATION: CPT

## 2023-03-25 RX ORDER — BUMETANIDE 0.25 MG/ML
2 INJECTION INTRAMUSCULAR; INTRAVENOUS
Qty: 90 | Refills: 0
Start: 2023-03-25

## 2023-03-25 RX ORDER — POTASSIUM CHLORIDE 20 MEQ
20 PACKET (EA) ORAL ONCE
Refills: 0 | Status: COMPLETED | OUTPATIENT
Start: 2023-03-25 | End: 2023-03-25

## 2023-03-25 RX ORDER — POTASSIUM CHLORIDE 20 MEQ
40 PACKET (EA) ORAL ONCE
Refills: 0 | Status: COMPLETED | OUTPATIENT
Start: 2023-03-25 | End: 2023-03-25

## 2023-03-25 RX ORDER — MAGNESIUM SULFATE 500 MG/ML
2 VIAL (ML) INJECTION ONCE
Refills: 0 | Status: COMPLETED | OUTPATIENT
Start: 2023-03-25 | End: 2023-03-25

## 2023-03-25 RX ADMIN — Medication 40 MILLIEQUIVALENT(S): at 14:26

## 2023-03-25 RX ADMIN — Medication 25 GRAM(S): at 14:25

## 2023-03-25 RX ADMIN — CARVEDILOL PHOSPHATE 12.5 MILLIGRAM(S): 80 CAPSULE, EXTENDED RELEASE ORAL at 17:17

## 2023-03-25 RX ADMIN — BUMETANIDE 2 MILLIGRAM(S): 0.25 INJECTION INTRAMUSCULAR; INTRAVENOUS at 11:13

## 2023-03-25 RX ADMIN — GABAPENTIN 300 MILLIGRAM(S): 400 CAPSULE ORAL at 17:17

## 2023-03-25 RX ADMIN — Medication 20 MILLIEQUIVALENT(S): at 14:25

## 2023-03-25 RX ADMIN — Medication 1 TABLET(S): at 12:47

## 2023-03-25 RX ADMIN — GABAPENTIN 300 MILLIGRAM(S): 400 CAPSULE ORAL at 06:00

## 2023-03-25 RX ADMIN — BUMETANIDE 2 MILLIGRAM(S): 0.25 INJECTION INTRAMUSCULAR; INTRAVENOUS at 14:24

## 2023-03-25 RX ADMIN — PANTOPRAZOLE SODIUM 40 MILLIGRAM(S): 20 TABLET, DELAYED RELEASE ORAL at 07:00

## 2023-03-25 RX ADMIN — LOSARTAN POTASSIUM 100 MILLIGRAM(S): 100 TABLET, FILM COATED ORAL at 06:00

## 2023-03-25 RX ADMIN — Medication 81 MILLIGRAM(S): at 12:46

## 2023-03-25 RX ADMIN — AMLODIPINE BESYLATE 10 MILLIGRAM(S): 2.5 TABLET ORAL at 06:00

## 2023-03-25 RX ADMIN — CHLORHEXIDINE GLUCONATE 1 APPLICATION(S): 213 SOLUTION TOPICAL at 06:00

## 2023-03-25 RX ADMIN — GABAPENTIN 300 MILLIGRAM(S): 400 CAPSULE ORAL at 12:47

## 2023-03-25 RX ADMIN — ENOXAPARIN SODIUM 40 MILLIGRAM(S): 100 INJECTION SUBCUTANEOUS at 06:00

## 2023-03-25 RX ADMIN — CARVEDILOL PHOSPHATE 12.5 MILLIGRAM(S): 80 CAPSULE, EXTENDED RELEASE ORAL at 06:00

## 2023-03-25 NOTE — PROGRESS NOTE ADULT - ASSESSMENT
67M w/ hx CHF on home bumex 2mg BID, CAD s/p CABG, ASIYA on CPAP, carpal tunnel s/p recent surgery in L arm, HTN, HLD, DM, gout, admitted for HF exacerbation. Imaging w/ incidental findings concerning for renal malignancy. Planned for DC today with close follow up.

## 2023-03-25 NOTE — PROGRESS NOTE ADULT - NUTRITIONAL ASSESSMENT
This patient has been assessed with a concern for Malnutrition and has been determined to have a diagnosis/diagnoses of Morbid obesity (BMI > 40).    This patient is being managed with:   Diet Consistent Carbohydrate Renal w/Evening Snack-  DASH/TLC {Sodium & Cholesterol Restricted} (DASH)  Entered: Mar 20 2023  9:00AM  
This patient has been assessed with a concern for Malnutrition and has been determined to have a diagnosis/diagnoses of Morbid obesity (BMI > 40).    This patient is being managed with:   Diet Consistent Carbohydrate Renal w/Evening Snack-  DASH/TLC {Sodium & Cholesterol Restricted} (DASH)  Entered: Mar 20 2023  9:00AM

## 2023-03-25 NOTE — PROGRESS NOTE ADULT - SUBJECTIVE AND OBJECTIVE BOX
Bruno Schmidt, PGY-3  Internal Medicine    PROGRESS NOTE:    ID #:     Patient is a 67y old  Male who presents with a chief complaint of Shortness of breath (25 Mar 2023 13:06)      MAJOR INTERVAL HOSPITAL EVENTS:     SUBJECTIVE / OVERNIGHT EVENTS: No acute overnight events.     REVIEW OF SYSTEMS:  CONSTITUTIONAL: No weakness, fevers or chills  EYES/ENT: No visual changes;    NECK: No pain or stiffness  RESPIRATORY: No cough, wheezing, hemoptysis; No shortness of breath  CARDIOVASCULAR: No chest pain or palpitations  GASTROINTESTINAL: No abdominal or epigastric pain. No nausea, vomiting, or hematemesis; No diarrhea or constipation. No melena or hematochezia.  GENITOURINARY: No dysuria, frequency or hematuria  NEUROLOGICAL: No numbness or weakness  SKIN: No itching, burning, rashes, or lesions   All other review of systems is negative unless indicated above.    MEDICATIONS  (STANDING):  allopurinol 100 milliGRAM(s) Oral at bedtime  amLODIPine   Tablet 10 milliGRAM(s) Oral daily  aspirin enteric coated 81 milliGRAM(s) Oral daily  atorvastatin 40 milliGRAM(s) Oral at bedtime  buMETAnide 2 milliGRAM(s) Oral <User Schedule>  carvedilol 12.5 milliGRAM(s) Oral every 12 hours  chlorhexidine 2% Cloths 1 Application(s) Topical <User Schedule>  dextrose 5%. 1000 milliLiter(s) (100 mL/Hr) IV Continuous <Continuous>  dextrose 5%. 1000 milliLiter(s) (50 mL/Hr) IV Continuous <Continuous>  dextrose 50% Injectable 25 Gram(s) IV Push once  dextrose 50% Injectable 12.5 Gram(s) IV Push once  dextrose 50% Injectable 25 Gram(s) IV Push once  enoxaparin Injectable 40 milliGRAM(s) SubCutaneous every 24 hours  gabapentin 300 milliGRAM(s) Oral four times a day  glucagon  Injectable 1 milliGRAM(s) IntraMuscular once  insulin lispro (ADMELOG) corrective regimen sliding scale   SubCutaneous three times a day before meals  insulin lispro (ADMELOG) corrective regimen sliding scale   SubCutaneous at bedtime  losartan 100 milliGRAM(s) Oral daily  multivitamin 1 Tablet(s) Oral daily  pantoprazole    Tablet 40 milliGRAM(s) Oral before breakfast  polyethylene glycol 3350 17 Gram(s) Oral at bedtime  tamsulosin 0.4 milliGRAM(s) Oral at bedtime    MEDICATIONS  (PRN):  acetaminophen     Tablet .. 650 milliGRAM(s) Oral every 6 hours PRN Temp greater or equal to 38C (100.4F), Mild Pain (1 - 3)  dextrose Oral Gel 15 Gram(s) Oral once PRN Blood Glucose LESS THAN 70 milliGRAM(s)/deciliter  lidocaine   4% Patch 1 Patch Transdermal daily PRN back pain  melatonin 3 milliGRAM(s) Oral at bedtime PRN Insomnia  oxyCODONE    IR 2.5 milliGRAM(s) Oral every 4 hours PRN Moderate Pain (4 - 6)  oxyCODONE    IR 5 milliGRAM(s) Oral every 4 hours PRN Severe Pain (7 - 10)      CAPILLARY BLOOD GLUCOSE      POCT Blood Glucose.: 147 mg/dL (25 Mar 2023 12:16)  POCT Blood Glucose.: 142 mg/dL (25 Mar 2023 08:03)  POCT Blood Glucose.: 126 mg/dL (24 Mar 2023 21:39)  POCT Blood Glucose.: 216 mg/dL (24 Mar 2023 16:43)    I&O's Summary    24 Mar 2023 07:01  -  25 Mar 2023 07:00  --------------------------------------------------------  IN: 2400 mL / OUT: 4800 mL / NET: -2400 mL    25 Mar 2023 07:01  -  25 Mar 2023 14:53  --------------------------------------------------------  IN: 240 mL / OUT: 0 mL / NET: 240 mL        PHYSICAL EXAM:  Vital Signs Last 24 Hrs  T(C): 37.1 (25 Mar 2023 11:49), Max: 37.1 (25 Mar 2023 11:49)  T(F): 98.7 (25 Mar 2023 11:49), Max: 98.7 (25 Mar 2023 11:49)  HR: 65 (25 Mar 2023 14:15) (62 - 80)  BP: 115/72 (25 Mar 2023 14:15) (105/66 - 121/73)  BP(mean): --  RR: 18 (25 Mar 2023 11:49) (18 - 18)  SpO2: 96% (25 Mar 2023 11:49) (95% - 97%)    Parameters below as of 25 Mar 2023 11:49  Patient On (Oxygen Delivery Method): room air          PHYSICAL EXAM:   GENERAL: NAD, sitting comfortably in chair, large habitus  HEAD: Atraumatic, normocephalic  EYES: EOMI, conjunctiva and sclera clear, no nystagmus noted  ENT: Moist mucous membranes  CHEST/LUNG: Clear to auscultation bilaterally; no rales, rhonchi, wheezing, or rubs; unlabored respirations, Improved orthopnea which now resolved   HEART: Auscultation limited by body habitus, Grossly normal s1 and s2 w/o appreciable murmur, rub or gallop   ABDOMEN: Normal bowel sounds; soft, nontender, nondistended  EXTREMITIES: 1+ pitting edema in bilateral ankles; no clubbing or cyanosis  MSK: No gross deformities noted   NEURO: AOx4, No focal deficits   SKIN: No rashes or lesions  PSYCH: Normal mood, affect             LABS:                        13.7   7.90  )-----------( 183      ( 25 Mar 2023 06:02 )             40.5     03-25    139  |  100  |  16  ----------------------------<  132<H>  3.4<L>   |  27  |  0.55    Ca    9.4      25 Mar 2023 06:03  Phos  3.1     03-25  Mg     1.6     03-25

## 2023-03-25 NOTE — PROGRESS NOTE ADULT - PROBLEM SELECTOR PLAN 1
Acute worsening SOB with substernal chest tightness, worsening orthopnea, 6 pound weight gain, slight cough w. clear sputum production concerning for acute decompensated HF vs angina vs diuretic resistance. Of note recently underwent procedure (fluid retention may be postanesthesia effects or decreased bioavailability of PO bumex), also started percocet which has a Class C interaction w/ diuretics (may reduce efficacy). Less likely acute ischemic process vs PNA.   - Transitioned from IV bumex to PO bumex, will dc with Bumex 4mg in am and 2mg in PM alt with Bumex 2mg BID   - 3/21: TTE w/ mild aortic root dilation; grossly normal LVSF; other structures poorly visualized  - 3/2021: MUGA scan 3/2021 w/ normal resting LV EF of 57 % and normal right and left ventricular wall motion  - 3/19: CTA Chest in ED w/o significant findings   - Pro-, no troponin elevation or ischemic EKG changes  - Daily weights, strict I/O

## 2023-03-25 NOTE — PROGRESS NOTE ADULT - PROBLEM SELECTOR PLAN 4
CT findings with 3.2 x 2.2 cm indeterminate exophytic lesion arising from lower pole right kidney coronal 4-62 which appears slightly larger compared to prior exam, suspicious for malignancy. Renal US findings consistent w/ RCC.  - findings discussed w/ patient  - urology consulted for further workup/intervention, recommended outpatient followup  - we will attempt to arrange for expedited followup w/ urology, cardiology, PCP  - PCP appointment 3/29   - Urology appointment 5/2023, office says they will make expedited appt this is a place [facility]  - Cardiology attempt make appointment, office to call us back

## 2023-03-25 NOTE — PROGRESS NOTE ADULT - PROBLEM SELECTOR PLAN 2
Pt w/ reported decreased urination w/o dysuria, hematuria. Improved. CTAP and renal US w/o evidence of obstruction. UA weakly positive w/ 11 WBC and few bacteria.  - UCx growing citrobacter  - s/p course of CTX (3/19-21)  - C/w flomax

## 2023-03-25 NOTE — PROGRESS NOTE ADULT - ATTENDING COMMENTS
67M with PMH CAD s/p CABG, presumed dCHF on bumex, HTN, HLD, DM, gout, carpal tunnel, ASIYA on CPAP, p/w SOB with associated CP, orthopnea and weight gain after undergoing carpal tunnel release, a/w acute on chronic diastolic HF. Found on imaging to have R renal exophytic mass c/f malignancy.   Significantly improved orthopnea, urinating well.     #acute on chronic diastolic HF   #UTI  #renal mass   #CAD s/p CABG  #HTN  #DM     - TTE: Endocardium not well visualized; grossly normal left ventricular systolic function. symptomatically improving  - will increase bumex to 4mg in Am, 2mg in PM  - UA positive, Cx + citrobacter koseri --> completed CTX x 3d   - CT showing exophytic renal mass, confirmed on US with c/f renal cell ca - urology consulted, recs appreciated: plan for outpt resection once medically optimized.   - c/w ASA, statin  - c/w BB and ARB, c/w norvasc  - CPAP nightly    d/c home today     35 minutes spent coordinating discharge

## 2023-03-25 NOTE — PROGRESS NOTE ADULT - PROBLEM SELECTOR PLAN 10
Gout prevention: C/w allopurinol   C/w home multivitamins  Diet: consistent carb  DVT: lovenox   Dispo: anticipate d/c soon pending good diuresis w/ PO bumex    Code Status: FULL CODE

## 2023-03-25 NOTE — PROGRESS NOTE ADULT - PROBLEM SELECTOR PLAN 5
Pt w/ ASIYA, uses CPAP at home. Does not know settings. On prior admission was set at 10. Trialed CPAP 5, mildly hypercarbic. Patient noted sensation of neck fullness on CPAP 8  - continue CPAP 6

## 2023-03-25 NOTE — PROGRESS NOTE ADULT - PROBLEM SELECTOR PLAN 9
s/p carpal tunnel release on 3/17  - recovering appropriately   - oxy 2.5/5mg Q4H PRN for moderate/severe pain; hold home percocet  - Re-wrapped by Ortho team in [facility]

## 2023-03-26 ENCOUNTER — TRANSCRIPTION ENCOUNTER (OUTPATIENT)
Age: 68
End: 2023-03-26

## 2023-03-27 ENCOUNTER — APPOINTMENT (OUTPATIENT)
Dept: ORTHOPEDIC SURGERY | Facility: CLINIC | Age: 68
End: 2023-03-27
Payer: MEDICARE

## 2023-03-27 ENCOUNTER — TRANSCRIPTION ENCOUNTER (OUTPATIENT)
Age: 68
End: 2023-03-27

## 2023-03-27 DIAGNOSIS — G56.02 CARPAL TUNNEL SYNDROME, LEFT UPPER LIMB: ICD-10-CM

## 2023-03-27 DIAGNOSIS — G56.22 LESION OF ULNAR NERVE, LEFT UPPER LIMB: ICD-10-CM

## 2023-03-27 PROBLEM — M65.30 TRIGGER FINGER OF LEFT HAND: Status: ACTIVE | Noted: 2023-03-27

## 2023-03-27 PROBLEM — G56.03 BILATERAL CARPAL TUNNEL SYNDROME: Status: ACTIVE | Noted: 2023-02-02

## 2023-03-27 PROCEDURE — 99024 POSTOP FOLLOW-UP VISIT: CPT

## 2023-03-27 NOTE — ADDENDUM
[FreeTextEntry1] : I, Massiel Alvarez wrote this note acting as a scribe for Dr. Charlie Esquivel on Mar 27, 2023.

## 2023-03-27 NOTE — END OF VISIT
[FreeTextEntry3] : All medical record entries made by the Scribe were at my,  Dr. Charlie Esquivel MD., direction and personally dictated by me on 03/27/2023. I have personally reviewed the chart and agree that the record accurately reflects my personal performance of the history, physical exam, assessment and plan.

## 2023-03-27 NOTE — HISTORY OF PRESENT ILLNESS
[de-identified] : s/p Left carpal tunnel release, left cubital tunnel release, and decompression of left thumb, index, long, ring, and little trigger fingers. [de-identified] : The patient is a 67 year male who returns for the 1st postoperative visit after undergoing Left carpal tunnel release, left cubital tunnel release, and decompression of left thumb, index, long, ring, and little trigger fingers at [facility]. The surgery was on 03/17/2023. The patient is recovering at home. He reports no postoperative pain. He is doing well. \par \par Of note, he was hospitalized on [place] 3/19/23 as he states he was retaining fluid and had blood in his urine. He underwent an ultrasound due to suspicious for kidney stones and subsequently was diagnosed with renal cell carcinoma. He will be undergoing a partial resection of the right kidney. [de-identified] : Patient is WDWN, alert, and in no acute distress. Breathing is unlabored. He is grossly oriented to person, place, and time.\par \par Left Elbow/Wrist:\par Incisions are healing well. There are no signs of infection. Sutures were removed. Normal amount of postoperative edema and tenderness present.\par Sensation along the median distribution is improved.\par Sensation along the ulnar nerve distribution is still diminished postoperatively. \par Digital motion is full. [de-identified] : no imaging today [de-identified] : At this time, the sutures were removed. He was instructed on local wound care and to keep the incision sites dry for 14 days postoperatively. He may then begin to massage the scar with vitamin E oil. Gentle range of motion, stretching, and strengthening exercises were encouraged. Patient should actively work on gradually increasing use of the hand and elbow, as tolerated. Follow up in 6 weeks.

## 2023-03-28 ENCOUNTER — APPOINTMENT (OUTPATIENT)
Dept: UROLOGY | Facility: CLINIC | Age: 68
End: 2023-03-28
Payer: MEDICARE

## 2023-03-28 VITALS
SYSTOLIC BLOOD PRESSURE: 103 MMHG | DIASTOLIC BLOOD PRESSURE: 66 MMHG | BODY MASS INDEX: 37.19 KG/M2 | RESPIRATION RATE: 17 BRPM | HEART RATE: 64 BPM | WEIGHT: 315 LBS | TEMPERATURE: 97.2 F | HEIGHT: 77 IN

## 2023-03-28 DIAGNOSIS — N28.89 OTHER SPECIFIED DISORDERS OF KIDNEY AND URETER: ICD-10-CM

## 2023-03-28 PROCEDURE — 99214 OFFICE O/P EST MOD 30 MIN: CPT

## 2023-03-28 NOTE — ASSESSMENT
[FreeTextEntry1] : We reviewed the images and reports It is growing at a rate that is suspicious for RCC .\par renal mass\par We reviewed the images and reports . We reviewed the possibe underlying histology of solid enhancing renal masses . We discussed the risk of malignancy  vs benign . We discussed the possibility/ role of percutaneous biopsy ,with the associated risks, benefits , complications and accuracy issues ( risk of false negative). \par The natural history and biology of renal cell carcinoma was discussed. \par Options were reviewed including ,not limited to,active surveillance, surgical extirpation and ablation . The risk of tumor growth and metastasis on active surveillance were reviewed .\par Options were reviewed including , not limited to,active surveillance ( AS ) surgical extirpation and ablation. The risk of tumor growth and metastasis on AS could mean missing the opportunity for cure was discussed \par With respect to treatment we reviewed  ablation ( cryoablation, radiofrequency ablation) risks of recurrence,opportunities for salvage treatment and imaging requirements for follow up based on he pathological findings . Oncologic outcomes for ablation were reviewed .\par With respect for surgery we reviewed nephron sparing surgery vs radical nephrectomy  as well as minimally invasive laparoscopic  approach surgery and the possibility of converting to an open procedure . \par Risks of surgical complications were reviewed, including but not limited to : bleeding/ life threatening hemorrhage, vascular/bowel/adjacent visceral organ injury,trocar access injury, the possibility of recognized vs unrecognized delayed recognition injury, risks of thermal /blunt/sharp/retraction injury. We reviewed the risk of DVT, PE, MI ,death,risks of cardiopulmonary/anesthesia related complications,positional injury,infection/collection/abscess,wound complications/dehiscence,urinoma/fistula, ureteral injury/obstruction as well as other complications \par Written materials regarding preop clear liquid diet and bowel prep were given to patient . \par Booking form entered\par \par \par We discussed the natural growth of RCC . We discussed the risks of surgery and benefits . We discussed converting to an open procedures , injury to an adjacent major organ , nerves , muscles , tissues \par We discussed the need for blood transfusion and delay recognition of injury . He has multiple commodities \par We discussed possible REHAB due to his comorbidities .\par Written instructions given .Weight loss discussed as well as smoking cessation \par Creatinine is 0.99\par Booking form done \par

## 2023-03-28 NOTE — HISTORY OF PRESENT ILLNESS
[None] : no symptoms [FreeTextEntry1] : Taken to the ED for SOB and chest pain CT of the abdomen done revealing a right renal mass . We looked at his images . The lesion was 1.7cm in 2022 and now it is 3.2cm x 2.2 cm cm in the lower pole of the right kidney

## 2023-03-28 NOTE — PHYSICAL EXAM
[General Appearance - Well Developed] : well developed [General Appearance - Well Nourished] : well nourished [Abdomen Soft] : soft [Skin Color & Pigmentation] : normal skin color and pigmentation [Heart Rate And Rhythm] : Heart rate and rhythm were normal [] : no respiratory distress [Oriented To Time, Place, And Person] : oriented to person, place, and time [Normal Station and Gait] : the gait and station were normal for the patient's age [No Focal Deficits] : no focal deficits [FreeTextEntry1] : multiple scars

## 2023-03-29 NOTE — ED PROVIDER NOTE - CPE EDP CARDIAC NORM
RN returned call to Jaci. RN was on hold for an extended period of time. Pt needs to call office to schedule surgery.  RN will call patient to schedule surgery. Called pt no answer, LVMICHELL.    normal...

## 2023-03-30 ENCOUNTER — OUTPATIENT (OUTPATIENT)
Dept: OUTPATIENT SERVICES | Facility: HOSPITAL | Age: 68
LOS: 1 days | End: 2023-03-30

## 2023-03-30 VITALS
SYSTOLIC BLOOD PRESSURE: 100 MMHG | OXYGEN SATURATION: 98 % | WEIGHT: 315 LBS | RESPIRATION RATE: 14 BRPM | HEIGHT: 77 IN | TEMPERATURE: 98 F | HEART RATE: 70 BPM | DIASTOLIC BLOOD PRESSURE: 68 MMHG

## 2023-03-30 DIAGNOSIS — G62.9 POLYNEUROPATHY, UNSPECIFIED: Chronic | ICD-10-CM

## 2023-03-30 DIAGNOSIS — Z96.651 PRESENCE OF RIGHT ARTIFICIAL KNEE JOINT: Chronic | ICD-10-CM

## 2023-03-30 DIAGNOSIS — Z98.890 OTHER SPECIFIED POSTPROCEDURAL STATES: Chronic | ICD-10-CM

## 2023-03-30 DIAGNOSIS — Z95.1 PRESENCE OF AORTOCORONARY BYPASS GRAFT: Chronic | ICD-10-CM

## 2023-03-30 DIAGNOSIS — N20.0 CALCULUS OF KIDNEY: Chronic | ICD-10-CM

## 2023-03-30 DIAGNOSIS — Z98.89 OTHER SPECIFIED POSTPROCEDURAL STATES: Chronic | ICD-10-CM

## 2023-03-30 DIAGNOSIS — N28.89 OTHER SPECIFIED DISORDERS OF KIDNEY AND URETER: ICD-10-CM

## 2023-03-30 DIAGNOSIS — Z90.49 ACQUIRED ABSENCE OF OTHER SPECIFIED PARTS OF DIGESTIVE TRACT: Chronic | ICD-10-CM

## 2023-03-30 DIAGNOSIS — Z96.643 PRESENCE OF ARTIFICIAL HIP JOINT, BILATERAL: Chronic | ICD-10-CM

## 2023-03-30 DIAGNOSIS — Z98.1 ARTHRODESIS STATUS: Chronic | ICD-10-CM

## 2023-03-30 DIAGNOSIS — Z96.653 PRESENCE OF ARTIFICIAL KNEE JOINT, BILATERAL: Chronic | ICD-10-CM

## 2023-03-30 LAB
A1C WITH ESTIMATED AVERAGE GLUCOSE RESULT: 6.5 % — HIGH (ref 4–5.6)
ALBUMIN SERPL ELPH-MCNC: 4.4 G/DL — SIGNIFICANT CHANGE UP (ref 3.3–5)
ALP SERPL-CCNC: 101 U/L — SIGNIFICANT CHANGE UP (ref 40–120)
ALT FLD-CCNC: 15 U/L — SIGNIFICANT CHANGE UP (ref 4–41)
ANION GAP SERPL CALC-SCNC: 16 MMOL/L — HIGH (ref 7–14)
AST SERPL-CCNC: 20 U/L — SIGNIFICANT CHANGE UP (ref 4–40)
BILIRUB SERPL-MCNC: 0.8 MG/DL — SIGNIFICANT CHANGE UP (ref 0.2–1.2)
BLD GP AB SCN SERPL QL: NEGATIVE — SIGNIFICANT CHANGE UP
BUN SERPL-MCNC: 19 MG/DL — SIGNIFICANT CHANGE UP (ref 7–23)
CALCIUM SERPL-MCNC: 9.7 MG/DL — SIGNIFICANT CHANGE UP (ref 8.4–10.5)
CHLORIDE SERPL-SCNC: 97 MMOL/L — LOW (ref 98–107)
CO2 SERPL-SCNC: 27 MMOL/L — SIGNIFICANT CHANGE UP (ref 22–31)
CREAT SERPL-MCNC: 0.64 MG/DL — SIGNIFICANT CHANGE UP (ref 0.5–1.3)
EGFR: 104 ML/MIN/1.73M2 — SIGNIFICANT CHANGE UP
ESTIMATED AVERAGE GLUCOSE: 140 — SIGNIFICANT CHANGE UP
GLUCOSE SERPL-MCNC: 126 MG/DL — HIGH (ref 70–99)
HCT VFR BLD CALC: 44.4 % — SIGNIFICANT CHANGE UP (ref 39–50)
HGB BLD-MCNC: 14.7 G/DL — SIGNIFICANT CHANGE UP (ref 13–17)
MCHC RBC-ENTMCNC: 29 PG — SIGNIFICANT CHANGE UP (ref 27–34)
MCHC RBC-ENTMCNC: 33.1 GM/DL — SIGNIFICANT CHANGE UP (ref 32–36)
MCV RBC AUTO: 87.6 FL — SIGNIFICANT CHANGE UP (ref 80–100)
NRBC # BLD: 0 /100 WBCS — SIGNIFICANT CHANGE UP (ref 0–0)
NRBC # FLD: 0 K/UL — SIGNIFICANT CHANGE UP (ref 0–0)
PLATELET # BLD AUTO: 220 K/UL — SIGNIFICANT CHANGE UP (ref 150–400)
POTASSIUM SERPL-MCNC: 3.5 MMOL/L — SIGNIFICANT CHANGE UP (ref 3.5–5.3)
POTASSIUM SERPL-SCNC: 3.5 MMOL/L — SIGNIFICANT CHANGE UP (ref 3.5–5.3)
PROT SERPL-MCNC: 7.1 G/DL — SIGNIFICANT CHANGE UP (ref 6–8.3)
RBC # BLD: 5.07 M/UL — SIGNIFICANT CHANGE UP (ref 4.2–5.8)
RBC # FLD: 13.2 % — SIGNIFICANT CHANGE UP (ref 10.3–14.5)
RH IG SCN BLD-IMP: POSITIVE — SIGNIFICANT CHANGE UP
SODIUM SERPL-SCNC: 140 MMOL/L — SIGNIFICANT CHANGE UP (ref 135–145)
WBC # BLD: 7.65 K/UL — SIGNIFICANT CHANGE UP (ref 3.8–10.5)
WBC # FLD AUTO: 7.65 K/UL — SIGNIFICANT CHANGE UP (ref 3.8–10.5)

## 2023-03-30 RX ORDER — GABAPENTIN 400 MG/1
1 CAPSULE ORAL
Qty: 0 | Refills: 0 | DISCHARGE

## 2023-03-30 RX ORDER — SODIUM CHLORIDE 9 MG/ML
1000 INJECTION, SOLUTION INTRAVENOUS
Refills: 0 | Status: DISCONTINUED | OUTPATIENT
Start: 2023-04-05 | End: 2023-04-05

## 2023-03-30 NOTE — H&P PST ADULT - PRO TOBACCO TYPE
Initiate Treatment: Doxycycline 100 mg BID\\nHibiclens 2-3 times a week when flaring
Detail Level: Detailed
Plan: Discussed weaning to once a day if condition continues to be well controlled. May work back up to BID if decrease causes a flare
cigars

## 2023-03-30 NOTE — H&P CARDIOLOGY - PMH
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
English

## 2023-03-30 NOTE — H&P PST ADULT - GASTROINTESTINAL
details… normal/soft/nontender/nondistended/normal active bowel sounds/no guarding/no rigidity/no organomegaly/no palpable waqas/no masses palpable

## 2023-03-30 NOTE — H&P PST ADULT - HISTORY OF PRESENT ILLNESS
66y/o male scheduled for right laparoscopic partial nephrectomy on 4/5/2023.  Pt states, "hx of left sided kidney stone, was admitted [place] 3/19/2023 for left sided pain, cat scan shows incidental finding of right kidney mass.  No intervention was done for left kidney stone, denies pain.  Was found to be in fluid overload, stayed one week in the hospital, adjustment made to medication Bumetanide.  No longer c/o sob."

## 2023-03-30 NOTE — H&P PST ADULT - NSICDXPASTMEDICALHX_GEN_ALL_CORE_FT
PAST MEDICAL HISTORY:  CAD (coronary artery disease) 2017- s/p cabg x3    Cubital tunnel syndrome, right     Essential hypertension     Gout     H/O CHF     H/O: osteoarthritis     Hypercholesterolemia     Kidney mass     Lumbar disc disease with radiculopathy     Morbid obesity with body mass index (BMI) of 40.0 or higher     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      PAST MEDICAL HISTORY:  CAD (coronary artery disease) 2017- s/p cabg x3    Cervical herniated disc     Cubital tunnel syndrome, right     Essential hypertension     Gout     H/O CHF     H/O: osteoarthritis     Hypercholesterolemia     Kidney mass     Lumbar disc disease with radiculopathy     Morbid obesity with body mass index (BMI) of 40.0 or higher     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

## 2023-03-30 NOTE — H&P PST ADULT - PROBLEM SELECTOR PLAN 1
Pt scheduled for right laparoscopic partial nephrectomy on 4/5/2023.  labs done results pending.  ekg in chart.  Pt instructed to obtain preop covid testing.  Chlorhexidine provided-  written and verbal instructions given, pt able to verbalize understanding.  Preop teaching done, pt able to verbalize understanding.   medication day or procedure-  amlodipine, carvedilol, gabapentin, losartan, bumetanide, pepcid   cad pt instructed to continue asa   aesthesia-  limited rom of neck in all directions, as per pt hx of severe sleep apnea using cpap.  s/p carpal tunnel release  (did not take bumetanide morning of the procedure) release developed chf, was admitted to [facility].  Case discussed with Dr. Robles, agrees pt to take Bumetanide day of procedure   pst request   cardiology eval Dr. Farfan Amid [phone]  echo 2023 in chart   cardiac angiogram [phone] Pt scheduled for right laparoscopic partial nephrectomy on 4/5/2023.  labs done results pending.  ekg in chart.  Pt instructed to obtain preop covid testing.  Chlorhexidine provided-  written and verbal instructions given, pt able to verbalize understanding.  Preop teaching done, pt able to verbalize understanding.   medication day or procedure-  amlodipine, carvedilol, gabapentin, losartan, bumetanide, pepcid   cad pt instructed to continue asa   OR booking notified of BMI 44.4  aesthesia-  limited rom of neck in all directions, as per pt hx of severe sleep apnea using cpap.  s/p carpal tunnel release  (did not take bumetanide morning of the procedure) release developed chf, was admitted to [facility].  Case discussed with Dr. Robles, agrees pt to take Bumetanide day of procedure   pst request   cardiology eval Dr. Farfan Amid [phone]  echo 2023 in chart   cardiac angiogram [phone]

## 2023-03-30 NOTE — H&P PST ADULT - NSICDXPASTSURGICALHX_GEN_ALL_CORE_FT
PAST SURGICAL HISTORY:  H/O lithotripsy x1    History of bilateral hip replacements     History of bilateral knee replacement     History of cholecystectomy     History of hernia repair     History of lumbar fusion     History of lymph node biopsy     History of Total Hip Replacement 2009- bilateral THR    Kidney stone s/w ESWL pt unsure site    Neuropathy Bilateral Feet since 2012    S/p bilateral carpal tunnel release     S/P CABG x 3 8/29/17    S/P Left Inguinal Hernia Repair     S/P lumbar discectomy 2012- ,L5  Aug 2013    S/P lumbar laminectomy Fusion L3-L5 2012    S/P revision of total knee, right x2- 2012 & 2014    S/P tonsillectomy and adenoidectomy as child

## 2023-04-01 NOTE — PROGRESS NOTE ADULT - PROBLEM/PLAN-3
DISPLAY PLAN FREE TEXT
0

## 2023-04-04 ENCOUNTER — TRANSCRIPTION ENCOUNTER (OUTPATIENT)
Age: 68
End: 2023-04-04

## 2023-04-05 ENCOUNTER — INPATIENT (INPATIENT)
Facility: HOSPITAL | Age: 68
LOS: 1 days | Discharge: HOME | End: 2023-04-07
Attending: UROLOGY | Admitting: UROLOGY
Payer: MEDICARE

## 2023-04-05 ENCOUNTER — APPOINTMENT (OUTPATIENT)
Dept: UROLOGY | Facility: HOSPITAL | Age: 68
End: 2023-04-05

## 2023-04-05 ENCOUNTER — RESULT REVIEW (OUTPATIENT)
Age: 68
End: 2023-04-05

## 2023-04-05 VITALS
RESPIRATION RATE: 16 BRPM | TEMPERATURE: 98 F | SYSTOLIC BLOOD PRESSURE: 118 MMHG | HEIGHT: 77 IN | WEIGHT: 315 LBS | DIASTOLIC BLOOD PRESSURE: 71 MMHG | OXYGEN SATURATION: 100 % | HEART RATE: 66 BPM

## 2023-04-05 DIAGNOSIS — I10 ESSENTIAL (PRIMARY) HYPERTENSION: ICD-10-CM

## 2023-04-05 DIAGNOSIS — Z96.653 PRESENCE OF ARTIFICIAL KNEE JOINT, BILATERAL: Chronic | ICD-10-CM

## 2023-04-05 DIAGNOSIS — G62.9 POLYNEUROPATHY, UNSPECIFIED: Chronic | ICD-10-CM

## 2023-04-05 DIAGNOSIS — I50.9 HEART FAILURE, UNSPECIFIED: ICD-10-CM

## 2023-04-05 DIAGNOSIS — Z98.89 OTHER SPECIFIED POSTPROCEDURAL STATES: Chronic | ICD-10-CM

## 2023-04-05 DIAGNOSIS — Z98.1 ARTHRODESIS STATUS: Chronic | ICD-10-CM

## 2023-04-05 DIAGNOSIS — Z98.890 OTHER SPECIFIED POSTPROCEDURAL STATES: Chronic | ICD-10-CM

## 2023-04-05 DIAGNOSIS — Z95.1 PRESENCE OF AORTOCORONARY BYPASS GRAFT: Chronic | ICD-10-CM

## 2023-04-05 DIAGNOSIS — Z96.643 PRESENCE OF ARTIFICIAL HIP JOINT, BILATERAL: Chronic | ICD-10-CM

## 2023-04-05 DIAGNOSIS — E11.9 TYPE 2 DIABETES MELLITUS WITHOUT COMPLICATIONS: ICD-10-CM

## 2023-04-05 DIAGNOSIS — N28.89 OTHER SPECIFIED DISORDERS OF KIDNEY AND URETER: ICD-10-CM

## 2023-04-05 DIAGNOSIS — Z90.49 ACQUIRED ABSENCE OF OTHER SPECIFIED PARTS OF DIGESTIVE TRACT: Chronic | ICD-10-CM

## 2023-04-05 DIAGNOSIS — Z29.9 ENCOUNTER FOR PROPHYLACTIC MEASURES, UNSPECIFIED: ICD-10-CM

## 2023-04-05 DIAGNOSIS — N20.0 CALCULUS OF KIDNEY: Chronic | ICD-10-CM

## 2023-04-05 DIAGNOSIS — G47.33 OBSTRUCTIVE SLEEP APNEA (ADULT) (PEDIATRIC): ICD-10-CM

## 2023-04-05 DIAGNOSIS — I25.10 ATHEROSCLEROTIC HEART DISEASE OF NATIVE CORONARY ARTERY WITHOUT ANGINA PECTORIS: ICD-10-CM

## 2023-04-05 DIAGNOSIS — Z96.651 PRESENCE OF RIGHT ARTIFICIAL KNEE JOINT: Chronic | ICD-10-CM

## 2023-04-05 LAB
GLUCOSE BLDC GLUCOMTR-MCNC: 170 MG/DL — HIGH (ref 70–99)
GLUCOSE BLDC GLUCOMTR-MCNC: 175 MG/DL — HIGH (ref 70–99)

## 2023-04-05 PROCEDURE — 99223 1ST HOSP IP/OBS HIGH 75: CPT

## 2023-04-05 PROCEDURE — 88313 SPECIAL STAINS GROUP 2: CPT | Mod: 26

## 2023-04-05 PROCEDURE — 88305 TISSUE EXAM BY PATHOLOGIST: CPT | Mod: 26

## 2023-04-05 PROCEDURE — 88307 TISSUE EXAM BY PATHOLOGIST: CPT | Mod: 26

## 2023-04-05 DEVICE — CLIP APPLIER COVIDIEN ENDOCLIP II 10MM MED/LG: Type: IMPLANTABLE DEVICE | Site: RIGHT | Status: FUNCTIONAL

## 2023-04-05 DEVICE — CLIP LIGATING VESOLOCK LGE PRPL: Type: IMPLANTABLE DEVICE | Site: RIGHT | Status: FUNCTIONAL

## 2023-04-05 DEVICE — SURGICEL 2 X 14": Type: IMPLANTABLE DEVICE | Site: RIGHT | Status: FUNCTIONAL

## 2023-04-05 RX ORDER — GLUCAGON INJECTION, SOLUTION 0.5 MG/.1ML
1 INJECTION, SOLUTION SUBCUTANEOUS ONCE
Refills: 0 | Status: DISCONTINUED | OUTPATIENT
Start: 2023-04-05 | End: 2023-04-07

## 2023-04-05 RX ORDER — AMLODIPINE BESYLATE 2.5 MG/1
10 TABLET ORAL DAILY
Refills: 0 | Status: DISCONTINUED | OUTPATIENT
Start: 2023-04-05 | End: 2023-04-07

## 2023-04-05 RX ORDER — INSULIN LISPRO 100/ML
VIAL (ML) SUBCUTANEOUS AT BEDTIME
Refills: 0 | Status: DISCONTINUED | OUTPATIENT
Start: 2023-04-05 | End: 2023-04-07

## 2023-04-05 RX ORDER — GLIMEPIRIDE 1 MG
1 TABLET ORAL
Qty: 0 | Refills: 0 | DISCHARGE

## 2023-04-05 RX ORDER — INSULIN LISPRO 100/ML
VIAL (ML) SUBCUTANEOUS
Refills: 0 | Status: DISCONTINUED | OUTPATIENT
Start: 2023-04-05 | End: 2023-04-07

## 2023-04-05 RX ORDER — SODIUM CHLORIDE 9 MG/ML
1000 INJECTION, SOLUTION INTRAVENOUS
Refills: 0 | Status: DISCONTINUED | OUTPATIENT
Start: 2023-04-05 | End: 2023-04-06

## 2023-04-05 RX ORDER — GABAPENTIN 400 MG/1
1 CAPSULE ORAL
Refills: 0 | DISCHARGE

## 2023-04-05 RX ORDER — HYDROMORPHONE HYDROCHLORIDE 2 MG/ML
0.5 INJECTION INTRAMUSCULAR; INTRAVENOUS; SUBCUTANEOUS
Refills: 0 | Status: DISCONTINUED | OUTPATIENT
Start: 2023-04-05 | End: 2023-04-05

## 2023-04-05 RX ORDER — DEXTROSE 50 % IN WATER 50 %
12.5 SYRINGE (ML) INTRAVENOUS ONCE
Refills: 0 | Status: DISCONTINUED | OUTPATIENT
Start: 2023-04-05 | End: 2023-04-07

## 2023-04-05 RX ORDER — BUMETANIDE 0.25 MG/ML
4 INJECTION INTRAMUSCULAR; INTRAVENOUS
Refills: 0 | Status: DISCONTINUED | OUTPATIENT
Start: 2023-04-05 | End: 2023-04-05

## 2023-04-05 RX ORDER — LOSARTAN POTASSIUM 100 MG/1
100 TABLET, FILM COATED ORAL DAILY
Refills: 0 | Status: DISCONTINUED | OUTPATIENT
Start: 2023-04-05 | End: 2023-04-05

## 2023-04-05 RX ORDER — OXYCODONE HYDROCHLORIDE 5 MG/1
10 TABLET ORAL EVERY 4 HOURS
Refills: 0 | Status: DISCONTINUED | OUTPATIENT
Start: 2023-04-05 | End: 2023-04-07

## 2023-04-05 RX ORDER — SODIUM CHLORIDE 9 MG/ML
1000 INJECTION, SOLUTION INTRAVENOUS
Refills: 0 | Status: DISCONTINUED | OUTPATIENT
Start: 2023-04-05 | End: 2023-04-05

## 2023-04-05 RX ORDER — ACETAMINOPHEN 500 MG
975 TABLET ORAL EVERY 6 HOURS
Refills: 0 | Status: DISCONTINUED | OUTPATIENT
Start: 2023-04-05 | End: 2023-04-07

## 2023-04-05 RX ORDER — CARVEDILOL PHOSPHATE 80 MG/1
12.5 CAPSULE, EXTENDED RELEASE ORAL EVERY 12 HOURS
Refills: 0 | Status: DISCONTINUED | OUTPATIENT
Start: 2023-04-05 | End: 2023-04-07

## 2023-04-05 RX ORDER — ASPIRIN/CALCIUM CARB/MAGNESIUM 324 MG
81 TABLET ORAL DAILY
Refills: 0 | Status: DISCONTINUED | OUTPATIENT
Start: 2023-04-05 | End: 2023-04-07

## 2023-04-05 RX ORDER — BUMETANIDE 0.25 MG/ML
2 INJECTION INTRAMUSCULAR; INTRAVENOUS
Refills: 0 | Status: DISCONTINUED | OUTPATIENT
Start: 2023-04-05 | End: 2023-04-07

## 2023-04-05 RX ORDER — BUMETANIDE 0.25 MG/ML
1 INJECTION INTRAMUSCULAR; INTRAVENOUS
Refills: 0 | DISCHARGE

## 2023-04-05 RX ORDER — OXYCODONE HYDROCHLORIDE 5 MG/1
5 TABLET ORAL EVERY 4 HOURS
Refills: 0 | Status: DISCONTINUED | OUTPATIENT
Start: 2023-04-05 | End: 2023-04-07

## 2023-04-05 RX ORDER — DEXTROSE 50 % IN WATER 50 %
25 SYRINGE (ML) INTRAVENOUS ONCE
Refills: 0 | Status: DISCONTINUED | OUTPATIENT
Start: 2023-04-05 | End: 2023-04-07

## 2023-04-05 RX ORDER — BUMETANIDE 0.25 MG/ML
2 INJECTION INTRAMUSCULAR; INTRAVENOUS ONCE
Refills: 0 | Status: COMPLETED | OUTPATIENT
Start: 2023-04-05 | End: 2023-04-05

## 2023-04-05 RX ORDER — HEPARIN SODIUM 5000 [USP'U]/ML
5000 INJECTION INTRAVENOUS; SUBCUTANEOUS EVERY 8 HOURS
Refills: 0 | Status: DISCONTINUED | OUTPATIENT
Start: 2023-04-05 | End: 2023-04-07

## 2023-04-05 RX ORDER — BUMETANIDE 0.25 MG/ML
2 INJECTION INTRAMUSCULAR; INTRAVENOUS
Refills: 0 | Status: DISCONTINUED | OUTPATIENT
Start: 2023-04-05 | End: 2023-04-05

## 2023-04-05 RX ORDER — BUMETANIDE 0.25 MG/ML
2 INJECTION INTRAMUSCULAR; INTRAVENOUS ONCE
Refills: 0 | Status: COMPLETED | OUTPATIENT
Start: 2023-04-06 | End: 2023-04-06

## 2023-04-05 RX ORDER — ONDANSETRON 8 MG/1
4 TABLET, FILM COATED ORAL ONCE
Refills: 0 | Status: DISCONTINUED | OUTPATIENT
Start: 2023-04-05 | End: 2023-04-05

## 2023-04-05 RX ORDER — ALLOPURINOL 300 MG
100 TABLET ORAL AT BEDTIME
Refills: 0 | Status: DISCONTINUED | OUTPATIENT
Start: 2023-04-05 | End: 2023-04-07

## 2023-04-05 RX ORDER — SODIUM CHLORIDE 9 MG/ML
1000 INJECTION, SOLUTION INTRAVENOUS
Refills: 0 | Status: DISCONTINUED | OUTPATIENT
Start: 2023-04-05 | End: 2023-04-07

## 2023-04-05 RX ORDER — LOSARTAN POTASSIUM 100 MG/1
100 TABLET, FILM COATED ORAL DAILY
Refills: 0 | Status: DISCONTINUED | OUTPATIENT
Start: 2023-04-05 | End: 2023-04-07

## 2023-04-05 RX ORDER — FENTANYL CITRATE 50 UG/ML
50 INJECTION INTRAVENOUS ONCE
Refills: 0 | Status: DISCONTINUED | OUTPATIENT
Start: 2023-04-05 | End: 2023-04-05

## 2023-04-05 RX ORDER — TIZANIDINE 4 MG/1
2 TABLET ORAL
Refills: 0 | Status: DISCONTINUED | OUTPATIENT
Start: 2023-04-05 | End: 2023-04-07

## 2023-04-05 RX ORDER — DEXTROSE 50 % IN WATER 50 %
15 SYRINGE (ML) INTRAVENOUS ONCE
Refills: 0 | Status: DISCONTINUED | OUTPATIENT
Start: 2023-04-05 | End: 2023-04-07

## 2023-04-05 RX ORDER — GABAPENTIN 400 MG/1
600 CAPSULE ORAL AT BEDTIME
Refills: 0 | Status: DISCONTINUED | OUTPATIENT
Start: 2023-04-05 | End: 2023-04-07

## 2023-04-05 RX ADMIN — BUMETANIDE 2 MILLIGRAM(S): 0.25 INJECTION INTRAMUSCULAR; INTRAVENOUS at 19:39

## 2023-04-05 RX ADMIN — FENTANYL CITRATE 50 MICROGRAM(S): 50 INJECTION INTRAVENOUS at 15:09

## 2023-04-05 RX ADMIN — OXYCODONE HYDROCHLORIDE 5 MILLIGRAM(S): 5 TABLET ORAL at 22:00

## 2023-04-05 RX ADMIN — Medication 975 MILLIGRAM(S): at 21:00

## 2023-04-05 RX ADMIN — HYDROMORPHONE HYDROCHLORIDE 0.5 MILLIGRAM(S): 2 INJECTION INTRAMUSCULAR; INTRAVENOUS; SUBCUTANEOUS at 16:38

## 2023-04-05 RX ADMIN — SODIUM CHLORIDE 50 MILLILITER(S): 9 INJECTION, SOLUTION INTRAVENOUS at 22:50

## 2023-04-05 RX ADMIN — SODIUM CHLORIDE 125 MILLILITER(S): 9 INJECTION, SOLUTION INTRAVENOUS at 21:44

## 2023-04-05 RX ADMIN — HYDROMORPHONE HYDROCHLORIDE 0.5 MILLIGRAM(S): 2 INJECTION INTRAMUSCULAR; INTRAVENOUS; SUBCUTANEOUS at 16:00

## 2023-04-05 RX ADMIN — Medication 975 MILLIGRAM(S): at 20:03

## 2023-04-05 RX ADMIN — HYDROMORPHONE HYDROCHLORIDE 0.5 MILLIGRAM(S): 2 INJECTION INTRAMUSCULAR; INTRAVENOUS; SUBCUTANEOUS at 15:32

## 2023-04-05 RX ADMIN — OXYCODONE HYDROCHLORIDE 5 MILLIGRAM(S): 5 TABLET ORAL at 22:50

## 2023-04-05 RX ADMIN — HEPARIN SODIUM 5000 UNIT(S): 5000 INJECTION INTRAVENOUS; SUBCUTANEOUS at 21:44

## 2023-04-05 RX ADMIN — Medication 100 MILLIGRAM(S): at 21:44

## 2023-04-05 RX ADMIN — GABAPENTIN 600 MILLIGRAM(S): 400 CAPSULE ORAL at 21:44

## 2023-04-05 RX ADMIN — FENTANYL CITRATE 50 MICROGRAM(S): 50 INJECTION INTRAVENOUS at 15:32

## 2023-04-05 RX ADMIN — Medication 2: at 16:28

## 2023-04-05 NOTE — ASU PREOP CHECKLIST - COMMENTS
took gabapentin carvedilol bumex baby asa astrovastin and famotidine with sip of water took gabapentin carvedilol bumetamide baby asa  amlodipine atorvastatin  and famotidine with sip of water

## 2023-04-05 NOTE — CONSULT NOTE ADULT - ASSESSMENT
67 year old male with HFrEF, CAD s/p CABG, ASIYA on CPAP, HTN, DM, right renal mass, recent admission 3/19-3/24/23 at [facility] for HF exacerbation, now presented for partial nephrectomy now s/p OR 4/5/23.

## 2023-04-05 NOTE — ASU PREOP CHECKLIST - NEEDLE GAUGE
Detail Level: Simple Add 48385 Cpt? (Important Note: In 2017 The Use Of 22517 Is Being Tracked By Cms To Determine Future Global Period Reimbursement For Global Periods): yes 18

## 2023-04-05 NOTE — ASU PREOP CHECKLIST - AS TEMP SITE
Bedside shift change report given to myself (oncoming nurse) by Rene Calvo RN (offgoing nurse). Report included the following information SBAR, Kardex, Procedure Summary, Intake/Output, MAR, Recent Results and Cardiac Rhythm NSR. oral

## 2023-04-05 NOTE — PATIENT PROFILE ADULT - FALL HARM RISK - HARM RISK INTERVENTIONS

## 2023-04-05 NOTE — CONSULT NOTE ADULT - PROBLEM SELECTOR RECOMMENDATION 2
Recent admission 3/19-3/24/23 at [facility] for HF exacerbation, has been feeling well since dc.  - Not currently appearing overloaded  - c/w home regimen of coreg, ARB  - resume bumex if acceptable by urology, monitor Cr and volume status closely

## 2023-04-05 NOTE — CONSULT NOTE ADULT - SUBJECTIVE AND OBJECTIVE BOX
CHIEF COMPLAINT: Patient is a 67y old  Male who presents with a chief complaint of right renal mass, partial nephrectomy    HPI: 67 year old male with HFrEF, CAD s/p CABG, ASIYA on CPAP, HTN, DM, right renal mass, presented for partial nephrectomy now s/p OR 23. Currently reports 7/10 right sided abdominal discomfort near op/trocar site. No other pain or discomfort. No nausea, vomiting, chest pain, shortness of breath, fever, chills. Recent admission 3/19-3/24/23 at [facility] for HF exacerbation, has been feeling well since discharge without shortness of breath or significant edema.     Allergies  No Known Allergies    HOME MEDICATIONS: [x] Reviewed    MEDICATIONS  (STANDING):  acetaminophen     Tablet .. 975 milliGRAM(s) Oral every 6 hours  allopurinol 100 milliGRAM(s) Oral at bedtime  amLODIPine   Tablet 10 milliGRAM(s) Oral daily  aspirin enteric coated 81 milliGRAM(s) Oral daily  carvedilol 12.5 milliGRAM(s) Oral every 12 hours  dextrose 5%. 1000 milliLiter(s) (100 mL/Hr) IV Continuous <Continuous>  dextrose 5%. 1000 milliLiter(s) (50 mL/Hr) IV Continuous <Continuous>  dextrose 50% Injectable 25 Gram(s) IV Push once  dextrose 50% Injectable 12.5 Gram(s) IV Push once  dextrose 50% Injectable 25 Gram(s) IV Push once  gabapentin 600 milliGRAM(s) Oral at bedtime  glucagon  Injectable 1 milliGRAM(s) IntraMuscular once  heparin   Injectable 5000 Unit(s) SubCutaneous every 8 hours  insulin lispro (ADMELOG) corrective regimen sliding scale   SubCutaneous three times a day before meals  insulin lispro (ADMELOG) corrective regimen sliding scale   SubCutaneous at bedtime  lactated ringers. 1000 milliLiter(s) (30 mL/Hr) IV Continuous <Continuous>  lactated ringers. 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  Essential hypertension  CAD (coronary artery disease)  - s/p cabg x3  Hypercholesterolemia  ASIYA (obstructive sleep apnea)  initially dx  ; CPAP  Paralytic ileus  post op THR  & post op laminectomy  Type 2 diabetes mellitus  Right carpal tunnel syndrome  Cubital tunnel syndrome, right  Trigger finger of right hand  Morbid obesity with body mass index (BMI) of 40.0 or higher  H/O CHF  Kidney mass  Cervical herniated disc  S/P Left Inguinal Hernia Repair  History of Total Hip Replacement  - bilateral THR  S/P tonsillectomy and adenoidectomy  as child  H/O lithotripsy  x1  S/P revision of total knee, right  x2-  &   S/P lumbar discectomy  - ,L5  Aug 2013  S/P CABG x 3  17  S/P lumbar laminectomy  Fusion L3-L5   Kidney stone  s/w ESWL pt unsure site  Neuropathy  Bilateral Feet since   History of bilateral hip replacements  History of hernia repair  History of lumbar fusion  History of cholecystectomy  History of bilateral knee replacement  History of lymph node biopsy  S/p bilateral carpal tunnel release  [x] Reviewed     SOCIAL HISTORY:  Former tobacco use, no etoh/drug use    FAMILY HISTORY:  Family history of coronary artery disease  HLD/Angina. Fatal MI age 73.  Family history of diabetes mellitus type II  mother- - hyperlipidemia and Hypertension.  Family history of pancreatic cancer (Sibling)  brother   Family history of coronary artery disease  brother- age 50+- Coronary Artery Stents  REVIEW OF SYSTEMS:    [x] All other ROS negative  [  ] Unable to obtain due to poor mental status    Vital Signs Last 24 Hrs  T(C): 36.4 (2023 14:40), Max: 36.8 (2023 09:00)  T(F): 97.5 (2023 14:40), Max: 98.2 (2023 09:00)  HR: 69 (2023 14:40) (66 - 69)  BP: 96/51 (2023 14:40) (96/51 - 118/71)  BP(mean): 61 (2023 14:40) (61 - 61)  RR: 7 (2023 14:40) (7 - 16)  SpO2: 95% (2023 14:40) (95% - 100%)    PHYSICAL EXAM:    GENERAL: NAD, inclined in bed  HEAD:  Atraumatic, Normocephalic  EYES: EOMI, conjunctiva and sclera clear  ENMT: Moist mucous membranes  NECK: Supple  RESPIRATORY: Clear to auscultation bilaterally; No rales, rhonchi, wheezing, or rubs  CARDIOVASCULAR: Regular rate and rhythm; Nl s1, s2  GASTROINTESTINAL: Soft, Nondistended  EXTREMITIES:  2+ Peripheral Pulses, No clubbing, cyanosis. trace b/l ankle edema  NERVOUS SYSTEM: Moving all 4 extremities; No gross sensory deficits  SKIN: No rashes    LABS:  CAPILLARY BLOOD GLUCOSE  POCT Blood Glucose.: 170 mg/dL (2023 14:42)  Reviewed:  [x] YES     [x] Care Discussed with Consultants/Other Providers: Urology PA - discussed

## 2023-04-05 NOTE — ASU PREOP CHECKLIST - NS ASU TO WHOM HOLDING TO OR
Patient seen and examined at bedside during evening rounding  Patient complains of chest pain in center and left side of chest, pressure like in nature worse with inspiration  Pain is non radiating  Pain started following IR procedure  Likely pleuritic chest pain follow thoracentesis, however can not exclude cardiac etiology  Will check EKG and troponin now      To be cosigned by Dr Nancy Monreal
Casise Grijalva RN

## 2023-04-06 ENCOUNTER — TRANSCRIPTION ENCOUNTER (OUTPATIENT)
Age: 68
End: 2023-04-06

## 2023-04-06 DIAGNOSIS — D62 ACUTE POSTHEMORRHAGIC ANEMIA: ICD-10-CM

## 2023-04-06 LAB
ANION GAP SERPL CALC-SCNC: 14 MMOL/L — SIGNIFICANT CHANGE UP (ref 7–14)
BUN SERPL-MCNC: 16 MG/DL — SIGNIFICANT CHANGE UP (ref 7–23)
CALCIUM SERPL-MCNC: 8.2 MG/DL — LOW (ref 8.4–10.5)
CHLORIDE SERPL-SCNC: 98 MMOL/L — SIGNIFICANT CHANGE UP (ref 98–107)
CO2 SERPL-SCNC: 25 MMOL/L — SIGNIFICANT CHANGE UP (ref 22–31)
CREAT SERPL-MCNC: 0.95 MG/DL — SIGNIFICANT CHANGE UP (ref 0.5–1.3)
EGFR: 88 ML/MIN/1.73M2 — SIGNIFICANT CHANGE UP
GLUCOSE BLDC GLUCOMTR-MCNC: 154 MG/DL — HIGH (ref 70–99)
GLUCOSE BLDC GLUCOMTR-MCNC: 160 MG/DL — HIGH (ref 70–99)
GLUCOSE BLDC GLUCOMTR-MCNC: 163 MG/DL — HIGH (ref 70–99)
GLUCOSE BLDC GLUCOMTR-MCNC: 171 MG/DL — HIGH (ref 70–99)
GLUCOSE BLDC GLUCOMTR-MCNC: 183 MG/DL — HIGH (ref 70–99)
GLUCOSE BLDC GLUCOMTR-MCNC: 201 MG/DL — HIGH (ref 70–99)
GLUCOSE SERPL-MCNC: 155 MG/DL — HIGH (ref 70–99)
HCT VFR BLD CALC: 34.8 % — LOW (ref 39–50)
HGB BLD-MCNC: 11.8 G/DL — LOW (ref 13–17)
MCHC RBC-ENTMCNC: 28.4 PG — SIGNIFICANT CHANGE UP (ref 27–34)
MCHC RBC-ENTMCNC: 33.9 GM/DL — SIGNIFICANT CHANGE UP (ref 32–36)
MCV RBC AUTO: 83.9 FL — SIGNIFICANT CHANGE UP (ref 80–100)
NRBC # BLD: 0 /100 WBCS — SIGNIFICANT CHANGE UP (ref 0–0)
NRBC # FLD: 0 K/UL — SIGNIFICANT CHANGE UP (ref 0–0)
PLATELET # BLD AUTO: 193 K/UL — SIGNIFICANT CHANGE UP (ref 150–400)
POTASSIUM SERPL-MCNC: 3.3 MMOL/L — LOW (ref 3.5–5.3)
POTASSIUM SERPL-SCNC: 3.3 MMOL/L — LOW (ref 3.5–5.3)
RBC # BLD: 4.15 M/UL — LOW (ref 4.2–5.8)
RBC # FLD: 12.6 % — SIGNIFICANT CHANGE UP (ref 10.3–14.5)
SODIUM SERPL-SCNC: 137 MMOL/L — SIGNIFICANT CHANGE UP (ref 135–145)
WBC # BLD: 9.46 K/UL — SIGNIFICANT CHANGE UP (ref 3.8–10.5)
WBC # FLD AUTO: 9.46 K/UL — SIGNIFICANT CHANGE UP (ref 3.8–10.5)

## 2023-04-06 PROCEDURE — 99233 SBSQ HOSP IP/OBS HIGH 50: CPT

## 2023-04-06 RX ORDER — POTASSIUM CHLORIDE 20 MEQ
40 PACKET (EA) ORAL EVERY 4 HOURS
Refills: 0 | Status: COMPLETED | OUTPATIENT
Start: 2023-04-06 | End: 2023-04-06

## 2023-04-06 RX ORDER — SODIUM CHLORIDE 9 MG/ML
1000 INJECTION, SOLUTION INTRAVENOUS
Refills: 0 | Status: DISCONTINUED | OUTPATIENT
Start: 2023-04-06 | End: 2023-04-07

## 2023-04-06 RX ORDER — POLYETHYLENE GLYCOL 3350 17 G/17G
17 POWDER, FOR SOLUTION ORAL DAILY
Refills: 0 | Status: DISCONTINUED | OUTPATIENT
Start: 2023-04-06 | End: 2023-04-07

## 2023-04-06 RX ADMIN — OXYCODONE HYDROCHLORIDE 10 MILLIGRAM(S): 5 TABLET ORAL at 15:05

## 2023-04-06 RX ADMIN — Medication 10 MILLIGRAM(S): at 06:44

## 2023-04-06 RX ADMIN — HEPARIN SODIUM 5000 UNIT(S): 5000 INJECTION INTRAVENOUS; SUBCUTANEOUS at 22:17

## 2023-04-06 RX ADMIN — Medication 1: at 07:39

## 2023-04-06 RX ADMIN — Medication 40 MILLIEQUIVALENT(S): at 10:14

## 2023-04-06 RX ADMIN — Medication 975 MILLIGRAM(S): at 08:39

## 2023-04-06 RX ADMIN — Medication 100 MILLIGRAM(S): at 22:16

## 2023-04-06 RX ADMIN — SODIUM CHLORIDE 50 MILLILITER(S): 9 INJECTION, SOLUTION INTRAVENOUS at 14:08

## 2023-04-06 RX ADMIN — Medication 975 MILLIGRAM(S): at 01:52

## 2023-04-06 RX ADMIN — Medication 40 MILLIEQUIVALENT(S): at 14:08

## 2023-04-06 RX ADMIN — HEPARIN SODIUM 5000 UNIT(S): 5000 INJECTION INTRAVENOUS; SUBCUTANEOUS at 05:46

## 2023-04-06 RX ADMIN — SODIUM CHLORIDE 50 MILLILITER(S): 9 INJECTION, SOLUTION INTRAVENOUS at 05:46

## 2023-04-06 RX ADMIN — Medication 975 MILLIGRAM(S): at 02:26

## 2023-04-06 RX ADMIN — CARVEDILOL PHOSPHATE 12.5 MILLIGRAM(S): 80 CAPSULE, EXTENDED RELEASE ORAL at 05:47

## 2023-04-06 RX ADMIN — OXYCODONE HYDROCHLORIDE 10 MILLIGRAM(S): 5 TABLET ORAL at 14:06

## 2023-04-06 RX ADMIN — BUMETANIDE 2 MILLIGRAM(S): 0.25 INJECTION INTRAMUSCULAR; INTRAVENOUS at 05:47

## 2023-04-06 RX ADMIN — OXYCODONE HYDROCHLORIDE 10 MILLIGRAM(S): 5 TABLET ORAL at 07:55

## 2023-04-06 RX ADMIN — GABAPENTIN 600 MILLIGRAM(S): 400 CAPSULE ORAL at 22:16

## 2023-04-06 RX ADMIN — Medication 975 MILLIGRAM(S): at 02:21

## 2023-04-06 RX ADMIN — Medication 975 MILLIGRAM(S): at 07:10

## 2023-04-06 RX ADMIN — Medication 10 MILLIGRAM(S): at 16:53

## 2023-04-06 RX ADMIN — OXYCODONE HYDROCHLORIDE 10 MILLIGRAM(S): 5 TABLET ORAL at 22:16

## 2023-04-06 RX ADMIN — LOSARTAN POTASSIUM 100 MILLIGRAM(S): 100 TABLET, FILM COATED ORAL at 05:47

## 2023-04-06 RX ADMIN — Medication 975 MILLIGRAM(S): at 14:06

## 2023-04-06 RX ADMIN — OXYCODONE HYDROCHLORIDE 10 MILLIGRAM(S): 5 TABLET ORAL at 23:16

## 2023-04-06 RX ADMIN — Medication 1: at 11:49

## 2023-04-06 RX ADMIN — OXYCODONE HYDROCHLORIDE 10 MILLIGRAM(S): 5 TABLET ORAL at 07:11

## 2023-04-06 RX ADMIN — BUMETANIDE 2 MILLIGRAM(S): 0.25 INJECTION INTRAMUSCULAR; INTRAVENOUS at 18:09

## 2023-04-06 RX ADMIN — Medication 81 MILLIGRAM(S): at 11:52

## 2023-04-06 RX ADMIN — Medication 1 TABLET(S): at 11:52

## 2023-04-06 RX ADMIN — Medication 1: at 16:54

## 2023-04-06 RX ADMIN — POLYETHYLENE GLYCOL 3350 17 GRAM(S): 17 POWDER, FOR SOLUTION ORAL at 11:51

## 2023-04-06 RX ADMIN — Medication 1 ENEMA: at 19:50

## 2023-04-06 RX ADMIN — Medication 975 MILLIGRAM(S): at 20:07

## 2023-04-06 RX ADMIN — SODIUM CHLORIDE 50 MILLILITER(S): 9 INJECTION, SOLUTION INTRAVENOUS at 20:08

## 2023-04-06 RX ADMIN — AMLODIPINE BESYLATE 10 MILLIGRAM(S): 2.5 TABLET ORAL at 05:46

## 2023-04-06 RX ADMIN — HEPARIN SODIUM 5000 UNIT(S): 5000 INJECTION INTRAVENOUS; SUBCUTANEOUS at 14:08

## 2023-04-06 RX ADMIN — Medication 975 MILLIGRAM(S): at 15:05

## 2023-04-06 NOTE — PROGRESS NOTE ADULT - PROBLEM SELECTOR PLAN 5
- c/w amlodipine, coreg, losartan, bumex  - recent BP reviewed, range acceptable  - Cr 0.64 -> 0.95, discussed with urology, can expect bump in cr after partial nephrectomy  - repeat BP/BMP shortly after DC as outpatient

## 2023-04-06 NOTE — DISCHARGE NOTE NURSING/CASE MANAGEMENT/SOCIAL WORK - NSDCVIVACCINE_GEN_ALL_CORE_FT
Tdap; 13-Feb-2022 09:56; Benoit Kessler (RN); [employer]; [id number] (Exp. Date: 09-Sep-2023); IntraMuscular; Deltoid Right.; 0.5 milliLiter(s); VIS (VIS Published: 09-May-2013, VIS Presented: 13-Feb-2022);

## 2023-04-06 NOTE — DISCHARGE NOTE NURSING/CASE MANAGEMENT/SOCIAL WORK - PATIENT PORTAL LINK FT
You can access the FollowMyHealth Patient Portal offered by [facility] by registering at the following website: [email or web address]. By joining [facility]’s FollowMyHealth portal, you will also be able to view your health information using other applications (apps) compatible with our system.

## 2023-04-06 NOTE — PROGRESS NOTE ADULT - PROBLEM SELECTOR PLAN 4
- ISS and glucose checks  - hold home oral agents  - c/w gabapentin. - ISS and glucose checks  - hold home oral agents  - c/w gabapentin

## 2023-04-06 NOTE — PROGRESS NOTE ADULT - PROBLEM SELECTOR PLAN 6
Continue coreg, losartan. Aspirin  - Resume statin - patient confirms he takes atorvastatin 40mg qAM

## 2023-04-06 NOTE — PROVIDER CONTACT NOTE (OTHER) - ASSESSMENT
Pt. A&OX4. Ambulated in the hallway, denies any acute symptoms. V/S stable, other than soft BP, SBP in 90's.

## 2023-04-06 NOTE — DISCHARGE NOTE NURSING/CASE MANAGEMENT/SOCIAL WORK - NSDCPNINST_GEN_ALL_CORE
Notify Dr Miranda if you experience any increase in pain not relieved with medication, any redness, drainage or swelling around incision, any persistent nausea vomiting or any fever >100.5.  Drink plenty of fluids.  No heavy lifting or straining.  Use over the counter stool softeners to assist with constipation.

## 2023-04-06 NOTE — DISCHARGE NOTE NURSING/CASE MANAGEMENT/SOCIAL WORK - NSDCPECAREGIVERED_GEN_ALL_CORE
partial nephrectomy, incision action plan, pain after surgery, side effects partial nephrectomy, incision action plan, pain after surgery, side effects/Yes

## 2023-04-06 NOTE — DISCHARGE NOTE NURSING/CASE MANAGEMENT/SOCIAL WORK - NSDCPEFALRISK_GEN_ALL_CORE
For information on Fall & Injury Prevention, visit: [email or web address] OR  [email or web address] OR  https://www.cdc.gov/steadi/patient.html

## 2023-04-07 ENCOUNTER — TRANSCRIPTION ENCOUNTER (OUTPATIENT)
Age: 68
End: 2023-04-07

## 2023-04-07 VITALS
TEMPERATURE: 98 F | OXYGEN SATURATION: 99 % | HEART RATE: 72 BPM | DIASTOLIC BLOOD PRESSURE: 72 MMHG | RESPIRATION RATE: 16 BRPM | SYSTOLIC BLOOD PRESSURE: 128 MMHG

## 2023-04-07 DIAGNOSIS — E87.6 HYPOKALEMIA: ICD-10-CM

## 2023-04-07 LAB
ANION GAP SERPL CALC-SCNC: 15 MMOL/L — HIGH (ref 7–14)
ANION GAP SERPL CALC-SCNC: 17 MMOL/L — HIGH (ref 7–14)
BLD GP AB SCN SERPL QL: NEGATIVE — SIGNIFICANT CHANGE UP
BUN SERPL-MCNC: 19 MG/DL — SIGNIFICANT CHANGE UP (ref 7–23)
BUN SERPL-MCNC: 22 MG/DL — SIGNIFICANT CHANGE UP (ref 7–23)
CALCIUM SERPL-MCNC: 8.7 MG/DL — SIGNIFICANT CHANGE UP (ref 8.4–10.5)
CALCIUM SERPL-MCNC: 8.8 MG/DL — SIGNIFICANT CHANGE UP (ref 8.4–10.5)
CHLORIDE SERPL-SCNC: 97 MMOL/L — LOW (ref 98–107)
CHLORIDE SERPL-SCNC: 97 MMOL/L — LOW (ref 98–107)
CO2 SERPL-SCNC: 23 MMOL/L — SIGNIFICANT CHANGE UP (ref 22–31)
CO2 SERPL-SCNC: 25 MMOL/L — SIGNIFICANT CHANGE UP (ref 22–31)
CREAT SERPL-MCNC: 1.09 MG/DL — SIGNIFICANT CHANGE UP (ref 0.5–1.3)
CREAT SERPL-MCNC: 1.33 MG/DL — HIGH (ref 0.5–1.3)
EGFR: 59 ML/MIN/1.73M2 — LOW
EGFR: 74 ML/MIN/1.73M2 — SIGNIFICANT CHANGE UP
GLUCOSE BLDC GLUCOMTR-MCNC: 160 MG/DL — HIGH (ref 70–99)
GLUCOSE BLDC GLUCOMTR-MCNC: 202 MG/DL — HIGH (ref 70–99)
GLUCOSE SERPL-MCNC: 159 MG/DL — HIGH (ref 70–99)
GLUCOSE SERPL-MCNC: 171 MG/DL — HIGH (ref 70–99)
HCT VFR BLD CALC: 33.7 % — LOW (ref 39–50)
HGB BLD-MCNC: 11.5 G/DL — LOW (ref 13–17)
MCHC RBC-ENTMCNC: 29.2 PG — SIGNIFICANT CHANGE UP (ref 27–34)
MCHC RBC-ENTMCNC: 34.1 GM/DL — SIGNIFICANT CHANGE UP (ref 32–36)
MCV RBC AUTO: 85.5 FL — SIGNIFICANT CHANGE UP (ref 80–100)
NRBC # BLD: 0 /100 WBCS — SIGNIFICANT CHANGE UP (ref 0–0)
NRBC # FLD: 0 K/UL — SIGNIFICANT CHANGE UP (ref 0–0)
PLATELET # BLD AUTO: 188 K/UL — SIGNIFICANT CHANGE UP (ref 150–400)
POTASSIUM SERPL-MCNC: 2.9 MMOL/L — CRITICAL LOW (ref 3.5–5.3)
POTASSIUM SERPL-MCNC: 3.5 MMOL/L — SIGNIFICANT CHANGE UP (ref 3.5–5.3)
POTASSIUM SERPL-SCNC: 2.9 MMOL/L — CRITICAL LOW (ref 3.5–5.3)
POTASSIUM SERPL-SCNC: 3.5 MMOL/L — SIGNIFICANT CHANGE UP (ref 3.5–5.3)
RBC # BLD: 3.94 M/UL — LOW (ref 4.2–5.8)
RBC # FLD: 12.9 % — SIGNIFICANT CHANGE UP (ref 10.3–14.5)
RH IG SCN BLD-IMP: POSITIVE — SIGNIFICANT CHANGE UP
SODIUM SERPL-SCNC: 137 MMOL/L — SIGNIFICANT CHANGE UP (ref 135–145)
SODIUM SERPL-SCNC: 137 MMOL/L — SIGNIFICANT CHANGE UP (ref 135–145)
WBC # BLD: 8.4 K/UL — SIGNIFICANT CHANGE UP (ref 3.8–10.5)
WBC # FLD AUTO: 8.4 K/UL — SIGNIFICANT CHANGE UP (ref 3.8–10.5)

## 2023-04-07 PROCEDURE — 99233 SBSQ HOSP IP/OBS HIGH 50: CPT

## 2023-04-07 RX ORDER — POTASSIUM CHLORIDE 20 MEQ
10 PACKET (EA) ORAL
Refills: 0 | Status: COMPLETED | OUTPATIENT
Start: 2023-04-07 | End: 2023-04-07

## 2023-04-07 RX ORDER — MAGNESIUM SULFATE 500 MG/ML
2 VIAL (ML) INJECTION ONCE
Refills: 0 | Status: COMPLETED | OUTPATIENT
Start: 2023-04-07 | End: 2023-04-07

## 2023-04-07 RX ORDER — BUMETANIDE 0.25 MG/ML
4 INJECTION INTRAMUSCULAR; INTRAVENOUS ONCE
Refills: 0 | Status: COMPLETED | OUTPATIENT
Start: 2023-04-07 | End: 2023-04-07

## 2023-04-07 RX ORDER — ATORVASTATIN CALCIUM 80 MG/1
40 TABLET, FILM COATED ORAL AT BEDTIME
Refills: 0 | Status: DISCONTINUED | OUTPATIENT
Start: 2023-04-07 | End: 2023-04-07

## 2023-04-07 RX ORDER — POTASSIUM CHLORIDE 20 MEQ
40 PACKET (EA) ORAL ONCE
Refills: 0 | Status: COMPLETED | OUTPATIENT
Start: 2023-04-07 | End: 2023-04-07

## 2023-04-07 RX ADMIN — CARVEDILOL PHOSPHATE 12.5 MILLIGRAM(S): 80 CAPSULE, EXTENDED RELEASE ORAL at 05:40

## 2023-04-07 RX ADMIN — Medication 975 MILLIGRAM(S): at 02:12

## 2023-04-07 RX ADMIN — AMLODIPINE BESYLATE 10 MILLIGRAM(S): 2.5 TABLET ORAL at 07:43

## 2023-04-07 RX ADMIN — LOSARTAN POTASSIUM 100 MILLIGRAM(S): 100 TABLET, FILM COATED ORAL at 07:43

## 2023-04-07 RX ADMIN — Medication 100 MILLIEQUIVALENT(S): at 09:59

## 2023-04-07 RX ADMIN — Medication 975 MILLIGRAM(S): at 15:05

## 2023-04-07 RX ADMIN — Medication 975 MILLIGRAM(S): at 14:06

## 2023-04-07 RX ADMIN — Medication 100 MILLIEQUIVALENT(S): at 08:34

## 2023-04-07 RX ADMIN — Medication 1 TABLET(S): at 11:32

## 2023-04-07 RX ADMIN — Medication 40 MILLIEQUIVALENT(S): at 07:31

## 2023-04-07 RX ADMIN — Medication 975 MILLIGRAM(S): at 01:12

## 2023-04-07 RX ADMIN — BUMETANIDE 4 MILLIGRAM(S): 0.25 INJECTION INTRAMUSCULAR; INTRAVENOUS at 09:20

## 2023-04-07 RX ADMIN — HEPARIN SODIUM 5000 UNIT(S): 5000 INJECTION INTRAVENOUS; SUBCUTANEOUS at 05:01

## 2023-04-07 RX ADMIN — Medication 100 MILLIEQUIVALENT(S): at 07:35

## 2023-04-07 RX ADMIN — HEPARIN SODIUM 5000 UNIT(S): 5000 INJECTION INTRAVENOUS; SUBCUTANEOUS at 14:38

## 2023-04-07 RX ADMIN — Medication 25 GRAM(S): at 08:34

## 2023-04-07 RX ADMIN — Medication 2: at 11:32

## 2023-04-07 RX ADMIN — Medication 975 MILLIGRAM(S): at 08:30

## 2023-04-07 RX ADMIN — Medication 975 MILLIGRAM(S): at 07:31

## 2023-04-07 RX ADMIN — Medication 81 MILLIGRAM(S): at 11:31

## 2023-04-07 RX ADMIN — Medication 1: at 07:32

## 2023-04-07 NOTE — DISCHARGE NOTE PROVIDER - HOSPITAL COURSE
Pt had R. lap partial neph 2 days ago; did well; ambulated; did not pass gas but had a few loose BM's after stool softeners, dulcolax; vinod reg diet; voiding well; had potassium replaced today, now 3.5; pain controlled; home today.

## 2023-04-07 NOTE — PROGRESS NOTE ADULT - PROBLEM SELECTOR PLAN 4
- ISS and glucose checks  - hold home oral agents  - c/w gabapentin potassium 2.9 today, agree with repletion ordered per primary team. likely in setting of diuretics and recent multiple BMs. Diet is now advanced which should help improve intake.   - follow up repeat BMP this afternoon, replete PRN potassium 2.9 today, agree with repletion ordered per primary team. likely in setting of diuretics and recent multiple BMs. Diet is now advanced which should help improve intake.   - follow up repeat BMP this afternoon, replete PRN    -afternoon update- K improved to 3.5

## 2023-04-07 NOTE — PROGRESS NOTE ADULT - PROBLEM SELECTOR PLAN 5
- c/w amlodipine, coreg, losartan, bumex  - recent BP reviewed, range acceptable  - Cr 0.64 -> 0.95 ->1.09, discussed with urology, can expect bump in cr after partial nephrectomy  - repeat BP/BMP shortly after DC as outpatient - ISS and glucose checks  - hold home oral agents  - c/w gabapentin

## 2023-04-07 NOTE — DISCHARGE NOTE PROVIDER - NSDCCPCAREPLAN_GEN_ALL_CORE_FT
PRINCIPAL DISCHARGE DIAGNOSIS  Diagnosis: Right renal mass  Assessment and Plan of Treatment: Drink plenty of fluids.  No heavy lifting (greater than 10 pounds) or straining for 4 to 6 weeks.  You may shower, just pat white strips dry, they will fall off in a few weeks.   Call 's   office  to schedule a follow up appointment.  Call the office if you have fever greater than 101, difficulty urinating, pain not relieved with pain medication, nausea/vomiting..

## 2023-04-07 NOTE — PROGRESS NOTE ADULT - PROBLEM SELECTOR PLAN 3
Recent admission 3/19-3/24/23 at [facility] for HF exacerbation, has been feeling well since dc.  - Not currently appearing overloaded  - c/w home regimen of coreg, ARB, bumex
Recent admission 3/19-3/24/23 at [facility] for HF exacerbation, has been feeling well since dc.  - Not currently appearing overloaded  - c/w home regimen of coreg, ARB, bumex

## 2023-04-07 NOTE — DISCHARGE NOTE PROVIDER - NSDCMRMEDTOKEN_GEN_ALL_CORE_FT
allopurinol 100 mg oral tablet: 1 tab(s) orally once a day (at bedtime)  amLODIPine 10 mg oral tablet: 1 tab(s) orally once a day AM  aspirin 81 mg oral delayed release tablet: 1 tab(s) orally once a day  atorvastatin 40 mg oral tablet: 1 tab(s) orally once a day AM  bumetanide 2 mg oral tablet: 1 tab(s) orally 2 times a day 2 tabs in the morning, 1 tab in the afternoon alternating every other day with 1 tab in the morning, 1 tab in the afternoon  carvedilol 12.5 mg oral tablet: 1 tab(s) orally every 12 hours  gabapentin 300 mg oral capsule: 2 cap(s) orally once a day (at bedtime)  losartan 100 mg oral tablet: 1 tab(s) orally once a day AM  tiZANidine 2 mg oral tablet: 1 tab(s) orally 2 times a day, As Needed  Tylenol 325 mg oral tablet: 3 orally every 6 hours as needed for  moderate pain

## 2023-04-07 NOTE — PROGRESS NOTE ADULT - PROBLEM SELECTOR PLAN 7
C/w nightly cpap Continue coreg, losartan. Aspirin  - Resume statin - patient confirms he takes atorvastatin 40mg qAM

## 2023-04-07 NOTE — PROGRESS NOTE ADULT - PROBLEM SELECTOR PLAN 1
S/p R partial nephrectomy 4/5/23, EBL 500ml  - Pain tolerable, ambulatory without issue  - Continued management per urology, advance diet per urology
S/p R partial nephrectomy 4/5/23, EBL 500ml  - Pain tolerable, ambulatory without issue  - Continued management per urology, diet advanced per urology

## 2023-04-07 NOTE — PROGRESS NOTE ADULT - PROBLEM SELECTOR PLAN 2
Hg 14.7-> 11.8, intra op EBL 500ml  no signs of ongoing bleeding, hemodynamically stable, repeat hg as outpatient
Hg 14.7-> 11.8 -> 11.5, intra op EBL 500ml  no signs of ongoing bleeding, hemodynamically stable, repeat hg as outpatient

## 2023-04-07 NOTE — DISCHARGE NOTE PROVIDER - CARE PROVIDER_API CALL
Gadiel Miranda; RANDEE)  Urology  [address] [place] [address]  Phone: [phone]  Fax: [phone]  Follow Up Time:

## 2023-04-07 NOTE — DISCHARGE NOTE PROVIDER - NSDCFUSCHEDAPPT_GEN_ALL_CORE_FT
Nehemiah Ritter  [facility]  UROLOGY [address] [place] [address] R  Scheduled Appointment: 05/04/2023    [facility]  UROLOGY [address] [place] [address] R  Scheduled Appointment: 07/03/2023    Nehemiah Ritter  [facility]  UROLOGY [address] [place] [address] R  Scheduled Appointment: 07/03/2023

## 2023-04-07 NOTE — PROVIDER CONTACT NOTE (CRITICAL VALUE NOTIFICATION) - ASSESSMENT
Pt is a&ox4, OOB 1X assist, VSS, afebrile. Pt is asymptomatic in no signs+ symptoms of distress and denies chest pain + SOB.

## 2023-04-07 NOTE — PROGRESS NOTE ADULT - SUBJECTIVE AND OBJECTIVE BOX
Patient is a 67y old  Male who presents with a chief complaint of Right renal mass, partial nephrectomy (05 Apr 2023 15:19)    SUBJECTIVE / OVERNIGHT EVENTS: No acute events. Ambulated in halls this morning without issue, currently sitting up in chair. No shortness of breath, chest pain, nausea, vomiting, fever, chills, rash.     MEDICATIONS  (STANDING):  acetaminophen     Tablet .. 975 milliGRAM(s) Oral every 6 hours  allopurinol 100 milliGRAM(s) Oral at bedtime  amLODIPine   Tablet 10 milliGRAM(s) Oral daily  aspirin enteric coated 81 milliGRAM(s) Oral daily  buMETAnide 2 milliGRAM(s) Oral <User Schedule>  carvedilol 12.5 milliGRAM(s) Oral every 12 hours  dextrose 5% + sodium chloride 0.45%. 1000 milliLiter(s) (50 mL/Hr) IV Continuous <Continuous>  dextrose 5%. 1000 milliLiter(s) (100 mL/Hr) IV Continuous <Continuous>  dextrose 5%. 1000 milliLiter(s) (50 mL/Hr) IV Continuous <Continuous>  dextrose 50% Injectable 25 Gram(s) IV Push once  dextrose 50% Injectable 12.5 Gram(s) IV Push once  dextrose 50% Injectable 25 Gram(s) IV Push once  gabapentin 600 milliGRAM(s) Oral at bedtime  glucagon  Injectable 1 milliGRAM(s) IntraMuscular once  heparin   Injectable 5000 Unit(s) SubCutaneous every 8 hours  insulin lispro (ADMELOG) corrective regimen sliding scale   SubCutaneous three times a day before meals  insulin lispro (ADMELOG) corrective regimen sliding scale   SubCutaneous at bedtime  losartan 100 milliGRAM(s) Oral daily  multivitamin 1 Tablet(s) Oral daily  polyethylene glycol 3350 17 Gram(s) Oral daily  potassium chloride    Tablet ER 40 milliEquivalent(s) Oral every 4 hours    MEDICATIONS  (PRN):  dextrose Oral Gel 15 Gram(s) Oral once PRN Blood Glucose LESS THAN 70 milliGRAM(s)/deciliter  oxyCODONE    IR 5 milliGRAM(s) Oral every 4 hours PRN Moderate Pain (4 - 6)  oxyCODONE    IR 10 milliGRAM(s) Oral every 4 hours PRN Severe Pain (7 - 10)  tiZANidine 2 milliGRAM(s) Oral <User Schedule> PRN Muscle Spasm    CAPILLARY BLOOD GLUCOSE    POCT Blood Glucose.: 171 mg/dL (06 Apr 2023 11:12)  POCT Blood Glucose.: 183 mg/dL (06 Apr 2023 07:22)  POCT Blood Glucose.: 175 mg/dL (05 Apr 2023 22:40)  POCT Blood Glucose.: 201 mg/dL (05 Apr 2023 16:19)  POCT Blood Glucose.: 170 mg/dL (05 Apr 2023 14:42)    I&O's Summary    05 Apr 2023 07:01  -  06 Apr 2023 07:00  --------------------------------------------------------  IN: 0 mL / OUT: 1675 mL / NET: -1675 mL    06 Apr 2023 07:01  -  06 Apr 2023 11:41  --------------------------------------------------------  IN: 120 mL / OUT: 795 mL / NET: -675 mL    PHYSICAL EXAM:  Vital Signs Last 24 Hrs  T(C): 36.9 (06 Apr 2023 09:24), Max: 37.1 (05 Apr 2023 22:48)  T(F): 98.5 (06 Apr 2023 09:24), Max: 98.7 (05 Apr 2023 22:48)  HR: 65 (06 Apr 2023 11:11) (64 - 85)  BP: 124/62 (06 Apr 2023 09:24) (96/51 - 135/69)  BP(mean): 75 (05 Apr 2023 17:00) (61 - 78)  RR: 18 (06 Apr 2023 09:24) (7 - 21)  SpO2: 98% (06 Apr 2023 11:11) (93% - 100%)    Parameters below as of 06 Apr 2023 09:24  Patient On (Oxygen Delivery Method): room air    GENERAL: NAD, sitting up in chair  HEAD:  Atraumatic, Normocephalic  EYES: EOMI, conjunctiva and sclera clear  ENMT: Moist mucous membranes  NECK: Supple  RESPIRATORY: Clear to auscultation bilaterally; No rales, rhonchi, wheezing, or rubs  CARDIOVASCULAR: Regular rate and rhythm; normal s1, s2  GASTROINTESTINAL: Soft, Nondistended, +BS  EXTREMITIES:  2+ Peripheral Pulses, No clubbing, cyanosis. trace b/l ankle edema  NERVOUS SYSTEM: Moving all 4 extremities; No gross sensory deficits  SKIN: No rashes/lesions    LABS:                        11.8   9.46  )-----------( 193      ( 06 Apr 2023 06:12 )             34.8     04-06    137  |  98  |  16  ----------------------------<  155<H>  3.3<L>   |  25  |  0.95    Ca    8.2<L>      06 Apr 2023 06:12    RADIOLOGY & ADDITIONAL TESTS: Reviewed    COORDINATION OF CARE:  Care Discussed with Consultants/Other Providers [Y- Urology PA]  
 Note    Post op Check    s/p R partial nephrectomy  EBL 500ml    Patient seen and examined without complaints, pain controlled, tolerating clears    Vital Signs Last 24 Hrs  T(C): 37.1 (05 Apr 2023 22:48), Max: 37.1 (05 Apr 2023 22:48)  T(F): 98.7 (05 Apr 2023 22:48), Max: 98.7 (05 Apr 2023 22:48)  HR: 79 (05 Apr 2023 22:48) (64 - 79)  BP: 118/65 (05 Apr 2023 22:48) (96/51 - 123/60)  BP(mean): 75 (05 Apr 2023 17:00) (61 - 78)  RR: 17 (05 Apr 2023 22:48) (7 - 21)  SpO2: 97% (05 Apr 2023 22:48) (93% - 100%)    Parameters below as of 05 Apr 2023 22:48  Patient On (Oxygen Delivery Method): room air    I&O's Summary    05 Apr 2023 07:01  -  05 Apr 2023 23:09  --------------------------------------------------------  IN: 0 mL / OUT: 550 mL / NET: -550 mL    PHYSICAL EXAM:     Constitutional: well appearing, A+O, no distress    Respiratory: clear     Cardiovascular: regular     Gastrointestinal: soft, nontender, no distention, dressings clean and dry    Genitourinary: amaro, draining well, clear light pink    Extremities: venodynes in place  
Subjective  No acute events overnight. Amaro removed on AM rounds, patient passed TOV. Has not passed flatus yet. Denies fevers, chills, nausea, vomiting.     Objective    Vital signs  T(F): , Max: 98.7 (04-05-23 @ 22:48)  HR: 85 (04-06-23 @ 09:24)  BP: 124/62 (04-06-23 @ 09:24)  SpO2: 98% (04-06-23 @ 09:24)  Wt(kg): --    Output     OUT:    Indwelling Catheter - Urethral (mL): 1675 mL  Total OUT: 1675 mL    Total NET: -1675 mL      OUT:    Voided (mL): 795 mL  Total OUT: 795 mL    Total NET: -795 mL          Gen: NAD  Abd: soft, nontender, nondistended  : amaro removed     Labs      04-06 @ 06:12    WBC 9.46  / Hct 34.8  / SCr 0.95           
Patient is a 67y old  Male who presents with a chief complaint of Right renal mass, partial nephrectomy (06 Apr 2023 11:38)    SUBJECTIVE / OVERNIGHT EVENTS: No acute events. Passing gas and has had several BMs. No nausea, vomiting. Feels hungry. No fevers, chills. Pain tolerable. No shortness of breath.     MEDICATIONS  (STANDING):  acetaminophen     Tablet .. 975 milliGRAM(s) Oral every 6 hours  allopurinol 100 milliGRAM(s) Oral at bedtime  amLODIPine   Tablet 10 milliGRAM(s) Oral daily  aspirin enteric coated 81 milliGRAM(s) Oral daily  buMETAnide 2 milliGRAM(s) Oral <User Schedule>  carvedilol 12.5 milliGRAM(s) Oral every 12 hours  dextrose 5% + sodium chloride 0.45%. 1000 milliLiter(s) (50 mL/Hr) IV Continuous <Continuous>  dextrose 5%. 1000 milliLiter(s) (100 mL/Hr) IV Continuous <Continuous>  dextrose 5%. 1000 milliLiter(s) (50 mL/Hr) IV Continuous <Continuous>  dextrose 50% Injectable 25 Gram(s) IV Push once  dextrose 50% Injectable 12.5 Gram(s) IV Push once  dextrose 50% Injectable 25 Gram(s) IV Push once  gabapentin 600 milliGRAM(s) Oral at bedtime  glucagon  Injectable 1 milliGRAM(s) IntraMuscular once  heparin   Injectable 5000 Unit(s) SubCutaneous every 8 hours  insulin lispro (ADMELOG) corrective regimen sliding scale   SubCutaneous three times a day before meals  insulin lispro (ADMELOG) corrective regimen sliding scale   SubCutaneous at bedtime  losartan 100 milliGRAM(s) Oral daily  multivitamin 1 Tablet(s) Oral daily  polyethylene glycol 3350 17 Gram(s) Oral daily  potassium chloride  10 mEq/100 mL IVPB 10 milliEquivalent(s) IV Intermittent every 1 hour    MEDICATIONS  (PRN):  dextrose Oral Gel 15 Gram(s) Oral once PRN Blood Glucose LESS THAN 70 milliGRAM(s)/deciliter  oxyCODONE    IR 5 milliGRAM(s) Oral every 4 hours PRN Moderate Pain (4 - 6)  oxyCODONE    IR 10 milliGRAM(s) Oral every 4 hours PRN Severe Pain (7 - 10)  tiZANidine 2 milliGRAM(s) Oral <User Schedule> PRN Muscle Spasm      CAPILLARY BLOOD GLUCOSE      POCT Blood Glucose.: 160 mg/dL (07 Apr 2023 07:14)  POCT Blood Glucose.: 154 mg/dL (06 Apr 2023 21:10)  POCT Blood Glucose.: 160 mg/dL (06 Apr 2023 16:31)  POCT Blood Glucose.: 171 mg/dL (06 Apr 2023 11:12)    I&O's Summary    06 Apr 2023 07:01  -  07 Apr 2023 07:00  --------------------------------------------------------  IN: 1360 mL / OUT: 2345 mL / NET: -985 mL    07 Apr 2023 07:01  -  07 Apr 2023 09:53  --------------------------------------------------------  IN: 0 mL / OUT: 400 mL / NET: -400 mL        PHYSICAL EXAM:  Vital Signs Last 24 Hrs  T(C): 36.9 (07 Apr 2023 09:39), Max: 36.9 (07 Apr 2023 09:39)  T(F): 98.4 (07 Apr 2023 09:39), Max: 98.4 (07 Apr 2023 09:39)  HR: 74 (07 Apr 2023 09:39) (65 - 74)  BP: 122/68 (07 Apr 2023 09:39) (98/53 - 130/67)  BP(mean): --  RR: 18 (07 Apr 2023 09:39) (16 - 18)  SpO2: 100% (07 Apr 2023 09:39) (96% - 100%)    Parameters below as of 07 Apr 2023 09:39  Patient On (Oxygen Delivery Method): room air    GENERAL: NAD, sitting in chair  HEAD:  Atraumatic, Normocephalic  EYES: EOMI, conjunctiva and sclera clear  ENMT: Moist mucous membranes  NECK: Supple  RESPIRATORY: Clear to auscultation bilaterally; No rales, rhonchi, wheezing, or rubs  CARDIOVASCULAR: Regular rate and rhythm; normal s1, s2  GASTROINTESTINAL: Soft, Nondistended, +BS  EXTREMITIES:  2+ Peripheral Pulses, No clubbing, cyanosis. no significant LE edema  NERVOUS SYSTEM: Moving all 4 extremities; No gross sensory deficits  SKIN: No rashes/lesions    LABS:                        11.5   8.40  )-----------( 188      ( 07 Apr 2023 05:33 )             33.7     04-07    137  |  97<L>  |  19  ----------------------------<  159<H>  2.9<LL>   |  25  |  1.09    Ca    8.7      07 Apr 2023 05:33    RADIOLOGY & ADDITIONAL TESTS: Reviewed    COORDINATION OF CARE:  Care Discussed with Consultants/Other Providers [Y- Urology PA]

## 2023-04-07 NOTE — PROGRESS NOTE ADULT - PROBLEM SELECTOR PLAN 6
Continue coreg, losartan. Aspirin  - Resume statin - patient confirms he takes atorvastatin 40mg qAM - c/w amlodipine, coreg, losartan, bumex  - recent BP reviewed, range acceptable  - Cr 0.64 -> 0.95 ->1.09, discussed with urology, can expect bump in cr after partial nephrectomy  - repeat BP/BMP shortly after DC as outpatient - c/w amlodipine, coreg, losartan, bumex  - recent BP reviewed, range acceptable  - Cr 0.64 -> 0.95 ->1.09, discussed with urology, can expect bump in cr after partial nephrectomy  - 12:45pm BMP reviewed, Cr 1.33, would hold losartan and repeat BMP/BP within one week - discussed with urology team

## 2023-04-07 NOTE — PROGRESS NOTE ADULT - ASSESSMENT
66 y/o male s/p R partial nephrectomy, stable.     -Strict I&O's  -Urine output improved after receiving home dose of Bumex, 400cc output ~10pm, will reduce fluids to 50cc/hr to avoid risk of CHF exacerbation.   -Analgesia prn  -Antiemetics prn  -DVT prophylaxis  -Incentive spirometry  -Clears   -OOB  -AM labs  
67 year old male s/p right partial laparoscopic nephrectomy    Plan:  -AM labs reviewed  -IVF  -Continue CLD until flatus  -ASA  -Ambulate   -DVT ppx/incentive spirometry   -PT ordered
67 year old male with HFrEF, CAD s/p CABG, ASIYA on CPAP, HTN, DM, right renal mass, recent admission 3/19-3/24/23 at [facility] for HF exacerbation, now presented for partial nephrectomy now s/p OR 4/5/23.
67 year old male with HFrEF, CAD s/p CABG, ASIYA on CPAP, HTN, DM, right renal mass, recent admission 3/19-3/24/23 at [facility] for HF exacerbation, now presented for partial nephrectomy now s/p OR 4/5/23.

## 2023-04-08 ENCOUNTER — TRANSCRIPTION ENCOUNTER (OUTPATIENT)
Age: 68
End: 2023-04-08

## 2023-04-10 ENCOUNTER — TRANSCRIPTION ENCOUNTER (OUTPATIENT)
Age: 68
End: 2023-04-10

## 2023-04-11 LAB — SURGICAL PATHOLOGY STUDY: SIGNIFICANT CHANGE UP

## 2023-04-12 PROBLEM — Z86.79 PERSONAL HISTORY OF OTHER DISEASES OF THE CIRCULATORY SYSTEM: Chronic | Status: ACTIVE | Noted: 2023-03-30

## 2023-04-12 PROBLEM — N28.89 OTHER SPECIFIED DISORDERS OF KIDNEY AND URETER: Chronic | Status: ACTIVE | Noted: 2023-03-30

## 2023-04-12 PROBLEM — M50.20 OTHER CERVICAL DISC DISPLACEMENT, UNSPECIFIED CERVICAL REGION: Chronic | Status: ACTIVE | Noted: 2023-03-30

## 2023-04-20 ENCOUNTER — TRANSCRIPTION ENCOUNTER (OUTPATIENT)
Age: 68
End: 2023-04-20

## 2023-05-03 ENCOUNTER — APPOINTMENT (OUTPATIENT)
Dept: UROLOGY | Facility: CLINIC | Age: 68
End: 2023-05-03
Payer: MEDICARE

## 2023-05-03 VITALS
SYSTOLIC BLOOD PRESSURE: 158 MMHG | HEIGHT: 77 IN | DIASTOLIC BLOOD PRESSURE: 88 MMHG | RESPIRATION RATE: 17 BRPM | WEIGHT: 315 LBS | BODY MASS INDEX: 37.19 KG/M2 | TEMPERATURE: 97.9 F | HEART RATE: 76 BPM

## 2023-05-03 PROCEDURE — 99024 POSTOP FOLLOW-UP VISIT: CPT

## 2023-05-03 NOTE — ASSESSMENT
[FreeTextEntry1] : Mr Colvin is doing well . His GI function and energy has returned . We discussed weight loss and a healthy diet with hydration.\par We discussed the pathology \par RCC pT1a FG 2 \par He sees Dr Ritter for nephrolithiasis \par We will see him in 6 months with an ultrasound which will cover his stones as well \par We cancelled his appointment with Dr Ritter tomorrow

## 2023-05-03 NOTE — HISTORY OF PRESENT ILLNESS
[FreeTextEntry1] : Renal mass noted requiring a partial nephrectomy . Right partial nephrectomy done April 5, 2023 [None] : no symptoms

## 2023-05-03 NOTE — PHYSICAL EXAM
[Skin Color & Pigmentation] : normal skin color and pigmentation [Heart Rate And Rhythm] : Heart rate and rhythm were normal [] : no respiratory distress [FreeTextEntry1] : walking with a cane

## 2023-05-04 ENCOUNTER — APPOINTMENT (OUTPATIENT)
Dept: UROLOGY | Facility: CLINIC | Age: 68
End: 2023-05-04

## 2023-06-01 ENCOUNTER — APPOINTMENT (OUTPATIENT)
Dept: ORTHOPEDIC SURGERY | Facility: CLINIC | Age: 68
End: 2023-06-01
Payer: MEDICARE

## 2023-06-01 VITALS — HEIGHT: 77 IN | BODY MASS INDEX: 37.19 KG/M2 | WEIGHT: 315 LBS

## 2023-06-01 DIAGNOSIS — M19.071 PRIMARY OSTEOARTHRITIS, RIGHT ANKLE AND FOOT: ICD-10-CM

## 2023-06-01 DIAGNOSIS — M19.072 PRIMARY OSTEOARTHRITIS, RIGHT ANKLE AND FOOT: ICD-10-CM

## 2023-06-01 DIAGNOSIS — G62.9 POLYNEUROPATHY, UNSPECIFIED: ICD-10-CM

## 2023-06-01 PROCEDURE — 73610 X-RAY EXAM OF ANKLE: CPT | Mod: 50

## 2023-06-01 PROCEDURE — 99214 OFFICE O/P EST MOD 30 MIN: CPT

## 2023-06-01 RX ORDER — MELOXICAM 15 MG/1
15 TABLET ORAL
Qty: 14 | Refills: 0 | Status: ACTIVE | COMMUNITY
Start: 2023-06-01 | End: 1900-01-01

## 2023-06-01 NOTE — IMAGING
[Bilateral] : ankle bilaterally [Weight -] : weightbearing [FreeTextEntry9] : diffuse hindfoot degen changes

## 2023-06-01 NOTE — HISTORY OF PRESENT ILLNESS
[7] : 7 [3] : 3 [Dull/Aching] : dull/aching [Sharp] : sharp [Shooting] : shooting [de-identified] : 06/01/2023: 2 weeks ankle pain radiating to legs. h/o neuropathy and mulitple prior lumbar surgeries. no recent injury. previous L ankle fx tx w/ cast. +dm (?a1c). quit tob 3 months ago. recent surgery for renal cell ca. using walker for ambulation [FreeTextEntry1] : bilateral ankles

## 2023-06-01 NOTE — ASSESSMENT
[FreeTextEntry1] : sx may be more radic in setting of prior lumbar surgery\par wbat\par mobic\par rec spine eval\par f/up prn\par \par The patient's current medication management of their orthopedic diagnosis was reviewed today:\par \par (1) We discussed a comprehensive treatment plan that included possible pharmaceutical management involving the use of prescription strength medications including but not limited to options such as oral Naprosyn 500mg BID, once daily Meloxicam 15 mg, or 500-650 mg Tylenol versus over the counter oral medications and topical prescription NSAID Pennsaid vs over the counter Voltaren gel.\par (2) There is a moderate risk of morbidity with further treatment, especially from use of prescription strength medications and possible side effects of these medications which include upset stomach with oral medications, skin reactions to topical medications and cardiac/renal issues with long term use.\par (3) I recommended that the patient follow-up with their medical physician to discuss any significant specific potential issues with long term medication use such as interactions with current medications or with exacerbation of underlying medical comorbidities.\par (4) The benefits and risks associated with use of injectable, oral or topical, prescription and over the counter anti-inflammatory medications were discussed with the patient. The patient voiced understanding of the risks including but not limited to bleeding, stroke, kidney dysfunction, heart disease, and were referred to the black box warning label for further information.\par \par

## 2023-06-01 NOTE — PHYSICAL EXAM
[Bilateral] : foot and ankle bilaterally [5___] : plantar flexion 5[unfilled]/5 [2+] : dorsalis pedis pulse: 2+ [] : uses walker [FreeTextEntry8] : essentially insensate -- no point ttp [de-identified] : essentially insensate [de-identified] : plantar flexion 30 degrees [TWNoteComboBox7] : dorsiflexion 10 degrees

## 2023-06-10 NOTE — PROGRESS NOTE ADULT - PROBLEM SELECTOR PROBLEM 8
Pt arrives to ED with c/o back pain ongoing x1 week but got worse this morning.   Pt states the pain woke him up.  +lower back pain- non radiating       No bowel/bladder incontinence     Pt denies falls/injury     Hx: PE     Pt denies CP/SOB  
Chronic gout without tophus, unspecified cause, unspecified site

## 2023-06-15 ENCOUNTER — APPOINTMENT (OUTPATIENT)
Dept: ORTHOPEDIC SURGERY | Facility: CLINIC | Age: 68
End: 2023-06-15
Payer: MEDICARE

## 2023-06-15 PROCEDURE — 72040 X-RAY EXAM NECK SPINE 2-3 VW: CPT

## 2023-06-15 PROCEDURE — 72110 X-RAY EXAM L-2 SPINE 4/>VWS: CPT

## 2023-06-15 PROCEDURE — 72170 X-RAY EXAM OF PELVIS: CPT

## 2023-06-15 PROCEDURE — 99214 OFFICE O/P EST MOD 30 MIN: CPT

## 2023-06-15 PROCEDURE — 72070 X-RAY EXAM THORAC SPINE 2VWS: CPT

## 2023-06-15 NOTE — ASSESSMENT
[FreeTextEntry1] : 67 M with pain in right calf and balance and gait issues s/p L3- pelvis fusion \par   MRI C and L spine o rule out myelopathy and recurrent stenosis in lumbar spine as cause of RLE radic. \par \par FU after MRI's \par \par

## 2023-06-15 NOTE — HISTORY OF PRESENT ILLNESS
[Lower back] : lower back [7] : 7 [9] : 9 [Sharp] : sharp [Shooting] : shooting [Stabbing] : stabbing [de-identified] : 6.15.2023 Patient has a history of lower back pain. He states he's had 3 surgeries on the back, 2 of the surgeries were done in may of 2022. His first surgery was in 2012. Patient was seen for pain throughout the R leg by Dr. Mena on June 1. 2023 where he was told the pain is coming from the back. Numbness/tingling in bilateral feet.  Had history of bilateral carpal tunnel release cubital tunnel release for hand numbness and tingling which resolved those symptoms. No neck pain. ISsues with balance and gait. SUes walker at baseline. Yamile closes eyes as difficulty staying upright. \par \par hx of multiple spinal surgeries by Dr. Xie.  L3-Pelvis [] : no [de-identified] : x-ray MRI CT

## 2023-06-15 NOTE — IMAGING
[de-identified] : Spine:\par Inspection/Palpation:\par No tenderness to palpation throughout Cervical/thoracic/lumbar spine.\par No bony stepoffs, No lesions.\par \par Gait:\par Non-antalgic, able to perform bilateral toe and heel rise.  Able to perform tandem gait.  \par \par Range of Motion:\par Cervical Spine: Flexion to chin to chest, extension to 70 degrees,  rotation 90 degrees bilaterally, Lateral flexion to 45 degrees bilaterally\par \par \par Neurologic:\par Bilateral upper extremities 5/5 Deltoid/Biceps/Triceps/ Wrist Flexion/Wrist Extension/ / Intrinsics Except\par \par Sensation intact to light touch C5-T1\par \par Biceps/Triceps/Brachioradialis Reflex within normal limits\par \par Negative Singh's,  No inverted brachioradials reflex\par \par X-ray Ap/Lateral/Flexion/Extension of lumbar spine were viewed and interpreted.   s.p l3-pelvis.  Well healed.  11 degrees of lordosis  hardware in good position. \par \par  X-ray Ap/Lateral of cervical spine were viewed and interpreted.  Normal alignment is maintained without any spondylolisthesis. body habitus obscures views.  multilevel degenerative changes. \par \par x-ray ap/lateral thoracic spine. increased kyphosis of thoracic spine. no fractures visualized.

## 2023-06-29 ENCOUNTER — FORM ENCOUNTER (OUTPATIENT)
Age: 68
End: 2023-06-29

## 2023-06-30 RX ORDER — DIAZEPAM 5 MG/1
5 TABLET ORAL
Qty: 2 | Refills: 0 | Status: ACTIVE | COMMUNITY
Start: 2023-06-30 | End: 1900-01-01

## 2023-07-03 ENCOUNTER — APPOINTMENT (OUTPATIENT)
Dept: UROLOGY | Facility: CLINIC | Age: 68
End: 2023-07-03

## 2023-07-05 PROBLEM — N20.0 NEPHROLITHIASIS: Status: ACTIVE | Noted: 2018-09-14

## 2023-07-07 ENCOUNTER — APPOINTMENT (OUTPATIENT)
Dept: UROLOGY | Facility: CLINIC | Age: 68
End: 2023-07-07
Payer: MEDICARE

## 2023-07-07 ENCOUNTER — OUTPATIENT (OUTPATIENT)
Dept: OUTPATIENT SERVICES | Facility: HOSPITAL | Age: 68
LOS: 1 days | End: 2023-07-07
Payer: COMMERCIAL

## 2023-07-07 DIAGNOSIS — Z98.89 OTHER SPECIFIED POSTPROCEDURAL STATES: Chronic | ICD-10-CM

## 2023-07-07 DIAGNOSIS — Z96.643 PRESENCE OF ARTIFICIAL HIP JOINT, BILATERAL: Chronic | ICD-10-CM

## 2023-07-07 DIAGNOSIS — Z98.890 OTHER SPECIFIED POSTPROCEDURAL STATES: Chronic | ICD-10-CM

## 2023-07-07 DIAGNOSIS — G62.9 POLYNEUROPATHY, UNSPECIFIED: Chronic | ICD-10-CM

## 2023-07-07 DIAGNOSIS — N20.0 CALCULUS OF KIDNEY: Chronic | ICD-10-CM

## 2023-07-07 DIAGNOSIS — Z98.1 ARTHRODESIS STATUS: Chronic | ICD-10-CM

## 2023-07-07 DIAGNOSIS — Z96.651 PRESENCE OF RIGHT ARTIFICIAL KNEE JOINT: Chronic | ICD-10-CM

## 2023-07-07 DIAGNOSIS — Z95.1 PRESENCE OF AORTOCORONARY BYPASS GRAFT: Chronic | ICD-10-CM

## 2023-07-07 DIAGNOSIS — Z96.653 PRESENCE OF ARTIFICIAL KNEE JOINT, BILATERAL: Chronic | ICD-10-CM

## 2023-07-07 DIAGNOSIS — N20.0 CALCULUS OF KIDNEY: ICD-10-CM

## 2023-07-07 DIAGNOSIS — Z90.49 ACQUIRED ABSENCE OF OTHER SPECIFIED PARTS OF DIGESTIVE TRACT: Chronic | ICD-10-CM

## 2023-07-07 PROCEDURE — 76775 US EXAM ABDO BACK WALL LIM: CPT

## 2023-07-07 PROCEDURE — 99213 OFFICE O/P EST LOW 20 MIN: CPT | Mod: 25

## 2023-07-07 PROCEDURE — 76775 US EXAM ABDO BACK WALL LIM: CPT | Mod: 26

## 2023-07-10 NOTE — ASSESSMENT
[FreeTextEntry1] : The patient underwent renal sonogram today for follow-up evaluation on the kidney cancer.  The exam was technically challenging due to the patient body habitus and it was difficult to make a complete assessment.  Visualized renal cyst was seen in the left kidney measuring 3.2 x 2.8 cm.  No hydronephrosis or stones were detected.\par \par I recommend to the patient that for his kidney cancer follow-up he should undergo a CT scan of the abdomen with and without contrast to better assess.  I will order that study for him and he will follow-up with Dr. Miranda. General

## 2023-07-12 DIAGNOSIS — N20.0 CALCULUS OF KIDNEY: ICD-10-CM

## 2023-07-12 DIAGNOSIS — Z85.528 PERSONAL HISTORY OF OTHER MALIGNANT NEOPLASM OF KIDNEY: ICD-10-CM

## 2023-07-13 ENCOUNTER — APPOINTMENT (OUTPATIENT)
Dept: ORTHOPEDIC SURGERY | Facility: CLINIC | Age: 68
End: 2023-07-13
Payer: MEDICARE

## 2023-07-13 VITALS — HEIGHT: 77 IN | WEIGHT: 315 LBS | BODY MASS INDEX: 37.19 KG/M2

## 2023-07-13 PROCEDURE — 99213 OFFICE O/P EST LOW 20 MIN: CPT

## 2023-07-13 NOTE — ASSESSMENT
[FreeTextEntry1] : 67 M with pain in right calf and balance and gait issues s/p L3- pelvis fusion  with multilevel degen cynthia in low back from foraminal stenosis.\par \par Cervical myelopathy with cord compression at 3-4\par CT scan of cervical spine to asses for calcification of disc\par Discused if no calcification would consider ACDF if calcified would consider laminoplasty vs laminectomy and fusion. \par \par FU after CT scan.

## 2023-07-13 NOTE — IMAGING

## 2023-07-13 NOTE — HISTORY OF PRESENT ILLNESS
[Lower back] : lower back [7] : 7 [9] : 9 [Sharp] : sharp [Shooting] : shooting [Stabbing] : stabbing [de-identified] : 7/12/23: MRI results \par \par 6.15.2023 Patient has a history of lower back pain. He states he's had 3 surgeries on the back, 2 of the surgeries were done in may of 2022. His first surgery was in 2012. Patient was seen for pain throughout the R leg by Dr. Mena on June 1. 2023 where he was told the pain is coming from the back. Numbness/tingling in bilateral feet.  Had history of bilateral carpal tunnel release cubital tunnel release for hand numbness and tingling which resolved those symptoms. No neck pain. ISsues with balance and gait. SUes walker at baseline. Yamile closes eyes as difficulty staying upright. \par \par hx of multiple spinal surgeries by Dr. Xie.  L3-Pelvis [] : no [de-identified] : x-ray MRI CT

## 2023-07-17 ENCOUNTER — APPOINTMENT (OUTPATIENT)
Dept: CT IMAGING | Facility: IMAGING CENTER | Age: 68
End: 2023-07-17
Payer: MEDICARE

## 2023-07-17 ENCOUNTER — OUTPATIENT (OUTPATIENT)
Dept: OUTPATIENT SERVICES | Facility: HOSPITAL | Age: 68
LOS: 1 days | End: 2023-07-17
Payer: COMMERCIAL

## 2023-07-17 DIAGNOSIS — Z96.653 PRESENCE OF ARTIFICIAL KNEE JOINT, BILATERAL: Chronic | ICD-10-CM

## 2023-07-17 DIAGNOSIS — Z98.1 ARTHRODESIS STATUS: Chronic | ICD-10-CM

## 2023-07-17 DIAGNOSIS — Z98.890 OTHER SPECIFIED POSTPROCEDURAL STATES: Chronic | ICD-10-CM

## 2023-07-17 DIAGNOSIS — N20.0 CALCULUS OF KIDNEY: ICD-10-CM

## 2023-07-17 DIAGNOSIS — Z98.89 OTHER SPECIFIED POSTPROCEDURAL STATES: Chronic | ICD-10-CM

## 2023-07-17 DIAGNOSIS — Z95.1 PRESENCE OF AORTOCORONARY BYPASS GRAFT: Chronic | ICD-10-CM

## 2023-07-17 DIAGNOSIS — G62.9 POLYNEUROPATHY, UNSPECIFIED: Chronic | ICD-10-CM

## 2023-07-17 DIAGNOSIS — Z96.643 PRESENCE OF ARTIFICIAL HIP JOINT, BILATERAL: Chronic | ICD-10-CM

## 2023-07-17 DIAGNOSIS — N20.0 CALCULUS OF KIDNEY: Chronic | ICD-10-CM

## 2023-07-17 DIAGNOSIS — Z00.8 ENCOUNTER FOR OTHER GENERAL EXAMINATION: ICD-10-CM

## 2023-07-17 PROCEDURE — 74170 CT ABD WO CNTRST FLWD CNTRST: CPT

## 2023-07-17 PROCEDURE — 74170 CT ABD WO CNTRST FLWD CNTRST: CPT | Mod: 26

## 2023-07-24 ENCOUNTER — APPOINTMENT (OUTPATIENT)
Dept: ORTHOPEDIC SURGERY | Facility: CLINIC | Age: 68
End: 2023-07-24
Payer: MEDICARE

## 2023-07-24 VITALS — WEIGHT: 315 LBS | BODY MASS INDEX: 37.19 KG/M2 | HEIGHT: 77 IN

## 2023-07-24 DIAGNOSIS — Z99.89 OBSTRUCTIVE SLEEP APNEA (ADULT) (PEDIATRIC): ICD-10-CM

## 2023-07-24 DIAGNOSIS — G47.33 OBSTRUCTIVE SLEEP APNEA (ADULT) (PEDIATRIC): ICD-10-CM

## 2023-07-24 DIAGNOSIS — E66.01 MORBID (SEVERE) OBESITY DUE TO EXCESS CALORIES: ICD-10-CM

## 2023-07-24 PROCEDURE — 99215 OFFICE O/P EST HI 40 MIN: CPT

## 2023-07-24 NOTE — HISTORY OF PRESENT ILLNESS
[Lower back] : lower back [Sharp] : sharp [Shooting] : shooting [Stabbing] : stabbing [9] : 9 [de-identified] : 7/24/23: CT results  Symptoms are same.  Still with difficult with balance and numbness tinling in bilatearl hands, \par \par 7/12/23: MRI results \par \par 6.15.2023 Patient has a history of lower back pain. He states he's had 3 surgeries on the back, 2 of the surgeries were done in may of 2022. His first surgery was in 2012. Patient was seen for pain throughout the R leg by Dr. Mena on June 1. 2023 where he was told the pain is coming from the back. Numbness/tingling in bilateral feet.  Had history of bilateral carpal tunnel release cubital tunnel release for hand numbness and tingling which resolved those symptoms. No neck pain. ISsues with balance and gait. SUes walker at baseline. Yamile closes eyes as difficulty staying upright. \par \par hx of multiple spinal surgeries by Dr. Xie.  L3-Pelvis [] : no [de-identified] : x-ray MRI CT

## 2023-07-24 NOTE — IMAGING

## 2023-07-24 NOTE — ASSESSMENT
[FreeTextEntry1] : 67 M with pain in right calf and balance and gait issues s/p L3- pelvis fusion  with multilevel degen cynthia in low back from foraminal stenosis.\par \par Cervical myelopathy with cord compression at 3-4\par Ct shows no calcification. \par \par PLan for OR for decompression. \par \par C3-4 ACDF vs C3-T1 posterior SPinal Fusion with instrumentation.  \par Will discuss case with colleagues and come up with final plan.\par \par Risks and benefits of surgery including but not limited to bleeding, infection, damage to surrounding structures, hardware complication, pseudoarthrosis, need for revision surgery, recurrent laryngeal nerve palsy, paralysis, anesthetic complications, persistent symptoms,. persistent neck pain, were discussed\par

## 2023-07-26 ENCOUNTER — NON-APPOINTMENT (OUTPATIENT)
Age: 68
End: 2023-07-26

## 2023-08-28 NOTE — ED ADULT TRIAGE NOTE - BP NONINVASIVE DIASTOLIC (MM HG)
100 Acitretin Pregnancy And Lactation Text: This medication is Pregnancy Category X and should not be given to women who are pregnant or may become pregnant in the future. This medication is excreted in breast milk.

## 2023-08-29 ENCOUNTER — NON-APPOINTMENT (OUTPATIENT)
Age: 68
End: 2023-08-29

## 2023-09-01 ENCOUNTER — OUTPATIENT (OUTPATIENT)
Dept: OUTPATIENT SERVICES | Facility: HOSPITAL | Age: 68
LOS: 1 days | End: 2023-09-01
Payer: MEDICARE

## 2023-09-01 VITALS
DIASTOLIC BLOOD PRESSURE: 69 MMHG | RESPIRATION RATE: 16 BRPM | OXYGEN SATURATION: 96 % | HEIGHT: 77 IN | SYSTOLIC BLOOD PRESSURE: 105 MMHG | HEART RATE: 73 BPM | TEMPERATURE: 98 F | WEIGHT: 315 LBS

## 2023-09-01 DIAGNOSIS — Z98.890 OTHER SPECIFIED POSTPROCEDURAL STATES: Chronic | ICD-10-CM

## 2023-09-01 DIAGNOSIS — Z98.1 ARTHRODESIS STATUS: Chronic | ICD-10-CM

## 2023-09-01 DIAGNOSIS — N20.0 CALCULUS OF KIDNEY: Chronic | ICD-10-CM

## 2023-09-01 DIAGNOSIS — Z96.653 PRESENCE OF ARTIFICIAL KNEE JOINT, BILATERAL: Chronic | ICD-10-CM

## 2023-09-01 DIAGNOSIS — Z96.643 PRESENCE OF ARTIFICIAL HIP JOINT, BILATERAL: Chronic | ICD-10-CM

## 2023-09-01 DIAGNOSIS — I25.10 ATHEROSCLEROTIC HEART DISEASE OF NATIVE CORONARY ARTERY WITHOUT ANGINA PECTORIS: ICD-10-CM

## 2023-09-01 DIAGNOSIS — Z98.89 OTHER SPECIFIED POSTPROCEDURAL STATES: Chronic | ICD-10-CM

## 2023-09-01 DIAGNOSIS — Z96.651 PRESENCE OF RIGHT ARTIFICIAL KNEE JOINT: Chronic | ICD-10-CM

## 2023-09-01 DIAGNOSIS — G95.9 DISEASE OF SPINAL CORD, UNSPECIFIED: ICD-10-CM

## 2023-09-01 DIAGNOSIS — G62.9 POLYNEUROPATHY, UNSPECIFIED: Chronic | ICD-10-CM

## 2023-09-01 DIAGNOSIS — Z90.5 ACQUIRED ABSENCE OF KIDNEY: Chronic | ICD-10-CM

## 2023-09-01 DIAGNOSIS — I50.9 HEART FAILURE, UNSPECIFIED: ICD-10-CM

## 2023-09-01 DIAGNOSIS — G47.33 OBSTRUCTIVE SLEEP APNEA (ADULT) (PEDIATRIC): ICD-10-CM

## 2023-09-01 DIAGNOSIS — Z90.49 ACQUIRED ABSENCE OF OTHER SPECIFIED PARTS OF DIGESTIVE TRACT: Chronic | ICD-10-CM

## 2023-09-01 DIAGNOSIS — Z95.1 PRESENCE OF AORTOCORONARY BYPASS GRAFT: Chronic | ICD-10-CM

## 2023-09-01 DIAGNOSIS — I10 ESSENTIAL (PRIMARY) HYPERTENSION: ICD-10-CM

## 2023-09-01 DIAGNOSIS — E66.9 OBESITY, UNSPECIFIED: ICD-10-CM

## 2023-09-01 DIAGNOSIS — E78.5 HYPERLIPIDEMIA, UNSPECIFIED: ICD-10-CM

## 2023-09-01 DIAGNOSIS — E11.9 TYPE 2 DIABETES MELLITUS WITHOUT COMPLICATIONS: ICD-10-CM

## 2023-09-01 DIAGNOSIS — G62.9 POLYNEUROPATHY, UNSPECIFIED: ICD-10-CM

## 2023-09-01 LAB
A1C WITH ESTIMATED AVERAGE GLUCOSE RESULT: 6.5 % — HIGH (ref 4–5.6)
ANION GAP SERPL CALC-SCNC: 16 MMOL/L — HIGH (ref 7–14)
BLD GP AB SCN SERPL QL: NEGATIVE — SIGNIFICANT CHANGE UP
BUN SERPL-MCNC: 17 MG/DL — SIGNIFICANT CHANGE UP (ref 7–23)
CALCIUM SERPL-MCNC: 9.1 MG/DL — SIGNIFICANT CHANGE UP (ref 8.4–10.5)
CHLORIDE SERPL-SCNC: 99 MMOL/L — SIGNIFICANT CHANGE UP (ref 98–107)
CO2 SERPL-SCNC: 26 MMOL/L — SIGNIFICANT CHANGE UP (ref 22–31)
CREAT SERPL-MCNC: 0.52 MG/DL — SIGNIFICANT CHANGE UP (ref 0.5–1.3)
EGFR: 110 ML/MIN/1.73M2 — SIGNIFICANT CHANGE UP
ESTIMATED AVERAGE GLUCOSE: 140 — SIGNIFICANT CHANGE UP
GLUCOSE SERPL-MCNC: 152 MG/DL — HIGH (ref 70–99)
HCT VFR BLD CALC: 41.4 % — SIGNIFICANT CHANGE UP (ref 39–50)
HGB BLD-MCNC: 13.9 G/DL — SIGNIFICANT CHANGE UP (ref 13–17)
MCHC RBC-ENTMCNC: 28.3 PG — SIGNIFICANT CHANGE UP (ref 27–34)
MCHC RBC-ENTMCNC: 33.6 GM/DL — SIGNIFICANT CHANGE UP (ref 32–36)
MCV RBC AUTO: 84.3 FL — SIGNIFICANT CHANGE UP (ref 80–100)
MRSA PCR RESULT.: SIGNIFICANT CHANGE UP
NRBC # BLD: 0 /100 WBCS — SIGNIFICANT CHANGE UP (ref 0–0)
NRBC # FLD: 0 K/UL — SIGNIFICANT CHANGE UP (ref 0–0)
PLATELET # BLD AUTO: 191 K/UL — SIGNIFICANT CHANGE UP (ref 150–400)
POTASSIUM SERPL-MCNC: 3.4 MMOL/L — LOW (ref 3.5–5.3)
POTASSIUM SERPL-SCNC: 3.4 MMOL/L — LOW (ref 3.5–5.3)
RBC # BLD: 4.91 M/UL — SIGNIFICANT CHANGE UP (ref 4.2–5.8)
RBC # FLD: 13.7 % — SIGNIFICANT CHANGE UP (ref 10.3–14.5)
RH IG SCN BLD-IMP: POSITIVE — SIGNIFICANT CHANGE UP
S AUREUS DNA NOSE QL NAA+PROBE: DETECTED
SODIUM SERPL-SCNC: 141 MMOL/L — SIGNIFICANT CHANGE UP (ref 135–145)
WBC # BLD: 6.24 K/UL — SIGNIFICANT CHANGE UP (ref 3.8–10.5)
WBC # FLD AUTO: 6.24 K/UL — SIGNIFICANT CHANGE UP (ref 3.8–10.5)

## 2023-09-01 PROCEDURE — 93010 ELECTROCARDIOGRAM REPORT: CPT

## 2023-09-01 RX ORDER — GABAPENTIN 400 MG/1
2 CAPSULE ORAL
Refills: 0 | DISCHARGE

## 2023-09-01 RX ORDER — BUMETANIDE 0.25 MG/ML
1 INJECTION INTRAMUSCULAR; INTRAVENOUS
Refills: 0 | DISCHARGE

## 2023-09-01 RX ORDER — TIZANIDINE 4 MG/1
1 TABLET ORAL
Qty: 0 | Refills: 0 | DISCHARGE

## 2023-09-01 RX ORDER — ACETAMINOPHEN 500 MG
3 TABLET ORAL
Qty: 0 | Refills: 0 | DISCHARGE

## 2023-09-01 NOTE — H&P PST ADULT - HISTORY OF PRESENT ILLNESS
68 yo male with preop dx. disease of spinal cord unspecified presents to pre surgical testing.  Pt reports poor balance and numbness/tingling to bilateral hands, worsening over the past 6 months, MRI revealing cervical stenosis ad cord compression.  Pt is scheduled for anterior cervical discectomy fusion C3-4.

## 2023-09-01 NOTE — H&P PST ADULT - NSICDXPASTMEDICALHX_GEN_ALL_CORE_FT
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

## 2023-09-01 NOTE — H&P PST ADULT - NEUROLOGICAL COMMENTS
bilateral feet, walks with cane at home, ambulating with rollator at PST A&Ox4, ambulating with walker at PST

## 2023-09-01 NOTE — H&P PST ADULT - PROBLEM SELECTOR PLAN 4
Per pt, he held bumetanide on DOS in March 2023 for carpal tunnel surgery and was hospitalized postop for acute CHF, therefore pt was instructed to take bumetanide AM of surgery, Dr. Mendoza in agreement with plan. Per pt, he held bumetanide on DOS in March 2023 for carpal tunnel surgery and was hospitalized postop for acute CHF, pt would like to take bumetanide AM of surgery, Dr. Mendoza in agreement.

## 2023-09-01 NOTE — H&P PST ADULT - PROBLEM SELECTOR PLAN 3
Pt instructed to take amlodipine and losartan AM of surgery with a sip of water, pt able to verbalize understanding.

## 2023-09-01 NOTE — H&P PST ADULT - PROBLEM SELECTOR PLAN 7
Pt to obtain cardiac eval as requested by surgeon, will request document.   Pt instructed to obtain preop aspirin instructions from cardiology.

## 2023-09-01 NOTE — H&P PST ADULT - PROBLEM SELECTOR PLAN 5
Pt instructed to take gabapentin AM of surgery with a sip of water, pt able to verbalize understanding.

## 2023-09-01 NOTE — H&P PST ADULT - PROBLEM SELECTOR PLAN 1
Pt is scheduled for anterior cervical discectomy fusion C3-4 on 9/19/23.  Verbal and written pre op instructions reviewed with patient and pt able to verbalize understanding.   Verbal and written instructions given with chlorhexidine wash, pt able to verbalize understanding via teach back method.

## 2023-09-01 NOTE — H&P PST ADULT - NSICDXPASTSURGICALHX_GEN_ALL_CORE_FT
PAST SURGICAL HISTORY:  H/O lithotripsy x1    History of bilateral hip replacements     History of bilateral knee replacement     History of cholecystectomy     History of hernia repair     History of lumbar fusion     History of lymph node biopsy     History of partial nephrectomy     History of Total Hip Replacement 2009- bilateral THR    Kidney stone s/w ESWL pt unsure site    Neuropathy Bilateral Feet since 2012    S/p bilateral carpal tunnel release     S/P CABG x 3 8/29/17    S/P Left Inguinal Hernia Repair     S/P lumbar discectomy 2012- ,L5  Aug 2013    S/P lumbar laminectomy Fusion L3-L5 2012    S/P revision of total knee, right x2- 2012 & 2014    S/P tonsillectomy and adenoidectomy as child

## 2023-09-01 NOTE — H&P PST ADULT - HISTORY OF COVID-19 VACCINATION
From: Isaias Mcclure  To: Jean Carlos Medley  Sent: 4/19/2022 10:15 AM CDT  Subject: Second boost    My wife and I both had the boooost in October. We are 73 and 74 years old and are contemplating a trip to [place] in early June. We are thinking about getting the second boost. Should we?   Yes

## 2023-09-01 NOTE — H&P PST ADULT - PROBLEM/PLAN-6
RT Inhaler-Nebulizer Bronchodilator Protocol Note    There is a bronchodilator order in the chart from a provider indicating to follow the RT Bronchodilator Protocol and there is an Initiate RT Inhaler-Nebulizer Bronchodilator Protocol order as well (see protocol at bottom of note). CXR Findings:  XR CHEST PORTABLE    Result Date: 10/19/2022  1. Severe radiographic findings typical of pulmonary fibrosis. Correlate with clinical history. 2. No superimposed pulmonary infiltrate evident radiographically. 3. Calcific atherosclerosis aorta. 4. Cardiomegaly. The findings from the last RT Protocol Assessment were as follows:   History Pulmonary Disease: Chronic pulmonary disease  Respiratory Pattern: Regular pattern and RR 12-20 bpm  Breath Sounds: Slightly diminished and/or crackles  Cough: Strong, spontaneous, non-productive  Indication for Bronchodilator Therapy: Decreased or absent breath sounds  Bronchodilator Assessment Score: 4    Aerosolized bronchodilator medication orders have been revised according to the RT Inhaler-Nebulizer Bronchodilator Protocol below. Respiratory Therapist to perform RT Therapy Protocol Assessment initially then follow the protocol. Repeat RT Therapy Protocol Assessment PRN for score 0-3 or on second treatment, BID, and PRN for scores above 3. No Indications - adjust the frequency to every 6 hours PRN wheezing or bronchospasm, if no treatments needed after 48 hours then discontinue using Per Protocol order mode. If indication present, adjust the RT bronchodilator orders based on the Bronchodilator Assessment Score as indicated below.   Use Inhaler orders unless patient has one or more of the following: on home nebulizer, not able to hold breath for 10 seconds, is not alert and oriented, cannot activate and use MDI correctly, or respiratory rate 25 breaths per minute or more, then use the equivalent nebulizer order(s) with same Frequency and PRN reasons based on the score.  If a patient is on this medication at home then do not decrease Frequency below that used at home. 0-3 - enter or revise RT bronchodilator order(s) to equivalent RT Bronchodilator order with Frequency of every 4 hours PRN for wheezing or increased work of breathing using Per Protocol order mode. 4-6 - enter or revise RT Bronchodilator order(s) to two equivalent RT bronchodilator orders with one order with BID Frequency and one order with Frequency of every 4 hours PRN wheezing or increased work of breathing using Per Protocol order mode. 7-10 - enter or revise RT Bronchodilator order(s) to two equivalent RT bronchodilator orders with one order with TID Frequency and one order with Frequency of every 4 hours PRN wheezing or increased work of breathing using Per Protocol order mode. 11-13 - enter or revise RT Bronchodilator order(s) to one equivalent RT bronchodilator order with QID Frequency and an Albuterol order with Frequency of every 4 hours PRN wheezing or increased work of breathing using Per Protocol order mode. Greater than 13 - enter or revise RT Bronchodilator order(s) to one equivalent RT bronchodilator order with every 4 hours Frequency and an Albuterol order with Frequency of every 2 hours PRN wheezing or increased work of breathing using Per Protocol order mode. RT to enter RT Home Evaluation for COPD & MDI Assessment order using Per Protocol order mode.     Electronically signed by Torie Diamond RCP on 10/21/2022 at 8:21 AM DISPLAY PLAN FREE TEXT

## 2023-09-01 NOTE — H&P PST ADULT - NEGATIVE GENERAL GENITOURINARY SYMPTOMS
Nothing to eat/drink after midnight prior to surgery except take Carvedilol, Hydralazine and Isosorbide on the AM of surgery with small sip water  Hold Aspirin 1 week before surgery and then restart again after surgery   no hematuria/no flank pain L/no flank pain R/no bladder infections/no dysuria/no urinary hesitancy/normal urinary frequency

## 2023-09-07 DIAGNOSIS — Z22.321 CARRIER OR SUSPECTED CARRIER OF METHICILLIN SUSCEPTIBLE STAPHYLOCOCCUS AUREUS: ICD-10-CM

## 2023-09-07 RX ORDER — MUPIROCIN 20 MG/G
2 OINTMENT TOPICAL TWICE DAILY
Qty: 1 | Refills: 0 | Status: ACTIVE | COMMUNITY
Start: 2023-09-07 | End: 1900-01-01

## 2023-09-15 PROBLEM — C64.9 MALIGNANT NEOPLASM OF UNSPECIFIED KIDNEY, EXCEPT RENAL PELVIS: Chronic | Status: ACTIVE | Noted: 2023-09-01

## 2023-09-15 PROBLEM — G95.9 DISEASE OF SPINAL CORD, UNSPECIFIED: Chronic | Status: ACTIVE | Noted: 2023-09-01

## 2023-09-18 ENCOUNTER — INPATIENT (INPATIENT)
Facility: HOSPITAL | Age: 68
LOS: 11 days | Discharge: HOME | End: 2023-09-30
Attending: STUDENT IN AN ORGANIZED HEALTH CARE EDUCATION/TRAINING PROGRAM | Admitting: STUDENT IN AN ORGANIZED HEALTH CARE EDUCATION/TRAINING PROGRAM
Payer: MEDICARE

## 2023-09-18 VITALS
DIASTOLIC BLOOD PRESSURE: 86 MMHG | RESPIRATION RATE: 18 BRPM | HEART RATE: 77 BPM | OXYGEN SATURATION: 100 % | HEIGHT: 77 IN | SYSTOLIC BLOOD PRESSURE: 150 MMHG | TEMPERATURE: 98 F

## 2023-09-18 DIAGNOSIS — Z98.89 OTHER SPECIFIED POSTPROCEDURAL STATES: Chronic | ICD-10-CM

## 2023-09-18 DIAGNOSIS — Z98.1 ARTHRODESIS STATUS: Chronic | ICD-10-CM

## 2023-09-18 DIAGNOSIS — N20.0 CALCULUS OF KIDNEY: Chronic | ICD-10-CM

## 2023-09-18 DIAGNOSIS — R55 SYNCOPE AND COLLAPSE: ICD-10-CM

## 2023-09-18 DIAGNOSIS — E11.9 TYPE 2 DIABETES MELLITUS WITHOUT COMPLICATIONS: ICD-10-CM

## 2023-09-18 DIAGNOSIS — Z96.643 PRESENCE OF ARTIFICIAL HIP JOINT, BILATERAL: Chronic | ICD-10-CM

## 2023-09-18 DIAGNOSIS — Z96.653 PRESENCE OF ARTIFICIAL KNEE JOINT, BILATERAL: Chronic | ICD-10-CM

## 2023-09-18 DIAGNOSIS — Z90.5 ACQUIRED ABSENCE OF KIDNEY: Chronic | ICD-10-CM

## 2023-09-18 DIAGNOSIS — Z98.890 OTHER SPECIFIED POSTPROCEDURAL STATES: Chronic | ICD-10-CM

## 2023-09-18 DIAGNOSIS — Z90.49 ACQUIRED ABSENCE OF OTHER SPECIFIED PARTS OF DIGESTIVE TRACT: Chronic | ICD-10-CM

## 2023-09-18 DIAGNOSIS — I50.9 HEART FAILURE, UNSPECIFIED: ICD-10-CM

## 2023-09-18 DIAGNOSIS — Z96.651 PRESENCE OF RIGHT ARTIFICIAL KNEE JOINT: Chronic | ICD-10-CM

## 2023-09-18 DIAGNOSIS — S22.009A UNSPECIFIED FRACTURE OF UNSPECIFIED THORACIC VERTEBRA, INITIAL ENCOUNTER FOR CLOSED FRACTURE: ICD-10-CM

## 2023-09-18 DIAGNOSIS — I10 ESSENTIAL (PRIMARY) HYPERTENSION: ICD-10-CM

## 2023-09-18 DIAGNOSIS — Z29.9 ENCOUNTER FOR PROPHYLACTIC MEASURES, UNSPECIFIED: ICD-10-CM

## 2023-09-18 DIAGNOSIS — Z95.1 PRESENCE OF AORTOCORONARY BYPASS GRAFT: Chronic | ICD-10-CM

## 2023-09-18 DIAGNOSIS — G62.9 POLYNEUROPATHY, UNSPECIFIED: Chronic | ICD-10-CM

## 2023-09-18 LAB
ALBUMIN SERPL ELPH-MCNC: 4.3 G/DL — SIGNIFICANT CHANGE UP (ref 3.3–5)
ALP SERPL-CCNC: 100 U/L — SIGNIFICANT CHANGE UP (ref 40–120)
ALT FLD-CCNC: 23 U/L — SIGNIFICANT CHANGE UP (ref 4–41)
ANION GAP SERPL CALC-SCNC: 16 MMOL/L — HIGH (ref 7–14)
APTT BLD: 24.5 SEC — SIGNIFICANT CHANGE UP (ref 24.5–35.6)
AST SERPL-CCNC: 22 U/L — SIGNIFICANT CHANGE UP (ref 4–40)
BASOPHILS # BLD AUTO: 0.07 K/UL — SIGNIFICANT CHANGE UP (ref 0–0.2)
BASOPHILS NFR BLD AUTO: 0.8 % — SIGNIFICANT CHANGE UP (ref 0–2)
BILIRUB SERPL-MCNC: 0.6 MG/DL — SIGNIFICANT CHANGE UP (ref 0.2–1.2)
BLD GP AB SCN SERPL QL: NEGATIVE — SIGNIFICANT CHANGE UP
BUN SERPL-MCNC: 14 MG/DL — SIGNIFICANT CHANGE UP (ref 7–23)
CALCIUM SERPL-MCNC: 9.8 MG/DL — SIGNIFICANT CHANGE UP (ref 8.4–10.5)
CHLORIDE SERPL-SCNC: 97 MMOL/L — LOW (ref 98–107)
CK MB BLD-MCNC: 3.6 % — HIGH (ref 0–2.5)
CK MB CFR SERPL CALC: 2 NG/ML — SIGNIFICANT CHANGE UP
CK SERPL-CCNC: 55 U/L — SIGNIFICANT CHANGE UP (ref 30–200)
CO2 SERPL-SCNC: 27 MMOL/L — SIGNIFICANT CHANGE UP (ref 22–31)
CREAT SERPL-MCNC: 0.5 MG/DL — SIGNIFICANT CHANGE UP (ref 0.5–1.3)
EGFR: 112 ML/MIN/1.73M2 — SIGNIFICANT CHANGE UP
EOSINOPHIL # BLD AUTO: 0.17 K/UL — SIGNIFICANT CHANGE UP (ref 0–0.5)
EOSINOPHIL NFR BLD AUTO: 2.1 % — SIGNIFICANT CHANGE UP (ref 0–6)
GLUCOSE SERPL-MCNC: 129 MG/DL — HIGH (ref 70–99)
HCT VFR BLD CALC: 41.1 % — SIGNIFICANT CHANGE UP (ref 39–50)
HGB BLD-MCNC: 14 G/DL — SIGNIFICANT CHANGE UP (ref 13–17)
IANC: 5.85 K/UL — SIGNIFICANT CHANGE UP (ref 1.8–7.4)
IMM GRANULOCYTES NFR BLD AUTO: 0.6 % — SIGNIFICANT CHANGE UP (ref 0–0.9)
INR BLD: 0.95 RATIO — SIGNIFICANT CHANGE UP (ref 0.85–1.18)
LYMPHOCYTES # BLD AUTO: 1.41 K/UL — SIGNIFICANT CHANGE UP (ref 1–3.3)
LYMPHOCYTES # BLD AUTO: 17.1 % — SIGNIFICANT CHANGE UP (ref 13–44)
MCHC RBC-ENTMCNC: 29.1 PG — SIGNIFICANT CHANGE UP (ref 27–34)
MCHC RBC-ENTMCNC: 34.1 GM/DL — SIGNIFICANT CHANGE UP (ref 32–36)
MCV RBC AUTO: 85.4 FL — SIGNIFICANT CHANGE UP (ref 80–100)
MONOCYTES # BLD AUTO: 0.7 K/UL — SIGNIFICANT CHANGE UP (ref 0–0.9)
MONOCYTES NFR BLD AUTO: 8.5 % — SIGNIFICANT CHANGE UP (ref 2–14)
NEUTROPHILS # BLD AUTO: 5.85 K/UL — SIGNIFICANT CHANGE UP (ref 1.8–7.4)
NEUTROPHILS NFR BLD AUTO: 70.9 % — SIGNIFICANT CHANGE UP (ref 43–77)
NRBC # BLD: 0 /100 WBCS — SIGNIFICANT CHANGE UP (ref 0–0)
NRBC # FLD: 0 K/UL — SIGNIFICANT CHANGE UP (ref 0–0)
NT-PROBNP SERPL-SCNC: 129 PG/ML — SIGNIFICANT CHANGE UP
PLATELET # BLD AUTO: 223 K/UL — SIGNIFICANT CHANGE UP (ref 150–400)
POTASSIUM SERPL-MCNC: 3.8 MMOL/L — SIGNIFICANT CHANGE UP (ref 3.5–5.3)
POTASSIUM SERPL-SCNC: 3.8 MMOL/L — SIGNIFICANT CHANGE UP (ref 3.5–5.3)
PROT SERPL-MCNC: 7.7 G/DL — SIGNIFICANT CHANGE UP (ref 6–8.3)
PROTHROM AB SERPL-ACNC: 10.7 SEC — SIGNIFICANT CHANGE UP (ref 9.5–13)
RBC # BLD: 4.81 M/UL — SIGNIFICANT CHANGE UP (ref 4.2–5.8)
RBC # FLD: 13.8 % — SIGNIFICANT CHANGE UP (ref 10.3–14.5)
RH IG SCN BLD-IMP: POSITIVE — SIGNIFICANT CHANGE UP
SODIUM SERPL-SCNC: 140 MMOL/L — SIGNIFICANT CHANGE UP (ref 135–145)
TROPONIN T, HIGH SENSITIVITY RESULT: 16 NG/L — SIGNIFICANT CHANGE UP
WBC # BLD: 8.25 K/UL — SIGNIFICANT CHANGE UP (ref 3.8–10.5)
WBC # FLD AUTO: 8.25 K/UL — SIGNIFICANT CHANGE UP (ref 3.8–10.5)

## 2023-09-18 PROCEDURE — 70450 CT HEAD/BRAIN W/O DYE: CPT | Mod: 26,MH

## 2023-09-18 PROCEDURE — 72131 CT LUMBAR SPINE W/O DYE: CPT | Mod: 26,MB

## 2023-09-18 PROCEDURE — 72125 CT NECK SPINE W/O DYE: CPT | Mod: 26,MB

## 2023-09-18 PROCEDURE — 72040 X-RAY EXAM NECK SPINE 2-3 VW: CPT | Mod: 26

## 2023-09-18 PROCEDURE — 71046 X-RAY EXAM CHEST 2 VIEWS: CPT | Mod: 26

## 2023-09-18 PROCEDURE — 72020 X-RAY EXAM OF SPINE 1 VIEW: CPT | Mod: 26,59

## 2023-09-18 PROCEDURE — 99223 1ST HOSP IP/OBS HIGH 75: CPT

## 2023-09-18 PROCEDURE — 93010 ELECTROCARDIOGRAM REPORT: CPT

## 2023-09-18 PROCEDURE — 72128 CT CHEST SPINE W/O DYE: CPT | Mod: 26,MB

## 2023-09-18 PROCEDURE — 99285 EMERGENCY DEPT VISIT HI MDM: CPT

## 2023-09-18 PROCEDURE — 72100 X-RAY EXAM L-S SPINE 2/3 VWS: CPT | Mod: 26

## 2023-09-18 RX ORDER — BUMETANIDE 0.25 MG/ML
2 INJECTION INTRAMUSCULAR; INTRAVENOUS AT BEDTIME
Refills: 0 | Status: DISCONTINUED | OUTPATIENT
Start: 2023-09-19 | End: 2023-09-20

## 2023-09-18 RX ORDER — ONDANSETRON 8 MG/1
4 TABLET, FILM COATED ORAL EVERY 8 HOURS
Refills: 0 | Status: DISCONTINUED | OUTPATIENT
Start: 2023-09-18 | End: 2023-09-23

## 2023-09-18 RX ORDER — BENZOCAINE AND MENTHOL 5; 1 G/100ML; G/100ML
1 LIQUID ORAL ONCE
Refills: 0 | Status: COMPLETED | OUTPATIENT
Start: 2023-09-18 | End: 2023-09-18

## 2023-09-18 RX ORDER — INSULIN LISPRO 100/ML
VIAL (ML) SUBCUTANEOUS AT BEDTIME
Refills: 0 | Status: DISCONTINUED | OUTPATIENT
Start: 2023-09-18 | End: 2023-09-25

## 2023-09-18 RX ORDER — LOSARTAN POTASSIUM 100 MG/1
100 TABLET, FILM COATED ORAL DAILY
Refills: 0 | Status: DISCONTINUED | OUTPATIENT
Start: 2023-09-18 | End: 2023-09-27

## 2023-09-18 RX ORDER — ATORVASTATIN CALCIUM 80 MG/1
40 TABLET, FILM COATED ORAL AT BEDTIME
Refills: 0 | Status: DISCONTINUED | OUTPATIENT
Start: 2023-09-18 | End: 2023-09-30

## 2023-09-18 RX ORDER — DEXTROSE 50 % IN WATER 50 %
25 SYRINGE (ML) INTRAVENOUS ONCE
Refills: 0 | Status: DISCONTINUED | OUTPATIENT
Start: 2023-09-18 | End: 2023-09-30

## 2023-09-18 RX ORDER — SODIUM CHLORIDE 9 MG/ML
1000 INJECTION, SOLUTION INTRAVENOUS
Refills: 0 | Status: DISCONTINUED | OUTPATIENT
Start: 2023-09-18 | End: 2023-09-25

## 2023-09-18 RX ORDER — BUMETANIDE 0.25 MG/ML
2 INJECTION INTRAMUSCULAR; INTRAVENOUS
Refills: 0 | Status: DISCONTINUED | OUTPATIENT
Start: 2023-09-18 | End: 2023-09-20

## 2023-09-18 RX ORDER — MORPHINE SULFATE 50 MG/1
6 CAPSULE, EXTENDED RELEASE ORAL ONCE
Refills: 0 | Status: DISCONTINUED | OUTPATIENT
Start: 2023-09-18 | End: 2023-09-18

## 2023-09-18 RX ORDER — ALLOPURINOL 300 MG
100 TABLET ORAL AT BEDTIME
Refills: 0 | Status: DISCONTINUED | OUTPATIENT
Start: 2023-09-18 | End: 2023-09-30

## 2023-09-18 RX ORDER — MORPHINE SULFATE 50 MG/1
4 CAPSULE, EXTENDED RELEASE ORAL ONCE
Refills: 0 | Status: DISCONTINUED | OUTPATIENT
Start: 2023-09-18 | End: 2023-09-18

## 2023-09-18 RX ORDER — CARVEDILOL PHOSPHATE 80 MG/1
12.5 CAPSULE, EXTENDED RELEASE ORAL EVERY 12 HOURS
Refills: 0 | Status: DISCONTINUED | OUTPATIENT
Start: 2023-09-18 | End: 2023-09-27

## 2023-09-18 RX ORDER — DEXTROSE 50 % IN WATER 50 %
15 SYRINGE (ML) INTRAVENOUS ONCE
Refills: 0 | Status: DISCONTINUED | OUTPATIENT
Start: 2023-09-18 | End: 2023-09-25

## 2023-09-18 RX ORDER — GLUCAGON INJECTION, SOLUTION 0.5 MG/.1ML
1 INJECTION, SOLUTION SUBCUTANEOUS ONCE
Refills: 0 | Status: DISCONTINUED | OUTPATIENT
Start: 2023-09-18 | End: 2023-09-23

## 2023-09-18 RX ORDER — AMLODIPINE BESYLATE 2.5 MG/1
10 TABLET ORAL DAILY
Refills: 0 | Status: DISCONTINUED | OUTPATIENT
Start: 2023-09-18 | End: 2023-09-27

## 2023-09-18 RX ORDER — SODIUM CHLORIDE 9 MG/ML
1000 INJECTION INTRAMUSCULAR; INTRAVENOUS; SUBCUTANEOUS ONCE
Refills: 0 | Status: COMPLETED | OUTPATIENT
Start: 2023-09-18 | End: 2023-09-18

## 2023-09-18 RX ORDER — INSULIN LISPRO 100/ML
VIAL (ML) SUBCUTANEOUS
Refills: 0 | Status: DISCONTINUED | OUTPATIENT
Start: 2023-09-18 | End: 2023-09-25

## 2023-09-18 RX ORDER — BUMETANIDE 0.25 MG/ML
2 INJECTION INTRAMUSCULAR; INTRAVENOUS ONCE
Refills: 0 | Status: COMPLETED | OUTPATIENT
Start: 2023-09-18 | End: 2023-09-18

## 2023-09-18 RX ORDER — DEXTROSE 50 % IN WATER 50 %
25 SYRINGE (ML) INTRAVENOUS ONCE
Refills: 0 | Status: DISCONTINUED | OUTPATIENT
Start: 2023-09-18 | End: 2023-09-25

## 2023-09-18 RX ORDER — BUMETANIDE 0.25 MG/ML
4 INJECTION INTRAMUSCULAR; INTRAVENOUS
Refills: 0 | Status: DISCONTINUED | OUTPATIENT
Start: 2023-09-18 | End: 2023-09-20

## 2023-09-18 RX ORDER — DEXTROSE 50 % IN WATER 50 %
12.5 SYRINGE (ML) INTRAVENOUS ONCE
Refills: 0 | Status: DISCONTINUED | OUTPATIENT
Start: 2023-09-18 | End: 2023-09-25

## 2023-09-18 RX ORDER — LANOLIN ALCOHOL/MO/W.PET/CERES
3 CREAM (GRAM) TOPICAL AT BEDTIME
Refills: 0 | Status: DISCONTINUED | OUTPATIENT
Start: 2023-09-18 | End: 2023-09-30

## 2023-09-18 RX ORDER — ACETAMINOPHEN 500 MG
650 TABLET ORAL EVERY 6 HOURS
Refills: 0 | Status: DISCONTINUED | OUTPATIENT
Start: 2023-09-18 | End: 2023-09-23

## 2023-09-18 RX ORDER — GABAPENTIN 400 MG/1
300 CAPSULE ORAL
Refills: 0 | Status: DISCONTINUED | OUTPATIENT
Start: 2023-09-18 | End: 2023-09-30

## 2023-09-18 RX ORDER — HEPARIN SODIUM 5000 [USP'U]/ML
5000 INJECTION INTRAVENOUS; SUBCUTANEOUS EVERY 8 HOURS
Refills: 0 | Status: DISCONTINUED | OUTPATIENT
Start: 2023-09-18 | End: 2023-09-21

## 2023-09-18 RX ORDER — ASPIRIN/CALCIUM CARB/MAGNESIUM 324 MG
81 TABLET ORAL DAILY
Refills: 0 | Status: ACTIVE | OUTPATIENT
Start: 2023-09-18 | End: 2024-08-16

## 2023-09-18 RX ORDER — ACETAMINOPHEN 500 MG
1000 TABLET ORAL ONCE
Refills: 0 | Status: COMPLETED | OUTPATIENT
Start: 2023-09-18 | End: 2023-09-18

## 2023-09-18 RX ADMIN — MORPHINE SULFATE 4 MILLIGRAM(S): 50 CAPSULE, EXTENDED RELEASE ORAL at 16:39

## 2023-09-18 RX ADMIN — GABAPENTIN 300 MILLIGRAM(S): 400 CAPSULE ORAL at 23:21

## 2023-09-18 RX ADMIN — MORPHINE SULFATE 6 MILLIGRAM(S): 50 CAPSULE, EXTENDED RELEASE ORAL at 14:25

## 2023-09-18 RX ADMIN — SODIUM CHLORIDE 1000 MILLILITER(S): 9 INJECTION INTRAMUSCULAR; INTRAVENOUS; SUBCUTANEOUS at 13:54

## 2023-09-18 RX ADMIN — Medication 100 MILLIGRAM(S): at 23:20

## 2023-09-18 RX ADMIN — Medication 400 MILLIGRAM(S): at 23:19

## 2023-09-18 RX ADMIN — HEPARIN SODIUM 5000 UNIT(S): 5000 INJECTION INTRAVENOUS; SUBCUTANEOUS at 23:21

## 2023-09-18 RX ADMIN — BUMETANIDE 2 MILLIGRAM(S): 0.25 INJECTION INTRAMUSCULAR; INTRAVENOUS at 19:54

## 2023-09-18 RX ADMIN — MORPHINE SULFATE 6 MILLIGRAM(S): 50 CAPSULE, EXTENDED RELEASE ORAL at 13:55

## 2023-09-18 RX ADMIN — ATORVASTATIN CALCIUM 40 MILLIGRAM(S): 80 TABLET, FILM COATED ORAL at 23:21

## 2023-09-18 RX ADMIN — MORPHINE SULFATE 4 MILLIGRAM(S): 50 CAPSULE, EXTENDED RELEASE ORAL at 16:09

## 2023-09-18 NOTE — ED PROVIDER NOTE - PRO INTERPRETER NEED 2
Problem: Dysphagia (Adult)  Goal: *Acute Goals and Plan of Care (Insert Text)  Description: Recommendations:  Diet: puree/nectar- thick  Meds: crushed  Aspiration Precautions  Oral Care TID  Other: MBS next day    Goals:  Patient will:  1. Tolerate diet and/or diet upgrade without overt s/sx of aspiration under SLP supervision  2. Utilize compensatory swallow strategies of small bite/sip, alternate liquid/solid with min cues   3. Perform oral-motor and laryngeal elevation exercises 10 reps/each to increase oropharyngeal swallow function with min cues  4. Complete an objective swallow study (i.e., MBSS) to assess swallow integrity, r/o aspiration, and determine of safest LRD, min A    Outcome: Progressing Towards Goal     SPEECH LANGUAGE PATHOLOGY BEDSIDE SWALLOW EVALUATION    Patient: Ayaz Lilly (27 y.o. female)  Date: 9/7/2021  Primary Diagnosis: Breakthrough seizure ([facility] [address] [G40.919]  KEYLA (acute kidney injury) ([facility] [address] [N17.9]  Dehydration [E86.0]  Anemia [D64.9]        Precautions: aspiration   Fall    PLOF: unable to determine    ASSESSMENT :  Based on the objective data described below, the patient presents with weakened oropharyngeal swallow function in the setting of AMS. Pt familiar to speech service from 9/2020 with recommendations for regular/thin liquid diet. Today, pt with neurogenic stutter. Oral-motor exam revealed structures grossly intact for mastication and deglutition. Reports difficulty \"getting food down\" and having to \"push down on my throat to get the food down\". Pt accepted thin liquids with immediate gag reflex resulting in cough. Nectar-thick liquid via straw and puree accepted; no aspiration s/s. Pt does exhibit significantly delayed swallow; further reports pain and feeling of globus with swallow. At this time, pt safe for puree diet with nectar-thick liquids; meds should be crushed.  Further, MBS recommended next AM to objectively assess oropharyngeal swallow integrity, r/o aspiration, and determine safest, least restrictive diet. 3Patient will benefit from skilled intervention to address the above impairments. Patient's rehabilitation potential is considered to be Fair  Factors which may influence rehabilitation potential include:   []            None noted  [x]            Mental ability/status  [x]            Medical condition  []            Home/family situation and support systems  [x]            Safety awareness  []            Pain tolerance/management  []            Other:      PLAN :  Recommendations and Planned Interventions:  puree diet with nectar-thick liquids; meds should be crushed  Frequency/Duration: Patient will be followed by speech-language pathology 1-2 times per day/3-5 days per week to address goals. Discharge Recommendations: Skilled Nursing Facility     SUBJECTIVE:   Patient stated ok, that's good. OBJECTIVE:     Past Medical History:   Diagnosis Date    Abnormal coordination 9/21/2020    Hallucinations 9/21/2020    Hypertension     Neurological disorder     Seizures ([facility] [address]     Stroke ([facility] [address] 2009   No past surgical history on file.   Home Situation:   Home Situation  Home Environment: Private residence  # Steps to Enter: 0  One/Two Story Residence: One story  Living Alone: No  Support Systems: Child(quiana)  Patient Expects to be Discharged to[de-identified] House  Current DME Used/Available at Home: Wheelchair    Diet prior to admission: unable to determine  Current Diet:  puree diet with nectar-thick liquids; meds should be crushed     Cognitive and Communication Status:  Neurologic State: Alert  Orientation Level: Oriented to person, Oriented to place, Disoriented to situation, Disoriented to time  Cognition: Follows commands  Perception: Appears intact  Perseveration: Perseverates during conversation  Safety/Judgement: Fall prevention  Oral Assessment:  Oral Assessment  Labial: Decreased rate;Decreased seal  Dentition: Limited  Oral Hygiene: Fair  Lingual: Decreased rate;Decreased strength  Velum: No impairment  Mandible: No impairment  P.O. Trials:  Patient Position: hob 60  Vocal quality prior to P.O.: Low volume  Consistency Presented: Thin liquid; Solid;Puree;Nectar thick liquid  How Presented: Self-fed/presented;SLP-fed/presented;Straw;Successive swallows     Bolus Acceptance: No impairment  Bolus Formation/Control: Impaired  Type of Impairment: Delayed;Mastication  Propulsion: Delayed (# of seconds); Lingual tremors; Tongue pumping  Oral Residue: None  Initiation of Swallow: Delayed (# of seconds)  Laryngeal Elevation: Decreased  Aspiration Signs/Symptoms: None  Pharyngeal Phase Characteristics: Feeling of discomfort; Foreign body sensation;Painful swallow  Effective Modifications: Small sips and bites  Cues for Modifications: Moderate       Oral Phase Severity: Mild  Pharyngeal Phase Severity : Moderate    PAIN:  Pain level pre-treatment: 0/10   Pain level post-treatment: 0/10     After treatment:   []            Patient left in no apparent distress sitting up in chair  [x]            Patient left in no apparent distress in bed  [x]            Call bell left within reach  [x]            Nursing notified  []            Family present  []            Caregiver present  []            Bed alarm activated    COMMUNICATION/EDUCATION:   [x]            Aspiration precautions; swallow safety; compensatory techniques. [x]            Patient/family have participated as able in goal setting and plan of care. []            Patient/family agree to work toward stated goals and plan of care. []            Patient understands intent and goals of therapy; neutral about participation. [x]            Patient unable to participate in goal setting/plan of care; educ ongoing with interdisciplinary staff  []         Posted safety precautions in patient's room.     Thank you for this referral,  TERESA Zhou  Time Calculation: 17 mins English

## 2023-09-18 NOTE — ED ADULT TRIAGE NOTE - CHIEF COMPLAINT QUOTE
Pt c/o lower back pain s/p fall last night. States he blew his nose and "blacked out." + LOC, +head injury. Bruising noted to R upper arm. Scheduled for neck surgery tomorrow- sent in for clearance . Stopped taking ASA on Tuesday for surgery. Denies CP, SOB, HA, dizziness, palpitations.

## 2023-09-18 NOTE — ED PROVIDER NOTE - CARE PLAN
Principal Discharge DX:	Syncope   1 Principal Discharge DX:	Syncope  Secondary Diagnosis:	Traumatic fracture of thoracic spine

## 2023-09-18 NOTE — ED PROVIDER NOTE - CLINICAL SUMMARY MEDICAL DECISION MAKING FREE TEXT BOX
67-year-old male with a history of HTN, HLD, DM, Gout, carpal tunnel, CAD s/p CABG, CHF on Bumex twice daily, ASIYA on CPAP,  here for evaluation of back pain after a syncopal episode and fall yesterday.  history more persistent with vaso vagal but given pt significant pmhx will do labs, EKG, imagings and will touch base with ortho

## 2023-09-18 NOTE — H&P ADULT - ASSESSMENT
66 yo M with PMhx of HFrEF, CAD s/p CABG, ASIYA on CPAP, HTN, DM, right renal mass s/p resection presents to the ED after a syncopal episode, found to have T12-L1 fracture.

## 2023-09-18 NOTE — CONSULT NOTE ADULT - NS ATTEND AMEND GEN_ALL_CORE FT
Patient initially scheduled for ACDF on 9/19/23 for spinal cord compression and cervical cord compression.  Patient syncopized and fell after blowing nose on 9/17 and had severe m id to lower back pain and was unable to get himself up off floor.  Had friends from  come pick him up and place him in his recliner where he stayed until next day.  Called my office and told me of situation.  He was sent into to ER for evaluation of syncope and back pain.  Found to have t12-L1 fracture through disc space. Has hx of hyperostosis looks more like AS on imaging than DISH.  Fused through to his thoracic spine.  Awaiting medical clearance for syncope and MRI of thoracic and Lumbar spine to further ascertain stability of fracture. Spinal precautions.  Pain control.  Patient is neurologically intact however with likely unstable thoracolumbar fracture given his AS.  Discussed with patient that will likely need extension of his lumbar fusion to incorporate to lower thoracic spine.   Will finalize plan after medical clearance and MRI of thoracic and lumbar spine.  Patient will require pre-medication prior to MRI.

## 2023-09-18 NOTE — H&P ADULT - NSHPLABSRESULTS_GEN_ALL_CORE
14.0   8.25  )-----------( 223      ( 18 Sep 2023 14:32 )             41.1     Hgb Trend: 14.0<--  09-18    140  |  97<L>  |  14  ----------------------------<  129<H>  3.8   |  27  |  0.50    Ca    9.8      18 Sep 2023 14:32    TPro  7.7  /  Alb  4.3  /  TBili  0.6  /  DBili  x   /  AST  22  /  ALT  23  /  AlkPhos  100  09-18    Creatinine Trend: 0.50<--, 0.52<--  PT/INR - ( 18 Sep 2023 14:32 )   PT: 10.7 sec;   INR: 0.95 ratio         PTT - ( 18 Sep 2023 14:32 )  PTT:24.5 sec  CARDIAC MARKERS ( 18 Sep 2023 14:32 )  x     / x     / 55 U/L / x     / 2.0 ng/mL      Urinalysis Basic - ( 18 Sep 2023 14:32 )    Color: x / Appearance: x / SG: x / pH: x  Gluc: 129 mg/dL / Ketone: x  / Bili: x / Urobili: x   Blood: x / Protein: x / Nitrite: x   Leuk Esterase: x / RBC: x / WBC x   Sq Epi: x / Non Sq Epi: x / Bacteria: x        EKG is reviewed by me       CT thoracic spine as reviewed by the radiologist: Acute fracture through the fused T12-L1 disc space. Consider MRI for   evaluation of the spinal canal.      MRI is ordered.

## 2023-09-18 NOTE — ED PROVIDER NOTE - PROGRESS NOTE DETAILS
CT shows: Acute fracture through the fused T12-L1 disc space. Consider MRI for   evaluation of the spinal canal.    Ortho aware, recommendation for medicine admission for cardiology clearance.

## 2023-09-18 NOTE — CONSULT NOTE ADULT - SUBJECTIVE AND OBJECTIVE BOX
Orthopedics Spine Consult:  67y Male presented to the ED after a syncopal fall. He stated that he syncopized last night after blowing his nose and hit his head/back. Denies pain/injury elsewhere. Denies numbness/tingling/paresthesias/weakness. Denies bowel/bladder incontinence. Patient is scheduled to undergo an ACDF with Dr. Hughes tomorrow 9/19 and presented for syncopal workup pre procedure.    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    ASIYA (obstructive sleep apnea)  initially dx 1997 ; CPAP    Class 3 severe obesity with body mass index (BMI) of 45.0 to 49.9 in adult    Paralytic ileus  post op THR 2009 & post op laminectomy    Umbilical hernia without obstruction and without gangrene    Type 2 diabetes mellitus    Right carpal tunnel syndrome    Cubital tunnel syndrome, right    Trigger finger of right hand    Morbid obesity with body mass index (BMI) of 40.0 or higher    H/O CHF    Kidney mass    Cervical herniated disc    Renal cancer    Disease of spinal cord, unspecified    S/P Left Inguinal Hernia Repair    History of Total Hip Replacement  2009- bilateral THR    Cholecystitis  cholecycstectomy 2010    H/O lymphadenopathy  cervical lymphnode enlarged, 1996 "Benign"    Hernia  repair, left inguinal 2010    S/P tonsillectomy and adenoidectomy  as child    S/P hip replacement  left 2009, right 2009    S/P knee replacement  2011- right x 3  - revisions in 2012 & 2014    H/O renal calculi  removed    H/O lithotripsy  x1    S/P revision of total knee, right  x2- 2012 & 2014    S/P lumbar discectomy  2012- ,L5  Aug 2013    S/P CABG x 3  8/29/17    S/P lumbar laminectomy  Fusion L3-L5 2012    Kidney stone  s/w ESWL pt unsure site    Neuropathy  Bilateral Feet since 2012    History of bilateral hip replacements    History of hernia repair    History of lumbar fusion    History of cholecystectomy    History of bilateral knee replacement    History of lymph node biopsy    S/p bilateral carpal tunnel release    History of partial nephrectomy        MEDICATIONS  (STANDING):      Allergies    No Known Allergies    Intolerances        Vital Signs Last 24 Hrs  T(C): 36.7 (09-18-23 @ 13:02), Max: 36.7 (09-18-23 @ 13:02)  T(F): 98 (09-18-23 @ 13:02), Max: 98 (09-18-23 @ 13:02)  HR: 81 (09-18-23 @ 14:08) (77 - 81)  BP: 142/77 (09-18-23 @ 14:08) (142/77 - 150/86)  BP(mean): --  RR: 18 (09-18-23 @ 14:08) (18 - 18)  SpO2: 100% (09-18-23 @ 14:08) (100% - 100%)      Labs:                         14.0   8.25  )-----------( 223      ( 18 Sep 2023 14:32 )             41.1     18 Sep 2023 14:32    140    |  97     |  14     ----------------------------<  129    3.8     |  27     |  0.50     Ca    9.8        18 Sep 2023 14:32    TPro  7.7    /  Alb  4.3    /  TBili  0.6    /  DBili  x      /  AST  22     /  ALT  23     /  AlkPhos  100    18 Sep 2023 14:32    PT/INR - ( 18 Sep 2023 14:32 )   PT: 10.7 sec;   INR: 0.95 ratio         PTT - ( 18 Sep 2023 14:32 )  PTT:24.5 sec  Urinalysis Basic - ( 18 Sep 2023 14:32 )    Color: x / Appearance: x / SG: x / pH: x  Gluc: 129 mg/dL / Ketone: x  / Bili: x / Urobili: x   Blood: x / Protein: x / Nitrite: x   Leuk Esterase: x / RBC: x / WBC x   Sq Epi: x / Non Sq Epi: x / Bacteria: x          Physical Exam:  Gen: NAD  Spine PE:  Skin intact  No gross deformity/bony step offs  No midline/paraspinal TTP C/T/L/S spine  Negative clonus/babinski  Negative eastman  No saddle anesthesia    Motor:               Deltoid       Bicep       Tricep     Wrist Ext      Wrist Flex    Finger Flex      Finger Abd  R             5/5            5/5           5/5            5/5                 5/5	           5/5                   5/5  L              5/5            5/5           5/5            5/5                 5/5               5/5                   5/5                Hip Flex        Quad      Ankle DF     Ankle PF     Toe Ext      Hamstring    R            5/5 5/5 5/5 5/5 5/5 5/5  L             5/5 5/5 5/5 5/5 5/5 5/5    Sensory:   (0 = absent, 1 = impaired, 2 = normal, NT = not testable)              C5         C6     C7      C8       T1          R         2            2       2        2         2  L          2            2       2        2         2               L2          L3         L4      L5       S1     R         2            2           2         2       1  L          2            2           2        2        1      Imaging: CT T/L Spine and XR T/L spine ordered     Orthopedics Spine Consult:  67y Male presented to the ED after a syncopal fall. He stated that he syncopized last night after blowing his nose and hit his head/back. Denies pain/injury elsewhere. Denies numbness/tingling/paresthesias/weakness. Denies bowel/bladder incontinence. Patient is scheduled to undergo an ACDF with Dr. Hughes tomorrow 9/19 and presents for syncopal workup pre procedure.    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    ASIYA (obstructive sleep apnea)  initially dx 1997 ; CPAP    Class 3 severe obesity with body mass index (BMI) of 45.0 to 49.9 in adult    Paralytic ileus  post op THR 2009 & post op laminectomy    Umbilical hernia without obstruction and without gangrene    Type 2 diabetes mellitus    Right carpal tunnel syndrome    Cubital tunnel syndrome, right    Trigger finger of right hand    Morbid obesity with body mass index (BMI) of 40.0 or higher    H/O CHF    Kidney mass    Cervical herniated disc    Renal cancer    Disease of spinal cord, unspecified    S/P Left Inguinal Hernia Repair    History of Total Hip Replacement  2009- bilateral THR    Cholecystitis  cholecycstectomy 2010    H/O lymphadenopathy  cervical lymphnode enlarged, 1996 "Benign"    Hernia  repair, left inguinal 2010    S/P tonsillectomy and adenoidectomy  as child    S/P hip replacement  left 2009, right 2009    S/P knee replacement  2011- right x 3  - revisions in 2012 & 2014    H/O renal calculi  removed    H/O lithotripsy  x1    S/P revision of total knee, right  x2- 2012 & 2014    S/P lumbar discectomy  2012- ,L5  Aug 2013    S/P CABG x 3  8/29/17    S/P lumbar laminectomy  Fusion L3-L5 2012    Kidney stone  s/w ESWL pt unsure site    Neuropathy  Bilateral Feet since 2012    History of bilateral hip replacements    History of hernia repair    History of lumbar fusion    History of cholecystectomy    History of bilateral knee replacement    History of lymph node biopsy    S/p bilateral carpal tunnel release    History of partial nephrectomy        MEDICATIONS  (STANDING):      Allergies    No Known Allergies    Intolerances        Vital Signs Last 24 Hrs  T(C): 36.7 (09-18-23 @ 13:02), Max: 36.7 (09-18-23 @ 13:02)  T(F): 98 (09-18-23 @ 13:02), Max: 98 (09-18-23 @ 13:02)  HR: 81 (09-18-23 @ 14:08) (77 - 81)  BP: 142/77 (09-18-23 @ 14:08) (142/77 - 150/86)  BP(mean): --  RR: 18 (09-18-23 @ 14:08) (18 - 18)  SpO2: 100% (09-18-23 @ 14:08) (100% - 100%)      Labs:                         14.0   8.25  )-----------( 223      ( 18 Sep 2023 14:32 )             41.1     18 Sep 2023 14:32    140    |  97     |  14     ----------------------------<  129    3.8     |  27     |  0.50     Ca    9.8        18 Sep 2023 14:32    TPro  7.7    /  Alb  4.3    /  TBili  0.6    /  DBili  x      /  AST  22     /  ALT  23     /  AlkPhos  100    18 Sep 2023 14:32    PT/INR - ( 18 Sep 2023 14:32 )   PT: 10.7 sec;   INR: 0.95 ratio         PTT - ( 18 Sep 2023 14:32 )  PTT:24.5 sec  Urinalysis Basic - ( 18 Sep 2023 14:32 )    Color: x / Appearance: x / SG: x / pH: x  Gluc: 129 mg/dL / Ketone: x  / Bili: x / Urobili: x   Blood: x / Protein: x / Nitrite: x   Leuk Esterase: x / RBC: x / WBC x   Sq Epi: x / Non Sq Epi: x / Bacteria: x          Physical Exam:  Gen: NAD  Spine PE:  Skin intact  No gross deformity/bony step offs  No midline/paraspinal TTP C/T/L/S spine  Negative clonus/babinski  Negative eastman  No saddle anesthesia    Motor:               Deltoid       Bicep       Tricep     Wrist Ext      Wrist Flex    Finger Flex      Finger Abd  R             5/5            5/5           5/5            5/5                 5/5	           5/5                   5/5  L              5/5            5/5           5/5            5/5                 5/5               5/5                   5/5                Hip Flex        Quad      Ankle DF     Ankle PF     Toe Ext      Hamstring    R            5/5 5/5 5/5 5/5 5/5 5/5  L             5/5 5/5 5/5 5/5 5/5 5/5    Sensory:   (0 = absent, 1 = impaired, 2 = normal, NT = not testable)              C5         C6     C7      C8       T1          R         2            2       2        2         2  L          2            2       2        2         2               L2          L3         L4      L5       S1     R         2            2           2         2       1  L          2            2           2        2        1      Imaging: CT T/L Spine and XR T/L spine ordered

## 2023-09-18 NOTE — ED PROVIDER NOTE - NEUROLOGICAL, MLM
oriented to person, place and time , normal sensation , short term memory intact Alert and oriented, no focal deficits, no motor or sensory deficits.

## 2023-09-18 NOTE — H&P ADULT - NSHPREVIEWOFSYSTEMS_GEN_ALL_CORE
REVIEW OF SYSTEMS:    CONSTITUTIONAL: No weakness, fevers or chills  EYES/ENT: No visual changes;  No vertigo or throat pain   NECK: No pain or stiffness  RESPIRATORY: No cough, wheezing, hemoptysis; No shortness of breath  CARDIOVASCULAR: No chest pain or palpitations  GASTROINTESTINAL: No abdominal or epigastric pain. No nausea, vomiting, or hematemesis; No diarrhea or constipation. No melena or hematochezia.  GENITOURINARY: No dysuria, frequency or hematuria  NEUROLOGICAL: No numbness or weakness  MUSCULOSKELETAL: +back pain   SKIN: No itching, burning, rashes, or lesions   All other review of systems is negative unless indicated above.

## 2023-09-18 NOTE — ED ADULT NURSE NOTE - OBJECTIVE STATEMENT
Pt received to intake 3 A&OX4 ambulatory c/o syncopal episode last night. pt reports he blew his nose and "saw black." Pt takes daily ASA, last dose on Tuesday. Pt scheduled for c-spine surgery tomorrow. Pt endorsing lower back pain. No facial droop or slurred speech. Equal strong strength to BUE, BLE. 20G IV placed in LAC. labs drawn and sent. Medicated as per EMAR. Stretcher in lowest position. Side rails up. Call bell within reach. Pending CT.

## 2023-09-18 NOTE — ED PROVIDER NOTE - ATTENDING APP SHARED VISIT CONTRIBUTION OF CARE
Patient is a 67-year-old male with a history of HTN, HLD, DM, Gout, carpal tunnel, CAD s/p CABG, CHF on Bumex twice daily, ASIYA on CPAP,  here for evaluation of back pain after a syncopal episode and fall yesterday.  Patient states that he was at his home, blew his nose and then passed out.  He states that he hit the kitchen island and knocked over a bunch of chairs.  He thinks he only passed out for a few seconds and was able to call his brother-in-law and other family members who live across the street.  He states they came over but were unable to get him up.  Fire department was called to help him stand.  Patient states that he did not have chest pain or palpitations.  He reports a history of multiple back surgeries with hardware in his back.  He reports he has low back pain.  He has been taking Tylenol for pain without relief.  Patient states that he is due to have cervical spine surgery with Dr. Jose C Hughes tomorrow.  He reports that he discussed the event with Dr. Hughes and was told to come to the hospital for clearance.  At the time of my assessment, patient  complains of persistent back pain.      VS noted  Gen. no acute distress, Non toxic   HEENT:  PERRL, EOMI, mmm   spine: No midline C–T tenderness.  There is midline lumbar tenderness and paraspinal lumbar tenderness on the left.  No ecchymosis.  No step-offs.  No skin changes.  Lungs: CTAB/L no C/ W /R   CVS: RRR   Abd; Soft non tender, non distended   Ext: no edema,  Bilateral shoulders with full range of motion, right upper extremity with ecchymosis   above  and medial to antecubital fossa  Skin: no rash  Neuro AAOx3, face symmetrical, CN II-XII intact,  strength equal bilaterally, sensation equal bilaterally, clear speech  a/p:  s/p syncope/fall–EKG reviewed and is unchanged from prior.  Patient reports blowing his nose and passing out for a few seconds.  Exam is significant for tenderness in the lumbar spine.  Patient also hit his head reportedly.  Last aspirin dose was 7 days ago.  Patient reports significant back pain.  Plan for labs, CT head, CT C–T–L-spine.  We will send coags and type and screen as well as cardiac labs.  Patient will likely need admission for cardiac evaluation prior to surgery.  - Lisa DELACRUZ Patient is a 67-year-old male with a history of HTN, HLD, DM, Gout, carpal tunnel, CAD s/p CABG, CHF on Bumex twice daily, ASIYA on CPAP,  here for evaluation of back pain after a syncopal episode and fall yesterday.  Patient states that he was at his home, blew his nose and then passed out.  He states that he hit the kitchen island and knocked over a bunch of chairs.  He thinks he only passed out for a few seconds and was able to call his brother-in-law and other family members who live across the street.  He states they came over but were unable to get him up.  Fire department was called to help him stand.  Patient states that he did not have chest pain or palpitations.  He reports a history of multiple back surgeries with hardware in his back.  He reports he has low back pain.  He has been taking Tylenol for pain without relief.  Patient states that he is due to have cervical spine surgery with Dr. Jose C Hughes tomorrow.  He reports that he discussed the event with Dr. Hughes and was told to come to the hospital for clearance.  At the time of my assessment, patient  complains of persistent back pain.      VS noted  Gen. no acute distress, Non toxic   HEENT:  PERRL, EOMI, mmm   spine: No midline C–T tenderness.  There is midline lumbar tenderness and paraspinal lumbar tenderness on the left.  No ecchymosis.  No step-offs.  No skin changes.  Lungs: CTAB/L no C/ W /R   CVS: RRR   Abd; Soft non tender, non distended   Ext: no edema,  Bilateral shoulders with full range of motion, right upper extremity with ecchymosis   above  and medial to antecubital fossa  Skin: no rash  Neuro AAOx3, face symmetrical, CN II-XII intact,  strength equal bilaterally, sensation equal bilaterally, clear speech  a/p:  s/p syncope/fall–EKG reviewed and is unchanged from prior.  Patient reports blowing his nose and passing out for a few seconds.  Exam is significant for tenderness in the lumbar spine.  Patient also hit his head reportedly.  Last aspirin dose was 7 days ago.  Patient reports significant back pain.  Plan for labs, CT head, CT C–T–L-spine.  We will send coags and type and screen as well as cardiac labs. Differential includes acute spine fracture, MSK pain, less likely ICH. Patient will likely need admission for cardiac evaluation prior to surgery. Orthopedics consulted.   - Lisa DELACRUZ

## 2023-09-18 NOTE — H&P ADULT - HISTORY OF PRESENT ILLNESS
66 yo M with PMhx of HFrEF, CAD s/p CABG, ASIYA on CPAP, HTN, DM, right renal mass s/p resection presents to the ED after a syncopal episode. Patient is AAOX3 and was able to provide most of the hx. Patient reports that yesterday morning he blew his nose and passed out for 2-3 seconds. He remembers "blacking out" and then hitting his back against the floor. He did not full lose consciousness. He did not have dizziness, chest pain, SOB, or palpitations prior to the fall. Afterwards, he was not able to move and get up to his feet due to severe back pain. He called his son and brother in law using his phone. They came to his house and attempted to get him up but they were notable to. Afterwards, fire fighters came and lifted him up using a board. The whole day he was not able to lie down due to pain. He remained in erect position in his recliner. He was supposed to undergo cervical surgery tomorrow and so he called his orthopedic doctor to tell him about the syncopal episode. He was advised to come to the ED for further work up.   In the ED, his vitals were notable for elevated BP. Labs were WNL. CT thoracic spine showed acute fracture of T12-L1. CT head and neck showed no acute bleeding or fracture. EKG showed no acute changes.  normal...

## 2023-09-18 NOTE — ED PROVIDER NOTE - OBJECTIVE STATEMENT
67-year-old male with a history of HTN, HLD, DM, Gout, carpal tunnel, CAD s/p CABG, CHF on Bumex twice daily, ASIYA on CPAP,  here for evaluation of back pain after a syncopal episode and fall yesterday.  Patient states that he was at his home, blew his nose and then passed out.  He states that he hit the kitchen island and knocked over a bunch of chairs landing backward hitting his head.  He thinks he only passed out for a few seconds and was able to call his brother-in-law and other family members who live across the street.  He states they came over but were unable to get him up.  Fire department was called to help him stand.  Patient states that he did not have chest pain or palpitations.  He reports a history of multiple back surgeries with hardware in his back.  He reports he has low back pain.  He has been taking Tylenol for pain without relief.  Patient states that he is due to have cervical spine surgery with Dr. Jose C Hughes tomorrow.  He reports that he discussed the event with Dr. Hughes and was told to come to the hospital for clearance.  At the time of my assessment, patient  complains of persistent back pain.

## 2023-09-18 NOTE — CONSULT NOTE ADULT - ASSESSMENT
A/P: 67y Male with syncopal fall prior to scheduled orthopedic surgery    - Pain control  - FU CT head/Cervical/Thoracic/Lumbar spine  - Please document medical clearance for surgical optimization  - Discussed with Dr. Hughes who is aware and in agreement with plan    Shaunna Nguyễn PA-C  Team Pager #[phone]     A/P: 67y Male with syncopal fall prior to scheduled orthopedic surgery    - Pain control  - FU CT head/Cervical/Thoracic/Lumbar spine and MRI Thoracic/Lumbar spine  - Please document medical clearance for surgical optimization  - Discussed with Dr. Hughes who is aware and in agreement with plan    Shaunna Nguyễn PA-C  Team Pager #[phone]

## 2023-09-18 NOTE — H&P ADULT - PROBLEM SELECTOR PLAN 2
Patient with T12-L1 fracture   -Ortho consulted, recs appreciated   -Pending MR thoracic and lumbar spine   -Patient was also scheduled for cervical surgery as outpatient. Might undergo both surgery together as per ortho team.   -Medical optimization is deferred for now as pt is not scheduled for surgery yet, pending MRI

## 2023-09-18 NOTE — H&P ADULT - PROBLEM SELECTOR PLAN 1
Patient with syncopal episode after blowing his nose   -Hx is consistent with situational syncope   -EKG shows no acute changes. Trops stable at 16. No active chest pain, SOB, palpitations or dizziness. Less likely cardiac in etiology   -CT head is negative for bleeding   -F/u with orthostatics

## 2023-09-18 NOTE — ED PROVIDER NOTE - NS ED ATTENDING STATEMENT MOD
This was a shared visit with the NAVA. I reviewed and verified the documentation and independently performed the documented:

## 2023-09-18 NOTE — H&P ADULT - NSHPPHYSICALEXAM_GEN_ALL_CORE
Vital Signs Last 24 Hrs  T(C): 36.7 (18 Sep 2023 17:22), Max: 36.7 (18 Sep 2023 13:02)  T(F): 98 (18 Sep 2023 17:22), Max: 98 (18 Sep 2023 13:02)  HR: 80 (18 Sep 2023 17:22) (77 - 81)  BP: 133/70 (18 Sep 2023 17:22) (133/70 - 150/86)  BP(mean): --  RR: 18 (18 Sep 2023 17:22) (18 - 18)  SpO2: 100% (18 Sep 2023 17:22) (100% - 100%)    Parameters below as of 18 Sep 2023 17:22  Patient On (Oxygen Delivery Method): room air    GENERAL: in mild distress due to pain, nontoxic appearing, obese    HEENT:  Atraumatic, Normocephalic, Conjunctiva and sclera clear, oral mucosa moist, clear w/o any exudate   NECK: Supple, No JVD  CHEST/LUNG: Clear to auscultation bilaterally; No wheeze  HEART: Regular rate and rhythm; No murmurs, rubs, or gallops  ABDOMEN: Soft, Nontender, Nondistended; Bowel sounds present  EXTREMITIES:  2+ Peripheral Pulses, Trace edema in LE  PSYCH: AAOx3, normal affect  NEUROLOGY: non-focal, moving all extremities. Sensation grossly intact.   SKIN: No rashes or lesions

## 2023-09-19 ENCOUNTER — APPOINTMENT (OUTPATIENT)
Dept: ORTHOPEDIC SURGERY | Facility: HOSPITAL | Age: 68
End: 2023-09-19

## 2023-09-19 ENCOUNTER — APPOINTMENT (OUTPATIENT)
Dept: ORTHOPEDIC SURGERY | Facility: CLINIC | Age: 68
End: 2023-09-19

## 2023-09-19 LAB
A1C WITH ESTIMATED AVERAGE GLUCOSE RESULT: 6.5 % — HIGH (ref 4–5.6)
ALBUMIN SERPL ELPH-MCNC: 3.8 G/DL — SIGNIFICANT CHANGE UP (ref 3.3–5)
ALP SERPL-CCNC: 96 U/L — SIGNIFICANT CHANGE UP (ref 40–120)
ALT FLD-CCNC: 19 U/L — SIGNIFICANT CHANGE UP (ref 4–41)
ANION GAP SERPL CALC-SCNC: 16 MMOL/L — HIGH (ref 7–14)
AST SERPL-CCNC: 19 U/L — SIGNIFICANT CHANGE UP (ref 4–40)
BASOPHILS # BLD AUTO: 0.04 K/UL — SIGNIFICANT CHANGE UP (ref 0–0.2)
BASOPHILS NFR BLD AUTO: 0.6 % — SIGNIFICANT CHANGE UP (ref 0–2)
BILIRUB SERPL-MCNC: 0.9 MG/DL — SIGNIFICANT CHANGE UP (ref 0.2–1.2)
BUN SERPL-MCNC: 13 MG/DL — SIGNIFICANT CHANGE UP (ref 7–23)
CALCIUM SERPL-MCNC: 9.2 MG/DL — SIGNIFICANT CHANGE UP (ref 8.4–10.5)
CHLORIDE SERPL-SCNC: 98 MMOL/L — SIGNIFICANT CHANGE UP (ref 98–107)
CHOLEST SERPL-MCNC: 181 MG/DL — SIGNIFICANT CHANGE UP
CO2 SERPL-SCNC: 26 MMOL/L — SIGNIFICANT CHANGE UP (ref 22–31)
CREAT SERPL-MCNC: 0.52 MG/DL — SIGNIFICANT CHANGE UP (ref 0.5–1.3)
EGFR: 110 ML/MIN/1.73M2 — SIGNIFICANT CHANGE UP
EOSINOPHIL # BLD AUTO: 0.22 K/UL — SIGNIFICANT CHANGE UP (ref 0–0.5)
EOSINOPHIL NFR BLD AUTO: 3.1 % — SIGNIFICANT CHANGE UP (ref 0–6)
ESTIMATED AVERAGE GLUCOSE: 140 — SIGNIFICANT CHANGE UP
GLUCOSE BLDC GLUCOMTR-MCNC: 133 MG/DL — HIGH (ref 70–99)
GLUCOSE BLDC GLUCOMTR-MCNC: 136 MG/DL — HIGH (ref 70–99)
GLUCOSE BLDC GLUCOMTR-MCNC: 166 MG/DL — HIGH (ref 70–99)
GLUCOSE SERPL-MCNC: 143 MG/DL — HIGH (ref 70–99)
HCT VFR BLD CALC: 39.3 % — SIGNIFICANT CHANGE UP (ref 39–50)
HDLC SERPL-MCNC: 27 MG/DL — LOW
HGB BLD-MCNC: 13.4 G/DL — SIGNIFICANT CHANGE UP (ref 13–17)
IANC: 4.91 K/UL — SIGNIFICANT CHANGE UP (ref 1.8–7.4)
IMM GRANULOCYTES NFR BLD AUTO: 0.8 % — SIGNIFICANT CHANGE UP (ref 0–0.9)
LIPID PNL WITH DIRECT LDL SERPL: SIGNIFICANT CHANGE UP MG/DL
LYMPHOCYTES # BLD AUTO: 1.26 K/UL — SIGNIFICANT CHANGE UP (ref 1–3.3)
LYMPHOCYTES # BLD AUTO: 17.5 % — SIGNIFICANT CHANGE UP (ref 13–44)
MCHC RBC-ENTMCNC: 29.3 PG — SIGNIFICANT CHANGE UP (ref 27–34)
MCHC RBC-ENTMCNC: 34.1 GM/DL — SIGNIFICANT CHANGE UP (ref 32–36)
MCV RBC AUTO: 86 FL — SIGNIFICANT CHANGE UP (ref 80–100)
MONOCYTES # BLD AUTO: 0.71 K/UL — SIGNIFICANT CHANGE UP (ref 0–0.9)
MONOCYTES NFR BLD AUTO: 9.9 % — SIGNIFICANT CHANGE UP (ref 2–14)
NEUTROPHILS # BLD AUTO: 4.91 K/UL — SIGNIFICANT CHANGE UP (ref 1.8–7.4)
NEUTROPHILS NFR BLD AUTO: 68.1 % — SIGNIFICANT CHANGE UP (ref 43–77)
NON HDL CHOLESTEROL: 154 MG/DL — HIGH
NRBC # BLD: 0 /100 WBCS — SIGNIFICANT CHANGE UP (ref 0–0)
NRBC # FLD: 0 K/UL — SIGNIFICANT CHANGE UP (ref 0–0)
PLATELET # BLD AUTO: 182 K/UL — SIGNIFICANT CHANGE UP (ref 150–400)
POTASSIUM SERPL-MCNC: 3.7 MMOL/L — SIGNIFICANT CHANGE UP (ref 3.5–5.3)
POTASSIUM SERPL-SCNC: 3.7 MMOL/L — SIGNIFICANT CHANGE UP (ref 3.5–5.3)
PROT SERPL-MCNC: 6.8 G/DL — SIGNIFICANT CHANGE UP (ref 6–8.3)
RBC # BLD: 4.57 M/UL — SIGNIFICANT CHANGE UP (ref 4.2–5.8)
RBC # FLD: 14 % — SIGNIFICANT CHANGE UP (ref 10.3–14.5)
SODIUM SERPL-SCNC: 140 MMOL/L — SIGNIFICANT CHANGE UP (ref 135–145)
TRIGL SERPL-MCNC: 646 MG/DL — HIGH
WBC # BLD: 7.2 K/UL — SIGNIFICANT CHANGE UP (ref 3.8–10.5)
WBC # FLD AUTO: 7.2 K/UL — SIGNIFICANT CHANGE UP (ref 3.8–10.5)

## 2023-09-19 PROCEDURE — 99233 SBSQ HOSP IP/OBS HIGH 50: CPT

## 2023-09-19 RX ORDER — OXYCODONE HYDROCHLORIDE 5 MG/1
2.5 TABLET ORAL ONCE
Refills: 0 | Status: DISCONTINUED | OUTPATIENT
Start: 2023-09-19 | End: 2023-09-19

## 2023-09-19 RX ORDER — NALOXONE HYDROCHLORIDE 4 MG/.1ML
0.4 SPRAY NASAL ONCE
Refills: 0 | Status: DISCONTINUED | OUTPATIENT
Start: 2023-09-19 | End: 2023-09-23

## 2023-09-19 RX ORDER — OXYCODONE HYDROCHLORIDE 5 MG/1
5 TABLET ORAL EVERY 4 HOURS
Refills: 0 | Status: DISCONTINUED | OUTPATIENT
Start: 2023-09-19 | End: 2023-09-21

## 2023-09-19 RX ORDER — OXYCODONE HYDROCHLORIDE 5 MG/1
10 TABLET ORAL EVERY 4 HOURS
Refills: 0 | Status: DISCONTINUED | OUTPATIENT
Start: 2023-09-19 | End: 2023-09-21

## 2023-09-19 RX ORDER — HYDROMORPHONE HYDROCHLORIDE 2 MG/ML
2 INJECTION INTRAMUSCULAR; INTRAVENOUS; SUBCUTANEOUS ONCE
Refills: 0 | Status: DISCONTINUED | OUTPATIENT
Start: 2023-09-19 | End: 2023-09-20

## 2023-09-19 RX ORDER — INFLUENZA VIRUS VACCINE 15; 15; 15; 15 UG/.5ML; UG/.5ML; UG/.5ML; UG/.5ML
0.7 SUSPENSION INTRAMUSCULAR ONCE
Refills: 0 | Status: COMPLETED | OUTPATIENT
Start: 2023-09-19 | End: 2023-09-29

## 2023-09-19 RX ORDER — SENNA PLUS 8.6 MG/1
2 TABLET ORAL AT BEDTIME
Refills: 0 | Status: DISCONTINUED | OUTPATIENT
Start: 2023-09-19 | End: 2023-09-23

## 2023-09-19 RX ORDER — BENZOCAINE AND MENTHOL 5; 1 G/100ML; G/100ML
1 LIQUID ORAL EVERY 4 HOURS
Refills: 0 | Status: DISCONTINUED | OUTPATIENT
Start: 2023-09-19 | End: 2023-09-23

## 2023-09-19 RX ORDER — POLYETHYLENE GLYCOL 3350 17 G/17G
17 POWDER, FOR SOLUTION ORAL DAILY
Refills: 0 | Status: DISCONTINUED | OUTPATIENT
Start: 2023-09-19 | End: 2023-09-20

## 2023-09-19 RX ORDER — LIDOCAINE 4 G/100G
1 CREAM TOPICAL DAILY
Refills: 0 | Status: DISCONTINUED | OUTPATIENT
Start: 2023-09-19 | End: 2023-09-30

## 2023-09-19 RX ADMIN — Medication 1: at 12:43

## 2023-09-19 RX ADMIN — OXYCODONE HYDROCHLORIDE 10 MILLIGRAM(S): 5 TABLET ORAL at 14:40

## 2023-09-19 RX ADMIN — OXYCODONE HYDROCHLORIDE 10 MILLIGRAM(S): 5 TABLET ORAL at 23:14

## 2023-09-19 RX ADMIN — HEPARIN SODIUM 5000 UNIT(S): 5000 INJECTION INTRAVENOUS; SUBCUTANEOUS at 12:43

## 2023-09-19 RX ADMIN — CARVEDILOL PHOSPHATE 12.5 MILLIGRAM(S): 80 CAPSULE, EXTENDED RELEASE ORAL at 06:37

## 2023-09-19 RX ADMIN — GABAPENTIN 300 MILLIGRAM(S): 400 CAPSULE ORAL at 22:21

## 2023-09-19 RX ADMIN — OXYCODONE HYDROCHLORIDE 2.5 MILLIGRAM(S): 5 TABLET ORAL at 01:50

## 2023-09-19 RX ADMIN — Medication 1 TABLET(S): at 09:07

## 2023-09-19 RX ADMIN — BUMETANIDE 2 MILLIGRAM(S): 0.25 INJECTION INTRAMUSCULAR; INTRAVENOUS at 22:14

## 2023-09-19 RX ADMIN — BUMETANIDE 2 MILLIGRAM(S): 0.25 INJECTION INTRAMUSCULAR; INTRAVENOUS at 09:06

## 2023-09-19 RX ADMIN — CARVEDILOL PHOSPHATE 12.5 MILLIGRAM(S): 80 CAPSULE, EXTENDED RELEASE ORAL at 18:33

## 2023-09-19 RX ADMIN — POLYETHYLENE GLYCOL 3350 17 GRAM(S): 17 POWDER, FOR SOLUTION ORAL at 18:33

## 2023-09-19 RX ADMIN — SENNA PLUS 2 TABLET(S): 8.6 TABLET ORAL at 22:13

## 2023-09-19 RX ADMIN — OXYCODONE HYDROCHLORIDE 2.5 MILLIGRAM(S): 5 TABLET ORAL at 12:17

## 2023-09-19 RX ADMIN — BENZOCAINE AND MENTHOL 1 LOZENGE: 5; 1 LIQUID ORAL at 22:21

## 2023-09-19 RX ADMIN — OXYCODONE HYDROCHLORIDE 10 MILLIGRAM(S): 5 TABLET ORAL at 15:40

## 2023-09-19 RX ADMIN — GABAPENTIN 300 MILLIGRAM(S): 400 CAPSULE ORAL at 12:43

## 2023-09-19 RX ADMIN — Medication 650 MILLIGRAM(S): at 09:06

## 2023-09-19 RX ADMIN — OXYCODONE HYDROCHLORIDE 10 MILLIGRAM(S): 5 TABLET ORAL at 22:14

## 2023-09-19 RX ADMIN — Medication 1000 MILLIGRAM(S): at 00:00

## 2023-09-19 RX ADMIN — HEPARIN SODIUM 5000 UNIT(S): 5000 INJECTION INTRAVENOUS; SUBCUTANEOUS at 22:14

## 2023-09-19 RX ADMIN — Medication 100 MILLIGRAM(S): at 22:21

## 2023-09-19 RX ADMIN — GABAPENTIN 300 MILLIGRAM(S): 400 CAPSULE ORAL at 18:33

## 2023-09-19 RX ADMIN — ATORVASTATIN CALCIUM 40 MILLIGRAM(S): 80 TABLET, FILM COATED ORAL at 22:14

## 2023-09-19 RX ADMIN — LOSARTAN POTASSIUM 100 MILLIGRAM(S): 100 TABLET, FILM COATED ORAL at 06:41

## 2023-09-19 RX ADMIN — BENZOCAINE AND MENTHOL 1 LOZENGE: 5; 1 LIQUID ORAL at 00:18

## 2023-09-19 RX ADMIN — Medication 650 MILLIGRAM(S): at 12:17

## 2023-09-19 RX ADMIN — OXYCODONE HYDROCHLORIDE 2.5 MILLIGRAM(S): 5 TABLET ORAL at 06:37

## 2023-09-19 RX ADMIN — GABAPENTIN 300 MILLIGRAM(S): 400 CAPSULE ORAL at 06:37

## 2023-09-19 RX ADMIN — HEPARIN SODIUM 5000 UNIT(S): 5000 INJECTION INTRAVENOUS; SUBCUTANEOUS at 06:37

## 2023-09-19 RX ADMIN — OXYCODONE HYDROCHLORIDE 2.5 MILLIGRAM(S): 5 TABLET ORAL at 00:54

## 2023-09-19 RX ADMIN — AMLODIPINE BESYLATE 10 MILLIGRAM(S): 2.5 TABLET ORAL at 06:37

## 2023-09-19 RX ADMIN — Medication 81 MILLIGRAM(S): at 09:07

## 2023-09-19 NOTE — PROGRESS NOTE ADULT - SUBJECTIVE AND OBJECTIVE BOX
ORTHOPEDIC PROGRESS NOTE    Overnight events: Attempted MRI however, patient was in too much discomfort for MRI    SUBJECTIVE: Pt seen and examined at bedside. Patient is complaining of continued low back pain since fall.      OBJECTIVE:  Vital Signs Last 24 Hrs  T(C): 36.6 (19 Sep 2023 00:50), Max: 36.9 (18 Sep 2023 20:50)  T(F): 97.9 (19 Sep 2023 00:50), Max: 98.4 (18 Sep 2023 20:50)  HR: 79 (19 Sep 2023 00:50) (77 - 84)  BP: 138/78 (19 Sep 2023 00:50) (130/61 - 150/86)  BP(mean): --  RR: 17 (19 Sep 2023 00:50) (14 - 18)  SpO2: 97% (19 Sep 2023 00:50) (97% - 100%)    Parameters below as of 19 Sep 2023 00:50  Patient On (Oxygen Delivery Method): room air      Physical Examination:  GEN: NAD, resting quietly  PULM: symmetric chest rise bilaterally, no increased WOB  ABD: nondistended  SPINE:    Motor:               Deltoid       Bicep       Tricep     Wrist Ext      Wrist Flex    Finger Flex      Finger Abd  R             5/5            5/5           5/5            5/5                 5/5	           5/5                   5/5  L              5/5            5/5           5/5            5/5                 5/5               5/5                   5/5                Hip Flex        Quad      Ankle DF     Ankle PF     Toe Ext      Hamstring    R            5/5               5/5            5/5               5/5              5/5	        5/5  L             5/5               5/5            5/5               5/5              5/5               5/5    Sensory:   (0 = absent, 1 = impaired, 2 = normal, NT = not testable)              C5         C6     C7      C8       T1          R         2            2       2        2         2  L          2            2       2        2         2               L2          L3         L4      L5       S1     R         2            2           2         2       1  L          2            2           2        2        1      (Patient has known bilateral neuropathy)    LABS:                        14.0   8.25  )-----------( 223      ( 18 Sep 2023 14:32 )             41.1       09-18    140  |  97<L>  |  14  ----------------------------<  129<H>  3.8   |  27  |  0.50    Ca    9.8      18 Sep 2023 14:32    TPro  7.7  /  Alb  4.3  /  TBili  0.6  /  DBili  x   /  AST  22  /  ALT  23  /  AlkPhos  100  09-18

## 2023-09-19 NOTE — PROGRESS NOTE ADULT - ASSESSMENT
Assessment: Patient is a 67yoM s/p syncopal fall with acute T12-L1 disc space fracture      Plan:  - requires MRI T/L spine, please premedicate prior to imaging so patient can tolerate  - Planned ACDF today is cancelled due to required further work up  - Will follow up imaging and update primary team with plan once complete      For all questions related to patient care, please reach out to the on-call team via the pager.     Venus Bravo, PGY 2  Orthopaedic Surgery  [facility] [id number]  [facility] [id number]  [facility] [phone]

## 2023-09-19 NOTE — PROGRESS NOTE ADULT - PROBLEM SELECTOR PLAN 2
Patient with T12-L1 fracture   -Ortho consulted, recs appreciated   -Pending MR thoracic and lumbar spine   -Patient was also scheduled for cervical surgery as outpatient. Might undergo both surgery together as per ortho team.   - MRI pending to determine surgical plan

## 2023-09-19 NOTE — PATIENT PROFILE ADULT - FALL HARM RISK - HARM RISK INTERVENTIONS

## 2023-09-19 NOTE — PROGRESS NOTE ADULT - SUBJECTIVE AND OBJECTIVE BOX
[facility] Division of Hospital Medicine  Nehemiah Pierre MD  In House Pager [phone]    Patient is a 67y old  Male who presents with a chief complaint of syncope (19 Sep 2023 06:23)    SUBJECTIVE / OVERNIGHT EVENTS: no acute events. patient couldn't tolerate MRI due to pain. Patient endorsed pain in his lower back, exacerbated with any movement and coughing.    ROS: Denied Fever, Chill, CP, SOB, Abd pain, N/V/D, LE swelling or pain.     MEDICATIONS  (STANDING):  allopurinol 100 milliGRAM(s) Oral at bedtime  amLODIPine   Tablet 10 milliGRAM(s) Oral daily  aspirin enteric coated 81 milliGRAM(s) Oral daily  atorvastatin 40 milliGRAM(s) Oral at bedtime  buMETAnide 4 milliGRAM(s) Oral <User Schedule>  buMETAnide 2 milliGRAM(s) Oral at bedtime  buMETAnide 2 milliGRAM(s) Oral <User Schedule>  carvedilol 12.5 milliGRAM(s) Oral every 12 hours  dextrose 5%. 1000 milliLiter(s) (50 mL/Hr) IV Continuous <Continuous>  dextrose 5%. 1000 milliLiter(s) (100 mL/Hr) IV Continuous <Continuous>  dextrose 50% Injectable 25 Gram(s) IV Push once  dextrose 50% Injectable 25 Gram(s) IV Push once  dextrose 50% Injectable 12.5 Gram(s) IV Push once  gabapentin 300 milliGRAM(s) Oral four times a day  glucagon  Injectable 1 milliGRAM(s) IntraMuscular once  heparin   Injectable 5000 Unit(s) SubCutaneous every 8 hours  insulin lispro (ADMELOG) corrective regimen sliding scale   SubCutaneous three times a day before meals  insulin lispro (ADMELOG) corrective regimen sliding scale   SubCutaneous at bedtime  lidocaine   4% Patch 1 Patch Transdermal daily  losartan 100 milliGRAM(s) Oral daily  multivitamin 1 Tablet(s) Oral daily  naloxone Injectable 0.4 milliGRAM(s) IV Push once  polyethylene glycol 3350 17 Gram(s) Oral daily  senna 2 Tablet(s) Oral at bedtime    MEDICATIONS  (PRN):  acetaminophen     Tablet .. 650 milliGRAM(s) Oral every 6 hours PRN Temp greater or equal to 38C (100.4F), Mild Pain (1 - 3)  aluminum hydroxide/magnesium hydroxide/simethicone Suspension 30 milliLiter(s) Oral every 4 hours PRN Dyspepsia  bisacodyl 5 milliGRAM(s) Oral daily PRN Constipation  dextrose Oral Gel 15 Gram(s) Oral once PRN Blood Glucose LESS THAN 70 milliGRAM(s)/deciliter  guaiFENesin Oral Liquid (Sugar-Free) 100 milliGRAM(s) Oral once PRN Cough  HYDROmorphone  Injectable 2 milliGRAM(s) IV Push once PRN Pain  melatonin 3 milliGRAM(s) Oral at bedtime PRN Insomnia  ondansetron Injectable 4 milliGRAM(s) IV Push every 8 hours PRN Nausea and/or Vomiting  oxyCODONE    IR 5 milliGRAM(s) Oral every 4 hours PRN Moderate Pain (4 - 6)  oxyCODONE    IR 10 milliGRAM(s) Oral every 4 hours PRN Severe Pain (7 - 10)    CAPILLARY BLOOD GLUCOSE  139 (18 Sep 2023 22:42)      POCT Blood Glucose.: 166 mg/dL (19 Sep 2023 12:18)  POCT Blood Glucose.: 150 mg/dL (19 Sep 2023 08:47)  POCT Blood Glucose.: 139 mg/dL (18 Sep 2023 22:42)  POCT Blood Glucose.: 129 mg/dL (18 Sep 2023 19:51)    I&O's Summary      Vital Signs Last 24 Hrs  T(C): 36.6 (19 Sep 2023 09:20), Max: 36.9 (18 Sep 2023 20:50)  T(F): 97.9 (19 Sep 2023 09:20), Max: 98.4 (18 Sep 2023 20:50)  HR: 75 (19 Sep 2023 09:20) (75 - 84)  BP: 153/90 (19 Sep 2023 09:20) (130/61 - 153/90)  BP(mean): --  RR: 17 (19 Sep 2023 09:20) (14 - 18)  SpO2: 100% (19 Sep 2023 09:20) (97% - 100%)    Parameters below as of 19 Sep 2023 09:20  Patient On (Oxygen Delivery Method): room air        LABS:                        13.4   7.20  )-----------( 182      ( 19 Sep 2023 06:45 )             39.3     09-19    140  |  98  |  13  ----------------------------<  143<H>  3.7   |  26  |  0.52    Ca    9.2      19 Sep 2023 06:45    TPro  6.8  /  Alb  3.8  /  TBili  0.9  /  DBili  x   /  AST  19  /  ALT  19  /  AlkPhos  96  09-19    PT/INR - ( 18 Sep 2023 14:32 )   PT: 10.7 sec;   INR: 0.95 ratio         PTT - ( 18 Sep 2023 14:32 )  PTT:24.5 sec  CARDIAC MARKERS ( 18 Sep 2023 14:32 )  x     / x     / 55 U/L / x     / 2.0 ng/mL      Urinalysis Basic - ( 19 Sep 2023 06:45 )    Color: x / Appearance: x / SG: x / pH: x  Gluc: 143 mg/dL / Ketone: x  / Bili: x / Urobili: x   Blood: x / Protein: x / Nitrite: x   Leuk Esterase: x / RBC: x / WBC x   Sq Epi: x / Non Sq Epi: x / Bacteria: x        RADIOLOGY & ADDITIONAL TESTS:  Results Reviewed: Y  Imaging Personally Reviewed: Y  Electrocardiogram Personally Reviewed: Y    COORDINATION OF CARE:  Care Discussed with Consultants/Other Providers [Y/N]: Y  Prior or Outpatient Records Reviewed [Y/N]: KATHARINE

## 2023-09-19 NOTE — PATIENT PROFILE ADULT - NSTOBACCO TYPE_GEN_A_CORE_RD
Concetta Rodríguez,    Attached are your test results.  -Cholesterol levels are at your goal levels.  ADVISE: Continuing your medication, a regular exercise program with at least 30 minutes of aerobic exercise 3-4 days/week ( 45 minutes 4-6 days/week if weight loss needed), and a low saturated fat,/low carbohydrate diet are helpful to maintain this.  Rechecking your fasting cholesterol panel in 12 months is recommended (Lipid w/ LDL reflex).  -TSH (thyroid stimulating hormone) level is normal which indicates normal thyroid function.  -Microalbumin (urine protein) test is normal.  ADVISE: recheck annually   Please contact us if you have any questions.    Pricila Avila, CNP  
Concetta Rodríguez,    Attached are your test results.  Thyroid antibody is negative    Please contact us if you have any questions.    Pricila Avila, CNP  
Cigars

## 2023-09-19 NOTE — PROGRESS NOTE ADULT - PROBLEM SELECTOR PLAN 3
Patient with hx of heart failure   -Not in acute exacerbation, clinically euvolemic.  -C/w bumex 4 mg and 2 mg in the AM (alternate every other day) and 2 mg at bedtime   -C/w carvedilol 12.5 mg BID   -C/w telemonitoring

## 2023-09-19 NOTE — PROGRESS NOTE ADULT - ASSESSMENT
66 yo M with PMhx of HFrEF, CAD s/p CABG, ASIYA on CPAP, HTN, DM, right renal mass s/p resection presents to the ED after a syncopal episode, found to have T12-L1 fracture.     Physical Exam:  GENERAL: no apparent distress  CHEST/LUNG: on RA; Clear to auscultation bilaterally; No wheezing; No crackles  HEART: Regular rate and rhythm; S1/S2 wnl; no obvious murmurs  ABDOMEN: Soft, Nontender, Nondistended; Bowel sounds present  EXTREMITIES:  2+ Peripheral Pulses, No edema  PSYCH: normal affect, calm demeanor  NEUROLOGY: AAOX3, CN 2-12 grossly intact, no obvious FND

## 2023-09-19 NOTE — PROGRESS NOTE ADULT - PROBLEM SELECTOR PLAN 1
Patient with syncopal episode after blowing his nose   -Hx is consistent with situational syncope   -EKG shows no acute changes. Trops stable at 16. No active chest pain, SOB, palpitations or dizziness. Less likely cardiac in etiology   -CT head is negative for bleeding   -F/u with orthostatics - difficult to perform due to back pain and unable to stand.  - attempted to obtain outpatient echo from cardiology office. per patient, last echo was done in April.  - will need repeat echo.   - medical clearance pending echo.   - cardiology consult for pre-op clearance and optimization.

## 2023-09-20 LAB
ANION GAP SERPL CALC-SCNC: 17 MMOL/L — HIGH (ref 7–14)
BUN SERPL-MCNC: 12 MG/DL — SIGNIFICANT CHANGE UP (ref 7–23)
CALCIUM SERPL-MCNC: 8.9 MG/DL — SIGNIFICANT CHANGE UP (ref 8.4–10.5)
CHLORIDE SERPL-SCNC: 95 MMOL/L — LOW (ref 98–107)
CO2 SERPL-SCNC: 25 MMOL/L — SIGNIFICANT CHANGE UP (ref 22–31)
CREAT SERPL-MCNC: 0.56 MG/DL — SIGNIFICANT CHANGE UP (ref 0.5–1.3)
EGFR: 108 ML/MIN/1.73M2 — SIGNIFICANT CHANGE UP
GLUCOSE BLDC GLUCOMTR-MCNC: 158 MG/DL — HIGH (ref 70–99)
GLUCOSE BLDC GLUCOMTR-MCNC: 171 MG/DL — HIGH (ref 70–99)
GLUCOSE BLDC GLUCOMTR-MCNC: 174 MG/DL — HIGH (ref 70–99)
GLUCOSE BLDC GLUCOMTR-MCNC: 216 MG/DL — HIGH (ref 70–99)
GLUCOSE SERPL-MCNC: 158 MG/DL — HIGH (ref 70–99)
HCT VFR BLD CALC: 39.6 % — SIGNIFICANT CHANGE UP (ref 39–50)
HGB BLD-MCNC: 13.4 G/DL — SIGNIFICANT CHANGE UP (ref 13–17)
MCHC RBC-ENTMCNC: 28.9 PG — SIGNIFICANT CHANGE UP (ref 27–34)
MCHC RBC-ENTMCNC: 33.8 GM/DL — SIGNIFICANT CHANGE UP (ref 32–36)
MCV RBC AUTO: 85.3 FL — SIGNIFICANT CHANGE UP (ref 80–100)
NRBC # BLD: 0 /100 WBCS — SIGNIFICANT CHANGE UP (ref 0–0)
NRBC # FLD: 0 K/UL — SIGNIFICANT CHANGE UP (ref 0–0)
PLATELET # BLD AUTO: 196 K/UL — SIGNIFICANT CHANGE UP (ref 150–400)
POTASSIUM SERPL-MCNC: 3.7 MMOL/L — SIGNIFICANT CHANGE UP (ref 3.5–5.3)
POTASSIUM SERPL-SCNC: 3.7 MMOL/L — SIGNIFICANT CHANGE UP (ref 3.5–5.3)
RBC # BLD: 4.64 M/UL — SIGNIFICANT CHANGE UP (ref 4.2–5.8)
RBC # FLD: 13.7 % — SIGNIFICANT CHANGE UP (ref 10.3–14.5)
SODIUM SERPL-SCNC: 137 MMOL/L — SIGNIFICANT CHANGE UP (ref 135–145)
WBC # BLD: 9.47 K/UL — SIGNIFICANT CHANGE UP (ref 3.8–10.5)
WBC # FLD AUTO: 9.47 K/UL — SIGNIFICANT CHANGE UP (ref 3.8–10.5)

## 2023-09-20 PROCEDURE — 99233 SBSQ HOSP IP/OBS HIGH 50: CPT

## 2023-09-20 RX ORDER — BUMETANIDE 0.25 MG/ML
2 INJECTION INTRAMUSCULAR; INTRAVENOUS
Refills: 0 | Status: DISCONTINUED | OUTPATIENT
Start: 2023-09-20 | End: 2023-09-24

## 2023-09-20 RX ORDER — POLYETHYLENE GLYCOL 3350 17 G/17G
17 POWDER, FOR SOLUTION ORAL
Refills: 0 | Status: DISCONTINUED | OUTPATIENT
Start: 2023-09-20 | End: 2023-09-30

## 2023-09-20 RX ADMIN — LOSARTAN POTASSIUM 100 MILLIGRAM(S): 100 TABLET, FILM COATED ORAL at 05:06

## 2023-09-20 RX ADMIN — Medication 81 MILLIGRAM(S): at 11:32

## 2023-09-20 RX ADMIN — SENNA PLUS 2 TABLET(S): 8.6 TABLET ORAL at 21:43

## 2023-09-20 RX ADMIN — Medication 1: at 08:52

## 2023-09-20 RX ADMIN — POLYETHYLENE GLYCOL 3350 17 GRAM(S): 17 POWDER, FOR SOLUTION ORAL at 17:52

## 2023-09-20 RX ADMIN — Medication 100 MILLIGRAM(S): at 21:43

## 2023-09-20 RX ADMIN — GABAPENTIN 300 MILLIGRAM(S): 400 CAPSULE ORAL at 05:05

## 2023-09-20 RX ADMIN — HEPARIN SODIUM 5000 UNIT(S): 5000 INJECTION INTRAVENOUS; SUBCUTANEOUS at 05:05

## 2023-09-20 RX ADMIN — HEPARIN SODIUM 5000 UNIT(S): 5000 INJECTION INTRAVENOUS; SUBCUTANEOUS at 21:43

## 2023-09-20 RX ADMIN — OXYCODONE HYDROCHLORIDE 10 MILLIGRAM(S): 5 TABLET ORAL at 05:01

## 2023-09-20 RX ADMIN — LIDOCAINE 1 PATCH: 4 CREAM TOPICAL at 11:33

## 2023-09-20 RX ADMIN — BUMETANIDE 4 MILLIGRAM(S): 0.25 INJECTION INTRAMUSCULAR; INTRAVENOUS at 08:52

## 2023-09-20 RX ADMIN — Medication 100 MILLIGRAM(S): at 05:10

## 2023-09-20 RX ADMIN — GABAPENTIN 300 MILLIGRAM(S): 400 CAPSULE ORAL at 23:59

## 2023-09-20 RX ADMIN — OXYCODONE HYDROCHLORIDE 10 MILLIGRAM(S): 5 TABLET ORAL at 04:01

## 2023-09-20 RX ADMIN — POLYETHYLENE GLYCOL 3350 17 GRAM(S): 17 POWDER, FOR SOLUTION ORAL at 11:32

## 2023-09-20 RX ADMIN — OXYCODONE HYDROCHLORIDE 10 MILLIGRAM(S): 5 TABLET ORAL at 13:09

## 2023-09-20 RX ADMIN — AMLODIPINE BESYLATE 10 MILLIGRAM(S): 2.5 TABLET ORAL at 05:05

## 2023-09-20 RX ADMIN — LIDOCAINE 1 PATCH: 4 CREAM TOPICAL at 23:14

## 2023-09-20 RX ADMIN — OXYCODONE HYDROCHLORIDE 10 MILLIGRAM(S): 5 TABLET ORAL at 14:09

## 2023-09-20 RX ADMIN — GABAPENTIN 300 MILLIGRAM(S): 400 CAPSULE ORAL at 17:47

## 2023-09-20 RX ADMIN — Medication 1 TABLET(S): at 11:32

## 2023-09-20 RX ADMIN — HEPARIN SODIUM 5000 UNIT(S): 5000 INJECTION INTRAVENOUS; SUBCUTANEOUS at 13:09

## 2023-09-20 RX ADMIN — GABAPENTIN 300 MILLIGRAM(S): 400 CAPSULE ORAL at 11:33

## 2023-09-20 RX ADMIN — HYDROMORPHONE HYDROCHLORIDE 2 MILLIGRAM(S): 2 INJECTION INTRAMUSCULAR; INTRAVENOUS; SUBCUTANEOUS at 10:03

## 2023-09-20 RX ADMIN — CARVEDILOL PHOSPHATE 12.5 MILLIGRAM(S): 80 CAPSULE, EXTENDED RELEASE ORAL at 05:05

## 2023-09-20 RX ADMIN — CARVEDILOL PHOSPHATE 12.5 MILLIGRAM(S): 80 CAPSULE, EXTENDED RELEASE ORAL at 17:47

## 2023-09-20 RX ADMIN — Medication 1: at 17:47

## 2023-09-20 RX ADMIN — OXYCODONE HYDROCHLORIDE 10 MILLIGRAM(S): 5 TABLET ORAL at 23:49

## 2023-09-20 RX ADMIN — HYDROMORPHONE HYDROCHLORIDE 2 MILLIGRAM(S): 2 INJECTION INTRAMUSCULAR; INTRAVENOUS; SUBCUTANEOUS at 09:03

## 2023-09-20 RX ADMIN — BUMETANIDE 2 MILLIGRAM(S): 0.25 INJECTION INTRAMUSCULAR; INTRAVENOUS at 15:31

## 2023-09-20 RX ADMIN — Medication 1: at 13:06

## 2023-09-20 RX ADMIN — LIDOCAINE 1 PATCH: 4 CREAM TOPICAL at 19:30

## 2023-09-20 RX ADMIN — ATORVASTATIN CALCIUM 40 MILLIGRAM(S): 80 TABLET, FILM COATED ORAL at 21:43

## 2023-09-20 RX ADMIN — OXYCODONE HYDROCHLORIDE 10 MILLIGRAM(S): 5 TABLET ORAL at 22:49

## 2023-09-20 NOTE — DIETITIAN INITIAL EVALUATION ADULT - PERSON TAUGHT/METHOD
Verbally educated patient on consuming adequate calories and protein, also DASH/TLC theraputic diet./verbal instruction/patient instructed

## 2023-09-20 NOTE — DIETITIAN INITIAL EVALUATION ADULT - OTHER INFO
Patient seen at bedside. A&Ox4. Patient is consuming <50% of meals due to pain. Patient does not like Fish. No chewing or swallowing issues. Denies nausea or vomiting. Patient complaints of constipation, on bowel movement. Last bowel movement was 9/18. Per patient usual body weight 395 lbs 9/17, current dosing weight 369 lbs, weight loss -26 lbs (-6.6%) in 1 day, questionable weight loss.

## 2023-09-20 NOTE — PROGRESS NOTE ADULT - PROBLEM SELECTOR PLAN 3
Patient with hx of heart failure   -Not in acute exacerbation, clinically euvolemic.  -home bumex 4 mg and 2 mg in the AM (alternate every other day) and 2 mg at bedtime   -C/w carvedilol 12.5 mg BID   -C/w telemonitoring - no events.   - suspecting syncope related to diuretics. decrease Bumex to 2mg bid.

## 2023-09-20 NOTE — DIETITIAN INITIAL EVALUATION ADULT - REASON FOR ADMISSION
Syncope and collapse    66 yo M with PMhx of HFrEF, CAD s/p CABG, ASIYA on CPAP, HTN, DM, right renal mass s/p resection presents to the ED after a syncopal episode

## 2023-09-20 NOTE — DIETITIAN INITIAL EVALUATION ADULT - PERTINENT MEDS FT
MEDICATIONS  (STANDING):  allopurinol 100 milliGRAM(s) Oral at bedtime  amLODIPine   Tablet 10 milliGRAM(s) Oral daily  aspirin enteric coated 81 milliGRAM(s) Oral daily  atorvastatin 40 milliGRAM(s) Oral at bedtime  buMETAnide 2 milliGRAM(s) Oral <User Schedule>  carvedilol 12.5 milliGRAM(s) Oral every 12 hours  dextrose 5%. 1000 milliLiter(s) (50 mL/Hr) IV Continuous <Continuous>  dextrose 5%. 1000 milliLiter(s) (100 mL/Hr) IV Continuous <Continuous>  dextrose 50% Injectable 25 Gram(s) IV Push once  dextrose 50% Injectable 12.5 Gram(s) IV Push once  dextrose 50% Injectable 25 Gram(s) IV Push once  gabapentin 300 milliGRAM(s) Oral four times a day  glucagon  Injectable 1 milliGRAM(s) IntraMuscular once  heparin   Injectable 5000 Unit(s) SubCutaneous every 8 hours  influenza  Vaccine (HIGH DOSE) 0.7 milliLiter(s) IntraMuscular once  insulin lispro (ADMELOG) corrective regimen sliding scale   SubCutaneous three times a day before meals  insulin lispro (ADMELOG) corrective regimen sliding scale   SubCutaneous at bedtime  lidocaine   4% Patch 1 Patch Transdermal daily  losartan 100 milliGRAM(s) Oral daily  multivitamin 1 Tablet(s) Oral daily  naloxone Injectable 0.4 milliGRAM(s) IV Push once  polyethylene glycol 3350 17 Gram(s) Oral two times a day  senna 2 Tablet(s) Oral at bedtime    MEDICATIONS  (PRN):  acetaminophen     Tablet .. 650 milliGRAM(s) Oral every 6 hours PRN Temp greater or equal to 38C (100.4F), Mild Pain (1 - 3)  aluminum hydroxide/magnesium hydroxide/simethicone Suspension 30 milliLiter(s) Oral every 4 hours PRN Dyspepsia  benzocaine/menthol Lozenge 1 Lozenge Oral every 4 hours PRN Sore Throat  bisacodyl 5 milliGRAM(s) Oral daily PRN Constipation  dextrose Oral Gel 15 Gram(s) Oral once PRN Blood Glucose LESS THAN 70 milliGRAM(s)/deciliter  melatonin 3 milliGRAM(s) Oral at bedtime PRN Insomnia  ondansetron Injectable 4 milliGRAM(s) IV Push every 8 hours PRN Nausea and/or Vomiting  oxyCODONE    IR 5 milliGRAM(s) Oral every 4 hours PRN Moderate Pain (4 - 6)  oxyCODONE    IR 10 milliGRAM(s) Oral every 4 hours PRN Severe Pain (7 - 10)

## 2023-09-20 NOTE — PROGRESS NOTE ADULT - SUBJECTIVE AND OBJECTIVE BOX
ORTHOPEDIC PROGRESS NOTE    Overnight events: None    SUBJECTIVE: Pt seen and examined at bedside. Patient is doing well, no acute complaints this AM. Pain is controlled with medication      OBJECTIVE:  Vital Signs Last 24 Hrs  T(C): 37.2 (20 Sep 2023 05:00), Max: 37.2 (20 Sep 2023 05:00)  T(F): 98.9 (20 Sep 2023 05:00), Max: 98.9 (20 Sep 2023 05:00)  HR: 87 (20 Sep 2023 05:00) (75 - 90)  BP: 160/76 (20 Sep 2023 05:00) (148/80 - 160/76)  BP(mean): --  RR: 18 (20 Sep 2023 05:00) (17 - 18)  SpO2: 99% (20 Sep 2023 05:00) (98% - 100%)    Parameters below as of 20 Sep 2023 05:00  Patient On (Oxygen Delivery Method): room air      09-19-23 @ 07:01  -  09-20-23 @ 07:00  --------------------------------------------------------  IN: 0 mL / OUT: 700 mL / NET: -700 mL    Physical Examination:  GEN: NAD, resting quietly  PULM: symmetric chest rise bilaterally, no increased WOB  ABD: nondistended  SPINE:    Motor:               Deltoid       Bicep       Tricep     Wrist Ext      Wrist Flex    Finger Flex      Finger Abd  R             5/5            5/5           5/5            5/5                 5/5	           5/5                   5/5  L              5/5            5/5           5/5            5/5                 5/5               5/5                   5/5                Hip Flex        Quad      Ankle DF     Ankle PF     Toe Ext      Hamstring    R            5/5               5/5            5/5               5/5              5/5	        5/5  L             5/5               5/5            5/5               5/5              5/5               5/5    Sensory:   (0 = absent, 1 = impaired, 2 = normal, NT = not testable)              C5         C6     C7      C8       T1          R         2            2       2        2         2  L          2            2       2        2         2               L2          L3         L4      L5       S1     R         2            2           2         2       1  L          2            2           2        2        1      (Patient has known bilateral neuropathy)      LABS:                        13.4   7.20  )-----------( 182      ( 19 Sep 2023 06:45 )             39.3       09-19    140  |  98  |  13  ----------------------------<  143<H>  3.7   |  26  |  0.52    Ca    9.2      19 Sep 2023 06:45    TPro  6.8  /  Alb  3.8  /  TBili  0.9  /  DBili  x   /  AST  19  /  ALT  19  /  AlkPhos  96  09-19

## 2023-09-20 NOTE — DIETITIAN INITIAL EVALUATION ADULT - PROBLEM SELECTOR PLAN 3
Patient with hx of heart failure   -Not in acute exacerbation   -C/w bumex 4 mg and 2 mg in the AM (alternate every other day) and 2 mg at bedtime   -C/w carvedilol 12.5 mg BID   -C/w telemonitoring

## 2023-09-20 NOTE — PROGRESS NOTE ADULT - PROBLEM SELECTOR PLAN 2
Patient with T12-L1 fracture   -Ortho consulted, recs appreciated   -Patient was also scheduled for cervical surgery as outpatient. Might undergo both surgery together as per ortho team.   - unable to perform MRI due to patient's large diameter.   - discussed with MRI supervisor, the MRI in [facility] is same size. no open MRI in health system.   - discussed with ortho team, follow up recs in term of surgical plans.

## 2023-09-20 NOTE — PROGRESS NOTE ADULT - PROBLEM SELECTOR PLAN 1
Patient with syncopal episode after blowing his nose   -Hx is consistent with situational syncope   -EKG shows no acute changes. Trops stable at 16. No active chest pain, SOB, palpitations or dizziness. Less likely cardiac in etiology   -CT head is negative for bleeding   -F/u with orthostatics - unable to perform due to back pain and unable to stand.  - outpatient echo from 5/5/23 showed nl LV function, EF 60%, mild diastolic dysfunction. normal RV function. trivial MR and TR, normal Aortic valve. normal Pulmonic valve.   - repeat echo pending.   - medical clearance pending echo.  - patient has reported intermittent dizziness since his hospitalization for heart failure and placed on higher dose of Bumex.

## 2023-09-20 NOTE — DIETITIAN INITIAL EVALUATION ADULT - PERTINENT LABORATORY DATA
09-20    137  |  95<L>  |  12  ----------------------------<  158<H>  3.7   |  25  |  0.56    Ca    8.9      20 Sep 2023 05:45    TPro  6.8  /  Alb  3.8  /  TBili  0.9  /  DBili  x   /  AST  19  /  ALT  19  /  AlkPhos  96  09-19  POCT Blood Glucose.: 171 mg/dL (09-20-23 @ 12:37)  A1C with Estimated Average Glucose Result: 6.5 % (09-19-23 @ 06:45)  A1C with Estimated Average Glucose Result: 6.5 % (09-01-23 @ 09:30)  A1C with Estimated Average Glucose Result: 6.5 % (03-30-23 @ 09:00)

## 2023-09-20 NOTE — PROGRESS NOTE ADULT - ASSESSMENT
66 yo M with PMhx of HFrEF, CAD s/p CABG, ASIYA on CPAP, HTN, DM, right renal mass s/p resection presents to the ED after a syncopal episode, found to have T12-L1 fracture.     Physical Exam:  GENERAL: no apparent distress  CHEST/LUNG: on RA; Clear to auscultation bilaterally; No wheezing; No crackles  HEART: Regular rate and rhythm; S1/S2 wnl; no obvious murmurs  ABDOMEN: Soft, Nontender, Nondistended; Bowel sounds present  EXTREMITIES:  2+ Peripheral Pulses, No edema  PSYCH: normal affect, calm demeanor  NEUROLOGY: AAOX3, CN2-12 intact;

## 2023-09-20 NOTE — PROGRESS NOTE ADULT - ASSESSMENT
Assessment: Patient is a 67yoM s/p syncopal fall with acute T12-L1 disc space fracture    Plan:  - requires MRI T/L spine, please premedicate prior to imaging so patient can tolerate  - Will follow up imaging and update primary team with plan once complete      For all questions related to patient care, please reach out to the on-call team via the pager.     Venus Bravo, PGY 2  Orthopaedic Surgery  [facility] [phone]  [facility] [place] [facility] [phone]  [facility] [phone]

## 2023-09-21 LAB
ANION GAP SERPL CALC-SCNC: 12 MMOL/L — SIGNIFICANT CHANGE UP (ref 7–14)
BUN SERPL-MCNC: 13 MG/DL — SIGNIFICANT CHANGE UP (ref 7–23)
CALCIUM SERPL-MCNC: 9.1 MG/DL — SIGNIFICANT CHANGE UP (ref 8.4–10.5)
CHLORIDE SERPL-SCNC: 95 MMOL/L — LOW (ref 98–107)
CO2 SERPL-SCNC: 27 MMOL/L — SIGNIFICANT CHANGE UP (ref 22–31)
CREAT SERPL-MCNC: 0.56 MG/DL — SIGNIFICANT CHANGE UP (ref 0.5–1.3)
EGFR: 108 ML/MIN/1.73M2 — SIGNIFICANT CHANGE UP
GLUCOSE BLDC GLUCOMTR-MCNC: 155 MG/DL — HIGH (ref 70–99)
GLUCOSE BLDC GLUCOMTR-MCNC: 160 MG/DL — HIGH (ref 70–99)
GLUCOSE BLDC GLUCOMTR-MCNC: 170 MG/DL — HIGH (ref 70–99)
GLUCOSE BLDC GLUCOMTR-MCNC: 223 MG/DL — HIGH (ref 70–99)
GLUCOSE SERPL-MCNC: 170 MG/DL — HIGH (ref 70–99)
HCT VFR BLD CALC: 38.4 % — LOW (ref 39–50)
HGB BLD-MCNC: 13.2 G/DL — SIGNIFICANT CHANGE UP (ref 13–17)
MAGNESIUM SERPL-MCNC: 1.8 MG/DL — SIGNIFICANT CHANGE UP (ref 1.6–2.6)
MCHC RBC-ENTMCNC: 29 PG — SIGNIFICANT CHANGE UP (ref 27–34)
MCHC RBC-ENTMCNC: 34.4 GM/DL — SIGNIFICANT CHANGE UP (ref 32–36)
MCV RBC AUTO: 84.4 FL — SIGNIFICANT CHANGE UP (ref 80–100)
NRBC # BLD: 0 /100 WBCS — SIGNIFICANT CHANGE UP (ref 0–0)
NRBC # FLD: 0 K/UL — SIGNIFICANT CHANGE UP (ref 0–0)
PHOSPHATE SERPL-MCNC: 3.2 MG/DL — SIGNIFICANT CHANGE UP (ref 2.5–4.5)
PLATELET # BLD AUTO: 184 K/UL — SIGNIFICANT CHANGE UP (ref 150–400)
POTASSIUM SERPL-MCNC: 3.7 MMOL/L — SIGNIFICANT CHANGE UP (ref 3.5–5.3)
POTASSIUM SERPL-SCNC: 3.7 MMOL/L — SIGNIFICANT CHANGE UP (ref 3.5–5.3)
RBC # BLD: 4.55 M/UL — SIGNIFICANT CHANGE UP (ref 4.2–5.8)
RBC # FLD: 13.5 % — SIGNIFICANT CHANGE UP (ref 10.3–14.5)
SODIUM SERPL-SCNC: 134 MMOL/L — LOW (ref 135–145)
WBC # BLD: 9.25 K/UL — SIGNIFICANT CHANGE UP (ref 3.8–10.5)
WBC # FLD AUTO: 9.25 K/UL — SIGNIFICANT CHANGE UP (ref 3.8–10.5)

## 2023-09-21 PROCEDURE — 93306 TTE W/DOPPLER COMPLETE: CPT | Mod: 26

## 2023-09-21 PROCEDURE — 99233 SBSQ HOSP IP/OBS HIGH 50: CPT

## 2023-09-21 RX ORDER — HEPARIN SODIUM 5000 [USP'U]/ML
5000 INJECTION INTRAVENOUS; SUBCUTANEOUS EVERY 8 HOURS
Refills: 0 | Status: COMPLETED | OUTPATIENT
Start: 2023-09-21 | End: 2023-09-21

## 2023-09-21 RX ORDER — OXYCODONE HYDROCHLORIDE 5 MG/1
10 TABLET ORAL EVERY 4 HOURS
Refills: 0 | Status: DISCONTINUED | OUTPATIENT
Start: 2023-09-21 | End: 2023-09-23

## 2023-09-21 RX ORDER — OXYCODONE HYDROCHLORIDE 5 MG/1
5 TABLET ORAL EVERY 4 HOURS
Refills: 0 | Status: DISCONTINUED | OUTPATIENT
Start: 2023-09-21 | End: 2023-09-23

## 2023-09-21 RX ORDER — CYCLOBENZAPRINE HYDROCHLORIDE 10 MG/1
5 TABLET, FILM COATED ORAL
Refills: 0 | Status: DISCONTINUED | OUTPATIENT
Start: 2023-09-21 | End: 2023-09-22

## 2023-09-21 RX ADMIN — CARVEDILOL PHOSPHATE 12.5 MILLIGRAM(S): 80 CAPSULE, EXTENDED RELEASE ORAL at 17:30

## 2023-09-21 RX ADMIN — OXYCODONE HYDROCHLORIDE 10 MILLIGRAM(S): 5 TABLET ORAL at 11:18

## 2023-09-21 RX ADMIN — AMLODIPINE BESYLATE 10 MILLIGRAM(S): 2.5 TABLET ORAL at 04:46

## 2023-09-21 RX ADMIN — HEPARIN SODIUM 5000 UNIT(S): 5000 INJECTION INTRAVENOUS; SUBCUTANEOUS at 04:47

## 2023-09-21 RX ADMIN — BENZOCAINE AND MENTHOL 1 LOZENGE: 5; 1 LIQUID ORAL at 04:48

## 2023-09-21 RX ADMIN — LIDOCAINE 1 PATCH: 4 CREAM TOPICAL at 23:21

## 2023-09-21 RX ADMIN — Medication 81 MILLIGRAM(S): at 11:19

## 2023-09-21 RX ADMIN — CARVEDILOL PHOSPHATE 12.5 MILLIGRAM(S): 80 CAPSULE, EXTENDED RELEASE ORAL at 04:46

## 2023-09-21 RX ADMIN — GABAPENTIN 300 MILLIGRAM(S): 400 CAPSULE ORAL at 04:46

## 2023-09-21 RX ADMIN — Medication 1: at 08:33

## 2023-09-21 RX ADMIN — CYCLOBENZAPRINE HYDROCHLORIDE 5 MILLIGRAM(S): 10 TABLET, FILM COATED ORAL at 06:49

## 2023-09-21 RX ADMIN — GABAPENTIN 300 MILLIGRAM(S): 400 CAPSULE ORAL at 11:19

## 2023-09-21 RX ADMIN — OXYCODONE HYDROCHLORIDE 10 MILLIGRAM(S): 5 TABLET ORAL at 20:51

## 2023-09-21 RX ADMIN — OXYCODONE HYDROCHLORIDE 10 MILLIGRAM(S): 5 TABLET ORAL at 04:47

## 2023-09-21 RX ADMIN — Medication 100 MILLIGRAM(S): at 06:34

## 2023-09-21 RX ADMIN — Medication 5 MILLIGRAM(S): at 10:47

## 2023-09-21 RX ADMIN — ATORVASTATIN CALCIUM 40 MILLIGRAM(S): 80 TABLET, FILM COATED ORAL at 21:20

## 2023-09-21 RX ADMIN — OXYCODONE HYDROCHLORIDE 10 MILLIGRAM(S): 5 TABLET ORAL at 05:47

## 2023-09-21 RX ADMIN — LIDOCAINE 1 PATCH: 4 CREAM TOPICAL at 19:30

## 2023-09-21 RX ADMIN — POLYETHYLENE GLYCOL 3350 17 GRAM(S): 17 POWDER, FOR SOLUTION ORAL at 04:47

## 2023-09-21 RX ADMIN — BUMETANIDE 2 MILLIGRAM(S): 0.25 INJECTION INTRAMUSCULAR; INTRAVENOUS at 07:43

## 2023-09-21 RX ADMIN — Medication 1 TABLET(S): at 11:19

## 2023-09-21 RX ADMIN — Medication 100 MILLIGRAM(S): at 21:20

## 2023-09-21 RX ADMIN — Medication 2: at 17:30

## 2023-09-21 RX ADMIN — OXYCODONE HYDROCHLORIDE 10 MILLIGRAM(S): 5 TABLET ORAL at 19:51

## 2023-09-21 RX ADMIN — LOSARTAN POTASSIUM 100 MILLIGRAM(S): 100 TABLET, FILM COATED ORAL at 04:47

## 2023-09-21 RX ADMIN — BUMETANIDE 2 MILLIGRAM(S): 0.25 INJECTION INTRAMUSCULAR; INTRAVENOUS at 15:06

## 2023-09-21 RX ADMIN — POLYETHYLENE GLYCOL 3350 17 GRAM(S): 17 POWDER, FOR SOLUTION ORAL at 17:30

## 2023-09-21 RX ADMIN — SENNA PLUS 2 TABLET(S): 8.6 TABLET ORAL at 21:19

## 2023-09-21 RX ADMIN — LIDOCAINE 1 PATCH: 4 CREAM TOPICAL at 11:20

## 2023-09-21 RX ADMIN — Medication 1: at 13:29

## 2023-09-21 RX ADMIN — OXYCODONE HYDROCHLORIDE 10 MILLIGRAM(S): 5 TABLET ORAL at 12:18

## 2023-09-21 RX ADMIN — GABAPENTIN 300 MILLIGRAM(S): 400 CAPSULE ORAL at 17:30

## 2023-09-21 NOTE — PROGRESS NOTE ADULT - SUBJECTIVE AND OBJECTIVE BOX
[facility] Division of Hospital Medicine  Nehemiah Pierre MD  In House Pager [phone]    Patient is a 67y old  Male who presents with a chief complaint of syncope (21 Sep 2023 06:52)    SUBJECTIVE / OVERNIGHT EVENTS: no acute events.     ROS: Denied Fever, Chill, CP, SOB, Abd pain, N/V/D, LE swelling or pain.     MEDICATIONS  (STANDING):  allopurinol 100 milliGRAM(s) Oral at bedtime  amLODIPine   Tablet 10 milliGRAM(s) Oral daily  aspirin enteric coated 81 milliGRAM(s) Oral daily  atorvastatin 40 milliGRAM(s) Oral at bedtime  buMETAnide 2 milliGRAM(s) Oral <User Schedule>  carvedilol 12.5 milliGRAM(s) Oral every 12 hours  dextrose 5%. 1000 milliLiter(s) (50 mL/Hr) IV Continuous <Continuous>  dextrose 5%. 1000 milliLiter(s) (100 mL/Hr) IV Continuous <Continuous>  dextrose 50% Injectable 25 Gram(s) IV Push once  dextrose 50% Injectable 12.5 Gram(s) IV Push once  dextrose 50% Injectable 25 Gram(s) IV Push once  gabapentin 300 milliGRAM(s) Oral four times a day  glucagon  Injectable 1 milliGRAM(s) IntraMuscular once  heparin   Injectable 5000 Unit(s) SubCutaneous every 8 hours  influenza  Vaccine (HIGH DOSE) 0.7 milliLiter(s) IntraMuscular once  insulin lispro (ADMELOG) corrective regimen sliding scale   SubCutaneous three times a day before meals  insulin lispro (ADMELOG) corrective regimen sliding scale   SubCutaneous at bedtime  lidocaine   4% Patch 1 Patch Transdermal daily  losartan 100 milliGRAM(s) Oral daily  multivitamin 1 Tablet(s) Oral daily  naloxone Injectable 0.4 milliGRAM(s) IV Push once  polyethylene glycol 3350 17 Gram(s) Oral two times a day  senna 2 Tablet(s) Oral at bedtime    MEDICATIONS  (PRN):  acetaminophen     Tablet .. 650 milliGRAM(s) Oral every 6 hours PRN Temp greater or equal to 38C (100.4F), Mild Pain (1 - 3)  aluminum hydroxide/magnesium hydroxide/simethicone Suspension 30 milliLiter(s) Oral every 4 hours PRN Dyspepsia  benzocaine/menthol Lozenge 1 Lozenge Oral every 4 hours PRN Sore Throat  bisacodyl 5 milliGRAM(s) Oral every 12 hours PRN Constipation  cyclobenzaprine 5 milliGRAM(s) Oral two times a day PRN Muscle Spasm  dextrose Oral Gel 15 Gram(s) Oral once PRN Blood Glucose LESS THAN 70 milliGRAM(s)/deciliter  guaiFENesin Oral Liquid (Sugar-Free) 100 milliGRAM(s) Oral every 6 hours PRN Cough  melatonin 3 milliGRAM(s) Oral at bedtime PRN Insomnia  ondansetron Injectable 4 milliGRAM(s) IV Push every 8 hours PRN Nausea and/or Vomiting  oxyCODONE    IR 5 milliGRAM(s) Oral every 4 hours PRN Moderate Pain (4 - 6)  oxyCODONE    IR 10 milliGRAM(s) Oral every 4 hours PRN Severe Pain (7 - 10)    CAPILLARY BLOOD GLUCOSE      POCT Blood Glucose.: 170 mg/dL (21 Sep 2023 08:22)  POCT Blood Glucose.: 216 mg/dL (20 Sep 2023 21:21)  POCT Blood Glucose.: 158 mg/dL (20 Sep 2023 17:15)  POCT Blood Glucose.: 171 mg/dL (20 Sep 2023 12:37)    I&O's Summary    20 Sep 2023 07:01  -  21 Sep 2023 07:00  --------------------------------------------------------  IN: 0 mL / OUT: 1225 mL / NET: -1225 mL    21 Sep 2023 07:01  -  21 Sep 2023 12:36  --------------------------------------------------------  IN: 0 mL / OUT: 675 mL / NET: -675 mL        Vital Signs Last 24 Hrs  T(C): 37.1 (21 Sep 2023 11:15), Max: 37.7 (20 Sep 2023 21:40)  T(F): 98.7 (21 Sep 2023 11:15), Max: 99.9 (20 Sep 2023 21:40)  HR: 78 (21 Sep 2023 11:15) (77 - 83)  BP: 142/73 (21 Sep 2023 11:15) (132/65 - 148/86)  BP(mean): --  RR: 18 (21 Sep 2023 11:15) (17 - 18)  SpO2: 96% (21 Sep 2023 11:15) (95% - 100%)    Parameters below as of 21 Sep 2023 11:15  Patient On (Oxygen Delivery Method): room air        LABS:                        13.2   9.25  )-----------( 184      ( 21 Sep 2023 07:30 )             38.4     09-21    134<L>  |  95<L>  |  13  ----------------------------<  170<H>  3.7   |  27  |  0.56    Ca    9.1      21 Sep 2023 07:30  Phos  3.2     09-21  Mg     1.80     09-21            Urinalysis Basic - ( 21 Sep 2023 07:30 )    Color: x / Appearance: x / SG: x / pH: x  Gluc: 170 mg/dL / Ketone: x  / Bili: x / Urobili: x   Blood: x / Protein: x / Nitrite: x   Leuk Esterase: x / RBC: x / WBC x   Sq Epi: x / Non Sq Epi: x / Bacteria: x        RADIOLOGY & ADDITIONAL TESTS:  Results Reviewed: Y  Imaging Personally Reviewed: Y  Electrocardiogram Personally Reviewed: Y    COORDINATION OF CARE:  Care Discussed with Consultants/Other Providers [Y/N]: Y  Prior or Outpatient Records Reviewed [Y/N]: Y

## 2023-09-21 NOTE — PROGRESS NOTE ADULT - ASSESSMENT
Assessment: Patient is a 67yoM s/p syncopal fall with acute T12-L1 disc space fracture    Plan:  - Unable to fit in MRI machine  - Will plan for OR tomorrow with Dr. Hughes  - Please document medical optimization today  - Please expedite TTE today  - NPO at midnight, IV F (if able to tolerate)  - CBC/BMP/Coags/Type & Screen at 0100 on 9/22    For all questions related to patient care, please reach out to the on-call team via the pager.     Venus Bravo, PGY 2  Orthopaedic Surgery  [facility] [phone]  [facility] [phone]  [facility] [phone]

## 2023-09-21 NOTE — PROGRESS NOTE ADULT - PROBLEM SELECTOR PLAN 2
Patient with T12-L1 fracture   -Ortho consulted, recs appreciated   -Patient was also scheduled for cervical surgery as outpatient. Might undergo both surgery together as per ortho team.   - unable to perform MRI due to patient's large diameter.   - discussed with MRI supervisor, the MRI in [facility] is same size. no open MRI in health system.   - discussed with ortho team, follow up recs in term of surgical plans.    Pre-operative medical assessment  Patient with history of CAD, HFrEF (EF60% in May), ASIYA on CPAP, HTN, DM on Insulin.   RCRI 3, baseline METs limited due to obesity and comobidities.   echo from 5/2023 with normal EF, syncope on 9/18 is likely vasovagal iso diuretics use. tele NSR with few PVC.   patient is clinically euvolemic, w/o signs of ACS.   patient is elevated risk for surgery.   hold AC and diuretics on day of surgery.   reduce Lantus dose by 25% while NPO.   perioperative management with CPAP.   pending repeat echo.  medical clearance pending echo finding.

## 2023-09-21 NOTE — PROGRESS NOTE ADULT - ASSESSMENT
68 yo M with PMhx of HFrEF, CAD s/p CABG, ASIYA on CPAP, HTN, DM, right renal mass s/p resection presents to the ED after a syncopal episode, found to have T12-L1 fracture. could not perform MRI due to size incompatibility. will plan for OR tomorrow.     Physical Exam:  GENERAL: no apparent distress  CHEST/LUNG: on RA; Clear to auscultation bilaterally; No wheezing; No crackles  HEART: Regular rate and rhythm; S1/S2 wnl; no obvious murmurs  ABDOMEN: Soft, Nontender, Nondistended; Bowel sounds present  EXTREMITIES:  2+ Peripheral Pulses, No edema  PSYCH: normal affect, calm demeanor  NEUROLOGY: AAOX3, CN2-12 intact;

## 2023-09-21 NOTE — PROGRESS NOTE ADULT - SUBJECTIVE AND OBJECTIVE BOX
ORTHOPEDIC PROGRESS NOTE    Overnight events: None    SUBJECTIVE: Pt seen and examined at bedside. Patient is c/o intense back spasms      OBJECTIVE:  Vital Signs Last 24 Hrs  T(C): 37.1 (21 Sep 2023 04:45), Max: 37.7 (20 Sep 2023 21:40)  T(F): 98.8 (21 Sep 2023 04:45), Max: 99.9 (20 Sep 2023 21:40)  HR: 82 (21 Sep 2023 04:45) (77 - 83)  BP: 148/86 (21 Sep 2023 04:45) (132/65 - 148/86)  BP(mean): --  RR: 18 (21 Sep 2023 04:45) (17 - 18)  SpO2: 97% (21 Sep 2023 04:45) (95% - 100%)    Parameters below as of 21 Sep 2023 04:45  Patient On (Oxygen Delivery Method): room air          09-19-23 @ 07:01  -  09-20-23 @ 07:00  --------------------------------------------------------  IN: 0 mL / OUT: 1700 mL / NET: -1700 mL    09-20-23 @ 07:01  -  09-21-23 @ 06:52  --------------------------------------------------------  IN: 0 mL / OUT: 1225 mL / NET: -1225 mL        Physical Examination:  GEN: NAD, resting quietly  PULM: symmetric chest rise bilaterally, no increased WOB  ABD: nondistended  SPINE:    Motor:               Deltoid       Bicep       Tricep     Wrist Ext      Wrist Flex    Finger Flex      Finger Abd  R             5/5            5/5           5/5            5/5                 5/5	           5/5                   5/5  L              5/5            5/5           5/5            5/5                 5/5               5/5                   5/5                Hip Flex        Quad      Ankle DF     Ankle PF     Toe Ext      Hamstring    R            5/5               5/5            5/5               5/5              5/5	        5/5  L             5/5               5/5            5/5               5/5              5/5               5/5    Sensory:   (0 = absent, 1 = impaired, 2 = normal, NT = not testable)              C5         C6     C7      C8       T1          R         2            2       2        2         2  L          2            2       2        2         2               L2          L3         L4      L5       S1     R         2            2           2         2       1  L          2            2           2        2        1      (Patient has known bilateral neuropathy)        LABS:                        13.4   9.47  )-----------( 196      ( 20 Sep 2023 05:45 )             39.6       09-20    137  |  95<L>  |  12  ----------------------------<  158<H>  3.7   |  25  |  0.56    Ca    8.9      20 Sep 2023 05:45

## 2023-09-22 ENCOUNTER — TRANSCRIPTION ENCOUNTER (OUTPATIENT)
Age: 68
End: 2023-09-22

## 2023-09-22 DIAGNOSIS — K59.01 SLOW TRANSIT CONSTIPATION: ICD-10-CM

## 2023-09-22 LAB
ANION GAP SERPL CALC-SCNC: 14 MMOL/L — SIGNIFICANT CHANGE UP (ref 7–14)
APTT BLD: 26.6 SEC — SIGNIFICANT CHANGE UP (ref 24.5–35.6)
BUN SERPL-MCNC: 19 MG/DL — SIGNIFICANT CHANGE UP (ref 7–23)
CALCIUM SERPL-MCNC: 9.3 MG/DL — SIGNIFICANT CHANGE UP (ref 8.4–10.5)
CHLORIDE SERPL-SCNC: 96 MMOL/L — LOW (ref 98–107)
CO2 SERPL-SCNC: 26 MMOL/L — SIGNIFICANT CHANGE UP (ref 22–31)
CREAT SERPL-MCNC: 0.59 MG/DL — SIGNIFICANT CHANGE UP (ref 0.5–1.3)
EGFR: 106 ML/MIN/1.73M2 — SIGNIFICANT CHANGE UP
GLUCOSE BLDC GLUCOMTR-MCNC: 150 MG/DL — HIGH (ref 70–99)
GLUCOSE BLDC GLUCOMTR-MCNC: 151 MG/DL — HIGH (ref 70–99)
GLUCOSE BLDC GLUCOMTR-MCNC: 165 MG/DL — HIGH (ref 70–99)
GLUCOSE BLDC GLUCOMTR-MCNC: 171 MG/DL — HIGH (ref 70–99)
GLUCOSE SERPL-MCNC: 178 MG/DL — HIGH (ref 70–99)
HCT VFR BLD CALC: 40.1 % — SIGNIFICANT CHANGE UP (ref 39–50)
HGB BLD-MCNC: 13.8 G/DL — SIGNIFICANT CHANGE UP (ref 13–17)
INR BLD: 1.02 RATIO — SIGNIFICANT CHANGE UP (ref 0.85–1.18)
MAGNESIUM SERPL-MCNC: 2 MG/DL — SIGNIFICANT CHANGE UP (ref 1.6–2.6)
MCHC RBC-ENTMCNC: 29.2 PG — SIGNIFICANT CHANGE UP (ref 27–34)
MCHC RBC-ENTMCNC: 34.4 GM/DL — SIGNIFICANT CHANGE UP (ref 32–36)
MCV RBC AUTO: 84.8 FL — SIGNIFICANT CHANGE UP (ref 80–100)
NRBC # BLD: 0 /100 WBCS — SIGNIFICANT CHANGE UP (ref 0–0)
NRBC # FLD: 0 K/UL — SIGNIFICANT CHANGE UP (ref 0–0)
PHOSPHATE SERPL-MCNC: 3.8 MG/DL — SIGNIFICANT CHANGE UP (ref 2.5–4.5)
PLATELET # BLD AUTO: 210 K/UL — SIGNIFICANT CHANGE UP (ref 150–400)
POTASSIUM SERPL-MCNC: 3.7 MMOL/L — SIGNIFICANT CHANGE UP (ref 3.5–5.3)
POTASSIUM SERPL-SCNC: 3.7 MMOL/L — SIGNIFICANT CHANGE UP (ref 3.5–5.3)
PROTHROM AB SERPL-ACNC: 11.4 SEC — SIGNIFICANT CHANGE UP (ref 9.5–13)
RBC # BLD: 4.73 M/UL — SIGNIFICANT CHANGE UP (ref 4.2–5.8)
RBC # FLD: 13.6 % — SIGNIFICANT CHANGE UP (ref 10.3–14.5)
SODIUM SERPL-SCNC: 136 MMOL/L — SIGNIFICANT CHANGE UP (ref 135–145)
WBC # BLD: 8.76 K/UL — SIGNIFICANT CHANGE UP (ref 3.8–10.5)
WBC # FLD AUTO: 8.76 K/UL — SIGNIFICANT CHANGE UP (ref 3.8–10.5)

## 2023-09-22 PROCEDURE — 99233 SBSQ HOSP IP/OBS HIGH 50: CPT

## 2023-09-22 RX ORDER — POTASSIUM CHLORIDE 20 MEQ
40 PACKET (EA) ORAL ONCE
Refills: 0 | Status: COMPLETED | OUTPATIENT
Start: 2023-09-22 | End: 2023-09-22

## 2023-09-22 RX ORDER — HEPARIN SODIUM 5000 [USP'U]/ML
7500 INJECTION INTRAVENOUS; SUBCUTANEOUS EVERY 8 HOURS
Refills: 0 | Status: COMPLETED | OUTPATIENT
Start: 2023-09-22 | End: 2023-09-22

## 2023-09-22 RX ORDER — ASPIRIN/CALCIUM CARB/MAGNESIUM 324 MG
81 TABLET ORAL DAILY
Refills: 0 | Status: DISCONTINUED | OUTPATIENT
Start: 2023-09-22 | End: 2023-09-22

## 2023-09-22 RX ORDER — HEPARIN SODIUM 5000 [USP'U]/ML
5000 INJECTION INTRAVENOUS; SUBCUTANEOUS EVERY 8 HOURS
Refills: 0 | Status: DISCONTINUED | OUTPATIENT
Start: 2023-09-22 | End: 2023-09-22

## 2023-09-22 RX ORDER — CYCLOBENZAPRINE HYDROCHLORIDE 10 MG/1
5 TABLET, FILM COATED ORAL
Refills: 0 | Status: DISCONTINUED | OUTPATIENT
Start: 2023-09-22 | End: 2023-09-23

## 2023-09-22 RX ADMIN — Medication 40 MILLIEQUIVALENT(S): at 16:55

## 2023-09-22 RX ADMIN — SENNA PLUS 2 TABLET(S): 8.6 TABLET ORAL at 22:15

## 2023-09-22 RX ADMIN — Medication 1: at 09:10

## 2023-09-22 RX ADMIN — BENZOCAINE AND MENTHOL 1 LOZENGE: 5; 1 LIQUID ORAL at 05:53

## 2023-09-22 RX ADMIN — CARVEDILOL PHOSPHATE 12.5 MILLIGRAM(S): 80 CAPSULE, EXTENDED RELEASE ORAL at 16:51

## 2023-09-22 RX ADMIN — OXYCODONE HYDROCHLORIDE 10 MILLIGRAM(S): 5 TABLET ORAL at 06:06

## 2023-09-22 RX ADMIN — OXYCODONE HYDROCHLORIDE 10 MILLIGRAM(S): 5 TABLET ORAL at 10:30

## 2023-09-22 RX ADMIN — AMLODIPINE BESYLATE 10 MILLIGRAM(S): 2.5 TABLET ORAL at 05:02

## 2023-09-22 RX ADMIN — OXYCODONE HYDROCHLORIDE 10 MILLIGRAM(S): 5 TABLET ORAL at 09:30

## 2023-09-22 RX ADMIN — GABAPENTIN 300 MILLIGRAM(S): 400 CAPSULE ORAL at 16:51

## 2023-09-22 RX ADMIN — OXYCODONE HYDROCHLORIDE 10 MILLIGRAM(S): 5 TABLET ORAL at 00:07

## 2023-09-22 RX ADMIN — CARVEDILOL PHOSPHATE 12.5 MILLIGRAM(S): 80 CAPSULE, EXTENDED RELEASE ORAL at 05:02

## 2023-09-22 RX ADMIN — OXYCODONE HYDROCHLORIDE 10 MILLIGRAM(S): 5 TABLET ORAL at 05:06

## 2023-09-22 RX ADMIN — ATORVASTATIN CALCIUM 40 MILLIGRAM(S): 80 TABLET, FILM COATED ORAL at 22:15

## 2023-09-22 RX ADMIN — Medication 1: at 13:25

## 2023-09-22 RX ADMIN — OXYCODONE HYDROCHLORIDE 10 MILLIGRAM(S): 5 TABLET ORAL at 15:21

## 2023-09-22 RX ADMIN — GABAPENTIN 300 MILLIGRAM(S): 400 CAPSULE ORAL at 11:50

## 2023-09-22 RX ADMIN — GABAPENTIN 300 MILLIGRAM(S): 400 CAPSULE ORAL at 00:07

## 2023-09-22 RX ADMIN — GABAPENTIN 300 MILLIGRAM(S): 400 CAPSULE ORAL at 22:16

## 2023-09-22 RX ADMIN — GABAPENTIN 300 MILLIGRAM(S): 400 CAPSULE ORAL at 05:02

## 2023-09-22 RX ADMIN — POLYETHYLENE GLYCOL 3350 17 GRAM(S): 17 POWDER, FOR SOLUTION ORAL at 05:03

## 2023-09-22 RX ADMIN — Medication 100 MILLIGRAM(S): at 06:27

## 2023-09-22 RX ADMIN — CYCLOBENZAPRINE HYDROCHLORIDE 5 MILLIGRAM(S): 10 TABLET, FILM COATED ORAL at 00:08

## 2023-09-22 RX ADMIN — Medication 3 MILLIGRAM(S): at 00:09

## 2023-09-22 RX ADMIN — POLYETHYLENE GLYCOL 3350 17 GRAM(S): 17 POWDER, FOR SOLUTION ORAL at 16:51

## 2023-09-22 RX ADMIN — Medication 100 MILLIGRAM(S): at 22:15

## 2023-09-22 RX ADMIN — Medication 81 MILLIGRAM(S): at 09:30

## 2023-09-22 RX ADMIN — Medication 5 MILLIGRAM(S): at 16:51

## 2023-09-22 RX ADMIN — BUMETANIDE 2 MILLIGRAM(S): 0.25 INJECTION INTRAMUSCULAR; INTRAVENOUS at 10:07

## 2023-09-22 RX ADMIN — LIDOCAINE 1 PATCH: 4 CREAM TOPICAL at 23:36

## 2023-09-22 RX ADMIN — OXYCODONE HYDROCHLORIDE 10 MILLIGRAM(S): 5 TABLET ORAL at 14:11

## 2023-09-22 RX ADMIN — HEPARIN SODIUM 7500 UNIT(S): 5000 INJECTION INTRAVENOUS; SUBCUTANEOUS at 14:06

## 2023-09-22 RX ADMIN — HEPARIN SODIUM 7500 UNIT(S): 5000 INJECTION INTRAVENOUS; SUBCUTANEOUS at 22:17

## 2023-09-22 RX ADMIN — LIDOCAINE 1 PATCH: 4 CREAM TOPICAL at 11:50

## 2023-09-22 RX ADMIN — CYCLOBENZAPRINE HYDROCHLORIDE 5 MILLIGRAM(S): 10 TABLET, FILM COATED ORAL at 10:51

## 2023-09-22 RX ADMIN — LIDOCAINE 1 PATCH: 4 CREAM TOPICAL at 16:58

## 2023-09-22 RX ADMIN — LOSARTAN POTASSIUM 100 MILLIGRAM(S): 100 TABLET, FILM COATED ORAL at 05:03

## 2023-09-22 RX ADMIN — BUMETANIDE 2 MILLIGRAM(S): 0.25 INJECTION INTRAMUSCULAR; INTRAVENOUS at 14:59

## 2023-09-22 RX ADMIN — OXYCODONE HYDROCHLORIDE 10 MILLIGRAM(S): 5 TABLET ORAL at 00:37

## 2023-09-22 RX ADMIN — Medication 1 TABLET(S): at 09:11

## 2023-09-22 NOTE — PROGRESS NOTE ADULT - PROBLEM SELECTOR PLAN 1
Patient with syncopal episode after blowing his nose   -Hx is consistent with situational syncope   -EKG shows no acute changes. Trops stable at 16. No active chest pain, SOB, palpitations or dizziness. Less likely cardiac in etiology   -CT head is negative for bleeding   -F/u with orthostatics - unable to perform due to back pain and unable to stand.  - outpatient echo from 5/5/23 showed nl LV function, EF 60%, mild diastolic dysfunction. normal RV function. trivial MR and TR, normal Aortic valve. normal Pulmonic valve.   - patient has reported intermittent dizziness since his hospitalization for heart failure and placed on higher dose of Bumex.  - echo result reviewed, grossly nl LV function.

## 2023-09-22 NOTE — PROGRESS NOTE ADULT - SUBJECTIVE AND OBJECTIVE BOX
[facility] Division of Hospital Medicine  Nehemiah Pierre MD  In House Pager [phone]    Patient is a 67y old  Male who presents with a chief complaint of syncope (22 Sep 2023 06:47)    SUBJECTIVE / OVERNIGHT EVENTS: no acute events. echo reviewed, grossly nl LV function.     ROS: Denied Fever, Chill, CP, SOB, Abd pain, N/V/D, LE swelling or pain.     MEDICATIONS  (STANDING):  allopurinol 100 milliGRAM(s) Oral at bedtime  amLODIPine   Tablet 10 milliGRAM(s) Oral daily  aspirin enteric coated 81 milliGRAM(s) Oral daily  atorvastatin 40 milliGRAM(s) Oral at bedtime  buMETAnide 2 milliGRAM(s) Oral <User Schedule>  carvedilol 12.5 milliGRAM(s) Oral every 12 hours  dextrose 5%. 1000 milliLiter(s) (50 mL/Hr) IV Continuous <Continuous>  dextrose 5%. 1000 milliLiter(s) (100 mL/Hr) IV Continuous <Continuous>  dextrose 50% Injectable 25 Gram(s) IV Push once  dextrose 50% Injectable 12.5 Gram(s) IV Push once  dextrose 50% Injectable 25 Gram(s) IV Push once  gabapentin 300 milliGRAM(s) Oral four times a day  glucagon  Injectable 1 milliGRAM(s) IntraMuscular once  heparin   Injectable 5000 Unit(s) SubCutaneous every 8 hours  influenza  Vaccine (HIGH DOSE) 0.7 milliLiter(s) IntraMuscular once  insulin lispro (ADMELOG) corrective regimen sliding scale   SubCutaneous three times a day before meals  insulin lispro (ADMELOG) corrective regimen sliding scale   SubCutaneous at bedtime  lidocaine   4% Patch 1 Patch Transdermal daily  losartan 100 milliGRAM(s) Oral daily  multivitamin 1 Tablet(s) Oral daily  naloxone Injectable 0.4 milliGRAM(s) IV Push once  polyethylene glycol 3350 17 Gram(s) Oral two times a day  senna 2 Tablet(s) Oral at bedtime    MEDICATIONS  (PRN):  acetaminophen     Tablet .. 650 milliGRAM(s) Oral every 6 hours PRN Temp greater or equal to 38C (100.4F), Mild Pain (1 - 3)  aluminum hydroxide/magnesium hydroxide/simethicone Suspension 30 milliLiter(s) Oral every 4 hours PRN Dyspepsia  benzocaine/menthol Lozenge 1 Lozenge Oral every 4 hours PRN Sore Throat  bisacodyl 5 milliGRAM(s) Oral every 12 hours PRN Constipation  cyclobenzaprine 5 milliGRAM(s) Oral two times a day PRN Muscle Spasm  dextrose Oral Gel 15 Gram(s) Oral once PRN Blood Glucose LESS THAN 70 milliGRAM(s)/deciliter  guaiFENesin Oral Liquid (Sugar-Free) 100 milliGRAM(s) Oral every 6 hours PRN Cough  melatonin 3 milliGRAM(s) Oral at bedtime PRN Insomnia  ondansetron Injectable 4 milliGRAM(s) IV Push every 8 hours PRN Nausea and/or Vomiting  oxyCODONE    IR 5 milliGRAM(s) Oral every 4 hours PRN Moderate Pain (4 - 6)  oxyCODONE    IR 10 milliGRAM(s) Oral every 4 hours PRN Severe Pain (7 - 10)    CAPILLARY BLOOD GLUCOSE      POCT Blood Glucose.: 165 mg/dL (22 Sep 2023 08:33)  POCT Blood Glucose.: 160 mg/dL (21 Sep 2023 21:55)  POCT Blood Glucose.: 223 mg/dL (21 Sep 2023 17:21)  POCT Blood Glucose.: 155 mg/dL (21 Sep 2023 13:21)    I&O's Summary    21 Sep 2023 07:01  -  22 Sep 2023 07:00  --------------------------------------------------------  IN: 0 mL / OUT: 975 mL / NET: -975 mL        Vital Signs Last 24 Hrs  T(C): 36.8 (22 Sep 2023 05:00), Max: 37.2 (21 Sep 2023 21:20)  T(F): 98.2 (22 Sep 2023 05:00), Max: 98.9 (21 Sep 2023 21:20)  HR: 71 (22 Sep 2023 07:54) (71 - 79)  BP: 151/85 (22 Sep 2023 05:00) (141/63 - 153/70)  BP(mean): --  RR: 18 (22 Sep 2023 05:00) (18 - 18)  SpO2: 98% (22 Sep 2023 07:54) (96% - 100%)    Parameters below as of 22 Sep 2023 05:00  Patient On (Oxygen Delivery Method): room air        LABS:                        13.8   8.76  )-----------( 210      ( 22 Sep 2023 06:00 )             40.1     09-22    136  |  96<L>  |  19  ----------------------------<  178<H>  3.7   |  26  |  0.59    Ca    9.3      22 Sep 2023 06:00  Phos  3.8     09-22  Mg     2.00     09-22      PT/INR - ( 22 Sep 2023 06:00 )   PT: 11.4 sec;   INR: 1.02 ratio         PTT - ( 22 Sep 2023 06:00 )  PTT:26.6 sec      Urinalysis Basic - ( 22 Sep 2023 06:00 )    Color: x / Appearance: x / SG: x / pH: x  Gluc: 178 mg/dL / Ketone: x  / Bili: x / Urobili: x   Blood: x / Protein: x / Nitrite: x   Leuk Esterase: x / RBC: x / WBC x   Sq Epi: x / Non Sq Epi: x / Bacteria: x        RADIOLOGY & ADDITIONAL TESTS:  Results Reviewed: Y  Imaging Personally Reviewed: Y  Electrocardiogram Personally Reviewed: Y    COORDINATION OF CARE:  Care Discussed with Consultants/Other Providers [Y/N]: Y  Prior or Outpatient Records Reviewed [Y/N]: Y   [facility]  Nehemiah Pierre MD  In House Pager [phone]    Patient is a 67y old  Male who presents with a chief complaint of syncope (22 Sep 2023 06:47)    SUBJECTIVE / OVERNIGHT EVENTS: no acute events. echo reviewed, grossly nl LV function. still no BM. still have back pain require rtc oxycodone. flexeril helps with spasm. no new SOB or CP.     ROS: Denied Fever, Chill, CP, SOB, Abd pain, N/V/D, LE swelling or pain.     MEDICATIONS  (STANDING):  allopurinol 100 milliGRAM(s) Oral at bedtime  amLODIPine   Tablet 10 milliGRAM(s) Oral daily  aspirin enteric coated 81 milliGRAM(s) Oral daily  atorvastatin 40 milliGRAM(s) Oral at bedtime  buMETAnide 2 milliGRAM(s) Oral <User Schedule>  carvedilol 12.5 milliGRAM(s) Oral every 12 hours  dextrose 5%. 1000 milliLiter(s) (50 mL/Hr) IV Continuous <Continuous>  dextrose 5%. 1000 milliLiter(s) (100 mL/Hr) IV Continuous <Continuous>  dextrose 50% Injectable 25 Gram(s) IV Push once  dextrose 50% Injectable 12.5 Gram(s) IV Push once  dextrose 50% Injectable 25 Gram(s) IV Push once  gabapentin 300 milliGRAM(s) Oral four times a day  glucagon  Injectable 1 milliGRAM(s) IntraMuscular once  heparin   Injectable 5000 Unit(s) SubCutaneous every 8 hours  influenza  Vaccine (HIGH DOSE) 0.7 milliLiter(s) IntraMuscular once  insulin lispro (ADMELOG) corrective regimen sliding scale   SubCutaneous three times a day before meals  insulin lispro (ADMELOG) corrective regimen sliding scale   SubCutaneous at bedtime  lidocaine   4% Patch 1 Patch Transdermal daily  losartan 100 milliGRAM(s) Oral daily  multivitamin 1 Tablet(s) Oral daily  naloxone Injectable 0.4 milliGRAM(s) IV Push once  polyethylene glycol 3350 17 Gram(s) Oral two times a day  senna 2 Tablet(s) Oral at bedtime    MEDICATIONS  (PRN):  acetaminophen     Tablet .. 650 milliGRAM(s) Oral every 6 hours PRN Temp greater or equal to 38C (100.4F), Mild Pain (1 - 3)  aluminum hydroxide/magnesium hydroxide/simethicone Suspension 30 milliLiter(s) Oral every 4 hours PRN Dyspepsia  benzocaine/menthol Lozenge 1 Lozenge Oral every 4 hours PRN Sore Throat  bisacodyl 5 milliGRAM(s) Oral every 12 hours PRN Constipation  cyclobenzaprine 5 milliGRAM(s) Oral two times a day PRN Muscle Spasm  dextrose Oral Gel 15 Gram(s) Oral once PRN Blood Glucose LESS THAN 70 milliGRAM(s)/deciliter  guaiFENesin Oral Liquid (Sugar-Free) 100 milliGRAM(s) Oral every 6 hours PRN Cough  melatonin 3 milliGRAM(s) Oral at bedtime PRN Insomnia  ondansetron Injectable 4 milliGRAM(s) IV Push every 8 hours PRN Nausea and/or Vomiting  oxyCODONE    IR 5 milliGRAM(s) Oral every 4 hours PRN Moderate Pain (4 - 6)  oxyCODONE    IR 10 milliGRAM(s) Oral every 4 hours PRN Severe Pain (7 - 10)    CAPILLARY BLOOD GLUCOSE      POCT Blood Glucose.: 165 mg/dL (22 Sep 2023 08:33)  POCT Blood Glucose.: 160 mg/dL (21 Sep 2023 21:55)  POCT Blood Glucose.: 223 mg/dL (21 Sep 2023 17:21)  POCT Blood Glucose.: 155 mg/dL (21 Sep 2023 13:21)    I&O's Summary    21 Sep 2023 07:01  -  22 Sep 2023 07:00  --------------------------------------------------------  IN: 0 mL / OUT: 975 mL / NET: -975 mL        Vital Signs Last 24 Hrs  T(C): 36.8 (22 Sep 2023 05:00), Max: 37.2 (21 Sep 2023 21:20)  T(F): 98.2 (22 Sep 2023 05:00), Max: 98.9 (21 Sep 2023 21:20)  HR: 71 (22 Sep 2023 07:54) (71 - 79)  BP: 151/85 (22 Sep 2023 05:00) (141/63 - 153/70)  BP(mean): --  RR: 18 (22 Sep 2023 05:00) (18 - 18)  SpO2: 98% (22 Sep 2023 07:54) (96% - 100%)    Parameters below as of 22 Sep 2023 05:00  Patient On (Oxygen Delivery Method): room air        LABS:                        13.8   8.76  )-----------( 210      ( 22 Sep 2023 06:00 )             40.1     09-22    136  |  96<L>  |  19  ----------------------------<  178<H>  3.7   |  26  |  0.59    Ca    9.3      22 Sep 2023 06:00  Phos  3.8     09-22  Mg     2.00     09-22      PT/INR - ( 22 Sep 2023 06:00 )   PT: 11.4 sec;   INR: 1.02 ratio         PTT - ( 22 Sep 2023 06:00 )  PTT:26.6 sec      Urinalysis Basic - ( 22 Sep 2023 06:00 )    Color: x / Appearance: x / SG: x / pH: x  Gluc: 178 mg/dL / Ketone: x  / Bili: x / Urobili: x   Blood: x / Protein: x / Nitrite: x   Leuk Esterase: x / RBC: x / WBC x   Sq Epi: x / Non Sq Epi: x / Bacteria: x        RADIOLOGY & ADDITIONAL TESTS:  Results Reviewed: Y  Imaging Personally Reviewed: Y  Electrocardiogram Personally Reviewed: Y    COORDINATION OF CARE:  Care Discussed with Consultants/Other Providers [Y/N]: Y  Prior or Outpatient Records Reviewed [Y/N]: KATHARINE

## 2023-09-22 NOTE — PROGRESS NOTE ADULT - PROBLEM SELECTOR PLAN 5
C/w carvedilol 12,5 mg BID, losartan 100 mg daily, and Amlodipine 10 mg daily C/w ISS and FS   -HgbA1c - 6.5   -Hold oral home meds

## 2023-09-22 NOTE — DISCHARGE NOTE PROVIDER - DETAILS OF MALNUTRITION DIAGNOSIS/DIAGNOSES
This patient has been assessed with a concern for Malnutrition and was treated during this hospitalization for the following Nutrition diagnosis/diagnoses:     -  09/20/2023: Morbid obesity (BMI > 40)

## 2023-09-22 NOTE — PROGRESS NOTE ADULT - PROBLEM SELECTOR PLAN 6
DVT ppx heparin subQ  dispo: pending spine surgery. C/w carvedilol 12,5 mg BID, losartan 100 mg daily, and Amlodipine 10 mg daily

## 2023-09-22 NOTE — DISCHARGE NOTE PROVIDER - NSDCCPCAREPLAN_GEN_ALL_CORE_FT
PRINCIPAL DISCHARGE DIAGNOSIS  Diagnosis: Syncope  Assessment and Plan of Treatment: this is likely vasovagal with concurrently on diuretics. your telemetry and echo was unremarkable. follow up with your cardiologist.      SECONDARY DISCHARGE DIAGNOSES  Diagnosis: Traumatic fracture of thoracic spine  Assessment and Plan of Treatment: you have surgery of your lumbar spine. follow up with orthopedics.

## 2023-09-22 NOTE — PROGRESS NOTE ADULT - PROBLEM SELECTOR PLAN 3
Patient with hx of heart failure   -Not in acute exacerbation, clinically euvolemic.  -home bumex 4 mg and 2 mg in the AM (alternate every other day) and 2 mg at bedtime   -C/w carvedilol 12.5 mg BID   -C/w telemonitoring - no events.   - suspecting syncope related to diuretics. decreased Bumex to 2mg bid.

## 2023-09-22 NOTE — PROGRESS NOTE ADULT - PROBLEM SELECTOR PLAN 4
C/w ISS and FS   -HgbA1c - 6.5   -Hold oral home meds no BM since admission. feels bloated.   c/w Senna qd  c/w Miralax bid  c/w Dulcolax bid standing.   SMOG enema today.

## 2023-09-22 NOTE — DISCHARGE NOTE PROVIDER - HOSPITAL COURSE
68 yo M with PMhx of HFrEF, CAD s/p CABG, ASIYA on CPAP, HTN, DM, right renal mass s/p resection presents to the ED after a syncopal episode, found to have T12-L1 fracture. could not perform MRI due to size incompatibility. Now plan for OR the L/T spine fusion on 9/23. medically optimized for surgery. 68 yo M with PMhx of HFrEF, CAD s/p CABG, ASIYA on CPAP, HTN, DM, right renal mass s/p resection presents to the ED after a syncopal episode, found to have T12-L1 fracture on imaging. Patient originally scheduled for an ACDF with Dr. Hughes, due to injury he was changed to a T9-pelvis revision PSF. Patient underwent procedure without MRI imaging due to body habitus limitations. Physical therapy evaluated patient postoperatively and recommended discharge to [facility]. Rest of hospital stay unremarkable and patient discharged when medically cleared/bed available. 66 yo M with PMhx of HFrEF, CAD s/p CABG, ASIYA on CPAP, HTN, DM, right renal mass s/p resection presents to the ED after a syncopal episode, found to have T12-L1 fracture on imaging. Patient originally scheduled for an ACDF with Dr. Hughes, due to injury he was changed to a T9-pelvis revision PSF. Patient underwent procedure without MRI imaging due to body habitus limitations. Physical therapy evaluated patient postoperatively and recommended discharge to [facility]. Rest of hospital stay unremarkable and patient discharged when medically cleared/bed available.    Please followup with your primary care MD as the following meds have been changed:  Losartan now 25 mg po daily  Coreg now 6.25 mg po daily  New med: Bumex 2 mg po BID  Discontinued: Amlodipine

## 2023-09-22 NOTE — DISCHARGE NOTE PROVIDER - NSDCMRMEDTOKEN_GEN_ALL_CORE_FT
allopurinol 100 mg oral tablet: 1 tab(s) orally once a day (at bedtime)  amLODIPine 10 mg oral tablet: 1 tab(s) orally once a day AM  aspirin 81 mg oral delayed release tablet: 1 tab(s) orally once a day  atorvastatin 40 mg oral tablet: 1 tab(s) orally once a day AM  bumetanide 2 mg oral tablet: 1 tab(s) orally once a day (at bedtime)  bumetanide 2 mg oral tablet: 1 tab(s) orally every other day in AM (alternating with 4mg dose)  bumetanide 2 mg oral tablet: 2 tab(s) orally every other day in AM (alternating with 2mg dose)  calcium with vitamin D3: 1 tab orally once a day  carvedilol 12.5 mg oral tablet: 1 tab(s) orally every 12 hours  gabapentin 300 mg oral capsule: 1 orally 4 times a day  glimepiride 1 mg oral tablet: 1 orally once a day  losartan 100 mg oral tablet: 1 tab(s) orally once a day AM  magnesium: 2 tabs orally once a day  Multiple Vitamins oral capsule: 1 orally once a day last dose 9/11/23  vitamin B 12: 1 tab orally once a day   acetaminophen 325 mg oral tablet: 3 tab(s) orally every 8 hours  allopurinol 100 mg oral tablet: 1 tab(s) orally once a day (at bedtime)  aluminum hydroxide-magnesium hydroxide 200 mg-200 mg/5 mL oral suspension: 30 milliliter(s) orally every 4 hours As needed Dyspepsia  aspirin 81 mg oral delayed release tablet: 1 tab(s) orally once a day  atorvastatin 40 mg oral tablet: 1 tab(s) orally once a day (at bedtime)  bisacodyl 5 mg oral delayed release tablet: 1 tab(s) orally every 12 hours  bumetanide 2 mg oral tablet: 1 tab(s) orally 2 times a day  calcium with vitamin D3: 1 tab orally once a day  carvedilol 6.25 mg oral tablet: 1 tab(s) orally every 12 hours  cyclobenzaprine 10 mg oral tablet: 1 tab(s) orally 3 times a day As needed Muscle Spasm  gabapentin 300 mg oral capsule: 1 cap(s) orally 4 times a day  glimepiride 1 mg oral tablet: 1 orally once a day  guaiFENesin 100 mg/5 mL oral liquid: 5 milliliter(s) orally every 6 hours As needed Cough  heparin: 5,000 milligram(s) subcutaneous every 8 hours  losartan 25 mg oral tablet: 1 tab(s) orally once a day  magnesium: 2 tabs orally once a day  magnesium hydroxide 8% oral suspension: 30 milliliter(s) orally every 12 hours As needed Constipation  melatonin 3 mg oral tablet: 1 tab(s) orally once a day (at bedtime) As needed Insomnia  Multiple Vitamins oral tablet: 1 tab(s) orally once a day  oxyCODONE 10 mg oral tablet: 1 tab(s) orally every 3 hours As needed Moderate Pain (4 - 6)  oxyCODONE 15 mg oral tablet: 1 tab(s) orally every 3 hours As needed Severe Pain (7 - 10)  pantoprazole 40 mg oral delayed release tablet: 1 tab(s) orally once a day (before a meal)  polyethylene glycol 3350 oral powder for reconstitution: 17 gram(s) orally 2 times a day  senna leaf extract oral tablet: 2 tab(s) orally once a day (at bedtime)  vitamin B 12: 1 tab orally once a day

## 2023-09-22 NOTE — PROGRESS NOTE ADULT - ASSESSMENT
66 yo M with PMhx of HFrEF, CAD s/p CABG, ASIYA on CPAP, HTN, DM, right renal mass s/p resection presents to the ED after a syncopal episode, found to have T12-L1 fracture. could not perform MRI due to size incompatibility. will plan for OR tomorrow.     Physical Exam:  GENERAL: no apparent distress  CHEST/LUNG: on RA; Clear to auscultation bilaterally; No wheezing; No crackles  HEART: Regular rate and rhythm; S1/S2 wnl; no obvious murmurs  ABDOMEN: Soft, Nontender, Nondistended; Bowel sounds present  EXTREMITIES:  2+ Peripheral Pulses, No edema  PSYCH: normal affect, calm demeanor  NEUROLOGY: AAOX3, CN2-12 intact;  66 yo M with PMhx of HFrEF, CAD s/p CABG, ASIYA on CPAP, HTN, DM, right renal mass s/p resection presents to the ED after a syncopal episode, found to have T12-L1 fracture. could not perform MRI due to size incompatibility. Now plan for OR the L/T spine fusion on 9/23. medically optimized for surgery.     Physical Exam:  GENERAL: no apparent distress  CHEST/LUNG: on RA; Clear to auscultation bilaterally; No wheezing; No crackles  HEART: Regular rate and rhythm; S1/S2 wnl; no obvious murmurs  ABDOMEN: Soft, Nontender, Nondistended; Bowel sounds present  EXTREMITIES:  2+ Peripheral Pulses, No edema  PSYCH: normal affect, calm demeanor  NEUROLOGY: AAOX3, CN2-12 intact;

## 2023-09-22 NOTE — DISCHARGE NOTE PROVIDER - NSDCFUSCHEDAPPT_GEN_ALL_CORE_FT
Jose C Hughes  [facility]  ONCORTHO [address] [place] [address]  Scheduled Appointment: 10/02/2023    [facility]  UROLOGY [address] [place] [address] R  Scheduled Appointment: 11/03/2023    Gadiel Miranda  [facility]  UROLOGY [address] [place] [address] R  Scheduled Appointment: 11/03/2023

## 2023-09-22 NOTE — DISCHARGE NOTE PROVIDER - CARE PROVIDER_API CALL
Jose C Hughes  Spine Surgery  [address] [place] [address]  Phone: [phone]  Fax: [phone]  Follow Up Time:

## 2023-09-22 NOTE — DISCHARGE NOTE PROVIDER - NSDCCPTREATMENT_GEN_ALL_CORE_FT
PRINCIPAL PROCEDURE  Procedure: Fusion, spine, lumbar, posterolateral approach  Findings and Treatment:

## 2023-09-22 NOTE — PROGRESS NOTE ADULT - PROBLEM SELECTOR PLAN 2
Patient with T12-L1 fracture   -Ortho consulted, recs appreciated   -Patient was also scheduled for cervical surgery as outpatient. Might undergo both surgery together as per ortho team.   - unable to perform MRI due to patient's large diameter.   - discussed with MRI supervisor, the MRI in [facility] is same size. no open MRI in health system.   - discussed with ortho team, follow up recs in term of surgical plans.    Pre-operative medical assessment  Patient with history of CAD, HFrEF (EF60% in May), ASIYA on CPAP, HTN, DM on Insulin.   RCRI 3, baseline METs limited due to obesity and comobidities.   echo from 5/2023 with normal EF, syncope on 9/18 is likely vasovagal iso diuretics use. tele NSR with few PVC.   repeat echo from 9/22/23 showed a grossly normal LV function.   patient is clinically euvolemic, w/o signs of ACS.   patient is elevated risk for surgery.   Patient is medically optimized for surgery, no further cardiac testing indicated at this time.   hold AC and diuretics on day of surgery.   reduce Lantus dose by 25% while NPO.   perioperative management with CPAP. Patient with T12-L1 fracture   -Ortho consulted, recs appreciated   -Patient was also scheduled for cervical surgery as outpatient. Might undergo both surgery together as per ortho team.   - unable to perform MRI due to patient's large diameter.   - discussed with MRI supervisor, the MRI in [facility] is same size. no open MRI in health system.   - discussed with ortho team, follow up recs in term of surgical plans.    Pre-operative medical assessment  Patient with history of CAD, HFrEF (EF60% in May), ASIYA on CPAP, HTN, DM on Insulin.   RCRI 3, baseline METs limited due to obesity and comobidities.   echo from 5/2023 with normal EF, syncope on 9/18 is likely vasovagal iso diuretics use. tele NSR with few PVC.   repeat echo from 9/22/23 showed a grossly normal LV function.   patient is clinically euvolemic, w/o signs of ACS.   patient is elevated risk for surgery.   Patient is medically optimized for surgery, no further cardiac testing indicated at this time.   hold AC after MN  can continue Bumex on day of surgery. Patient went into decompensated HF last time they held his lasix for surgery.   reduce Lantus dose by 25% while NPO.   perioperative management with CPAP. /tele.

## 2023-09-22 NOTE — PROGRESS NOTE ADULT - SUBJECTIVE AND OBJECTIVE BOX
ORTHOPEDIC PROGRESS NOTE      Overnight events: None    SUBJECTIVE: Pt seen and examined at bedside. Patient is doing well, no acute complaints this AM. Pain is controlled with medication      OBJECTIVE:  Vital Signs Last 24 Hrs  T(C): 36.8 (22 Sep 2023 05:00), Max: 37.2 (21 Sep 2023 21:20)  T(F): 98.2 (22 Sep 2023 05:00), Max: 98.9 (21 Sep 2023 21:20)  HR: 71 (22 Sep 2023 05:00) (71 - 80)  BP: 151/85 (22 Sep 2023 05:00) (141/63 - 153/70)  BP(mean): --  RR: 18 (22 Sep 2023 05:00) (18 - 18)  SpO2: 98% (22 Sep 2023 05:00) (96% - 100%)    Parameters below as of 22 Sep 2023 05:00  Patient On (Oxygen Delivery Method): room air          09-20-23 @ 07:01  -  09-21-23 @ 07:00  --------------------------------------------------------  IN: 0 mL / OUT: 1225 mL / NET: -1225 mL    09-21-23 @ 07:01  -  09-22-23 @ 06:47  --------------------------------------------------------  IN: 0 mL / OUT: 975 mL / NET: -975 mL        Physical Examination:  GEN: NAD, resting quietly  PULM: symmetric chest rise bilaterally, no increased WOB  ABD: nondistended   SPINE:    Motor:               Deltoid       Bicep       Tricep     Wrist Ext      Wrist Flex    Finger Flex      Finger Abd  R             5/5            5/5           5/5            5/5                 5/5	           5/5                   5/5  L              5/5            5/5           5/5            5/5                 5/5               5/5                   5/5                Hip Flex        Quad      Ankle DF     Ankle PF     Toe Ext      Hamstring    R            5/5               5/5            5/5               5/5              5/5	        5/5  L             5/5               5/5            5/5               5/5              5/5               5/5    Sensory:   (0 = absent, 1 = impaired, 2 = normal, NT = not testable)              C5         C6     C7      C8       T1          R         2            2       2        2         2  L          2            2       2        2         2               L2          L3         L4      L5       S1     R         2            2           2         2       1  L          2            2           2        2        1      (Patient has known bilateral neuropathy)      LABS:                        13.2   9.25  )-----------( 184      ( 21 Sep 2023 07:30 )             38.4       09-21    134<L>  |  95<L>  |  13  ----------------------------<  170<H>  3.7   |  27  |  0.56    Ca    9.1      21 Sep 2023 07:30  Phos  3.2     09-21  Mg     1.80     09-21

## 2023-09-23 ENCOUNTER — APPOINTMENT (OUTPATIENT)
Dept: ORTHOPEDIC SURGERY | Facility: HOSPITAL | Age: 68
End: 2023-09-23
Payer: MEDICARE

## 2023-09-23 LAB
ALBUMIN SERPL ELPH-MCNC: 3.5 G/DL — SIGNIFICANT CHANGE UP (ref 3.3–5)
ALP SERPL-CCNC: 116 U/L — SIGNIFICANT CHANGE UP (ref 40–120)
ALT FLD-CCNC: 23 U/L — SIGNIFICANT CHANGE UP (ref 4–41)
ANION GAP SERPL CALC-SCNC: 14 MMOL/L — SIGNIFICANT CHANGE UP (ref 7–14)
ANION GAP SERPL CALC-SCNC: 15 MMOL/L — HIGH (ref 7–14)
APTT BLD: 26.5 SEC — SIGNIFICANT CHANGE UP (ref 24.5–35.6)
AST SERPL-CCNC: 40 U/L — SIGNIFICANT CHANGE UP (ref 4–40)
BASOPHILS # BLD AUTO: 0.05 K/UL — SIGNIFICANT CHANGE UP (ref 0–0.2)
BASOPHILS NFR BLD AUTO: 0.6 % — SIGNIFICANT CHANGE UP (ref 0–2)
BILIRUB SERPL-MCNC: 0.6 MG/DL — SIGNIFICANT CHANGE UP (ref 0.2–1.2)
BLD GP AB SCN SERPL QL: NEGATIVE — SIGNIFICANT CHANGE UP
BLOOD GAS ARTERIAL - LYTES,HGB,ICA,LACT RESULT: SIGNIFICANT CHANGE UP
BUN SERPL-MCNC: 26 MG/DL — HIGH (ref 7–23)
BUN SERPL-MCNC: 31 MG/DL — HIGH (ref 7–23)
CA-I BLD-SCNC: 1.1 MMOL/L — LOW (ref 1.15–1.29)
CALCIUM SERPL-MCNC: 8.9 MG/DL — SIGNIFICANT CHANGE UP (ref 8.4–10.5)
CALCIUM SERPL-MCNC: 9.1 MG/DL — SIGNIFICANT CHANGE UP (ref 8.4–10.5)
CHLORIDE SERPL-SCNC: 96 MMOL/L — LOW (ref 98–107)
CHLORIDE SERPL-SCNC: 97 MMOL/L — LOW (ref 98–107)
CO2 SERPL-SCNC: 23 MMOL/L — SIGNIFICANT CHANGE UP (ref 22–31)
CO2 SERPL-SCNC: 24 MMOL/L — SIGNIFICANT CHANGE UP (ref 22–31)
CREAT SERPL-MCNC: 0.63 MG/DL — SIGNIFICANT CHANGE UP (ref 0.5–1.3)
CREAT SERPL-MCNC: 1.15 MG/DL — SIGNIFICANT CHANGE UP (ref 0.5–1.3)
EGFR: 104 ML/MIN/1.73M2 — SIGNIFICANT CHANGE UP
EGFR: 70 ML/MIN/1.73M2 — SIGNIFICANT CHANGE UP
EOSINOPHIL # BLD AUTO: 0.24 K/UL — SIGNIFICANT CHANGE UP (ref 0–0.5)
EOSINOPHIL NFR BLD AUTO: 2.9 % — SIGNIFICANT CHANGE UP (ref 0–6)
GLUCOSE BLDC GLUCOMTR-MCNC: 166 MG/DL — HIGH (ref 70–99)
GLUCOSE BLDC GLUCOMTR-MCNC: 244 MG/DL — HIGH (ref 70–99)
GLUCOSE BLDC GLUCOMTR-MCNC: 245 MG/DL — HIGH (ref 70–99)
GLUCOSE SERPL-MCNC: 181 MG/DL — HIGH (ref 70–99)
GLUCOSE SERPL-MCNC: 248 MG/DL — HIGH (ref 70–99)
HCT VFR BLD CALC: 34.7 % — LOW (ref 39–50)
HCT VFR BLD CALC: 39.3 % — SIGNIFICANT CHANGE UP (ref 39–50)
HGB BLD-MCNC: 12 G/DL — LOW (ref 13–17)
HGB BLD-MCNC: 13.3 G/DL — SIGNIFICANT CHANGE UP (ref 13–17)
IANC: 6.29 K/UL — SIGNIFICANT CHANGE UP (ref 1.8–7.4)
IMM GRANULOCYTES NFR BLD AUTO: 0.8 % — SIGNIFICANT CHANGE UP (ref 0–0.9)
INR BLD: 1.03 RATIO — SIGNIFICANT CHANGE UP (ref 0.85–1.18)
LYMPHOCYTES # BLD AUTO: 1 K/UL — SIGNIFICANT CHANGE UP (ref 1–3.3)
LYMPHOCYTES # BLD AUTO: 12 % — LOW (ref 13–44)
MAGNESIUM SERPL-MCNC: 2 MG/DL — SIGNIFICANT CHANGE UP (ref 1.6–2.6)
MAGNESIUM SERPL-MCNC: 2.1 MG/DL — SIGNIFICANT CHANGE UP (ref 1.6–2.6)
MCHC RBC-ENTMCNC: 29.1 PG — SIGNIFICANT CHANGE UP (ref 27–34)
MCHC RBC-ENTMCNC: 29.6 PG — SIGNIFICANT CHANGE UP (ref 27–34)
MCHC RBC-ENTMCNC: 33.8 GM/DL — SIGNIFICANT CHANGE UP (ref 32–36)
MCHC RBC-ENTMCNC: 34.6 GM/DL — SIGNIFICANT CHANGE UP (ref 32–36)
MCV RBC AUTO: 85.7 FL — SIGNIFICANT CHANGE UP (ref 80–100)
MCV RBC AUTO: 86 FL — SIGNIFICANT CHANGE UP (ref 80–100)
MONOCYTES # BLD AUTO: 0.68 K/UL — SIGNIFICANT CHANGE UP (ref 0–0.9)
MONOCYTES NFR BLD AUTO: 8.2 % — SIGNIFICANT CHANGE UP (ref 2–14)
NEUTROPHILS # BLD AUTO: 6.29 K/UL — SIGNIFICANT CHANGE UP (ref 1.8–7.4)
NEUTROPHILS NFR BLD AUTO: 75.5 % — SIGNIFICANT CHANGE UP (ref 43–77)
NRBC # BLD: 0 /100 WBCS — SIGNIFICANT CHANGE UP (ref 0–0)
NRBC # BLD: 0 /100 WBCS — SIGNIFICANT CHANGE UP (ref 0–0)
NRBC # FLD: 0 K/UL — SIGNIFICANT CHANGE UP (ref 0–0)
NRBC # FLD: 0 K/UL — SIGNIFICANT CHANGE UP (ref 0–0)
PHOSPHATE SERPL-MCNC: 3.9 MG/DL — SIGNIFICANT CHANGE UP (ref 2.5–4.5)
PHOSPHATE SERPL-MCNC: 5.7 MG/DL — HIGH (ref 2.5–4.5)
PLATELET # BLD AUTO: 226 K/UL — SIGNIFICANT CHANGE UP (ref 150–400)
PLATELET # BLD AUTO: 242 K/UL — SIGNIFICANT CHANGE UP (ref 150–400)
POTASSIUM SERPL-MCNC: 4.5 MMOL/L — SIGNIFICANT CHANGE UP (ref 3.5–5.3)
POTASSIUM SERPL-MCNC: 4.8 MMOL/L — SIGNIFICANT CHANGE UP (ref 3.5–5.3)
POTASSIUM SERPL-SCNC: 4.5 MMOL/L — SIGNIFICANT CHANGE UP (ref 3.5–5.3)
POTASSIUM SERPL-SCNC: 4.8 MMOL/L — SIGNIFICANT CHANGE UP (ref 3.5–5.3)
PROT SERPL-MCNC: 6.7 G/DL — SIGNIFICANT CHANGE UP (ref 6–8.3)
PROTHROM AB SERPL-ACNC: 11.5 SEC — SIGNIFICANT CHANGE UP (ref 9.5–13)
RBC # BLD: 4.05 M/UL — LOW (ref 4.2–5.8)
RBC # BLD: 4.57 M/UL — SIGNIFICANT CHANGE UP (ref 4.2–5.8)
RBC # FLD: 13.3 % — SIGNIFICANT CHANGE UP (ref 10.3–14.5)
RBC # FLD: 13.3 % — SIGNIFICANT CHANGE UP (ref 10.3–14.5)
RH IG SCN BLD-IMP: POSITIVE — SIGNIFICANT CHANGE UP
SODIUM SERPL-SCNC: 134 MMOL/L — LOW (ref 135–145)
SODIUM SERPL-SCNC: 135 MMOL/L — SIGNIFICANT CHANGE UP (ref 135–145)
WBC # BLD: 13.8 K/UL — HIGH (ref 3.8–10.5)
WBC # BLD: 8.33 K/UL — SIGNIFICANT CHANGE UP (ref 3.8–10.5)
WBC # FLD AUTO: 13.8 K/UL — HIGH (ref 3.8–10.5)
WBC # FLD AUTO: 8.33 K/UL — SIGNIFICANT CHANGE UP (ref 3.8–10.5)

## 2023-09-23 PROCEDURE — 22843 INSERT SPINE FIXATION DEVICE: CPT

## 2023-09-23 PROCEDURE — 22614 ARTHRD PST TQ 1NTRSPC EA ADD: CPT

## 2023-09-23 PROCEDURE — 22612 ARTHRD PST TQ 1NTRSPC LUMBAR: CPT

## 2023-09-23 PROCEDURE — 99233 SBSQ HOSP IP/OBS HIGH 50: CPT

## 2023-09-23 PROCEDURE — 22830 EXPLORATION OF SPINAL FUSION: CPT | Mod: 59

## 2023-09-23 DEVICE — SET SCREW EVEREST ATR: Type: IMPLANTABLE DEVICE | Status: FUNCTIONAL

## 2023-09-23 DEVICE — SURGIFOAM PAD 8CM X 12.5CM X 2MM (100C): Type: IMPLANTABLE DEVICE | Status: FUNCTIONAL

## 2023-09-23 DEVICE — BONE WAX 2.5GM: Type: IMPLANTABLE DEVICE | Status: FUNCTIONAL

## 2023-09-23 DEVICE — ROD STR 5.5X500MM: Type: IMPLANTABLE DEVICE | Status: FUNCTIONAL

## 2023-09-23 DEVICE — SURGIFLO MATRIX WITH THROMBIN KIT: Type: IMPLANTABLE DEVICE | Status: FUNCTIONAL

## 2023-09-23 DEVICE — SCREW EVEREST POLY 6.5X40MM: Type: IMPLANTABLE DEVICE | Status: FUNCTIONAL

## 2023-09-23 DEVICE — SCREW EVEREST POLY 6.5X50MM: Type: IMPLANTABLE DEVICE | Status: FUNCTIONAL

## 2023-09-23 RX ORDER — CYCLOBENZAPRINE HYDROCHLORIDE 10 MG/1
10 TABLET, FILM COATED ORAL EVERY 8 HOURS
Refills: 0 | Status: DISCONTINUED | OUTPATIENT
Start: 2023-09-23 | End: 2023-09-24

## 2023-09-23 RX ORDER — TRAMADOL HYDROCHLORIDE 50 MG/1
50 TABLET ORAL EVERY 8 HOURS
Refills: 0 | Status: DISCONTINUED | OUTPATIENT
Start: 2023-09-23 | End: 2023-09-23

## 2023-09-23 RX ORDER — SODIUM CHLORIDE 9 MG/ML
1000 INJECTION, SOLUTION INTRAVENOUS
Refills: 0 | Status: DISCONTINUED | OUTPATIENT
Start: 2023-09-23 | End: 2023-09-24

## 2023-09-23 RX ORDER — POLYETHYLENE GLYCOL 3350 17 G/17G
17 POWDER, FOR SOLUTION ORAL DAILY
Refills: 0 | Status: DISCONTINUED | OUTPATIENT
Start: 2023-09-23 | End: 2023-09-23

## 2023-09-23 RX ORDER — DEXAMETHASONE 0.5 MG/5ML
4 ELIXIR ORAL EVERY 6 HOURS
Refills: 0 | Status: COMPLETED | OUTPATIENT
Start: 2023-09-24 | End: 2023-09-25

## 2023-09-23 RX ORDER — ACETAMINOPHEN 500 MG
975 TABLET ORAL EVERY 8 HOURS
Refills: 0 | Status: DISCONTINUED | OUTPATIENT
Start: 2023-09-23 | End: 2023-09-30

## 2023-09-23 RX ORDER — NALOXONE HYDROCHLORIDE 4 MG/.1ML
0.1 SPRAY NASAL
Refills: 0 | Status: DISCONTINUED | OUTPATIENT
Start: 2023-09-23 | End: 2023-09-30

## 2023-09-23 RX ORDER — DEXTROSE 50 % IN WATER 50 %
15 SYRINGE (ML) INTRAVENOUS ONCE
Refills: 0 | Status: DISCONTINUED | OUTPATIENT
Start: 2023-09-23 | End: 2023-09-25

## 2023-09-23 RX ORDER — INSULIN LISPRO 100/ML
VIAL (ML) SUBCUTANEOUS AT BEDTIME
Refills: 0 | Status: DISCONTINUED | OUTPATIENT
Start: 2023-09-23 | End: 2023-09-25

## 2023-09-23 RX ORDER — HYDROMORPHONE HYDROCHLORIDE 2 MG/ML
0.5 INJECTION INTRAMUSCULAR; INTRAVENOUS; SUBCUTANEOUS ONCE
Refills: 0 | Status: DISCONTINUED | OUTPATIENT
Start: 2023-09-23 | End: 2023-09-23

## 2023-09-23 RX ORDER — OXYCODONE HYDROCHLORIDE 5 MG/1
5 TABLET ORAL EVERY 4 HOURS
Refills: 0 | Status: DISCONTINUED | OUTPATIENT
Start: 2023-09-23 | End: 2023-09-23

## 2023-09-23 RX ORDER — CEFAZOLIN SODIUM 1 G
2000 VIAL (EA) INJECTION EVERY 8 HOURS
Refills: 0 | Status: COMPLETED | OUTPATIENT
Start: 2023-09-23 | End: 2023-09-24

## 2023-09-23 RX ORDER — MAGNESIUM HYDROXIDE 400 MG/1
30 TABLET, CHEWABLE ORAL EVERY 12 HOURS
Refills: 0 | Status: DISCONTINUED | OUTPATIENT
Start: 2023-09-23 | End: 2023-09-30

## 2023-09-23 RX ORDER — DEXAMETHASONE 0.5 MG/5ML
6 ELIXIR ORAL EVERY 6 HOURS
Refills: 0 | Status: COMPLETED | OUTPATIENT
Start: 2023-09-23 | End: 2023-09-24

## 2023-09-23 RX ORDER — DEXAMETHASONE 0.5 MG/5ML
2 ELIXIR ORAL EVERY 6 HOURS
Refills: 0 | Status: COMPLETED | OUTPATIENT
Start: 2023-09-25 | End: 2023-09-26

## 2023-09-23 RX ORDER — CALCIUM GLUCONATE 100 MG/ML
2 VIAL (ML) INTRAVENOUS ONCE
Refills: 0 | Status: COMPLETED | OUTPATIENT
Start: 2023-09-23 | End: 2023-09-23

## 2023-09-23 RX ORDER — OXYCODONE HYDROCHLORIDE 5 MG/1
10 TABLET ORAL EVERY 4 HOURS
Refills: 0 | Status: DISCONTINUED | OUTPATIENT
Start: 2023-09-23 | End: 2023-09-23

## 2023-09-23 RX ORDER — BENZOCAINE AND MENTHOL 5; 1 G/100ML; G/100ML
1 LIQUID ORAL
Refills: 0 | Status: DISCONTINUED | OUTPATIENT
Start: 2023-09-23 | End: 2023-09-30

## 2023-09-23 RX ORDER — GLUCAGON INJECTION, SOLUTION 0.5 MG/.1ML
1 INJECTION, SOLUTION SUBCUTANEOUS ONCE
Refills: 0 | Status: DISCONTINUED | OUTPATIENT
Start: 2023-09-23 | End: 2023-09-27

## 2023-09-23 RX ORDER — PANTOPRAZOLE SODIUM 20 MG/1
40 TABLET, DELAYED RELEASE ORAL
Refills: 0 | Status: DISCONTINUED | OUTPATIENT
Start: 2023-09-23 | End: 2023-09-30

## 2023-09-23 RX ORDER — DEXTROSE 50 % IN WATER 50 %
25 SYRINGE (ML) INTRAVENOUS ONCE
Refills: 0 | Status: DISCONTINUED | OUTPATIENT
Start: 2023-09-23 | End: 2023-09-25

## 2023-09-23 RX ORDER — ONDANSETRON 8 MG/1
4 TABLET, FILM COATED ORAL ONCE
Refills: 0 | Status: DISCONTINUED | OUTPATIENT
Start: 2023-09-23 | End: 2023-09-23

## 2023-09-23 RX ORDER — HYDROMORPHONE HYDROCHLORIDE 2 MG/ML
0.5 INJECTION INTRAMUSCULAR; INTRAVENOUS; SUBCUTANEOUS
Refills: 0 | Status: DISCONTINUED | OUTPATIENT
Start: 2023-09-23 | End: 2023-09-24

## 2023-09-23 RX ORDER — SODIUM CHLORIDE 9 MG/ML
500 INJECTION INTRAMUSCULAR; INTRAVENOUS; SUBCUTANEOUS ONCE
Refills: 0 | Status: COMPLETED | OUTPATIENT
Start: 2023-09-23 | End: 2023-09-23

## 2023-09-23 RX ORDER — SENNA PLUS 8.6 MG/1
2 TABLET ORAL AT BEDTIME
Refills: 0 | Status: DISCONTINUED | OUTPATIENT
Start: 2023-09-23 | End: 2023-09-30

## 2023-09-23 RX ORDER — INSULIN LISPRO 100/ML
VIAL (ML) SUBCUTANEOUS
Refills: 0 | Status: DISCONTINUED | OUTPATIENT
Start: 2023-09-23 | End: 2023-09-25

## 2023-09-23 RX ORDER — HYDROMORPHONE HYDROCHLORIDE 2 MG/ML
0.25 INJECTION INTRAMUSCULAR; INTRAVENOUS; SUBCUTANEOUS
Refills: 0 | Status: DISCONTINUED | OUTPATIENT
Start: 2023-09-23 | End: 2023-09-23

## 2023-09-23 RX ORDER — ONDANSETRON 8 MG/1
4 TABLET, FILM COATED ORAL EVERY 6 HOURS
Refills: 0 | Status: DISCONTINUED | OUTPATIENT
Start: 2023-09-23 | End: 2023-09-30

## 2023-09-23 RX ORDER — DEXTROSE 50 % IN WATER 50 %
12.5 SYRINGE (ML) INTRAVENOUS ONCE
Refills: 0 | Status: DISCONTINUED | OUTPATIENT
Start: 2023-09-23 | End: 2023-09-25

## 2023-09-23 RX ORDER — SODIUM CHLORIDE 9 MG/ML
1000 INJECTION, SOLUTION INTRAVENOUS
Refills: 0 | Status: DISCONTINUED | OUTPATIENT
Start: 2023-09-23 | End: 2023-09-23

## 2023-09-23 RX ORDER — HYDROMORPHONE HYDROCHLORIDE 2 MG/ML
30 INJECTION INTRAMUSCULAR; INTRAVENOUS; SUBCUTANEOUS
Refills: 0 | Status: DISCONTINUED | OUTPATIENT
Start: 2023-09-23 | End: 2023-09-24

## 2023-09-23 RX ORDER — SODIUM CHLORIDE 9 MG/ML
1000 INJECTION, SOLUTION INTRAVENOUS
Refills: 0 | Status: DISCONTINUED | OUTPATIENT
Start: 2023-09-23 | End: 2023-09-25

## 2023-09-23 RX ADMIN — BUMETANIDE 2 MILLIGRAM(S): 0.25 INJECTION INTRAMUSCULAR; INTRAVENOUS at 08:03

## 2023-09-23 RX ADMIN — Medication 100 MILLIGRAM(S): at 22:53

## 2023-09-23 RX ADMIN — SODIUM CHLORIDE 75 MILLILITER(S): 9 INJECTION, SOLUTION INTRAVENOUS at 17:41

## 2023-09-23 RX ADMIN — LOSARTAN POTASSIUM 100 MILLIGRAM(S): 100 TABLET, FILM COATED ORAL at 05:34

## 2023-09-23 RX ADMIN — CYCLOBENZAPRINE HYDROCHLORIDE 10 MILLIGRAM(S): 10 TABLET, FILM COATED ORAL at 17:29

## 2023-09-23 RX ADMIN — GABAPENTIN 300 MILLIGRAM(S): 400 CAPSULE ORAL at 17:33

## 2023-09-23 RX ADMIN — HYDROMORPHONE HYDROCHLORIDE 0.5 MILLIGRAM(S): 2 INJECTION INTRAMUSCULAR; INTRAVENOUS; SUBCUTANEOUS at 17:17

## 2023-09-23 RX ADMIN — Medication 975 MILLIGRAM(S): at 23:38

## 2023-09-23 RX ADMIN — CARVEDILOL PHOSPHATE 12.5 MILLIGRAM(S): 80 CAPSULE, EXTENDED RELEASE ORAL at 05:34

## 2023-09-23 RX ADMIN — HYDROMORPHONE HYDROCHLORIDE 30 MILLILITER(S): 2 INJECTION INTRAMUSCULAR; INTRAVENOUS; SUBCUTANEOUS at 18:35

## 2023-09-23 RX ADMIN — BUMETANIDE 2 MILLIGRAM(S): 0.25 INJECTION INTRAMUSCULAR; INTRAVENOUS at 17:43

## 2023-09-23 RX ADMIN — HYDROMORPHONE HYDROCHLORIDE 30 MILLILITER(S): 2 INJECTION INTRAMUSCULAR; INTRAVENOUS; SUBCUTANEOUS at 16:30

## 2023-09-23 RX ADMIN — AMLODIPINE BESYLATE 10 MILLIGRAM(S): 2.5 TABLET ORAL at 05:35

## 2023-09-23 RX ADMIN — HYDROMORPHONE HYDROCHLORIDE 0.5 MILLIGRAM(S): 2 INJECTION INTRAMUSCULAR; INTRAVENOUS; SUBCUTANEOUS at 15:50

## 2023-09-23 RX ADMIN — Medication 5 MILLIGRAM(S): at 05:34

## 2023-09-23 RX ADMIN — Medication 200 GRAM(S): at 17:41

## 2023-09-23 RX ADMIN — GABAPENTIN 300 MILLIGRAM(S): 400 CAPSULE ORAL at 23:06

## 2023-09-23 RX ADMIN — HYDROMORPHONE HYDROCHLORIDE 0.5 MILLIGRAM(S): 2 INJECTION INTRAMUSCULAR; INTRAVENOUS; SUBCUTANEOUS at 16:05

## 2023-09-23 RX ADMIN — GABAPENTIN 300 MILLIGRAM(S): 400 CAPSULE ORAL at 05:35

## 2023-09-23 RX ADMIN — HYDROMORPHONE HYDROCHLORIDE 0.5 MILLIGRAM(S): 2 INJECTION INTRAMUSCULAR; INTRAVENOUS; SUBCUTANEOUS at 18:34

## 2023-09-23 RX ADMIN — SODIUM CHLORIDE 500 MILLILITER(S): 9 INJECTION INTRAMUSCULAR; INTRAVENOUS; SUBCUTANEOUS at 16:04

## 2023-09-23 RX ADMIN — CARVEDILOL PHOSPHATE 12.5 MILLIGRAM(S): 80 CAPSULE, EXTENDED RELEASE ORAL at 22:52

## 2023-09-23 RX ADMIN — POLYETHYLENE GLYCOL 3350 17 GRAM(S): 17 POWDER, FOR SOLUTION ORAL at 05:34

## 2023-09-23 RX ADMIN — HYDROMORPHONE HYDROCHLORIDE 30 MILLILITER(S): 2 INJECTION INTRAMUSCULAR; INTRAVENOUS; SUBCUTANEOUS at 22:46

## 2023-09-23 RX ADMIN — HYDROMORPHONE HYDROCHLORIDE 0.5 MILLIGRAM(S): 2 INJECTION INTRAMUSCULAR; INTRAVENOUS; SUBCUTANEOUS at 19:56

## 2023-09-23 RX ADMIN — Medication 975 MILLIGRAM(S): at 23:08

## 2023-09-23 RX ADMIN — Medication 2: at 16:17

## 2023-09-23 RX ADMIN — ATORVASTATIN CALCIUM 40 MILLIGRAM(S): 80 TABLET, FILM COATED ORAL at 22:52

## 2023-09-23 RX ADMIN — Medication 6 MILLIGRAM(S): at 17:29

## 2023-09-23 RX ADMIN — HYDROMORPHONE HYDROCHLORIDE 30 MILLILITER(S): 2 INJECTION INTRAMUSCULAR; INTRAVENOUS; SUBCUTANEOUS at 18:51

## 2023-09-23 NOTE — PROGRESS NOTE ADULT - PROBLEM SELECTOR PLAN 1
Patient with syncopal episode after blowing his nose   -Hx is consistent with situational syncope   -EKG shows no acute changes. Trops stable at 16. No active chest pain, SOB, palpitations or dizziness. Less likely cardiac in etiology   - CT head is negative for bleeding   - unable to perform orthostats due to back pain and unable to stand.  - outpatient echo from 5/5/23 showed nl LV function, EF 60%, mild diastolic dysfunction. normal RV function. trivial MR and TR, normal Aortic valve. normal Pulmonic valve.   - patient has reported intermittent dizziness since his hospitalization for heart failure and placed on higher dose of Bumex.  - echo result reviewed, grossly nl LV function  Plan:  - likely 2/2 situation syncope  - f/u PT eval when cleared by ortho

## 2023-09-23 NOTE — PROGRESS NOTE ADULT - ASSESSMENT
Assessment: Patient is a 67yoM s/p syncopal fall with acute T12-L1 disc space fracture.    Plan:    - Will plan for OR tomday   - appreciate med clearance  - NPO at midnight, IVF (if able to tolerate)    Mariela Meek MD  Orthopaedic Surgery Resident    For all questions, please reach out via the following numbers for the on-call resident; do not reach out via Teams.  [facility] [place] [id number]  [facility]        [id number]  [facility]  [id number]/ [phone]

## 2023-09-23 NOTE — PROGRESS NOTE ADULT - PROBLEM SELECTOR PLAN 2
Patient with T12-L1 fracture   -Ortho consulted, recs appreciated   -Patient was also scheduled for cervical surgery as outpatient  - unable to perform MRI due to patient's large diameter.   - discussed with MRI supervisor, the MRI in [facility] is same size. no open MRI in health system.   - s/p T9-pelvis posterior fusion 9/29  Plan:  - currently assessed in PACU satting well and comfortable on NRB. will wean O2  - f/u ortho for post op recs

## 2023-09-23 NOTE — CHART NOTE - NSCHARTNOTEFT_GEN_A_CORE
ORTHOPEDIC POST OP NOTE    Patient is doing well, complaining of incisional back pain  Denies any chest pain, difficulty breathing, headache or dizziness    ROS  Negative unless otherwise specified in HPI.    PAST MEDICAL & SURGICAL Hx  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      Essential hypertension      CAD (coronary artery disease)  - s/p cabg x3      Hypercholesterolemia      ASIYA (obstructive sleep apnea)  initially dx  ; CPAP      Paralytic ileus  post op THR  & post op laminectomy      Type 2 diabetes mellitus      Right carpal tunnel syndrome      Cubital tunnel syndrome, right      Trigger finger of right hand      Morbid obesity with body mass index (BMI) of 40.0 or higher      H/O CHF      Kidney mass      Cervical herniated disc      Renal cancer      Disease of spinal cord, unspecified      S/P Left Inguinal Hernia Repair      History of Total Hip Replacement  - bilateral THR      S/P tonsillectomy and adenoidectomy  as child      H/O lithotripsy  x1      S/P revision of total knee, right  x2-  &       S/P lumbar discectomy  - ,L5  Aug 2013      S/P CABG x 3  17      S/P lumbar laminectomy  Fusion L3-L5       Kidney stone  s/w ESWL pt unsure site      Neuropathy  Bilateral Feet since       History of bilateral hip replacements      History of hernia repair      History of lumbar fusion      History of cholecystectomy      History of bilateral knee replacement      History of lymph node biopsy      S/p bilateral carpal tunnel release      History of partial nephrectomy          MEDICATIONS  Home Medications:  allopurinol 100 mg oral tablet: 1 tab(s) orally once a day (at bedtime) (18 Sep 2023 19:43)  amLODIPine 10 mg oral tablet: 1 tab(s) orally once a day AM (18 Sep 2023 19:43)  aspirin 81 mg oral delayed release tablet: 1 tab(s) orally once a day (18 Sep 2023 19:43)  atorvastatin 40 mg oral tablet: 1 tab(s) orally once a day AM (18 Sep 2023 19:43)  bumetanide 2 mg oral tablet: 1 tab(s) orally once a day (at bedtime) (18 Sep 2023 19:43)  bumetanide 2 mg oral tablet: 1 tab(s) orally every other day in AM (alternating with 4mg dose) (18 Sep 2023 19:43)  bumetanide 2 mg oral tablet: 2 tab(s) orally every other day in AM (alternating with 2mg dose) (18 Sep 2023 19:43)  calcium with vitamin D3: 1 tab orally once a day (18 Sep 2023 19:43)  carvedilol 12.5 mg oral tablet: 1 tab(s) orally every 12 hours (18 Sep 2023 19:43)  gabapentin 300 mg oral capsule: 1 orally 4 times a day (18 Sep 2023 19:43)  glimepiride 1 mg oral tablet: 1 orally once a day (18 Sep 2023 19:43)  losartan 100 mg oral tablet: 1 tab(s) orally once a day AM (18 Sep 2023 19:43)  magnesium: 2 tabs orally once a day (18 Sep 2023 19:43)  Multiple Vitamins oral capsule: 1 orally once a day last dose 23 (18 Sep 2023 19:43)  vitamin B 12: 1 tab orally once a day (18 Sep 2023 19:43)      ALLERGIES  No Known Allergies      FAMILY Hx  FAMILY HISTORY:  Family history of coronary artery disease  HLD/Angina. Fatal MI age 73.    Family history of diabetes mellitus type II  mother- - hyperlipidemia and Hypertension.    Family history of pancreatic cancer (Sibling)  brother     Family history of coronary artery disease  brother- age 50+- Coronary Artery Stents        SOCIAL Hx  Social History:  Former smoker (18 Sep 2023 19:31)      VITALS  Vital Signs Last 24 Hrs  T(C): 36.2 (23 Sep 2023 17:00), Max: 36.9 (22 Sep 2023 20:31)  T(F): 97.1 (23 Sep 2023 17:00), Max: 98.5 (22 Sep 2023 20:31)  HR: 72 (23 Sep 2023 18:00) (70 - 82)  BP: 132/80 (23 Sep 2023 18:00) (84/49 - 134/81)  BP(mean): 91 (23 Sep 2023 18:00) (56 - 92)  RR: 18 (23 Sep 2023 18:00) (13 - 20)  SpO2: 98% (23 Sep 2023 18:00) (94% - 100%)    Parameters below as of 23 Sep 2023 18:00  Patient On (Oxygen Delivery Method): nasal cannula  O2 Flow (L/min): 2      PHYSICAL EXAM  Gen: Lying in bed, NAD  Resp: No increased WOB  Spine:  Dressing c/d/i  Hemovacs/PAUL are intact draining SS output    Motor:                   C5                C6              C7               C8           T1   R            5/5                5/5            5/5             5/5          5/5  L             5/5               5/5             5/5             5/5          5/5                L2             L3             L4               L5            S1  R         3/5           3/5          5/5             5/5           5/5  L          3/5          3/5           5/5             5/5           5/5    Proximal muscle exam limited due to severe back pain    Sensory:            C5         C6         C7      C8       T1        (0=absent, 1=impaired, 2=normal, NT=not testable)  R         2            2           2        2         2  L          2            2           2        2         2               L2          L3         L4      L5       S1         (0=absent, 1=impaired, 2=normal, NT=not testable)  R         2            2            2        2        2  L          2            2           2        2         2    LABS                        12.0   13.80 )-----------( 242      ( 23 Sep 2023 16:00 )             34.7     09-23    135  |  97<L>  |  31<H>  ----------------------------<  248<H>  4.8   |  24  |  1.15    Ca    8.9      23 Sep 2023 16:00  Phos  5.7     09-  Mg     2.10     09-23    TPro  6.7  /  Alb  3.5  /  TBili  0.6  /  DBili  x   /  AST  40  /  ALT  23  /  AlkPhos  116  09-23    PT/INR - ( 23 Sep 2023 01:12 )   PT: 11.5 sec;   INR: 1.03 ratio         PTT - ( 23 Sep 2023 01:12 )  PTT:26.5 sec        ASSESSMENT & PLAN  67yMale s/p T9-Pelvis revision fusion.  -WBAT  -Monitor drain outputs  - Admit to ortho  - decadron taper  -pain control with PCA  -No chemical DVT prophylaxis (venodynes and mobilization)  -PT/OT  - D/C Katie with OOB    For all questions related to patient care, please reach out to the on-call team via the pager.     Venus Bravo, PGY 2  Orthopaedic Surgery  [facility] [id number]  [facility] [id number]  [facility] [phone]

## 2023-09-23 NOTE — BRIEF OPERATIVE NOTE - OPERATION/FINDINGS
Findings: T11-12 disc space fracture   Procedure: Removal of hardware from L3-pelvis, revision T9-pelvis posterior fusion

## 2023-09-23 NOTE — PROGRESS NOTE ADULT - ASSESSMENT
68 yo M with PMhx of HFrEF, CAD s/p CABG, ASIYA on CPAP, HTN, DM, right renal mass s/p resection presents to the ED after a syncopal episode, found to have T12-L1 fracture. could not perform MRI due to size incompatibility. s/p L/T spine fusion on 9/23.

## 2023-09-23 NOTE — PROGRESS NOTE ADULT - SUBJECTIVE AND OBJECTIVE BOX
Orthopedic Surgery Progress Note     S: Patient seen and examined today. No acute events overnight. Pain is well controlled. Denies f/c, chest pain, shortness of breath, dizziness.    MEDICATIONS  (STANDING):  allopurinol 100 milliGRAM(s) Oral at bedtime  amLODIPine   Tablet 10 milliGRAM(s) Oral daily  atorvastatin 40 milliGRAM(s) Oral at bedtime  bisacodyl 5 milliGRAM(s) Oral every 12 hours  buMETAnide 2 milliGRAM(s) Oral <User Schedule>  carvedilol 12.5 milliGRAM(s) Oral every 12 hours  dextrose 5%. 1000 milliLiter(s) (50 mL/Hr) IV Continuous <Continuous>  dextrose 5%. 1000 milliLiter(s) (100 mL/Hr) IV Continuous <Continuous>  dextrose 50% Injectable 25 Gram(s) IV Push once  dextrose 50% Injectable 12.5 Gram(s) IV Push once  dextrose 50% Injectable 25 Gram(s) IV Push once  gabapentin 300 milliGRAM(s) Oral four times a day  glucagon  Injectable 1 milliGRAM(s) IntraMuscular once  influenza  Vaccine (HIGH DOSE) 0.7 milliLiter(s) IntraMuscular once  insulin lispro (ADMELOG) corrective regimen sliding scale   SubCutaneous three times a day before meals  insulin lispro (ADMELOG) corrective regimen sliding scale   SubCutaneous at bedtime  lidocaine   4% Patch 1 Patch Transdermal daily  losartan 100 milliGRAM(s) Oral daily  multivitamin 1 Tablet(s) Oral daily  naloxone Injectable 0.4 milliGRAM(s) IV Push once  polyethylene glycol 3350 17 Gram(s) Oral two times a day  senna 2 Tablet(s) Oral at bedtime    MEDICATIONS  (PRN):  acetaminophen     Tablet .. 650 milliGRAM(s) Oral every 6 hours PRN Temp greater or equal to 38C (100.4F), Mild Pain (1 - 3)  aluminum hydroxide/magnesium hydroxide/simethicone Suspension 30 milliLiter(s) Oral every 4 hours PRN Dyspepsia  benzocaine/menthol Lozenge 1 Lozenge Oral every 4 hours PRN Sore Throat  cyclobenzaprine 5 milliGRAM(s) Oral four times a day PRN Muscle Spasm  dextrose Oral Gel 15 Gram(s) Oral once PRN Blood Glucose LESS THAN 70 milliGRAM(s)/deciliter  guaiFENesin Oral Liquid (Sugar-Free) 100 milliGRAM(s) Oral every 6 hours PRN Cough  melatonin 3 milliGRAM(s) Oral at bedtime PRN Insomnia  ondansetron Injectable 4 milliGRAM(s) IV Push every 8 hours PRN Nausea and/or Vomiting  oxyCODONE    IR 10 milliGRAM(s) Oral every 4 hours PRN Severe Pain (7 - 10)  oxyCODONE    IR 5 milliGRAM(s) Oral every 4 hours PRN Moderate Pain (4 - 6)      Physical Exam:  Gen: NAD  Spine:  Motor:               Deltoid       Bicep       Tricep     Wrist Ext      Wrist Flex    Finger Flex      Finger Abd  R             5/5            5/5           5/5            5/5                 5/5	           5/5                   5/5  L              5/5            5/5           5/5            5/5                 5/5               5/5                   5/5                Hip Flex        Quad      Ankle DF     Ankle PF     Toe Ext      Hamstring    R            5/5               5/5            5/5               5/5              5/5	        5/5  L             5/5               5/5            5/5               5/5              5/5               5/5    Sensory:   (0 = absent, 1 = impaired, 2 = normal, NT = not testable)              C5         C6     C7      C8       T1          R         2            2       2        2         2  L          2            2       2        2         2               L2          L3         L4      L5       S1     R         2            2           2         2       1  L          2            2           2        2        1        Vital Signs Last 24 Hrs  T(C): 36.9 (22 Sep 2023 20:31), Max: 36.9 (22 Sep 2023 20:31)  T(F): 98.5 (22 Sep 2023 20:31), Max: 98.5 (22 Sep 2023 20:31)  HR: 82 (23 Sep 2023 00:38) (67 - 82)  BP: 112/56 (22 Sep 2023 20:31) (109/59 - 151/85)  BP(mean): --  RR: 18 (22 Sep 2023 20:31) (17 - 18)  SpO2: 94% (23 Sep 2023 00:38) (94% - 100%)    Parameters below as of 22 Sep 2023 16:50  Patient On (Oxygen Delivery Method): room air        09-21-23 @ 07:01  -  09-22-23 @ 07:00  --------------------------------------------------------  IN: 0 mL / OUT: 975 mL / NET: -975 mL    09-22-23 @ 07:01  -  09-23-23 @ 04:53  --------------------------------------------------------  IN: 0 mL / OUT: 3250 mL / NET: -3250 mL                    LABS:                        13.3   8.33  )-----------( 226      ( 23 Sep 2023 01:12 )             39.3     09-23    134<L>  |  96<L>  |  26<H>  ----------------------------<  181<H>  4.5   |  23  |  0.63    Ca    9.1      23 Sep 2023 01:12  Phos  3.9     09-23  Mg     2.00     09-23

## 2023-09-23 NOTE — PROGRESS NOTE ADULT - SUBJECTIVE AND OBJECTIVE BOX
CC: Patient is a 67y old  Male who presents with a chief complaint of syncope (23 Sep 2023 04:52)      INTERVAL EVENTS: went to OR with ortho    SUBJECTIVE / INTERVAL HPI: Patient seen and examined at bedside. Pt reports mild pain after surgery. Otherwise grossly denies complaints.    ROS: negative unless otherwise stated above.    VITAL SIGNS:  Vital Signs Last 24 Hrs  T(C): 36.3 (23 Sep 2023 16:00), Max: 36.9 (22 Sep 2023 20:31)  T(F): 97.4 (23 Sep 2023 16:00), Max: 98.5 (22 Sep 2023 20:31)  HR: 71 (23 Sep 2023 16:45) (70 - 82)  BP: 118/75 (23 Sep 2023 16:45) (84/49 - 124/71)  BP(mean): 83 (23 Sep 2023 16:45) (56 - 89)  RR: 17 (23 Sep 2023 16:45) (14 - 20)  SpO2: 96% (23 Sep 2023 16:45) (94% - 100%)    Parameters below as of 23 Sep 2023 16:45  Patient On (Oxygen Delivery Method): mask, simple face  O2 Flow (L/min): 4        09-22-23 @ 07:01  -  09-23-23 @ 07:00  --------------------------------------------------------  IN: 0 mL / OUT: 4350 mL / NET: -4350 mL    09-23-23 @ 07:01  -  09-23-23 @ 17:03  --------------------------------------------------------  IN: 500 mL / OUT: 325 mL / NET: 175 mL        PHYSICAL EXAM:    General: pt seen in PACU after OR; NAD; somnolent after surgery  HEENT: MMM  Neck: supple  Cardiovascular: +S1/S2; RRR  Respiratory: CTA B/L; no W/R/R  Gastrointestinal: soft, NT/ND  Extremities: WWP; no edema, clubbing or cyanosis  Vascular: 2+ radial, DP pulses B/L  Neurological: AAOx3; no focal deficits    MEDICATIONS:  MEDICATIONS  (STANDING):  acetaminophen     Tablet .. 975 milliGRAM(s) Oral every 8 hours  allopurinol 100 milliGRAM(s) Oral at bedtime  amLODIPine   Tablet 10 milliGRAM(s) Oral daily  atorvastatin 40 milliGRAM(s) Oral at bedtime  bisacodyl 5 milliGRAM(s) Oral every 12 hours  buMETAnide 2 milliGRAM(s) Oral <User Schedule>  calcium gluconate IVPB 2 Gram(s) IV Intermittent once  carvedilol 12.5 milliGRAM(s) Oral every 12 hours  ceFAZolin   IVPB 2000 milliGRAM(s) IV Intermittent every 8 hours  dexAMETHasone  Injectable 6 milliGRAM(s) IV Push every 6 hours  dextrose 5%. 1000 milliLiter(s) (50 mL/Hr) IV Continuous <Continuous>  dextrose 5%. 1000 milliLiter(s) (100 mL/Hr) IV Continuous <Continuous>  dextrose 5%. 1000 milliLiter(s) (100 mL/Hr) IV Continuous <Continuous>  dextrose 5%. 1000 milliLiter(s) (50 mL/Hr) IV Continuous <Continuous>  dextrose 50% Injectable 25 Gram(s) IV Push once  dextrose 50% Injectable 12.5 Gram(s) IV Push once  dextrose 50% Injectable 25 Gram(s) IV Push once  dextrose 50% Injectable 12.5 Gram(s) IV Push once  dextrose 50% Injectable 25 Gram(s) IV Push once  dextrose 50% Injectable 25 Gram(s) IV Push once  gabapentin 300 milliGRAM(s) Oral four times a day  glucagon  Injectable 1 milliGRAM(s) IntraMuscular once  HYDROmorphone PCA (1 mG/mL) 30 milliLiter(s) PCA Continuous PCA Continuous  influenza  Vaccine (HIGH DOSE) 0.7 milliLiter(s) IntraMuscular once  insulin lispro (ADMELOG) corrective regimen sliding scale   SubCutaneous at bedtime  insulin lispro (ADMELOG) corrective regimen sliding scale   SubCutaneous three times a day before meals  insulin lispro (ADMELOG) corrective regimen sliding scale   SubCutaneous three times a day before meals  insulin lispro (ADMELOG) corrective regimen sliding scale   SubCutaneous at bedtime  lactated ringers. 1000 milliLiter(s) (75 mL/Hr) IV Continuous <Continuous>  lactated ringers. 1000 milliLiter(s) (75 mL/Hr) IV Continuous <Continuous>  lidocaine   4% Patch 1 Patch Transdermal daily  losartan 100 milliGRAM(s) Oral daily  multivitamin 1 Tablet(s) Oral daily  pantoprazole    Tablet 40 milliGRAM(s) Oral before breakfast  polyethylene glycol 3350 17 Gram(s) Oral two times a day  senna 2 Tablet(s) Oral at bedtime    MEDICATIONS  (PRN):  aluminum hydroxide/magnesium hydroxide/simethicone Suspension 30 milliLiter(s) Oral every 4 hours PRN Dyspepsia  benzocaine/menthol Lozenge 1 Lozenge Oral every 3 hours PRN Sore Throat  cyclobenzaprine 10 milliGRAM(s) Oral every 8 hours PRN Muscle Spasm  dextrose Oral Gel 15 Gram(s) Oral once PRN Blood Glucose LESS THAN 70 milliGRAM(s)/deciliter  dextrose Oral Gel 15 Gram(s) Oral once PRN Blood Glucose LESS THAN 70 milliGRAM(s)/deciliter  guaiFENesin Oral Liquid (Sugar-Free) 100 milliGRAM(s) Oral every 6 hours PRN Cough  HYDROmorphone PCA (1 mG/mL) Rescue Clinician Bolus 0.5 milliGRAM(s) IV Push every 15 minutes PRN for Pain Scale GREATER THAN 6  magnesium hydroxide Suspension 30 milliLiter(s) Oral every 12 hours PRN Constipation  melatonin 3 milliGRAM(s) Oral at bedtime PRN Insomnia  naloxone Injectable 0.1 milliGRAM(s) IV Push every 3 minutes PRN For ANY of the following changes in patient status:  A. RR LESS THAN 10 breaths per minute, B. Oxygen saturation LESS THAN 90%, C. Sedation score of 6  ondansetron   Disintegrating Tablet 4 milliGRAM(s) Oral every 6 hours PRN Nausea and/or Vomiting      ALLERGIES:  Allergies    No Known Allergies    Intolerances        LABS:                        12.0   13.80 )-----------( 242      ( 23 Sep 2023 16:00 )             34.7     09-23    135  |  97<L>  |  31<H>  ----------------------------<  248<H>  4.8   |  24  |  1.15    Ca    8.9      23 Sep 2023 16:00  Phos  5.7     09-23  Mg     2.10     09-23    TPro  6.7  /  Alb  3.5  /  TBili  0.6  /  DBili  x   /  AST  40  /  ALT  23  /  AlkPhos  116  09-23    PT/INR - ( 23 Sep 2023 01:12 )   PT: 11.5 sec;   INR: 1.03 ratio         PTT - ( 23 Sep 2023 01:12 )  PTT:26.5 sec  Urinalysis Basic - ( 23 Sep 2023 16:00 )    Color: x / Appearance: x / SG: x / pH: x  Gluc: 248 mg/dL / Ketone: x  / Bili: x / Urobili: x   Blood: x / Protein: x / Nitrite: x   Leuk Esterase: x / RBC: x / WBC x   Sq Epi: x / Non Sq Epi: x / Bacteria: x      CAPILLARY BLOOD GLUCOSE      POCT Blood Glucose.: 245 mg/dL (23 Sep 2023 15:42)      RADIOLOGY & ADDITIONAL TESTS: Reviewed.

## 2023-09-24 LAB
ANION GAP SERPL CALC-SCNC: 15 MMOL/L — HIGH (ref 7–14)
BASOPHILS # BLD AUTO: 0.02 K/UL — SIGNIFICANT CHANGE UP (ref 0–0.2)
BASOPHILS NFR BLD AUTO: 0.2 % — SIGNIFICANT CHANGE UP (ref 0–2)
BUN SERPL-MCNC: 22 MG/DL — SIGNIFICANT CHANGE UP (ref 7–23)
CALCIUM SERPL-MCNC: 8.9 MG/DL — SIGNIFICANT CHANGE UP (ref 8.4–10.5)
CHLORIDE SERPL-SCNC: 96 MMOL/L — LOW (ref 98–107)
CO2 SERPL-SCNC: 25 MMOL/L — SIGNIFICANT CHANGE UP (ref 22–31)
CREAT SERPL-MCNC: 0.6 MG/DL — SIGNIFICANT CHANGE UP (ref 0.5–1.3)
EGFR: 106 ML/MIN/1.73M2 — SIGNIFICANT CHANGE UP
EOSINOPHIL # BLD AUTO: 0 K/UL — SIGNIFICANT CHANGE UP (ref 0–0.5)
EOSINOPHIL NFR BLD AUTO: 0 % — SIGNIFICANT CHANGE UP (ref 0–6)
GLUCOSE BLDC GLUCOMTR-MCNC: 290 MG/DL — HIGH (ref 70–99)
GLUCOSE BLDC GLUCOMTR-MCNC: 307 MG/DL — HIGH (ref 70–99)
GLUCOSE BLDC GLUCOMTR-MCNC: 347 MG/DL — HIGH (ref 70–99)
GLUCOSE BLDC GLUCOMTR-MCNC: 390 MG/DL — HIGH (ref 70–99)
GLUCOSE BLDC GLUCOMTR-MCNC: 427 MG/DL — HIGH (ref 70–99)
GLUCOSE SERPL-MCNC: 263 MG/DL — HIGH (ref 70–99)
HCT VFR BLD CALC: 34.3 % — LOW (ref 39–50)
HGB BLD-MCNC: 11.5 G/DL — LOW (ref 13–17)
IANC: 11.99 K/UL — HIGH (ref 1.8–7.4)
IMM GRANULOCYTES NFR BLD AUTO: 0.7 % — SIGNIFICANT CHANGE UP (ref 0–0.9)
LYMPHOCYTES # BLD AUTO: 0.66 K/UL — LOW (ref 1–3.3)
LYMPHOCYTES # BLD AUTO: 5 % — LOW (ref 13–44)
MAGNESIUM SERPL-MCNC: 1.9 MG/DL — SIGNIFICANT CHANGE UP (ref 1.6–2.6)
MCHC RBC-ENTMCNC: 29.1 PG — SIGNIFICANT CHANGE UP (ref 27–34)
MCHC RBC-ENTMCNC: 33.5 GM/DL — SIGNIFICANT CHANGE UP (ref 32–36)
MCV RBC AUTO: 86.8 FL — SIGNIFICANT CHANGE UP (ref 80–100)
MONOCYTES # BLD AUTO: 0.47 K/UL — SIGNIFICANT CHANGE UP (ref 0–0.9)
MONOCYTES NFR BLD AUTO: 3.6 % — SIGNIFICANT CHANGE UP (ref 2–14)
NEUTROPHILS # BLD AUTO: 11.99 K/UL — HIGH (ref 1.8–7.4)
NEUTROPHILS NFR BLD AUTO: 90.5 % — HIGH (ref 43–77)
NRBC # BLD: 0 /100 WBCS — SIGNIFICANT CHANGE UP (ref 0–0)
NRBC # FLD: 0 K/UL — SIGNIFICANT CHANGE UP (ref 0–0)
PHOSPHATE SERPL-MCNC: 3.9 MG/DL — SIGNIFICANT CHANGE UP (ref 2.5–4.5)
PLATELET # BLD AUTO: 215 K/UL — SIGNIFICANT CHANGE UP (ref 150–400)
POTASSIUM SERPL-MCNC: 4.5 MMOL/L — SIGNIFICANT CHANGE UP (ref 3.5–5.3)
POTASSIUM SERPL-SCNC: 4.5 MMOL/L — SIGNIFICANT CHANGE UP (ref 3.5–5.3)
RBC # BLD: 3.95 M/UL — LOW (ref 4.2–5.8)
RBC # FLD: 13 % — SIGNIFICANT CHANGE UP (ref 10.3–14.5)
SODIUM SERPL-SCNC: 136 MMOL/L — SIGNIFICANT CHANGE UP (ref 135–145)
WBC # BLD: 13.23 K/UL — HIGH (ref 3.8–10.5)
WBC # FLD AUTO: 13.23 K/UL — HIGH (ref 3.8–10.5)

## 2023-09-24 PROCEDURE — 99232 SBSQ HOSP IP/OBS MODERATE 35: CPT

## 2023-09-24 RX ORDER — INSULIN LISPRO 100/ML
4 VIAL (ML) SUBCUTANEOUS
Refills: 0 | Status: DISCONTINUED | OUTPATIENT
Start: 2023-09-24 | End: 2023-09-24

## 2023-09-24 RX ORDER — CYCLOBENZAPRINE HYDROCHLORIDE 10 MG/1
5 TABLET, FILM COATED ORAL EVERY 8 HOURS
Refills: 0 | Status: DISCONTINUED | OUTPATIENT
Start: 2023-09-24 | End: 2023-09-26

## 2023-09-24 RX ORDER — ASPIRIN/CALCIUM CARB/MAGNESIUM 324 MG
81 TABLET ORAL DAILY
Refills: 0 | Status: DISCONTINUED | OUTPATIENT
Start: 2023-09-24 | End: 2023-09-30

## 2023-09-24 RX ORDER — HEPARIN SODIUM 5000 [USP'U]/ML
5000 INJECTION INTRAVENOUS; SUBCUTANEOUS EVERY 8 HOURS
Refills: 0 | Status: DISCONTINUED | OUTPATIENT
Start: 2023-09-24 | End: 2023-09-30

## 2023-09-24 RX ORDER — OXYCODONE HYDROCHLORIDE 5 MG/1
10 TABLET ORAL
Refills: 0 | Status: DISCONTINUED | OUTPATIENT
Start: 2023-09-24 | End: 2023-09-30

## 2023-09-24 RX ORDER — BUMETANIDE 0.25 MG/ML
2 INJECTION INTRAMUSCULAR; INTRAVENOUS
Refills: 0 | Status: DISCONTINUED | OUTPATIENT
Start: 2023-09-24 | End: 2023-09-28

## 2023-09-24 RX ORDER — INSULIN GLARGINE 100 [IU]/ML
9 INJECTION, SOLUTION SUBCUTANEOUS AT BEDTIME
Refills: 0 | Status: DISCONTINUED | OUTPATIENT
Start: 2023-09-24 | End: 2023-09-25

## 2023-09-24 RX ORDER — OXYCODONE HYDROCHLORIDE 5 MG/1
15 TABLET ORAL
Refills: 0 | Status: DISCONTINUED | OUTPATIENT
Start: 2023-09-24 | End: 2023-09-30

## 2023-09-24 RX ORDER — HYDROMORPHONE HYDROCHLORIDE 2 MG/ML
0.5 INJECTION INTRAMUSCULAR; INTRAVENOUS; SUBCUTANEOUS
Refills: 0 | Status: DISCONTINUED | OUTPATIENT
Start: 2023-09-24 | End: 2023-09-27

## 2023-09-24 RX ORDER — INSULIN LISPRO 100/ML
3 VIAL (ML) SUBCUTANEOUS
Refills: 0 | Status: DISCONTINUED | OUTPATIENT
Start: 2023-09-24 | End: 2023-09-25

## 2023-09-24 RX ADMIN — Medication 6 MILLIGRAM(S): at 12:33

## 2023-09-24 RX ADMIN — ATORVASTATIN CALCIUM 40 MILLIGRAM(S): 80 TABLET, FILM COATED ORAL at 22:33

## 2023-09-24 RX ADMIN — HYDROMORPHONE HYDROCHLORIDE 30 MILLILITER(S): 2 INJECTION INTRAMUSCULAR; INTRAVENOUS; SUBCUTANEOUS at 07:24

## 2023-09-24 RX ADMIN — OXYCODONE HYDROCHLORIDE 15 MILLIGRAM(S): 5 TABLET ORAL at 16:40

## 2023-09-24 RX ADMIN — Medication 5 MILLIGRAM(S): at 12:32

## 2023-09-24 RX ADMIN — BUMETANIDE 2 MILLIGRAM(S): 0.25 INJECTION INTRAMUSCULAR; INTRAVENOUS at 08:10

## 2023-09-24 RX ADMIN — GABAPENTIN 300 MILLIGRAM(S): 400 CAPSULE ORAL at 12:32

## 2023-09-24 RX ADMIN — Medication 6 MILLIGRAM(S): at 06:11

## 2023-09-24 RX ADMIN — CYCLOBENZAPRINE HYDROCHLORIDE 5 MILLIGRAM(S): 10 TABLET, FILM COATED ORAL at 22:45

## 2023-09-24 RX ADMIN — Medication 4 MILLIGRAM(S): at 17:06

## 2023-09-24 RX ADMIN — Medication 100 MILLIGRAM(S): at 22:33

## 2023-09-24 RX ADMIN — OXYCODONE HYDROCHLORIDE 15 MILLIGRAM(S): 5 TABLET ORAL at 20:45

## 2023-09-24 RX ADMIN — HEPARIN SODIUM 5000 UNIT(S): 5000 INJECTION INTRAVENOUS; SUBCUTANEOUS at 22:34

## 2023-09-24 RX ADMIN — AMLODIPINE BESYLATE 10 MILLIGRAM(S): 2.5 TABLET ORAL at 06:12

## 2023-09-24 RX ADMIN — GABAPENTIN 300 MILLIGRAM(S): 400 CAPSULE ORAL at 17:06

## 2023-09-24 RX ADMIN — Medication 975 MILLIGRAM(S): at 08:10

## 2023-09-24 RX ADMIN — LOSARTAN POTASSIUM 100 MILLIGRAM(S): 100 TABLET, FILM COATED ORAL at 06:13

## 2023-09-24 RX ADMIN — Medication 4: at 22:25

## 2023-09-24 RX ADMIN — Medication 5 MILLIGRAM(S): at 22:34

## 2023-09-24 RX ADMIN — Medication 100 MILLIGRAM(S): at 06:06

## 2023-09-24 RX ADMIN — CARVEDILOL PHOSPHATE 12.5 MILLIGRAM(S): 80 CAPSULE, EXTENDED RELEASE ORAL at 12:32

## 2023-09-24 RX ADMIN — Medication 1 TABLET(S): at 12:33

## 2023-09-24 RX ADMIN — LIDOCAINE 1 PATCH: 4 CREAM TOPICAL at 19:51

## 2023-09-24 RX ADMIN — POLYETHYLENE GLYCOL 3350 17 GRAM(S): 17 POWDER, FOR SOLUTION ORAL at 17:07

## 2023-09-24 RX ADMIN — CARVEDILOL PHOSPHATE 12.5 MILLIGRAM(S): 80 CAPSULE, EXTENDED RELEASE ORAL at 22:33

## 2023-09-24 RX ADMIN — SODIUM CHLORIDE 75 MILLILITER(S): 9 INJECTION, SOLUTION INTRAVENOUS at 07:25

## 2023-09-24 RX ADMIN — BUMETANIDE 2 MILLIGRAM(S): 0.25 INJECTION INTRAMUSCULAR; INTRAVENOUS at 14:06

## 2023-09-24 RX ADMIN — LIDOCAINE 1 PATCH: 4 CREAM TOPICAL at 12:33

## 2023-09-24 RX ADMIN — Medication 4: at 12:30

## 2023-09-24 RX ADMIN — GABAPENTIN 300 MILLIGRAM(S): 400 CAPSULE ORAL at 06:12

## 2023-09-24 RX ADMIN — Medication 4: at 17:05

## 2023-09-24 RX ADMIN — CYCLOBENZAPRINE HYDROCHLORIDE 5 MILLIGRAM(S): 10 TABLET, FILM COATED ORAL at 14:04

## 2023-09-24 RX ADMIN — PANTOPRAZOLE SODIUM 40 MILLIGRAM(S): 20 TABLET, DELAYED RELEASE ORAL at 06:11

## 2023-09-24 RX ADMIN — HEPARIN SODIUM 5000 UNIT(S): 5000 INJECTION INTRAVENOUS; SUBCUTANEOUS at 14:04

## 2023-09-24 RX ADMIN — Medication 3: at 08:09

## 2023-09-24 RX ADMIN — Medication 975 MILLIGRAM(S): at 17:07

## 2023-09-24 RX ADMIN — INSULIN GLARGINE 9 UNIT(S): 100 INJECTION, SOLUTION SUBCUTANEOUS at 22:24

## 2023-09-24 RX ADMIN — Medication 975 MILLIGRAM(S): at 17:41

## 2023-09-24 RX ADMIN — Medication 975 MILLIGRAM(S): at 08:40

## 2023-09-24 RX ADMIN — OXYCODONE HYDROCHLORIDE 15 MILLIGRAM(S): 5 TABLET ORAL at 20:12

## 2023-09-24 RX ADMIN — OXYCODONE HYDROCHLORIDE 15 MILLIGRAM(S): 5 TABLET ORAL at 17:15

## 2023-09-24 RX ADMIN — HYDROMORPHONE HYDROCHLORIDE 0.5 MILLIGRAM(S): 2 INJECTION INTRAMUSCULAR; INTRAVENOUS; SUBCUTANEOUS at 00:48

## 2023-09-24 RX ADMIN — Medication 6 MILLIGRAM(S): at 00:43

## 2023-09-24 RX ADMIN — SENNA PLUS 2 TABLET(S): 8.6 TABLET ORAL at 22:33

## 2023-09-24 RX ADMIN — Medication 81 MILLIGRAM(S): at 12:34

## 2023-09-24 NOTE — PROGRESS NOTE ADULT - TIME BILLING
Time-based billing (NON-critical care).     More than 50% of the visit was spent counseling and / or coordinating care by the attending physician.      The necessity of the time spent during the encounter on this date of service was due to: documentation in [place], reviewing chart and coordinating care with patient/primary team as well as reviewing vitals, laboratory data, radiology, medication list, and prior records. Interventions were performed as documented above.
reviewing laboratory data, consultants' recommendations, documentation in [place], performing medically appropriate examinations/evaluations, discussion with patient/family/RN/ACP/Residents and interdisciplinary staff (such as , social workers, etc), counseling patient/family/care giver, ordering medically appropriate medication, tests, or procedures. Interventions were performed as documented above.
Time-based billing (NON-critical care).     More than 50% of the visit was spent counseling and / or coordinating care by the attending physician.      The necessity of the time spent during the encounter on this date of service was due to: documentation in [place], reviewing chart and coordinating care with patient/ACPs and interdisciplinary staff (such as , social workers, etc) as well as reviewing vitals, laboratory data, radiology, medication list, consultants' recommendations and prior records. Interventions were performed as documented above.
The necessity of the time spent during the encounter on this date of service was due to:   - Ordering, reviewing, and interpreting labs, testing, and imaging.  - Independently obtaining a review of systems and performing a physical exam  - Reviewing prior hospitalization and where necessary, outpatient records.  - Reviewing consultant recommendations/communicating with consultants  - Counselling and educating patient and family regarding interpretation of aforementioned items and plan of care.  - Communicating care plan with ACP/Resident/RN/SW/CM
reviewing laboratory data, consultants' recommendations, documentation in [place], performing medically appropriate examinations/evaluations, discussion with patient/family/RN/ACP/Residents and interdisciplinary staff (such as , social workers, etc), counseling patient/family/care giver, ordering medically appropriate medication, tests, or procedures. Interventions were performed as documented above.
The necessity of the time spent during the encounter on this date of service was due to:   - Ordering, reviewing, and interpreting labs, testing, and imaging.  - Independently obtaining a review of systems and performing a physical exam  - Reviewing prior hospitalization and where necessary, outpatient records.  - Reviewing consultant recommendations/communicating with consultants  - Counselling and educating patient and family regarding interpretation of aforementioned items and plan of care.  - Communicating care plan with ACP/Resident/RN/SW/CM

## 2023-09-24 NOTE — PROGRESS NOTE ADULT - SUBJECTIVE AND OBJECTIVE BOX
Anesthesia Pain Management Service    SUBJECTIVE: Patient is using the IV PCA and feels that it helps, but his pain is still present.  Patient is worried because he has been in bed all week and today PT will be working with him.    Pain Scale Score	At rest: _5/10__ 	With Activity: ___ 	[X ] Refer to charted pain scores    THERAPY:    [ ] IV PCA Morphine		[ ] 5 mg/mL	[ ] 1 mg/mL  [X ] IV PCA Hydromorphone	[ ] 5 mg/mL	[X ] 1 mg/mL  [ ] IV PCA Fentanyl		[ ] 50 micrograms/mL    Demand dose __0.3_ lockout __6_ (minutes) Continuous Rate _0__ Total: __12.3_   mg used (in past 24 hrs)      MEDICATIONS  (STANDING):  acetaminophen     Tablet .. 975 milliGRAM(s) Oral every 8 hours  allopurinol 100 milliGRAM(s) Oral at bedtime  amLODIPine   Tablet 10 milliGRAM(s) Oral daily  aspirin enteric coated 81 milliGRAM(s) Oral daily  atorvastatin 40 milliGRAM(s) Oral at bedtime  bisacodyl 5 milliGRAM(s) Oral every 12 hours  buMETAnide 2 milliGRAM(s) Oral <User Schedule>  carvedilol 12.5 milliGRAM(s) Oral every 12 hours  cyclobenzaprine 5 milliGRAM(s) Oral every 8 hours  dexAMETHasone  Injectable 6 milliGRAM(s) IV Push every 6 hours  dextrose 5%. 1000 milliLiter(s) (50 mL/Hr) IV Continuous <Continuous>  dextrose 5%. 1000 milliLiter(s) (100 mL/Hr) IV Continuous <Continuous>  dextrose 5%. 1000 milliLiter(s) (50 mL/Hr) IV Continuous <Continuous>  dextrose 5%. 1000 milliLiter(s) (100 mL/Hr) IV Continuous <Continuous>  dextrose 50% Injectable 25 Gram(s) IV Push once  dextrose 50% Injectable 12.5 Gram(s) IV Push once  dextrose 50% Injectable 25 Gram(s) IV Push once  dextrose 50% Injectable 25 Gram(s) IV Push once  dextrose 50% Injectable 25 Gram(s) IV Push once  dextrose 50% Injectable 12.5 Gram(s) IV Push once  gabapentin 300 milliGRAM(s) Oral four times a day  glucagon  Injectable 1 milliGRAM(s) IntraMuscular once  heparin   Injectable 5000 Unit(s) SubCutaneous every 8 hours  influenza  Vaccine (HIGH DOSE) 0.7 milliLiter(s) IntraMuscular once  insulin lispro (ADMELOG) corrective regimen sliding scale   SubCutaneous three times a day before meals  insulin lispro (ADMELOG) corrective regimen sliding scale   SubCutaneous at bedtime  insulin lispro (ADMELOG) corrective regimen sliding scale   SubCutaneous three times a day before meals  insulin lispro (ADMELOG) corrective regimen sliding scale   SubCutaneous at bedtime  lactated ringers. 1000 milliLiter(s) (75 mL/Hr) IV Continuous <Continuous>  lidocaine   4% Patch 1 Patch Transdermal daily  losartan 100 milliGRAM(s) Oral daily  multivitamin 1 Tablet(s) Oral daily  pantoprazole    Tablet 40 milliGRAM(s) Oral before breakfast  polyethylene glycol 3350 17 Gram(s) Oral two times a day  senna 2 Tablet(s) Oral at bedtime    MEDICATIONS  (PRN):  aluminum hydroxide/magnesium hydroxide/simethicone Suspension 30 milliLiter(s) Oral every 4 hours PRN Dyspepsia  benzocaine/menthol Lozenge 1 Lozenge Oral every 3 hours PRN Sore Throat  dextrose Oral Gel 15 Gram(s) Oral once PRN Blood Glucose LESS THAN 70 milliGRAM(s)/deciliter  dextrose Oral Gel 15 Gram(s) Oral once PRN Blood Glucose LESS THAN 70 milliGRAM(s)/deciliter  guaiFENesin Oral Liquid (Sugar-Free) 100 milliGRAM(s) Oral every 6 hours PRN Cough  HYDROmorphone  Injectable 0.5 milliGRAM(s) IV Push every 3 hours PRN Severe breakthrough Pain (7 - 10)  magnesium hydroxide Suspension 30 milliLiter(s) Oral every 12 hours PRN Constipation  melatonin 3 milliGRAM(s) Oral at bedtime PRN Insomnia  naloxone Injectable 0.1 milliGRAM(s) IV Push every 3 minutes PRN For ANY of the following changes in patient status:  A. RR LESS THAN 10 breaths per minute, B. Oxygen saturation LESS THAN 90%, C. Sedation score of 6  ondansetron   Disintegrating Tablet 4 milliGRAM(s) Oral every 6 hours PRN Nausea and/or Vomiting  oxyCODONE    IR 10 milliGRAM(s) Oral every 3 hours PRN Moderate Pain (4 - 6)  oxyCODONE    IR 15 milliGRAM(s) Oral every 3 hours PRN Severe Pain (7 - 10)      OBJECTIVE:  Patient is sitting up in bed.     Sedation Score:	[ X] Alert	[ ] Drowsy 	[ ] Arousable	[ ] Asleep	[ ] Unresponsive    Side Effects:	[X ] None	[ ] Nausea	[ ] Vomiting	[ ] Pruritus  		[ ] Other:    Vital Signs Last 24 Hrs  T(C): 36.8 (24 Sep 2023 08:17), Max: 37.2 (23 Sep 2023 20:00)  T(F): 98.2 (24 Sep 2023 08:17), Max: 98.9 (23 Sep 2023 20:00)  HR: 83 (24 Sep 2023 08:17) (69 - 83)  BP: 146/84 (24 Sep 2023 08:17) (84/49 - 146/84)  BP(mean): 91 (23 Sep 2023 18:00) (56 - 92)  RR: 18 (24 Sep 2023 08:17) (13 - 20)  SpO2: 95% (24 Sep 2023 08:17) (94% - 99%)    Parameters below as of 24 Sep 2023 08:17  Patient On (Oxygen Delivery Method): room air        ASSESSMENT/ PLAN    Therapy to  be:	[ ] Continue   [ X] Discontinued   [X ] Change to prn Analgesics    Documentation and Verification of current medications:   [X] Done	[ ] Not done, not elligible    Comments: Discussed patient with Ortho team and ok to discontinue IV Dilaudid PCA.  PRN Oral/IV opioids and/or Adjuvant non-opioid medication to be ordered at this point.    Progress Note written now but Patient was seen earlier.

## 2023-09-24 NOTE — PROGRESS NOTE ADULT - PROBLEM SELECTOR PLAN 3
Patient with hx of heart failure   -Not in acute exacerbation, clinically euvolemic.  -home bumex dose: 4 mg and 2 mg in the AM (alternate every other day) and 2 mg at bedtime  Plan:  -C/w carvedilol 12.5 mg BID  - continue decreased bumex dose of 2mg BID to prevent orthostasis

## 2023-09-24 NOTE — PROGRESS NOTE ADULT - PROBLEM SELECTOR PLAN 1
Patient with syncopal episode after blowing his nose   -EKG shows no acute changes. Trops stable at 16. No active chest pain, SOB, palpitations or dizziness. Less likely cardiac in etiology   - CT head is negative for bleeding   - unable to perform orthostats due to back pain and unable to stand.  - outpatient echo from 5/5/23 showed nl LV function, EF 60%, mild diastolic dysfunction. normal RV function. trivial MR and TR, normal Aortic valve. normal Pulmonic valve.   - patient has reported intermittent dizziness since his hospitalization for heart failure during which he was placed on higher dose of Bumex.  - echo result reviewed, grossly nl LV function  Plan:  - likely 2/2 situational syncope in the setting of blowing his nose vs orthostatic in the setting of diuretics  - continue decreased bumex dose of 2mg BID  - PT: ELISSA

## 2023-09-24 NOTE — PROGRESS NOTE ADULT - PROBLEM SELECTOR PLAN 2
Patient with T12-L1 fracture   -Ortho consulted, recs appreciated   -Patient was also scheduled for cervical surgery as outpatient  - unable to perform MRI due to patient's large diameter.   - discussed with MRI supervisor, the MRI in [facility] is same size. no open MRI in health system.   - s/p T9-pelvis posterior fusion 9/29  Plan:  - continue pain management as per ortho

## 2023-09-24 NOTE — PROGRESS NOTE ADULT - ASSESSMENT
67M PMH HFrEF, CAD s/p CABG, ASIYA on CPAP, HTN, DM, right renal mass s/p resection presents to the ED after a syncopal episode, likely 2/2 situational syncope. Found to have T12-L1 fracture s/p L/T spine fusion on 9/23.

## 2023-09-24 NOTE — OCCUPATIONAL THERAPY INITIAL EVALUATION ADULT - PERTINENT HX OF CURRENT PROBLEM, REHAB EVAL
Pt is a 67 year old male status post Removal of hardware from L3-pelvis, revision T9-pelvis posterior fusion.

## 2023-09-24 NOTE — PHYSICAL THERAPY INITIAL EVALUATION ADULT - LEVEL OF INDEPENDENCE: SUPINE/SIT, REHAB EVAL
Pt endorsed lightheadedness at the edge of bed, Pt reported symptoms improved with seated rest/moderate assist (50% patients effort)

## 2023-09-24 NOTE — PROGRESS NOTE ADULT - ASSESSMENT
67yMale s/p T9-Pelvis revision fusion.    - WBAT  -Monitor drain outputs  - decadron taper  - PCA  - SQH and [id number] to start today  - PT/OT  - D/C Katie with OOB

## 2023-09-24 NOTE — PROGRESS NOTE ADULT - SUBJECTIVE AND OBJECTIVE BOX
Orthopedic Surgery Progress Note     S: Patient seen and examined today. No acute events overnight. Pain is well controlled on PCA. Denies f/c, chest pain, shortness of breath, dizziness.    MEDICATIONS  (STANDING):  acetaminophen     Tablet .. 975 milliGRAM(s) Oral every 8 hours  allopurinol 100 milliGRAM(s) Oral at bedtime  amLODIPine   Tablet 10 milliGRAM(s) Oral daily  aspirin enteric coated 81 milliGRAM(s) Oral daily  atorvastatin 40 milliGRAM(s) Oral at bedtime  bisacodyl 5 milliGRAM(s) Oral every 12 hours  buMETAnide 2 milliGRAM(s) Oral <User Schedule>  carvedilol 12.5 milliGRAM(s) Oral every 12 hours  dexAMETHasone  Injectable 6 milliGRAM(s) IV Push every 6 hours  dextrose 5%. 1000 milliLiter(s) (100 mL/Hr) IV Continuous <Continuous>  dextrose 5%. 1000 milliLiter(s) (50 mL/Hr) IV Continuous <Continuous>  dextrose 5%. 1000 milliLiter(s) (100 mL/Hr) IV Continuous <Continuous>  dextrose 5%. 1000 milliLiter(s) (50 mL/Hr) IV Continuous <Continuous>  dextrose 50% Injectable 25 Gram(s) IV Push once  dextrose 50% Injectable 12.5 Gram(s) IV Push once  dextrose 50% Injectable 25 Gram(s) IV Push once  dextrose 50% Injectable 12.5 Gram(s) IV Push once  dextrose 50% Injectable 25 Gram(s) IV Push once  dextrose 50% Injectable 25 Gram(s) IV Push once  gabapentin 300 milliGRAM(s) Oral four times a day  glucagon  Injectable 1 milliGRAM(s) IntraMuscular once  heparin   Injectable 5000 Unit(s) SubCutaneous every 8 hours  HYDROmorphone PCA (1 mG/mL) 30 milliLiter(s) PCA Continuous PCA Continuous  influenza  Vaccine (HIGH DOSE) 0.7 milliLiter(s) IntraMuscular once  insulin lispro (ADMELOG) corrective regimen sliding scale   SubCutaneous at bedtime  insulin lispro (ADMELOG) corrective regimen sliding scale   SubCutaneous three times a day before meals  insulin lispro (ADMELOG) corrective regimen sliding scale   SubCutaneous at bedtime  insulin lispro (ADMELOG) corrective regimen sliding scale   SubCutaneous three times a day before meals  lactated ringers. 1000 milliLiter(s) (75 mL/Hr) IV Continuous <Continuous>  lidocaine   4% Patch 1 Patch Transdermal daily  losartan 100 milliGRAM(s) Oral daily  multivitamin 1 Tablet(s) Oral daily  pantoprazole    Tablet 40 milliGRAM(s) Oral before breakfast  polyethylene glycol 3350 17 Gram(s) Oral two times a day  senna 2 Tablet(s) Oral at bedtime    MEDICATIONS  (PRN):  aluminum hydroxide/magnesium hydroxide/simethicone Suspension 30 milliLiter(s) Oral every 4 hours PRN Dyspepsia  benzocaine/menthol Lozenge 1 Lozenge Oral every 3 hours PRN Sore Throat  cyclobenzaprine 10 milliGRAM(s) Oral every 8 hours PRN Muscle Spasm  dextrose Oral Gel 15 Gram(s) Oral once PRN Blood Glucose LESS THAN 70 milliGRAM(s)/deciliter  dextrose Oral Gel 15 Gram(s) Oral once PRN Blood Glucose LESS THAN 70 milliGRAM(s)/deciliter  guaiFENesin Oral Liquid (Sugar-Free) 100 milliGRAM(s) Oral every 6 hours PRN Cough  HYDROmorphone PCA (1 mG/mL) Rescue Clinician Bolus 0.5 milliGRAM(s) IV Push every 15 minutes PRN for Pain Scale GREATER THAN 6  magnesium hydroxide Suspension 30 milliLiter(s) Oral every 12 hours PRN Constipation  melatonin 3 milliGRAM(s) Oral at bedtime PRN Insomnia  naloxone Injectable 0.1 milliGRAM(s) IV Push every 3 minutes PRN For ANY of the following changes in patient status:  A. RR LESS THAN 10 breaths per minute, B. Oxygen saturation LESS THAN 90%, C. Sedation score of 6  ondansetron   Disintegrating Tablet 4 milliGRAM(s) Oral every 6 hours PRN Nausea and/or Vomiting      Vital Signs Last 24 Hrs  T(C): 36.6 (24 Sep 2023 04:10), Max: 37.2 (23 Sep 2023 20:00)  T(F): 97.9 (24 Sep 2023 04:10), Max: 98.9 (23 Sep 2023 20:00)  HR: 69 (24 Sep 2023 08:06) (69 - 79)  BP: 140/83 (24 Sep 2023 04:10) (84/49 - 144/83)  BP(mean): 91 (23 Sep 2023 18:00) (56 - 92)  RR: 17 (24 Sep 2023 04:10) (13 - 20)  SpO2: 96% (24 Sep 2023 08:06) (94% - 99%)    Parameters below as of 24 Sep 2023 04:10  Patient On (Oxygen Delivery Method): BiPAP/CPAP        09-23-23 @ 07:01  -  09-24-23 @ 07:00  --------------------------------------------------------  IN: 3185 mL / OUT: 3465 mL / NET: -280 mL        Physical Exam:  Gen: NAD  Constitutional: No Acute Distress   Neurological: AOx3, Following Commands  Spine: Incision c/d/i. PAUL and HMV w serosang output  Sensation: Decreased in bilateral feet similar to preop (neuropathy) otherwise SILT  Motor exam:          Lower extremity                        HF         KF        KE       DF         PF                                                  R        5/5        5/5        5/5       5/5         5/5                                               L         5/5        5/5       5/5       5/5          5/5                                                 BLE: WWP, compartments soft and compressible      LABS:                        11.5   13.23 )-----------( 215      ( 24 Sep 2023 05:45 )             34.3     09-24    136  |  96<L>  |  22  ----------------------------<  263<H>  4.5   |  25  |  0.60    Ca    8.9      24 Sep 2023 05:45  Phos  3.9     09-24  Mg     1.90     09-24    TPro  6.7  /  Alb  3.5  /  TBili  0.6  /  DBili  x   /  AST  40  /  ALT  23  /  AlkPhos  116  09-23

## 2023-09-24 NOTE — PROGRESS NOTE ADULT - SUBJECTIVE AND OBJECTIVE BOX
CC: Patient is a 67y old  Male who presents with a chief complaint of syncope (24 Sep 2023 11:12)      INTERVAL EVENTS: transferred to ortho surgery post op    SUBJECTIVE / INTERVAL HPI: Patient seen and examined at bedside. Reports back spasms post op. Otherwise, had BM prior to OR, but not yet today. Denies other complaints.    ROS: negative unless otherwise stated above.    VITAL SIGNS:  Vital Signs Last 24 Hrs  T(C): 36.8 (24 Sep 2023 08:17), Max: 37.2 (23 Sep 2023 20:00)  T(F): 98.2 (24 Sep 2023 08:17), Max: 98.9 (23 Sep 2023 20:00)  HR: 83 (24 Sep 2023 08:17) (69 - 83)  BP: 146/84 (24 Sep 2023 08:17) (84/49 - 146/84)  BP(mean): 91 (23 Sep 2023 18:00) (56 - 92)  RR: 18 (24 Sep 2023 08:17) (13 - 20)  SpO2: 95% (24 Sep 2023 08:17) (94% - 99%)    Parameters below as of 24 Sep 2023 08:17  Patient On (Oxygen Delivery Method): room air          09-23-23 @ 07:01  -  09-24-23 @ 07:00  --------------------------------------------------------  IN: 3185 mL / OUT: 3465 mL / NET: -280 mL    09-24-23 @ 07:01  -  09-24-23 @ 11:57  --------------------------------------------------------  IN: 225 mL / OUT: 1365 mL / NET: -1140 mL        PHYSICAL EXAM:    General: NAD  HEENT: MMM  Neck: supple  Cardiovascular: +S1/S2; RRR  Respiratory: CTA B/L; no W/R/R  Gastrointestinal: soft, NT/ND  Extremities: WWP; no edema, clubbing or cyanosis  Vascular: 2+ radial, DP pulses B/L  Neurological: AAOx3; no focal deficits    MEDICATIONS:  MEDICATIONS  (STANDING):  acetaminophen     Tablet .. 975 milliGRAM(s) Oral every 8 hours  allopurinol 100 milliGRAM(s) Oral at bedtime  amLODIPine   Tablet 10 milliGRAM(s) Oral daily  aspirin enteric coated 81 milliGRAM(s) Oral daily  atorvastatin 40 milliGRAM(s) Oral at bedtime  bisacodyl 5 milliGRAM(s) Oral every 12 hours  buMETAnide 2 milliGRAM(s) Oral <User Schedule>  carvedilol 12.5 milliGRAM(s) Oral every 12 hours  cyclobenzaprine 5 milliGRAM(s) Oral every 8 hours  dexAMETHasone  Injectable 6 milliGRAM(s) IV Push every 6 hours  dextrose 5%. 1000 milliLiter(s) (50 mL/Hr) IV Continuous <Continuous>  dextrose 5%. 1000 milliLiter(s) (100 mL/Hr) IV Continuous <Continuous>  dextrose 5%. 1000 milliLiter(s) (50 mL/Hr) IV Continuous <Continuous>  dextrose 5%. 1000 milliLiter(s) (100 mL/Hr) IV Continuous <Continuous>  dextrose 50% Injectable 25 Gram(s) IV Push once  dextrose 50% Injectable 12.5 Gram(s) IV Push once  dextrose 50% Injectable 25 Gram(s) IV Push once  dextrose 50% Injectable 12.5 Gram(s) IV Push once  dextrose 50% Injectable 25 Gram(s) IV Push once  dextrose 50% Injectable 25 Gram(s) IV Push once  gabapentin 300 milliGRAM(s) Oral four times a day  glucagon  Injectable 1 milliGRAM(s) IntraMuscular once  heparin   Injectable 5000 Unit(s) SubCutaneous every 8 hours  influenza  Vaccine (HIGH DOSE) 0.7 milliLiter(s) IntraMuscular once  insulin lispro (ADMELOG) corrective regimen sliding scale   SubCutaneous at bedtime  insulin lispro (ADMELOG) corrective regimen sliding scale   SubCutaneous three times a day before meals  insulin lispro (ADMELOG) corrective regimen sliding scale   SubCutaneous three times a day before meals  insulin lispro (ADMELOG) corrective regimen sliding scale   SubCutaneous at bedtime  lactated ringers. 1000 milliLiter(s) (75 mL/Hr) IV Continuous <Continuous>  lidocaine   4% Patch 1 Patch Transdermal daily  losartan 100 milliGRAM(s) Oral daily  multivitamin 1 Tablet(s) Oral daily  pantoprazole    Tablet 40 milliGRAM(s) Oral before breakfast  polyethylene glycol 3350 17 Gram(s) Oral two times a day  senna 2 Tablet(s) Oral at bedtime    MEDICATIONS  (PRN):  aluminum hydroxide/magnesium hydroxide/simethicone Suspension 30 milliLiter(s) Oral every 4 hours PRN Dyspepsia  benzocaine/menthol Lozenge 1 Lozenge Oral every 3 hours PRN Sore Throat  dextrose Oral Gel 15 Gram(s) Oral once PRN Blood Glucose LESS THAN 70 milliGRAM(s)/deciliter  dextrose Oral Gel 15 Gram(s) Oral once PRN Blood Glucose LESS THAN 70 milliGRAM(s)/deciliter  guaiFENesin Oral Liquid (Sugar-Free) 100 milliGRAM(s) Oral every 6 hours PRN Cough  HYDROmorphone  Injectable 0.5 milliGRAM(s) IV Push every 3 hours PRN Severe breakthrough Pain (7 - 10)  magnesium hydroxide Suspension 30 milliLiter(s) Oral every 12 hours PRN Constipation  melatonin 3 milliGRAM(s) Oral at bedtime PRN Insomnia  naloxone Injectable 0.1 milliGRAM(s) IV Push every 3 minutes PRN For ANY of the following changes in patient status:  A. RR LESS THAN 10 breaths per minute, B. Oxygen saturation LESS THAN 90%, C. Sedation score of 6  ondansetron   Disintegrating Tablet 4 milliGRAM(s) Oral every 6 hours PRN Nausea and/or Vomiting  oxyCODONE    IR 15 milliGRAM(s) Oral every 3 hours PRN Severe Pain (7 - 10)  oxyCODONE    IR 10 milliGRAM(s) Oral every 3 hours PRN Moderate Pain (4 - 6)      ALLERGIES:  Allergies    No Known Allergies    Intolerances        LABS:                        11.5   13.23 )-----------( 215      ( 24 Sep 2023 05:45 )             34.3     09-24    136  |  96<L>  |  22  ----------------------------<  263<H>  4.5   |  25  |  0.60    Ca    8.9      24 Sep 2023 05:45  Phos  3.9     09-24  Mg     1.90     09-24    TPro  6.7  /  Alb  3.5  /  TBili  0.6  /  DBili  x   /  AST  40  /  ALT  23  /  AlkPhos  116  09-23    PT/INR - ( 23 Sep 2023 01:12 )   PT: 11.5 sec;   INR: 1.03 ratio         PTT - ( 23 Sep 2023 01:12 )  PTT:26.5 sec  Urinalysis Basic - ( 24 Sep 2023 05:45 )    Color: x / Appearance: x / SG: x / pH: x  Gluc: 263 mg/dL / Ketone: x  / Bili: x / Urobili: x   Blood: x / Protein: x / Nitrite: x   Leuk Esterase: x / RBC: x / WBC x   Sq Epi: x / Non Sq Epi: x / Bacteria: x      CAPILLARY BLOOD GLUCOSE      POCT Blood Glucose.: 290 mg/dL (24 Sep 2023 07:57)      RADIOLOGY & ADDITIONAL TESTS: Reviewed.

## 2023-09-24 NOTE — PROGRESS NOTE ADULT - ATTENDING COMMENTS
Patient seen and examined this morning unable to fit in MRI machine.  Discussed nature of patients pathology. Lower thoracic fracture in setting of AS  leads to patient being high risk for fracture propagation and displacement. Without MRI unable to ascertain if there is posterior ligamentous complex involvement however given patines weight and long lever arms at the fracture site feel it is necessary to stabilized and fuse.  Also unable to determine if there is any stenosis or hematoma that would require a decompression however patient is neurologically intact and having no radicular complaints.  Ideally would go 2 levels above and below fracture site but given patients previous hardware to L3 o not want to leave a small stress riser between constructs.  Therefore plan will be for a t9-pelvis fusion. Risks and benefits of surgery including but not limited to bleeding infection, damage to surrounding structures, hardware failure,  adjacent segment disease, persistent back pain.  Given previous surgery, obesity patient is at high risk for infection and will plan for plastic surgery closure.    Discussed with patient will get prepositional neuromonitoring in order to establish a baseline.  If his neck begins to decompensate during the surgery discussed may require emergent decompression in same setting.     Will get medical optimization prior to OR  NPOpMN  hold a/c  Am labs  OR 9/22 for T9-Pelvis Fusion
Patient seen and examined this morning unable to fit in MRI machine.  Discussed nature of patients pathology. Lower thoracic fracture in setting of AS  leads to patient being high risk for fracture propagation and displacement. Without MRI unable to ascertain if there is posterior ligamentous complex involvement however given patines weight and long lever arms at the fracture site feel it is necessary to stabilized and fuse.  Also unable to determine if there is any stenosis or hematoma that would require a decompression however patient is neurologically intact and having no radicular complaints.  Ideally would go 2 levels above and below fracture site but given patients previous hardware to L3 to not want to leave a small stress riser between constructs.  Therefore plan will be for a t9-pelvis fusion. Risks and benefits of surgery including but not limited to bleeding infection, damage to surrounding structures, hardware failure,  adjacent segment disease, persistent back pain.  Given previous surgery, obesity patient is at high risk for infection and will plan for plastic surgery closure.    Discussed with patient will get prepositional neuromonitoring in order to establish a baseline.  If his neck begins to decompensate during the surgery discussed may require emergent decompression in same setting.       NPO  hold a/c  Am labs  OR Today for T9-Pelvis Fusion
Patient seen and examined.   Agree with above.  Mild right ulnar nerve symptom tingling 4th  and 5th digit.  Secondary to positioning during case.  No left sided symptoms.  Back pain well controlled.  Not out of bed yet.  No radicular complaints.  SQH for DVT PPx.  OOB today with PT.  Wilson out when OOB.  Will monitor progress with PT.  Likely return to OR for cervical decompression in 4-6 weeks.

## 2023-09-24 NOTE — OCCUPATIONAL THERAPY INITIAL EVALUATION ADULT - GENERAL OBSERVATIONS, REHAB EVAL
Patient is alert and following directions. Vitals: Patient is alert and following directions. Vitals: HR 84 BPM

## 2023-09-24 NOTE — OCCUPATIONAL THERAPY INITIAL EVALUATION ADULT - THERAPY FREQUENCY, OT EVAL
Patient's stone sent for analysis   Patient has scheduled follow up in 6 months with [facility] prior to visit per Dr Sheldon Rios  1-2x/week

## 2023-09-24 NOTE — PHYSICAL THERAPY INITIAL EVALUATION ADULT - GENERAL OBSERVATIONS, REHAB EVAL
Pt received semi-supine, + oxygen via Nasal Canula, +2 drains, +telemetry monitor, all lines/tubes intact, NAD. HR: 80 beats per minute

## 2023-09-24 NOTE — PROGRESS NOTE ADULT - PROBLEM SELECTOR PLAN 4
c/w Senna qd  c/w Miralax bid  c/w Dulcolax bid standing  - monitor for constipation while receiving opioids, continue bowel regimen

## 2023-09-24 NOTE — PHYSICAL THERAPY INITIAL EVALUATION ADULT - ADDITIONAL COMMENTS
Pt lives alone in a house with 3 stairs to enter and a flight of stairs to his bedroom. prior to admission Pt was independent with all mobility and ambulated with a straight cane inside the home and a rolling walker outside the home.     Pt. left comfortable in bed with all tubes/lines intact, head of bed elevated, call bell in reach and in NAD.

## 2023-09-25 LAB
ANION GAP SERPL CALC-SCNC: 11 MMOL/L — SIGNIFICANT CHANGE UP (ref 7–14)
BASOPHILS # BLD AUTO: 0.01 K/UL — SIGNIFICANT CHANGE UP (ref 0–0.2)
BASOPHILS NFR BLD AUTO: 0.1 % — SIGNIFICANT CHANGE UP (ref 0–2)
BUN SERPL-MCNC: 21 MG/DL — SIGNIFICANT CHANGE UP (ref 7–23)
CALCIUM SERPL-MCNC: 9 MG/DL — SIGNIFICANT CHANGE UP (ref 8.4–10.5)
CHLORIDE SERPL-SCNC: 100 MMOL/L — SIGNIFICANT CHANGE UP (ref 98–107)
CO2 SERPL-SCNC: 27 MMOL/L — SIGNIFICANT CHANGE UP (ref 22–31)
CREAT SERPL-MCNC: 0.54 MG/DL — SIGNIFICANT CHANGE UP (ref 0.5–1.3)
EGFR: 109 ML/MIN/1.73M2 — SIGNIFICANT CHANGE UP
EOSINOPHIL # BLD AUTO: 0 K/UL — SIGNIFICANT CHANGE UP (ref 0–0.5)
EOSINOPHIL NFR BLD AUTO: 0 % — SIGNIFICANT CHANGE UP (ref 0–6)
GLUCOSE BLDC GLUCOMTR-MCNC: 290 MG/DL — HIGH (ref 70–99)
GLUCOSE BLDC GLUCOMTR-MCNC: 297 MG/DL — HIGH (ref 70–99)
GLUCOSE BLDC GLUCOMTR-MCNC: 298 MG/DL — HIGH (ref 70–99)
GLUCOSE BLDC GLUCOMTR-MCNC: 298 MG/DL — HIGH (ref 70–99)
GLUCOSE BLDC GLUCOMTR-MCNC: 316 MG/DL — HIGH (ref 70–99)
GLUCOSE BLDC GLUCOMTR-MCNC: 323 MG/DL — HIGH (ref 70–99)
GLUCOSE SERPL-MCNC: 295 MG/DL — HIGH (ref 70–99)
HCT VFR BLD CALC: 30.7 % — LOW (ref 39–50)
HGB BLD-MCNC: 10.5 G/DL — LOW (ref 13–17)
IANC: 10.39 K/UL — HIGH (ref 1.8–7.4)
IMM GRANULOCYTES NFR BLD AUTO: 1 % — HIGH (ref 0–0.9)
LYMPHOCYTES # BLD AUTO: 0.68 K/UL — LOW (ref 1–3.3)
LYMPHOCYTES # BLD AUTO: 5.6 % — LOW (ref 13–44)
MAGNESIUM SERPL-MCNC: 1.9 MG/DL — SIGNIFICANT CHANGE UP (ref 1.6–2.6)
MCHC RBC-ENTMCNC: 29.2 PG — SIGNIFICANT CHANGE UP (ref 27–34)
MCHC RBC-ENTMCNC: 34.2 GM/DL — SIGNIFICANT CHANGE UP (ref 32–36)
MCV RBC AUTO: 85.3 FL — SIGNIFICANT CHANGE UP (ref 80–100)
MONOCYTES # BLD AUTO: 0.91 K/UL — HIGH (ref 0–0.9)
MONOCYTES NFR BLD AUTO: 7.5 % — SIGNIFICANT CHANGE UP (ref 2–14)
NEUTROPHILS # BLD AUTO: 10.39 K/UL — HIGH (ref 1.8–7.4)
NEUTROPHILS NFR BLD AUTO: 85.8 % — HIGH (ref 43–77)
NRBC # BLD: 0 /100 WBCS — SIGNIFICANT CHANGE UP (ref 0–0)
NRBC # FLD: 0 K/UL — SIGNIFICANT CHANGE UP (ref 0–0)
PHOSPHATE SERPL-MCNC: 2.2 MG/DL — LOW (ref 2.5–4.5)
PLATELET # BLD AUTO: 221 K/UL — SIGNIFICANT CHANGE UP (ref 150–400)
POTASSIUM SERPL-MCNC: 4 MMOL/L — SIGNIFICANT CHANGE UP (ref 3.5–5.3)
POTASSIUM SERPL-SCNC: 4 MMOL/L — SIGNIFICANT CHANGE UP (ref 3.5–5.3)
RBC # BLD: 3.6 M/UL — LOW (ref 4.2–5.8)
RBC # FLD: 13.1 % — SIGNIFICANT CHANGE UP (ref 10.3–14.5)
SODIUM SERPL-SCNC: 138 MMOL/L — SIGNIFICANT CHANGE UP (ref 135–145)
WBC # BLD: 12.11 K/UL — HIGH (ref 3.8–10.5)
WBC # FLD AUTO: 12.11 K/UL — HIGH (ref 3.8–10.5)

## 2023-09-25 PROCEDURE — 99233 SBSQ HOSP IP/OBS HIGH 50: CPT

## 2023-09-25 RX ORDER — INSULIN LISPRO 100/ML
VIAL (ML) SUBCUTANEOUS AT BEDTIME
Refills: 0 | Status: DISCONTINUED | OUTPATIENT
Start: 2023-09-25 | End: 2023-09-29

## 2023-09-25 RX ORDER — INSULIN LISPRO 100/ML
4 VIAL (ML) SUBCUTANEOUS
Refills: 0 | Status: DISCONTINUED | OUTPATIENT
Start: 2023-09-25 | End: 2023-09-26

## 2023-09-25 RX ORDER — INSULIN LISPRO 100/ML
2 VIAL (ML) SUBCUTANEOUS ONCE
Refills: 0 | Status: COMPLETED | OUTPATIENT
Start: 2023-09-25 | End: 2023-09-25

## 2023-09-25 RX ORDER — INSULIN GLARGINE 100 [IU]/ML
12 INJECTION, SOLUTION SUBCUTANEOUS AT BEDTIME
Refills: 0 | Status: DISCONTINUED | OUTPATIENT
Start: 2023-09-25 | End: 2023-09-26

## 2023-09-25 RX ORDER — INSULIN LISPRO 100/ML
VIAL (ML) SUBCUTANEOUS
Refills: 0 | Status: DISCONTINUED | OUTPATIENT
Start: 2023-09-25 | End: 2023-09-29

## 2023-09-25 RX ADMIN — ATORVASTATIN CALCIUM 40 MILLIGRAM(S): 80 TABLET, FILM COATED ORAL at 21:32

## 2023-09-25 RX ADMIN — OXYCODONE HYDROCHLORIDE 15 MILLIGRAM(S): 5 TABLET ORAL at 14:48

## 2023-09-25 RX ADMIN — INSULIN GLARGINE 12 UNIT(S): 100 INJECTION, SOLUTION SUBCUTANEOUS at 22:31

## 2023-09-25 RX ADMIN — Medication 3: at 08:14

## 2023-09-25 RX ADMIN — Medication 4 MILLIGRAM(S): at 00:51

## 2023-09-25 RX ADMIN — Medication 1 TABLET(S): at 12:04

## 2023-09-25 RX ADMIN — GABAPENTIN 300 MILLIGRAM(S): 400 CAPSULE ORAL at 17:14

## 2023-09-25 RX ADMIN — Medication 2 MILLIGRAM(S): at 23:21

## 2023-09-25 RX ADMIN — OXYCODONE HYDROCHLORIDE 15 MILLIGRAM(S): 5 TABLET ORAL at 13:48

## 2023-09-25 RX ADMIN — Medication 975 MILLIGRAM(S): at 01:38

## 2023-09-25 RX ADMIN — OXYCODONE HYDROCHLORIDE 15 MILLIGRAM(S): 5 TABLET ORAL at 06:50

## 2023-09-25 RX ADMIN — GABAPENTIN 300 MILLIGRAM(S): 400 CAPSULE ORAL at 12:04

## 2023-09-25 RX ADMIN — Medication 2 MILLIGRAM(S): at 17:14

## 2023-09-25 RX ADMIN — HEPARIN SODIUM 5000 UNIT(S): 5000 INJECTION INTRAVENOUS; SUBCUTANEOUS at 21:33

## 2023-09-25 RX ADMIN — Medication 4 UNIT(S): at 12:03

## 2023-09-25 RX ADMIN — Medication 4 MILLIGRAM(S): at 05:16

## 2023-09-25 RX ADMIN — BUMETANIDE 2 MILLIGRAM(S): 0.25 INJECTION INTRAMUSCULAR; INTRAVENOUS at 13:48

## 2023-09-25 RX ADMIN — HEPARIN SODIUM 5000 UNIT(S): 5000 INJECTION INTRAVENOUS; SUBCUTANEOUS at 13:48

## 2023-09-25 RX ADMIN — LIDOCAINE 1 PATCH: 4 CREAM TOPICAL at 00:32

## 2023-09-25 RX ADMIN — GABAPENTIN 300 MILLIGRAM(S): 400 CAPSULE ORAL at 00:51

## 2023-09-25 RX ADMIN — CYCLOBENZAPRINE HYDROCHLORIDE 5 MILLIGRAM(S): 10 TABLET, FILM COATED ORAL at 21:33

## 2023-09-25 RX ADMIN — Medication 100 MILLIGRAM(S): at 21:33

## 2023-09-25 RX ADMIN — Medication 5 MILLIGRAM(S): at 21:32

## 2023-09-25 RX ADMIN — CYCLOBENZAPRINE HYDROCHLORIDE 5 MILLIGRAM(S): 10 TABLET, FILM COATED ORAL at 16:00

## 2023-09-25 RX ADMIN — HEPARIN SODIUM 5000 UNIT(S): 5000 INJECTION INTRAVENOUS; SUBCUTANEOUS at 05:18

## 2023-09-25 RX ADMIN — Medication 4: at 12:02

## 2023-09-25 RX ADMIN — CYCLOBENZAPRINE HYDROCHLORIDE 5 MILLIGRAM(S): 10 TABLET, FILM COATED ORAL at 05:17

## 2023-09-25 RX ADMIN — GABAPENTIN 300 MILLIGRAM(S): 400 CAPSULE ORAL at 05:18

## 2023-09-25 RX ADMIN — PANTOPRAZOLE SODIUM 40 MILLIGRAM(S): 20 TABLET, DELAYED RELEASE ORAL at 05:19

## 2023-09-25 RX ADMIN — Medication 975 MILLIGRAM(S): at 08:15

## 2023-09-25 RX ADMIN — SENNA PLUS 2 TABLET(S): 8.6 TABLET ORAL at 21:32

## 2023-09-25 RX ADMIN — Medication 2: at 22:31

## 2023-09-25 RX ADMIN — Medication 5 MILLIGRAM(S): at 10:28

## 2023-09-25 RX ADMIN — BUMETANIDE 2 MILLIGRAM(S): 0.25 INJECTION INTRAMUSCULAR; INTRAVENOUS at 05:18

## 2023-09-25 RX ADMIN — LIDOCAINE 1 PATCH: 4 CREAM TOPICAL at 12:05

## 2023-09-25 RX ADMIN — Medication 975 MILLIGRAM(S): at 00:51

## 2023-09-25 RX ADMIN — Medication 975 MILLIGRAM(S): at 16:00

## 2023-09-25 RX ADMIN — OXYCODONE HYDROCHLORIDE 15 MILLIGRAM(S): 5 TABLET ORAL at 23:21

## 2023-09-25 RX ADMIN — LOSARTAN POTASSIUM 100 MILLIGRAM(S): 100 TABLET, FILM COATED ORAL at 05:18

## 2023-09-25 RX ADMIN — POLYETHYLENE GLYCOL 3350 17 GRAM(S): 17 POWDER, FOR SOLUTION ORAL at 05:20

## 2023-09-25 RX ADMIN — CARVEDILOL PHOSPHATE 12.5 MILLIGRAM(S): 80 CAPSULE, EXTENDED RELEASE ORAL at 10:28

## 2023-09-25 RX ADMIN — GABAPENTIN 300 MILLIGRAM(S): 400 CAPSULE ORAL at 23:21

## 2023-09-25 RX ADMIN — Medication 6: at 17:10

## 2023-09-25 RX ADMIN — Medication 975 MILLIGRAM(S): at 08:45

## 2023-09-25 RX ADMIN — Medication 975 MILLIGRAM(S): at 23:21

## 2023-09-25 RX ADMIN — POLYETHYLENE GLYCOL 3350 17 GRAM(S): 17 POWDER, FOR SOLUTION ORAL at 17:14

## 2023-09-25 RX ADMIN — Medication 81 MILLIGRAM(S): at 12:04

## 2023-09-25 RX ADMIN — OXYCODONE HYDROCHLORIDE 15 MILLIGRAM(S): 5 TABLET ORAL at 07:20

## 2023-09-25 RX ADMIN — Medication 4 UNIT(S): at 17:11

## 2023-09-25 RX ADMIN — Medication 3 UNIT(S): at 08:14

## 2023-09-25 RX ADMIN — CARVEDILOL PHOSPHATE 12.5 MILLIGRAM(S): 80 CAPSULE, EXTENDED RELEASE ORAL at 21:33

## 2023-09-25 RX ADMIN — Medication 4 MILLIGRAM(S): at 12:04

## 2023-09-25 RX ADMIN — Medication 975 MILLIGRAM(S): at 17:00

## 2023-09-25 RX ADMIN — AMLODIPINE BESYLATE 10 MILLIGRAM(S): 2.5 TABLET ORAL at 05:21

## 2023-09-25 RX ADMIN — Medication 2 UNIT(S): at 03:10

## 2023-09-25 NOTE — PROGRESS NOTE ADULT - SUBJECTIVE AND OBJECTIVE BOX
Orthopedic Surgery Progress Note     S: Patient seen and examined today. No acute events overnight. Complaining of significant back spasms. Denies f/c, chest pain, shortness of breath, dizziness.    Vital Signs Last 24 Hrs  T(C): 37 (25 Sep 2023 04:30), Max: 37 (25 Sep 2023 00:18)  T(F): 98.6 (25 Sep 2023 04:30), Max: 98.6 (25 Sep 2023 00:18)  HR: 95 (25 Sep 2023 04:30) (69 - 96)  BP: 130/81 (25 Sep 2023 04:30) (118/67 - 146/84)  BP(mean): --  RR: 18 (25 Sep 2023 04:30) (18 - 18)  SpO2: 100% (25 Sep 2023 04:30) (95% - 100%)    Parameters below as of 25 Sep 2023 04:30  Patient On (Oxygen Delivery Method): BiPAP/CPAP      Physical Exam:  Gen: NAD  Constitutional: No Acute Distress   Neurological: AOx3, Following Commands  Spine: Incision c/d/i. PAUL and HMV w serosang output  Sensation: Decreased in bilateral feet similar to preop (neuropathy) otherwise SILT L2-S1  Motor exam:          Lower extremity                        HF         KF        KE       DF         PF                                                  R        5/5        5/5        5/5       5/5         5/5                                               L         5/5        5/5       5/5       5/5          5/5                                                 BLE: WWP, compartments soft and compressible      LABS:                        11.5   13.23 )-----------( 215      ( 24 Sep 2023 05:45 )             34.3

## 2023-09-25 NOTE — PROGRESS NOTE ADULT - ASSESSMENT
67yMale s/p T9-Pelvis revision fusion 9/23    - WBAT  - Monitor drain outputs  - Decadron taper  - Pain team following, appreciate recs  - SQH and ASA81  - PT/OT/OOB  - D/C Katie today  - Dispo planning: [facility]

## 2023-09-25 NOTE — PROGRESS NOTE ADULT - PROBLEM SELECTOR PLAN 4
A1C 6.5%  - FS elevated iso of dexamethasone  - hold home oral meds, resume on discharge  - increase Lantus to 12U qhs + Admelog to 4U TID qac  - FS/SSI qac and hs

## 2023-09-25 NOTE — PROGRESS NOTE ADULT - SUBJECTIVE AND OBJECTIVE BOX
Polly Ross MD  [facility] Division of Hospital Medicine  Pager [phone] (M-F 8AM-5PM)  Other Times: [phone]    Patient is a 67y old  Male who presents with a chief complaint of syncope (25 Sep 2023 06:43)    SUBJECTIVE / OVERNIGHT EVENTS: no acute events overnight     MEDICATIONS  (STANDING):  acetaminophen     Tablet .. 975 milliGRAM(s) Oral every 8 hours  allopurinol 100 milliGRAM(s) Oral at bedtime  amLODIPine   Tablet 10 milliGRAM(s) Oral daily  aspirin enteric coated 81 milliGRAM(s) Oral daily  atorvastatin 40 milliGRAM(s) Oral at bedtime  bisacodyl 5 milliGRAM(s) Oral every 12 hours  buMETAnide 2 milliGRAM(s) Oral two times a day  carvedilol 12.5 milliGRAM(s) Oral every 12 hours  cyclobenzaprine 5 milliGRAM(s) Oral every 8 hours  dexAMETHasone  Injectable 4 milliGRAM(s) IV Push every 6 hours  dexAMETHasone  Injectable 2 milliGRAM(s) IV Push every 6 hours  dextrose 5%. 1000 milliLiter(s) (100 mL/Hr) IV Continuous <Continuous>  dextrose 5%. 1000 milliLiter(s) (50 mL/Hr) IV Continuous <Continuous>  dextrose 5%. 1000 milliLiter(s) (50 mL/Hr) IV Continuous <Continuous>  dextrose 5%. 1000 milliLiter(s) (100 mL/Hr) IV Continuous <Continuous>  dextrose 50% Injectable 12.5 Gram(s) IV Push once  dextrose 50% Injectable 25 Gram(s) IV Push once  dextrose 50% Injectable 12.5 Gram(s) IV Push once  dextrose 50% Injectable 25 Gram(s) IV Push once  dextrose 50% Injectable 25 Gram(s) IV Push once  dextrose 50% Injectable 25 Gram(s) IV Push once  gabapentin 300 milliGRAM(s) Oral four times a day  glucagon  Injectable 1 milliGRAM(s) IntraMuscular once  heparin   Injectable 5000 Unit(s) SubCutaneous every 8 hours  influenza  Vaccine (HIGH DOSE) 0.7 milliLiter(s) IntraMuscular once  insulin glargine Injectable (LANTUS) 12 Unit(s) SubCutaneous at bedtime  insulin lispro (ADMELOG) corrective regimen sliding scale   SubCutaneous three times a day before meals  insulin lispro (ADMELOG) corrective regimen sliding scale   SubCutaneous at bedtime  insulin lispro Injectable (ADMELOG) 4 Unit(s) SubCutaneous three times a day with meals  lidocaine   4% Patch 1 Patch Transdermal daily  losartan 100 milliGRAM(s) Oral daily  multivitamin 1 Tablet(s) Oral daily  pantoprazole    Tablet 40 milliGRAM(s) Oral before breakfast  polyethylene glycol 3350 17 Gram(s) Oral two times a day  senna 2 Tablet(s) Oral at bedtime    MEDICATIONS  (PRN):  aluminum hydroxide/magnesium hydroxide/simethicone Suspension 30 milliLiter(s) Oral every 4 hours PRN Dyspepsia  benzocaine/menthol Lozenge 1 Lozenge Oral every 3 hours PRN Sore Throat  dextrose Oral Gel 15 Gram(s) Oral once PRN Blood Glucose LESS THAN 70 milliGRAM(s)/deciliter  dextrose Oral Gel 15 Gram(s) Oral once PRN Blood Glucose LESS THAN 70 milliGRAM(s)/deciliter  guaiFENesin Oral Liquid (Sugar-Free) 100 milliGRAM(s) Oral every 6 hours PRN Cough  HYDROmorphone  Injectable 0.5 milliGRAM(s) IV Push every 3 hours PRN Severe breakthrough Pain (7 - 10)  magnesium hydroxide Suspension 30 milliLiter(s) Oral every 12 hours PRN Constipation  melatonin 3 milliGRAM(s) Oral at bedtime PRN Insomnia  naloxone Injectable 0.1 milliGRAM(s) IV Push every 3 minutes PRN For ANY of the following changes in patient status:  A. RR LESS THAN 10 breaths per minute, B. Oxygen saturation LESS THAN 90%, C. Sedation score of 6  ondansetron   Disintegrating Tablet 4 milliGRAM(s) Oral every 6 hours PRN Nausea and/or Vomiting  oxyCODONE    IR 10 milliGRAM(s) Oral every 3 hours PRN Moderate Pain (4 - 6)  oxyCODONE    IR 15 milliGRAM(s) Oral every 3 hours PRN Severe Pain (7 - 10)      PHYSICAL EXAM:  Vital Signs Last 24 Hrs  T(C): 36.8 (25 Sep 2023 08:20), Max: 37 (25 Sep 2023 00:18)  T(F): 98.3 (25 Sep 2023 08:20), Max: 98.6 (25 Sep 2023 00:18)  HR: 81 (25 Sep 2023 08:20) (81 - 96)  BP: 145/70 (25 Sep 2023 08:20) (118/67 - 145/70)  BP(mean): --  RR: 18 (25 Sep 2023 08:20) (18 - 18)  SpO2: 96% (25 Sep 2023 08:20) (96% - 100%)    Parameters below as of 25 Sep 2023 08:20  Patient On (Oxygen Delivery Method): room air    CONSTITUTIONAL: NAD, well-developed, well-groomed  RESPIRATORY: Normal respiratory effort; lungs are clear to auscultation bilaterally  CARDIOVASCULAR: Regular rate and rhythm, normal S1 and S2, no murmur/rub/gallop; No lower extremity edema  GASTROINTESTINAL: Nontender to palpation, normoactive bowel sounds, no rebound/guarding; No hepatosplenomegaly  MUSCULOSKELETAL:  no clubbing or cyanosis of digits; no joint swelling or tenderness to palpation  NEUROLOGY: non-focal; no gross sensory deficits   PSYCH: A+O to person, place, and time; affect appropriate  SKIN: No rashes; warm     LABS:                        10.5   12.11 )-----------( 221      ( 25 Sep 2023 05:50 )             30.7     09-25    138  |  100  |  21  ----------------------------<  295<H>  4.0   |  27  |  0.54    Ca    9.0      25 Sep 2023 05:50  Phos  2.2     09-25  Mg     1.90     09-25    TPro  6.7  /  Alb  3.5  /  TBili  0.6  /  DBili  x   /  AST  40  /  ALT  23  /  AlkPhos  116  09-23          Urinalysis Basic - ( 25 Sep 2023 05:50 )    Color: x / Appearance: x / SG: x / pH: x  Gluc: 295 mg/dL / Ketone: x  / Bili: x / Urobili: x   Blood: x / Protein: x / Nitrite: x   Leuk Esterase: x / RBC: x / WBC x   Sq Epi: x / Non Sq Epi: x / Bacteria: x          RADIOLOGY & ADDITIONAL TESTS:  Results Reviewed:   Imaging Personally Reviewed:  Electrocardiogram Personally Reviewed:    COORDINATION OF CARE:  Care Discussed with Consultants/Other Providers [Y/N]:  Prior or Outpatient Records Reviewed [Y/N]:

## 2023-09-25 NOTE — PROGRESS NOTE ADULT - ASSESSMENT
67M with hx of HFrEF, CAD s/p CABG, ASIYA on CPAP, HTN, DM, right renal mass s/p resection presents to the ED after a syncopal episode, likely 2/2 situational syncope. Found to have T12-L1 fracture s/p L/T spine fusion on 9/23.

## 2023-09-25 NOTE — PROGRESS NOTE ADULT - PROBLEM SELECTOR PLAN 1
syncopal episode after blowing his nose  - EKG without acute changes, trops stable  - CTH negative, TTE with no acute findings, no events on tele (can dc)  - unable to perform orthostats due to back pain and unable to stand  - reports intermittent dizziness since recent increase in home bumex   - continue with decreased dose of Bumex 2mg BID  - PT recommended rehab

## 2023-09-25 NOTE — PROGRESS NOTE ADULT - PROBLEM SELECTOR PLAN 2
T12-L1 fracture   - s/p T9-pelvis posterior fusion 9/29  - post op care per ortho  - pain control: Tylenol + PRN Oxycodone, bowel regimen with opiates   - encouraged incentive spirometer use  - PT recommended rehab  - DVT ppx: HSQ

## 2023-09-26 LAB
ANION GAP SERPL CALC-SCNC: 10 MMOL/L — SIGNIFICANT CHANGE UP (ref 7–14)
BASOPHILS # BLD AUTO: 0.03 K/UL — SIGNIFICANT CHANGE UP (ref 0–0.2)
BASOPHILS NFR BLD AUTO: 0.3 % — SIGNIFICANT CHANGE UP (ref 0–2)
BUN SERPL-MCNC: 24 MG/DL — HIGH (ref 7–23)
CALCIUM SERPL-MCNC: 9 MG/DL — SIGNIFICANT CHANGE UP (ref 8.4–10.5)
CHLORIDE SERPL-SCNC: 98 MMOL/L — SIGNIFICANT CHANGE UP (ref 98–107)
CO2 SERPL-SCNC: 30 MMOL/L — SIGNIFICANT CHANGE UP (ref 22–31)
CREAT SERPL-MCNC: 0.52 MG/DL — SIGNIFICANT CHANGE UP (ref 0.5–1.3)
EGFR: 110 ML/MIN/1.73M2 — SIGNIFICANT CHANGE UP
EOSINOPHIL # BLD AUTO: 0 K/UL — SIGNIFICANT CHANGE UP (ref 0–0.5)
EOSINOPHIL NFR BLD AUTO: 0 % — SIGNIFICANT CHANGE UP (ref 0–6)
GLUCOSE BLDC GLUCOMTR-MCNC: 254 MG/DL — HIGH (ref 70–99)
GLUCOSE BLDC GLUCOMTR-MCNC: 278 MG/DL — HIGH (ref 70–99)
GLUCOSE BLDC GLUCOMTR-MCNC: 280 MG/DL — HIGH (ref 70–99)
GLUCOSE BLDC GLUCOMTR-MCNC: 312 MG/DL — HIGH (ref 70–99)
GLUCOSE SERPL-MCNC: 275 MG/DL — HIGH (ref 70–99)
HCT VFR BLD CALC: 32.2 % — LOW (ref 39–50)
HGB BLD-MCNC: 11 G/DL — LOW (ref 13–17)
IANC: 8.72 K/UL — HIGH (ref 1.8–7.4)
IMM GRANULOCYTES NFR BLD AUTO: 1.3 % — HIGH (ref 0–0.9)
LYMPHOCYTES # BLD AUTO: 0.83 K/UL — LOW (ref 1–3.3)
LYMPHOCYTES # BLD AUTO: 7.9 % — LOW (ref 13–44)
MCHC RBC-ENTMCNC: 29.4 PG — SIGNIFICANT CHANGE UP (ref 27–34)
MCHC RBC-ENTMCNC: 34.2 GM/DL — SIGNIFICANT CHANGE UP (ref 32–36)
MCV RBC AUTO: 86.1 FL — SIGNIFICANT CHANGE UP (ref 80–100)
MONOCYTES # BLD AUTO: 0.82 K/UL — SIGNIFICANT CHANGE UP (ref 0–0.9)
MONOCYTES NFR BLD AUTO: 7.8 % — SIGNIFICANT CHANGE UP (ref 2–14)
NEUTROPHILS # BLD AUTO: 8.72 K/UL — HIGH (ref 1.8–7.4)
NEUTROPHILS NFR BLD AUTO: 82.7 % — HIGH (ref 43–77)
NRBC # BLD: 0 /100 WBCS — SIGNIFICANT CHANGE UP (ref 0–0)
NRBC # FLD: 0 K/UL — SIGNIFICANT CHANGE UP (ref 0–0)
PLATELET # BLD AUTO: 226 K/UL — SIGNIFICANT CHANGE UP (ref 150–400)
POTASSIUM SERPL-MCNC: 3.8 MMOL/L — SIGNIFICANT CHANGE UP (ref 3.5–5.3)
POTASSIUM SERPL-SCNC: 3.8 MMOL/L — SIGNIFICANT CHANGE UP (ref 3.5–5.3)
RBC # BLD: 3.74 M/UL — LOW (ref 4.2–5.8)
RBC # FLD: 13.2 % — SIGNIFICANT CHANGE UP (ref 10.3–14.5)
SODIUM SERPL-SCNC: 138 MMOL/L — SIGNIFICANT CHANGE UP (ref 135–145)
WBC # BLD: 10.54 K/UL — HIGH (ref 3.8–10.5)
WBC # FLD AUTO: 10.54 K/UL — HIGH (ref 3.8–10.5)

## 2023-09-26 PROCEDURE — 99222 1ST HOSP IP/OBS MODERATE 55: CPT

## 2023-09-26 PROCEDURE — 99233 SBSQ HOSP IP/OBS HIGH 50: CPT

## 2023-09-26 RX ORDER — INSULIN LISPRO 100/ML
5 VIAL (ML) SUBCUTANEOUS
Refills: 0 | Status: DISCONTINUED | OUTPATIENT
Start: 2023-09-26 | End: 2023-09-26

## 2023-09-26 RX ORDER — CYCLOBENZAPRINE HYDROCHLORIDE 10 MG/1
10 TABLET, FILM COATED ORAL THREE TIMES A DAY
Refills: 0 | Status: DISCONTINUED | OUTPATIENT
Start: 2023-09-26 | End: 2023-09-30

## 2023-09-26 RX ORDER — INSULIN GLARGINE 100 [IU]/ML
18 INJECTION, SOLUTION SUBCUTANEOUS AT BEDTIME
Refills: 0 | Status: DISCONTINUED | OUTPATIENT
Start: 2023-09-26 | End: 2023-09-26

## 2023-09-26 RX ORDER — INSULIN GLARGINE 100 [IU]/ML
15 INJECTION, SOLUTION SUBCUTANEOUS AT BEDTIME
Refills: 0 | Status: DISCONTINUED | OUTPATIENT
Start: 2023-09-26 | End: 2023-09-26

## 2023-09-26 RX ORDER — INSULIN LISPRO 100/ML
10 VIAL (ML) SUBCUTANEOUS
Refills: 0 | Status: DISCONTINUED | OUTPATIENT
Start: 2023-09-26 | End: 2023-09-27

## 2023-09-26 RX ORDER — INSULIN GLARGINE 100 [IU]/ML
20 INJECTION, SOLUTION SUBCUTANEOUS AT BEDTIME
Refills: 0 | Status: DISCONTINUED | OUTPATIENT
Start: 2023-09-26 | End: 2023-09-27

## 2023-09-26 RX ORDER — INSULIN LISPRO 100/ML
6 VIAL (ML) SUBCUTANEOUS
Refills: 0 | Status: DISCONTINUED | OUTPATIENT
Start: 2023-09-26 | End: 2023-09-26

## 2023-09-26 RX ADMIN — Medication 975 MILLIGRAM(S): at 07:34

## 2023-09-26 RX ADMIN — LOSARTAN POTASSIUM 100 MILLIGRAM(S): 100 TABLET, FILM COATED ORAL at 05:08

## 2023-09-26 RX ADMIN — ATORVASTATIN CALCIUM 40 MILLIGRAM(S): 80 TABLET, FILM COATED ORAL at 21:06

## 2023-09-26 RX ADMIN — Medication 2 MILLIGRAM(S): at 05:09

## 2023-09-26 RX ADMIN — OXYCODONE HYDROCHLORIDE 15 MILLIGRAM(S): 5 TABLET ORAL at 00:21

## 2023-09-26 RX ADMIN — CYCLOBENZAPRINE HYDROCHLORIDE 10 MILLIGRAM(S): 10 TABLET, FILM COATED ORAL at 13:14

## 2023-09-26 RX ADMIN — Medication 975 MILLIGRAM(S): at 00:21

## 2023-09-26 RX ADMIN — CYCLOBENZAPRINE HYDROCHLORIDE 5 MILLIGRAM(S): 10 TABLET, FILM COATED ORAL at 05:09

## 2023-09-26 RX ADMIN — Medication 975 MILLIGRAM(S): at 16:28

## 2023-09-26 RX ADMIN — CARVEDILOL PHOSPHATE 12.5 MILLIGRAM(S): 80 CAPSULE, EXTENDED RELEASE ORAL at 21:06

## 2023-09-26 RX ADMIN — AMLODIPINE BESYLATE 10 MILLIGRAM(S): 2.5 TABLET ORAL at 05:09

## 2023-09-26 RX ADMIN — Medication 975 MILLIGRAM(S): at 17:28

## 2023-09-26 RX ADMIN — OXYCODONE HYDROCHLORIDE 15 MILLIGRAM(S): 5 TABLET ORAL at 11:55

## 2023-09-26 RX ADMIN — GABAPENTIN 300 MILLIGRAM(S): 400 CAPSULE ORAL at 11:39

## 2023-09-26 RX ADMIN — POLYETHYLENE GLYCOL 3350 17 GRAM(S): 17 POWDER, FOR SOLUTION ORAL at 17:36

## 2023-09-26 RX ADMIN — BUMETANIDE 2 MILLIGRAM(S): 0.25 INJECTION INTRAMUSCULAR; INTRAVENOUS at 05:09

## 2023-09-26 RX ADMIN — HYDROMORPHONE HYDROCHLORIDE 0.5 MILLIGRAM(S): 2 INJECTION INTRAMUSCULAR; INTRAVENOUS; SUBCUTANEOUS at 09:15

## 2023-09-26 RX ADMIN — OXYCODONE HYDROCHLORIDE 15 MILLIGRAM(S): 5 TABLET ORAL at 22:05

## 2023-09-26 RX ADMIN — Medication 10 UNIT(S): at 16:27

## 2023-09-26 RX ADMIN — Medication 2 MILLIGRAM(S): at 13:15

## 2023-09-26 RX ADMIN — Medication 6: at 07:35

## 2023-09-26 RX ADMIN — Medication 100 MILLIGRAM(S): at 21:09

## 2023-09-26 RX ADMIN — INSULIN GLARGINE 20 UNIT(S): 100 INJECTION, SOLUTION SUBCUTANEOUS at 22:17

## 2023-09-26 RX ADMIN — HEPARIN SODIUM 5000 UNIT(S): 5000 INJECTION INTRAVENOUS; SUBCUTANEOUS at 05:09

## 2023-09-26 RX ADMIN — GABAPENTIN 300 MILLIGRAM(S): 400 CAPSULE ORAL at 05:09

## 2023-09-26 RX ADMIN — Medication 975 MILLIGRAM(S): at 08:40

## 2023-09-26 RX ADMIN — Medication 5 MILLIGRAM(S): at 21:08

## 2023-09-26 RX ADMIN — Medication 8: at 11:37

## 2023-09-26 RX ADMIN — HYDROMORPHONE HYDROCHLORIDE 0.5 MILLIGRAM(S): 2 INJECTION INTRAMUSCULAR; INTRAVENOUS; SUBCUTANEOUS at 09:03

## 2023-09-26 RX ADMIN — PANTOPRAZOLE SODIUM 40 MILLIGRAM(S): 20 TABLET, DELAYED RELEASE ORAL at 05:09

## 2023-09-26 RX ADMIN — Medication 6: at 16:27

## 2023-09-26 RX ADMIN — CYCLOBENZAPRINE HYDROCHLORIDE 10 MILLIGRAM(S): 10 TABLET, FILM COATED ORAL at 22:17

## 2023-09-26 RX ADMIN — GABAPENTIN 300 MILLIGRAM(S): 400 CAPSULE ORAL at 23:30

## 2023-09-26 RX ADMIN — Medication 81 MILLIGRAM(S): at 11:42

## 2023-09-26 RX ADMIN — LIDOCAINE 1 PATCH: 4 CREAM TOPICAL at 18:28

## 2023-09-26 RX ADMIN — Medication 4 UNIT(S): at 07:36

## 2023-09-26 RX ADMIN — Medication 2: at 22:18

## 2023-09-26 RX ADMIN — Medication 3 MILLIGRAM(S): at 22:17

## 2023-09-26 RX ADMIN — OXYCODONE HYDROCHLORIDE 15 MILLIGRAM(S): 5 TABLET ORAL at 10:55

## 2023-09-26 RX ADMIN — OXYCODONE HYDROCHLORIDE 15 MILLIGRAM(S): 5 TABLET ORAL at 08:55

## 2023-09-26 RX ADMIN — Medication 5 UNIT(S): at 11:38

## 2023-09-26 RX ADMIN — HEPARIN SODIUM 5000 UNIT(S): 5000 INJECTION INTRAVENOUS; SUBCUTANEOUS at 13:16

## 2023-09-26 RX ADMIN — LIDOCAINE 1 PATCH: 4 CREAM TOPICAL at 13:13

## 2023-09-26 RX ADMIN — Medication 5 MILLIGRAM(S): at 11:38

## 2023-09-26 RX ADMIN — OXYCODONE HYDROCHLORIDE 15 MILLIGRAM(S): 5 TABLET ORAL at 21:05

## 2023-09-26 RX ADMIN — GABAPENTIN 300 MILLIGRAM(S): 400 CAPSULE ORAL at 17:36

## 2023-09-26 RX ADMIN — HEPARIN SODIUM 5000 UNIT(S): 5000 INJECTION INTRAVENOUS; SUBCUTANEOUS at 21:06

## 2023-09-26 RX ADMIN — Medication 975 MILLIGRAM(S): at 23:30

## 2023-09-26 RX ADMIN — OXYCODONE HYDROCHLORIDE 15 MILLIGRAM(S): 5 TABLET ORAL at 07:55

## 2023-09-26 RX ADMIN — SENNA PLUS 2 TABLET(S): 8.6 TABLET ORAL at 21:07

## 2023-09-26 RX ADMIN — BUMETANIDE 2 MILLIGRAM(S): 0.25 INJECTION INTRAMUSCULAR; INTRAVENOUS at 13:17

## 2023-09-26 RX ADMIN — POLYETHYLENE GLYCOL 3350 17 GRAM(S): 17 POWDER, FOR SOLUTION ORAL at 05:10

## 2023-09-26 RX ADMIN — LIDOCAINE 1 PATCH: 4 CREAM TOPICAL at 01:47

## 2023-09-26 RX ADMIN — Medication 1 TABLET(S): at 11:41

## 2023-09-26 NOTE — PROGRESS NOTE ADULT - PROBLEM SELECTOR PLAN 4
A1C 6.5%  - hold home oral meds, resume on discharge  - c/b steroid induced hyperglycemia   - increase Lantus to 15U qhs + Admelog to 5U TID qac  - moderate FS/SSI qac and hs A1C 6.5%  - hold home oral meds, resume on discharge  - c/b steroid induced hyperglycemia   - increase Lantus to 18U qhs + Admelog to 6U TID qac  - moderate FS/SSI qac and hs

## 2023-09-26 NOTE — CONSULT NOTE ADULT - SUBJECTIVE AND OBJECTIVE BOX
HPI:  68 yo M with PMhx of HFrEF, CAD s/p CABG, ASIYA on CPAP, HTN, DM, right renal mass s/p resection presents to the ED after a syncopal episode. Patient is AAOX3 and was able to provide most of the hx. Patient reports that yesterday morning he blew his nose and passed out for 2-3 seconds. He remembers "blacking out" and then hitting his back against the floor. He did not full lose consciousness. He did not have dizziness, chest pain, SOB, or palpitations prior to the fall. Afterwards, he was not able to move and get up to his feet due to severe back pain. He called his son and brother in law using his phone. They came to his house and attempted to get him up but they were notable to. Afterwards, fire fighters came and lifted him up using a board. The whole day he was not able to lie down due to pain. He remained in erect position in his recliner. He was supposed to undergo cervical surgery tomorrow and so he called his orthopedic doctor to tell him about the syncopal episode. He was advised to come to the ED for further work up.   In the ED, his vitals were notable for elevated BP. Labs were WNL. CT thoracic spine showed acute fracture of T12-L1. CT head and neck showed no acute bleeding or fracture. EKG showed no acute changes.  (18 Sep 2023 19:31)    Patient was admitted on 9/18, s/p T9-pelvis revision of fusion on 9/23, seen today, pain controlled with current pain medications, 5-10/10. Reports h/o LE peripheral neuropathy.     REVIEW OF SYSTEMS  Constitutional - No fever, No weight loss, No fatigue  HEENT - No eye pain, No visual disturbances, No difficulty hearing, No tinnitus, No vertigo, + neck pain  Respiratory - No cough, No wheezing, No shortness of breath  Cardiovascular - No chest pain, No palpitations  Gastrointestinal - No BM  Genitourinary - No dysuria, No frequency, No hematuria, No incontinence  Psychiatric - No depression, No anxiety    VITALS  T(C): 36.4 (09-26-23 @ 14:12), Max: 36.8 (09-25-23 @ 17:45)  HR: 70 (09-26-23 @ 15:54) (64 - 88)  BP: 112/56 (09-26-23 @ 14:12) (107/66 - 153/65)  RR: 17 (09-26-23 @ 14:12) (17 - 18)  SpO2: 98% (09-26-23 @ 15:54) (95% - 100%)  Wt(kg): --    PAST MEDICAL & SURGICAL HISTORY  Gout    History of Obesity    Lumbar disc disease with radiculopathy    H/O: osteoarthritis    Obstructive sleep apnea    Renal calculi    Hx of insomnia    Neuropathy    Essential hypertension    CAD (coronary artery disease)    Ankle fracture    Hypercholesterolemia    Stool finding    Class 3 severe obesity with body mass index (BMI) of 45.0 to 49.9 in adult    Paralytic ileus    Umbilical hernia without obstruction and without gangrene    Type 2 diabetes mellitus    Right carpal tunnel syndrome    Cubital tunnel syndrome, right    Trigger finger of right hand    H/O CHF    Cervical herniated disc    Renal cancer    Disease of spinal cord, unspecified    S/P Left Inguinal Hernia Repair    Cholecystitis    H/O lymphadenopathy    Hernia    S/P tonsillectomy and adenoidectomy    S/P hip replacement    S/P knee replacement    H/O renal calculi    H/O lithotripsy    S/P revision of total knee, right    S/P lumbar discectomy    S/P CABG x 3    S/P lumbar laminectomy    Kidney stone    Neuropathy    History of bilateral hip replacements    History of hernia repair    History of lumbar fusion    History of cholecystectomy    History of bilateral knee replacement    History of lymph node biopsy    S/p bilateral carpal tunnel release    History of partial nephrectomy        SOCIAL HISTORY  Smoking - Denied  EtOH - Denied   Drugs - Denied    FUNCTIONAL HISTORY  Lives alone, family nearby, steps to enter, one flight to bedroom   Independent ADLs, used RW outside home, cane inside    CURRENT FUNCTIONAL STATUS  9/26 PT  bed mobility min assist  transfers min assist with RW  gait min assist x 40 feet with RW    9/24 OT  bed mobility mod assist x 1-2  transfers mod assist x 2, RW   LB dressing max assist     FAMILY HISTORY   Family history of coronary artery disease    Family history of diabetes mellitus type II    Family history of pancreatic cancer (Sibling)        RECENT LABS/IMAGING  CBC Full  -  ( 26 Sep 2023 06:00 )  WBC Count : 10.54 K/uL  RBC Count : 3.74 M/uL  Hemoglobin : 11.0 g/dL  Hematocrit : 32.2 %  Platelet Count - Automated : 226 K/uL  Mean Cell Volume : 86.1 fL  Mean Cell Hemoglobin : 29.4 pg  Mean Cell Hemoglobin Concentration : 34.2 gm/dL  Auto Neutrophil # : 8.72 K/uL  Auto Lymphocyte # : 0.83 K/uL  Auto Monocyte # : 0.82 K/uL  Auto Eosinophil # : 0.00 K/uL  Auto Basophil # : 0.03 K/uL  Auto Neutrophil % : 82.7 %  Auto Lymphocyte % : 7.9 %  Auto Monocyte % : 7.8 %  Auto Eosinophil % : 0.0 %  Auto Basophil % : 0.3 %    09-26    138  |  98  |  24<H>  ----------------------------<  275<H>  3.8   |  30  |  0.52    Ca    9.0      26 Sep 2023 06:00  Phos  2.2     09-25  Mg     1.90     09-25      Urinalysis Basic - ( 26 Sep 2023 06:00 )    Color: x / Appearance: x / SG: x / pH: x  Gluc: 275 mg/dL / Ketone: x  / Bili: x / Urobili: x   Blood: x / Protein: x / Nitrite: x   Leuk Esterase: x / RBC: x / WBC x   Sq Epi: x / Non Sq Epi: x / Bacteria: x    < from: CT Lumbar Spine No Cont (09.18.23 @ 15:56) >    IMPRESSION:    Acute fracture through the fused T12-L1 disc space. Consider MRI for   evaluation of the spinal canal.    Flowing osteophytosis throughout the thoracic levels and ossifications of   portions of the supraspinous ligament. Findings could represent diffuse   idiopathic skeletal hyperostosis versus ankylosing spondylitis.    < end of copied text >      ALLERGIES  No Known Allergies      MEDICATIONS   acetaminophen     Tablet .. 975 milliGRAM(s) Oral every 8 hours  allopurinol 100 milliGRAM(s) Oral at bedtime  aluminum hydroxide/magnesium hydroxide/simethicone Suspension 30 milliLiter(s) Oral every 4 hours PRN  amLODIPine   Tablet 10 milliGRAM(s) Oral daily  aspirin enteric coated 81 milliGRAM(s) Oral daily  atorvastatin 40 milliGRAM(s) Oral at bedtime  benzocaine/menthol Lozenge 1 Lozenge Oral every 3 hours PRN  bisacodyl 5 milliGRAM(s) Oral every 12 hours  buMETAnide 2 milliGRAM(s) Oral two times a day  carvedilol 12.5 milliGRAM(s) Oral every 12 hours  cyclobenzaprine 10 milliGRAM(s) Oral three times a day PRN  dextrose 50% Injectable 25 Gram(s) IV Push once  gabapentin 300 milliGRAM(s) Oral four times a day  glucagon  Injectable 1 milliGRAM(s) IntraMuscular once  guaiFENesin Oral Liquid (Sugar-Free) 100 milliGRAM(s) Oral every 6 hours PRN  heparin   Injectable 5000 Unit(s) SubCutaneous every 8 hours  HYDROmorphone  Injectable 0.5 milliGRAM(s) IV Push every 3 hours PRN  influenza  Vaccine (HIGH DOSE) 0.7 milliLiter(s) IntraMuscular once  insulin glargine Injectable (LANTUS) 20 Unit(s) SubCutaneous at bedtime  insulin lispro (ADMELOG) corrective regimen sliding scale   SubCutaneous three times a day before meals  insulin lispro (ADMELOG) corrective regimen sliding scale   SubCutaneous at bedtime  insulin lispro Injectable (ADMELOG) 10 Unit(s) SubCutaneous three times a day with meals  lidocaine   4% Patch 1 Patch Transdermal daily  losartan 100 milliGRAM(s) Oral daily  magnesium hydroxide Suspension 30 milliLiter(s) Oral every 12 hours PRN  melatonin 3 milliGRAM(s) Oral at bedtime PRN  multivitamin 1 Tablet(s) Oral daily  naloxone Injectable 0.1 milliGRAM(s) IV Push every 3 minutes PRN  ondansetron   Disintegrating Tablet 4 milliGRAM(s) Oral every 6 hours PRN  oxyCODONE    IR 10 milliGRAM(s) Oral every 3 hours PRN  oxyCODONE    IR 15 milliGRAM(s) Oral every 3 hours PRN  pantoprazole    Tablet 40 milliGRAM(s) Oral before breakfast  polyethylene glycol 3350 17 Gram(s) Oral two times a day  senna 2 Tablet(s) Oral at bedtime      ----------------------------------------------------------------------------------------  PHYSICAL EXAM  Constitutional - NAD, Comfortable, in chair   Chest - Breathing comfortably  Cardiovascular - S1S2   Abdomen - Soft   Extremities - No C/C/E, No calf tenderness   Neurologic Exam -      follows commands               Cognitive - Awake, Alert, AAO to self, place, date, year, situation     Communication - Fluent, No dysarthria        Motor -                     LEFT    UE - ShAB 5/5, EF 5/5, EE 5/5, WE 5/5,  5/5                    RIGHT UE - ShAB 5/5, EF 5/5, EE 5/5, WE 5/5,  5/5                    LEFT    LE - HF 4/5, KE 4/5, DF 5/5, PF 5/5                    RIGHT LE - HF 4/5, KE 4/5, DF 5/5, PF 5/5        Sensory - Intact to LT     Psychiatric - Mood stable, Affect WNL  ----------------------------------------------------------------------------------------  ASSESSMENT/PLAN  67yMale h/o HFrEF, CAD, ASIYA on CPAP, HTN, DM with neuropathy with functional deficits after T9 to pelvis revision/fusion on 9/23   syncope/fall at home, lower Thoracic fracture   Pain - Tylenol oxycodone, dilaudid IV, flexeril, gabapentin, lidocaine patch  DVT PPX - SCDs heparin   Rehab - Will continue to follow for ongoing rehab needs and recommendations.   needs to be off IV pain meds x 24 hours for acute rehab    Recommend ACUTE inpatient rehabilitation for the functional deficits consisting of 3 hours of therapy/day & 24 hour RN/daily PMR physician for comorbid medical management. Patient will be able to tolerate 3 hours a day.

## 2023-09-26 NOTE — PROGRESS NOTE ADULT - ASSESSMENT
67yMale s/p T9-Pelvis revision fusion 9/23    - WBAT  - Monitor drain outputs  - Decadron taper, to conclude today  - Pain team following, appreciate recs  - SQH and ASA81  - PT/OT/OOB  - Dispo planning: [facility]

## 2023-09-26 NOTE — PROGRESS NOTE ADULT - PROBLEM SELECTOR PLAN 3
Euvolemic, compensated   - c/w carvedilol 12.5mg BID, reduced bumex dose to 2mg BID as above   - monitor I/Os

## 2023-09-26 NOTE — PROGRESS NOTE ADULT - SUBJECTIVE AND OBJECTIVE BOX
Polly Ross MD  [facility] Division of Hospital Medicine  Pager [phone] (M-F 8AM-5PM)  Other Times: [phone]    Patient is a 67y old  Male who presents with a chief complaint of syncope (26 Sep 2023 06:32)    SUBJECTIVE / OVERNIGHT EVENTS: no acute events overnight     MEDICATIONS  (STANDING):  acetaminophen     Tablet .. 975 milliGRAM(s) Oral every 8 hours  allopurinol 100 milliGRAM(s) Oral at bedtime  amLODIPine   Tablet 10 milliGRAM(s) Oral daily  aspirin enteric coated 81 milliGRAM(s) Oral daily  atorvastatin 40 milliGRAM(s) Oral at bedtime  bisacodyl 5 milliGRAM(s) Oral every 12 hours  buMETAnide 2 milliGRAM(s) Oral two times a day  carvedilol 12.5 milliGRAM(s) Oral every 12 hours  cyclobenzaprine 5 milliGRAM(s) Oral every 8 hours  dexAMETHasone  Injectable 2 milliGRAM(s) IV Push every 6 hours  dextrose 50% Injectable 25 Gram(s) IV Push once  gabapentin 300 milliGRAM(s) Oral four times a day  glucagon  Injectable 1 milliGRAM(s) IntraMuscular once  heparin   Injectable 5000 Unit(s) SubCutaneous every 8 hours  influenza  Vaccine (HIGH DOSE) 0.7 milliLiter(s) IntraMuscular once  insulin glargine Injectable (LANTUS) 15 Unit(s) SubCutaneous at bedtime  insulin lispro (ADMELOG) corrective regimen sliding scale   SubCutaneous three times a day before meals  insulin lispro (ADMELOG) corrective regimen sliding scale   SubCutaneous at bedtime  insulin lispro Injectable (ADMELOG) 5 Unit(s) SubCutaneous three times a day with meals  lidocaine   4% Patch 1 Patch Transdermal daily  losartan 100 milliGRAM(s) Oral daily  multivitamin 1 Tablet(s) Oral daily  pantoprazole    Tablet 40 milliGRAM(s) Oral before breakfast  polyethylene glycol 3350 17 Gram(s) Oral two times a day  senna 2 Tablet(s) Oral at bedtime    MEDICATIONS  (PRN):  aluminum hydroxide/magnesium hydroxide/simethicone Suspension 30 milliLiter(s) Oral every 4 hours PRN Dyspepsia  benzocaine/menthol Lozenge 1 Lozenge Oral every 3 hours PRN Sore Throat  guaiFENesin Oral Liquid (Sugar-Free) 100 milliGRAM(s) Oral every 6 hours PRN Cough  HYDROmorphone  Injectable 0.5 milliGRAM(s) IV Push every 3 hours PRN Severe breakthrough Pain (7 - 10)  magnesium hydroxide Suspension 30 milliLiter(s) Oral every 12 hours PRN Constipation  melatonin 3 milliGRAM(s) Oral at bedtime PRN Insomnia  naloxone Injectable 0.1 milliGRAM(s) IV Push every 3 minutes PRN For ANY of the following changes in patient status:  A. RR LESS THAN 10 breaths per minute, B. Oxygen saturation LESS THAN 90%, C. Sedation score of 6  ondansetron   Disintegrating Tablet 4 milliGRAM(s) Oral every 6 hours PRN Nausea and/or Vomiting  oxyCODONE    IR 10 milliGRAM(s) Oral every 3 hours PRN Moderate Pain (4 - 6)  oxyCODONE    IR 15 milliGRAM(s) Oral every 3 hours PRN Severe Pain (7 - 10)    PHYSICAL EXAM:  Vital Signs Last 24 Hrs  T(C): 36.6 (26 Sep 2023 05:25), Max: 36.8 (25 Sep 2023 17:45)  T(F): 97.9 (26 Sep 2023 05:25), Max: 98.2 (25 Sep 2023 17:45)  HR: 88 (26 Sep 2023 07:06) (74 - 88)  BP: 153/65 (26 Sep 2023 05:25) (112/69 - 153/65)  RR: 17 (26 Sep 2023 05:25) (17 - 18)  SpO2: 98% (26 Sep 2023 07:06) (95% - 100%)    Parameters below as of 26 Sep 2023 05:25  Patient On (Oxygen Delivery Method): room air    CONSTITUTIONAL: NAD, well-developed, well-groomed  RESPIRATORY: Normal respiratory effort; lungs are clear to auscultation bilaterally  CARDIOVASCULAR: Regular rate and rhythm, normal S1 and S2, no murmur/rub/gallop; No lower extremity edema  GASTROINTESTINAL: Nontender to palpation, normoactive bowel sounds, no rebound/guarding; No hepatosplenomegaly  MUSCULOSKELETAL:  no clubbing or cyanosis of digits; no joint swelling or tenderness to palpation  NEUROLOGY: non-focal; no gross sensory deficits   PSYCH: A+O to person, place, and time; affect appropriate  SKIN: No rashes; warm, surgical site c/d/i    LABS:                        11.0   10.54 )-----------( 226      ( 26 Sep 2023 06:00 )             32.2     09-26    138  |  98  |  24<H>  ----------------------------<  275<H>  3.8   |  30  |  0.52    Ca    9.0      26 Sep 2023 06:00  Phos  2.2     09-25  Mg     1.90     09-25            Urinalysis Basic - ( 26 Sep 2023 06:00 )    Color: x / Appearance: x / SG: x / pH: x  Gluc: 275 mg/dL / Ketone: x  / Bili: x / Urobili: x   Blood: x / Protein: x / Nitrite: x   Leuk Esterase: x / RBC: x / WBC x   Sq Epi: x / Non Sq Epi: x / Bacteria: x          RADIOLOGY & ADDITIONAL TESTS:  Results Reviewed:   Imaging Personally Reviewed:  Electrocardiogram Personally Reviewed:    COORDINATION OF CARE:  Care Discussed with Consultants/Other Providers [Y/N]:  Prior or Outpatient Records Reviewed [Y/N]:

## 2023-09-26 NOTE — PROGRESS NOTE ADULT - SUBJECTIVE AND OBJECTIVE BOX
Orthopedic Progress Note     S:  No acute events overnight, pain is well controlled.  Patient denies any chest pain, SOB, N/V, fevers/chills. He reports he worked well with PT yesterday, ambulating in the harry. Reports no bowel movement since DOS.     T(C): 36.6 (09-26-23 @ 05:25), Max: 36.8 (09-25-23 @ 08:20)  HR: 77 (09-26-23 @ 05:25) (74 - 87)  BP: 153/65 (09-26-23 @ 05:25) (112/69 - 153/65)  RR: 17 (09-26-23 @ 05:25) (17 - 18)  SpO2: 100% (09-26-23 @ 05:25) (95% - 100%)  Wt(kg): --I&O's Summary    24 Sep 2023 07:01  -  25 Sep 2023 07:00  --------------------------------------------------------  IN: 600 mL / OUT: 6260 mL / NET: -5660 mL    25 Sep 2023 07:01  -  26 Sep 2023 06:33  --------------------------------------------------------  IN: 0 mL / OUT: 2950 mL / NET: -2950 mL        O:  Physical exam:  Gen: Alert and Oriented x3, No Acute Distress  Spine: Incision c/d/i. PAUL and HMV w serosang output  Sensation: Decreased in bilateral feet similar to preop (neuropathy) otherwise SILT L2-S1  Motor exam:          Lower extremity                        HF         KF        KE       DF         PF                                                  R        5/5        5/5        5/5       5/5         5/5                                               L         5/5        5/5       5/5       5/5          5/5                                                 BLE: WWP, compartments soft and compressible        Labs:                        10.5   12.11 )-----------( 221      ( 25 Sep 2023 05:50 )             30.7    09-25    138  |  100  |  21  ----------------------------<  295<H>  4.0   |  27  |  0.54    Ca    9.0      25 Sep 2023 05:50  Phos  2.2     09-25  Mg     1.90     09-25

## 2023-09-26 NOTE — CHART NOTE - NSCHARTNOTEFT_GEN_A_CORE
Reason for Follow-Up Assessment: Follow-Up    67M with hx of HFrEF, CAD s/p CABG, ASIYA on CPAP, HTN, DM, right renal mass s/p resection presents to the ED after a syncopal episode, likely 2/2 situational syncope. Found to have T12-L1 fracture s/p L/T spine fusion on 9/23.  Patient noted with DM c/b steroid induced hyperglycemia as reflected in POCT glucose  readings.      RD met with patient who reported good appetite / po intake.  No chewing or swallowing difficulties reported. No GI distress reported i.e. nausea, vomiting, diarrhea. Patient completed 100% of lunch meal today and complimentary of foods provided. Declines need for diet education.  Based on history, recommend adding DASH/TLC (cholesterol and sodium restricted) to diet order, continue CSTCHO with evening snack.  Given elevated POCT glucose, consider endocrinology consult if medically indicated for improved glycemic control.  Communicated recommendations to Ortho team, per ortho, medicine managing elevated glucose and attributes to patient being on steroids. Of note, triglycerides also elevated as per labs below.    Source: [ X ] Patient [ ] Family [ ] RN [ X ] Chart     Diet, Consistent Carbohydrate w/Evening Snack (09-23-23 @ 14:30)    GI: WDL. Last BM noted on [  9/22 ] per nursing flowsheets    PO intake:  [ ] Poor < 50%  [ ] Fair 50-75% [ X ] Good  % [ ] Inconsistent PO intake    Enteral /Parenteral Nutrition:       [ X ] n/a    Height -  6'5"   Weight - 9/23 - 201.2kg   BMI - 52.5kg/m2 (morbidly obese; wt may be in part skewed by presence of edema)  Inconsistent weight pattern noted while inpatient 9/19 - 167.4kg      9/21 - 201.2kg      9/23 - 201.2kg        IBW 94.5kgs    Edema: Generalized 2+ edema noted per flowsheets    Pressure Injuries:  No edema noted per flowsheets    _______________ Pertinent Medications_______________  MEDICATIONS  (STANDING):  acetaminophen     Tablet .. 975 milliGRAM(s) Oral every 8 hours  allopurinol 100 milliGRAM(s) Oral at bedtime  amLODIPine   Tablet 10 milliGRAM(s) Oral daily  aspirin enteric coated 81 milliGRAM(s) Oral daily  atorvastatin 40 milliGRAM(s) Oral at bedtime  bisacodyl 5 milliGRAM(s) Oral every 12 hours  buMETAnide 2 milliGRAM(s) Oral two times a day  carvedilol 12.5 milliGRAM(s) Oral every 12 hours  dextrose 50% Injectable 25 Gram(s) IV Push once  gabapentin 300 milliGRAM(s) Oral four times a day  glucagon  Injectable 1 milliGRAM(s) IntraMuscular once  heparin   Injectable 5000 Unit(s) SubCutaneous every 8 hours  influenza  Vaccine (HIGH DOSE) 0.7 milliLiter(s) IntraMuscular once  insulin glargine Injectable (LANTUS) 18 Unit(s) SubCutaneous at bedtime  insulin lispro (ADMELOG) corrective regimen sliding scale   SubCutaneous three times a day before meals  insulin lispro (ADMELOG) corrective regimen sliding scale   SubCutaneous at bedtime  insulin lispro Injectable (ADMELOG) 6 Unit(s) SubCutaneous three times a day with meals  lidocaine   4% Patch 1 Patch Transdermal daily  losartan 100 milliGRAM(s) Oral daily  multivitamin 1 Tablet(s) Oral daily  pantoprazole    Tablet 40 milliGRAM(s) Oral before breakfast  polyethylene glycol 3350 17 Gram(s) Oral two times a day  senna 2 Tablet(s) Oral at bedtime    MEDICATIONS  (PRN):  aluminum hydroxide/magnesium hydroxide/simethicone Suspension 30 milliLiter(s) Oral every 4 hours PRN Dyspepsia  benzocaine/menthol Lozenge 1 Lozenge Oral every 3 hours PRN Sore Throat  cyclobenzaprine 10 milliGRAM(s) Oral three times a day PRN Muscle Spasm  guaiFENesin Oral Liquid (Sugar-Free) 100 milliGRAM(s) Oral every 6 hours PRN Cough  HYDROmorphone  Injectable 0.5 milliGRAM(s) IV Push every 3 hours PRN Severe breakthrough Pain (7 - 10)  magnesium hydroxide Suspension 30 milliLiter(s) Oral every 12 hours PRN Constipation  melatonin 3 milliGRAM(s) Oral at bedtime PRN Insomnia  naloxone Injectable 0.1 milliGRAM(s) IV Push every 3 minutes PRN For ANY of the following changes in patient status:  A. RR LESS THAN 10 breaths per minute, B. Oxygen saturation LESS THAN 90%, C. Sedation score of 6  ondansetron   Disintegrating Tablet 4 milliGRAM(s) Oral every 6 hours PRN Nausea and/or Vomiting  oxyCODONE    IR 15 milliGRAM(s) Oral every 3 hours PRN Severe Pain (7 - 10)  oxyCODONE    IR 10 milliGRAM(s) Oral every 3 hours PRN Moderate Pain (4 - 6)      __________________ Pertinent Labs__________________   09-26 Na138 mmol/L Glu 275 mg/dL<H> K+ 3.8 mmol/L Cr  0.52 mg/dL BUN 24 mg/dL<H> 09-25 Phos 2.2 mg/dL<L> 09-23 Alb 3.5 g/dL 09-19 Chol 181 mg/dL LDL --    HDL 27 mg/dL<L> Trig 646 mg/dL<H>    POCT Blood Glucose.: 312 mg/dL (09-26-23 @ 11:10)  POCT Blood Glucose.: 280 mg/dL (09-26-23 @ 07:22)  POCT Blood Glucose.: 298 mg/dL (09-25-23 @ 22:24)  POCT Blood Glucose.: 298 mg/dL (09-25-23 @ 16:30)  POCT Blood Glucose.: 316 mg/dL (09-25-23 @ 11:57)  POCT Blood Glucose.: 290 mg/dL (09-25-23 @ 07:55)  POCT Blood Glucose.: 297 mg/dL (09-25-23 @ 05:14)  POCT Blood Glucose.: 323 mg/dL (09-25-23 @ 02:43)  POCT Blood Glucose.: 390 mg/dL (09-24-23 @ 23:14)  POCT Blood Glucose.: 427 mg/dL (09-24-23 @ 22:13)  POCT Blood Glucose.: 347 mg/dL (09-24-23 @ 16:54)  POCT Blood Glucose.: 307 mg/dL (09-24-23 @ 11:57)  POCT Blood Glucose.: 290 mg/dL (09-24-23 @ 07:57)  POCT Blood Glucose.: 244 mg/dL (09-23-23 @ 22:24)  POCT Blood Glucose.: 245 mg/dL (09-23-23 @ 15:42)      Estimated Needs:     [ ] no change since previous assessment  [X] recalculated: Based on  pounds / 94.5kg  20-22kcal/yk=[id number]  1-1.2gm/kg=.4gm protein    Previous Nutrition Diagnosis: Inadequate Oral Intake --- RESOLVED    New Nutrition Diagnosis: Altered Nutrition Related Labs - Glucose related to endocrine dysfunction / DM, likely further exacerbated by steroids as evidenced by elevated POCT glucose trends (see above, as high as 427mg/dL 9/25) and elevated triglycerides     Monitoring and Evaluation:     [ x ] Tolerance to diet prescription / adequacy of meal intake  [ x ] Weight trends   [ x ] Pertinent labs      Recommendations:  1) Diet / Supplement Changes: Continue CSTCHO; add DASH/TLC (cholesterol and sodium restricted) to diet order. Communicated same to team (ortho).  2) Suggested endocrinology consult be obtained for improved glycemic control  3) Obtain and record current weights to best monitor for acute changes in nutritional status.  4) Please consistently document % meal intake in nursing flowsheets.     Kendra Hallman, MS, RDN, CDN  Pager [phone]  Also available on MS Teams Reason for Follow-Up Assessment: Follow-Up    67M with hx of HFrEF, CAD s/p CABG, ASIYA on CPAP, HTN, DM, right renal mass s/p resection presents to the ED after a syncopal episode, likely 2/2 situational syncope. Found to have T12-L1 fracture s/p L/T spine fusion on 9/23.  Patient noted with DM c/b steroid induced hyperglycemia as reflected in POCT glucose  readings.      RD met with patient who reported good appetite / po intake.  No chewing or swallowing difficulties reported. No GI distress reported i.e. nausea, vomiting, diarrhea. Patient completed 100% of lunch meal today and complimentary of foods provided. Declines need for diet education.  Based on history, recommend adding DASH/TLC (cholesterol and sodium restricted) to diet order, continue CSTCHO with evening snack.  Given elevated POCT glucose, consider endocrinology consult if medically indicated for improved glycemic control.  Communicated recommendations to Ortho team, per ortho, medicine managing elevated glucose and attributes to patient being on steroids. Of note, triglycerides also elevated as per labs below. Patient declined need for diet education at time of RD follow-up visit. Informed patient that RD remains available as needed/requested.    Source: [ X ] Patient [ ] Family [ ] RN [ X ] Chart     Diet, Consistent Carbohydrate w/Evening Snack (09-23-23 @ 14:30)    GI: WDL. Last BM noted on [  9/22 ] per nursing flowsheets    PO intake:  [ ] Poor < 50%  [ ] Fair 50-75% [ X ] Good  % [ ] Inconsistent PO intake    Enteral /Parenteral Nutrition:       [ X ] n/a    Height -  6'5"   Weight - 9/23 - 201.2kg   BMI - 52.5kg/m2 (morbidly obese; wt may be in part skewed by presence of edema)  Inconsistent weight pattern noted while inpatient 9/19 - 167.4kg      9/21 - 201.2kg      9/23 - 201.2kg        IBW 94.5kgs    Edema: Generalized 2+ edema noted per flowsheets    Pressure Injuries:  No edema noted per flowsheets    _______________ Pertinent Medications_______________  MEDICATIONS  (STANDING):  acetaminophen     Tablet .. 975 milliGRAM(s) Oral every 8 hours  allopurinol 100 milliGRAM(s) Oral at bedtime  amLODIPine   Tablet 10 milliGRAM(s) Oral daily  aspirin enteric coated 81 milliGRAM(s) Oral daily  atorvastatin 40 milliGRAM(s) Oral at bedtime  bisacodyl 5 milliGRAM(s) Oral every 12 hours  buMETAnide 2 milliGRAM(s) Oral two times a day  carvedilol 12.5 milliGRAM(s) Oral every 12 hours  dextrose 50% Injectable 25 Gram(s) IV Push once  gabapentin 300 milliGRAM(s) Oral four times a day  glucagon  Injectable 1 milliGRAM(s) IntraMuscular once  heparin   Injectable 5000 Unit(s) SubCutaneous every 8 hours  influenza  Vaccine (HIGH DOSE) 0.7 milliLiter(s) IntraMuscular once  insulin glargine Injectable (LANTUS) 18 Unit(s) SubCutaneous at bedtime  insulin lispro (ADMELOG) corrective regimen sliding scale   SubCutaneous three times a day before meals  insulin lispro (ADMELOG) corrective regimen sliding scale   SubCutaneous at bedtime  insulin lispro Injectable (ADMELOG) 6 Unit(s) SubCutaneous three times a day with meals  lidocaine   4% Patch 1 Patch Transdermal daily  losartan 100 milliGRAM(s) Oral daily  multivitamin 1 Tablet(s) Oral daily  pantoprazole    Tablet 40 milliGRAM(s) Oral before breakfast  polyethylene glycol 3350 17 Gram(s) Oral two times a day  senna 2 Tablet(s) Oral at bedtime    MEDICATIONS  (PRN):  aluminum hydroxide/magnesium hydroxide/simethicone Suspension 30 milliLiter(s) Oral every 4 hours PRN Dyspepsia  benzocaine/menthol Lozenge 1 Lozenge Oral every 3 hours PRN Sore Throat  cyclobenzaprine 10 milliGRAM(s) Oral three times a day PRN Muscle Spasm  guaiFENesin Oral Liquid (Sugar-Free) 100 milliGRAM(s) Oral every 6 hours PRN Cough  HYDROmorphone  Injectable 0.5 milliGRAM(s) IV Push every 3 hours PRN Severe breakthrough Pain (7 - 10)  magnesium hydroxide Suspension 30 milliLiter(s) Oral every 12 hours PRN Constipation  melatonin 3 milliGRAM(s) Oral at bedtime PRN Insomnia  naloxone Injectable 0.1 milliGRAM(s) IV Push every 3 minutes PRN For ANY of the following changes in patient status:  A. RR LESS THAN 10 breaths per minute, B. Oxygen saturation LESS THAN 90%, C. Sedation score of 6  ondansetron   Disintegrating Tablet 4 milliGRAM(s) Oral every 6 hours PRN Nausea and/or Vomiting  oxyCODONE    IR 15 milliGRAM(s) Oral every 3 hours PRN Severe Pain (7 - 10)  oxyCODONE    IR 10 milliGRAM(s) Oral every 3 hours PRN Moderate Pain (4 - 6)      __________________ Pertinent Labs__________________   09-26 Na138 mmol/L Glu 275 mg/dL<H> K+ 3.8 mmol/L Cr  0.52 mg/dL BUN 24 mg/dL<H> 09-25 Phos 2.2 mg/dL<L> 09-23 Alb 3.5 g/dL 09-19 Chol 181 mg/dL LDL --    HDL 27 mg/dL<L> Trig 646 mg/dL<H>    POCT Blood Glucose.: 312 mg/dL (09-26-23 @ 11:10)  POCT Blood Glucose.: 280 mg/dL (09-26-23 @ 07:22)  POCT Blood Glucose.: 298 mg/dL (09-25-23 @ 22:24)  POCT Blood Glucose.: 298 mg/dL (09-25-23 @ 16:30)  POCT Blood Glucose.: 316 mg/dL (09-25-23 @ 11:57)  POCT Blood Glucose.: 290 mg/dL (09-25-23 @ 07:55)  POCT Blood Glucose.: 297 mg/dL (09-25-23 @ 05:14)  POCT Blood Glucose.: 323 mg/dL (09-25-23 @ 02:43)  POCT Blood Glucose.: 390 mg/dL (09-24-23 @ 23:14)  POCT Blood Glucose.: 427 mg/dL (09-24-23 @ 22:13)  POCT Blood Glucose.: 347 mg/dL (09-24-23 @ 16:54)  POCT Blood Glucose.: 307 mg/dL (09-24-23 @ 11:57)  POCT Blood Glucose.: 290 mg/dL (09-24-23 @ 07:57)  POCT Blood Glucose.: 244 mg/dL (09-23-23 @ 22:24)  POCT Blood Glucose.: 245 mg/dL (09-23-23 @ 15:42)      Estimated Needs:     [ ] no change since previous assessment  [X] recalculated: Based on  pounds / 94.5kg  20-22kcal/et=[id number]  1-1.2gm/kg=.4gm protein    Previous Nutrition Diagnosis: Inadequate Oral Intake --- RESOLVED    New Nutrition Diagnosis: Altered Nutrition Related Labs - Glucose related to endocrine dysfunction / DM, likely further exacerbated by steroids as evidenced by elevated POCT glucose trends (see above, as high as 427mg/dL 9/25) and elevated triglycerides     Monitoring and Evaluation:     [ x ] Tolerance to diet prescription / adequacy of meal intake  [ x ] Weight trends   [ x ] Pertinent labs      Recommendations:  1) Diet / Supplement Changes: Continue CSTCHO; add DASH/TLC (cholesterol and sodium restricted) to diet order. Communicated same to team (ortho).  2) Suggested endocrinology consult be obtained for improved glycemic control  3) Obtain and record current weights to best monitor for acute changes in nutritional status.  4) Please consistently document % meal intake in nursing flowsheets.     Kendra Hallman, MS, RDN, CDN  Pager [phone]  Also available on MS Teams

## 2023-09-27 LAB
ALBUMIN SERPL ELPH-MCNC: 3.3 G/DL — SIGNIFICANT CHANGE UP (ref 3.3–5)
ALP SERPL-CCNC: 84 U/L — SIGNIFICANT CHANGE UP (ref 40–120)
ALT FLD-CCNC: 22 U/L — SIGNIFICANT CHANGE UP (ref 4–41)
ANION GAP SERPL CALC-SCNC: 15 MMOL/L — HIGH (ref 7–14)
AST SERPL-CCNC: 19 U/L — SIGNIFICANT CHANGE UP (ref 4–40)
BASOPHILS # BLD AUTO: 0.03 K/UL — SIGNIFICANT CHANGE UP (ref 0–0.2)
BASOPHILS NFR BLD AUTO: 0.3 % — SIGNIFICANT CHANGE UP (ref 0–2)
BILIRUB DIRECT SERPL-MCNC: <0.2 MG/DL — SIGNIFICANT CHANGE UP (ref 0–0.3)
BILIRUB INDIRECT FLD-MCNC: >0.1 MG/DL — SIGNIFICANT CHANGE UP (ref 0–1)
BILIRUB SERPL-MCNC: 0.3 MG/DL — SIGNIFICANT CHANGE UP (ref 0.2–1.2)
BUN SERPL-MCNC: 32 MG/DL — HIGH (ref 7–23)
CALCIUM SERPL-MCNC: 8.9 MG/DL — SIGNIFICANT CHANGE UP (ref 8.4–10.5)
CHLORIDE SERPL-SCNC: 95 MMOL/L — LOW (ref 98–107)
CO2 SERPL-SCNC: 30 MMOL/L — SIGNIFICANT CHANGE UP (ref 22–31)
CREAT SERPL-MCNC: 0.54 MG/DL — SIGNIFICANT CHANGE UP (ref 0.5–1.3)
EGFR: 109 ML/MIN/1.73M2 — SIGNIFICANT CHANGE UP
EOSINOPHIL # BLD AUTO: 0.01 K/UL — SIGNIFICANT CHANGE UP (ref 0–0.5)
EOSINOPHIL NFR BLD AUTO: 0.1 % — SIGNIFICANT CHANGE UP (ref 0–6)
GLUCOSE BLDC GLUCOMTR-MCNC: 187 MG/DL — HIGH (ref 70–99)
GLUCOSE BLDC GLUCOMTR-MCNC: 202 MG/DL — HIGH (ref 70–99)
GLUCOSE BLDC GLUCOMTR-MCNC: 209 MG/DL — HIGH (ref 70–99)
GLUCOSE BLDC GLUCOMTR-MCNC: 265 MG/DL — HIGH (ref 70–99)
GLUCOSE SERPL-MCNC: 214 MG/DL — HIGH (ref 70–99)
HCT VFR BLD CALC: 31.8 % — LOW (ref 39–50)
HGB BLD-MCNC: 10.9 G/DL — LOW (ref 13–17)
IANC: 7.18 K/UL — SIGNIFICANT CHANGE UP (ref 1.8–7.4)
IMM GRANULOCYTES NFR BLD AUTO: 2.8 % — HIGH (ref 0–0.9)
LYMPHOCYTES # BLD AUTO: 1.41 K/UL — SIGNIFICANT CHANGE UP (ref 1–3.3)
LYMPHOCYTES # BLD AUTO: 14.4 % — SIGNIFICANT CHANGE UP (ref 13–44)
MCHC RBC-ENTMCNC: 29 PG — SIGNIFICANT CHANGE UP (ref 27–34)
MCHC RBC-ENTMCNC: 34.3 GM/DL — SIGNIFICANT CHANGE UP (ref 32–36)
MCV RBC AUTO: 84.6 FL — SIGNIFICANT CHANGE UP (ref 80–100)
MONOCYTES # BLD AUTO: 0.87 K/UL — SIGNIFICANT CHANGE UP (ref 0–0.9)
MONOCYTES NFR BLD AUTO: 8.9 % — SIGNIFICANT CHANGE UP (ref 2–14)
NEUTROPHILS # BLD AUTO: 7.18 K/UL — SIGNIFICANT CHANGE UP (ref 1.8–7.4)
NEUTROPHILS NFR BLD AUTO: 73.5 % — SIGNIFICANT CHANGE UP (ref 43–77)
NRBC # BLD: 0 /100 WBCS — SIGNIFICANT CHANGE UP (ref 0–0)
NRBC # FLD: 0 K/UL — SIGNIFICANT CHANGE UP (ref 0–0)
PLATELET # BLD AUTO: 240 K/UL — SIGNIFICANT CHANGE UP (ref 150–400)
POTASSIUM SERPL-MCNC: 3.5 MMOL/L — SIGNIFICANT CHANGE UP (ref 3.5–5.3)
POTASSIUM SERPL-SCNC: 3.5 MMOL/L — SIGNIFICANT CHANGE UP (ref 3.5–5.3)
PROT SERPL-MCNC: 6 G/DL — SIGNIFICANT CHANGE UP (ref 6–8.3)
RBC # BLD: 3.76 M/UL — LOW (ref 4.2–5.8)
RBC # FLD: 13 % — SIGNIFICANT CHANGE UP (ref 10.3–14.5)
SODIUM SERPL-SCNC: 140 MMOL/L — SIGNIFICANT CHANGE UP (ref 135–145)
WBC # BLD: 9.77 K/UL — SIGNIFICANT CHANGE UP (ref 3.8–10.5)
WBC # FLD AUTO: 9.77 K/UL — SIGNIFICANT CHANGE UP (ref 3.8–10.5)

## 2023-09-27 PROCEDURE — 99233 SBSQ HOSP IP/OBS HIGH 50: CPT

## 2023-09-27 RX ORDER — LOSARTAN POTASSIUM 100 MG/1
50 TABLET, FILM COATED ORAL DAILY
Refills: 0 | Status: DISCONTINUED | OUTPATIENT
Start: 2023-09-28 | End: 2023-09-28

## 2023-09-27 RX ORDER — CARVEDILOL PHOSPHATE 80 MG/1
6.25 CAPSULE, EXTENDED RELEASE ORAL EVERY 12 HOURS
Refills: 0 | Status: DISCONTINUED | OUTPATIENT
Start: 2023-09-27 | End: 2023-09-30

## 2023-09-27 RX ORDER — INSULIN GLARGINE 100 [IU]/ML
16 INJECTION, SOLUTION SUBCUTANEOUS AT BEDTIME
Refills: 0 | Status: DISCONTINUED | OUTPATIENT
Start: 2023-09-27 | End: 2023-09-28

## 2023-09-27 RX ORDER — INSULIN LISPRO 100/ML
5 VIAL (ML) SUBCUTANEOUS
Refills: 0 | Status: DISCONTINUED | OUTPATIENT
Start: 2023-09-27 | End: 2023-09-30

## 2023-09-27 RX ADMIN — OXYCODONE HYDROCHLORIDE 15 MILLIGRAM(S): 5 TABLET ORAL at 02:44

## 2023-09-27 RX ADMIN — OXYCODONE HYDROCHLORIDE 15 MILLIGRAM(S): 5 TABLET ORAL at 22:50

## 2023-09-27 RX ADMIN — CYCLOBENZAPRINE HYDROCHLORIDE 10 MILLIGRAM(S): 10 TABLET, FILM COATED ORAL at 23:31

## 2023-09-27 RX ADMIN — LIDOCAINE 1 PATCH: 4 CREAM TOPICAL at 20:45

## 2023-09-27 RX ADMIN — Medication 3 MILLIGRAM(S): at 23:31

## 2023-09-27 RX ADMIN — Medication 975 MILLIGRAM(S): at 17:20

## 2023-09-27 RX ADMIN — OXYCODONE HYDROCHLORIDE 15 MILLIGRAM(S): 5 TABLET ORAL at 05:29

## 2023-09-27 RX ADMIN — BUMETANIDE 2 MILLIGRAM(S): 0.25 INJECTION INTRAMUSCULAR; INTRAVENOUS at 05:30

## 2023-09-27 RX ADMIN — GABAPENTIN 300 MILLIGRAM(S): 400 CAPSULE ORAL at 18:30

## 2023-09-27 RX ADMIN — Medication 4: at 11:27

## 2023-09-27 RX ADMIN — Medication 4: at 07:30

## 2023-09-27 RX ADMIN — POLYETHYLENE GLYCOL 3350 17 GRAM(S): 17 POWDER, FOR SOLUTION ORAL at 05:31

## 2023-09-27 RX ADMIN — GABAPENTIN 300 MILLIGRAM(S): 400 CAPSULE ORAL at 23:31

## 2023-09-27 RX ADMIN — Medication 10 UNIT(S): at 07:32

## 2023-09-27 RX ADMIN — Medication 975 MILLIGRAM(S): at 16:23

## 2023-09-27 RX ADMIN — Medication 975 MILLIGRAM(S): at 00:30

## 2023-09-27 RX ADMIN — Medication 6: at 16:23

## 2023-09-27 RX ADMIN — OXYCODONE HYDROCHLORIDE 15 MILLIGRAM(S): 5 TABLET ORAL at 11:30

## 2023-09-27 RX ADMIN — LOSARTAN POTASSIUM 100 MILLIGRAM(S): 100 TABLET, FILM COATED ORAL at 05:30

## 2023-09-27 RX ADMIN — GABAPENTIN 300 MILLIGRAM(S): 400 CAPSULE ORAL at 05:30

## 2023-09-27 RX ADMIN — PANTOPRAZOLE SODIUM 40 MILLIGRAM(S): 20 TABLET, DELAYED RELEASE ORAL at 05:29

## 2023-09-27 RX ADMIN — HEPARIN SODIUM 5000 UNIT(S): 5000 INJECTION INTRAVENOUS; SUBCUTANEOUS at 05:30

## 2023-09-27 RX ADMIN — Medication 975 MILLIGRAM(S): at 23:31

## 2023-09-27 RX ADMIN — INSULIN GLARGINE 16 UNIT(S): 100 INJECTION, SOLUTION SUBCUTANEOUS at 21:51

## 2023-09-27 RX ADMIN — OXYCODONE HYDROCHLORIDE 15 MILLIGRAM(S): 5 TABLET ORAL at 19:30

## 2023-09-27 RX ADMIN — Medication 81 MILLIGRAM(S): at 11:26

## 2023-09-27 RX ADMIN — OXYCODONE HYDROCHLORIDE 15 MILLIGRAM(S): 5 TABLET ORAL at 21:50

## 2023-09-27 RX ADMIN — Medication 100 MILLIGRAM(S): at 21:52

## 2023-09-27 RX ADMIN — Medication 975 MILLIGRAM(S): at 08:02

## 2023-09-27 RX ADMIN — OXYCODONE HYDROCHLORIDE 15 MILLIGRAM(S): 5 TABLET ORAL at 18:32

## 2023-09-27 RX ADMIN — Medication 1 TABLET(S): at 11:26

## 2023-09-27 RX ADMIN — Medication 5 MILLIGRAM(S): at 10:40

## 2023-09-27 RX ADMIN — GABAPENTIN 300 MILLIGRAM(S): 400 CAPSULE ORAL at 11:26

## 2023-09-27 RX ADMIN — CYCLOBENZAPRINE HYDROCHLORIDE 10 MILLIGRAM(S): 10 TABLET, FILM COATED ORAL at 10:38

## 2023-09-27 RX ADMIN — Medication 10 UNIT(S): at 11:29

## 2023-09-27 RX ADMIN — OXYCODONE HYDROCHLORIDE 15 MILLIGRAM(S): 5 TABLET ORAL at 10:38

## 2023-09-27 RX ADMIN — HEPARIN SODIUM 5000 UNIT(S): 5000 INJECTION INTRAVENOUS; SUBCUTANEOUS at 21:50

## 2023-09-27 RX ADMIN — OXYCODONE HYDROCHLORIDE 15 MILLIGRAM(S): 5 TABLET ORAL at 01:44

## 2023-09-27 RX ADMIN — CYCLOBENZAPRINE HYDROCHLORIDE 10 MILLIGRAM(S): 10 TABLET, FILM COATED ORAL at 07:34

## 2023-09-27 RX ADMIN — ATORVASTATIN CALCIUM 40 MILLIGRAM(S): 80 TABLET, FILM COATED ORAL at 21:50

## 2023-09-27 RX ADMIN — LIDOCAINE 1 PATCH: 4 CREAM TOPICAL at 01:08

## 2023-09-27 RX ADMIN — OXYCODONE HYDROCHLORIDE 15 MILLIGRAM(S): 5 TABLET ORAL at 06:29

## 2023-09-27 RX ADMIN — Medication 5 UNIT(S): at 16:24

## 2023-09-27 RX ADMIN — HEPARIN SODIUM 5000 UNIT(S): 5000 INJECTION INTRAVENOUS; SUBCUTANEOUS at 14:56

## 2023-09-27 RX ADMIN — AMLODIPINE BESYLATE 10 MILLIGRAM(S): 2.5 TABLET ORAL at 05:30

## 2023-09-27 RX ADMIN — LIDOCAINE 1 PATCH: 4 CREAM TOPICAL at 12:55

## 2023-09-27 RX ADMIN — Medication 975 MILLIGRAM(S): at 07:31

## 2023-09-27 RX ADMIN — POLYETHYLENE GLYCOL 3350 17 GRAM(S): 17 POWDER, FOR SOLUTION ORAL at 18:32

## 2023-09-27 NOTE — PROGRESS NOTE ADULT - SUBJECTIVE AND OBJECTIVE BOX
Polly Ross MD  [facility] Division of Hospital Medicine  Pager [phone] (M-F 8AM-5PM)  Other Times: [phone]    Patient is a 67y old  Male who presents with a chief complaint of syncope (27 Sep 2023 07:28)    SUBJECTIVE / OVERNIGHT EVENTS: no acute events overnight     MEDICATIONS  (STANDING):  acetaminophen     Tablet .. 975 milliGRAM(s) Oral every 8 hours  allopurinol 100 milliGRAM(s) Oral at bedtime  amLODIPine   Tablet 10 milliGRAM(s) Oral daily  aspirin enteric coated 81 milliGRAM(s) Oral daily  atorvastatin 40 milliGRAM(s) Oral at bedtime  bisacodyl 5 milliGRAM(s) Oral every 12 hours  buMETAnide 2 milliGRAM(s) Oral two times a day  carvedilol 12.5 milliGRAM(s) Oral every 12 hours  dextrose 50% Injectable 25 Gram(s) IV Push once  gabapentin 300 milliGRAM(s) Oral four times a day  glucagon  Injectable 1 milliGRAM(s) IntraMuscular once  heparin   Injectable 5000 Unit(s) SubCutaneous every 8 hours  influenza  Vaccine (HIGH DOSE) 0.7 milliLiter(s) IntraMuscular once  insulin glargine Injectable (LANTUS) 16 Unit(s) SubCutaneous at bedtime  insulin lispro (ADMELOG) corrective regimen sliding scale   SubCutaneous three times a day before meals  insulin lispro (ADMELOG) corrective regimen sliding scale   SubCutaneous at bedtime  insulin lispro Injectable (ADMELOG) 10 Unit(s) SubCutaneous three times a day with meals  lidocaine   4% Patch 1 Patch Transdermal daily  losartan 100 milliGRAM(s) Oral daily  multivitamin 1 Tablet(s) Oral daily  pantoprazole    Tablet 40 milliGRAM(s) Oral before breakfast  polyethylene glycol 3350 17 Gram(s) Oral two times a day  senna 2 Tablet(s) Oral at bedtime    MEDICATIONS  (PRN):  aluminum hydroxide/magnesium hydroxide/simethicone Suspension 30 milliLiter(s) Oral every 4 hours PRN Dyspepsia  benzocaine/menthol Lozenge 1 Lozenge Oral every 3 hours PRN Sore Throat  cyclobenzaprine 10 milliGRAM(s) Oral three times a day PRN Muscle Spasm  guaiFENesin Oral Liquid (Sugar-Free) 100 milliGRAM(s) Oral every 6 hours PRN Cough  magnesium hydroxide Suspension 30 milliLiter(s) Oral every 12 hours PRN Constipation  melatonin 3 milliGRAM(s) Oral at bedtime PRN Insomnia  naloxone Injectable 0.1 milliGRAM(s) IV Push every 3 minutes PRN For ANY of the following changes in patient status:  A. RR LESS THAN 10 breaths per minute, B. Oxygen saturation LESS THAN 90%, C. Sedation score of 6  ondansetron   Disintegrating Tablet 4 milliGRAM(s) Oral every 6 hours PRN Nausea and/or Vomiting  oxyCODONE    IR 10 milliGRAM(s) Oral every 3 hours PRN Moderate Pain (4 - 6)  oxyCODONE    IR 15 milliGRAM(s) Oral every 3 hours PRN Severe Pain (7 - 10)      PHYSICAL EXAM:  Vital Signs Last 24 Hrs  T(C): 36.3 (27 Sep 2023 10:08), Max: 36.4 (26 Sep 2023 14:12)  T(F): 97.3 (27 Sep 2023 10:08), Max: 97.5 (26 Sep 2023 14:12)  HR: 55 (27 Sep 2023 10:08) (55 - 78)  BP: 114/59 (27 Sep 2023 10:08) (102/51 - 124/65)  RR: 18 (27 Sep 2023 10:08) (17 - 18)  SpO2: 97% (27 Sep 2023 10:08) (97% - 100%)    Parameters below as of 27 Sep 2023 05:39  Patient On (Oxygen Delivery Method): BiPAP/CPAP        CONSTITUTIONAL: NAD, well-developed, well-groomed  RESPIRATORY: Normal respiratory effort; lungs are clear to auscultation bilaterally  CARDIOVASCULAR: Regular rate and rhythm, normal S1 and S2, no murmur/rub/gallop; No lower extremity edema  GASTROINTESTINAL: Nontender to palpation, normoactive bowel sounds, no rebound/guarding; No hepatosplenomegaly  MUSCULOSKELETAL:  no clubbing or cyanosis of digits; no joint swelling or tenderness to palpation  NEUROLOGY: non-focal; no gross sensory deficits   PSYCH: A+O to person, place, and time; affect appropriate  SKIN: No rashes; warm, surgical site c/d/i    LABS:                        10.9   9.77  )-----------( 240      ( 27 Sep 2023 05:24 )             31.8     09-27    140  |  95<L>  |  32<H>  ----------------------------<  214<H>  3.5   |  30  |  0.54    Ca    8.9      27 Sep 2023 05:24            Urinalysis Basic - ( 27 Sep 2023 05:24 )    Color: x / Appearance: x / SG: x / pH: x  Gluc: 214 mg/dL / Ketone: x  / Bili: x / Urobili: x   Blood: x / Protein: x / Nitrite: x   Leuk Esterase: x / RBC: x / WBC x   Sq Epi: x / Non Sq Epi: x / Bacteria: x          RADIOLOGY & ADDITIONAL TESTS:  Results Reviewed:   Imaging Personally Reviewed:  Electrocardiogram Personally Reviewed:    COORDINATION OF CARE:  Care Discussed with Consultants/Other Providers [Y/N]:  Prior or Outpatient Records Reviewed [Y/N]:   Polly Ross MD  [facility] Division of Hospital Medicine  Pager [phone] (M-F 8AM-5PM)  Other Times: [phone]    Patient is a 67y old  Male who presents with a chief complaint of syncope (27 Sep 2023 07:28)    SUBJECTIVE / OVERNIGHT EVENTS:    MEDICATIONS  (STANDING):  acetaminophen     Tablet .. 975 milliGRAM(s) Oral every 8 hours  allopurinol 100 milliGRAM(s) Oral at bedtime  amLODIPine   Tablet 10 milliGRAM(s) Oral daily  aspirin enteric coated 81 milliGRAM(s) Oral daily  atorvastatin 40 milliGRAM(s) Oral at bedtime  bisacodyl 5 milliGRAM(s) Oral every 12 hours  buMETAnide 2 milliGRAM(s) Oral two times a day  carvedilol 12.5 milliGRAM(s) Oral every 12 hours  dextrose 50% Injectable 25 Gram(s) IV Push once  gabapentin 300 milliGRAM(s) Oral four times a day  glucagon  Injectable 1 milliGRAM(s) IntraMuscular once  heparin   Injectable 5000 Unit(s) SubCutaneous every 8 hours  influenza  Vaccine (HIGH DOSE) 0.7 milliLiter(s) IntraMuscular once  insulin glargine Injectable (LANTUS) 16 Unit(s) SubCutaneous at bedtime  insulin lispro (ADMELOG) corrective regimen sliding scale   SubCutaneous three times a day before meals  insulin lispro (ADMELOG) corrective regimen sliding scale   SubCutaneous at bedtime  insulin lispro Injectable (ADMELOG) 10 Unit(s) SubCutaneous three times a day with meals  lidocaine   4% Patch 1 Patch Transdermal daily  losartan 100 milliGRAM(s) Oral daily  multivitamin 1 Tablet(s) Oral daily  pantoprazole    Tablet 40 milliGRAM(s) Oral before breakfast  polyethylene glycol 3350 17 Gram(s) Oral two times a day  senna 2 Tablet(s) Oral at bedtime    MEDICATIONS  (PRN):  aluminum hydroxide/magnesium hydroxide/simethicone Suspension 30 milliLiter(s) Oral every 4 hours PRN Dyspepsia  benzocaine/menthol Lozenge 1 Lozenge Oral every 3 hours PRN Sore Throat  cyclobenzaprine 10 milliGRAM(s) Oral three times a day PRN Muscle Spasm  guaiFENesin Oral Liquid (Sugar-Free) 100 milliGRAM(s) Oral every 6 hours PRN Cough  magnesium hydroxide Suspension 30 milliLiter(s) Oral every 12 hours PRN Constipation  melatonin 3 milliGRAM(s) Oral at bedtime PRN Insomnia  naloxone Injectable 0.1 milliGRAM(s) IV Push every 3 minutes PRN For ANY of the following changes in patient status:  A. RR LESS THAN 10 breaths per minute, B. Oxygen saturation LESS THAN 90%, C. Sedation score of 6  ondansetron   Disintegrating Tablet 4 milliGRAM(s) Oral every 6 hours PRN Nausea and/or Vomiting  oxyCODONE    IR 10 milliGRAM(s) Oral every 3 hours PRN Moderate Pain (4 - 6)  oxyCODONE    IR 15 milliGRAM(s) Oral every 3 hours PRN Severe Pain (7 - 10)      PHYSICAL EXAM:  Vital Signs Last 24 Hrs  T(C): 36.3 (27 Sep 2023 10:08), Max: 36.4 (26 Sep 2023 14:12)  T(F): 97.3 (27 Sep 2023 10:08), Max: 97.5 (26 Sep 2023 14:12)  HR: 55 (27 Sep 2023 10:08) (55 - 78)  BP: 114/59 (27 Sep 2023 10:08) (102/51 - 124/65)  RR: 18 (27 Sep 2023 10:08) (17 - 18)  SpO2: 97% (27 Sep 2023 10:08) (97% - 100%)    Parameters below as of 27 Sep 2023 05:39  Patient On (Oxygen Delivery Method): BiPAP/CPAP        CONSTITUTIONAL: NAD, well-developed, well-groomed  RESPIRATORY: Normal respiratory effort; lungs are clear to auscultation bilaterally  CARDIOVASCULAR: Regular rate and rhythm, normal S1 and S2, no murmur/rub/gallop; No lower extremity edema  GASTROINTESTINAL: Nontender to palpation, normoactive bowel sounds, no rebound/guarding; No hepatosplenomegaly  MUSCULOSKELETAL:  no clubbing or cyanosis of digits; no joint swelling or tenderness to palpation  NEUROLOGY: non-focal; no gross sensory deficits   PSYCH: A+O to person, place, and time; affect appropriate  SKIN: No rashes; warm, surgical site c/d/i    LABS:                        10.9   9.77  )-----------( 240      ( 27 Sep 2023 05:24 )             31.8     09-27    140  |  95<L>  |  32<H>  ----------------------------<  214<H>  3.5   |  30  |  0.54    Ca    8.9      27 Sep 2023 05:24            Urinalysis Basic - ( 27 Sep 2023 05:24 )    Color: x / Appearance: x / SG: x / pH: x  Gluc: 214 mg/dL / Ketone: x  / Bili: x / Urobili: x   Blood: x / Protein: x / Nitrite: x   Leuk Esterase: x / RBC: x / WBC x   Sq Epi: x / Non Sq Epi: x / Bacteria: x          RADIOLOGY & ADDITIONAL TESTS:  Results Reviewed:   Imaging Personally Reviewed:  Electrocardiogram Personally Reviewed:    COORDINATION OF CARE:  Care Discussed with Consultants/Other Providers [Y/N]:  Prior or Outpatient Records Reviewed [Y/N]:

## 2023-09-27 NOTE — PROGRESS NOTE ADULT - PROBLEM SELECTOR PLAN 3
Euvolemic, compensated   - c/w carvedilol 12.5mg BID, reduced bumex dose to 2mg BID as above   - monitor I/Os Euvolemic, compensated   - BPs soft iso of pain medications   - reduce carvedilol to 6.25mg BID, losartan to 50mg daily, bumex dose to 2mg BID as above   - monitor I/Os

## 2023-09-27 NOTE — PROGRESS NOTE ADULT - PROBLEM SELECTOR PLAN 5
BP stable  - c/w carvedilol, losartan, amlodipine  - monitor vital signs BPs soft iso of pain medications   - d/c amlodipine, reduce carvedilol to 6.25mg BID, losartan to 50mg daily, bumex dose to 2mg BID as above   - monitor vital signs

## 2023-09-27 NOTE — PROGRESS NOTE ADULT - SUBJECTIVE AND OBJECTIVE BOX
Orthopedic Progress Note     S:  No acute events overnight, pain is well controlled.  Patient denies any chest pain, SOB, N/V, fevers/chills.    T(C): 36.4 (09-27-23 @ 05:39), Max: 36.6 (09-26-23 @ 10:06)  HR: 74 (09-27-23 @ 07:23) (60 - 78)  BP: 113/57 (09-27-23 @ 05:39) (102/51 - 124/65)  RR: 17 (09-27-23 @ 05:39) (17 - 18)  SpO2: 99% (09-27-23 @ 07:23) (97% - 100%)  Wt(kg): --I&O's Summary    26 Sep 2023 07:01  -  27 Sep 2023 07:00  --------------------------------------------------------  IN: 0 mL / OUT: 2158.5 mL / NET: -2158.5 mL        O:  Physical exam:  Gen: No Acute Distress, Resting comfortably in bed.  Spine: Incision c/d/i. PAUL and HMV w serosang output  Sensation: Decreased in bilateral feet similar to preop (neuropathy) otherwise SILT L2-S1  Motor exam:          Lower extremity                        HF         KF        KE       DF         PF                                                  R        5/5        5/5        5/5       5/5         5/5                                               L         5/5        5/5       5/5       5/5          5/5                                                 BLE: WWP, compartments soft and compressible           Labs:                        10.9   9.77  )-----------( 240      ( 27 Sep 2023 05:24 )             31.8    09-26    138  |  98  |  24<H>  ----------------------------<  275<H>  3.8   |  30  |  0.52    Ca    9.0      26 Sep 2023 06:00

## 2023-09-27 NOTE — PROGRESS NOTE ADULT - PROBLEM SELECTOR PLAN 4
A1C 6.5%  - hold home oral meds, resume on discharge  - c/b steroid induced hyperglycemia, now off steroids  - decrease to Lantus 16U qhs + Admelog to 5U TID qac after lunch   - moderate FS/SSI qac and hs

## 2023-09-27 NOTE — PROGRESS NOTE ADULT - ASSESSMENT
67y Male s/p T9-Pelvis revision fusion 9/23    - WBAT  - Monitor drain outputs  - Pain team following, appreciate recs  - SQH and ASA81  - PT/OT/OOB  - Dispo planning: [facility]

## 2023-09-28 ENCOUNTER — TRANSCRIPTION ENCOUNTER (OUTPATIENT)
Age: 68
End: 2023-09-28

## 2023-09-28 LAB
ANION GAP SERPL CALC-SCNC: 12 MMOL/L — SIGNIFICANT CHANGE UP (ref 7–14)
BASE EXCESS BLDV CALC-SCNC: 7.5 MMOL/L — HIGH (ref -2–3)
BLOOD GAS VENOUS COMPREHENSIVE RESULT: SIGNIFICANT CHANGE UP
BUN SERPL-MCNC: 28 MG/DL — HIGH (ref 7–23)
CALCIUM SERPL-MCNC: 8.8 MG/DL — SIGNIFICANT CHANGE UP (ref 8.4–10.5)
CHLORIDE BLDV-SCNC: 99 MMOL/L — SIGNIFICANT CHANGE UP (ref 96–108)
CHLORIDE SERPL-SCNC: 96 MMOL/L — LOW (ref 98–107)
CO2 BLDV-SCNC: 36.1 MMOL/L — HIGH (ref 22–26)
CO2 SERPL-SCNC: 32 MMOL/L — HIGH (ref 22–31)
CREAT SERPL-MCNC: 0.56 MG/DL — SIGNIFICANT CHANGE UP (ref 0.5–1.3)
EGFR: 108 ML/MIN/1.73M2 — SIGNIFICANT CHANGE UP
GAS PNL BLDV: 137 MMOL/L — SIGNIFICANT CHANGE UP (ref 136–145)
GAS PNL BLDV: SIGNIFICANT CHANGE UP
GLUCOSE BLDC GLUCOMTR-MCNC: 163 MG/DL — HIGH (ref 70–99)
GLUCOSE BLDC GLUCOMTR-MCNC: 165 MG/DL — HIGH (ref 70–99)
GLUCOSE BLDC GLUCOMTR-MCNC: 177 MG/DL — HIGH (ref 70–99)
GLUCOSE BLDC GLUCOMTR-MCNC: 182 MG/DL — HIGH (ref 70–99)
GLUCOSE BLDV-MCNC: 165 MG/DL — HIGH (ref 70–99)
GLUCOSE SERPL-MCNC: 163 MG/DL — HIGH (ref 70–99)
HCO3 BLDV-SCNC: 34 MMOL/L — HIGH (ref 22–29)
HCT VFR BLD CALC: 34.5 % — LOW (ref 39–50)
HCT VFR BLDA CALC: 35 % — LOW (ref 39–51)
HGB BLD CALC-MCNC: 11.7 G/DL — LOW (ref 12.6–17.4)
HGB BLD-MCNC: 11.4 G/DL — LOW (ref 13–17)
LACTATE BLDV-MCNC: 2.5 MMOL/L — HIGH (ref 0.5–2)
LACTATE SERPL-SCNC: 2.9 MMOL/L — HIGH (ref 0.5–2)
MAGNESIUM SERPL-MCNC: 1.9 MG/DL — SIGNIFICANT CHANGE UP (ref 1.6–2.6)
MCHC RBC-ENTMCNC: 29.1 PG — SIGNIFICANT CHANGE UP (ref 27–34)
MCHC RBC-ENTMCNC: 33 GM/DL — SIGNIFICANT CHANGE UP (ref 32–36)
MCV RBC AUTO: 88 FL — SIGNIFICANT CHANGE UP (ref 80–100)
NRBC # BLD: 0 /100 WBCS — SIGNIFICANT CHANGE UP (ref 0–0)
NRBC # FLD: 0.03 K/UL — HIGH (ref 0–0)
PCO2 BLDV: 58 MMHG — HIGH (ref 42–55)
PH BLDV: 7.38 — SIGNIFICANT CHANGE UP (ref 7.32–7.43)
PHOSPHATE SERPL-MCNC: 4 MG/DL — SIGNIFICANT CHANGE UP (ref 2.5–4.5)
PLATELET # BLD AUTO: 253 K/UL — SIGNIFICANT CHANGE UP (ref 150–400)
PO2 BLDV: 54 MMHG — HIGH (ref 25–45)
POTASSIUM BLDV-SCNC: 3.5 MMOL/L — SIGNIFICANT CHANGE UP (ref 3.5–5.1)
POTASSIUM SERPL-MCNC: 3.6 MMOL/L — SIGNIFICANT CHANGE UP (ref 3.5–5.3)
POTASSIUM SERPL-SCNC: 3.6 MMOL/L — SIGNIFICANT CHANGE UP (ref 3.5–5.3)
RBC # BLD: 3.92 M/UL — LOW (ref 4.2–5.8)
RBC # FLD: 13.4 % — SIGNIFICANT CHANGE UP (ref 10.3–14.5)
SAO2 % BLDV: 85.1 % — SIGNIFICANT CHANGE UP (ref 67–88)
SODIUM SERPL-SCNC: 140 MMOL/L — SIGNIFICANT CHANGE UP (ref 135–145)
WBC # BLD: 8.48 K/UL — SIGNIFICANT CHANGE UP (ref 3.8–10.5)
WBC # FLD AUTO: 8.48 K/UL — SIGNIFICANT CHANGE UP (ref 3.8–10.5)

## 2023-09-28 PROCEDURE — 99233 SBSQ HOSP IP/OBS HIGH 50: CPT

## 2023-09-28 RX ORDER — LOSARTAN POTASSIUM 100 MG/1
25 TABLET, FILM COATED ORAL DAILY
Refills: 0 | Status: DISCONTINUED | OUTPATIENT
Start: 2023-09-29 | End: 2023-09-29

## 2023-09-28 RX ORDER — BUMETANIDE 0.25 MG/ML
2 INJECTION INTRAMUSCULAR; INTRAVENOUS
Refills: 0 | Status: DISCONTINUED | OUTPATIENT
Start: 2023-09-29 | End: 2023-09-30

## 2023-09-28 RX ORDER — INSULIN GLARGINE 100 [IU]/ML
15 INJECTION, SOLUTION SUBCUTANEOUS AT BEDTIME
Refills: 0 | Status: DISCONTINUED | OUTPATIENT
Start: 2023-09-28 | End: 2023-09-30

## 2023-09-28 RX ADMIN — LIDOCAINE 1 PATCH: 4 CREAM TOPICAL at 14:04

## 2023-09-28 RX ADMIN — Medication 975 MILLIGRAM(S): at 07:12

## 2023-09-28 RX ADMIN — BUMETANIDE 2 MILLIGRAM(S): 0.25 INJECTION INTRAMUSCULAR; INTRAVENOUS at 05:58

## 2023-09-28 RX ADMIN — OXYCODONE HYDROCHLORIDE 15 MILLIGRAM(S): 5 TABLET ORAL at 02:38

## 2023-09-28 RX ADMIN — OXYCODONE HYDROCHLORIDE 15 MILLIGRAM(S): 5 TABLET ORAL at 06:57

## 2023-09-28 RX ADMIN — CARVEDILOL PHOSPHATE 6.25 MILLIGRAM(S): 80 CAPSULE, EXTENDED RELEASE ORAL at 18:09

## 2023-09-28 RX ADMIN — OXYCODONE HYDROCHLORIDE 15 MILLIGRAM(S): 5 TABLET ORAL at 23:09

## 2023-09-28 RX ADMIN — OXYCODONE HYDROCHLORIDE 15 MILLIGRAM(S): 5 TABLET ORAL at 11:45

## 2023-09-28 RX ADMIN — Medication 81 MILLIGRAM(S): at 11:45

## 2023-09-28 RX ADMIN — GABAPENTIN 300 MILLIGRAM(S): 400 CAPSULE ORAL at 05:58

## 2023-09-28 RX ADMIN — GABAPENTIN 300 MILLIGRAM(S): 400 CAPSULE ORAL at 18:09

## 2023-09-28 RX ADMIN — GABAPENTIN 300 MILLIGRAM(S): 400 CAPSULE ORAL at 11:45

## 2023-09-28 RX ADMIN — LIDOCAINE 1 PATCH: 4 CREAM TOPICAL at 20:41

## 2023-09-28 RX ADMIN — Medication 100 MILLIGRAM(S): at 21:30

## 2023-09-28 RX ADMIN — Medication 2: at 16:29

## 2023-09-28 RX ADMIN — Medication 2: at 07:39

## 2023-09-28 RX ADMIN — OXYCODONE HYDROCHLORIDE 15 MILLIGRAM(S): 5 TABLET ORAL at 01:38

## 2023-09-28 RX ADMIN — Medication 5 MILLIGRAM(S): at 21:30

## 2023-09-28 RX ADMIN — PANTOPRAZOLE SODIUM 40 MILLIGRAM(S): 20 TABLET, DELAYED RELEASE ORAL at 05:58

## 2023-09-28 RX ADMIN — INSULIN GLARGINE 15 UNIT(S): 100 INJECTION, SOLUTION SUBCUTANEOUS at 22:27

## 2023-09-28 RX ADMIN — Medication 975 MILLIGRAM(S): at 00:31

## 2023-09-28 RX ADMIN — Medication 1 TABLET(S): at 11:45

## 2023-09-28 RX ADMIN — OXYCODONE HYDROCHLORIDE 15 MILLIGRAM(S): 5 TABLET ORAL at 05:57

## 2023-09-28 RX ADMIN — LIDOCAINE 1 PATCH: 4 CREAM TOPICAL at 00:18

## 2023-09-28 RX ADMIN — LOSARTAN POTASSIUM 50 MILLIGRAM(S): 100 TABLET, FILM COATED ORAL at 06:00

## 2023-09-28 RX ADMIN — HEPARIN SODIUM 5000 UNIT(S): 5000 INJECTION INTRAVENOUS; SUBCUTANEOUS at 05:58

## 2023-09-28 RX ADMIN — GABAPENTIN 300 MILLIGRAM(S): 400 CAPSULE ORAL at 23:09

## 2023-09-28 RX ADMIN — Medication 975 MILLIGRAM(S): at 07:53

## 2023-09-28 RX ADMIN — OXYCODONE HYDROCHLORIDE 15 MILLIGRAM(S): 5 TABLET ORAL at 12:45

## 2023-09-28 RX ADMIN — Medication 5 UNIT(S): at 16:29

## 2023-09-28 RX ADMIN — OXYCODONE HYDROCHLORIDE 15 MILLIGRAM(S): 5 TABLET ORAL at 19:05

## 2023-09-28 RX ADMIN — Medication 975 MILLIGRAM(S): at 16:29

## 2023-09-28 RX ADMIN — CYCLOBENZAPRINE HYDROCHLORIDE 10 MILLIGRAM(S): 10 TABLET, FILM COATED ORAL at 07:11

## 2023-09-28 RX ADMIN — Medication 5 UNIT(S): at 11:46

## 2023-09-28 RX ADMIN — HEPARIN SODIUM 5000 UNIT(S): 5000 INJECTION INTRAVENOUS; SUBCUTANEOUS at 21:31

## 2023-09-28 RX ADMIN — Medication 975 MILLIGRAM(S): at 17:20

## 2023-09-28 RX ADMIN — HEPARIN SODIUM 5000 UNIT(S): 5000 INJECTION INTRAVENOUS; SUBCUTANEOUS at 14:06

## 2023-09-28 RX ADMIN — Medication 2: at 11:46

## 2023-09-28 RX ADMIN — Medication 975 MILLIGRAM(S): at 23:09

## 2023-09-28 RX ADMIN — Medication 5 UNIT(S): at 07:40

## 2023-09-28 RX ADMIN — ATORVASTATIN CALCIUM 40 MILLIGRAM(S): 80 TABLET, FILM COATED ORAL at 21:30

## 2023-09-28 RX ADMIN — OXYCODONE HYDROCHLORIDE 15 MILLIGRAM(S): 5 TABLET ORAL at 18:09

## 2023-09-28 RX ADMIN — CYCLOBENZAPRINE HYDROCHLORIDE 10 MILLIGRAM(S): 10 TABLET, FILM COATED ORAL at 21:36

## 2023-09-28 RX ADMIN — CYCLOBENZAPRINE HYDROCHLORIDE 10 MILLIGRAM(S): 10 TABLET, FILM COATED ORAL at 14:08

## 2023-09-28 NOTE — PROGRESS NOTE ADULT - PROBLEM SELECTOR PLAN 4
A1C 6.5%  - hold home oral meds, resume on discharge  - c/b steroid induced hyperglycemia, now off steroids  - decrease to Lantus 16U qhs + Admelog to 5U TID qac after lunch   - moderate FS/SSI qac and hs A1C 6.5%  - hold home oral meds, resume on discharge  - c/b steroid induced hyperglycemia, FS improving since steroids discontinued   - decrease to Lantus 15U qhs + Admelog to 5U TID qac after lunch   - moderate FS/SSI qac and hs

## 2023-09-28 NOTE — PROGRESS NOTE ADULT - PROBLEM SELECTOR PLAN 3
Euvolemic, compensated   - BPs soft iso of pain medications   - reduce carvedilol to 6.25mg BID, losartan to 50mg daily, bumex dose to 2mg BID as above   - monitor I/Os Euvolemic, compensated   - BPs soft iso of pain medications   - GDMT: reduce carvedilol to 6.25mg BID, losartan to 25mg daily, bumex dose to 2mg BID as above   - monitor I/Os

## 2023-09-28 NOTE — DISCHARGE NOTE NURSING/CASE MANAGEMENT/SOCIAL WORK - PATIENT PORTAL LINK FT
You can access the FollowMyHealth Patient Portal offered by [facility] by registering at the following website: [email or web address]. By joining [facility]s FollowMyHealth portal, you will also be able to view your health information using other applications (apps) compatible with our system.

## 2023-09-28 NOTE — PROGRESS NOTE ADULT - PROBLEM SELECTOR PLAN 5
BPs soft iso of pain medications   - d/c amlodipine, reduce carvedilol to 6.25mg BID, losartan to 50mg daily, bumex dose to 2mg BID as above   - monitor vital signs BPs soft iso of pain medications   - d/c amlodipine, reduce carvedilol to 6.25mg BID, losartan to 25mg daily, bumex dose to 2mg BID as above   - monitor vital signs

## 2023-09-28 NOTE — DISCHARGE NOTE NURSING/CASE MANAGEMENT/SOCIAL WORK - NSDCPECAREGIVERED_GEN_ALL_CORE
Medline and carenotes for surgical procedure Spinal Fusion, Inscision care, Spine Precautions, Pain management, Diabetes, A1C, Oxy IR, Chaz, Flexeril, as well as DC Medications and side effects literature for patient reference.

## 2023-09-28 NOTE — PROGRESS NOTE ADULT - SUBJECTIVE AND OBJECTIVE BOX
Polly Ross MD  [facility] Division of Hospital Medicine  Pager [phone] (M-F 8AM-5PM)  Other Times: [phone]    Patient is a 67y old  Male who presents with a chief complaint of syncope (28 Sep 2023 06:23)    SUBJECTIVE / OVERNIGHT EVENTS: BPs were soft this morning, felt lightheaded briefly    MEDICATIONS  (STANDING):  acetaminophen     Tablet .. 975 milliGRAM(s) Oral every 8 hours  allopurinol 100 milliGRAM(s) Oral at bedtime  aspirin enteric coated 81 milliGRAM(s) Oral daily  atorvastatin 40 milliGRAM(s) Oral at bedtime  bisacodyl 5 milliGRAM(s) Oral every 12 hours  buMETAnide 2 milliGRAM(s) Oral two times a day  carvedilol 6.25 milliGRAM(s) Oral every 12 hours  dextrose 50% Injectable 25 Gram(s) IV Push once  gabapentin 300 milliGRAM(s) Oral four times a day  heparin   Injectable 5000 Unit(s) SubCutaneous every 8 hours  influenza  Vaccine (HIGH DOSE) 0.7 milliLiter(s) IntraMuscular once  insulin glargine Injectable (LANTUS) 15 Unit(s) SubCutaneous at bedtime  insulin lispro (ADMELOG) corrective regimen sliding scale   SubCutaneous three times a day before meals  insulin lispro (ADMELOG) corrective regimen sliding scale   SubCutaneous at bedtime  insulin lispro Injectable (ADMELOG) 5 Unit(s) SubCutaneous three times a day with meals  lidocaine   4% Patch 1 Patch Transdermal daily  losartan 50 milliGRAM(s) Oral daily  multivitamin 1 Tablet(s) Oral daily  pantoprazole    Tablet 40 milliGRAM(s) Oral before breakfast  polyethylene glycol 3350 17 Gram(s) Oral two times a day  senna 2 Tablet(s) Oral at bedtime    MEDICATIONS  (PRN):  aluminum hydroxide/magnesium hydroxide/simethicone Suspension 30 milliLiter(s) Oral every 4 hours PRN Dyspepsia  benzocaine/menthol Lozenge 1 Lozenge Oral every 3 hours PRN Sore Throat  cyclobenzaprine 10 milliGRAM(s) Oral three times a day PRN Muscle Spasm  guaiFENesin Oral Liquid (Sugar-Free) 100 milliGRAM(s) Oral every 6 hours PRN Cough  magnesium hydroxide Suspension 30 milliLiter(s) Oral every 12 hours PRN Constipation  melatonin 3 milliGRAM(s) Oral at bedtime PRN Insomnia  naloxone Injectable 0.1 milliGRAM(s) IV Push every 3 minutes PRN For ANY of the following changes in patient status:  A. RR LESS THAN 10 breaths per minute, B. Oxygen saturation LESS THAN 90%, C. Sedation score of 6  ondansetron   Disintegrating Tablet 4 milliGRAM(s) Oral every 6 hours PRN Nausea and/or Vomiting  oxyCODONE    IR 10 milliGRAM(s) Oral every 3 hours PRN Moderate Pain (4 - 6)  oxyCODONE    IR 15 milliGRAM(s) Oral every 3 hours PRN Severe Pain (7 - 10)      PHYSICAL EXAM:  Vital Signs Last 24 Hrs  T(C): 36.6 (28 Sep 2023 09:47), Max: 36.6 (28 Sep 2023 09:47)  T(F): 97.9 (28 Sep 2023 09:47), Max: 97.9 (28 Sep 2023 09:47)  HR: 87 (28 Sep 2023 09:55) (55 - 88)  BP: 100/60 (28 Sep 2023 09:55) (91/45 - 130/54)  RR: 18 (28 Sep 2023 09:47) (17 - 18)  SpO2: 100% (28 Sep 2023 09:47) (97% - 100%)    Parameters below as of 28 Sep 2023 09:47  Patient On (Oxygen Delivery Method): room air      CONSTITUTIONAL: resting comfortably sitting up in chair   RESPIRATORY: Normal respiratory effort; lungs are clear to auscultation bilaterally  CARDIOVASCULAR: Regular rate and rhythm, no murmurs, No lower extremity edema  GASTROINTESTINAL: Nontender to palpation, normoactive bowel sounds, no rebound/guarding  NEUROLOGY: non-focal; no gross sensory deficits   PSYCH: A+O to person, place, and time; affect appropriate  SKIN: surgical site c/d/i, PAUL and HMV with serosang output    LABS:                        11.4   8.48  )-----------( 253      ( 28 Sep 2023 05:44 )             34.5     09-28    140  |  96<L>  |  28<H>  ----------------------------<  163<H>  3.6   |  32<H>  |  0.56    Ca    8.8      28 Sep 2023 05:44  Phos  4.0     09-28  Mg     1.90     09-28    TPro  6.0  /  Alb  3.3  /  TBili  0.3  /  DBili  <0.2  /  AST  19  /  ALT  22  /  AlkPhos  84  09-27          Urinalysis Basic - ( 28 Sep 2023 05:44 )    Color: x / Appearance: x / SG: x / pH: x  Gluc: 163 mg/dL / Ketone: x  / Bili: x / Urobili: x   Blood: x / Protein: x / Nitrite: x   Leuk Esterase: x / RBC: x / WBC x   Sq Epi: x / Non Sq Epi: x / Bacteria: x          RADIOLOGY & ADDITIONAL TESTS:  Results Reviewed:   Imaging Personally Reviewed:  Electrocardiogram Personally Reviewed:    COORDINATION OF CARE:  Care Discussed with Consultants/Other Providers [Y/N]:  Prior or Outpatient Records Reviewed [Y/N]:   Polly Ross MD  [facility] Division of Hospital Medicine  Pager [phone] (M-F 8AM-5PM)  Other Times: [phone]    Patient is a 67y old  Male who presents with a chief complaint of syncope (28 Sep 2023 06:23)    SUBJECTIVE / OVERNIGHT EVENTS: BPs were soft this morning, felt lightheaded briefly    MEDICATIONS  (STANDING):  acetaminophen     Tablet .. 975 milliGRAM(s) Oral every 8 hours  allopurinol 100 milliGRAM(s) Oral at bedtime  aspirin enteric coated 81 milliGRAM(s) Oral daily  atorvastatin 40 milliGRAM(s) Oral at bedtime  bisacodyl 5 milliGRAM(s) Oral every 12 hours  buMETAnide 2 milliGRAM(s) Oral two times a day  carvedilol 6.25 milliGRAM(s) Oral every 12 hours  dextrose 50% Injectable 25 Gram(s) IV Push once  gabapentin 300 milliGRAM(s) Oral four times a day  heparin   Injectable 5000 Unit(s) SubCutaneous every 8 hours  influenza  Vaccine (HIGH DOSE) 0.7 milliLiter(s) IntraMuscular once  insulin glargine Injectable (LANTUS) 15 Unit(s) SubCutaneous at bedtime  insulin lispro (ADMELOG) corrective regimen sliding scale   SubCutaneous three times a day before meals  insulin lispro (ADMELOG) corrective regimen sliding scale   SubCutaneous at bedtime  insulin lispro Injectable (ADMELOG) 5 Unit(s) SubCutaneous three times a day with meals  lidocaine   4% Patch 1 Patch Transdermal daily  losartan 50 milliGRAM(s) Oral daily  multivitamin 1 Tablet(s) Oral daily  pantoprazole    Tablet 40 milliGRAM(s) Oral before breakfast  polyethylene glycol 3350 17 Gram(s) Oral two times a day  senna 2 Tablet(s) Oral at bedtime    MEDICATIONS  (PRN):  aluminum hydroxide/magnesium hydroxide/simethicone Suspension 30 milliLiter(s) Oral every 4 hours PRN Dyspepsia  benzocaine/menthol Lozenge 1 Lozenge Oral every 3 hours PRN Sore Throat  cyclobenzaprine 10 milliGRAM(s) Oral three times a day PRN Muscle Spasm  guaiFENesin Oral Liquid (Sugar-Free) 100 milliGRAM(s) Oral every 6 hours PRN Cough  magnesium hydroxide Suspension 30 milliLiter(s) Oral every 12 hours PRN Constipation  melatonin 3 milliGRAM(s) Oral at bedtime PRN Insomnia  naloxone Injectable 0.1 milliGRAM(s) IV Push every 3 minutes PRN For ANY of the following changes in patient status:  A. RR LESS THAN 10 breaths per minute, B. Oxygen saturation LESS THAN 90%, C. Sedation score of 6  ondansetron   Disintegrating Tablet 4 milliGRAM(s) Oral every 6 hours PRN Nausea and/or Vomiting  oxyCODONE    IR 10 milliGRAM(s) Oral every 3 hours PRN Moderate Pain (4 - 6)  oxyCODONE    IR 15 milliGRAM(s) Oral every 3 hours PRN Severe Pain (7 - 10)      PHYSICAL EXAM:  Vital Signs Last 24 Hrs  T(C): 36.6 (28 Sep 2023 09:47), Max: 36.6 (28 Sep 2023 09:47)  T(F): 97.9 (28 Sep 2023 09:47), Max: 97.9 (28 Sep 2023 09:47)  HR: 87 (28 Sep 2023 09:55) (55 - 88)  BP: 100/60 (28 Sep 2023 09:55) (91/45 - 130/54)  RR: 18 (28 Sep 2023 09:47) (17 - 18)  SpO2: 100% (28 Sep 2023 09:47) (97% - 100%)    Parameters below as of 28 Sep 2023 09:47  Patient On (Oxygen Delivery Method): room air      CONSTITUTIONAL: comfortable, resting in bed   RESPIRATORY: Normal respiratory effort; lungs are clear to auscultation bilaterally  CARDIOVASCULAR: Regular rate and rhythm, no murmurs, No lower extremity edema  GASTROINTESTINAL: Nontender to palpation, normoactive bowel sounds, no rebound/guarding  NEUROLOGY: non-focal; no gross sensory deficits   PSYCH: A+O to person, place, and time; affect appropriate  SKIN: surgical site c/d/i, PAUL and HMV with serosang output    LABS:                        11.4   8.48  )-----------( 253      ( 28 Sep 2023 05:44 )             34.5     09-28    140  |  96<L>  |  28<H>  ----------------------------<  163<H>  3.6   |  32<H>  |  0.56    Ca    8.8      28 Sep 2023 05:44  Phos  4.0     09-28  Mg     1.90     09-28    TPro  6.0  /  Alb  3.3  /  TBili  0.3  /  DBili  <0.2  /  AST  19  /  ALT  22  /  AlkPhos  84  09-27          Urinalysis Basic - ( 28 Sep 2023 05:44 )    Color: x / Appearance: x / SG: x / pH: x  Gluc: 163 mg/dL / Ketone: x  / Bili: x / Urobili: x   Blood: x / Protein: x / Nitrite: x   Leuk Esterase: x / RBC: x / WBC x   Sq Epi: x / Non Sq Epi: x / Bacteria: x          RADIOLOGY & ADDITIONAL TESTS:  Results Reviewed:   Imaging Personally Reviewed:  Electrocardiogram Personally Reviewed:    COORDINATION OF CARE:  Care Discussed with Consultants/Other Providers [Y/N]:  Prior or Outpatient Records Reviewed [Y/N]:

## 2023-09-28 NOTE — PROGRESS NOTE ADULT - PROBLEM SELECTOR PLAN 1
syncopal episode after blowing his nose  - EKG without acute changes, trops stable  - CTH negative, TTE with no acute findings, no events on tele (can dc)  - unable to perform orthostats due to back pain and unable to stand  - reports intermittent dizziness since recent increase in home bumex   - continue with decreased dose of Bumex 2mg BID  - PT recommended rehab syncopal episode after blowing his nose  - EKG without acute changes, trops stable  - CTH negative, TTE with no acute findings, no events on tele (can dc)  - reports intermittent dizziness since recent increase in home bumex   - lightheaded this morning likely due to soft BPs from pain medications   - home diuretics/antihypertensives adjusted as below   - PT recommended rehab

## 2023-09-28 NOTE — PROGRESS NOTE ADULT - PROBLEM SELECTOR PROBLEM 6
Patient consents to video visit due to current covid situation.    Patient is at home.    I am conducting this interview from my [place] office.    CHIEF COMPLAINT:   Chief Complaint   Patient presents with   • Follow-up        HPI:        Jeancarlos is a 32 year old male here for f/u. Has a history of crohns. Has not been taking his humira for a few weeks as he states his refridgerator stopped working.  Has had some diarrhea for the past few days, and some mild abdominal pain.     ROS:   Constitutional:  No fever, chills, change in weight.   HEENT: PERRLA, EOMI, Anicteric sclera, moist mucous membranes, good dentition  Neck: Supple, no LAD  Chest: No cough or SOB  CV: No chest pain  GI: Abdominal pain: Yes, see hpi        Bowel problems: Yes, see hpi  Derm: No rash  All other systems reviewed and are negative.     Review of Systems      There are no diagnoses linked to this encounter.    Surgical History:  No past surgical history on file.    Allergies:  ALLERGIES:  Cefzil, Sulfa antibiotics, Prednisone, and Tramadol    Social History:  + sarah    Family History:  No first degree relatives with a history of GI malignancy or IBD.      OBJECTIVE:       There were no vitals filed for this visit.    Physical Exam:  Patient appears comfortable at rest   AAOX3.   Skin - normal color on the visualized area.   RS - Does not appear to be sob. Can talk in full sentences   Neuro - Normal speech. Moving upper extremity appropriately to command.   Psych - Normal demeanor during encounter.     Physical Exam      Previous Reports:   GI Procedures: see epic  Hematology:   Lab Results   Component Value Date    WBC 0-5 08/17/2019    MCV 88.8 05/30/2020     Chemistry:   Lab Results   Component Value Date    GLUCOSE 125 (H) 05/30/2020     LFT's  No results found for: AST, ALT  Coags:  Lab Results   Component Value Date    PTT 26.9 08/06/2018      Other:   No results found for: FERRITIN, IRON, TIBC, AMYLASE, LIPASE     Radiology and  labs were reviewed by me independently and discussed with the patient.     ASSESSMENT/PLAN:  32 year old male with a history of Crohn's disease here for f/u. He has a history of non compliance and also marijuana use.  I have ordered blood and stool testing.  Will refill his humira.  Last egd and colonoscopy were in 2018.    Time spent: 8 minutes    Carla Gee MD             Preventive measure

## 2023-09-28 NOTE — PROGRESS NOTE ADULT - PROBLEM SELECTOR PLAN 2
T12-L1 fracture   - s/p T9-pelvis posterior fusion 9/29  - post op care per ortho  - pain control: Tylenol + PRN Oxycodone, bowel regimen with opiates   - encouraged incentive spirometer use  - PT recommended rehab  - DVT ppx: HSQ T12-L1 fracture   - s/p T9-pelvis posterior fusion 9/29  - post op care per ortho  - pain control: ATC Tylenol + PRN Oxycodone, bowel regimen with opiates   - encouraged incentive spirometer use  - PT recommended rehab  - DVT ppx: HSQ

## 2023-09-28 NOTE — DISCHARGE NOTE NURSING/CASE MANAGEMENT/SOCIAL WORK - NSDCVIVACCINE_GEN_ALL_CORE_FT
Tdap; 13-Feb-2022 09:56; Benoit Kessler (RN); [employer]; [id number] (Exp. Date: 09-Sep-2023); IntraMuscular; Deltoid Right.; 0.5 milliLiter(s); VIS (VIS Published: 09-May-2013, VIS Presented: 13-Feb-2022);

## 2023-09-28 NOTE — PROGRESS NOTE ADULT - SUBJECTIVE AND OBJECTIVE BOX
Orthopedic Progress Note     S:  No acute events overnight, pain is well controlled.  Patient denies any chest pain, SOB, N/V, fevers/chills.    T(C): 36.5 (09-28-23 @ 05:50), Max: 36.5 (09-28-23 @ 05:50)  HR: 55 (09-28-23 @ 05:50) (55 - 76)  BP: 126/71 (09-28-23 @ 05:50) (92/46 - 130/54)  RR: 18 (09-28-23 @ 05:50) (17 - 18)  SpO2: 100% (09-28-23 @ 05:50) (97% - 100%)  Wt(kg): --I&O's Summary    26 Sep 2023 07:01  -  27 Sep 2023 07:00  --------------------------------------------------------  IN: 0 mL / OUT: 2158.5 mL / NET: -2158.5 mL    27 Sep 2023 07:01  -  28 Sep 2023 06:24  --------------------------------------------------------  IN: 0 mL / OUT: 2947.5 mL / NET: -2947.5 mL        O:  Physical exam:  Gen: No Acute Distress, Resting comfortably in bed.  Spine: Incision c/d/i. PAUL and HMV w serosang output  Sensation: Decreased in bilateral feet similar to preop (neuropathy) otherwise SILT L2-S1  Motor exam:          Lower extremity                        HF         KF        KE       DF         PF                                                  R        5/5        5/5        5/5       5/5         5/5                                               L         5/5        5/5       5/5       5/5          5/5                                                 BLE: WWP, compartments soft and compressible           Labs:                        10.9   9.77  )-----------( 240      ( 27 Sep 2023 05:24 )             31.8    09-27    140  |  95<L>  |  32<H>  ----------------------------<  214<H>  3.5   |  30  |  0.54    Ca    8.9      27 Sep 2023 05:24    TPro  6.0  /  Alb  3.3  /  TBili  0.3  /  DBili  <0.2  /  AST  19  /  ALT  22  /  AlkPhos  84  09-27

## 2023-09-28 NOTE — DISCHARGE NOTE NURSING/CASE MANAGEMENT/SOCIAL WORK - NSDPDISTO_GEN_ALL_CORE
Pt incision with dressing intact, positive NV status. VS stable Afebrile. pt vinod po diet, voiding without difficulty./Home Pt incision with dressing intact, positive NV status. VS stable Afebrile. pt vinod po diet, voiding without difficulty./Sub-Acute rehab

## 2023-09-28 NOTE — DISCHARGE NOTE NURSING/CASE MANAGEMENT/SOCIAL WORK - NSDCPNINST_GEN_ALL_CORE
Maintain back incision and dressing clean dry and intact. Call MD with any signs of infection ie fever, redness, drainage, or with signs of bleeding, unrelieved increased pain, or persistent nausea. Avoid twisting motion and remember to logroll to turn in bed. Continue to drink plenty of fluids. Avoid strenuous activity and constipation which may be a side effect from taking certain medications and narcotics. No heavy lifting greater than 10 pounds, ie. a gallon of milk. Safety and fall prevention measures reinforced. Follow-up with MD in office as instructed.  Continue Diabetes management, diet and glucose monitoring, know your A1C blood level and follow up with PMD for continuity of care. Remember hand hygiene, skin inspection for prevention of infection.   You have a post op appointment with Dr. Hughes on October 2, 2023 @ 2:30PM. If you are unable to keep this appointment, please call the office to reschedule. Maintain back incision and dressing clean dry and intact. Call MD with any signs of infection ie fever, redness, drainage, or with signs of bleeding, unrelieved increased pain, or persistent nausea. Avoid twisting motion and remember to logroll to turn in bed. Continue to drink plenty of fluids. Avoid strenuous activity and constipation which may be a side effect from taking certain medications and narcotics. No heavy lifting greater than 10 pounds, ie. a gallon of milk. Safety and fall prevention measures reinforced.    Continue Diabetes management, diet and glucose monitoring, know your A1C blood level and follow up with PMD for continuity of care. Remember hand hygiene, skin inspection for prevention of infection.

## 2023-09-28 NOTE — PROGRESS NOTE ADULT - ASSESSMENT
A/P67y Male s/p T9-Pelvis revision fusion 9/23    - WBAT  - Monitor drain outputs, consider DC today with output <30   - Pain team following, appreciate recs  - SQH and ASA81  - PT/OT/OOB  - Dispo planning: [facility]

## 2023-09-29 ENCOUNTER — APPOINTMENT (OUTPATIENT)
Dept: UROLOGY | Facility: CLINIC | Age: 68
End: 2023-09-29

## 2023-09-29 LAB
GLUCOSE BLDC GLUCOMTR-MCNC: 143 MG/DL — HIGH (ref 70–99)
GLUCOSE BLDC GLUCOMTR-MCNC: 153 MG/DL — HIGH (ref 70–99)
GLUCOSE BLDC GLUCOMTR-MCNC: 179 MG/DL — HIGH (ref 70–99)
GLUCOSE BLDC GLUCOMTR-MCNC: 199 MG/DL — HIGH (ref 70–99)

## 2023-09-29 PROCEDURE — 99233 SBSQ HOSP IP/OBS HIGH 50: CPT

## 2023-09-29 RX ORDER — POLYETHYLENE GLYCOL 3350 17 G/17G
17 POWDER, FOR SOLUTION ORAL
Refills: 0 | Status: DISCONTINUED | OUTPATIENT
Start: 2023-09-30 | End: 2023-09-30

## 2023-09-29 RX ORDER — SODIUM CHLORIDE 9 MG/ML
1000 INJECTION, SOLUTION INTRAVENOUS
Refills: 0 | Status: DISCONTINUED | OUTPATIENT
Start: 2023-09-30 | End: 2023-10-14

## 2023-09-29 RX ORDER — CARVEDILOL PHOSPHATE 80 MG/1
6.25 CAPSULE, EXTENDED RELEASE ORAL EVERY 12 HOURS
Refills: 0 | Status: DISCONTINUED | OUTPATIENT
Start: 2023-09-30 | End: 2023-10-04

## 2023-09-29 RX ORDER — BUMETANIDE 0.25 MG/ML
1 INJECTION INTRAMUSCULAR; INTRAVENOUS
Refills: 0 | DISCHARGE

## 2023-09-29 RX ORDER — BUMETANIDE 0.25 MG/ML
1 INJECTION INTRAMUSCULAR; INTRAVENOUS
Qty: 0 | Refills: 0 | DISCHARGE
Start: 2023-09-29

## 2023-09-29 RX ORDER — LIDOCAINE 4 G/100G
1 CREAM TOPICAL DAILY
Refills: 0 | Status: DISCONTINUED | OUTPATIENT
Start: 2023-09-30 | End: 2023-10-14

## 2023-09-29 RX ORDER — GLUCAGON INJECTION, SOLUTION 0.5 MG/.1ML
1 INJECTION, SOLUTION SUBCUTANEOUS ONCE
Refills: 0 | Status: DISCONTINUED | OUTPATIENT
Start: 2023-09-30 | End: 2023-10-14

## 2023-09-29 RX ORDER — PANTOPRAZOLE SODIUM 20 MG/1
40 TABLET, DELAYED RELEASE ORAL
Refills: 0 | Status: DISCONTINUED | OUTPATIENT
Start: 2023-09-30 | End: 2023-10-14

## 2023-09-29 RX ORDER — ONDANSETRON 8 MG/1
4 TABLET, FILM COATED ORAL EVERY 6 HOURS
Refills: 0 | Status: DISCONTINUED | OUTPATIENT
Start: 2023-09-30 | End: 2023-10-14

## 2023-09-29 RX ORDER — POLYETHYLENE GLYCOL 3350 17 G/17G
17 POWDER, FOR SOLUTION ORAL
Qty: 0 | Refills: 0 | DISCHARGE
Start: 2023-09-29

## 2023-09-29 RX ORDER — CYCLOBENZAPRINE HYDROCHLORIDE 10 MG/1
10 TABLET, FILM COATED ORAL THREE TIMES A DAY
Refills: 0 | Status: DISCONTINUED | OUTPATIENT
Start: 2023-09-30 | End: 2023-10-01

## 2023-09-29 RX ORDER — INSULIN LISPRO 100/ML
VIAL (ML) SUBCUTANEOUS AT BEDTIME
Refills: 0 | Status: DISCONTINUED | OUTPATIENT
Start: 2023-09-30 | End: 2023-10-14

## 2023-09-29 RX ORDER — HEPARIN SODIUM 5000 [USP'U]/ML
5000 INJECTION INTRAVENOUS; SUBCUTANEOUS
Qty: 0 | Refills: 0 | DISCHARGE
Start: 2023-09-29

## 2023-09-29 RX ORDER — ALLOPURINOL 300 MG
1 TABLET ORAL
Qty: 0 | Refills: 0 | DISCHARGE
Start: 2023-09-29

## 2023-09-29 RX ORDER — INSULIN LISPRO 100/ML
5 VIAL (ML) SUBCUTANEOUS
Refills: 0 | Status: DISCONTINUED | OUTPATIENT
Start: 2023-09-30 | End: 2023-10-14

## 2023-09-29 RX ORDER — AMLODIPINE BESYLATE 2.5 MG/1
1 TABLET ORAL
Qty: 0 | Refills: 0 | DISCHARGE

## 2023-09-29 RX ORDER — LANOLIN ALCOHOL/MO/W.PET/CERES
3 CREAM (GRAM) TOPICAL AT BEDTIME
Refills: 0 | Status: DISCONTINUED | OUTPATIENT
Start: 2023-09-30 | End: 2023-10-14

## 2023-09-29 RX ORDER — INSULIN LISPRO 100/ML
VIAL (ML) SUBCUTANEOUS
Refills: 0 | Status: DISCONTINUED | OUTPATIENT
Start: 2023-09-29 | End: 2023-09-30

## 2023-09-29 RX ORDER — ATORVASTATIN CALCIUM 80 MG/1
1 TABLET, FILM COATED ORAL
Qty: 0 | Refills: 0 | DISCHARGE

## 2023-09-29 RX ORDER — OXYCODONE HYDROCHLORIDE 5 MG/1
1 TABLET ORAL
Qty: 0 | Refills: 0 | DISCHARGE
Start: 2023-09-29

## 2023-09-29 RX ORDER — LANOLIN ALCOHOL/MO/W.PET/CERES
1 CREAM (GRAM) TOPICAL
Qty: 0 | Refills: 0 | DISCHARGE
Start: 2023-09-29

## 2023-09-29 RX ORDER — SENNA PLUS 8.6 MG/1
2 TABLET ORAL
Qty: 0 | Refills: 0 | DISCHARGE
Start: 2023-09-29

## 2023-09-29 RX ORDER — BUMETANIDE 0.25 MG/ML
2 INJECTION INTRAMUSCULAR; INTRAVENOUS
Refills: 0 | Status: DISCONTINUED | OUTPATIENT
Start: 2023-09-30 | End: 2023-10-04

## 2023-09-29 RX ORDER — CARVEDILOL PHOSPHATE 80 MG/1
1 CAPSULE, EXTENDED RELEASE ORAL
Qty: 0 | Refills: 0 | DISCHARGE
Start: 2023-09-29

## 2023-09-29 RX ORDER — CYCLOBENZAPRINE HYDROCHLORIDE 10 MG/1
1 TABLET, FILM COATED ORAL
Qty: 0 | Refills: 0 | DISCHARGE
Start: 2023-09-29

## 2023-09-29 RX ORDER — ACETAMINOPHEN 500 MG
3 TABLET ORAL
Qty: 0 | Refills: 0 | DISCHARGE
Start: 2023-09-29

## 2023-09-29 RX ORDER — BUMETANIDE 0.25 MG/ML
2 INJECTION INTRAMUSCULAR; INTRAVENOUS
Refills: 0 | DISCHARGE

## 2023-09-29 RX ORDER — ATORVASTATIN CALCIUM 80 MG/1
1 TABLET, FILM COATED ORAL
Qty: 0 | Refills: 0 | DISCHARGE
Start: 2023-09-29

## 2023-09-29 RX ORDER — GABAPENTIN 400 MG/1
300 CAPSULE ORAL
Refills: 0 | Status: DISCONTINUED | OUTPATIENT
Start: 2023-09-30 | End: 2023-10-14

## 2023-09-29 RX ORDER — LOSARTAN POTASSIUM 100 MG/1
1 TABLET, FILM COATED ORAL
Qty: 0 | Refills: 0 | DISCHARGE

## 2023-09-29 RX ORDER — ALLOPURINOL 300 MG
1 TABLET ORAL
Qty: 0 | Refills: 0 | DISCHARGE

## 2023-09-29 RX ORDER — BENZOCAINE AND MENTHOL 5; 1 G/100ML; G/100ML
1 LIQUID ORAL
Refills: 0 | Status: DISCONTINUED | OUTPATIENT
Start: 2023-09-30 | End: 2023-10-14

## 2023-09-29 RX ORDER — LOSARTAN POTASSIUM 100 MG/1
25 TABLET, FILM COATED ORAL DAILY
Refills: 0 | Status: DISCONTINUED | OUTPATIENT
Start: 2023-09-30 | End: 2023-09-30

## 2023-09-29 RX ORDER — HEPARIN SODIUM 5000 [USP'U]/ML
5000 INJECTION INTRAVENOUS; SUBCUTANEOUS EVERY 8 HOURS
Refills: 0 | Status: DISCONTINUED | OUTPATIENT
Start: 2023-09-30 | End: 2023-10-14

## 2023-09-29 RX ORDER — MAGNESIUM HYDROXIDE 400 MG/1
30 TABLET, CHEWABLE ORAL EVERY 12 HOURS
Refills: 0 | Status: DISCONTINUED | OUTPATIENT
Start: 2023-09-30 | End: 2023-09-30

## 2023-09-29 RX ORDER — INSULIN LISPRO 100/ML
VIAL (ML) SUBCUTANEOUS AT BEDTIME
Refills: 0 | Status: DISCONTINUED | OUTPATIENT
Start: 2023-09-29 | End: 2023-09-30

## 2023-09-29 RX ORDER — DEXTROSE 50 % IN WATER 50 %
12.5 SYRINGE (ML) INTRAVENOUS ONCE
Refills: 0 | Status: DISCONTINUED | OUTPATIENT
Start: 2023-09-30 | End: 2023-10-14

## 2023-09-29 RX ORDER — LOSARTAN POTASSIUM 100 MG/1
25 TABLET, FILM COATED ORAL DAILY
Refills: 0 | Status: DISCONTINUED | OUTPATIENT
Start: 2023-10-01 | End: 2023-10-02

## 2023-09-29 RX ORDER — DEXTROSE 50 % IN WATER 50 %
25 SYRINGE (ML) INTRAVENOUS ONCE
Refills: 0 | Status: DISCONTINUED | OUTPATIENT
Start: 2023-09-30 | End: 2023-10-14

## 2023-09-29 RX ORDER — LOSARTAN POTASSIUM 100 MG/1
1 TABLET, FILM COATED ORAL
Qty: 0 | Refills: 0 | DISCHARGE
Start: 2023-09-29

## 2023-09-29 RX ORDER — ALLOPURINOL 300 MG
100 TABLET ORAL AT BEDTIME
Refills: 0 | Status: DISCONTINUED | OUTPATIENT
Start: 2023-09-30 | End: 2023-10-14

## 2023-09-29 RX ORDER — INSULIN LISPRO 100/ML
VIAL (ML) SUBCUTANEOUS
Refills: 0 | Status: DISCONTINUED | OUTPATIENT
Start: 2023-09-30 | End: 2023-10-14

## 2023-09-29 RX ORDER — INSULIN GLARGINE 100 [IU]/ML
15 INJECTION, SOLUTION SUBCUTANEOUS AT BEDTIME
Refills: 0 | Status: DISCONTINUED | OUTPATIENT
Start: 2023-09-30 | End: 2023-10-03

## 2023-09-29 RX ORDER — ATORVASTATIN CALCIUM 80 MG/1
40 TABLET, FILM COATED ORAL AT BEDTIME
Refills: 0 | Status: DISCONTINUED | OUTPATIENT
Start: 2023-09-30 | End: 2023-10-14

## 2023-09-29 RX ORDER — INSULIN LISPRO 100/ML
5 VIAL (ML) SUBCUTANEOUS
Refills: 0 | Status: DISCONTINUED | OUTPATIENT
Start: 2023-10-01 | End: 2023-10-11

## 2023-09-29 RX ORDER — GABAPENTIN 400 MG/1
1 CAPSULE ORAL
Refills: 0 | DISCHARGE

## 2023-09-29 RX ORDER — INSULIN LISPRO 100/ML
5 VIAL (ML) SUBCUTANEOUS
Refills: 0 | Status: DISCONTINUED | OUTPATIENT
Start: 2023-10-01 | End: 2023-10-14

## 2023-09-29 RX ORDER — OXYCODONE HYDROCHLORIDE 5 MG/1
15 TABLET ORAL
Refills: 0 | Status: DISCONTINUED | OUTPATIENT
Start: 2023-09-30 | End: 2023-10-06

## 2023-09-29 RX ORDER — MAGNESIUM HYDROXIDE 400 MG/1
30 TABLET, CHEWABLE ORAL
Qty: 0 | Refills: 0 | DISCHARGE
Start: 2023-09-29

## 2023-09-29 RX ORDER — ASPIRIN/CALCIUM CARB/MAGNESIUM 324 MG
81 TABLET ORAL DAILY
Refills: 0 | Status: DISCONTINUED | OUTPATIENT
Start: 2023-09-30 | End: 2023-10-14

## 2023-09-29 RX ORDER — PANTOPRAZOLE SODIUM 20 MG/1
1 TABLET, DELAYED RELEASE ORAL
Qty: 0 | Refills: 0 | DISCHARGE
Start: 2023-09-29

## 2023-09-29 RX ORDER — GABAPENTIN 400 MG/1
1 CAPSULE ORAL
Qty: 0 | Refills: 0 | DISCHARGE
Start: 2023-09-29

## 2023-09-29 RX ORDER — OXYCODONE HYDROCHLORIDE 5 MG/1
10 TABLET ORAL
Refills: 0 | Status: DISCONTINUED | OUTPATIENT
Start: 2023-09-30 | End: 2023-10-06

## 2023-09-29 RX ORDER — ASPIRIN/CALCIUM CARB/MAGNESIUM 324 MG
1 TABLET ORAL
Qty: 0 | Refills: 0 | DISCHARGE
Start: 2023-09-29

## 2023-09-29 RX ORDER — DEXTROSE 50 % IN WATER 50 %
15 SYRINGE (ML) INTRAVENOUS ONCE
Refills: 0 | Status: DISCONTINUED | OUTPATIENT
Start: 2023-09-30 | End: 2023-10-14

## 2023-09-29 RX ADMIN — OXYCODONE HYDROCHLORIDE 15 MILLIGRAM(S): 5 TABLET ORAL at 17:06

## 2023-09-29 RX ADMIN — LIDOCAINE 1 PATCH: 4 CREAM TOPICAL at 18:00

## 2023-09-29 RX ADMIN — OXYCODONE HYDROCHLORIDE 15 MILLIGRAM(S): 5 TABLET ORAL at 23:17

## 2023-09-29 RX ADMIN — OXYCODONE HYDROCHLORIDE 15 MILLIGRAM(S): 5 TABLET ORAL at 06:41

## 2023-09-29 RX ADMIN — BUMETANIDE 2 MILLIGRAM(S): 0.25 INJECTION INTRAMUSCULAR; INTRAVENOUS at 06:34

## 2023-09-29 RX ADMIN — GABAPENTIN 300 MILLIGRAM(S): 400 CAPSULE ORAL at 18:30

## 2023-09-29 RX ADMIN — OXYCODONE HYDROCHLORIDE 15 MILLIGRAM(S): 5 TABLET ORAL at 00:09

## 2023-09-29 RX ADMIN — OXYCODONE HYDROCHLORIDE 15 MILLIGRAM(S): 5 TABLET ORAL at 18:06

## 2023-09-29 RX ADMIN — Medication 5 MILLIGRAM(S): at 09:01

## 2023-09-29 RX ADMIN — CYCLOBENZAPRINE HYDROCHLORIDE 10 MILLIGRAM(S): 10 TABLET, FILM COATED ORAL at 09:01

## 2023-09-29 RX ADMIN — GABAPENTIN 300 MILLIGRAM(S): 400 CAPSULE ORAL at 12:15

## 2023-09-29 RX ADMIN — OXYCODONE HYDROCHLORIDE 15 MILLIGRAM(S): 5 TABLET ORAL at 12:08

## 2023-09-29 RX ADMIN — Medication 975 MILLIGRAM(S): at 00:09

## 2023-09-29 RX ADMIN — POLYETHYLENE GLYCOL 3350 17 GRAM(S): 17 POWDER, FOR SOLUTION ORAL at 06:35

## 2023-09-29 RX ADMIN — GABAPENTIN 300 MILLIGRAM(S): 400 CAPSULE ORAL at 06:34

## 2023-09-29 RX ADMIN — Medication 100 MILLIGRAM(S): at 21:32

## 2023-09-29 RX ADMIN — Medication 5 MILLIGRAM(S): at 21:33

## 2023-09-29 RX ADMIN — Medication 5 UNIT(S): at 16:51

## 2023-09-29 RX ADMIN — Medication 5 UNIT(S): at 11:36

## 2023-09-29 RX ADMIN — Medication 975 MILLIGRAM(S): at 16:39

## 2023-09-29 RX ADMIN — SENNA PLUS 2 TABLET(S): 8.6 TABLET ORAL at 21:32

## 2023-09-29 RX ADMIN — GABAPENTIN 300 MILLIGRAM(S): 400 CAPSULE ORAL at 23:17

## 2023-09-29 RX ADMIN — CARVEDILOL PHOSPHATE 6.25 MILLIGRAM(S): 80 CAPSULE, EXTENDED RELEASE ORAL at 06:34

## 2023-09-29 RX ADMIN — HEPARIN SODIUM 5000 UNIT(S): 5000 INJECTION INTRAVENOUS; SUBCUTANEOUS at 13:14

## 2023-09-29 RX ADMIN — INFLUENZA VIRUS VACCINE 0.7 MILLILITER(S): 15; 15; 15; 15 SUSPENSION INTRAMUSCULAR at 17:22

## 2023-09-29 RX ADMIN — BUMETANIDE 2 MILLIGRAM(S): 0.25 INJECTION INTRAMUSCULAR; INTRAVENOUS at 15:39

## 2023-09-29 RX ADMIN — HEPARIN SODIUM 5000 UNIT(S): 5000 INJECTION INTRAVENOUS; SUBCUTANEOUS at 06:35

## 2023-09-29 RX ADMIN — HEPARIN SODIUM 5000 UNIT(S): 5000 INJECTION INTRAVENOUS; SUBCUTANEOUS at 21:39

## 2023-09-29 RX ADMIN — Medication 975 MILLIGRAM(S): at 15:39

## 2023-09-29 RX ADMIN — OXYCODONE HYDROCHLORIDE 15 MILLIGRAM(S): 5 TABLET ORAL at 07:41

## 2023-09-29 RX ADMIN — LIDOCAINE 1 PATCH: 4 CREAM TOPICAL at 02:39

## 2023-09-29 RX ADMIN — LIDOCAINE 1 PATCH: 4 CREAM TOPICAL at 12:15

## 2023-09-29 RX ADMIN — OXYCODONE HYDROCHLORIDE 15 MILLIGRAM(S): 5 TABLET ORAL at 11:08

## 2023-09-29 RX ADMIN — Medication 1: at 16:51

## 2023-09-29 RX ADMIN — ATORVASTATIN CALCIUM 40 MILLIGRAM(S): 80 TABLET, FILM COATED ORAL at 21:32

## 2023-09-29 RX ADMIN — Medication 5 UNIT(S): at 07:38

## 2023-09-29 RX ADMIN — Medication 1 TABLET(S): at 12:15

## 2023-09-29 RX ADMIN — PANTOPRAZOLE SODIUM 40 MILLIGRAM(S): 20 TABLET, DELAYED RELEASE ORAL at 06:35

## 2023-09-29 RX ADMIN — INSULIN GLARGINE 15 UNIT(S): 100 INJECTION, SOLUTION SUBCUTANEOUS at 22:58

## 2023-09-29 RX ADMIN — Medication 1: at 11:36

## 2023-09-29 RX ADMIN — Medication 975 MILLIGRAM(S): at 23:17

## 2023-09-29 RX ADMIN — LOSARTAN POTASSIUM 25 MILLIGRAM(S): 100 TABLET, FILM COATED ORAL at 06:40

## 2023-09-29 RX ADMIN — Medication 81 MILLIGRAM(S): at 12:15

## 2023-09-29 NOTE — PROGRESS NOTE ADULT - PROBLEM SELECTOR PROBLEM 2
Thoracic spine fracture

## 2023-09-29 NOTE — PROGRESS NOTE ADULT - PROBLEM SELECTOR PROBLEM 3
Chronic heart failure

## 2023-09-29 NOTE — PROVIDER CONTACT NOTE (OTHER) - ASSESSMENT
Per Provider pt will be ready to be discharged tomorrow at 9 AM.  Pt was accepted to [facility] [phone], DL spoke with Sirena RN/Spr for tonight and notified of the change of discharge. Per Sirena pt can be accepted tomorrow, the bed will be on HOLD. DL arranged [facility] [phone]. Trip# [id number]. P/U 9 AM. Updated provider.

## 2023-09-29 NOTE — PROGRESS NOTE ADULT - PROBLEM SELECTOR PLAN 3
Euvolemic, compensated   - BPs soft iso of pain medications   - GDMT: reduce carvedilol to 6.25mg BID, losartan to 25mg daily, bumex dose to 2mg BID    - monitor I/Os

## 2023-09-29 NOTE — PROGRESS NOTE ADULT - SUBJECTIVE AND OBJECTIVE BOX
Polly Ross MD  [facility] Division of Hospital Medicine  Pager [phone] (M-F 8AM-5PM)  Other Times: [id number]    Patient is a 67y old  Male who presents with a chief complaint of syncope (29 Sep 2023 07:03)    SUBJECTIVE / OVERNIGHT EVENTS: no acute events overnight     MEDICATIONS  (STANDING):  acetaminophen     Tablet .. 975 milliGRAM(s) Oral every 8 hours  allopurinol 100 milliGRAM(s) Oral at bedtime  aspirin enteric coated 81 milliGRAM(s) Oral daily  atorvastatin 40 milliGRAM(s) Oral at bedtime  bisacodyl 5 milliGRAM(s) Oral every 12 hours  buMETAnide 2 milliGRAM(s) Oral two times a day  carvedilol 6.25 milliGRAM(s) Oral every 12 hours  dextrose 50% Injectable 25 Gram(s) IV Push once  gabapentin 300 milliGRAM(s) Oral four times a day  heparin   Injectable 5000 Unit(s) SubCutaneous every 8 hours  influenza  Vaccine (HIGH DOSE) 0.7 milliLiter(s) IntraMuscular once  insulin glargine Injectable (LANTUS) 15 Unit(s) SubCutaneous at bedtime  insulin lispro (ADMELOG) corrective regimen sliding scale   SubCutaneous three times a day before meals  insulin lispro (ADMELOG) corrective regimen sliding scale   SubCutaneous at bedtime  insulin lispro Injectable (ADMELOG) 5 Unit(s) SubCutaneous three times a day with meals  lidocaine   4% Patch 1 Patch Transdermal daily  multivitamin 1 Tablet(s) Oral daily  pantoprazole    Tablet 40 milliGRAM(s) Oral before breakfast  polyethylene glycol 3350 17 Gram(s) Oral two times a day  senna 2 Tablet(s) Oral at bedtime    MEDICATIONS  (PRN):  aluminum hydroxide/magnesium hydroxide/simethicone Suspension 30 milliLiter(s) Oral every 4 hours PRN Dyspepsia  benzocaine/menthol Lozenge 1 Lozenge Oral every 3 hours PRN Sore Throat  cyclobenzaprine 10 milliGRAM(s) Oral three times a day PRN Muscle Spasm  guaiFENesin Oral Liquid (Sugar-Free) 100 milliGRAM(s) Oral every 6 hours PRN Cough  magnesium hydroxide Suspension 30 milliLiter(s) Oral every 12 hours PRN Constipation  melatonin 3 milliGRAM(s) Oral at bedtime PRN Insomnia  naloxone Injectable 0.1 milliGRAM(s) IV Push every 3 minutes PRN For ANY of the following changes in patient status:  A. RR LESS THAN 10 breaths per minute, B. Oxygen saturation LESS THAN 90%, C. Sedation score of 6  ondansetron   Disintegrating Tablet 4 milliGRAM(s) Oral every 6 hours PRN Nausea and/or Vomiting  oxyCODONE    IR 15 milliGRAM(s) Oral every 3 hours PRN Severe Pain (7 - 10)  oxyCODONE    IR 10 milliGRAM(s) Oral every 3 hours PRN Moderate Pain (4 - 6)      PHYSICAL EXAM:  Vital Signs Last 24 Hrs  T(C): 36.4 (29 Sep 2023 09:24), Max: 36.6 (28 Sep 2023 17:51)  T(F): 97.6 (29 Sep 2023 09:24), Max: 97.9 (28 Sep 2023 21:28)  HR: 71 (29 Sep 2023 09:24) (60 - 88)  BP: 103/57 (29 Sep 2023 09:24) (103/57 - 156/56)  BP(mean): --  RR: 18 (29 Sep 2023 09:24) (17 - 19)  SpO2: 100% (29 Sep 2023 09:24) (95% - 100%)    Parameters below as of 29 Sep 2023 09:24  Patient On (Oxygen Delivery Method): room air    CONSTITUTIONAL: comfortable, resting in bed   RESPIRATORY: Normal respiratory effort; lungs are clear to auscultation bilaterally  CARDIOVASCULAR: Regular rate and rhythm, no murmurs, No lower extremity edema  GASTROINTESTINAL: Nontender to palpation, normoactive bowel sounds, no rebound/guarding  NEUROLOGY: non-focal; no gross sensory deficits   PSYCH: A+O to person, place, and time; affect appropriate  SKIN: surgical site c/d/i    LABS:                        11.4   8.48  )-----------( 253      ( 28 Sep 2023 05:44 )             34.5     09-28    140  |  96<L>  |  28<H>  ----------------------------<  163<H>  3.6   |  32<H>  |  0.56    Ca    8.8      28 Sep 2023 05:44  Phos  4.0     09-28  Mg     1.90     09-28            Urinalysis Basic - ( 28 Sep 2023 05:44 )    Color: x / Appearance: x / SG: x / pH: x  Gluc: 163 mg/dL / Ketone: x  / Bili: x / Urobili: x   Blood: x / Protein: x / Nitrite: x   Leuk Esterase: x / RBC: x / WBC x   Sq Epi: x / Non Sq Epi: x / Bacteria: x          RADIOLOGY & ADDITIONAL TESTS:  Results Reviewed:   Imaging Personally Reviewed:  Electrocardiogram Personally Reviewed:    COORDINATION OF CARE:  Care Discussed with Consultants/Other Providers [Y/N]:  Prior or Outpatient Records Reviewed [Y/N]:

## 2023-09-29 NOTE — PROGRESS NOTE ADULT - PROBLEM SELECTOR PLAN 1
syncopal episode after blowing his nose  - EKG without acute changes, trops stable  - CTH negative, TTE with no acute findings, no events on tele (can dc)  - lightheadedness improving - was likely due to soft BPs from pain medications   - home diuretics/antihypertensives adjusted as below   - PT recommended rehab

## 2023-09-29 NOTE — PROGRESS NOTE ADULT - PROBLEM SELECTOR PLAN 4
A1C 6.5%  - hold home oral meds, resume on discharge  - c/b steroid induced hyperglycemia, FS improving since steroids discontinued   - c/w Lantus 15U qhs + Admelog to 5U TID qac, ow SSI qac and hs   - on discharge - no need for insulin, can resume home glimepiride, A1C well controlled and anticipate further improvement in BG since off steroids

## 2023-09-29 NOTE — PROGRESS NOTE ADULT - SUBJECTIVE AND OBJECTIVE BOX
Orthopedic Surgery Progress Note     S: Patient seen and examined today. No acute events overnight. Pain is well controlled. Denies f/c, chest pain, shortness of breath, dizziness. PAUL drain removed 9/28.    MEDICATIONS  (STANDING):  acetaminophen     Tablet .. 975 milliGRAM(s) Oral every 8 hours  allopurinol 100 milliGRAM(s) Oral at bedtime  aspirin enteric coated 81 milliGRAM(s) Oral daily  atorvastatin 40 milliGRAM(s) Oral at bedtime  bisacodyl 5 milliGRAM(s) Oral every 12 hours  buMETAnide 2 milliGRAM(s) Oral two times a day  carvedilol 6.25 milliGRAM(s) Oral every 12 hours  dextrose 50% Injectable 25 Gram(s) IV Push once  gabapentin 300 milliGRAM(s) Oral four times a day  heparin   Injectable 5000 Unit(s) SubCutaneous every 8 hours  influenza  Vaccine (HIGH DOSE) 0.7 milliLiter(s) IntraMuscular once  insulin glargine Injectable (LANTUS) 15 Unit(s) SubCutaneous at bedtime  insulin lispro (ADMELOG) corrective regimen sliding scale   SubCutaneous three times a day before meals  insulin lispro (ADMELOG) corrective regimen sliding scale   SubCutaneous at bedtime  insulin lispro Injectable (ADMELOG) 5 Unit(s) SubCutaneous three times a day with meals  lidocaine   4% Patch 1 Patch Transdermal daily  losartan 25 milliGRAM(s) Oral daily  multivitamin 1 Tablet(s) Oral daily  pantoprazole    Tablet 40 milliGRAM(s) Oral before breakfast  polyethylene glycol 3350 17 Gram(s) Oral two times a day  senna 2 Tablet(s) Oral at bedtime    MEDICATIONS  (PRN):  aluminum hydroxide/magnesium hydroxide/simethicone Suspension 30 milliLiter(s) Oral every 4 hours PRN Dyspepsia  benzocaine/menthol Lozenge 1 Lozenge Oral every 3 hours PRN Sore Throat  cyclobenzaprine 10 milliGRAM(s) Oral three times a day PRN Muscle Spasm  guaiFENesin Oral Liquid (Sugar-Free) 100 milliGRAM(s) Oral every 6 hours PRN Cough  magnesium hydroxide Suspension 30 milliLiter(s) Oral every 12 hours PRN Constipation  melatonin 3 milliGRAM(s) Oral at bedtime PRN Insomnia  naloxone Injectable 0.1 milliGRAM(s) IV Push every 3 minutes PRN For ANY of the following changes in patient status:  A. RR LESS THAN 10 breaths per minute, B. Oxygen saturation LESS THAN 90%, C. Sedation score of 6  ondansetron   Disintegrating Tablet 4 milliGRAM(s) Oral every 6 hours PRN Nausea and/or Vomiting  oxyCODONE    IR 10 milliGRAM(s) Oral every 3 hours PRN Moderate Pain (4 - 6)  oxyCODONE    IR 15 milliGRAM(s) Oral every 3 hours PRN Severe Pain (7 - 10)      Vital Signs Last 24 Hrs  T(C): 36.4 (29 Sep 2023 06:02), Max: 36.6 (28 Sep 2023 09:47)  T(F): 97.6 (29 Sep 2023 06:02), Max: 97.9 (28 Sep 2023 09:47)  HR: 67 (29 Sep 2023 06:02) (60 - 88)  BP: 118/57 (29 Sep 2023 06:02) (91/45 - 156/56)  BP(mean): --  RR: 17 (29 Sep 2023 06:02) (17 - 19)  SpO2: 98% (29 Sep 2023 06:02) (95% - 100%)    Parameters below as of 29 Sep 2023 06:02  Patient On (Oxygen Delivery Method): room air        09-28-23 @ 07:01  -  09-29-23 @ 07:00  --------------------------------------------------------  IN: 0 mL / OUT: 2212.5 mL / NET: -2212.5 mL        Physical Exam:  Gen: No Acute Distress, Resting comfortably in bed.  Spine: Incision c/d/i. HMV w serosang output  Sensation: Decreased in bilateral feet similar to preop (neuropathy) otherwise SILT L2-S1  Motor exam:          Lower extremity                        HF         KF        KE       DF         PF                                                  R        5/5        5/5        5/5       5/5         5/5                                               L         5/5        5/5       5/5       5/5          5/5                                                 BLE: WWP, compartments soft and compressible    LABS:                        11.4   8.48  )-----------( 253      ( 28 Sep 2023 05:44 )             34.5     09-28    140  |  96<L>  |  28<H>  ----------------------------<  163<H>  3.6   |  32<H>  |  0.56    Ca    8.8      28 Sep 2023 05:44  Phos  4.0     09-28  Mg     1.90     09-28

## 2023-09-29 NOTE — H&P ADULT - NSHPLABSRESULTS_GEN_ALL_CORE
RECENT LABS/IMAGING                        11.4   8.48  )-----------( 253      ( 28 Sep 2023 05:44 )             34.5     09-28    140  |  96<L>  |  28<H>  ----------------------------<  163<H>  3.6   |  32<H>  |  0.56    Ca    8.8      28 Sep 2023 05:44  Phos  4.0     09-28  Mg     1.90     09-28        Urinalysis Basic - ( 28 Sep 2023 05:44 )    Color: x / Appearance: x / SG: x / pH: x  Gluc: 163 mg/dL / Ketone: x  / Bili: x / Urobili: x   Blood: x / Protein: x / Nitrite: x   Leuk Esterase: x / RBC: x / WBC x   Sq Epi: x / Non Sq Epi: x / Bacteria: x     X-ray Thoracic Spine 2 View (09.18.23 @ 17:01)     IMPRESSION:  Highly limited exam due to underpenetration. Cervical spine is visualized   on the lateral view up to the level of C3.  Known fracture at T12-L1 is not well evaluated on these images.  Multilevel disc space narrowing. Multilevel bridging syndesmophytes.  Status post lumbosacral spinal fusion.  Partially imaged bilateral hip arthroplasties and sternal wires.      CT Lumbar Spine No Cont (09.18.23 @ 15:56)     IMPRESSION:  Acute fracture through the fused T12-L1 disc space. Consider MRI for evaluation of the spinal canal.  Flowing osteophytosis throughout the thoracic levels and ossifications of   portions of the supraspinous ligament. Findings could represent diffuse   idiopathic skeletal hyperostosis versus ankylosing spondylitis.      CT Cervical Spine No Cont (09.18.23 @ 15:56)     IMPRESSION:  CT head:  -No acute intracranial findings.    CT cervical spine:  -No acute fracture or dislocation.

## 2023-09-29 NOTE — PROGRESS NOTE ADULT - ASSESSMENT
67y Male s/p T9-Pelvis revision posterior fusion 9/23    Plan:  - WBAT  - Monitor drain outputs, consider DC of HV today   - Pain team following, appreciate recs  - DVT ppx: SQH and ASA81  - PT/OT/OOB  - Dispo planning: [facility]

## 2023-09-29 NOTE — H&P ADULT - ASSESSMENT
ASSESSMENT/PLAN  This is a   ___ year-old ___ with a PMH of _.  Patient now with gait Instability, ADL impairments and Functional impairments.    #  - Start Comprehensive Rehab Program: PT/OT/ST, 3hours daily and 5 days weekly  - PT: Focused on improving strength, endurance, coordination, balance, functional mobility, and transfers  - OT: Focused on improving strength, fine motor skills, coordination, posture and ADLs.    - ST: to diagnose and treat deficits in swallowing, cognition and communication.   - WB Status:  - Prosthetic and Othortic as needed    #CVA  -    #HTN  -    #HLD  -    #DM II  - ISS and FS  - Admelog and Lantus  - A1c ..... on     #A-fib  -    #Pain management  - Tylenol PRN, Oxycodone PRN    #DVT ppx  - Lovenox, Heparin, SCD, TEDs    #GI ppx  - Pepcid 20mg , Nexium, Protonix 40mg    #Bowel Regimen  - Senna, miralax PRN    #Bladder management  - BS on admission, and q 8 hours (SC if > 400)  - Monitor UO    #FEN   - Diet:  - Supplements:    #Skin:  - No active issues at this time  - Skin on admission: ***  - Pressure injury/Skin: Turn Q2hrs while in bed, OOB to Chair, PT/OT     #Dysphagia    - SLP: evaluation and treatment    #Mood/Cognition:  - Neuropsychology consult PRN    #Sleep:   - Maintain quiet hours and low stim environment.  - Melatonin PRN to maximize participation in therapy during the day.   - Monitor sleep logs    #Precaution  - Fall, Aspiration, cardiac, Sternal, Spinal, Seizure, Hip (Anterior or Posterior)      #[facility]  CODE STATUS: FULL CODE , DNR, DNI    Outpatient Follow-up (Specialty/Name of physician):        MEDICAL PROGNOSIS: GOOD            REHAB POTENTIAL: GOOD             ESTIMATED DISPOSITION: HOME WITH HOME CARE            ELOS: 10-14 Days   EXPECTED THERAPY:     P.T. 1hr/day       O.T. 1hr/day      S.L.P. 1hr/day     P&O Unnecessary     EXP FREQUENCY: 5 days per 7 day period     PRESCREEN COMPARISON:   I have reviewed the prescreen information and I have found no relevant changes between the preadmission screening and my post admission evaluation     RATIONALE FOR INPATIENT ADMISSION - Patient demonstrates the following: (check all that apply)  [X] Medically appropriate for rehabilitation admission  [X] Has attainable rehab goals with an appropriate initial discharge plan  [X] Has rehabilitation potential (expected to make a significant improvement within a reasonable period of time)   [X] Requires close medical management by a rehab physician, rehab nursing care, Hospitalist and comprehensive interdisciplinary team (including PT, OT, & or SLP, Prosthetics and Orthotics)   ASSESSMENT/PLAN  This is a 66 YO male with PMH of  HFrEF, CAD s/p CABG, ASIYA on CPAP, HTN, DM II, right renal mass s/p resection presents to  [facility] ED on 9/18  after a syncopal episode, found to have T12-L1 fracture on imaging. Patient had a T9-pelvis revision PSF. Patient now with gait Instability, ADL impairments and Functional impairments.    #Thoracic spine fracture  - T9-pelvis revision PSF on 9/23  - Start Comprehensive Rehab Program: PT/OT, 3hours daily and 5 days weekly  - PT: Focused on improving strength, endurance, coordination, balance, functional mobility, and transfers  - OT: Focused on improving strength, fine motor skills, coordination, posture and ADLs.      #HTN  - Carvedilol 6.25mg BID  - Losartan 25mg daily    #HLD  - Lipitor 40mg daily    #CAD   - s/p CABG  - c/w ASA 81mg daily    #HF  - Bumex 2mg BID    #DM II  - ISS and FS  - Admelog and Lantus  - A1c 6.5 on 9/19    #Pain management  - Tylenol PRN, Oxycodone PRN, lidocaine patch, flexeril     #DVT ppx  - Heparin Q 8 hrs  -  SCD, TEDs    #GI ppx  - Protonix 40mg  - Zofran     #Bowel Regimen  - Senna, dulcolax 5mg BID, miralax PRN    #Bladder management  - BS on admission, and q 8 hours (SC if > 400)  - Monitor UO    #FEN   - Diet: DASH/TLC  - MVI    #Skin:  - Skin on admission: ***  - Pressure injury/Skin: Turn Q2hrs while in bed, OOB to Chair, PT/OT     #Sleep:   - Maintain quiet hours and low stim environment.  - Melatonin 3mg nightly PRN to maximize participation in therapy during the day.     #Precaution  - Fall, Aspiration, Spinal      #GOC  CODE STATUS: FULL CODE    Outpatient Follow-up (Specialty/Name of physician):    Jose C Hughes  Spine Surgery  [address] [place] [id number]  Phone: [phone]  Fax: [phone]  Follow Up Time:    [facility]  UROLOGY [address] [place] [address]  Scheduled Appointment: 11/03/2023    Gadiel Miranda  [facility]  UROLOGY [address] [place] [address]  Scheduled Appointment: 11/03/2023    MEDICAL PROGNOSIS: GOOD            REHAB POTENTIAL: GOOD             ESTIMATED DISPOSITION: HOME WITH HOME CARE            ELOS: 10-14 Days   EXPECTED THERAPY:     P.T. 2hr/day       O.T. 1hr/day      S.L.P. 0hr/day     P&O Unnecessary     EXP FREQUENCY: 5 days per 7 day period     PRESCREEN COMPARISON:   I have reviewed the prescreen information and I have found no relevant changes between the preadmission screening and my post admission evaluation     RATIONALE FOR INPATIENT ADMISSION - Patient demonstrates the following: (check all that apply)  [X] Medically appropriate for rehabilitation admission  [X] Has attainable rehab goals with an appropriate initial discharge plan  [X] Has rehabilitation potential (expected to make a significant improvement within a reasonable period of time)   [X] Requires close medical management by a rehab physician, rehab nursing care, Hospitalist and comprehensive interdisciplinary team (including PT, OT, & or SLP, Prosthetics and Orthotics)   ASSESSMENT/PLAN  This is a 66 YO male with PMH of  HFrEF, CAD s/p CABG, ASIYA on CPAP, HTN, DM II, right renal mass s/p resection presents to  [facility] ED on 9/18  after a syncopal episode, found to have T12-L1 fracture on imaging. Patient had a T9-pelvis revision PSF. Patient now with gait Instability, ADL impairments and Functional impairments.    #Thoracic spine fracture  - T9-pelvis revision PSF on 9/23  - Start Comprehensive Rehab Program: PT/OT, 3hours daily and 5 days weekly  - PT: Focused on improving strength, endurance, coordination, balance, functional mobility, and transfers  - OT: Focused on improving strength, fine motor skills, coordination, posture and ADLs.      #ASIYA  Respiratory order for nocturnal CPAP. Pressure set at 10 and may be adjusted as needed.     #HTN  - Carvedilol 6.25mg BID  - Losartan 25mg daily    #HLD  - Lipitor 40mg daily    #CAD   - s/p CABG  - c/w ASA 81mg daily    #HF  - Bumex 2mg BID    #DM II  - ISS and FS  - Admelog and Lantus  - A1c 6.5 on 9/19    #Neuropathy  -gabapentin 300mg QID.    #Pain management  - Tylenol PRN,   - Oxycodone 10mg moderate, 15mg severe PRN,   - lidocaine patch,   - flexeril 10mg TID.     #DVT ppx  - Heparin 5K Q 8 hrs  -  SCD, TEDs    #GI ppx  - Protonix 40mg  - Zofran     #Bowel Regimen  - Senna, dulcolax 5mg BID, miralax PRN    #Bladder management  - BS on admission, and q 8 hours (SC if > 400)  - Monitor UO    #FEN   - Diet: DASH/TLC  - MVI    #Skin:  - Skin on admission: Multiple Left sided abdomen abrasions. Mid thoracic to lumbar incision covered with primary dressing. Previous surgical scars of chest, abdomen, b/l elbows, wrists and knees. No pressure ulcers.  - Pressure injury/Skin: Turn Q2hrs while in bed, OOB to Chair, PT/OT     #Sleep:   - Maintain quiet hours and low stim environment.  - Melatonin 3mg nightly PRN to maximize participation in therapy during the day.     #Precaution  - Fall, Aspiration, Spinal      #GOC  CODE STATUS: FULL CODE    Outpatient Follow-up (Specialty/Name of physician):    Jose C Hughes  Spine Surgery  [address] [place] [address]  Phone: [phone]  Fax: [phone]  Follow Up Time:    [facility]  UROLOGY [address]  Scheduled Appointment: 11/03/2023    Gadiel Miranda  [facility]  UROLOGY [address]  Scheduled Appointment: 11/03/2023    MEDICAL PROGNOSIS: GOOD            REHAB POTENTIAL: GOOD             ESTIMATED DISPOSITION: HOME WITH HOME CARE            ELOS: 10-14 Days   EXPECTED THERAPY:     P.T. 2hr/day       O.T. 1hr/day      S.L.P. 0hr/day     P&O Unnecessary     EXP FREQUENCY: 5 days per 7 day period     PRESCREEN COMPARISON:   I have reviewed the prescreen information and I have found no relevant changes between the preadmission screening and my post admission evaluation     RATIONALE FOR INPATIENT ADMISSION - Patient demonstrates the following: (check all that apply)  [X] Medically appropriate for rehabilitation admission  [X] Has attainable rehab goals with an appropriate initial discharge plan  [X] Has rehabilitation potential (expected to make a significant improvement within a reasonable period of time)   [X] Requires close medical management by a rehab physician, rehab nursing care, Hospitalist and comprehensive interdisciplinary team (including PT, OT, & or SLP, Prosthetics and Orthotics)   ASSESSMENT/PLAN  This is a 66 YO male with PMH of  HFrEF, CAD s/p CABG, ASIYA on CPAP, HTN, DM II, right renal mass s/p resection presents to  [facility] ED on 9/18  after a syncopal episode, found to have T12-L1 fracture on imaging. Patient had a T9-pelvis revision PSF. Patient now with gait Instability, ADL impairments and Functional impairments.    #Thoracic spine fracture  - T9-pelvis revision PSF on 9/23  - Start Comprehensive Rehab Program: PT/OT, 3hours daily and 5 days weekly  - PT: Focused on improving strength, endurance, coordination, balance, functional mobility, and transfers  - OT: Focused on improving strength, fine motor skills, coordination, posture and ADLs.      #ASIYA  Respiratory order for nocturnal CPAP. Pressure set withy home setting of 5.    #HTN  - Carvedilol 6.25mg BID  - Losartan 25mg daily    #HLD  - Lipitor 40mg daily    #CAD   - s/p CABG  - c/w ASA 81mg daily    #HF  - Bumex 2mg BID    #DM II  - ISS and FS  - Admelog and Lantus  - A1c 6.5 on 9/19    #Neuropathy  -gabapentin 300mg QID.    #Pain management  - Tylenol PRN,   - Oxycodone 10mg moderate, 15mg severe PRN,   - lidocaine patch,   - flexeril 10mg TID.     #DVT ppx  - Heparin 5K Q 8 hrs  -  SCD, TEDs    #GI ppx  - Protonix 40mg  - Zofran     #Bowel Regimen  - Senna, dulcolax 5mg BID, miralax PRN    #Bladder management  - BS on admission, and q 8 hours (SC if > 400)  - Monitor UO    #FEN   - Diet: DASH/TLC  - MVI    #Skin:  - Skin on admission: Multiple Left sided abdomen abrasions. Mid thoracic to lumbar incision covered with primary dressing. Previous surgical scars of chest, abdomen, b/l elbows, wrists and knees. No pressure ulcers.  - Pressure injury/Skin: Turn Q2hrs while in bed, OOB to Chair, PT/OT     #Sleep:   - Maintain quiet hours and low stim environment.  - Melatonin 3mg nightly PRN to maximize participation in therapy during the day.     #Precaution  - Fall, Aspiration, Spinal      #GOC  CODE STATUS: FULL CODE    Outpatient Follow-up (Specialty/Name of physician):    Jose C Hughes  Spine Surgery  [address] [place] [id number]  Phone: [phone]  Fax: [phone]  Follow Up Time:    [facility]  UROLOGY [address]  Scheduled Appointment: 11/03/2023    Gadiel Miranda  [facility]  UROLOGY [address]  Scheduled Appointment: 11/03/2023    MEDICAL PROGNOSIS: GOOD            REHAB POTENTIAL: GOOD             ESTIMATED DISPOSITION: HOME WITH HOME CARE            ELOS: 10-14 Days   EXPECTED THERAPY:     P.T. 2hr/day       O.T. 1hr/day      S.L.P. 0hr/day     P&O Unnecessary     EXP FREQUENCY: 5 days per 7 day period     PRESCREEN COMPARISON:   I have reviewed the prescreen information and I have found no relevant changes between the preadmission screening and my post admission evaluation     RATIONALE FOR INPATIENT ADMISSION - Patient demonstrates the following: (check all that apply)  [X] Medically appropriate for rehabilitation admission  [X] Has attainable rehab goals with an appropriate initial discharge plan  [X] Has rehabilitation potential (expected to make a significant improvement within a reasonable period of time)   [X] Requires close medical management by a rehab physician, rehab nursing care, Hospitalist and comprehensive interdisciplinary team (including PT, OT, & or SLP, Prosthetics and Orthotics)

## 2023-09-29 NOTE — PROGRESS NOTE ADULT - PROBLEM SELECTOR PLAN 5
BPs soft iso of pain medications   - d/c amlodipine, reduce carvedilol to 6.25mg BID, losartan to 25mg daily, bumex dose to 2mg BID   - outpatient follow up with PCP to monitor blood pressure, adjust medications as needed   - monitor vital signs

## 2023-09-29 NOTE — H&P ADULT - NSHPPHYSICALEXAM_GEN_ALL_CORE
Constitutional - NAD, Comfortable  HEENT - NCAT, EOMI.   Neck - Supple, Limited ROM due to chronic DDD.   Chest - good chest expansion, good respiratory effort   Cardio - warm and well perfused  Abdomen -  Obese, Soft, NTND  Extremities - No peripheral edema, No calf tenderness   Neurologic Exam:                    Cognitive -             Orientation: Awake, AAO to self, place, date, year, situation            Attention:  Days of week, recall 3 objects without cuing            Memory: Recent/ remote memory intact            Thought: process, content appropriate     Speech - Fluent, Comprehensible, No dysarthria, No aphasia      Cranial Nerves - No facial asymmetry, Tongue midline, EOMI, Shoulder shrug intact     Motor -                      LEFT    UE - ShAB 5/5, EF 5/5, EE 5/5, WE 5/5,  WNL                    RIGHT UE - ShAB 5/5, EF 5/5, EE 5/5, WE 5/5,  WNL                    LEFT    LE - HF 5/5, KE 5/5, DF 5/5, PF 5/5                    RIGHT LE - HF 5/5, KE 5/5, DF 5/5, PF 5/5        Sensory - Intact to LT bilateral     Reflexes - DTR 0 / 4 in PJ. +1 in b/l biceps, neg Singh's b/l, neg Babinski's b/l     Coordination - FTN intact. Unable to complete HTS as patient is in pain.      OculoVestibular -  No nystagmus  Psychiatric - Mood stable, Affect WNL  Skin on admission: Multiple Left sided abdomen abrasions. Mid thoracic to lumbar incision covered with primary dressing. Previous surgical scars of chest, abdomen, b/l elbows, wrists and knees. No pressure ulcers. Constitutional - NAD, Comfortable  HEENT - NCAT, EOMI.   Neck - Supple, Limited ROM due to chronic DDD.   Chest - good chest expansion, good respiratory effort   Cardio - warm and well perfused  Abdomen -  Obese, Soft, NTND  Extremities - No peripheral edema, No calf tenderness   Neurologic Exam:                    Cognitive -             Orientation: Awake, AAO to self, place, date, year, situation            Attention:  Days of week, recall 3 objects without cuing            Memory: Recent/ remote memory intact            Thought: process, content appropriate     Speech - Fluent, Comprehensible, No dysarthria, No aphasia      Cranial Nerves - No facial asymmetry, Tongue midline, EOMI, Shoulder shrug intact     Motor -  Strength preserved in UE and LE with severe pain in back.                     LEFT    UE - ShAB 5/5, EF 5/5, EE 5/5, WE 5/5,  WNL                     RIGHT UE - ShAB 5/5, EF 5/5, EE 5/5, WE 5/5,  WNL                    LEFT    LE - HF 4/5, KE 5/5, DF 5/5, PF 5/5                    RIGHT LE - HF 4/5, KE 5/5, DF 5/5, PF 5/5        Sensory - Intact to LT bilateral     Reflexes - DTR 0 / 4 in PJ. +1 in b/l biceps, neg Singh's b/l, neg Babinski's b/l     Coordination - FTN intact. Unable to complete HTS as patient is in pain.      OculoVestibular -  No nystagmus  Psychiatric - Mood stable, Affect WNL  Skin on admission: Multiple Left sided abdomen abrasions. Mid thoracic to lumbar incision covered with primary dressing. Previous surgical scars of chest, abdomen, b/l elbows, wrists and knees. No pressure ulcers.

## 2023-09-29 NOTE — PROGRESS NOTE ADULT - SUBJECTIVE AND OBJECTIVE BOX
FRANCO RIVERO   [id number]    Patient stable, tolerating diet, pain controlled on regimen.      T(C): 36.4 (09-29-23 @ 09:24), Max: 36.6 (09-28-23 @ 17:51)  HR: 71 (09-29-23 @ 09:24) (60 - 88)  BP: 103/57 (09-29-23 @ 09:24) (103/57 - 156/56)  RR: 18 (09-29-23 @ 09:24) (17 - 19)  SpO2: 100% (09-29-23 @ 09:24) (95% - 100%)  Wt(kg): --  NAD  Back: Dressing clean/dry/adherent.  Soft.  No collection.    BLE: No calf tenderness.      09-28 @ 07:01  -  09-29 @ 07:00  --------------------------------------------------------  IN: 0 mL / OUT: 2212.5 mL / NET: -2212.5 mL    09-29 @ 07:01  -  09-29 @ 12:25  --------------------------------------------------------  IN: 0 mL / OUT: 250 mL / NET: -250 mL      Hemovac:  PALU:   acetaminophen     Tablet .. 975 milliGRAM(s) Oral every 8 hours  allopurinol 100 milliGRAM(s) Oral at bedtime  aluminum hydroxide/magnesium hydroxide/simethicone Suspension 30 milliLiter(s) Oral every 4 hours PRN  aspirin enteric coated 81 milliGRAM(s) Oral daily  atorvastatin 40 milliGRAM(s) Oral at bedtime  benzocaine/menthol Lozenge 1 Lozenge Oral every 3 hours PRN  bisacodyl 5 milliGRAM(s) Oral every 12 hours  buMETAnide 2 milliGRAM(s) Oral two times a day  carvedilol 6.25 milliGRAM(s) Oral every 12 hours  cyclobenzaprine 10 milliGRAM(s) Oral three times a day PRN  dextrose 50% Injectable 25 Gram(s) IV Push once  gabapentin 300 milliGRAM(s) Oral four times a day  guaiFENesin Oral Liquid (Sugar-Free) 100 milliGRAM(s) Oral every 6 hours PRN  heparin   Injectable 5000 Unit(s) SubCutaneous every 8 hours  influenza  Vaccine (HIGH DOSE) 0.7 milliLiter(s) IntraMuscular once  insulin glargine Injectable (LANTUS) 15 Unit(s) SubCutaneous at bedtime  insulin lispro (ADMELOG) corrective regimen sliding scale   SubCutaneous at bedtime  insulin lispro (ADMELOG) corrective regimen sliding scale   SubCutaneous three times a day before meals  insulin lispro Injectable (ADMELOG) 5 Unit(s) SubCutaneous three times a day with meals  lidocaine   4% Patch 1 Patch Transdermal daily  magnesium hydroxide Suspension 30 milliLiter(s) Oral every 12 hours PRN  melatonin 3 milliGRAM(s) Oral at bedtime PRN  multivitamin 1 Tablet(s) Oral daily  naloxone Injectable 0.1 milliGRAM(s) IV Push every 3 minutes PRN  ondansetron   Disintegrating Tablet 4 milliGRAM(s) Oral every 6 hours PRN  oxyCODONE    IR 10 milliGRAM(s) Oral every 3 hours PRN  oxyCODONE    IR 15 milliGRAM(s) Oral every 3 hours PRN  pantoprazole    Tablet 40 milliGRAM(s) Oral before breakfast  polyethylene glycol 3350 17 Gram(s) Oral two times a day  senna 2 Tablet(s) Oral at bedtime                            11.4   8.48  )-----------( 253      ( 28 Sep 2023 05:44 )             34.5     09-28    140  |  96<L>  |  28<H>  ----------------------------<  163<H>  3.6   |  32<H>  |  0.56    Ca    8.8      28 Sep 2023 05:44  Phos  4.0     09-28  Mg     1.90     09-28        A/P: S/P posterior spine fusion with muscle flap reconstruction.  - Diet  - Pain control  - abx  - DVT PPx: SCD, chemoprophylaxis as per spine service  - Will Follow    Thank You  Jason Blount MD  Plastic Surgery

## 2023-09-29 NOTE — H&P ADULT - NSHPREVIEWOFSYSTEMS_GEN_ALL_CORE
REVIEW OF SYSTEMS  Constitutional: No fever, No Chills, No fatigue  HEENT: No eye pain, No visual disturbances, No difficulty hearing  Pulm: No cough,  No shortness of breath  Cardio: No chest pain, No palpitations  GI:  No abdominal pain, No N/V/D/C. Last bowel movement 9/30/23.   : No dysuria, No frequency, No hematuria  Neuro: No headaches, No memory loss, No loss of strength, No numbness, No tremors  Skin: No itching, No rashes, No lesions   Endo: No temperature intolerance  MSK: Positive chronic neck and acute back pain. Positive muscle spasm in back. No joint pain, No joint swelling.   Psych:  No depression, No anxiety REVIEW OF SYSTEMS  Constitutional: No fever, No Chills, No fatigue  HEENT: No eye pain, No visual disturbances, No difficulty hearing  Pulm: No cough,  No shortness of breath  Cardio: No chest pain, No palpitations  GI:  No abdominal pain, No N/V/D/C. Last bowel movement 9/30/23.   : No dysuria, No frequency, No hematuria  Neuro: Positive dizziness when standing. Positive neuropathy in b/l legs. No headaches, No memory loss, No loss of strength, No tremors  Skin: No itching, No rashes, No lesions   Endo: No temperature intolerance  MSK: Positive chronic neck and acute back pain. Positive muscle spasm in back. No joint pain, No joint swelling.   Psych:  No depression, No anxiety

## 2023-09-29 NOTE — H&P ADULT - NSHPSOCIALHISTORY_GEN_ALL_CORE
Smoking -  EtOH -   Drugs -     Marital status:    Patient lives   PTA: Independent in ADLs and ambulation     CURRENT FUNCTIONAL STATUS  Date:   Bed Mobility:   Transfers:   Gait:   Upper Body Dressing:  Lower Body Dressing:  Toileting:  Bathing: Smoking -  EtOH -   Drugs -     Patient lives alone in a house with 3 stairs to enter and a flight of stairs to his bedroom.   PTA: Independent in ADLs and ambulation     CURRENT FUNCTIONAL STATUS  Date: 9/27  Bed Mobility: min a, 1 person  Transfers: min a, 1 person  Gait: min a, 1 person 5ft  from chair to bed  Lower Body Dressing: min a, 1 person Smoking - quit in March 2023. Smoked 3-4 cigars daily.   EtOH - Denies.   Drugs - Denies    Patient lives alone in a house with 3 stairs to enter and a flight of 12 stairs to his bedroom. A flight of 12 stairs down to basement for laundry.   PTA: Independent in ADLs and ambulation     CURRENT FUNCTIONAL STATUS  Date: 9/27  Bed Mobility: min a, 1 person  Transfers: min a, 1 person  Gait: min a, 1 person 5ft  from chair to bed  Lower Body Dressing: min a, 1 person Smoking - quit in March 2023. Smoked 3-4 cigars daily.   EtOH - Denies.   Drugs - Denies    Patient lives alone in a house with 3 stairs to enter and a flight of 12 stairs to his bedroom. A flight of 12 stairs down to basement for laundry.   PTA: Independent in ADLs and ambulation using a walker outside and a cane inside.      CURRENT FUNCTIONAL STATUS  Date: 9/27  Bed Mobility: min a, 1 person  Transfers: min a, 1 person  Gait: min a, 1 person 5ft  from chair to bed  Lower Body Dressing: min a, 1 person

## 2023-09-29 NOTE — PROGRESS NOTE ADULT - PROBLEM SELECTOR PLAN 2
T12-L1 fracture   - s/p T9-pelvis posterior fusion 9/29  - post op care per ortho  - pain control: ATC Tylenol + PRN Oxycodone, bowel regimen with opiates   - encouraged incentive spirometer use  - PT recommended rehab  - DVT ppx: HSQ

## 2023-09-30 ENCOUNTER — INPATIENT (INPATIENT)
Facility: HOSPITAL | Age: 68
LOS: 13 days | Discharge: HOME HEALTH | DRG: 560 | End: 2023-10-14
Attending: PHYSICAL MEDICINE & REHABILITATION | Admitting: PHYSICAL MEDICINE & REHABILITATION
Payer: COMMERCIAL

## 2023-09-30 VITALS
RESPIRATION RATE: 16 BRPM | SYSTOLIC BLOOD PRESSURE: 113 MMHG | TEMPERATURE: 98 F | HEART RATE: 80 BPM | WEIGHT: 315 LBS | DIASTOLIC BLOOD PRESSURE: 75 MMHG | HEIGHT: 77 IN | OXYGEN SATURATION: 97 %

## 2023-09-30 VITALS
DIASTOLIC BLOOD PRESSURE: 92 MMHG | SYSTOLIC BLOOD PRESSURE: 154 MMHG | RESPIRATION RATE: 18 BRPM | HEART RATE: 100 BPM | TEMPERATURE: 98 F | OXYGEN SATURATION: 98 %

## 2023-09-30 DIAGNOSIS — Z90.5 ACQUIRED ABSENCE OF KIDNEY: Chronic | ICD-10-CM

## 2023-09-30 DIAGNOSIS — Z95.1 PRESENCE OF AORTOCORONARY BYPASS GRAFT: Chronic | ICD-10-CM

## 2023-09-30 DIAGNOSIS — Z98.89 OTHER SPECIFIED POSTPROCEDURAL STATES: Chronic | ICD-10-CM

## 2023-09-30 DIAGNOSIS — Z98.890 OTHER SPECIFIED POSTPROCEDURAL STATES: Chronic | ICD-10-CM

## 2023-09-30 DIAGNOSIS — G62.9 POLYNEUROPATHY, UNSPECIFIED: Chronic | ICD-10-CM

## 2023-09-30 DIAGNOSIS — Z96.643 PRESENCE OF ARTIFICIAL HIP JOINT, BILATERAL: Chronic | ICD-10-CM

## 2023-09-30 DIAGNOSIS — Z96.651 PRESENCE OF RIGHT ARTIFICIAL KNEE JOINT: Chronic | ICD-10-CM

## 2023-09-30 DIAGNOSIS — M43.20 FUSION OF SPINE, SITE UNSPECIFIED: ICD-10-CM

## 2023-09-30 DIAGNOSIS — N20.0 CALCULUS OF KIDNEY: Chronic | ICD-10-CM

## 2023-09-30 DIAGNOSIS — Z90.49 ACQUIRED ABSENCE OF OTHER SPECIFIED PARTS OF DIGESTIVE TRACT: Chronic | ICD-10-CM

## 2023-09-30 DIAGNOSIS — Z96.653 PRESENCE OF ARTIFICIAL KNEE JOINT, BILATERAL: Chronic | ICD-10-CM

## 2023-09-30 DIAGNOSIS — Z98.1 ARTHRODESIS STATUS: Chronic | ICD-10-CM

## 2023-09-30 LAB
GLUCOSE BLDC GLUCOMTR-MCNC: 157 MG/DL — HIGH (ref 70–99)
GLUCOSE BLDC GLUCOMTR-MCNC: 174 MG/DL — HIGH (ref 70–99)
GLUCOSE BLDC GLUCOMTR-MCNC: 194 MG/DL — HIGH (ref 70–99)
GLUCOSE BLDC GLUCOMTR-MCNC: 283 MG/DL — HIGH (ref 70–99)
SARS-COV-2 RNA SPEC QL NAA+PROBE: SIGNIFICANT CHANGE UP

## 2023-09-30 PROCEDURE — 99222 1ST HOSP IP/OBS MODERATE 55: CPT | Mod: GC

## 2023-09-30 RX ORDER — POLYETHYLENE GLYCOL 3350 17 G/17G
17 POWDER, FOR SOLUTION ORAL
Refills: 0 | Status: DISCONTINUED | OUTPATIENT
Start: 2023-09-30 | End: 2023-10-14

## 2023-09-30 RX ORDER — INSULIN LISPRO 100/ML
5 VIAL (ML) SUBCUTANEOUS ONCE
Refills: 0 | Status: COMPLETED | OUTPATIENT
Start: 2023-09-30 | End: 2023-09-30

## 2023-09-30 RX ADMIN — ATORVASTATIN CALCIUM 40 MILLIGRAM(S): 80 TABLET, FILM COATED ORAL at 21:50

## 2023-09-30 RX ADMIN — Medication 5 UNIT(S): at 17:15

## 2023-09-30 RX ADMIN — CARVEDILOL PHOSPHATE 6.25 MILLIGRAM(S): 80 CAPSULE, EXTENDED RELEASE ORAL at 17:18

## 2023-09-30 RX ADMIN — CYCLOBENZAPRINE HYDROCHLORIDE 10 MILLIGRAM(S): 10 TABLET, FILM COATED ORAL at 21:50

## 2023-09-30 RX ADMIN — OXYCODONE HYDROCHLORIDE 15 MILLIGRAM(S): 5 TABLET ORAL at 23:08

## 2023-09-30 RX ADMIN — Medication 6: at 17:15

## 2023-09-30 RX ADMIN — POLYETHYLENE GLYCOL 3350 17 GRAM(S): 17 POWDER, FOR SOLUTION ORAL at 17:16

## 2023-09-30 RX ADMIN — CYCLOBENZAPRINE HYDROCHLORIDE 10 MILLIGRAM(S): 10 TABLET, FILM COATED ORAL at 13:07

## 2023-09-30 RX ADMIN — LOSARTAN POTASSIUM 25 MILLIGRAM(S): 100 TABLET, FILM COATED ORAL at 06:47

## 2023-09-30 RX ADMIN — GABAPENTIN 300 MILLIGRAM(S): 400 CAPSULE ORAL at 23:08

## 2023-09-30 RX ADMIN — Medication 5 MILLIGRAM(S): at 17:17

## 2023-09-30 RX ADMIN — Medication 5 UNIT(S): at 07:46

## 2023-09-30 RX ADMIN — BUMETANIDE 2 MILLIGRAM(S): 0.25 INJECTION INTRAMUSCULAR; INTRAVENOUS at 06:49

## 2023-09-30 RX ADMIN — GABAPENTIN 300 MILLIGRAM(S): 400 CAPSULE ORAL at 17:11

## 2023-09-30 RX ADMIN — OXYCODONE HYDROCHLORIDE 15 MILLIGRAM(S): 5 TABLET ORAL at 09:09

## 2023-09-30 RX ADMIN — OXYCODONE HYDROCHLORIDE 15 MILLIGRAM(S): 5 TABLET ORAL at 10:09

## 2023-09-30 RX ADMIN — Medication 3 MILLIGRAM(S): at 23:08

## 2023-09-30 RX ADMIN — Medication 5 UNIT(S): at 13:52

## 2023-09-30 RX ADMIN — HEPARIN SODIUM 5000 UNIT(S): 5000 INJECTION INTRAVENOUS; SUBCUTANEOUS at 21:51

## 2023-09-30 RX ADMIN — BUMETANIDE 2 MILLIGRAM(S): 0.25 INJECTION INTRAMUSCULAR; INTRAVENOUS at 13:53

## 2023-09-30 RX ADMIN — INSULIN GLARGINE 15 UNIT(S): 100 INJECTION, SOLUTION SUBCUTANEOUS at 21:51

## 2023-09-30 RX ADMIN — PANTOPRAZOLE SODIUM 40 MILLIGRAM(S): 20 TABLET, DELAYED RELEASE ORAL at 06:47

## 2023-09-30 RX ADMIN — CARVEDILOL PHOSPHATE 6.25 MILLIGRAM(S): 80 CAPSULE, EXTENDED RELEASE ORAL at 06:50

## 2023-09-30 RX ADMIN — HEPARIN SODIUM 5000 UNIT(S): 5000 INJECTION INTRAVENOUS; SUBCUTANEOUS at 17:14

## 2023-09-30 RX ADMIN — Medication 975 MILLIGRAM(S): at 07:45

## 2023-09-30 RX ADMIN — GABAPENTIN 300 MILLIGRAM(S): 400 CAPSULE ORAL at 06:47

## 2023-09-30 RX ADMIN — HEPARIN SODIUM 5000 UNIT(S): 5000 INJECTION INTRAVENOUS; SUBCUTANEOUS at 06:48

## 2023-09-30 RX ADMIN — Medication 1: at 07:47

## 2023-09-30 RX ADMIN — Medication 100 MILLIGRAM(S): at 21:50

## 2023-09-30 NOTE — H&P ADULT - NSICDXPASTSURGICALHX_GEN_ALL_CORE_FT
PAST SURGICAL HISTORY:  H/O lithotripsy x1    History of bilateral hip replacements     History of bilateral knee replacement     History of cholecystectomy     History of hernia repair     History of lumbar fusion     History of lymph node biopsy     History of partial nephrectomy     History of Total Hip Replacement 2009- bilateral THR    Kidney stone s/w ESWL pt unsure site    Neuropathy Bilateral Feet since 2012    S/p bilateral carpal tunnel release     S/P CABG x 3 8/29/17    S/P Left Inguinal Hernia Repair     S/P lumbar discectomy 2012- ,L5  Aug 2013    S/P lumbar laminectomy Fusion L3-L5 2012    S/P revision of total knee, right x2- 2012 & 2014    S/P tonsillectomy and adenoidectomy as child    
PAST SURGICAL HISTORY:  H/O lithotripsy x1    History of bilateral hip replacements     History of bilateral knee replacement     History of cholecystectomy     History of hernia repair     History of lumbar fusion     History of lymph node biopsy     History of partial nephrectomy     History of Total Hip Replacement 2009- bilateral THR    Kidney stone s/w ESWL pt unsure site    Neuropathy Bilateral Feet since 2012    S/p bilateral carpal tunnel release     S/P CABG x 3 8/29/17    S/P Left Inguinal Hernia Repair     S/P lumbar discectomy 2012- ,L5  Aug 2013    S/P lumbar laminectomy Fusion L3-L5 2012    S/P revision of total knee, right x2- 2012 & 2014    S/P tonsillectomy and adenoidectomy as child

## 2023-09-30 NOTE — PATIENT PROFILE ADULT - FALL HARM RISK - HARM RISK INTERVENTIONS
Assistance with ambulation/Assistance OOB with selected safe patient handling equipment/Communicate Risk of Fall with Harm to all staff/Discuss with provider need for PT consult/Monitor gait and stability/Reinforce activity limits and safety measures with patient and family/Sit up slowly, dangle for a short time, stand at bedside before walking/Tailored Fall Risk Interventions/Use of alarms - bed, chair and/or voice tab/Visual Cue: Yellow wristband and red socks/Bed in lowest position, wheels locked, appropriate side rails in place/Call bell, personal items and telephone in reach/Instruct patient to call for assistance before getting out of bed or chair/Non-slip footwear when patient is out of bed/[place] to call system/Physically safe environment - no spills, clutter or unnecessary equipment/Purposeful Proactive Rounding/Room/bathroom lighting operational, light cord in reach

## 2023-09-30 NOTE — PROGRESS NOTE ADULT - NUTRITIONAL ASSESSMENT
This patient has been assessed with a concern for Malnutrition and has been determined to have a diagnosis/diagnoses of Morbid obesity (BMI > 40).    This patient is being managed with:   Diet DASH/TLC-  Sodium & Cholesterol Restricted  Consistent Carbohydrate {Evening Snack} (CSTCHOSN)  Entered: Sep 26 2023  2:26PM  
This patient has been assessed with a concern for Malnutrition and has been determined to have a diagnosis/diagnoses of Morbid obesity (BMI > 40).    This patient is being managed with:   Diet Consistent Carbohydrate w/Evening Snack-  Entered: Sep 23 2023  2:46PM    Diet NPO after Midnight-     NPO Start Date: 22-Sep-2023   NPO Start Time: 23:59  Except Medications  Entered: Sep 22 2023 11:47AM    This patient has been assessed with a concern for Malnutrition and has been determined to have a diagnosis/diagnoses of Morbid obesity (BMI > 40).    This patient is being managed with:   Diet Consistent Carbohydrate w/Evening Snack-  Entered: Sep 23 2023  2:46PM    Diet NPO after Midnight-     NPO Start Date: 22-Sep-2023   NPO Start Time: 23:59  Except Medications  Entered: Sep 22 2023 11:47AM  
This patient has been assessed with a concern for Malnutrition and has been determined to have a diagnosis/diagnoses of Morbid obesity (BMI > 40).    This patient is being managed with:   Diet DASH/TLC-  Sodium & Cholesterol Restricted  Consistent Carbohydrate {Evening Snack} (CSTCHOSN)  Entered: Sep 26 2023  2:26PM  
This patient has been assessed with a concern for Malnutrition and has been determined to have a diagnosis/diagnoses of Morbid obesity (BMI > 40).    This patient is being managed with:   Diet Consistent Carbohydrate w/Evening Snack-  Entered: Sep 23 2023  2:46PM  
This patient has been assessed with a concern for Malnutrition and has been determined to have a diagnosis/diagnoses of Morbid obesity (BMI > 40).    This patient is being managed with:   Diet Consistent Carbohydrate w/Evening Snack-  Entered: Sep 23 2023  2:46PM    Diet NPO after Midnight-     NPO Start Date: 22-Sep-2023   NPO Start Time: 23:59  Except Medications  Entered: Sep 22 2023 11:47AM  
This patient has been assessed with a concern for Malnutrition and has been determined to have a diagnosis/diagnoses of Morbid obesity (BMI > 40).    This patient is being managed with:   Diet DASH/TLC-  Sodium & Cholesterol Restricted  Supplement Feeding Modality:  Oral  Ensure Plus High Protein Cans or Servings Per Day:  1       Frequency:  Three Times a day  Entered: Sep 20 2023  3:26PM  
This patient has been assessed with a concern for Malnutrition and has been determined to have a diagnosis/diagnoses of Morbid obesity (BMI > 40).    This patient is being managed with:   Diet Consistent Carbohydrate w/Evening Snack-  Entered: Sep 23 2023  2:46PM    Diet NPO after Midnight-     NPO Start Date: 22-Sep-2023   NPO Start Time: 23:59  Except Medications  Entered: Sep 22 2023 11:47AM  
This patient has been assessed with a concern for Malnutrition and has been determined to have a diagnosis/diagnoses of Morbid obesity (BMI > 40).    This patient is being managed with:   Diet NPO after Midnight-     NPO Start Date: 21-Sep-2023   NPO Start Time: 23:59  Entered: Sep 21 2023 10:42AM    Diet NPO after Midnight-     NPO Start Date: 21-Sep-2023   NPO Start Time: 23:59  Except Medications  Entered: Sep 21 2023  6:58AM    Diet DASH/TLC-  Sodium & Cholesterol Restricted  Supplement Feeding Modality:  Oral  Ensure Plus High Protein Cans or Servings Per Day:  1       Frequency:  Three Times a day  Entered: Sep 20 2023  3:26PM

## 2023-09-30 NOTE — H&P ADULT - ASSESSMENT
This is a 66 YO male with PMH of  HFrEF, CAD s/p CABG, ASIYA on CPAP, HTN, DM II, right renal mass s/p resection presents to  [facility] ED on 9/18  after a syncopal episode, found to have T12-L1 fracture on imaging. Patient had a T9-pelvis revision PSF. Patient now with gait Instability, ADL impairments and Functional impairments.    #Thoracic spine fracture  - T9-pelvis revision PSF on 9/23  - Start Comprehensive Rehab Program: PT/OT, 3hours daily and 5 days weekly  - PT: Focused on improving strength, endurance, coordination, balance, functional mobility, and transfers  - OT: Focused on improving strength, fine motor skills, coordination, posture and ADLs.      #ASIYA  Respiratory order for nocturnal CPAP. Pressure set withy home setting of 5.    #HTN  - Carvedilol 6.25mg BID  - Losartan 25mg daily    #HLD  - Lipitor 40mg daily    #CAD   - s/p CABG  - c/w ASA 81mg daily    #HF  - Bumex 2mg BID    #DM II  - ISS and FS  - Admelog and Lantus  - A1c 6.5 on 9/19    #Neuropathy  -gabapentin 300mg QID.    #Pain management  - Tylenol PRN,   - Oxycodone 10mg moderate, 15mg severe PRN,   - lidocaine patch,   - flexeril 10mg TID.     #DVT ppx  - Heparin 5K Q 8 hrs  -  SCD, TEDs    #GI ppx  - Protonix 40mg  - Zofran     #Bowel Regimen  - Senna, dulcolax 5mg BID, miralax PRN    #Bladder management  - BS on admission, and q 8 hours (SC if > 400)  - Monitor UO    #FEN   - Diet: DASH/TLC  - MVI    #Skin:  - Skin on admission: Multiple Left sided abdomen abrasions. Mid thoracic to lumbar incision covered with primary dressing. Previous surgical scars of chest, abdomen, b/l elbows, wrists and knees. No pressure ulcers.  - Pressure injury/Skin: Turn Q2hrs while in bed, OOB to Chair, PT/OT     #Sleep:   - Maintain quiet hours and low stim environment.  - Melatonin 3mg nightly PRN to maximize participation in therapy during the day.     #Precaution  - Fall, Aspiration, Spinal      #GOC  CODE STATUS: FULL CODE    Outpatient Follow-up (Specialty/Name of physician):    Jose C Hughes  Spine Surgery  [address] [place] [address]  Phone: [phone]  Fax: [phone]  Follow Up Time:    [facility]  UROLOGY [address]  Scheduled Appointment: 11/03/2023    Gadiel Miranda  [facility]  UROLOGY [address]  Scheduled Appointment: 11/03/2023    MEDICAL PROGNOSIS: GOOD            REHAB POTENTIAL: GOOD             ESTIMATED DISPOSITION: HOME WITH HOME CARE            ELOS: 10-14 Days   EXPECTED THERAPY:     P.T. 2hr/day       O.T. 1hr/day      S.L.P. 0hr/day     P&O Unnecessary     EXP FREQUENCY: 5 days per 7 day period     PRESCREEN COMPARISON:   I have reviewed the prescreen information and I have found no relevant changes between the preadmission screening and my post admission evaluation     RATIONALE FOR INPATIENT ADMISSION - Patient demonstrates the following: (check all that apply)  [X] Medically appropriate for rehabilitation admission  [X] Has attainable rehab goals with an appropriate initial discharge plan  [X] Has rehabilitation potential (expected to make a significant improvement within a reasonable period of time)   [X] Requires close medical management by a rehab physician, rehab nursing care, Hospitalist and comprehensive interdisciplinary team (including PT, OT, & or SLP, Prosthetics and Orthotics)

## 2023-09-30 NOTE — H&P ADULT - NSHPSOCIALHISTORY_GEN_ALL_CORE
Smoking - quit in March 2023. Smoked 3-4 cigars daily.   EtOH - Denies.   Drugs - Denies    Patient lives alone in a house with 3 stairs to enter and a flight of 12 stairs to his bedroom. A flight of 12 stairs down to basement for laundry.   PTA: Independent in ADLs and ambulation using a walker outside and a cane inside.      CURRENT FUNCTIONAL STATUS  Date: 9/27  Bed Mobility: min a, 1 person  Transfers: min a, 1 person  Gait: min a, 1 person 5ft  from chair to bed  Lower Body Dressing: min a, 1 person

## 2023-09-30 NOTE — H&P ADULT - NSICDXFAMILYHX_GEN_ALL_CORE_FT
FAMILY HISTORY:  Family history of coronary artery disease, HLD/Angina. Fatal MI age 73.  Family history of coronary artery disease, brother- age 50+- Coronary Artery Stents  Family history of diabetes mellitus type II, mother- - hyperlipidemia and Hypertension.    Sibling  Still living? No  Family history of pancreatic cancer, Age at diagnosis: Age Unknown    
FAMILY HISTORY:  Family history of coronary artery disease, HLD/Angina. Fatal MI age 73.  Family history of coronary artery disease, brother- age 50+- Coronary Artery Stents  Family history of diabetes mellitus type II, mother- - hyperlipidemia and Hypertension.    Sibling  Still living? No  Family history of pancreatic cancer, Age at diagnosis: Age Unknown

## 2023-09-30 NOTE — PROGRESS NOTE ADULT - SUBJECTIVE AND OBJECTIVE BOX
ORTHO PROGRESS NOTE     No acute overnight events. Pt resting comfortably without complaint. Pain controlled       Vital Signs Last 24 Hrs  T(C): 36.8 (30 Sep 2023 06:44), Max: 36.8 (30 Sep 2023 06:44)  T(F): 98.2 (30 Sep 2023 06:44), Max: 98.2 (30 Sep 2023 06:44)  HR: 71 (30 Sep 2023 06:44) (70 - 85)  BP: 125/59 (30 Sep 2023 06:44) (101/65 - 125/59)  BP(mean): --  RR: 18 (30 Sep 2023 06:44) (18 - 18)  SpO2: 96% (30 Sep 2023 06:44) (96% - 100%)    Parameters below as of 30 Sep 2023 06:44  Patient On (Oxygen Delivery Method): room air      Physical Exam:  Gen: No Acute Distress, Resting comfortably in bed.  Spine: Incision c/d/i. HMV w serosang output  Sensation: Decreased in bilateral feet similar to preop (neuropathy) otherwise SILT L2-S1  Motor exam:          Lower extremity                        HF         KF        KE       DF         PF                                                  R        5/5        5/5        5/5       5/5         5/5                                               L         5/5        5/5       5/5       5/5          5/5                                                 BLE: WWP, compartments soft and compressible        67y Male s/p T9-Pelvis revision posterior fusion 9/23    Plan:  - WBAT  - Monitor drain outputs, consider DC of HV today   - Pain team following, appreciate recs  - DVT ppx: SQH and [id number]  - PT/OT/OOB  - Dispo planning: [facility]

## 2023-09-30 NOTE — PATIENT PROFILE ADULT - FUNCTIONAL ASSESSMENT - BASIC MOBILITY 6.
1-calculated by average/Not able to assess (calculate score using [facility] averaging method)

## 2023-09-30 NOTE — H&P ADULT - NSHPREVIEWOFSYSTEMS_GEN_ALL_CORE
REVIEW OF SYSTEMS  Constitutional: No fever, No Chills, No fatigue  HEENT: No eye pain, No visual disturbances, No difficulty hearing  Pulm: No cough,  No shortness of breath  Cardio: No chest pain, No palpitations  GI:  No abdominal pain, No N/V/D/C. Last bowel movement 9/30/23.   : No dysuria, No frequency, No hematuria  Neuro: Positive dizziness when standing. Positive neuropathy in b/l legs. No headaches, No memory loss, No loss of strength, No tremors  Skin: No itching, No rashes, No lesions   Endo: No temperature intolerance  MSK: Positive chronic neck and acute back pain. Positive muscle spasm in back. No joint pain, No joint swelling.   Psych:  No depression, No anxiety

## 2023-09-30 NOTE — H&P ADULT - HISTORY OF PRESENT ILLNESS
This is a 66 YO male with PMH of  HFrEF, CAD s/p CABG, ASIYA on CPAP, HTN, DM, right renal mass s/p resection presents to  [facility] ED ON 9/18  after a syncopal episode, found to have T12-L1 fracture on imaging. Patient originally scheduled for an ACDF with Dr. Hughes, due to injury he was changed to a T9-pelvis revision PSF. Hospital course s/f medication adjustments for HTN  and HF. Patient was evaluated by PM&R and therapy for functional deficits, gait/ADL impairments and acute rehabilitation was recommended. Patient was medically optimized for discharge to [facility] IRU on 9/29/23.  
This is a 66 YO male with PMH of  HFrEF, CAD s/p CABG, ASIYA on CPAP, HTN, DM, right renal mass s/p resection presents to  [facility] ED ON 9/18  after a syncopal episode, found to have T12-L1 fracture on imaging. Patient originally scheduled for an ACDF with Dr. Hughes, due to injury he was changed to a T9-pelvis revision PSF. Hospital course s/f medication adjustments for HTN  and HF.    Patient was evaluated by PM&R and therapy for functional deficits, gait/ADL impairments and acute rehabilitation was recommended. Patient was medically optimized for discharge to [facility] IRU on 9/29/23.

## 2023-09-30 NOTE — PROGRESS NOTE ADULT - REASON FOR ADMISSION
syncope

## 2023-09-30 NOTE — PROGRESS NOTE ADULT - PROVIDER SPECIALTY LIST ADULT
Hospitalist
Orthopedics
Pain Medicine
Plastic Surgery
Hospitalist
Orthopedics
Hospitalist
Internal Medicine

## 2023-09-30 NOTE — H&P ADULT - NSHPPHYSICALEXAM_GEN_ALL_CORE
Constitutional - NAD, Comfortable  HEENT - NCAT, EOMI.   Neck - Supple, Limited ROM due to chronic DDD.   Chest - good chest expansion, good respiratory effort   Cardio - warm and well perfused  Abdomen -  Obese, Soft, NTND  Extremities - No peripheral edema, No calf tenderness   Neurologic Exam:                    Cognitive -             Orientation: Awake, AAO to self, place, date, year, situation            Attention:  Days of week, recall 3 objects without cuing            Memory: Recent/ remote memory intact            Thought: process, content appropriate     Speech - Fluent, Comprehensible, No dysarthria, No aphasia      Cranial Nerves - No facial asymmetry, Tongue midline, EOMI, Shoulder shrug intact     Motor -  Strength preserved in UE and LE with severe pain in back.                     LEFT    UE - ShAB 5/5, EF 5/5, EE 5/5, WE 5/5,  WNL                     RIGHT UE - ShAB 5/5, EF 5/5, EE 5/5, WE 5/5,  WNL                    LEFT    LE - HF 4/5, KE 5/5, DF 5/5, PF 5/5                    RIGHT LE - HF 4/5, KE 5/5, DF 5/5, PF 5/5        Sensory - Intact to LT bilateral     Reflexes - DTR 0 / 4 in PJ. +1 in b/l biceps, neg Singh's b/l, neg Babinski's b/l     Coordination - FTN intact. Unable to complete HTS as patient is in pain.      OculoVestibular -  No nystagmus  Psychiatric - Mood stable, Affect WNL  Skin on admission: Multiple Left sided abdomen abrasions. Mid thoracic to lumbar incision covered with primary dressing. Previous surgical scars of chest, abdomen, b/l elbows, wrists and knees. No pressure ulcers.

## 2023-09-30 NOTE — H&P ADULT - ATTENDING COMMENTS
Rehab Attending- Patient seen and examined by me - Case discussed, above note reviewed by me with modifications made    This is a 66 YO male with PMH of  HFrEF, CAD s/p CABG, ASIYA on CPAP, HTN, DM II, right renal mass s/p resection presents to  [facility] ED on 9/18  after a syncopal episode, found to have T12-L1 fracture on imaging. Patient had a T9-pelvis revision PSF. Patient now with gait Instability, ADL impairments and Functional impairments.    patient seen and examined bedside  exam as above- no motor deficits/ intact to lt touch  limited by pain- mostly spasms  flexeril dose recently increased  moving bowels frequently after miralax dosing- taking narcotics however- encouraged continued use  gabapentin dose lower than PTA-was taking 400,400, 800mg- will considering incresing dose  ICE added for pain    IMP Rehab gait ab secondary to T12L1  spinal Fx sp Revision/ fusion- good candidate for intensive rehab - will tolerate three hours rehab daily

## 2023-09-30 NOTE — H&P ADULT - NSICDXPASTMEDICALHX_GEN_ALL_CORE_FT
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

## 2023-09-30 NOTE — PROVIDER CONTACT NOTE (OTHER) - ACTION/TREATMENT ORDERED:
Patient received pre meal and sliding scale, patient educated on importance of adhering and importance of diabetic diet.

## 2023-10-01 LAB
ALBUMIN SERPL ELPH-MCNC: 2.5 G/DL — LOW (ref 3.3–5)
ALP SERPL-CCNC: 111 U/L — SIGNIFICANT CHANGE UP (ref 40–120)
ALT FLD-CCNC: 28 U/L — SIGNIFICANT CHANGE UP (ref 10–45)
ANION GAP SERPL CALC-SCNC: 6 MMOL/L — SIGNIFICANT CHANGE UP (ref 5–17)
ANISOCYTOSIS BLD QL: SLIGHT — SIGNIFICANT CHANGE UP
AST SERPL-CCNC: 21 U/L — SIGNIFICANT CHANGE UP (ref 10–40)
BASOPHILS # BLD AUTO: 0.11 K/UL — SIGNIFICANT CHANGE UP (ref 0–0.2)
BASOPHILS NFR BLD AUTO: 1 % — SIGNIFICANT CHANGE UP (ref 0–2)
BILIRUB SERPL-MCNC: 0.7 MG/DL — SIGNIFICANT CHANGE UP (ref 0.2–1.2)
BUN SERPL-MCNC: 19 MG/DL — SIGNIFICANT CHANGE UP (ref 7–23)
CALCIUM SERPL-MCNC: 8.8 MG/DL — SIGNIFICANT CHANGE UP (ref 8.4–10.5)
CHLORIDE SERPL-SCNC: 99 MMOL/L — SIGNIFICANT CHANGE UP (ref 96–108)
CO2 SERPL-SCNC: 33 MMOL/L — HIGH (ref 22–31)
CREAT SERPL-MCNC: 0.62 MG/DL — SIGNIFICANT CHANGE UP (ref 0.5–1.3)
EGFR: 105 ML/MIN/1.73M2 — SIGNIFICANT CHANGE UP
EOSINOPHIL # BLD AUTO: 0.22 K/UL — SIGNIFICANT CHANGE UP (ref 0–0.5)
EOSINOPHIL NFR BLD AUTO: 2 % — SIGNIFICANT CHANGE UP (ref 0–6)
GLUCOSE BLDC GLUCOMTR-MCNC: 157 MG/DL — HIGH (ref 70–99)
GLUCOSE BLDC GLUCOMTR-MCNC: 190 MG/DL — HIGH (ref 70–99)
GLUCOSE BLDC GLUCOMTR-MCNC: 196 MG/DL — HIGH (ref 70–99)
GLUCOSE BLDC GLUCOMTR-MCNC: 210 MG/DL — HIGH (ref 70–99)
GLUCOSE SERPL-MCNC: 161 MG/DL — HIGH (ref 70–99)
HCT VFR BLD CALC: 35.3 % — LOW (ref 39–50)
HGB BLD-MCNC: 12 G/DL — LOW (ref 13–17)
LYMPHOCYTES # BLD AUTO: 1.77 K/UL — SIGNIFICANT CHANGE UP (ref 1–3.3)
LYMPHOCYTES # BLD AUTO: 16 % — SIGNIFICANT CHANGE UP (ref 13–44)
MANUAL SMEAR VERIFICATION: SIGNIFICANT CHANGE UP
MCHC RBC-ENTMCNC: 29.6 PG — SIGNIFICANT CHANGE UP (ref 27–34)
MCHC RBC-ENTMCNC: 34 GM/DL — SIGNIFICANT CHANGE UP (ref 32–36)
MCV RBC AUTO: 87.2 FL — SIGNIFICANT CHANGE UP (ref 80–100)
METAMYELOCYTES # FLD: 1 % — HIGH (ref 0–0)
MONOCYTES # BLD AUTO: 0.78 K/UL — SIGNIFICANT CHANGE UP (ref 0–0.9)
MONOCYTES NFR BLD AUTO: 7 % — SIGNIFICANT CHANGE UP (ref 2–14)
MYELOCYTES NFR BLD: 4 % — HIGH (ref 0–0)
NEUTROPHILS # BLD AUTO: 7.65 K/UL — HIGH (ref 1.8–7.4)
NEUTROPHILS NFR BLD AUTO: 67 % — SIGNIFICANT CHANGE UP (ref 43–77)
NEUTS BAND # BLD: 2 % — SIGNIFICANT CHANGE UP (ref 0–8)
NRBC # BLD: 0 /100 — SIGNIFICANT CHANGE UP (ref 0–0)
PLAT MORPH BLD: NORMAL — SIGNIFICANT CHANGE UP
PLATELET # BLD AUTO: 216 K/UL — SIGNIFICANT CHANGE UP (ref 150–400)
POIKILOCYTOSIS BLD QL AUTO: SLIGHT — SIGNIFICANT CHANGE UP
POTASSIUM SERPL-MCNC: 3.3 MMOL/L — LOW (ref 3.5–5.3)
POTASSIUM SERPL-SCNC: 3.3 MMOL/L — LOW (ref 3.5–5.3)
PROT SERPL-MCNC: 6.5 G/DL — SIGNIFICANT CHANGE UP (ref 6–8.3)
RBC # BLD: 4.05 M/UL — LOW (ref 4.2–5.8)
RBC # FLD: 13.4 % — SIGNIFICANT CHANGE UP (ref 10.3–14.5)
RBC BLD AUTO: ABNORMAL
SODIUM SERPL-SCNC: 138 MMOL/L — SIGNIFICANT CHANGE UP (ref 135–145)
WBC # BLD: 11.09 K/UL — HIGH (ref 3.8–10.5)
WBC # FLD AUTO: 11.09 K/UL — HIGH (ref 3.8–10.5)

## 2023-10-01 PROCEDURE — 99232 SBSQ HOSP IP/OBS MODERATE 35: CPT | Mod: GC

## 2023-10-01 PROCEDURE — 99223 1ST HOSP IP/OBS HIGH 75: CPT

## 2023-10-01 RX ORDER — CYCLOBENZAPRINE HYDROCHLORIDE 10 MG/1
10 TABLET, FILM COATED ORAL THREE TIMES A DAY
Refills: 0 | Status: DISCONTINUED | OUTPATIENT
Start: 2023-10-01 | End: 2023-10-02

## 2023-10-01 RX ORDER — POTASSIUM CHLORIDE 20 MEQ
20 PACKET (EA) ORAL
Refills: 0 | Status: COMPLETED | OUTPATIENT
Start: 2023-10-01 | End: 2023-10-01

## 2023-10-01 RX ADMIN — CYCLOBENZAPRINE HYDROCHLORIDE 10 MILLIGRAM(S): 10 TABLET, FILM COATED ORAL at 06:36

## 2023-10-01 RX ADMIN — Medication 2: at 17:26

## 2023-10-01 RX ADMIN — OXYCODONE HYDROCHLORIDE 15 MILLIGRAM(S): 5 TABLET ORAL at 00:00

## 2023-10-01 RX ADMIN — BENZOCAINE AND MENTHOL 1 LOZENGE: 5; 1 LIQUID ORAL at 22:48

## 2023-10-01 RX ADMIN — GABAPENTIN 300 MILLIGRAM(S): 400 CAPSULE ORAL at 12:41

## 2023-10-01 RX ADMIN — Medication 5 MILLIGRAM(S): at 17:36

## 2023-10-01 RX ADMIN — OXYCODONE HYDROCHLORIDE 10 MILLIGRAM(S): 5 TABLET ORAL at 12:41

## 2023-10-01 RX ADMIN — ATORVASTATIN CALCIUM 40 MILLIGRAM(S): 80 TABLET, FILM COATED ORAL at 21:33

## 2023-10-01 RX ADMIN — BUMETANIDE 2 MILLIGRAM(S): 0.25 INJECTION INTRAMUSCULAR; INTRAVENOUS at 06:34

## 2023-10-01 RX ADMIN — HEPARIN SODIUM 5000 UNIT(S): 5000 INJECTION INTRAVENOUS; SUBCUTANEOUS at 14:37

## 2023-10-01 RX ADMIN — HEPARIN SODIUM 5000 UNIT(S): 5000 INJECTION INTRAVENOUS; SUBCUTANEOUS at 21:33

## 2023-10-01 RX ADMIN — OXYCODONE HYDROCHLORIDE 10 MILLIGRAM(S): 5 TABLET ORAL at 06:35

## 2023-10-01 RX ADMIN — Medication 4: at 12:35

## 2023-10-01 RX ADMIN — Medication 2: at 07:47

## 2023-10-01 RX ADMIN — HEPARIN SODIUM 5000 UNIT(S): 5000 INJECTION INTRAVENOUS; SUBCUTANEOUS at 06:35

## 2023-10-01 RX ADMIN — CYCLOBENZAPRINE HYDROCHLORIDE 10 MILLIGRAM(S): 10 TABLET, FILM COATED ORAL at 14:36

## 2023-10-01 RX ADMIN — INSULIN GLARGINE 15 UNIT(S): 100 INJECTION, SOLUTION SUBCUTANEOUS at 21:33

## 2023-10-01 RX ADMIN — CARVEDILOL PHOSPHATE 6.25 MILLIGRAM(S): 80 CAPSULE, EXTENDED RELEASE ORAL at 17:38

## 2023-10-01 RX ADMIN — Medication 1 TABLET(S): at 12:38

## 2023-10-01 RX ADMIN — BUMETANIDE 2 MILLIGRAM(S): 0.25 INJECTION INTRAMUSCULAR; INTRAVENOUS at 14:35

## 2023-10-01 RX ADMIN — PANTOPRAZOLE SODIUM 40 MILLIGRAM(S): 20 TABLET, DELAYED RELEASE ORAL at 07:28

## 2023-10-01 RX ADMIN — Medication 20 MILLIEQUIVALENT(S): at 12:35

## 2023-10-01 RX ADMIN — GABAPENTIN 300 MILLIGRAM(S): 400 CAPSULE ORAL at 22:47

## 2023-10-01 RX ADMIN — Medication 81 MILLIGRAM(S): at 12:38

## 2023-10-01 RX ADMIN — OXYCODONE HYDROCHLORIDE 10 MILLIGRAM(S): 5 TABLET ORAL at 07:30

## 2023-10-01 RX ADMIN — Medication 5 UNIT(S): at 17:26

## 2023-10-01 RX ADMIN — CYCLOBENZAPRINE HYDROCHLORIDE 10 MILLIGRAM(S): 10 TABLET, FILM COATED ORAL at 12:41

## 2023-10-01 RX ADMIN — Medication 100 MILLIGRAM(S): at 21:33

## 2023-10-01 RX ADMIN — Medication 20 MILLIEQUIVALENT(S): at 17:26

## 2023-10-01 RX ADMIN — GABAPENTIN 300 MILLIGRAM(S): 400 CAPSULE ORAL at 17:36

## 2023-10-01 RX ADMIN — OXYCODONE HYDROCHLORIDE 15 MILLIGRAM(S): 5 TABLET ORAL at 22:47

## 2023-10-01 RX ADMIN — CYCLOBENZAPRINE HYDROCHLORIDE 10 MILLIGRAM(S): 10 TABLET, FILM COATED ORAL at 21:33

## 2023-10-01 RX ADMIN — Medication 3 MILLIGRAM(S): at 22:47

## 2023-10-01 RX ADMIN — OXYCODONE HYDROCHLORIDE 15 MILLIGRAM(S): 5 TABLET ORAL at 23:40

## 2023-10-01 RX ADMIN — CARVEDILOL PHOSPHATE 6.25 MILLIGRAM(S): 80 CAPSULE, EXTENDED RELEASE ORAL at 06:35

## 2023-10-01 RX ADMIN — GABAPENTIN 300 MILLIGRAM(S): 400 CAPSULE ORAL at 06:35

## 2023-10-01 RX ADMIN — Medication 20 MILLIEQUIVALENT(S): at 14:38

## 2023-10-01 RX ADMIN — LOSARTAN POTASSIUM 25 MILLIGRAM(S): 100 TABLET, FILM COATED ORAL at 06:34

## 2023-10-01 NOTE — DIETITIAN INITIAL EVALUATION ADULT - PERTINENT LABORATORY DATA
10-01    138  |  99  |  19  ----------------------------<  161<H>  3.3<L>   |  33<H>  |  0.62    Ca    8.8      01 Oct 2023 05:45    TPro  6.5  /  Alb  2.5<L>  /  TBili  0.7  /  DBili  x   /  AST  21  /  ALT  28  /  AlkPhos  111  10-01  POCT Blood Glucose.: 190 mg/dL (10-01-23 @ 07:39)  A1C with Estimated Average Glucose Result: 6.5 % (09-19-23 @ 06:45)  A1C with Estimated Average Glucose Result: 6.5 % (09-01-23 @ 09:30)  A1C with Estimated Average Glucose Result: 6.5 % (03-30-23 @ 09:00)

## 2023-10-01 NOTE — DIETITIAN INITIAL EVALUATION ADULT - ORAL INTAKE PTA/DIET HISTORY
Pt with good appetite and po intake 75% breakfast this morning. Pt denies n/v/d/c/abdominal pain, denies trouble chewing/swallowing. LBM 9/30 - WDL. Skin integrity WDL.  lb, pt states he often fluctuates +/- 10 lbs. Pt does not require oral nutrition supplement at this time. Reviewed current diet with patient (Heart Healthy Diabetic Diet), nutrition education handout provided for reference. Pt verbalized understanding.

## 2023-10-01 NOTE — PROGRESS NOTE ADULT - ASSESSMENT
This is a 66 YO male with PMH of  HFrEF, CAD s/p CABG, ASIYA on CPAP, HTN, DM II, right renal mass s/p resection presents to  [facility] ED on 9/18  after a syncopal episode, found to have T12-L1 fracture on imaging. Patient had a T9-pelvis revision PSF. Patient now with gait Instability, ADL impairments and Functional impairments.    #Thoracic spine fracture  - T9-pelvis revision PSF on 9/23  - Start Comprehensive Rehab Program: PT/OT, 3hours daily and 5 days weekly  - PT: Focused on improving strength, endurance, coordination, balance, functional mobility, and transfers  - OT: Focused on improving strength, fine motor skills, coordination, posture and ADLs.      #ASIYA  Respiratory order for nocturnal CPAP. Pressure set withy home setting of 5.    #HTN  - Carvedilol 6.25mg BID  - Losartan 25mg daily    #HLD  - Lipitor 40mg daily    #CAD   - s/p CABG  - c/w ASA 81mg daily    #HF  - Bumex 2mg BID    #DM II  - ISS and FS  - Admelog and Lantus- Premeal 5U on hold- ordered  - A1c 6.5 on 9/19  follow fingersticks    #Neuropathy  -gabapentin 300mg QID.- consider increasing dose to 400mg 4x/day as PTA    #Pain management  - Tylenol PRN,   - Oxycodone 10mg moderate, 15mg severe PRN,   - lidocaine patch,   - flexeril 10mg TID.   - gabapentin 300mg 4x/.day  ICE    #DVT ppx  - Heparin 5K Q 8 hrs  -  SCD, TEDs    #GI ppx  - Protonix 40mg  - Zofran    Hypokalemia  K supplemented- Check BMP/ Mg level in AM     #Bowel Regimen  - Senna, dulcolax 5mg BID, miralax 2x/day    #Bladder management  - BS on admission, and q 8 hours (SC if > 400)  - Monitor UO    #FEN   - Diet: DASH/TLC  - MVI    #Skin:  - Skin on admission: Multiple Left sided abdomen abrasions. Mid thoracic to lumbar incision covered with primary dressing. Previous surgical scars of chest, abdomen, b/l elbows, wrists and knees. No pressure ulcers.  - Pressure injury/Skin: Turn Q2hrs while in bed, OOB to Chair, PT/OT     #Sleep:   - Maintain quiet hours and low stim environment.  - Melatonin 3mg nightly PRN to maximize participation in therapy during the day.     #Precaution  - Fall, Aspiration, Spinal

## 2023-10-01 NOTE — DIETITIAN INITIAL EVALUATION ADULT - OTHER INFO
This is a 68 YO male with PMH of  HFrEF, CAD s/p CABG, ASIYA on CPAP, HTN, DM, right renal mass s/p resection presents to  [facility] ED ON 9/18  after a syncopal episode, found to have T12-L1 fracture on imaging.

## 2023-10-01 NOTE — PROGRESS NOTE ADULT - SUBJECTIVE AND OBJECTIVE BOX
Chief complaint: back pain- difficulty ambulating    This is a 68 YO male with PMH of  HFrEF, CAD s/p CABG, ASIYA on CPAP, HTN, DM, right renal mass s/p resection presents to  [facility] ED ON 9/18  after a syncopal episode, found to have T12-L1 fracture on imaging. Patient originally scheduled for an ACDF with Dr. Hughes, due to injury he was changed to a T9-pelvis revision PSF. Hospital course s/f medication adjustments for HTN  and HF. Patient was evaluated by PM&R and therapy for functional deficits, gait/ADL impairments and acute rehabilitation was recommended. Patient was medically optimized for discharge to [facility] IRU on 9/29/23.    ROS/subjective:  patient seen and evaluated bedside  no OOB yet secondary to WC not available  NO BM since yesterday- voiding well  Will make Miralax 2x/day standing with senna while on narcotics  taking Flexeril PRN- will change to standing 3x/day  significant spasm with movement in bed- using ICE with some relief  no dizziness, no headaches, no chest pain , No SOB, no nausea, no vomiting    MEDICATIONS  (STANDING):  allopurinol 100 milliGRAM(s) Oral at bedtime  aspirin enteric coated 81 milliGRAM(s) Oral daily  atorvastatin 40 milliGRAM(s) Oral at bedtime  bisacodyl 5 milliGRAM(s) Oral every 12 hours  buMETAnide 2 milliGRAM(s) Oral two times a day  carvedilol 6.25 milliGRAM(s) Oral every 12 hours  cyclobenzaprine 10 milliGRAM(s) Oral three times a day  dextrose 5%. 1000 milliLiter(s) (50 mL/Hr) IV Continuous <Continuous>  dextrose 5%. 1000 milliLiter(s) (100 mL/Hr) IV Continuous <Continuous>  dextrose 50% Injectable 25 Gram(s) IV Push once  dextrose 50% Injectable 25 Gram(s) IV Push once  dextrose 50% Injectable 12.5 Gram(s) IV Push once  gabapentin 300 milliGRAM(s) Oral four times a day  glucagon  Injectable 1 milliGRAM(s) IntraMuscular once  heparin   Injectable 5000 Unit(s) SubCutaneous every 8 hours  insulin glargine Injectable (LANTUS) 15 Unit(s) SubCutaneous at bedtime  insulin lispro (ADMELOG) corrective regimen sliding scale   SubCutaneous three times a day before meals  insulin lispro (ADMELOG) corrective regimen sliding scale   SubCutaneous at bedtime  insulin lispro Injectable (ADMELOG) 5 Unit(s) SubCutaneous before dinner  lidocaine   4% Patch 1 Patch Transdermal daily  losartan 25 milliGRAM(s) Oral daily  multivitamin 1 Tablet(s) Oral daily  pantoprazole    Tablet 40 milliGRAM(s) Oral before breakfast  potassium chloride    Tablet ER 20 milliEquivalent(s) Oral every 2 hours    MEDICATIONS  (PRN):  aluminum hydroxide/magnesium hydroxide/simethicone Suspension 30 milliLiter(s) Oral every 4 hours PRN Dyspepsia  benzocaine/menthol Lozenge 1 Lozenge Oral every 3 hours PRN Sore Throat  dextrose Oral Gel 15 Gram(s) Oral once PRN Blood Glucose LESS THAN 70 milliGRAM(s)/deciliter  guaiFENesin Oral Liquid (Sugar-Free) 100 milliGRAM(s) Oral every 6 hours PRN Cough  melatonin 3 milliGRAM(s) Oral at bedtime PRN Insomnia  ondansetron   Disintegrating Tablet 4 milliGRAM(s) Oral every 6 hours PRN Nausea and/or Vomiting  oxyCODONE    IR 15 milliGRAM(s) Oral every 3 hours PRN Severe Pain (7 - 10)  oxyCODONE    IR 10 milliGRAM(s) Oral every 3 hours PRN Moderate Pain (4 - 6)  polyethylene glycol 3350 17 Gram(s) Oral two times a day PRN Constipation                            12.0   11.09 )-----------( 216      ( 01 Oct 2023 05:45 )             35.3     10-01    138  |  99  |  19  ----------------------------<  161<H>  3.3<L>   |  33<H>  |  0.62    Ca    8.8      01 Oct 2023 05:45    TPro  6.5  /  Alb  2.5<L>  /  TBili  0.7  /  DBili  x   /  AST  21  /  ALT  28  /  AlkPhos  111  10-01    Urinalysis Basic - ( 01 Oct 2023 05:45 )    Color: x / Appearance: x / SG: x / pH: x  Gluc: 161 mg/dL / Ketone: x  / Bili: x / Urobili: x   Blood: x / Protein: x / Nitrite: x   Leuk Esterase: x / RBC: x / WBC x   Sq Epi: x / Non Sq Epi: x / Bacteria: x        CAPILLARY BLOOD GLUCOSE      POCT Blood Glucose.: 210 mg/dL (01 Oct 2023 11:37)  POCT Blood Glucose.: 190 mg/dL (01 Oct 2023 07:39)  POCT Blood Glucose.: 174 mg/dL (30 Sep 2023 21:47)  POCT Blood Glucose.: 283 mg/dL (30 Sep 2023 17:07)      Vital Signs Last 24 Hrs  T(C): 36.6 (01 Oct 2023 07:48), Max: 36.7 (30 Sep 2023 20:14)  T(F): 97.8 (01 Oct 2023 07:48), Max: 98 (30 Sep 2023 20:14)  HR: 74 (01 Oct 2023 07:48) (71 - 83)  BP: 113/72 (01 Oct 2023 07:48) (113/72 - 146/62)  BP(mean): --  RR: 16 (01 Oct 2023 07:48) (15 - 16)  SpO2: 96% (01 Oct 2023 07:48) (96% - 98%)    Parameters below as of 01 Oct 2023 07:48  Patient On (Oxygen Delivery Method): room air     Constitutional - NAD, Comfortable  HEENT - NCAT, EOMI.    Chest - good chest expansion, good respiratory effort   Cardio - S1D2 RRR  Abdomen -  Obese, Soft, NTND BS +  Extremities - No peripheral edema, No calf tenderness   Neurologic Exam:                    Cranial Nerves -2-12 intact     Motor -  Strength preserved in UE and LE with severe pain in back.                     LEFT    UE - ShAB 5/5, EF 5/5, EE 5/5, WE 5/5,  WNL                     RIGHT UE - ShAB 5/5, EF 5/5, EE 5/5, WE 5/5,  WNL                    LEFT    LE - HF 4/5, KE 5/5, DF 5/5, PF 5/5                    RIGHT LE - HF 4/5, KE 5/5, DF 5/5, PF 5/5        Sensory - Intact to LT bilateral  Skin on admission:  Mid thoracic to lumbar incision covered with primary dressing. Previous surgical scars of chest, abdomen, b/l elbows, wrists and knees. No pressure ulcers.      Rehab eval in progress

## 2023-10-01 NOTE — DIETITIAN NUTRITION RISK NOTIFICATION - TREATMENT: THE FOLLOWING DIET HAS BEEN RECOMMENDED
Diet, DASH/TLC:   Sodium & Cholesterol Restricted  Consistent Carbohydrate {Evening Snack} (CSTCHOSN) (09-29-23 @ 14:53) [Active]

## 2023-10-01 NOTE — DIETITIAN INITIAL EVALUATION ADULT - PERTINENT MEDS FT
MEDICATIONS  (STANDING):  allopurinol 100 milliGRAM(s) Oral at bedtime  aspirin enteric coated 81 milliGRAM(s) Oral daily  atorvastatin 40 milliGRAM(s) Oral at bedtime  bisacodyl 5 milliGRAM(s) Oral every 12 hours  buMETAnide 2 milliGRAM(s) Oral two times a day  carvedilol 6.25 milliGRAM(s) Oral every 12 hours  dextrose 5%. 1000 milliLiter(s) (50 mL/Hr) IV Continuous <Continuous>  dextrose 5%. 1000 milliLiter(s) (100 mL/Hr) IV Continuous <Continuous>  dextrose 50% Injectable 12.5 Gram(s) IV Push once  dextrose 50% Injectable 25 Gram(s) IV Push once  dextrose 50% Injectable 25 Gram(s) IV Push once  gabapentin 300 milliGRAM(s) Oral four times a day  glucagon  Injectable 1 milliGRAM(s) IntraMuscular once  heparin   Injectable 5000 Unit(s) SubCutaneous every 8 hours  insulin glargine Injectable (LANTUS) 15 Unit(s) SubCutaneous at bedtime  insulin lispro (ADMELOG) corrective regimen sliding scale   SubCutaneous three times a day before meals  insulin lispro (ADMELOG) corrective regimen sliding scale   SubCutaneous at bedtime  insulin lispro Injectable (ADMELOG) 5 Unit(s) SubCutaneous before dinner  lidocaine   4% Patch 1 Patch Transdermal daily  losartan 25 milliGRAM(s) Oral daily  multivitamin 1 Tablet(s) Oral daily  pantoprazole    Tablet 40 milliGRAM(s) Oral before breakfast  potassium chloride    Tablet ER 20 milliEquivalent(s) Oral every 2 hours    MEDICATIONS  (PRN):  aluminum hydroxide/magnesium hydroxide/simethicone Suspension 30 milliLiter(s) Oral every 4 hours PRN Dyspepsia  benzocaine/menthol Lozenge 1 Lozenge Oral every 3 hours PRN Sore Throat  cyclobenzaprine 10 milliGRAM(s) Oral three times a day PRN Muscle Spasm  dextrose Oral Gel 15 Gram(s) Oral once PRN Blood Glucose LESS THAN 70 milliGRAM(s)/deciliter  guaiFENesin Oral Liquid (Sugar-Free) 100 milliGRAM(s) Oral every 6 hours PRN Cough  melatonin 3 milliGRAM(s) Oral at bedtime PRN Insomnia  ondansetron   Disintegrating Tablet 4 milliGRAM(s) Oral every 6 hours PRN Nausea and/or Vomiting  oxyCODONE    IR 15 milliGRAM(s) Oral every 3 hours PRN Severe Pain (7 - 10)  oxyCODONE    IR 10 milliGRAM(s) Oral every 3 hours PRN Moderate Pain (4 - 6)  polyethylene glycol 3350 17 Gram(s) Oral two times a day PRN Constipation

## 2023-10-02 ENCOUNTER — APPOINTMENT (OUTPATIENT)
Dept: ORTHOPEDIC SURGERY | Facility: CLINIC | Age: 68
End: 2023-10-02

## 2023-10-02 LAB
ALBUMIN SERPL ELPH-MCNC: 2.6 G/DL — LOW (ref 3.3–5)
ALP SERPL-CCNC: 125 U/L — HIGH (ref 40–120)
ALT FLD-CCNC: 27 U/L — SIGNIFICANT CHANGE UP (ref 10–45)
ANION GAP SERPL CALC-SCNC: 6 MMOL/L — SIGNIFICANT CHANGE UP (ref 5–17)
AST SERPL-CCNC: 21 U/L — SIGNIFICANT CHANGE UP (ref 10–40)
BILIRUB SERPL-MCNC: 0.8 MG/DL — SIGNIFICANT CHANGE UP (ref 0.2–1.2)
BUN SERPL-MCNC: 19 MG/DL — SIGNIFICANT CHANGE UP (ref 7–23)
CALCIUM SERPL-MCNC: 8.9 MG/DL — SIGNIFICANT CHANGE UP (ref 8.4–10.5)
CHLORIDE SERPL-SCNC: 101 MMOL/L — SIGNIFICANT CHANGE UP (ref 96–108)
CO2 SERPL-SCNC: 32 MMOL/L — HIGH (ref 22–31)
CREAT SERPL-MCNC: 0.63 MG/DL — SIGNIFICANT CHANGE UP (ref 0.5–1.3)
EGFR: 104 ML/MIN/1.73M2 — SIGNIFICANT CHANGE UP
GLUCOSE BLDC GLUCOMTR-MCNC: 155 MG/DL — HIGH (ref 70–99)
GLUCOSE BLDC GLUCOMTR-MCNC: 182 MG/DL — HIGH (ref 70–99)
GLUCOSE BLDC GLUCOMTR-MCNC: 192 MG/DL — HIGH (ref 70–99)
GLUCOSE BLDC GLUCOMTR-MCNC: 222 MG/DL — HIGH (ref 70–99)
GLUCOSE SERPL-MCNC: 163 MG/DL — HIGH (ref 70–99)
HCT VFR BLD CALC: 36.5 % — LOW (ref 39–50)
HGB BLD-MCNC: 12.1 G/DL — LOW (ref 13–17)
MAGNESIUM SERPL-MCNC: 1.8 MG/DL — SIGNIFICANT CHANGE UP (ref 1.6–2.6)
MCHC RBC-ENTMCNC: 29 PG — SIGNIFICANT CHANGE UP (ref 27–34)
MCHC RBC-ENTMCNC: 33.2 GM/DL — SIGNIFICANT CHANGE UP (ref 32–36)
MCV RBC AUTO: 87.5 FL — SIGNIFICANT CHANGE UP (ref 80–100)
NRBC # BLD: 0 /100 WBCS — SIGNIFICANT CHANGE UP (ref 0–0)
PLATELET # BLD AUTO: 219 K/UL — SIGNIFICANT CHANGE UP (ref 150–400)
POTASSIUM SERPL-MCNC: 3.8 MMOL/L — SIGNIFICANT CHANGE UP (ref 3.5–5.3)
POTASSIUM SERPL-SCNC: 3.8 MMOL/L — SIGNIFICANT CHANGE UP (ref 3.5–5.3)
PROT SERPL-MCNC: 6.5 G/DL — SIGNIFICANT CHANGE UP (ref 6–8.3)
RBC # BLD: 4.17 M/UL — LOW (ref 4.2–5.8)
RBC # FLD: 13.4 % — SIGNIFICANT CHANGE UP (ref 10.3–14.5)
SODIUM SERPL-SCNC: 139 MMOL/L — SIGNIFICANT CHANGE UP (ref 135–145)
WBC # BLD: 10.44 K/UL — SIGNIFICANT CHANGE UP (ref 3.8–10.5)
WBC # FLD AUTO: 10.44 K/UL — SIGNIFICANT CHANGE UP (ref 3.8–10.5)

## 2023-10-02 PROCEDURE — 99232 SBSQ HOSP IP/OBS MODERATE 35: CPT

## 2023-10-02 PROCEDURE — 99232 SBSQ HOSP IP/OBS MODERATE 35: CPT | Mod: GC

## 2023-10-02 RX ORDER — TIZANIDINE 4 MG/1
2 TABLET ORAL EVERY 6 HOURS
Refills: 0 | Status: DISCONTINUED | OUTPATIENT
Start: 2023-10-02 | End: 2023-10-04

## 2023-10-02 RX ORDER — LOSARTAN POTASSIUM 100 MG/1
25 TABLET, FILM COATED ORAL DAILY
Refills: 0 | Status: DISCONTINUED | OUTPATIENT
Start: 2023-10-03 | End: 2023-10-04

## 2023-10-02 RX ADMIN — POLYETHYLENE GLYCOL 3350 17 GRAM(S): 17 POWDER, FOR SOLUTION ORAL at 12:30

## 2023-10-02 RX ADMIN — Medication 5 UNIT(S): at 17:30

## 2023-10-02 RX ADMIN — Medication 5 UNIT(S): at 12:30

## 2023-10-02 RX ADMIN — OXYCODONE HYDROCHLORIDE 15 MILLIGRAM(S): 5 TABLET ORAL at 21:55

## 2023-10-02 RX ADMIN — LIDOCAINE 1 PATCH: 4 CREAM TOPICAL at 19:00

## 2023-10-02 RX ADMIN — Medication 2: at 17:30

## 2023-10-02 RX ADMIN — Medication 100 MILLIGRAM(S): at 21:42

## 2023-10-02 RX ADMIN — CYCLOBENZAPRINE HYDROCHLORIDE 10 MILLIGRAM(S): 10 TABLET, FILM COATED ORAL at 06:33

## 2023-10-02 RX ADMIN — PANTOPRAZOLE SODIUM 40 MILLIGRAM(S): 20 TABLET, DELAYED RELEASE ORAL at 06:32

## 2023-10-02 RX ADMIN — HEPARIN SODIUM 5000 UNIT(S): 5000 INJECTION INTRAVENOUS; SUBCUTANEOUS at 14:05

## 2023-10-02 RX ADMIN — GABAPENTIN 300 MILLIGRAM(S): 400 CAPSULE ORAL at 12:29

## 2023-10-02 RX ADMIN — HEPARIN SODIUM 5000 UNIT(S): 5000 INJECTION INTRAVENOUS; SUBCUTANEOUS at 21:42

## 2023-10-02 RX ADMIN — Medication 1 TABLET(S): at 12:29

## 2023-10-02 RX ADMIN — TIZANIDINE 2 MILLIGRAM(S): 4 TABLET ORAL at 17:31

## 2023-10-02 RX ADMIN — TIZANIDINE 2 MILLIGRAM(S): 4 TABLET ORAL at 14:04

## 2023-10-02 RX ADMIN — Medication 4: at 12:30

## 2023-10-02 RX ADMIN — Medication 5 UNIT(S): at 08:42

## 2023-10-02 RX ADMIN — Medication 81 MILLIGRAM(S): at 12:29

## 2023-10-02 RX ADMIN — CARVEDILOL PHOSPHATE 6.25 MILLIGRAM(S): 80 CAPSULE, EXTENDED RELEASE ORAL at 06:32

## 2023-10-02 RX ADMIN — GABAPENTIN 300 MILLIGRAM(S): 400 CAPSULE ORAL at 17:34

## 2023-10-02 RX ADMIN — INSULIN GLARGINE 15 UNIT(S): 100 INJECTION, SOLUTION SUBCUTANEOUS at 21:43

## 2023-10-02 RX ADMIN — BUMETANIDE 2 MILLIGRAM(S): 0.25 INJECTION INTRAMUSCULAR; INTRAVENOUS at 14:05

## 2023-10-02 RX ADMIN — HEPARIN SODIUM 5000 UNIT(S): 5000 INJECTION INTRAVENOUS; SUBCUTANEOUS at 06:31

## 2023-10-02 RX ADMIN — OXYCODONE HYDROCHLORIDE 15 MILLIGRAM(S): 5 TABLET ORAL at 10:14

## 2023-10-02 RX ADMIN — BUMETANIDE 2 MILLIGRAM(S): 0.25 INJECTION INTRAMUSCULAR; INTRAVENOUS at 06:32

## 2023-10-02 RX ADMIN — LIDOCAINE 1 PATCH: 4 CREAM TOPICAL at 12:31

## 2023-10-02 RX ADMIN — ATORVASTATIN CALCIUM 40 MILLIGRAM(S): 80 TABLET, FILM COATED ORAL at 21:41

## 2023-10-02 RX ADMIN — GABAPENTIN 300 MILLIGRAM(S): 400 CAPSULE ORAL at 06:31

## 2023-10-02 RX ADMIN — OXYCODONE HYDROCHLORIDE 10 MILLIGRAM(S): 5 TABLET ORAL at 06:32

## 2023-10-02 RX ADMIN — Medication 2: at 08:41

## 2023-10-02 RX ADMIN — Medication 5 MILLIGRAM(S): at 06:32

## 2023-10-02 RX ADMIN — Medication 5 MILLIGRAM(S): at 17:35

## 2023-10-02 RX ADMIN — OXYCODONE HYDROCHLORIDE 15 MILLIGRAM(S): 5 TABLET ORAL at 10:45

## 2023-10-02 RX ADMIN — OXYCODONE HYDROCHLORIDE 15 MILLIGRAM(S): 5 TABLET ORAL at 14:14

## 2023-10-02 NOTE — PROGRESS NOTE ADULT - NS ATTEND AMEND GEN_ALL_CORE FT
Rehab Attending- Patient seen and examined by me - Case discussed, above note reviewed by me with modifications made    patient seen and evaluated bedside- tolerates OOB in WC- Difficulty moving in bed + spasms  will DC flexeril and try Zanaflex  last BM 9/30 -on Miralax Senna and Dulcolax  labs reviewed by me- stable   to continue intensive rehab program      Vital Signs Last 24 Hrs  T(C): 36.5 (02 Oct 2023 07:45), Max: 37.2 (01 Oct 2023 20:23)  T(F): 97.7 (02 Oct 2023 07:45), Max: 98.9 (01 Oct 2023 20:23)  HR: 83 (02 Oct 2023 08:51) (74 - 85)  BP: 113/75 (02 Oct 2023 08:45) (98/67 - 159/78)  BP(mean): --  RR: 16 (02 Oct 2023 07:45) (15 - 16)  SpO2: 95% (02 Oct 2023 08:51) (95% - 98%)    Parameters below as of 02 Oct 2023 07:45  Patient On (Oxygen Delivery Method): room air      Constitutional - NAD, Comfortable  HEENT - NCAT, EOMI.    Chest - good chest expansion, good respiratory effort   Cardio - S1D2 RRR  Abdomen -  Obese, Soft, NTND BS +  Extremities - No peripheral edema, No calf tenderness   Neurologic Exam:                    Cranial Nerves -2-12 intact     Motor -  Strength preserved in UE and LE with severe pain in back.                     LEFT    UE - ShAB 5/5, EF 5/5, EE 5/5, WE 5/5,  WNL                     RIGHT UE - ShAB 5/5, EF 5/5, EE 5/5, WE 5/5,  WNL                    LEFT    LE - HF 4/5, KE 5/5, DF 5/5, PF 5/5                    RIGHT LE - HF 4/5, KE 5/5, DF 5/5, PF 5/5        Sensory - Intact to LT bilateral  Skin on admission:  Mid thoracic to lumbar incision covered with aquacel- dry intact

## 2023-10-02 NOTE — PROGRESS NOTE ADULT - ASSESSMENT
This is a 66 YO male with PMH of  HFrEF, CAD s/p CABG, ASIYA on CPAP, HTN, DM II, right renal mass s/p resection presents to  [facility] ED on 9/18  after a syncopal episode, found to have T12-L1 fracture on imaging. Patient had a T9-pelvis revision PSF. Patient now with gait Instability, ADL impairments and Functional impairments.    #Thoracic spine fracture  - T9-pelvis revision PSF on 9/23  -  Comprehensive Rehab Program: PT/OT, 3hours daily and 5 days weekly  - PT: Focused on improving strength, endurance, coordination, balance, functional mobility, and transfers  - OT: Focused on improving strength, fine motor skills, coordination, posture and ADLs.    -pain regimen  -zanaflex trial, avoid hypotension      #ASIYA  Respiratory order for nocturnal CPAP. Pressure set withy home setting of 5.    #HTN  - Carvedilol 6.25mg BID  - Losartan 25mg daily-parameter>130-avoid hypotension    #HLD  - Lipitor 40mg daily    #CAD   - s/p CABG  - c/w ASA 81mg daily    #HF  - Bumex 2mg BID    #DM II  - ISS and FS  - Admelog and Lantus  - A1c 6.5 on 9/19  follow fingersticks    #Neuropathy  -gabapentin 300mg QID.- consider increasing dose to 400mg 4x/day as PTA    #Pain management  - Tylenol PRN,   - Oxycodone 10mg moderate, 15mg severe PRN,   - lidocaine patch,   - flexeril 10mg TID.   - gabapentin 300mg 4x/.day  ICE    #DVT ppx  - Heparin 5K Q 8 hrs  -  SCD, TEDs    #GI ppx  - Protonix 40mg  - Zofran    #Bowel Regimen  - Senna, dulcolax 5mg BID, miralax 2x/day    #Bladder management  - BS on admission, and q 8 hours (SC if > 400)  - Monitor UO    #FEN   - Diet: DASH/TLC  - MVI    #Skin:  - Skin on admission: Multiple Left sided abdomen abrasions. Mid thoracic to lumbar incision covered with primary dressing. Previous surgical scars of chest, abdomen, b/l elbows, wrists and knees. No pressure ulcers.  - Pressure injury/Skin: Turn Q2hrs while in bed, OOB to Chair, PT/OT     #Sleep:   - Maintain quiet hours and low stim environment.  - Melatonin 3mg nightly PRN to maximize participation in therapy during the day.     #Precaution  - Fall, Aspiration, Spinal   This is a 68 YO male with PMH of  HFrEF, CAD s/p CABG, ASIYA on CPAP, HTN, DM II, right renal mass s/p resection presents to  [facility] ED on 9/18  after a syncopal episode, found to have T12-L1 fracture on imaging. Patient had a T9-pelvis revision PSF. Patient now with gait Instability, ADL impairments and Functional impairments.    #Thoracic spine fracture  - T9-pelvis revision PSF on 9/23  -  Comprehensive Rehab Program: PT/OT, 3hours daily and 5 days weekly  - PT: Focused on improving strength, endurance, coordination, balance, functional mobility, and transfers  - OT: Focused on improving strength, fine motor skills, coordination, posture and ADLs.    -pain regimen  -zanaflex trial, avoid hypotension- muscle spasms major issue      #ASIYA  Respiratory order for nocturnal CPAP. Pressure set withy home setting of 5.    #HTN  - Carvedilol 6.25mg BID  - Losartan 25mg daily-parameter>130-avoid hypotension    #HLD  - Lipitor 40mg daily    #CAD   - s/p CABG  - c/w ASA 81mg daily    #HF  - Bumex 2mg BID    #DM II  - ISS and FS  - Admelog and Lantus  - A1c 6.5 on 9/19  follow fingersticks    #Neuropathy  -gabapentin 300mg QID.- consider increasing dose to 400mg 4x/day as PTA    #Pain management  - Tylenol PRN,   - Oxycodone 10mg moderate, 15mg severe PRN,   - lidocaine patch,   - flexeril 10mg TID.   - gabapentin 300mg 4x/.day  ICE    #DVT ppx  - Heparin 5K Q 8 hrs  -  SCD, TEDs    #GI ppx  - Protonix 40mg  - Zofran    #Bowel Regimen  - Senna, dulcolax 5mg BID, miralax 2x/day    #Bladder management  - BS on admission, and q 8 hours (SC if > 400)  - Monitor UO    #FEN   - Diet: DASH/TLC  - MVI    #Skin:  - Skin on admission: Multiple Left sided abdomen abrasions. Mid thoracic to lumbar incision covered with primary dressing. Previous surgical scars of chest, abdomen, b/l elbows, wrists and knees. No pressure ulcers.  - Pressure injury/Skin: Turn Q2hrs while in bed, OOB to Chair, PT/OT     #Sleep:   - Maintain quiet hours and low stim environment.  - Melatonin 3mg nightly PRN to maximize participation in therapy during the day.     #Precaution  - Fall, Aspiration, Spinal

## 2023-10-02 NOTE — PROGRESS NOTE ADULT - SUBJECTIVE AND OBJECTIVE BOX
Interval History  Patient seen and examined at bedside. No acute events noted.  Patient reports back pain/spasm improved on current pain regimen.  He denies any other acute complaints. Last BM 2 days ago. Rest of ROS is negative.    ALLERGIES:  No Known Allergies    MEDICATIONS  (STANDING):  allopurinol 100 milliGRAM(s) Oral at bedtime  aspirin enteric coated 81 milliGRAM(s) Oral daily  atorvastatin 40 milliGRAM(s) Oral at bedtime  bisacodyl 5 milliGRAM(s) Oral every 12 hours  buMETAnide 2 milliGRAM(s) Oral two times a day  carvedilol 6.25 milliGRAM(s) Oral every 12 hours  dextrose 5%. 1000 milliLiter(s) (50 mL/Hr) IV Continuous <Continuous>  dextrose 5%. 1000 milliLiter(s) (100 mL/Hr) IV Continuous <Continuous>  dextrose 50% Injectable 25 Gram(s) IV Push once  dextrose 50% Injectable 25 Gram(s) IV Push once  dextrose 50% Injectable 12.5 Gram(s) IV Push once  gabapentin 300 milliGRAM(s) Oral four times a day  glucagon  Injectable 1 milliGRAM(s) IntraMuscular once  heparin   Injectable 5000 Unit(s) SubCutaneous every 8 hours  insulin glargine Injectable (LANTUS) 15 Unit(s) SubCutaneous at bedtime  insulin lispro (ADMELOG) corrective regimen sliding scale   SubCutaneous three times a day before meals  insulin lispro (ADMELOG) corrective regimen sliding scale   SubCutaneous at bedtime  insulin lispro Injectable (ADMELOG) 5 Unit(s) SubCutaneous before breakfast  insulin lispro Injectable (ADMELOG) 5 Unit(s) SubCutaneous before lunch  insulin lispro Injectable (ADMELOG) 5 Unit(s) SubCutaneous before dinner  lidocaine   4% Patch 1 Patch Transdermal daily  multivitamin 1 Tablet(s) Oral daily  pantoprazole    Tablet 40 milliGRAM(s) Oral before breakfast  tiZANidine 2 milliGRAM(s) Oral every 6 hours    MEDICATIONS  (PRN):  aluminum hydroxide/magnesium hydroxide/simethicone Suspension 30 milliLiter(s) Oral every 4 hours PRN Dyspepsia  benzocaine/menthol Lozenge 1 Lozenge Oral every 3 hours PRN Sore Throat  dextrose Oral Gel 15 Gram(s) Oral once PRN Blood Glucose LESS THAN 70 milliGRAM(s)/deciliter  guaiFENesin Oral Liquid (Sugar-Free) 100 milliGRAM(s) Oral every 6 hours PRN Cough  melatonin 3 milliGRAM(s) Oral at bedtime PRN Insomnia  ondansetron   Disintegrating Tablet 4 milliGRAM(s) Oral every 6 hours PRN Nausea and/or Vomiting  oxyCODONE    IR 15 milliGRAM(s) Oral every 3 hours PRN Severe Pain (7 - 10)  oxyCODONE    IR 10 milliGRAM(s) Oral every 3 hours PRN Moderate Pain (4 - 6)  polyethylene glycol 3350 17 Gram(s) Oral two times a day PRN Constipation    Vital Signs Last 24 Hrs  T(F): 97.7 (02 Oct 2023 07:45), Max: 98.9 (01 Oct 2023 20:23)  HR: 83 (02 Oct 2023 08:51) (74 - 85)  BP: 113/75 (02 Oct 2023 08:45) (98/67 - 159/78)  RR: 16 (02 Oct 2023 07:45) (15 - 16)  SpO2: 95% (02 Oct 2023 08:51) (95% - 98%)  I&O's Summary    BMI (kg/m2): 44.9 (09-30-23 @ 12:35)    PHYSICAL EXAM:  GENERAL: NAD  HEENT: NCAT  CHEST/LUNG: Clear to percussion bilaterally; No wheeze  HEART: Regular rate and rhythm; No murmurs  ABDOMEN: Soft, Nontender, Nondistended; Bowel sounds present  MUSCULOSKELETAL/EXTREMITIES:  2+ Peripheral Pulses, trace bilateral pitting edema   Thoracic/Lumbar incision with aquacel in place, no bleeding/erythema  PSYCH: Appropriate affect  NEURO: Alert & Oriented, non-focal     LABS:                        12.1   10.44 )-----------( 219      ( 02 Oct 2023 06:10 )             36.5       10-02    139  |  101  |  19  ----------------------------<  163  3.8   |  32  |  0.63    Ca    8.9      02 Oct 2023 06:10  Mg     1.8     10-02    TPro  6.5  /  Alb  2.6  /  TBili  0.8  /  DBili  x   /  AST  21  /  ALT  27  /  AlkPhos  125  10-02     09-19 Chol 181 mg/dL LDL -- HDL 27 mg/dL Trig 646 mg/dL    POCT Blood Glucose.: 222 mg/dL (02 Oct 2023 11:31)  POCT Blood Glucose.: 192 mg/dL (02 Oct 2023 08:10)  POCT Blood Glucose.: 157 mg/dL (01 Oct 2023 20:26)  POCT Blood Glucose.: 196 mg/dL (01 Oct 2023 17:14)    Urinalysis Basic - ( 02 Oct 2023 06:10 )    Color: x / Appearance: x / SG: x / pH: x  Gluc: 163 mg/dL / Ketone: x  / Bili: x / Urobili: x   Blood: x / Protein: x / Nitrite: x   Leuk Esterase: x / RBC: x / WBC x   Sq Epi: x / Non Sq Epi: x / Bacteria: x    COVID-19 PCR: NotDetec (09-30-23 @ 16:00)

## 2023-10-02 NOTE — CHART NOTE - NSCHARTNOTEFT_GEN_A_CORE
[facility] Plan of Care    REHABILITATION DIAGNOSIS:  Fusion of spine          COMORBIDITIES/COMPLICATING CONDITIONS IMPACTING REHABILITATION:  HEALTH ISSUES - PROBLEM Dx:        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      Essential hypertension      CAD (coronary artery disease)  2017- s/p cabg x3      Hypercholesterolemia      ASIYA (obstructive sleep apnea)  initially dx 1997 ; CPAP      Paralytic ileus  post op THR 2009 & post op laminectomy      Type 2 diabetes mellitus      Right carpal tunnel syndrome      Cubital tunnel syndrome, right      Trigger finger of right hand      Morbid obesity with body mass index (BMI) of 40.0 or higher      H/O CHF      Kidney mass      Cervical herniated disc      Renal cancer      Disease of spinal cord, unspecified      S/P Left Inguinal Hernia Repair      History of Total Hip Replacement  2009- bilateral THR      S/P tonsillectomy and adenoidectomy  as child      H/O lithotripsy  x1      S/P revision of total knee, right  x2- 2012 & 2014      S/P lumbar discectomy  2012- ,L5  Aug 2013      S/P CABG x 3  8/29/17      S/P lumbar laminectomy  Fusion L3-L5 2012      Kidney stone  s/w ESWL pt unsure site      Neuropathy  Bilateral Feet since 2012      History of bilateral hip replacements      History of hernia repair      History of lumbar fusion      History of cholecystectomy      History of bilateral knee replacement      History of lymph node biopsy      S/p bilateral carpal tunnel release      History of partial nephrectomy          Based upon consideration of the patient's impairments, functional status, complicating conditions and any other contributing factors and after information garnered from the assessments of all therapy disciplines involved in treating the patient and other pertinent clinicians:    INTERDISCIPLINARY REHABILITATION INTERVENTIONS:    [ X  ] Transfer Training  [ X  ] Bed Mobility  [ X  ] Therapeutic Exercise  [ X ] Balance/Coordination Exercises  [ X ] Locomotion retraining  [ X  ] Stairs  [  X ] Functional Transfer Training  [ x  ] Bowel/Bladder program  [ x  ] Pain Management  [  x ] Skin/Wound Care  [   ] Visual/Perceptual Training  [   ] Therapeutic Recreation Activities  [   ] Neuromuscular Re-education  [ X  ] Activities of Daily Living  [   ] Speech Exercise  [   ] Swallowing Exercises  [   ] Vital Stim  [   ] Dietary Supplements  [   ] Calorie Count  [   ] Cognitive Exercises  [   ] Congnitive/Linguistic Treatment  [   ] Behavior Program  [   ] Neuropsych Therapy  [ X  ] Patient/Family Counseling  [ X ] Family Training  [ X  ] Community Re-entry  [   ] Orthotic Evaluation  [   ] Prosthetic Eval/Training    MEDICAL PROGNOSIS:  good    REHAB POTENTIAL:  good  EXPECTED DAILY THERAPY:         PT: 2hr       OT: 1hr       ST:       P&O:    EXPECTED INTENSITY OF PROGRAM:  3 hrs / Day    EXPECTED FREQUENCY OF PROGRAM: 5 Days/ Week    ESTIMATED LOS:  [  ] 5-7 Days  [  ] 7-10 Days  [ x ] 10- 14 Days  [  ] 14- 18 Days  [  ] 18- 21 Days    ESTIMATED DISPOSITION:  [  ] Home   [  ] Home with Outpatient Therapies  [ x ] Home with Home Therapies  [  ] Assisted Living  [  ] Nursing Home  [  ] Long Term Acute Care    INTERDISCIPLINARY FUNCTIONAL OUTCOMES/GOALS:         Gait/Mobility: mod I       Transfers: Mod I       ADLs: Mod I       Functional Transfers: Mod I       Medication Management: Independent       Communication: NA       Cognitive: NA       Dysphagia: NA       Bladder independent       Bowel: independent     Functional Independent Measures:   7 = Independent  6 = Modified Independent  5 = Supervision  4 = Minimal Assist/ Contact Guard  3 = Moderate Assistance  2 = Maximum Assistance  1 = Total Assistance  0 = Unable to assess

## 2023-10-02 NOTE — PROGRESS NOTE ADULT - SUBJECTIVE AND OBJECTIVE BOX
HPI:  This is a 66 YO male with PMH of  HFrEF, CAD s/p CABG, ASIYA on CPAP, HTN, DM, right renal mass s/p resection presents to  [facility] ED ON 9/18  after a syncopal episode, found to have T12-L1 fracture on imaging. Patient originally scheduled for an ACDF with Dr. Hughes, due to injury he was changed to a T9-pelvis revision PSF. Hospital course s/f medication adjustments for HTN  and HF. Patient was evaluated by PM&R and therapy for functional deficits, gait/ADL impairments and acute rehabilitation was recommended. Patient was medically optimized for discharge to [facility] IRU on 9/29/23.      ROS/subjective:  patient seen and evaluated, NAD in chair, spasms/back pain in bed  BM sat, on lax now  taking Flexeril PRN- reports no relief-Zanaflex trial  BPs low-Cozaar w/parameter>130 now  no dizziness, no headaches, no chest pain , No SOB, no nausea, no vomiting      MEDICATIONS  (STANDING):  allopurinol 100 milliGRAM(s) Oral at bedtime  aspirin enteric coated 81 milliGRAM(s) Oral daily  atorvastatin 40 milliGRAM(s) Oral at bedtime  bisacodyl 5 milliGRAM(s) Oral every 12 hours  buMETAnide 2 milliGRAM(s) Oral two times a day  carvedilol 6.25 milliGRAM(s) Oral every 12 hours  dextrose 5%. 1000 milliLiter(s) (50 mL/Hr) IV Continuous <Continuous>  dextrose 5%. 1000 milliLiter(s) (100 mL/Hr) IV Continuous <Continuous>  dextrose 50% Injectable 25 Gram(s) IV Push once  dextrose 50% Injectable 25 Gram(s) IV Push once  dextrose 50% Injectable 12.5 Gram(s) IV Push once  gabapentin 300 milliGRAM(s) Oral four times a day  glucagon  Injectable 1 milliGRAM(s) IntraMuscular once  heparin   Injectable 5000 Unit(s) SubCutaneous every 8 hours  insulin glargine Injectable (LANTUS) 15 Unit(s) SubCutaneous at bedtime  insulin lispro (ADMELOG) corrective regimen sliding scale   SubCutaneous three times a day before meals  insulin lispro (ADMELOG) corrective regimen sliding scale   SubCutaneous at bedtime  insulin lispro Injectable (ADMELOG) 5 Unit(s) SubCutaneous before breakfast  insulin lispro Injectable (ADMELOG) 5 Unit(s) SubCutaneous before lunch  insulin lispro Injectable (ADMELOG) 5 Unit(s) SubCutaneous before dinner  lidocaine   4% Patch 1 Patch Transdermal daily  multivitamin 1 Tablet(s) Oral daily  pantoprazole    Tablet 40 milliGRAM(s) Oral before breakfast  tiZANidine 2 milliGRAM(s) Oral every 6 hours    MEDICATIONS  (PRN):  aluminum hydroxide/magnesium hydroxide/simethicone Suspension 30 milliLiter(s) Oral every 4 hours PRN Dyspepsia  benzocaine/menthol Lozenge 1 Lozenge Oral every 3 hours PRN Sore Throat  dextrose Oral Gel 15 Gram(s) Oral once PRN Blood Glucose LESS THAN 70 milliGRAM(s)/deciliter  guaiFENesin Oral Liquid (Sugar-Free) 100 milliGRAM(s) Oral every 6 hours PRN Cough  melatonin 3 milliGRAM(s) Oral at bedtime PRN Insomnia  ondansetron   Disintegrating Tablet 4 milliGRAM(s) Oral every 6 hours PRN Nausea and/or Vomiting  oxyCODONE    IR 15 milliGRAM(s) Oral every 3 hours PRN Severe Pain (7 - 10)  oxyCODONE    IR 10 milliGRAM(s) Oral every 3 hours PRN Moderate Pain (4 - 6)  polyethylene glycol 3350 17 Gram(s) Oral two times a day PRN Constipation                            12.1   10.44 )-----------( 219      ( 02 Oct 2023 06:10 )             36.5     10-02    139  |  101  |  19  ----------------------------<  163<H>  3.8   |  32<H>  |  0.63    Ca    8.9      02 Oct 2023 06:10  Mg     1.8     10-02    TPro  6.5  /  Alb  2.6<L>  /  TBili  0.8  /  DBili  x   /  AST  21  /  ALT  27  /  AlkPhos  125<H>  10-02    Urinalysis Basic - ( 02 Oct 2023 06:10 )    Color: x / Appearance: x / SG: x / pH: x  Gluc: 163 mg/dL / Ketone: x  / Bili: x / Urobili: x   Blood: x / Protein: x / Nitrite: x   Leuk Esterase: x / RBC: x / WBC x   Sq Epi: x / Non Sq Epi: x / Bacteria: x        CAPILLARY BLOOD GLUCOSE      POCT Blood Glucose.: 222 mg/dL (02 Oct 2023 11:31)  POCT Blood Glucose.: 192 mg/dL (02 Oct 2023 08:10)  POCT Blood Glucose.: 157 mg/dL (01 Oct 2023 20:26)  POCT Blood Glucose.: 196 mg/dL (01 Oct 2023 17:14)      Vital Signs Last 24 Hrs  T(C): 36.5 (02 Oct 2023 07:45), Max: 37.2 (01 Oct 2023 20:23)  T(F): 97.7 (02 Oct 2023 07:45), Max: 98.9 (01 Oct 2023 20:23)  HR: 83 (02 Oct 2023 08:51) (74 - 85)  BP: 113/75 (02 Oct 2023 08:45) (98/67 - 159/78)  BP(mean): --  RR: 16 (02 Oct 2023 07:45) (15 - 16)  SpO2: 95% (02 Oct 2023 08:51) (95% - 98%)    Parameters below as of 02 Oct 2023 07:45  Patient On (Oxygen Delivery Method): room air      Constitutional - NAD, Comfortable  HEENT - NCAT, EOMI.    Chest - good chest expansion, good respiratory effort   Cardio - S1D2 RRR  Abdomen -  Obese, Soft, NTND BS +  Extremities - No peripheral edema, No calf tenderness   Neurologic Exam:                    Cranial Nerves -2-12 intact     Motor -  Strength preserved in UE and LE with severe pain in back.                     LEFT    UE - ShAB 5/5, EF 5/5, EE 5/5, WE 5/5,  WNL                     RIGHT UE - ShAB 5/5, EF 5/5, EE 5/5, WE 5/5,  WNL                    LEFT    LE - HF 4/5, KE 5/5, DF 5/5, PF 5/5                    RIGHT LE - HF 4/5, KE 5/5, DF 5/5, PF 5/5        Sensory - Intact to LT bilateral  Skin on admission:  Mid thoracic to lumbar incision covered with primary dressing. Previous surgical scars of chest, abdomen, b/l elbows, wrists and knees. No pressure ulcers.      Continue comprehensive acute rehab program consisting of 3hrs/day of OT/PT .

## 2023-10-02 NOTE — PROGRESS NOTE ADULT - ASSESSMENT
67 year old male with PMH of  HTN, HLD, CAD s/p CABG, HFrEF (LV systolic function appears grossly normal on Echo 9/21), Type 2 Diabetes, ASIYA on CPAP, right renal mass s/p partial resection (3/2023, pathology negative), s/p pervious L3-pelvis fusion, who presented to [facility] ED on 9/18  after a syncopal episode, found to have T12-L1 fracture on imaging. Patient had a removal of hardware from L3-pelvis and revision T9-pelvis revision PSF w/ muscle flap reconstruction on 9/23. Patient now with gait Instability, ADL impairments and Functional impairments.    Gait Instability/Functional Impairment  Status Post Fall w/ T12-L1 Acute Fracture   -s/p removal of hardware from L3-pelvis and revision T9-pelvis revision PSF w/ muscle flap reconstruction on 9/23  -Fall/Spine precautions  -Pain control w/ bowel regimen per PMR team  -Comprehensive Rehab Program with PT/OT  -Outpatient follow up with neurosurgery    Syncopal Episode   -Felt to be due to situational syncope (after blowing his nose)  -Workup at OSH: CTH negative, EKG showed no acute changes, Echo showed grossly nl LV function, no significant valvular disease   -Will monitor     HTN/HLD  CAD s/p CABG/Mild Diastolic Dysfunction w/ EF 60%  -Per documentation: Echo from 5/5/23 showed nl LV function, EF 60%, mild diastolic dysfunction. normal RV function. trivial MR and TR, normal Aortic valve. normal Pulmonic valve.   -No evidence of fluid overload on exam   -Continue Aspirin, Coreg, Losartan, Atorvastatin   -Continue Bumex BID   -Daily weight   -Monitor BP  -Outpatient cardiology follow up     ASIYA  - Continue with nocturnal CPAP. Pressure set with home setting of 5.    Type 2 Diabetes  -HbA1C 6.5 on 9/19  -Continue to hold oral glimepiride  -His hospitalization was complicated by steroid induced hyperglycemia - now off of steroids   -FS reviewed, FS suboptimal but also did not receive premeal admelog for breakfast/lunch yesterday   -Continue Lantus 15units QHS  -Continue Admelog 5units TID w/ meals  -Continue RAISS (moderate corrective scale)  -Continue to monitor FS, adjust insulin as needed    Gout  -Continue allopurinol    DVT PPx  -Heparin SC

## 2023-10-03 LAB
GLUCOSE BLDC GLUCOMTR-MCNC: 157 MG/DL — HIGH (ref 70–99)
GLUCOSE BLDC GLUCOMTR-MCNC: 159 MG/DL — HIGH (ref 70–99)
GLUCOSE BLDC GLUCOMTR-MCNC: 188 MG/DL — HIGH (ref 70–99)
GLUCOSE BLDC GLUCOMTR-MCNC: 192 MG/DL — HIGH (ref 70–99)

## 2023-10-03 PROCEDURE — 99232 SBSQ HOSP IP/OBS MODERATE 35: CPT | Mod: GC

## 2023-10-03 PROCEDURE — 99232 SBSQ HOSP IP/OBS MODERATE 35: CPT

## 2023-10-03 RX ORDER — INSULIN GLARGINE 100 [IU]/ML
17 INJECTION, SOLUTION SUBCUTANEOUS AT BEDTIME
Refills: 0 | Status: DISCONTINUED | OUTPATIENT
Start: 2023-10-03 | End: 2023-10-06

## 2023-10-03 RX ADMIN — Medication 2: at 11:37

## 2023-10-03 RX ADMIN — HEPARIN SODIUM 5000 UNIT(S): 5000 INJECTION INTRAVENOUS; SUBCUTANEOUS at 06:33

## 2023-10-03 RX ADMIN — LIDOCAINE 1 PATCH: 4 CREAM TOPICAL at 00:31

## 2023-10-03 RX ADMIN — TIZANIDINE 2 MILLIGRAM(S): 4 TABLET ORAL at 00:20

## 2023-10-03 RX ADMIN — ATORVASTATIN CALCIUM 40 MILLIGRAM(S): 80 TABLET, FILM COATED ORAL at 21:05

## 2023-10-03 RX ADMIN — OXYCODONE HYDROCHLORIDE 15 MILLIGRAM(S): 5 TABLET ORAL at 06:33

## 2023-10-03 RX ADMIN — HEPARIN SODIUM 5000 UNIT(S): 5000 INJECTION INTRAVENOUS; SUBCUTANEOUS at 21:05

## 2023-10-03 RX ADMIN — Medication 2: at 08:13

## 2023-10-03 RX ADMIN — Medication 5 UNIT(S): at 17:19

## 2023-10-03 RX ADMIN — HEPARIN SODIUM 5000 UNIT(S): 5000 INJECTION INTRAVENOUS; SUBCUTANEOUS at 15:36

## 2023-10-03 RX ADMIN — BENZOCAINE AND MENTHOL 1 LOZENGE: 5; 1 LIQUID ORAL at 17:21

## 2023-10-03 RX ADMIN — GABAPENTIN 300 MILLIGRAM(S): 400 CAPSULE ORAL at 17:46

## 2023-10-03 RX ADMIN — Medication 100 MILLIGRAM(S): at 21:05

## 2023-10-03 RX ADMIN — GABAPENTIN 300 MILLIGRAM(S): 400 CAPSULE ORAL at 11:36

## 2023-10-03 RX ADMIN — Medication 2: at 17:18

## 2023-10-03 RX ADMIN — TIZANIDINE 2 MILLIGRAM(S): 4 TABLET ORAL at 17:19

## 2023-10-03 RX ADMIN — Medication 5 MILLIGRAM(S): at 17:21

## 2023-10-03 RX ADMIN — Medication 3 MILLIGRAM(S): at 23:03

## 2023-10-03 RX ADMIN — OXYCODONE HYDROCHLORIDE 15 MILLIGRAM(S): 5 TABLET ORAL at 15:36

## 2023-10-03 RX ADMIN — PANTOPRAZOLE SODIUM 40 MILLIGRAM(S): 20 TABLET, DELAYED RELEASE ORAL at 06:34

## 2023-10-03 RX ADMIN — Medication 5 UNIT(S): at 11:37

## 2023-10-03 RX ADMIN — TIZANIDINE 2 MILLIGRAM(S): 4 TABLET ORAL at 11:36

## 2023-10-03 RX ADMIN — Medication 5 UNIT(S): at 08:14

## 2023-10-03 RX ADMIN — OXYCODONE HYDROCHLORIDE 15 MILLIGRAM(S): 5 TABLET ORAL at 10:54

## 2023-10-03 RX ADMIN — GABAPENTIN 300 MILLIGRAM(S): 400 CAPSULE ORAL at 06:33

## 2023-10-03 RX ADMIN — TIZANIDINE 2 MILLIGRAM(S): 4 TABLET ORAL at 06:35

## 2023-10-03 RX ADMIN — BUMETANIDE 2 MILLIGRAM(S): 0.25 INJECTION INTRAMUSCULAR; INTRAVENOUS at 06:34

## 2023-10-03 RX ADMIN — LOSARTAN POTASSIUM 25 MILLIGRAM(S): 100 TABLET, FILM COATED ORAL at 06:34

## 2023-10-03 RX ADMIN — Medication 1 TABLET(S): at 11:37

## 2023-10-03 RX ADMIN — OXYCODONE HYDROCHLORIDE 15 MILLIGRAM(S): 5 TABLET ORAL at 23:04

## 2023-10-03 RX ADMIN — Medication 5 MILLIGRAM(S): at 06:33

## 2023-10-03 RX ADMIN — Medication 81 MILLIGRAM(S): at 11:36

## 2023-10-03 RX ADMIN — GABAPENTIN 300 MILLIGRAM(S): 400 CAPSULE ORAL at 00:22

## 2023-10-03 RX ADMIN — GABAPENTIN 300 MILLIGRAM(S): 400 CAPSULE ORAL at 23:03

## 2023-10-03 RX ADMIN — TIZANIDINE 2 MILLIGRAM(S): 4 TABLET ORAL at 23:03

## 2023-10-03 RX ADMIN — CARVEDILOL PHOSPHATE 6.25 MILLIGRAM(S): 80 CAPSULE, EXTENDED RELEASE ORAL at 06:34

## 2023-10-03 RX ADMIN — BUMETANIDE 2 MILLIGRAM(S): 0.25 INJECTION INTRAMUSCULAR; INTRAVENOUS at 15:35

## 2023-10-03 RX ADMIN — INSULIN GLARGINE 17 UNIT(S): 100 INJECTION, SOLUTION SUBCUTANEOUS at 21:05

## 2023-10-03 NOTE — PROGRESS NOTE ADULT - NS ATTEND AMEND GEN_ALL_CORE FT
Rehab Attending- Patient seen and examined by me - Case discussed, above note reviewed by me with modifications made    patient seen and evaluated bedside- tolerates OOB in WC- Difficulty moving in bed + spasms  will DC flexeril and try Zanaflex  last BM 9/30 -on Miralax Senna and Dulcolax  labs reviewed by me- stable   to continue intensive rehab program      Vital Signs Last 24 Hrs  T(C): 36.5 (02 Oct 2023 07:45), Max: 37.2 (01 Oct 2023 20:23)  T(F): 97.7 (02 Oct 2023 07:45), Max: 98.9 (01 Oct 2023 20:23)  HR: 83 (02 Oct 2023 08:51) (74 - 85)  BP: 113/75 (02 Oct 2023 08:45) (98/67 - 159/78)  BP(mean): --  RR: 16 (02 Oct 2023 07:45) (15 - 16)  SpO2: 95% (02 Oct 2023 08:51) (95% - 98%)    Parameters below as of 02 Oct 2023 07:45  Patient On (Oxygen Delivery Method): room air      Constitutional - NAD, Comfortable  HEENT - NCAT, EOMI.    Chest - good chest expansion, good respiratory effort   Cardio - S1D2 RRR  Abdomen -  Obese, Soft, NTND BS +  Extremities - No peripheral edema, No calf tenderness   Neurologic Exam:                    Cranial Nerves -2-12 intact     Motor -  Strength preserved in UE and LE with severe pain in back.                     LEFT    UE - ShAB 5/5, EF 5/5, EE 5/5, WE 5/5,  WNL                     RIGHT UE - ShAB 5/5, EF 5/5, EE 5/5, WE 5/5,  WNL                    LEFT    LE - HF 4/5, KE 5/5, DF 5/5, PF 5/5                    RIGHT LE - HF 4/5, KE 5/5, DF 5/5, PF 5/5        Sensory - Intact to LT bilateral  Skin on admission:  Mid thoracic to lumbar incision covered with aquacel- dry intact Rehab Attending- Patient seen and examined by me - Case discussed, above note reviewed by me with modifications made    patient seen and evaluated bedside-  back spasms better on zanaflex  developed painful spasm over Right posterior ribs - heat added  moved bowels this AM- voiding well  to continue intensive rehab program    Vital Signs Last 24 Hrs  T(C): 36.6 (03 Oct 2023 06:17), Max: 37 (02 Oct 2023 20:46)  T(F): 97.9 (03 Oct 2023 06:17), Max: 98.6 (02 Oct 2023 20:46)  HR: 88 (03 Oct 2023 06:17) (65 - 99)  BP: 158/54 (03 Oct 2023 06:17) (104/72 - 158/54)  BP(mean): --  RR: 16 (03 Oct 2023 06:17) (14 - 16)  SpO2: 96% (03 Oct 2023 06:17) (74% - 98%)    Parameters below as of 03 Oct 2023 06:17  Patient On (Oxygen Delivery Method): room air      Constitutional - NAD, Comfortable  HEENT - NCAT, EOMI.    Chest - good chest expansion, good respiratory effort - tender over Right posterio ribs  Cardio - S1D2 RRR  Abdomen -  Obese, Soft, NTND BS +  Extremities - No peripheral edema, No calf tenderness   Neurologic Exam:                    Cranial Nerves -2-12 intact     Motor -  Strength preserved in UE and LE with severe pain in back.                     LEFT    UE - ShAB 5/5, EF 5/5, EE 5/5, WE 5/5,  WNL                     RIGHT UE - ShAB 5/5, EF 5/5, EE 5/5, WE 5/5,  WNL                    LEFT    LE - HF 4/5, KE 5/5, DF 5/5, PF 5/5                    RIGHT LE - HF 4/5, KE 5/5, DF 5/5, PF 5/5        Sensory - Intact to LT bilateral  Skin on admission:  Mid thoracic to lumbar incision covered with aquacel- dry intact Rehab Attending- Patient seen and examined by me - Case discussed, above note reviewed by me with modifications made    patient seen and evaluated bedside-  back spasms better on zanaflex  developed painful spasm over Right posterior ribs - heat added  moved bowels this AM- voiding well  to continue intensive rehab program    Vital Signs Last 24 Hrs  T(C): 36.6 (03 Oct 2023 06:17), Max: 37 (02 Oct 2023 20:46)  T(F): 97.9 (03 Oct 2023 06:17), Max: 98.6 (02 Oct 2023 20:46)  HR: 88 (03 Oct 2023 06:17) (65 - 99)  BP: 158/54 (03 Oct 2023 06:17) (104/72 - 158/54)  BP(mean): --  RR: 16 (03 Oct 2023 06:17) (14 - 16)  SpO2: 96% (03 Oct 2023 06:17) (74% - 98%)    Parameters below as of 03 Oct 2023 06:17  Patient On (Oxygen Delivery Method): room air      Constitutional - NAD, Comfortable  HEENT - NCAT, EOMI.    Chest - good chest expansion, good respiratory effort - tender over Right posterior ribs  Cardio - S1D2 RRR  Abdomen -  Obese, Soft, NTND BS +  Extremities - No peripheral edema, No calf tenderness   Neurologic Exam:                    Cranial Nerves -2-12 intact     Motor -  Strength preserved in UE and LE with severe pain in back.                     LEFT    UE - ShAB 5/5, EF 5/5, EE 5/5, WE 5/5,  WNL                     RIGHT UE - ShAB 5/5, EF 5/5, EE 5/5, WE 5/5,  WNL                    LEFT    LE - HF 4/5, KE 5/5, DF 5/5, PF 5/5                    RIGHT LE - HF 4/5, KE 5/5, DF 5/5, PF 5/5        Sensory - Intact to LT bilateral  Skin on admission:  Mid thoracic to lumbar incision covered with aquacel- dry intact

## 2023-10-03 NOTE — PROGRESS NOTE ADULT - ASSESSMENT
This is a 66 YO male with PMH of  HFrEF, CAD s/p CABG, ASIYA on CPAP, HTN, DM II, right renal mass s/p resection presents to  [facility] ED on 9/18  after a syncopal episode, found to have T12-L1 fracture on imaging. Patient had a T9-pelvis revision PSF. Patient now with gait Instability, ADL impairments and Functional impairments.    #Thoracic spine fracture  - T9-pelvis revision PSF on 9/23  - Comprehensive Rehab Program: PT/OT, 3hours daily and 5 days weekly  - PT: Focused on improving strength, endurance, coordination, balance, functional mobility, and transfers  - OT: Focused on improving strength, fine motor skills, coordination, posture and ADLs.    -pain regimen  -zanaflex trial, avoid hypotension- muscle spasms major issue      #ASIYA  Respiratory order for nocturnal CPAP. Pressure set withy home setting of 5.    #HTN  - Carvedilol 6.25mg BID  - Losartan 25mg daily-hold    #HLD  - Lipitor 40mg daily    #CAD   - s/p CABG  - c/w ASA 81mg daily    #HF  - Bumex 2mg BID    #DM II  - ISS and FS  - Admelog and Lantus  - A1c 6.5 on 9/19  follow fingersticks    #Neuropathy  -gabapentin 300mg QID.- consider increasing dose to 400mg 4x/day as PTA    #Pain management  - Tylenol PRN,   - Oxycodone 10mg moderate, 15mg severe PRN,   - lidocaine patch,   - flexeril 10mg TID.   - gabapentin 300mg 4x/.day  ICE    #DVT ppx  - Heparin 5K Q 8 hrs  -  SCD, TEDs    #GI ppx  - Protonix 40mg  - Zofran    #Bowel Regimen  - Senna, dulcolax 5mg BID, miralax 2x/day    #Bladder management  - BS on admission, and q 8 hours (SC if > 400)  - Monitor UO    #FEN   - Diet: DASH/TLC  - MVI    #Skin:  - Skin on admission: Multiple Left sided abdomen abrasions. Mid thoracic to lumbar incision covered with primary dressing. Previous surgical scars of chest, abdomen, b/l elbows, wrists and knees. No pressure ulcers.  - Pressure injury/Skin: Turn Q2hrs while in bed, OOB to Chair, PT/OT     #Sleep:   - Maintain quiet hours and low stim environment.  - Melatonin 3mg nightly PRN to maximize participation in therapy during the day.     #Precaution  - Fall, Aspiration, Spinal

## 2023-10-03 NOTE — PROGRESS NOTE ADULT - SUBJECTIVE AND OBJECTIVE BOX
PROGRESS NOTE:     Patient is a 67y old  Male who presents with a chief complaint of Spinal Fusion (03 Oct 2023 11:32)          SUBJECTIVE & OBJECTIVE:   Pt seen and examined at bedside. still reports of back pain, by the R ribs   no overnight events.   no new complaints    REVIEW OF SYSTEMS: remaining ROS negative     PHYSICAL EXAM:  VITALS:  Vital Signs Last 24 Hrs  T(C): 36.6 (03 Oct 2023 06:17), Max: 37 (02 Oct 2023 20:46)  T(F): 97.9 (03 Oct 2023 06:17), Max: 98.6 (02 Oct 2023 20:46)  HR: 88 (03 Oct 2023 06:17) (65 - 99)  BP: 158/54 (03 Oct 2023 06:17) (104/72 - 158/54)  BP(mean): --  RR: 16 (03 Oct 2023 06:17) (14 - 16)  SpO2: 96% (03 Oct 2023 06:17) (74% - 98%)    Parameters below as of 03 Oct 2023 06:17  Patient On (Oxygen Delivery Method): room air          GENERAL: NAD,  no increased WOB  HEAD:  Atraumatic, Normocephalic  EYES: EOMI, conjunctiva and sclera clear  ENMT: Moist mucous membranes  NECK: Supple, No JVD  NERVOUS SYSTEM:  Alert & Oriented, no focal neuro deficits   CHEST/LUNG: Clear to auscultation bilaterally; No rales, rhonchi, wheezing  HEART: Regular rate and rhythm  ABDOMEN: Soft, Nontender, Nondistended; Bowel sounds present  EXTREMITIES:  No clubbing, cyanosis, calf tenderness, trace edema      MEDICATIONS  (STANDING):  allopurinol 100 milliGRAM(s) Oral at bedtime  aspirin enteric coated 81 milliGRAM(s) Oral daily  atorvastatin 40 milliGRAM(s) Oral at bedtime  bisacodyl 5 milliGRAM(s) Oral every 12 hours  buMETAnide 2 milliGRAM(s) Oral two times a day  carvedilol 6.25 milliGRAM(s) Oral every 12 hours  dextrose 5%. 1000 milliLiter(s) (50 mL/Hr) IV Continuous <Continuous>  dextrose 5%. 1000 milliLiter(s) (100 mL/Hr) IV Continuous <Continuous>  dextrose 50% Injectable 25 Gram(s) IV Push once  dextrose 50% Injectable 25 Gram(s) IV Push once  dextrose 50% Injectable 12.5 Gram(s) IV Push once  gabapentin 300 milliGRAM(s) Oral four times a day  glucagon  Injectable 1 milliGRAM(s) IntraMuscular once  heparin   Injectable 5000 Unit(s) SubCutaneous every 8 hours  insulin glargine Injectable (LANTUS) 15 Unit(s) SubCutaneous at bedtime  insulin lispro (ADMELOG) corrective regimen sliding scale   SubCutaneous three times a day before meals  insulin lispro (ADMELOG) corrective regimen sliding scale   SubCutaneous at bedtime  insulin lispro Injectable (ADMELOG) 5 Unit(s) SubCutaneous before breakfast  insulin lispro Injectable (ADMELOG) 5 Unit(s) SubCutaneous before lunch  insulin lispro Injectable (ADMELOG) 5 Unit(s) SubCutaneous before dinner  lidocaine   4% Patch 1 Patch Transdermal daily  losartan 25 milliGRAM(s) Oral daily  multivitamin 1 Tablet(s) Oral daily  pantoprazole    Tablet 40 milliGRAM(s) Oral before breakfast  tiZANidine 2 milliGRAM(s) Oral every 6 hours    MEDICATIONS  (PRN):  aluminum hydroxide/magnesium hydroxide/simethicone Suspension 30 milliLiter(s) Oral every 4 hours PRN Dyspepsia  benzocaine/menthol Lozenge 1 Lozenge Oral every 3 hours PRN Sore Throat  dextrose Oral Gel 15 Gram(s) Oral once PRN Blood Glucose LESS THAN 70 milliGRAM(s)/deciliter  guaiFENesin Oral Liquid (Sugar-Free) 100 milliGRAM(s) Oral every 6 hours PRN Cough  melatonin 3 milliGRAM(s) Oral at bedtime PRN Insomnia  ondansetron   Disintegrating Tablet 4 milliGRAM(s) Oral every 6 hours PRN Nausea and/or Vomiting  oxyCODONE    IR 10 milliGRAM(s) Oral every 3 hours PRN Moderate Pain (4 - 6)  oxyCODONE    IR 15 milliGRAM(s) Oral every 3 hours PRN Severe Pain (7 - 10)  polyethylene glycol 3350 17 Gram(s) Oral two times a day PRN Constipation      Allergies    No Known Allergies    Intolerances              LABS:                           12.1   10.44 )-----------( 219      ( 02 Oct 2023 06:10 )             36.5     10-02    139  |  101  |  19  ----------------------------<  163<H>  3.8   |  32<H>  |  0.63    Ca    8.9      02 Oct 2023 06:10  Mg     1.8     10-02    TPro  6.5  /  Alb  2.6<L>  /  TBili  0.8  /  DBili  x   /  AST  21  /  ALT  27  /  AlkPhos  125<H>  10-02      Urinalysis Basic - ( 02 Oct 2023 06:10 )    Color: x / Appearance: x / SG: x / pH: x  Gluc: 163 mg/dL / Ketone: x  / Bili: x / Urobili: x   Blood: x / Protein: x / Nitrite: x   Leuk Esterase: x / RBC: x / WBC x   Sq Epi: x / Non Sq Epi: x / Bacteria: x      CAPILLARY BLOOD GLUCOSE      POCT Blood Glucose.: 192 mg/dL (03 Oct 2023 11:34)  POCT Blood Glucose.: 188 mg/dL (03 Oct 2023 08:06)  POCT Blood Glucose.: 182 mg/dL (02 Oct 2023 21:41)  POCT Blood Glucose.: 155 mg/dL (02 Oct 2023 17:25)                    COVID-19 PCR: NotDetec (09-30-23 @ 16:00)

## 2023-10-03 NOTE — PROGRESS NOTE ADULT - SUBJECTIVE AND OBJECTIVE BOX
HPI:  This is a 68 YO male with PMH of  HFrEF, CAD s/p CABG, ASIYA on CPAP, HTN, DM, right renal mass s/p resection presents to  [facility] ED ON 9/18  after a syncopal episode, found to have T12-L1 fracture on imaging. Patient originally scheduled for an ACDF with Dr. Hughes, due to injury he was changed to a T9-pelvis revision PSF. Hospital course s/f medication adjustments for HTN  and HF. Patient was evaluated by PM&R and therapy for functional deficits, gait/ADL impairments and acute rehabilitation was recommended. Patient was medically optimized for discharge to [facility] IRU on 9/29/23.       ROS/subjective:  patient seen and evaluated, NAD in chair, spasms/back pain-on Zanaflex-better  BM yesterday, on lax now  BPs low-Cozaar on hold while Zanaflex titrated  no dizziness, no headaches, no chest pain , No SOB, no nausea, no vomiting      MEDICATIONS  (STANDING):  allopurinol 100 milliGRAM(s) Oral at bedtime  aspirin enteric coated 81 milliGRAM(s) Oral daily  atorvastatin 40 milliGRAM(s) Oral at bedtime  bisacodyl 5 milliGRAM(s) Oral every 12 hours  buMETAnide 2 milliGRAM(s) Oral two times a day  carvedilol 6.25 milliGRAM(s) Oral every 12 hours  dextrose 5%. 1000 milliLiter(s) (100 mL/Hr) IV Continuous <Continuous>  dextrose 5%. 1000 milliLiter(s) (50 mL/Hr) IV Continuous <Continuous>  dextrose 50% Injectable 12.5 Gram(s) IV Push once  dextrose 50% Injectable 25 Gram(s) IV Push once  dextrose 50% Injectable 25 Gram(s) IV Push once  gabapentin 300 milliGRAM(s) Oral four times a day  glucagon  Injectable 1 milliGRAM(s) IntraMuscular once  heparin   Injectable 5000 Unit(s) SubCutaneous every 8 hours  insulin glargine Injectable (LANTUS) 15 Unit(s) SubCutaneous at bedtime  insulin lispro (ADMELOG) corrective regimen sliding scale   SubCutaneous three times a day before meals  insulin lispro (ADMELOG) corrective regimen sliding scale   SubCutaneous at bedtime  insulin lispro Injectable (ADMELOG) 5 Unit(s) SubCutaneous before breakfast  insulin lispro Injectable (ADMELOG) 5 Unit(s) SubCutaneous before lunch  insulin lispro Injectable (ADMELOG) 5 Unit(s) SubCutaneous before dinner  lidocaine   4% Patch 1 Patch Transdermal daily  losartan 25 milliGRAM(s) Oral daily  multivitamin 1 Tablet(s) Oral daily  pantoprazole    Tablet 40 milliGRAM(s) Oral before breakfast  tiZANidine 2 milliGRAM(s) Oral every 6 hours    MEDICATIONS  (PRN):  aluminum hydroxide/magnesium hydroxide/simethicone Suspension 30 milliLiter(s) Oral every 4 hours PRN Dyspepsia  benzocaine/menthol Lozenge 1 Lozenge Oral every 3 hours PRN Sore Throat  dextrose Oral Gel 15 Gram(s) Oral once PRN Blood Glucose LESS THAN 70 milliGRAM(s)/deciliter  guaiFENesin Oral Liquid (Sugar-Free) 100 milliGRAM(s) Oral every 6 hours PRN Cough  melatonin 3 milliGRAM(s) Oral at bedtime PRN Insomnia  ondansetron   Disintegrating Tablet 4 milliGRAM(s) Oral every 6 hours PRN Nausea and/or Vomiting  oxyCODONE    IR 15 milliGRAM(s) Oral every 3 hours PRN Severe Pain (7 - 10)  oxyCODONE    IR 10 milliGRAM(s) Oral every 3 hours PRN Moderate Pain (4 - 6)  polyethylene glycol 3350 17 Gram(s) Oral two times a day PRN Constipation                            12.1   10.44 )-----------( 219      ( 02 Oct 2023 06:10 )             36.5     10-02    139  |  101  |  19  ----------------------------<  163<H>  3.8   |  32<H>  |  0.63    Ca    8.9      02 Oct 2023 06:10  Mg     1.8     10-02    TPro  6.5  /  Alb  2.6<L>  /  TBili  0.8  /  DBili  x   /  AST  21  /  ALT  27  /  AlkPhos  125<H>  10-02    Urinalysis Basic - ( 02 Oct 2023 06:10 )    Color: x / Appearance: x / SG: x / pH: x  Gluc: 163 mg/dL / Ketone: x  / Bili: x / Urobili: x   Blood: x / Protein: x / Nitrite: x   Leuk Esterase: x / RBC: x / WBC x   Sq Epi: x / Non Sq Epi: x / Bacteria: x        CAPILLARY BLOOD GLUCOSE      POCT Blood Glucose.: 192 mg/dL (03 Oct 2023 11:34)  POCT Blood Glucose.: 188 mg/dL (03 Oct 2023 08:06)  POCT Blood Glucose.: 182 mg/dL (02 Oct 2023 21:41)  POCT Blood Glucose.: 155 mg/dL (02 Oct 2023 17:25)      Vital Signs Last 24 Hrs  T(C): 36.6 (03 Oct 2023 06:17), Max: 37 (02 Oct 2023 20:46)  T(F): 97.9 (03 Oct 2023 06:17), Max: 98.6 (02 Oct 2023 20:46)  HR: 88 (03 Oct 2023 06:17) (65 - 99)  BP: 158/54 (03 Oct 2023 06:17) (104/72 - 158/54)  BP(mean): --  RR: 16 (03 Oct 2023 06:17) (14 - 16)  SpO2: 96% (03 Oct 2023 06:17) (74% - 98%)    Parameters below as of 03 Oct 2023 06:17  Patient On (Oxygen Delivery Method): room air      Constitutional - NAD, Comfortable  HEENT - NCAT, EOMI.    Chest - good chest expansion, good respiratory effort   Cardio - S1D2 RRR  Abdomen -  Obese, Soft, NTND BS +  Extremities - No peripheral edema, No calf tenderness   Neurologic Exam:                    Cranial Nerves -2-12 intact     Motor -  Strength preserved in UE and LE with severe pain in back.                     LEFT    UE - ShAB 5/5, EF 5/5, EE 5/5, WE 5/5,  WNL                     RIGHT UE - ShAB 5/5, EF 5/5, EE 5/5, WE 5/5,  WNL                    LEFT    LE - HF 4/5, KE 5/5, DF 5/5, PF 5/5                    RIGHT LE - HF 4/5, KE 5/5, DF 5/5, PF 5/5        Sensory - Intact to LT bilateral  Skin on admission:  Mid thoracic to lumbar incision covered with primary dressing. Previous surgical scars of chest, abdomen, b/l elbows, wrists and knees. No pressure ulcers.      Continue comprehensive acute rehab program consisting of 3hrs/day of OT/PT.

## 2023-10-03 NOTE — PROGRESS NOTE ADULT - ASSESSMENT
67 year old male with PMH of  HTN, HLD, CAD s/p CABG, HFrEF (LV systolic function appears grossly normal on Echo 9/21), Type 2 Diabetes, ASIYA on CPAP, right renal mass s/p partial resection (3/2023, pathology negative), s/p pervious L3-pelvis fusion, who presented to [facility] ED on 9/18  after a syncopal episode, found to have T12-L1 fracture on imaging. Patient had a removal of hardware from L3-pelvis and revision T9-pelvis revision PSF w/ muscle flap reconstruction on 9/23. Patient now with gait Instability, ADL impairments and Functional impairments.    Gait Instability/Functional Impairment  Status Post Fall w/ T12-L1 Acute Fracture   -s/p removal of hardware from L3-pelvis and revision T9-pelvis revision PSF w/ muscle flap reconstruction on 9/23  -Fall/Spine precautions  -Pain control w/ bowel regimen per PMR team  -Comprehensive Rehab Program with PT/OT  -Outpatient follow up with neurosurgery    Syncopal Episode   -Felt to be due to situational syncope (after blowing his nose)  -Workup at OSH: CTH negative, EKG showed no acute changes, Echo showed grossly nl LV function, no significant valvular disease   -Will monitor     HTN/HLD  CAD s/p CABG/Mild Diastolic Dysfunction w/ EF 60%  -Per documentation: Echo from 5/5/23 showed nl LV function, EF 60%, mild diastolic dysfunction. normal RV function. trivial MR and TR, normal Aortic valve. normal Pulmonic valve.   -No evidence of fluid overload on exam   -Continue Aspirin, Coreg, Losartan, Atorvastatin   -Continue Bumex BID   -Daily weight   -Monitor BP  -Outpatient cardiology follow up     ASIYA  - Continue with nocturnal CPAP. Pressure set with home setting of 5.    Type 2 Diabetes  -HbA1C 6.5 on 9/19  -Continue to hold oral glimepiride  -His hospitalization was complicated by steroid induced hyperglycemia - now off of steroids    -Increase Lantus 17units QHS  -Continue Admelog 5units TID w/ meals  -Continue RAISS (moderate corrective scale)  -Continue to monitor FS, adjust insulin as needed    Gout  -Continue allopurinol    DVT PPx  -Heparin SC

## 2023-10-04 LAB
GLUCOSE BLDC GLUCOMTR-MCNC: 156 MG/DL — HIGH (ref 70–99)
GLUCOSE BLDC GLUCOMTR-MCNC: 177 MG/DL — HIGH (ref 70–99)
GLUCOSE BLDC GLUCOMTR-MCNC: 185 MG/DL — HIGH (ref 70–99)
GLUCOSE BLDC GLUCOMTR-MCNC: 185 MG/DL — HIGH (ref 70–99)

## 2023-10-04 PROCEDURE — 99232 SBSQ HOSP IP/OBS MODERATE 35: CPT | Mod: GC

## 2023-10-04 RX ORDER — BUMETANIDE 0.25 MG/ML
2 INJECTION INTRAMUSCULAR; INTRAVENOUS DAILY
Refills: 0 | Status: DISCONTINUED | OUTPATIENT
Start: 2023-10-05 | End: 2023-10-14

## 2023-10-04 RX ORDER — TIZANIDINE 4 MG/1
6 TABLET ORAL AT BEDTIME
Refills: 0 | Status: ACTIVE | OUTPATIENT
Start: 2023-10-04 | End: 2024-09-01

## 2023-10-04 RX ORDER — TIZANIDINE 4 MG/1
2 TABLET ORAL
Refills: 0 | Status: DISCONTINUED | OUTPATIENT
Start: 2023-10-04 | End: 2023-10-14

## 2023-10-04 RX ADMIN — OXYCODONE HYDROCHLORIDE 15 MILLIGRAM(S): 5 TABLET ORAL at 10:30

## 2023-10-04 RX ADMIN — Medication 3 MILLIGRAM(S): at 23:01

## 2023-10-04 RX ADMIN — OXYCODONE HYDROCHLORIDE 15 MILLIGRAM(S): 5 TABLET ORAL at 22:40

## 2023-10-04 RX ADMIN — Medication 5 MILLIGRAM(S): at 06:23

## 2023-10-04 RX ADMIN — Medication 81 MILLIGRAM(S): at 12:49

## 2023-10-04 RX ADMIN — BENZOCAINE AND MENTHOL 1 LOZENGE: 5; 1 LIQUID ORAL at 08:37

## 2023-10-04 RX ADMIN — Medication 1 TABLET(S): at 12:51

## 2023-10-04 RX ADMIN — OXYCODONE HYDROCHLORIDE 15 MILLIGRAM(S): 5 TABLET ORAL at 09:39

## 2023-10-04 RX ADMIN — GABAPENTIN 300 MILLIGRAM(S): 400 CAPSULE ORAL at 23:00

## 2023-10-04 RX ADMIN — OXYCODONE HYDROCHLORIDE 15 MILLIGRAM(S): 5 TABLET ORAL at 06:23

## 2023-10-04 RX ADMIN — Medication 2: at 18:03

## 2023-10-04 RX ADMIN — GABAPENTIN 300 MILLIGRAM(S): 400 CAPSULE ORAL at 12:50

## 2023-10-04 RX ADMIN — OXYCODONE HYDROCHLORIDE 15 MILLIGRAM(S): 5 TABLET ORAL at 16:15

## 2023-10-04 RX ADMIN — Medication 2: at 08:20

## 2023-10-04 RX ADMIN — OXYCODONE HYDROCHLORIDE 15 MILLIGRAM(S): 5 TABLET ORAL at 02:07

## 2023-10-04 RX ADMIN — Medication 5 UNIT(S): at 12:46

## 2023-10-04 RX ADMIN — HEPARIN SODIUM 5000 UNIT(S): 5000 INJECTION INTRAVENOUS; SUBCUTANEOUS at 15:01

## 2023-10-04 RX ADMIN — GABAPENTIN 300 MILLIGRAM(S): 400 CAPSULE ORAL at 06:24

## 2023-10-04 RX ADMIN — OXYCODONE HYDROCHLORIDE 15 MILLIGRAM(S): 5 TABLET ORAL at 03:00

## 2023-10-04 RX ADMIN — ATORVASTATIN CALCIUM 40 MILLIGRAM(S): 80 TABLET, FILM COATED ORAL at 21:49

## 2023-10-04 RX ADMIN — TIZANIDINE 2 MILLIGRAM(S): 4 TABLET ORAL at 15:00

## 2023-10-04 RX ADMIN — BUMETANIDE 2 MILLIGRAM(S): 0.25 INJECTION INTRAMUSCULAR; INTRAVENOUS at 06:23

## 2023-10-04 RX ADMIN — Medication 2: at 12:45

## 2023-10-04 RX ADMIN — Medication 5 UNIT(S): at 08:21

## 2023-10-04 RX ADMIN — PANTOPRAZOLE SODIUM 40 MILLIGRAM(S): 20 TABLET, DELAYED RELEASE ORAL at 06:24

## 2023-10-04 RX ADMIN — Medication 5 UNIT(S): at 17:45

## 2023-10-04 RX ADMIN — Medication 100 MILLIGRAM(S): at 21:50

## 2023-10-04 RX ADMIN — HEPARIN SODIUM 5000 UNIT(S): 5000 INJECTION INTRAVENOUS; SUBCUTANEOUS at 06:25

## 2023-10-04 RX ADMIN — OXYCODONE HYDROCHLORIDE 15 MILLIGRAM(S): 5 TABLET ORAL at 00:00

## 2023-10-04 RX ADMIN — TIZANIDINE 4 MILLIGRAM(S): 4 TABLET ORAL at 21:50

## 2023-10-04 RX ADMIN — INSULIN GLARGINE 17 UNIT(S): 100 INJECTION, SOLUTION SUBCUTANEOUS at 21:49

## 2023-10-04 RX ADMIN — OXYCODONE HYDROCHLORIDE 15 MILLIGRAM(S): 5 TABLET ORAL at 21:49

## 2023-10-04 RX ADMIN — OXYCODONE HYDROCHLORIDE 15 MILLIGRAM(S): 5 TABLET ORAL at 15:31

## 2023-10-04 RX ADMIN — TIZANIDINE 2 MILLIGRAM(S): 4 TABLET ORAL at 06:24

## 2023-10-04 RX ADMIN — Medication 5 MILLIGRAM(S): at 18:09

## 2023-10-04 RX ADMIN — GABAPENTIN 300 MILLIGRAM(S): 400 CAPSULE ORAL at 17:53

## 2023-10-04 RX ADMIN — LIDOCAINE 1 PATCH: 4 CREAM TOPICAL at 12:52

## 2023-10-04 RX ADMIN — HEPARIN SODIUM 5000 UNIT(S): 5000 INJECTION INTRAVENOUS; SUBCUTANEOUS at 21:49

## 2023-10-04 RX ADMIN — BENZOCAINE AND MENTHOL 1 LOZENGE: 5; 1 LIQUID ORAL at 02:09

## 2023-10-04 NOTE — PROGRESS NOTE ADULT - ASSESSMENT
This is a 68 YO male with PMH of  HFrEF, CAD s/p CABG, ASIYA on CPAP, HTN, DM II, right renal mass s/p resection presents to  [facility] ED on 9/18  after a syncopal episode, found to have T12-L1 fracture on imaging. Patient had a T9-pelvis revision PSF. Patient now with gait Instability, ADL impairments and Functional impairments.    #Thoracic spine fracture  - T9-pelvis revision PSF on 9/23  - Comprehensive Rehab Program: PT/OT, 3hours daily and 5 days weekly  - PT: Focused on improving strength, endurance, coordination, balance, functional mobility, and transfers  - OT: Focused on improving strength, fine motor skills, coordination, posture and ADLs.    -pain regimen  -zanaflex trial, avoid hypotension- muscle spasms major issue      #ASIYA  Respiratory order for nocturnal CPAP. Pressure set withy home setting of 5.    #HTN  - Carvedilol 6.25mg BID  - Losartan 25mg daily-hold    #HLD  - Lipitor 40mg daily    #CAD   - s/p CABG  - c/w ASA 81mg daily    #HF  - Bumex 2mg BID    #DM II  - ISS and FS  - Admelog and Lantus  - A1c 6.5 on 9/19  follow fingersticks    #Neuropathy  -gabapentin 300mg QID.- consider increasing dose to 400mg 4x/day as PTA    #Pain management  - Tylenol PRN,   - Oxycodone 10mg moderate, 15mg severe PRN,   - lidocaine patch,   - flexeril 10mg TID.   - gabapentin 300mg 4x/.day  ICE    #DVT ppx  - Heparin 5K Q 8 hrs  -  SCD, TEDs    #GI ppx  - Protonix 40mg  - Zofran    #Bowel Regimen  - Senna, dulcolax 5mg BID, miralax 2x/day    #Bladder management  - BS on admission, and q 8 hours (SC if > 400)  - Monitor UO    #FEN   - Diet: DASH/TLC  - MVI    #Skin:  - Skin on admission: Multiple Left sided abdomen abrasions. Mid thoracic to lumbar incision covered with primary dressing. Previous surgical scars of chest, abdomen, b/l elbows, wrists and knees. No pressure ulcers.  - Pressure injury/Skin: Turn Q2hrs while in bed, OOB to Chair, PT/OT     #Sleep:   - Maintain quiet hours and low stim environment.  - Melatonin 3mg nightly PRN to maximize participation in therapy during the day.     #Precaution  - Fall, Aspiration, Spinal   This is a 68 YO male with PMH of  HFrEF, CAD s/p CABG, ASIYA on CPAP, HTN, DM II, right renal mass s/p resection presents to  [facility] ED on 9/18  after a syncopal episode, found to have T12-L1 fracture on imaging. Patient had a T9-pelvis revision PSF. Patient now with gait Instability, ADL impairments and Functional impairments.    #Thoracic spine fracture  - T9-pelvis revision PSF on 9/23  - Comprehensive Rehab Program: PT/OT, 3hours daily and 5 days weekly  - PT: Focused on improving strength, endurance, coordination, balance, functional mobility, and transfers  - OT: Focused on improving strength, fine motor skills, coordination, posture and ADLs.    -pain regimen  -zanaflex trial, avoid hypotension- muscle spasms major issue      #ASIYA  Respiratory order for nocturnal CPAP. Pressure set withy home setting of 5.    #HTN  - Carvedilol 6.25mg BID-on hold per hospitalist  - Losartan 25mg daily-hold    #HLD  - Lipitor 40mg daily    #CAD   - s/p CABG  - c/w ASA 81mg daily    #HF  - Bumex 2mg BID to daily 2/2 BP 90s    #DM II  - ISS and FS  - Admelog and Lantus  - A1c 6.5 on 9/19  follow fingersticks    #Neuropathy  -gabapentin 300mg QID.- consider increasing dose to 400mg 4x/day as PTA    #Pain management  - Tylenol PRN,   - Oxycodone 10mg moderate, 15mg severe PRN,   - lidocaine patch,   -zanaflex 2mg/2mg/4mg  - gabapentin 300mg 4x/.day  ICE    #DVT ppx  - Heparin 5K Q 8 hrs  -  SCD, TEDs    #GI ppx  - Protonix 40mg  - Zofran    #Bowel Regimen  - Senna, dulcolax 5mg BID, miralax 2x/day    #Bladder management  - BS on admission, and q 8 hours (SC if > 400)  - Monitor UO    #FEN   - Diet: DASH/TLC  - MVI    #Skin:  - Skin on admission: Multiple Left sided abdomen abrasions. Mid thoracic to lumbar incision covered with primary dressing. Previous surgical scars of chest, abdomen, b/l elbows, wrists and knees. No pressure ulcers.  - Pressure injury/Skin: Turn Q2hrs while in bed, OOB to Chair, PT/OT     #Sleep:   - Maintain quiet hours and low stim environment.  - Melatonin 3mg nightly PRN to maximize participation in therapy during the day.     #Precaution  - Fall, Aspiration, Spinal

## 2023-10-04 NOTE — CHART NOTE - NSCHARTNOTEFT_GEN_A_CORE
Nutrition Follow Up Note  Hospital Course   (Per Electronic Medical Record)    Source:  Patient [X]  Medical Record [X]      Diet:   Consistent Carbohydrate DASH-TLC Diet w/ Thin Liquids (IDDSI Level 0)  Tolerates Diet Consistency Well  No Chewing/Swallowing Difficulties  No Recent Nausea, Vomiting, Diarrhea or Constipation (as Per Patient)  Consumes % of Meals (as Per Documentation) - States Good PO Intake/Appetite (Per Patient)   Education Provided on Proper Nutrition & Blood Glucose Management   Obtained Food Preferences from Patient    Enteral/Parenteral Nutrition: Not Applicable    Current Weight: 258.9lb on 10/4  Obtain New Weight to Confirm  Obtain Weights Daily     Pertinent Medications: MEDICATIONS  (STANDING):  allopurinol 100 milliGRAM(s) Oral at bedtime  aspirin enteric coated 81 milliGRAM(s) Oral daily  atorvastatin 40 milliGRAM(s) Oral at bedtime  bisacodyl 5 milliGRAM(s) Oral every 12 hours  buMETAnide 2 milliGRAM(s) Oral two times a day  carvedilol 6.25 milliGRAM(s) Oral every 12 hours  dextrose 5%. 1000 milliLiter(s) (50 mL/Hr) IV Continuous <Continuous>  dextrose 5%. 1000 milliLiter(s) (100 mL/Hr) IV Continuous <Continuous>  dextrose 50% Injectable 25 Gram(s) IV Push once  dextrose 50% Injectable 25 Gram(s) IV Push once  dextrose 50% Injectable 12.5 Gram(s) IV Push once  gabapentin 300 milliGRAM(s) Oral four times a day  glucagon  Injectable 1 milliGRAM(s) IntraMuscular once  heparin   Injectable 5000 Unit(s) SubCutaneous every 8 hours  insulin glargine Injectable (LANTUS) 17 Unit(s) SubCutaneous at bedtime  insulin lispro (ADMELOG) corrective regimen sliding scale   SubCutaneous three times a day before meals  insulin lispro (ADMELOG) corrective regimen sliding scale   SubCutaneous at bedtime  insulin lispro Injectable (ADMELOG) 5 Unit(s) SubCutaneous before breakfast  insulin lispro Injectable (ADMELOG) 5 Unit(s) SubCutaneous before lunch  insulin lispro Injectable (ADMELOG) 5 Unit(s) SubCutaneous before dinner  lidocaine   4% Patch 1 Patch Transdermal daily  losartan 25 milliGRAM(s) Oral daily  multivitamin 1 Tablet(s) Oral daily  pantoprazole    Tablet 40 milliGRAM(s) Oral before breakfast  tiZANidine 2 milliGRAM(s) Oral every 6 hours    MEDICATIONS  (PRN):  aluminum hydroxide/magnesium hydroxide/simethicone Suspension 30 milliLiter(s) Oral every 4 hours PRN Dyspepsia  benzocaine/menthol Lozenge 1 Lozenge Oral every 3 hours PRN Sore Throat  dextrose Oral Gel 15 Gram(s) Oral once PRN Blood Glucose LESS THAN 70 milliGRAM(s)/deciliter  guaiFENesin Oral Liquid (Sugar-Free) 100 milliGRAM(s) Oral every 6 hours PRN Cough  melatonin 3 milliGRAM(s) Oral at bedtime PRN Insomnia  ondansetron   Disintegrating Tablet 4 milliGRAM(s) Oral every 6 hours PRN Nausea and/or Vomiting  oxyCODONE    IR 15 milliGRAM(s) Oral every 3 hours PRN Severe Pain (7 - 10)  oxyCODONE    IR 10 milliGRAM(s) Oral every 3 hours PRN Moderate Pain (4 - 6)  polyethylene glycol 3350 17 Gram(s) Oral two times a day PRN Constipation    Pertinent Labs:  10-02 Na139 mmol/L Glu 163 mg/dL<H> K+ 3.8 mmol/L Cr  0.63 mg/dL BUN 19 mg/dL 09-28 Phos 4.0 mg/dL 10-02 Alb 2.6 g/dL<L> 09-19 Chol 181 mg/dL LDL --    HDL 27 mg/dL<L> Trig 646 mg/dL<H>    POCT (over Last 3 Days) - Ranging from 155-222    Skin: No Pressure Ulcers  Surgical Incision on Back  (as Per Nursing Flow Sheet)     Edema: None Noted (as Per Documentation)     Last Bowel Movement: on 10/3    Estimated Needs:   [X] No Change Since Previous Assessment    Previous Nutrition Diagnosis:   Morbidly Obese  Altered Nutrition Related Labwork     Nutrition Diagnosis is [X] Ongoing - Education Provided on Proper Nutrition & Blood Glucose Management     New Nutrition Diagnosis: [X] Not Applicable    Interventions:   1. Education Provided on Proper Nutrition & Blood Glucose Management   2. Recommend Continue Nutrition Plan of Care     Monitoring & Evaluation:   [X] Weights   [X] PO Intake   [X] Skin Integrity   [X] Follow Up (Per Protocol)  [X] Tolerance to Diet Prescription   [X] Other: Labs & POCT    Registered Dietitian/Nutritionist Remains Available.  Dax Clayton, DOUGN, CDN    Phone# [phone]

## 2023-10-04 NOTE — PROGRESS NOTE ADULT - SUBJECTIVE AND OBJECTIVE BOX
HPI:  This is a 68 YO male with PMH of  HFrEF, CAD s/p CABG, ASIYA on CPAP, HTN, DM, right renal mass s/p resection presents to  [facility] ED ON 9/18  after a syncopal episode, found to have T12-L1 fracture on imaging. Patient originally scheduled for an ACDF with Dr. Hughes, due to injury he was changed to a T9-pelvis revision PSF. Hospital course s/f medication adjustments for HTN  and HF. Patient was evaluated by PM&R and therapy for functional deficits, gait/ADL impairments and acute rehabilitation was recommended. Patient was medically optimized for discharge to [facility] IRU on 9/29/23.      ROS/subjective:  Patient seen and evaluated, NAD in chair, spasms/back pain-on Zanaflex-better. Increase Zanaflex at bedtime 2/2/4.  BPs low 90s-Coreg held -hospitalist to follow up  no dizziness, no headaches, no chest pain , No SOB, no nausea, no vomiting      MEDICATIONS  (STANDING):  allopurinol 100 milliGRAM(s) Oral at bedtime  aspirin enteric coated 81 milliGRAM(s) Oral daily  atorvastatin 40 milliGRAM(s) Oral at bedtime  bisacodyl 5 milliGRAM(s) Oral every 12 hours  buMETAnide 2 milliGRAM(s) Oral two times a day  carvedilol 6.25 milliGRAM(s) Oral every 12 hours  dextrose 5%. 1000 milliLiter(s) (50 mL/Hr) IV Continuous <Continuous>  dextrose 5%. 1000 milliLiter(s) (100 mL/Hr) IV Continuous <Continuous>  dextrose 50% Injectable 25 Gram(s) IV Push once  dextrose 50% Injectable 25 Gram(s) IV Push once  dextrose 50% Injectable 12.5 Gram(s) IV Push once  gabapentin 300 milliGRAM(s) Oral four times a day  glucagon  Injectable 1 milliGRAM(s) IntraMuscular once  heparin   Injectable 5000 Unit(s) SubCutaneous every 8 hours  insulin glargine Injectable (LANTUS) 17 Unit(s) SubCutaneous at bedtime  insulin lispro (ADMELOG) corrective regimen sliding scale   SubCutaneous three times a day before meals  insulin lispro (ADMELOG) corrective regimen sliding scale   SubCutaneous at bedtime  insulin lispro Injectable (ADMELOG) 5 Unit(s) SubCutaneous before breakfast  insulin lispro Injectable (ADMELOG) 5 Unit(s) SubCutaneous before lunch  insulin lispro Injectable (ADMELOG) 5 Unit(s) SubCutaneous before dinner  lidocaine   4% Patch 1 Patch Transdermal daily  multivitamin 1 Tablet(s) Oral daily  pantoprazole    Tablet 40 milliGRAM(s) Oral before breakfast  tiZANidine 4 milliGRAM(s) Oral at bedtime  tiZANidine 2 milliGRAM(s) Oral <User Schedule>    MEDICATIONS  (PRN):  aluminum hydroxide/magnesium hydroxide/simethicone Suspension 30 milliLiter(s) Oral every 4 hours PRN Dyspepsia  benzocaine/menthol Lozenge 1 Lozenge Oral every 3 hours PRN Sore Throat  dextrose Oral Gel 15 Gram(s) Oral once PRN Blood Glucose LESS THAN 70 milliGRAM(s)/deciliter  guaiFENesin Oral Liquid (Sugar-Free) 100 milliGRAM(s) Oral every 6 hours PRN Cough  melatonin 3 milliGRAM(s) Oral at bedtime PRN Insomnia  ondansetron   Disintegrating Tablet 4 milliGRAM(s) Oral every 6 hours PRN Nausea and/or Vomiting  oxyCODONE    IR 15 milliGRAM(s) Oral every 3 hours PRN Severe Pain (7 - 10)  oxyCODONE    IR 10 milliGRAM(s) Oral every 3 hours PRN Moderate Pain (4 - 6)  polyethylene glycol 3350 17 Gram(s) Oral two times a day PRN Constipation      CAPILLARY BLOOD GLUCOSE      POCT Blood Glucose.: 177 mg/dL (04 Oct 2023 07:55)  POCT Blood Glucose.: 157 mg/dL (03 Oct 2023 20:59)  POCT Blood Glucose.: 159 mg/dL (03 Oct 2023 17:08)  POCT Blood Glucose.: 192 mg/dL (03 Oct 2023 11:34)      Vital Signs Last 24 Hrs  T(C): 36.6 (04 Oct 2023 08:01), Max: 37.2 (03 Oct 2023 21:00)  T(F): 97.8 (04 Oct 2023 08:01), Max: 98.9 (03 Oct 2023 21:00)  HR: 72 (04 Oct 2023 08:01) (72 - 98)  BP: 96/61 (04 Oct 2023 08:05) (89/54 - 109/68)  BP(mean): --  RR: 16 (04 Oct 2023 08:01) (16 - 17)  SpO2: 77% (04 Oct 2023 01:55) (76% - 94%)    Parameters below as of 03 Oct 2023 21:00  Patient On (Oxygen Delivery Method): room air      Constitutional - NAD  HEENT - NCAT, EOMI.    Chest - good chest expansion, good respiratory effort   Cardio - S1D2 RRR  Abdomen -  Obese, Soft, NTND BS +  Extremities - No peripheral edema, No calf tenderness   Neurologic Exam:                    Cranial Nerves -2-12 intact     Motor -  Strength preserved in UE and LE with severe pain in back.                     LEFT    UE - ShAB 5/5, EF 5/5, EE 5/5, WE 5/5,  WNL                     RIGHT UE - ShAB 5/5, EF 5/5, EE 5/5, WE 5/5,  WNL                    LEFT    LE - HF 4/5, KE 5/5, DF 5/5, PF 5/5                    RIGHT LE - HF 4/5, KE 5/5, DF 5/5, PF 5/5        Sensory - Intact to LT bilateral  Skin on admission:  Mid thoracic to lumbar incision covered with primary dressing. Previous surgical scars of chest, abdomen, b/l elbows, wrists and knees. No pressure ulcers.      Continue comprehensive acute rehab program consisting of 3hrs/day of OT/PT and SLP. HPI:  This is a 66 YO male with PMH of  HFrEF, CAD s/p CABG, ASIYA on CPAP, HTN, DM, right renal mass s/p resection presents to  [facility] ED ON 9/18  after a syncopal episode, found to have T12-L1 fracture on imaging. Patient originally scheduled for an ACDF with Dr. Hughes, due to injury he was changed to a T9-pelvis revision PSF. Hospital course s/f medication adjustments for HTN  and HF. Patient was evaluated by PM&R and therapy for functional deficits, gait/ADL impairments and acute rehabilitation was recommended. Patient was medically optimized for discharge to [facility] IRU on 9/29/23.      ROS/subjective:  Patient seen and evaluated, NAD in chair, spasms/back pain-on Zanaflex-better. Increase Zanaflex at bedtime 2/2/4.  BPs low 90s-Coreg held -hospitalist to follow up  no dizziness, no headaches, no chest pain , No SOB, no nausea, no vomiting      MEDICATIONS  (STANDING):  allopurinol 100 milliGRAM(s) Oral at bedtime  aspirin enteric coated 81 milliGRAM(s) Oral daily  atorvastatin 40 milliGRAM(s) Oral at bedtime  bisacodyl 5 milliGRAM(s) Oral every 12 hours  buMETAnide 2 milliGRAM(s) Oral two times a day  carvedilol 6.25 milliGRAM(s) Oral every 12 hours  dextrose 5%. 1000 milliLiter(s) (50 mL/Hr) IV Continuous <Continuous>  dextrose 5%. 1000 milliLiter(s) (100 mL/Hr) IV Continuous <Continuous>  dextrose 50% Injectable 25 Gram(s) IV Push once  dextrose 50% Injectable 25 Gram(s) IV Push once  dextrose 50% Injectable 12.5 Gram(s) IV Push once  gabapentin 300 milliGRAM(s) Oral four times a day  glucagon  Injectable 1 milliGRAM(s) IntraMuscular once  heparin   Injectable 5000 Unit(s) SubCutaneous every 8 hours  insulin glargine Injectable (LANTUS) 17 Unit(s) SubCutaneous at bedtime  insulin lispro (ADMELOG) corrective regimen sliding scale   SubCutaneous three times a day before meals  insulin lispro (ADMELOG) corrective regimen sliding scale   SubCutaneous at bedtime  insulin lispro Injectable (ADMELOG) 5 Unit(s) SubCutaneous before breakfast  insulin lispro Injectable (ADMELOG) 5 Unit(s) SubCutaneous before lunch  insulin lispro Injectable (ADMELOG) 5 Unit(s) SubCutaneous before dinner  lidocaine   4% Patch 1 Patch Transdermal daily  multivitamin 1 Tablet(s) Oral daily  pantoprazole    Tablet 40 milliGRAM(s) Oral before breakfast  tiZANidine 4 milliGRAM(s) Oral at bedtime  tiZANidine 2 milliGRAM(s) Oral <User Schedule>    MEDICATIONS  (PRN):  aluminum hydroxide/magnesium hydroxide/simethicone Suspension 30 milliLiter(s) Oral every 4 hours PRN Dyspepsia  benzocaine/menthol Lozenge 1 Lozenge Oral every 3 hours PRN Sore Throat  dextrose Oral Gel 15 Gram(s) Oral once PRN Blood Glucose LESS THAN 70 milliGRAM(s)/deciliter  guaiFENesin Oral Liquid (Sugar-Free) 100 milliGRAM(s) Oral every 6 hours PRN Cough  melatonin 3 milliGRAM(s) Oral at bedtime PRN Insomnia  ondansetron   Disintegrating Tablet 4 milliGRAM(s) Oral every 6 hours PRN Nausea and/or Vomiting  oxyCODONE    IR 15 milliGRAM(s) Oral every 3 hours PRN Severe Pain (7 - 10)  oxyCODONE    IR 10 milliGRAM(s) Oral every 3 hours PRN Moderate Pain (4 - 6)  polyethylene glycol 3350 17 Gram(s) Oral two times a day PRN Constipation      CAPILLARY BLOOD GLUCOSE      POCT Blood Glucose.: 177 mg/dL (04 Oct 2023 07:55)  POCT Blood Glucose.: 157 mg/dL (03 Oct 2023 20:59)  POCT Blood Glucose.: 159 mg/dL (03 Oct 2023 17:08)  POCT Blood Glucose.: 192 mg/dL (03 Oct 2023 11:34)      Vital Signs Last 24 Hrs  T(C): 36.6 (04 Oct 2023 08:01), Max: 37.2 (03 Oct 2023 21:00)  T(F): 97.8 (04 Oct 2023 08:01), Max: 98.9 (03 Oct 2023 21:00)  HR: 72 (04 Oct 2023 08:01) (72 - 98)  BP: 96/61 (04 Oct 2023 08:05) (89/54 - 109/68)  BP(mean): --  RR: 16 (04 Oct 2023 08:01) (16 - 17)  SpO2: 77% (04 Oct 2023 01:55) (76% - 94%)    Parameters below as of 03 Oct 2023 21:00  Patient On (Oxygen Delivery Method): room air      Constitutional - NAD  HEENT - NCAT, EOMI.    Chest - good chest expansion, good respiratory effort   Cardio - S1D2 RRR  Abdomen -  Obese, Soft, NTND BS +  Extremities - No peripheral edema, No calf tenderness   Neurologic Exam:                    Cranial Nerves -2-12 intact     Motor -  Strength preserved in UE and LE with severe pain in back.                     LEFT    UE - ShAB 5/5, EF 5/5, EE 5/5, WE 5/5,  WNL                     RIGHT UE - ShAB 5/5, EF 5/5, EE 5/5, WE 5/5,  WNL                    LEFT    LE - HF 4/5, KE 5/5, DF 5/5, PF 5/5                    RIGHT LE - HF 4/5, KE 5/5, DF 5/5, PF 5/5        Sensory - Intact to LT bilateral  Skin on admission:  Mid thoracic to lumbar incision covered with primary dressing. Previous surgical scars of chest, abdomen, b/l elbows, wrists and knees. No pressure ulcers.    IDT meeting on 10/4    SW: PH 3STE, 12STI, children    OT:  eating set up  grooming set up  UBD/LBD mod A  toileting mod A  tub/shower mod A  bathing mod A    PT:  amb 50ft min A RW  transfers mod A  stairs    SLP na    tentative dc 10/14 HC       Continue comprehensive acute rehab program consisting of 3hrs/day of OT/PT and SLP.

## 2023-10-04 NOTE — PROGRESS NOTE ADULT - NS ATTEND AMEND GEN_ALL_CORE FT
Rehab Attending- Patient seen and examined by me - Case discussed, above note reviewed by me with modifications made    patient seen and evaluated bedside-  back spasms better on zanaflex  developed painful spasm over Right posterior ribs - heat added  moved bowels this AM- voiding well  to continue intensive rehab program    Vital Signs Last 24 Hrs  T(C): 36.6 (03 Oct 2023 06:17), Max: 37 (02 Oct 2023 20:46)  T(F): 97.9 (03 Oct 2023 06:17), Max: 98.6 (02 Oct 2023 20:46)  HR: 88 (03 Oct 2023 06:17) (65 - 99)  BP: 158/54 (03 Oct 2023 06:17) (104/72 - 158/54)  BP(mean): --  RR: 16 (03 Oct 2023 06:17) (14 - 16)  SpO2: 96% (03 Oct 2023 06:17) (74% - 98%)    Parameters below as of 03 Oct 2023 06:17  Patient On (Oxygen Delivery Method): room air      Constitutional - NAD, Comfortable  HEENT - NCAT, EOMI.    Chest - good chest expansion, good respiratory effort - tender over Right posterior ribs  Cardio - S1D2 RRR  Abdomen -  Obese, Soft, NTND BS +  Extremities - No peripheral edema, No calf tenderness   Neurologic Exam:                    Cranial Nerves -2-12 intact     Motor -  Strength preserved in UE and LE with severe pain in back.                     LEFT    UE - ShAB 5/5, EF 5/5, EE 5/5, WE 5/5,  WNL                     RIGHT UE - ShAB 5/5, EF 5/5, EE 5/5, WE 5/5,  WNL                    LEFT    LE - HF 4/5, KE 5/5, DF 5/5, PF 5/5                    RIGHT LE - HF 4/5, KE 5/5, DF 5/5, PF 5/5        Sensory - Intact to LT bilateral  Skin on admission:  Mid thoracic to lumbar incision covered with aquacel- dry intact Rehab Attending- Patient seen and examined by me - Case discussed, above note reviewed by me with modifications made    patient seen and evaluated bedside-  back spasms better on zanaflex- more prevalent at night- will increase PM zaniflex to 4mg  BPs low in therapy- Bumex changed to daily, Losartan/Coreg on Hold  painful spasm over Right posterior ribs better today  moved bowels this AM- voiding well  discussed in team- tentative Dc home 10/14  to continue intensive rehab program    Vital Signs Last 24 Hrs  T(C): 36.6 (04 Oct 2023 08:01), Max: 37.2 (03 Oct 2023 21:00)  T(F): 97.8 (04 Oct 2023 08:01), Max: 98.9 (03 Oct 2023 21:00)  HR: 72 (04 Oct 2023 08:01) (72 - 98)  BP: 96/61 (04 Oct 2023 08:05) (89/54 - 109/68)  BP(mean): --  RR: 16 (04 Oct 2023 08:01) (16 - 17)  SpO2: 77% (04 Oct 2023 01:55) (76% - 94%)    Parameters below as of 03 Oct 2023 21:00  Patient On (Oxygen Delivery Method): room air        Constitutional - NAD, Comfortable  HEENT - NCAT, EOMI.    Chest - good chest expansion, good respiratory effort - tender over Right posterior ribs  Cardio - S1D2 RRR  Abdomen -  Obese, Soft, NTND BS +  Extremities - No peripheral edema, No calf tenderness   Neurologic Exam:                    Cranial Nerves -2-12 intact     Motor -  Strength preserved in UE and LE with severe pain in back.                     LEFT    UE - ShAB 5/5, EF 5/5, EE 5/5, WE 5/5,  WNL                     RIGHT UE - ShAB 5/5, EF 5/5, EE 5/5, WE 5/5,  WNL                    LEFT    LE - HF 4/5, KE 5/5, DF 5/5, PF 5/5                    RIGHT LE - HF 4/5, KE 5/5, DF 5/5, PF 5/5        Sensory - Intact to LT bilateral  Skin on admission:  Mid thoracic to lumbar incision covered with aquacel- aquacel DCD- incision intact with running suture

## 2023-10-05 LAB
ALBUMIN SERPL ELPH-MCNC: 2.3 G/DL — LOW (ref 3.3–5)
ALP SERPL-CCNC: 116 U/L — SIGNIFICANT CHANGE UP (ref 40–120)
ALT FLD-CCNC: 19 U/L — SIGNIFICANT CHANGE UP (ref 10–45)
ANION GAP SERPL CALC-SCNC: 7 MMOL/L — SIGNIFICANT CHANGE UP (ref 5–17)
AST SERPL-CCNC: 20 U/L — SIGNIFICANT CHANGE UP (ref 10–40)
BILIRUB SERPL-MCNC: 0.9 MG/DL — SIGNIFICANT CHANGE UP (ref 0.2–1.2)
BUN SERPL-MCNC: 19 MG/DL — SIGNIFICANT CHANGE UP (ref 7–23)
CALCIUM SERPL-MCNC: 8.7 MG/DL — SIGNIFICANT CHANGE UP (ref 8.4–10.5)
CHLORIDE SERPL-SCNC: 95 MMOL/L — LOW (ref 96–108)
CO2 SERPL-SCNC: 30 MMOL/L — SIGNIFICANT CHANGE UP (ref 22–31)
CREAT SERPL-MCNC: 0.75 MG/DL — SIGNIFICANT CHANGE UP (ref 0.5–1.3)
EGFR: 99 ML/MIN/1.73M2 — SIGNIFICANT CHANGE UP
GLUCOSE BLDC GLUCOMTR-MCNC: 154 MG/DL — HIGH (ref 70–99)
GLUCOSE BLDC GLUCOMTR-MCNC: 176 MG/DL — HIGH (ref 70–99)
GLUCOSE BLDC GLUCOMTR-MCNC: 199 MG/DL — HIGH (ref 70–99)
GLUCOSE BLDC GLUCOMTR-MCNC: 221 MG/DL — HIGH (ref 70–99)
GLUCOSE SERPL-MCNC: 164 MG/DL — HIGH (ref 70–99)
HCT VFR BLD CALC: 29.3 % — LOW (ref 39–50)
HGB BLD-MCNC: 10 G/DL — LOW (ref 13–17)
MCHC RBC-ENTMCNC: 29.2 PG — SIGNIFICANT CHANGE UP (ref 27–34)
MCHC RBC-ENTMCNC: 34.1 GM/DL — SIGNIFICANT CHANGE UP (ref 32–36)
MCV RBC AUTO: 85.4 FL — SIGNIFICANT CHANGE UP (ref 80–100)
NRBC # BLD: 0 /100 WBCS — SIGNIFICANT CHANGE UP (ref 0–0)
NT-PROBNP SERPL-SCNC: 163 PG/ML — SIGNIFICANT CHANGE UP (ref 0–300)
PLATELET # BLD AUTO: 221 K/UL — SIGNIFICANT CHANGE UP (ref 150–400)
POTASSIUM SERPL-MCNC: 3.4 MMOL/L — LOW (ref 3.5–5.3)
POTASSIUM SERPL-SCNC: 3.4 MMOL/L — LOW (ref 3.5–5.3)
PROT SERPL-MCNC: 6.2 G/DL — SIGNIFICANT CHANGE UP (ref 6–8.3)
RBC # BLD: 3.43 M/UL — LOW (ref 4.2–5.8)
RBC # FLD: 13.2 % — SIGNIFICANT CHANGE UP (ref 10.3–14.5)
SODIUM SERPL-SCNC: 132 MMOL/L — LOW (ref 135–145)
WBC # BLD: 8.62 K/UL — SIGNIFICANT CHANGE UP (ref 3.8–10.5)
WBC # FLD AUTO: 8.62 K/UL — SIGNIFICANT CHANGE UP (ref 3.8–10.5)

## 2023-10-05 PROCEDURE — 99232 SBSQ HOSP IP/OBS MODERATE 35: CPT

## 2023-10-05 RX ORDER — SALIVA SUBSTITUTE COMB NO.11 351 MG
15 POWDER IN PACKET (EA) MUCOUS MEMBRANE THREE TIMES A DAY
Refills: 0 | Status: DISCONTINUED | OUTPATIENT
Start: 2023-10-05 | End: 2023-10-14

## 2023-10-05 RX ORDER — LIDOCAINE 4 G/100G
5 CREAM TOPICAL THREE TIMES A DAY
Refills: 0 | Status: DISCONTINUED | OUTPATIENT
Start: 2023-10-05 | End: 2023-10-11

## 2023-10-05 RX ORDER — TIZANIDINE 4 MG/1
2 TABLET ORAL ONCE
Refills: 0 | Status: COMPLETED | OUTPATIENT
Start: 2023-10-05 | End: 2023-10-05

## 2023-10-05 RX ORDER — LACTULOSE 10 G/15ML
20 SOLUTION ORAL ONCE
Refills: 0 | Status: COMPLETED | OUTPATIENT
Start: 2023-10-05 | End: 2023-10-05

## 2023-10-05 RX ADMIN — Medication 5 MILLIGRAM(S): at 17:13

## 2023-10-05 RX ADMIN — Medication 5 UNIT(S): at 17:14

## 2023-10-05 RX ADMIN — OXYCODONE HYDROCHLORIDE 15 MILLIGRAM(S): 5 TABLET ORAL at 06:53

## 2023-10-05 RX ADMIN — ATORVASTATIN CALCIUM 40 MILLIGRAM(S): 80 TABLET, FILM COATED ORAL at 21:35

## 2023-10-05 RX ADMIN — Medication 100 MILLIGRAM(S): at 21:32

## 2023-10-05 RX ADMIN — Medication 15 MILLILITER(S): at 16:00

## 2023-10-05 RX ADMIN — BENZOCAINE AND MENTHOL 1 LOZENGE: 5; 1 LIQUID ORAL at 10:23

## 2023-10-05 RX ADMIN — HEPARIN SODIUM 5000 UNIT(S): 5000 INJECTION INTRAVENOUS; SUBCUTANEOUS at 21:32

## 2023-10-05 RX ADMIN — BUMETANIDE 2 MILLIGRAM(S): 0.25 INJECTION INTRAMUSCULAR; INTRAVENOUS at 06:53

## 2023-10-05 RX ADMIN — OXYCODONE HYDROCHLORIDE 15 MILLIGRAM(S): 5 TABLET ORAL at 03:40

## 2023-10-05 RX ADMIN — Medication 15 MILLILITER(S): at 21:33

## 2023-10-05 RX ADMIN — OXYCODONE HYDROCHLORIDE 15 MILLIGRAM(S): 5 TABLET ORAL at 20:51

## 2023-10-05 RX ADMIN — Medication 4: at 12:03

## 2023-10-05 RX ADMIN — GABAPENTIN 300 MILLIGRAM(S): 400 CAPSULE ORAL at 06:53

## 2023-10-05 RX ADMIN — Medication 2: at 17:13

## 2023-10-05 RX ADMIN — Medication 5 UNIT(S): at 08:15

## 2023-10-05 RX ADMIN — Medication 81 MILLIGRAM(S): at 12:03

## 2023-10-05 RX ADMIN — HEPARIN SODIUM 5000 UNIT(S): 5000 INJECTION INTRAVENOUS; SUBCUTANEOUS at 16:01

## 2023-10-05 RX ADMIN — HEPARIN SODIUM 5000 UNIT(S): 5000 INJECTION INTRAVENOUS; SUBCUTANEOUS at 06:53

## 2023-10-05 RX ADMIN — LIDOCAINE 1 PATCH: 4 CREAM TOPICAL at 12:02

## 2023-10-05 RX ADMIN — TIZANIDINE 2 MILLIGRAM(S): 4 TABLET ORAL at 16:13

## 2023-10-05 RX ADMIN — Medication 1 TABLET(S): at 12:02

## 2023-10-05 RX ADMIN — GABAPENTIN 300 MILLIGRAM(S): 400 CAPSULE ORAL at 11:55

## 2023-10-05 RX ADMIN — Medication 5 UNIT(S): at 12:03

## 2023-10-05 RX ADMIN — TIZANIDINE 2 MILLIGRAM(S): 4 TABLET ORAL at 03:52

## 2023-10-05 RX ADMIN — OXYCODONE HYDROCHLORIDE 15 MILLIGRAM(S): 5 TABLET ORAL at 16:12

## 2023-10-05 RX ADMIN — PANTOPRAZOLE SODIUM 40 MILLIGRAM(S): 20 TABLET, DELAYED RELEASE ORAL at 06:53

## 2023-10-05 RX ADMIN — LIDOCAINE 5 MILLILITER(S): 4 CREAM TOPICAL at 21:32

## 2023-10-05 RX ADMIN — INSULIN GLARGINE 17 UNIT(S): 100 INJECTION, SOLUTION SUBCUTANEOUS at 21:33

## 2023-10-05 RX ADMIN — GABAPENTIN 300 MILLIGRAM(S): 400 CAPSULE ORAL at 17:12

## 2023-10-05 RX ADMIN — Medication 2: at 08:14

## 2023-10-05 RX ADMIN — LIDOCAINE 5 MILLILITER(S): 4 CREAM TOPICAL at 15:59

## 2023-10-05 RX ADMIN — Medication 5 MILLIGRAM(S): at 06:53

## 2023-10-05 RX ADMIN — TIZANIDINE 6 MILLIGRAM(S): 4 TABLET ORAL at 21:36

## 2023-10-05 RX ADMIN — LACTULOSE 20 GRAM(S): 10 SOLUTION ORAL at 11:51

## 2023-10-05 RX ADMIN — OXYCODONE HYDROCHLORIDE 15 MILLIGRAM(S): 5 TABLET ORAL at 02:47

## 2023-10-05 NOTE — PROGRESS NOTE ADULT - ASSESSMENT
66 y/o M with PMH of  HTN, HLD, CAD s/p CABG, HFrEF (LV systolic function appears grossly normal on Echo 9/21), Type 2 Diabetes, ASIYA on CPAP, right renal mass s/p partial resection (3/2023, pathology negative), s/p pervious L3-pelvis fusion, who presented to [facility] ED on 9/18 after a syncopal episode, found to have T12-L1 fracture on imaging. Patient had a removal of hardware from L3-pelvis and revision T9-pelvis revision PSF w/ muscle flap reconstruction on 9/23. Patient now with gait Instability, ADL impairments and Functional impairments.    Gait Instability/Functional Impairment  Status Post Fall w/ T12-L1 Acute Fracture   -s/p removal of hardware from L3-pelvis and revision T9-pelvis revision PSF w/ muscle flap reconstruction on 9/23  -Fall/Spine precautions  -Outpatient follow up with neurosurgery  -Continue comprehensive rehab program - PT/OT/SLP per rehab team  -Pain management, bowel regimen per rehab   -Monitor H/H    Hypokalemia  -Replete and monitor    Syncopal Episode   -Felt to be due to situational syncope (after blowing his nose)  -Workup at OSH: CTH negative, EKG showed no acute changes, Echo showed grossly nl LV function, no significant valvular disease   -Will monitor     HTN/HLD  CAD s/p CABG/Mild Diastolic Dysfunction w/ EF 60%  -Per documentation: Echo from 5/5/23 showed nl LV function, EF 60%, mild diastolic dysfunction. normal RV function. trivial MR and TR, normal Aortic valve. normal Pulmonic valve.   -No evidence of fluid overload on exam   -Continue Aspirin, Coreg, Losartan, Atorvastatin   -Bumex decreased from 2mg BID to qd 10/4. Probnp 163 - can dc bumex 10/5 if hypotension persists  -Daily weight   -Outpatient cardiology follow up     ASIYA  -Continue with nocturnal CPAP. Pressure set with home setting of 5.    Type 2 Diabetes  -HbA1C 6.5 on 9/19  -Continue to hold oral glimepiride  -Hospitalization was complicated by steroid induced hyperglycemia - now off of steroids    -Lantus 17un qhs, admelog 5un TID w/ meals, ISS  -Continue to monitor FS, adjust insulin as needed    Gout  -Continue allopurinol    DVT ppx - HSQ     66 y/o M with PMH of  HTN, HLD, CAD s/p CABG, HFrEF (LV systolic function appears grossly normal on Echo 9/21), Type 2 Diabetes, ASIYA on CPAP, right renal mass s/p partial resection (3/2023, pathology negative), s/p pervious L3-pelvis fusion, who presented to [facility] ED on 9/18 after a syncopal episode, found to have T12-L1 fracture on imaging. Patient had a removal of hardware from L3-pelvis and revision T9-pelvis revision PSF w/ muscle flap reconstruction on 9/23. Patient now with gait Instability, ADL impairments and Functional impairments.    Gait Instability/Functional Impairment  Status Post Fall w/ T12-L1 Acute Fracture   -s/p removal of hardware from L3-pelvis and revision T9-pelvis revision PSF w/ muscle flap reconstruction on 9/23  -Fall/Spine precautions  -Outpatient follow up with neurosurgery  -Continue comprehensive rehab program - PT/OT/SLP per rehab team  -Pain management, bowel regimen per rehab   -Monitor H/H    Hypokalemia  -Replete and monitor    Syncopal Episode   -Felt to be due to situational syncope (after blowing his nose)  -Workup at OSH: CTH negative, EKG showed no acute changes, Echo showed grossly nl LV function, no significant valvular disease   -Will monitor     HTN/HLD  CAD s/p CABG/Mild Diastolic Dysfunction w/ EF 60%  -Per documentation: Echo from 5/5/23 showed nl LV function, EF 60%, mild diastolic dysfunction. normal RV function. trivial MR and TR, normal Aortic valve. normal Pulmonic valve.   -Continue Aspirin, Atorvastatin   -Coreg, Losartan held for syncope/hypotension  -Bumex decreased from 2mg BID to qd 10/4. Probnp 163 - can dc bumex 10/5 if hypotension persists  -Daily weight  -Outpatient cardiology follow up     ASIYA  -Continue with nocturnal CPAP. Pressure set with home setting of 5.    Type 2 Diabetes  -HbA1C 6.5 on 9/19  -Continue to hold oral glimepiride  -Hospitalization was complicated by steroid induced hyperglycemia - now off of steroids    -Lantus 17un qhs, admelog 5un TID w/ meals, ISS  -Continue to monitor FS, adjust insulin as needed    Gout  -Continue allopurinol    DVT ppx - HSQ

## 2023-10-05 NOTE — PROGRESS NOTE ADULT - ASSESSMENT
This is a 68 YO male with PMH of  HFrEF, CAD s/p CABG, ASIYA on CPAP, HTN, DM II, right renal mass s/p resection presents to  [facility] ED on 9/18  after a syncopal episode, found to have T12-L1 fracture on imaging. Patient had a T9-pelvis revision PSF. Patient now with gait Instability, ADL impairments and Functional impairments.    #Thoracic spine fracture  - T9-pelvis revision PSF on 9/23  - Comprehensive Rehab Program: PT/OT, 3hours daily and 5 days weekly  - PT: Focused on improving strength, endurance, coordination, balance, functional mobility, and transfers  - OT: Focused on improving strength, fine motor skills, coordination, posture and ADLs.    -pain regimen  -zanaflex trial, avoid hypotension- muscle spasms major issue-mostly at night/in bed      #ASIYA  Respiratory order for nocturnal CPAP. Pressure set withy home setting of 5.    #HTN  - Carvedilol 6.25mg BID-on hold per hospitalist  - Losartan 25mg daily-hold    #HLD  - Lipitor 40mg daily    #CAD   - s/p CABG  - c/w ASA 81mg daily    #HF  - Bumex 2mg BID to daily 2/2 BP 90s    #DM II  - ISS and FS  - Admelog and Lantus  - A1c 6.5 on 9/19  follow fingersticks    #Neuropathy  -gabapentin 300mg QID.- consider increasing dose to 400mg 4x/day as PTA    #Pain management  - Tylenol PRN,   - Oxycodone 10mg moderate, 15mg severe PRN,   - lidocaine patch,   -zanaflex 2mg/2mg/6mg  - gabapentin 300mg 4x/.day  ICE    #DVT ppx  - Heparin 5K Q 8 hrs  -  SCD, TEDs    #GI ppx  - Protonix 40mg  - Zofran    #Bowel Regimen  - Senna, dulcolax 5mg BID, miralax 2x/day  -Lactulose x1 today    #Bladder management  - BS on admission, and q 8 hours (SC if > 400)  - Monitor UO    #FEN   - Diet: DASH/TLC  - MVI    #Skin:  - Skin on admission: Multiple Left sided abdomen abrasions. Mid thoracic to lumbar incision covered with primary dressing. Previous surgical scars of chest, abdomen, b/l elbows, wrists and knees. No pressure ulcers.  - Pressure injury/Skin: Turn Q2hrs while in bed, OOB to Chair, PT/OT     #Sleep:   - Maintain quiet hours and low stim environment.  - Melatonin 3mg nightly PRN to maximize participation in therapy during the day.     #Precaution  - Fall, Aspiration, Spinal

## 2023-10-05 NOTE — PROGRESS NOTE ADULT - SUBJECTIVE AND OBJECTIVE BOX
HPI:  This is a 68 YO male with PMH of  HFrEF, CAD s/p CABG, ASIYA on CPAP, HTN, DM, right renal mass s/p resection presents to  [facility] ED ON 9/18  after a syncopal episode, found to have T12-L1 fracture on imaging. Patient originally scheduled for an ACDF with Dr. Hughes, due to injury he was changed to a T9-pelvis revision PSF. Hospital course s/f medication adjustments for HTN  and HF. Patient was evaluated by PM&R and therapy for functional deficits, gait/ADL impairments and acute rehabilitation was recommended. Patient was medically optimized for discharge to [facility] IRU on 9/29/23.      ROS/subjective:  Patient seen and evaluated, NAD in chair, spasms/back pain-on Zanaflex. Increase Zanaflex at bedtime 2/2/6.  BPs improved-Coreg held -hospitalist in  no dizziness, no headaches, no chest pain , No SOB, no nausea, no vomiting  Lactulose x1-no BM  biotene/visc Lidocaine for lip lesion and dry mouth      MEDICATIONS  (STANDING):  allopurinol 100 milliGRAM(s) Oral at bedtime  aspirin enteric coated 81 milliGRAM(s) Oral daily  atorvastatin 40 milliGRAM(s) Oral at bedtime  Biotene Dry Mouth Oral Rinse 15 milliLiter(s) Swish and Spit three times a day  bisacodyl 5 milliGRAM(s) Oral every 12 hours  buMETAnide 2 milliGRAM(s) Oral daily  dextrose 5%. 1000 milliLiter(s) (100 mL/Hr) IV Continuous <Continuous>  dextrose 5%. 1000 milliLiter(s) (50 mL/Hr) IV Continuous <Continuous>  dextrose 50% Injectable 25 Gram(s) IV Push once  dextrose 50% Injectable 25 Gram(s) IV Push once  dextrose 50% Injectable 12.5 Gram(s) IV Push once  gabapentin 300 milliGRAM(s) Oral four times a day  glucagon  Injectable 1 milliGRAM(s) IntraMuscular once  heparin   Injectable 5000 Unit(s) SubCutaneous every 8 hours  insulin glargine Injectable (LANTUS) 17 Unit(s) SubCutaneous at bedtime  insulin lispro (ADMELOG) corrective regimen sliding scale   SubCutaneous three times a day before meals  insulin lispro (ADMELOG) corrective regimen sliding scale   SubCutaneous at bedtime  insulin lispro Injectable (ADMELOG) 5 Unit(s) SubCutaneous before breakfast  insulin lispro Injectable (ADMELOG) 5 Unit(s) SubCutaneous before lunch  insulin lispro Injectable (ADMELOG) 5 Unit(s) SubCutaneous before dinner  lidocaine   4% Patch 1 Patch Transdermal daily  lidocaine 2% Viscous 5 milliLiter(s) Swish and Spit three times a day  multivitamin 1 Tablet(s) Oral daily  pantoprazole    Tablet 40 milliGRAM(s) Oral before breakfast  tiZANidine 6 milliGRAM(s) Oral at bedtime  tiZANidine 2 milliGRAM(s) Oral <User Schedule>    MEDICATIONS  (PRN):  aluminum hydroxide/magnesium hydroxide/simethicone Suspension 30 milliLiter(s) Oral every 4 hours PRN Dyspepsia  benzocaine/menthol Lozenge 1 Lozenge Oral every 3 hours PRN Sore Throat  dextrose Oral Gel 15 Gram(s) Oral once PRN Blood Glucose LESS THAN 70 milliGRAM(s)/deciliter  guaiFENesin Oral Liquid (Sugar-Free) 100 milliGRAM(s) Oral every 6 hours PRN Cough  melatonin 3 milliGRAM(s) Oral at bedtime PRN Insomnia  ondansetron   Disintegrating Tablet 4 milliGRAM(s) Oral every 6 hours PRN Nausea and/or Vomiting  oxyCODONE    IR 10 milliGRAM(s) Oral every 3 hours PRN Moderate Pain (4 - 6)  oxyCODONE    IR 15 milliGRAM(s) Oral every 3 hours PRN Severe Pain (7 - 10)  polyethylene glycol 3350 17 Gram(s) Oral two times a day PRN Constipation                            10.0   8.62  )-----------( 221      ( 05 Oct 2023 06:15 )             29.3     10-05    132<L>  |  95<L>  |  19  ----------------------------<  164<H>  3.4<L>   |  30  |  0.75    Ca    8.7      05 Oct 2023 06:15    TPro  6.2  /  Alb  2.3<L>  /  TBili  0.9  /  DBili  x   /  AST  20  /  ALT  19  /  AlkPhos  116  10-05    Urinalysis Basic - ( 05 Oct 2023 06:15 )    Color: x / Appearance: x / SG: x / pH: x  Gluc: 164 mg/dL / Ketone: x  / Bili: x / Urobili: x   Blood: x / Protein: x / Nitrite: x   Leuk Esterase: x / RBC: x / WBC x   Sq Epi: x / Non Sq Epi: x / Bacteria: x        CAPILLARY BLOOD GLUCOSE      POCT Blood Glucose.: 221 mg/dL (05 Oct 2023 11:50)  POCT Blood Glucose.: 199 mg/dL (05 Oct 2023 08:11)  POCT Blood Glucose.: 185 mg/dL (04 Oct 2023 20:51)  POCT Blood Glucose.: 156 mg/dL (04 Oct 2023 17:22)      Vital Signs Last 24 Hrs  T(C): 36.6 (05 Oct 2023 08:06), Max: 37 (04 Oct 2023 20:55)  T(F): 97.9 (05 Oct 2023 08:06), Max: 98.6 (04 Oct 2023 20:55)  HR: 60 (05 Oct 2023 08:06) (60 - 84)  BP: 111/75 (05 Oct 2023 08:06) (97/63 - 111/75)  BP(mean): --  RR: 16 (05 Oct 2023 08:06) (15 - 16)  SpO2: 95% (05 Oct 2023 08:06) (95% - 97%)    Parameters below as of 05 Oct 2023 08:06  Patient On (Oxygen Delivery Method): room air        Constitutional - NAD  HEENT - NCAT, EOMI.    Chest - good chest expansion, good respiratory effort   Cardio - S1D2 RRR  Abdomen -  Obese, Soft, NTND BS +  Extremities - No peripheral edema, No calf tenderness   Neurologic Exam:                    Cranial Nerves -2-12 intact     Motor -  Strength preserved in UE and LE with severe pain in back.                     LEFT    UE - ShAB 5/5, EF 5/5, EE 5/5, WE 5/5,  WNL                     RIGHT UE - ShAB 5/5, EF 5/5, EE 5/5, WE 5/5,  WNL                    LEFT    LE - HF 4/5, KE 5/5, DF 5/5, PF 5/5                    RIGHT LE - HF 4/5, KE 5/5, DF 5/5, PF 5/5        Sensory - Intact to LT bilateral  Skin on admission:  Mid thoracic to lumbar incision covered with primary dressing. Previous surgical scars of chest, abdomen, b/l elbows, wrists and knees. No pressure ulcers.    IDT meeting on 10/4    SW: PH 3STE, 12STI, children    OT:  eating set up  grooming set up  UBD/LBD mod A  toileting mod A  tub/shower mod A  bathing mod A    PT:  amb 50ft min A RW  transfers mod A  stairs    SLP na    tentative dc 10/14 HC                 Continue comprehensive acute rehab program consisting of 3hrs/day of OT/PT and SLP.

## 2023-10-05 NOTE — CHART NOTE - NSCHARTNOTEFT_GEN_A_CORE
67 year old male with PMH of  HTN, HLD, CAD s/p CABG, HFrEF (LV systolic function appears grossly normal on Echo 9/21), Type 2 Diabetes, ASIYA on CPAP, right renal mass s/p partial resection (3/2023, pathology negative), s/p pervious L3-pelvis fusion, who presented to [facility] ED on 9/18  after a syncopal episode, found to have T12-L1 fracture on imaging. Patient had a removal of hardware from L3-pelvis and revision T9-pelvis revision PSF w/ muscle flap reconstruction on 9/23. Patient now with gait Instability, ADL impairments and Functional impairments.    Interval Events:  Notified by nurse that patient complaining of painful muscle spasms in back . Given patient's scheduled 2mg zanaflex earlier. Will s/o to dayteam

## 2023-10-05 NOTE — PROGRESS NOTE ADULT - SUBJECTIVE AND OBJECTIVE BOX
Patient is a 67y old  Male who presents with a chief complaint of Spinal Fusion (04 Oct 2023 11:25)      Patient seen and examined at bedside. denies headache, fever, chills, cp, sob, n/v, abd pain.     ALLERGIES:  No Known Allergies    MEDICATIONS  (STANDING):  allopurinol 100 milliGRAM(s) Oral at bedtime  aspirin enteric coated 81 milliGRAM(s) Oral daily  atorvastatin 40 milliGRAM(s) Oral at bedtime  bisacodyl 5 milliGRAM(s) Oral every 12 hours  buMETAnide 2 milliGRAM(s) Oral daily  dextrose 5%. 1000 milliLiter(s) (50 mL/Hr) IV Continuous <Continuous>  dextrose 5%. 1000 milliLiter(s) (100 mL/Hr) IV Continuous <Continuous>  dextrose 50% Injectable 25 Gram(s) IV Push once  dextrose 50% Injectable 25 Gram(s) IV Push once  dextrose 50% Injectable 12.5 Gram(s) IV Push once  gabapentin 300 milliGRAM(s) Oral four times a day  glucagon  Injectable 1 milliGRAM(s) IntraMuscular once  heparin   Injectable 5000 Unit(s) SubCutaneous every 8 hours  insulin glargine Injectable (LANTUS) 17 Unit(s) SubCutaneous at bedtime  insulin lispro (ADMELOG) corrective regimen sliding scale   SubCutaneous three times a day before meals  insulin lispro (ADMELOG) corrective regimen sliding scale   SubCutaneous at bedtime  insulin lispro Injectable (ADMELOG) 5 Unit(s) SubCutaneous before breakfast  insulin lispro Injectable (ADMELOG) 5 Unit(s) SubCutaneous before lunch  insulin lispro Injectable (ADMELOG) 5 Unit(s) SubCutaneous before dinner  lidocaine   4% Patch 1 Patch Transdermal daily  multivitamin 1 Tablet(s) Oral daily  pantoprazole    Tablet 40 milliGRAM(s) Oral before breakfast  tiZANidine 4 milliGRAM(s) Oral at bedtime  tiZANidine 2 milliGRAM(s) Oral <User Schedule>    MEDICATIONS  (PRN):  aluminum hydroxide/magnesium hydroxide/simethicone Suspension 30 milliLiter(s) Oral every 4 hours PRN Dyspepsia  benzocaine/menthol Lozenge 1 Lozenge Oral every 3 hours PRN Sore Throat  dextrose Oral Gel 15 Gram(s) Oral once PRN Blood Glucose LESS THAN 70 milliGRAM(s)/deciliter  guaiFENesin Oral Liquid (Sugar-Free) 100 milliGRAM(s) Oral every 6 hours PRN Cough  melatonin 3 milliGRAM(s) Oral at bedtime PRN Insomnia  ondansetron   Disintegrating Tablet 4 milliGRAM(s) Oral every 6 hours PRN Nausea and/or Vomiting  oxyCODONE    IR 10 milliGRAM(s) Oral every 3 hours PRN Moderate Pain (4 - 6)  oxyCODONE    IR 15 milliGRAM(s) Oral every 3 hours PRN Severe Pain (7 - 10)  polyethylene glycol 3350 17 Gram(s) Oral two times a day PRN Constipation    Vital Signs Last 24 Hrs  T(F): 97.9 (05 Oct 2023 08:06), Max: 98.6 (04 Oct 2023 20:55)  HR: 60 (05 Oct 2023 08:06) (60 - 84)  BP: 111/75 (05 Oct 2023 08:06) (97/63 - 111/75)  RR: 16 (05 Oct 2023 08:06) (15 - 16)  SpO2: 95% (05 Oct 2023 08:06) (95% - 97%)  I&O's Summary    04 Oct 2023 07:01  -  05 Oct 2023 07:00  --------------------------------------------------------  IN: 0 mL / OUT: 600 mL / NET: -600 mL      BMI (kg/m2): 44.9 (10-02-23 @ 13:10)    PHYSICAL EXAM:  General: NAD, obese  ENT: MMM, no scleral icterus  Neck: Supple, No JVD  Lungs: Clear to auscultation bilaterally, no wheezes, rales, rhonchi  Cardio: RRR, S1/S2  Abdomen: Soft, Nontender, Nondistended; Bowel sounds present  Extremities: No calf tenderness, No pitting edema    LABS:                        10.0   8.62  )-----------( 221      ( 05 Oct 2023 06:15 )             29.3       10-05    132  |  95  |  19  ----------------------------<  164  3.4   |  30  |  0.75    Ca    8.7      05 Oct 2023 06:15    TPro  6.2  /  Alb  2.3  /  TBili  0.9  /  DBili  x   /  AST  20  /  ALT  19  /  AlkPhos  116  10-05 09-19 Chol 181 mg/dL LDL -- HDL 27 mg/dL Trig 646 mg/dL              POCT Blood Glucose.: 199 mg/dL (05 Oct 2023 08:11)  POCT Blood Glucose.: 185 mg/dL (04 Oct 2023 20:51)  POCT Blood Glucose.: 156 mg/dL (04 Oct 2023 17:22)  POCT Blood Glucose.: 185 mg/dL (04 Oct 2023 12:19)      Urinalysis Basic - ( 05 Oct 2023 06:15 )    Color: x / Appearance: x / SG: x / pH: x  Gluc: 164 mg/dL / Ketone: x  / Bili: x / Urobili: x   Blood: x / Protein: x / Nitrite: x   Leuk Esterase: x / RBC: x / WBC x   Sq Epi: x / Non Sq Epi: x / Bacteria: x        COVID-19 PCR: NotDetec (09-30-23 @ 16:00)      RADIOLOGY & ADDITIONAL TESTS:    Care Discussed with Consultants/Other Providers: yes, rehab

## 2023-10-06 LAB
ANION GAP SERPL CALC-SCNC: 9 MMOL/L — SIGNIFICANT CHANGE UP (ref 5–17)
BUN SERPL-MCNC: 16 MG/DL — SIGNIFICANT CHANGE UP (ref 7–23)
CALCIUM SERPL-MCNC: 8.7 MG/DL — SIGNIFICANT CHANGE UP (ref 8.4–10.5)
CHLORIDE SERPL-SCNC: 96 MMOL/L — SIGNIFICANT CHANGE UP (ref 96–108)
CO2 SERPL-SCNC: 29 MMOL/L — SIGNIFICANT CHANGE UP (ref 22–31)
CREAT SERPL-MCNC: 0.73 MG/DL — SIGNIFICANT CHANGE UP (ref 0.5–1.3)
EGFR: 100 ML/MIN/1.73M2 — SIGNIFICANT CHANGE UP
GLUCOSE BLDC GLUCOMTR-MCNC: 144 MG/DL — HIGH (ref 70–99)
GLUCOSE BLDC GLUCOMTR-MCNC: 155 MG/DL — HIGH (ref 70–99)
GLUCOSE BLDC GLUCOMTR-MCNC: 159 MG/DL — HIGH (ref 70–99)
GLUCOSE BLDC GLUCOMTR-MCNC: 201 MG/DL — HIGH (ref 70–99)
GLUCOSE SERPL-MCNC: 155 MG/DL — HIGH (ref 70–99)
POTASSIUM SERPL-MCNC: 3.3 MMOL/L — LOW (ref 3.5–5.3)
POTASSIUM SERPL-SCNC: 3.3 MMOL/L — LOW (ref 3.5–5.3)
SODIUM SERPL-SCNC: 134 MMOL/L — LOW (ref 135–145)

## 2023-10-06 PROCEDURE — 99232 SBSQ HOSP IP/OBS MODERATE 35: CPT | Mod: GC

## 2023-10-06 PROCEDURE — 99232 SBSQ HOSP IP/OBS MODERATE 35: CPT

## 2023-10-06 RX ORDER — BUMETANIDE 0.25 MG/ML
1 INJECTION INTRAMUSCULAR; INTRAVENOUS
Refills: 0 | Status: DISCONTINUED | OUTPATIENT
Start: 2023-10-06 | End: 2023-10-09

## 2023-10-06 RX ORDER — POTASSIUM CHLORIDE 20 MEQ
20 PACKET (EA) ORAL DAILY
Refills: 0 | Status: DISCONTINUED | OUTPATIENT
Start: 2023-10-06 | End: 2023-10-14

## 2023-10-06 RX ORDER — OXYCODONE HYDROCHLORIDE 5 MG/1
15 TABLET ORAL
Refills: 0 | Status: DISCONTINUED | OUTPATIENT
Start: 2023-10-06 | End: 2023-10-11

## 2023-10-06 RX ORDER — POTASSIUM CHLORIDE 20 MEQ
40 PACKET (EA) ORAL EVERY 4 HOURS
Refills: 0 | Status: COMPLETED | OUTPATIENT
Start: 2023-10-06 | End: 2023-10-06

## 2023-10-06 RX ORDER — INSULIN GLARGINE 100 [IU]/ML
20 INJECTION, SOLUTION SUBCUTANEOUS AT BEDTIME
Refills: 0 | Status: DISCONTINUED | OUTPATIENT
Start: 2023-10-06 | End: 2023-10-08

## 2023-10-06 RX ORDER — OXYCODONE HYDROCHLORIDE 5 MG/1
10 TABLET ORAL
Refills: 0 | Status: DISCONTINUED | OUTPATIENT
Start: 2023-10-06 | End: 2023-10-12

## 2023-10-06 RX ADMIN — ATORVASTATIN CALCIUM 40 MILLIGRAM(S): 80 TABLET, FILM COATED ORAL at 21:29

## 2023-10-06 RX ADMIN — Medication 5 UNIT(S): at 12:18

## 2023-10-06 RX ADMIN — TIZANIDINE 6 MILLIGRAM(S): 4 TABLET ORAL at 21:29

## 2023-10-06 RX ADMIN — Medication 2: at 12:17

## 2023-10-06 RX ADMIN — OXYCODONE HYDROCHLORIDE 10 MILLIGRAM(S): 5 TABLET ORAL at 07:20

## 2023-10-06 RX ADMIN — Medication 15 MILLILITER(S): at 15:28

## 2023-10-06 RX ADMIN — Medication 4: at 08:23

## 2023-10-06 RX ADMIN — GABAPENTIN 300 MILLIGRAM(S): 400 CAPSULE ORAL at 05:56

## 2023-10-06 RX ADMIN — OXYCODONE HYDROCHLORIDE 15 MILLIGRAM(S): 5 TABLET ORAL at 12:17

## 2023-10-06 RX ADMIN — Medication 40 MILLIEQUIVALENT(S): at 17:23

## 2023-10-06 RX ADMIN — Medication 15 MILLILITER(S): at 05:56

## 2023-10-06 RX ADMIN — Medication 81 MILLIGRAM(S): at 12:19

## 2023-10-06 RX ADMIN — OXYCODONE HYDROCHLORIDE 15 MILLIGRAM(S): 5 TABLET ORAL at 21:44

## 2023-10-06 RX ADMIN — HEPARIN SODIUM 5000 UNIT(S): 5000 INJECTION INTRAVENOUS; SUBCUTANEOUS at 13:25

## 2023-10-06 RX ADMIN — TIZANIDINE 2 MILLIGRAM(S): 4 TABLET ORAL at 08:56

## 2023-10-06 RX ADMIN — Medication 5 MILLIGRAM(S): at 05:56

## 2023-10-06 RX ADMIN — GABAPENTIN 300 MILLIGRAM(S): 400 CAPSULE ORAL at 12:16

## 2023-10-06 RX ADMIN — HEPARIN SODIUM 5000 UNIT(S): 5000 INJECTION INTRAVENOUS; SUBCUTANEOUS at 21:50

## 2023-10-06 RX ADMIN — PANTOPRAZOLE SODIUM 40 MILLIGRAM(S): 20 TABLET, DELAYED RELEASE ORAL at 05:56

## 2023-10-06 RX ADMIN — BUMETANIDE 2 MILLIGRAM(S): 0.25 INJECTION INTRAMUSCULAR; INTRAVENOUS at 05:56

## 2023-10-06 RX ADMIN — Medication 40 MILLIEQUIVALENT(S): at 12:19

## 2023-10-06 RX ADMIN — Medication 1 TABLET(S): at 12:19

## 2023-10-06 RX ADMIN — HEPARIN SODIUM 5000 UNIT(S): 5000 INJECTION INTRAVENOUS; SUBCUTANEOUS at 05:55

## 2023-10-06 RX ADMIN — LIDOCAINE 5 MILLILITER(S): 4 CREAM TOPICAL at 21:29

## 2023-10-06 RX ADMIN — TIZANIDINE 2 MILLIGRAM(S): 4 TABLET ORAL at 17:24

## 2023-10-06 RX ADMIN — Medication 5 UNIT(S): at 17:25

## 2023-10-06 RX ADMIN — BUMETANIDE 1 MILLIGRAM(S): 0.25 INJECTION INTRAMUSCULAR; INTRAVENOUS at 12:20

## 2023-10-06 RX ADMIN — Medication 5 MILLIGRAM(S): at 17:24

## 2023-10-06 RX ADMIN — Medication 2: at 17:25

## 2023-10-06 RX ADMIN — Medication 15 MILLILITER(S): at 21:30

## 2023-10-06 RX ADMIN — Medication 5 UNIT(S): at 08:23

## 2023-10-06 RX ADMIN — Medication 100 MILLIGRAM(S): at 21:29

## 2023-10-06 RX ADMIN — INSULIN GLARGINE 20 UNIT(S): 100 INJECTION, SOLUTION SUBCUTANEOUS at 21:49

## 2023-10-06 RX ADMIN — LIDOCAINE 5 MILLILITER(S): 4 CREAM TOPICAL at 13:25

## 2023-10-06 RX ADMIN — GABAPENTIN 300 MILLIGRAM(S): 400 CAPSULE ORAL at 17:24

## 2023-10-06 NOTE — PROGRESS NOTE ADULT - ASSESSMENT
This is a 66 YO male with PMH of  HFrEF, CAD s/p CABG, ASIYA on CPAP, HTN, DM II, right renal mass s/p resection presents to  [facility] ED on 9/18  after a syncopal episode, found to have T12-L1 fracture on imaging. Patient had a T9-pelvis revision PSF. Patient now with gait Instability, ADL impairments and Functional impairments.    #Thoracic spine fracture  - T9-pelvis revision PSF on 9/23  - Comprehensive Rehab Program: PT/OT, 3hours daily and 5 days weekly  - PT: Focused on improving strength, endurance, coordination, balance, functional mobility, and transfers  - OT: Focused on improving strength, fine motor skills, coordination, posture and ADLs.    -pain regimen  -zanaflex trial, avoid hypotension- muscle spasms major issue-mostly at night/in bed      #ASIYA  Respiratory order for nocturnal CPAP. Pressure set withy home setting of 5.    #HTN  - Carvedilol 6.25mg BID-on hold per hospitalist  - Losartan 25mg daily-hold    #HLD  - Lipitor 40mg daily    #CAD   - s/p CABG  - c/w ASA 81mg daily    #CHF  - Bumex 2mg+1mg. Monitor weight daily.     #DM II  - ISS and FS  - Admelog and Lantus  - A1c 6.5 on 9/19  follow fingersticks    #Neuropathy  -gabapentin 300mg QID.- consider increasing dose to 400mg 4x/day as PTA    #Pain management  - Tylenol PRN,   - Oxycodone 10mg moderate, 15mg severe PRN,   - lidocaine patch,   -zanaflex 2mg/2mg/6mg  - gabapentin 300mg 4x/.day  ICE    #DVT ppx  - Heparin 5K Q 8 hrs    #GI ppx  - Protonix 40mg  - Zofran    #Bowel Regimen  - Senna, dulcolax 5mg BID, miralax 2x/day  -Lactulose x1 -BMx2    #Bladder management  - BS on admission, and q 8 hours (SC if > 400)  - Monitor UO    #FEN   - Diet: DASH/TLC  - MVI    #Skin:  - Skin on admission: Multiple Left sided abdomen abrasions. Mid thoracic to lumbar incision covered with primary dressing. Previous surgical scars of chest, abdomen, b/l elbows, wrists and knees. No pressure ulcers.  - Pressure injury/Skin: Turn Q2hrs while in bed, OOB to Chair, PT/OT     #Sleep:   - Maintain quiet hours and low stim environment.  - Melatonin 3mg nightly PRN to maximize participation in therapy during the day.     #Precaution  - Fall, Aspiration, Spinal

## 2023-10-06 NOTE — PROGRESS NOTE ADULT - SUBJECTIVE AND OBJECTIVE BOX
Patient is a 67y old  Male who presents with a chief complaint of Spinal Fusion (05 Oct 2023 14:05).    Patient seen and examined at bedside. NAD. denies acute medical complaints.     ALLERGIES:  No Known Allergies    MEDICATIONS  (STANDING):  allopurinol 100 milliGRAM(s) Oral at bedtime  aspirin enteric coated 81 milliGRAM(s) Oral daily  atorvastatin 40 milliGRAM(s) Oral at bedtime  Biotene Dry Mouth Oral Rinse 15 milliLiter(s) Swish and Spit three times a day  bisacodyl 5 milliGRAM(s) Oral every 12 hours  buMETAnide 2 milliGRAM(s) Oral daily  dextrose 5%. 1000 milliLiter(s) (50 mL/Hr) IV Continuous <Continuous>  dextrose 5%. 1000 milliLiter(s) (100 mL/Hr) IV Continuous <Continuous>  dextrose 50% Injectable 12.5 Gram(s) IV Push once  dextrose 50% Injectable 25 Gram(s) IV Push once  dextrose 50% Injectable 25 Gram(s) IV Push once  gabapentin 300 milliGRAM(s) Oral four times a day  glucagon  Injectable 1 milliGRAM(s) IntraMuscular once  heparin   Injectable 5000 Unit(s) SubCutaneous every 8 hours  insulin glargine Injectable (LANTUS) 17 Unit(s) SubCutaneous at bedtime  insulin lispro (ADMELOG) corrective regimen sliding scale   SubCutaneous three times a day before meals  insulin lispro (ADMELOG) corrective regimen sliding scale   SubCutaneous at bedtime  insulin lispro Injectable (ADMELOG) 5 Unit(s) SubCutaneous before breakfast  insulin lispro Injectable (ADMELOG) 5 Unit(s) SubCutaneous before lunch  insulin lispro Injectable (ADMELOG) 5 Unit(s) SubCutaneous before dinner  lidocaine   4% Patch 1 Patch Transdermal daily  lidocaine 2% Viscous 5 milliLiter(s) Swish and Spit three times a day  multivitamin 1 Tablet(s) Oral daily  pantoprazole    Tablet 40 milliGRAM(s) Oral before breakfast  potassium chloride    Tablet ER 40 milliEquivalent(s) Oral every 4 hours  tiZANidine 6 milliGRAM(s) Oral at bedtime  tiZANidine 2 milliGRAM(s) Oral <User Schedule>    MEDICATIONS  (PRN):  aluminum hydroxide/magnesium hydroxide/simethicone Suspension 30 milliLiter(s) Oral every 4 hours PRN Dyspepsia  benzocaine/menthol Lozenge 1 Lozenge Oral every 3 hours PRN Sore Throat  dextrose Oral Gel 15 Gram(s) Oral once PRN Blood Glucose LESS THAN 70 milliGRAM(s)/deciliter  guaiFENesin Oral Liquid (Sugar-Free) 100 milliGRAM(s) Oral every 6 hours PRN Cough  melatonin 3 milliGRAM(s) Oral at bedtime PRN Insomnia  ondansetron   Disintegrating Tablet 4 milliGRAM(s) Oral every 6 hours PRN Nausea and/or Vomiting  oxyCODONE    IR 10 milliGRAM(s) Oral every 3 hours PRN Moderate Pain (4 - 6)  oxyCODONE    IR 15 milliGRAM(s) Oral every 3 hours PRN Severe Pain (7 - 10)  polyethylene glycol 3350 17 Gram(s) Oral two times a day PRN Constipation    Vital Signs Last 24 Hrs  T(F): 97.4 (06 Oct 2023 09:09), Max: 98.2 (06 Oct 2023 08:59)  HR: 96 (06 Oct 2023 09:09) (67 - 96)  BP: 111/73 (06 Oct 2023 09:09) (111/73 - 113/84)  RR: 16 (06 Oct 2023 09:09) (16 - 16)  SpO2: 95% (06 Oct 2023 09:09) (95% - 96%)  I&O's Summary    BMI (kg/m2): 44.9 (10-02-23 @ 13:10)    PHYSICAL EXAM:  General: NAD, obese  ENT: MMM, no scleral icterus  Neck: Supple, No JVD  Lungs: Clear to auscultation bilaterally, no wheezes, rales, rhonchi  Cardio: RRR, S1/S2  Abdomen: Soft, Nontender, Nondistended; Bowel sounds present  Extremities: No calf tenderness, No pitting edema    LABS:                        10.0   8.62  )-----------( 221      ( 05 Oct 2023 06:15 )             29.3       10-06    134  |  96  |  16  ----------------------------<  155  3.3   |  29  |  0.73    Ca    8.7      06 Oct 2023 05:41    TPro  6.2  /  Alb  2.3  /  TBili  0.9  /  DBili  x   /  AST  20  /  ALT  19  /  AlkPhos  116  10-05                09-19 Chol 181 mg/dL LDL -- HDL 27 mg/dL Trig 646 mg/dL              POCT Blood Glucose.: 201 mg/dL (06 Oct 2023 08:07)  POCT Blood Glucose.: 154 mg/dL (05 Oct 2023 20:56)  POCT Blood Glucose.: 176 mg/dL (05 Oct 2023 17:04)  POCT Blood Glucose.: 221 mg/dL (05 Oct 2023 11:50)      Urinalysis Basic - ( 06 Oct 2023 05:41 )    Color: x / Appearance: x / SG: x / pH: x  Gluc: 155 mg/dL / Ketone: x  / Bili: x / Urobili: x   Blood: x / Protein: x / Nitrite: x   Leuk Esterase: x / RBC: x / WBC x   Sq Epi: x / Non Sq Epi: x / Bacteria: x        COVID-19 PCR: NotDetec (09-30-23 @ 16:00)      RADIOLOGY & ADDITIONAL TESTS:    Care Discussed with Consultants/Other Providers: yes, rehab

## 2023-10-06 NOTE — PROGRESS NOTE ADULT - SUBJECTIVE AND OBJECTIVE BOX
HPI:  This is a 66 YO male with PMH of  HFrEF, CAD s/p CABG, ASIYA on CPAP, HTN, DM, right renal mass s/p resection presents to  [facility] ED ON 9/18  after a syncopal episode, found to have T12-L1 fracture on imaging. Patient originally scheduled for an ACDF with Dr. Hughes, due to injury he was changed to a T9-pelvis revision PSF. Hospital course s/f medication adjustments for HTN  and HF. Patient was evaluated by PM&R and therapy for functional deficits, gait/ADL impairments and acute rehabilitation was recommended. Patient was medically optimized for discharge to [facility] IRU on 9/29/23.      ROS/subjective:  Patient seen and evaluated, NAD in chair, spasms/back pain- Zanaflex at bedtime 2/2/6.  BPs improved-Coreg held -hospitalist in. Bumex 2mg/1mg for hx CHF exacerbation.  no dizziness, no headaches, no chest pain , No SOB, no nausea, no vomiting  Lactulose x1-large BM  biotene/visc Lidocaine for lip lesion and dry mouth      MEDICATIONS  (STANDING):  allopurinol 100 milliGRAM(s) Oral at bedtime  aspirin enteric coated 81 milliGRAM(s) Oral daily  atorvastatin 40 milliGRAM(s) Oral at bedtime  Biotene Dry Mouth Oral Rinse 15 milliLiter(s) Swish and Spit three times a day  bisacodyl 5 milliGRAM(s) Oral every 12 hours  buMETAnide 1 milliGRAM(s) Oral <User Schedule>  buMETAnide 2 milliGRAM(s) Oral daily  dextrose 5%. 1000 milliLiter(s) (50 mL/Hr) IV Continuous <Continuous>  dextrose 5%. 1000 milliLiter(s) (100 mL/Hr) IV Continuous <Continuous>  dextrose 50% Injectable 25 Gram(s) IV Push once  dextrose 50% Injectable 25 Gram(s) IV Push once  dextrose 50% Injectable 12.5 Gram(s) IV Push once  gabapentin 300 milliGRAM(s) Oral four times a day  glucagon  Injectable 1 milliGRAM(s) IntraMuscular once  heparin   Injectable 5000 Unit(s) SubCutaneous every 8 hours  insulin glargine Injectable (LANTUS) 20 Unit(s) SubCutaneous at bedtime  insulin lispro (ADMELOG) corrective regimen sliding scale   SubCutaneous three times a day before meals  insulin lispro (ADMELOG) corrective regimen sliding scale   SubCutaneous at bedtime  insulin lispro Injectable (ADMELOG) 5 Unit(s) SubCutaneous before breakfast  insulin lispro Injectable (ADMELOG) 5 Unit(s) SubCutaneous before lunch  insulin lispro Injectable (ADMELOG) 5 Unit(s) SubCutaneous before dinner  lidocaine   4% Patch 1 Patch Transdermal daily  lidocaine 2% Viscous 5 milliLiter(s) Swish and Spit three times a day  multivitamin 1 Tablet(s) Oral daily  pantoprazole    Tablet 40 milliGRAM(s) Oral before breakfast  potassium chloride    Tablet ER 40 milliEquivalent(s) Oral every 4 hours  tiZANidine 6 milliGRAM(s) Oral at bedtime  tiZANidine 2 milliGRAM(s) Oral <User Schedule>    MEDICATIONS  (PRN):  aluminum hydroxide/magnesium hydroxide/simethicone Suspension 30 milliLiter(s) Oral every 4 hours PRN Dyspepsia  benzocaine/menthol Lozenge 1 Lozenge Oral every 3 hours PRN Sore Throat  dextrose Oral Gel 15 Gram(s) Oral once PRN Blood Glucose LESS THAN 70 milliGRAM(s)/deciliter  guaiFENesin Oral Liquid (Sugar-Free) 100 milliGRAM(s) Oral every 6 hours PRN Cough  melatonin 3 milliGRAM(s) Oral at bedtime PRN Insomnia  ondansetron   Disintegrating Tablet 4 milliGRAM(s) Oral every 6 hours PRN Nausea and/or Vomiting  oxyCODONE    IR 15 milliGRAM(s) Oral every 3 hours PRN Severe Pain (7 - 10)  oxyCODONE    IR 10 milliGRAM(s) Oral every 3 hours PRN Moderate Pain (4 - 6)  polyethylene glycol 3350 17 Gram(s) Oral two times a day PRN Constipation                            10.0   8.62  )-----------( 221      ( 05 Oct 2023 06:15 )             29.3     10-06    134<L>  |  96  |  16  ----------------------------<  155<H>  3.3<L>   |  29  |  0.73    Ca    8.7      06 Oct 2023 05:41    TPro  6.2  /  Alb  2.3<L>  /  TBili  0.9  /  DBili  x   /  AST  20  /  ALT  19  /  AlkPhos  116  10-05    Urinalysis Basic - ( 06 Oct 2023 05:41 )    Color: x / Appearance: x / SG: x / pH: x  Gluc: 155 mg/dL / Ketone: x  / Bili: x / Urobili: x   Blood: x / Protein: x / Nitrite: x   Leuk Esterase: x / RBC: x / WBC x   Sq Epi: x / Non Sq Epi: x / Bacteria: x        CAPILLARY BLOOD GLUCOSE      POCT Blood Glucose.: 201 mg/dL (06 Oct 2023 08:07)  POCT Blood Glucose.: 154 mg/dL (05 Oct 2023 20:56)  POCT Blood Glucose.: 176 mg/dL (05 Oct 2023 17:04)      Vital Signs Last 24 Hrs  T(C): 36.3 (06 Oct 2023 09:09), Max: 36.8 (06 Oct 2023 08:59)  T(F): 97.4 (06 Oct 2023 09:09), Max: 98.2 (06 Oct 2023 08:59)  HR: 96 (06 Oct 2023 09:09) (67 - 96)  BP: 111/73 (06 Oct 2023 09:09) (111/73 - 113/84)  BP(mean): --  RR: 16 (06 Oct 2023 09:09) (16 - 16)  SpO2: 95% (06 Oct 2023 09:09) (95% - 96%)    Parameters below as of 06 Oct 2023 09:09  Patient On (Oxygen Delivery Method): room air      Parameters below as of 05 Oct 2023 08:06  Patient On (Oxygen Delivery Method): room air        Constitutional - NAD  HEENT - NCAT, EOMI.    Chest - good chest expansion, good respiratory effort   Cardio - S1D2 RRR  Abdomen -  Obese, Soft, NTND BS +  Extremities - No peripheral edema, No calf tenderness   Neurologic Exam:                    Cranial Nerves -2-12 intact     Motor -  Strength preserved in UE and LE with severe pain in back.                     LEFT    UE - ShAB 5/5, EF 5/5, EE 5/5, WE 5/5,  WNL                     RIGHT UE - ShAB 5/5, EF 5/5, EE 5/5, WE 5/5,  WNL                    LEFT    LE - HF 4/5, KE 5/5, DF 5/5, PF 5/5                    RIGHT LE - HF 4/5, KE 5/5, DF 5/5, PF 5/5        Sensory - Intact to LT bilateral  Skin on admission:  Mid thoracic to lumbar incision covered with primary dressing. Previous surgical scars of chest, abdomen, b/l elbows, wrists and knees. No pressure ulcers.    IDT meeting on 10/4    SW: PH 3STE, 12STI, children    OT:  eating set up  grooming set up  UBD/LBD mod A  toileting mod A  tub/shower mod A  bathing mod A    PT:  amb 50ft min A RW  transfers mod A  stairs    SLP na    tentative dc 10/14 HC       Continue comprehensive acute rehab program consisting of 3hrs/day of OT/PT and SLP. HPI:  This is a 68 YO male with PMH of  HFrEF, CAD s/p CABG, ASIYA on CPAP, HTN, DM, right renal mass s/p resection presents to  [facility] ED ON 9/18  after a syncopal episode, found to have T12-L1 fracture on imaging. Patient originally scheduled for an ACDF with Dr. Hughes, due to injury he was changed to a T9-pelvis revision PSF. Hospital course s/f medication adjustments for HTN  and HF. Patient was evaluated by PM&R and therapy for functional deficits, gait/ADL impairments and acute rehabilitation was recommended. Patient was medically optimized for discharge to [facility] IRU on 9/29/23.      ROS/subjective:  Patient seen and evaluated, NAD in chair, spasms/back pain- Zanaflex at bedtime 2/2/6.  BPs improved-Coreg held -hospitalist in. Bumex 2mg/1mg for hx CHF exacerbation.  no dizziness, no headaches, no chest pain , No SOB, no nausea, no vomiting  Lactulose x1-large BM  biotene/visc Lidocaine for lip lesion and dry mouth      MEDICATIONS  (STANDING):  allopurinol 100 milliGRAM(s) Oral at bedtime  aspirin enteric coated 81 milliGRAM(s) Oral daily  atorvastatin 40 milliGRAM(s) Oral at bedtime  Biotene Dry Mouth Oral Rinse 15 milliLiter(s) Swish and Spit three times a day  bisacodyl 5 milliGRAM(s) Oral every 12 hours  buMETAnide 1 milliGRAM(s) Oral <User Schedule>  buMETAnide 2 milliGRAM(s) Oral daily  dextrose 5%. 1000 milliLiter(s) (50 mL/Hr) IV Continuous <Continuous>  dextrose 5%. 1000 milliLiter(s) (100 mL/Hr) IV Continuous <Continuous>  dextrose 50% Injectable 25 Gram(s) IV Push once  dextrose 50% Injectable 25 Gram(s) IV Push once  dextrose 50% Injectable 12.5 Gram(s) IV Push once  gabapentin 300 milliGRAM(s) Oral four times a day  glucagon  Injectable 1 milliGRAM(s) IntraMuscular once  heparin   Injectable 5000 Unit(s) SubCutaneous every 8 hours  insulin glargine Injectable (LANTUS) 20 Unit(s) SubCutaneous at bedtime  insulin lispro (ADMELOG) corrective regimen sliding scale   SubCutaneous three times a day before meals  insulin lispro (ADMELOG) corrective regimen sliding scale   SubCutaneous at bedtime  insulin lispro Injectable (ADMELOG) 5 Unit(s) SubCutaneous before breakfast  insulin lispro Injectable (ADMELOG) 5 Unit(s) SubCutaneous before lunch  insulin lispro Injectable (ADMELOG) 5 Unit(s) SubCutaneous before dinner  lidocaine   4% Patch 1 Patch Transdermal daily  lidocaine 2% Viscous 5 milliLiter(s) Swish and Spit three times a day  multivitamin 1 Tablet(s) Oral daily  pantoprazole    Tablet 40 milliGRAM(s) Oral before breakfast  potassium chloride    Tablet ER 40 milliEquivalent(s) Oral every 4 hours  tiZANidine 6 milliGRAM(s) Oral at bedtime  tiZANidine 2 milliGRAM(s) Oral <User Schedule>    MEDICATIONS  (PRN):  aluminum hydroxide/magnesium hydroxide/simethicone Suspension 30 milliLiter(s) Oral every 4 hours PRN Dyspepsia  benzocaine/menthol Lozenge 1 Lozenge Oral every 3 hours PRN Sore Throat  dextrose Oral Gel 15 Gram(s) Oral once PRN Blood Glucose LESS THAN 70 milliGRAM(s)/deciliter  guaiFENesin Oral Liquid (Sugar-Free) 100 milliGRAM(s) Oral every 6 hours PRN Cough  melatonin 3 milliGRAM(s) Oral at bedtime PRN Insomnia  ondansetron   Disintegrating Tablet 4 milliGRAM(s) Oral every 6 hours PRN Nausea and/or Vomiting  oxyCODONE    IR 15 milliGRAM(s) Oral every 3 hours PRN Severe Pain (7 - 10)  oxyCODONE    IR 10 milliGRAM(s) Oral every 3 hours PRN Moderate Pain (4 - 6)  polyethylene glycol 3350 17 Gram(s) Oral two times a day PRN Constipation                            10.0   8.62  )-----------( 221      ( 05 Oct 2023 06:15 )             29.3     10-06    134<L>  |  96  |  16  ----------------------------<  155<H>  3.3<L>   |  29  |  0.73    Ca    8.7      06 Oct 2023 05:41    TPro  6.2  /  Alb  2.3<L>  /  TBili  0.9  /  DBili  x   /  AST  20  /  ALT  19  /  AlkPhos  116  10-05    Urinalysis Basic - ( 06 Oct 2023 05:41 )    Color: x / Appearance: x / SG: x / pH: x  Gluc: 155 mg/dL / Ketone: x  / Bili: x / Urobili: x   Blood: x / Protein: x / Nitrite: x   Leuk Esterase: x / RBC: x / WBC x   Sq Epi: x / Non Sq Epi: x / Bacteria: x        CAPILLARY BLOOD GLUCOSE      POCT Blood Glucose.: 201 mg/dL (06 Oct 2023 08:07)  POCT Blood Glucose.: 154 mg/dL (05 Oct 2023 20:56)  POCT Blood Glucose.: 176 mg/dL (05 Oct 2023 17:04)      Vital Signs Last 24 Hrs  T(C): 36.3 (06 Oct 2023 09:09), Max: 36.8 (06 Oct 2023 08:59)  T(F): 97.4 (06 Oct 2023 09:09), Max: 98.2 (06 Oct 2023 08:59)  HR: 96 (06 Oct 2023 09:09) (67 - 96)  BP: 111/73 (06 Oct 2023 09:09) (111/73 - 113/84)  BP(mean): --  RR: 16 (06 Oct 2023 09:09) (16 - 16)  SpO2: 95% (06 Oct 2023 09:09) (95% - 96%)    Parameters below as of 06 Oct 2023 09:09  Patient On (Oxygen Delivery Method): room air      Parameters below as of 05 Oct 2023 08:06  Patient On (Oxygen Delivery Method): room air        Constitutional - NAD  HEENT - NCAT, EOMI.    Chest - good chest expansion, good respiratory effort   Cardio - S1D2 RRR  Abdomen -  Obese, Soft, NTND BS +  Extremities - No peripheral edema, No calf tenderness   Neurologic Exam:                    Cranial Nerves -2-12 intact     Motor -  Strength preserved in UE and LE with severe pain in back.                     LEFT    UE - ShAB 5/5, EF 5/5, EE 5/5, WE 5/5,  WNL                     RIGHT UE - ShAB 5/5, EF 5/5, EE 5/5, WE 5/5,  WNL                    LEFT    LE - HF 4/5, KE 5/5, DF 5/5, PF 5/5                    RIGHT LE - HF 4/5, KE 5/5, DF 5/5, PF 5/5        Sensory - Intact to LT bilateral  Skin on admission:  Mid thoracic to lumbar incision covered with primary dressing. Previous surgical scars of chest, abdomen, b/l elbows, wrists and knees. No pressure ulcers.- back dressing Dcd- some erythema/ eschar Mid incision-   painted incision with betadine- Telfa/ tegaderm applied    IDT meeting on 10/4    SW: PH 3STE, 12STI, children    OT:  eating set up  grooming set up  UBD/LBD mod A  toileting mod A  tub/shower mod A  bathing mod A    PT:  amb 50ft min A RW  transfers mod A  stairs    SLP na    tentative dc 10/14 HC       Continue comprehensive acute rehab program consisting of 3hrs/day of OT/PT and SLP.

## 2023-10-06 NOTE — PROGRESS NOTE ADULT - ASSESSMENT
66 y/o M with PMH of  HTN, HLD, CAD s/p CABG, HFrEF (LV systolic function appears grossly normal on Echo 9/21), Type 2 Diabetes, ASIYA on CPAP, right renal mass s/p partial resection (3/2023, pathology negative), s/p pervious L3-pelvis fusion, who presented to [facility] ED on 9/18 after a syncopal episode, found to have T12-L1 fracture on imaging. Patient had a removal of hardware from L3-pelvis and revision T9-pelvis revision PSF w/ muscle flap reconstruction on 9/23. Patient now with gait Instability, ADL impairments and Functional impairments.    Gait Instability/Functional Impairment  Status Post Fall w/ T12-L1 Acute Fracture  -s/p removal of hardware from L3-pelvis and revision T9-pelvis revision PSF w/ muscle flap reconstruction on 9/23  -Fall/Spine precautions  -Outpatient follow up with neurosurgery  -Continue comprehensive rehab program - PT/OT/SLP per rehab team  -Pain management, bowel regimen per rehab   -Monitor H/H    Hypokalemia  -Replete and monitor    Syncopal Episode   -Felt to be due to situational syncope (after blowing his nose)  -Workup at OSH: CTH negative, EKG showed no acute changes, Echo showed grossly nl LV function, no significant valvular disease   -Will monitor     HTN/HLD  CAD s/p CABG/Mild Diastolic Dysfunction w/ EF 60%  -Per documentation: Echo from 5/5/23 showed nl LV function, EF 60%, mild diastolic dysfunction. normal RV function. trivial MR and TR, normal Aortic valve. normal Pulmonic valve.   -Continue Aspirin, Atorvastatin   -Coreg, Losartan held for syncope/hypotension  -Bumex decreased from 2mg BID to qd 10/4. Probnp 163 - can dc bumex 10/5 if hypotension persists - Patient endorses home dose alternates between 2mg 1 tab BID (total 4mg/day) and 2mg 2 tab AM, 2mg 1 tab pm (total 6mg/day) and significant hx of CHF exacerbation March 2023 - monitor fluid status, daily weights, I/Os closely  -Outpatient cardiology follow up     ASIYA  -Continue with nocturnal CPAP. Pressure set with home setting of 5.    Type 2 Diabetes  -HbA1C 6.5 on 9/19  -Continue to hold oral glimepiride  -Hospitalization was complicated by steroid induced hyperglycemia - now off of steroids    -Lantus 17un qhs, admelog 5un TID w/ meals, ISS - increase lantus to 20un qhs 10/6  -Continue to monitor FS, adjust insulin as needed    Gout  -Continue allopurinol    DVT ppx - HSQ

## 2023-10-06 NOTE — PROGRESS NOTE ADULT - NS ATTEND AMEND GEN_ALL_CORE FT
Rehab Attending- Patient seen and examined by me - Case discussed, above note reviewed by me with modifications made    patient seen and evaluated in OT  persistent spasms in Bed- Ok when upright in chair- on zanaflex  + BM with lactulose  increased lower ext edema- added back Bumex 1mg in afternoon  will supplement K daily  back dressing Dcd- sone mid-incisional necrosis- no drainage- betadine/ telfa/ tegadrem applied  to continue intensive rehab program    BP OK  Vital Signs Last 24 Hrs  T(C): 36.3 (06 Oct 2023 09:09), Max: 36.8 (06 Oct 2023 08:59)  T(F): 97.4 (06 Oct 2023 09:09), Max: 98.2 (06 Oct 2023 08:59)  HR: 96 (06 Oct 2023 09:09) (67 - 96)  BP: 111/73 (06 Oct 2023 09:09) (111/73 - 113/84)  BP(mean): --  RR: 16 (06 Oct 2023 09:09) (16 - 16)  SpO2: 95% (06 Oct 2023 09:09) (95% - 96%)    Parameters below as of 06 Oct 2023 09:09  Patient On (Oxygen Delivery Method): room air      Parameters below as of 05 Oct 2023 08:06  Patient On (Oxygen Delivery Method): room air        Constitutional - NAD  HEENT - NCAT, EOMI.    Chest - good chest expansion, good respiratory effort   Cardio - S1D2 RRR  Abdomen -  Obese, Soft, NTND BS +  Extremities - No peripheral edema, No calf tenderness   Neurologic Exam:                    Cranial Nerves -2-12 intact     Motor -  Strength preserved in UE and LE with severe pain in back.                     LEFT    UE - ShAB 5/5, EF 5/5, EE 5/5, WE 5/5,  WNL                     RIGHT UE - ShAB 5/5, EF 5/5, EE 5/5, WE 5/5,  WNL                    LEFT    LE - HF 4/5, KE 5/5, DF 5/5, PF 5/5                    RIGHT LE - HF 4/5, KE 5/5, DF 5/5, PF 5/5        Sensory - Intact to LT bilateral  Skin on admission:  Mid thoracic to lumbar incision covered with primary dressing. Previous surgical scars of chest, abdomen, b/l elbows, wrists and knees. No pressure ulcers.- back dressing Dcd- some erythema/ eschar Mid incision-   painted incision with betadine- Telfa/ tegaderm applied

## 2023-10-07 LAB
GLUCOSE BLDC GLUCOMTR-MCNC: 131 MG/DL — HIGH (ref 70–99)
GLUCOSE BLDC GLUCOMTR-MCNC: 160 MG/DL — HIGH (ref 70–99)
GLUCOSE BLDC GLUCOMTR-MCNC: 230 MG/DL — HIGH (ref 70–99)
GLUCOSE BLDC GLUCOMTR-MCNC: 239 MG/DL — HIGH (ref 70–99)
GLUCOSE BLDC GLUCOMTR-MCNC: 257 MG/DL — HIGH (ref 70–99)

## 2023-10-07 PROCEDURE — 99232 SBSQ HOSP IP/OBS MODERATE 35: CPT

## 2023-10-07 RX ORDER — TIZANIDINE 4 MG/1
2 TABLET ORAL
Refills: 0 | Status: DISCONTINUED | OUTPATIENT
Start: 2023-10-07 | End: 2023-10-11

## 2023-10-07 RX ADMIN — OXYCODONE HYDROCHLORIDE 15 MILLIGRAM(S): 5 TABLET ORAL at 10:45

## 2023-10-07 RX ADMIN — INSULIN GLARGINE 20 UNIT(S): 100 INJECTION, SOLUTION SUBCUTANEOUS at 21:25

## 2023-10-07 RX ADMIN — Medication 15 MILLILITER(S): at 21:25

## 2023-10-07 RX ADMIN — HEPARIN SODIUM 5000 UNIT(S): 5000 INJECTION INTRAVENOUS; SUBCUTANEOUS at 14:37

## 2023-10-07 RX ADMIN — LIDOCAINE 5 MILLILITER(S): 4 CREAM TOPICAL at 14:40

## 2023-10-07 RX ADMIN — BUMETANIDE 2 MILLIGRAM(S): 0.25 INJECTION INTRAMUSCULAR; INTRAVENOUS at 05:20

## 2023-10-07 RX ADMIN — Medication 2: at 08:37

## 2023-10-07 RX ADMIN — BUMETANIDE 1 MILLIGRAM(S): 0.25 INJECTION INTRAMUSCULAR; INTRAVENOUS at 13:48

## 2023-10-07 RX ADMIN — Medication 6: at 12:07

## 2023-10-07 RX ADMIN — ATORVASTATIN CALCIUM 40 MILLIGRAM(S): 80 TABLET, FILM COATED ORAL at 21:27

## 2023-10-07 RX ADMIN — Medication 81 MILLIGRAM(S): at 11:40

## 2023-10-07 RX ADMIN — LIDOCAINE 1 PATCH: 4 CREAM TOPICAL at 11:43

## 2023-10-07 RX ADMIN — GABAPENTIN 300 MILLIGRAM(S): 400 CAPSULE ORAL at 17:48

## 2023-10-07 RX ADMIN — LIDOCAINE 5 MILLILITER(S): 4 CREAM TOPICAL at 05:21

## 2023-10-07 RX ADMIN — HEPARIN SODIUM 5000 UNIT(S): 5000 INJECTION INTRAVENOUS; SUBCUTANEOUS at 05:21

## 2023-10-07 RX ADMIN — Medication 20 MILLIEQUIVALENT(S): at 15:36

## 2023-10-07 RX ADMIN — Medication 1 TABLET(S): at 11:40

## 2023-10-07 RX ADMIN — TIZANIDINE 2 MILLIGRAM(S): 4 TABLET ORAL at 08:59

## 2023-10-07 RX ADMIN — TIZANIDINE 6 MILLIGRAM(S): 4 TABLET ORAL at 21:27

## 2023-10-07 RX ADMIN — HEPARIN SODIUM 5000 UNIT(S): 5000 INJECTION INTRAVENOUS; SUBCUTANEOUS at 21:27

## 2023-10-07 RX ADMIN — Medication 100 MILLIGRAM(S): at 21:27

## 2023-10-07 RX ADMIN — Medication 5 UNIT(S): at 08:38

## 2023-10-07 RX ADMIN — Medication 100 MILLIGRAM(S): at 21:25

## 2023-10-07 RX ADMIN — PANTOPRAZOLE SODIUM 40 MILLIGRAM(S): 20 TABLET, DELAYED RELEASE ORAL at 05:20

## 2023-10-07 RX ADMIN — GABAPENTIN 300 MILLIGRAM(S): 400 CAPSULE ORAL at 05:20

## 2023-10-07 RX ADMIN — Medication 15 MILLILITER(S): at 05:20

## 2023-10-07 RX ADMIN — LIDOCAINE 5 MILLILITER(S): 4 CREAM TOPICAL at 21:25

## 2023-10-07 RX ADMIN — Medication 5 MILLIGRAM(S): at 17:44

## 2023-10-07 RX ADMIN — GABAPENTIN 300 MILLIGRAM(S): 400 CAPSULE ORAL at 11:39

## 2023-10-07 RX ADMIN — Medication 5 MILLIGRAM(S): at 05:20

## 2023-10-07 RX ADMIN — OXYCODONE HYDROCHLORIDE 15 MILLIGRAM(S): 5 TABLET ORAL at 09:22

## 2023-10-07 RX ADMIN — OXYCODONE HYDROCHLORIDE 15 MILLIGRAM(S): 5 TABLET ORAL at 05:28

## 2023-10-07 RX ADMIN — OXYCODONE HYDROCHLORIDE 10 MILLIGRAM(S): 5 TABLET ORAL at 23:17

## 2023-10-07 RX ADMIN — Medication 5 UNIT(S): at 17:06

## 2023-10-07 RX ADMIN — TIZANIDINE 4 MILLIGRAM(S): 4 TABLET ORAL at 17:56

## 2023-10-07 RX ADMIN — GABAPENTIN 300 MILLIGRAM(S): 400 CAPSULE ORAL at 23:17

## 2023-10-07 RX ADMIN — Medication 15 MILLILITER(S): at 14:39

## 2023-10-07 RX ADMIN — Medication 5 UNIT(S): at 12:09

## 2023-10-07 NOTE — PROGRESS NOTE ADULT - ASSESSMENT
68 y/o M with PMH of  HTN, HLD, CAD s/p CABG, HFrEF (LV systolic function appears grossly normal on Echo 9/21), Type 2 Diabetes, ASIYA on CPAP, right renal mass s/p partial resection (3/2023, pathology negative), s/p pervious L3-pelvis fusion, who presented to [facility] ED on 9/18 after a syncopal episode, found to have T12-L1 fracture on imaging. Patient had a removal of hardware from L3-pelvis and revision T9-pelvis revision PSF w/ muscle flap reconstruction on 9/23. Patient now with gait Instability, ADL impairments and Functional impairments.    Gait Instability/Functional Impairment  Status Post Fall w/ T12-L1 Acute Fracture  -s/p removal of hardware from L3-pelvis and revision T9-pelvis revision PSF w/ muscle flap reconstruction on 9/23  -Fall/Spine precautions  -Outpatient follow up with neurosurgery  -Continue comprehensive rehab program - PT/OT/SLP per rehab team  -Pain management, bowel regimen per rehab       Hypokalemia  -Replete and monitor    Syncopal Episode   -Felt to be due to situational syncope (after blowing his nose)  -Workup at OSH: CTH negative, EKG showed no acute changes, Echo showed grossly nl LV function, no significant valvular disease   -Will monitor     HTN/HLD  CAD s/p CABG/Mild Diastolic Dysfunction w/ EF 60%  -Per documentation: Echo from 5/5/23 showed nl LV function, EF 60%, mild diastolic dysfunction. normal RV function. trivial MR and TR, normal Aortic valve. normal Pulmonic valve.   -Continue Aspirin, Atorvastatin   -Coreg, Losartan held for syncope/hypotension  - Patient endorses home dose alternates between 2mg 1 tab BID (total 4mg/day) and 2mg 2 tab AM, 2mg 1 tab pm (total 6mg/day)  - Bumex 2mg in AM, 1mg in afternoon. can titrate if hypotensive  -Outpatient cardiology follow up     ASIYA  -Continue with nocturnal CPAP. Pressure set with home setting of 5.    Type 2 Diabetes  -HbA1C 6.5 on 9/19  -Continue to hold oral glimepiride  -Hospitalization was complicated by steroid induced hyperglycemia - now off of steroids    -Lantus 17un qhs, admelog 5un TID w/ meals, ISS - increase lantus to 20un qhs 10/6  -Continue to monitor FS, adjust insulin as needed    Gout  -Continue allopurinol    DVT ppx - HSQ

## 2023-10-07 NOTE — PROGRESS NOTE ADULT - SUBJECTIVE AND OBJECTIVE BOX
PROGRESS NOTE:     Patient is a 67y old  Male who presents with a chief complaint of Spinal Fusion (06 Oct 2023 11:59)          SUBJECTIVE & OBJECTIVE:   Pt seen and examined at bedside     no overnight events.   no new complaints    REVIEW OF SYSTEMS: remaining ROS negative     PHYSICAL EXAM:  VITALS:  Vital Signs Last 24 Hrs  T(C): 36.6 (07 Oct 2023 09:15), Max: 36.9 (06 Oct 2023 20:40)  T(F): 97.8 (07 Oct 2023 09:15), Max: 98.4 (06 Oct 2023 20:40)  HR: 87 (07 Oct 2023 09:15) (74 - 87)  BP: 120/74 (07 Oct 2023 09:15) (112/76 - 120/74)  BP(mean): --  RR: 16 (07 Oct 2023 09:15) (16 - 18)  SpO2: 97% (07 Oct 2023 09:15) (96% - 98%)    Parameters below as of 06 Oct 2023 20:40  Patient On (Oxygen Delivery Method): room air          GENERAL: NAD,  no increased WOB  HEAD:  Atraumatic, Normocephalic  EYES: EOMI, conjunctiva and sclera clear  ENMT: Moist mucous membranes  NECK: Supple, No JVD  NERVOUS SYSTEM:  Alert & Oriented X3, no focal neuro deficits   CHEST/LUNG: Clear to auscultation bilaterally; No rales, rhonchi, wheezing, or rubs  HEART: Regular rate and rhythm; S1 S2  ABDOMEN: Soft, Nontender, Nondistended; Bowel sounds present  EXTREMITIES: No clubbing, cyanosis, calf tenderness or edema b/l      MEDICATIONS  (STANDING):  allopurinol 100 milliGRAM(s) Oral at bedtime  aspirin enteric coated 81 milliGRAM(s) Oral daily  atorvastatin 40 milliGRAM(s) Oral at bedtime  Biotene Dry Mouth Oral Rinse 15 milliLiter(s) Swish and Spit three times a day  bisacodyl 5 milliGRAM(s) Oral every 12 hours  buMETAnide 1 milliGRAM(s) Oral <User Schedule>  buMETAnide 2 milliGRAM(s) Oral daily  dextrose 5%. 1000 milliLiter(s) (50 mL/Hr) IV Continuous <Continuous>  dextrose 5%. 1000 milliLiter(s) (100 mL/Hr) IV Continuous <Continuous>  dextrose 50% Injectable 25 Gram(s) IV Push once  dextrose 50% Injectable 25 Gram(s) IV Push once  dextrose 50% Injectable 12.5 Gram(s) IV Push once  gabapentin 300 milliGRAM(s) Oral four times a day  glucagon  Injectable 1 milliGRAM(s) IntraMuscular once  heparin   Injectable 5000 Unit(s) SubCutaneous every 8 hours  insulin glargine Injectable (LANTUS) 20 Unit(s) SubCutaneous at bedtime  insulin lispro (ADMELOG) corrective regimen sliding scale   SubCutaneous three times a day before meals  insulin lispro (ADMELOG) corrective regimen sliding scale   SubCutaneous at bedtime  insulin lispro Injectable (ADMELOG) 5 Unit(s) SubCutaneous before breakfast  insulin lispro Injectable (ADMELOG) 5 Unit(s) SubCutaneous before lunch  insulin lispro Injectable (ADMELOG) 5 Unit(s) SubCutaneous before dinner  lidocaine   4% Patch 1 Patch Transdermal daily  lidocaine 2% Viscous 5 milliLiter(s) Swish and Spit three times a day  multivitamin 1 Tablet(s) Oral daily  pantoprazole    Tablet 40 milliGRAM(s) Oral before breakfast  potassium chloride    Tablet ER 20 milliEquivalent(s) Oral daily  tiZANidine 2 milliGRAM(s) Oral <User Schedule>  tiZANidine 6 milliGRAM(s) Oral at bedtime    MEDICATIONS  (PRN):  aluminum hydroxide/magnesium hydroxide/simethicone Suspension 30 milliLiter(s) Oral every 4 hours PRN Dyspepsia  benzocaine/menthol Lozenge 1 Lozenge Oral every 3 hours PRN Sore Throat  dextrose Oral Gel 15 Gram(s) Oral once PRN Blood Glucose LESS THAN 70 milliGRAM(s)/deciliter  guaiFENesin Oral Liquid (Sugar-Free) 100 milliGRAM(s) Oral every 6 hours PRN Cough  melatonin 3 milliGRAM(s) Oral at bedtime PRN Insomnia  ondansetron   Disintegrating Tablet 4 milliGRAM(s) Oral every 6 hours PRN Nausea and/or Vomiting  oxyCODONE    IR 10 milliGRAM(s) Oral every 3 hours PRN Moderate Pain (4 - 6)  oxyCODONE    IR 15 milliGRAM(s) Oral every 3 hours PRN Severe Pain (7 - 10)  polyethylene glycol 3350 17 Gram(s) Oral two times a day PRN Constipation      Allergies    No Known Allergies    Intolerances              LABS:       10-06    134<L>  |  96  |  16  ----------------------------<  155<H>  3.3<L>   |  29  |  0.73    Ca    8.7      06 Oct 2023 05:41        Urinalysis Basic - ( 06 Oct 2023 05:41 )    Color: x / Appearance: x / SG: x / pH: x  Gluc: 155 mg/dL / Ketone: x  / Bili: x / Urobili: x   Blood: x / Protein: x / Nitrite: x   Leuk Esterase: x / RBC: x / WBC x   Sq Epi: x / Non Sq Epi: x / Bacteria: x      CAPILLARY BLOOD GLUCOSE      POCT Blood Glucose.: 257 mg/dL (07 Oct 2023 11:51)  POCT Blood Glucose.: 160 mg/dL (07 Oct 2023 08:06)  POCT Blood Glucose.: 144 mg/dL (06 Oct 2023 21:47)  POCT Blood Glucose.: 155 mg/dL (06 Oct 2023 17:19)                RECENT CULTURES:          RADIOLOGY & ADDITIONAL TESTS:

## 2023-10-07 NOTE — PROGRESS NOTE ADULT - SUBJECTIVE AND OBJECTIVE BOX
Subjective: Pt. reports back spasms are bad at night.  Last night was a little better but was awake in pain for 2 h last night before it calmed down and he could sleep.  Pain is tolerable during the day.  Bowels regular    VITALS  T(C): 36.6 (10-07-23 @ 09:15), Max: 36.9 (10-06-23 @ 20:40)  HR: 87 (10-07-23 @ 09:15) (74 - 87)  BP: 120/74 (10-07-23 @ 09:15) (112/76 - 120/74)  RR: 16 (10-07-23 @ 09:15) (16 - 18)  SpO2: 97% (10-07-23 @ 09:15) (96% - 98%)  Wt(kg): --    REVIEW OF SYSTEMS  Pertinent in last 24 h: Neurological deficits      Physical Exam:  Constitutional - NAD, Comfortable  HEENT - NCAT, EOMI  Chest -  breathing comfortably on RA  Cardiovascular - Warm well perfused  Abdomen - Soft, NTND  Extremities - No edema, No calf tenderness   SKin-- Thoracic incision covered with clean gauze dressing-- no erythema.    Neurologic Exam -    Stable                Cognitive - Awake, Alert, AAO x3     Cranial Nerves - CN 2-12 intact     Motor - no new deficit     Psychiatric - Mood stable, Affect WNL  -    RECENT LABS/IMAGING    10-06    134<L>  |  96  |  16  ----------------------------<  155<H>  3.3<L>   |  29  |  0.73    Ca    8.7      06 Oct 2023 05:41      Urinalysis Basic - ( 06 Oct 2023 05:41 )    Color: x / Appearance: x / SG: x / pH: x  Gluc: 155 mg/dL / Ketone: x  / Bili: x / Urobili: x   Blood: x / Protein: x / Nitrite: x   Leuk Esterase: x / RBC: x / WBC x   Sq Epi: x / Non Sq Epi: x / Bacteria: x            MEDICATIONS   allopurinol 100 milliGRAM(s) at bedtime  aluminum hydroxide/magnesium hydroxide/simethicone Suspension 30 milliLiter(s) every 4 hours PRN  aspirin enteric coated 81 milliGRAM(s) daily  atorvastatin 40 milliGRAM(s) at bedtime  benzocaine/menthol Lozenge 1 Lozenge every 3 hours PRN  Biotene Dry Mouth Oral Rinse 15 milliLiter(s) three times a day  bisacodyl 5 milliGRAM(s) every 12 hours  buMETAnide 1 milliGRAM(s) <User Schedule>  buMETAnide 2 milliGRAM(s) daily  dextrose 5%. 1000 milliLiter(s) <Continuous>  dextrose 5%. 1000 milliLiter(s) <Continuous>  dextrose 50% Injectable 25 Gram(s) once  dextrose 50% Injectable 25 Gram(s) once  dextrose 50% Injectable 12.5 Gram(s) once  dextrose Oral Gel 15 Gram(s) once PRN  gabapentin 300 milliGRAM(s) four times a day  glucagon  Injectable 1 milliGRAM(s) once  guaiFENesin Oral Liquid (Sugar-Free) 100 milliGRAM(s) every 6 hours PRN  heparin   Injectable 5000 Unit(s) every 8 hours  insulin glargine Injectable (LANTUS) 20 Unit(s) at bedtime  insulin lispro (ADMELOG) corrective regimen sliding scale   three times a day before meals  insulin lispro (ADMELOG) corrective regimen sliding scale   at bedtime  insulin lispro Injectable (ADMELOG) 5 Unit(s) before breakfast  insulin lispro Injectable (ADMELOG) 5 Unit(s) before lunch  insulin lispro Injectable (ADMELOG) 5 Unit(s) before dinner  lidocaine   4% Patch 1 Patch daily  lidocaine 2% Viscous 5 milliLiter(s) three times a day  melatonin 3 milliGRAM(s) at bedtime PRN  multivitamin 1 Tablet(s) daily  ondansetron   Disintegrating Tablet 4 milliGRAM(s) every 6 hours PRN  oxyCODONE    IR 15 milliGRAM(s) every 3 hours PRN  oxyCODONE    IR 10 milliGRAM(s) every 3 hours PRN  pantoprazole    Tablet 40 milliGRAM(s) before breakfast  polyethylene glycol 3350 17 Gram(s) two times a day PRN  potassium chloride    Tablet ER 20 milliEquivalent(s) daily  tiZANidine 4 milliGRAM(s) <User Schedule>  tiZANidine 2 milliGRAM(s) <User Schedule>  tiZANidine 6 milliGRAM(s) at bedtime      --------------------------------------------------------------------

## 2023-10-07 NOTE — PROGRESS NOTE ADULT - ASSESSMENT
68 yo M  admitted to acute Rehabilitation with  T12-L1 Fracture s/p T9-Pelvis revision PSF    Pt. Stable  Active Issues    Pain Management-- Increase Tizanidine 4PM dosing to 4mg;  cont. 2mg in AM and 6mg qhs-- d/w pt.                           -- Cont. Gabapentin 300mg QID                          ---Cont. Lidoderm patches                         --- Cont. PRN Oxycodone    CHF-- Cont. Bumetanide    CAD-- cont. ASA, Lipitor    Gout-- cont. allopurinol    DM-2-- cont. Lantus and Premeal Admelog    DVT ppx--Heparin 5000 units q.8h    Cont. comprehensive inpatient PT, OT and Speech    No acute issues,   Cont. current plan of care and medications as per primary team

## 2023-10-08 LAB
GLUCOSE BLDC GLUCOMTR-MCNC: 132 MG/DL — HIGH (ref 70–99)
GLUCOSE BLDC GLUCOMTR-MCNC: 154 MG/DL — HIGH (ref 70–99)
GLUCOSE BLDC GLUCOMTR-MCNC: 171 MG/DL — HIGH (ref 70–99)
GLUCOSE BLDC GLUCOMTR-MCNC: 209 MG/DL — HIGH (ref 70–99)

## 2023-10-08 PROCEDURE — 99232 SBSQ HOSP IP/OBS MODERATE 35: CPT

## 2023-10-08 RX ORDER — INSULIN GLARGINE 100 [IU]/ML
22 INJECTION, SOLUTION SUBCUTANEOUS AT BEDTIME
Refills: 0 | Status: DISCONTINUED | OUTPATIENT
Start: 2023-10-08 | End: 2023-10-11

## 2023-10-08 RX ADMIN — OXYCODONE HYDROCHLORIDE 10 MILLIGRAM(S): 5 TABLET ORAL at 07:12

## 2023-10-08 RX ADMIN — Medication 100 MILLIGRAM(S): at 22:23

## 2023-10-08 RX ADMIN — PANTOPRAZOLE SODIUM 40 MILLIGRAM(S): 20 TABLET, DELAYED RELEASE ORAL at 05:52

## 2023-10-08 RX ADMIN — TIZANIDINE 6 MILLIGRAM(S): 4 TABLET ORAL at 22:23

## 2023-10-08 RX ADMIN — Medication 2: at 16:57

## 2023-10-08 RX ADMIN — LIDOCAINE 1 PATCH: 4 CREAM TOPICAL at 19:57

## 2023-10-08 RX ADMIN — TIZANIDINE 2 MILLIGRAM(S): 4 TABLET ORAL at 08:00

## 2023-10-08 RX ADMIN — Medication 5 UNIT(S): at 08:00

## 2023-10-08 RX ADMIN — GABAPENTIN 300 MILLIGRAM(S): 400 CAPSULE ORAL at 11:51

## 2023-10-08 RX ADMIN — Medication 4: at 11:53

## 2023-10-08 RX ADMIN — Medication 2: at 08:00

## 2023-10-08 RX ADMIN — ATORVASTATIN CALCIUM 40 MILLIGRAM(S): 80 TABLET, FILM COATED ORAL at 22:23

## 2023-10-08 RX ADMIN — Medication 81 MILLIGRAM(S): at 11:51

## 2023-10-08 RX ADMIN — Medication 15 MILLILITER(S): at 13:35

## 2023-10-08 RX ADMIN — Medication 15 MILLILITER(S): at 05:49

## 2023-10-08 RX ADMIN — Medication 5 MILLIGRAM(S): at 17:04

## 2023-10-08 RX ADMIN — OXYCODONE HYDROCHLORIDE 15 MILLIGRAM(S): 5 TABLET ORAL at 12:51

## 2023-10-08 RX ADMIN — TIZANIDINE 4 MILLIGRAM(S): 4 TABLET ORAL at 16:58

## 2023-10-08 RX ADMIN — Medication 3 MILLIGRAM(S): at 22:23

## 2023-10-08 RX ADMIN — LIDOCAINE 1 PATCH: 4 CREAM TOPICAL at 11:51

## 2023-10-08 RX ADMIN — BUMETANIDE 2 MILLIGRAM(S): 0.25 INJECTION INTRAMUSCULAR; INTRAVENOUS at 05:50

## 2023-10-08 RX ADMIN — Medication 5 UNIT(S): at 11:53

## 2023-10-08 RX ADMIN — Medication 1 TABLET(S): at 11:51

## 2023-10-08 RX ADMIN — OXYCODONE HYDROCHLORIDE 15 MILLIGRAM(S): 5 TABLET ORAL at 11:51

## 2023-10-08 RX ADMIN — Medication 20 MILLIEQUIVALENT(S): at 13:35

## 2023-10-08 RX ADMIN — HEPARIN SODIUM 5000 UNIT(S): 5000 INJECTION INTRAVENOUS; SUBCUTANEOUS at 05:49

## 2023-10-08 RX ADMIN — GABAPENTIN 300 MILLIGRAM(S): 400 CAPSULE ORAL at 05:49

## 2023-10-08 RX ADMIN — LIDOCAINE 5 MILLILITER(S): 4 CREAM TOPICAL at 13:35

## 2023-10-08 RX ADMIN — LIDOCAINE 5 MILLILITER(S): 4 CREAM TOPICAL at 05:49

## 2023-10-08 RX ADMIN — BUMETANIDE 1 MILLIGRAM(S): 0.25 INJECTION INTRAMUSCULAR; INTRAVENOUS at 11:51

## 2023-10-08 RX ADMIN — GABAPENTIN 300 MILLIGRAM(S): 400 CAPSULE ORAL at 17:06

## 2023-10-08 RX ADMIN — Medication 5 UNIT(S): at 16:58

## 2023-10-08 RX ADMIN — OXYCODONE HYDROCHLORIDE 15 MILLIGRAM(S): 5 TABLET ORAL at 22:23

## 2023-10-08 RX ADMIN — INSULIN GLARGINE 22 UNIT(S): 100 INJECTION, SOLUTION SUBCUTANEOUS at 22:25

## 2023-10-08 RX ADMIN — OXYCODONE HYDROCHLORIDE 15 MILLIGRAM(S): 5 TABLET ORAL at 17:04

## 2023-10-08 RX ADMIN — OXYCODONE HYDROCHLORIDE 15 MILLIGRAM(S): 5 TABLET ORAL at 18:04

## 2023-10-08 RX ADMIN — HEPARIN SODIUM 5000 UNIT(S): 5000 INJECTION INTRAVENOUS; SUBCUTANEOUS at 13:36

## 2023-10-08 RX ADMIN — OXYCODONE HYDROCHLORIDE 15 MILLIGRAM(S): 5 TABLET ORAL at 23:10

## 2023-10-08 RX ADMIN — OXYCODONE HYDROCHLORIDE 10 MILLIGRAM(S): 5 TABLET ORAL at 06:12

## 2023-10-08 RX ADMIN — Medication 5 MILLIGRAM(S): at 05:50

## 2023-10-08 RX ADMIN — HEPARIN SODIUM 5000 UNIT(S): 5000 INJECTION INTRAVENOUS; SUBCUTANEOUS at 22:22

## 2023-10-08 NOTE — PROGRESS NOTE ADULT - ASSESSMENT
68 y/o M with PMH of  HTN, HLD, CAD s/p CABG, HFrEF (LV systolic function appears grossly normal on Echo 9/21), Type 2 Diabetes, ASIYA on CPAP, right renal mass s/p partial resection (3/2023, pathology negative), s/p pervious L3-pelvis fusion, who presented to [facility] ED on 9/18 after a syncopal episode, found to have T12-L1 fracture on imaging. Patient had a removal of hardware from L3-pelvis and revision T9-pelvis revision PSF w/ muscle flap reconstruction on 9/23. Patient now with gait Instability, ADL impairments and Functional impairments.    Gait Instability/Functional Impairment  Status Post Fall w/ T12-L1 Acute Fracture  -s/p removal of hardware from L3-pelvis and revision T9-pelvis revision PSF w/ muscle flap reconstruction on 9/23  -Fall/Spine precautions  -Outpatient follow up with neurosurgery  -Continue comprehensive rehab program - PT/OT/SLP per rehab team  -Pain management, bowel regimen per rehab       Hypokalemia  -Replete and monitor    Syncopal Episode   -Felt to be due to situational syncope (after blowing his nose)  -Workup at OSH: CTH negative, EKG showed no acute changes, Echo showed grossly nl LV function, no significant valvular disease   -Will monitor     HTN/HLD  CAD s/p CABG/Mild Diastolic Dysfunction w/ EF 60%  -Per documentation: Echo from 5/5/23 showed nl LV function, EF 60%, mild diastolic dysfunction. normal RV function. trivial MR and TR, normal Aortic valve. normal Pulmonic valve.   -Continue Aspirin, Atorvastatin   -Coreg, Losartan held for syncope/hypotension  - Patient endorses home dose alternates between 2mg 1 tab BID (total 4mg/day) and 2mg 2 tab AM, 2mg 1 tab pm (total 6mg/day)  - Bumex 2mg in AM, 1mg in afternoon. can titrate if hypotensive  -Outpatient cardiology follow up     ASIYA  -Continue with nocturnal CPAP. Pressure set with home setting of 5.    Type 2 Diabetes  -HbA1C 6.5 on 9/19  -Continue to hold oral glimepiride  -Hospitalization was complicated by steroid induced hyperglycemia - now off of steroids    -Lantus 17un qhs, admelog 5un TID w/ meals, ISS - increase lantus to 22un qhs 10/8  -Continue to monitor FS, adjust insulin as needed    Gout  -Continue allopurinol    DVT ppx - HSQ

## 2023-10-08 NOTE — PROGRESS NOTE ADULT - SUBJECTIVE AND OBJECTIVE BOX
PROGRESS NOTE:     Patient is a 67y old  Male who presents with a chief complaint of Spinal Fusion (06 Oct 2023 11:59)          SUBJECTIVE & OBJECTIVE:   Pt seen and examined at bedside     no overnight events.   no new complaints    REVIEW OF SYSTEMS: remaining ROS negative     PHYSICAL EXAM:  VITALS:  Vital Signs Last 24 Hrs  T(C): 36.9 (08 Oct 2023 07:57), Max: 36.9 (08 Oct 2023 07:57)  T(F): 98.4 (08 Oct 2023 07:57), Max: 98.4 (08 Oct 2023 07:57)  HR: 79 (08 Oct 2023 07:57) (71 - 79)  BP: 107/70 (08 Oct 2023 07:57) (107/70 - 107/70)  BP(mean): --  RR: 16 (08 Oct 2023 07:57) (16 - 16)  SpO2: 96% (08 Oct 2023 07:57) (96% - 98%)    Parameters below as of 08 Oct 2023 07:57  Patient On (Oxygen Delivery Method): room air          GENERAL: NAD,  no increased WOB  HEAD:  Atraumatic, Normocephalic  EYES: EOMI, conjunctiva and sclera clear  ENMT: Moist mucous membranes  NECK: Supple, No JVD  NERVOUS SYSTEM:  Alert & Oriented X3, no focal neuro deficits   CHEST/LUNG: Clear to auscultation bilaterally; No rales, rhonchi, wheezing, or rubs  HEART: Regular rate and rhythm; S1 S2  ABDOMEN: Soft, Nontender, Nondistended; Bowel sounds present  EXTREMITIES: No clubbing, cyanosis, calf tenderness or edema b/l      MEDICATIONS  (STANDING):  allopurinol 100 milliGRAM(s) Oral at bedtime  aspirin enteric coated 81 milliGRAM(s) Oral daily  atorvastatin 40 milliGRAM(s) Oral at bedtime  Biotene Dry Mouth Oral Rinse 15 milliLiter(s) Swish and Spit three times a day  bisacodyl 5 milliGRAM(s) Oral every 12 hours  buMETAnide 1 milliGRAM(s) Oral <User Schedule>  buMETAnide 2 milliGRAM(s) Oral daily  dextrose 5%. 1000 milliLiter(s) (50 mL/Hr) IV Continuous <Continuous>  dextrose 5%. 1000 milliLiter(s) (100 mL/Hr) IV Continuous <Continuous>  dextrose 50% Injectable 25 Gram(s) IV Push once  dextrose 50% Injectable 25 Gram(s) IV Push once  dextrose 50% Injectable 12.5 Gram(s) IV Push once  gabapentin 300 milliGRAM(s) Oral four times a day  glucagon  Injectable 1 milliGRAM(s) IntraMuscular once  heparin   Injectable 5000 Unit(s) SubCutaneous every 8 hours  insulin glargine Injectable (LANTUS) 22 Unit(s) SubCutaneous at bedtime  insulin lispro (ADMELOG) corrective regimen sliding scale   SubCutaneous three times a day before meals  insulin lispro (ADMELOG) corrective regimen sliding scale   SubCutaneous at bedtime  insulin lispro Injectable (ADMELOG) 5 Unit(s) SubCutaneous before breakfast  insulin lispro Injectable (ADMELOG) 5 Unit(s) SubCutaneous before lunch  insulin lispro Injectable (ADMELOG) 5 Unit(s) SubCutaneous before dinner  lidocaine   4% Patch 1 Patch Transdermal daily  lidocaine 2% Viscous 5 milliLiter(s) Swish and Spit three times a day  multivitamin 1 Tablet(s) Oral daily  pantoprazole    Tablet 40 milliGRAM(s) Oral before breakfast  potassium chloride    Tablet ER 20 milliEquivalent(s) Oral daily  tiZANidine 2 milliGRAM(s) Oral <User Schedule>  tiZANidine 6 milliGRAM(s) Oral at bedtime  tiZANidine 4 milliGRAM(s) Oral <User Schedule>      MEDICATIONS  (PRN):  aluminum hydroxide/magnesium hydroxide/simethicone Suspension 30 milliLiter(s) Oral every 4 hours PRN Dyspepsia  benzocaine/menthol Lozenge 1 Lozenge Oral every 3 hours PRN Sore Throat  dextrose Oral Gel 15 Gram(s) Oral once PRN Blood Glucose LESS THAN 70 milliGRAM(s)/deciliter  guaiFENesin Oral Liquid (Sugar-Free) 100 milliGRAM(s) Oral every 6 hours PRN Cough  melatonin 3 milliGRAM(s) Oral at bedtime PRN Insomnia  ondansetron   Disintegrating Tablet 4 milliGRAM(s) Oral every 6 hours PRN Nausea and/or Vomiting  oxyCODONE    IR 10 milliGRAM(s) Oral every 3 hours PRN Moderate Pain (4 - 6)  oxyCODONE    IR 15 milliGRAM(s) Oral every 3 hours PRN Severe Pain (7 - 10)  polyethylene glycol 3350 17 Gram(s) Oral two times a day PRN Constipation      Allergies    No Known Allergies    Intolerances              LABS:       10-06    134<L>  |  96  |  16  ----------------------------<  155<H>  3.3<L>   |  29  |  0.73    Ca    8.7      06 Oct 2023 05:41        Urinalysis Basic - ( 06 Oct 2023 05:41 )    Color: x / Appearance: x / SG: x / pH: x  Gluc: 155 mg/dL / Ketone: x  / Bili: x / Urobili: x   Blood: x / Protein: x / Nitrite: x   Leuk Esterase: x / RBC: x / WBC x   Sq Epi: x / Non Sq Epi: x / Bacteria: x      CAPILLARY BLOOD GLUCOSE      POCT Blood Glucose.: 209 mg/dL (08 Oct 2023 11:49)  POCT Blood Glucose.: 171 mg/dL (08 Oct 2023 07:59)  POCT Blood Glucose.: 239 mg/dL (07 Oct 2023 21:05)  POCT Blood Glucose.: 230 mg/dL (07 Oct 2023 21:04)  POCT Blood Glucose.: 131 mg/dL (07 Oct 2023 17:00)                RECENT CULTURES:          RADIOLOGY & ADDITIONAL TESTS:

## 2023-10-09 LAB
ALBUMIN SERPL ELPH-MCNC: 2.4 G/DL — LOW (ref 3.3–5)
ALP SERPL-CCNC: 116 U/L — SIGNIFICANT CHANGE UP (ref 40–120)
ALT FLD-CCNC: 18 U/L — SIGNIFICANT CHANGE UP (ref 10–45)
ANION GAP SERPL CALC-SCNC: 8 MMOL/L — SIGNIFICANT CHANGE UP (ref 5–17)
AST SERPL-CCNC: 16 U/L — SIGNIFICANT CHANGE UP (ref 10–40)
BILIRUB SERPL-MCNC: 0.6 MG/DL — SIGNIFICANT CHANGE UP (ref 0.2–1.2)
BUN SERPL-MCNC: 15 MG/DL — SIGNIFICANT CHANGE UP (ref 7–23)
CALCIUM SERPL-MCNC: 9 MG/DL — SIGNIFICANT CHANGE UP (ref 8.4–10.5)
CHLORIDE SERPL-SCNC: 99 MMOL/L — SIGNIFICANT CHANGE UP (ref 96–108)
CO2 SERPL-SCNC: 30 MMOL/L — SIGNIFICANT CHANGE UP (ref 22–31)
CREAT SERPL-MCNC: 0.7 MG/DL — SIGNIFICANT CHANGE UP (ref 0.5–1.3)
EGFR: 101 ML/MIN/1.73M2 — SIGNIFICANT CHANGE UP
GLUCOSE BLDC GLUCOMTR-MCNC: 145 MG/DL — HIGH (ref 70–99)
GLUCOSE BLDC GLUCOMTR-MCNC: 153 MG/DL — HIGH (ref 70–99)
GLUCOSE BLDC GLUCOMTR-MCNC: 164 MG/DL — HIGH (ref 70–99)
GLUCOSE BLDC GLUCOMTR-MCNC: 181 MG/DL — HIGH (ref 70–99)
GLUCOSE SERPL-MCNC: 145 MG/DL — HIGH (ref 70–99)
HCT VFR BLD CALC: 30.5 % — LOW (ref 39–50)
HGB BLD-MCNC: 9.9 G/DL — LOW (ref 13–17)
MAGNESIUM SERPL-MCNC: 1.3 MG/DL — LOW (ref 1.6–2.6)
MCHC RBC-ENTMCNC: 28.3 PG — SIGNIFICANT CHANGE UP (ref 27–34)
MCHC RBC-ENTMCNC: 32.5 GM/DL — SIGNIFICANT CHANGE UP (ref 32–36)
MCV RBC AUTO: 87.1 FL — SIGNIFICANT CHANGE UP (ref 80–100)
NRBC # BLD: 0 /100 WBCS — SIGNIFICANT CHANGE UP (ref 0–0)
PLATELET # BLD AUTO: 247 K/UL — SIGNIFICANT CHANGE UP (ref 150–400)
POTASSIUM SERPL-MCNC: 3.9 MMOL/L — SIGNIFICANT CHANGE UP (ref 3.5–5.3)
POTASSIUM SERPL-SCNC: 3.9 MMOL/L — SIGNIFICANT CHANGE UP (ref 3.5–5.3)
PROT SERPL-MCNC: 6.4 G/DL — SIGNIFICANT CHANGE UP (ref 6–8.3)
RBC # BLD: 3.5 M/UL — LOW (ref 4.2–5.8)
RBC # FLD: 13.2 % — SIGNIFICANT CHANGE UP (ref 10.3–14.5)
SODIUM SERPL-SCNC: 137 MMOL/L — SIGNIFICANT CHANGE UP (ref 135–145)
WBC # BLD: 7.07 K/UL — SIGNIFICANT CHANGE UP (ref 3.8–10.5)
WBC # FLD AUTO: 7.07 K/UL — SIGNIFICANT CHANGE UP (ref 3.8–10.5)

## 2023-10-09 PROCEDURE — 99232 SBSQ HOSP IP/OBS MODERATE 35: CPT

## 2023-10-09 PROCEDURE — 99232 SBSQ HOSP IP/OBS MODERATE 35: CPT | Mod: GC

## 2023-10-09 RX ORDER — BUMETANIDE 0.25 MG/ML
2 INJECTION INTRAMUSCULAR; INTRAVENOUS
Refills: 0 | Status: DISCONTINUED | OUTPATIENT
Start: 2023-10-10 | End: 2023-10-14

## 2023-10-09 RX ORDER — MAGNESIUM OXIDE 400 MG ORAL TABLET 241.3 MG
400 TABLET ORAL
Refills: 0 | Status: DISCONTINUED | OUTPATIENT
Start: 2023-10-09 | End: 2023-10-14

## 2023-10-09 RX ADMIN — LIDOCAINE 5 MILLILITER(S): 4 CREAM TOPICAL at 04:46

## 2023-10-09 RX ADMIN — Medication 2: at 08:34

## 2023-10-09 RX ADMIN — TIZANIDINE 2 MILLIGRAM(S): 4 TABLET ORAL at 08:35

## 2023-10-09 RX ADMIN — LIDOCAINE 5 MILLILITER(S): 4 CREAM TOPICAL at 21:03

## 2023-10-09 RX ADMIN — OXYCODONE HYDROCHLORIDE 15 MILLIGRAM(S): 5 TABLET ORAL at 15:47

## 2023-10-09 RX ADMIN — OXYCODONE HYDROCHLORIDE 15 MILLIGRAM(S): 5 TABLET ORAL at 05:25

## 2023-10-09 RX ADMIN — BUMETANIDE 2 MILLIGRAM(S): 0.25 INJECTION INTRAMUSCULAR; INTRAVENOUS at 04:44

## 2023-10-09 RX ADMIN — Medication 5 UNIT(S): at 12:22

## 2023-10-09 RX ADMIN — ATORVASTATIN CALCIUM 40 MILLIGRAM(S): 80 TABLET, FILM COATED ORAL at 21:03

## 2023-10-09 RX ADMIN — OXYCODONE HYDROCHLORIDE 15 MILLIGRAM(S): 5 TABLET ORAL at 04:44

## 2023-10-09 RX ADMIN — Medication 2: at 12:21

## 2023-10-09 RX ADMIN — TIZANIDINE 4 MILLIGRAM(S): 4 TABLET ORAL at 15:49

## 2023-10-09 RX ADMIN — LIDOCAINE 5 MILLILITER(S): 4 CREAM TOPICAL at 15:48

## 2023-10-09 RX ADMIN — PANTOPRAZOLE SODIUM 40 MILLIGRAM(S): 20 TABLET, DELAYED RELEASE ORAL at 04:45

## 2023-10-09 RX ADMIN — OXYCODONE HYDROCHLORIDE 15 MILLIGRAM(S): 5 TABLET ORAL at 23:19

## 2023-10-09 RX ADMIN — GABAPENTIN 300 MILLIGRAM(S): 400 CAPSULE ORAL at 17:19

## 2023-10-09 RX ADMIN — INSULIN GLARGINE 22 UNIT(S): 100 INJECTION, SOLUTION SUBCUTANEOUS at 21:03

## 2023-10-09 RX ADMIN — Medication 1 TABLET(S): at 12:24

## 2023-10-09 RX ADMIN — Medication 3 MILLIGRAM(S): at 23:19

## 2023-10-09 RX ADMIN — Medication 100 MILLIGRAM(S): at 21:03

## 2023-10-09 RX ADMIN — LIDOCAINE 1 PATCH: 4 CREAM TOPICAL at 00:08

## 2023-10-09 RX ADMIN — Medication 20 MILLIEQUIVALENT(S): at 12:24

## 2023-10-09 RX ADMIN — HEPARIN SODIUM 5000 UNIT(S): 5000 INJECTION INTRAVENOUS; SUBCUTANEOUS at 21:03

## 2023-10-09 RX ADMIN — GABAPENTIN 300 MILLIGRAM(S): 400 CAPSULE ORAL at 04:44

## 2023-10-09 RX ADMIN — HEPARIN SODIUM 5000 UNIT(S): 5000 INJECTION INTRAVENOUS; SUBCUTANEOUS at 15:51

## 2023-10-09 RX ADMIN — BUMETANIDE 1 MILLIGRAM(S): 0.25 INJECTION INTRAMUSCULAR; INTRAVENOUS at 12:24

## 2023-10-09 RX ADMIN — Medication 5 MILLIGRAM(S): at 04:45

## 2023-10-09 RX ADMIN — OXYCODONE HYDROCHLORIDE 15 MILLIGRAM(S): 5 TABLET ORAL at 08:38

## 2023-10-09 RX ADMIN — HEPARIN SODIUM 5000 UNIT(S): 5000 INJECTION INTRAVENOUS; SUBCUTANEOUS at 04:45

## 2023-10-09 RX ADMIN — MAGNESIUM OXIDE 400 MG ORAL TABLET 400 MILLIGRAM(S): 241.3 TABLET ORAL at 17:16

## 2023-10-09 RX ADMIN — Medication 5 UNIT(S): at 17:16

## 2023-10-09 RX ADMIN — GABAPENTIN 300 MILLIGRAM(S): 400 CAPSULE ORAL at 23:19

## 2023-10-09 RX ADMIN — Medication 5 MILLIGRAM(S): at 17:17

## 2023-10-09 RX ADMIN — Medication 81 MILLIGRAM(S): at 12:23

## 2023-10-09 RX ADMIN — Medication 5 UNIT(S): at 08:34

## 2023-10-09 RX ADMIN — Medication 15 MILLILITER(S): at 15:51

## 2023-10-09 RX ADMIN — GABAPENTIN 300 MILLIGRAM(S): 400 CAPSULE ORAL at 12:25

## 2023-10-09 RX ADMIN — Medication 15 MILLILITER(S): at 04:46

## 2023-10-09 RX ADMIN — TIZANIDINE 6 MILLIGRAM(S): 4 TABLET ORAL at 21:03

## 2023-10-09 RX ADMIN — Medication 15 MILLILITER(S): at 23:18

## 2023-10-09 RX ADMIN — OXYCODONE HYDROCHLORIDE 15 MILLIGRAM(S): 5 TABLET ORAL at 16:17

## 2023-10-09 RX ADMIN — GABAPENTIN 300 MILLIGRAM(S): 400 CAPSULE ORAL at 00:04

## 2023-10-09 NOTE — PROGRESS NOTE ADULT - SUBJECTIVE AND OBJECTIVE BOX
HPI:  This is a 66 YO male with PMH of  HFrEF, CAD s/p CABG, ASIYA on CPAP, HTN, DM, right renal mass s/p resection presents to  [facility] ED ON 9/18  after a syncopal episode, found to have T12-L1 fracture on imaging. Patient originally scheduled for an ACDF with Dr. Hughes, due to injury he was changed to a T9-pelvis revision PSF. Hospital course s/f medication adjustments for HTN  and HF. Patient was evaluated by PM&R and therapy for functional deficits, gait/ADL impairments and acute rehabilitation was recommended. Patient was medically optimized for discharge to [facility] IRU on 9/29/23.       ROS/subjective:  Patient seen and evaluated, NAD in chair, spasms/back pain-controlled- Zanaflex continues.  BPs improved-Coreg held -hospitalist in. Bumex increased to 2mg/2mg for hx CHF exacerbation. Daily weight following.   no dizziness, no headaches, no chest pain , No SOB, no nausea, no vomiting  BM x2 Sat.   biotene/visc Lidocaine for lip lesion and dry mouth  Plastics consulted to evaluate back wound-remove sutures    MEDICATIONS  (STANDING):  allopurinol 100 milliGRAM(s) Oral at bedtime  aspirin enteric coated 81 milliGRAM(s) Oral daily  atorvastatin 40 milliGRAM(s) Oral at bedtime  Biotene Dry Mouth Oral Rinse 15 milliLiter(s) Swish and Spit three times a day  bisacodyl 5 milliGRAM(s) Oral every 12 hours  buMETAnide 2 milliGRAM(s) Oral <User Schedule>  buMETAnide 2 milliGRAM(s) Oral daily  dextrose 5%. 1000 milliLiter(s) (50 mL/Hr) IV Continuous <Continuous>  dextrose 5%. 1000 milliLiter(s) (100 mL/Hr) IV Continuous <Continuous>  dextrose 50% Injectable 25 Gram(s) IV Push once  dextrose 50% Injectable 25 Gram(s) IV Push once  dextrose 50% Injectable 12.5 Gram(s) IV Push once  gabapentin 300 milliGRAM(s) Oral four times a day  glucagon  Injectable 1 milliGRAM(s) IntraMuscular once  heparin   Injectable 5000 Unit(s) SubCutaneous every 8 hours  insulin glargine Injectable (LANTUS) 22 Unit(s) SubCutaneous at bedtime  insulin lispro (ADMELOG) corrective regimen sliding scale   SubCutaneous three times a day before meals  insulin lispro (ADMELOG) corrective regimen sliding scale   SubCutaneous at bedtime  insulin lispro Injectable (ADMELOG) 5 Unit(s) SubCutaneous before breakfast  insulin lispro Injectable (ADMELOG) 5 Unit(s) SubCutaneous before lunch  insulin lispro Injectable (ADMELOG) 5 Unit(s) SubCutaneous before dinner  lidocaine   4% Patch 1 Patch Transdermal daily  lidocaine 2% Viscous 5 milliLiter(s) Swish and Spit three times a day  multivitamin 1 Tablet(s) Oral daily  pantoprazole    Tablet 40 milliGRAM(s) Oral before breakfast  potassium chloride    Tablet ER 20 milliEquivalent(s) Oral daily  tiZANidine 4 milliGRAM(s) Oral <User Schedule>  tiZANidine 2 milliGRAM(s) Oral <User Schedule>  tiZANidine 6 milliGRAM(s) Oral at bedtime    MEDICATIONS  (PRN):  aluminum hydroxide/magnesium hydroxide/simethicone Suspension 30 milliLiter(s) Oral every 4 hours PRN Dyspepsia  benzocaine/menthol Lozenge 1 Lozenge Oral every 3 hours PRN Sore Throat  dextrose Oral Gel 15 Gram(s) Oral once PRN Blood Glucose LESS THAN 70 milliGRAM(s)/deciliter  guaiFENesin Oral Liquid (Sugar-Free) 100 milliGRAM(s) Oral every 6 hours PRN Cough  melatonin 3 milliGRAM(s) Oral at bedtime PRN Insomnia  ondansetron   Disintegrating Tablet 4 milliGRAM(s) Oral every 6 hours PRN Nausea and/or Vomiting  oxyCODONE    IR 15 milliGRAM(s) Oral every 3 hours PRN Severe Pain (7 - 10)  oxyCODONE    IR 10 milliGRAM(s) Oral every 3 hours PRN Moderate Pain (4 - 6)  polyethylene glycol 3350 17 Gram(s) Oral two times a day PRN Constipation                            9.9    7.07  )-----------( 247      ( 09 Oct 2023 06:10 )             30.5     10-09    137  |  99  |  15  ----------------------------<  145<H>  3.9   |  30  |  0.70    Ca    9.0      09 Oct 2023 06:10  Mg     1.3     10-09    TPro  6.4  /  Alb  2.4<L>  /  TBili  0.6  /  DBili  x   /  AST  16  /  ALT  18  /  AlkPhos  116  10-09    Urinalysis Basic - ( 09 Oct 2023 06:10 )    Color: x / Appearance: x / SG: x / pH: x  Gluc: 145 mg/dL / Ketone: x  / Bili: x / Urobili: x   Blood: x / Protein: x / Nitrite: x   Leuk Esterase: x / RBC: x / WBC x   Sq Epi: x / Non Sq Epi: x / Bacteria: x      CAPILLARY BLOOD GLUCOSE      POCT Blood Glucose.: 181 mg/dL (09 Oct 2023 12:07)  POCT Blood Glucose.: 164 mg/dL (09 Oct 2023 08:16)  POCT Blood Glucose.: 132 mg/dL (08 Oct 2023 22:12)  POCT Blood Glucose.: 154 mg/dL (08 Oct 2023 16:57)      Vital Signs Last 24 Hrs  T(C): 36.7 (09 Oct 2023 08:33), Max: 37.2 (08 Oct 2023 22:50)  T(F): 98.1 (09 Oct 2023 08:33), Max: 99 (08 Oct 2023 22:50)  HR: 85 (09 Oct 2023 08:33) (73 - 85)  BP: 156/74 (09 Oct 2023 08:33) (110/69 - 156/74)  BP(mean): --  RR: 16 (09 Oct 2023 08:33) (15 - 16)  SpO2: 99% (09 Oct 2023 08:33) (97% - 99%)    Parameters below as of 09 Oct 2023 08:33  Patient On (Oxygen Delivery Method): room air        Constitutional - NAD  HEENT - NCAT, EOMI.    Chest - good chest expansion, good respiratory effort   Cardio - S1D2 RRR  Abdomen -  Obese, Soft, NTND BS +  Extremities - No peripheral edema, No calf tenderness   Neurologic Exam:                    Cranial Nerves -2-12 intact     Motor -  Strength preserved in UE and LE with severe pain in back.                     LEFT    UE - ShAB 5/5, EF 5/5, EE 5/5, WE 5/5,  WNL                     RIGHT UE - ShAB 5/5, EF 5/5, EE 5/5, WE 5/5,  WNL                    LEFT    LE - HF 4/5, KE 5/5, DF 5/5, PF 5/5                    RIGHT LE - HF 4/5, KE 5/5, DF 5/5, PF 5/5        Sensory - Intact to LT bilateral  Skin on admission:  Mid thoracic to lumbar incision covered with primary dressing. Previous surgical scars of chest, abdomen, b/l elbows, wrists and knees. No pressure ulcers.- back dressing Dcd- some erythema/ eschar Mid incision-   leslie PATEL    IDT meeting on 10/4    SW: PH 3STE, 12STI, children    OT:  eating set up  grooming set up  UBD/LBD mod A  toileting mod A  tub/shower mod A  bathing mod A    PT:  amb 50ft min A RW  transfers mod A  stairs    SLP na    tentative dc 10/14 HC       Continue comprehensive acute rehab program consisting of 3hrs/day of OT/PT. HPI:  This is a 68 YO male with PMH of  HFrEF, CAD s/p CABG, ASIYA on CPAP, HTN, DM, right renal mass s/p resection presents to  [facility] ED ON 9/18  after a syncopal episode, found to have T12-L1 fracture on imaging. Patient originally scheduled for an ACDF with Dr. Hughes, due to injury he was changed to a T9-pelvis revision PSF. Hospital course s/f medication adjustments for HTN  and HF. Patient was evaluated by PM&R and therapy for functional deficits, gait/ADL impairments and acute rehabilitation was recommended. Patient was medically optimized for discharge to [facility] IRU on 9/29/23.       ROS/subjective:  Patient seen and evaluated, NAD in chair, spasms/back pain-controlled- Zanaflex continues.  BPs improved-Coreg held -hospitalist in. Bumex increased to 2mg/2mg for hx CHF exacerbation. Daily weight following.   no dizziness, no headaches, no chest pain , No SOB, no nausea, no vomiting  BM x2 Sat.   biotene/visc Lidocaine for lip lesion and dry mouth  Plastics consulted to evaluate back wound-remove sutures    MEDICATIONS  (STANDING):  allopurinol 100 milliGRAM(s) Oral at bedtime  aspirin enteric coated 81 milliGRAM(s) Oral daily  atorvastatin 40 milliGRAM(s) Oral at bedtime  Biotene Dry Mouth Oral Rinse 15 milliLiter(s) Swish and Spit three times a day  bisacodyl 5 milliGRAM(s) Oral every 12 hours  buMETAnide 2 milliGRAM(s) Oral <User Schedule>  buMETAnide 2 milliGRAM(s) Oral daily  dextrose 5%. 1000 milliLiter(s) (50 mL/Hr) IV Continuous <Continuous>  dextrose 5%. 1000 milliLiter(s) (100 mL/Hr) IV Continuous <Continuous>  dextrose 50% Injectable 25 Gram(s) IV Push once  dextrose 50% Injectable 25 Gram(s) IV Push once  dextrose 50% Injectable 12.5 Gram(s) IV Push once  gabapentin 300 milliGRAM(s) Oral four times a day  glucagon  Injectable 1 milliGRAM(s) IntraMuscular once  heparin   Injectable 5000 Unit(s) SubCutaneous every 8 hours  insulin glargine Injectable (LANTUS) 22 Unit(s) SubCutaneous at bedtime  insulin lispro (ADMELOG) corrective regimen sliding scale   SubCutaneous three times a day before meals  insulin lispro (ADMELOG) corrective regimen sliding scale   SubCutaneous at bedtime  insulin lispro Injectable (ADMELOG) 5 Unit(s) SubCutaneous before breakfast  insulin lispro Injectable (ADMELOG) 5 Unit(s) SubCutaneous before lunch  insulin lispro Injectable (ADMELOG) 5 Unit(s) SubCutaneous before dinner  lidocaine   4% Patch 1 Patch Transdermal daily  lidocaine 2% Viscous 5 milliLiter(s) Swish and Spit three times a day  multivitamin 1 Tablet(s) Oral daily  pantoprazole    Tablet 40 milliGRAM(s) Oral before breakfast  potassium chloride    Tablet ER 20 milliEquivalent(s) Oral daily  tiZANidine 4 milliGRAM(s) Oral <User Schedule>  tiZANidine 2 milliGRAM(s) Oral <User Schedule>  tiZANidine 6 milliGRAM(s) Oral at bedtime    MEDICATIONS  (PRN):  aluminum hydroxide/magnesium hydroxide/simethicone Suspension 30 milliLiter(s) Oral every 4 hours PRN Dyspepsia  benzocaine/menthol Lozenge 1 Lozenge Oral every 3 hours PRN Sore Throat  dextrose Oral Gel 15 Gram(s) Oral once PRN Blood Glucose LESS THAN 70 milliGRAM(s)/deciliter  guaiFENesin Oral Liquid (Sugar-Free) 100 milliGRAM(s) Oral every 6 hours PRN Cough  melatonin 3 milliGRAM(s) Oral at bedtime PRN Insomnia  ondansetron   Disintegrating Tablet 4 milliGRAM(s) Oral every 6 hours PRN Nausea and/or Vomiting  oxyCODONE    IR 15 milliGRAM(s) Oral every 3 hours PRN Severe Pain (7 - 10)  oxyCODONE    IR 10 milliGRAM(s) Oral every 3 hours PRN Moderate Pain (4 - 6)  polyethylene glycol 3350 17 Gram(s) Oral two times a day PRN Constipation                            9.9    7.07  )-----------( 247      ( 09 Oct 2023 06:10 )             30.5     10-09    137  |  99  |  15  ----------------------------<  145<H>  3.9   |  30  |  0.70    Ca    9.0      09 Oct 2023 06:10  Mg     1.3     10-09    TPro  6.4  /  Alb  2.4<L>  /  TBili  0.6  /  DBili  x   /  AST  16  /  ALT  18  /  AlkPhos  116  10-09    Urinalysis Basic - ( 09 Oct 2023 06:10 )    Color: x / Appearance: x / SG: x / pH: x  Gluc: 145 mg/dL / Ketone: x  / Bili: x / Urobili: x   Blood: x / Protein: x / Nitrite: x   Leuk Esterase: x / RBC: x / WBC x   Sq Epi: x / Non Sq Epi: x / Bacteria: x      CAPILLARY BLOOD GLUCOSE      POCT Blood Glucose.: 181 mg/dL (09 Oct 2023 12:07)  POCT Blood Glucose.: 164 mg/dL (09 Oct 2023 08:16)  POCT Blood Glucose.: 132 mg/dL (08 Oct 2023 22:12)  POCT Blood Glucose.: 154 mg/dL (08 Oct 2023 16:57)      Vital Signs Last 24 Hrs  T(C): 36.7 (09 Oct 2023 08:33), Max: 37.2 (08 Oct 2023 22:50)  T(F): 98.1 (09 Oct 2023 08:33), Max: 99 (08 Oct 2023 22:50)  HR: 85 (09 Oct 2023 08:33) (73 - 85)  BP: 156/74 (09 Oct 2023 08:33) (110/69 - 156/74)  BP(mean): --  RR: 16 (09 Oct 2023 08:33) (15 - 16)  SpO2: 99% (09 Oct 2023 08:33) (97% - 99%)    Parameters below as of 09 Oct 2023 08:33  Patient On (Oxygen Delivery Method): room air    Constitutional - NAD  HEENT - NCAT, EOMI.    Chest - good chest expansion, good respiratory effort   Cardio - S1D2 RRR  Abdomen -  Obese, Soft, NTND BS +  Extremities - No peripheral edema, No calf tenderness   Neurologic Exam:                    Cranial Nerves -2-12 intact     Motor -  Strength preserved in UE and LE with severe pain in back.                     LEFT    UE - ShAB 5/5, EF 5/5, EE 5/5, WE 5/5,  WNL                     RIGHT UE - ShAB 5/5, EF 5/5, EE 5/5, WE 5/5,  WNL                    LEFT    LE - HF 4/5, KE 5/5, DF 5/5, PF 5/5                    RIGHT LE - HF 4/5, KE 5/5, DF 5/5, PF 5/5        Sensory - Intact to LT bilateral  Skin on admission:  Mid thoracic to lumbar incision covered with primary dressing. Previous surgical scars of chest, abdomen, b/l elbows, wrists and knees. No pressure ulcers.- back dressing Dcd-   Noted imbedding of suture line most noted inferiorly- no drainage noted-  leslie PATEL    IDT meeting on 10/4    SW: PH 3STE, 12STI, children    OT:  eating set up  grooming set up  UBD/LBD mod A  toileting mod A  tub/shower mod A  bathing mod A    PT:  amb 50ft min A RW  transfers mod A  stairs    SLP na    tentative dc 10/14 HC       Continue comprehensive acute rehab program consisting of 3hrs/day of OT/PT.

## 2023-10-09 NOTE — PROGRESS NOTE ADULT - ASSESSMENT
This is a 68 YO male with PMH of  HFrEF, CAD s/p CABG, ASIYA on CPAP, HTN, DM II, right renal mass s/p resection presents to  [facility] ED on 9/18  after a syncopal episode, found to have T12-L1 fracture on imaging. Patient had a T9-pelvis revision PSF. Patient now with gait Instability, ADL impairments and Functional impairments.    #Thoracic spine fracture  - T9-pelvis revision PSF on 9/23  - Comprehensive Rehab Program: PT/OT, 3hours daily and 5 days weekly  - PT: Focused on improving strength, endurance, coordination, balance, functional mobility, and transfers  - OT: Focused on improving strength, fine motor skills, coordination, posture and ADLs.    -pain regimen  -zanaflex TID, avoid hypotension- muscle spasms improved  -plastics to see for suture removal    #ASIYA  Respiratory order for nocturnal CPAP. Pressure set withy home setting of 5.    #HTN  - Carvedilol 6.25mg BID-on hold per hospitalist  - Losartan 25mg daily-hold  -Bumex BID    #HLD  - Lipitor 40mg daily    #CAD   - s/p CABG  - c/w ASA 81mg daily    #CHF  - Bumex 2mg+2mg. Monitor weight daily.     #DM II  - ISS and FS  - Admelog and Lantus  - A1c 6.5 on 9/19  - follow fingersticks    #Neuropathy  -gabapentin 300mg QID.- consider increasing dose to 400mg 4x/day as PTA    #Pain management  - Tylenol PRN,   - Oxycodone 10mg moderate, 15mg severe PRN,   - lidocaine patch,   -zanaflex 4mg/2mg/6mg  - gabapentin 300mg 4x/.day  ICE    #DVT ppx  - Heparin 5K Q 8 hrs    #GI ppx  - Protonix 40mg  - Zofran    #Bowel Regimen  - Senna, dulcolax 5mg BID, miralax 2x/day    #Bladder management  - BS on admission, and q 8 hours (SC if > 400)  - Monitor UO    #FEN   - Diet: DASH/TLC  - MVI    #Skin:  - Skin on admission: Multiple Left sided abdomen abrasions. Mid thoracic to lumbar incision covered with primary dressing. Previous surgical scars of chest, abdomen, b/l elbows, wrists and knees. No pressure ulcers.  - Pressure injury/Skin: Turn Q2hrs while in bed, OOB to Chair, PT/OT     #Sleep:   - Maintain quiet hours and low stim environment.  - Melatonin 3mg nightly PRN to maximize participation in therapy during the day.     #Precaution  - Fall, Aspiration, Spinal   This is a 68 YO male with PMH of  HFrEF, CAD s/p CABG, ASIYA on CPAP, HTN, DM II, right renal mass s/p resection presents to  [facility] ED on 9/18  after a syncopal episode, found to have T12-L1 fracture on imaging. Patient had a T9-pelvis revision PSF. Patient now with gait Instability, ADL impairments and Functional impairments.    #Thoracic spine fracture  - T9-pelvis revision PSF on 9/23  - Comprehensive Rehab Program: PT/OT, 3hours daily and 5 days weekly  - PT: Focused on improving strength, endurance, coordination, balance, functional mobility, and transfers  - OT: Focused on improving strength, fine motor skills, coordination, posture and ADLs.    -pain regimen  -zanaflex TID, avoid hypotension- muscle spasms improved  -plastics to see for suture removal    #ASIYA  Respiratory order for nocturnal CPAP. Pressure set withy home setting of 5.    #HTN  - Carvedilol 6.25mg BID-on hold per hospitalist  - Losartan 25mg daily-hold  -Bumex BID    #HLD  - Lipitor 40mg daily    #CAD   - s/p CABG  - c/w ASA 81mg daily    #CHF  - Bumex 2mg+2mg. Monitor weight daily.    hypomagnesemia  discussed with hospitalist  to supplement MG  check levels on Thursday     #DM II  - ISS and FS  - Admelog and Lantus  - A1c 6.5 on 9/19  - follow fingersticks    #Neuropathy  -gabapentin 300mg QID.    #Pain management  - Tylenol PRN,   - Oxycodone 10mg moderate, 15mg severe PRN,   - lidocaine patch,   -zanaflex 4mg/2mg/6mg  - gabapentin 300mg 4x/.day  ICE    #DVT ppx  - Heparin 5K Q 8 hrs    #GI ppx  - Protonix 40mg  - Zofran    #Bowel Regimen  - Senna, dulcolax 5mg BID, miralax 2x/day    #Bladder management  - BS on admission, and q 8 hours (SC if > 400)  - Monitor UO    #FEN   - Diet: DASH/TLC  - MVI    #Skin:  - Skin on admission: Multiple Left sided abdomen abrasions. Mid thoracic to lumbar incision now open to air- noted embedded incision- plastics to see in AM to Dc Sutures  - Pressure injury/Skin: Turn Q2hrs while in bed, OOB to Chair, PT/OT     #Sleep:   - Maintain quiet hours and low stim environment.  - Melatonin 3mg nightly PRN to maximize participation in therapy during the day.     #Precaution  - Fall, Aspiration, Spinal

## 2023-10-09 NOTE — PROGRESS NOTE ADULT - SUBJECTIVE AND OBJECTIVE BOX
Patient is a 67y old  Male who presents with a chief complaint of Spinal Fusion (08 Oct 2023 12:01)    Patient seen and examined at bedside. No acute overnight events.     ALLERGIES:  No Known Allergies    MEDICATIONS  (STANDING):  allopurinol 100 milliGRAM(s) Oral at bedtime  aspirin enteric coated 81 milliGRAM(s) Oral daily  atorvastatin 40 milliGRAM(s) Oral at bedtime  Biotene Dry Mouth Oral Rinse 15 milliLiter(s) Swish and Spit three times a day  bisacodyl 5 milliGRAM(s) Oral every 12 hours  buMETAnide 1 milliGRAM(s) Oral <User Schedule>  buMETAnide 2 milliGRAM(s) Oral daily  dextrose 5%. 1000 milliLiter(s) (50 mL/Hr) IV Continuous <Continuous>  dextrose 5%. 1000 milliLiter(s) (100 mL/Hr) IV Continuous <Continuous>  dextrose 50% Injectable 25 Gram(s) IV Push once  dextrose 50% Injectable 25 Gram(s) IV Push once  dextrose 50% Injectable 12.5 Gram(s) IV Push once  gabapentin 300 milliGRAM(s) Oral four times a day  glucagon  Injectable 1 milliGRAM(s) IntraMuscular once  heparin   Injectable 5000 Unit(s) SubCutaneous every 8 hours  insulin glargine Injectable (LANTUS) 22 Unit(s) SubCutaneous at bedtime  insulin lispro (ADMELOG) corrective regimen sliding scale   SubCutaneous three times a day before meals  insulin lispro (ADMELOG) corrective regimen sliding scale   SubCutaneous at bedtime  insulin lispro Injectable (ADMELOG) 5 Unit(s) SubCutaneous before breakfast  insulin lispro Injectable (ADMELOG) 5 Unit(s) SubCutaneous before lunch  insulin lispro Injectable (ADMELOG) 5 Unit(s) SubCutaneous before dinner  lidocaine   4% Patch 1 Patch Transdermal daily  lidocaine 2% Viscous 5 milliLiter(s) Swish and Spit three times a day  multivitamin 1 Tablet(s) Oral daily  pantoprazole    Tablet 40 milliGRAM(s) Oral before breakfast  potassium chloride    Tablet ER 20 milliEquivalent(s) Oral daily  tiZANidine 2 milliGRAM(s) Oral <User Schedule>  tiZANidine 4 milliGRAM(s) Oral <User Schedule>  tiZANidine 6 milliGRAM(s) Oral at bedtime    MEDICATIONS  (PRN):  aluminum hydroxide/magnesium hydroxide/simethicone Suspension 30 milliLiter(s) Oral every 4 hours PRN Dyspepsia  benzocaine/menthol Lozenge 1 Lozenge Oral every 3 hours PRN Sore Throat  dextrose Oral Gel 15 Gram(s) Oral once PRN Blood Glucose LESS THAN 70 milliGRAM(s)/deciliter  guaiFENesin Oral Liquid (Sugar-Free) 100 milliGRAM(s) Oral every 6 hours PRN Cough  melatonin 3 milliGRAM(s) Oral at bedtime PRN Insomnia  ondansetron   Disintegrating Tablet 4 milliGRAM(s) Oral every 6 hours PRN Nausea and/or Vomiting  oxyCODONE    IR 10 milliGRAM(s) Oral every 3 hours PRN Moderate Pain (4 - 6)  oxyCODONE    IR 15 milliGRAM(s) Oral every 3 hours PRN Severe Pain (7 - 10)  polyethylene glycol 3350 17 Gram(s) Oral two times a day PRN Constipation    Vital Signs Last 24 Hrs  T(F): 98.1 (09 Oct 2023 08:33), Max: 99 (08 Oct 2023 22:50)  HR: 85 (09 Oct 2023 08:33) (73 - 85)  BP: 156/74 (09 Oct 2023 08:33) (110/69 - 156/74)  RR: 16 (09 Oct 2023 08:33) (15 - 16)  SpO2: 99% (09 Oct 2023 08:33) (97% - 99%)  I&O's Summary    PHYSICAL EXAM:  General: NAD, awake, alert   ENT: MMM, no tonsilar exudate  Neck: Supple, No JVD  Lungs: Clear to auscultation bilaterally, no wheezes. Good air entry bilaterally   Cardio: RRR, S1/S2, No murmurs  Abdomen: Soft, Nontender, Nondistended; Bowel sounds present  Extremities: No calf tenderness, No pitting edema    LABS:                        9.9    7.07  )-----------( 247      ( 09 Oct 2023 06:10 )             30.5       10-09    137  |  99  |  15  ----------------------------<  145  3.9   |  30  |  0.70    Ca    9.0      09 Oct 2023 06:10  Mg     1.3     10-09    TPro  6.4  /  Alb  2.4  /  TBili  0.6  /  DBili  x   /  AST  16  /  ALT  18  /  AlkPhos  116  10-09     09-19 Chol 181 mg/dL LDL -- HDL 27 mg/dL Trig 646 mg/dL    POCT Blood Glucose.: 164 mg/dL (09 Oct 2023 08:16)  POCT Blood Glucose.: 132 mg/dL (08 Oct 2023 22:12)  POCT Blood Glucose.: 154 mg/dL (08 Oct 2023 16:57)  POCT Blood Glucose.: 209 mg/dL (08 Oct 2023 11:49)    Urinalysis Basic - ( 09 Oct 2023 06:10 )    Color: x / Appearance: x / SG: x / pH: x  Gluc: 145 mg/dL / Ketone: x  / Bili: x / Urobili: x   Blood: x / Protein: x / Nitrite: x   Leuk Esterase: x / RBC: x / WBC x   Sq Epi: x / Non Sq Epi: x / Bacteria: x    COVID-19 PCR: NotDetec (09-30-23 @ 16:00)    RADIOLOGY & ADDITIONAL TESTS:     Care Discussed with Consultants/Other Providers:

## 2023-10-09 NOTE — PROGRESS NOTE ADULT - NS ATTEND AMEND GEN_ALL_CORE FT
Rehab Attending- Patient seen and examined by me - Case discussed, above note reviewed by me with modifications made    patient seen and evaluated in OT  persistent spasms in Bed- Ok when upright in chair- on zanaflex  + BM with lactulose  increased lower ext edema- added back Bumex 1mg in afternoon  will supplement K daily  back dressing Dcd- sone mid-incisional necrosis- no drainage- betadine/ telfa/ tegadrem applied  to continue intensive rehab program    BP OK  Vital Signs Last 24 Hrs  T(C): 36.3 (06 Oct 2023 09:09), Max: 36.8 (06 Oct 2023 08:59)  T(F): 97.4 (06 Oct 2023 09:09), Max: 98.2 (06 Oct 2023 08:59)  HR: 96 (06 Oct 2023 09:09) (67 - 96)  BP: 111/73 (06 Oct 2023 09:09) (111/73 - 113/84)  BP(mean): --  RR: 16 (06 Oct 2023 09:09) (16 - 16)  SpO2: 95% (06 Oct 2023 09:09) (95% - 96%)    Parameters below as of 06 Oct 2023 09:09  Patient On (Oxygen Delivery Method): room air      Parameters below as of 05 Oct 2023 08:06  Patient On (Oxygen Delivery Method): room air        Constitutional - NAD  HEENT - NCAT, EOMI.    Chest - good chest expansion, good respiratory effort   Cardio - S1D2 RRR  Abdomen -  Obese, Soft, NTND BS +  Extremities - No peripheral edema, No calf tenderness   Neurologic Exam:                    Cranial Nerves -2-12 intact     Motor -  Strength preserved in UE and LE with severe pain in back.                     LEFT    UE - ShAB 5/5, EF 5/5, EE 5/5, WE 5/5,  WNL                     RIGHT UE - ShAB 5/5, EF 5/5, EE 5/5, WE 5/5,  WNL                    LEFT    LE - HF 4/5, KE 5/5, DF 5/5, PF 5/5                    RIGHT LE - HF 4/5, KE 5/5, DF 5/5, PF 5/5        Sensory - Intact to LT bilateral  Skin on admission:  Mid thoracic to lumbar incision covered with primary dressing. Previous surgical scars of chest, abdomen, b/l elbows, wrists and knees. No pressure ulcers.- back dressing Dcd- some erythema/ eschar Mid incision-   painted incision with betadine- Telfa/ tegaderm applied Rehab Attending- Patient seen and examined by me - Case discussed, above note reviewed by me with modifications made    patient seen and evaluated bedside  Back spasms now improved on current Zanaflex dose  small loose BM yesterday- would continue Bowel program  Bumex resumed 2mg 2x/day  labs reviewed- K 3.9 Mg 1.3- to supplement Mg  Back incision noted- some embedded suture- plastics to see in AM to DC Sutures  keep incision open to air  to continue intensive rehab program    Vital Signs Last 24 Hrs  T(C): 36.7 (09 Oct 2023 08:33), Max: 37.2 (08 Oct 2023 22:50)  T(F): 98.1 (09 Oct 2023 08:33), Max: 99 (08 Oct 2023 22:50)  HR: 85 (09 Oct 2023 08:33) (73 - 85)  BP: 156/74 (09 Oct 2023 08:33) (110/69 - 156/74)  BP(mean): --  RR: 16 (09 Oct 2023 08:33) (15 - 16)  SpO2: 99% (09 Oct 2023 08:33) (97% - 99%)    Parameters below as of 09 Oct 2023 08:33  Patient On (Oxygen Delivery Method): room air      Constitutional - NAD  HEENT - NCAT, EOMI.    Chest - good chest expansion, good respiratory effort   Cardio - S1D2 RRR  Abdomen -  Obese, Soft, NTND BS +  Extremities - No peripheral edema, No calf tenderness   Neurologic Exam:                    Cranial Nerves -2-12 intact     Motor -  Strength preserved in UE and LE                     LEFT    UE - ShAB 5/5, EF 5/5, EE 5/5, WE 5/5,  WNL                     RIGHT UE - ShAB 5/5, EF 5/5, EE 5/5, WE 5/5,  WNL                    LEFT    LE - HF 4/5, KE 5/5, DF 5/5, PF 5/5                    RIGHT LE - HF 4/5, KE 5/5, DF 5/5, PF 5/5        Sensory - Intact to LT bilateral  Skin on admission:  Mid thoracic to lumbar incision now open to air- suture embedded especially inferiorly--   paint periincision with cavilon

## 2023-10-09 NOTE — PROGRESS NOTE ADULT - ASSESSMENT
68 y/o M with PMH of  HTN, HLD, CAD s/p CABG, HFrEF (LV systolic function appears grossly normal on Echo 9/21), Type 2 Diabetes, ASIYA on CPAP, right renal mass s/p partial resection (3/2023, pathology negative), s/p pervious L3-pelvis fusion, who presented to [facility] ED on 9/18 after a syncopal episode, found to have T12-L1 fracture on imaging. Patient had a removal of hardware from L3-pelvis and revision T9-pelvis revision PSF w/ muscle flap reconstruction on 9/23. Patient now with gait Instability, ADL impairments and Functional impairments.    Gait Instability/Functional Impairment  Status Post Fall w/ T12-L1 Acute Fracture  -s/p removal of hardware from L3-pelvis and revision T9-pelvis revision PSF w/ muscle flap reconstruction on 9/23  -Fall/Spine precautions  -Outpatient follow up with neurosurgery  -Continue comprehensive rehab program - PT/OT/SLP per rehab team  -Pain management, bowel regimen per rehab     Hypokalemia  -Replete and monitor    Syncopal Episode   -Felt to be due to situational syncope (after blowing his nose)  -Workup at OSH: CTH negative, EKG showed no acute changes, Echo showed grossly nl LV function, no significant valvular disease   -Will monitor     HTN/HLD  CAD s/p CABG/Mild Diastolic Dysfunction w/ EF 60%  -Per documentation: Echo from 5/5/23 showed nl LV function, EF 60%, mild diastolic dysfunction. normal RV function. trivial MR and TR, normal Aortic valve. normal Pulmonic valve.   -Continue Aspirin, Atorvastatin   -Coreg, Losartan held for syncope/hypotension  - Patient endorses home dose alternates between 2mg 1 tab BID (total 4mg/day) and 2mg 2 tab AM, 2mg 1 tab pm (total 6mg/day)  - Bumex 2mg in AM, 1mg in afternoon. can titrate if hypotensive  -Outpatient cardiology follow up     ASIYA  -Continue with nocturnal CPAP. Pressure set with home setting of 5.    Type 2 Diabetes  -HbA1C 6.5 on 9/19  -Continue to hold oral glimepiride  -Hospitalization was complicated by steroid induced hyperglycemia - now off of steroids    -Lantus 22un qhs, admelog 5un TID w/ meals, ISS    -Continue to monitor FS, adjust insulin as needed    Gout  -Continue allopurinol    DVT ppx - HSQ

## 2023-10-10 LAB
GLUCOSE BLDC GLUCOMTR-MCNC: 181 MG/DL — HIGH (ref 70–99)
GLUCOSE BLDC GLUCOMTR-MCNC: 183 MG/DL — HIGH (ref 70–99)
GLUCOSE BLDC GLUCOMTR-MCNC: 209 MG/DL — HIGH (ref 70–99)
GLUCOSE BLDC GLUCOMTR-MCNC: 221 MG/DL — HIGH (ref 70–99)
GLUCOSE BLDC GLUCOMTR-MCNC: 221 MG/DL — HIGH (ref 70–99)

## 2023-10-10 PROCEDURE — 99232 SBSQ HOSP IP/OBS MODERATE 35: CPT

## 2023-10-10 PROCEDURE — 99232 SBSQ HOSP IP/OBS MODERATE 35: CPT | Mod: GC

## 2023-10-10 RX ORDER — LACTULOSE 10 G/15ML
20 SOLUTION ORAL ONCE
Refills: 0 | Status: COMPLETED | OUTPATIENT
Start: 2023-10-10 | End: 2023-10-10

## 2023-10-10 RX ADMIN — OXYCODONE HYDROCHLORIDE 15 MILLIGRAM(S): 5 TABLET ORAL at 00:00

## 2023-10-10 RX ADMIN — Medication 15 MILLILITER(S): at 14:25

## 2023-10-10 RX ADMIN — Medication 5 UNIT(S): at 12:10

## 2023-10-10 RX ADMIN — MAGNESIUM OXIDE 400 MG ORAL TABLET 400 MILLIGRAM(S): 241.3 TABLET ORAL at 07:35

## 2023-10-10 RX ADMIN — OXYCODONE HYDROCHLORIDE 15 MILLIGRAM(S): 5 TABLET ORAL at 06:10

## 2023-10-10 RX ADMIN — Medication 5 MILLIGRAM(S): at 17:24

## 2023-10-10 RX ADMIN — OXYCODONE HYDROCHLORIDE 15 MILLIGRAM(S): 5 TABLET ORAL at 14:22

## 2023-10-10 RX ADMIN — Medication 4: at 17:27

## 2023-10-10 RX ADMIN — TIZANIDINE 4 MILLIGRAM(S): 4 TABLET ORAL at 17:24

## 2023-10-10 RX ADMIN — Medication 5 UNIT(S): at 07:34

## 2023-10-10 RX ADMIN — LACTULOSE 20 GRAM(S): 10 SOLUTION ORAL at 12:12

## 2023-10-10 RX ADMIN — Medication 5 UNIT(S): at 17:27

## 2023-10-10 RX ADMIN — Medication 81 MILLIGRAM(S): at 12:12

## 2023-10-10 RX ADMIN — Medication 15 MILLILITER(S): at 05:55

## 2023-10-10 RX ADMIN — LIDOCAINE 5 MILLILITER(S): 4 CREAM TOPICAL at 14:24

## 2023-10-10 RX ADMIN — Medication 20 MILLIEQUIVALENT(S): at 12:12

## 2023-10-10 RX ADMIN — TIZANIDINE 2 MILLIGRAM(S): 4 TABLET ORAL at 07:35

## 2023-10-10 RX ADMIN — Medication 5 MILLIGRAM(S): at 05:54

## 2023-10-10 RX ADMIN — OXYCODONE HYDROCHLORIDE 15 MILLIGRAM(S): 5 TABLET ORAL at 23:30

## 2023-10-10 RX ADMIN — Medication 2: at 12:10

## 2023-10-10 RX ADMIN — OXYCODONE HYDROCHLORIDE 15 MILLIGRAM(S): 5 TABLET ORAL at 05:57

## 2023-10-10 RX ADMIN — Medication 2: at 07:34

## 2023-10-10 RX ADMIN — BUMETANIDE 2 MILLIGRAM(S): 0.25 INJECTION INTRAMUSCULAR; INTRAVENOUS at 05:54

## 2023-10-10 RX ADMIN — LIDOCAINE 5 MILLILITER(S): 4 CREAM TOPICAL at 05:55

## 2023-10-10 RX ADMIN — GABAPENTIN 300 MILLIGRAM(S): 400 CAPSULE ORAL at 12:12

## 2023-10-10 RX ADMIN — Medication 1 TABLET(S): at 12:12

## 2023-10-10 RX ADMIN — MAGNESIUM OXIDE 400 MG ORAL TABLET 400 MILLIGRAM(S): 241.3 TABLET ORAL at 17:24

## 2023-10-10 RX ADMIN — BUMETANIDE 2 MILLIGRAM(S): 0.25 INJECTION INTRAMUSCULAR; INTRAVENOUS at 12:11

## 2023-10-10 RX ADMIN — HEPARIN SODIUM 5000 UNIT(S): 5000 INJECTION INTRAVENOUS; SUBCUTANEOUS at 14:23

## 2023-10-10 RX ADMIN — MAGNESIUM OXIDE 400 MG ORAL TABLET 400 MILLIGRAM(S): 241.3 TABLET ORAL at 12:10

## 2023-10-10 RX ADMIN — GABAPENTIN 300 MILLIGRAM(S): 400 CAPSULE ORAL at 17:26

## 2023-10-10 RX ADMIN — GABAPENTIN 300 MILLIGRAM(S): 400 CAPSULE ORAL at 05:54

## 2023-10-10 RX ADMIN — PANTOPRAZOLE SODIUM 40 MILLIGRAM(S): 20 TABLET, DELAYED RELEASE ORAL at 05:54

## 2023-10-10 RX ADMIN — HEPARIN SODIUM 5000 UNIT(S): 5000 INJECTION INTRAVENOUS; SUBCUTANEOUS at 05:54

## 2023-10-10 NOTE — CONSULT NOTE ADULT - SUBJECTIVE AND OBJECTIVE BOX
CC:  Patient is a 67y old  Male who presents with a chief complaint of Spinal Fusion 23, asked to evaluate for suture removal.      HPI:  This is a 68 YO male with PMH of  HFrEF, CAD s/p CABG, ASIYA on CPAP, HTN, DM, right renal mass s/p resection presents to  [facility] ED ON   after a syncopal episode, found to have T12-L1 fracture on imaging. Patient originally scheduled for an ACDF with Dr. Hughes, due to injury he was changed to a T9-pelvis revision PSF. Hospital course s/f medication adjustments for HTN  and HF. Patient was evaluated by PM&R and therapy for functional deficits, gait/ADL impairments and acute rehabilitation was recommended. Patient was medically optimized for discharge to [facility] IRU on 23.         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      Essential hypertension      CAD (coronary artery disease)  - s/p cabg x3      Hypercholesterolemia      ASIYA (obstructive sleep apnea)  initially dx  ; CPAP      Paralytic ileus  post op THR  & post op laminectomy      Type 2 diabetes mellitus      Right carpal tunnel syndrome      Cubital tunnel syndrome, right      Trigger finger of right hand      Morbid obesity with body mass index (BMI) of 40.0 or higher      H/O CHF      Kidney mass      Cervical herniated disc      Renal cancer      Disease of spinal cord, unspecified      S/P Left Inguinal Hernia Repair      History of Total Hip Replacement  - bilateral THR      S/P tonsillectomy and adenoidectomy  as child      H/O lithotripsy  x1      S/P revision of total knee, right  x2-  &       S/P lumbar discectomy  - ,L5  Aug 2013      S/P CABG x 3  17      S/P lumbar laminectomy  Fusion L3-L5       Kidney stone  s/w ESWL pt unsure site      Neuropathy  Bilateral Feet since       History of bilateral hip replacements      History of hernia repair      History of lumbar fusion      History of cholecystectomy      History of bilateral knee replacement      History of lymph node biopsy      S/p bilateral carpal tunnel release      History of partial nephrectomy          Allergies    No Known Allergies    Intolerances        SOCIAL HISTORY          Smoking: Yes [ ]  No [ ]   ______pk yrs          ETOH  Yes [ ]  No [ ]  Social [ ]          DRUGS:  Yes [ ]  No [ ]  if so what______________    FAMILY HISTORY:  Family history of coronary artery disease  HLD/Angina. Fatal MI age 73.    Family history of diabetes mellitus type II  mother- - hyperlipidemia and Hypertension.    Family history of pancreatic cancer (Sibling)  brother     Family history of coronary artery disease  brother- age 50+- Coronary Artery Stents        MEDICATIONS  (STANDING):  allopurinol 100 milliGRAM(s) Oral at bedtime  aspirin enteric coated 81 milliGRAM(s) Oral daily  atorvastatin 40 milliGRAM(s) Oral at bedtime  Biotene Dry Mouth Oral Rinse 15 milliLiter(s) Swish and Spit three times a day  bisacodyl 5 milliGRAM(s) Oral every 12 hours  buMETAnide 2 milliGRAM(s) Oral <User Schedule>  buMETAnide 2 milliGRAM(s) Oral daily  dextrose 5%. 1000 milliLiter(s) (50 mL/Hr) IV Continuous <Continuous>  dextrose 5%. 1000 milliLiter(s) (100 mL/Hr) IV Continuous <Continuous>  dextrose 50% Injectable 25 Gram(s) IV Push once  dextrose 50% Injectable 25 Gram(s) IV Push once  dextrose 50% Injectable 12.5 Gram(s) IV Push once  gabapentin 300 milliGRAM(s) Oral four times a day  glucagon  Injectable 1 milliGRAM(s) IntraMuscular once  heparin   Injectable 5000 Unit(s) SubCutaneous every 8 hours  insulin glargine Injectable (LANTUS) 22 Unit(s) SubCutaneous at bedtime  insulin lispro (ADMELOG) corrective regimen sliding scale   SubCutaneous three times a day before meals  insulin lispro (ADMELOG) corrective regimen sliding scale   SubCutaneous at bedtime  insulin lispro Injectable (ADMELOG) 5 Unit(s) SubCutaneous before breakfast  insulin lispro Injectable (ADMELOG) 5 Unit(s) SubCutaneous before lunch  insulin lispro Injectable (ADMELOG) 5 Unit(s) SubCutaneous before dinner  lidocaine   4% Patch 1 Patch Transdermal daily  lidocaine 2% Viscous 5 milliLiter(s) Swish and Spit three times a day  magnesium oxide 400 milliGRAM(s) Oral three times a day with meals  multivitamin 1 Tablet(s) Oral daily  pantoprazole    Tablet 40 milliGRAM(s) Oral before breakfast  potassium chloride    Tablet ER 20 milliEquivalent(s) Oral daily  tiZANidine 4 milliGRAM(s) Oral <User Schedule>  tiZANidine 6 milliGRAM(s) Oral at bedtime  tiZANidine 2 milliGRAM(s) Oral <User Schedule>    MEDICATIONS  (PRN):  aluminum hydroxide/magnesium hydroxide/simethicone Suspension 30 milliLiter(s) Oral every 4 hours PRN Dyspepsia  benzocaine/menthol Lozenge 1 Lozenge Oral every 3 hours PRN Sore Throat  dextrose Oral Gel 15 Gram(s) Oral once PRN Blood Glucose LESS THAN 70 milliGRAM(s)/deciliter  guaiFENesin Oral Liquid (Sugar-Free) 100 milliGRAM(s) Oral every 6 hours PRN Cough  melatonin 3 milliGRAM(s) Oral at bedtime PRN Insomnia  ondansetron   Disintegrating Tablet 4 milliGRAM(s) Oral every 6 hours PRN Nausea and/or Vomiting  oxyCODONE    IR 15 milliGRAM(s) Oral every 3 hours PRN Severe Pain (7 - 10)  oxyCODONE    IR 10 milliGRAM(s) Oral every 3 hours PRN Moderate Pain (4 - 6)  polyethylene glycol 3350 17 Gram(s) Oral two times a day PRN Constipation         Review of systems:  General:  no fever or chills  Pulmonary:  no SOB  Cardiac:  denies chest pain, palpitations  GI:  no nausea, vomitting, or abddominal pain  :  no increase frequency, or burning  Heme:  no easy bruising with minor trauma  Musculoskeletal:  No history of back pain or trauma  Skin:  no history of rashes, injury, trauma  Neuro:  no weakness         Vital Signs Last 24 Hrs  T(C): 36.5 (10 Oct 2023 07:31), Max: 36.7 (09 Oct 2023 23:20)  T(F): 97.7 (10 Oct 2023 07:31), Max: 98 (09 Oct 2023 23:20)  HR: 86 (10 Oct 2023 07:31) (64 - 86)  BP: 113/74 (10 Oct 2023 07:31) (113/74 - 137/77)  BP(mean): --  RR: 16 (10 Oct 2023 07:31) (15 - 16)  SpO2: 96% (10 Oct 2023 07:31) (96% - 99%)    Parameters below as of 10 Oct 2023 07:31  Patient On (Oxygen Delivery Method): room air        Physical Exam:      Skin:   long upper back to low back incision with nonabsorbable running and interupted sutures, no drainage from incision  some thin crusting especially where sutures are less obvious,  hyperemia jesus incision, could be contact dermatitis.     LABS:                        9.9    7.07  )-----------( 247      ( 09 Oct 2023 06:10 )             30.5     10-09    137  |  99  |  15  ----------------------------<  145<H>  3.9   |  30  |  0.70    Ca    9.0      09 Oct 2023 06:10  Mg     1.3     10-09    TPro  6.4  /  Alb  2.4<L>  /  TBili  0.6  /  DBili  x   /  AST  16  /  ALT  18  /  AlkPhos  116  10-09      Urinalysis Basic - ( 09 Oct 2023 06:10 )    Color: x / Appearance: x / SG: x / pH: x  Gluc: 145 mg/dL / Ketone: x  / Bili: x / Urobili: x   Blood: x / Protein: x / Nitrite: x   Leuk Esterase: x / RBC: x / WBC x   Sq Epi: x / Non Sq Epi: x / Bacteria: x        RADIOLOGY & ADDITIONAL STUDIES:    Risks, benefits, and alternatives to treatment discussed. All questions answered with understanding.    Procedure Performed:  (  )Yes     ( x )No  Name of Procedure:      [  ]Debridement     [  ]I&D    [  ]Laceration Repair     [  ]Other:  (  )partial thickness     (  )full thickness     (  )subcutaneous     (  )muscle/tendon     (  )bone  (  )sharp     (  )surgical  
Patient is a 67y old  Male who presents with a chief complaint of Fusion of spine     (01 Oct 2023 09:15)      HPI:  This is a 68 YO male with PMH of  HFrEF, CAD s/p CABG, ASIYA on CPAP, HTN, DM, right renal mass s/p resection presents to  [facility] ED ON 9/18  after a syncopal episode, found to have T12-L1 fracture on imaging. Patient originally scheduled for an ACDF with Dr. Hughes, due to injury he was changed to a T9-pelvis revision PSF. Hospital course s/f medication adjustments for HTN  and HF. Patient was evaluated by PM&R and therapy for functional deficits, gait/ADL impairments and acute rehabilitation was recommended. Patient was medically optimized for discharge to [facility] IRU on 9/29/23.   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      Essential hypertension      CAD (coronary artery disease)  2017- s/p cabg x3      Hypercholesterolemia      ASIYA (obstructive sleep apnea)  initially dx 1997 ; CPAP      Paralytic ileus  post op THR 2009 & post op laminectomy      Type 2 diabetes mellitus      Right carpal tunnel syndrome      Cubital tunnel syndrome, right      Trigger finger of right hand      Morbid obesity with body mass index (BMI) of 40.0 or higher      H/O CHF      Kidney mass      Cervical herniated disc      Renal cancer      Disease of spinal cord, unspecified      S/P Left Inguinal Hernia Repair      History of Total Hip Replacement  2009- bilateral THR      S/P tonsillectomy and adenoidectomy  as child      H/O lithotripsy  x1      S/P revision of total knee, right  x2- 2012 & 2014      S/P lumbar discectomy  2012- ,L5  Aug 2013      S/P CABG x 3  8/29/17      S/P lumbar laminectomy  Fusion L3-L5 2012      Kidney stone  s/w ESWL pt unsure site      Neuropathy  Bilateral Feet since 2012      History of bilateral hip replacements      History of hernia repair      History of lumbar fusion      History of cholecystectomy      History of bilateral knee replacement      History of lymph node biopsy      S/p bilateral carpal tunnel release      History of partial nephrectomy              Substance Use (street drugs): ( X ) never used  (  ) other:  Tobacco Usage:  (   ) never smoked   ( X  ) former smoker   (   ) current smoker  (     ) pack year  Alcohol Usage: denies      Allergies    No Known Allergies    Intolerances        cyclobenzaprine 10 milliGRAM(s) Oral three times a day  polyethylene glycol 3350 17 Gram(s) Oral two times a day PRN  potassium chloride    Tablet ER 20 milliEquivalent(s) Oral every 2 hours      REVIEW OF SYSTEMS:  ALL ROS REVIEWED AND NORMAL EXCEPT AS STATED ABOVE    T(C): 36.6 (10-01-23 @ 07:48), Max: 36.7 (09-30-23 @ 20:14)  HR: 74 (10-01-23 @ 07:48) (71 - 83)  BP: 113/72 (10-01-23 @ 07:48) (113/72 - 146/62)  RR: 16 (10-01-23 @ 07:48) (15 - 16)  SpO2: 96% (10-01-23 @ 07:48) (96% - 98%)  Wt(kg): --Vital Signs Last 24 Hrs  T(C): 36.6 (01 Oct 2023 07:48), Max: 36.7 (30 Sep 2023 20:14)  T(F): 97.8 (01 Oct 2023 07:48), Max: 98 (30 Sep 2023 20:14)  HR: 74 (01 Oct 2023 07:48) (71 - 83)  BP: 113/72 (01 Oct 2023 07:48) (113/72 - 146/62)  BP(mean): --  RR: 16 (01 Oct 2023 07:48) (15 - 16)  SpO2: 96% (01 Oct 2023 07:48) (96% - 98%)    Parameters below as of 01 Oct 2023 07:48  Patient On (Oxygen Delivery Method): room air        PHYSICAL EXAM:  GENERAL: Obese, NAD  HEAD:  Atraumatic, Normocephalic  EYES: EOMI,conjunctiva and sclera clear  ENMT:  Moist mucous membranes  NECK: Supple, Hard to assess JVD given large neck circumference  NERVOUS SYSTEM:  Alert & Oriented X3, Good concentration;   CHEST/LUNG: Clear to percussion bilaterally; No rales, rhonchi, wheezing, or rubs  HEART: Regular rate and rhythm  ABDOMEN: Soft, Nontender, Nondistended; Bowel sounds present  EXTREMITIES:  No clubbing, cyanosis, +1 pitting edema      LABS:                        12.0   11.09 )-----------( 216      ( 01 Oct 2023 05:45 )             35.3     10-01    138  |  99  |  19  ----------------------------<  161<H>  3.3<L>   |  33<H>  |  0.62    Ca    8.8      01 Oct 2023 05:45    TPro  6.5  /  Alb  2.5<L>  /  TBili  0.7  /  DBili  x   /  AST  21  /  ALT  28  /  AlkPhos  111  10-01      Urinalysis Basic - ( 01 Oct 2023 05:45 )    Color: x / Appearance: x / SG: x / pH: x  Gluc: 161 mg/dL / Ketone: x  / Bili: x / Urobili: x   Blood: x / Protein: x / Nitrite: x   Leuk Esterase: x / RBC: x / WBC x   Sq Epi: x / Non Sq Epi: x / Bacteria: x       CAPILLARY BLOOD GLUCOSE      POCT Blood Glucose.: 210 mg/dL (01 Oct 2023 11:37)  POCT Blood Glucose.: 190 mg/dL (01 Oct 2023 07:39)  POCT Blood Glucose.: 174 mg/dL (30 Sep 2023 21:47)  POCT Blood Glucose.: 283 mg/dL (30 Sep 2023 17:07)        Urinalysis Basic - ( 01 Oct 2023 05:45 )    Color: x / Appearance: x / SG: x / pH: x  Gluc: 161 mg/dL / Ketone: x  / Bili: x / Urobili: x   Blood: x / Protein: x / Nitrite: x   Leuk Esterase: x / RBC: x / WBC x   Sq Epi: x / Non Sq Epi: x / Bacteria: x        RADIOLOGY & ADDITIONAL TESTS:      Care Discussed with Consultants/Other Providers [ x] YES  [ ] NO

## 2023-10-10 NOTE — CONSULT NOTE ADULT - ASSESSMENT
68 YO male with PMH of  HFrEF, CAD s/p CABG, ASIYA on CPAP, HTN, DM II, right renal mass s/p resection presents to  [facility] ED on 9/18  after a syncopal episode, found to have T12-L1 fracture on imaging. Patient had a T9-pelvis revision PSF. Patient now with gait Instability, ADL impairments and Functional impairments.    #Thoracic spine fracture  - T9-pelvis revision PSF on 9/23  - Comprehensive Rehab Program: PT/OT    #ASIYA  - Continue with nocturnal CPAP. Pressure set with home setting of 5.    #HTN  - Carvedilol 6.25mg BID  - Losartan 25mg daily    #HLD  - Lipitor 40mg daily    #CAD   - s/p CABG  - c/w ASA 81mg daily  - c/w Lipitor 40mg      #HFrEF  - c/w Carvedilol  - c/w losartan  - Bumex 2mg BID    #DM II  - A1c 6.5 on 9/19  - ISS and FS  - Admelog and Lantus        #DVT ppx  - Heparin 5K Q 8 hrs  -  SCD, TEDs    #GI ppx  - Protonix 40mg  
s/p T9-pelvis revision spinal surgery on 9/23/23 (POD17)  -sutures can be removed as soon as operative gives permission on or around POD21  -Discussed the Dr. Wilson

## 2023-10-10 NOTE — PROGRESS NOTE ADULT - ASSESSMENT
68 y/o M with PMH of  HTN, HLD, CAD s/p CABG, HFrEF (LV systolic function appears grossly normal on Echo 9/21), Type 2 Diabetes, ASIYA on CPAP, right renal mass s/p partial resection (3/2023, pathology negative), s/p pervious L3-pelvis fusion, who presented to [facility] ED on 9/18 after a syncopal episode, found to have T12-L1 fracture on imaging. Patient had a removal of hardware from L3-pelvis and revision T9-pelvis revision PSF w/ muscle flap reconstruction on 9/23. Patient now with gait Instability, ADL impairments and Functional impairments.    Gait Instability/Functional Impairment  Status Post Fall w/ T12-L1 Acute Fracture  -s/p removal of hardware from L3-pelvis and revision T9-pelvis revision PSF w/ muscle flap reconstruction on 9/23  -Fall/Spine precautions  -Outpatient follow up with neurosurgery  -Continue comprehensive rehab program - PT/OT/SLP per rehab team  -Pain management, bowel regimen per rehab     Hypokalemia  -Replete and monitor    Hypomagnesemia   - oral repletion. monitor routinely with bloodwork     Syncopal Episode   -Felt to be due to situational syncope (after blowing his nose)  -Workup at OSH: CTH negative, EKG showed no acute changes, Echo showed grossly nl LV function, no significant valvular disease   -Will monitor     HTN/HLD  CAD s/p CABG/Mild Diastolic Dysfunction w/ EF 60%  -Per documentation: Echo from 5/5/23 showed nl LV function, EF 60%, mild diastolic dysfunction. normal RV function. trivial MR and TR, normal Aortic valve. normal Pulmonic valve.   -Continue Aspirin, Atorvastatin   -Coreg, Losartan held for syncope/hypotension  - Patient endorses home dose alternates between 2mg 1 tab BID (total 4mg/day) and 2mg 2 tab AM, 2mg 1 tab pm (total 6mg/day)  - Bumex 2mg bid  -Outpatient cardiology follow up     ASIYA  -Continue with nocturnal CPAP. Pressure set with home setting of 5.    Type 2 Diabetes  -HbA1C 6.5 on 9/19  -Continue to hold oral glimepiride  -Hospitalization was complicated by steroid induced hyperglycemia - now off of steroids    -Lantus 22un qhs, admelog 5un TID w/ meals, ISS    -Continue to monitor FS, adjust insulin as needed    Gout  -Continue allopurinol    DVT ppx - HSQ

## 2023-10-10 NOTE — CHART NOTE - NSCHARTNOTEFT_GEN_A_CORE
Assessment:   Patient seen for:  f/u     Source: [x] medical review, [x] patient, [ ] family member    Factors impacting intake: [ ] none [ ] nausea  [ ] vomiting [ ] diarrhea [ ] constipation  [ ]chewing problems [ ] swallowing issues  [ ] other:     Intake:     Diet Prescription: Diet, DASH/TLC:   Sodium & Cholesterol Restricted  Consistent Carbohydrate {Evening Snack} (CSTCHOSN) (09-29-23 @ 14:53)      Current Weight:     Weight Change:     Edema: none, as per nursing flowsheet   Skin: WDL     Pertinent Medications: MEDICATIONS  (STANDING):  allopurinol 100 milliGRAM(s) Oral at bedtime  aspirin enteric coated 81 milliGRAM(s) Oral daily  atorvastatin 40 milliGRAM(s) Oral at bedtime  Biotene Dry Mouth Oral Rinse 15 milliLiter(s) Swish and Spit three times a day  bisacodyl 5 milliGRAM(s) Oral every 12 hours  buMETAnide 2 milliGRAM(s) Oral <User Schedule>  buMETAnide 2 milliGRAM(s) Oral daily  dextrose 5%. 1000 milliLiter(s) (50 mL/Hr) IV Continuous <Continuous>  dextrose 5%. 1000 milliLiter(s) (100 mL/Hr) IV Continuous <Continuous>  dextrose 50% Injectable 25 Gram(s) IV Push once  dextrose 50% Injectable 25 Gram(s) IV Push once  dextrose 50% Injectable 12.5 Gram(s) IV Push once  gabapentin 300 milliGRAM(s) Oral four times a day  glucagon  Injectable 1 milliGRAM(s) IntraMuscular once  heparin   Injectable 5000 Unit(s) SubCutaneous every 8 hours  insulin glargine Injectable (LANTUS) 22 Unit(s) SubCutaneous at bedtime  insulin lispro (ADMELOG) corrective regimen sliding scale   SubCutaneous three times a day before meals  insulin lispro (ADMELOG) corrective regimen sliding scale   SubCutaneous at bedtime  insulin lispro Injectable (ADMELOG) 5 Unit(s) SubCutaneous before dinner  insulin lispro Injectable (ADMELOG) 5 Unit(s) SubCutaneous before breakfast  insulin lispro Injectable (ADMELOG) 5 Unit(s) SubCutaneous before lunch  lactulose Syrup 20 Gram(s) Oral once  lidocaine   4% Patch 1 Patch Transdermal daily  lidocaine 2% Viscous 5 milliLiter(s) Swish and Spit three times a day  magnesium oxide 400 milliGRAM(s) Oral three times a day with meals  multivitamin 1 Tablet(s) Oral daily  pantoprazole    Tablet 40 milliGRAM(s) Oral before breakfast  potassium chloride    Tablet ER 20 milliEquivalent(s) Oral daily  tiZANidine 2 milliGRAM(s) Oral <User Schedule>  tiZANidine 4 milliGRAM(s) Oral <User Schedule>  tiZANidine 6 milliGRAM(s) Oral at bedtime    MEDICATIONS  (PRN):  aluminum hydroxide/magnesium hydroxide/simethicone Suspension 30 milliLiter(s) Oral every 4 hours PRN Dyspepsia  benzocaine/menthol Lozenge 1 Lozenge Oral every 3 hours PRN Sore Throat  dextrose Oral Gel 15 Gram(s) Oral once PRN Blood Glucose LESS THAN 70 milliGRAM(s)/deciliter  guaiFENesin Oral Liquid (Sugar-Free) 100 milliGRAM(s) Oral every 6 hours PRN Cough  melatonin 3 milliGRAM(s) Oral at bedtime PRN Insomnia  ondansetron   Disintegrating Tablet 4 milliGRAM(s) Oral every 6 hours PRN Nausea and/or Vomiting  oxyCODONE    IR 10 milliGRAM(s) Oral every 3 hours PRN Moderate Pain (4 - 6)  oxyCODONE    IR 15 milliGRAM(s) Oral every 3 hours PRN Severe Pain (7 - 10)  polyethylene glycol 3350 17 Gram(s) Oral two times a day PRN Constipation    Pertinent Labs: 10-09 Na137 mmol/L Glu 145 mg/dL<H> K+ 3.9 mmol/L Cr  0.70 mg/dL BUN 15 mg/dL 10-09 Alb 2.4 g/dL<L> 09-19 Chol 181 mg/dL LDL --    HDL 27 mg/dL<L> Trig 646 mg/dL<H>     CAPILLARY BLOOD GLUCOSE      POCT Blood Glucose.: 183 mg/dL (10 Oct 2023 07:30)  POCT Blood Glucose.: 153 mg/dL (09 Oct 2023 20:47)  POCT Blood Glucose.: 145 mg/dL (09 Oct 2023 17:14)  POCT Blood Glucose.: 181 mg/dL (09 Oct 2023 12:07)    Estimated Needs:   [x] no change since previous assessment  [ ] recalculated:     Previous Nutrition Diagnosis:   [ ] Inadequate Energy Intake   [ ] Inadequate Oral Intake  [ ] Chewing/Swallowing Difficulties   [ ] Excessive Energy Intake   [ ] Underweight   [ ] Increased Nutrient Needs   [ ] Overweight/Obesity   [ ] Altered GI Function  [ ] Altered Nutrition Labs  [ ] Unintended Weight Loss   [ ] Food & Nutrition Related Knowledge Deficit   [ ] Malnutrition     Nutrition Diagnosis is [x] ongoing  [ ] resolved [ ] not applicable     New Nutrition Diagnosis: [x] not applicable     Interventions:   Recommend  [ ] Change Diet To:  [x] Continue with current diet:   [x] Nutrition Supplement:   [x] Honor pt's food preferences as feasible with prescribed diet.  [x] Obtain and record PO intake and weights daily    Monitoring and Evaluation:   Continue to monitor Nutritional intake, Tolerance to diet prescription, weights, labs, skin integrity.  RD remains available upon request and will follow up per protocol. Assessment:   Patient seen for:  f/u     Source: [x] medical review, [x] patient, [ ] family member    Factors impacting intake: [x] none [ ] nausea  [ ] vomiting [ ] diarrhea [ ] constipation  [ ]chewing problems [ ] swallowing issues  [ ] other:     Intake: varies 51-75% and %, evening snacks pt consumes <50%.     Diet Prescription: Diet, DASH/TLC:   Sodium & Cholesterol Restricted  Consistent Carbohydrate {Evening Snack} (CSTCHOSN) (09-29-23 @ 14:53)    Current Weight: 10/09: 373 Lbs    10/01: 376 Lbs    Weight Change: weight varies likely r/t scale inaccuracy and fluids retention.    Edema: as per nursing flowsheet, BLE +1   Skin: WDL except sx incision on medial back.     Pt reports fair appetite. PO intake varies %, noted evening snacks pt consumes <50%. Pt feeds self, reports no chewing/swallowing difficulties with current diet. Denies N/V/D c/o constipation. Last BM: 10/07. Edema on BLE +1. Skin WDL except sx incision on medial back.   Pt is on Lantus 22 units at bed time, encourage pt to consume his evening snack to prevent hypoglycemia. Suggest to drink plenty of fluids and add more fruits and vegetables in daily food options to manage constipation. Pt was agreeable with recommendations.       Pertinent Medications: MEDICATIONS  (STANDING):  allopurinol 100 milliGRAM(s) Oral at bedtime  aspirin enteric coated 81 milliGRAM(s) Oral daily  atorvastatin 40 milliGRAM(s) Oral at bedtime  Biotene Dry Mouth Oral Rinse 15 milliLiter(s) Swish and Spit three times a day  bisacodyl 5 milliGRAM(s) Oral every 12 hours  buMETAnide 2 milliGRAM(s) Oral <User Schedule>  buMETAnide 2 milliGRAM(s) Oral daily  dextrose 5%. 1000 milliLiter(s) (50 mL/Hr) IV Continuous <Continuous>  dextrose 5%. 1000 milliLiter(s) (100 mL/Hr) IV Continuous <Continuous>  dextrose 50% Injectable 25 Gram(s) IV Push once  dextrose 50% Injectable 25 Gram(s) IV Push once  dextrose 50% Injectable 12.5 Gram(s) IV Push once  gabapentin 300 milliGRAM(s) Oral four times a day  glucagon  Injectable 1 milliGRAM(s) IntraMuscular once  heparin   Injectable 5000 Unit(s) SubCutaneous every 8 hours  insulin glargine Injectable (LANTUS) 22 Unit(s) SubCutaneous at bedtime  insulin lispro (ADMELOG) corrective regimen sliding scale   SubCutaneous three times a day before meals  insulin lispro (ADMELOG) corrective regimen sliding scale   SubCutaneous at bedtime  insulin lispro Injectable (ADMELOG) 5 Unit(s) SubCutaneous before dinner  insulin lispro Injectable (ADMELOG) 5 Unit(s) SubCutaneous before breakfast  insulin lispro Injectable (ADMELOG) 5 Unit(s) SubCutaneous before lunch  lactulose Syrup 20 Gram(s) Oral once  lidocaine   4% Patch 1 Patch Transdermal daily  lidocaine 2% Viscous 5 milliLiter(s) Swish and Spit three times a day  magnesium oxide 400 milliGRAM(s) Oral three times a day with meals  multivitamin 1 Tablet(s) Oral daily  pantoprazole    Tablet 40 milliGRAM(s) Oral before breakfast  potassium chloride    Tablet ER 20 milliEquivalent(s) Oral daily  tiZANidine 2 milliGRAM(s) Oral <User Schedule>  tiZANidine 4 milliGRAM(s) Oral <User Schedule>  tiZANidine 6 milliGRAM(s) Oral at bedtime    MEDICATIONS  (PRN):  aluminum hydroxide/magnesium hydroxide/simethicone Suspension 30 milliLiter(s) Oral every 4 hours PRN Dyspepsia  benzocaine/menthol Lozenge 1 Lozenge Oral every 3 hours PRN Sore Throat  dextrose Oral Gel 15 Gram(s) Oral once PRN Blood Glucose LESS THAN 70 milliGRAM(s)/deciliter  guaiFENesin Oral Liquid (Sugar-Free) 100 milliGRAM(s) Oral every 6 hours PRN Cough  melatonin 3 milliGRAM(s) Oral at bedtime PRN Insomnia  ondansetron   Disintegrating Tablet 4 milliGRAM(s) Oral every 6 hours PRN Nausea and/or Vomiting  oxyCODONE    IR 10 milliGRAM(s) Oral every 3 hours PRN Moderate Pain (4 - 6)  oxyCODONE    IR 15 milliGRAM(s) Oral every 3 hours PRN Severe Pain (7 - 10)  polyethylene glycol 3350 17 Gram(s) Oral two times a day PRN Constipation    Pertinent Labs: 10-09 Na137 mmol/L Glu 145 mg/dL<H> K+ 3.9 mmol/L Cr  0.70 mg/dL BUN 15 mg/dL 10-09 Alb 2.4 g/dL<L> 09-19 Chol 181 mg/dL LDL --    HDL 27 mg/dL<L> Trig 646 mg/dL<H>     CAPILLARY BLOOD GLUCOSE      POCT Blood Glucose.: 183 mg/dL (10 Oct 2023 07:30)  POCT Blood Glucose.: 153 mg/dL (09 Oct 2023 20:47)  POCT Blood Glucose.: 145 mg/dL (09 Oct 2023 17:14)  POCT Blood Glucose.: 181 mg/dL (09 Oct 2023 12:07)    Estimated Needs:   [x] no change since previous assessment  [ ] recalculated:     Previous Nutrition Diagnosis:   [ ] Inadequate Energy Intake   [ ] Inadequate Oral Intake  [ ] Chewing/Swallowing Difficulties   [ ] Excessive Energy Intake   [ ] Underweight   [ ] Increased Nutrient Needs   [ ] Overweight/Obesity   [ ] Altered GI Function  [x] Altered Nutrition Labs  [ ] Unintended Weight Loss   [ ] Food & Nutrition Related Knowledge Deficit   [ ] Malnutrition     Nutrition Diagnosis is [x] ongoing  [ ] resolved [ ] not applicable     New Nutrition Diagnosis: [x] Obesity r/t excessive calories intake in the context of low physical activity AEB BMI 44.9    Goal: Pt will loose 1-2 Lbs x week while consuming 75%or > of Est Nut Needs during hospitalization     Interventions:   Recommend  [x] Continue with current diet: Consistent Carbohydrates diet (evening snack), DASH/TLC  [x] Honor pt's food preferences as feasible with prescribed diet.  [x] Obtain and record PO intake and weights daily    Monitoring and Evaluation:   Continue to monitor Nutritional intake, Tolerance to diet prescription, weights, labs, skin integrity.  RD remains available upon request and will follow up per protocol.

## 2023-10-10 NOTE — PROGRESS NOTE ADULT - NS ATTEND AMEND GEN_ALL_CORE FT
Rehab Attending- Patient seen and examined by me - Case discussed, above note reviewed by me with modifications made    patient seen and evaluated bedside  Back spasms now improved on current Zanaflex dose  small loose BM yesterday- would continue Bowel program  Bumex resumed 2mg 2x/day  labs reviewed- K 3.9 Mg 1.3- to supplement Mg  Back incision noted- some embedded suture- plastics to see in AM to DC Sutures  keep incision open to air  to continue intensive rehab program    Vital Signs Last 24 Hrs  T(C): 36.7 (09 Oct 2023 08:33), Max: 37.2 (08 Oct 2023 22:50)  T(F): 98.1 (09 Oct 2023 08:33), Max: 99 (08 Oct 2023 22:50)  HR: 85 (09 Oct 2023 08:33) (73 - 85)  BP: 156/74 (09 Oct 2023 08:33) (110/69 - 156/74)  BP(mean): --  RR: 16 (09 Oct 2023 08:33) (15 - 16)  SpO2: 99% (09 Oct 2023 08:33) (97% - 99%)    Parameters below as of 09 Oct 2023 08:33  Patient On (Oxygen Delivery Method): room air      Constitutional - NAD  HEENT - NCAT, EOMI.    Chest - good chest expansion, good respiratory effort   Cardio - S1D2 RRR  Abdomen -  Obese, Soft, NTND BS +  Extremities - No peripheral edema, No calf tenderness   Neurologic Exam:                    Cranial Nerves -2-12 intact     Motor -  Strength preserved in UE and LE                     LEFT    UE - ShAB 5/5, EF 5/5, EE 5/5, WE 5/5,  WNL                     RIGHT UE - ShAB 5/5, EF 5/5, EE 5/5, WE 5/5,  WNL                    LEFT    LE - HF 4/5, KE 5/5, DF 5/5, PF 5/5                    RIGHT LE - HF 4/5, KE 5/5, DF 5/5, PF 5/5        Sensory - Intact to LT bilateral  Skin on admission:  Mid thoracic to lumbar incision now open to air- suture embedded especially inferiorly--   paint periincision with cavilon Rehab Attending- Patient seen and examined by me - Case discussed, above note reviewed by me with modifications made    patient seen and evaluated in OT  Back spasms now improved   reports constipation on multiple meds will give lactulose today with repeat dose if needed  reports difficulty voiding- not voiding a lot on Bumex 2x/day  last void this Afternoon with 0cc PVR- Good Flow  ? related to constipation or BPH- ? start Flomax  suture line visualized - seen By Dr Graff of plastics here as well - wants clearance from Dr Blount who did procedure to DC sutures  Mg supplemented last night- check levels on thursday  to continue intensive rehab program    Vital Signs Last 24 Hrs  T(C): 36.5 (10 Oct 2023 07:31), Max: 36.7 (09 Oct 2023 23:20)  T(F): 97.7 (10 Oct 2023 07:31), Max: 98 (09 Oct 2023 23:20)  HR: 86 (10 Oct 2023 07:31) (64 - 86)  BP: 113/74 (10 Oct 2023 07:31) (113/74 - 137/77)  BP(mean): --  RR: 16 (10 Oct 2023 07:31) (15 - 16)  SpO2: 96% (10 Oct 2023 07:31) (96% - 99%)    Parameters below as of 10 Oct 2023 07:31  Patient On (Oxygen Delivery Method): room air    Constitutional - NAD  HEENT - NCAT, EOMI.    Chest - good chest expansion, good respiratory effort   Cardio - S1D2 RRR  Abdomen -  Obese, Soft, NTND BS +  Extremities - No peripheral edema, No calf tenderness   Neurologic Exam:                    Cranial Nerves -2-12 intact     Motor -  Strength preserved in UE and LE                     LEFT    UE - ShAB 5/5, EF 5/5, EE 5/5, WE 5/5,  WNL                     RIGHT UE - ShAB 5/5, EF 5/5, EE 5/5, WE 5/5,  WNL                    LEFT    LE - HF 4/5, KE 5/5, DF 5/5, PF 5/5                    RIGHT LE - HF 4/5, KE 5/5, DF 5/5, PF 5/5        Sensory - Intact to LT bilateral  Skin on admission:  Mid thoracic to lumbar incision now open to air- suture embedded especially inferiorly--   periincision abrasions from tape weeping- to use wound gel

## 2023-10-10 NOTE — PROGRESS NOTE ADULT - SUBJECTIVE AND OBJECTIVE BOX
Patient is a 67y old  Male who presents with a chief complaint of Spinal Fusion (10 Oct 2023 11:28)    Patient seen and examined at bedside. No acute overnight events. Pain controlled.     ALLERGIES:  No Known Allergies    MEDICATIONS  (STANDING):  allopurinol 100 milliGRAM(s) Oral at bedtime  aspirin enteric coated 81 milliGRAM(s) Oral daily  atorvastatin 40 milliGRAM(s) Oral at bedtime  Biotene Dry Mouth Oral Rinse 15 milliLiter(s) Swish and Spit three times a day  bisacodyl 5 milliGRAM(s) Oral every 12 hours  buMETAnide 2 milliGRAM(s) Oral <User Schedule>  buMETAnide 2 milliGRAM(s) Oral daily  dextrose 5%. 1000 milliLiter(s) (50 mL/Hr) IV Continuous <Continuous>  dextrose 5%. 1000 milliLiter(s) (100 mL/Hr) IV Continuous <Continuous>  dextrose 50% Injectable 12.5 Gram(s) IV Push once  dextrose 50% Injectable 25 Gram(s) IV Push once  dextrose 50% Injectable 25 Gram(s) IV Push once  gabapentin 300 milliGRAM(s) Oral four times a day  glucagon  Injectable 1 milliGRAM(s) IntraMuscular once  heparin   Injectable 5000 Unit(s) SubCutaneous every 8 hours  insulin glargine Injectable (LANTUS) 22 Unit(s) SubCutaneous at bedtime  insulin lispro (ADMELOG) corrective regimen sliding scale   SubCutaneous three times a day before meals  insulin lispro (ADMELOG) corrective regimen sliding scale   SubCutaneous at bedtime  insulin lispro Injectable (ADMELOG) 5 Unit(s) SubCutaneous before dinner  insulin lispro Injectable (ADMELOG) 5 Unit(s) SubCutaneous before breakfast  insulin lispro Injectable (ADMELOG) 5 Unit(s) SubCutaneous before lunch  lidocaine   4% Patch 1 Patch Transdermal daily  lidocaine 2% Viscous 5 milliLiter(s) Swish and Spit three times a day  magnesium oxide 400 milliGRAM(s) Oral three times a day with meals  multivitamin 1 Tablet(s) Oral daily  pantoprazole    Tablet 40 milliGRAM(s) Oral before breakfast  potassium chloride    Tablet ER 20 milliEquivalent(s) Oral daily  tiZANidine 6 milliGRAM(s) Oral at bedtime  tiZANidine 4 milliGRAM(s) Oral <User Schedule>  tiZANidine 2 milliGRAM(s) Oral <User Schedule>    MEDICATIONS  (PRN):  aluminum hydroxide/magnesium hydroxide/simethicone Suspension 30 milliLiter(s) Oral every 4 hours PRN Dyspepsia  benzocaine/menthol Lozenge 1 Lozenge Oral every 3 hours PRN Sore Throat  dextrose Oral Gel 15 Gram(s) Oral once PRN Blood Glucose LESS THAN 70 milliGRAM(s)/deciliter  guaiFENesin Oral Liquid (Sugar-Free) 100 milliGRAM(s) Oral every 6 hours PRN Cough  melatonin 3 milliGRAM(s) Oral at bedtime PRN Insomnia  ondansetron   Disintegrating Tablet 4 milliGRAM(s) Oral every 6 hours PRN Nausea and/or Vomiting  oxyCODONE    IR 15 milliGRAM(s) Oral every 3 hours PRN Severe Pain (7 - 10)  oxyCODONE    IR 10 milliGRAM(s) Oral every 3 hours PRN Moderate Pain (4 - 6)  polyethylene glycol 3350 17 Gram(s) Oral two times a day PRN Constipation    Vital Signs Last 24 Hrs  T(F): 97.7 (10 Oct 2023 07:31), Max: 98 (09 Oct 2023 23:20)  HR: 86 (10 Oct 2023 07:31) (64 - 86)  BP: 113/74 (10 Oct 2023 07:31) (113/74 - 137/77)  RR: 16 (10 Oct 2023 07:31) (15 - 16)  SpO2: 96% (10 Oct 2023 07:31) (96% - 99%)  I&O's Summary    PHYSICAL EXAM:  General: NAD, awake, alert   ENT: MMM, no tonsilar exudate  Neck: Supple, No JVD  Lungs: Clear to auscultation bilaterally, no wheezes. Good air entry bilaterally   Cardio: RRR, S1/S2, No murmurs +sternotomy scar  Abdomen: Soft, Nontender, Nondistended; Bowel sounds present  Extremities: No calf tenderness, +edema b/l LE    LABS:                        9.9    7.07  )-----------( 247      ( 09 Oct 2023 06:10 )             30.5       10-09    137  |  99  |  15  ----------------------------<  145  3.9   |  30  |  0.70    Ca    9.0      09 Oct 2023 06:10  Mg     1.3     10-09    TPro  6.4  /  Alb  2.4  /  TBili  0.6  /  DBili  x   /  AST  16  /  ALT  18  /  AlkPhos  116  10-09     09-19 Chol 181 mg/dL LDL -- HDL 27 mg/dL Trig 646 mg/dL    POCT Blood Glucose.: 181 mg/dL (10 Oct 2023 12:01)  POCT Blood Glucose.: 183 mg/dL (10 Oct 2023 07:30)  POCT Blood Glucose.: 153 mg/dL (09 Oct 2023 20:47)  POCT Blood Glucose.: 145 mg/dL (09 Oct 2023 17:14)    Urinalysis Basic - ( 09 Oct 2023 06:10 )    Color: x / Appearance: x / SG: x / pH: x  Gluc: 145 mg/dL / Ketone: x  / Bili: x / Urobili: x   Blood: x / Protein: x / Nitrite: x   Leuk Esterase: x / RBC: x / WBC x   Sq Epi: x / Non Sq Epi: x / Bacteria: x    COVID-19 PCR: NotDetec (09-30-23 @ 16:00)    RADIOLOGY & ADDITIONAL TESTS:     Care Discussed with Consultants/Other Providers:

## 2023-10-10 NOTE — PROGRESS NOTE ADULT - SUBJECTIVE AND OBJECTIVE BOX
HPI:  This is a 66 YO male with PMH of  HFrEF, CAD s/p CABG, ASIYA on CPAP, HTN, DM, right renal mass s/p resection presents to  [facility] ED ON 9/18  after a syncopal episode, found to have T12-L1 fracture on imaging. Patient originally scheduled for an ACDF with Dr. Hughes, due to injury he was changed to a T9-pelvis revision PSF. Hospital course s/f medication adjustments for HTN  and HF. Patient was evaluated by PM&R and therapy for functional deficits, gait/ADL impairments and acute rehabilitation was recommended. Patient was medically optimized for discharge to [facility] IRU on 9/29/23.         ROS/subjective:  Patient seen and evaluated, NAD in chair, spasms/back pain-controlled- Zanaflex continues.  BPs improved-Coreg held -hospitalist in. Bumex 2mg/2mg for hx CHF exacerbation. Daily weight following.   no dizziness, no headaches, no chest pain , No SOB, no nausea, no vomiting  BM x2 Sat. Lactulose today  biotene/visc Lidocaine for lip lesion and dry mouth  Plastics consulted to evaluate back wound-remove sutures  Check PVR/voiding        MEDICATIONS  (STANDING):  allopurinol 100 milliGRAM(s) Oral at bedtime  aspirin enteric coated 81 milliGRAM(s) Oral daily  atorvastatin 40 milliGRAM(s) Oral at bedtime  Biotene Dry Mouth Oral Rinse 15 milliLiter(s) Swish and Spit three times a day  bisacodyl 5 milliGRAM(s) Oral every 12 hours  buMETAnide 2 milliGRAM(s) Oral <User Schedule>  buMETAnide 2 milliGRAM(s) Oral daily  dextrose 5%. 1000 milliLiter(s) (50 mL/Hr) IV Continuous <Continuous>  dextrose 5%. 1000 milliLiter(s) (100 mL/Hr) IV Continuous <Continuous>  dextrose 50% Injectable 25 Gram(s) IV Push once  dextrose 50% Injectable 25 Gram(s) IV Push once  dextrose 50% Injectable 12.5 Gram(s) IV Push once  gabapentin 300 milliGRAM(s) Oral four times a day  glucagon  Injectable 1 milliGRAM(s) IntraMuscular once  heparin   Injectable 5000 Unit(s) SubCutaneous every 8 hours  insulin glargine Injectable (LANTUS) 22 Unit(s) SubCutaneous at bedtime  insulin lispro (ADMELOG) corrective regimen sliding scale   SubCutaneous three times a day before meals  insulin lispro (ADMELOG) corrective regimen sliding scale   SubCutaneous at bedtime  insulin lispro Injectable (ADMELOG) 5 Unit(s) SubCutaneous before dinner  insulin lispro Injectable (ADMELOG) 5 Unit(s) SubCutaneous before breakfast  insulin lispro Injectable (ADMELOG) 5 Unit(s) SubCutaneous before lunch  lactulose Syrup 20 Gram(s) Oral once  lidocaine   4% Patch 1 Patch Transdermal daily  lidocaine 2% Viscous 5 milliLiter(s) Swish and Spit three times a day  magnesium oxide 400 milliGRAM(s) Oral three times a day with meals  multivitamin 1 Tablet(s) Oral daily  pantoprazole    Tablet 40 milliGRAM(s) Oral before breakfast  potassium chloride    Tablet ER 20 milliEquivalent(s) Oral daily  tiZANidine 2 milliGRAM(s) Oral <User Schedule>  tiZANidine 4 milliGRAM(s) Oral <User Schedule>  tiZANidine 6 milliGRAM(s) Oral at bedtime    MEDICATIONS  (PRN):  aluminum hydroxide/magnesium hydroxide/simethicone Suspension 30 milliLiter(s) Oral every 4 hours PRN Dyspepsia  benzocaine/menthol Lozenge 1 Lozenge Oral every 3 hours PRN Sore Throat  dextrose Oral Gel 15 Gram(s) Oral once PRN Blood Glucose LESS THAN 70 milliGRAM(s)/deciliter  guaiFENesin Oral Liquid (Sugar-Free) 100 milliGRAM(s) Oral every 6 hours PRN Cough  melatonin 3 milliGRAM(s) Oral at bedtime PRN Insomnia  ondansetron   Disintegrating Tablet 4 milliGRAM(s) Oral every 6 hours PRN Nausea and/or Vomiting  oxyCODONE    IR 15 milliGRAM(s) Oral every 3 hours PRN Severe Pain (7 - 10)  oxyCODONE    IR 10 milliGRAM(s) Oral every 3 hours PRN Moderate Pain (4 - 6)  polyethylene glycol 3350 17 Gram(s) Oral two times a day PRN Constipation                            9.9    7.07  )-----------( 247      ( 09 Oct 2023 06:10 )             30.5     10-09    137  |  99  |  15  ----------------------------<  145<H>  3.9   |  30  |  0.70    Ca    9.0      09 Oct 2023 06:10  Mg     1.3     10-09    TPro  6.4  /  Alb  2.4<L>  /  TBili  0.6  /  DBili  x   /  AST  16  /  ALT  18  /  AlkPhos  116  10-09    Urinalysis Basic - ( 09 Oct 2023 06:10 )    Color: x / Appearance: x / SG: x / pH: x  Gluc: 145 mg/dL / Ketone: x  / Bili: x / Urobili: x   Blood: x / Protein: x / Nitrite: x   Leuk Esterase: x / RBC: x / WBC x   Sq Epi: x / Non Sq Epi: x / Bacteria: x        CAPILLARY BLOOD GLUCOSE      POCT Blood Glucose.: 183 mg/dL (10 Oct 2023 07:30)  POCT Blood Glucose.: 153 mg/dL (09 Oct 2023 20:47)  POCT Blood Glucose.: 145 mg/dL (09 Oct 2023 17:14)  POCT Blood Glucose.: 181 mg/dL (09 Oct 2023 12:07)      Vital Signs Last 24 Hrs  T(C): 36.5 (10 Oct 2023 07:31), Max: 36.7 (09 Oct 2023 23:20)  T(F): 97.7 (10 Oct 2023 07:31), Max: 98 (09 Oct 2023 23:20)  HR: 86 (10 Oct 2023 07:31) (64 - 86)  BP: 113/74 (10 Oct 2023 07:31) (113/74 - 137/77)  BP(mean): --  RR: 16 (10 Oct 2023 07:31) (15 - 16)  SpO2: 96% (10 Oct 2023 07:31) (96% - 99%)    Parameters below as of 10 Oct 2023 07:31  Patient On (Oxygen Delivery Method): room air      Constitutional - NAD  HEENT - NCAT, EOMI.    Chest - good chest expansion, good respiratory effort   Cardio - S1D2 RRR  Abdomen -  Obese, Soft, NTND BS +  Extremities - No peripheral edema, No calf tenderness   Neurologic Exam:                    Cranial Nerves -2-12 intact     Motor -  Strength preserved in UE and LE with severe pain in back.                     LEFT    UE - ShAB 5/5, EF 5/5, EE 5/5, WE 5/5,  WNL                     RIGHT UE - ShAB 5/5, EF 5/5, EE 5/5, WE 5/5,  WNL                    LEFT    LE - HF 4/5, KE 5/5, DF 5/5, PF 5/5                    RIGHT LE - HF 4/5, KE 5/5, DF 5/5, PF 5/5        Sensory - Intact to LT bilateral  Skin on admission:  Mid thoracic to lumbar incision covered with primary dressing. Previous surgical scars of chest, abdomen, b/l elbows, wrists and knees. No pressure ulcers.- back dressing Dcd-   Noted imbedding of suture line most noted inferiorly- no drainage noted-  leslie PATEL    IDT meeting on 10/4    SW: PH 3STE, 12STI, children    OT:  eating set up  grooming set up  UBD/LBD mod A  toileting mod A  tub/shower mod A  bathing mod A    PT:  amb 50ft min A RW  transfers mod A  stairs    SLP na    tentative dc 10/14 HC         Continue comprehensive acute rehab program consisting of 3hrs/day of OT/PT and SLP. HPI:  This is a 68 YO male with PMH of  HFrEF, CAD s/p CABG, ASIYA on CPAP, HTN, DM, right renal mass s/p resection presents to  [facility] ED ON 9/18  after a syncopal episode, found to have T12-L1 fracture on imaging. Patient originally scheduled for an ACDF with Dr. Hughes, due to injury he was changed to a T9-pelvis revision PSF. Hospital course s/f medication adjustments for HTN  and HF. Patient was evaluated by PM&R and therapy for functional deficits, gait/ADL impairments and acute rehabilitation was recommended. Patient was medically optimized for discharge to [facility] IRU on 9/29/23.         ROS/subjective:  Patient seen and evaluated, NAD in chair, spasms/back pain-controlled- Zanaflex continues.  BPs improved-Coreg held -hospitalist in. Bumex 2mg/2mg for hx CHF exacerbation. Daily weight following.   patient reports difficulty voiding- feels LES more swollen- Not voiding as much on Bumex  No SOB  no dizziness, no headaches, no chest pain , No SOB, no nausea, no vomiting  BM x2 Sat. Lactulose today  biotene/visc Lidocaine for lip lesion and dry mouth  Plastics consulted to evaluate back wound-remove sutures  Check PVR/voiding        MEDICATIONS  (STANDING):  allopurinol 100 milliGRAM(s) Oral at bedtime  aspirin enteric coated 81 milliGRAM(s) Oral daily  atorvastatin 40 milliGRAM(s) Oral at bedtime  Biotene Dry Mouth Oral Rinse 15 milliLiter(s) Swish and Spit three times a day  bisacodyl 5 milliGRAM(s) Oral every 12 hours  buMETAnide 2 milliGRAM(s) Oral <User Schedule>  buMETAnide 2 milliGRAM(s) Oral daily  dextrose 5%. 1000 milliLiter(s) (50 mL/Hr) IV Continuous <Continuous>  dextrose 5%. 1000 milliLiter(s) (100 mL/Hr) IV Continuous <Continuous>  dextrose 50% Injectable 25 Gram(s) IV Push once  dextrose 50% Injectable 25 Gram(s) IV Push once  dextrose 50% Injectable 12.5 Gram(s) IV Push once  gabapentin 300 milliGRAM(s) Oral four times a day  glucagon  Injectable 1 milliGRAM(s) IntraMuscular once  heparin   Injectable 5000 Unit(s) SubCutaneous every 8 hours  insulin glargine Injectable (LANTUS) 22 Unit(s) SubCutaneous at bedtime  insulin lispro (ADMELOG) corrective regimen sliding scale   SubCutaneous three times a day before meals  insulin lispro (ADMELOG) corrective regimen sliding scale   SubCutaneous at bedtime  insulin lispro Injectable (ADMELOG) 5 Unit(s) SubCutaneous before dinner  insulin lispro Injectable (ADMELOG) 5 Unit(s) SubCutaneous before breakfast  insulin lispro Injectable (ADMELOG) 5 Unit(s) SubCutaneous before lunch  lactulose Syrup 20 Gram(s) Oral once  lidocaine   4% Patch 1 Patch Transdermal daily  lidocaine 2% Viscous 5 milliLiter(s) Swish and Spit three times a day  magnesium oxide 400 milliGRAM(s) Oral three times a day with meals  multivitamin 1 Tablet(s) Oral daily  pantoprazole    Tablet 40 milliGRAM(s) Oral before breakfast  potassium chloride    Tablet ER 20 milliEquivalent(s) Oral daily  tiZANidine 2 milliGRAM(s) Oral <User Schedule>  tiZANidine 4 milliGRAM(s) Oral <User Schedule>  tiZANidine 6 milliGRAM(s) Oral at bedtime    MEDICATIONS  (PRN):  aluminum hydroxide/magnesium hydroxide/simethicone Suspension 30 milliLiter(s) Oral every 4 hours PRN Dyspepsia  benzocaine/menthol Lozenge 1 Lozenge Oral every 3 hours PRN Sore Throat  dextrose Oral Gel 15 Gram(s) Oral once PRN Blood Glucose LESS THAN 70 milliGRAM(s)/deciliter  guaiFENesin Oral Liquid (Sugar-Free) 100 milliGRAM(s) Oral every 6 hours PRN Cough  melatonin 3 milliGRAM(s) Oral at bedtime PRN Insomnia  ondansetron   Disintegrating Tablet 4 milliGRAM(s) Oral every 6 hours PRN Nausea and/or Vomiting  oxyCODONE    IR 15 milliGRAM(s) Oral every 3 hours PRN Severe Pain (7 - 10)  oxyCODONE    IR 10 milliGRAM(s) Oral every 3 hours PRN Moderate Pain (4 - 6)  polyethylene glycol 3350 17 Gram(s) Oral two times a day PRN Constipation                            9.9    7.07  )-----------( 247      ( 09 Oct 2023 06:10 )             30.5     10-09    137  |  99  |  15  ----------------------------<  145<H>  3.9   |  30  |  0.70    Ca    9.0      09 Oct 2023 06:10  Mg     1.3     10-09    TPro  6.4  /  Alb  2.4<L>  /  TBili  0.6  /  DBili  x   /  AST  16  /  ALT  18  /  AlkPhos  116  10-09    Urinalysis Basic - ( 09 Oct 2023 06:10 )    Color: x / Appearance: x / SG: x / pH: x  Gluc: 145 mg/dL / Ketone: x  / Bili: x / Urobili: x   Blood: x / Protein: x / Nitrite: x   Leuk Esterase: x / RBC: x / WBC x   Sq Epi: x / Non Sq Epi: x / Bacteria: x        CAPILLARY BLOOD GLUCOSE      POCT Blood Glucose.: 183 mg/dL (10 Oct 2023 07:30)  POCT Blood Glucose.: 153 mg/dL (09 Oct 2023 20:47)  POCT Blood Glucose.: 145 mg/dL (09 Oct 2023 17:14)  POCT Blood Glucose.: 181 mg/dL (09 Oct 2023 12:07)      Vital Signs Last 24 Hrs  T(C): 36.5 (10 Oct 2023 07:31), Max: 36.7 (09 Oct 2023 23:20)  T(F): 97.7 (10 Oct 2023 07:31), Max: 98 (09 Oct 2023 23:20)  HR: 86 (10 Oct 2023 07:31) (64 - 86)  BP: 113/74 (10 Oct 2023 07:31) (113/74 - 137/77)  BP(mean): --  RR: 16 (10 Oct 2023 07:31) (15 - 16)  SpO2: 96% (10 Oct 2023 07:31) (96% - 99%)    Parameters below as of 10 Oct 2023 07:31  Patient On (Oxygen Delivery Method): room air      Constitutional - NAD  HEENT - NCAT, EOMI.    Chest - good chest expansion, good respiratory effort   Cardio - S1D2 RRR  Abdomen -  Obese, Soft, NTND BS +  Extremities - No peripheral edema, No calf tenderness   Neurologic Exam:                    Cranial Nerves -2-12 intact     Motor -  Strength preserved in UE and LE with severe pain in back.                     LEFT    UE - ShAB 5/5, EF 5/5, EE 5/5, WE 5/5,  WNL                     RIGHT UE - ShAB 5/5, EF 5/5, EE 5/5, WE 5/5,  WNL                    LEFT    LE - HF 4/5, KE 5/5, DF 5/5, PF 5/5                    RIGHT LE - HF 4/5, KE 5/5, DF 5/5, PF 5/5        Sensory - Intact to LT bilateral  Skin on admission:  Mid thoracic to lumbar incision covered with primary dressing. Previous surgical scars of chest, abdomen, b/l elbows, wrists and knees. No pressure ulcers.- back dressing Dcd-   Noted imbedding of suture line most noted inferiorly- no drainage noted-  leslie PATEL    IDT meeting on 10/4    SW: PH 3STE, 12STI, children    OT:  eating set up  grooming set up  UBD/LBD mod A  toileting mod A  tub/shower mod A  bathing mod A    PT:  amb 50ft min A RW  transfers mod A  stairs    SLP na    tentative dc 10/14 HC         Continue comprehensive acute rehab program consisting of 3hrs/day of OT/PT and SLP. HPI:  This is a 66 YO male with PMH of  HFrEF, CAD s/p CABG, ASIYA on CPAP, HTN, DM, right renal mass s/p resection presents to  [facility] ED ON 9/18  after a syncopal episode, found to have T12-L1 fracture on imaging. Patient originally scheduled for an ACDF with Dr. Hughes, due to injury he was changed to a T9-pelvis revision PSF. Hospital course s/f medication adjustments for HTN  and HF. Patient was evaluated by PM&R and therapy for functional deficits, gait/ADL impairments and acute rehabilitation was recommended. Patient was medically optimized for discharge to [facility] IRU on 9/29/23.         ROS/subjective:  Patient seen and evaluated, NAD in chair, spasms/back pain-controlled- Zanaflex continues.  BPs improved-Coreg held -hospitalist in. Bumex 2mg/2mg for hx CHF exacerbation. Daily weight following.   patient reports difficulty voiding- feels LES more swollen- Not voiding as much on Bumex  No SOB  no dizziness, no headaches, no chest pain , No SOB, no nausea, no vomiting  BM x2 Sat. Lactulose today  biotene/visc Lidocaine for lip lesion and dry mouth  Plastics consulted to evaluate back wound- ?remove sutures  PVR 0cc- large void this afternoon        MEDICATIONS  (STANDING):  allopurinol 100 milliGRAM(s) Oral at bedtime  aspirin enteric coated 81 milliGRAM(s) Oral daily  atorvastatin 40 milliGRAM(s) Oral at bedtime  Biotene Dry Mouth Oral Rinse 15 milliLiter(s) Swish and Spit three times a day  bisacodyl 5 milliGRAM(s) Oral every 12 hours  buMETAnide 2 milliGRAM(s) Oral <User Schedule>  buMETAnide 2 milliGRAM(s) Oral daily  dextrose 5%. 1000 milliLiter(s) (50 mL/Hr) IV Continuous <Continuous>  dextrose 5%. 1000 milliLiter(s) (100 mL/Hr) IV Continuous <Continuous>  dextrose 50% Injectable 25 Gram(s) IV Push once  dextrose 50% Injectable 25 Gram(s) IV Push once  dextrose 50% Injectable 12.5 Gram(s) IV Push once  gabapentin 300 milliGRAM(s) Oral four times a day  glucagon  Injectable 1 milliGRAM(s) IntraMuscular once  heparin   Injectable 5000 Unit(s) SubCutaneous every 8 hours  insulin glargine Injectable (LANTUS) 22 Unit(s) SubCutaneous at bedtime  insulin lispro (ADMELOG) corrective regimen sliding scale   SubCutaneous three times a day before meals  insulin lispro (ADMELOG) corrective regimen sliding scale   SubCutaneous at bedtime  insulin lispro Injectable (ADMELOG) 5 Unit(s) SubCutaneous before dinner  insulin lispro Injectable (ADMELOG) 5 Unit(s) SubCutaneous before breakfast  insulin lispro Injectable (ADMELOG) 5 Unit(s) SubCutaneous before lunch  lactulose Syrup 20 Gram(s) Oral once  lidocaine   4% Patch 1 Patch Transdermal daily  lidocaine 2% Viscous 5 milliLiter(s) Swish and Spit three times a day  magnesium oxide 400 milliGRAM(s) Oral three times a day with meals  multivitamin 1 Tablet(s) Oral daily  pantoprazole    Tablet 40 milliGRAM(s) Oral before breakfast  potassium chloride    Tablet ER 20 milliEquivalent(s) Oral daily  tiZANidine 2 milliGRAM(s) Oral <User Schedule>  tiZANidine 4 milliGRAM(s) Oral <User Schedule>  tiZANidine 6 milliGRAM(s) Oral at bedtime    MEDICATIONS  (PRN):  aluminum hydroxide/magnesium hydroxide/simethicone Suspension 30 milliLiter(s) Oral every 4 hours PRN Dyspepsia  benzocaine/menthol Lozenge 1 Lozenge Oral every 3 hours PRN Sore Throat  dextrose Oral Gel 15 Gram(s) Oral once PRN Blood Glucose LESS THAN 70 milliGRAM(s)/deciliter  guaiFENesin Oral Liquid (Sugar-Free) 100 milliGRAM(s) Oral every 6 hours PRN Cough  melatonin 3 milliGRAM(s) Oral at bedtime PRN Insomnia  ondansetron   Disintegrating Tablet 4 milliGRAM(s) Oral every 6 hours PRN Nausea and/or Vomiting  oxyCODONE    IR 15 milliGRAM(s) Oral every 3 hours PRN Severe Pain (7 - 10)  oxyCODONE    IR 10 milliGRAM(s) Oral every 3 hours PRN Moderate Pain (4 - 6)  polyethylene glycol 3350 17 Gram(s) Oral two times a day PRN Constipation                            9.9    7.07  )-----------( 247      ( 09 Oct 2023 06:10 )             30.5     10-09    137  |  99  |  15  ----------------------------<  145<H>  3.9   |  30  |  0.70    Ca    9.0      09 Oct 2023 06:10  Mg     1.3     10-09    TPro  6.4  /  Alb  2.4<L>  /  TBili  0.6  /  DBili  x   /  AST  16  /  ALT  18  /  AlkPhos  116  10-09    Urinalysis Basic - ( 09 Oct 2023 06:10 )    Color: x / Appearance: x / SG: x / pH: x  Gluc: 145 mg/dL / Ketone: x  / Bili: x / Urobili: x   Blood: x / Protein: x / Nitrite: x   Leuk Esterase: x / RBC: x / WBC x   Sq Epi: x / Non Sq Epi: x / Bacteria: x        CAPILLARY BLOOD GLUCOSE      POCT Blood Glucose.: 183 mg/dL (10 Oct 2023 07:30)  POCT Blood Glucose.: 153 mg/dL (09 Oct 2023 20:47)  POCT Blood Glucose.: 145 mg/dL (09 Oct 2023 17:14)  POCT Blood Glucose.: 181 mg/dL (09 Oct 2023 12:07)      Vital Signs Last 24 Hrs  T(C): 36.5 (10 Oct 2023 07:31), Max: 36.7 (09 Oct 2023 23:20)  T(F): 97.7 (10 Oct 2023 07:31), Max: 98 (09 Oct 2023 23:20)  HR: 86 (10 Oct 2023 07:31) (64 - 86)  BP: 113/74 (10 Oct 2023 07:31) (113/74 - 137/77)  BP(mean): --  RR: 16 (10 Oct 2023 07:31) (15 - 16)  SpO2: 96% (10 Oct 2023 07:31) (96% - 99%)    Parameters below as of 10 Oct 2023 07:31  Patient On (Oxygen Delivery Method): room air      Constitutional - NAD  HEENT - NCAT, EOMI.    Chest - good chest expansion, good respiratory effort   Cardio - S1D2 RRR  Abdomen -  Obese, Soft, NTND BS +  Extremities - No peripheral edema, No calf tenderness   Neurologic Exam:                    Cranial Nerves -2-12 intact     Motor -  Strength preserved in UE and LE with severe pain in back.                     LEFT    UE - ShAB 5/5, EF 5/5, EE 5/5, WE 5/5,  WNL                     RIGHT UE - ShAB 5/5, EF 5/5, EE 5/5, WE 5/5,  WNL                    LEFT    LE - HF 4/5, KE 5/5, DF 5/5, PF 5/5                    RIGHT LE - HF 4/5, KE 5/5, DF 5/5, PF 5/5        Sensory - Intact to LT bilateral  Skin on admission:  Mid thoracic to lumbar incision covered with primary dressing. Previous surgical scars of chest, abdomen, b/l elbows, wrists and knees. No pressure ulcers.- back dressing Dcd-   Noted imbedding of suture line most noted inferiorly- no drainage noted-  leslie PATEL    IDT meeting on 10/4    SW: PH 3STE, 12STI, children    OT:  eating set up  grooming set up  UBD/LBD mod A  toileting mod A  tub/shower mod A  bathing mod A    PT:  amb 50ft min A RW  transfers mod A  stairs    SLP na    tentative dc 10/14 HC         Continue comprehensive acute rehab program consisting of 3hrs/day of OT/PT and SLP.

## 2023-10-10 NOTE — PROGRESS NOTE ADULT - ASSESSMENT
This is a 66 YO male with PMH of  HFrEF, CAD s/p CABG, ASIYA on CPAP, HTN, DM II, right renal mass s/p resection presents to  [facility] ED on 9/18  after a syncopal episode, found to have T12-L1 fracture on imaging. Patient had a T9-pelvis revision PSF. Patient now with gait Instability, ADL impairments and Functional impairments.    #Thoracic spine fracture  - T9-pelvis revision PSF on 9/23  - Comprehensive Rehab Program: PT/OT, 3hours daily and 5 days weekly  - PT: Focused on improving strength, endurance, coordination, balance, functional mobility, and transfers  - OT: Focused on improving strength, fine motor skills, coordination, posture and ADLs.    - pain regimen  - zanaflex TID, avoid hypotension- muscle spasms improved  - plastics to see for suture removal    #ASIYA  Respiratory order for nocturnal CPAP. Pressure set withy home setting of 5.    #HTN  - Carvedilol 6.25mg BID-on hold per hospitalist  - Losartan 25mg daily-hold    #HLD  - Lipitor 40mg daily    #CAD   - s/p CABG  - c/w ASA 81mg daily    #CHF  - Bumex 2mg+2mg. Monitor weight daily.    hypomagnesemia  discussed with hospitalist  to supplement MG  check levels on Thursday     #DM II  - ISS and FS  - Admelog and Lantus  - A1c 6.5 on 9/19  - follow fingersticks    #Neuropathy  -gabapentin 300mg QID.    #Pain management  - Tylenol PRN,   - Oxycodone 10mg moderate, 15mg severe PRN,   - lidocaine patch,   -zanaflex 4mg/2mg/6mg  - gabapentin 300mg 4x/.day  ICE    #DVT ppx  - Heparin 5K Q 8 hrs    #GI ppx  - Protonix 40mg  - Zofran    #Bowel Regimen  - Senna, miralax 2x/day  - Lactulose today    #Bladder management  -PVR today for incomplete voiding  - Monitor UO  -goal BM daily    #FEN   - Diet: DASH/TLC  - MVI    #Skin:  - Skin on admission: Multiple Left sided abdomen abrasions. Mid thoracic to lumbar incision now open to air- noted embedded incision- plastics to see in AM to Dc Sutures  - Pressure injury/Skin: Turn Q2hrs while in bed, OOB to Chair, PT/OT     #Sleep:   - Maintain quiet hours and low stim environment.  - Melatonin 3mg nightly PRN to maximize participation in therapy during the day.     #Precaution  - Fall, Aspiration, Spinal

## 2023-10-11 ENCOUNTER — TRANSCRIPTION ENCOUNTER (OUTPATIENT)
Age: 68
End: 2023-10-11

## 2023-10-11 LAB
ANION GAP SERPL CALC-SCNC: 8 MMOL/L — SIGNIFICANT CHANGE UP (ref 5–17)
BUN SERPL-MCNC: 15 MG/DL — SIGNIFICANT CHANGE UP (ref 7–23)
CALCIUM SERPL-MCNC: 9 MG/DL — SIGNIFICANT CHANGE UP (ref 8.4–10.5)
CHLORIDE SERPL-SCNC: 98 MMOL/L — SIGNIFICANT CHANGE UP (ref 96–108)
CO2 SERPL-SCNC: 30 MMOL/L — SIGNIFICANT CHANGE UP (ref 22–31)
CREAT SERPL-MCNC: 0.65 MG/DL — SIGNIFICANT CHANGE UP (ref 0.5–1.3)
EGFR: 103 ML/MIN/1.73M2 — SIGNIFICANT CHANGE UP
GLUCOSE BLDC GLUCOMTR-MCNC: 158 MG/DL — HIGH (ref 70–99)
GLUCOSE BLDC GLUCOMTR-MCNC: 159 MG/DL — HIGH (ref 70–99)
GLUCOSE BLDC GLUCOMTR-MCNC: 167 MG/DL — HIGH (ref 70–99)
GLUCOSE BLDC GLUCOMTR-MCNC: 187 MG/DL — HIGH (ref 70–99)
GLUCOSE SERPL-MCNC: 136 MG/DL — HIGH (ref 70–99)
HCT VFR BLD CALC: 29.6 % — LOW (ref 39–50)
HGB BLD-MCNC: 9.7 G/DL — LOW (ref 13–17)
MAGNESIUM SERPL-MCNC: 1.4 MG/DL — LOW (ref 1.6–2.6)
MCHC RBC-ENTMCNC: 28.2 PG — SIGNIFICANT CHANGE UP (ref 27–34)
MCHC RBC-ENTMCNC: 32.8 GM/DL — SIGNIFICANT CHANGE UP (ref 32–36)
MCV RBC AUTO: 86 FL — SIGNIFICANT CHANGE UP (ref 80–100)
NRBC # BLD: 0 /100 WBCS — SIGNIFICANT CHANGE UP (ref 0–0)
PLATELET # BLD AUTO: 228 K/UL — SIGNIFICANT CHANGE UP (ref 150–400)
POTASSIUM SERPL-MCNC: 3.8 MMOL/L — SIGNIFICANT CHANGE UP (ref 3.5–5.3)
POTASSIUM SERPL-SCNC: 3.8 MMOL/L — SIGNIFICANT CHANGE UP (ref 3.5–5.3)
RBC # BLD: 3.44 M/UL — LOW (ref 4.2–5.8)
RBC # FLD: 13.2 % — SIGNIFICANT CHANGE UP (ref 10.3–14.5)
SODIUM SERPL-SCNC: 136 MMOL/L — SIGNIFICANT CHANGE UP (ref 135–145)
WBC # BLD: 5.84 K/UL — SIGNIFICANT CHANGE UP (ref 3.8–10.5)
WBC # FLD AUTO: 5.84 K/UL — SIGNIFICANT CHANGE UP (ref 3.8–10.5)

## 2023-10-11 PROCEDURE — 99232 SBSQ HOSP IP/OBS MODERATE 35: CPT

## 2023-10-11 PROCEDURE — 99232 SBSQ HOSP IP/OBS MODERATE 35: CPT | Mod: GC

## 2023-10-11 RX ORDER — METFORMIN HYDROCHLORIDE 850 MG/1
500 TABLET ORAL
Refills: 0 | Status: DISCONTINUED | OUTPATIENT
Start: 2023-10-11 | End: 2023-10-14

## 2023-10-11 RX ORDER — MAGNESIUM SULFATE 500 MG/ML
2 VIAL (ML) INJECTION ONCE
Refills: 0 | Status: COMPLETED | OUTPATIENT
Start: 2023-10-11 | End: 2023-10-11

## 2023-10-11 RX ORDER — ATORVASTATIN CALCIUM 80 MG/1
1 TABLET, FILM COATED ORAL
Qty: 0 | Refills: 0 | DISCHARGE
Start: 2023-10-11

## 2023-10-11 RX ORDER — TIZANIDINE 4 MG/1
1 TABLET ORAL
Qty: 0 | Refills: 0 | DISCHARGE
Start: 2023-10-11

## 2023-10-11 RX ORDER — MAGNESIUM OXIDE 400 MG ORAL TABLET 241.3 MG
1 TABLET ORAL
Qty: 0 | Refills: 0 | DISCHARGE
Start: 2023-10-11

## 2023-10-11 RX ORDER — CARVEDILOL PHOSPHATE 80 MG/1
3.12 CAPSULE, EXTENDED RELEASE ORAL EVERY 12 HOURS
Refills: 0 | Status: DISCONTINUED | OUTPATIENT
Start: 2023-10-11 | End: 2023-10-14

## 2023-10-11 RX ORDER — INSULIN GLARGINE 100 [IU]/ML
18 INJECTION, SOLUTION SUBCUTANEOUS AT BEDTIME
Refills: 0 | Status: DISCONTINUED | OUTPATIENT
Start: 2023-10-11 | End: 2023-10-14

## 2023-10-11 RX ORDER — TIZANIDINE 4 MG/1
3 TABLET ORAL
Qty: 0 | Refills: 0 | DISCHARGE
Start: 2023-10-11

## 2023-10-11 RX ORDER — POTASSIUM CHLORIDE 20 MEQ
1 PACKET (EA) ORAL
Qty: 0 | Refills: 0 | DISCHARGE
Start: 2023-10-11

## 2023-10-11 RX ORDER — BUMETANIDE 0.25 MG/ML
1 INJECTION INTRAMUSCULAR; INTRAVENOUS
Qty: 0 | Refills: 0 | DISCHARGE
Start: 2023-10-11

## 2023-10-11 RX ORDER — OXYCODONE HYDROCHLORIDE 5 MG/1
1 TABLET ORAL
Qty: 0 | Refills: 0 | DISCHARGE
Start: 2023-10-11

## 2023-10-11 RX ORDER — METFORMIN HYDROCHLORIDE 850 MG/1
1 TABLET ORAL
Qty: 0 | Refills: 0 | DISCHARGE
Start: 2023-10-11

## 2023-10-11 RX ORDER — CARVEDILOL PHOSPHATE 80 MG/1
1 CAPSULE, EXTENDED RELEASE ORAL
Qty: 0 | Refills: 0 | DISCHARGE
Start: 2023-10-11

## 2023-10-11 RX ORDER — GABAPENTIN 400 MG/1
1 CAPSULE ORAL
Qty: 0 | Refills: 0 | DISCHARGE
Start: 2023-10-11

## 2023-10-11 RX ORDER — TIZANIDINE 4 MG/1
2 TABLET ORAL
Refills: 0 | Status: DISCONTINUED | OUTPATIENT
Start: 2023-10-11 | End: 2023-10-14

## 2023-10-11 RX ORDER — PANTOPRAZOLE SODIUM 20 MG/1
1 TABLET, DELAYED RELEASE ORAL
Qty: 0 | Refills: 0 | DISCHARGE
Start: 2023-10-11

## 2023-10-11 RX ADMIN — Medication 5 UNIT(S): at 17:46

## 2023-10-11 RX ADMIN — BUMETANIDE 2 MILLIGRAM(S): 0.25 INJECTION INTRAMUSCULAR; INTRAVENOUS at 05:22

## 2023-10-11 RX ADMIN — Medication 25 GRAM(S): at 15:44

## 2023-10-11 RX ADMIN — Medication 2: at 08:14

## 2023-10-11 RX ADMIN — PANTOPRAZOLE SODIUM 40 MILLIGRAM(S): 20 TABLET, DELAYED RELEASE ORAL at 05:21

## 2023-10-11 RX ADMIN — TIZANIDINE 2 MILLIGRAM(S): 4 TABLET ORAL at 14:31

## 2023-10-11 RX ADMIN — BUMETANIDE 2 MILLIGRAM(S): 0.25 INJECTION INTRAMUSCULAR; INTRAVENOUS at 12:16

## 2023-10-11 RX ADMIN — HEPARIN SODIUM 5000 UNIT(S): 5000 INJECTION INTRAVENOUS; SUBCUTANEOUS at 14:31

## 2023-10-11 RX ADMIN — MAGNESIUM OXIDE 400 MG ORAL TABLET 400 MILLIGRAM(S): 241.3 TABLET ORAL at 08:16

## 2023-10-11 RX ADMIN — ATORVASTATIN CALCIUM 40 MILLIGRAM(S): 80 TABLET, FILM COATED ORAL at 21:57

## 2023-10-11 RX ADMIN — Medication 81 MILLIGRAM(S): at 12:18

## 2023-10-11 RX ADMIN — Medication 100 MILLIGRAM(S): at 21:57

## 2023-10-11 RX ADMIN — OXYCODONE HYDROCHLORIDE 15 MILLIGRAM(S): 5 TABLET ORAL at 04:22

## 2023-10-11 RX ADMIN — GABAPENTIN 300 MILLIGRAM(S): 400 CAPSULE ORAL at 05:21

## 2023-10-11 RX ADMIN — Medication 5 UNIT(S): at 12:12

## 2023-10-11 RX ADMIN — HEPARIN SODIUM 5000 UNIT(S): 5000 INJECTION INTRAVENOUS; SUBCUTANEOUS at 05:21

## 2023-10-11 RX ADMIN — MAGNESIUM OXIDE 400 MG ORAL TABLET 400 MILLIGRAM(S): 241.3 TABLET ORAL at 17:49

## 2023-10-11 RX ADMIN — HEPARIN SODIUM 5000 UNIT(S): 5000 INJECTION INTRAVENOUS; SUBCUTANEOUS at 21:57

## 2023-10-11 RX ADMIN — Medication 5 MILLIGRAM(S): at 05:21

## 2023-10-11 RX ADMIN — LIDOCAINE 5 MILLILITER(S): 4 CREAM TOPICAL at 08:16

## 2023-10-11 RX ADMIN — OXYCODONE HYDROCHLORIDE 15 MILLIGRAM(S): 5 TABLET ORAL at 05:10

## 2023-10-11 RX ADMIN — Medication 5 MILLIGRAM(S): at 17:48

## 2023-10-11 RX ADMIN — Medication 5 UNIT(S): at 08:14

## 2023-10-11 RX ADMIN — GABAPENTIN 300 MILLIGRAM(S): 400 CAPSULE ORAL at 17:51

## 2023-10-11 RX ADMIN — TIZANIDINE 2 MILLIGRAM(S): 4 TABLET ORAL at 08:15

## 2023-10-11 RX ADMIN — Medication 1 TABLET(S): at 12:18

## 2023-10-11 RX ADMIN — METFORMIN HYDROCHLORIDE 500 MILLIGRAM(S): 850 TABLET ORAL at 17:48

## 2023-10-11 RX ADMIN — INSULIN GLARGINE 18 UNIT(S): 100 INJECTION, SOLUTION SUBCUTANEOUS at 21:57

## 2023-10-11 RX ADMIN — CARVEDILOL PHOSPHATE 3.12 MILLIGRAM(S): 80 CAPSULE, EXTENDED RELEASE ORAL at 17:48

## 2023-10-11 RX ADMIN — Medication 15 MILLILITER(S): at 14:31

## 2023-10-11 RX ADMIN — TIZANIDINE 6 MILLIGRAM(S): 4 TABLET ORAL at 21:57

## 2023-10-11 RX ADMIN — GABAPENTIN 300 MILLIGRAM(S): 400 CAPSULE ORAL at 12:16

## 2023-10-11 RX ADMIN — Medication 2: at 17:45

## 2023-10-11 RX ADMIN — Medication 20 MILLIEQUIVALENT(S): at 12:16

## 2023-10-11 RX ADMIN — Medication 15 MILLILITER(S): at 08:17

## 2023-10-11 RX ADMIN — Medication 2: at 12:12

## 2023-10-11 RX ADMIN — MAGNESIUM OXIDE 400 MG ORAL TABLET 400 MILLIGRAM(S): 241.3 TABLET ORAL at 12:17

## 2023-10-11 NOTE — PROGRESS NOTE ADULT - SUBJECTIVE AND OBJECTIVE BOX
Patient is a 67y old  Male who presents with a chief complaint of Spinal Fusion (11 Oct 2023 08:21)      Subjective and overnight events:  Patient seen and examined at bedside. reports pain controlled. + BM. no fever, chills, sob, cp, abd pain.    ALLERGIES:  No Known Allergies    MEDICATIONS  (STANDING):  allopurinol 100 milliGRAM(s) Oral at bedtime  aspirin enteric coated 81 milliGRAM(s) Oral daily  atorvastatin 40 milliGRAM(s) Oral at bedtime  Biotene Dry Mouth Oral Rinse 15 milliLiter(s) Swish and Spit three times a day  bisacodyl 5 milliGRAM(s) Oral every 12 hours  buMETAnide 2 milliGRAM(s) Oral <User Schedule>  buMETAnide 2 milliGRAM(s) Oral daily  carvedilol 3.125 milliGRAM(s) Oral every 12 hours  dextrose 5%. 1000 milliLiter(s) (50 mL/Hr) IV Continuous <Continuous>  dextrose 5%. 1000 milliLiter(s) (100 mL/Hr) IV Continuous <Continuous>  dextrose 50% Injectable 25 Gram(s) IV Push once  dextrose 50% Injectable 25 Gram(s) IV Push once  dextrose 50% Injectable 12.5 Gram(s) IV Push once  gabapentin 300 milliGRAM(s) Oral four times a day  glucagon  Injectable 1 milliGRAM(s) IntraMuscular once  heparin   Injectable 5000 Unit(s) SubCutaneous every 8 hours  insulin glargine Injectable (LANTUS) 22 Unit(s) SubCutaneous at bedtime  insulin lispro (ADMELOG) corrective regimen sliding scale   SubCutaneous three times a day before meals  insulin lispro (ADMELOG) corrective regimen sliding scale   SubCutaneous at bedtime  insulin lispro Injectable (ADMELOG) 5 Unit(s) SubCutaneous before breakfast  insulin lispro Injectable (ADMELOG) 5 Unit(s) SubCutaneous before lunch  insulin lispro Injectable (ADMELOG) 5 Unit(s) SubCutaneous before dinner  lidocaine   4% Patch 1 Patch Transdermal daily  lidocaine 2% Viscous 5 milliLiter(s) Swish and Spit three times a day  magnesium oxide 400 milliGRAM(s) Oral three times a day with meals  multivitamin 1 Tablet(s) Oral daily  pantoprazole    Tablet 40 milliGRAM(s) Oral before breakfast  potassium chloride    Tablet ER 20 milliEquivalent(s) Oral daily  tiZANidine 2 milliGRAM(s) Oral <User Schedule>  tiZANidine 6 milliGRAM(s) Oral at bedtime  tiZANidine 2 milliGRAM(s) Oral <User Schedule>    MEDICATIONS  (PRN):  aluminum hydroxide/magnesium hydroxide/simethicone Suspension 30 milliLiter(s) Oral every 4 hours PRN Dyspepsia  benzocaine/menthol Lozenge 1 Lozenge Oral every 3 hours PRN Sore Throat  dextrose Oral Gel 15 Gram(s) Oral once PRN Blood Glucose LESS THAN 70 milliGRAM(s)/deciliter  guaiFENesin Oral Liquid (Sugar-Free) 100 milliGRAM(s) Oral every 6 hours PRN Cough  melatonin 3 milliGRAM(s) Oral at bedtime PRN Insomnia  ondansetron   Disintegrating Tablet 4 milliGRAM(s) Oral every 6 hours PRN Nausea and/or Vomiting  oxyCODONE    IR 15 milliGRAM(s) Oral every 3 hours PRN Severe Pain (7 - 10)  oxyCODONE    IR 10 milliGRAM(s) Oral every 3 hours PRN Moderate Pain (4 - 6)  polyethylene glycol 3350 17 Gram(s) Oral two times a day PRN Constipation    Vital Signs Last 24 Hrs  T(F): 97.9 (11 Oct 2023 08:21), Max: 98.2 (10 Oct 2023 21:22)  HR: 85 (11 Oct 2023 08:21) (65 - 85)  BP: 161/84 (11 Oct 2023 08:21) (130/68 - 161/84)  RR: 16 (11 Oct 2023 08:21) (14 - 16)  SpO2: 96% (11 Oct 2023 08:21) (96% - 99%)  I&O's Summary    PHYSICAL EXAM:  General: NAD, A/O x 3  ENT: MMM  Neck: Supple, No JVD  Lungs: Clear to auscultation bilaterally  Cardio: RRR, S1/S2, No murmurs  Abdomen: Soft, Nontender, Nondistended; Bowel sounds present  Extremities: No calf tenderness, No pitting edema    LABS:                        9.7    5.84  )-----------( 228      ( 11 Oct 2023 05:28 )             29.6     10-11    136  |  98  |  15  ----------------------------<  136  3.8   |  30  |  0.65    Ca    9.0      11 Oct 2023 05:28  Mg     1.4     10-11    TPro  6.4  /  Alb  2.4  /  TBili  0.6  /  DBili  x   /  AST  16  /  ALT  18  /  AlkPhos  116  10-09      09-19 Chol 181 mg/dL LDL -- HDL 27 mg/dL Trig 646 mg/dL      POCT Blood Glucose.: 167 mg/dL (11 Oct 2023 08:09)  POCT Blood Glucose.: 221 mg/dL (10 Oct 2023 21:09)  POCT Blood Glucose.: 209 mg/dL (10 Oct 2023 17:22)  POCT Blood Glucose.: 181 mg/dL (10 Oct 2023 12:01)      Urinalysis Basic - ( 11 Oct 2023 05:28 )    Color: x / Appearance: x / SG: x / pH: x  Gluc: 136 mg/dL / Ketone: x  / Bili: x / Urobili: x   Blood: x / Protein: x / Nitrite: x   Leuk Esterase: x / RBC: x / WBC x   Sq Epi: x / Non Sq Epi: x / Bacteria: x        COVID-19 PCR: NotDetec (09-30-23 @ 16:00)      RADIOLOGY & ADDITIONAL TESTS:    Care Discussed with Consultants/Other Providers:

## 2023-10-11 NOTE — DISCHARGE NOTE PROVIDER - NSDCMRMEDTOKEN_GEN_ALL_CORE_FT
acetaminophen 325 mg oral tablet: 3 tab(s) orally every 8 hours  allopurinol 100 mg oral tablet: 1 tab(s) orally once a day (at bedtime)  aspirin 81 mg oral delayed release tablet: 1 tab(s) orally once a day  atorvastatin 40 mg oral tablet: 1 tab(s) orally once a day (at bedtime)  bumetanide 2 mg oral tablet: 1 tab(s) orally 2 times a day  calcium with vitamin D3: 1 tab orally once a day  carvedilol 3.125 mg oral tablet: 1 tab(s) orally every 12 hours  gabapentin 300 mg oral capsule: 1 cap(s) orally 4 times a day  glimepiride 1 mg oral tablet: 1 orally once a day  magnesium oxide 400 mg oral tablet: 1 tab(s) orally 3 times a day (with meals)  melatonin 3 mg oral tablet: 1 tab(s) orally once a day (at bedtime) As needed Insomnia  metFORMIN 500 mg oral tablet: 1 tab(s) orally 2 times a day (with meals)  Multiple Vitamins oral tablet: 1 tab(s) orally once a day  oxyCODONE 10 mg oral tablet: 1 tab(s) orally every 3 hours As needed Moderate Pain (4 - 6)  pantoprazole 40 mg oral delayed release tablet: 1 tab(s) orally once a day (before a meal)  polyethylene glycol 3350 oral powder for reconstitution: 17 gram(s) orally 2 times a day  potassium chloride 20 mEq oral tablet, extended release: 1 tab(s) orally once a day  senna leaf extract oral tablet: 2 tab(s) orally once a day (at bedtime)  tiZANidine 2 mg oral tablet: 1 tab(s) orally 2 times a day  tiZANidine 2 mg oral tablet: 3 tab(s) orally once a day (at bedtime)   acetaminophen 325 mg oral tablet: 3 tab(s) orally every 8 hours  allopurinol 100 mg oral tablet: 1 tab(s) orally once a day (at bedtime)  aspirin 81 mg oral delayed release tablet: 1 tab(s) orally once a day  atorvastatin 40 mg oral tablet: 1 tab(s) orally once a day (at bedtime)  bumetanide 2 mg oral tablet: 1 tab(s) orally 2 times a day  calcium with vitamin D3: 1 tab orally once a day  carvedilol 3.125 mg oral tablet: 1 tab(s) orally every 12 hours  gabapentin 300 mg oral capsule: 1 cap(s) orally 4 times a day  glimepiride 1 mg oral tablet: 1 tab(s) orally once a day  magnesium oxide 400 mg oral tablet: 1 tab(s) orally 3 times a day (with meals)  melatonin 3 mg oral tablet: 1 tab(s) orally once a day (at bedtime) As needed Insomnia  Multiple Vitamins oral tablet: 1 tab(s) orally once a day  oxyCODONE 10 mg oral tablet: 1 tab(s) orally 3 times a day as needed for  severe pain MDD: 2 tabs  pantoprazole 40 mg oral delayed release tablet: 1 tab(s) orally once a day (before a meal)  polyethylene glycol 3350 oral powder for reconstitution: 17 gram(s) orally 2 times a day  potassium chloride 20 mEq oral tablet, extended release: 1 tab(s) orally once a day  senna leaf extract oral tablet: 2 tab(s) orally once a day (at bedtime)  tiZANidine 2 mg oral tablet: 1 tab(s) orally 3 times a day   acetaminophen 325 mg oral tablet: 3 tab(s) orally every 8 hours  allopurinol 100 mg oral tablet: 1 tab(s) orally once a day (at bedtime)  aspirin 81 mg oral delayed release tablet: 1 tab(s) orally once a day  atorvastatin 40 mg oral tablet: 1 tab(s) orally once a day (at bedtime)  bumetanide 2 mg oral tablet: 1 tab(s) orally 2 times a day  calcium with vitamin D3: 1 tab orally once a day  carvedilol 3.125 mg oral tablet: 1 tab(s) orally every 12 hours  gabapentin 300 mg oral capsule: 1 cap(s) orally 4 times a day  glimepiride 1 mg oral tablet: 1 tab(s) orally once a day  magnesium oxide 400 mg oral tablet: 1 tab(s) orally 3 times a day (with meals)  melatonin 3 mg oral tablet: 1 tab(s) orally once a day (at bedtime) As needed Insomnia  Multiple Vitamins oral tablet: 1 tab(s) orally once a day  oxyCODONE 10 mg oral tablet: 1 tab(s) orally 3 times a day as needed for  severe pain MDD: 2 tabs  pantoprazole 40 mg oral delayed release tablet: 1 tab(s) orally once a day (before a meal)  polyethylene glycol 3350 oral powder for reconstitution: 17 gram(s) orally 2 times a day  potassium chloride 20 mEq oral tablet, extended release: 1 tab(s) orally once a day  senna leaf extract oral tablet: 2 tab(s) orally once a day (at bedtime)  tiZANidine 2 mg oral tablet: 1 tab(s) orally 3 times a day as needed for  muscle spasm MDD: 3tabs

## 2023-10-11 NOTE — DISCHARGE NOTE PROVIDER - NSDCCPCAREPLAN_GEN_ALL_CORE_FT
PRINCIPAL DISCHARGE DIAGNOSIS  Diagnosis: H/O spinal fusion  Assessment and Plan of Treatment:       SECONDARY DISCHARGE DIAGNOSES  Diagnosis: DM2 (diabetes mellitus, type 2)  Assessment and Plan of Treatment:     Diagnosis: HTN (hypertension)  Assessment and Plan of Treatment:     Diagnosis: Chronic diastolic congestive heart failure  Assessment and Plan of Treatment:      PRINCIPAL DISCHARGE DIAGNOSIS  Diagnosis: H/O spinal fusion  Assessment and Plan of Treatment: continue spinal precautions, pain regimen. Follow up your surgeon in 1 week.      SECONDARY DISCHARGE DIAGNOSES  Diagnosis: DM2 (diabetes mellitus, type 2)  Assessment and Plan of Treatment: Resume home glimepiride, follow diabetic diet. Follow up PCP    Diagnosis: HTN (hypertension)  Assessment and Plan of Treatment: Coreg resumed. PCP follow up in 1 week.    Diagnosis: Chronic diastolic congestive heart failure  Assessment and Plan of Treatment: on Bumex and coreg. Daily weights. Follow up Cardiology.

## 2023-10-11 NOTE — PROGRESS NOTE ADULT - NS ATTEND AMEND GEN_ALL_CORE FT
Rehab Attending- Patient seen and examined by me - Case discussed, above note reviewed by me with modifications made    patient seen and evaluated in OT  Back spasms now improved   reports constipation on multiple meds will give lactulose today with repeat dose if needed  reports difficulty voiding- not voiding a lot on Bumex 2x/day  last void this Afternoon with 0cc PVR- Good Flow  ? related to constipation or BPH- ? start Flomax  suture line visualized - seen By Dr Graff of plastics here as well - wants clearance from Dr Blount who did procedure to DC sutures  Mg supplemented last night- check levels on thursday  to continue intensive rehab program    Vital Signs Last 24 Hrs  T(C): 36.5 (10 Oct 2023 07:31), Max: 36.7 (09 Oct 2023 23:20)  T(F): 97.7 (10 Oct 2023 07:31), Max: 98 (09 Oct 2023 23:20)  HR: 86 (10 Oct 2023 07:31) (64 - 86)  BP: 113/74 (10 Oct 2023 07:31) (113/74 - 137/77)  BP(mean): --  RR: 16 (10 Oct 2023 07:31) (15 - 16)  SpO2: 96% (10 Oct 2023 07:31) (96% - 99%)    Parameters below as of 10 Oct 2023 07:31  Patient On (Oxygen Delivery Method): room air    Constitutional - NAD  HEENT - NCAT, EOMI.    Chest - good chest expansion, good respiratory effort   Cardio - S1D2 RRR  Abdomen -  Obese, Soft, NTND BS +  Extremities - No peripheral edema, No calf tenderness   Neurologic Exam:                    Cranial Nerves -2-12 intact     Motor -  Strength preserved in UE and LE                     LEFT    UE - ShAB 5/5, EF 5/5, EE 5/5, WE 5/5,  WNL                     RIGHT UE - ShAB 5/5, EF 5/5, EE 5/5, WE 5/5,  WNL                    LEFT    LE - HF 4/5, KE 5/5, DF 5/5, PF 5/5                    RIGHT LE - HF 4/5, KE 5/5, DF 5/5, PF 5/5        Sensory - Intact to LT bilateral  Skin on admission:  Mid thoracic to lumbar incision now open to air- suture embedded especially inferiorly--   periincision abrasions from tape weeping- to use wound gel Rehab Attending- Patient seen and examined by me - Case discussed, above note reviewed by me with modifications made    patient seen and evaluated in OT  Back spasms now improved   large BM with lactulose last night- Urinating well today  Burning at back lateral to incision- from skin tears related to dressings- adaptic to be applied  seen By Dr Graff of plastics here to DC sutures- Dr Blount who did procedure approved   Mg supplemented- check levels on thursday  discussed in team- tentative DC to home with VN - family to assist- On 10/14  family training- ? virtual on friday  to continue intensive rehab program    Vital Signs Last 24 Hrs  T(C): 36.6 (11 Oct 2023 08:21), Max: 36.8 (10 Oct 2023 21:22)  T(F): 97.9 (11 Oct 2023 08:21), Max: 98.2 (10 Oct 2023 21:22)  HR: 85 (11 Oct 2023 08:21) (65 - 85)  BP: 161/84 (11 Oct 2023 08:21) (130/68 - 161/84)  BP(mean): --  RR: 16 (11 Oct 2023 08:21) (14 - 16)  SpO2: 96% (11 Oct 2023 08:21) (96% - 99%)    Parameters below as of 11 Oct 2023 08:21  Patient On (Oxygen Delivery Method): room air    Constitutional - NAD  HEENT - NCAT, EOMI.    Chest - good chest expansion, good respiratory effort   Cardio - S1D2 RRR  Abdomen -  Obese, Soft, NTND BS +  Extremities - No peripheral edema, No calf tenderness   Neurologic Exam:                    Cranial Nerves -2-12 intact     Motor -  Strength preserved in UE and LE                     LEFT    UE - ShAB 5/5, EF 5/5, EE 5/5, WE 5/5,  WNL                     RIGHT UE - ShAB 5/5, EF 5/5, EE 5/5, WE 5/5,  WNL                    LEFT    LE - HF 4/5, KE 5/5, DF 5/5, PF 5/5                    RIGHT LE - HF 4/5, KE 5/5, DF 5/5, PF 5/5        Sensory - Intact to LT bilateral  Skin on admission:  Mid thoracic to lumbar incision now open to air- suture embedded especially inferiorly--   periincision abrasions from tape weeping- to use adaptic

## 2023-10-11 NOTE — PROGRESS NOTE ADULT - SUBJECTIVE AND OBJECTIVE BOX
HPI:  This is a 68 YO male with PMH of  HFrEF, CAD s/p CABG, ASIYA on CPAP, HTN, DM, right renal mass s/p resection presents to  [facility] ED ON 9/18  after a syncopal episode, found to have T12-L1 fracture on imaging. Patient originally scheduled for an ACDF with Dr. Hughes, due to injury he was changed to a T9-pelvis revision PSF. Hospital course s/f medication adjustments for HTN  and HF. Patient was evaluated by PM&R and therapy for functional deficits, gait/ADL impairments and acute rehabilitation was recommended. Patient was medically optimized for discharge to [facility] IRU on 9/29/23.       ROS/subjective:  Patient seen and evaluated, NAD in chair, spasms/back pain-controlled- Zanaflex daytime decreased. Spasms at night, no sx daytime.  BPs improved-Coreg held -hospitalist in. Bumex 2mg/2mg for hx CHF exacerbation. Daily weight following-improved 368  PVRs low.  No SOB  no dizziness, no headaches, no chest pain , No SOB, no nausea, no vomiting  BM overnight-large- s/p Lactulose  biotene/visc Lidocaine for lip lesion and dry mouth  Plastics consulted to evaluate back wound- ?remove sutures-pending surgery clearance. Wound care appreciated for skin tear-adaptic        MEDICATIONS  (STANDING):  allopurinol 100 milliGRAM(s) Oral at bedtime  aspirin enteric coated 81 milliGRAM(s) Oral daily  atorvastatin 40 milliGRAM(s) Oral at bedtime  Biotene Dry Mouth Oral Rinse 15 milliLiter(s) Swish and Spit three times a day  bisacodyl 5 milliGRAM(s) Oral every 12 hours  buMETAnide 2 milliGRAM(s) Oral <User Schedule>  buMETAnide 2 milliGRAM(s) Oral daily  dextrose 5%. 1000 milliLiter(s) (50 mL/Hr) IV Continuous <Continuous>  dextrose 5%. 1000 milliLiter(s) (100 mL/Hr) IV Continuous <Continuous>  dextrose 50% Injectable 25 Gram(s) IV Push once  dextrose 50% Injectable 25 Gram(s) IV Push once  dextrose 50% Injectable 12.5 Gram(s) IV Push once  gabapentin 300 milliGRAM(s) Oral four times a day  glucagon  Injectable 1 milliGRAM(s) IntraMuscular once  heparin   Injectable 5000 Unit(s) SubCutaneous every 8 hours  insulin glargine Injectable (LANTUS) 22 Unit(s) SubCutaneous at bedtime  insulin lispro (ADMELOG) corrective regimen sliding scale   SubCutaneous three times a day before meals  insulin lispro (ADMELOG) corrective regimen sliding scale   SubCutaneous at bedtime  insulin lispro Injectable (ADMELOG) 5 Unit(s) SubCutaneous before breakfast  insulin lispro Injectable (ADMELOG) 5 Unit(s) SubCutaneous before lunch  insulin lispro Injectable (ADMELOG) 5 Unit(s) SubCutaneous before dinner  lidocaine   4% Patch 1 Patch Transdermal daily  lidocaine 2% Viscous 5 milliLiter(s) Swish and Spit three times a day  magnesium oxide 400 milliGRAM(s) Oral three times a day with meals  multivitamin 1 Tablet(s) Oral daily  pantoprazole    Tablet 40 milliGRAM(s) Oral before breakfast  potassium chloride    Tablet ER 20 milliEquivalent(s) Oral daily  tiZANidine 2 milliGRAM(s) Oral <User Schedule>  tiZANidine 6 milliGRAM(s) Oral at bedtime  tiZANidine 4 milliGRAM(s) Oral <User Schedule>    MEDICATIONS  (PRN):  aluminum hydroxide/magnesium hydroxide/simethicone Suspension 30 milliLiter(s) Oral every 4 hours PRN Dyspepsia  benzocaine/menthol Lozenge 1 Lozenge Oral every 3 hours PRN Sore Throat  dextrose Oral Gel 15 Gram(s) Oral once PRN Blood Glucose LESS THAN 70 milliGRAM(s)/deciliter  guaiFENesin Oral Liquid (Sugar-Free) 100 milliGRAM(s) Oral every 6 hours PRN Cough  melatonin 3 milliGRAM(s) Oral at bedtime PRN Insomnia  ondansetron   Disintegrating Tablet 4 milliGRAM(s) Oral every 6 hours PRN Nausea and/or Vomiting  oxyCODONE    IR 10 milliGRAM(s) Oral every 3 hours PRN Moderate Pain (4 - 6)  oxyCODONE    IR 15 milliGRAM(s) Oral every 3 hours PRN Severe Pain (7 - 10)  polyethylene glycol 3350 17 Gram(s) Oral two times a day PRN Constipation                            9.7    5.84  )-----------( 228      ( 11 Oct 2023 05:28 )             29.6     10-11    136  |  98  |  15  ----------------------------<  136<H>  3.8   |  30  |  0.65    Ca    9.0      11 Oct 2023 05:28  Mg     1.4     10-11      Urinalysis Basic - ( 11 Oct 2023 05:28 )    Color: x / Appearance: x / SG: x / pH: x  Gluc: 136 mg/dL / Ketone: x  / Bili: x / Urobili: x   Blood: x / Protein: x / Nitrite: x   Leuk Esterase: x / RBC: x / WBC x   Sq Epi: x / Non Sq Epi: x / Bacteria: x        CAPILLARY BLOOD GLUCOSE      POCT Blood Glucose.: 167 mg/dL (11 Oct 2023 08:09)  POCT Blood Glucose.: 221 mg/dL (10 Oct 2023 21:09)  POCT Blood Glucose.: 209 mg/dL (10 Oct 2023 17:22)  POCT Blood Glucose.: 181 mg/dL (10 Oct 2023 12:01)      Vital Signs Last 24 Hrs  T(C): 36.6 (11 Oct 2023 08:21), Max: 36.8 (10 Oct 2023 21:22)  T(F): 97.9 (11 Oct 2023 08:21), Max: 98.2 (10 Oct 2023 21:22)  HR: 85 (11 Oct 2023 08:21) (65 - 85)  BP: 161/84 (11 Oct 2023 08:21) (130/68 - 161/84)  BP(mean): --  RR: 16 (11 Oct 2023 08:21) (14 - 16)  SpO2: 96% (11 Oct 2023 08:21) (96% - 99%)    Parameters below as of 11 Oct 2023 08:21  Patient On (Oxygen Delivery Method): room air    Constitutional - NAD  HEENT - NCAT, EOMI.    Chest - good chest expansion, good respiratory effort   Cardio - S1D2 RRR  Abdomen -  Obese, Soft, NTND BS +  Extremities - No peripheral edema, No calf tenderness   Neurologic Exam:                    Cranial Nerves -2-12 intact     Motor -  Strength preserved in UE and LE with severe pain in back.                     LEFT    UE - ShAB 5/5, EF 5/5, EE 5/5, WE 5/5,  WNL                     RIGHT UE - ShAB 5/5, EF 5/5, EE 5/5, WE 5/5,  WNL                    LEFT    LE - HF 4/5, KE 5/5, DF 5/5, PF 5/5                    RIGHT LE - HF 4/5, KE 5/5, DF 5/5, PF 5/5        Sensory - Intact to LT bilateral  Skin on admission:  Mid thoracic to lumbar incision AMANDA-Previous surgical scars of chest, abdomen, b/l elbows, wrists and knees. No pressure ulcers.- back dressing Dcd-   Noted imbedding of suture line most noted inferiorly- no drainage noted-  leslie PATEL    IDT meeting on 10/4    SW: PH 3STE, 12STI, children    OT:  eating set up  grooming set up  UBD/LBD mod A  toileting mod A  tub/shower mod A  bathing mod A    PT:  amb 50ft min A RW  transfers mod A  stairs    SLP na    tentative dc 10/14 HC         Continue comprehensive acute rehab program consisting of 3hrs/day of OT/PT. HPI:  This is a 66 YO male with PMH of  HFrEF, CAD s/p CABG, ASIYA on CPAP, HTN, DM, right renal mass s/p resection presents to  [facility] ED ON 9/18  after a syncopal episode, found to have T12-L1 fracture on imaging. Patient originally scheduled for an ACDF with Dr. Hughes, due to injury he was changed to a T9-pelvis revision PSF. Hospital course s/f medication adjustments for HTN  and HF. Patient was evaluated by PM&R and therapy for functional deficits, gait/ADL impairments and acute rehabilitation was recommended. Patient was medically optimized for discharge to [facility] IRU on 9/29/23.       ROS/subjective:  Patient seen and evaluated, NAD in chair, spasms/back pain-controlled- Zanaflex daytime decreased. Spasms at night, no sx daytime.  BPs up-Coreg resumed low dose. Bumex 2mg/2mg for hx CHF exacerbation. Daily weight following-improved 368  PVRs low.  No SOB  no dizziness, no headaches, no chest pain , No SOB, no nausea, no vomiting  BM overnight-large- s/p Lactulose  biotene/visc Lidocaine for lip lesion and dry mouth  Plastics consulted to evaluate back wound- ?remove sutures-pending surgery clearance. Wound care appreciated for skin tear-adaptic        MEDICATIONS  (STANDING):  allopurinol 100 milliGRAM(s) Oral at bedtime  aspirin enteric coated 81 milliGRAM(s) Oral daily  atorvastatin 40 milliGRAM(s) Oral at bedtime  Biotene Dry Mouth Oral Rinse 15 milliLiter(s) Swish and Spit three times a day  bisacodyl 5 milliGRAM(s) Oral every 12 hours  buMETAnide 2 milliGRAM(s) Oral <User Schedule>  buMETAnide 2 milliGRAM(s) Oral daily  dextrose 5%. 1000 milliLiter(s) (50 mL/Hr) IV Continuous <Continuous>  dextrose 5%. 1000 milliLiter(s) (100 mL/Hr) IV Continuous <Continuous>  dextrose 50% Injectable 25 Gram(s) IV Push once  dextrose 50% Injectable 25 Gram(s) IV Push once  dextrose 50% Injectable 12.5 Gram(s) IV Push once  gabapentin 300 milliGRAM(s) Oral four times a day  glucagon  Injectable 1 milliGRAM(s) IntraMuscular once  heparin   Injectable 5000 Unit(s) SubCutaneous every 8 hours  insulin glargine Injectable (LANTUS) 22 Unit(s) SubCutaneous at bedtime  insulin lispro (ADMELOG) corrective regimen sliding scale   SubCutaneous three times a day before meals  insulin lispro (ADMELOG) corrective regimen sliding scale   SubCutaneous at bedtime  insulin lispro Injectable (ADMELOG) 5 Unit(s) SubCutaneous before breakfast  insulin lispro Injectable (ADMELOG) 5 Unit(s) SubCutaneous before lunch  insulin lispro Injectable (ADMELOG) 5 Unit(s) SubCutaneous before dinner  lidocaine   4% Patch 1 Patch Transdermal daily  lidocaine 2% Viscous 5 milliLiter(s) Swish and Spit three times a day  magnesium oxide 400 milliGRAM(s) Oral three times a day with meals  multivitamin 1 Tablet(s) Oral daily  pantoprazole    Tablet 40 milliGRAM(s) Oral before breakfast  potassium chloride    Tablet ER 20 milliEquivalent(s) Oral daily  tiZANidine 2 milliGRAM(s) Oral <User Schedule>  tiZANidine 6 milliGRAM(s) Oral at bedtime  tiZANidine 4 milliGRAM(s) Oral <User Schedule>    MEDICATIONS  (PRN):  aluminum hydroxide/magnesium hydroxide/simethicone Suspension 30 milliLiter(s) Oral every 4 hours PRN Dyspepsia  benzocaine/menthol Lozenge 1 Lozenge Oral every 3 hours PRN Sore Throat  dextrose Oral Gel 15 Gram(s) Oral once PRN Blood Glucose LESS THAN 70 milliGRAM(s)/deciliter  guaiFENesin Oral Liquid (Sugar-Free) 100 milliGRAM(s) Oral every 6 hours PRN Cough  melatonin 3 milliGRAM(s) Oral at bedtime PRN Insomnia  ondansetron   Disintegrating Tablet 4 milliGRAM(s) Oral every 6 hours PRN Nausea and/or Vomiting  oxyCODONE    IR 10 milliGRAM(s) Oral every 3 hours PRN Moderate Pain (4 - 6)  oxyCODONE    IR 15 milliGRAM(s) Oral every 3 hours PRN Severe Pain (7 - 10)  polyethylene glycol 3350 17 Gram(s) Oral two times a day PRN Constipation                            9.7    5.84  )-----------( 228      ( 11 Oct 2023 05:28 )             29.6     10-11    136  |  98  |  15  ----------------------------<  136<H>  3.8   |  30  |  0.65    Ca    9.0      11 Oct 2023 05:28  Mg     1.4     10-11      Urinalysis Basic - ( 11 Oct 2023 05:28 )    Color: x / Appearance: x / SG: x / pH: x  Gluc: 136 mg/dL / Ketone: x  / Bili: x / Urobili: x   Blood: x / Protein: x / Nitrite: x   Leuk Esterase: x / RBC: x / WBC x   Sq Epi: x / Non Sq Epi: x / Bacteria: x        CAPILLARY BLOOD GLUCOSE      POCT Blood Glucose.: 167 mg/dL (11 Oct 2023 08:09)  POCT Blood Glucose.: 221 mg/dL (10 Oct 2023 21:09)  POCT Blood Glucose.: 209 mg/dL (10 Oct 2023 17:22)  POCT Blood Glucose.: 181 mg/dL (10 Oct 2023 12:01)      Vital Signs Last 24 Hrs  T(C): 36.6 (11 Oct 2023 08:21), Max: 36.8 (10 Oct 2023 21:22)  T(F): 97.9 (11 Oct 2023 08:21), Max: 98.2 (10 Oct 2023 21:22)  HR: 85 (11 Oct 2023 08:21) (65 - 85)  BP: 161/84 (11 Oct 2023 08:21) (130/68 - 161/84)  BP(mean): --  RR: 16 (11 Oct 2023 08:21) (14 - 16)  SpO2: 96% (11 Oct 2023 08:21) (96% - 99%)    Parameters below as of 11 Oct 2023 08:21  Patient On (Oxygen Delivery Method): room air    Constitutional - NAD  HEENT - NCAT, EOMI.    Chest - good chest expansion, good respiratory effort   Cardio - S1D2 RRR  Abdomen -  Obese, Soft, NTND BS +  Extremities - No peripheral edema, No calf tenderness   Neurologic Exam:                    Cranial Nerves -2-12 intact     Motor -  Strength preserved in UE and LE with severe pain in back.                     LEFT    UE - ShAB 5/5, EF 5/5, EE 5/5, WE 5/5,  WNL                     RIGHT UE - ShAB 5/5, EF 5/5, EE 5/5, WE 5/5,  WNL                    LEFT    LE - HF 4/5, KE 5/5, DF 5/5, PF 5/5                    RIGHT LE - HF 4/5, KE 5/5, DF 5/5, PF 5/5        Sensory - Intact to LT bilateral  Skin on admission:  Mid thoracic to lumbar incision AMANDA-Previous surgical scars of chest, abdomen, b/l elbows, wrists and knees. No pressure ulcers.- back dressing Dcd-   Noted imbedding of suture line most noted inferiorly- no drainage noted-  leslie PATEL    IDT meeting on 10/4    SW: PH 3STE, 12STI, children    OT:  eating set up  grooming set up  UBD/LBD mod A  toileting mod A  tub/shower mod A  bathing mod A    PT:  amb 50ft min A RW  transfers mod A  stairs    SLP na    tentative dc 10/14 HC         Continue comprehensive acute rehab program consisting of 3hrs/day of OT/PT. HPI:  This is a 68 YO male with PMH of  HFrEF, CAD s/p CABG, ASIYA on CPAP, HTN, DM, right renal mass s/p resection presents to  [facility] ED ON 9/18  after a syncopal episode, found to have T12-L1 fracture on imaging. Patient originally scheduled for an ACDF with Dr. Hughes, due to injury he was changed to a T9-pelvis revision PSF. Hospital course s/f medication adjustments for HTN  and HF. Patient was evaluated by PM&R and therapy for functional deficits, gait/ADL impairments and acute rehabilitation was recommended. Patient was medically optimized for discharge to [facility] IRU on 9/29/23.       ROS/subjective:  Patient seen and evaluated, NAD in chair, spasms/back pain-controlled- Zanaflex daytime decreased. Spasms at night, no sx daytime.  BPs up-Coreg resumed low dose. Bumex 2mg/2mg for hx CHF exacerbation. Daily weight following-improved 368  PVRs low.  No SOB  no dizziness, no headaches, no chest pain , No SOB, no nausea, no vomiting  BM overnight-large- s/p Lactulose  biotene/visc Lidocaine for lip lesion and dry mouth  Plastics consulted to evaluate back wound- ?remove sutures-pending surgery clearance. Wound care appreciated for skin tear-adaptic        MEDICATIONS  (STANDING):  allopurinol 100 milliGRAM(s) Oral at bedtime  aspirin enteric coated 81 milliGRAM(s) Oral daily  atorvastatin 40 milliGRAM(s) Oral at bedtime  Biotene Dry Mouth Oral Rinse 15 milliLiter(s) Swish and Spit three times a day  bisacodyl 5 milliGRAM(s) Oral every 12 hours  buMETAnide 2 milliGRAM(s) Oral <User Schedule>  buMETAnide 2 milliGRAM(s) Oral daily  dextrose 5%. 1000 milliLiter(s) (50 mL/Hr) IV Continuous <Continuous>  dextrose 5%. 1000 milliLiter(s) (100 mL/Hr) IV Continuous <Continuous>  dextrose 50% Injectable 25 Gram(s) IV Push once  dextrose 50% Injectable 25 Gram(s) IV Push once  dextrose 50% Injectable 12.5 Gram(s) IV Push once  gabapentin 300 milliGRAM(s) Oral four times a day  glucagon  Injectable 1 milliGRAM(s) IntraMuscular once  heparin   Injectable 5000 Unit(s) SubCutaneous every 8 hours  insulin glargine Injectable (LANTUS) 22 Unit(s) SubCutaneous at bedtime  insulin lispro (ADMELOG) corrective regimen sliding scale   SubCutaneous three times a day before meals  insulin lispro (ADMELOG) corrective regimen sliding scale   SubCutaneous at bedtime  insulin lispro Injectable (ADMELOG) 5 Unit(s) SubCutaneous before breakfast  insulin lispro Injectable (ADMELOG) 5 Unit(s) SubCutaneous before lunch  insulin lispro Injectable (ADMELOG) 5 Unit(s) SubCutaneous before dinner  lidocaine   4% Patch 1 Patch Transdermal daily  lidocaine 2% Viscous 5 milliLiter(s) Swish and Spit three times a day  magnesium oxide 400 milliGRAM(s) Oral three times a day with meals  multivitamin 1 Tablet(s) Oral daily  pantoprazole    Tablet 40 milliGRAM(s) Oral before breakfast  potassium chloride    Tablet ER 20 milliEquivalent(s) Oral daily  tiZANidine 2 milliGRAM(s) Oral <User Schedule>  tiZANidine 6 milliGRAM(s) Oral at bedtime  tiZANidine 4 milliGRAM(s) Oral <User Schedule>    MEDICATIONS  (PRN):  aluminum hydroxide/magnesium hydroxide/simethicone Suspension 30 milliLiter(s) Oral every 4 hours PRN Dyspepsia  benzocaine/menthol Lozenge 1 Lozenge Oral every 3 hours PRN Sore Throat  dextrose Oral Gel 15 Gram(s) Oral once PRN Blood Glucose LESS THAN 70 milliGRAM(s)/deciliter  guaiFENesin Oral Liquid (Sugar-Free) 100 milliGRAM(s) Oral every 6 hours PRN Cough  melatonin 3 milliGRAM(s) Oral at bedtime PRN Insomnia  ondansetron   Disintegrating Tablet 4 milliGRAM(s) Oral every 6 hours PRN Nausea and/or Vomiting  oxyCODONE    IR 10 milliGRAM(s) Oral every 3 hours PRN Moderate Pain (4 - 6)  oxyCODONE    IR 15 milliGRAM(s) Oral every 3 hours PRN Severe Pain (7 - 10)  polyethylene glycol 3350 17 Gram(s) Oral two times a day PRN Constipation                            9.7    5.84  )-----------( 228      ( 11 Oct 2023 05:28 )             29.6     10-11    136  |  98  |  15  ----------------------------<  136<H>  3.8   |  30  |  0.65    Ca    9.0      11 Oct 2023 05:28  Mg     1.4     10-11      Urinalysis Basic - ( 11 Oct 2023 05:28 )    Color: x / Appearance: x / SG: x / pH: x  Gluc: 136 mg/dL / Ketone: x  / Bili: x / Urobili: x   Blood: x / Protein: x / Nitrite: x   Leuk Esterase: x / RBC: x / WBC x   Sq Epi: x / Non Sq Epi: x / Bacteria: x        CAPILLARY BLOOD GLUCOSE      POCT Blood Glucose.: 167 mg/dL (11 Oct 2023 08:09)  POCT Blood Glucose.: 221 mg/dL (10 Oct 2023 21:09)  POCT Blood Glucose.: 209 mg/dL (10 Oct 2023 17:22)  POCT Blood Glucose.: 181 mg/dL (10 Oct 2023 12:01)      Vital Signs Last 24 Hrs  T(C): 36.6 (11 Oct 2023 08:21), Max: 36.8 (10 Oct 2023 21:22)  T(F): 97.9 (11 Oct 2023 08:21), Max: 98.2 (10 Oct 2023 21:22)  HR: 85 (11 Oct 2023 08:21) (65 - 85)  BP: 161/84 (11 Oct 2023 08:21) (130/68 - 161/84)  BP(mean): --  RR: 16 (11 Oct 2023 08:21) (14 - 16)  SpO2: 96% (11 Oct 2023 08:21) (96% - 99%)    Parameters below as of 11 Oct 2023 08:21  Patient On (Oxygen Delivery Method): room air    Constitutional - NAD  HEENT - NCAT, EOMI.    Chest - good chest expansion, good respiratory effort   Cardio - S1D2 RRR  Abdomen -  Obese, Soft, NTND BS +  Extremities - No peripheral edema, No calf tenderness   Neurologic Exam:                    Cranial Nerves -2-12 intact     Motor -  Strength preserved in UE and LE with severe pain in back.                     LEFT    UE - ShAB 5/5, EF 5/5, EE 5/5, WE 5/5,  WNL                     RIGHT UE - ShAB 5/5, EF 5/5, EE 5/5, WE 5/5,  WNL                    LEFT    LE - HF 4/5, KE 5/5, DF 5/5, PF 5/5                    RIGHT LE - HF 4/5, KE 5/5, DF 5/5, PF 5/5        Sensory - Intact to LT bilateral  Skin on admission:  Mid thoracic to lumbar incision AMANDA-Previous surgical scars of chest, abdomen, b/l elbows, wrists and knees. No pressure ulcers.- back dressing Dcd-   Noted imbedding of suture line most noted inferiorly- no drainage noted-  leslie PATEL    IDT meeting on 10/11    SW: PH 3STE, 12STI, children    OT:  eating set up  grooming set up  UBD/LBD cg A  toileting cg A  tub/shower cg A  bathing cg A    PT:  amb 130ft cs A RW  transfers cs A  stairs cg    SLP na    tentative dc 10/14 HC         Continue comprehensive acute rehab program consisting of 3hrs/day of OT/PT. HPI:  This is a 68 YO male with PMH of  HFrEF, CAD s/p CABG, ASIYA on CPAP, HTN, DM, right renal mass s/p resection presents to  [facility] ED ON 9/18  after a syncopal episode, found to have T12-L1 fracture on imaging. Patient originally scheduled for an ACDF with Dr. Hughes, due to injury he was changed to a T9-pelvis revision PSF. Hospital course s/f medication adjustments for HTN  and HF. Patient was evaluated by PM&R and therapy for functional deficits, gait/ADL impairments and acute rehabilitation was recommended. Patient was medically optimized for discharge to [facility] IRU on 9/29/23.       ROS/subjective:  Patient seen and evaluated in OT, NAD in chair, spasms/back pain-controlled- Zanaflex daytime decreased.  BPs up-Coreg resumed low dose. Bumex 2mg/2mg for hx CHF exacerbation. Daily weight following-improved 368  PVRs low.  no dizziness, no headaches, no chest pain , No SOB, no nausea, no vomiting  BM overnight-large- s/p Lactulose  voiding well overnight- good flow  Plastics consulted to evaluate back wound-- spoke with plastics at [facility]- can DC sutures   Wound care appreciated for skin tear-adaptic        MEDICATIONS  (STANDING):  allopurinol 100 milliGRAM(s) Oral at bedtime  aspirin enteric coated 81 milliGRAM(s) Oral daily  atorvastatin 40 milliGRAM(s) Oral at bedtime  Biotene Dry Mouth Oral Rinse 15 milliLiter(s) Swish and Spit three times a day  bisacodyl 5 milliGRAM(s) Oral every 12 hours  buMETAnide 2 milliGRAM(s) Oral <User Schedule>  buMETAnide 2 milliGRAM(s) Oral daily  dextrose 5%. 1000 milliLiter(s) (50 mL/Hr) IV Continuous <Continuous>  dextrose 5%. 1000 milliLiter(s) (100 mL/Hr) IV Continuous <Continuous>  dextrose 50% Injectable 25 Gram(s) IV Push once  dextrose 50% Injectable 25 Gram(s) IV Push once  dextrose 50% Injectable 12.5 Gram(s) IV Push once  gabapentin 300 milliGRAM(s) Oral four times a day  glucagon  Injectable 1 milliGRAM(s) IntraMuscular once  heparin   Injectable 5000 Unit(s) SubCutaneous every 8 hours  insulin glargine Injectable (LANTUS) 22 Unit(s) SubCutaneous at bedtime  insulin lispro (ADMELOG) corrective regimen sliding scale   SubCutaneous three times a day before meals  insulin lispro (ADMELOG) corrective regimen sliding scale   SubCutaneous at bedtime  insulin lispro Injectable (ADMELOG) 5 Unit(s) SubCutaneous before breakfast  insulin lispro Injectable (ADMELOG) 5 Unit(s) SubCutaneous before lunch  insulin lispro Injectable (ADMELOG) 5 Unit(s) SubCutaneous before dinner  lidocaine   4% Patch 1 Patch Transdermal daily  lidocaine 2% Viscous 5 milliLiter(s) Swish and Spit three times a day  magnesium oxide 400 milliGRAM(s) Oral three times a day with meals  multivitamin 1 Tablet(s) Oral daily  pantoprazole    Tablet 40 milliGRAM(s) Oral before breakfast  potassium chloride    Tablet ER 20 milliEquivalent(s) Oral daily  tiZANidine 2 milliGRAM(s) Oral <User Schedule>  tiZANidine 6 milliGRAM(s) Oral at bedtime  tiZANidine 4 milliGRAM(s) Oral <User Schedule>    MEDICATIONS  (PRN):  aluminum hydroxide/magnesium hydroxide/simethicone Suspension 30 milliLiter(s) Oral every 4 hours PRN Dyspepsia  benzocaine/menthol Lozenge 1 Lozenge Oral every 3 hours PRN Sore Throat  dextrose Oral Gel 15 Gram(s) Oral once PRN Blood Glucose LESS THAN 70 milliGRAM(s)/deciliter  guaiFENesin Oral Liquid (Sugar-Free) 100 milliGRAM(s) Oral every 6 hours PRN Cough  melatonin 3 milliGRAM(s) Oral at bedtime PRN Insomnia  ondansetron   Disintegrating Tablet 4 milliGRAM(s) Oral every 6 hours PRN Nausea and/or Vomiting  oxyCODONE    IR 10 milliGRAM(s) Oral every 3 hours PRN Moderate Pain (4 - 6)  oxyCODONE    IR 15 milliGRAM(s) Oral every 3 hours PRN Severe Pain (7 - 10)  polyethylene glycol 3350 17 Gram(s) Oral two times a day PRN Constipation                            9.7    5.84  )-----------( 228      ( 11 Oct 2023 05:28 )             29.6     10-11    136  |  98  |  15  ----------------------------<  136<H>  3.8   |  30  |  0.65    Ca    9.0      11 Oct 2023 05:28  Mg     1.4     10-11      Urinalysis Basic - ( 11 Oct 2023 05:28 )    Color: x / Appearance: x / SG: x / pH: x  Gluc: 136 mg/dL / Ketone: x  / Bili: x / Urobili: x   Blood: x / Protein: x / Nitrite: x   Leuk Esterase: x / RBC: x / WBC x   Sq Epi: x / Non Sq Epi: x / Bacteria: x        CAPILLARY BLOOD GLUCOSE      POCT Blood Glucose.: 167 mg/dL (11 Oct 2023 08:09)  POCT Blood Glucose.: 221 mg/dL (10 Oct 2023 21:09)  POCT Blood Glucose.: 209 mg/dL (10 Oct 2023 17:22)  POCT Blood Glucose.: 181 mg/dL (10 Oct 2023 12:01)      Vital Signs Last 24 Hrs  T(C): 36.6 (11 Oct 2023 08:21), Max: 36.8 (10 Oct 2023 21:22)  T(F): 97.9 (11 Oct 2023 08:21), Max: 98.2 (10 Oct 2023 21:22)  HR: 85 (11 Oct 2023 08:21) (65 - 85)  BP: 161/84 (11 Oct 2023 08:21) (130/68 - 161/84)  BP(mean): --  RR: 16 (11 Oct 2023 08:21) (14 - 16)  SpO2: 96% (11 Oct 2023 08:21) (96% - 99%)    Parameters below as of 11 Oct 2023 08:21  Patient On (Oxygen Delivery Method): room air    Constitutional - NAD  HEENT - NCAT, EOMI.    Chest - good chest expansion, good respiratory effort   Cardio - S1D2 RRR  Abdomen -  Obese, Soft, NTND BS +  Extremities - No peripheral edema, No calf tenderness   Neurologic Exam:                    Cranial Nerves -2-12 intact     Motor -  Strength preserved in UE and LE with severe pain in back.                     LEFT    UE - ShAB 5/5, EF 5/5, EE 5/5, WE 5/5,  WNL                     RIGHT UE - ShAB 5/5, EF 5/5, EE 5/5, WE 5/5,  WNL                    LEFT    LE - HF 4/5, KE 5/5, DF 5/5, PF 5/5                    RIGHT LE - HF 4/5, KE 5/5, DF 5/5, PF 5/5        Sensory - Intact to LT bilateral  Skin on admission:  Mid thoracic to lumbar incision AMANDA-Previous surgical scars of chest, abdomen, b/l elbows, wrists and knees. No pressure ulcers.- back dressing Dcd-   Noted imbedding of suture line most noted inferiorly- no drainage noted-  leslie PATEL    IDT meeting on 10/11    SW: PH 3STE, 12STI, children    OT:  eating set up  grooming set up  UBD/LBD cg A  toileting cg A  tub/shower cg A  bathing cg A    PT:  amb 130ft cs A RW  transfers cs A  stairs cg    SLP na    tentative dc 10/14 HC         Continue comprehensive acute rehab program consisting of 3hrs/day of OT/PT.

## 2023-10-11 NOTE — DISCHARGE NOTE PROVIDER - HOSPITAL COURSE
66 YO male with PMH of  HFrEF, CAD s/p CABG, ASIYA on CPAP, HTN, DM, right renal mass s/p resection presents to  [facility] ED ON 9/18  after a syncopal episode, found to have T12-L1 fracture on imaging. Patient originally scheduled for an ACDF with Dr. Hughes, due to injury he was changed to a T9-pelvis revision PSF. Hospital course s/f medication adjustments for HTN  and HF. Patient was evaluated by PM&R and therapy for functional deficits, gait/ADL impairments and acute rehabilitation was recommended. Patient was medically optimized for discharge to [facility] IRU on 9/29/23.  At [facility] rehab patient completed comprehensive PT OT program and reached his rehab goals on 10/14. While at rehab evaluated by medicine, plastics. Pain and bowel regimen adjusted. Sutures removed. Bumex resumed. BP regimen adjusted. Cleared for dc home on      66 YO male with PMH of  HFrEF, CAD s/p CABG, ASIYA on CPAP, HTN, DM, right renal mass s/p resection presents to  [facility] ED ON 9/18  after a syncopal episode, found to have T12-L1 fracture on imaging. Patient originally scheduled for an ACDF with Dr. Hughes, due to injury he was changed to a T9-pelvis revision PSF. Hospital course s/f medication adjustments for HTN  and HF. Patient was evaluated by PM&R and therapy for functional deficits, gait/ADL impairments and acute rehabilitation was recommended. Patient was medically optimized for discharge to [facility] IRU on 9/29/23.  At [facility] rehab patient completed comprehensive PT OT program and reached his rehab goals on 10/14. While at rehab evaluated by medicine, plastics. Pain and bowel regimen adjusted. Sutures removed. Bumex and Coreg resumed. BP regimen adjusted. Cleared for dc home on 10/14.

## 2023-10-11 NOTE — DISCHARGE NOTE PROVIDER - NSDCFUADDINST_GEN_ALL_CORE_FT
PLease see your cardiiologist in 7-10 days to assess current medications  keep back incision open to air- gentle cleansing  with soap and water- contact neurosurgery if any drainage noted from wound

## 2023-10-11 NOTE — PROGRESS NOTE ADULT - ASSESSMENT
68 y/o M with PMH of  HTN, HLD, CAD s/p CABG, HFrEF (LV systolic function appears grossly normal on Echo 9/21), Type 2 Diabetes, ASIYA on CPAP, right renal mass s/p partial resection (3/2023, pathology negative), s/p pervious L3-pelvis fusion, who presented to [facility] ED on 9/18 after a syncopal episode, found to have T12-L1 fracture on imaging. Patient had a removal of hardware from L3-pelvis and revision T9-pelvis revision PSF w/ muscle flap reconstruction on 9/23. Patient now with gait Instability, ADL impairments and Functional impairments.    Gait Instability/Functional Impairment  Status Post Fall w/ T12-L1 Acute Fracture  -s/p removal of hardware from L3-pelvis and revision T9-pelvis revision PSF w/ muscle flap reconstruction on 9/23  -Fall/Spine precautions  -Outpatient follow up with neurosurgery  -Continue comprehensive rehab program - PT/OT/SLP per rehab team  -Pain management, bowel regimen per rehab     Syncopal Episode   -Felt to be due to situational syncope (after blowing his nose)  -Workup at OSH: CTH negative, EKG showed no acute changes, Echo showed grossly nl LV function, no significant valvular disease   -Will monitor     HTN/HLD  CAD s/p CABG/Mild Diastolic Dysfunction w/ EF 60%  -Per documentation: Echo from 5/5/23 showed nl LV function, EF 60%, mild diastolic dysfunction. normal RV function. trivial MR and TR, normal Aortic valve. normal Pulmonic valve.   -Continue Aspirin, Atorvastatin   -Coreg, Losartan held for syncope/hypotension  -Patient endorses home dose alternates between 2mg 1 tab BID (total 4mg/day) and 2mg 2 tab AM, 2mg 1 tab pm (total 6mg/day)  -Bumex 2mg bid  -Outpatient cardiology follow up     ASIYA  -Continue with nocturnal CPAP. Pressure set with home setting of 5.    Type 2 Diabetes  -HbA1C 6.5 on 9/19  -Continue to hold oral glimepiride  -start metformin 500mg BID  -Lantus 22un qhs, ISS  -Continue to monitor FS, adjust insulin as needed    Gout  -Continue allopurinol    DVT ppx - HSQ

## 2023-10-11 NOTE — DISCHARGE NOTE PROVIDER - NSDCFUSCHEDAPPT_GEN_ALL_CORE_FT
[facility]  UROLOGY [address] [place] [address]  Scheduled Appointment: 11/03/2023    Gadiel Miranda  [facility]  UROLOGY [address] [place] [address]  Scheduled Appointment: 11/03/2023     Jose C Hughes  [facility]  ONCORTHO [address] [place] [address]  Scheduled Appointment: 10/19/2023    [facility]  UROLOGY [address] [place] [address] R  Scheduled Appointment: 11/03/2023    Gadiel Miranda  [facility]  UROLOGY [address] [place] [address] R  Scheduled Appointment: 11/03/2023

## 2023-10-11 NOTE — DISCHARGE NOTE PROVIDER - CARE PROVIDERS DIRECT ADDRESSES
,DirectAddress_Unknown,DirectAddress_Unknown,DirectAddress_Unknown,[email or web address]

## 2023-10-11 NOTE — DISCHARGE NOTE PROVIDER - CARE PROVIDER_API CALL
Jose C Hughes  Spine Surgery  [address] [place] [address]  Phone: [phone]  Fax: [phone]  Follow Up Time:     Vic Wilson  Physical/Rehab Medicine  [address] [place] [address]  Phone: [phone]  Fax: [phone]  Follow Up Time:     Jason Blount  Plastic Surgery  [address] [place] [address]  Phone: [phone]  Fax: [phone]  Follow Up Time:     Gadiel Miranda  Urology  [address] [facility], [address]  [place] [address]  Phone: [phone]  Fax: [phone]  Follow Up Time:

## 2023-10-11 NOTE — DISCHARGE NOTE PROVIDER - DETAILS OF MALNUTRITION DIAGNOSIS/DIAGNOSES
This patient has been assessed with a concern for Malnutrition and was treated during this hospitalization for the following Nutrition diagnosis/diagnoses:     -  10/01/2023: Morbid obesity (BMI > 40)

## 2023-10-11 NOTE — DISCHARGE NOTE PROVIDER - NSDCHHHOMEBOUND_GEN_ALL_CORE
Progress Notes by Sharmin Denton MD at 03/15/18 04:44 PM     Author:  Sharmin Denton MD Service:  (none) Author Type:  Physician     Filed:  03/15/18 05:39 PM Encounter Date:  3/15/2018 Status:  Signed     :  Sharmin Denton MD (Physician)            Roomed by Caren Goyal RN  3/15/2018 4:44 PM  Name and date of birth verified.   Last office visit[LB1.1T]  10/27/2017[LB1.1M]    Problem #1:  Botox injection.  S: Eze Stringer came today for Botox injection.  I reviewed with her the risks and benefits including ptosis, infection, bleeding. She has no contraindication. She  is on no antibiotics.  No neuromuscular disorders. she is not pregnant. She  tolerated this well. She  will return to see me again in three to four months, earlier if any problems. Patient understands side effects and risks as well as the necessity for continued treatment to maintain improvement.  Additional therapy may be necessary. Call if any problems. See diagram for injection sites and dosages.[LB1.1T] 18[TB1.1M] units used at a concentration of 10 units per 0.1 cc.[LB1.1T]    Electronically Signed by:    Sharmin Denton MD , 3/15/2018[TB1.1T]            Revision History        User Key Date/Time User Provider Type Action    > TB1.1 03/15/18 05:39 PM Sharmin Denton MD Physician Sign     LB1.1 03/15/18 04:45 PM Caren Goyal, RN, BSN Registered Nurse Sign at close encounter    M - Manual, T - Template             Chest pain/weakness during/after ambulation   greater than 20 feet/Fall risk/Pain greater than 7 on scale of 10 on ambulation

## 2023-10-11 NOTE — PROGRESS NOTE ADULT - ASSESSMENT
This is a 68 YO male with PMH of  HFrEF, CAD s/p CABG, ASIYA on CPAP, HTN, DM II, right renal mass s/p resection presents to  [facility] ED on 9/18  after a syncopal episode, found to have T12-L1 fracture on imaging. Patient had a T9-pelvis revision PSF. Patient now with gait Instability, ADL impairments and Functional impairments.    #Thoracic spine fracture  - T9-pelvis revision PSF on 9/23  - Comprehensive Rehab Program: PT/OT, 3hours daily and 5 days weekly  - PT: Focused on improving strength, endurance, coordination, balance, functional mobility, and transfers  - OT: Focused on improving strength, fine motor skills, coordination, posture and ADLs.    - pain regimen  - zanaflex TID, avoid hypotension- muscle spasms improved. Dose decreased daytime  - plastics in-awaiting primary team response re suture removal    #ASIYA  Respiratory order for nocturnal CPAP. Pressure set withy home setting of 5.    #HTN  - Carvedilol 6.25mg BID-on hold per hospitalist  - Losartan 25mg daily-hold    #HLD  - Lipitor 40mg daily    #CAD   - s/p CABG  - c/w ASA 81mg daily    #CHF  - Bumex 2mg+2mg. Monitor weight daily-improved    hypomagnesemia  discussed with hospitalist  to supplement MG  check levels on Thursday     #DM II  - ISS and FS  - Admelog and Lantus  - A1c 6.5 on 9/19  - follow fingersticks    #Neuropathy  -gabapentin 300mg QID.    #Pain management  - Tylenol PRN,   - Oxycodone 10mg moderate, 15mg severe PRN,   - lidocaine patch,   - zanaflex 2mg/2mg/6mg  - gabapentin 300mg 4x/.day  ICE    #DVT ppx  - Heparin 5K Q 8 hrs    #GI ppx  - Protonix 40mg  - Zofran    #Bowel Regimen  - Senna, miralax 2x/day  - Lactulose given-large BM 10/10    #Bladder management  -PVR low  - Monitor UO  -goal BM daily    #FEN   - Diet: DASH/TLC  - MVI    #Skin:  - Skin on admission: Multiple Left sided abdomen abrasions. Mid thoracic to lumbar incision now open to air- noted embedded incision- plastics to see   - Pressure injury/Skin: Turn Q2hrs while in bed, OOB to Chair, PT/OT     #Precaution  - Fall, Aspiration, Spinal   This is a 68 YO male with PMH of  HFrEF, CAD s/p CABG, ASIYA on CPAP, HTN, DM II, right renal mass s/p resection presents to  [facility] ED on 9/18  after a syncopal episode, found to have T12-L1 fracture on imaging. Patient had a T9-pelvis revision PSF. Patient now with gait Instability, ADL impairments and Functional impairments.    #Thoracic spine fracture  - T9-pelvis revision PSF on 9/23  - Comprehensive Rehab Program: PT/OT, 3hours daily and 5 days weekly  - PT: Focused on improving strength, endurance, coordination, balance, functional mobility, and transfers  - OT: Focused on improving strength, fine motor skills, coordination, posture and ADLs.    - pain regimen  - zanaflex TID, avoid hypotension- muscle spasms improved. Dose decreased daytime  - plastics in-awaiting primary team response re suture removal    #ASIYA  Respiratory order for nocturnal CPAP. Pressure set withy home setting of 5.    #HTN  - Carvedilol resumed low dose. BPs 130-160 now  - Losartan 25mg daily-hold    #HLD  - Lipitor 40mg daily    #CAD   - s/p CABG  - c/w ASA 81mg daily    #CHF  - Bumex 2mg+2mg. Monitor weight daily-improved    hypomagnesemia  discussed with hospitalist  to supplement MG  check levels on Thursday     #DM II  - ISS and FS  - Admelog and Lantus  - A1c 6.5 on 9/19  - follow fingersticks    #Neuropathy  -gabapentin 300mg QID.    #Pain management  - Tylenol PRN,   - Oxycodone 10mg moderate, 15mg severe PRN,   - lidocaine patch,   - zanaflex 2mg/2mg/6mg  - gabapentin 300mg 4x/.day  ICE    #DVT ppx  - Heparin 5K Q 8 hrs    #GI ppx  - Protonix 40mg  - Zofran    #Bowel Regimen  - Senna, miralax 2x/day  - Lactulose given-large BM 10/10    #Bladder management  -PVR low  - Monitor UO  -goal BM daily    #FEN   - Diet: DASH/TLC  - MVI    #Skin:  - Skin on admission: Multiple Left sided abdomen abrasions. Mid thoracic to lumbar incision now open to air- noted embedded incision- plastics to see   - Pressure injury/Skin: Turn Q2hrs while in bed, OOB to Chair, PT/OT     #Precaution  - Fall, Aspiration, Spinal   This is a 66 YO male with PMH of  HFrEF, CAD s/p CABG, ASIYA on CPAP, HTN, DM II, right renal mass s/p resection presents to  [facility] ED on 9/18  after a syncopal episode, found to have T12-L1 fracture on imaging. Patient had a T9-pelvis revision PSF. Patient now with gait Instability, ADL impairments and Functional impairments.    #Thoracic spine fracture  - T9-pelvis revision PSF on 9/23  - Comprehensive Rehab Program: PT/OT, 3hours daily and 5 days weekly  - PT: Focused on improving strength, endurance, coordination, balance, functional mobility, and transfers  - OT: Focused on improving strength, fine motor skills, coordination, posture and ADLs.    - pain regimen  - zanaflex TID, avoid hypotension- muscle spasms improved. Dose decreased daytime  - plastics in-[employer] approved DCing sutures- Dr Graff To DC    #ASIYA  Respiratory order for nocturnal CPAP. Pressure set withy home setting of 5.    #HTN  - Carvedilol resumed low dose. BPs 130-160 now  - Losartan 25mg daily-hold    #HLD  - Lipitor 40mg daily    #CAD   - s/p CABG  - c/w ASA 81mg daily    #CHF  - Bumex 2mg+2mg. Monitor weight daily-improved    hypomagnesemia  Mg 1.4  10/11  discussed with hospitalist  to supplement MG  check levels on Thursday     Skin Tears -   in region of incision   adaptic    #DM II  - ISS and FS  - Admelog and Lantus  - A1c 6.5 on 9/19  - follow fingersticks    #Neuropathy  -gabapentin 300mg QID.    #Pain management  - Tylenol PRN,   - Oxycodone 10mg moderate, 15mg severe PRN,   - lidocaine patch,   - zanaflex 2mg/2mg/6mg  - gabapentin 300mg 4x/.day  ICE    #DVT ppx  - Heparin 5K Q 8 hrs    #GI ppx  - Protonix 40mg  - Zofran    #Bowel Regimen  - Senna, miralax 2x/day  - Lactulose given-large BM 10/10    #Bladder management  -PVR low  - Monitor UO  -goal BM daily    #FEN   - Diet: DASH/TLC  - MVI    #Skin:  - Skin on admission: Multiple Left sided abdomen abrasions. Mid thoracic to lumbar incision now open to air- noted embedded incision- plastics to DC Sutures Here  - Pressure injury/Skin: Turn Q2hrs while in bed, OOB to Chair, PT/OT     #Precaution  - Fall, Aspiration, Spinal

## 2023-10-12 LAB
A1C WITH ESTIMATED AVERAGE GLUCOSE RESULT: 6.8 % — HIGH (ref 4–5.6)
ALBUMIN SERPL ELPH-MCNC: 2.4 G/DL — LOW (ref 3.3–5)
ALP SERPL-CCNC: 114 U/L — SIGNIFICANT CHANGE UP (ref 40–120)
ALT FLD-CCNC: 15 U/L — SIGNIFICANT CHANGE UP (ref 10–45)
ANION GAP SERPL CALC-SCNC: 7 MMOL/L — SIGNIFICANT CHANGE UP (ref 5–17)
AST SERPL-CCNC: 16 U/L — SIGNIFICANT CHANGE UP (ref 10–40)
BILIRUB SERPL-MCNC: 0.5 MG/DL — SIGNIFICANT CHANGE UP (ref 0.2–1.2)
BUN SERPL-MCNC: 14 MG/DL — SIGNIFICANT CHANGE UP (ref 7–23)
CALCIUM SERPL-MCNC: 9.1 MG/DL — SIGNIFICANT CHANGE UP (ref 8.4–10.5)
CHLORIDE SERPL-SCNC: 98 MMOL/L — SIGNIFICANT CHANGE UP (ref 96–108)
CO2 SERPL-SCNC: 31 MMOL/L — SIGNIFICANT CHANGE UP (ref 22–31)
CREAT SERPL-MCNC: 0.75 MG/DL — SIGNIFICANT CHANGE UP (ref 0.5–1.3)
EGFR: 99 ML/MIN/1.73M2 — SIGNIFICANT CHANGE UP
ESTIMATED AVERAGE GLUCOSE: 148 MG/DL — HIGH (ref 68–114)
GLUCOSE BLDC GLUCOMTR-MCNC: 147 MG/DL — HIGH (ref 70–99)
GLUCOSE BLDC GLUCOMTR-MCNC: 149 MG/DL — HIGH (ref 70–99)
GLUCOSE BLDC GLUCOMTR-MCNC: 150 MG/DL — HIGH (ref 70–99)
GLUCOSE BLDC GLUCOMTR-MCNC: 171 MG/DL — HIGH (ref 70–99)
GLUCOSE BLDC GLUCOMTR-MCNC: 171 MG/DL — HIGH (ref 70–99)
GLUCOSE SERPL-MCNC: 143 MG/DL — HIGH (ref 70–99)
HCT VFR BLD CALC: 29.8 % — LOW (ref 39–50)
HGB BLD-MCNC: 9.7 G/DL — LOW (ref 13–17)
MAGNESIUM SERPL-MCNC: 1.8 MG/DL — SIGNIFICANT CHANGE UP (ref 1.6–2.6)
MCHC RBC-ENTMCNC: 28 PG — SIGNIFICANT CHANGE UP (ref 27–34)
MCHC RBC-ENTMCNC: 32.6 GM/DL — SIGNIFICANT CHANGE UP (ref 32–36)
MCV RBC AUTO: 85.9 FL — SIGNIFICANT CHANGE UP (ref 80–100)
NRBC # BLD: 0 /100 WBCS — SIGNIFICANT CHANGE UP (ref 0–0)
PLATELET # BLD AUTO: 237 K/UL — SIGNIFICANT CHANGE UP (ref 150–400)
POTASSIUM SERPL-MCNC: 3.8 MMOL/L — SIGNIFICANT CHANGE UP (ref 3.5–5.3)
POTASSIUM SERPL-SCNC: 3.8 MMOL/L — SIGNIFICANT CHANGE UP (ref 3.5–5.3)
PROT SERPL-MCNC: 6.3 G/DL — SIGNIFICANT CHANGE UP (ref 6–8.3)
RBC # BLD: 3.47 M/UL — LOW (ref 4.2–5.8)
RBC # FLD: 13.2 % — SIGNIFICANT CHANGE UP (ref 10.3–14.5)
SODIUM SERPL-SCNC: 136 MMOL/L — SIGNIFICANT CHANGE UP (ref 135–145)
WBC # BLD: 5.51 K/UL — SIGNIFICANT CHANGE UP (ref 3.8–10.5)
WBC # FLD AUTO: 5.51 K/UL — SIGNIFICANT CHANGE UP (ref 3.8–10.5)

## 2023-10-12 PROCEDURE — 99232 SBSQ HOSP IP/OBS MODERATE 35: CPT

## 2023-10-12 PROCEDURE — 99232 SBSQ HOSP IP/OBS MODERATE 35: CPT | Mod: GC

## 2023-10-12 RX ORDER — TIZANIDINE 4 MG/1
2 TABLET ORAL AT BEDTIME
Refills: 0 | Status: DISCONTINUED | OUTPATIENT
Start: 2023-10-12 | End: 2023-10-14

## 2023-10-12 RX ADMIN — PANTOPRAZOLE SODIUM 40 MILLIGRAM(S): 20 TABLET, DELAYED RELEASE ORAL at 05:35

## 2023-10-12 RX ADMIN — Medication 5 MILLIGRAM(S): at 17:25

## 2023-10-12 RX ADMIN — Medication 20 MILLIEQUIVALENT(S): at 11:46

## 2023-10-12 RX ADMIN — OXYCODONE HYDROCHLORIDE 10 MILLIGRAM(S): 5 TABLET ORAL at 05:25

## 2023-10-12 RX ADMIN — MAGNESIUM OXIDE 400 MG ORAL TABLET 400 MILLIGRAM(S): 241.3 TABLET ORAL at 08:30

## 2023-10-12 RX ADMIN — METFORMIN HYDROCHLORIDE 500 MILLIGRAM(S): 850 TABLET ORAL at 08:30

## 2023-10-12 RX ADMIN — OXYCODONE HYDROCHLORIDE 10 MILLIGRAM(S): 5 TABLET ORAL at 01:12

## 2023-10-12 RX ADMIN — CARVEDILOL PHOSPHATE 3.12 MILLIGRAM(S): 80 CAPSULE, EXTENDED RELEASE ORAL at 05:33

## 2023-10-12 RX ADMIN — BUMETANIDE 2 MILLIGRAM(S): 0.25 INJECTION INTRAMUSCULAR; INTRAVENOUS at 05:32

## 2023-10-12 RX ADMIN — TIZANIDINE 2 MILLIGRAM(S): 4 TABLET ORAL at 21:46

## 2023-10-12 RX ADMIN — Medication 81 MILLIGRAM(S): at 11:46

## 2023-10-12 RX ADMIN — Medication 3 MILLIGRAM(S): at 23:03

## 2023-10-12 RX ADMIN — HEPARIN SODIUM 5000 UNIT(S): 5000 INJECTION INTRAVENOUS; SUBCUTANEOUS at 21:46

## 2023-10-12 RX ADMIN — MAGNESIUM OXIDE 400 MG ORAL TABLET 400 MILLIGRAM(S): 241.3 TABLET ORAL at 17:24

## 2023-10-12 RX ADMIN — TIZANIDINE 2 MILLIGRAM(S): 4 TABLET ORAL at 17:24

## 2023-10-12 RX ADMIN — BUMETANIDE 2 MILLIGRAM(S): 0.25 INJECTION INTRAMUSCULAR; INTRAVENOUS at 11:46

## 2023-10-12 RX ADMIN — TIZANIDINE 2 MILLIGRAM(S): 4 TABLET ORAL at 08:31

## 2023-10-12 RX ADMIN — GABAPENTIN 300 MILLIGRAM(S): 400 CAPSULE ORAL at 05:32

## 2023-10-12 RX ADMIN — GABAPENTIN 300 MILLIGRAM(S): 400 CAPSULE ORAL at 11:46

## 2023-10-12 RX ADMIN — GABAPENTIN 300 MILLIGRAM(S): 400 CAPSULE ORAL at 01:30

## 2023-10-12 RX ADMIN — MAGNESIUM OXIDE 400 MG ORAL TABLET 400 MILLIGRAM(S): 241.3 TABLET ORAL at 11:46

## 2023-10-12 RX ADMIN — OXYCODONE HYDROCHLORIDE 10 MILLIGRAM(S): 5 TABLET ORAL at 23:03

## 2023-10-12 RX ADMIN — HEPARIN SODIUM 5000 UNIT(S): 5000 INJECTION INTRAVENOUS; SUBCUTANEOUS at 05:31

## 2023-10-12 RX ADMIN — INSULIN GLARGINE 18 UNIT(S): 100 INJECTION, SOLUTION SUBCUTANEOUS at 21:55

## 2023-10-12 RX ADMIN — LIDOCAINE 1 PATCH: 4 CREAM TOPICAL at 11:45

## 2023-10-12 RX ADMIN — GABAPENTIN 300 MILLIGRAM(S): 400 CAPSULE ORAL at 17:24

## 2023-10-12 RX ADMIN — Medication 1 TABLET(S): at 11:46

## 2023-10-12 RX ADMIN — CARVEDILOL PHOSPHATE 3.12 MILLIGRAM(S): 80 CAPSULE, EXTENDED RELEASE ORAL at 17:25

## 2023-10-12 RX ADMIN — Medication 3 MILLIGRAM(S): at 01:31

## 2023-10-12 RX ADMIN — Medication 100 MILLIGRAM(S): at 21:46

## 2023-10-12 RX ADMIN — ATORVASTATIN CALCIUM 40 MILLIGRAM(S): 80 TABLET, FILM COATED ORAL at 21:46

## 2023-10-12 RX ADMIN — METFORMIN HYDROCHLORIDE 500 MILLIGRAM(S): 850 TABLET ORAL at 17:25

## 2023-10-12 RX ADMIN — Medication 5 UNIT(S): at 17:25

## 2023-10-12 RX ADMIN — Medication 5 MILLIGRAM(S): at 05:33

## 2023-10-12 NOTE — PROGRESS NOTE ADULT - ASSESSMENT
66 y/o M with PMH of  HTN, HLD, CAD s/p CABG, HFrEF (LV systolic function appears grossly normal on Echo 9/21), Type 2 Diabetes, ASIYA on CPAP, right renal mass s/p partial resection (3/2023, pathology negative), s/p pervious L3-pelvis fusion, who presented to [facility] ED on 9/18 after a syncopal episode, found to have T12-L1 fracture on imaging. Patient had a removal of hardware from L3-pelvis and revision T9-pelvis revision PSF w/ muscle flap reconstruction on 9/23. Patient now with gait Instability, ADL impairments and Functional impairments.    Gait Instability/Functional Impairment  Status Post Fall w/ T12-L1 Acute Fracture  -s/p removal of hardware from L3-pelvis and revision T9-pelvis revision PSF w/ muscle flap reconstruction on 9/23  -Fall/Spine precautions  -Outpatient follow up with neurosurgery  -Continue comprehensive rehab program - PT/OT/SLP per rehab team  -Pain management, bowel regimen per rehab     HTN/HLD  CAD s/p CABG  Mild Diastolic Dysfunction w/ EF 60%  -Per documentation: Echo from 5/5/23 showed nl LV function, EF 60%, mild diastolic dysfunction. normal RV function. trivial MR and TR, normal Aortic valve. normal Pulmonic valve.   -Continue Aspirin, Atorvastatin   -low dose Coreg  -Losartan held for syncope/hypotension  -Patient endorses home dose alternates between 2mg 1 tab BID (total 4mg/day) and 2mg 2 tab AM, 2mg 1 tab pm (total 6mg/day)  -Bumex 2mg bid  -Outpatient cardiology follow up     ASIYA  -Continue with nocturnal CPAP. Pressure set with home setting of 5.    Type 2 Diabetes  -HbA1C 6.5 on 9/19  -Continue to hold oral glimepiride  -start metformin 500mg BID  -Lantus 18un qhs, ISS  -Continue to monitor FS, adjust insulin as needed  -DC on home glimepiride     Gout  -Continue allopurinol    DVT ppx - HSQ

## 2023-10-12 NOTE — PROGRESS NOTE ADULT - ASSESSMENT
This is a 66 YO male with PMH of  HFrEF, CAD s/p CABG, ASIYA on CPAP, HTN, DM II, right renal mass s/p resection presents to  [facility] ED on 9/18  after a syncopal episode, found to have T12-L1 fracture on imaging. Patient had a T9-pelvis revision PSF. Patient now with gait Instability, ADL impairments and Functional impairments.    #Thoracic spine fracture  - T9-pelvis revision PSF on 9/23  - Comprehensive Rehab Program: PT/OT, 3hours daily and 5 days weekly  - PT: Focused on improving strength, endurance, coordination, balance, functional mobility, and transfers  - OT: Focused on improving strength, fine motor skills, coordination, posture and ADLs.    - pain regimen  - zanaflex TID, avoid hypotension- muscle spasms improved. Dose decreased daytime  - plastics in-[employer] approved DCing sutures- Dr Graff To DC    #ASIYA  Respiratory order for nocturnal CPAP. Pressure set withy home setting of 5.    #HTN  - Carvedilol resumed low dose. BPs 130-160 now  - Losartan 25mg daily-hold    #HLD  - Lipitor 40mg daily    #CAD   - s/p CABG  - c/w ASA 81mg daily    #CHF  - Bumex 2mg+2mg. Monitor weight daily-improved    hypomagnesemia  Mg 1.4  10/11  discussed with hospitalist  to supplement MG  check levels on Thursday     Skin Tears -   in region of incision   adaptic    #DM II  - ISS and FS  - Admelog and Lantus  - A1c 6.5 on 9/19  - follow fingersticks    #Neuropathy  -gabapentin 300mg QID.    #Pain management  - Tylenol PRN,   - Oxycodone 10mg moderate, 15mg severe PRN,   - lidocaine patch,   - zanaflex 2mg/2mg/6mg  - gabapentin 300mg 4x/.day  ICE    #DVT ppx  - Heparin 5K Q 8 hrs    #GI ppx  - Protonix 40mg  - Zofran    #Bowel Regimen  - Senna, miralax 2x/day  - Lactulose given-large BM 10/10    #Bladder management  -PVR low  - Monitor UO  -goal BM daily    #FEN   - Diet: DASH/TLC  - MVI    #Skin:  - Skin on admission: Multiple Left sided abdomen abrasions. Mid thoracic to lumbar incision now open to air- noted embedded incision- plastics to DC Sutures Here  - Pressure injury/Skin: Turn Q2hrs while in bed, OOB to Chair, PT/OT     #Precaution  - Fall, Aspiration, Spinal   This is a 66 YO male with PMH of  HFrEF, CAD s/p CABG, ASIYA on CPAP, HTN, DM II, right renal mass s/p resection presents to  [facility] ED on 9/18  after a syncopal episode, found to have T12-L1 fracture on imaging. Patient had a T9-pelvis revision PSF. Patient now with gait Instability, ADL impairments and Functional impairments.    #Thoracic spine fracture  - T9-pelvis revision PSF on 9/23  - Comprehensive Rehab Program: PT/OT, 3hours daily and 5 days weekly  - PT: Focused on improving strength, endurance, coordination, balance, functional mobility, and transfers  - OT: Focused on improving strength, fine motor skills, coordination, posture and ADLs.    - pain regimen  - zanaflex TID, avoid hypotension- muscle spasms improved. Dose decreased daytime  - plastics in-sutures removed    #ASIYA  Respiratory order for nocturnal CPAP. Pressure set withy home setting of 5.    #HTN  - Carvedilol resumed low dose. BPs 130-160 now  - Losartan 25mg daily-hold    #HLD  - Lipitor 40mg daily    #CAD   - s/p CABG  - c/w ASA 81mg daily    #CHF  - Bumex 2mg+2mg. Monitor weight daily-improved    hypomagnesemia  Mg 1.8  - supplement MG    Skin Tears -   in region of incision   adaptic    #DM II  - ISS and FS  - Admelog and Lantus, metformin  - A1c 6.5 on 9/19  - follow fingersticks    #Neuropathy  -gabapentin 300mg QID.    #Pain management  - Tylenol PRN,   - Oxycodone 10mg moderate, 15mg severe PRN,   - lidocaine patch,   - zanaflex 2mg/2mg/6mg  - gabapentin 300mg 4x/.day  ICE    #DVT ppx  - Heparin 5K Q 8 hrs    #GI ppx  - Protonix 40mg  - Zofran    #Bowel Regimen  - Senna, miralax 2x/day  - Lactulose given-large BM 10/10    #Bladder management  -PVR low  - Monitor UO  -goal BM daily    #FEN   - Diet: DASH/TLC  - MVI    #Skin:  - Skin on admission: Multiple Left sided abdomen abrasions. Mid thoracic to lumbar incision now open to air- sutures removed  - Pressure injury/Skin: Turn Q2hrs while in bed, OOB to Chair, PT/OT     #Precaution  - Fall, Aspiration, Spinal   This is a 68 YO male with PMH of  HFrEF, CAD s/p CABG, ASIYA on CPAP, HTN, DM II, right renal mass s/p resection presents to  [facility] ED on 9/18  after a syncopal episode, found to have T12-L1 fracture on imaging. Patient had a T9-pelvis revision PSF. Patient now with gait Instability, ADL impairments and Functional impairments.    #Thoracic spine fracture  - T9-pelvis revision PSF on 9/23  - Comprehensive Rehab Program: PT/OT, 3hours daily and 5 days weekly  - PT: Focused on improving strength, endurance, coordination, balance, functional mobility, and transfers  - OT: Focused on improving strength, fine motor skills, coordination, posture and ADLs.    - pain regimen  - zanaflex TID, avoid hypotension- muscle spasms improved. Dose decreased   - plastics in-sutures removed    #ASIYA  Respiratory order for nocturnal CPAP. Pressure set withy home setting of 5.    #HTN  - Carvedilol resumed low dose. BPs 130-160 now  - Losartan 25mg daily-hold    #HLD  - Lipitor 40mg daily    #CAD   - s/p CABG  - c/w ASA 81mg daily    #CHF  - Bumex 2mg+2mg. Monitor weight daily-improved    hypomagnesemia  Mg 1.8  - supplement MG    Skin Tears -   in region of incision   adaptic    #DM II  - ISS and FS  - Admelog and Lantus, metformin  - A1c 6.5 on 9/19  - follow fingersticks    #Neuropathy  -gabapentin 300mg QID.    #Pain management  - Tylenol PRN,   - Oxycodone 10mg moderate, 15mg severe PRN,   - lidocaine patch,   - zanaflex 2mg/2mg/6mg  - gabapentin 300mg 4x/.day  ICE    #DVT ppx  - Heparin 5K Q 8 hrs    #GI ppx  - Protonix 40mg  - Zofran    #Bowel Regimen  - Senna, miralax 2x/day  - Lactulose given-large BM 10/10    #Bladder management  -PVR low  - Monitor UO  -goal BM daily    #FEN   - Diet: DASH/TLC  - MVI    #Skin:  - Skin on admission: Multiple Left sided abdomen abrasions. Mid thoracic to lumbar incision now open to air- sutures removed  - Pressure injury/Skin: Turn Q2hrs while in bed, OOB to Chair, PT/OT     #Precaution  - Fall, Aspiration, Spinal

## 2023-10-12 NOTE — PROGRESS NOTE ADULT - SUBJECTIVE AND OBJECTIVE BOX
This is a 68 YO male with PMH of  HFrEF, CAD s/p CABG, ASIYA on CPAP, HTN, DM, right renal mass s/p resection presents to  [facility] ED ON 9/18  after a syncopal episode, found to have T12-L1 fracture on imaging. Patient originally scheduled for an ACDF with Dr. Hughes, due to injury he was changed to a T9-pelvis revision PSF. Hospital course s/f medication adjustments for HTN  and HF. Patient was evaluated by PM&R and therapy for functional deficits, gait/ADL impairments and acute rehabilitation was recommended. Patient was medically optimized for discharge to [facility] IRU on 9/29/23.         MEDICATIONS  (STANDING):  allopurinol 100 milliGRAM(s) Oral at bedtime  aspirin enteric coated 81 milliGRAM(s) Oral daily  atorvastatin 40 milliGRAM(s) Oral at bedtime  Biotene Dry Mouth Oral Rinse 15 milliLiter(s) Swish and Spit three times a day  bisacodyl 5 milliGRAM(s) Oral every 12 hours  buMETAnide 2 milliGRAM(s) Oral <User Schedule>  buMETAnide 2 milliGRAM(s) Oral daily  carvedilol 3.125 milliGRAM(s) Oral every 12 hours  dextrose 5%. 1000 milliLiter(s) (50 mL/Hr) IV Continuous <Continuous>  dextrose 5%. 1000 milliLiter(s) (100 mL/Hr) IV Continuous <Continuous>  dextrose 50% Injectable 25 Gram(s) IV Push once  dextrose 50% Injectable 25 Gram(s) IV Push once  dextrose 50% Injectable 12.5 Gram(s) IV Push once  gabapentin 300 milliGRAM(s) Oral four times a day  glucagon  Injectable 1 milliGRAM(s) IntraMuscular once  heparin   Injectable 5000 Unit(s) SubCutaneous every 8 hours  insulin glargine Injectable (LANTUS) 18 Unit(s) SubCutaneous at bedtime  insulin lispro (ADMELOG) corrective regimen sliding scale   SubCutaneous three times a day before meals  insulin lispro (ADMELOG) corrective regimen sliding scale   SubCutaneous at bedtime  insulin lispro Injectable (ADMELOG) 5 Unit(s) SubCutaneous before dinner  insulin lispro Injectable (ADMELOG) 5 Unit(s) SubCutaneous before lunch  lidocaine   4% Patch 1 Patch Transdermal daily  magnesium oxide 400 milliGRAM(s) Oral three times a day with meals  metFORMIN 500 milliGRAM(s) Oral two times a day with meals  multivitamin 1 Tablet(s) Oral daily  pantoprazole    Tablet 40 milliGRAM(s) Oral before breakfast  potassium chloride    Tablet ER 20 milliEquivalent(s) Oral daily  tiZANidine 6 milliGRAM(s) Oral at bedtime  tiZANidine 2 milliGRAM(s) Oral <User Schedule>  tiZANidine 2 milliGRAM(s) Oral <User Schedule>    MEDICATIONS  (PRN):  aluminum hydroxide/magnesium hydroxide/simethicone Suspension 30 milliLiter(s) Oral every 4 hours PRN Dyspepsia  benzocaine/menthol Lozenge 1 Lozenge Oral every 3 hours PRN Sore Throat  dextrose Oral Gel 15 Gram(s) Oral once PRN Blood Glucose LESS THAN 70 milliGRAM(s)/deciliter  guaiFENesin Oral Liquid (Sugar-Free) 100 milliGRAM(s) Oral every 6 hours PRN Cough  melatonin 3 milliGRAM(s) Oral at bedtime PRN Insomnia  ondansetron   Disintegrating Tablet 4 milliGRAM(s) Oral every 6 hours PRN Nausea and/or Vomiting  oxyCODONE    IR 10 milliGRAM(s) Oral every 3 hours PRN Moderate Pain (4 - 6)  oxyCODONE    IR 15 milliGRAM(s) Oral every 3 hours PRN Severe Pain (7 - 10)  polyethylene glycol 3350 17 Gram(s) Oral two times a day PRN Constipation                            9.7    5.51  )-----------( 237      ( 12 Oct 2023 05:42 )             29.8     10-12    136  |  98  |  14  ----------------------------<  143<H>  3.8   |  31  |  0.75    Ca    9.1      12 Oct 2023 05:42  Mg     1.8     10-12    TPro  6.3  /  Alb  2.4<L>  /  TBili  0.5  /  DBili  x   /  AST  16  /  ALT  15  /  AlkPhos  114  10-12    Urinalysis Basic - ( 12 Oct 2023 05:42 )    Color: x / Appearance: x / SG: x / pH: x  Gluc: 143 mg/dL / Ketone: x  / Bili: x / Urobili: x   Blood: x / Protein: x / Nitrite: x   Leuk Esterase: x / RBC: x / WBC x   Sq Epi: x / Non Sq Epi: x / Bacteria: x        CAPILLARY BLOOD GLUCOSE      POCT Blood Glucose.: 150 mg/dL (12 Oct 2023 07:49)  POCT Blood Glucose.: 187 mg/dL (11 Oct 2023 21:55)  POCT Blood Glucose.: 159 mg/dL (11 Oct 2023 17:43)  POCT Blood Glucose.: 158 mg/dL (11 Oct 2023 12:08)      Vital Signs Last 24 Hrs  T(C): 37.1 (11 Oct 2023 20:36), Max: 37.1 (11 Oct 2023 20:36)  T(F): 98.7 (11 Oct 2023 20:36), Max: 98.7 (11 Oct 2023 20:36)  HR: 98 (12 Oct 2023 00:30) (76 - 98)  BP: 128/74 (11 Oct 2023 20:36) (128/74 - 128/77)  BP(mean): --  RR: 17 (11 Oct 2023 20:36) (17 - 17)  SpO2: 71% (12 Oct 2023 00:30) (71% - 97%)    Parameters below as of 11 Oct 2023 20:36  Patient On (Oxygen Delivery Method): room air                Continue comprehensive acute rehab program consisting of 3hrs/day of OT/PT and SLP. This is a 66 YO male with PMH of  HFrEF, CAD s/p CABG, ASIYA on CPAP, HTN, DM, right renal mass s/p resection presents to  [facility] ED ON 9/18  after a syncopal episode, found to have T12-L1 fracture on imaging. Patient originally scheduled for an ACDF with Dr. Hughes, due to injury he was changed to a T9-pelvis revision PSF. Hospital course s/f medication adjustments for HTN  and HF. Patient was evaluated by PM&R and therapy for functional deficits, gait/ADL impairments and acute rehabilitation was recommended. Patient was medically optimized for discharge to [facility] IRU on 9/29/23.       ROS/subjective:  Patient seen and evaluated in chair, NAD, spasms/back pain-controlled- Zanaflex 2/2/6  BPs up-Coreg resumed low dose. Bumex 2mg/2mg for hx CHF exacerbation. Daily weight following-improved 364  PVRs low.  no dizziness, no headaches, no chest pain , No SOB, no nausea, no vomiting  requests Lactulose  voiding well overnight- good flow  Plastics consulted- sutures removed   Wound care appreciated for skin tear-adaptic  Mg ok    MEDICATIONS  (STANDING):  allopurinol 100 milliGRAM(s) Oral at bedtime  aspirin enteric coated 81 milliGRAM(s) Oral daily  atorvastatin 40 milliGRAM(s) Oral at bedtime  Biotene Dry Mouth Oral Rinse 15 milliLiter(s) Swish and Spit three times a day  bisacodyl 5 milliGRAM(s) Oral every 12 hours  buMETAnide 2 milliGRAM(s) Oral <User Schedule>  buMETAnide 2 milliGRAM(s) Oral daily  carvedilol 3.125 milliGRAM(s) Oral every 12 hours  dextrose 5%. 1000 milliLiter(s) (50 mL/Hr) IV Continuous <Continuous>  dextrose 5%. 1000 milliLiter(s) (100 mL/Hr) IV Continuous <Continuous>  dextrose 50% Injectable 25 Gram(s) IV Push once  dextrose 50% Injectable 25 Gram(s) IV Push once  dextrose 50% Injectable 12.5 Gram(s) IV Push once  gabapentin 300 milliGRAM(s) Oral four times a day  glucagon  Injectable 1 milliGRAM(s) IntraMuscular once  heparin   Injectable 5000 Unit(s) SubCutaneous every 8 hours  insulin glargine Injectable (LANTUS) 18 Unit(s) SubCutaneous at bedtime  insulin lispro (ADMELOG) corrective regimen sliding scale   SubCutaneous three times a day before meals  insulin lispro (ADMELOG) corrective regimen sliding scale   SubCutaneous at bedtime  insulin lispro Injectable (ADMELOG) 5 Unit(s) SubCutaneous before dinner  insulin lispro Injectable (ADMELOG) 5 Unit(s) SubCutaneous before lunch  lidocaine   4% Patch 1 Patch Transdermal daily  magnesium oxide 400 milliGRAM(s) Oral three times a day with meals  metFORMIN 500 milliGRAM(s) Oral two times a day with meals  multivitamin 1 Tablet(s) Oral daily  pantoprazole    Tablet 40 milliGRAM(s) Oral before breakfast  potassium chloride    Tablet ER 20 milliEquivalent(s) Oral daily  tiZANidine 6 milliGRAM(s) Oral at bedtime  tiZANidine 2 milliGRAM(s) Oral <User Schedule>  tiZANidine 2 milliGRAM(s) Oral <User Schedule>    MEDICATIONS  (PRN):  aluminum hydroxide/magnesium hydroxide/simethicone Suspension 30 milliLiter(s) Oral every 4 hours PRN Dyspepsia  benzocaine/menthol Lozenge 1 Lozenge Oral every 3 hours PRN Sore Throat  dextrose Oral Gel 15 Gram(s) Oral once PRN Blood Glucose LESS THAN 70 milliGRAM(s)/deciliter  guaiFENesin Oral Liquid (Sugar-Free) 100 milliGRAM(s) Oral every 6 hours PRN Cough  melatonin 3 milliGRAM(s) Oral at bedtime PRN Insomnia  ondansetron   Disintegrating Tablet 4 milliGRAM(s) Oral every 6 hours PRN Nausea and/or Vomiting  oxyCODONE    IR 10 milliGRAM(s) Oral every 3 hours PRN Moderate Pain (4 - 6)  oxyCODONE    IR 15 milliGRAM(s) Oral every 3 hours PRN Severe Pain (7 - 10)  polyethylene glycol 3350 17 Gram(s) Oral two times a day PRN Constipation                            9.7    5.51  )-----------( 237      ( 12 Oct 2023 05:42 )             29.8     10-12    136  |  98  |  14  ----------------------------<  143<H>  3.8   |  31  |  0.75    Ca    9.1      12 Oct 2023 05:42  Mg     1.8     10-12    TPro  6.3  /  Alb  2.4<L>  /  TBili  0.5  /  DBili  x   /  AST  16  /  ALT  15  /  AlkPhos  114  10-12    Urinalysis Basic - ( 12 Oct 2023 05:42 )    Color: x / Appearance: x / SG: x / pH: x  Gluc: 143 mg/dL / Ketone: x  / Bili: x / Urobili: x   Blood: x / Protein: x / Nitrite: x   Leuk Esterase: x / RBC: x / WBC x   Sq Epi: x / Non Sq Epi: x / Bacteria: x        CAPILLARY BLOOD GLUCOSE      POCT Blood Glucose.: 150 mg/dL (12 Oct 2023 07:49)  POCT Blood Glucose.: 187 mg/dL (11 Oct 2023 21:55)  POCT Blood Glucose.: 159 mg/dL (11 Oct 2023 17:43)  POCT Blood Glucose.: 158 mg/dL (11 Oct 2023 12:08)      Vital Signs Last 24 Hrs  T(C): 37.1 (11 Oct 2023 20:36), Max: 37.1 (11 Oct 2023 20:36)  T(F): 98.7 (11 Oct 2023 20:36), Max: 98.7 (11 Oct 2023 20:36)  HR: 98 (12 Oct 2023 00:30) (76 - 98)  BP: 128/74 (11 Oct 2023 20:36) (128/74 - 128/77)  BP(mean): --  RR: 17 (11 Oct 2023 20:36) (17 - 17)  SpO2: 71% (12 Oct 2023 00:30) (71% - 97%)    Parameters below as of 11 Oct 2023 20:36  Patient On (Oxygen Delivery Method): room air      Constitutional - NAD  HEENT - NCAT, EOMI.    Chest - good chest expansion, good respiratory effort   Cardio - S1D2 RRR  Abdomen -  Obese, Soft, NTND BS +  Extremities - No peripheral edema, No calf tenderness   Neurologic Exam:                    Cranial Nerves -2-12 intact     Motor -  Strength preserved in UE and LE with severe pain in back.                     LEFT    UE - ShAB 5/5, EF 5/5, EE 5/5, WE 5/5,  WNL                     RIGHT UE - ShAB 5/5, EF 5/5, EE 5/5, WE 5/5,  WNL                    LEFT    LE - HF 4/5, KE 5/5, DF 5/5, PF 5/5                    RIGHT LE - HF 4/5, KE 5/5, DF 5/5, PF 5/5        Sensory - Intact to LT bilateral  Skin on admission:  Mid thoracic to lumbar incision AMANDA-Previous surgical scars of chest, abdomen, b/l elbows, wrists and knees. No pressure ulcers.- back dressing Dcd-   Noted imbedding of suture line most noted inferiorly- no drainage noted-  leslie PATEL    IDT meeting on 10/11    SW: PH 3STE, 12STI, children    OT:  eating set up  grooming set up  UBD/LBD cg A  toileting cg A  tub/shower cg A  bathing cg A    PT:  amb 130ft cs A RW  transfers cs A  stairs cg    SLP na    tentative dc 10/14 HC       Continue comprehensive acute rehab program consisting of 3hrs/day of OT/PT and SLP. This is a 68 YO male with PMH of  HFrEF, CAD s/p CABG, ASIYA on CPAP, HTN, DM, right renal mass s/p resection presents to  [facility] ED ON 9/18  after a syncopal episode, found to have T12-L1 fracture on imaging. Patient originally scheduled for an ACDF with Dr. Hughes, due to injury he was changed to a T9-pelvis revision PSF. Hospital course s/f medication adjustments for HTN  and HF. Patient was evaluated by PM&R and therapy for functional deficits, gait/ADL impairments and acute rehabilitation was recommended. Patient was medically optimized for discharge to [facility] IRU on 9/29/23.       ROS/subjective:  Patient seen and evaluated in chair, NAD, spasms/back pain-controlled- Zanaflex 2/2/6  BPs up-Coreg resumed low dose. Bumex 2mg/2mg for hx CHF exacerbation. Daily weight following-improved 364  PVRs low.  no dizziness, no headaches, no chest pain , No SOB, no nausea, no vomiting  requests Lactulose  voiding well overnight- good flow  Plastics consulted- sutures removed   Wound care appreciated for skin tear-adaptic  Mg ok    MEDICATIONS  (STANDING):  allopurinol 100 milliGRAM(s) Oral at bedtime  aspirin enteric coated 81 milliGRAM(s) Oral daily  atorvastatin 40 milliGRAM(s) Oral at bedtime  Biotene Dry Mouth Oral Rinse 15 milliLiter(s) Swish and Spit three times a day  bisacodyl 5 milliGRAM(s) Oral every 12 hours  buMETAnide 2 milliGRAM(s) Oral <User Schedule>  buMETAnide 2 milliGRAM(s) Oral daily  carvedilol 3.125 milliGRAM(s) Oral every 12 hours  dextrose 5%. 1000 milliLiter(s) (50 mL/Hr) IV Continuous <Continuous>  dextrose 5%. 1000 milliLiter(s) (100 mL/Hr) IV Continuous <Continuous>  dextrose 50% Injectable 25 Gram(s) IV Push once  dextrose 50% Injectable 25 Gram(s) IV Push once  dextrose 50% Injectable 12.5 Gram(s) IV Push once  gabapentin 300 milliGRAM(s) Oral four times a day  glucagon  Injectable 1 milliGRAM(s) IntraMuscular once  heparin   Injectable 5000 Unit(s) SubCutaneous every 8 hours  insulin glargine Injectable (LANTUS) 18 Unit(s) SubCutaneous at bedtime  insulin lispro (ADMELOG) corrective regimen sliding scale   SubCutaneous three times a day before meals  insulin lispro (ADMELOG) corrective regimen sliding scale   SubCutaneous at bedtime  insulin lispro Injectable (ADMELOG) 5 Unit(s) SubCutaneous before dinner  insulin lispro Injectable (ADMELOG) 5 Unit(s) SubCutaneous before lunch  lidocaine   4% Patch 1 Patch Transdermal daily  magnesium oxide 400 milliGRAM(s) Oral three times a day with meals  metFORMIN 500 milliGRAM(s) Oral two times a day with meals  multivitamin 1 Tablet(s) Oral daily  pantoprazole    Tablet 40 milliGRAM(s) Oral before breakfast  potassium chloride    Tablet ER 20 milliEquivalent(s) Oral daily  tiZANidine 6 milliGRAM(s) Oral at bedtime  tiZANidine 2 milliGRAM(s) Oral <User Schedule>  tiZANidine 2 milliGRAM(s) Oral <User Schedule>    MEDICATIONS  (PRN):  aluminum hydroxide/magnesium hydroxide/simethicone Suspension 30 milliLiter(s) Oral every 4 hours PRN Dyspepsia  benzocaine/menthol Lozenge 1 Lozenge Oral every 3 hours PRN Sore Throat  dextrose Oral Gel 15 Gram(s) Oral once PRN Blood Glucose LESS THAN 70 milliGRAM(s)/deciliter  guaiFENesin Oral Liquid (Sugar-Free) 100 milliGRAM(s) Oral every 6 hours PRN Cough  melatonin 3 milliGRAM(s) Oral at bedtime PRN Insomnia  ondansetron   Disintegrating Tablet 4 milliGRAM(s) Oral every 6 hours PRN Nausea and/or Vomiting  oxyCODONE    IR 10 milliGRAM(s) Oral every 3 hours PRN Moderate Pain (4 - 6)  oxyCODONE    IR 15 milliGRAM(s) Oral every 3 hours PRN Severe Pain (7 - 10)  polyethylene glycol 3350 17 Gram(s) Oral two times a day PRN Constipation                            9.7    5.51  )-----------( 237      ( 12 Oct 2023 05:42 )             29.8     10-12    136  |  98  |  14  ----------------------------<  143<H>  3.8   |  31  |  0.75    Ca    9.1      12 Oct 2023 05:42  Mg     1.8     10-12    TPro  6.3  /  Alb  2.4<L>  /  TBili  0.5  /  DBili  x   /  AST  16  /  ALT  15  /  AlkPhos  114  10-12    Urinalysis Basic - ( 12 Oct 2023 05:42 )    Color: x / Appearance: x / SG: x / pH: x  Gluc: 143 mg/dL / Ketone: x  / Bili: x / Urobili: x   Blood: x / Protein: x / Nitrite: x   Leuk Esterase: x / RBC: x / WBC x   Sq Epi: x / Non Sq Epi: x / Bacteria: x        CAPILLARY BLOOD GLUCOSE      POCT Blood Glucose.: 150 mg/dL (12 Oct 2023 07:49)  POCT Blood Glucose.: 187 mg/dL (11 Oct 2023 21:55)  POCT Blood Glucose.: 159 mg/dL (11 Oct 2023 17:43)  POCT Blood Glucose.: 158 mg/dL (11 Oct 2023 12:08)      Vital Signs Last 24 Hrs  T(C): 37.1 (11 Oct 2023 20:36), Max: 37.1 (11 Oct 2023 20:36)  T(F): 98.7 (11 Oct 2023 20:36), Max: 98.7 (11 Oct 2023 20:36)  HR: 98 (12 Oct 2023 00:30) (76 - 98)  BP: 128/74 (11 Oct 2023 20:36) (128/74 - 128/77)  BP(mean): --  RR: 17 (11 Oct 2023 20:36) (17 - 17)  SpO2: 71% (12 Oct 2023 00:30) (71% - 97%)    Parameters below as of 11 Oct 2023 20:36  Patient On (Oxygen Delivery Method): room air      Constitutional - NAD  HEENT - NCAT, EOMI.    Chest - good chest expansion, good respiratory effort   Cardio - S1D2 RRR  Abdomen -  Obese, Soft, NTND BS +  Extremities - No peripheral edema, No calf tenderness   Neurologic Exam:                    Cranial Nerves -2-12 intact     Motor -  Strength preserved in UE and LE with severe pain in back.                     LEFT    UE - ShAB 5/5, EF 5/5, EE 5/5, WE 5/5,  WNL                     RIGHT UE - ShAB 5/5, EF 5/5, EE 5/5, WE 5/5,  WNL                    LEFT    LE - HF 4/5, KE 5/5, DF 5/5, PF 5/5                    RIGHT LE - HF 4/5, KE 5/5, DF 5/5, PF 5/5        Sensory - Intact to LT bilateral  Skin on admission:  Mid thoracic to lumbar incision AMANDA-Previous surgical scars of chest, abdomen, b/l elbows, wrists and knees. No pressure ulcers.- back dressing Dcd-   Back incision intact- sutures removed - small area of scabbing mid suture line  leslie PATEL    IDT meeting on 10/11    SW: PH 3STE, 12STI, children    OT:  eating set up  grooming set up  UBD/LBD cg A  toileting cg A  tub/shower cg A  bathing cg A    PT:  amb 130ft cs A RW  transfers cs A  stairs cg    SLP na    tentative dc 10/14 HC       Continue comprehensive acute rehab program consisting of 3hrs/day of OT/PT and SLP.

## 2023-10-12 NOTE — PROGRESS NOTE ADULT - SUBJECTIVE AND OBJECTIVE BOX
Patient is a 67y old  Male who presents with a chief complaint of Spinal Fusion (12 Oct 2023 10:59)      Subjective and overnight events:  Patient seen and examined at bedside. no complaints. no fever, chills, sob, cp, abd pain, abd pain.     ALLERGIES:  No Known Allergies    MEDICATIONS  (STANDING):  allopurinol 100 milliGRAM(s) Oral at bedtime  aspirin enteric coated 81 milliGRAM(s) Oral daily  atorvastatin 40 milliGRAM(s) Oral at bedtime  Biotene Dry Mouth Oral Rinse 15 milliLiter(s) Swish and Spit three times a day  bisacodyl 5 milliGRAM(s) Oral every 12 hours  buMETAnide 2 milliGRAM(s) Oral <User Schedule>  buMETAnide 2 milliGRAM(s) Oral daily  carvedilol 3.125 milliGRAM(s) Oral every 12 hours  dextrose 5%. 1000 milliLiter(s) (50 mL/Hr) IV Continuous <Continuous>  dextrose 5%. 1000 milliLiter(s) (100 mL/Hr) IV Continuous <Continuous>  dextrose 50% Injectable 25 Gram(s) IV Push once  dextrose 50% Injectable 25 Gram(s) IV Push once  dextrose 50% Injectable 12.5 Gram(s) IV Push once  gabapentin 300 milliGRAM(s) Oral four times a day  glucagon  Injectable 1 milliGRAM(s) IntraMuscular once  heparin   Injectable 5000 Unit(s) SubCutaneous every 8 hours  insulin glargine Injectable (LANTUS) 18 Unit(s) SubCutaneous at bedtime  insulin lispro (ADMELOG) corrective regimen sliding scale   SubCutaneous three times a day before meals  insulin lispro (ADMELOG) corrective regimen sliding scale   SubCutaneous at bedtime  insulin lispro Injectable (ADMELOG) 5 Unit(s) SubCutaneous before lunch  insulin lispro Injectable (ADMELOG) 5 Unit(s) SubCutaneous before dinner  lidocaine   4% Patch 1 Patch Transdermal daily  magnesium oxide 400 milliGRAM(s) Oral three times a day with meals  metFORMIN 500 milliGRAM(s) Oral two times a day with meals  multivitamin 1 Tablet(s) Oral daily  pantoprazole    Tablet 40 milliGRAM(s) Oral before breakfast  potassium chloride    Tablet ER 20 milliEquivalent(s) Oral daily  tiZANidine 2 milliGRAM(s) Oral <User Schedule>  tiZANidine 2 milliGRAM(s) Oral <User Schedule>  tiZANidine 6 milliGRAM(s) Oral at bedtime    MEDICATIONS  (PRN):  aluminum hydroxide/magnesium hydroxide/simethicone Suspension 30 milliLiter(s) Oral every 4 hours PRN Dyspepsia  benzocaine/menthol Lozenge 1 Lozenge Oral every 3 hours PRN Sore Throat  dextrose Oral Gel 15 Gram(s) Oral once PRN Blood Glucose LESS THAN 70 milliGRAM(s)/deciliter  guaiFENesin Oral Liquid (Sugar-Free) 100 milliGRAM(s) Oral every 6 hours PRN Cough  melatonin 3 milliGRAM(s) Oral at bedtime PRN Insomnia  ondansetron   Disintegrating Tablet 4 milliGRAM(s) Oral every 6 hours PRN Nausea and/or Vomiting  oxyCODONE    IR 10 milliGRAM(s) Oral every 3 hours PRN Moderate Pain (4 - 6)  oxyCODONE    IR 15 milliGRAM(s) Oral every 3 hours PRN Severe Pain (7 - 10)  polyethylene glycol 3350 17 Gram(s) Oral two times a day PRN Constipation    Vital Signs Last 24 Hrs  T(F): 98.3 (12 Oct 2023 11:01), Max: 98.7 (11 Oct 2023 20:36)  HR: 73 (12 Oct 2023 11:01) (73 - 98)  BP: 103/68 (12 Oct 2023 11:01) (103/68 - 128/77)  RR: 18 (12 Oct 2023 11:01) (17 - 18)  SpO2: 95% (12 Oct 2023 11:01) (71% - 97%)  I&O's Summary    PHYSICAL EXAM:  General: NAD, A/O x 3  ENT: MMM  Neck: Supple, No JVD  Lungs: Clear to auscultation bilaterally  Cardio: RRR, S1/S2, No murmurs  Abdomen: Soft, Nontender, Nondistended; Bowel sounds present  Extremities: No calf tenderness, No pitting edema    LABS:                        9.7    5.51  )-----------( 237      ( 12 Oct 2023 05:42 )             29.8     10-12    136  |  98  |  14  ----------------------------<  143  3.8   |  31  |  0.75    Ca    9.1      12 Oct 2023 05:42  Mg     1.8     10-12    TPro  6.3  /  Alb  2.4  /  TBili  0.5  /  DBili  x   /  AST  16  /  ALT  15  /  AlkPhos  114  10-12      09-19 Chol 181 mg/dL LDL -- HDL 27 mg/dL Trig 646 mg/dL      POCT Blood Glucose.: 171 mg/dL (12 Oct 2023 11:58)  POCT Blood Glucose.: 150 mg/dL (12 Oct 2023 07:49)  POCT Blood Glucose.: 187 mg/dL (11 Oct 2023 21:55)  POCT Blood Glucose.: 159 mg/dL (11 Oct 2023 17:43)      Urinalysis Basic - ( 12 Oct 2023 05:42 )    Color: x / Appearance: x / SG: x / pH: x  Gluc: 143 mg/dL / Ketone: x  / Bili: x / Urobili: x   Blood: x / Protein: x / Nitrite: x   Leuk Esterase: x / RBC: x / WBC x   Sq Epi: x / Non Sq Epi: x / Bacteria: x        COVID-19 PCR: NotDetec (09-30-23 @ 16:00)      RADIOLOGY & ADDITIONAL TESTS:    Care Discussed with Consultants/Other Providers:

## 2023-10-12 NOTE — PROGRESS NOTE ADULT - NS ATTEND AMEND GEN_ALL_CORE FT
Rehab Attending- Patient seen and examined by me - Case discussed, above note reviewed by me with modifications made    patient seen and evaluated in OT  Back spasms now improved   large BM with lactulose last night- Urinating well today  Burning at back lateral to incision- from skin tears related to dressings- adaptic to be applied  seen By Dr Graff of plastics here to DC sutures- Dr Blount who did procedure approved   Mg supplemented- check levels on thursday  discussed in team- tentative DC to home with VN - family to assist- On 10/14  family training- ? virtual on friday  to continue intensive rehab program    Vital Signs Last 24 Hrs  T(C): 36.6 (11 Oct 2023 08:21), Max: 36.8 (10 Oct 2023 21:22)  T(F): 97.9 (11 Oct 2023 08:21), Max: 98.2 (10 Oct 2023 21:22)  HR: 85 (11 Oct 2023 08:21) (65 - 85)  BP: 161/84 (11 Oct 2023 08:21) (130/68 - 161/84)  BP(mean): --  RR: 16 (11 Oct 2023 08:21) (14 - 16)  SpO2: 96% (11 Oct 2023 08:21) (96% - 99%)    Parameters below as of 11 Oct 2023 08:21  Patient On (Oxygen Delivery Method): room air    Constitutional - NAD  HEENT - NCAT, EOMI.    Chest - good chest expansion, good respiratory effort   Cardio - S1D2 RRR  Abdomen -  Obese, Soft, NTND BS +  Extremities - No peripheral edema, No calf tenderness   Neurologic Exam:                    Cranial Nerves -2-12 intact     Motor -  Strength preserved in UE and LE                     LEFT    UE - ShAB 5/5, EF 5/5, EE 5/5, WE 5/5,  WNL                     RIGHT UE - ShAB 5/5, EF 5/5, EE 5/5, WE 5/5,  WNL                    LEFT    LE - HF 4/5, KE 5/5, DF 5/5, PF 5/5                    RIGHT LE - HF 4/5, KE 5/5, DF 5/5, PF 5/5        Sensory - Intact to LT bilateral  Skin on admission:  Mid thoracic to lumbar incision now open to air- suture embedded especially inferiorly--   periincision abrasions from tape weeping- to use adaptic Rehab Attending- Patient seen and examined by me - Case discussed, above note reviewed by me with modifications made    patient seen and evaluated bedside  just moved bowels  no back spasms x few days - will decrease zanaflex to 2mg q8h  Burning at back persists- adaptic in place preiincisionally  sutures out by plastics- incision intact  labs reviewed by me- MGK WNL  family training- ? virtual on friday  to continue intensive rehab program    Vital Signs Last 24 Hrs  T(C): 36.8 (12 Oct 2023 11:01), Max: 37.1 (11 Oct 2023 20:36)  T(F): 98.3 (12 Oct 2023 11:01), Max: 98.7 (11 Oct 2023 20:36)  HR: 73 (12 Oct 2023 11:01) (73 - 98)  BP: 103/68 (12 Oct 2023 11:01) (103/68 - 128/77)  BP(mean): --  RR: 18 (12 Oct 2023 11:01) (17 - 18)  SpO2: 95% (12 Oct 2023 11:01) (71% - 97%)    Parameters below as of 12 Oct 2023 11:01  Patient On (Oxygen Delivery Method): room air    Constitutional - NAD  HEENT - NCAT, EOMI.    Chest - good chest expansion, good respiratory effort   Cardio - S1D2 RRR  Abdomen -  Obese, Soft, NTND BS +  Extremities - No peripheral edema, No calf tenderness   Neurologic Exam:                    Cranial Nerves -2-12 intact     Motor -  Strength preserved in UE and LE                     LEFT    UE - ShAB 5/5, EF 5/5, EE 5/5, WE 5/5,  WNL                     RIGHT UE - ShAB 5/5, EF 5/5, EE 5/5, WE 5/5,  WNL                    LEFT    LE - HF 4/5, KE 5/5, DF 5/5, PF 5/5                    RIGHT LE - HF 4/5, KE 5/5, DF 5/5, PF 5/5        Sensory - Intact to LT bilateral  Skin on admission:  Mid thoracic to lumbar incision now open to air- suture embedded especially inferiorly--   periincision abrasions from tape weeping- to use adaptic

## 2023-10-13 ENCOUNTER — TRANSCRIPTION ENCOUNTER (OUTPATIENT)
Age: 68
End: 2023-10-13

## 2023-10-13 LAB
GLUCOSE BLDC GLUCOMTR-MCNC: 125 MG/DL — HIGH (ref 70–99)
GLUCOSE BLDC GLUCOMTR-MCNC: 132 MG/DL — HIGH (ref 70–99)
GLUCOSE BLDC GLUCOMTR-MCNC: 157 MG/DL — HIGH (ref 70–99)
GLUCOSE BLDC GLUCOMTR-MCNC: 162 MG/DL — HIGH (ref 70–99)

## 2023-10-13 PROCEDURE — 99232 SBSQ HOSP IP/OBS MODERATE 35: CPT | Mod: GC

## 2023-10-13 RX ORDER — GABAPENTIN 400 MG/1
1 CAPSULE ORAL
Qty: 120 | Refills: 0
Start: 2023-10-13 | End: 2023-11-11

## 2023-10-13 RX ORDER — PANTOPRAZOLE SODIUM 20 MG/1
1 TABLET, DELAYED RELEASE ORAL
Qty: 30 | Refills: 0
Start: 2023-10-13 | End: 2023-11-11

## 2023-10-13 RX ORDER — ATORVASTATIN CALCIUM 80 MG/1
1 TABLET, FILM COATED ORAL
Qty: 30 | Refills: 0
Start: 2023-10-13 | End: 2023-11-11

## 2023-10-13 RX ORDER — OXYCODONE HYDROCHLORIDE 5 MG/1
1 TABLET ORAL
Qty: 21 | Refills: 0
Start: 2023-10-13 | End: 2023-10-19

## 2023-10-13 RX ORDER — OXYCODONE HYDROCHLORIDE 5 MG/1
10 TABLET ORAL
Refills: 0 | Status: DISCONTINUED | OUTPATIENT
Start: 2023-10-14 | End: 2023-10-14

## 2023-10-13 RX ORDER — BUMETANIDE 0.25 MG/ML
1 INJECTION INTRAMUSCULAR; INTRAVENOUS
Qty: 60 | Refills: 0
Start: 2023-10-13 | End: 2023-11-11

## 2023-10-13 RX ORDER — OXYCODONE HYDROCHLORIDE 5 MG/1
15 TABLET ORAL
Refills: 0 | Status: DISCONTINUED | OUTPATIENT
Start: 2023-10-14 | End: 2023-10-14

## 2023-10-13 RX ORDER — POTASSIUM CHLORIDE 20 MEQ
1 PACKET (EA) ORAL
Qty: 30 | Refills: 0
Start: 2023-10-13 | End: 2023-11-11

## 2023-10-13 RX ORDER — TIZANIDINE 4 MG/1
1 TABLET ORAL
Qty: 45 | Refills: 0
Start: 2023-10-13 | End: 2023-10-27

## 2023-10-13 RX ORDER — GLIMEPIRIDE 1 MG
1 TABLET ORAL
Refills: 0 | DISCHARGE

## 2023-10-13 RX ORDER — GLIMEPIRIDE 1 MG
1 TABLET ORAL
Qty: 30 | Refills: 0
Start: 2023-10-13 | End: 2023-11-11

## 2023-10-13 RX ORDER — CARVEDILOL PHOSPHATE 80 MG/1
1 CAPSULE, EXTENDED RELEASE ORAL
Qty: 60 | Refills: 0
Start: 2023-10-13 | End: 2023-11-11

## 2023-10-13 RX ORDER — MAGNESIUM OXIDE 400 MG ORAL TABLET 241.3 MG
1 TABLET ORAL
Qty: 90 | Refills: 0
Start: 2023-10-13 | End: 2023-11-11

## 2023-10-13 RX ORDER — MAGNESIUM OXIDE 400 MG ORAL TABLET 241.3 MG
2 TABLET ORAL
Qty: 90 | Refills: 0 | DISCHARGE
Start: 2023-10-13 | End: 2023-11-11

## 2023-10-13 RX ADMIN — GABAPENTIN 300 MILLIGRAM(S): 400 CAPSULE ORAL at 14:17

## 2023-10-13 RX ADMIN — MAGNESIUM OXIDE 400 MG ORAL TABLET 400 MILLIGRAM(S): 241.3 TABLET ORAL at 08:26

## 2023-10-13 RX ADMIN — Medication 3 MILLIGRAM(S): at 22:41

## 2023-10-13 RX ADMIN — Medication 5 UNIT(S): at 17:37

## 2023-10-13 RX ADMIN — TIZANIDINE 2 MILLIGRAM(S): 4 TABLET ORAL at 14:17

## 2023-10-13 RX ADMIN — CARVEDILOL PHOSPHATE 3.12 MILLIGRAM(S): 80 CAPSULE, EXTENDED RELEASE ORAL at 05:26

## 2023-10-13 RX ADMIN — HEPARIN SODIUM 5000 UNIT(S): 5000 INJECTION INTRAVENOUS; SUBCUTANEOUS at 14:17

## 2023-10-13 RX ADMIN — PANTOPRAZOLE SODIUM 40 MILLIGRAM(S): 20 TABLET, DELAYED RELEASE ORAL at 08:26

## 2023-10-13 RX ADMIN — Medication 5 MILLIGRAM(S): at 17:40

## 2023-10-13 RX ADMIN — OXYCODONE HYDROCHLORIDE 10 MILLIGRAM(S): 5 TABLET ORAL at 22:40

## 2023-10-13 RX ADMIN — BUMETANIDE 2 MILLIGRAM(S): 0.25 INJECTION INTRAMUSCULAR; INTRAVENOUS at 05:25

## 2023-10-13 RX ADMIN — Medication 81 MILLIGRAM(S): at 12:26

## 2023-10-13 RX ADMIN — HEPARIN SODIUM 5000 UNIT(S): 5000 INJECTION INTRAVENOUS; SUBCUTANEOUS at 21:14

## 2023-10-13 RX ADMIN — GABAPENTIN 300 MILLIGRAM(S): 400 CAPSULE ORAL at 18:03

## 2023-10-13 RX ADMIN — GABAPENTIN 300 MILLIGRAM(S): 400 CAPSULE ORAL at 22:41

## 2023-10-13 RX ADMIN — Medication 5 MILLIGRAM(S): at 05:26

## 2023-10-13 RX ADMIN — Medication 2: at 12:25

## 2023-10-13 RX ADMIN — Medication 1 TABLET(S): at 12:25

## 2023-10-13 RX ADMIN — MAGNESIUM OXIDE 400 MG ORAL TABLET 400 MILLIGRAM(S): 241.3 TABLET ORAL at 17:39

## 2023-10-13 RX ADMIN — Medication 20 MILLIEQUIVALENT(S): at 12:26

## 2023-10-13 RX ADMIN — TIZANIDINE 2 MILLIGRAM(S): 4 TABLET ORAL at 08:26

## 2023-10-13 RX ADMIN — MAGNESIUM OXIDE 400 MG ORAL TABLET 400 MILLIGRAM(S): 241.3 TABLET ORAL at 12:26

## 2023-10-13 RX ADMIN — OXYCODONE HYDROCHLORIDE 10 MILLIGRAM(S): 5 TABLET ORAL at 23:40

## 2023-10-13 RX ADMIN — ATORVASTATIN CALCIUM 40 MILLIGRAM(S): 80 TABLET, FILM COATED ORAL at 21:14

## 2023-10-13 RX ADMIN — METFORMIN HYDROCHLORIDE 500 MILLIGRAM(S): 850 TABLET ORAL at 08:25

## 2023-10-13 RX ADMIN — BUMETANIDE 2 MILLIGRAM(S): 0.25 INJECTION INTRAMUSCULAR; INTRAVENOUS at 12:26

## 2023-10-13 RX ADMIN — Medication 5 UNIT(S): at 12:25

## 2023-10-13 RX ADMIN — Medication 100 MILLIGRAM(S): at 21:14

## 2023-10-13 RX ADMIN — Medication 2: at 08:23

## 2023-10-13 RX ADMIN — CARVEDILOL PHOSPHATE 3.12 MILLIGRAM(S): 80 CAPSULE, EXTENDED RELEASE ORAL at 18:03

## 2023-10-13 RX ADMIN — HEPARIN SODIUM 5000 UNIT(S): 5000 INJECTION INTRAVENOUS; SUBCUTANEOUS at 05:24

## 2023-10-13 RX ADMIN — INSULIN GLARGINE 18 UNIT(S): 100 INJECTION, SOLUTION SUBCUTANEOUS at 21:14

## 2023-10-13 RX ADMIN — GABAPENTIN 300 MILLIGRAM(S): 400 CAPSULE ORAL at 05:24

## 2023-10-13 RX ADMIN — METFORMIN HYDROCHLORIDE 500 MILLIGRAM(S): 850 TABLET ORAL at 17:40

## 2023-10-13 RX ADMIN — TIZANIDINE 2 MILLIGRAM(S): 4 TABLET ORAL at 21:14

## 2023-10-13 NOTE — DISCHARGE NOTE NURSING/CASE MANAGEMENT/SOCIAL WORK - NSDCPEFALRISK_GEN_ALL_CORE
For information on Fall & Injury Prevention, visit: [email or web address] OR  [email or web address] OR  https://www.cdc.gov/steadi/patient.html

## 2023-10-13 NOTE — PROGRESS NOTE ADULT - NUTRITIONAL ASSESSMENT
This patient has been assessed with a concern for Malnutrition and has been determined to have a diagnosis/diagnoses of Morbid obesity (BMI > 40).    This patient is being managed with:   Diet DASH/TLC-  Sodium & Cholesterol Restricted  Consistent Carbohydrate {Evening Snack} (CSTCHOSN)  Entered: Sep 30 2023 12:39PM  

## 2023-10-13 NOTE — PROGRESS NOTE ADULT - ASSESSMENT
This is a 68 YO male with PMH of  HFrEF, CAD s/p CABG, ASIYA on CPAP, HTN, DM II, right renal mass s/p resection presents to  [facility] ED on 9/18  after a syncopal episode, found to have T12-L1 fracture on imaging. Patient had a T9-pelvis revision PSF. Patient now with gait Instability, ADL impairments and Functional impairments.    #Thoracic spine fracture  - T9-pelvis revision PSF on 9/23  - Comprehensive Rehab Program: PT/OT, 3hours daily and 5 days weekly  - PT: Focused on improving strength, endurance, coordination, balance, functional mobility, and transfers  - OT: Focused on improving strength, fine motor skills, coordination, posture and ADLs.    - pain regimen  - zanaflex TID, avoid hypotension- muscle spasms improved. Dose decreased.   - plastics in-sutures removed    #ASIYA  Respiratory order for nocturnal CPAP. Pressure set withy home setting of 5.    #HTN  - Carvedilol resumed low dose.   - Losartan 25mg daily-hold    #HLD  - Lipitor 40mg daily    #CAD   - s/p CABG  - c/w ASA 81mg daily    #CHF  - Bumex 2mg+2mg. Monitor weight daily-improved    hypomagnesemia  Mg 1.8  - supplement prn    Skin Tears -   in region of incision   adaptic    #DM II  - ISS and FS  - Admelog and Lantus, metformin  - A1c 6.5 on 9/19  - follow fingersticks    #Neuropathy  -gabapentin 300mg QID.    #Pain management  - Tylenol PRN,   - Oxycodone 10mg moderate, 15mg severe PRN,   - lidocaine patch,   - zanaflex 2mg/2mg/6mg  - gabapentin 300mg 4x/.day  ICE    #DVT ppx  - Heparin 5K Q 8 hrs    #GI ppx  - Protonix 40mg  - Zofran    #Bowel Regimen  - Senna, miralax 2x/day  - Lactulose given-large BM 10/10    #Bladder management  -PVR low  - Monitor UO  -goal BM daily    #FEN   - Diet: DASH/TLC  - MVI    #Skin:  - Skin on admission: Multiple Left sided abdomen abrasions. Mid thoracic to lumbar incision now open to air- sutures removed  - Pressure injury/Skin: Turn Q2hrs while in bed, OOB to Chair, PT/OT     #Precaution  - Fall, Aspiration, Spinal   This is a 68 YO male with PMH of  HFrEF, CAD s/p CABG, ASIYA on CPAP, HTN, DM II, right renal mass s/p resection presents to  [facility] ED on 9/18  after a syncopal episode, found to have T12-L1 fracture on imaging. Patient had a T9-pelvis revision PSF. Patient now with gait Instability, ADL impairments and Functional impairments.    #Thoracic spine fracture  - T9-pelvis revision PSF on 9/23  - Comprehensive Rehab Program: PT/OT, 3hours daily and 5 days weekly  - PT: Focused on improving strength, endurance, coordination, balance, functional mobility, and transfers  - OT: Focused on improving strength, fine motor skills, coordination, posture and ADLs.    - pain regimen  - zanaflex TID, avoid hypotension- muscle spasms improved. Dose decreased.   - plastics in-sutures removed  Zanaflex PRN on DC  Oxycodone PRN  Anticipate Dc in AM    #ASIYA  Respiratory order for nocturnal CPAP. Pressure set withy home setting of 5.    #HTN  - Carvedilol resumed low dose.   - Losartan 25mg daily-hold    #HLD  - Lipitor 40mg daily    #CAD   - s/p CABG  - c/w ASA 81mg daily    #CHF  - Bumex 2mg+2mg. Monitor weight daily-improved    hypomagnesemia  Mg 1.8  - supplement prn    Skin Tears -   in region of incision   adaptic    #DM II  - ISS and FS  - Admelog and Lantus, metformin  - A1c 6.5 on 9/19  - follow fingersticks    #Neuropathy  -gabapentin 300mg QID.    #Pain management  - Tylenol PRN,   - Oxycodone 10mg moderate, 15mg severe PRN,   - lidocaine patch,   - zanaflex 2mg/2mg/6mg  - gabapentin 300mg 4x/.day  ICE    #DVT ppx  - Heparin 5K Q 8 hrs    #GI ppx  - Protonix 40mg  - Zofran    #Bowel Regimen  - Senna, miralax 2x/day  - Lactulose given-large BM 10/10    #Bladder management  -PVR low  - Monitor UO  -goal BM daily    #FEN   - Diet: DASH/TLC  - MVI    #Skin:  - Skin on admission: Multiple Left sided abdomen abrasions. Mid thoracic to lumbar incision now open to air- sutures removed  - Pressure injury/Skin: Turn Q2hrs while in bed, OOB to Chair, PT/OT     #Precaution  - Fall, Aspiration, Spinal

## 2023-10-13 NOTE — DISCHARGE NOTE NURSING/CASE MANAGEMENT/SOCIAL WORK - NSDCVIVACCINE_GEN_ALL_CORE_FT
influenza, high-dose, quadrivalent; 29-Sep-2023 17:22; Jody Coleman (RN); [employer]; [id number] (Exp. Date: 28-Jun-2024); IntraMuscular; Deltoid Left.; 0.7 milliLiter(s); VIS (VIS Published: 06-Aug-2021, VIS Presented: 29-Sep-2023);   Tdap; 13-Feb-2022 09:56; Benoit Kessler (RN); [employer]; [id number] (Exp. Date: 09-Sep-2023); IntraMuscular; Deltoid Right.; 0.5 milliLiter(s); VIS (VIS Published: 09-May-2013, VIS Presented: 13-Feb-2022);

## 2023-10-13 NOTE — PROGRESS NOTE ADULT - NS ATTEND AMEND GEN_ALL_CORE FT
Rehab Attending- Patient seen and examined by me - Case discussed, above note reviewed by me with modifications made    patient seen and evaluated bedside  just moved bowels  no back spasms x few days - will decrease zanaflex to 2mg q8h  Burning at back persists- adaptic in place preiincisionally  sutures out by plastics- incision intact  labs reviewed by me- MGK WNL  family training- ? virtual on friday  to continue intensive rehab program    Vital Signs Last 24 Hrs  T(C): 36.8 (12 Oct 2023 11:01), Max: 37.1 (11 Oct 2023 20:36)  T(F): 98.3 (12 Oct 2023 11:01), Max: 98.7 (11 Oct 2023 20:36)  HR: 73 (12 Oct 2023 11:01) (73 - 98)  BP: 103/68 (12 Oct 2023 11:01) (103/68 - 128/77)  BP(mean): --  RR: 18 (12 Oct 2023 11:01) (17 - 18)  SpO2: 95% (12 Oct 2023 11:01) (71% - 97%)    Parameters below as of 12 Oct 2023 11:01  Patient On (Oxygen Delivery Method): room air    Constitutional - NAD  HEENT - NCAT, EOMI.    Chest - good chest expansion, good respiratory effort   Cardio - S1D2 RRR  Abdomen -  Obese, Soft, NTND BS +  Extremities - No peripheral edema, No calf tenderness   Neurologic Exam:                    Cranial Nerves -2-12 intact     Motor -  Strength preserved in UE and LE                     LEFT    UE - ShAB 5/5, EF 5/5, EE 5/5, WE 5/5,  WNL                     RIGHT UE - ShAB 5/5, EF 5/5, EE 5/5, WE 5/5,  WNL                    LEFT    LE - HF 4/5, KE 5/5, DF 5/5, PF 5/5                    RIGHT LE - HF 4/5, KE 5/5, DF 5/5, PF 5/5        Sensory - Intact to LT bilateral  Skin on admission:  Mid thoracic to lumbar incision now open to air- suture embedded especially inferiorly--   periincision abrasions from tape weeping- to use adaptic Rehab Attending- Patient seen and examined by me - Case discussed, above note reviewed by me with modifications made    patient seen and evaluated bedside  virtual family training today  no back spasms on Lower dose of Zanaflex- to change to PRN on DC  Burning at back persists- adaptic in place jesus-incisionally  sutures out by plastics- incision intact  to continue intensive rehab program    Vital Signs Last 24 Hrs  T(C): 36.8 (13 Oct 2023 08:00), Max: 37 (12 Oct 2023 20:42)  T(F): 98.3 (13 Oct 2023 08:00), Max: 98.6 (12 Oct 2023 20:42)  HR: 69 (13 Oct 2023 08:00) (69 - 98)  BP: 102/64 (13 Oct 2023 08:00) (102/64 - 114/78)  BP(mean): --  RR: 16 (13 Oct 2023 08:00) (16 - 18)  SpO2: 98% (13 Oct 2023 08:00) (70% - 98%)    Parameters below as of 13 Oct 2023 08:00  Patient On (Oxygen Delivery Method): room air    Constitutional - NAD  HEENT - NCAT, EOMI.    Chest - good chest expansion, good respiratory effort   Cardio - S1D2 RRR  Abdomen -  Obese, Soft, NTND BS +  Extremities - No peripheral edema, No calf tenderness   Neurologic Exam:                    Cranial Nerves -2-12 intact     Motor -  Strength preserved in UE and LE                     LEFT    UE - ShAB 5/5, EF 5/5, EE 5/5, WE 5/5,  WNL                     RIGHT UE - ShAB 5/5, EF 5/5, EE 5/5, WE 5/5,  WNL                    LEFT    LE - HF 4/5, KE 5/5, DF 5/5, PF 5/5                    RIGHT LE - HF 4/5, KE 5/5, DF 5/5, PF 5/5        Sensory - Intact to LT bilateral  Skin on admission:  Mid thoracic to lumbar incision now open to air- sutures out- some scabbing noted mid incision- dry  periincision abrasions now dry- to use adaptic

## 2023-10-13 NOTE — DISCHARGE NOTE NURSING/CASE MANAGEMENT/SOCIAL WORK - NSDCDMETYPESERV_GEN_ALL_CORE_FT
Bariatric Shower Chair Ordered-pt declined, will purchase elsewhere. Bariatric Shower Chair Ordered-pt declined, will purchase elsewhere. Wheelchair and Grab Bars ordered.

## 2023-10-13 NOTE — PROGRESS NOTE ADULT - SUBJECTIVE AND OBJECTIVE BOX
HPI:  This is a 68 YO male with PMH of  HFrEF, CAD s/p CABG, ASIYA on CPAP, HTN, DM, right renal mass s/p resection presents to  [facility] ED ON 9/18  after a syncopal episode, found to have T12-L1 fracture on imaging. Patient originally scheduled for an ACDF with Dr. Hughes, due to injury he was changed to a T9-pelvis revision PSF. Hospital course s/f medication adjustments for HTN  and HF. Patient was evaluated by PM&R and therapy for functional deficits, gait/ADL impairments and acute rehabilitation was recommended. Patient was medically optimized for discharge to [facility] IRU on 9/29/23.         ROS/subjective:  Patient seen and evaluated in chair, NAD, spasms/back pain-controlled- Zanaflex 2/2/2  BPs up-Coreg resumed low dose. Bumex 2mg/2mg for hx CHF exacerbation. Daily weight following  PVRs low.  no dizziness, no headaches, no chest pain , No SOB, no nausea, no vomiting  BM yesterday  voiding well overnight- good flow  Plastics consulted- sutures removed   Wound care appreciated for skin tear-adaptic-healing well  Mg ok      MEDICATIONS  (STANDING):  allopurinol 100 milliGRAM(s) Oral at bedtime  aspirin enteric coated 81 milliGRAM(s) Oral daily  atorvastatin 40 milliGRAM(s) Oral at bedtime  Biotene Dry Mouth Oral Rinse 15 milliLiter(s) Swish and Spit three times a day  bisacodyl 5 milliGRAM(s) Oral every 12 hours  buMETAnide 2 milliGRAM(s) Oral <User Schedule>  buMETAnide 2 milliGRAM(s) Oral daily  carvedilol 3.125 milliGRAM(s) Oral every 12 hours  dextrose 5%. 1000 milliLiter(s) (50 mL/Hr) IV Continuous <Continuous>  dextrose 5%. 1000 milliLiter(s) (100 mL/Hr) IV Continuous <Continuous>  dextrose 50% Injectable 25 Gram(s) IV Push once  dextrose 50% Injectable 25 Gram(s) IV Push once  dextrose 50% Injectable 12.5 Gram(s) IV Push once  gabapentin 300 milliGRAM(s) Oral four times a day  glucagon  Injectable 1 milliGRAM(s) IntraMuscular once  heparin   Injectable 5000 Unit(s) SubCutaneous every 8 hours  insulin glargine Injectable (LANTUS) 18 Unit(s) SubCutaneous at bedtime  insulin lispro (ADMELOG) corrective regimen sliding scale   SubCutaneous three times a day before meals  insulin lispro (ADMELOG) corrective regimen sliding scale   SubCutaneous at bedtime  insulin lispro Injectable (ADMELOG) 5 Unit(s) SubCutaneous before lunch  insulin lispro Injectable (ADMELOG) 5 Unit(s) SubCutaneous before dinner  lidocaine   4% Patch 1 Patch Transdermal daily  magnesium oxide 400 milliGRAM(s) Oral three times a day with meals  metFORMIN 500 milliGRAM(s) Oral two times a day with meals  multivitamin 1 Tablet(s) Oral daily  pantoprazole    Tablet 40 milliGRAM(s) Oral before breakfast  potassium chloride    Tablet ER 20 milliEquivalent(s) Oral daily  tiZANidine 2 milliGRAM(s) Oral <User Schedule>  tiZANidine 2 milliGRAM(s) Oral <User Schedule>  tiZANidine 2 milliGRAM(s) Oral at bedtime    MEDICATIONS  (PRN):  aluminum hydroxide/magnesium hydroxide/simethicone Suspension 30 milliLiter(s) Oral every 4 hours PRN Dyspepsia  benzocaine/menthol Lozenge 1 Lozenge Oral every 3 hours PRN Sore Throat  dextrose Oral Gel 15 Gram(s) Oral once PRN Blood Glucose LESS THAN 70 milliGRAM(s)/deciliter  guaiFENesin Oral Liquid (Sugar-Free) 100 milliGRAM(s) Oral every 6 hours PRN Cough  melatonin 3 milliGRAM(s) Oral at bedtime PRN Insomnia  ondansetron   Disintegrating Tablet 4 milliGRAM(s) Oral every 6 hours PRN Nausea and/or Vomiting  oxyCODONE    IR 15 milliGRAM(s) Oral every 3 hours PRN Severe Pain (7 - 10)  oxyCODONE    IR 10 milliGRAM(s) Oral every 3 hours PRN Moderate Pain (4 - 6)  polyethylene glycol 3350 17 Gram(s) Oral two times a day PRN Constipation                            9.7    5.51  )-----------( 237      ( 12 Oct 2023 05:42 )             29.8     10-12    136  |  98  |  14  ----------------------------<  143<H>  3.8   |  31  |  0.75    Ca    9.1      12 Oct 2023 05:42  Mg     1.8     10-12    TPro  6.3  /  Alb  2.4<L>  /  TBili  0.5  /  DBili  x   /  AST  16  /  ALT  15  /  AlkPhos  114  10-12    Urinalysis Basic - ( 12 Oct 2023 05:42 )    Color: x / Appearance: x / SG: x / pH: x  Gluc: 143 mg/dL / Ketone: x  / Bili: x / Urobili: x   Blood: x / Protein: x / Nitrite: x   Leuk Esterase: x / RBC: x / WBC x   Sq Epi: x / Non Sq Epi: x / Bacteria: x        CAPILLARY BLOOD GLUCOSE      POCT Blood Glucose.: 162 mg/dL (13 Oct 2023 07:34)  POCT Blood Glucose.: 149 mg/dL (12 Oct 2023 21:50)  POCT Blood Glucose.: 147 mg/dL (12 Oct 2023 17:06)  POCT Blood Glucose.: 171 mg/dL (12 Oct 2023 11:58)      Vital Signs Last 24 Hrs  T(C): 36.8 (13 Oct 2023 08:00), Max: 37 (12 Oct 2023 20:42)  T(F): 98.3 (13 Oct 2023 08:00), Max: 98.6 (12 Oct 2023 20:42)  HR: 69 (13 Oct 2023 08:00) (69 - 98)  BP: 102/64 (13 Oct 2023 08:00) (102/64 - 114/78)  BP(mean): --  RR: 16 (13 Oct 2023 08:00) (16 - 18)  SpO2: 98% (13 Oct 2023 08:00) (70% - 98%)    Parameters below as of 13 Oct 2023 08:00  Patient On (Oxygen Delivery Method): room air        Constitutional - NAD  HEENT - NCAT, EOMI.    Chest - good chest expansion, good respiratory effort   Cardio - S1D2 RRR  Abdomen -  Obese, Soft, NTND BS +  Extremities - No peripheral edema, No calf tenderness   Neurologic Exam:                    Cranial Nerves -2-12 intact     Motor -  Strength preserved in UE and LE with severe pain in back.                     LEFT    UE - ShAB 5/5, EF 5/5, EE 5/5, WE 5/5,  WNL                     RIGHT UE - ShAB 5/5, EF 5/5, EE 5/5, WE 5/5,  WNL                    LEFT    LE - HF 4/5, KE 5/5, DF 5/5, PF 5/5                    RIGHT LE - HF 4/5, KE 5/5, DF 5/5, PF 5/5        Sensory - Intact to LT bilateral  Skin on admission:  Mid thoracic to lumbar incision AMANDA-Previous surgical scars of chest, abdomen, b/l elbows, wrists and knees. No pressure ulcers.- back dressing Dcd-   Back incision intact- sutures removed - small area of scabbing mid suture line  leslie PATEL    IDT meeting on 10/11    SW: PH 3STE, 12STI, children    OT:  eating set up  grooming set up  UBD/LBD cg A  toileting cg A  tub/shower cg A  bathing cg A    PT:  amb 130ft cs A RW  transfers cs A  stairs cg    SLP na    tentative dc 10/14 HC       Continue comprehensive acute rehab program consisting of 3hrs/day of OT/PT. HPI:  This is a 66 YO male with PMH of  HFrEF, CAD s/p CABG, ASIYA on CPAP, HTN, DM, right renal mass s/p resection presents to  [facility] ED ON 9/18  after a syncopal episode, found to have T12-L1 fracture on imaging. Patient originally scheduled for an ACDF with Dr. Hughes, due to injury he was changed to a T9-pelvis revision PSF. Hospital course s/f medication adjustments for HTN  and HF. Patient was evaluated by PM&R and therapy for functional deficits, gait/ADL impairments and acute rehabilitation was recommended. Patient was medically optimized for discharge to [facility] IRU on 9/29/23.     ROS/subjective:  Patient seen and evaluated in chair, NAD, spasms/back pain-controlled- Zanaflex 2/2/2  BPs up-Coreg resumed low dose. Bumex 2mg/2mg for hx CHF exacerbation. Daily weight following  PVRs low.  no dizziness, no headaches, no chest pain , No SOB, no nausea, no vomiting  BM yesterday  voiding well overnight- good flow  Plastics consulted- sutures removed   Wound care appreciated for skin tear-adaptic-healing well  Mg ok  Anticipate Dc in AM      MEDICATIONS  (STANDING):  allopurinol 100 milliGRAM(s) Oral at bedtime  aspirin enteric coated 81 milliGRAM(s) Oral daily  atorvastatin 40 milliGRAM(s) Oral at bedtime  Biotene Dry Mouth Oral Rinse 15 milliLiter(s) Swish and Spit three times a day  bisacodyl 5 milliGRAM(s) Oral every 12 hours  buMETAnide 2 milliGRAM(s) Oral <User Schedule>  buMETAnide 2 milliGRAM(s) Oral daily  carvedilol 3.125 milliGRAM(s) Oral every 12 hours  dextrose 5%. 1000 milliLiter(s) (50 mL/Hr) IV Continuous <Continuous>  dextrose 5%. 1000 milliLiter(s) (100 mL/Hr) IV Continuous <Continuous>  dextrose 50% Injectable 25 Gram(s) IV Push once  dextrose 50% Injectable 25 Gram(s) IV Push once  dextrose 50% Injectable 12.5 Gram(s) IV Push once  gabapentin 300 milliGRAM(s) Oral four times a day  glucagon  Injectable 1 milliGRAM(s) IntraMuscular once  heparin   Injectable 5000 Unit(s) SubCutaneous every 8 hours  insulin glargine Injectable (LANTUS) 18 Unit(s) SubCutaneous at bedtime  insulin lispro (ADMELOG) corrective regimen sliding scale   SubCutaneous three times a day before meals  insulin lispro (ADMELOG) corrective regimen sliding scale   SubCutaneous at bedtime  insulin lispro Injectable (ADMELOG) 5 Unit(s) SubCutaneous before lunch  insulin lispro Injectable (ADMELOG) 5 Unit(s) SubCutaneous before dinner  lidocaine   4% Patch 1 Patch Transdermal daily  magnesium oxide 400 milliGRAM(s) Oral three times a day with meals  metFORMIN 500 milliGRAM(s) Oral two times a day with meals  multivitamin 1 Tablet(s) Oral daily  pantoprazole    Tablet 40 milliGRAM(s) Oral before breakfast  potassium chloride    Tablet ER 20 milliEquivalent(s) Oral daily  tiZANidine 2 milliGRAM(s) Oral <User Schedule>  tiZANidine 2 milliGRAM(s) Oral <User Schedule>  tiZANidine 2 milliGRAM(s) Oral at bedtime    MEDICATIONS  (PRN):  aluminum hydroxide/magnesium hydroxide/simethicone Suspension 30 milliLiter(s) Oral every 4 hours PRN Dyspepsia  benzocaine/menthol Lozenge 1 Lozenge Oral every 3 hours PRN Sore Throat  dextrose Oral Gel 15 Gram(s) Oral once PRN Blood Glucose LESS THAN 70 milliGRAM(s)/deciliter  guaiFENesin Oral Liquid (Sugar-Free) 100 milliGRAM(s) Oral every 6 hours PRN Cough  melatonin 3 milliGRAM(s) Oral at bedtime PRN Insomnia  ondansetron   Disintegrating Tablet 4 milliGRAM(s) Oral every 6 hours PRN Nausea and/or Vomiting  oxyCODONE    IR 15 milliGRAM(s) Oral every 3 hours PRN Severe Pain (7 - 10)  oxyCODONE    IR 10 milliGRAM(s) Oral every 3 hours PRN Moderate Pain (4 - 6)  polyethylene glycol 3350 17 Gram(s) Oral two times a day PRN Constipation                            9.7    5.51  )-----------( 237      ( 12 Oct 2023 05:42 )             29.8     10-12    136  |  98  |  14  ----------------------------<  143<H>  3.8   |  31  |  0.75    Ca    9.1      12 Oct 2023 05:42  Mg     1.8     10-12    TPro  6.3  /  Alb  2.4<L>  /  TBili  0.5  /  DBili  x   /  AST  16  /  ALT  15  /  AlkPhos  114  10-12    Urinalysis Basic - ( 12 Oct 2023 05:42 )    Color: x / Appearance: x / SG: x / pH: x  Gluc: 143 mg/dL / Ketone: x  / Bili: x / Urobili: x   Blood: x / Protein: x / Nitrite: x   Leuk Esterase: x / RBC: x / WBC x   Sq Epi: x / Non Sq Epi: x / Bacteria: x        CAPILLARY BLOOD GLUCOSE      POCT Blood Glucose.: 162 mg/dL (13 Oct 2023 07:34)  POCT Blood Glucose.: 149 mg/dL (12 Oct 2023 21:50)  POCT Blood Glucose.: 147 mg/dL (12 Oct 2023 17:06)  POCT Blood Glucose.: 171 mg/dL (12 Oct 2023 11:58)      Vital Signs Last 24 Hrs  T(C): 36.8 (13 Oct 2023 08:00), Max: 37 (12 Oct 2023 20:42)  T(F): 98.3 (13 Oct 2023 08:00), Max: 98.6 (12 Oct 2023 20:42)  HR: 69 (13 Oct 2023 08:00) (69 - 98)  BP: 102/64 (13 Oct 2023 08:00) (102/64 - 114/78)  BP(mean): --  RR: 16 (13 Oct 2023 08:00) (16 - 18)  SpO2: 98% (13 Oct 2023 08:00) (70% - 98%)    Parameters below as of 13 Oct 2023 08:00  Patient On (Oxygen Delivery Method): room air        Constitutional - NAD  HEENT - NCAT, EOMI.    Chest - good chest expansion, good respiratory effort   Cardio - S1D2 RRR  Abdomen -  Obese, Soft, NTND BS +  Extremities - No peripheral edema, No calf tenderness   Neurologic Exam:                    Cranial Nerves -2-12 intact     Motor -  Strength preserved in UE and LE with severe pain in back.                     LEFT    UE - ShAB 5/5, EF 5/5, EE 5/5, WE 5/5,  WNL                     RIGHT UE - ShAB 5/5, EF 5/5, EE 5/5, WE 5/5,  WNL                    LEFT    LE - HF 4/5, KE 5/5, DF 5/5, PF 5/5                    RIGHT LE - HF 4/5, KE 5/5, DF 5/5, PF 5/5        Sensory - Intact to LT bilateral  Skin on admission:  Mid thoracic to lumbar incision AMANDA-Previous surgical scars of chest, abdomen, b/l elbows, wrists and knees. No pressure ulcers.- back dressing Dcd-   Back incision intact- sutures removed - scabbing mid suture line- dry - no drainage  AMANDA,    IDT meeting on 10/11    SW: PH 3STE, 12STI, children    OT:  eating set up  grooming set up  UBD/LBD cg A  toileting cg A  tub/shower cg A  bathing cg A    PT:  amb 130ft cs A RW  transfers cs A  stairs cg    SLP na    tentative dc 10/14 HC       Continue comprehensive acute rehab program consisting of 3hrs/day of OT/PT.

## 2023-10-14 VITALS
HEART RATE: 68 BPM | DIASTOLIC BLOOD PRESSURE: 67 MMHG | SYSTOLIC BLOOD PRESSURE: 102 MMHG | TEMPERATURE: 98 F | OXYGEN SATURATION: 97 % | RESPIRATION RATE: 16 BRPM

## 2023-10-14 LAB — GLUCOSE BLDC GLUCOMTR-MCNC: 154 MG/DL — HIGH (ref 70–99)

## 2023-10-14 PROCEDURE — 85027 COMPLETE CBC AUTOMATED: CPT

## 2023-10-14 PROCEDURE — 80048 BASIC METABOLIC PNL TOTAL CA: CPT

## 2023-10-14 PROCEDURE — 83735 ASSAY OF MAGNESIUM: CPT

## 2023-10-14 PROCEDURE — 85025 COMPLETE CBC W/AUTO DIFF WBC: CPT

## 2023-10-14 PROCEDURE — 99238 HOSP IP/OBS DSCHRG MGMT 30/<: CPT

## 2023-10-14 PROCEDURE — 97112 NEUROMUSCULAR REEDUCATION: CPT

## 2023-10-14 PROCEDURE — 83036 HEMOGLOBIN GLYCOSYLATED A1C: CPT

## 2023-10-14 PROCEDURE — 94660 CPAP INITIATION&MGMT: CPT

## 2023-10-14 PROCEDURE — 97110 THERAPEUTIC EXERCISES: CPT

## 2023-10-14 PROCEDURE — 36415 COLL VENOUS BLD VENIPUNCTURE: CPT

## 2023-10-14 PROCEDURE — 97535 SELF CARE MNGMENT TRAINING: CPT

## 2023-10-14 PROCEDURE — 97167 OT EVAL HIGH COMPLEX 60 MIN: CPT

## 2023-10-14 PROCEDURE — 83880 ASSAY OF NATRIURETIC PEPTIDE: CPT

## 2023-10-14 PROCEDURE — 97116 GAIT TRAINING THERAPY: CPT

## 2023-10-14 PROCEDURE — 80053 COMPREHEN METABOLIC PANEL: CPT

## 2023-10-14 PROCEDURE — 97163 PT EVAL HIGH COMPLEX 45 MIN: CPT

## 2023-10-14 PROCEDURE — 82962 GLUCOSE BLOOD TEST: CPT

## 2023-10-14 PROCEDURE — 87635 SARS-COV-2 COVID-19 AMP PRB: CPT

## 2023-10-14 PROCEDURE — 97530 THERAPEUTIC ACTIVITIES: CPT

## 2023-10-14 RX ADMIN — BUMETANIDE 2 MILLIGRAM(S): 0.25 INJECTION INTRAMUSCULAR; INTRAVENOUS at 05:47

## 2023-10-14 RX ADMIN — PANTOPRAZOLE SODIUM 40 MILLIGRAM(S): 20 TABLET, DELAYED RELEASE ORAL at 05:48

## 2023-10-14 RX ADMIN — Medication 5 MILLIGRAM(S): at 05:48

## 2023-10-14 RX ADMIN — MAGNESIUM OXIDE 400 MG ORAL TABLET 400 MILLIGRAM(S): 241.3 TABLET ORAL at 08:29

## 2023-10-14 RX ADMIN — HEPARIN SODIUM 5000 UNIT(S): 5000 INJECTION INTRAVENOUS; SUBCUTANEOUS at 05:48

## 2023-10-14 RX ADMIN — Medication 81 MILLIGRAM(S): at 11:19

## 2023-10-14 RX ADMIN — Medication 1 TABLET(S): at 11:19

## 2023-10-14 RX ADMIN — BUMETANIDE 2 MILLIGRAM(S): 0.25 INJECTION INTRAMUSCULAR; INTRAVENOUS at 11:20

## 2023-10-14 RX ADMIN — TIZANIDINE 2 MILLIGRAM(S): 4 TABLET ORAL at 08:28

## 2023-10-14 RX ADMIN — GABAPENTIN 300 MILLIGRAM(S): 400 CAPSULE ORAL at 05:48

## 2023-10-14 RX ADMIN — METFORMIN HYDROCHLORIDE 500 MILLIGRAM(S): 850 TABLET ORAL at 08:28

## 2023-10-14 RX ADMIN — Medication 2: at 08:27

## 2023-10-14 RX ADMIN — CARVEDILOL PHOSPHATE 3.12 MILLIGRAM(S): 80 CAPSULE, EXTENDED RELEASE ORAL at 05:48

## 2023-10-14 RX ADMIN — GABAPENTIN 300 MILLIGRAM(S): 400 CAPSULE ORAL at 11:18

## 2023-10-14 RX ADMIN — Medication 20 MILLIEQUIVALENT(S): at 11:18

## 2023-10-14 NOTE — PROGRESS NOTE ADULT - PROVIDER SPECIALTY LIST ADULT
Hospitalist
Hospitalist
Physiatry
Physiatry
Hospitalist
Physiatry
Rehab Medicine
Hospitalist
Physiatry
Hospitalist
Physiatry
Physiatry

## 2023-10-14 NOTE — PROGRESS NOTE ADULT - ASSESSMENT
This is a 68 YO male with PMH of  HFrEF, CAD s/p CABG, ASIYA on CPAP, HTN, DM II, right renal mass s/p resection presents to  [facility] ED on 9/18  after a syncopal episode, found to have T12-L1 fracture on imaging. Patient had a T9-pelvis revision PSF. Patient now with gait Instability, ADL impairments and Functional impairments.    #Thoracic spine fracture  - T9-pelvis revision PSF on 9/23  - Comprehensive Rehab Program: PT/OT, 3hours daily and 5 days weekly  - PT: Focused on improving strength, endurance, coordination, balance, functional mobility, and transfers  - OT: Focused on improving strength, fine motor skills, coordination, posture and ADLs.    - pain regimen  - zanaflex TID, avoid hypotension- muscle spasms improved. Dose decreased.   - plastics in-sutures removed  Zanaflex PRN on DC  Oxycodone PRN  discharged home today    #ASIYA  Respiratory order for nocturnal CPAP. Pressure set withy home setting of 5.    #HTN  - Carvedilol resumed low dose.   - Losartan 25mg daily-hold    #HLD  - Lipitor 40mg daily    #CAD   - s/p CABG  - c/w ASA 81mg daily    #CHF  - Bumex 2mg+2mg. Monitor weight daily-improved    hypomagnesemia  Mg 1.8  - supplement prn    Skin Tears -   in region of incision   adaptic    #DM II  - ISS and FS  - Admelog and Lantus, metformin  - A1c 6.5 on 9/19  - follow fingersticks    #Neuropathy  -gabapentin 300mg QID.    #Pain management  - Tylenol PRN,   - Oxycodone 10mg moderate, 15mg severe PRN,   - lidocaine patch,   - zanaflex 2mg/2mg/6mg  - gabapentin 300mg 4x/.day  ICE    #DVT ppx  - Heparin 5K Q 8 hrs    #GI ppx  - Protonix 40mg  - Zofran    #Bowel Regimen  - Senna, miralax 2x/day  - Lactulose given-large BM 10/10    #Bladder management  -PVR low  - Monitor UO  -goal BM daily    #FEN   - Diet: DASH/TLC  - MVI    #Skin:  - Skin on admission: Multiple Left sided abdomen abrasions. Mid thoracic to lumbar incision now open to air- sutures removed  - Pressure injury/Skin: Turn Q2hrs while in bed, OOB to Chair, PT/OT     #Precaution  - Fall, Aspiration, Spinal

## 2023-10-14 NOTE — PROGRESS NOTE ADULT - SUBJECTIVE AND OBJECTIVE BOX
HPI:  This is a 68 YO male with PMH of  HFrEF, CAD s/p CABG, ASIYA on CPAP, HTN, DM, right renal mass s/p resection presents to  [facility] ED ON 9/18  after a syncopal episode, found to have T12-L1 fracture on imaging. Patient originally scheduled for an ACDF with Dr. Hughes, due to injury he was changed to a T9-pelvis revision PSF. Hospital course s/f medication adjustments for HTN  and HF. Patient was evaluated by PM&R and therapy for functional deficits, gait/ADL impairments and acute rehabilitation was recommended. Patient was medically optimized for discharge to [facility] IRU on 9/29/23.     ROS/subjective:  Patient is eager to return home today  Son will pick him up  No c/o pain   Nurse will review discharge instructions    MEDICATIONS  (STANDING):  allopurinol 100 milliGRAM(s) Oral at bedtime  aspirin enteric coated 81 milliGRAM(s) Oral daily  atorvastatin 40 milliGRAM(s) Oral at bedtime  Biotene Dry Mouth Oral Rinse 15 milliLiter(s) Swish and Spit three times a day  bisacodyl 5 milliGRAM(s) Oral every 12 hours  buMETAnide 2 milliGRAM(s) Oral <User Schedule>  buMETAnide 2 milliGRAM(s) Oral daily  carvedilol 3.125 milliGRAM(s) Oral every 12 hours  dextrose 5%. 1000 milliLiter(s) (50 mL/Hr) IV Continuous <Continuous>  dextrose 5%. 1000 milliLiter(s) (100 mL/Hr) IV Continuous <Continuous>  dextrose 50% Injectable 25 Gram(s) IV Push once  dextrose 50% Injectable 25 Gram(s) IV Push once  dextrose 50% Injectable 12.5 Gram(s) IV Push once  gabapentin 300 milliGRAM(s) Oral four times a day  glucagon  Injectable 1 milliGRAM(s) IntraMuscular once  heparin   Injectable 5000 Unit(s) SubCutaneous every 8 hours  insulin glargine Injectable (LANTUS) 18 Unit(s) SubCutaneous at bedtime  insulin lispro (ADMELOG) corrective regimen sliding scale   SubCutaneous three times a day before meals  insulin lispro (ADMELOG) corrective regimen sliding scale   SubCutaneous at bedtime  insulin lispro Injectable (ADMELOG) 5 Unit(s) SubCutaneous before lunch  insulin lispro Injectable (ADMELOG) 5 Unit(s) SubCutaneous before dinner  lidocaine   4% Patch 1 Patch Transdermal daily  magnesium oxide 400 milliGRAM(s) Oral three times a day with meals  metFORMIN 500 milliGRAM(s) Oral two times a day with meals  multivitamin 1 Tablet(s) Oral daily  pantoprazole    Tablet 40 milliGRAM(s) Oral before breakfast  potassium chloride    Tablet ER 20 milliEquivalent(s) Oral daily  tiZANidine 2 milliGRAM(s) Oral <User Schedule>  tiZANidine 2 milliGRAM(s) Oral <User Schedule>  tiZANidine 2 milliGRAM(s) Oral at bedtime    MEDICATIONS  (PRN):  aluminum hydroxide/magnesium hydroxide/simethicone Suspension 30 milliLiter(s) Oral every 4 hours PRN Dyspepsia  benzocaine/menthol Lozenge 1 Lozenge Oral every 3 hours PRN Sore Throat  dextrose Oral Gel 15 Gram(s) Oral once PRN Blood Glucose LESS THAN 70 milliGRAM(s)/deciliter  guaiFENesin Oral Liquid (Sugar-Free) 100 milliGRAM(s) Oral every 6 hours PRN Cough  melatonin 3 milliGRAM(s) Oral at bedtime PRN Insomnia  ondansetron   Disintegrating Tablet 4 milliGRAM(s) Oral every 6 hours PRN Nausea and/or Vomiting  oxyCODONE    IR 15 milliGRAM(s) Oral every 3 hours PRN Severe Pain (7 - 10)  oxyCODONE    IR 10 milliGRAM(s) Oral every 3 hours PRN Moderate Pain (4 - 6)  polyethylene glycol 3350 17 Gram(s) Oral two times a day PRN Constipation                            9.7    5.51  )-----------( 237      ( 12 Oct 2023 05:42 )             29.8     10-12    136  |  98  |  14  ----------------------------<  143<H>  3.8   |  31  |  0.75    Ca    9.1      12 Oct 2023 05:42  Mg     1.8     10-12    TPro  6.3  /  Alb  2.4<L>  /  TBili  0.5  /  DBili  x   /  AST  16  /  ALT  15  /  AlkPhos  114  10-12    Urinalysis Basic - ( 12 Oct 2023 05:42 )    Color: x / Appearance: x / SG: x / pH: x  Gluc: 143 mg/dL / Ketone: x  / Bili: x / Urobili: x   Blood: x / Protein: x / Nitrite: x   Leuk Esterase: x / RBC: x / WBC x   Sq Epi: x / Non Sq Epi: x / Bacteria: x    CAPILLARY BLOOD GLUCOSE    POCT Blood Glucose.: 154 mg/dL (14 Oct 2023 08:09)  POCT Blood Glucose.: 132 mg/dL (13 Oct 2023 21:12)  POCT Blood Glucose.: 125 mg/dL (13 Oct 2023 17:35)    POCT Blood Glucose.: 162 mg/dL (13 Oct 2023 07:34)  POCT Blood Glucose.: 149 mg/dL (12 Oct 2023 21:50)  POCT Blood Glucose.: 147 mg/dL (12 Oct 2023 17:06)  POCT Blood Glucose.: 171 mg/dL (12 Oct 2023 11:58)      Vital Signs Last 24 Hrs  ICU Vital Signs Last 24 Hrs  T(C): 36.6 (14 Oct 2023 08:00), Max: 36.6 (14 Oct 2023 08:00)  T(F): 97.9 (14 Oct 2023 08:00), Max: 97.9 (14 Oct 2023 08:00)  HR: 68 (14 Oct 2023 08:00) (68 - 97)  BP: 102/67 (14 Oct 2023 08:00) (102/67 - 129/76)  O2 Parameters below as of 14 Oct 2023 08:00  Patient On (Oxygen Delivery Method): room air      Constitutional - NAD  HEENT - NCAT, EOMI.    Chest - good chest expansion, good respiratory effort   Cardio - S1D2 RRR  Abdomen -  Obese, Soft, NTND BS +  Extremities - No peripheral edema, No calf tenderness   Neurologic Exam:                    Cranial Nerves -2-12 intact     Motor -  Strength preserved in UE and LE with severe pain in back.                     LEFT    UE - ShAB 5/5, EF 5/5, EE 5/5, WE 5/5,  WNL                     RIGHT UE - ShAB 5/5, EF 5/5, EE 5/5, WE 5/5,  WNL                    LEFT    LE - HF 4/5, KE 5/5, DF 5/5, PF 5/5                    RIGHT LE - HF 4/5, KE 5/5, DF 5/5, PF 5/5        Sensory - Intact to LT bilateral  Skin on admission:  Mid thoracic to lumbar incision AMANDA-Previous surgical scars of chest, abdomen, b/l elbows, wrists and knees. No pressure ulcers.- back dressing Dcd-   Back incision intact- sutures removed - scabbing mid suture line- dry - no drainage

## 2023-10-19 ENCOUNTER — APPOINTMENT (OUTPATIENT)
Dept: ORTHOPEDIC SURGERY | Facility: CLINIC | Age: 68
End: 2023-10-19
Payer: MEDICARE

## 2023-10-19 VITALS — HEIGHT: 77 IN | BODY MASS INDEX: 37.19 KG/M2 | WEIGHT: 315 LBS

## 2023-10-19 PROCEDURE — 72100 X-RAY EXAM L-S SPINE 2/3 VWS: CPT

## 2023-10-19 PROCEDURE — 99024 POSTOP FOLLOW-UP VISIT: CPT

## 2023-11-02 ENCOUNTER — APPOINTMENT (OUTPATIENT)
Dept: ORTHOPEDIC SURGERY | Facility: CLINIC | Age: 68
End: 2023-11-02
Payer: MEDICARE

## 2023-11-02 VITALS — HEIGHT: 77 IN | WEIGHT: 315 LBS | BODY MASS INDEX: 37.19 KG/M2

## 2023-11-02 PROCEDURE — 99024 POSTOP FOLLOW-UP VISIT: CPT

## 2023-11-02 RX ORDER — OXYCODONE 10 MG/1
10 TABLET ORAL
Qty: 30 | Refills: 0 | Status: ACTIVE | COMMUNITY
Start: 2023-11-02 | End: 1900-01-01

## 2023-11-03 ENCOUNTER — APPOINTMENT (OUTPATIENT)
Dept: UROLOGY | Facility: CLINIC | Age: 68
End: 2023-11-03

## 2023-11-08 ENCOUNTER — INPATIENT (INPATIENT)
Facility: HOSPITAL | Age: 68
LOS: 1 days | Discharge: HOME | End: 2023-11-10
Attending: STUDENT IN AN ORGANIZED HEALTH CARE EDUCATION/TRAINING PROGRAM | Admitting: STUDENT IN AN ORGANIZED HEALTH CARE EDUCATION/TRAINING PROGRAM
Payer: MEDICARE

## 2023-11-08 ENCOUNTER — TRANSCRIPTION ENCOUNTER (OUTPATIENT)
Age: 68
End: 2023-11-08

## 2023-11-08 VITALS
HEIGHT: 77 IN | DIASTOLIC BLOOD PRESSURE: 86 MMHG | RESPIRATION RATE: 18 BRPM | SYSTOLIC BLOOD PRESSURE: 157 MMHG | TEMPERATURE: 97 F | HEART RATE: 92 BPM | OXYGEN SATURATION: 99 %

## 2023-11-08 DIAGNOSIS — Z96.651 PRESENCE OF RIGHT ARTIFICIAL KNEE JOINT: Chronic | ICD-10-CM

## 2023-11-08 DIAGNOSIS — Z98.890 OTHER SPECIFIED POSTPROCEDURAL STATES: Chronic | ICD-10-CM

## 2023-11-08 DIAGNOSIS — T81.30XA DISRUPTION OF WOUND, UNSPECIFIED, INITIAL ENCOUNTER: ICD-10-CM

## 2023-11-08 DIAGNOSIS — Z90.5 ACQUIRED ABSENCE OF KIDNEY: Chronic | ICD-10-CM

## 2023-11-08 DIAGNOSIS — Z98.1 ARTHRODESIS STATUS: Chronic | ICD-10-CM

## 2023-11-08 DIAGNOSIS — Z98.89 OTHER SPECIFIED POSTPROCEDURAL STATES: Chronic | ICD-10-CM

## 2023-11-08 DIAGNOSIS — Z95.1 PRESENCE OF AORTOCORONARY BYPASS GRAFT: Chronic | ICD-10-CM

## 2023-11-08 DIAGNOSIS — G62.9 POLYNEUROPATHY, UNSPECIFIED: Chronic | ICD-10-CM

## 2023-11-08 DIAGNOSIS — Z96.653 PRESENCE OF ARTIFICIAL KNEE JOINT, BILATERAL: Chronic | ICD-10-CM

## 2023-11-08 DIAGNOSIS — Z90.49 ACQUIRED ABSENCE OF OTHER SPECIFIED PARTS OF DIGESTIVE TRACT: Chronic | ICD-10-CM

## 2023-11-08 DIAGNOSIS — Z96.643 PRESENCE OF ARTIFICIAL HIP JOINT, BILATERAL: Chronic | ICD-10-CM

## 2023-11-08 DIAGNOSIS — N20.0 CALCULUS OF KIDNEY: Chronic | ICD-10-CM

## 2023-11-08 LAB
ALBUMIN SERPL ELPH-MCNC: 4.1 G/DL — SIGNIFICANT CHANGE UP (ref 3.3–5)
ALBUMIN SERPL ELPH-MCNC: 4.1 G/DL — SIGNIFICANT CHANGE UP (ref 3.3–5)
ALP SERPL-CCNC: 135 U/L — HIGH (ref 40–120)
ALP SERPL-CCNC: 135 U/L — HIGH (ref 40–120)
ALT FLD-CCNC: 23 U/L — SIGNIFICANT CHANGE UP (ref 4–41)
ALT FLD-CCNC: 23 U/L — SIGNIFICANT CHANGE UP (ref 4–41)
ANION GAP SERPL CALC-SCNC: 15 MMOL/L — HIGH (ref 7–14)
ANION GAP SERPL CALC-SCNC: 15 MMOL/L — HIGH (ref 7–14)
APPEARANCE UR: CLEAR — SIGNIFICANT CHANGE UP
APPEARANCE UR: CLEAR — SIGNIFICANT CHANGE UP
APTT BLD: 27.1 SEC — SIGNIFICANT CHANGE UP (ref 24.5–35.6)
APTT BLD: 27.1 SEC — SIGNIFICANT CHANGE UP (ref 24.5–35.6)
AST SERPL-CCNC: 23 U/L — SIGNIFICANT CHANGE UP (ref 4–40)
AST SERPL-CCNC: 23 U/L — SIGNIFICANT CHANGE UP (ref 4–40)
BACTERIA # UR AUTO: NEGATIVE /HPF — SIGNIFICANT CHANGE UP
BACTERIA # UR AUTO: NEGATIVE /HPF — SIGNIFICANT CHANGE UP
BASOPHILS # BLD AUTO: 0.06 K/UL — SIGNIFICANT CHANGE UP (ref 0–0.2)
BASOPHILS # BLD AUTO: 0.06 K/UL — SIGNIFICANT CHANGE UP (ref 0–0.2)
BASOPHILS NFR BLD AUTO: 0.7 % — SIGNIFICANT CHANGE UP (ref 0–2)
BASOPHILS NFR BLD AUTO: 0.7 % — SIGNIFICANT CHANGE UP (ref 0–2)
BILIRUB SERPL-MCNC: 0.3 MG/DL — SIGNIFICANT CHANGE UP (ref 0.2–1.2)
BILIRUB SERPL-MCNC: 0.3 MG/DL — SIGNIFICANT CHANGE UP (ref 0.2–1.2)
BILIRUB UR-MCNC: NEGATIVE — SIGNIFICANT CHANGE UP
BILIRUB UR-MCNC: NEGATIVE — SIGNIFICANT CHANGE UP
BLD GP AB SCN SERPL QL: NEGATIVE — SIGNIFICANT CHANGE UP
BLD GP AB SCN SERPL QL: NEGATIVE — SIGNIFICANT CHANGE UP
BUN SERPL-MCNC: 24 MG/DL — HIGH (ref 7–23)
BUN SERPL-MCNC: 24 MG/DL — HIGH (ref 7–23)
CALCIUM SERPL-MCNC: 10 MG/DL — SIGNIFICANT CHANGE UP (ref 8.4–10.5)
CALCIUM SERPL-MCNC: 10 MG/DL — SIGNIFICANT CHANGE UP (ref 8.4–10.5)
CAST: 1 /LPF — SIGNIFICANT CHANGE UP (ref 0–4)
CAST: 1 /LPF — SIGNIFICANT CHANGE UP (ref 0–4)
CHLORIDE SERPL-SCNC: 100 MMOL/L — SIGNIFICANT CHANGE UP (ref 98–107)
CHLORIDE SERPL-SCNC: 100 MMOL/L — SIGNIFICANT CHANGE UP (ref 98–107)
CO2 SERPL-SCNC: 25 MMOL/L — SIGNIFICANT CHANGE UP (ref 22–31)
CO2 SERPL-SCNC: 25 MMOL/L — SIGNIFICANT CHANGE UP (ref 22–31)
COLOR SPEC: YELLOW — SIGNIFICANT CHANGE UP
COLOR SPEC: YELLOW — SIGNIFICANT CHANGE UP
CREAT SERPL-MCNC: 0.61 MG/DL — SIGNIFICANT CHANGE UP (ref 0.5–1.3)
CREAT SERPL-MCNC: 0.61 MG/DL — SIGNIFICANT CHANGE UP (ref 0.5–1.3)
CRP SERPL-MCNC: 7.8 MG/L — HIGH
CRP SERPL-MCNC: 7.8 MG/L — HIGH
DIFF PNL FLD: NEGATIVE — SIGNIFICANT CHANGE UP
DIFF PNL FLD: NEGATIVE — SIGNIFICANT CHANGE UP
EGFR: 105 ML/MIN/1.73M2 — SIGNIFICANT CHANGE UP
EGFR: 105 ML/MIN/1.73M2 — SIGNIFICANT CHANGE UP
EOSINOPHIL # BLD AUTO: 0.25 K/UL — SIGNIFICANT CHANGE UP (ref 0–0.5)
EOSINOPHIL # BLD AUTO: 0.25 K/UL — SIGNIFICANT CHANGE UP (ref 0–0.5)
EOSINOPHIL NFR BLD AUTO: 2.9 % — SIGNIFICANT CHANGE UP (ref 0–6)
EOSINOPHIL NFR BLD AUTO: 2.9 % — SIGNIFICANT CHANGE UP (ref 0–6)
GLUCOSE SERPL-MCNC: 185 MG/DL — HIGH (ref 70–99)
GLUCOSE SERPL-MCNC: 185 MG/DL — HIGH (ref 70–99)
GLUCOSE UR QL: NEGATIVE MG/DL — SIGNIFICANT CHANGE UP
GLUCOSE UR QL: NEGATIVE MG/DL — SIGNIFICANT CHANGE UP
HCT VFR BLD CALC: 37.3 % — LOW (ref 39–50)
HCT VFR BLD CALC: 37.3 % — LOW (ref 39–50)
HGB BLD-MCNC: 12.3 G/DL — LOW (ref 13–17)
HGB BLD-MCNC: 12.3 G/DL — LOW (ref 13–17)
IANC: 6.18 K/UL — SIGNIFICANT CHANGE UP (ref 1.8–7.4)
IANC: 6.18 K/UL — SIGNIFICANT CHANGE UP (ref 1.8–7.4)
IMM GRANULOCYTES NFR BLD AUTO: 0.6 % — SIGNIFICANT CHANGE UP (ref 0–0.9)
IMM GRANULOCYTES NFR BLD AUTO: 0.6 % — SIGNIFICANT CHANGE UP (ref 0–0.9)
INR BLD: 0.93 RATIO — SIGNIFICANT CHANGE UP (ref 0.85–1.18)
INR BLD: 0.93 RATIO — SIGNIFICANT CHANGE UP (ref 0.85–1.18)
KETONES UR-MCNC: NEGATIVE MG/DL — SIGNIFICANT CHANGE UP
KETONES UR-MCNC: NEGATIVE MG/DL — SIGNIFICANT CHANGE UP
LEUKOCYTE ESTERASE UR-ACNC: ABNORMAL
LEUKOCYTE ESTERASE UR-ACNC: ABNORMAL
LYMPHOCYTES # BLD AUTO: 1.39 K/UL — SIGNIFICANT CHANGE UP (ref 1–3.3)
LYMPHOCYTES # BLD AUTO: 1.39 K/UL — SIGNIFICANT CHANGE UP (ref 1–3.3)
LYMPHOCYTES # BLD AUTO: 16.2 % — SIGNIFICANT CHANGE UP (ref 13–44)
LYMPHOCYTES # BLD AUTO: 16.2 % — SIGNIFICANT CHANGE UP (ref 13–44)
MCHC RBC-ENTMCNC: 28 PG — SIGNIFICANT CHANGE UP (ref 27–34)
MCHC RBC-ENTMCNC: 28 PG — SIGNIFICANT CHANGE UP (ref 27–34)
MCHC RBC-ENTMCNC: 33 GM/DL — SIGNIFICANT CHANGE UP (ref 32–36)
MCHC RBC-ENTMCNC: 33 GM/DL — SIGNIFICANT CHANGE UP (ref 32–36)
MCV RBC AUTO: 84.8 FL — SIGNIFICANT CHANGE UP (ref 80–100)
MCV RBC AUTO: 84.8 FL — SIGNIFICANT CHANGE UP (ref 80–100)
MONOCYTES # BLD AUTO: 0.64 K/UL — SIGNIFICANT CHANGE UP (ref 0–0.9)
MONOCYTES # BLD AUTO: 0.64 K/UL — SIGNIFICANT CHANGE UP (ref 0–0.9)
MONOCYTES NFR BLD AUTO: 7.5 % — SIGNIFICANT CHANGE UP (ref 2–14)
MONOCYTES NFR BLD AUTO: 7.5 % — SIGNIFICANT CHANGE UP (ref 2–14)
NEUTROPHILS # BLD AUTO: 6.18 K/UL — SIGNIFICANT CHANGE UP (ref 1.8–7.4)
NEUTROPHILS # BLD AUTO: 6.18 K/UL — SIGNIFICANT CHANGE UP (ref 1.8–7.4)
NEUTROPHILS NFR BLD AUTO: 72.1 % — SIGNIFICANT CHANGE UP (ref 43–77)
NEUTROPHILS NFR BLD AUTO: 72.1 % — SIGNIFICANT CHANGE UP (ref 43–77)
NITRITE UR-MCNC: NEGATIVE — SIGNIFICANT CHANGE UP
NITRITE UR-MCNC: NEGATIVE — SIGNIFICANT CHANGE UP
NRBC # BLD: 0 /100 WBCS — SIGNIFICANT CHANGE UP (ref 0–0)
NRBC # BLD: 0 /100 WBCS — SIGNIFICANT CHANGE UP (ref 0–0)
NRBC # FLD: 0 K/UL — SIGNIFICANT CHANGE UP (ref 0–0)
NRBC # FLD: 0 K/UL — SIGNIFICANT CHANGE UP (ref 0–0)
PH UR: 6 — SIGNIFICANT CHANGE UP (ref 5–8)
PH UR: 6 — SIGNIFICANT CHANGE UP (ref 5–8)
PLATELET # BLD AUTO: 241 K/UL — SIGNIFICANT CHANGE UP (ref 150–400)
PLATELET # BLD AUTO: 241 K/UL — SIGNIFICANT CHANGE UP (ref 150–400)
POTASSIUM SERPL-MCNC: 3.8 MMOL/L — SIGNIFICANT CHANGE UP (ref 3.5–5.3)
POTASSIUM SERPL-MCNC: 3.8 MMOL/L — SIGNIFICANT CHANGE UP (ref 3.5–5.3)
POTASSIUM SERPL-SCNC: 3.8 MMOL/L — SIGNIFICANT CHANGE UP (ref 3.5–5.3)
POTASSIUM SERPL-SCNC: 3.8 MMOL/L — SIGNIFICANT CHANGE UP (ref 3.5–5.3)
PROT SERPL-MCNC: 7.1 G/DL — SIGNIFICANT CHANGE UP (ref 6–8.3)
PROT SERPL-MCNC: 7.1 G/DL — SIGNIFICANT CHANGE UP (ref 6–8.3)
PROT UR-MCNC: NEGATIVE MG/DL — SIGNIFICANT CHANGE UP
PROT UR-MCNC: NEGATIVE MG/DL — SIGNIFICANT CHANGE UP
PROTHROM AB SERPL-ACNC: 10.5 SEC — SIGNIFICANT CHANGE UP (ref 9.5–13)
PROTHROM AB SERPL-ACNC: 10.5 SEC — SIGNIFICANT CHANGE UP (ref 9.5–13)
RBC # BLD: 4.4 M/UL — SIGNIFICANT CHANGE UP (ref 4.2–5.8)
RBC # BLD: 4.4 M/UL — SIGNIFICANT CHANGE UP (ref 4.2–5.8)
RBC # FLD: 14 % — SIGNIFICANT CHANGE UP (ref 10.3–14.5)
RBC # FLD: 14 % — SIGNIFICANT CHANGE UP (ref 10.3–14.5)
RBC CASTS # UR COMP ASSIST: 0 /HPF — SIGNIFICANT CHANGE UP (ref 0–4)
RBC CASTS # UR COMP ASSIST: 0 /HPF — SIGNIFICANT CHANGE UP (ref 0–4)
RH IG SCN BLD-IMP: POSITIVE — SIGNIFICANT CHANGE UP
RH IG SCN BLD-IMP: POSITIVE — SIGNIFICANT CHANGE UP
SODIUM SERPL-SCNC: 140 MMOL/L — SIGNIFICANT CHANGE UP (ref 135–145)
SODIUM SERPL-SCNC: 140 MMOL/L — SIGNIFICANT CHANGE UP (ref 135–145)
SP GR SPEC: 1.01 — SIGNIFICANT CHANGE UP (ref 1–1.03)
SP GR SPEC: 1.01 — SIGNIFICANT CHANGE UP (ref 1–1.03)
SQUAMOUS # UR AUTO: 0 /HPF — SIGNIFICANT CHANGE UP (ref 0–5)
SQUAMOUS # UR AUTO: 0 /HPF — SIGNIFICANT CHANGE UP (ref 0–5)
UROBILINOGEN FLD QL: 0.2 MG/DL — SIGNIFICANT CHANGE UP (ref 0.2–1)
UROBILINOGEN FLD QL: 0.2 MG/DL — SIGNIFICANT CHANGE UP (ref 0.2–1)
WBC # BLD: 8.57 K/UL — SIGNIFICANT CHANGE UP (ref 3.8–10.5)
WBC # BLD: 8.57 K/UL — SIGNIFICANT CHANGE UP (ref 3.8–10.5)
WBC # FLD AUTO: 8.57 K/UL — SIGNIFICANT CHANGE UP (ref 3.8–10.5)
WBC # FLD AUTO: 8.57 K/UL — SIGNIFICANT CHANGE UP (ref 3.8–10.5)
WBC UR QL: 2 /HPF — SIGNIFICANT CHANGE UP (ref 0–5)
WBC UR QL: 2 /HPF — SIGNIFICANT CHANGE UP (ref 0–5)

## 2023-11-08 PROCEDURE — 71045 X-RAY EXAM CHEST 1 VIEW: CPT | Mod: 26

## 2023-11-08 PROCEDURE — 99285 EMERGENCY DEPT VISIT HI MDM: CPT

## 2023-11-08 RX ORDER — MORPHINE SULFATE 50 MG/1
4 CAPSULE, EXTENDED RELEASE ORAL ONCE
Refills: 0 | Status: DISCONTINUED | OUTPATIENT
Start: 2023-11-08 | End: 2023-11-08

## 2023-11-08 RX ORDER — CHLORHEXIDINE GLUCONATE 213 G/1000ML
1 SOLUTION TOPICAL ONCE
Refills: 0 | Status: DISCONTINUED | OUTPATIENT
Start: 2023-11-08 | End: 2023-11-10

## 2023-11-08 RX ADMIN — MORPHINE SULFATE 4 MILLIGRAM(S): 50 CAPSULE, EXTENDED RELEASE ORAL at 21:01

## 2023-11-08 NOTE — ED PROVIDER NOTE - ATTENDING APP SHARED VISIT CONTRIBUTION OF CARE
67 yo M hx HTN, HLD, DM2, CAD s/p CABG, CHF, R renal mass s/p resection, ASIYA s/p CPAP, T12-L1 fx initially scheduled for ACDF but due to injury changed to T9-pelvis revision PSF, presenting for evaluation of wound dehiscence. Pt noted increased drainage from incision site to back over the past few weeks and states that it has been getting worse, so sent in to ED by his surgeon today. Denies fevers/chills, but reports exquisite pain. On exam, pt unable to lie comfortably in stretcher, having to sit up straight, with erythem and tenderness surrounding incision site. No noted purulent drainage, heart rrr, lungs ctab, abd soft nt/nd, no LE edema. Plan for pre-op labs, meds, CT (ortho to follow up on), and admission.

## 2023-11-08 NOTE — ED ADULT NURSE NOTE - NSFALLHARMRISKINTERV_ED_ALL_ED

## 2023-11-08 NOTE — ED ADULT NURSE REASSESSMENT NOTE - NS ED NURSE REASSESS COMMENT FT1
earline tobar RN. received report from  Suzie MOSES. Pt is a/o x 3. no complaints of chest pain, headache, nausea, dizziness, vomiting, SOB, fever, chills   verbalized. Pt sitting in chair is more comfortable. reports decreased pain after morphine administration. Pt has iv placed with no redness or swelling noted. Awaiting further orders.

## 2023-11-08 NOTE — ED ADULT TRIAGE NOTE - CHIEF COMPLAINT QUOTE
states" I had back surgery on September 23 and my incision opened up at 2 sites with drainage coming out and painful  and  I was told to come back to get admitted by Dr Hughes. that I need to go for another surgery. c/o pain to mid back. h/o DM  Type 2.

## 2023-11-08 NOTE — ED PROVIDER NOTE - OBJECTIVE STATEMENT
69 y/o M with PMH of HTN, HLD, DM2, CAD s/p CABG, CHF, right renal mass s/p resection, ASIYA s/p CPAP, thoracic spine fracture s/p T9-pelvis revision psf on 9/23/23 with Dr. Hughes presents sent in by Dr. Hughes for admission regarding worsening wound dehiscence. Patient notes that he first started to notice there was wound dehiscence developing about 2 weeks ago. He has since followed up with Dr. Hughes and his plastic surgeon multiple times for wound checks. He has been on keflex twice within the past 2 weeks most recently re-started on it last week Thursday 11/2/23. Patient notes though that 2 days ago the wound got worse with active bleeding so when he went to follow up with his plastic surgeon today the decision was made to have him return to the hospital now for readmission for more appropriate wound management. Patient does endorse pain in his back, but otherwise denies any other complaints or concerns. Denies chest pain, SOB, cough, fevers, chills, abdominal pain, N/V/D/C, urinary complaints, HA, dizziness, numbness, tingling, weakness, sick contacts, recent travel.

## 2023-11-08 NOTE — ED PROVIDER NOTE - PHYSICAL EXAMINATION
CONSTITUTIONAL: Comfortable; in no acute distress. Non-toxic appearing.   NEURO: Alert & oriented. Sensory and motor functions are grossly intact.  PSYCH: Mood appropriate. Thought processes intact.   HEAD: NC/AT     ABD: Soft; non-distended; non-tender. No guarding or rebound.  BACK: (+) 2 separate areas of wound dehiscence through the mid and lower thoracic and lumbar spine with subcutaneous fat exposure. No active bleeding or purulent discharge. No surrounding erythema, increased warmth, or tenderness to palpation.  MUSCULOSKELETAL/EXTREMITIES: FROM in all four extremities; no extremity edema.  SKIN: Warm; dry; no apparent lesions or exudate

## 2023-11-08 NOTE — ED ADULT NURSE NOTE - OBJECTIVE STATEMENT
received pt spot16. A&OX4 RR even unlabored completing full sentences. pt presents c/o back pain s/p back surgery. pt states" I had back surgery on September 23 after a fall which broke my back in 2 placed and my incision opened up at 2 sites with yellow drainage coming out and it is painful, my surgeon told to come back to get admitted by Dr Hughes for another surgery." pt has incision site on back area, yellow exudate noted at open areas. pt endorses pain to mid back. past medical hx DM Type 2. pt states walks with walker post surgery and is difficult to do so. pt denies any falls/ trauma. pt denies h/a, dizziness/lightheadedness, abd pain, n/v/d, fevers/chills, cp, sob. 20gIV placed to LAC, labs drawn and sent per order. UA sent per order. medicated per order. pt pending CTscan. safety measures maintained throughout.

## 2023-11-08 NOTE — ED PROVIDER NOTE - CLINICAL SUMMARY MEDICAL DECISION MAKING FREE TEXT BOX
69 y/o M with PMH of HTN, HLD, DM2, CAD s/p CABG, CHF, right renal mass s/p resection, ASIYA s/p CPAP, thoracic spine fracture s/p T9-pelvis revision psf on 9/23/23 with Dr. Hughes presents sent in by Dr. Hughes for admission regarding worsening wound dehiscence. Discussed the case with the ortho team who requested blood work and imaging. Blood work wnl. Patient accepted under Dr. Hughes's service for admission for wound dehiscence.

## 2023-11-09 ENCOUNTER — RESULT REVIEW (OUTPATIENT)
Age: 68
End: 2023-11-09

## 2023-11-09 DIAGNOSIS — G47.33 OBSTRUCTIVE SLEEP APNEA (ADULT) (PEDIATRIC): ICD-10-CM

## 2023-11-09 DIAGNOSIS — M10.9 GOUT, UNSPECIFIED: ICD-10-CM

## 2023-11-09 DIAGNOSIS — Z01.818 ENCOUNTER FOR OTHER PREPROCEDURAL EXAMINATION: ICD-10-CM

## 2023-11-09 DIAGNOSIS — E11.9 TYPE 2 DIABETES MELLITUS WITHOUT COMPLICATIONS: ICD-10-CM

## 2023-11-09 DIAGNOSIS — I50.20 UNSPECIFIED SYSTOLIC (CONGESTIVE) HEART FAILURE: ICD-10-CM

## 2023-11-09 DIAGNOSIS — E78.5 HYPERLIPIDEMIA, UNSPECIFIED: ICD-10-CM

## 2023-11-09 DIAGNOSIS — Z29.9 ENCOUNTER FOR PROPHYLACTIC MEASURES, UNSPECIFIED: ICD-10-CM

## 2023-11-09 DIAGNOSIS — T81.30XA DISRUPTION OF WOUND, UNSPECIFIED, INITIAL ENCOUNTER: ICD-10-CM

## 2023-11-09 DIAGNOSIS — I25.10 ATHEROSCLEROTIC HEART DISEASE OF NATIVE CORONARY ARTERY WITHOUT ANGINA PECTORIS: ICD-10-CM

## 2023-11-09 DIAGNOSIS — I10 ESSENTIAL (PRIMARY) HYPERTENSION: ICD-10-CM

## 2023-11-09 LAB
ANION GAP SERPL CALC-SCNC: 13 MMOL/L — SIGNIFICANT CHANGE UP (ref 7–14)
ANION GAP SERPL CALC-SCNC: 13 MMOL/L — SIGNIFICANT CHANGE UP (ref 7–14)
APTT BLD: 24 SEC — LOW (ref 24.5–35.6)
APTT BLD: 24 SEC — LOW (ref 24.5–35.6)
BASOPHILS # BLD AUTO: 0.05 K/UL — SIGNIFICANT CHANGE UP (ref 0–0.2)
BASOPHILS # BLD AUTO: 0.05 K/UL — SIGNIFICANT CHANGE UP (ref 0–0.2)
BASOPHILS NFR BLD AUTO: 0.6 % — SIGNIFICANT CHANGE UP (ref 0–2)
BASOPHILS NFR BLD AUTO: 0.6 % — SIGNIFICANT CHANGE UP (ref 0–2)
BLD GP AB SCN SERPL QL: NEGATIVE — SIGNIFICANT CHANGE UP
BLD GP AB SCN SERPL QL: NEGATIVE — SIGNIFICANT CHANGE UP
BUN SERPL-MCNC: 19 MG/DL — SIGNIFICANT CHANGE UP (ref 7–23)
BUN SERPL-MCNC: 19 MG/DL — SIGNIFICANT CHANGE UP (ref 7–23)
CALCIUM SERPL-MCNC: 7.6 MG/DL — LOW (ref 8.4–10.5)
CALCIUM SERPL-MCNC: 7.6 MG/DL — LOW (ref 8.4–10.5)
CHLORIDE SERPL-SCNC: 107 MMOL/L — SIGNIFICANT CHANGE UP (ref 98–107)
CHLORIDE SERPL-SCNC: 107 MMOL/L — SIGNIFICANT CHANGE UP (ref 98–107)
CO2 SERPL-SCNC: 23 MMOL/L — SIGNIFICANT CHANGE UP (ref 22–31)
CO2 SERPL-SCNC: 23 MMOL/L — SIGNIFICANT CHANGE UP (ref 22–31)
CREAT SERPL-MCNC: 0.59 MG/DL — SIGNIFICANT CHANGE UP (ref 0.5–1.3)
CREAT SERPL-MCNC: 0.59 MG/DL — SIGNIFICANT CHANGE UP (ref 0.5–1.3)
EGFR: 106 ML/MIN/1.73M2 — SIGNIFICANT CHANGE UP
EGFR: 106 ML/MIN/1.73M2 — SIGNIFICANT CHANGE UP
EOSINOPHIL # BLD AUTO: 0.25 K/UL — SIGNIFICANT CHANGE UP (ref 0–0.5)
EOSINOPHIL # BLD AUTO: 0.25 K/UL — SIGNIFICANT CHANGE UP (ref 0–0.5)
EOSINOPHIL NFR BLD AUTO: 2.8 % — SIGNIFICANT CHANGE UP (ref 0–6)
EOSINOPHIL NFR BLD AUTO: 2.8 % — SIGNIFICANT CHANGE UP (ref 0–6)
ERYTHROCYTE [SEDIMENTATION RATE] IN BLOOD: 56 MM/HR — HIGH (ref 1–15)
ERYTHROCYTE [SEDIMENTATION RATE] IN BLOOD: 56 MM/HR — HIGH (ref 1–15)
GLUCOSE BLDC GLUCOMTR-MCNC: 111 MG/DL — HIGH (ref 70–99)
GLUCOSE BLDC GLUCOMTR-MCNC: 111 MG/DL — HIGH (ref 70–99)
GLUCOSE BLDC GLUCOMTR-MCNC: 114 MG/DL — HIGH (ref 70–99)
GLUCOSE BLDC GLUCOMTR-MCNC: 114 MG/DL — HIGH (ref 70–99)
GLUCOSE BLDC GLUCOMTR-MCNC: 123 MG/DL — HIGH (ref 70–99)
GLUCOSE BLDC GLUCOMTR-MCNC: 123 MG/DL — HIGH (ref 70–99)
GLUCOSE BLDC GLUCOMTR-MCNC: 128 MG/DL — HIGH (ref 70–99)
GLUCOSE BLDC GLUCOMTR-MCNC: 128 MG/DL — HIGH (ref 70–99)
GLUCOSE BLDC GLUCOMTR-MCNC: 140 MG/DL — HIGH (ref 70–99)
GLUCOSE BLDC GLUCOMTR-MCNC: 140 MG/DL — HIGH (ref 70–99)
GLUCOSE BLDC GLUCOMTR-MCNC: 141 MG/DL — HIGH (ref 70–99)
GLUCOSE BLDC GLUCOMTR-MCNC: 141 MG/DL — HIGH (ref 70–99)
GLUCOSE SERPL-MCNC: 105 MG/DL — HIGH (ref 70–99)
GLUCOSE SERPL-MCNC: 105 MG/DL — HIGH (ref 70–99)
HCT VFR BLD CALC: 37.6 % — LOW (ref 39–50)
HCT VFR BLD CALC: 37.6 % — LOW (ref 39–50)
HGB BLD-MCNC: 12.2 G/DL — LOW (ref 13–17)
HGB BLD-MCNC: 12.2 G/DL — LOW (ref 13–17)
IANC: 6.52 K/UL — SIGNIFICANT CHANGE UP (ref 1.8–7.4)
IANC: 6.52 K/UL — SIGNIFICANT CHANGE UP (ref 1.8–7.4)
IMM GRANULOCYTES NFR BLD AUTO: 0.7 % — SIGNIFICANT CHANGE UP (ref 0–0.9)
IMM GRANULOCYTES NFR BLD AUTO: 0.7 % — SIGNIFICANT CHANGE UP (ref 0–0.9)
INR BLD: 0.95 RATIO — SIGNIFICANT CHANGE UP (ref 0.85–1.18)
INR BLD: 0.95 RATIO — SIGNIFICANT CHANGE UP (ref 0.85–1.18)
LYMPHOCYTES # BLD AUTO: 1.53 K/UL — SIGNIFICANT CHANGE UP (ref 1–3.3)
LYMPHOCYTES # BLD AUTO: 1.53 K/UL — SIGNIFICANT CHANGE UP (ref 1–3.3)
LYMPHOCYTES # BLD AUTO: 16.9 % — SIGNIFICANT CHANGE UP (ref 13–44)
LYMPHOCYTES # BLD AUTO: 16.9 % — SIGNIFICANT CHANGE UP (ref 13–44)
MCHC RBC-ENTMCNC: 28 PG — SIGNIFICANT CHANGE UP (ref 27–34)
MCHC RBC-ENTMCNC: 28 PG — SIGNIFICANT CHANGE UP (ref 27–34)
MCHC RBC-ENTMCNC: 32.4 GM/DL — SIGNIFICANT CHANGE UP (ref 32–36)
MCHC RBC-ENTMCNC: 32.4 GM/DL — SIGNIFICANT CHANGE UP (ref 32–36)
MCV RBC AUTO: 86.2 FL — SIGNIFICANT CHANGE UP (ref 80–100)
MCV RBC AUTO: 86.2 FL — SIGNIFICANT CHANGE UP (ref 80–100)
MONOCYTES # BLD AUTO: 0.64 K/UL — SIGNIFICANT CHANGE UP (ref 0–0.9)
MONOCYTES # BLD AUTO: 0.64 K/UL — SIGNIFICANT CHANGE UP (ref 0–0.9)
MONOCYTES NFR BLD AUTO: 7.1 % — SIGNIFICANT CHANGE UP (ref 2–14)
MONOCYTES NFR BLD AUTO: 7.1 % — SIGNIFICANT CHANGE UP (ref 2–14)
NEUTROPHILS # BLD AUTO: 6.52 K/UL — SIGNIFICANT CHANGE UP (ref 1.8–7.4)
NEUTROPHILS # BLD AUTO: 6.52 K/UL — SIGNIFICANT CHANGE UP (ref 1.8–7.4)
NEUTROPHILS NFR BLD AUTO: 71.9 % — SIGNIFICANT CHANGE UP (ref 43–77)
NEUTROPHILS NFR BLD AUTO: 71.9 % — SIGNIFICANT CHANGE UP (ref 43–77)
NRBC # BLD: 0 /100 WBCS — SIGNIFICANT CHANGE UP (ref 0–0)
NRBC # BLD: 0 /100 WBCS — SIGNIFICANT CHANGE UP (ref 0–0)
NRBC # FLD: 0 K/UL — SIGNIFICANT CHANGE UP (ref 0–0)
NRBC # FLD: 0 K/UL — SIGNIFICANT CHANGE UP (ref 0–0)
PLATELET # BLD AUTO: 255 K/UL — SIGNIFICANT CHANGE UP (ref 150–400)
PLATELET # BLD AUTO: 255 K/UL — SIGNIFICANT CHANGE UP (ref 150–400)
POTASSIUM SERPL-MCNC: 3.2 MMOL/L — LOW (ref 3.5–5.3)
POTASSIUM SERPL-MCNC: 3.2 MMOL/L — LOW (ref 3.5–5.3)
POTASSIUM SERPL-SCNC: 3.2 MMOL/L — LOW (ref 3.5–5.3)
POTASSIUM SERPL-SCNC: 3.2 MMOL/L — LOW (ref 3.5–5.3)
PROTHROM AB SERPL-ACNC: 10.7 SEC — SIGNIFICANT CHANGE UP (ref 9.5–13)
PROTHROM AB SERPL-ACNC: 10.7 SEC — SIGNIFICANT CHANGE UP (ref 9.5–13)
RBC # BLD: 4.36 M/UL — SIGNIFICANT CHANGE UP (ref 4.2–5.8)
RBC # BLD: 4.36 M/UL — SIGNIFICANT CHANGE UP (ref 4.2–5.8)
RBC # FLD: 14 % — SIGNIFICANT CHANGE UP (ref 10.3–14.5)
RBC # FLD: 14 % — SIGNIFICANT CHANGE UP (ref 10.3–14.5)
RH IG SCN BLD-IMP: POSITIVE — SIGNIFICANT CHANGE UP
RH IG SCN BLD-IMP: POSITIVE — SIGNIFICANT CHANGE UP
SODIUM SERPL-SCNC: 143 MMOL/L — SIGNIFICANT CHANGE UP (ref 135–145)
SODIUM SERPL-SCNC: 143 MMOL/L — SIGNIFICANT CHANGE UP (ref 135–145)
WBC # BLD: 9.05 K/UL — SIGNIFICANT CHANGE UP (ref 3.8–10.5)
WBC # BLD: 9.05 K/UL — SIGNIFICANT CHANGE UP (ref 3.8–10.5)
WBC # FLD AUTO: 9.05 K/UL — SIGNIFICANT CHANGE UP (ref 3.8–10.5)
WBC # FLD AUTO: 9.05 K/UL — SIGNIFICANT CHANGE UP (ref 3.8–10.5)

## 2023-11-09 PROCEDURE — 72127 CT NECK SPINE W/O & W/DYE: CPT | Mod: 26

## 2023-11-09 PROCEDURE — 88304 TISSUE EXAM BY PATHOLOGIST: CPT | Mod: 26

## 2023-11-09 PROCEDURE — 88312 SPECIAL STAINS GROUP 1: CPT | Mod: 26

## 2023-11-09 PROCEDURE — 72130 CT CHEST SPINE W/O & W/DYE: CPT | Mod: 26

## 2023-11-09 PROCEDURE — 93970 EXTREMITY STUDY: CPT | Mod: 26

## 2023-11-09 PROCEDURE — 72133 CT LUMBAR SPINE W/O & W/DYE: CPT | Mod: 26

## 2023-11-09 PROCEDURE — 99223 1ST HOSP IP/OBS HIGH 75: CPT

## 2023-11-09 RX ORDER — CARVEDILOL PHOSPHATE 80 MG/1
3.12 CAPSULE, EXTENDED RELEASE ORAL EVERY 12 HOURS
Refills: 0 | Status: DISCONTINUED | OUTPATIENT
Start: 2023-11-09 | End: 2023-11-10

## 2023-11-09 RX ORDER — SODIUM CHLORIDE 9 MG/ML
1000 INJECTION, SOLUTION INTRAVENOUS
Refills: 0 | Status: DISCONTINUED | OUTPATIENT
Start: 2023-11-09 | End: 2023-11-10

## 2023-11-09 RX ORDER — DEXTROSE 50 % IN WATER 50 %
25 SYRINGE (ML) INTRAVENOUS ONCE
Refills: 0 | Status: DISCONTINUED | OUTPATIENT
Start: 2023-11-09 | End: 2023-11-10

## 2023-11-09 RX ORDER — DEXTROSE 50 % IN WATER 50 %
12.5 SYRINGE (ML) INTRAVENOUS ONCE
Refills: 0 | Status: DISCONTINUED | OUTPATIENT
Start: 2023-11-09 | End: 2023-11-10

## 2023-11-09 RX ORDER — OXYCODONE HYDROCHLORIDE 5 MG/1
5 TABLET ORAL EVERY 4 HOURS
Refills: 0 | Status: DISCONTINUED | OUTPATIENT
Start: 2023-11-09 | End: 2023-11-10

## 2023-11-09 RX ORDER — ATORVASTATIN CALCIUM 80 MG/1
40 TABLET, FILM COATED ORAL AT BEDTIME
Refills: 0 | Status: DISCONTINUED | OUTPATIENT
Start: 2023-11-09 | End: 2023-11-10

## 2023-11-09 RX ORDER — GABAPENTIN 400 MG/1
300 CAPSULE ORAL
Refills: 0 | Status: DISCONTINUED | OUTPATIENT
Start: 2023-11-09 | End: 2023-11-10

## 2023-11-09 RX ORDER — ALLOPURINOL 300 MG
100 TABLET ORAL AT BEDTIME
Refills: 0 | Status: DISCONTINUED | OUTPATIENT
Start: 2023-11-09 | End: 2023-11-10

## 2023-11-09 RX ORDER — LANOLIN ALCOHOL/MO/W.PET/CERES
3 CREAM (GRAM) TOPICAL AT BEDTIME
Refills: 0 | Status: DISCONTINUED | OUTPATIENT
Start: 2023-11-09 | End: 2023-11-10

## 2023-11-09 RX ORDER — POTASSIUM CHLORIDE 20 MEQ
10 PACKET (EA) ORAL
Refills: 0 | Status: COMPLETED | OUTPATIENT
Start: 2023-11-09 | End: 2023-11-09

## 2023-11-09 RX ORDER — SODIUM CHLORIDE 9 MG/ML
1000 INJECTION INTRAMUSCULAR; INTRAVENOUS; SUBCUTANEOUS
Refills: 0 | Status: DISCONTINUED | OUTPATIENT
Start: 2023-11-09 | End: 2023-11-09

## 2023-11-09 RX ORDER — GLUCAGON INJECTION, SOLUTION 0.5 MG/.1ML
1 INJECTION, SOLUTION SUBCUTANEOUS ONCE
Refills: 0 | Status: DISCONTINUED | OUTPATIENT
Start: 2023-11-09 | End: 2023-11-10

## 2023-11-09 RX ORDER — BUMETANIDE 0.25 MG/ML
2 INJECTION INTRAMUSCULAR; INTRAVENOUS
Refills: 0 | Status: DISCONTINUED | OUTPATIENT
Start: 2023-11-09 | End: 2023-11-10

## 2023-11-09 RX ORDER — ACETAMINOPHEN 500 MG
975 TABLET ORAL EVERY 8 HOURS
Refills: 0 | Status: DISCONTINUED | OUTPATIENT
Start: 2023-11-09 | End: 2023-11-10

## 2023-11-09 RX ORDER — INSULIN LISPRO 100/ML
VIAL (ML) SUBCUTANEOUS AT BEDTIME
Refills: 0 | Status: DISCONTINUED | OUTPATIENT
Start: 2023-11-09 | End: 2023-11-10

## 2023-11-09 RX ORDER — POLYETHYLENE GLYCOL 3350 17 G/17G
17 POWDER, FOR SOLUTION ORAL
Refills: 0 | Status: DISCONTINUED | OUTPATIENT
Start: 2023-11-09 | End: 2023-11-10

## 2023-11-09 RX ORDER — SENNA PLUS 8.6 MG/1
2 TABLET ORAL AT BEDTIME
Refills: 0 | Status: DISCONTINUED | OUTPATIENT
Start: 2023-11-09 | End: 2023-11-10

## 2023-11-09 RX ORDER — DEXTROSE 50 % IN WATER 50 %
15 SYRINGE (ML) INTRAVENOUS ONCE
Refills: 0 | Status: DISCONTINUED | OUTPATIENT
Start: 2023-11-09 | End: 2023-11-10

## 2023-11-09 RX ORDER — INSULIN LISPRO 100/ML
VIAL (ML) SUBCUTANEOUS
Refills: 0 | Status: DISCONTINUED | OUTPATIENT
Start: 2023-11-09 | End: 2023-11-10

## 2023-11-09 RX ORDER — OXYCODONE HYDROCHLORIDE 5 MG/1
2.5 TABLET ORAL EVERY 4 HOURS
Refills: 0 | Status: DISCONTINUED | OUTPATIENT
Start: 2023-11-09 | End: 2023-11-10

## 2023-11-09 RX ORDER — PANTOPRAZOLE SODIUM 20 MG/1
40 TABLET, DELAYED RELEASE ORAL
Refills: 0 | Status: DISCONTINUED | OUTPATIENT
Start: 2023-11-09 | End: 2023-11-10

## 2023-11-09 RX ADMIN — CARVEDILOL PHOSPHATE 3.12 MILLIGRAM(S): 80 CAPSULE, EXTENDED RELEASE ORAL at 04:50

## 2023-11-09 RX ADMIN — Medication 975 MILLIGRAM(S): at 23:15

## 2023-11-09 RX ADMIN — GABAPENTIN 300 MILLIGRAM(S): 400 CAPSULE ORAL at 04:50

## 2023-11-09 RX ADMIN — GABAPENTIN 300 MILLIGRAM(S): 400 CAPSULE ORAL at 18:17

## 2023-11-09 RX ADMIN — CARVEDILOL PHOSPHATE 3.12 MILLIGRAM(S): 80 CAPSULE, EXTENDED RELEASE ORAL at 19:25

## 2023-11-09 RX ADMIN — Medication 100 MILLIEQUIVALENT(S): at 07:17

## 2023-11-09 RX ADMIN — POLYETHYLENE GLYCOL 3350 17 GRAM(S): 17 POWDER, FOR SOLUTION ORAL at 04:51

## 2023-11-09 RX ADMIN — Medication 100 MILLIGRAM(S): at 22:16

## 2023-11-09 RX ADMIN — Medication 3 MILLIGRAM(S): at 22:17

## 2023-11-09 RX ADMIN — Medication 975 MILLIGRAM(S): at 13:10

## 2023-11-09 RX ADMIN — OXYCODONE HYDROCHLORIDE 5 MILLIGRAM(S): 5 TABLET ORAL at 01:24

## 2023-11-09 RX ADMIN — OXYCODONE HYDROCHLORIDE 5 MILLIGRAM(S): 5 TABLET ORAL at 06:52

## 2023-11-09 RX ADMIN — Medication 100 MILLIEQUIVALENT(S): at 05:19

## 2023-11-09 RX ADMIN — GABAPENTIN 300 MILLIGRAM(S): 400 CAPSULE ORAL at 12:47

## 2023-11-09 RX ADMIN — ATORVASTATIN CALCIUM 40 MILLIGRAM(S): 80 TABLET, FILM COATED ORAL at 22:17

## 2023-11-09 RX ADMIN — SODIUM CHLORIDE 125 MILLILITER(S): 9 INJECTION INTRAMUSCULAR; INTRAVENOUS; SUBCUTANEOUS at 01:43

## 2023-11-09 RX ADMIN — BUMETANIDE 2 MILLIGRAM(S): 0.25 INJECTION INTRAMUSCULAR; INTRAVENOUS at 04:50

## 2023-11-09 RX ADMIN — BUMETANIDE 2 MILLIGRAM(S): 0.25 INJECTION INTRAMUSCULAR; INTRAVENOUS at 13:11

## 2023-11-09 RX ADMIN — PANTOPRAZOLE SODIUM 40 MILLIGRAM(S): 20 TABLET, DELAYED RELEASE ORAL at 04:50

## 2023-11-09 RX ADMIN — OXYCODONE HYDROCHLORIDE 5 MILLIGRAM(S): 5 TABLET ORAL at 22:17

## 2023-11-09 RX ADMIN — OXYCODONE HYDROCHLORIDE 5 MILLIGRAM(S): 5 TABLET ORAL at 23:15

## 2023-11-09 RX ADMIN — Medication 975 MILLIGRAM(S): at 04:50

## 2023-11-09 RX ADMIN — Medication 975 MILLIGRAM(S): at 22:17

## 2023-11-09 RX ADMIN — OXYCODONE HYDROCHLORIDE 5 MILLIGRAM(S): 5 TABLET ORAL at 12:47

## 2023-11-09 RX ADMIN — Medication 100 MILLIEQUIVALENT(S): at 04:01

## 2023-11-09 NOTE — CONSULT NOTE ADULT - ASSESSMENT
This is a 68M with history as above who presents to the hospital with dehiscence of his surgical wound.

## 2023-11-09 NOTE — CONSULT NOTE ADULT - PROBLEM SELECTOR RECOMMENDATION 5
- On aspirin, no history of stents or CVA as per patient, no chest pain, EKG without ischemic findings  - Can hold his aspirin pending his surgery, restart his aspirin as per orthopedics

## 2023-11-09 NOTE — CONSULT NOTE ADULT - PROBLEM SELECTOR RECOMMENDATION 9
- Restart patient's CPAP post op (previously on CPAP at 5 cmH20) - As per orthopedics -> plan for OR for I&D and plastics closure  - abx as per orthopedics

## 2023-11-09 NOTE — PROVIDER CONTACT NOTE (CHANGE IN STATUS NOTIFICATION) - ASSESSMENT
Patient lying on right side, conversing with son. Episode of tachycardia to 142 noted at 1840. Patient denies chest pain or shortness of breath, remains alert and oriented x4.

## 2023-11-09 NOTE — H&P ADULT - ASSESSMENT
A/P: 68y Male with wound dehiscience s/p T9-pelvis revision PSF    Pain control  WBAT with assistive devices as needed  FU CT w/wo of the spine  Plan for OR for I&D and plastics closure  FU preop labs  EKG/Xray  Please document medical clearance for OR  SCDs    Mariela Meek MD  Orthopaedic Surgery Resident    For all questions, please reach out via the following numbers for the on-call resident; do not reach out via Teams.  [facility] [id number]  J        [id number]  [facility]  [phone]/ [phone]

## 2023-11-09 NOTE — CONSULT NOTE ADULT - PROBLEM SELECTOR RECOMMENDATION 2
- Patient with history of HFrEF, CAD with CABG, DM2 (not on insulin therapy at baseline) placing him at an elevated risk for his surgery  - Prior to his initial injury in Sept 2023 he wad ambulatory with a cane with good functional capacity at baseline, now more debilitated but seems to still have decent functional capacity currently (able to do his usual iADLs without cardiac or respiratory symptoms)  - EKG here read as LBBB but when compared to prior EKG there are no significant changes, patient's EKG likely showing LVH with QRS widening as per prior, otherwise no significant ST-T wave changes  - Noted to have b/l LE pitting edema on examination, unable to assess for JVD as patient cannot lay flat 2/2 back issues but lungs grossly clear to auscultation and his CXR shows clear lungs, recent TTE in Sept 2023 with grossly nl LV systolic function, patient's diuretics were adjusted during his prior hospitalization -> unclear if current volume status is his baseline or a worsening from his baseline (though mostly asymptomatic), would recommend cardiology eval for further risk stratification, also recommend checking US duplex b/l LE for DVT r/o as a cause of his LE edema

## 2023-11-09 NOTE — PROVIDER CONTACT NOTE (CHANGE IN STATUS NOTIFICATION) - ACTION/TREATMENT ORDERED:
MD Maru Gutierrez notified and at bedside. Patient returned to baseline without intervention. Per MD no intervention at this time, EKG would not capture the event. If happens again, EKG can be done. Notify Ortho.     Ortho paged at #[phone]. Per MD, no intervention at this time as patient was asymptomatic. Patient can still transfer to 9T and does NOT require tele monitoring or pulse ox.

## 2023-11-09 NOTE — BRIEF OPERATIVE NOTE - NSICDXBRIEFPROCEDURE_GEN_ALL_CORE_FT
PROCEDURES:  Surgical preparation of recipient site by excision of open wound of back 09-Nov-2023 16:47:57  Jason Blount

## 2023-11-09 NOTE — CONSULT NOTE ADULT - ATTENDING COMMENTS
Pt with cardiac hx and without evidence of active cardiac ischemia, uncontrolled arrhtyhmia, or decompensated heart failure.    No cardiac contraindication to the planned procedure.  Continue carvedilol in the perioperative period.

## 2023-11-09 NOTE — H&P ADULT - ATTENDING COMMENTS
68M 6.5 weeks s/p T10-Pelvis revision fusion for 3 column fracture.  Has been having wound healing issues over last 3 weeks.  Has been treated with antibiotics wound dressing and has an in office debridement with plastic surgery.  However has still not been able to heal wound. After shared decision making with plastic surgery as well as the patient decision was made to send patient into ER for admission for irrigation debridement  and repeat closure.  CT scan of lumbar spine showed no fluid collections and hardware in good position.  Plan for exploration of thoracolumbar spine wound with reclosure with plastic surgery.     NPO  IVF  OR Today  Medical clearance appreciated

## 2023-11-09 NOTE — CONSULT NOTE ADULT - ASSESSMENT
Patient is a 67 y/o M w/ a PMHx of CAD s/p CABG (3 vessels, 08/2017 at [facility]), HFrEF (LV systolic function grossly normal on TTE 09/2023), T2DM (not on insulin), ASIYA on CPAP, HTN, HLD, and renal mass s/p resection, with recent admission for fall w/ T12-L1 fracture s/p T9-pelvis PSF (09/18/23-09/30/23), who presents w/ wound dehiscence s/p revision T9-pelvis PSF. Cardiology consulted for pre-operative cardiac risk assessment.    #Pre-operative Risk Assessment:  - Patient w/ hx of CAD s/p CABG, HFrEF and T2DM  - TTE 09/2023 w/ grossly normal LV systolic function  - ECG w/ sinus rhythm 76  - Henriquez Perioperative Risk for Myocardial Infarction or Cardiac Arrest: 0.4%    #RCRI  Cardiovascular Risk Stratification - RCRI = 3  (0 predictors = 0.4%, 1 predictor = 0.9%, 2 predictors = 6.6%, =3 predictors = >11%)    History of ischemic heart disease: CAD s/p CABG (3 vessel, 2017 at [facility])  History of congestive heart failure: HFrEF, most recent TTE w/ grossly normal LV systolic function  History of cerebrovascular disease (stroke or transient ischemic attack): none  History of diabetes requiring preoperative insulin use: Required insulin use during prior surgery/admission  Chronic kidney disease (creatinine > 2 mg/dL): no  Undergoing suprainguinal vascular, intraperitoneal, or intrathoracic surgery: no  Functional Status: 4 METS (Description of maximal physical activity    Overall this patient is as 15% risk (for cardiac death, nonfatal myocardial infarction, and nonfatal cardiac arrest perioperatively for this T9-pelvis PSF revision. Patient currently does not show any clinical evidence of decompensated heart failure, cardiac arrhythmias or active ischemia. According to ACC/AHA 2014 Guideline on Perioperative Cardiovascular Evaluation and Management (Circulation. 2014;130:e278–e333), no further cardiac testing is recommended. May proceed after discussion and shared-decision making with regarding the risk, benefits, and alternatives to the procedure by procedure-performing physician with patient or surrogate decision maker.        All recommendations pending attending attestation. We will continue to follow with you.    Jr Chou, PGY-1  Cardiology Inpatient Consult Service Patient is a 69 y/o M w/ a PMHx of CAD s/p CABG (3 vessels, 08/2017 at [facility]), HFrEF (LV systolic function grossly normal on TTE 09/2023), T2DM (not on insulin), ASIYA on CPAP, HTN, HLD, and renal mass s/p resection, with recent admission for fall w/ T12-L1 fracture s/p T9-pelvis PSF (09/18/23-09/30/23), who presents w/ wound dehiscence s/p revision T9-pelvis PSF. Cardiology consulted for pre-operative cardiac risk assessment.    #Pre-operative Risk Assessment:  - Patient w/ hx of CAD s/p CABG, HFrEF and T2DM  - 1+ BLE pitting edema, patient states this is baseline for him  - TTE 09/2023 w/ grossly normal LV systolic function  - ECG w/ sinus rhythm 76, no ST elevation or T wave changes  - Henriquez Perioperative Risk for Myocardial Infarction or Cardiac Arrest: 0.4%    #RCRI  Cardiovascular Risk Stratification - RCRI = 3  (0 predictors = 0.4%, 1 predictor = 0.9%, 2 predictors = 6.6%, =3 predictors = >11%)    History of ischemic heart disease: CAD s/p CABG (3 vessel, 2017 at [facility])  History of congestive heart failure: HFrEF, most recent TTE w/ grossly normal LV systolic function  History of cerebrovascular disease (stroke or transient ischemic attack): none  History of diabetes requiring preoperative insulin use: Required insulin use during prior surgery/admission  Chronic kidney disease (creatinine > 2 mg/dL): no  Undergoing suprainguinal vascular, intraperitoneal, or intrathoracic surgery: no  Functional Status: 4 METS (Description of maximal physical activity    Overall this patient is as 15% risk (for cardiac death, nonfatal myocardial infarction, and nonfatal cardiac arrest perioperatively for this T9-pelvis PSF revision. Patient currently does not show any clinical evidence of decompensated heart failure, cardiac arrhythmias or active ischemia. According to ACC/AHA 2014 Guideline on Perioperative Cardiovascular Evaluation and Management (Circulation. 2014;130:e278–e333), no further cardiac testing is recommended. May proceed after discussion and shared-decision making with regarding the risk, benefits, and alternatives to the procedure by procedure-performing physician with patient or surrogate decision maker.        All recommendations pending attending attestation. We will continue to follow with you.    Jr Chou, PGY-1  Cardiology Inpatient Consult Service Patient is a 69 y/o M w/ a PMHx of CAD s/p CABG (3 vessels, 08/2017 at [facility]), HFrEF (LV systolic function grossly normal on TTE 09/2023), T2DM (not on insulin), ASIYA on CPAP, HTN, HLD, and renal mass s/p resection, with recent admission for fall w/ T12-L1 fracture s/p T9-pelvis PSF (09/18/23-09/30/23), who presents w/ wound dehiscence s/p revision T9-pelvis PSF. Cardiology consulted for pre-operative cardiac risk assessment.    #Pre-operative Risk Assessment:  - Patient w/ hx of CAD s/p CABG, HFrEF and T2DM  - 1+ BLE pitting edema, patient states this is baseline for him  - Former smoker, quit 03/2023 after RCC diagnosis  - TTE 09/2023 w/ grossly normal LV systolic function  - ECG w/ sinus rhythm 76, no ST elevation or T wave changes  - Henriquez Perioperative Risk for Myocardial Infarction or Cardiac Arrest: 0.4%    #RCRI  Cardiovascular Risk Stratification - RCRI = 3  (0 predictors = 0.4%, 1 predictor = 0.9%, 2 predictors = 6.6%, =3 predictors = >11%)    History of ischemic heart disease: CAD s/p CABG (3 vessel, 2017 at [facility])  History of congestive heart failure: HFrEF, most recent TTE w/ grossly normal LV systolic function  History of cerebrovascular disease (stroke or transient ischemic attack): none  History of diabetes requiring preoperative insulin use: Required insulin use during prior surgery/admission  Chronic kidney disease (creatinine > 2 mg/dL): no  Undergoing suprainguinal vascular, intraperitoneal, or intrathoracic surgery: no  Functional Status: 4 METS (Description of maximal physical activity    Overall this patient is as 15% risk (for cardiac death, nonfatal myocardial infarction, and nonfatal cardiac arrest perioperatively for this T9-pelvis PSF revision. Patient currently does not show any clinical evidence of decompensated heart failure, cardiac arrhythmias or active ischemia. According to ACC/AHA 2014 Guideline on Perioperative Cardiovascular Evaluation and Management (Circulation. 2014;130:e278–e333), no further cardiac testing is recommended. May proceed after discussion and shared-decision making with regarding the risk, benefits, and alternatives to the procedure by procedure-performing physician with patient or surrogate decision maker.        All recommendations pending attending attestation. We will continue to follow with you.    Jr Chou, PGY-1  Cardiology Inpatient Consult Service

## 2023-11-09 NOTE — CONSULT NOTE ADULT - PROBLEM SELECTOR RECOMMENDATION 4
- Hold his home glimepiride while inpatient, place on low dose ISS, FS q6h while NPO  - c/w gabapentin for his b/l LE neuropathy

## 2023-11-09 NOTE — ED ADULT NURSE REASSESSMENT NOTE - NS ED NURSE REASSESS COMMENT FT1
pt remains at baseline mental status, RR even unlabored completing full sentences. pt resting in stretcher comfortably at this time, no new complaints offered. stretcher lowest position siderails up safety measures in place. pt admitted pending bed placement. report given [id number]ichael[facility]

## 2023-11-09 NOTE — CONSULT NOTE ADULT - SUBJECTIVE AND OBJECTIVE BOX
CHIEF COMPLAINT    HPI  This is a     Allergies    No Known Allergies    Intolerances        HOME MEDICATIONS: [ ] Reviewed    MEDICATIONS  (STANDING):  acetaminophen     Tablet .. 975 milliGRAM(s) Oral every 8 hours  allopurinol 100 milliGRAM(s) Oral at bedtime  atorvastatin 40 milliGRAM(s) Oral at bedtime  buMETAnide 2 milliGRAM(s) Oral two times a day  carvedilol 3.125 milliGRAM(s) Oral every 12 hours  chlorhexidine 2% Cloths 1 Application(s) Topical once  gabapentin 300 milliGRAM(s) Oral four times a day  pantoprazole    Tablet 40 milliGRAM(s) Oral before breakfast  polyethylene glycol 3350 17 Gram(s) Oral two times a day  potassium chloride  10 mEq/100 mL IVPB 10 milliEquivalent(s) IV Intermittent every 1 hour  senna 2 Tablet(s) Oral at bedtime  sodium chloride 0.9%. 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      Essential hypertension      CAD (coronary artery disease)  - s/p cabg x3      Hypercholesterolemia      ASIYA (obstructive sleep apnea)  initially dx  ; CPAP      Paralytic ileus  post op THR  & post op laminectomy      Type 2 diabetes mellitus      Right carpal tunnel syndrome      Cubital tunnel syndrome, right      Trigger finger of right hand      Morbid obesity with body mass index (BMI) of 40.0 or higher      H/O CHF      Kidney mass      Cervical herniated disc      Renal cancer      Disease of spinal cord, unspecified      S/P Left Inguinal Hernia Repair      History of Total Hip Replacement  - bilateral THR      S/P tonsillectomy and adenoidectomy  as child      H/O lithotripsy  x1      S/P revision of total knee, right  x2-  &       S/P lumbar discectomy  - ,L5  Aug 2013      S/P CABG x 3  17      S/P lumbar laminectomy  Fusion L3-L5       Kidney stone  s/w ESWL pt unsure site      Neuropathy  Bilateral Feet since 2012      History of bilateral hip replacements      History of hernia repair      History of lumbar fusion      History of cholecystectomy      History of bilateral knee replacement      History of lymph node biopsy      S/p bilateral carpal tunnel release      History of partial nephrectomy      [ ] Reviewed     SOCIAL HISTORY:    FAMILY HISTORY:  Family history of coronary artery disease  HLD/Angina. Fatal MI age 73.    Family history of diabetes mellitus type II  mother- - hyperlipidemia and Hypertension.    Family history of pancreatic cancer (Sibling)  brother     Family history of coronary artery disease  brother- age 50+- Coronary Artery Stents        REVIEW OF SYSTEMS    General:  Negative  Skin/Breast:  Negative  Ophthalmologic:  Negative  ENMT:  Negative  Respiratory and Thorax:  Negative  Cardiovascular:  Negative  Gastrointestinal:  Negative  Genitourinary:  Negative  Musculoskeletal:  Negative  Neurological:  Negative  Psychiatric:  Negative  Hematology/Lymphatics:  Negative	  Endocrine:	  Negative  Allergic/Immunologic:	 Negative    PHYSICAL EXAM:  Vital Signs Last 24 Hrs  T(C): 36.6 (2023 03:56), Max: 36.7 (2023 23:38)  T(F): 97.9 (2023 03:56), Max: 98.1 (2023 03:00)  HR: 80 (2023 03:56) (80 - 92)  BP: 145/58 (2023 03:56) (128/74 - 157/86)  BP(mean): 101 (2023 23:38) (101 - 101)  RR: 18 (2023 03:56) (17 - 20)  SpO2: 100% (2023 03:56) (98% - 100%)    Parameters below as of 2023 03:56  Patient On (Oxygen Delivery Method): room air        PHYSICAL EXAM:  GENERAL: No Acute Distress  EYES: conjunctiva and sclera clear  ENMT: Moist mucous membranes   NECK: Supple  PULMONARY: Clear to auscultation bilaterally  CARDIAC: Regular rate and rhythm; No murmurs, rubs, or gallops  GASTROINTESTINAL: Abdomen soft, Nontender, Nondistended; Bowel sounds normal  EXTREMITIES:   No clubbing, cyanosis, or pedal edema  PSYCH: Normal Affect, Normal Behavior  NEUROLOGY:   - Mental status A&O x 3,  - Cranial Nerves II-XII intact  - Motor   - Sensory  - Coordination  SKIN: No rashes or lesions  MUSCULOSKELETAL: No joint swelling          143  |  107  |  19  ----------------------------<  105<H>  3.2<L>   |  23  |  0.59    Ca    7.6<L>      2023 01:30    TPro  7.1  /  Alb  4.1  /  TBili  0.3  /  DBili  x   /  AST  23  /  ALT  23  /  AlkPhos  135<H>  11-08                            12.2   9.05  )-----------( 255      ( 2023 01:30 )             37.6               PT/INR - ( 2023 01:30 )   PT: 10.7 sec;   INR: 0.95 ratio         PTT - ( :30 )  PTT:24.0 sec    Urinalysis Basic - ( 2023 01:30 )    Color: x / Appearance: x / SG: x / pH: x  Gluc: 105 mg/dL / Ketone: x  / Bili: x / Urobili: x   Blood: x / Protein: x / Nitrite: x   Leuk Esterase: x / RBC: x / WBC x   Sq Epi: x / Non Sq Epi: x / Bacteria: x        RADIOLOGY & ADDITIONAL STUDIES:    EKG:     IMAGES:         CHIEF COMPLAINT    HPI  This is a 68M with history of CAD s/p CABG 3V (Aug 2017 at [facility]), HFrEF (last TTE 2023 with grossly nl LV sys function), DM2, HTN, HLD, ASIYA on CPAP and R renal mass s/p resection who presents to the hospital for dehiscence of his surgical back wound. Patient had a recent admission at [facility] from  -  for fall c/b T12-L1 spine fracture s/p T9-pelvis PSF with subsequent stay at [place] re[facility] until 10/14 after which he was discharged home. Said that he has a VNS service that is helping to care for his surgical wound and they noticed that he was having dehiscence of his would. He followed up with his spine surgeon (Dr. Jose C Hughes) who placed him on a course of keflex about 3 weeks PTA for 8 days and referred him to plastic surgery Dr. Blount. His dressings were changes but his wound continued to drain. He was started on a second course of Keflex last week on Thursday but his wound did not improve and Dr. Hughes sent him to the hospital for further management. Patient currently reports pain in his lower back (no associated saddle anesthesia, no urinary/fecal incontinence/retention). Reports having serosanguinous drainage from his wound with occasional purulence. Denies fevers/chills/diaphoresis. Denies other acute complaints.     With regards to his activity level. States that before his fall he was ambulatory with a cane and was able to do all of his iADLs without cardiac complaints. Would be able to climb up a flight of stairs without chest pain, palpitations, dizziness, or syncope. Since his injury he is more debilitated. Currently ambulates with a walker, is able to do his iADLs (eg. bathe himself, dust his house, clean dishes, and cook occasionally) independently without chest pain/palpitations/dizziness/syncope/SOB. Denies worsening LE edema. Unable to lay flat to sleep 2/2 chronic back pain.     Allergies  No Known Allergies    HOME MEDICATIONS: [ X ] Reviewed - Aspirin 81mg daily, Allopurinol 100mg qHS, Atorvastatin 40mg qHS, Bumex 2mg BID, Carvedilol 3.125mg BID, Calcium + D3 daily, Gabapentin 300mg four times day, Glimepiride 1mg daily, MVI 1T daily, Losartan 100mg daily, Vitamin B12 1000mg daily    MEDICATIONS  (STANDING):  acetaminophen     Tablet .. 975 milliGRAM(s) Oral every 8 hours  allopurinol 100 milliGRAM(s) Oral at bedtime  atorvastatin 40 milliGRAM(s) Oral at bedtime  buMETAnide 2 milliGRAM(s) Oral two times a day  carvedilol 3.125 milliGRAM(s) Oral every 12 hours  chlorhexidine 2% Cloths 1 Application(s) Topical once  gabapentin 300 milliGRAM(s) Oral four times a day  pantoprazole    Tablet 40 milliGRAM(s) Oral before breakfast  polyethylene glycol 3350 17 Gram(s) Oral two times a day  potassium chloride  10 mEq/100 mL IVPB 10 milliEquivalent(s) IV Intermittent every 1 hour  senna 2 Tablet(s) Oral at bedtime  sodium chloride 0.9%. 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  Essential hypertension  CAD (coronary artery disease) - s/p cabg x3  Hypercholesterolemia  ASIYA (obstructive sleep apnea) initially dx  ; CPAP  Paralytic ileus post op THR  & post op laminectomy  Type 2 diabetes mellitus  Right carpal tunnel syndrome  Cubital tunnel syndrome, right  Trigger finger of right hand  Morbid obesity with body mass index (BMI) of 40.0 or higher  H/O CHF  Kidney mass  Cervical herniated disc  Renal cancer  Disease of spinal cord, unspecified S/P Left Inguinal Hernia Repair  History of Total Hip Replacement - bilateral THR  S/P tonsillectomy and adenoidectomy as child  H/O lithotripsy x1  S/P revision of total knee, right x2-  &   S/P lumbar discectomy - ,L5  Aug 2013  S/P CABG x 3 17  S/P lumbar laminectomy Fusion L3-L5   Kidney stone s/w ESWL pt unsure site  Neuropathy Bilateral Feet since 2012  History of bilateral hip replacements  History of hernia repair  History of lumbar fusion  History of cholecystectomy  History of bilateral knee replacement  History of lymph node biopsy  S/p bilateral carpal tunnel release  History of partial nephrectomy    [ X ] Reviewed     SOCIAL HISTORY:    FAMILY HISTORY:  Family history of coronary artery disease  HLD/Angina. Fatal MI age 73.    Family history of diabetes mellitus type II  mother- - hyperlipidemia and Hypertension.    Family history of pancreatic cancer (Sibling)  brother     Family history of coronary artery disease  brother- age 50+- Coronary Artery Stents    REVIEW OF SYSTEMS:  CONSTITUTIONAL: No weakness, fevers or chills  EYES: No visual changes or eye discharge  ENT: No rhinorrhea or sore throat  NECK: No pain or stiffness  RESPIRATORY: No cough, wheezing, hemoptysis; No shortness of breath  CARDIOVASCULAR: No chest pain or palpitations; No lower extremity edema  GASTROINTESTINAL: No abdominal or epigastric pain. No nausea, vomiting, or hematemesis; No diarrhea or constipation. No melena or hematochezia.  BACK: +Lower back pain without saddle anesthesia, no urinary/fecal incontinence/retention  GENITOURINARY: No dysuria, frequency or hematuria  NEUROLOGICAL: No numbness or weakness  SKIN: +Mid to lower back surgical wound with dehiscence and drainage, No itching, burning, rashes, or lesions     PHYSICAL EXAM:  Vital Signs Last 24 Hrs  T(C): 36.6 (2023 03:56), Max: 36.7 (2023 23:38)  T(F): 97.9 (2023 03:56), Max: 98.1 (2023 03:00)  HR: 80 (2023 03:56) (80 - 92)  BP: 145/58 (2023 03:56) (128/74 - 157/86)  BP(mean): 101 (2023 23:38) (101 - 101)  RR: 18 (2023 03:56) (17 - 20)  SpO2: 100% (2023 03:56) (98% - 100%)    Parameters below as of 2023 03:56  Patient On (Oxygen Delivery Method): room air    PHYSICAL EXAM:  GENERAL: No acute distress, well-developed  EYES/ENT: EOMI, PERRL, conjunctiva and sclera clear, Neck supple, No JVD, moist mucosa  CHEST/LUNG: Clear to auscultation bilaterally; No wheeze, equal breath sounds bilaterally   BACK: No spinal tenderness  HEART: Regular rate and rhythm; No murmurs, rubs, or gallops  ABDOMEN: Soft, Nontender, Nondistended; Bowel sounds present  EXTREMITIES: 1+ b/l pitting edema up to upper shines, symmetric, without erythema/warmth/TTP, Well perfused peripherally   PSYCH: Nl behavior, nl affect  NEUROLOGY: AAOx3, non-focal, cranial nerves intact  SKIN: Mid to lower back surgical wound with dehiscence with serosanguinous drainage and some purulence, no rashes noted    LABS:                        12.2   9.05  )-----------( 255      ( 2023 01:30 )             37.6     143  |  107  |  19  ----------------------------<  105<H>       3.2<L>   |  23  |  0.59    Ca    7.6<L>      2023 01:30    TPro  7.1  /  Alb  4.1  /  TBili  0.3  /  DBili  x   /  AST  23  /  ALT  23  /  AlkPhos  135<H>      PT/INR: 10.7/0.95 (23 @ 01:30)  PTT: 24.0 (23 @ 01:30)  PT/INR: 10.5/0.93 (23 @ 21:00)  PTT: 27.1 (23 @ 21:00)    Urinalysis Basic - ( 2023 21:00 )  Color: Yellow / Appearance: Clear / S.013 / pH: 6.0  Gluc: Negative mg/dL / Ketone: Negative mg/dL  / Bili: Negative / Urobili: 0.2 mg/dL   Blood: Negative / Protein: Negative mg/dL / Nitrite: Negative   Leuk Esterase: Trace / RBC: 0 /HPF / WBC 2 /HPF   Sq Epi: x / Non Sq Epi: x / Bacteria: Negative /HPF              RADIOLOGY & ADDITIONAL STUDIES:    EKG:     IMAGES:         CHIEF COMPLAINT    HPI  This is a 68M with history of CAD s/p CABG 3V (Aug 2017 at [facility]), HFrEF (last TTE 2023 with grossly nl LV sys function), DM2, HTN, HLD, ASIYA on CPAP and R renal mass s/p resection who presents to the hospital for dehiscence of his surgical back wound. Patient had a recent admission at [facility] from  -  for fall c/b T12-L1 spine fracture s/p T9-pelvis PSF with subsequent stay at [place] until 10/14 after which he was discharged home. Said that he has a VNS service that is helping to care for his surgical wound and they noticed that he was having dehiscence of his would. He followed up with his spine surgeon (Dr. Jose C Hughes) who placed him on a course of keflex about 3 weeks PTA for 8 days and referred him to plastic surgery Dr. Blount. His dressings were changes but his wound continued to drain. He was started on a second course of Keflex last week on Thursday but his wound did not improve and Dr. Hughes sent him to the hospital for further management. Patient currently reports pain in his lower back (no associated saddle anesthesia, no urinary/fecal incontinence/retention). Reports having serosanguinous drainage from his wound with occasional purulence. Denies fevers/chills/diaphoresis. Denies other acute complaints.     With regards to his activity level. States that before his fall he was ambulatory with a cane and was able to do all of his iADLs without cardiac complaints. Would be able to climb up a flight of stairs without chest pain, palpitations, dizziness, or syncope. Since his injury he is more debilitated. Currently ambulates with a walker, is able to do his iADLs (eg. bathe himself, dust his house, clean dishes, and cook occasionally) independently without chest pain/palpitations/dizziness/syncope/SOB. Denies worsening LE edema. Unable to lay flat to sleep 2/2 chronic back pain.     Allergies  No Known Allergies    HOME MEDICATIONS: [ X ] Reviewed - Aspirin 81mg daily, Allopurinol 100mg qHS, Atorvastatin 40mg qHS, Bumex 2mg BID, Carvedilol 3.125mg BID, Calcium + D3 daily, Gabapentin 300mg four times day, Glimepiride 1mg daily, MVI 1T daily, Losartan 100mg daily, Vitamin B12 1000mg daily    MEDICATIONS  (STANDING):  acetaminophen     Tablet .. 975 milliGRAM(s) Oral every 8 hours  allopurinol 100 milliGRAM(s) Oral at bedtime  atorvastatin 40 milliGRAM(s) Oral at bedtime  buMETAnide 2 milliGRAM(s) Oral two times a day  carvedilol 3.125 milliGRAM(s) Oral every 12 hours  chlorhexidine 2% Cloths 1 Application(s) Topical once  gabapentin 300 milliGRAM(s) Oral four times a day  pantoprazole    Tablet 40 milliGRAM(s) Oral before breakfast  polyethylene glycol 3350 17 Gram(s) Oral two times a day  potassium chloride  10 mEq/100 mL IVPB 10 milliEquivalent(s) IV Intermittent every 1 hour  senna 2 Tablet(s) Oral at bedtime  sodium chloride 0.9%. 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  Essential hypertension  CAD (coronary artery disease) - s/p cabg x3  Hypercholesterolemia  ASIYA (obstructive sleep apnea) initially dx  ; CPAP  Paralytic ileus post op THR  & post op laminectomy  Type 2 diabetes mellitus  Right carpal tunnel syndrome  Cubital tunnel syndrome, right  Trigger finger of right hand  Morbid obesity with body mass index (BMI) of 40.0 or higher  H/O CHF  Kidney mass  Cervical herniated disc  Renal cancer  Disease of spinal cord, unspecified S/P Left Inguinal Hernia Repair  History of Total Hip Replacement - bilateral THR  S/P tonsillectomy and adenoidectomy as child  H/O lithotripsy x1  S/P revision of total knee, right x2-  &   S/P lumbar discectomy - ,L5  Aug 2013  S/P CABG x 3 17  S/P lumbar laminectomy Fusion L3-L5   Kidney stone s/w ESWL pt unsure site  Neuropathy Bilateral Feet since 2012  History of bilateral hip replacements  History of hernia repair  History of lumbar fusion  History of cholecystectomy  History of bilateral knee replacement  History of lymph node biopsy  S/p bilateral carpal tunnel release  History of partial nephrectomy    [ X ] Reviewed     SOCIAL HISTORY:    FAMILY HISTORY:  Family history of coronary artery disease  HLD/Angina. Fatal MI age 73.    Family history of diabetes mellitus type II  mother- - hyperlipidemia and Hypertension.    Family history of pancreatic cancer (Sibling)  brother     Family history of coronary artery disease  brother- age 50+- Coronary Artery Stents    REVIEW OF SYSTEMS:  CONSTITUTIONAL: No weakness, fevers or chills  EYES: No visual changes or eye discharge  ENT: No rhinorrhea or sore throat  NECK: No pain or stiffness  RESPIRATORY: No cough, wheezing, hemoptysis; No shortness of breath  CARDIOVASCULAR: No chest pain or palpitations; No lower extremity edema  GASTROINTESTINAL: No abdominal or epigastric pain. No nausea, vomiting, or hematemesis; No diarrhea or constipation. No melena or hematochezia.  BACK: +Lower back pain without saddle anesthesia, no urinary/fecal incontinence/retention  GENITOURINARY: No dysuria, frequency or hematuria  NEUROLOGICAL: No numbness or weakness  SKIN: +Mid to lower back surgical wound with dehiscence and drainage, No itching, burning, rashes, or lesions     PHYSICAL EXAM:  Vital Signs Last 24 Hrs  T(C): 36.6 (2023 03:56), Max: 36.7 (2023 23:38)  T(F): 97.9 (2023 03:56), Max: 98.1 (2023 03:00)  HR: 80 (2023 03:56) (80 - 92)  BP: 145/58 (2023 03:56) (128/74 - 157/86)  BP(mean): 101 (2023 23:38) (101 - 101)  RR: 18 (2023 03:56) (17 - 20)  SpO2: 100% (2023 03:56) (98% - 100%)    Parameters below as of 2023 03:56  Patient On (Oxygen Delivery Method): room air    PHYSICAL EXAM:  GENERAL: No acute distress, well-developed  EYES/ENT: EOMI, PERRL, conjunctiva and sclera clear, Neck supple, No JVD, moist mucosa  CHEST/LUNG: Clear to auscultation bilaterally; No wheeze, equal breath sounds bilaterally   BACK: No spinal tenderness  HEART: Regular rate and rhythm; No murmurs, rubs, or gallops  ABDOMEN: Soft, Nontender, Nondistended; Bowel sounds present  EXTREMITIES: 1+ b/l pitting edema up to upper shines, symmetric, without erythema/warmth/TTP, Well perfused peripherally   PSYCH: Nl behavior, nl affect  NEUROLOGY: AAOx3, non-focal, cranial nerves intact  SKIN: Mid to lower back surgical wound with dehiscence with serosanguinous drainage and some purulence, no rashes noted    LABS:                        12.2   9.05  )-----------( 255      ( 2023 01:30 )             37.6     143  |  107  |  19  ----------------------------<  105<H>       3.2<L>   |  23  |  0.59    Ca    7.6<L>      2023 01:30    TPro  7.1  /  Alb  4.1  /  TBili  0.3  /  DBili  x   /  AST  23  /  ALT  23  /  AlkPhos  135<H>      PT/INR: 10.7/0.95 (23 @ 01:30)  PTT: 24.0 (23 @ 01:30)  PT/INR: 10.5/0.93 (23 @ 21:00)  PTT: 27.1 (23 @ 21:00)    Urinalysis Basic - ( 2023 21:00 )  Color: Yellow / Appearance: Clear / S.013 / pH: 6.0  Gluc: Negative mg/dL / Ketone: Negative mg/dL  / Bili: Negative / Urobili: 0.2 mg/dL   Blood: Negative / Protein: Negative mg/dL / Nitrite: Negative   Leuk Esterase: Trace / RBC: 0 /HPF / WBC 2 /HPF   Sq Epi: x / Non Sq Epi: x / Bacteria: Negative /HPF    RADIOLOGY & ADDITIONAL STUDIES:  < from: Xray Chest 1 View AP/PA (23 @ 20:52) >  ******PRELIMINARY REPORT******    FINDINGS:  The heart size is normal in size.  The lungs are clear.  There is no pleural effusion. There is no pneumothorax.  No acute bony abnormality. Median sternotomy wires. Posterior thoracic   fusion hardware partially visualized.    IMPRESSION:  Clear lungs.  < end of copied text >    < from: CT Cervical Spine w/wo IV Cont (23 @ 00:39) >  IMPRESSION:  No abnormal enhancement or discrete focal drainable collection. Postsurgical changes in the posterior abdominal wall with nonspecific dependent subcutaneous complex fluid posterior to hardware.    T9-S1 spinal fusion hardware.    Multilevel degenerative disease.  --- End of Report ---  < end of copied text >    EKG:   EKG - NSR at 76, QTc 454, read as LBBB but no acute changes from prior EKG from 2023, no significant ST-T wave changes

## 2023-11-09 NOTE — ED ADULT NURSE REASSESSMENT NOTE - NS ED NURSE REASSESS COMMENT FT1
pt remains at baseline mental status, RR even unlabored completing full sentences. pt sitting in stretcher c/o increasing pain, 8/10 on scaled 1-10. pt medicated per orders. 0100 labs drawn and sent per ortho orders. stretcher lowest position siderails up safety measures in place. pending bed placement

## 2023-11-09 NOTE — PACU DISCHARGE NOTE - MENTAL STATUS: PATIENT PARTICIPATION
AFTER YOUR ENDOSCOPY/COLONOSCOPY    Date of Procedure:  3/8/2022    You had the following procedure(s) performed today:  Colonoscopy    Exam Results:  The physician removed 3 polyp(s), which will be sent to the lab for testing. Results can take up to 14 business days to be communicated to you.  Your physician will contact you with the result of your biopsy and when your follow-up colonoscopy is due    Diet Instructions:  You may resume your regular diet today. It is recommended to avoid heavy, greasy, and spicy foods today.    Medications:  You may resume your medications as prescribed.    Activity for Today:    · DO NOT DRIVE.  Do not operate any other heavy machinery or equipment.  · Avoid activities that require coordination (climbing ladders, using a knife/cooking equipment, etc.)  · Do not make important financial or legal decisions  · Do not return to work.  · Do not drink any alcohol.  · Rest the remainder of the day. You may resume your activity tomorrow.    It is normal to have.....    Colonoscopy / Sigmoidoscopy   · Mild abdominal distention and/or cramping are normal after these procedures, but should pass within an hour or two with the passage of air.  · A scant amount of rectal bleeding is normal following a biopsy, polypectomy or if you have hemorrhoids.   · Mild nausea and/or vomiting       When to call Dr. Verde at [phone]    For Colonoscopy / Sigmoidoscopy   · If you have unusual belly pain that is NOT relieved with passing air  · If you have rectal bleeding more than blood streaking on the toilet tissue  · If you have moderate nausea and/or vomiting         Go to the Emergency Room if you experience any of the following:  · Severe pain  · Difficulty breathing or swallowing  · Persistent vomiting  · Vomiting blood, either brown (coffee ground in consistency) or red  · Significant bright red, rectal bleeding  · Black colored or bloody stool  · Chills or fever over 101 degrees F.       
Awake

## 2023-11-09 NOTE — CONSULT NOTE ADULT - SUBJECTIVE AND OBJECTIVE BOX
CARDIOLOGY CONSULT NOTE    HPI:  Patient is a 68y Male PMH CAD s/p CABG (3 vessels, 2017 at [facility]), HFrEF (LV systolic function grossly normal on TTE 2023), T2DM (not on insulin), ASIYA on CPAP, HTN, HLD, renal mass s/p resection who presents c/o wound dehiscence s/p revision T9-pelvis PSF. Patient states that for the past few weeks he has noticed drainage from his back incision. Has been following up outpatient with surgeon who has been monitoring however has been getting worse. Denies pain/injury elsewhere. Denies numbness/tingling/paresthesias/weakness. Denies bowel/bladder incontinence. Denies fevers/chills. No other complaints at this time.    Cardiology consulted for pre-operative cardiac risk assessment. Patient recently admitted to [facility] (23-23) for fall w/ T12-L1 fracture s/p T9-pelvis PSF, discharged to [facility] and then to home on 10/14/23. Patient has visiting home nursing service for wound care who reported dehiscence. Followed up with outpatient spine surgeon (Dr. Hughes) and completed course of Keflex with minimal improvement in drainage and was told to come to the ED. Patient endorsing lower back pain and continued drainage from his wound (with intermittent purulence). Denies any urinary/fecal incontinence or saddle anesthesia. Patient currently able to perform ADLs independently without SOB, chest pain, or dizziness. Patient requires walker for ambulation. Prior to fall and recent hospitalization, patient able to ambulate with a cane. Patient unable to sleep flat on his back due to chronic back pain, denies SOB while lying flat. States that his current LE swelling is at baseline. Denies any fevers, chills, chest pain, SOB, abdominal pain, nausea/vomiting, diarrhea, or urinary symptoms.       ECG: Personally reviewed    Echo: Personally reviewed    Stress Testing: Personally reviewed    Cath: Personally reviewed    CXR: Personally reviewed      PMHx:   Gout    History of Obesity    Lumbar disc disease with radiculopathy    H/O: osteoarthritis    Obstructive sleep apnea    Renal calculi    Hx of insomnia    Neuropathy    Essential hypertension    CAD (coronary artery disease)    Ankle fracture    Hypercholesterolemia    Stool finding    ASIYA (obstructive sleep apnea)    Class 3 severe obesity with body mass index (BMI) of 45.0 to 49.9 in adult    Paralytic ileus    Umbilical hernia without obstruction and without gangrene    Type 2 diabetes mellitus    Right carpal tunnel syndrome    Cubital tunnel syndrome, right    Trigger finger of right hand    Morbid obesity with body mass index (BMI) of 40.0 or higher    H/O CHF    Kidney mass    Cervical herniated disc    Renal cancer    Disease of spinal cord, unspecified    PSHx:   S/P Left Inguinal Hernia Repair    History of Total Hip Replacement    Cholecystitis    H/O lymphadenopathy    Hernia    S/P tonsillectomy and adenoidectomy    S/P hip replacement    S/P knee replacement    H/O renal calculi    H/O lithotripsy    S/P revision of total knee, right    S/P lumbar discectomy    S/P CABG x 3    S/P lumbar laminectomy    Kidney stone    Neuropathy    History of bilateral hip replacements    History of hernia repair    History of lumbar fusion    History of cholecystectomy    History of bilateral knee replacement    History of lymph node biopsy    S/p bilateral carpal tunnel release    History of partial nephrectomy      Allergies:  No Known Allergies    Home Meds:  - Aspirin 81mg qd  - Atorvastatin 40mg qhs  - Bumex 2mg BID  - Carvedilol 3.125mg BID  - Losartan 100mg qd  - Glimepiride 1mg daily  - Gabapentin 300mg four times day  - Calcium + D3 daily  - MVI 1T daily  - Vitamin B12 1000mg daily    Current Medications:   acetaminophen     Tablet .. 975 milliGRAM(s) Oral every 8 hours  allopurinol 100 milliGRAM(s) Oral at bedtime  atorvastatin 40 milliGRAM(s) Oral at bedtime  buMETAnide 2 milliGRAM(s) Oral two times a day  carvedilol 3.125 milliGRAM(s) Oral every 12 hours  chlorhexidine 2% Cloths 1 Application(s) Topical once  gabapentin 300 milliGRAM(s) Oral four times a day  melatonin 3 milliGRAM(s) Oral at bedtime PRN  oxyCODONE    IR 2.5 milliGRAM(s) Oral every 4 hours PRN  oxyCODONE    IR 5 milliGRAM(s) Oral every 4 hours PRN  pantoprazole    Tablet 40 milliGRAM(s) Oral before breakfast  polyethylene glycol 3350 17 Gram(s) Oral two times a day  senna 2 Tablet(s) Oral at bedtime      FAMILY HISTORY:  Family history of coronary artery disease  HLD/Angina. Fatal MI age 73.    Family history of diabetes mellitus type II  mother- - hyperlipidemia and Hypertension.    Family history of pancreatic cancer (Sibling)  brother     Family history of coronary artery disease  brother- age 50+- Coronary Artery Stents      Social History:  Smoking History:  Alcohol Use:  Drug Use:    REVIEW OF SYSTEMS:  CONSTITUTIONAL: No weakness, fevers or chills  EYES/ENT: No visual changes;  No dysphagia  NECK: No pain or stiffness  RESPIRATORY: No cough, wheezing, hemoptysis; No shortness of breath  CARDIOVASCULAR: No chest pain or palpitations; No lower extremity edema  GASTROINTESTINAL: No abdominal or epigastric pain. No nausea, vomiting, or hematemesis; No diarrhea or constipation. No melena or hematochezia.  BACK: +Lower back pain w/o saddle anesthesia or urinary/fecal incontinence  GENITOURINARY: No dysuria, frequency or hematuria  NEUROLOGICAL: No numbness or weakness  SKIN: +Lower back surgical wound w/ dehiscence and drainage.  All other review of systems is negative unless indicated above.    Physical Exam:  T(F): 97.7 (), Max: 98.1 ()  HR: 75 () (75 - 92)  BP: 147/74 () (128/74 - 157/86)  RR: 18 ()  SpO2: 99% ()    GEN: comfortable appearing, lying in bed in NAD  HEENT: NCAT, MMM  CV: Regular S1, S2, no m/r/g  RESP: CTAB  ABD: Soft, NTND, +BS  EXT: 1+ BLE edema. WWP, pulses palpable throughout  NEURO: No focal deficits, AOx3  SKIN: +Lower back surgical wound w/ dehiscence and drainage    Labs: Personally reviewed                        12.2   9.05  )-----------( 255      ( 2023 01:30 )             37.6       143  |  107  |  19  ----------------------------<  105<H>  3.2<L>   |  23  |  0.59    Ca    7.6<L>      2023 01:30    TPro  7.1  /  Alb  4.1  /  TBili  0.3  /  DBili  x   /  AST  23  /  ALT  23  /  AlkPhos  135<H>      PT/INR - ( 2023 01:30 )   PT: 10.7 sec;   INR: 0.95 ratio    PTT - ( 2023 01:30 )  PTT:24.0 sec   CARDIOLOGY CONSULT NOTE    HPI:  Patient is a 68y Male PMH CAD s/p CABG (3 vessels, 2017 at [facility]), HFrEF (LV systolic function grossly normal on TTE 2023), T2DM (not on insulin), ASIYA on CPAP, HTN, HLD, renal mass s/p resection who presents c/o wound dehiscence s/p revision T9-pelvis PSF. Patient states that for the past few weeks he has noticed drainage from his back incision. Has been following up outpatient with surgeon who has been monitoring however has been getting worse. Denies pain/injury elsewhere. Denies numbness/tingling/paresthesias/weakness. Denies bowel/bladder incontinence. Denies fevers/chills. No other complaints at this time.    Cardiology consulted for pre-operative cardiac risk assessment. Patient recently admitted to [facility] (23-23) for fall w/ T12-L1 fracture s/p T9-pelvis PSF, discharged to [facility] and then to home on 10/14/23. Patient has visiting home nursing service for wound care who reported dehiscence. Followed up with outpatient spine surgeon (Dr. Hughes) and completed course of Keflex with minimal improvement in drainage and was told to come to the ED. Patient endorsing lower back pain and continued drainage from his wound (with intermittent purulence). Denies any urinary/fecal incontinence or saddle anesthesia. Patient currently able to perform ADLs independently without SOB, chest pain, or dizziness. Patient requires walker for ambulation. Prior to fall and recent hospitalization, patient able to ambulate with a cane. Patient unable to sleep flat on his back due to chronic back pain, denies SOB while lying flat. States that his current LE swelling is at baseline. Denies any fevers, chills, chest pain, SOB, abdominal pain, nausea/vomiting, diarrhea, or urinary symptoms.       ECG: Personally reviewed    Echo: Personally reviewed    Stress Testing: Personally reviewed    Cath: Personally reviewed    CXR: Personally reviewed      PMHx:   Gout    History of Obesity    Lumbar disc disease with radiculopathy    H/O: osteoarthritis    Obstructive sleep apnea    Renal calculi    Hx of insomnia    Neuropathy    Essential hypertension    CAD (coronary artery disease)    Ankle fracture    Hypercholesterolemia    Stool finding    ASIYA (obstructive sleep apnea)    Class 3 severe obesity with body mass index (BMI) of 45.0 to 49.9 in adult    Paralytic ileus    Umbilical hernia without obstruction and without gangrene    Type 2 diabetes mellitus    Right carpal tunnel syndrome    Cubital tunnel syndrome, right    Trigger finger of right hand    Morbid obesity with body mass index (BMI) of 40.0 or higher    H/O CHF    Kidney mass    Cervical herniated disc    Renal cancer    Disease of spinal cord, unspecified    PSHx:   S/P Left Inguinal Hernia Repair    History of Total Hip Replacement    Cholecystitis    H/O lymphadenopathy    Hernia    S/P tonsillectomy and adenoidectomy    S/P hip replacement    S/P knee replacement    H/O renal calculi    H/O lithotripsy    S/P revision of total knee, right    S/P lumbar discectomy    S/P CABG x 3    S/P lumbar laminectomy    Kidney stone    Neuropathy    History of bilateral hip replacements    History of hernia repair    History of lumbar fusion    History of cholecystectomy    History of bilateral knee replacement    History of lymph node biopsy    S/p bilateral carpal tunnel release    History of partial nephrectomy      Allergies:  No Known Allergies    Home Meds:  - Aspirin 81mg qd  - Atorvastatin 40mg qhs  - Bumex 2mg BID  - Carvedilol 3.125mg BID  - Losartan 100mg qd  - Glimepiride 1mg daily  - Gabapentin 300mg four times day  - Calcium + D3 daily  - MVI 1T daily  - Vitamin B12 1000mg daily    Current Medications:   acetaminophen     Tablet .. 975 milliGRAM(s) Oral every 8 hours  allopurinol 100 milliGRAM(s) Oral at bedtime  atorvastatin 40 milliGRAM(s) Oral at bedtime  buMETAnide 2 milliGRAM(s) Oral two times a day  carvedilol 3.125 milliGRAM(s) Oral every 12 hours  chlorhexidine 2% Cloths 1 Application(s) Topical once  gabapentin 300 milliGRAM(s) Oral four times a day  melatonin 3 milliGRAM(s) Oral at bedtime PRN  oxyCODONE    IR 2.5 milliGRAM(s) Oral every 4 hours PRN  oxyCODONE    IR 5 milliGRAM(s) Oral every 4 hours PRN  pantoprazole    Tablet 40 milliGRAM(s) Oral before breakfast  polyethylene glycol 3350 17 Gram(s) Oral two times a day  senna 2 Tablet(s) Oral at bedtime      FAMILY HISTORY:  Family history of coronary artery disease  HLD/Angina. Fatal MI age 73.    Family history of diabetes mellitus type II  mother- - hyperlipidemia and Hypertension.    Family history of pancreatic cancer (Sibling)  brother     Family history of coronary artery disease  brother- age 50+- Coronary Artery Stents      Social History:  Smoking History:  Alcohol Use:  Drug Use:    REVIEW OF SYSTEMS:  CONSTITUTIONAL: No weakness, fevers or chills  EYES/ENT: No visual changes;  No dysphagia  NECK: No pain or stiffness  RESPIRATORY: No cough, wheezing, hemoptysis; No shortness of breath  CARDIOVASCULAR: No chest pain or palpitations; No lower extremity edema  GASTROINTESTINAL: No abdominal or epigastric pain. No nausea, vomiting, or hematemesis; No diarrhea or constipation. No melena or hematochezia.  BACK: +Lower back pain w/o saddle anesthesia or urinary/fecal incontinence  GENITOURINARY: No dysuria, frequency or hematuria  NEUROLOGICAL: No numbness or weakness  SKIN: +Lower back surgical wound w/ dehiscence and drainage.  All other review of systems is negative unless indicated above.    Physical Exam:  T(F): 97.7 (), Max: 98.1 ()  HR: 75 () (75 - 92)  BP: 147/74 () (128/74 - 157/86)  RR: 18 ()  SpO2: 99% ()    GEN: comfortable appearing, lying in bed in NAD  HEENT: NCAT, MMM. Unable to assess JVD as patient cannot lay down per ortho  CV: Regular S1, S2, no m/r/g  RESP: CTAB  ABD: Soft, NTND, +BS  EXT: 1+ BLE edema. WWP, pulses palpable throughout  NEURO: No focal deficits, AOx3  SKIN: +Lower back surgical wound w/ dehiscence and drainage    Labs: Personally reviewed                        12.2   9.05  )-----------( 255      ( 2023 01:30 )             37.6       143  |  107  |  19  ----------------------------<  105<H>  3.2<L>   |  23  |  0.59    Ca    7.6<L>      2023 01:30    TPro  7.1  /  Alb  4.1  /  TBili  0.3  /  DBili  x   /  AST  23  /  ALT  23  /  AlkPhos  135<H>      PT/INR - ( 2023 01:30 )   PT: 10.7 sec;   INR: 0.95 ratio    PTT - ( 2023 01:30 )  PTT:24.0 sec      RADIOLOGY:  < from: TTE W or WO Ultrasound Enhancing Agent (23 @ 12:00) >  CONCLUSIONS:      1. Left ventricular endocardium is not well visualized; however, the left ventricular systolic function appears grossly normal.   2. The right ventricle is not well visualized. Probably normal systolic function. Tricuspid annular plane systolic excursion (TAPSE) is 2.2 cm (normal >=1.7 cm).   3. Technically difficult image quality.   4. The left atrium is normal in size.    < end of copied text > CARDIOLOGY CONSULT NOTE    HPI:  Patient is a 68y Male PMH CAD s/p CABG (3 vessels, 2017 at [facility]), HFrEF (LV systolic function grossly normal on TTE 2023), T2DM (not on insulin), ASIYA on CPAP, HTN, HLD, renal mass s/p resection who presents c/o wound dehiscence s/p revision T9-pelvis PSF. Patient states that for the past few weeks he has noticed drainage from his back incision. Has been following up outpatient with surgeon who has been monitoring however has been getting worse. Denies pain/injury elsewhere. Denies numbness/tingling/paresthesias/weakness. Denies bowel/bladder incontinence. Denies fevers/chills. No other complaints at this time.    Cardiology consulted for pre-operative cardiac risk assessment. Patient recently admitted to [facility] (23-23) for fall w/ T12-L1 fracture s/p T9-pelvis PSF, discharged to [facility] and then to home on 10/14/23. Patient has visiting home nursing service for wound care who reported dehiscence. Followed up with outpatient spine surgeon (Dr. Hughes) and completed course of Keflex with minimal improvement in drainage and was told to come to the ED. Patient endorsing lower back pain and continued drainage from his wound (with intermittent purulence). Denies any urinary/fecal incontinence or saddle anesthesia. Patient currently able to perform ADLs independently without SOB, chest pain, or dizziness. Patient requires walker for ambulation. Prior to fall and recent hospitalization, patient able to ambulate with a cane. Patient unable to sleep flat on his back due to chronic back pain, denies SOB while lying flat. States that his current LE swelling is at baseline. Denies any fevers, chills, chest pain, SOB, abdominal pain, nausea/vomiting, diarrhea, or urinary symptoms.       ECG: Personally reviewed    Echo: Personally reviewed    Stress Testing: Personally reviewed    Cath: Personally reviewed    CXR: Personally reviewed      PMHx:   Gout    History of Obesity    Lumbar disc disease with radiculopathy    H/O: osteoarthritis    Obstructive sleep apnea    Renal calculi    Hx of insomnia    Neuropathy    Essential hypertension    CAD (coronary artery disease)    Ankle fracture    Hypercholesterolemia    Stool finding    ASIYA (obstructive sleep apnea)    Class 3 severe obesity with body mass index (BMI) of 45.0 to 49.9 in adult    Paralytic ileus    Umbilical hernia without obstruction and without gangrene    Type 2 diabetes mellitus    Right carpal tunnel syndrome    Cubital tunnel syndrome, right    Trigger finger of right hand    Morbid obesity with body mass index (BMI) of 40.0 or higher    H/O CHF    Kidney mass    Cervical herniated disc    Renal cancer    Disease of spinal cord, unspecified    PSHx:   S/P Left Inguinal Hernia Repair    History of Total Hip Replacement    Cholecystitis    H/O lymphadenopathy    Hernia    S/P tonsillectomy and adenoidectomy    S/P hip replacement    S/P knee replacement    H/O renal calculi    H/O lithotripsy    S/P revision of total knee, right    S/P lumbar discectomy    S/P CABG x 3    S/P lumbar laminectomy    Kidney stone    Neuropathy    History of bilateral hip replacements    History of hernia repair    History of lumbar fusion    History of cholecystectomy    History of bilateral knee replacement    History of lymph node biopsy    S/p bilateral carpal tunnel release    History of partial nephrectomy      Allergies:  No Known Allergies    Home Meds:  - Aspirin 81mg qd  - Atorvastatin 40mg qhs  - Bumex 2mg BID  - Carvedilol 3.125mg BID  - Losartan 100mg qd  - Glimepiride 1mg daily  - Gabapentin 300mg four times day  - Calcium + D3 daily  - MVI 1T daily  - Vitamin B12 1000mg daily    Current Medications:   acetaminophen     Tablet .. 975 milliGRAM(s) Oral every 8 hours  allopurinol 100 milliGRAM(s) Oral at bedtime  atorvastatin 40 milliGRAM(s) Oral at bedtime  buMETAnide 2 milliGRAM(s) Oral two times a day  carvedilol 3.125 milliGRAM(s) Oral every 12 hours  chlorhexidine 2% Cloths 1 Application(s) Topical once  gabapentin 300 milliGRAM(s) Oral four times a day  melatonin 3 milliGRAM(s) Oral at bedtime PRN  oxyCODONE    IR 2.5 milliGRAM(s) Oral every 4 hours PRN  oxyCODONE    IR 5 milliGRAM(s) Oral every 4 hours PRN  pantoprazole    Tablet 40 milliGRAM(s) Oral before breakfast  polyethylene glycol 3350 17 Gram(s) Oral two times a day  senna 2 Tablet(s) Oral at bedtime      FAMILY HISTORY:  Family history of coronary artery disease  HLD/Angina. Fatal MI age 73.    Family history of diabetes mellitus type II  mother- - hyperlipidemia and Hypertension.    Family history of pancreatic cancer (Sibling)  brother     Family history of coronary artery disease  brother- age 50+- Coronary Artery Stents      Social History:  Smoking History: former smoker, quite 2023 after RCC diagnosis (prior was smoking 3-4 cigars per day)  Alcohol Use: denies  Drug Use: denies    REVIEW OF SYSTEMS:  CONSTITUTIONAL: No weakness, fevers or chills  EYES/ENT: No visual changes;  No dysphagia  NECK: No pain or stiffness  RESPIRATORY: No cough, wheezing, hemoptysis; No shortness of breath  CARDIOVASCULAR: No chest pain or palpitations; No lower extremity edema  GASTROINTESTINAL: No abdominal or epigastric pain. No nausea, vomiting, or hematemesis; No diarrhea or constipation. No melena or hematochezia.  BACK: +Lower back pain w/o saddle anesthesia or urinary/fecal incontinence  GENITOURINARY: No dysuria, frequency or hematuria  NEUROLOGICAL: No numbness or weakness  SKIN: +Lower back surgical wound w/ dehiscence and drainage.  All other review of systems is negative unless indicated above.    Physical Exam:  T(F): 97.7 (), Max: 98.1 ()  HR: 75 () (75 - 92)  BP: 147/74 () (128/74 - 157/86)  RR: 18 ()  SpO2: 99% ()    GEN: comfortable appearing, lying in bed in NAD  HEENT: NCAT, MMM. Unable to assess JVD as patient cannot lay down per ortho  CV: Regular S1, S2, no m/r/g  RESP: CTAB  ABD: Soft, NTND, +BS  EXT: 1+ BLE edema. WWP, pulses palpable throughout  NEURO: No focal deficits, AOx3  SKIN: +Lower back surgical wound w/ dehiscence and drainage    Labs: Personally reviewed                        12.2   9.05  )-----------( 255      ( 2023 01:30 )             37.6       143  |  107  |  19  ----------------------------<  105<H>  3.2<L>   |  23  |  0.59    Ca    7.6<L>      2023 01:30    TPro  7.1  /  Alb  4.1  /  TBili  0.3  /  DBili  x   /  AST  23  /  ALT  23  /  AlkPhos  135<H>  11-08    PT/INR - ( 2023 01:30 )   PT: 10.7 sec;   INR: 0.95 ratio    PTT - ( 2023 01:30 )  PTT:24.0 sec      RADIOLOGY:  < from: TTE W or WO Ultrasound Enhancing Agent (23 @ 12:00) >  CONCLUSIONS:      1. Left ventricular endocardium is not well visualized; however, the left ventricular systolic function appears grossly normal.   2. The right ventricle is not well visualized. Probably normal systolic function. Tricuspid annular plane systolic excursion (TAPSE) is 2.2 cm (normal >=1.7 cm).   3. Technically difficult image quality.   4. The left atrium is normal in size.    < end of copied text >

## 2023-11-09 NOTE — H&P ADULT - HISTORY OF PRESENT ILLNESS
Patient is a 68y Male PMH CAD s/p CABG, HFrEF, DM, ASIYA on CPAP, HTN, HLD, renal mass s/p resection who presents c/o wound dehiscience s/p revision T9-pelvis PSF. Patient states that for the past few weeks he has noticed drainage from his back incision. Has been following up outpatient with surgeon who has been monitoring however has been getting worse. Denies pain/injury elsewhere. Denies numbness/tingling/paresthesias/weakness. Denies bowel/bladder incontinence. Denies fevers/chills. No other complaints at this time.    HEALTH ISSUES - PROBLEM Dx:      MEDICATIONS  (STANDING):  acetaminophen     Tablet .. 975 milliGRAM(s) Oral every 8 hours  chlorhexidine 2% Cloths 1 Application(s) Topical once  senna 2 Tablet(s) Oral at bedtime  sodium chloride 0.9%. 1000 milliLiter(s) IV Continuous <Continuous>      PAST MEDICAL & SURGICAL HISTORY:  Gout    History of Obesity    Lumbar disc disease with radiculopathy    H/O: osteoarthritis    Obstructive sleep apnea  CPAP    Renal calculi    Hx of insomnia    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    Essential hypertension    CAD (coronary artery disease)  - s/p cabg x3    Ankle fracture    Hypercholesterolemia    Renal cancer    Disease of spinal cord, unspecified      S/P hip replacement  left , right 2009    S/P knee replacement  2011- right x 3  - revisions in  &     H/O renal calculi  removed    H/O lithotripsy  x1    S/P revision of total knee, right  x2-  &     S/P lumbar discectomy  - ,L5  Aug 2013    S/P CABG x 3  17    S/P lumbar laminectomy  Fusion L3-L5     Kidney stone  s/w ESWL pt unsure site    Neuropathy  Bilateral Feet since     History of bilateral hip replacements    History of hernia repair    History of lumbar fusion    History of cholecystectomy    History of bilateral knee replacement    History of lymph node biopsy    S/p bilateral carpal tunnel release    History of partial nephrectomy                              12.3   8.57  )-----------( 241      ( 2023 21:00 )             37.3       2023 21:00    140    |  100    |  24     ----------------------------<  185    3.8     |  25     |  0.61     Ca    10.0       2023 21:00    TPro  7.1    /  Alb  4.1    /  TBili  0.3    /  DBili  x      /  AST  23     /  ALT  23     /  AlkPhos  135    2023 21:00      PT/INR - ( 2023 21:00 )   PT: 10.5 sec;   INR: 0.93 ratio         PTT - ( 2023 21:00 )  PTT:27.1 sec    Urinalysis Basic - ( 2023 21:00 )    Color: Yellow / Appearance: Clear / S.013 / pH: x  Gluc: 185 mg/dL / Ketone: Negative mg/dL  / Bili: Negative / Urobili: 0.2 mg/dL   Blood: x / Protein: Negative mg/dL / Nitrite: Negative   Leuk Esterase: Trace / RBC: 0 /HPF / WBC 2 /HPF   Sq Epi: x / Non Sq Epi: 0 /HPF / Bacteria: Negative /HPF        Vital Signs Last 24 Hrs  T(C): 36.7 (23 @ 23:38), Max: 36.7 (23 @ 23:38)  T(F): 98 (23 @ 23:38), Max: 98 (23 @ 23:38)  HR: 80 (23 @ 23:38) (80 - 92)  BP: 128/74 (23 @ 23:38) (128/74 - 157/86)  BP(mean): 101 (23 @ 23:38) (101 - 101)  RR: 20 (23 @ 23:38) (18 - 20)  SpO2: 98% (23 @ 23:38) (98% - 99%)    Physical Exam:  Gen: NAD  Spine:  Skin intact  No gross deformity  No midline TTP C/T/L/S spine  No bony step offs  No paraspinal muscle ttp/hypertonicity   Negative Straight leg raise  Negative clonus  Negative babinski  Negative eastman  + rectal tone  No saddle anesthesia    Motor:                   C5                C6              C7               C8           T1   R            5/5                5/5            5/5             5/5          5/5  L             5/5               5/5             5/5             5/5          5/5                L2             L3             L4               L5            S1  R         5/5           5/5          5/5             5/5           5/5  L          5/5          5/5           5/5             5/5           5/5    Sensory:            C5         C6         C7      C8       T1        (0=absent, 1=impaired, 2=normal, NT=not testable)  R         2            2           2        2         2  L          2            2           2        2         2               L2          L3         L4      L5       S1         (0=absent, 1=impaired, 2=normal, NT=not testable)  R         2            2            2        2        2  L          2            2           2        2         2

## 2023-11-10 ENCOUNTER — APPOINTMENT (OUTPATIENT)
Dept: ORTHOPEDIC SURGERY | Facility: CLINIC | Age: 68
End: 2023-11-10

## 2023-11-10 ENCOUNTER — TRANSCRIPTION ENCOUNTER (OUTPATIENT)
Age: 68
End: 2023-11-10

## 2023-11-10 VITALS
OXYGEN SATURATION: 96 % | HEART RATE: 72 BPM | SYSTOLIC BLOOD PRESSURE: 117 MMHG | DIASTOLIC BLOOD PRESSURE: 56 MMHG | RESPIRATION RATE: 18 BRPM

## 2023-11-10 LAB
ANION GAP SERPL CALC-SCNC: 13 MMOL/L — SIGNIFICANT CHANGE UP (ref 7–14)
ANION GAP SERPL CALC-SCNC: 13 MMOL/L — SIGNIFICANT CHANGE UP (ref 7–14)
BUN SERPL-MCNC: 17 MG/DL — SIGNIFICANT CHANGE UP (ref 7–23)
BUN SERPL-MCNC: 17 MG/DL — SIGNIFICANT CHANGE UP (ref 7–23)
CALCIUM SERPL-MCNC: 9.2 MG/DL — SIGNIFICANT CHANGE UP (ref 8.4–10.5)
CALCIUM SERPL-MCNC: 9.2 MG/DL — SIGNIFICANT CHANGE UP (ref 8.4–10.5)
CHLORIDE SERPL-SCNC: 101 MMOL/L — SIGNIFICANT CHANGE UP (ref 98–107)
CHLORIDE SERPL-SCNC: 101 MMOL/L — SIGNIFICANT CHANGE UP (ref 98–107)
CO2 SERPL-SCNC: 26 MMOL/L — SIGNIFICANT CHANGE UP (ref 22–31)
CO2 SERPL-SCNC: 26 MMOL/L — SIGNIFICANT CHANGE UP (ref 22–31)
CREAT SERPL-MCNC: 0.54 MG/DL — SIGNIFICANT CHANGE UP (ref 0.5–1.3)
CREAT SERPL-MCNC: 0.54 MG/DL — SIGNIFICANT CHANGE UP (ref 0.5–1.3)
EGFR: 109 ML/MIN/1.73M2 — SIGNIFICANT CHANGE UP
EGFR: 109 ML/MIN/1.73M2 — SIGNIFICANT CHANGE UP
GLUCOSE BLDC GLUCOMTR-MCNC: 140 MG/DL — HIGH (ref 70–99)
GLUCOSE BLDC GLUCOMTR-MCNC: 140 MG/DL — HIGH (ref 70–99)
GLUCOSE BLDC GLUCOMTR-MCNC: 158 MG/DL — HIGH (ref 70–99)
GLUCOSE BLDC GLUCOMTR-MCNC: 158 MG/DL — HIGH (ref 70–99)
GLUCOSE SERPL-MCNC: 138 MG/DL — HIGH (ref 70–99)
GLUCOSE SERPL-MCNC: 138 MG/DL — HIGH (ref 70–99)
HCT VFR BLD CALC: 35.3 % — LOW (ref 39–50)
HCT VFR BLD CALC: 35.3 % — LOW (ref 39–50)
HGB BLD-MCNC: 11.5 G/DL — LOW (ref 13–17)
HGB BLD-MCNC: 11.5 G/DL — LOW (ref 13–17)
MCHC RBC-ENTMCNC: 28 PG — SIGNIFICANT CHANGE UP (ref 27–34)
MCHC RBC-ENTMCNC: 28 PG — SIGNIFICANT CHANGE UP (ref 27–34)
MCHC RBC-ENTMCNC: 32.6 GM/DL — SIGNIFICANT CHANGE UP (ref 32–36)
MCHC RBC-ENTMCNC: 32.6 GM/DL — SIGNIFICANT CHANGE UP (ref 32–36)
MCV RBC AUTO: 85.9 FL — SIGNIFICANT CHANGE UP (ref 80–100)
MCV RBC AUTO: 85.9 FL — SIGNIFICANT CHANGE UP (ref 80–100)
NRBC # BLD: 0 /100 WBCS — SIGNIFICANT CHANGE UP (ref 0–0)
NRBC # BLD: 0 /100 WBCS — SIGNIFICANT CHANGE UP (ref 0–0)
NRBC # FLD: 0 K/UL — SIGNIFICANT CHANGE UP (ref 0–0)
NRBC # FLD: 0 K/UL — SIGNIFICANT CHANGE UP (ref 0–0)
PLATELET # BLD AUTO: 212 K/UL — SIGNIFICANT CHANGE UP (ref 150–400)
PLATELET # BLD AUTO: 212 K/UL — SIGNIFICANT CHANGE UP (ref 150–400)
POTASSIUM SERPL-MCNC: 3.7 MMOL/L — SIGNIFICANT CHANGE UP (ref 3.5–5.3)
POTASSIUM SERPL-MCNC: 3.7 MMOL/L — SIGNIFICANT CHANGE UP (ref 3.5–5.3)
POTASSIUM SERPL-SCNC: 3.7 MMOL/L — SIGNIFICANT CHANGE UP (ref 3.5–5.3)
POTASSIUM SERPL-SCNC: 3.7 MMOL/L — SIGNIFICANT CHANGE UP (ref 3.5–5.3)
RBC # BLD: 4.11 M/UL — LOW (ref 4.2–5.8)
RBC # BLD: 4.11 M/UL — LOW (ref 4.2–5.8)
RBC # FLD: 13.6 % — SIGNIFICANT CHANGE UP (ref 10.3–14.5)
RBC # FLD: 13.6 % — SIGNIFICANT CHANGE UP (ref 10.3–14.5)
SODIUM SERPL-SCNC: 140 MMOL/L — SIGNIFICANT CHANGE UP (ref 135–145)
SODIUM SERPL-SCNC: 140 MMOL/L — SIGNIFICANT CHANGE UP (ref 135–145)
WBC # BLD: 6.95 K/UL — SIGNIFICANT CHANGE UP (ref 3.8–10.5)
WBC # BLD: 6.95 K/UL — SIGNIFICANT CHANGE UP (ref 3.8–10.5)
WBC # FLD AUTO: 6.95 K/UL — SIGNIFICANT CHANGE UP (ref 3.8–10.5)
WBC # FLD AUTO: 6.95 K/UL — SIGNIFICANT CHANGE UP (ref 3.8–10.5)

## 2023-11-10 PROCEDURE — 99233 SBSQ HOSP IP/OBS HIGH 50: CPT

## 2023-11-10 RX ORDER — CEFAZOLIN SODIUM 1 G
2000 VIAL (EA) INJECTION EVERY 8 HOURS
Refills: 0 | Status: COMPLETED | OUTPATIENT
Start: 2023-11-10 | End: 2023-11-10

## 2023-11-10 RX ORDER — TIZANIDINE 4 MG/1
1 TABLET ORAL
Qty: 21 | Refills: 0
Start: 2023-11-10 | End: 2023-11-16

## 2023-11-10 RX ORDER — CEFAZOLIN SODIUM 1 G
VIAL (EA) INJECTION
Refills: 0 | Status: COMPLETED | OUTPATIENT
Start: 2023-11-10 | End: 2023-11-10

## 2023-11-10 RX ORDER — OXYCODONE HYDROCHLORIDE 5 MG/1
1 TABLET ORAL
Qty: 15 | Refills: 0
Start: 2023-11-10 | End: 2023-11-14

## 2023-11-10 RX ORDER — CEFAZOLIN SODIUM 1 G
2000 VIAL (EA) INJECTION ONCE
Refills: 0 | Status: COMPLETED | OUTPATIENT
Start: 2023-11-10 | End: 2023-11-10

## 2023-11-10 RX ADMIN — GABAPENTIN 300 MILLIGRAM(S): 400 CAPSULE ORAL at 00:24

## 2023-11-10 RX ADMIN — Medication 2: at 11:23

## 2023-11-10 RX ADMIN — GABAPENTIN 300 MILLIGRAM(S): 400 CAPSULE ORAL at 05:54

## 2023-11-10 RX ADMIN — OXYCODONE HYDROCHLORIDE 5 MILLIGRAM(S): 5 TABLET ORAL at 06:55

## 2023-11-10 RX ADMIN — OXYCODONE HYDROCHLORIDE 5 MILLIGRAM(S): 5 TABLET ORAL at 10:57

## 2023-11-10 RX ADMIN — PANTOPRAZOLE SODIUM 40 MILLIGRAM(S): 20 TABLET, DELAYED RELEASE ORAL at 05:54

## 2023-11-10 RX ADMIN — OXYCODONE HYDROCHLORIDE 5 MILLIGRAM(S): 5 TABLET ORAL at 14:03

## 2023-11-10 RX ADMIN — Medication 975 MILLIGRAM(S): at 05:54

## 2023-11-10 RX ADMIN — Medication 975 MILLIGRAM(S): at 13:36

## 2023-11-10 RX ADMIN — Medication 975 MILLIGRAM(S): at 06:55

## 2023-11-10 RX ADMIN — OXYCODONE HYDROCHLORIDE 5 MILLIGRAM(S): 5 TABLET ORAL at 05:54

## 2023-11-10 RX ADMIN — GABAPENTIN 300 MILLIGRAM(S): 400 CAPSULE ORAL at 11:22

## 2023-11-10 RX ADMIN — OXYCODONE HYDROCHLORIDE 5 MILLIGRAM(S): 5 TABLET ORAL at 09:57

## 2023-11-10 RX ADMIN — Medication 100 MILLIGRAM(S): at 07:22

## 2023-11-10 RX ADMIN — Medication 100 MILLIGRAM(S): at 13:28

## 2023-11-10 RX ADMIN — BUMETANIDE 2 MILLIGRAM(S): 0.25 INJECTION INTRAMUSCULAR; INTRAVENOUS at 13:40

## 2023-11-10 NOTE — PROGRESS NOTE ADULT - SUBJECTIVE AND OBJECTIVE BOX
ANESTHESIA POSTOP CHECK    68y Male POSTOP DAY 1     No COMPLAINTS    NO APPARENT ANESTHESIA COMPLICATIONS

## 2023-11-10 NOTE — DISCHARGE NOTE PROVIDER - DETAILS OF MALNUTRITION DIAGNOSIS/DIAGNOSES
This patient has been assessed with a concern for Malnutrition and was treated during this hospitalization for the following Nutrition diagnosis/diagnoses:     -  11/10/2023: Morbid obesity (BMI > 40)

## 2023-11-10 NOTE — DISCHARGE NOTE NURSING/CASE MANAGEMENT/SOCIAL WORK - PATIENT PORTAL LINK FT
You can access the FollowMyHealth Patient Portal offered by [facility] by registering at the following website: [email or web address]. By joining [facility]’s FollowMyHealth portal, you will also be able to view your health information using other applications (apps) compatible with our system.

## 2023-11-10 NOTE — DIETITIAN INITIAL EVALUATION ADULT - PERTINENT LABORATORY DATA
11-10    140  |  101  |  17  ----------------------------<  138<H>  3.7   |  26  |  0.54    Ca    9.2      10 Nov 2023 06:55    TPro  7.1  /  Alb  4.1  /  TBili  0.3  /  DBili  x   /  AST  23  /  ALT  23  /  AlkPhos  135<H>  11-08  POCT Blood Glucose.: 140 mg/dL (11-10-23 @ 07:29)  A1C with Estimated Average Glucose Result: 6.8 % (10-12-23 @ 05:42)  A1C with Estimated Average Glucose Result: 6.5 % (09-19-23 @ 06:45)  A1C with Estimated Average Glucose Result: 6.5 % (09-01-23 @ 09:30)  CAPILLARY BLOOD GLUCOSE      POCT Blood Glucose.: 140 mg/dL (10 Nov 2023 07:29)  POCT Blood Glucose.: 128 mg/dL (09 Nov 2023 22:06)  POCT Blood Glucose.: 111 mg/dL (09 Nov 2023 19:28)  POCT Blood Glucose.: 123 mg/dL (09 Nov 2023 16:42)  POCT Blood Glucose.: 141 mg/dL (09 Nov 2023 12:50)

## 2023-11-10 NOTE — PROGRESS NOTE ADULT - PROBLEM SELECTOR PLAN 4
- On aspirin, no history of stents or CVA as per patient, no chest pain, EKG without ischemic findings  - Aspirin on hold - restart as per orthopedics.

## 2023-11-10 NOTE — DISCHARGE NOTE PROVIDER - NSDCCPTREATMENT_GEN_ALL_CORE_FT
PRINCIPAL PROCEDURE  Procedure: Surgical preparation of recipient site by excision of open wound of back  Findings and Treatment:

## 2023-11-10 NOTE — DISCHARGE NOTE NURSING/CASE MANAGEMENT/SOCIAL WORK - NSDCVIVACCINE_GEN_ALL_CORE_FT
influenza, high-dose, quadrivalent; 29-Sep-2023 17:22; Jody Coleman (RN); [employer]; [id number] (Exp. Date: 28-Jun-2024); IntraMuscular; Deltoid Left.; 0.7 milliLiter(s); VIS (VIS Published: 06-Aug-2021, VIS Presented: 29-Sep-2023);   Tdap; 13-Feb-2022 09:56; Benoit Kessler (RN); [employer]; [id number] (Exp. Date: 09-Sep-2023); IntraMuscular; Deltoid Right.; 0.5 milliLiter(s); VIS (VIS Published: 09-May-2013, VIS Presented: 13-Feb-2022);

## 2023-11-10 NOTE — PHYSICAL THERAPY INITIAL EVALUATION ADULT - LIVES WITH, PROFILE
family at home with 3 step entry and 12 steps to second floor - pt states he will be staying on the first floor

## 2023-11-10 NOTE — PROGRESS NOTE ADULT - PROBLEM SELECTOR PLAN 5
-c/w home carvedilol  -losartan was held due to post-op hypotension  -resume losartan tomorrow if BP amenable.

## 2023-11-10 NOTE — PROGRESS NOTE ADULT - TIME BILLING
[facility] Roberto A. Sedonia Boas, MD Kayla Rouge, MD Nevada Grinder, MD. Marjorie Mercedes MD   Progress Note    6/5/2019    SUBJECTIVE:   On RA   Braden in place   UO better . PROBLEM LIST:    Principal Problem:    Acute renal failure (ARF) (HCC)  Resolved Problems:    * No resolved hospital problems. *       DIET:    DIET RENAL;       Allergies : Patient has no known allergies. Past Medical History:   Diagnosis Date    Anxiety     Depression     Hyperlipidemia     Hypertension     Hypothyroidism     Intellectual disability     Leg pain, bilateral     Noncompliance with medications     Noncompliance with treatment     Osteoarthritis        Past Surgical History:   Procedure Laterality Date    CYSTOSCOPY Left 1/21/2019    CYSTOSCOPY, BILATERAL RETROGRADE PYELOGRAMS, BILATERAL STENT INSERTION performed by Verenice Cali MD at [address] Bilateral 6/1/2019    CYSTOSCOPY, RETROGRADE PYELOGRAMS, BILATERAL URETERAL STENT EXCHANGE performed by Cm Galvez MD at [address]       Family History   Problem Relation Age of Onset    Diabetes Brother     Thyroid Disease Mother     Other Daughter         Autism        reports that she quit smoking about 30 years ago. She has a 5.00 pack-year smoking history. She has never used smokeless tobacco. She reports that she does not drink alcohol or use drugs. Review of Systems:   Constitutional: no fevers , no chills , feels ok   Eyes: no eye pain , no itching , no drainage  Ears, nose, mouth, throat, and face: no ear ,nose pain , hearing is ok ,no nasal drainage   Respiratory: no sob ,no cough ,no wheezing . Cardiovascular: no chest pain , no palpitation ,no sob . Gastrointestinal: no nausea, vomiting , constipation , no abdominal pain . Genitourinary:no urinary retention , no burning , dysuria . No polyuria   Hematologic/lymphatic: no bleeding , no cougulation issues . Musculoskeletal:no joint pain , no swelling . Neurological: no headaches ,no weakness , no numbness . Endocrine: no thirst , no weight issues .      MEDS (scheduled):    sodium chloride  250 mL Intravenous Once    sodium polystyrene  15 g Oral Once    sodium bicarbonate  650 mg Oral BID    pantoprazole  40 mg Oral QAM AC    chlorhexidine  15 mL Mouth/Throat BID    meropenem  500 mg Intravenous Q12H    atorvastatin  40 mg Oral Daily    docusate sodium  100 mg Oral BID    ferrous sulfate  325 mg Oral BID WC    fluticasone  2 spray Nasal Daily    levothyroxine  100 mcg Oral Daily    mirtazapine  15 mg Oral Nightly    sodium chloride flush  10 mL Intravenous 2 times per day    heparin (porcine)  5,000 Units Subcutaneous 3 times per day       MEDS (infusions):   sodium chloride 75 mL/hr at 06/04/19 2112    dextrose         MEDS (prn):  magnesium hydroxide, glucose, dextrose, glucagon (rDNA), dextrose, traMADol, sodium chloride flush, ondansetron    PHYSICAL EXAM:     Patient Vitals for the past 24 hrs:   BP Temp Temp src Pulse Resp SpO2   06/05/19 0845 (!) 129/58 98.3 °F (36.8 °C) Oral 76 16 94 %   06/05/19 0500 (!) 142/69 98.2 °F (36.8 °C) Oral 71 16 93 %   06/05/19 0045 (!) 171/79 98.8 °F (37.1 °C) Oral 73 16 97 %   06/05/19 0000 -- -- -- 75 -- --   06/04/19 2246 -- -- -- 68 -- --   06/04/19 2100 (!) 177/82 99.1 °F (37.3 °C) Oral 64 16 95 %   06/04/19 1836 (!) 164/77 98.6 °F (37 °C) -- 64 -- --   06/04/19 1800 (!) 171/78 -- -- 64 -- --   06/04/19 1730 (!) 173/79 -- -- 65 -- --   06/04/19 1700 (!) 170/77 -- -- 65 -- --   06/04/19 1630 (!) 177/80 -- -- 68 -- --   06/04/19 1606 (!) 182/82 98.4 °F (36.9 °C) -- 70 16 --   @      Intake/Output Summary (Last 24 hours) at 6/5/2019 1222  Last data filed at 6/5/2019 0840  Gross per 24 hour   Intake 3830.25 ml   Output 3450 ml   Net 380.25 ml         Wt Readings from Last 3 Encounters:   05/31/19 145 lb 12.8 oz (66.1 kg)   05/23/19 148 lb (67.1 kg)   05/15/19 140 lb 10.5 oz (63.8 kg)       Constitutional: in no acute distress  Oral: mucus membranes moist  Neck: no JVD  Cardiovascular: S1, S2 regular rhythm, no murmur,or rub  Respiratory:  No crackles, no wheeze  Gastrointestinal:  Soft, nontender, nondistended, NABS  Ext: no edema, feet warm  Skin: dry, no rash  Neuro: awake, alert, interactive      DATA:    Recent Labs     06/03/19  0640  06/04/19  0025 06/04/19  0525 06/05/19  0520   WBC 10.3  --   --  11.8* 10.2   HGB 6.8*   < > 8.3* 8.4* 8.1*   HCT 22.2*   < > 26.5* 27.0* 24.9*   MCV 95.3  --   --  92.5 88.9     --   --  300 276    < > = values in this interval not displayed. Recent Labs     06/05/19  0055 06/05/19  0520 06/05/19  1050    138 137   K 4.6 4.7 4.5    102 103   CO2 22 22 23   BUN 34* 34* 34*   CREATININE 5.2* 5.4* 5.1*       No results found for: LABPROT    ASSESSMENT / RECOMMENDATIONS:      Acute kidney injury 2.2 obstructive uropathy in contest of poorly functioning bladder and neurogenic bladder .   -Chronic kidney disease baseline cr 1.4   -Sepsis secondary to a urinary tract infection  -Anaemia of chronic disease . R/O acute component .  -Chronic mental disability  -firm abnormal nodule lesion within the vagina with its urethra with cystinosis suspicion for gynecological malignancy              Plan :   S/p Cystourethroscopy, bilateral JJ ureteral stent insertion  UO picking up .  Will hold dialysis tomorrow and watch for signs of recovery                   Electronically signed by Marie Schmitz MD on 6/5/2019 at 12:22 PM Time-based billing (NON-critical care).     More than 50% of the visit was spent counseling and / or coordinating care by the attending physician.      The necessity of the time spent during the encounter on this date of service was due to: documentation in [place], reviewing chart and coordinating care with patient/resident and interdisciplinary staff (such as , social workers, etc) as well as reviewing vitals, laboratory data, radiology, medication list, consultants' recommendations and prior records. Interventions were performed as documented above.

## 2023-11-10 NOTE — DIETITIAN INITIAL EVALUATION ADULT - PERTINENT MEDS FT
MEDICATIONS  (STANDING):  acetaminophen     Tablet .. 975 milliGRAM(s) Oral every 8 hours  allopurinol 100 milliGRAM(s) Oral at bedtime  atorvastatin 40 milliGRAM(s) Oral at bedtime  buMETAnide 2 milliGRAM(s) Oral two times a day  carvedilol 3.125 milliGRAM(s) Oral every 12 hours  ceFAZolin   IVPB      ceFAZolin   IVPB 2000 milliGRAM(s) IV Intermittent every 8 hours  chlorhexidine 2% Cloths 1 Application(s) Topical once  dextrose 5%. 1000 milliLiter(s) (100 mL/Hr) IV Continuous <Continuous>  dextrose 5%. 1000 milliLiter(s) (50 mL/Hr) IV Continuous <Continuous>  dextrose 50% Injectable 12.5 Gram(s) IV Push once  dextrose 50% Injectable 25 Gram(s) IV Push once  dextrose 50% Injectable 25 Gram(s) IV Push once  gabapentin 300 milliGRAM(s) Oral four times a day  glucagon  Injectable 1 milliGRAM(s) IntraMuscular once  insulin lispro (ADMELOG) corrective regimen sliding scale   SubCutaneous three times a day before meals  insulin lispro (ADMELOG) corrective regimen sliding scale   SubCutaneous at bedtime  pantoprazole    Tablet 40 milliGRAM(s) Oral before breakfast  polyethylene glycol 3350 17 Gram(s) Oral two times a day  senna 2 Tablet(s) Oral at bedtime    MEDICATIONS  (PRN):  dextrose Oral Gel 15 Gram(s) Oral once PRN Blood Glucose LESS THAN 70 milliGRAM(s)/deciliter  melatonin 3 milliGRAM(s) Oral at bedtime PRN Insomnia  oxyCODONE    IR 5 milliGRAM(s) Oral every 4 hours PRN Severe Pain (7 - 10)  oxyCODONE    IR 2.5 milliGRAM(s) Oral every 4 hours PRN Moderate Pain (4 - 6)

## 2023-11-10 NOTE — PROGRESS NOTE ADULT - PROBLEM SELECTOR PLAN 1
-h/o syncopal episode/fall found to have a T12-L1 spine fracture s/p T9-pelvis PSF with subsequent stay at [facility] until 10/14/23 after which he was discharged home. He returned to [facility] on 11/8/23 with dehiscence of his surgical wound s/p posterior spine wound I&D w/ PRS 11/9.  -Pain well controlled; continue management and pain control per ortho recs with oxycodone IR prn and tylenol tid.   -c/w bowel regimen  -c/w incentive spirometer use  -c/w PT  -f/u PRS recs

## 2023-11-10 NOTE — PROGRESS NOTE ADULT - SUBJECTIVE AND OBJECTIVE BOX
CHIEF COMPLAINT: f/u wound dehiscence of his surgical wound.       SUBJECTIVE / OVERNIGHT EVENTS: Patient seen and examined. No acute events overnight. Pain well controlled and patient without any complaints.    MEDICATIONS  (STANDING):  acetaminophen     Tablet .. 975 milliGRAM(s) Oral every 8 hours  allopurinol 100 milliGRAM(s) Oral at bedtime  atorvastatin 40 milliGRAM(s) Oral at bedtime  buMETAnide 2 milliGRAM(s) Oral two times a day  carvedilol 3.125 milliGRAM(s) Oral every 12 hours  ceFAZolin   IVPB      ceFAZolin   IVPB 2000 milliGRAM(s) IV Intermittent every 8 hours  chlorhexidine 2% Cloths 1 Application(s) Topical once  dextrose 5%. 1000 milliLiter(s) (100 mL/Hr) IV Continuous <Continuous>  dextrose 5%. 1000 milliLiter(s) (50 mL/Hr) IV Continuous <Continuous>  dextrose 50% Injectable 12.5 Gram(s) IV Push once  dextrose 50% Injectable 25 Gram(s) IV Push once  dextrose 50% Injectable 25 Gram(s) IV Push once  gabapentin 300 milliGRAM(s) Oral four times a day  glucagon  Injectable 1 milliGRAM(s) IntraMuscular once  insulin lispro (ADMELOG) corrective regimen sliding scale   SubCutaneous three times a day before meals  insulin lispro (ADMELOG) corrective regimen sliding scale   SubCutaneous at bedtime  pantoprazole    Tablet 40 milliGRAM(s) Oral before breakfast  polyethylene glycol 3350 17 Gram(s) Oral two times a day  senna 2 Tablet(s) Oral at bedtime    MEDICATIONS  (PRN):  dextrose Oral Gel 15 Gram(s) Oral once PRN Blood Glucose LESS THAN 70 milliGRAM(s)/deciliter  melatonin 3 milliGRAM(s) Oral at bedtime PRN Insomnia  oxyCODONE    IR 5 milliGRAM(s) Oral every 4 hours PRN Severe Pain (7 - 10)  oxyCODONE    IR 2.5 milliGRAM(s) Oral every 4 hours PRN Moderate Pain (4 - 6)    VITALS:  T(F): 97.7 (11-10-23 @ 09:48), Max: 97.9 (11-09-23 @ 15:00)  HR: 80 (11-10-23 @ 09:48) (76 - 142)  BP: 117/52 (11-10-23 @ 09:48) (111/60 - 158/82)  RR: 16 (11-10-23 @ 09:48) (7 - 19)  SpO2: 98% (11-10-23 @ 09:48)  Weight (kg): 195.6 (15:31)    PHYSICAL EXAM:  GENERAL: No acute distress, well-developed  EYES/ENT: EOMI, PERRL, conjunctiva and sclera clear, Neck supple, No JVD, moist mucosa  CHEST/LUNG: Clear to auscultation bilaterally; No wheeze, equal breath sounds bilaterally   BACK: No spinal tenderness  HEART: Regular rate and rhythm; No murmurs, rubs, or gallops  ABDOMEN: Soft, Nontender, Nondistended; Bowel sounds present  EXTREMITIES: 1+ b/l pitting edema up to upper shines, symmetric, without erythema/warmth/TTP, Well perfused peripherally   SKIN: dressing C/D/I, mild appropriate jesus-incisional TTP    LABS:              11.5                 140  | 26   | 17           6.95  >-----------< 212     ------------------------< 138                   35.3                 3.7  | 101  | 0.54                                         Ca 9.2   Mg x     Ph x           TPro  7.1  /  Alb  4.1      TBili  0.3  /  DBili  x         AST  23  /  ALT  23            AlkPhos  135      INR: 0.95 ratio;    PT: 10.7 sec;    PTT: 24.0 sec<L>    CAPILLARY BLOOD GLUCOSE  POCT Blood Glucose.: 158 mg/dL (10 Nov 2023 11:15)  POCT Blood Glucose.: 140 mg/dL (10 Nov 2023 07:29)  POCT Blood Glucose.: 128 mg/dL (09 Nov 2023 22:06)  POCT Blood Glucose.: 111 mg/dL (09 Nov 2023 19:28)  POCT Blood Glucose.: 123 mg/dL (09 Nov 2023 16:42)  POCT Blood Glucose.: 141 mg/dL (09 Nov 2023 12:50)    Urinalysis Basic - ( 10 Nov 2023 06:55 )  Color: x / Appearance: x / SG: x / pH: x  Gluc: 138 mg/dL / Ketone: x  / Bili: x / Urobili: x   Blood: x / Protein: x / Nitrite: x   Leuk Esterase: x / RBC: x / WBC x   Sq Epi: x / Non Sq Epi: x / Bacteria: x  Culture - Blood (11.08.23 @ 21:25)   Specimen Source: .Blood Blood-Peripheral  Culture Results: No growth at 24 hours  Culture - Blood (11.08.23 @ 21:18)   Specimen Source: .Blood Blood-Peripheral  Culture Results: No growth at 24 hours  RADIOLOGY & ADDITIONAL TESTS:  Imaging Personally Reviewed: [x] Yes  < from: US Duplex Venous Lower Ext Complete, Bilateral (11.09.23 @ 10:03) >  IMPRESSION:  No evidence of deep venous thrombosis in either lower extremity. Limited   visualized of the calf veins.  < end of copied text >    [ ] Consultant(s) Notes Reviewed:  [x] Care Discussed with Consultants/Other Providers: Orthopedic PA - discussed post-op care

## 2023-11-10 NOTE — PROGRESS NOTE ADULT - SUBJECTIVE AND OBJECTIVE BOX
Orthopaedic Surgery Progress Note    Subjective:   Patient seen and examined. No acute events overnight. Pain well controlled on current regimen.     Objective:  T(C): 36.6 (11-10-23 @ 05:57), Max: 36.6 (11-09-23 @ 15:00)  HR: 76 (11-10-23 @ 05:57) (75 - 142)  BP: 126/53 (11-10-23 @ 05:57) (111/60 - 158/82)  RR: 16 (11-10-23 @ 05:57) (7 - 19)  SpO2: 98% (11-10-23 @ 05:57) (94% - 99%)  Wt(kg): --    11-09 @ 07:01  -  11-10 @ 06:43  --------------------------------------------------------  IN: 480 mL / OUT: 960 mL / NET: -480 mL        PE  Gen: NAD  Back:   dressing C/D/I, mild appropriate jesus-incisional ttp  Neuro:  RLE IP 5/5 HS 5/5 Q 5/5 GS 5/5 TA 5/5 EHL 5/5   SILT L2-S1  LLE IP 5/5 HS 5/5 Q 5/5 GS 5/5 TA 5/5 EHL 5/5  SILT L2-S1  WWP BLE                          12.2   9.05  )-----------( 255      ( 09 Nov 2023 01:30 )             37.6     11-09    143  |  107  |  19  ----------------------------<  105<H>  3.2<L>   |  23  |  0.59    Ca    7.6<L>      09 Nov 2023 01:30    TPro  7.1  /  Alb  4.1  /  TBili  0.3  /  DBili  x   /  AST  23  /  ALT  23  /  AlkPhos  135<H>  11-08    PT/INR - ( 09 Nov 2023 01:30 )   PT: 10.7 sec;   INR: 0.95 ratio         PTT - ( 09 Nov 2023 01:30 )  PTT:24.0 sec  Urinalysis Basic - ( 09 Nov 2023 01:30 )    Color: x / Appearance: x / SG: x / pH: x  Gluc: 105 mg/dL / Ketone: x  / Bili: x / Urobili: x   Blood: x / Protein: x / Nitrite: x   Leuk Esterase: x / RBC: x / WBC x   Sq Epi: x / Non Sq Epi: x / Bacteria: x        68y Male s/p posterior spine wound I&D w/ PRS 11/9  - Pain control  - FU labs  - WBAT  - Standing Ancef, FU OR Cx  - ID Cs  - PT/OT/OOB  - I/S  - Appreciate PRS recs  - SCDs  - Dispo planning pending PT eval Orthopaedic Surgery Progress Note    Subjective:   Patient seen and examined. No acute events overnight. Pain well controlled on current regimen.     Objective:  T(C): 36.6 (11-10-23 @ 05:57), Max: 36.6 (11-09-23 @ 15:00)  HR: 76 (11-10-23 @ 05:57) (75 - 142)  BP: 126/53 (11-10-23 @ 05:57) (111/60 - 158/82)  RR: 16 (11-10-23 @ 05:57) (7 - 19)  SpO2: 98% (11-10-23 @ 05:57) (94% - 99%)  Wt(kg): --    11-09 @ 07:01  -  11-10 @ 06:43  --------------------------------------------------------  IN: 480 mL / OUT: 960 mL / NET: -480 mL        PE  Gen: NAD  Back:   dressing C/D/I, mild appropriate jesus-incisional ttp  Neuro:  RLE IP 5/5 HS 5/5 Q 5/5 GS 5/5 TA 5/5 EHL 5/5   SILT L2-S1  LLE IP 5/5 HS 5/5 Q 5/5 GS 5/5 TA 5/5 EHL 5/5  SILT L2-S1  WWP BLE                          12.2   9.05  )-----------( 255      ( 09 Nov 2023 01:30 )             37.6     11-09    143  |  107  |  19  ----------------------------<  105<H>  3.2<L>   |  23  |  0.59    Ca    7.6<L>      09 Nov 2023 01:30    TPro  7.1  /  Alb  4.1  /  TBili  0.3  /  DBili  x   /  AST  23  /  ALT  23  /  AlkPhos  135<H>  11-08    PT/INR - ( 09 Nov 2023 01:30 )   PT: 10.7 sec;   INR: 0.95 ratio         PTT - ( 09 Nov 2023 01:30 )  PTT:24.0 sec  Urinalysis Basic - ( 09 Nov 2023 01:30 )    Color: x / Appearance: x / SG: x / pH: x  Gluc: 105 mg/dL / Ketone: x  / Bili: x / Urobili: x   Blood: x / Protein: x / Nitrite: x   Leuk Esterase: x / RBC: x / WBC x   Sq Epi: x / Non Sq Epi: x / Bacteria: x        68y Male s/p posterior spine wound I&D w/ PRS 11/9  - Pain control  - FU labs  - WBAT  - Periop Ancef, FU OR Cx  - PT/OT/OOB  - I/S  - Appreciate PRS recs  - SCDs  - Dispo planning pending PT eval

## 2023-11-10 NOTE — PROGRESS NOTE ADULT - PROBLEM SELECTOR PLAN 3
-MrZ2e=1.8%. FS well controlled. Continue with ISS for now and continue to monitor FS closely.  - Hold his home glimepiride while inpatient  - c/w gabapentin for his b/l LE neuropathy.

## 2023-11-10 NOTE — DISCHARGE NOTE NURSING/CASE MANAGEMENT/SOCIAL WORK - NSDCPNINST_GEN_ALL_CORE
Please be sure to schedule & attend your post-op appointment with your surgeon. You may resume your regular diet; drink plenty of fluids. Continue to take your medications as prescribed. Do not operate any machinery or drive any vehicles whilst taking opioid narcotics. You may take over-the-counter stool softeners if you experience any constipation. No heavy lifting or bending until otherwise instructed to you by your surgeon. If you start to experience pain unrelieved by medications, develop fever >100.4 or any signs/symptoms of an infection notify your surgeon's office right away.  Maintain incision with Aquacel dressing clean and dry, call MD with any signs of infection such as fever, redness or drainage from site.   Please be sure to schedule & attend your post-op appointment with your surgeon. You may resume your regular diet; drink plenty of fluids. Continue to take your medications as prescribed. Do not operate any machinery or drive any vehicles whilst taking opioid narcotics. You may take over-the-counter stool softeners if you experience any constipation. No heavy lifting or bending until otherwise instructed to you by your surgeon. If you start to experience pain unrelieved by medications, develop fever >100.4 or any signs/symptoms of an infection notify your surgeon's office right away.

## 2023-11-10 NOTE — PROGRESS NOTE ADULT - ASSESSMENT
68M h/o CHF, CAD s/p CABG, ASIYA, DM HTN, HLD p/w dehiscence of his surgical wound s/p posterior spine wound I&D w/ PRS 11/9.

## 2023-11-10 NOTE — DISCHARGE NOTE NURSING/CASE MANAGEMENT/SOCIAL WORK - NSDPDISTO_GEN_ALL_CORE
Home Pt spine incision with Aquacel and ABD dressing in place, dressing intact, positive NV status. VS stable Afebrile. pt vinod po diet, voiding without difficulty. vinod PT in AM, amb without difficulty and cleared for DC to home as per safe DC plan. P/Home

## 2023-11-10 NOTE — DISCHARGE NOTE PROVIDER - NSDCMRMEDTOKEN_GEN_ALL_CORE_FT
acetaminophen 325 mg oral tablet: 3 tab(s) orally every 8 hours  allopurinol 100 mg oral tablet: 1 tab(s) orally once a day (at bedtime)  aspirin 81 mg oral delayed release tablet: 1 tab(s) orally once a day  atorvastatin 40 mg oral tablet: 1 tab(s) orally once a day (at bedtime)  bumetanide 2 mg oral tablet: 1 tab(s) orally 2 times a day  calcium with vitamin D3: 1 tab orally once a day  carvedilol 3.125 mg oral tablet: 1 tab(s) orally every 12 hours  gabapentin 300 mg oral capsule: 1 cap(s) orally 4 times a day  glimepiride 1 mg oral tablet: 1 tab(s) orally once a day  magnesium oxide 400 mg oral tablet: 1 tab(s) orally 3 times a day (with meals)  melatonin 3 mg oral tablet: 1 tab(s) orally once a day (at bedtime) As needed Insomnia  Multiple Vitamins oral tablet: 1 tab(s) orally once a day  oxyCODONE 10 mg oral tablet: 1 tab(s) orally 3 times a day as needed for  severe pain MDD: 3  pantoprazole 40 mg oral delayed release tablet: 1 tab(s) orally once a day (before a meal)  polyethylene glycol 3350 oral powder for reconstitution: 17 gram(s) orally 2 times a day  potassium chloride 20 mEq oral tablet, extended release: 1 tab(s) orally once a day  senna leaf extract oral tablet: 2 tab(s) orally once a day (at bedtime)  tiZANidine 2 mg oral tablet: 1 tab(s) orally 3 times a day as needed for  muscle spasm MDD: 3

## 2023-11-10 NOTE — DISCHARGE NOTE PROVIDER - CARE PROVIDER_API CALL
Jose C Hughes  Spine Surgery  [address] [place] [address]  Phone: [phone]  Fax: [phone]  Follow Up Time: 2 weeks

## 2023-11-10 NOTE — DISCHARGE NOTE PROVIDER - HOSPITAL COURSE
Patient was admitted to [facility] due to wound dehiscence s/p removal of hardware L3-Pelvis and revision T9-pelvis posterior spinal fusion on 9/23/23.   Patient underwent irrigation and debridement and wound closure with the plastic surgery team. Patient tolerated the procedure well.   Patient was evaluated by physical therapy whom recommended home for discharge.   Remainder of hospital stay unremarkable. Patient stable and ready for discharge.

## 2023-11-13 NOTE — ED PROVIDER NOTE - NS ED MD DISPO DISCHARGE CCDA
Pt was given discharge paperwork, no questions asked, walked out of ED     Jeremy Christianson  11/12/23 6667 Patient/Caregiver provided printed discharge information.

## 2023-11-14 LAB
CULTURE RESULTS: SIGNIFICANT CHANGE UP
SPECIMEN SOURCE: SIGNIFICANT CHANGE UP

## 2023-11-29 LAB
SURGICAL PATHOLOGY STUDY: SIGNIFICANT CHANGE UP
SURGICAL PATHOLOGY STUDY: SIGNIFICANT CHANGE UP

## 2023-12-11 ENCOUNTER — APPOINTMENT (OUTPATIENT)
Dept: ORTHOPEDIC SURGERY | Facility: CLINIC | Age: 68
End: 2023-12-11
Payer: MEDICARE

## 2023-12-11 PROCEDURE — 99024 POSTOP FOLLOW-UP VISIT: CPT

## 2023-12-25 NOTE — ED ADULT TRIAGE NOTE - STATUS:
RT PROGRESS NOTE    VENT DAY# Ventilation Day(s): 2    Current Ventilator Settings:   Vent Mode: CMV/AC  (Continuous Mandatory Ventilation/ Assist Control)  FiO2 (%): 100 %  Resp Rate (Set): 34 breaths/min  Tidal Volume (Set, mL): 320 mL  PEEP (cm H2O): 8 cmH2O  Resp: (!) 34          Patient Parameters with above settings:  Ventilator - Patient   Patient Resp Rate : 34 breaths/min  Expiratory Vt (ml): 315  Minute Volume (ml): 10.9 L/min  Peak Inspiratory Pressure (cm H2O): 39 cmH2O  Mean Airway Pressure (cm H2O): 19 cmH2O  Plateau Pressure (cm H2O): 37 cmH2O  Dynamic Compliance (mL/cm H2O): 11.9 mL/cm H2O  Airway Resistance: 15.1  Auto/ Intrinsic PEEP (cm H2O): 6 cmH2O     SBT completed No ,   Secretions: small thick yellow.  Breath Sounds: coarse    Emergency Ambu bag, mask and peep valve at bedside.     (Emergency trach supplies including current size, downsize, and obturator at bedside)    Respiratory Medications: Duoneb Qid; given and pt tolerated well.       ABG: @15:48 pH 7.21; pCO2 39; pO2 66; HCO3 16, %O2 Sat 85, BE 12.3 on 90.4    NOTE / SHIFT SUMMARY:     Today's Changes    Pt's SpO2 went down to low 80s and FiO2 increase to 100% during this shift.  Skin checked and tube moved Q2, this responsibility is shared between RN and RT. Endotracheal tube cuff assessed and appropriate at the time of the assessment.   RT to continue to assess and monitor cardiopulmonary status while in the ICU. For additional vital checks and ventilator check information please see flowsheets.    Georgi Meléndez, RT    Intact

## 2023-12-29 ENCOUNTER — APPOINTMENT (OUTPATIENT)
Dept: UROLOGY | Facility: CLINIC | Age: 68
End: 2023-12-29
Payer: MEDICARE

## 2023-12-29 VITALS
WEIGHT: 315 LBS | SYSTOLIC BLOOD PRESSURE: 122 MMHG | RESPIRATION RATE: 17 BRPM | BODY MASS INDEX: 37.19 KG/M2 | HEART RATE: 141 BPM | DIASTOLIC BLOOD PRESSURE: 73 MMHG | HEIGHT: 77 IN | TEMPERATURE: 97.9 F

## 2023-12-29 PROCEDURE — 99213 OFFICE O/P EST LOW 20 MIN: CPT

## 2023-12-29 NOTE — HISTORY OF PRESENT ILLNESS
[FreeTextEntry1] : pt with 1.7 cm renal mass and grew to 3.2 cm over a year grade 2 t1a removed 4/2023.  pt has frequency on hctz.  nocturia times 1

## 2024-01-02 ENCOUNTER — NON-APPOINTMENT (OUTPATIENT)
Age: 69
End: 2024-01-02

## 2024-01-10 NOTE — ED ADULT NURSE NOTE - CAS ELECT INFOMATION PROVIDED
----- Message from Vianca Bush DC instructions

## 2024-01-11 NOTE — ED ADULT NURSE NOTE - NSFALLRSKPASTHIST_ED_ALL_ED
[FreeTextEntry1] : Hypothyroidism. Her TSH was recently suppressed after stopping her combined oral contraceptive pill and we will adjust as below. We reviewed proper use and compliance with levothyroxine. We discussed the increased levothyroxine requirements in pregnancy and she will call the office immediately if she becomes pregnant. Her weight-based dose of levothyroxine is around 108 mcg. Transglutaminase antibodies were checked due to levothyroxine requirements above her expected weight-based dose and were negative.\par Adjust levothyroxine to 137 mcg, 1 pill 6 days/week and 0.5 pill 1 day/week for goal TSH 0.5-2.5 uIU/mL \par Repeat TSH in 8-12 weeks or earlier if pregnancy\par \par Elevated total bilirubin. Total bilirubin borderline elevated with liver function tests otherwise within range, likely due to Gilbert's disease. I advised she discuss with her primary care provider.
no
No (0)

## 2024-01-22 ENCOUNTER — OUTPATIENT (OUTPATIENT)
Dept: OUTPATIENT SERVICES | Facility: HOSPITAL | Age: 69
LOS: 1 days | End: 2024-01-22

## 2024-01-22 VITALS
RESPIRATION RATE: 18 BRPM | SYSTOLIC BLOOD PRESSURE: 129 MMHG | TEMPERATURE: 98 F | DIASTOLIC BLOOD PRESSURE: 81 MMHG | HEIGHT: 77 IN | HEART RATE: 81 BPM | WEIGHT: 315 LBS | OXYGEN SATURATION: 98 %

## 2024-01-22 DIAGNOSIS — Z90.49 ACQUIRED ABSENCE OF OTHER SPECIFIED PARTS OF DIGESTIVE TRACT: Chronic | ICD-10-CM

## 2024-01-22 DIAGNOSIS — Z98.890 OTHER SPECIFIED POSTPROCEDURAL STATES: Chronic | ICD-10-CM

## 2024-01-22 DIAGNOSIS — Z95.1 PRESENCE OF AORTOCORONARY BYPASS GRAFT: Chronic | ICD-10-CM

## 2024-01-22 DIAGNOSIS — N20.0 CALCULUS OF KIDNEY: Chronic | ICD-10-CM

## 2024-01-22 DIAGNOSIS — G95.9 DISEASE OF SPINAL CORD, UNSPECIFIED: ICD-10-CM

## 2024-01-22 DIAGNOSIS — Z96.653 PRESENCE OF ARTIFICIAL KNEE JOINT, BILATERAL: Chronic | ICD-10-CM

## 2024-01-22 DIAGNOSIS — Z98.89 OTHER SPECIFIED POSTPROCEDURAL STATES: Chronic | ICD-10-CM

## 2024-01-22 DIAGNOSIS — I10 ESSENTIAL (PRIMARY) HYPERTENSION: ICD-10-CM

## 2024-01-22 DIAGNOSIS — G62.9 POLYNEUROPATHY, UNSPECIFIED: ICD-10-CM

## 2024-01-22 DIAGNOSIS — Z98.1 ARTHRODESIS STATUS: Chronic | ICD-10-CM

## 2024-01-22 DIAGNOSIS — E66.01 MORBID (SEVERE) OBESITY DUE TO EXCESS CALORIES: ICD-10-CM

## 2024-01-22 DIAGNOSIS — Z90.5 ACQUIRED ABSENCE OF KIDNEY: Chronic | ICD-10-CM

## 2024-01-22 DIAGNOSIS — Z96.651 PRESENCE OF RIGHT ARTIFICIAL KNEE JOINT: Chronic | ICD-10-CM

## 2024-01-22 DIAGNOSIS — G47.33 OBSTRUCTIVE SLEEP APNEA (ADULT) (PEDIATRIC): ICD-10-CM

## 2024-01-22 DIAGNOSIS — Z96.643 PRESENCE OF ARTIFICIAL HIP JOINT, BILATERAL: Chronic | ICD-10-CM

## 2024-01-22 DIAGNOSIS — E11.9 TYPE 2 DIABETES MELLITUS WITHOUT COMPLICATIONS: ICD-10-CM

## 2024-01-22 DIAGNOSIS — G62.9 POLYNEUROPATHY, UNSPECIFIED: Chronic | ICD-10-CM

## 2024-01-22 DIAGNOSIS — I50.9 HEART FAILURE, UNSPECIFIED: ICD-10-CM

## 2024-01-22 LAB
A1C WITH ESTIMATED AVERAGE GLUCOSE RESULT: 7.2 % — HIGH (ref 4–5.6)
ANION GAP SERPL CALC-SCNC: 16 MMOL/L — HIGH (ref 7–14)
BLD GP AB SCN SERPL QL: NEGATIVE — SIGNIFICANT CHANGE UP
BUN SERPL-MCNC: 21 MG/DL — SIGNIFICANT CHANGE UP (ref 7–23)
CALCIUM SERPL-MCNC: 9.6 MG/DL — SIGNIFICANT CHANGE UP (ref 8.4–10.5)
CHLORIDE SERPL-SCNC: 101 MMOL/L — SIGNIFICANT CHANGE UP (ref 98–107)
CO2 SERPL-SCNC: 26 MMOL/L — SIGNIFICANT CHANGE UP (ref 22–31)
CREAT SERPL-MCNC: 0.54 MG/DL — SIGNIFICANT CHANGE UP (ref 0.5–1.3)
EGFR: 109 ML/MIN/1.73M2 — SIGNIFICANT CHANGE UP
ESTIMATED AVERAGE GLUCOSE: 160 — SIGNIFICANT CHANGE UP
GLUCOSE SERPL-MCNC: 160 MG/DL — HIGH (ref 70–99)
HCT VFR BLD CALC: 41.6 % — SIGNIFICANT CHANGE UP (ref 39–50)
HGB BLD-MCNC: 13.6 G/DL — SIGNIFICANT CHANGE UP (ref 13–17)
MCHC RBC-ENTMCNC: 26.2 PG — LOW (ref 27–34)
MCHC RBC-ENTMCNC: 32.7 GM/DL — SIGNIFICANT CHANGE UP (ref 32–36)
MCV RBC AUTO: 80.2 FL — SIGNIFICANT CHANGE UP (ref 80–100)
NRBC # BLD: 0 /100 WBCS — SIGNIFICANT CHANGE UP (ref 0–0)
NRBC # FLD: 0 K/UL — SIGNIFICANT CHANGE UP (ref 0–0)
PLATELET # BLD AUTO: 233 K/UL — SIGNIFICANT CHANGE UP (ref 150–400)
POTASSIUM SERPL-MCNC: 3.7 MMOL/L — SIGNIFICANT CHANGE UP (ref 3.5–5.3)
POTASSIUM SERPL-SCNC: 3.7 MMOL/L — SIGNIFICANT CHANGE UP (ref 3.5–5.3)
RBC # BLD: 5.19 M/UL — SIGNIFICANT CHANGE UP (ref 4.2–5.8)
RBC # FLD: 14.7 % — HIGH (ref 10.3–14.5)
RH IG SCN BLD-IMP: POSITIVE — SIGNIFICANT CHANGE UP
SODIUM SERPL-SCNC: 143 MMOL/L — SIGNIFICANT CHANGE UP (ref 135–145)
WBC # BLD: 7.89 K/UL — SIGNIFICANT CHANGE UP (ref 3.8–10.5)
WBC # FLD AUTO: 7.89 K/UL — SIGNIFICANT CHANGE UP (ref 3.8–10.5)

## 2024-01-22 NOTE — H&P PST ADULT - MUSCULOSKELETAL
details… Limited ROM neck- extension, flexion and left and right side. Unsteady gait- Uses walker/no joint swelling/no joint erythema/no joint warmth/no calf tenderness/strength 5/5 bilateral upper extremities/strength 5/5 bilateral lower extremities/abnormal gait

## 2024-01-22 NOTE — H&P PST ADULT - BP NONINVASIVE DIASTOLIC (MM HG)
81 Pt will be taking only metoprolol the morning of surgery.    He is quite anxious about being extubated. Ever since his tonsillectomy as a child he does not tolerate any oral dental procedure well and is very anxious about being extubated. Encouraged him to discuss w/the anesthesiologist the morning of surgery and he agreed to do so.

## 2024-01-22 NOTE — H&P PST ADULT - PROBLEM SELECTOR PLAN 1
Patient tentatively scheduled for Anterior cervical, discectomy fusion C3-4 on 1/25/24.  Pre-op instructions provided. Pt given verbal and written instructions with teach back on chlorhexidine shampoo and pepcid. Pt verbalized understanding with return demonstration.     CBC, BMP, HgBA1c, T&S, MRSA were done at PST.  Copy of EKG and Echocardiogram in chart.  Copy of Cardiology evaluation requested. (Hx of CHF, HTN, Obesity, CABG) for high risk procedure. Patient has an appointment 1/23/24.

## 2024-01-22 NOTE — H&P PST ADULT - RESPIRATORY
normal/clear to auscultation bilaterally/no wheezes/no rales/no rhonchi/no respiratory distress/no use of accessory muscles/airway patent/breath sounds equal

## 2024-01-22 NOTE — H&P PST ADULT - NSICDXPASTMEDICALHX_GEN_ALL_CORE_FT
PAST MEDICAL HISTORY:  CAD (coronary artery disease) 2017- s/p cabg x3    Cervical herniated disc     Cubital tunnel syndrome, right     Disease of spinal cord, unspecified     Essential hypertension     Gout     H/O CHF     H/O: osteoarthritis     Hypercholesterolemia     Kidney mass     Lumbar disc disease with radiculopathy     Morbid obesity with body mass index (BMI) of 40.0 or higher     Myelopathy     Neuropathy 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    Obstructive sleep apnea CPAP    ASIYA (obstructive sleep apnea) initially dx 1997 ; CPAP    Paralytic ileus post op THR 2009 & post op laminectomy    Peripheral neuropathy     Renal calculi     Renal cancer     Right carpal tunnel syndrome     Trigger finger of right hand     Type 2 diabetes mellitus      PAST MEDICAL HISTORY:  CAD (coronary artery disease) 2017- s/p cabg x3    Cervical herniated disc     Cubital tunnel syndrome, right     Disease of spinal cord, unspecified     Essential hypertension     Gout     H/O CHF     H/O: osteoarthritis     Hypercholesterolemia     Kidney mass     Lumbar disc disease with radiculopathy     Morbid obesity     Morbid obesity with body mass index (BMI) of 40.0 or higher     Myelopathy     Neuropathy 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    Obstructive sleep apnea CPAP    ASIYA (obstructive sleep apnea) initially dx 1997 ; CPAP    Paralytic ileus post op THR 2009 & post op laminectomy    Peripheral neuropathy     Renal calculi     Renal cancer     Right carpal tunnel syndrome     Trigger finger of right hand     Type 2 diabetes mellitus

## 2024-01-22 NOTE — H&P PST ADULT - ATTENDING COMMENTS
Patient seen and examined in preoperative holding area.  Patient with severe stenosis and cord compression at C3-4.  Previously scheduled for surgery but had a fall resulting gin 3 column fracture with DISH and necessitating extension of lumbar fusion.  ACDF was delayed to allow for healing of his back.  Patient not feels ready to undergo ACDF.  Main issues is balance and hand dysfunction.   Risks and benefits of surgery including but not limited to bleeding, infection, damage to surrounding structures, hematoma, need for revision procedure hoarse voice, dysphagia, hardware malpositon., hardware failure, adjacent segment disease, neck pain,  arm pain,  numbness, weakness, C5 palsy, and paralysis were discussed.  After informed consent plan was made to move ahead with C3-4 ACDF. Patient seen and examined in preoperative holding area.  Patient with severe stenosis and cord compression at C3-4.  Previously scheduled for surgery but had a fall resulting gin 3 column fracture with DISH and necessitating extension of lumbar fusion.  ACDF was delayed to allow for healing of his back.  Patient not feels ready to undergo ACDF.  Main issue is balance and coordination in lower extremities.  Also Has head heaviness and neck pain that improves with forward flexion.   Risks and benefits of surgery including but not limited to bleeding, infection, damage to surrounding structures, hematoma, need for revision procedure hoarse voice, dysphagia, hardware malpositon., hardware failure, adjacent segment disease, neck pain,  arm pain,  numbness, weakness, C5 palsy, and paralysis were discussed.  After informed consent plan was made to move ahead with C3-4 ACDF.

## 2024-01-22 NOTE — H&P PST ADULT - NSICDXPASTSURGICALHX_GEN_ALL_CORE_FT
PAST SURGICAL HISTORY:  H/O elbow surgery     H/O lithotripsy x1    History of bilateral hip replacements     History of bilateral knee replacement     History of cholecystectomy     History of hernia repair     History of lumbar fusion     History of lymph node biopsy     History of partial nephrectomy     History of thoracic spinal fusion     History of Total Hip Replacement 2009- bilateral THR    Kidney stone s/w ESWL pt unsure site    Neuropathy Bilateral Feet since 2012    S/p bilateral carpal tunnel release     S/P CABG x 3 8/29/17    S/P Left Inguinal Hernia Repair     S/P lumbar discectomy 2012- ,L5  Aug 2013    S/P lumbar laminectomy Fusion L3-L5 2012    S/P revision of total knee, right x2- 2012 & 2014    S/P tonsillectomy and adenoidectomy as child

## 2024-01-22 NOTE — H&P PST ADULT - HISTORY OF PRESENT ILLNESS
68 year old male with pmhx of CAD s/p CABG (3 vessels, 08/2017 at [facility]), CHF, T2DM (not on insulin), ASIYA on CPAP, HTN, HLD, Right renal mass s/p partial resection, s/p Lumbar fusion x 3 (2012-L3-L5, 2022 x 2-Revision-L3-L5, S1, S2 Pelvis), T12-L1 fracture s/p T9-pelvis PSF (9/2023), s/p wound dehiscence s/p revision T9-pelvis PSF (11/2023), presents for pre-op evaluation for diagnosis Disease of spinal cord unspecified. Patient is scheduled for Anterior cervical, discectomy fusion C3-4.  68 year old male with pmhx of CAD s/p CABG (3 vessels, 08/2017 at [facility]), Morbid obesity, CHF, T2DM (not on insulin), ASIYA on CPAP, HTN, HLD, Right renal mass s/p partial resection, s/p Lumbar fusion x 3 (2012-L3-L5, 2022 x 2-Revision-L3-L5, S1, S2 Pelvis), T12-L1 fracture s/p T9-pelvis PSF (9/2023), s/p wound dehiscence s/p revision T9-pelvis PSF (11/2023), presents for pre-op evaluation for diagnosis Disease of spinal cord unspecified. Patient is scheduled for Anterior cervical, discectomy fusion C3-4.

## 2024-01-23 LAB
MRSA PCR RESULT.: SIGNIFICANT CHANGE UP
S AUREUS DNA NOSE QL NAA+PROBE: SIGNIFICANT CHANGE UP

## 2024-01-24 ENCOUNTER — TRANSCRIPTION ENCOUNTER (OUTPATIENT)
Age: 69
End: 2024-01-24

## 2024-01-24 PROBLEM — E66.01 MORBID (SEVERE) OBESITY DUE TO EXCESS CALORIES: Chronic | Status: ACTIVE | Noted: 2024-01-22

## 2024-01-24 PROBLEM — G62.9 POLYNEUROPATHY, UNSPECIFIED: Chronic | Status: ACTIVE | Noted: 2024-01-22

## 2024-01-24 PROBLEM — G95.9 DISEASE OF SPINAL CORD, UNSPECIFIED: Chronic | Status: ACTIVE | Noted: 2024-01-22

## 2024-01-24 NOTE — ASU PATIENT PROFILE, ADULT - NSICDXPASTMEDICALHX_GEN_ALL_CORE_FT
PAST MEDICAL HISTORY:  CAD (coronary artery disease) 2017- s/p cabg x3    Cervical herniated disc     Cubital tunnel syndrome, right     Disease of spinal cord, unspecified     Essential hypertension     Gout     H/O CHF     H/O: osteoarthritis     Hypercholesterolemia     Kidney mass     Lumbar disc disease with radiculopathy     Morbid obesity     Morbid obesity with body mass index (BMI) of 40.0 or higher     Myelopathy     Neuropathy 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    Obstructive sleep apnea CPAP    ASIYA (obstructive sleep apnea) initially dx 1997 ; CPAP    Paralytic ileus post op THR 2009 & post op laminectomy    Peripheral neuropathy     Renal calculi     Renal cancer     Right carpal tunnel syndrome     Trigger finger of right hand     Type 2 diabetes mellitus

## 2024-01-24 NOTE — ASU PATIENT PROFILE, ADULT - FALL HARM RISK - RISK INTERVENTIONS
Assistance OOB with selected safe patient handling equipment/Assistance with ambulation/Communicate Fall Risk and Risk Factors to all staff, patient, and family/Discuss with provider need for PT consult/Monitor gait and stability/Move patient closer to nurses' station/Orthostatic vital signs/Provide patient with walking aids - walker, cane, crutches/Reinforce activity limits and safety measures with patient and family/Sit up slowly, dangle for a short time, stand at bedside before walking/Toileting schedule using arm’s reach rule for commode and bathroom/Visual Cue: Yellow wristband/Bed in lowest position, wheels locked, appropriate side rails in place/Call bell, personal items and telephone in reach/Instruct patient to call for assistance before getting out of bed or chair/Non-slip footwear when patient is out of bed/[place] to call system/Physically safe environment - no spills, clutter or unnecessary equipment/Purposeful Proactive Rounding/Room/bathroom lighting operational, light cord in reach

## 2024-01-25 ENCOUNTER — INPATIENT (INPATIENT)
Facility: HOSPITAL | Age: 69
LOS: 1 days | Discharge: HOME | End: 2024-01-27
Attending: STUDENT IN AN ORGANIZED HEALTH CARE EDUCATION/TRAINING PROGRAM | Admitting: STUDENT IN AN ORGANIZED HEALTH CARE EDUCATION/TRAINING PROGRAM
Payer: MEDICARE

## 2024-01-25 ENCOUNTER — APPOINTMENT (OUTPATIENT)
Dept: ORTHOPEDIC SURGERY | Facility: HOSPITAL | Age: 69
End: 2024-01-25
Payer: MEDICARE

## 2024-01-25 VITALS
DIASTOLIC BLOOD PRESSURE: 58 MMHG | RESPIRATION RATE: 16 BRPM | HEIGHT: 77 IN | WEIGHT: 315 LBS | OXYGEN SATURATION: 98 % | HEART RATE: 79 BPM | SYSTOLIC BLOOD PRESSURE: 121 MMHG | TEMPERATURE: 99 F

## 2024-01-25 DIAGNOSIS — Z98.1 ARTHRODESIS STATUS: Chronic | ICD-10-CM

## 2024-01-25 DIAGNOSIS — Z96.643 PRESENCE OF ARTIFICIAL HIP JOINT, BILATERAL: Chronic | ICD-10-CM

## 2024-01-25 DIAGNOSIS — Z90.49 ACQUIRED ABSENCE OF OTHER SPECIFIED PARTS OF DIGESTIVE TRACT: Chronic | ICD-10-CM

## 2024-01-25 DIAGNOSIS — Z98.890 OTHER SPECIFIED POSTPROCEDURAL STATES: Chronic | ICD-10-CM

## 2024-01-25 DIAGNOSIS — G95.9 DISEASE OF SPINAL CORD, UNSPECIFIED: ICD-10-CM

## 2024-01-25 DIAGNOSIS — Z95.1 PRESENCE OF AORTOCORONARY BYPASS GRAFT: Chronic | ICD-10-CM

## 2024-01-25 DIAGNOSIS — Z96.651 PRESENCE OF RIGHT ARTIFICIAL KNEE JOINT: Chronic | ICD-10-CM

## 2024-01-25 DIAGNOSIS — G62.9 POLYNEUROPATHY, UNSPECIFIED: Chronic | ICD-10-CM

## 2024-01-25 DIAGNOSIS — Z98.89 OTHER SPECIFIED POSTPROCEDURAL STATES: Chronic | ICD-10-CM

## 2024-01-25 DIAGNOSIS — N20.0 CALCULUS OF KIDNEY: Chronic | ICD-10-CM

## 2024-01-25 DIAGNOSIS — Z96.653 PRESENCE OF ARTIFICIAL KNEE JOINT, BILATERAL: Chronic | ICD-10-CM

## 2024-01-25 DIAGNOSIS — Z90.5 ACQUIRED ABSENCE OF KIDNEY: Chronic | ICD-10-CM

## 2024-01-25 LAB
ALBUMIN SERPL ELPH-MCNC: 3.9 G/DL — SIGNIFICANT CHANGE UP (ref 3.3–5)
ALP SERPL-CCNC: 101 U/L — SIGNIFICANT CHANGE UP (ref 40–120)
ALT FLD-CCNC: 23 U/L — SIGNIFICANT CHANGE UP (ref 4–41)
ANION GAP SERPL CALC-SCNC: 16 MMOL/L — HIGH (ref 7–14)
AST SERPL-CCNC: 23 U/L — SIGNIFICANT CHANGE UP (ref 4–40)
BILIRUB DIRECT SERPL-MCNC: <0.2 MG/DL — SIGNIFICANT CHANGE UP (ref 0–0.3)
BILIRUB INDIRECT FLD-MCNC: >0.1 MG/DL — SIGNIFICANT CHANGE UP (ref 0–1)
BILIRUB SERPL-MCNC: 0.3 MG/DL — SIGNIFICANT CHANGE UP (ref 0.2–1.2)
BUN SERPL-MCNC: 19 MG/DL — SIGNIFICANT CHANGE UP (ref 7–23)
CALCIUM SERPL-MCNC: 9.2 MG/DL — SIGNIFICANT CHANGE UP (ref 8.4–10.5)
CHLORIDE SERPL-SCNC: 101 MMOL/L — SIGNIFICANT CHANGE UP (ref 98–107)
CO2 SERPL-SCNC: 23 MMOL/L — SIGNIFICANT CHANGE UP (ref 22–31)
CREAT SERPL-MCNC: 0.6 MG/DL — SIGNIFICANT CHANGE UP (ref 0.5–1.3)
EGFR: 105 ML/MIN/1.73M2 — SIGNIFICANT CHANGE UP
GLUCOSE BLDC GLUCOMTR-MCNC: 170 MG/DL — HIGH (ref 70–99)
GLUCOSE BLDC GLUCOMTR-MCNC: 190 MG/DL — HIGH (ref 70–99)
GLUCOSE BLDC GLUCOMTR-MCNC: 203 MG/DL — HIGH (ref 70–99)
GLUCOSE BLDC GLUCOMTR-MCNC: 205 MG/DL — HIGH (ref 70–99)
GLUCOSE BLDC GLUCOMTR-MCNC: 207 MG/DL — HIGH (ref 70–99)
GLUCOSE SERPL-MCNC: 205 MG/DL — HIGH (ref 70–99)
HCT VFR BLD CALC: 39.1 % — SIGNIFICANT CHANGE UP (ref 39–50)
HGB BLD-MCNC: 12.9 G/DL — LOW (ref 13–17)
MAGNESIUM SERPL-MCNC: 1.7 MG/DL — SIGNIFICANT CHANGE UP (ref 1.6–2.6)
MCHC RBC-ENTMCNC: 26.8 PG — LOW (ref 27–34)
MCHC RBC-ENTMCNC: 33 GM/DL — SIGNIFICANT CHANGE UP (ref 32–36)
MCV RBC AUTO: 81.1 FL — SIGNIFICANT CHANGE UP (ref 80–100)
NRBC # BLD: 0 /100 WBCS — SIGNIFICANT CHANGE UP (ref 0–0)
NRBC # FLD: 0 K/UL — SIGNIFICANT CHANGE UP (ref 0–0)
PHOSPHATE SERPL-MCNC: 3.4 MG/DL — SIGNIFICANT CHANGE UP (ref 2.5–4.5)
PLATELET # BLD AUTO: 207 K/UL — SIGNIFICANT CHANGE UP (ref 150–400)
POTASSIUM SERPL-MCNC: 3.6 MMOL/L — SIGNIFICANT CHANGE UP (ref 3.5–5.3)
POTASSIUM SERPL-SCNC: 3.6 MMOL/L — SIGNIFICANT CHANGE UP (ref 3.5–5.3)
PROT SERPL-MCNC: 6.9 G/DL — SIGNIFICANT CHANGE UP (ref 6–8.3)
RBC # BLD: 4.82 M/UL — SIGNIFICANT CHANGE UP (ref 4.2–5.8)
RBC # FLD: 14.6 % — HIGH (ref 10.3–14.5)
SODIUM SERPL-SCNC: 140 MMOL/L — SIGNIFICANT CHANGE UP (ref 135–145)
WBC # BLD: 9.44 K/UL — SIGNIFICANT CHANGE UP (ref 3.8–10.5)
WBC # FLD AUTO: 9.44 K/UL — SIGNIFICANT CHANGE UP (ref 3.8–10.5)

## 2024-01-25 PROCEDURE — 22853 INSJ BIOMECHANICAL DEVICE: CPT

## 2024-01-25 PROCEDURE — 93010 ELECTROCARDIOGRAM REPORT: CPT

## 2024-01-25 PROCEDURE — 22845 INSERT SPINE FIXATION DEVICE: CPT | Mod: 59

## 2024-01-25 PROCEDURE — 22551 ARTHRD ANT NTRBDY CERVICAL: CPT

## 2024-01-25 DEVICE — IMPLANTABLE DEVICE: Type: IMPLANTABLE DEVICE | Status: FUNCTIONAL

## 2024-01-25 DEVICE — BONE WAX 2.5GM: Type: IMPLANTABLE DEVICE | Status: FUNCTIONAL

## 2024-01-25 DEVICE — SURGIFLO MATRIX WITH THROMBIN KIT: Type: IMPLANTABLE DEVICE | Status: FUNCTIONAL

## 2024-01-25 DEVICE — SCREW MAXCESS-C 14MM: Type: IMPLANTABLE DEVICE | Status: FUNCTIONAL

## 2024-01-25 DEVICE — SURGIFOAM PAD 8CM X 12.5CM X 2MM (100C): Type: IMPLANTABLE DEVICE | Status: FUNCTIONAL

## 2024-01-25 RX ORDER — LOSARTAN POTASSIUM 100 MG/1
1 TABLET, FILM COATED ORAL
Refills: 0 | DISCHARGE

## 2024-01-25 RX ORDER — LOSARTAN POTASSIUM 100 MG/1
50 TABLET, FILM COATED ORAL DAILY
Refills: 0 | Status: DISCONTINUED | OUTPATIENT
Start: 2024-01-25 | End: 2024-01-27

## 2024-01-25 RX ORDER — CYCLOBENZAPRINE HYDROCHLORIDE 10 MG/1
5 TABLET, FILM COATED ORAL THREE TIMES A DAY
Refills: 0 | Status: DISCONTINUED | OUTPATIENT
Start: 2024-01-25 | End: 2024-01-27

## 2024-01-25 RX ORDER — ALLOPURINOL 300 MG
100 TABLET ORAL AT BEDTIME
Refills: 0 | Status: DISCONTINUED | OUTPATIENT
Start: 2024-01-25 | End: 2024-01-27

## 2024-01-25 RX ORDER — SODIUM CHLORIDE 9 MG/ML
1000 INJECTION, SOLUTION INTRAVENOUS ONCE
Refills: 0 | Status: COMPLETED | OUTPATIENT
Start: 2024-01-25 | End: 2024-01-25

## 2024-01-25 RX ORDER — CARVEDILOL PHOSPHATE 80 MG/1
3.12 CAPSULE, EXTENDED RELEASE ORAL EVERY 12 HOURS
Refills: 0 | Status: DISCONTINUED | OUTPATIENT
Start: 2024-01-25 | End: 2024-01-27

## 2024-01-25 RX ORDER — DEXTROSE 50 % IN WATER 50 %
15 SYRINGE (ML) INTRAVENOUS ONCE
Refills: 0 | Status: DISCONTINUED | OUTPATIENT
Start: 2024-01-25 | End: 2024-01-27

## 2024-01-25 RX ORDER — PANTOPRAZOLE SODIUM 20 MG/1
40 TABLET, DELAYED RELEASE ORAL
Refills: 0 | Status: DISCONTINUED | OUTPATIENT
Start: 2024-01-25 | End: 2024-01-27

## 2024-01-25 RX ORDER — ALBUMIN HUMAN 25 %
250 VIAL (ML) INTRAVENOUS ONCE
Refills: 0 | Status: COMPLETED | OUTPATIENT
Start: 2024-01-25 | End: 2024-01-25

## 2024-01-25 RX ORDER — ACETAMINOPHEN 500 MG
975 TABLET ORAL ONCE
Refills: 0 | Status: DISCONTINUED | OUTPATIENT
Start: 2024-01-25 | End: 2024-02-05

## 2024-01-25 RX ORDER — DIAZEPAM 5 MG
1 TABLET ORAL ONCE
Refills: 0 | Status: DISCONTINUED | OUTPATIENT
Start: 2024-01-25 | End: 2024-01-25

## 2024-01-25 RX ORDER — FAMOTIDINE 10 MG/ML
20 INJECTION INTRAVENOUS ONCE
Refills: 0 | Status: COMPLETED | OUTPATIENT
Start: 2024-01-25 | End: 2024-01-25

## 2024-01-25 RX ORDER — OXYCODONE HYDROCHLORIDE 5 MG/1
5 TABLET ORAL EVERY 4 HOURS
Refills: 0 | Status: DISCONTINUED | OUTPATIENT
Start: 2024-01-25 | End: 2024-01-27

## 2024-01-25 RX ORDER — OXYCODONE HYDROCHLORIDE 5 MG/1
5 TABLET ORAL ONCE
Refills: 0 | Status: DISCONTINUED | OUTPATIENT
Start: 2024-01-25 | End: 2024-01-25

## 2024-01-25 RX ORDER — CEFAZOLIN SODIUM 1 G
2000 VIAL (EA) INJECTION EVERY 8 HOURS
Refills: 0 | Status: COMPLETED | OUTPATIENT
Start: 2024-01-25 | End: 2024-01-26

## 2024-01-25 RX ORDER — SENNA PLUS 8.6 MG/1
2 TABLET ORAL AT BEDTIME
Refills: 0 | Status: DISCONTINUED | OUTPATIENT
Start: 2024-01-25 | End: 2024-01-27

## 2024-01-25 RX ORDER — ONDANSETRON 8 MG/1
4 TABLET, FILM COATED ORAL EVERY 6 HOURS
Refills: 0 | Status: DISCONTINUED | OUTPATIENT
Start: 2024-01-25 | End: 2024-01-27

## 2024-01-25 RX ORDER — TRAMADOL HYDROCHLORIDE 50 MG/1
50 TABLET ORAL DAILY
Refills: 0 | Status: COMPLETED | OUTPATIENT
Start: 2024-01-25 | End: 2024-02-01

## 2024-01-25 RX ORDER — POTASSIUM CHLORIDE 20 MEQ
20 PACKET (EA) ORAL
Refills: 0 | Status: COMPLETED | OUTPATIENT
Start: 2024-01-25 | End: 2024-01-25

## 2024-01-25 RX ORDER — GABAPENTIN 400 MG/1
300 CAPSULE ORAL
Refills: 0 | Status: DISCONTINUED | OUTPATIENT
Start: 2024-01-25 | End: 2024-01-27

## 2024-01-25 RX ORDER — INSULIN LISPRO 100/ML
VIAL (ML) SUBCUTANEOUS
Refills: 0 | Status: DISCONTINUED | OUTPATIENT
Start: 2024-01-25 | End: 2024-01-27

## 2024-01-25 RX ORDER — HYDROMORPHONE HYDROCHLORIDE 2 MG/ML
1 INJECTION INTRAMUSCULAR; INTRAVENOUS; SUBCUTANEOUS
Refills: 0 | Status: DISCONTINUED | OUTPATIENT
Start: 2024-01-25 | End: 2024-01-25

## 2024-01-25 RX ORDER — ACETAMINOPHEN 500 MG
1000 TABLET ORAL ONCE
Refills: 0 | Status: COMPLETED | OUTPATIENT
Start: 2024-01-25 | End: 2024-01-25

## 2024-01-25 RX ORDER — GLUCAGON INJECTION, SOLUTION 0.5 MG/.1ML
1 INJECTION, SOLUTION SUBCUTANEOUS ONCE
Refills: 0 | Status: DISCONTINUED | OUTPATIENT
Start: 2024-01-25 | End: 2024-01-27

## 2024-01-25 RX ORDER — TRAMADOL HYDROCHLORIDE 50 MG/1
50 TABLET ORAL EVERY 8 HOURS
Refills: 0 | Status: DISCONTINUED | OUTPATIENT
Start: 2024-01-25 | End: 2024-01-27

## 2024-01-25 RX ORDER — SODIUM CHLORIDE 9 MG/ML
1000 INJECTION, SOLUTION INTRAVENOUS
Refills: 0 | Status: DISCONTINUED | OUTPATIENT
Start: 2024-01-25 | End: 2024-01-26

## 2024-01-25 RX ORDER — MAGNESIUM SULFATE 500 MG/ML
2 VIAL (ML) INJECTION ONCE
Refills: 0 | Status: COMPLETED | OUTPATIENT
Start: 2024-01-25 | End: 2024-01-25

## 2024-01-25 RX ORDER — INSULIN LISPRO 100/ML
VIAL (ML) SUBCUTANEOUS AT BEDTIME
Refills: 0 | Status: DISCONTINUED | OUTPATIENT
Start: 2024-01-25 | End: 2024-01-27

## 2024-01-25 RX ORDER — BUMETANIDE 0.25 MG/ML
2 INJECTION INTRAMUSCULAR; INTRAVENOUS
Refills: 0 | Status: DISCONTINUED | OUTPATIENT
Start: 2024-01-25 | End: 2024-01-27

## 2024-01-25 RX ORDER — ACETAMINOPHEN 500 MG
975 TABLET ORAL EVERY 8 HOURS
Refills: 0 | Status: DISCONTINUED | OUTPATIENT
Start: 2024-01-25 | End: 2024-01-27

## 2024-01-25 RX ORDER — LANOLIN ALCOHOL/MO/W.PET/CERES
3 CREAM (GRAM) TOPICAL AT BEDTIME
Refills: 0 | Status: DISCONTINUED | OUTPATIENT
Start: 2024-01-25 | End: 2024-01-27

## 2024-01-25 RX ORDER — OXYCODONE HYDROCHLORIDE 5 MG/1
10 TABLET ORAL EVERY 4 HOURS
Refills: 0 | Status: DISCONTINUED | OUTPATIENT
Start: 2024-01-25 | End: 2024-01-27

## 2024-01-25 RX ORDER — PANTOPRAZOLE SODIUM 20 MG/1
40 TABLET, DELAYED RELEASE ORAL
Refills: 0 | Status: DISCONTINUED | OUTPATIENT
Start: 2024-01-25 | End: 2024-02-05

## 2024-01-25 RX ORDER — ATORVASTATIN CALCIUM 80 MG/1
40 TABLET, FILM COATED ORAL AT BEDTIME
Refills: 0 | Status: DISCONTINUED | OUTPATIENT
Start: 2024-01-25 | End: 2024-01-27

## 2024-01-25 RX ORDER — HYDROMORPHONE HYDROCHLORIDE 2 MG/ML
0.5 INJECTION INTRAMUSCULAR; INTRAVENOUS; SUBCUTANEOUS
Refills: 0 | Status: DISCONTINUED | OUTPATIENT
Start: 2024-01-25 | End: 2024-01-25

## 2024-01-25 RX ADMIN — Medication 100 MILLIGRAM(S): at 21:52

## 2024-01-25 RX ADMIN — Medication 975 MILLIGRAM(S): at 23:00

## 2024-01-25 RX ADMIN — OXYCODONE HYDROCHLORIDE 10 MILLIGRAM(S): 5 TABLET ORAL at 23:00

## 2024-01-25 RX ADMIN — SODIUM CHLORIDE 1000 MILLILITER(S): 9 INJECTION, SOLUTION INTRAVENOUS at 16:28

## 2024-01-25 RX ADMIN — Medication 2: at 17:41

## 2024-01-25 RX ADMIN — TRAMADOL HYDROCHLORIDE 50 MILLIGRAM(S): 50 TABLET ORAL at 20:30

## 2024-01-25 RX ADMIN — ATORVASTATIN CALCIUM 40 MILLIGRAM(S): 80 TABLET, FILM COATED ORAL at 21:52

## 2024-01-25 RX ADMIN — CARVEDILOL PHOSPHATE 3.12 MILLIGRAM(S): 80 CAPSULE, EXTENDED RELEASE ORAL at 19:26

## 2024-01-25 RX ADMIN — CYCLOBENZAPRINE HYDROCHLORIDE 5 MILLIGRAM(S): 10 TABLET, FILM COATED ORAL at 20:31

## 2024-01-25 RX ADMIN — HYDROMORPHONE HYDROCHLORIDE 0.5 MILLIGRAM(S): 2 INJECTION INTRAMUSCULAR; INTRAVENOUS; SUBCUTANEOUS at 13:00

## 2024-01-25 RX ADMIN — Medication 20 MILLIEQUIVALENT(S): at 15:51

## 2024-01-25 RX ADMIN — FAMOTIDINE 20 MILLIGRAM(S): 10 INJECTION INTRAVENOUS at 15:24

## 2024-01-25 RX ADMIN — Medication 975 MILLIGRAM(S): at 21:52

## 2024-01-25 RX ADMIN — Medication 20 MILLIEQUIVALENT(S): at 14:25

## 2024-01-25 RX ADMIN — OXYCODONE HYDROCHLORIDE 5 MILLIGRAM(S): 5 TABLET ORAL at 16:20

## 2024-01-25 RX ADMIN — Medication 3 MILLIGRAM(S): at 21:51

## 2024-01-25 RX ADMIN — OXYCODONE HYDROCHLORIDE 10 MILLIGRAM(S): 5 TABLET ORAL at 21:51

## 2024-01-25 RX ADMIN — Medication 500 MILLILITER(S): at 13:46

## 2024-01-25 RX ADMIN — Medication 1 MILLIGRAM(S): at 15:03

## 2024-01-25 RX ADMIN — Medication 400 MILLIGRAM(S): at 16:30

## 2024-01-25 RX ADMIN — HYDROMORPHONE HYDROCHLORIDE 0.5 MILLIGRAM(S): 2 INJECTION INTRAMUSCULAR; INTRAVENOUS; SUBCUTANEOUS at 12:50

## 2024-01-25 RX ADMIN — GABAPENTIN 300 MILLIGRAM(S): 400 CAPSULE ORAL at 19:25

## 2024-01-25 RX ADMIN — OXYCODONE HYDROCHLORIDE 5 MILLIGRAM(S): 5 TABLET ORAL at 15:51

## 2024-01-25 RX ADMIN — Medication 1000 MILLIGRAM(S): at 17:00

## 2024-01-25 RX ADMIN — TRAMADOL HYDROCHLORIDE 50 MILLIGRAM(S): 50 TABLET ORAL at 19:25

## 2024-01-25 RX ADMIN — Medication 100 MILLIGRAM(S): at 17:10

## 2024-01-25 RX ADMIN — Medication 2: at 13:49

## 2024-01-25 RX ADMIN — Medication 25 GRAM(S): at 16:13

## 2024-01-25 NOTE — ASU PREOP CHECKLIST - BSA (M2)
Mitzi from [facility] called and needed a signature for services, dr Wei Concepcion from [place] [id number] ([phone]) will be signing the forms due to the issues with [facility] 2.98

## 2024-01-25 NOTE — PACU DISCHARGE NOTE - NSCLINEINSERTRD_GEN_ALL_CORE
No Larisa HALL aware, pt asymptomatic otherwise, Pt received oxy 5 and tylenol for complaints of pain, will reassess.

## 2024-01-25 NOTE — PATIENT PROFILE ADULT - FALL HARM RISK - HARM RISK INTERVENTIONS
Assistance with ambulation/Assistance OOB with selected safe patient handling equipment/Communicate Risk of Fall with Harm to all staff/Discuss with provider need for PT consult/Monitor gait and stability/Provide patient with walking aids - walker, cane, crutches/Reinforce activity limits and safety measures with patient and family/Sit up slowly, dangle for a short time, stand at bedside before walking/Tailored Fall Risk Interventions/Use of alarms - bed, chair and/or voice tab/Visual Cue: Yellow wristband and red socks/Bed in lowest position, wheels locked, appropriate side rails in place/Call bell, personal items and telephone in reach/Instruct patient to call for assistance before getting out of bed or chair/Non-slip footwear when patient is out of bed/[place] to call system/Physically safe environment - no spills, clutter or unnecessary equipment/Purposeful Proactive Rounding/Room/bathroom lighting operational, light cord in reach

## 2024-01-26 ENCOUNTER — TRANSCRIPTION ENCOUNTER (OUTPATIENT)
Age: 69
End: 2024-01-26

## 2024-01-26 DIAGNOSIS — I25.10 ATHEROSCLEROTIC HEART DISEASE OF NATIVE CORONARY ARTERY WITHOUT ANGINA PECTORIS: ICD-10-CM

## 2024-01-26 DIAGNOSIS — I50.9 HEART FAILURE, UNSPECIFIED: ICD-10-CM

## 2024-01-26 DIAGNOSIS — E11.9 TYPE 2 DIABETES MELLITUS WITHOUT COMPLICATIONS: ICD-10-CM

## 2024-01-26 DIAGNOSIS — Z29.9 ENCOUNTER FOR PROPHYLACTIC MEASURES, UNSPECIFIED: ICD-10-CM

## 2024-01-26 DIAGNOSIS — G47.33 OBSTRUCTIVE SLEEP APNEA (ADULT) (PEDIATRIC): ICD-10-CM

## 2024-01-26 DIAGNOSIS — D72.829 ELEVATED WHITE BLOOD CELL COUNT, UNSPECIFIED: ICD-10-CM

## 2024-01-26 DIAGNOSIS — M50.90 CERVICAL DISC DISORDER, UNSPECIFIED, UNSPECIFIED CERVICAL REGION: ICD-10-CM

## 2024-01-26 LAB
ANION GAP SERPL CALC-SCNC: 11 MMOL/L — SIGNIFICANT CHANGE UP (ref 7–14)
BASOPHILS # BLD AUTO: 0.01 K/UL — SIGNIFICANT CHANGE UP (ref 0–0.2)
BASOPHILS NFR BLD AUTO: 0.1 % — SIGNIFICANT CHANGE UP (ref 0–2)
BUN SERPL-MCNC: 15 MG/DL — SIGNIFICANT CHANGE UP (ref 7–23)
CALCIUM SERPL-MCNC: 9.1 MG/DL — SIGNIFICANT CHANGE UP (ref 8.4–10.5)
CHLORIDE SERPL-SCNC: 102 MMOL/L — SIGNIFICANT CHANGE UP (ref 98–107)
CO2 SERPL-SCNC: 26 MMOL/L — SIGNIFICANT CHANGE UP (ref 22–31)
CREAT SERPL-MCNC: 0.5 MG/DL — SIGNIFICANT CHANGE UP (ref 0.5–1.3)
EGFR: 111 ML/MIN/1.73M2 — SIGNIFICANT CHANGE UP
EOSINOPHIL # BLD AUTO: 0.01 K/UL — SIGNIFICANT CHANGE UP (ref 0–0.5)
EOSINOPHIL NFR BLD AUTO: 0.1 % — SIGNIFICANT CHANGE UP (ref 0–6)
GLUCOSE BLDC GLUCOMTR-MCNC: 155 MG/DL — HIGH (ref 70–99)
GLUCOSE BLDC GLUCOMTR-MCNC: 159 MG/DL — HIGH (ref 70–99)
GLUCOSE BLDC GLUCOMTR-MCNC: 178 MG/DL — HIGH (ref 70–99)
GLUCOSE BLDC GLUCOMTR-MCNC: 181 MG/DL — HIGH (ref 70–99)
GLUCOSE SERPL-MCNC: 179 MG/DL — HIGH (ref 70–99)
HCT VFR BLD CALC: 35.8 % — LOW (ref 39–50)
HGB BLD-MCNC: 12.2 G/DL — LOW (ref 13–17)
IANC: 9.03 K/UL — HIGH (ref 1.8–7.4)
IMM GRANULOCYTES NFR BLD AUTO: 0.4 % — SIGNIFICANT CHANGE UP (ref 0–0.9)
LYMPHOCYTES # BLD AUTO: 1.37 K/UL — SIGNIFICANT CHANGE UP (ref 1–3.3)
LYMPHOCYTES # BLD AUTO: 12 % — LOW (ref 13–44)
MCHC RBC-ENTMCNC: 26.9 PG — LOW (ref 27–34)
MCHC RBC-ENTMCNC: 34.1 GM/DL — SIGNIFICANT CHANGE UP (ref 32–36)
MCV RBC AUTO: 78.9 FL — LOW (ref 80–100)
MONOCYTES # BLD AUTO: 0.91 K/UL — HIGH (ref 0–0.9)
MONOCYTES NFR BLD AUTO: 8 % — SIGNIFICANT CHANGE UP (ref 2–14)
NEUTROPHILS # BLD AUTO: 9.03 K/UL — HIGH (ref 1.8–7.4)
NEUTROPHILS NFR BLD AUTO: 79.4 % — HIGH (ref 43–77)
NRBC # BLD: 0 /100 WBCS — SIGNIFICANT CHANGE UP (ref 0–0)
NRBC # FLD: 0 K/UL — SIGNIFICANT CHANGE UP (ref 0–0)
PLATELET # BLD AUTO: 192 K/UL — SIGNIFICANT CHANGE UP (ref 150–400)
POTASSIUM SERPL-MCNC: 3.9 MMOL/L — SIGNIFICANT CHANGE UP (ref 3.5–5.3)
POTASSIUM SERPL-SCNC: 3.9 MMOL/L — SIGNIFICANT CHANGE UP (ref 3.5–5.3)
RBC # BLD: 4.54 M/UL — SIGNIFICANT CHANGE UP (ref 4.2–5.8)
RBC # FLD: 14.3 % — SIGNIFICANT CHANGE UP (ref 10.3–14.5)
SODIUM SERPL-SCNC: 139 MMOL/L — SIGNIFICANT CHANGE UP (ref 135–145)
WBC # BLD: 11.38 K/UL — HIGH (ref 3.8–10.5)
WBC # FLD AUTO: 11.38 K/UL — HIGH (ref 3.8–10.5)

## 2024-01-26 PROCEDURE — 99223 1ST HOSP IP/OBS HIGH 75: CPT

## 2024-01-26 PROCEDURE — 93010 ELECTROCARDIOGRAM REPORT: CPT

## 2024-01-26 RX ORDER — ACETAMINOPHEN 500 MG
1000 TABLET ORAL ONCE
Refills: 0 | Status: DISCONTINUED | OUTPATIENT
Start: 2024-01-26 | End: 2024-01-26

## 2024-01-26 RX ORDER — TRAMADOL HYDROCHLORIDE 50 MG/1
50 TABLET ORAL ONCE
Refills: 0 | Status: DISCONTINUED | OUTPATIENT
Start: 2024-01-26 | End: 2024-01-27

## 2024-01-26 RX ORDER — BENZOCAINE AND MENTHOL 5; 1 G/100ML; G/100ML
1 LIQUID ORAL THREE TIMES A DAY
Refills: 0 | Status: DISCONTINUED | OUTPATIENT
Start: 2024-01-26 | End: 2024-01-27

## 2024-01-26 RX ADMIN — Medication 1: at 16:46

## 2024-01-26 RX ADMIN — OXYCODONE HYDROCHLORIDE 10 MILLIGRAM(S): 5 TABLET ORAL at 10:33

## 2024-01-26 RX ADMIN — GABAPENTIN 300 MILLIGRAM(S): 400 CAPSULE ORAL at 18:13

## 2024-01-26 RX ADMIN — Medication 975 MILLIGRAM(S): at 22:55

## 2024-01-26 RX ADMIN — OXYCODONE HYDROCHLORIDE 10 MILLIGRAM(S): 5 TABLET ORAL at 10:03

## 2024-01-26 RX ADMIN — GABAPENTIN 300 MILLIGRAM(S): 400 CAPSULE ORAL at 11:42

## 2024-01-26 RX ADMIN — Medication 975 MILLIGRAM(S): at 14:22

## 2024-01-26 RX ADMIN — SODIUM CHLORIDE 100 MILLILITER(S): 9 INJECTION, SOLUTION INTRAVENOUS at 10:03

## 2024-01-26 RX ADMIN — ATORVASTATIN CALCIUM 40 MILLIGRAM(S): 80 TABLET, FILM COATED ORAL at 22:55

## 2024-01-26 RX ADMIN — OXYCODONE HYDROCHLORIDE 10 MILLIGRAM(S): 5 TABLET ORAL at 22:55

## 2024-01-26 RX ADMIN — PANTOPRAZOLE SODIUM 40 MILLIGRAM(S): 20 TABLET, DELAYED RELEASE ORAL at 05:26

## 2024-01-26 RX ADMIN — Medication 100 MILLIGRAM(S): at 22:55

## 2024-01-26 RX ADMIN — Medication 3 MILLIGRAM(S): at 22:55

## 2024-01-26 RX ADMIN — GABAPENTIN 300 MILLIGRAM(S): 400 CAPSULE ORAL at 00:33

## 2024-01-26 RX ADMIN — CARVEDILOL PHOSPHATE 3.12 MILLIGRAM(S): 80 CAPSULE, EXTENDED RELEASE ORAL at 18:13

## 2024-01-26 RX ADMIN — OXYCODONE HYDROCHLORIDE 10 MILLIGRAM(S): 5 TABLET ORAL at 05:26

## 2024-01-26 RX ADMIN — CARVEDILOL PHOSPHATE 3.12 MILLIGRAM(S): 80 CAPSULE, EXTENDED RELEASE ORAL at 05:26

## 2024-01-26 RX ADMIN — OXYCODONE HYDROCHLORIDE 10 MILLIGRAM(S): 5 TABLET ORAL at 18:43

## 2024-01-26 RX ADMIN — Medication 100 MILLIGRAM(S): at 00:33

## 2024-01-26 RX ADMIN — TRAMADOL HYDROCHLORIDE 50 MILLIGRAM(S): 50 TABLET ORAL at 20:26

## 2024-01-26 RX ADMIN — TRAMADOL HYDROCHLORIDE 50 MILLIGRAM(S): 50 TABLET ORAL at 11:42

## 2024-01-26 RX ADMIN — OXYCODONE HYDROCHLORIDE 10 MILLIGRAM(S): 5 TABLET ORAL at 06:30

## 2024-01-26 RX ADMIN — Medication 1: at 07:48

## 2024-01-26 RX ADMIN — LOSARTAN POTASSIUM 50 MILLIGRAM(S): 100 TABLET, FILM COATED ORAL at 05:26

## 2024-01-26 RX ADMIN — BENZOCAINE AND MENTHOL 1 LOZENGE: 5; 1 LIQUID ORAL at 20:42

## 2024-01-26 RX ADMIN — GABAPENTIN 300 MILLIGRAM(S): 400 CAPSULE ORAL at 05:26

## 2024-01-26 RX ADMIN — Medication 975 MILLIGRAM(S): at 05:26

## 2024-01-26 RX ADMIN — BUMETANIDE 2 MILLIGRAM(S): 0.25 INJECTION INTRAMUSCULAR; INTRAVENOUS at 13:52

## 2024-01-26 RX ADMIN — Medication 1: at 11:43

## 2024-01-26 RX ADMIN — Medication 975 MILLIGRAM(S): at 06:30

## 2024-01-26 RX ADMIN — Medication 975 MILLIGRAM(S): at 13:52

## 2024-01-26 RX ADMIN — TRAMADOL HYDROCHLORIDE 50 MILLIGRAM(S): 50 TABLET ORAL at 19:33

## 2024-01-26 RX ADMIN — BUMETANIDE 2 MILLIGRAM(S): 0.25 INJECTION INTRAMUSCULAR; INTRAVENOUS at 05:26

## 2024-01-26 RX ADMIN — BENZOCAINE AND MENTHOL 1 LOZENGE: 5; 1 LIQUID ORAL at 13:56

## 2024-01-26 RX ADMIN — TRAMADOL HYDROCHLORIDE 50 MILLIGRAM(S): 50 TABLET ORAL at 12:12

## 2024-01-26 RX ADMIN — OXYCODONE HYDROCHLORIDE 10 MILLIGRAM(S): 5 TABLET ORAL at 18:13

## 2024-01-26 NOTE — CHART NOTE - NSCHARTNOTEFT_GEN_A_CORE
Notified by surgeon that patient complaining of chest discomfort this morning. He states that he did not use his CPAP machine last night due to it not fitting appropriately with the cervical collar he has on.     Evaluated patient at bedside. Denies radiation, nausea, dizziness, lightheadedness.  Was able to locate chest pain with one finger to left anterior chest wall.   Denies pain with palpation to chest wall.   Patient does have a history of stent placement x 3, but states this pain is not comparable to when he had those stents placed.     EKG ordered and reviewed, compared with previous EKGs in chart. NSR, 68 BPM.   Ordered 1G IV Tylenol   Instructed patient to notify RN staff about any increasing chest pain or increasing pain/discomfort to neck, trouble breathing, or voice changes.   Will continue to monitor for any change in exam.    Tiara Hernandez PA-C  Orthopedic Joint and Spine Coordinator  [facility]  [phone]

## 2024-01-26 NOTE — DIETITIAN INITIAL EVALUATION ADULT - PERTINENT MEDS FT
MEDICATIONS  (STANDING):  acetaminophen     Tablet .. 975 milliGRAM(s) Oral every 8 hours  allopurinol 100 milliGRAM(s) Oral at bedtime  atorvastatin 40 milliGRAM(s) Oral at bedtime  buMETAnide 2 milliGRAM(s) Oral two times a day  carvedilol 3.125 milliGRAM(s) Oral every 12 hours  gabapentin 300 milliGRAM(s) Oral four times a day  glucagon  Injectable 1 milliGRAM(s) IntraMuscular once  insulin lispro (ADMELOG) corrective regimen sliding scale   SubCutaneous at bedtime  insulin lispro (ADMELOG) corrective regimen sliding scale   SubCutaneous three times a day before meals  lactated ringers. 1000 milliLiter(s) (100 mL/Hr) IV Continuous <Continuous>  losartan 50 milliGRAM(s) Oral daily  melatonin 3 milliGRAM(s) Oral at bedtime  pantoprazole    Tablet 40 milliGRAM(s) Oral before breakfast  senna 2 Tablet(s) Oral at bedtime  traMADol 50 milliGRAM(s) Oral every 8 hours  traMADol 50 milliGRAM(s) Oral once    MEDICATIONS  (PRN):  benzocaine/menthol Lozenge 1 Lozenge Oral three times a day PRN Sore Throat  cyclobenzaprine 5 milliGRAM(s) Oral three times a day PRN Muscle Spasm  dextrose Oral Gel 15 Gram(s) Oral once PRN Blood Glucose LESS THAN 70 milliGRAM(s)/deciliter  ondansetron Injectable 4 milliGRAM(s) IV Push every 6 hours PRN Nausea and/or Vomiting  oxyCODONE    IR 10 milliGRAM(s) Oral every 4 hours PRN Severe Pain (7 - 10)  oxyCODONE    IR 5 milliGRAM(s) Oral every 4 hours PRN Moderate Pain (4 - 6)

## 2024-01-26 NOTE — DIETITIAN NUTRITION RISK NOTIFICATION - TREATMENT: THE FOLLOWING DIET HAS BEEN RECOMMENDED
Diet, Regular:   Consistent Carbohydrate {Evening Snack} (CSTCHOSN)  Soft and Bite Sized (SOFTBTSZ) (01-26-24 @ 11:26) [Active]

## 2024-01-26 NOTE — CONSULT NOTE ADULT - ASSESSMENT
68M with hx of morbid obesity, CAD s/p CABG (3 vessels, 08/2017 at [facility]), CHF, T2DM (not on insulin), ASIYA on CPAP, HTN, HLD, Right renal mass s/p partial resection, Lumbar fusion x 3 (2012-L3-L5, 2022 x 2-Revision-L3-L5, S1, S2 Pelvis), T12-L1 fracture s/p T9-pelvis PSF (9/2023), s/p wound dehiscence s/p revision T9-pelvis PSF (11/2023), admitted for C3-4 anterior cervical, discectomy fusion (OR 1/25).

## 2024-01-26 NOTE — DIETITIAN INITIAL EVALUATION ADULT - OTHER INFO
RD visited with patient for MST Score 2 or Greater, however, in reviewing chart, no reported unintended weight loss or appetite changes.  Patient with weight changes per chart review, likely in setting of h/o edema / possible previous weight discrepancies.    24 Hospitalist AttendinM with hx of morbid obesity, CAD s/p CABG (3 vessels, 2017 at [facility]), CHF, T2DM (not on insulin), ASIYA on CPAP, HTN, HLD, Right renal mass s/p partial resection, Lumbar fusion x 3 (-L3-L5, 2022 x 2-Revision-L3-L5, S1, S2 Pelvis), T12-L1 fracture s/p T9-pelvis PSF (2023), s/p wound dehiscence s/p revision T9-pelvis PSF (2023), admitted for C3-4 anterior cervical, discectomy fusion (OR ).    Patient endorses good appetite and po intake.  No GI distress reported i.e. nausea, vomiting, diarrhea. Patient stated he cuts food up into smaller pieces for ease of chewing due to neck brace / collar. Offered patient mechanically altered diet, however, he declined as he would prefer to cut up foods himself, but informed him of this option should he change his mind.  Patient reported usual body weight of 394 pounds PTA.  Current weight 391 pounds (177.76kgs).  Patient receiving diuretic bumetanide daily.  Labs reviewed - uptrending a1c. No food allergies noted or reported.     Reviewed diet education with patient, heart healthy / plate method at bedside and provided written materials regarding same. Patient demonstrated minimal interest in discussing.

## 2024-01-26 NOTE — CONSULT NOTE ADULT - PROBLEM SELECTOR RECOMMENDATION 9
s/p C3-4 anterior cervical, discectomy fusion (OR 1/25)  - post op care per ortho  - pain control: ATC Tylenol, Oxycodone PRN, bowel regimen with opiates   - encouraged incentive spirometer use  - PT recommended rehab  - DVT ppx: SCDs s/p C3-4 anterior cervical, discectomy fusion (OR 1/25)  - post op care per ortho  - pain control: ATC Tylenol, Oxycodone PRN, bowel regimen with opiates   - encouraged incentive spirometer use  - PT recommended home  - DVT ppx: SCDs

## 2024-01-26 NOTE — CONSULT NOTE ADULT - SUBJECTIVE AND OBJECTIVE BOX
Polly Ross MD  [facility] Division of Hospital Medicine  Pager [phone] (M-F 8AM-5PM)  Other Times: [id number]    Patient is a 68y old  Male who presents with a chief complaint of C3-C4 ACDF (2024 06:38)      HPI: 68M with hx of CAD s/p CABG (3 vessels, 2017 at [facility]), CHF, T2DM (not on insulin), ASIYA on CPAP, HTN, HLD, Right renal mass s/p partial resection, Lumbar fusion x 3 (-L3-L5, 2022 x 2-Revision-L3-L5, S1, S2 Pelvis), T12-L1 fracture s/p T9-pelvis PSF (2023), s/p wound dehiscence s/p revision T9-pelvis PSF (2023), admitted for C3-4 anterior cervical, discectomy fusion (OR ). Patient seen POD1. Earlier this morning had some chest discomfort which has since resolved. Ortho obtained EKG at the time, which was unchanged from prior. Denies sob/diaphoresis/palpitations.       Review of Systems:   CONSTITUTIONAL: No fever, no fatigue  EYES: No eye pain or discharge  ENMT:  No sinus or throat pain  NECK: No pain or stiffness  RESPIRATORY: No cough, wheezing, chills or hemoptysis; No shortness of breath  CARDIOVASCULAR: No chest pain, palpitations, dizziness, or leg swelling  GASTROINTESTINAL: No abdominal or epigastric pain. No nausea, vomiting, or hematemesis; No diarrhea or constipation. No melena or hematochezia.  GENITOURINARY: No dysuria or incontinence  MUSCULOSKELETAL: No joint pain or swelling; No muscle, back, or extremity pain  NEUROLOGICAL: No headaches, memory loss, loss of strength, numbness, or tremors  PSYCHIATRIC: No depression, anxiety, mood swings, or difficulty sleeping  SKIN: No rashes, no skin changes    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      Essential hypertension      CAD (coronary artery disease)  - s/p cabg x3      Hypercholesterolemia      ASIYA (obstructive sleep apnea)  initially dx  ; CPAP      Paralytic ileus  post op THR  & post op laminectomy      Type 2 diabetes mellitus      Right carpal tunnel syndrome      Cubital tunnel syndrome, right      Trigger finger of right hand      Morbid obesity with body mass index (BMI) of 40.0 or higher      H/O CHF      Kidney mass      Cervical herniated disc      Renal cancer      Disease of spinal cord, unspecified      Peripheral neuropathy      Myelopathy      Morbid obesity      S/P Left Inguinal Hernia Repair      History of Total Hip Replacement  - bilateral THR      S/P tonsillectomy and adenoidectomy  as child      H/O lithotripsy  x1      S/P revision of total knee, right  x2-  &       S/P lumbar discectomy  - ,L5  Aug 2013      S/P CABG x 3  17      S/P lumbar laminectomy  Fusion L3-L5       Kidney stone  s/w ESWL pt unsure site      Neuropathy  Bilateral Feet since       History of bilateral hip replacements      History of hernia repair      History of lumbar fusion      History of cholecystectomy      History of bilateral knee replacement      History of lymph node biopsy      S/p bilateral carpal tunnel release      History of partial nephrectomy      H/O elbow surgery      History of thoracic spinal fusion      FAMILY HISTORY:  Family history of coronary artery disease  HLD/Angina. Fatal MI age 73.    Family history of diabetes mellitus type II  mother- - hyperlipidemia and Hypertension.    Family history of pancreatic cancer (Sibling)  brother     Family history of coronary artery disease  brother- age 50+- Coronary Artery Stents    SOCIAL HISTORY: denies smoking/EtOH    Allergies  No Known Allergies    Home Medications:  allopurinol 100 mg oral tablet: 1 tab(s) orally once a day (at bedtime) (2024 06:09)  aspirin 81 mg oral delayed release tablet: 1 tab(s) orally once a day (2024 06:09)  calcium with vitamin D3: 1 tab orally once a day (2024 06:09)  Centrum multivitamin 1 tab daily:  (2024 06:09)  losartan 50 mg oral tablet: 1 tab(s) orally once a day (2024 17:55)  magnesium oxide 400 mg oral tablet: 2 tab(s) orally once a day (2024 06:09)  Vitamin B12 1000 mcg daily:  (2024 06:09)    MEDICATIONS  (STANDING):  acetaminophen     Tablet .. 975 milliGRAM(s) Oral every 8 hours  allopurinol 100 milliGRAM(s) Oral at bedtime  atorvastatin 40 milliGRAM(s) Oral at bedtime  buMETAnide 2 milliGRAM(s) Oral two times a day  carvedilol 3.125 milliGRAM(s) Oral every 12 hours  gabapentin 300 milliGRAM(s) Oral four times a day  glucagon  Injectable 1 milliGRAM(s) IntraMuscular once  insulin lispro (ADMELOG) corrective regimen sliding scale   SubCutaneous at bedtime  insulin lispro (ADMELOG) corrective regimen sliding scale   SubCutaneous three times a day before meals  lactated ringers. 1000 milliLiter(s) (100 mL/Hr) IV Continuous <Continuous>  losartan 50 milliGRAM(s) Oral daily  melatonin 3 milliGRAM(s) Oral at bedtime  pantoprazole    Tablet 40 milliGRAM(s) Oral before breakfast  senna 2 Tablet(s) Oral at bedtime  traMADol 50 milliGRAM(s) Oral every 8 hours  traMADol 50 milliGRAM(s) Oral once    MEDICATIONS  (PRN):  benzocaine/menthol Lozenge 1 Lozenge Oral three times a day PRN Sore Throat  cyclobenzaprine 5 milliGRAM(s) Oral three times a day PRN Muscle Spasm  dextrose Oral Gel 15 Gram(s) Oral once PRN Blood Glucose LESS THAN 70 milliGRAM(s)/deciliter  ondansetron Injectable 4 milliGRAM(s) IV Push every 6 hours PRN Nausea and/or Vomiting  oxyCODONE    IR 5 milliGRAM(s) Oral every 4 hours PRN Moderate Pain (4 - 6)  oxyCODONE    IR 10 milliGRAM(s) Oral every 4 hours PRN Severe Pain (7 - 10)      PHYSICAL EXAM:  Vital Signs Last 24 Hrs  T(C): 36.5 (2024 09:33), Max: 36.6 (2024 12:30)  T(F): 97.7 (2024 09:33), Max: 97.9 (2024 12:30)  HR: 70 (2024 09:33) (70 - 102)  BP: 131/62 (2024 09:33) (98/43 - 164/79)  BP(mean): 91 (2024 17:45) (54 - 97)  RR: 17 (2024 09:33) (13 - 22)  SpO2: 97% (2024 09:33) (93% - 99%)    Parameters below as of 2024 09:33  Patient On (Oxygen Delivery Method): room air      CONSTITUTIONAL: well-developed, well-groomed, no apparent distress  EYES: no conjunctival or scleral injection, non-icteric, PERRLA  ENMT: no external nasal lesions, oral mucosa with moist membranes  NECK: trachea midline, no palpable neck mass   RESPIRATORY: breathing comfortably; lungs CTA without wheeze/rales/rhonchi  CARDIOVASCULAR: regular rate and rhythm; +S1S2, no murmurs, rubs, or gallops, no lower extremity edema, 2+ peripheral pulses  GASTROINTESTINAL: soft, nontender, nondistended; +BS throughout, no rebound/guarding  MUSCULOSKELETAL: no joint effusions, normal strength and tone of extremities   NEUROLOGIC: non-focal, sensation intact to light touch in b/l upper and lower extremities   PSYCHIATRIC: AAOx3, appropriate mood and affect  SKIN: no rashes or lesions, warm, surgical site c/d/i    LABS:                        12.2   11.38 )-----------( 192      ( 2024 05:20 )             35.8         139  |  102  |  15  ----------------------------<  179<H>  3.9   |  26  |  0.50    Ca    9.1      2024 05:20  Phos  3.4       Mg     1.70         TPro  6.9  /  Alb  3.9  /  TBili  0.3  /  DBili  <0.2  /  AST  23  /  ALT  23  /  AlkPhos  101            Urinalysis Basic - ( 2024 05:20 )    Color: x / Appearance: x / SG: x / pH: x  Gluc: 179 mg/dL / Ketone: x  / Bili: x / Urobili: x   Blood: x / Protein: x / Nitrite: x   Leuk Esterase: x / RBC: x / WBC x   Sq Epi: x / Non Sq Epi: x / Bacteria: x        RADIOLOGY & ADDITIONAL TESTS:    EKG Personally Reviewed:    Imaging Personally Reviewed:    Care Discussed with Consultants/Other Providers:
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
## 2024-01-26 NOTE — PROGRESS NOTE ADULT - ATTENDING COMMENTS
Patient seen and examined this am.  Doing well overall.  States over last 2 hours has been having some increased work of breathing. No voice pitch change. Feels his chest is slightly heavy. Did not use CPAP overnight because of difficulty with getting it on with collar. Patient may remove collar while in bed, for eating, and to apply CPAP.    Dressing C/d/I removed. No drainage.  Neck non tender and supple.  Patient with large neck due to his BMI but does not appear swollen.     Will obtain EKG to rule out cardiac cause. Raised HOB. Get him to chair.  Was putting out a large amount of sputum in recovery room.  Consult medical service to see patient this am.  VSS will continue to monitor throughout out day.   PT today  Obtain [place] collar  mobilize patient.

## 2024-01-26 NOTE — CONSULT NOTE ADULT - PROBLEM SELECTOR RECOMMENDATION 3
s/p CABG (3 vessels, 08/2017 at [facility])  - brief episode of chest pain this AM (now resolved), EKG unchanged from prior - known LBBB  - GDMT: c/w carvedilol, losartan

## 2024-01-26 NOTE — DISCHARGE NOTE PROVIDER - HOSPITAL COURSE
Patient underwent C3-C4 ACDF with Dr. Hughes. Patient tolerated the procedure well without any intraoperative complications. Patient tolerated physical therapy, weight bearing as tolerated and pain was controlled. Seen by medical attending for continuity of care and management and cleared for safe discharge. As per surgeon, the patient is stable and ready for discharge. Do not take any NSAIDS (motrin, advil, ibuprofen, aleve), Aspirin, Anti-inflammatory medications unless instructed by your orthopedic surgeon to continue. Avoid any heavy lifting, bending, squatting, or twisting motions. Keep dressing/incision clean, dry and intact, may remove dressing two days after discharge and leave incision open to air. Any sutures/staples to be removed on post-op day #14 at your office visit. Please follow up with Dr. Hughes in 2 weeks, call the office to make an appointment, [phone]. Please follow up with your PMD for continuity of care and management as medications may have changed.

## 2024-01-26 NOTE — CONSULT NOTE ADULT - PROBLEM SELECTOR RECOMMENDATION 2
WBC 11  - likely reactive in post op state  - no focal s/s of infection, observe off abx   - trend fever curve

## 2024-01-26 NOTE — DIETITIAN INITIAL EVALUATION ADULT - PERTINENT LABORATORY DATA
01-26    139  |  102  |  15  ----------------------------<  179<H>  3.9   |  26  |  0.50    Ca    9.1      26 Jan 2024 05:20  Phos  3.4     01-25  Mg     1.70     01-25    TPro  6.9  /  Alb  3.9  /  TBili  0.3  /  DBili  <0.2  /  AST  23  /  ALT  23  /  AlkPhos  101  01-25    A1C with Estimated Average Glucose Result: 7.2 % (01-22-24 @ 10:50)  A1C with Estimated Average Glucose Result: 6.8 % (10-12-23 @ 05:42)  A1C with Estimated Average Glucose Result: 6.5 % (09-19-23 @ 06:45)    CAPILLARY BLOOD GLUCOSE  POCT Blood Glucose.: 178 mg/dL (26 Jan 2024 11:16)  POCT Blood Glucose.: 181 mg/dL (26 Jan 2024 07:34)  POCT Blood Glucose.: 190 mg/dL (25 Jan 2024 21:56)  POCT Blood Glucose.: 203 mg/dL (25 Jan 2024 17:32)  POCT Blood Glucose.: 205 mg/dL (25 Jan 2024 13:28)

## 2024-01-26 NOTE — DIETITIAN INITIAL EVALUATION ADULT - NS FNS DIET ORDER
Diet, Regular:   Consistent Carbohydrate {Evening Snack} (CSTCHOSN)  Soft and Bite Sized (SOFTBTSZ) (01-26-24 @ 11:26)

## 2024-01-26 NOTE — OCCUPATIONAL THERAPY INITIAL EVALUATION ADULT - LIGHT TOUCH SENSATION, RUE, REHAB EVAL
Patient denies numbness/tingling of bilater UE. Bilateral UE appear grossly intact/within normal limits

## 2024-01-26 NOTE — DISCHARGE NOTE PROVIDER - NSDCMRMEDTOKEN_GEN_ALL_CORE_FT
allopurinol 100 mg oral tablet: 1 tab(s) orally once a day (at bedtime)  aspirin 81 mg oral delayed release tablet: 1 tab(s) orally once a day  atorvastatin 40 mg oral tablet: 1 tab(s) orally once a day (at bedtime)  bumetanide 2 mg oral tablet: 1 tab(s) orally 2 times a day  calcium with vitamin D3: 1 tab orally once a day  carvedilol 3.125 mg oral tablet: 1 tab(s) orally every 12 hours  Centrum multivitamin 1 tab daily:   gabapentin 300 mg oral capsule: 1 cap(s) orally 4 times a day  glimepiride 1 mg oral tablet: 1 tab(s) orally once a day  losartan 50 mg oral tablet: 1 tab(s) orally once a day  magnesium oxide 400 mg oral tablet: 2 tab(s) orally once a day  Vitamin B12 1000 mcg daily:    acetaminophen 325 mg oral tablet: 3 tab(s) orally every 8 hours MDD: 9  allopurinol 100 mg oral tablet: 1 tab(s) orally once a day (at bedtime)  aspirin 81 mg oral delayed release tablet: 1 tab(s) orally once a day  atorvastatin 40 mg oral tablet: 1 tab(s) orally once a day (at bedtime)  bumetanide 2 mg oral tablet: 1 tab(s) orally 2 times a day  calcium with vitamin D3: 1 tab orally once a day  carvedilol 3.125 mg oral tablet: 1 tab(s) orally every 12 hours  Centrum multivitamin 1 tab daily:   Colace 100 mg oral capsule: 1 cap(s) orally 3 times a day MDD: 3  cyclobenzaprine 5 mg oral tablet: 1 tab(s) orally 3 times a day As needed Muscle Spasm  gabapentin 300 mg oral capsule: 1 cap(s) orally every 8 hours MDD: 3  glimepiride 1 mg oral tablet: 1 tab(s) orally once a day  losartan 50 mg oral tablet: 1 tab(s) orally once a day  magnesium oxide 400 mg oral tablet: 2 tab(s) orally once a day  Narcan 4 mg/0.1 mL nasal spray: 4 milligram(s) intranasally once as needed for opioid overdose MDD: 1  oxyCODONE 5 mg oral tablet: 1 tab(s) orally every 4 to 6 hours as needed for  severe pain MDD: 6  pantoprazole 40 mg oral delayed release tablet: 1 tab(s) orally once a day (before a meal)  Senna 8.6 mg oral tablet: 2 tab(s) orally once a day (at bedtime) MDD: 2  traMADol 50 mg oral tablet: 1 tab(s) orally every 8 hours MDD: 3  Vitamin B12 1000 mcg daily:

## 2024-01-26 NOTE — OCCUPATIONAL THERAPY INITIAL EVALUATION ADULT - GENERAL OBSERVATIONS, REHAB EVAL
Upon entry, patient semi-supine in bed, patient agreeable to OT eval, cleared for OT evaluation as per RN. Patient left seated in chair at bedside, call bell in reach, all lines/tubes intact +IV +cervical collar, bed alarm activated, vitals stable, NAD.

## 2024-01-26 NOTE — PHYSICAL THERAPY INITIAL EVALUATION ADULT - ADDITIONAL COMMENTS
Patient lives alone in private home, 3 steps to enter and flight to bedroom/bathroom. Patient was using a walker prior to admission. Patient was independent in all ADL prior to admission.

## 2024-01-26 NOTE — CONSULT NOTE ADULT - CONSULT REASON
CAD s/p CABG (3 vessels, 08/2017 at [facility]), CHF, T2DM (not on insulin), ASIYA on CPAP, HTN, HLD

## 2024-01-26 NOTE — OCCUPATIONAL THERAPY INITIAL EVALUATION ADULT - LIVES WITH, PROFILE
Patient lives alone in private home, 3 steps to enter and flight to bedroom/bathroom, is turning dining room on 1st floor into bedroom/alone

## 2024-01-26 NOTE — PHYSICAL THERAPY INITIAL EVALUATION ADULT - DIAGNOSIS, PT EVAL
03/13/23 1325   Wound 09/09/22 Heel Left;Lateral   Date First Assessed/Time First Assessed: 09/09/22 1535   Present on Hospital Admission: Yes  Primary Wound Type: Diabetic Ulcer  Location: Heel  Wound Location Orientation: Left;Lateral   Wound Etiology Diabetic [place] 2   Dressing Status Clean;Dry; Intact   Offloading for Diabetic Foot Ulcers Offloading ordered   Wound Length (cm) 0.6 cm   Wound Width (cm) 0.2 cm   Wound Depth (cm) 0.1 cm   Wound Surface Area (cm^2) 0.12 cm^2   Change in Wound Size % (l*w) 98   Wound Volume (cm^3) 0.012 cm^3   Wound Healing % 100   Pain Assessment   Pain Assessment None - Denies Pain   NO photo taken, Mepilex transfer left in place with [facility] underneath. Only changed outer dressing at today's visit.
Decreased functional mobility due to status post spinal surgery

## 2024-01-26 NOTE — DISCHARGE NOTE PROVIDER - DETAILS OF MALNUTRITION DIAGNOSIS/DIAGNOSES
This patient has been assessed with a concern for Malnutrition and was treated during this hospitalization for the following Nutrition diagnosis/diagnoses:     -  01/26/2024: Morbid obesity (BMI > 40)

## 2024-01-26 NOTE — DISCHARGE NOTE PROVIDER - CARE PROVIDER_API CALL
Jose C Hughes  Spine Surgery  [address] [place] [address]  Phone: [phone]  Fax: [phone]  Follow Up Time: 2 weeks

## 2024-01-26 NOTE — DISCHARGE NOTE PROVIDER - NSDCFUADDINST_GEN_ALL_CORE_FT
Do not take any NSAIDS (motrin, advil, ibuprofen, aleve), Aspirin, Anti-inflammatory medications unless instructed by your orthopedic surgeon to continue. Avoid any heavy lifting, bending, squatting, or twisting motions. Keep dressing/incision clean, dry and intact, may remove dressing two days after discharge and leave incision open to air. Any sutures/staples to be removed on post-op day #14 at your office visit. Please follow up with Dr. Hughes in 2 weeks, call the office to make an appointment, [phone]. Please follow up with your PMD for continuity of care and management as medications may have changed.

## 2024-01-27 ENCOUNTER — TRANSCRIPTION ENCOUNTER (OUTPATIENT)
Age: 69
End: 2024-01-27

## 2024-01-27 VITALS
RESPIRATION RATE: 17 BRPM | TEMPERATURE: 97 F | SYSTOLIC BLOOD PRESSURE: 129 MMHG | DIASTOLIC BLOOD PRESSURE: 64 MMHG | OXYGEN SATURATION: 97 % | HEART RATE: 65 BPM

## 2024-01-27 LAB
ANION GAP SERPL CALC-SCNC: 14 MMOL/L — SIGNIFICANT CHANGE UP (ref 7–14)
BASOPHILS # BLD AUTO: 0.03 K/UL — SIGNIFICANT CHANGE UP (ref 0–0.2)
BASOPHILS NFR BLD AUTO: 0.4 % — SIGNIFICANT CHANGE UP (ref 0–2)
BUN SERPL-MCNC: 16 MG/DL — SIGNIFICANT CHANGE UP (ref 7–23)
CALCIUM SERPL-MCNC: 9 MG/DL — SIGNIFICANT CHANGE UP (ref 8.4–10.5)
CHLORIDE SERPL-SCNC: 100 MMOL/L — SIGNIFICANT CHANGE UP (ref 98–107)
CO2 SERPL-SCNC: 27 MMOL/L — SIGNIFICANT CHANGE UP (ref 22–31)
CREAT SERPL-MCNC: 0.54 MG/DL — SIGNIFICANT CHANGE UP (ref 0.5–1.3)
EGFR: 109 ML/MIN/1.73M2 — SIGNIFICANT CHANGE UP
EOSINOPHIL # BLD AUTO: 0.1 K/UL — SIGNIFICANT CHANGE UP (ref 0–0.5)
EOSINOPHIL NFR BLD AUTO: 1.4 % — SIGNIFICANT CHANGE UP (ref 0–6)
GLUCOSE BLDC GLUCOMTR-MCNC: 145 MG/DL — HIGH (ref 70–99)
GLUCOSE SERPL-MCNC: 137 MG/DL — HIGH (ref 70–99)
HCT VFR BLD CALC: 37 % — LOW (ref 39–50)
HGB BLD-MCNC: 12.2 G/DL — LOW (ref 13–17)
IANC: 4.58 K/UL — SIGNIFICANT CHANGE UP (ref 1.8–7.4)
IMM GRANULOCYTES NFR BLD AUTO: 0.4 % — SIGNIFICANT CHANGE UP (ref 0–0.9)
LYMPHOCYTES # BLD AUTO: 1.84 K/UL — SIGNIFICANT CHANGE UP (ref 1–3.3)
LYMPHOCYTES # BLD AUTO: 25.5 % — SIGNIFICANT CHANGE UP (ref 13–44)
MCHC RBC-ENTMCNC: 26.8 PG — LOW (ref 27–34)
MCHC RBC-ENTMCNC: 33 GM/DL — SIGNIFICANT CHANGE UP (ref 32–36)
MCV RBC AUTO: 81.3 FL — SIGNIFICANT CHANGE UP (ref 80–100)
MONOCYTES # BLD AUTO: 0.64 K/UL — SIGNIFICANT CHANGE UP (ref 0–0.9)
MONOCYTES NFR BLD AUTO: 8.9 % — SIGNIFICANT CHANGE UP (ref 2–14)
NEUTROPHILS # BLD AUTO: 4.58 K/UL — SIGNIFICANT CHANGE UP (ref 1.8–7.4)
NEUTROPHILS NFR BLD AUTO: 63.4 % — SIGNIFICANT CHANGE UP (ref 43–77)
NRBC # BLD: 0 /100 WBCS — SIGNIFICANT CHANGE UP (ref 0–0)
NRBC # FLD: 0 K/UL — SIGNIFICANT CHANGE UP (ref 0–0)
PLATELET # BLD AUTO: 178 K/UL — SIGNIFICANT CHANGE UP (ref 150–400)
POTASSIUM SERPL-MCNC: 3.3 MMOL/L — LOW (ref 3.5–5.3)
POTASSIUM SERPL-SCNC: 3.3 MMOL/L — LOW (ref 3.5–5.3)
RBC # BLD: 4.55 M/UL — SIGNIFICANT CHANGE UP (ref 4.2–5.8)
RBC # FLD: 15 % — HIGH (ref 10.3–14.5)
SODIUM SERPL-SCNC: 141 MMOL/L — SIGNIFICANT CHANGE UP (ref 135–145)
WBC # BLD: 7.22 K/UL — SIGNIFICANT CHANGE UP (ref 3.8–10.5)
WBC # FLD AUTO: 7.22 K/UL — SIGNIFICANT CHANGE UP (ref 3.8–10.5)

## 2024-01-27 RX ORDER — CYCLOBENZAPRINE HYDROCHLORIDE 10 MG/1
1 TABLET, FILM COATED ORAL
Qty: 42 | Refills: 0
Start: 2024-01-27 | End: 2024-02-09

## 2024-01-27 RX ORDER — DOCUSATE SODIUM 100 MG
1 CAPSULE ORAL
Qty: 21 | Refills: 0
Start: 2024-01-27 | End: 2024-02-02

## 2024-01-27 RX ORDER — ACETAMINOPHEN 500 MG
3 TABLET ORAL
Qty: 126 | Refills: 0
Start: 2024-01-27 | End: 2024-02-09

## 2024-01-27 RX ORDER — TRAMADOL HYDROCHLORIDE 50 MG/1
1 TABLET ORAL
Qty: 42 | Refills: 0
Start: 2024-01-27 | End: 2024-02-09

## 2024-01-27 RX ORDER — OXYCODONE HYDROCHLORIDE 5 MG/1
1 TABLET ORAL
Qty: 42 | Refills: 0
Start: 2024-01-27 | End: 2024-02-02

## 2024-01-27 RX ORDER — GABAPENTIN 400 MG/1
1 CAPSULE ORAL
Qty: 42 | Refills: 0
Start: 2024-01-27 | End: 2024-02-09

## 2024-01-27 RX ORDER — PANTOPRAZOLE SODIUM 20 MG/1
1 TABLET, DELAYED RELEASE ORAL
Qty: 30 | Refills: 0
Start: 2024-01-27 | End: 2024-02-25

## 2024-01-27 RX ORDER — PANTOPRAZOLE SODIUM 20 MG/1
1 TABLET, DELAYED RELEASE ORAL
Qty: 0 | Refills: 0 | DISCHARGE
Start: 2024-01-27

## 2024-01-27 RX ORDER — SENNA PLUS 8.6 MG/1
2 TABLET ORAL
Qty: 14 | Refills: 0
Start: 2024-01-27 | End: 2024-02-02

## 2024-01-27 RX ORDER — NALOXONE HYDROCHLORIDE 4 MG/.1ML
4 SPRAY NASAL
Qty: 1 | Refills: 0
Start: 2024-01-27 | End: 2024-01-27

## 2024-01-27 RX ORDER — CYCLOBENZAPRINE HYDROCHLORIDE 10 MG/1
1 TABLET, FILM COATED ORAL
Qty: 0 | Refills: 0 | DISCHARGE
Start: 2024-01-27

## 2024-01-27 RX ADMIN — PANTOPRAZOLE SODIUM 40 MILLIGRAM(S): 20 TABLET, DELAYED RELEASE ORAL at 05:28

## 2024-01-27 RX ADMIN — OXYCODONE HYDROCHLORIDE 10 MILLIGRAM(S): 5 TABLET ORAL at 06:30

## 2024-01-27 RX ADMIN — CARVEDILOL PHOSPHATE 3.12 MILLIGRAM(S): 80 CAPSULE, EXTENDED RELEASE ORAL at 05:28

## 2024-01-27 RX ADMIN — OXYCODONE HYDROCHLORIDE 10 MILLIGRAM(S): 5 TABLET ORAL at 00:00

## 2024-01-27 RX ADMIN — Medication 975 MILLIGRAM(S): at 00:00

## 2024-01-27 RX ADMIN — TRAMADOL HYDROCHLORIDE 50 MILLIGRAM(S): 50 TABLET ORAL at 10:04

## 2024-01-27 RX ADMIN — Medication 975 MILLIGRAM(S): at 05:28

## 2024-01-27 RX ADMIN — GABAPENTIN 300 MILLIGRAM(S): 400 CAPSULE ORAL at 05:28

## 2024-01-27 RX ADMIN — TRAMADOL HYDROCHLORIDE 50 MILLIGRAM(S): 50 TABLET ORAL at 10:34

## 2024-01-27 RX ADMIN — OXYCODONE HYDROCHLORIDE 10 MILLIGRAM(S): 5 TABLET ORAL at 05:28

## 2024-01-27 RX ADMIN — BUMETANIDE 2 MILLIGRAM(S): 0.25 INJECTION INTRAMUSCULAR; INTRAVENOUS at 07:48

## 2024-01-27 RX ADMIN — LOSARTAN POTASSIUM 50 MILLIGRAM(S): 100 TABLET, FILM COATED ORAL at 05:28

## 2024-01-27 RX ADMIN — Medication 975 MILLIGRAM(S): at 06:30

## 2024-01-27 NOTE — PROGRESS NOTE ADULT - SUBJECTIVE AND OBJECTIVE BOX
ORTHO POC - SPINE     Patient resting comfortably withour complaint s/p C3-4 ACDF         Vital Signs Last 24 Hrs  T(C): 36.1 (25 Jan 2024 14:00), Max: 37.2 (25 Jan 2024 05:48)  T(F): 97 (25 Jan 2024 14:00), Max: 99 (25 Jan 2024 05:48)  HR: 87 (25 Jan 2024 14:15) (79 - 102)  BP: 102/42 (25 Jan 2024 14:15) (98/43 - 121/58)  BP(mean): 56 (25 Jan 2024 14:15) (54 - 97)  RR: 17 (25 Jan 2024 14:15) (13 - 22)  SpO2: 96% (25 Jan 2024 14:15) (96% - 99%)    Parameters below as of 25 Jan 2024 13:45  Patient On (Oxygen Delivery Method): room air          Neck:  Dressing clean/dry/intact   + Nidia collar    BUE:  Deltoids:  5/5          Biceps: 5/5           Triceps:  5/5          Wrist ext: 5/5            : 5/5     U/O: 350cc      CBC Full  -  ( 25 Jan 2024 13:39 )  WBC Count : 9.44 K/uL  RBC Count : 4.82 M/uL  Hemoglobin : 12.9 g/dL  Hematocrit : 39.1 %  Platelet Count - Automated : 207 K/uL  Mean Cell Volume : 81.1 fL  Mean Cell Hemoglobin : 26.8 pg  Mean Cell Hemoglobin Concentration : 33.0 gm/dL  Auto Neutrophil # : x  Auto Lymphocyte # : x  Auto Monocyte # : x  Auto Eosinophil # : x  Auto Basophil # : x  Auto Neutrophil % : x  Auto Lymphocyte % : x  Auto Monocyte % : x  Auto Eosinophil % : x  Auto Basophil % : x        01-25    140  |  101  |  19  ----------------------------<  205<H>  3.6   |  23  |  0.60    Ca    9.2      25 Jan 2024 13:39            A/P: Stable            PT-WBAT           DVT prophylaxis- venodynes           Pain control           Incentive spirometer           Follow up AM labs            
ANESTHESIA POSTOP CHECK    68y Male POSTOP DAY 1 S/P     [ X] NO APPARENT ANESTHESIA COMPLICATIONS      Comments: 
Orthopedic Surgery Progress Note     S: Patient seen and examined today. No acute events overnight. Pain is well controlled. Denies f/c, chest pain, shortness of breath, dizziness.    MEDICATIONS  (STANDING):  acetaminophen     Tablet .. 975 milliGRAM(s) Oral every 8 hours  allopurinol 100 milliGRAM(s) Oral at bedtime  atorvastatin 40 milliGRAM(s) Oral at bedtime  buMETAnide 2 milliGRAM(s) Oral two times a day  carvedilol 3.125 milliGRAM(s) Oral every 12 hours  gabapentin 300 milliGRAM(s) Oral four times a day  glucagon  Injectable 1 milliGRAM(s) IntraMuscular once  insulin lispro (ADMELOG) corrective regimen sliding scale   SubCutaneous at bedtime  insulin lispro (ADMELOG) corrective regimen sliding scale   SubCutaneous three times a day before meals  losartan 50 milliGRAM(s) Oral daily  melatonin 3 milliGRAM(s) Oral at bedtime  pantoprazole    Tablet 40 milliGRAM(s) Oral before breakfast  senna 2 Tablet(s) Oral at bedtime  traMADol 50 milliGRAM(s) Oral once  traMADol 50 milliGRAM(s) Oral every 8 hours    MEDICATIONS  (PRN):  benzocaine/menthol Lozenge 1 Lozenge Oral three times a day PRN Sore Throat  cyclobenzaprine 5 milliGRAM(s) Oral three times a day PRN Muscle Spasm  dextrose Oral Gel 15 Gram(s) Oral once PRN Blood Glucose LESS THAN 70 milliGRAM(s)/deciliter  ondansetron Injectable 4 milliGRAM(s) IV Push every 6 hours PRN Nausea and/or Vomiting  oxyCODONE    IR 5 milliGRAM(s) Oral every 4 hours PRN Moderate Pain (4 - 6)  oxyCODONE    IR 10 milliGRAM(s) Oral every 4 hours PRN Severe Pain (7 - 10)      Physical Exam:  Gen: NAD  Spine PE:      Motor:                   C5                C6              C7               C8           T1   R            5/5                5/5            5/5             5/5          5/5  L             5/5               5/5             5/5             5/5          5/5                L2             L3             L4               L5            S1  R         5/5           5/5          5/5             5/5           5/5  L          5/5          5/5           5/5             5/5           5/5    Sensory:            C5         C6         C7      C8       T1        (0=absent, 1=impaired, 2=normal, NT=not testable)  R         2            2           2        2         2  L          2            2           2        2         2               L2          L3         L4      L5       S1         (0=absent, 1=impaired, 2=normal, NT=not testable)  R         2            2           1        1        1  L          2            2           1        1         1      Vital Signs Last 24 Hrs  T(C): 36.3 (27 Jan 2024 05:31), Max: 36.5 (26 Jan 2024 09:33)  T(F): 97.3 (27 Jan 2024 05:31), Max: 97.7 (26 Jan 2024 09:33)  HR: 80 (27 Jan 2024 05:31) (66 - 80)  BP: 154/70 (27 Jan 2024 05:31) (126/65 - 154/70)  BP(mean): --  RR: 17 (27 Jan 2024 05:31) (17 - 18)  SpO2: 95% (27 Jan 2024 05:31) (95% - 98%)    Parameters below as of 27 Jan 2024 05:31  Patient On (Oxygen Delivery Method): room air        01-25-24 @ 07:01  -  01-26-24 @ 07:00  --------------------------------------------------------  IN: 3900 mL / OUT: 1655 mL / NET: 2245 mL    01-26-24 @ 07:01  -  01-27-24 @ 06:12  --------------------------------------------------------  IN: 0 mL / OUT: 3750 mL / NET: -3750 mL        LABS:                        12.2   11.38 )-----------( 192      ( 26 Jan 2024 05:20 )             35.8     01-26    139  |  102  |  15  ----------------------------<  179<H>  3.9   |  26  |  0.50    Ca    9.1      26 Jan 2024 05:20  Phos  3.4     01-25  Mg     1.70     01-25    TPro  6.9  /  Alb  3.9  /  TBili  0.3  /  DBili  <0.2  /  AST  23  /  ALT  23  /  AlkPhos  101  01-25  
ORTHOPAEDICS DAILY PROGRESS NOTE:       SUBJECTIVE/ROS: POD 1. Seen and examined. Pain well controlled. Needs to work with PT. Denies CP/SOB/N/V.         MEDICATIONS  (STANDING):  acetaminophen     Tablet .. 975 milliGRAM(s) Oral every 8 hours  allopurinol 100 milliGRAM(s) Oral at bedtime  atorvastatin 40 milliGRAM(s) Oral at bedtime  buMETAnide 2 milliGRAM(s) Oral two times a day  carvedilol 3.125 milliGRAM(s) Oral every 12 hours  gabapentin 300 milliGRAM(s) Oral four times a day  glucagon  Injectable 1 milliGRAM(s) IntraMuscular once  insulin lispro (ADMELOG) corrective regimen sliding scale   SubCutaneous at bedtime  insulin lispro (ADMELOG) corrective regimen sliding scale   SubCutaneous three times a day before meals  lactated ringers. 1000 milliLiter(s) (100 mL/Hr) IV Continuous <Continuous>  losartan 50 milliGRAM(s) Oral daily  melatonin 3 milliGRAM(s) Oral at bedtime  pantoprazole    Tablet 40 milliGRAM(s) Oral before breakfast  senna 2 Tablet(s) Oral at bedtime  traMADol 50 milliGRAM(s) Oral every 8 hours    MEDICATIONS  (PRN):  cyclobenzaprine 5 milliGRAM(s) Oral three times a day PRN Muscle Spasm  dextrose Oral Gel 15 Gram(s) Oral once PRN Blood Glucose LESS THAN 70 milliGRAM(s)/deciliter  ondansetron Injectable 4 milliGRAM(s) IV Push every 6 hours PRN Nausea and/or Vomiting  oxyCODONE    IR 5 milliGRAM(s) Oral every 4 hours PRN Moderate Pain (4 - 6)  oxyCODONE    IR 10 milliGRAM(s) Oral every 4 hours PRN Severe Pain (7 - 10)      OBJECTIVE:    Vital Signs Last 24 Hrs  T(C): 36.5 (26 Jan 2024 05:31), Max: 36.6 (25 Jan 2024 12:30)  T(F): 97.7 (26 Jan 2024 05:31), Max: 97.9 (25 Jan 2024 12:30)  HR: 72 (26 Jan 2024 05:31) (72 - 102)  BP: 148/75 (26 Jan 2024 05:31) (98/43 - 164/79)  BP(mean): 91 (25 Jan 2024 17:45) (54 - 97)  RR: 16 (26 Jan 2024 05:31) (13 - 22)  SpO2: 95% (26 Jan 2024 05:31) (93% - 99%)    Parameters below as of 26 Jan 2024 05:31  Patient On (Oxygen Delivery Method): room air        I&O's Detail    25 Jan 2024 07:01  -  26 Jan 2024 06:40  --------------------------------------------------------  IN:    IV PiggyBack: 400 mL    Lactated Ringers: 1300 mL    Lactated Ringers Bolus: 1000 mL    Oral Fluid: 1200 mL  Total IN: 3900 mL    OUT:    Indwelling Catheter - Urethral (mL): 1655 mL  Total OUT: 1655 mL    Total NET: 2245 mL          Daily     Daily     LABS:                        12.2   11.38 )-----------( 192      ( 26 Jan 2024 05:20 )             35.8     01-25    140  |  101  |  19  ----------------------------<  205<H>  3.6   |  23  |  0.60    Ca    9.2      25 Jan 2024 13:39  Phos  3.4     01-25  Mg     1.70     01-25    TPro  6.9  /  Alb  3.9  /  TBili  0.3  /  DBili  <0.2  /  AST  23  /  ALT  23  /  AlkPhos  101  01-25      Urinalysis Basic - ( 25 Jan 2024 13:39 )    Color: x / Appearance: x / SG: x / pH: x  Gluc: 205 mg/dL / Ketone: x  / Bili: x / Urobili: x   Blood: x / Protein: x / Nitrite: x   Leuk Esterase: x / RBC: x / WBC x   Sq Epi: x / Non Sq Epi: x / Bacteria: x                PHYSICAL EXAM:  nad  nonlabored resp  c collar  dressing c/d/i  Spine PE:      Motor:                   C5                C6              C7               C8           T1   R            5/5                5/5            5/5             5/5          5/5  L             5/5               5/5             5/5             5/5          5/5                L2             L3             L4               L5            S1  R         5/5           5/5          5/5             5/5           5/5  L          5/5          5/5           5/5             5/5           5/5    Sensory:            C5         C6         C7      C8       T1        (0=absent, 1=impaired, 2=normal, NT=not testable)  R         2            2           2        2         2  L          2            2           2        2         2               L2          L3         L4      L5       S1         (0=absent, 1=impaired, 2=normal, NT=not testable)  R         2            2           1        1        1  L          2            2           1        1         1

## 2024-01-27 NOTE — PROGRESS NOTE ADULT - ASSESSMENT
A/P: 69 yo M s/p C3-4 ACDF, stable           PT-WBAT           DVT prophylaxis- venodynes           Pain control           Incentive spirometer           Follow up AM labs           CANDI amaro this AM once OOB           Dispo planning            
A/P: 67 yo M s/p C3-4 ACDF, stable           no continued chest tightness this AM, likely 2/2 to lack of CPAP on prior night           PT-WBAT           DVT prophylaxis- venodynes           Pain control           Incentive spirometer           Follow up AM labs           Dispo planning: anticipate home    Mariela Meek MD  Orthopaedic Surgery Resident    For all questions, please reach out via the following numbers for the on-call resident; do not reach out via Teams.  [facility] [phone]  [facility]        [id number]  [facility]  [phone]/ [phone]

## 2024-01-27 NOTE — DISCHARGE NOTE NURSING/CASE MANAGEMENT/SOCIAL WORK - NSDCVIVACCINE_GEN_ALL_CORE_FT
influenza, high-dose, quadrivalent; 29-Sep-2023 17:22; Jody Coleman (RN); [employer]; [id number] (Exp. Date: 28-Jun-2024); IntraMuscular; Deltoid Left.; 0.7 milliLiter(s); VIS (VIS Published: 06-Aug-2021, VIS Presented: 29-Sep-2023);   Tdap; 13-Feb-2022 09:56; Benoit Kessler (RN); [employer]; [id number] (Exp. Date: 09-Sep-2023); IntraMuscular; Deltoid Right.; 0.5 milliLiter(s); VIS (VIS Published: 09-May-2013, VIS Presented: 13-Feb-2022);

## 2024-01-27 NOTE — DISCHARGE NOTE NURSING/CASE MANAGEMENT/SOCIAL WORK - PATIENT PORTAL LINK FT
You can access the FollowMyHealth Patient Portal offered by [facility] by registering at the following website: [email or web address]. By joining [facility]’s FollowMyHealth portal, you will also be able to view your health information using other applications (apps) compatible with our system.

## 2024-01-27 NOTE — DISCHARGE NOTE NURSING/CASE MANAGEMENT/SOCIAL WORK - NSDCPEFALRISK_GEN_ALL_CORE
For information on Fall & Injury Prevention, visit: [email or web address] OR  [email or web address] OR  https://www.cdc.gov/steadi/patient.html

## 2024-01-27 NOTE — PROGRESS NOTE ADULT - NUTRITIONAL ASSESSMENT
This patient has been assessed with a concern for Malnutrition and has been determined to have a diagnosis/diagnoses of Morbid obesity (BMI > 40).    This patient is being managed with:   Diet Regular-  Consistent Carbohydrate {Evening Snack} (CSTCHOSN)  Soft and Bite Sized (SOFTBTSZ)  Entered: Jan 26 2024 11:27AM

## 2024-02-07 NOTE — PROGRESS NOTE ADULT - ASSESSMENT
show 67-year-old male w hx HTN, HLD, DM, Gout, carpal tunnel, CAD s/p CABG,  CHF on 2 mg of Bumex twice daily, ASIYA on CPAP, presenting for chest pressure since Saturday evening associated with shortness of breath, worsening dyspnea on exertion, worsening orthopnea, 6 pound weight gain in the last 24 hours, slight cough w. clear sputum, and oliguria, admitted for further cardiac evaluation.  67-year-old male w hx HTN, HLD, DM, Gout, carpal tunnel, CAD s/p CABG,  CHF on 2 mg of Bumex twice daily, ASIYA on CPAP, presenting for chest pressure since Saturday evening associated with shortness of breath, worsening dyspnea on exertion, worsening orthopnea, chest pressure, 6 pound weight gain in the last 24 hours, slight cough w. clear sputum, and oliguria, admitted for further cardiac evaluation. Concern for acute decompensated CHF due to diuretic resistance vs less likely angina.

## 2024-02-12 ENCOUNTER — APPOINTMENT (OUTPATIENT)
Dept: ORTHOPEDIC SURGERY | Facility: CLINIC | Age: 69
End: 2024-02-12
Payer: MEDICARE

## 2024-02-12 DIAGNOSIS — M54.9 DORSALGIA, UNSPECIFIED: ICD-10-CM

## 2024-02-12 PROCEDURE — 72040 X-RAY EXAM NECK SPINE 2-3 VW: CPT

## 2024-02-12 PROCEDURE — 99024 POSTOP FOLLOW-UP VISIT: CPT

## 2024-02-12 NOTE — IMAGING
[de-identified] : surgicial incision healing  well  Antalgic gait Suture tails rmeoved 5/5 Strength in BLLE and BLUE  Baseline neurologic exam   x-ray ap/lateral cervical spine shows hardware in good position.

## 2024-02-12 NOTE — HISTORY OF PRESENT ILLNESS
[Neck] : neck [Lower back] : lower back [Sharp] : sharp [Shooting] : shooting [Stabbing] : stabbing [de-identified] : 02/12/2023: pain has improved after sx. Feels balance slightly better as well. Still using walker. Wearing c-colar.  Takin 1 oxycodone at night.  NO issues with eating.  No repeat episodes of SOB   12/11/2023:  pain has improved, but c/o LROM Still with numbness and tingling in hands as well as balance issues 11/02/2023: pain has improved, but c/o LROM  10/19/23: Here today for follow up. Prior to OR had syncope and fell.  Broke through His T12-L1 disc spac.  Given his DISH it was an unstable injury requiring surical intervention.  Revision Fusion from T9-Pelvis. Has been in vic till 5 days ago doing well.  NOticed some drainage from incision. Overall just some mild discomfort.   7/24/23: CT results  Symptoms are same.  Still with difficult with balance and numbness tingling in bilatearl hands,   7/12/23: MRI results   6.15.2023 Patient has a history of lower back pain. He states he's had 3 surgeries on the back, 2 of the surgeries were done in may of 2022. His first surgery was in 2012. Patient was seen for pain throughout the R leg by Dr. Mena on June 1. 2023 where he was told the pain is coming from the back. Numbness/tingling in bilateral feet.  Had history of bilateral carpal tunnel release cubital tunnel release for hand numbness and tingling which resolved those symptoms. No neck pain. ISsues with balance and gait. SUes walker at baseline. Yamile closes eyes as difficulty staying upright.   hx of multiple spinal surgeries by Dr. Xie.  L3-Pelvis [] : no [de-identified] : x-ray MRI CT [de-identified] : C3-4 ACDF vs C3-T1 posterior SPinal Fusion with instrumentation.

## 2024-02-12 NOTE — ASSESSMENT
[FreeTextEntry1] : 67 M C3-4 ACDF 2 .5 weeks out  Doing well C_collar on when out of house FU 4 weeks in home PT.  to include gait training Wean pain medications as tolerated

## 2024-03-01 NOTE — DISCHARGE NOTE PROVIDER - NSDCHC_MEDRECSTATUS_GEN_ALL_CORE
New patient presents today to establish care. Patient c/o vaginal burning, itching, urgency. She has some sore on both  of her labia and it burns when she urinates. Symptoms started about 5 days ago. Patient states similar symptoms happened about 2 months ago. Patient message her previous midwife and she and she was prescribed a suppository to clear any bacteria that she had at the moment., She also took some cranberry supplements and cranberry juice.    Patient also complaining of black moles on her labia and she is concerned as she has a  history of cervical and ovarian cancer in her family. She would like those to be checked. Patient has some difficulties with her low blood pressure during pregnancy and postpartum.      Pap Smear: few yrs ago. Due for one.   Mammogram:NA  Colonoscopy:NA  DEXA:NA    OB History          3    Para   3    Term   3            AB        Living   3         SAB        IAB        Ectopic        Molar        Multiple        Live Births   3                  GYN History           Patient's last menstrual period was 2023 (exact date). Patient is breastfeeding   negative postcoital bleeding    Past Medical History:  Past Medical History:   Diagnosis Date    Migraine 2001    Postpartum depression 2019       Past Surgical History:  History reviewed. No pertinent surgical history.    Allergies:   Allergies   Allergen Reactions    Penicillins Headaches, Rash and Hives     Pt states pcn positive on allergy test she had done.       Medication History:  No current outpatient medications on file.     No current facility-administered medications for this visit.       Social History:  Social History     Socioeconomic History    Marital status:      Spouse name: Not on file    Number of children: 3    Years of education: Not on file    Highest education level: Not on file   Occupational History    Not on file   Tobacco Use    Smoking status: Never    Smokeless  Admission Reconciliation is Completed  Discharge Reconciliation is Completed

## 2024-03-07 ENCOUNTER — APPOINTMENT (OUTPATIENT)
Dept: ORTHOPEDIC SURGERY | Facility: CLINIC | Age: 69
End: 2024-03-07
Payer: MEDICARE

## 2024-03-07 PROCEDURE — 99024 POSTOP FOLLOW-UP VISIT: CPT

## 2024-03-07 PROCEDURE — 72040 X-RAY EXAM NECK SPINE 2-3 VW: CPT

## 2024-03-07 NOTE — IMAGING
[de-identified] : surgicial incision well healed Antalgic gait Suture tails rmeoved 5/5 Strength in BLLE and BLUE  Baseline neurologic exam   x-ray ap/lateral cervical spine shows hardware in good position.

## 2024-03-07 NOTE — ASSESSMENT
[FreeTextEntry1] : 67 M C3-4 ACDF 6 weeks out  Doing well FU 6 weeks in home PT.  to include gait training. new rx provided. consider pain management for transforaminal BRENNEN fi buttock pain does not improve.  as some foraminal stenosis at L5-s1

## 2024-03-07 NOTE — HISTORY OF PRESENT ILLNESS
[Neck] : neck [Lower back] : lower back [Shooting] : shooting [Sharp] : sharp [Stabbing] : stabbing [de-identified] : 03/07/2024: pain has improved.  Balance improved significantly. using cane in house. No longer balancing on walls in shower.  Hands are improving as well.  NO neck pain.   Having some right buttock pain jerry changing positions as well.   02/12/2023: pain has improved after sx. Feels balance slightly better as well. Still using walker. Wearing c-collar.  Takin 1 oxycodone at night.  NO issues with eating.  No repeat episodes of SOB   12/11/2023:  pain has improved, but c/o LROM Still with numbness and tingling in hands as well as balance issues 11/02/2023: pain has improved, but c/o LROM  10/19/23: Here today for follow up. Prior to OR had syncope and fell.  Broke through His T12-L1 disc spac.  Given his DISH it was an unstable injury requiring surical intervention.  Revision Fusion from T9-Pelvis. Has been in vic till 5 days ago doing well.  NOticed some drainage from incision. Overall just some mild discomfort.   7/24/23: CT results  Symptoms are same.  Still with difficult with balance and numbness tingling in bilatearl hands,   7/12/23: MRI results   6.15.2023 Patient has a history of lower back pain. He states he's had 3 surgeries on the back, 2 of the surgeries were done in may of 2022. His first surgery was in 2012. Patient was seen for pain throughout the R leg by Dr. Mena on June 1. 2023 where he was told the pain is coming from the back. Numbness/tingling in bilateral feet.  Had history of bilateral carpal tunnel release cubital tunnel release for hand numbness and tingling which resolved those symptoms. No neck pain. ISsues with balance and gait. SUes walker at baseline. Jerry closes eyes as difficulty staying upright.   hx of multiple spinal surgeries by Dr. Xie.  L3-Pelvis [] : no [de-identified] : x-ray MRI CT [de-identified] : C3-4 ACDF vs C3-T1 posterior SPinal Fusion with instrumentation.

## 2024-03-14 NOTE — PROVIDER CONTACT NOTE (MEDICATION) - BACKGROUND
Admitted with fusion of spine
H/O CAD, CHF, s/p t9 - pelvis revsion [facility].
PAST SURGICAL HISTORY:  No significant past surgical history

## 2024-04-08 NOTE — H&P CARDIOLOGY - PSH
Subjective     Patient ID: Kaley Ruffin is a 21 y.o. female.    Chief Complaint: No chief complaint on file.    HPI  Review of Systems       Objective     Physical Exam       Assessment and Plan     1. Encounter for drug screening        Drug testing only           No follow-ups on file.    
Cholecystitis  cholecycstectomy 2011  H/O lithotripsy    Hernia  repair, left inguinal 2010  History of Total Hip Replacement    S/P hip replacement  left 2009, right 2009  S/P knee replacement  rigth 10/11  S/P Left Inguinal Hernia Repair    S/P lumbar discectomy  L3,,L4,L5  Aug 2013  S/P revision of total knee, right  12/2012  S/P tonsillectomy and adenoidectomy  as child

## 2024-04-15 ENCOUNTER — APPOINTMENT (OUTPATIENT)
Dept: ORTHOPEDIC SURGERY | Facility: CLINIC | Age: 69
End: 2024-04-15
Payer: MEDICARE

## 2024-04-15 VITALS — WEIGHT: 315 LBS | HEIGHT: 77 IN | BODY MASS INDEX: 37.19 KG/M2

## 2024-04-15 DIAGNOSIS — G95.9 DISEASE OF SPINAL CORD, UNSPECIFIED: ICD-10-CM

## 2024-04-15 PROCEDURE — 99024 POSTOP FOLLOW-UP VISIT: CPT

## 2024-04-15 PROCEDURE — 72040 X-RAY EXAM NECK SPINE 2-3 VW: CPT

## 2024-04-15 NOTE — HISTORY OF PRESENT ILLNESS
[Neck] : neck [Lower back] : lower back [Sharp] : sharp [Shooting] : shooting [Stabbing] : stabbing [de-identified] : 04/15/2024: pain has improved significantly.  Hip pain now biggest issue.  Didficutly mabulating seocndary to it.  fine when laying down. worst when moving hips or walking.   03/07/2024: pain has improved.  Balance improved significantly. using cane in house. No longer balancing on walls in shower.  Hands are improving as well.  NO neck pain.   Having some right buttock pain jerry changing positions as well.   02/12/2023: pain has improved after sx. Feels balance slightly better as well. Still using walker. Wearing c-collar.  Takin 1 oxycodone at night.  NO issues with eating.  No repeat episodes of SOB   12/11/2023:  pain has improved, but c/o LROM Still with numbness and tingling in hands as well as balance issues 11/02/2023: pain has improved, but c/o LROM  10/19/23: Here today for follow up. Prior to OR had syncope and fell.  Broke through His T12-L1 disc spac.  Given his DISH it was an unstable injury requiring surical intervention.  Revision Fusion from T9-Pelvis. Has been in vic till 5 days ago doing well.  NOticed some drainage from incision. Overall just some mild discomfort.   7/24/23: CT results  Symptoms are same.  Still with difficult with balance and numbness tingling in bilatearl hands,   7/12/23: MRI results   6.15.2023 Patient has a history of lower back pain. He states he's had 3 surgeries on the back, 2 of the surgeries were done in may of 2022. His first surgery was in 2012. Patient was seen for pain throughout the R leg by Dr. Mena on June 1. 2023 where he was told the pain is coming from the back. Numbness/tingling in bilateral feet.  Had history of bilateral carpal tunnel release cubital tunnel release for hand numbness and tingling which resolved those symptoms. No neck pain. ISsues with balance and gait. SUes walker at baseline. Jerry closes eyes as difficulty staying upright.   hx of multiple spinal surgeries by Dr. Xie.  L3-Pelvis [] : no [de-identified] : x-ray MRI CT [de-identified] : C3-4 ACDF vs C3-T1 posterior SPinal Fusion with instrumentation.

## 2024-04-23 ENCOUNTER — APPOINTMENT (OUTPATIENT)
Dept: DERMATOLOGY | Facility: CLINIC | Age: 69
End: 2024-04-23
Payer: MEDICARE

## 2024-04-23 DIAGNOSIS — L72.9 FOLLICULAR CYST OF THE SKIN AND SUBCUTANEOUS TISSUE, UNSPECIFIED: ICD-10-CM

## 2024-04-23 DIAGNOSIS — L82.1 OTHER SEBORRHEIC KERATOSIS: ICD-10-CM

## 2024-04-23 PROCEDURE — 17110 DESTRUCTION B9 LES UP TO 14: CPT | Mod: 59

## 2024-04-23 PROCEDURE — 99203 OFFICE O/P NEW LOW 30 MIN: CPT | Mod: 25

## 2024-04-23 PROCEDURE — 17000 DESTRUCT PREMALG LESION: CPT | Mod: 59

## 2024-04-23 NOTE — HISTORY OF PRESENT ILLNESS
[FreeTextEntry1] : growth on upper lip, growth on cheek, rash, lump under right axilla [de-identified] : here for evaluation of growth on upper lip and right cheek Lump under right axilla - stable, asymptomatic, unchanging  has rash on face - noticed recently

## 2024-04-23 NOTE — ASSESSMENT
[FreeTextEntry1] : Neoplasm of uncertain behavior of skin, upper lip - favor verruca vs verrucous keratosis vs AK - discussed LN vs shave/snip removal -- pt would like to trial LN first given concerns with lying semi-reclined with recent surgeries. Plan for LN today, with close followup in 6-8 weeks. plan for removal at subsequent visit if not resolving - counseled on risk of dyspigmentation - Procedure note: Cryotherapy  Verbal consent obtained. Risks/benefits/alternatives discussed including but not limited to risk of pain, inflammatory and blistering reaction, infection, risk of permanent scar and/or dyspigmentation, recurrence, possible lack of efficacy and need for repeat treatments. Site confirmed. 1 lesion(s) treated with cryotherapy: liquid nitrogen x 2 rapid freeze-slow thaw cycle using CTA. Discussed wound care instructions. Patient tolerated well with no immediate complications.  Favor Actinic keratoses, right cheek  - Natural history reviewed including small risk of transformation to squamous cell carcinoma over time  - Patient instructed to call for re-evaluation if lesion does not resolve in 4-6 weeks Procedure note: Cryotherapy  Verbal consent obtained. Risks/benefits/alternatives discussed including but not limited to risk of pain, inflammatory and blistering reaction, infection, risk of permanent scar and/or dyspigmentation, recurrence, possible lack of efficacy and need for repeat treatments. Site confirmed.  lesion(s) treated with cryotherapy: liquid nitrogen x 2 rapid freeze-slow thaw cycles. Discussed wound care instructions. Patient tolerated well with no immediate complications.  Favor seborrheic dermatitis, face  Chronic, flaring Start with topical approach - ketoconazole 2% cream 1-2x/day   Favor cyst, right axilla - stable, asymptomatic - okay to monitor  -reassess if any growth/symptoms

## 2024-04-23 NOTE — PHYSICAL EXAM
[FreeTextEntry3] : hyperkeratotic papule on right cheek, above upper lip scaling and erythema on nasolabial folds, chin subcutaneous nodule under right axilla

## 2024-04-26 DIAGNOSIS — L21.9 SEBORRHEIC DERMATITIS, UNSPECIFIED: ICD-10-CM

## 2024-04-26 RX ORDER — KETOCONAZOLE 20 MG/G
2 CREAM TOPICAL
Qty: 1 | Refills: 5 | Status: ACTIVE | COMMUNITY
Start: 2024-04-26 | End: 1900-01-01

## 2024-05-03 ENCOUNTER — APPOINTMENT (OUTPATIENT)
Dept: ORTHOPEDIC SURGERY | Facility: CLINIC | Age: 69
End: 2024-05-03
Payer: MEDICARE

## 2024-05-03 DIAGNOSIS — Z96.642 PRESENCE OF LEFT ARTIFICIAL HIP JOINT: ICD-10-CM

## 2024-05-03 DIAGNOSIS — Z96.641 PRESENCE OF RIGHT ARTIFICIAL HIP JOINT: ICD-10-CM

## 2024-05-03 PROCEDURE — 99213 OFFICE O/P EST LOW 20 MIN: CPT

## 2024-05-03 PROCEDURE — 73502 X-RAY EXAM HIP UNI 2-3 VIEWS: CPT

## 2024-05-03 NOTE — ASSESSMENT
[FreeTextEntry1] : 68 year M WITH MODERATE BILATERAL HIP PAIN. H/O B/L PRAVEEN BY DR. JOCELYN PADILLA. PAIN IS TO THE LATERAL ASPECT OF THE HIP AND RADIATES DOWN THE LE. H/O LUMBAR ISSUES INCLUDING FUSION FROM T9-PELVIS. HE FELL IN OCTOBER RESULTING IN LUMBAR SURGERIES. HE FELT LATERAL HIP PAIN AT THAT TIME WHICH HAS CONTINUED. PAIN WORSENS WITH LYING ON THE AFFECTED SIDE. PAIN IS AFFECTING FUNCTIONAL ACTIVITIES. THERE IS NO REPRODUCIBLE GROIN PAIN ON PHYSICAL EXAM. XRAYS REVIEWED WITH COMPONENTS WELL FIXED. TREATMENT OPTIONS REVIEWED. WILL CONTINUE WITH DR. LEWIS REGARDING LUMBAR ISSUES.

## 2024-05-06 ENCOUNTER — APPOINTMENT (OUTPATIENT)
Dept: ORTHOPEDIC SURGERY | Facility: CLINIC | Age: 69
End: 2024-05-06
Payer: MEDICARE

## 2024-05-06 VITALS — WEIGHT: 315 LBS | HEIGHT: 77 IN | BODY MASS INDEX: 37.19 KG/M2

## 2024-05-06 DIAGNOSIS — M54.17 RADICULOPATHY, LUMBOSACRAL REGION: ICD-10-CM

## 2024-05-06 PROCEDURE — 99213 OFFICE O/P EST LOW 20 MIN: CPT

## 2024-05-06 NOTE — HISTORY OF PRESENT ILLNESS
[Neck] : neck [Lower back] : lower back [Sharp] : sharp [Shooting] : shooting [Stabbing] : stabbing [de-identified] : 05/06/2024: pain has slightly improved  04/15/2024: pain has improved significantly.  Hip pain now biggest issue.  Difficulty ambulating secondary to it.  fine when laying down. worst when moving hips or walking.   03/07/2024: pain has improved.  Balance improved significantly. using cane in house. No longer balancing on walls in shower.  Hands are improving as well.  NO neck pain.   Having some right buttock pain jerry changing positions as well.   02/12/2023: pain has improved after sx. Feels balance slightly better as well. Still using walker. Wearing c-collar.  Takin 1 oxycodone at night.  NO issues with eating.  No repeat episodes of SOB   12/11/2023:  pain has improved, but c/o LROM Still with numbness and tingling in hands as well as balance issues 11/02/2023: pain has improved, but c/o LROM  10/19/23: Here today for follow up. Prior to OR had syncope and fell.  Broke through His T12-L1 disc spac.  Given his DISH it was an unstable injury requiring surical intervention.  Revision Fusion from T9-Pelvis. Has been in vic till 5 days ago doing well.  NOticed some drainage from incision. Overall just some mild discomfort.   7/24/23: CT results  Symptoms are same.  Still with difficult with balance and numbness tingling in bilatearl hands,   7/12/23: MRI results   6.15.2023 Patient has a history of lower back pain. He states he's had 3 surgeries on the back, 2 of the surgeries were done in may of 2022. His first surgery was in 2012. Patient was seen for pain throughout the R leg by Dr. Mena on June 1. 2023 where he was told the pain is coming from the back. Numbness/tingling in bilateral feet.  Had history of bilateral carpal tunnel release cubital tunnel release for hand numbness and tingling which resolved those symptoms. No neck pain. ISsues with balance and gait. SUes walker at baseline. Jerry closes eyes as difficulty staying upright.   hx of multiple spinal surgeries by Dr. Xie.  L3-Pelvis [] : no [de-identified] : x-ray MRI CT [de-identified] : C3-4 ACDF vs C3-T1 posterior SPinal Fusion with instrumentation.

## 2024-05-06 NOTE — IMAGING
[de-identified] : surgicial incision well healed Antalgic gait Suture tails rmeoved 5/5 Strength in BLLE and BLUE  Baseline neurologic exam   x-ray ap/lateral cervical spine shows hardware in good position. no loosening.   NO pain with ER/IR hip today

## 2024-05-06 NOTE — ASSESSMENT
[FreeTextEntry1] : 67 M C3-4 ACDF and thoracolumbar fusion PT for lower back and Lumabr radic Discussed pain management for possible BRENNEN or possible spinal cord stimulator if that failed Would like to try PT alone at this time FU 6 weeks repeat AP/Lateral thoracolumbar and cervical spine x-rays at that time .

## 2024-05-07 NOTE — H&P ADULT - NSICDXPASTMEDICALHX_GEN_ALL_CORE_FT
Patient presents with dizziness and left side numbness weakness started around 2:40 PM on the day of admission lasted for 2-3 hours.  Reports went to 4 doctors office today including PT office  CTA head and neck unremarkable  TIA versus CVA  Will follow stroke pathway.  MRI of the brain pending at the current time  2D echo with bubble study-EF 54% without any PFO with normal valvular function  Telemetry. EKG showed NSR  LDL was 76.  Hemoglobin A1c was 5  Permissive hypertension  Patient given full dose aspirin, Lipitor 80 mg p.o. in ED. 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

## 2024-05-16 ENCOUNTER — NON-APPOINTMENT (OUTPATIENT)
Age: 69
End: 2024-05-16

## 2024-05-16 DIAGNOSIS — R10.9 UNSPECIFIED ABDOMINAL PAIN: ICD-10-CM

## 2024-05-22 ENCOUNTER — RESULT REVIEW (OUTPATIENT)
Age: 69
End: 2024-05-22

## 2024-05-25 ENCOUNTER — APPOINTMENT (OUTPATIENT)
Dept: CT IMAGING | Facility: IMAGING CENTER | Age: 69
End: 2024-05-25
Payer: MEDICARE

## 2024-05-25 ENCOUNTER — OUTPATIENT (OUTPATIENT)
Dept: OUTPATIENT SERVICES | Facility: HOSPITAL | Age: 69
LOS: 1 days | End: 2024-05-25
Payer: COMMERCIAL

## 2024-05-25 DIAGNOSIS — Z98.89 OTHER SPECIFIED POSTPROCEDURAL STATES: Chronic | ICD-10-CM

## 2024-05-25 DIAGNOSIS — Z96.653 PRESENCE OF ARTIFICIAL KNEE JOINT, BILATERAL: Chronic | ICD-10-CM

## 2024-05-25 DIAGNOSIS — Z98.890 OTHER SPECIFIED POSTPROCEDURAL STATES: Chronic | ICD-10-CM

## 2024-05-25 DIAGNOSIS — R10.9 UNSPECIFIED ABDOMINAL PAIN: ICD-10-CM

## 2024-05-25 DIAGNOSIS — Z00.8 ENCOUNTER FOR OTHER GENERAL EXAMINATION: ICD-10-CM

## 2024-05-25 DIAGNOSIS — Z96.643 PRESENCE OF ARTIFICIAL HIP JOINT, BILATERAL: Chronic | ICD-10-CM

## 2024-05-25 PROCEDURE — 74178 CT ABD&PLV WO CNTR FLWD CNTR: CPT

## 2024-05-25 PROCEDURE — 74178 CT ABD&PLV WO CNTR FLWD CNTR: CPT | Mod: 26

## 2024-05-29 NOTE — PACU DISCHARGE NOTE - AIRWAY PATENCY:
Satisfactory I have personally seen and examined this patient.    I have reviewed all pertinent clinical information and reviewed all relevant imaging and diagnostic studies personally.   I discussed recommendations with the primary team.

## 2024-06-02 DIAGNOSIS — R91.1 SOLITARY PULMONARY NODULE: ICD-10-CM

## 2024-06-02 LAB
APPEARANCE: CLEAR
BACTERIA UR CULT: ABNORMAL
BACTERIA: NEGATIVE /HPF
BILIRUBIN URINE: NEGATIVE
BLOOD URINE: NEGATIVE
CAST: 0 /LPF
COLOR: YELLOW
EPITHELIAL CELLS: 1 /HPF
GLUCOSE QUALITATIVE U: >=1000 MG/DL
KETONES URINE: NEGATIVE MG/DL
LEUKOCYTE ESTERASE URINE: ABNORMAL
MICROSCOPIC-UA: NORMAL
NITRITE URINE: NEGATIVE
PH URINE: 6.5
PROTEIN URINE: NEGATIVE MG/DL
RED BLOOD CELLS URINE: 1 /HPF
SPECIFIC GRAVITY URINE: 1.02
UROBILINOGEN URINE: 0.2 MG/DL
WHITE BLOOD CELLS URINE: 47 /HPF

## 2024-06-02 RX ORDER — LEVOFLOXACIN 500 MG/1
500 TABLET, FILM COATED ORAL DAILY
Qty: 7 | Refills: 0 | Status: ACTIVE | COMMUNITY
Start: 2024-06-02 | End: 1900-01-01

## 2024-06-02 RX ORDER — CEPHALEXIN 500 MG/1
500 TABLET ORAL 3 TIMES DAILY
Qty: 21 | Refills: 0 | Status: DISCONTINUED | COMMUNITY
Start: 2023-10-19 | End: 2024-06-02

## 2024-06-04 ENCOUNTER — APPOINTMENT (OUTPATIENT)
Dept: UROLOGY | Facility: CLINIC | Age: 69
End: 2024-06-04
Payer: MEDICARE

## 2024-06-04 VITALS — OXYGEN SATURATION: 95 % | DIASTOLIC BLOOD PRESSURE: 84 MMHG | HEART RATE: 94 BPM | SYSTOLIC BLOOD PRESSURE: 150 MMHG

## 2024-06-04 DIAGNOSIS — Z85.528 PERSONAL HISTORY OF OTHER MALIGNANT NEOPLASM OF KIDNEY: ICD-10-CM

## 2024-06-04 DIAGNOSIS — N39.0 URINARY TRACT INFECTION, SITE NOT SPECIFIED: ICD-10-CM

## 2024-06-04 DIAGNOSIS — A49.9 URINARY TRACT INFECTION, SITE NOT SPECIFIED: ICD-10-CM

## 2024-06-04 PROCEDURE — G2211 COMPLEX E/M VISIT ADD ON: CPT

## 2024-06-04 PROCEDURE — 99214 OFFICE O/P EST MOD 30 MIN: CPT

## 2024-06-05 NOTE — ASSESSMENT
[FreeTextEntry1] : I reviewed the CT scan findings with the patient.  There does not appear to be any evidence of locally recurrent disease but he does have a pulmonary nodule present at the periphery of the right lung laterally and because of that finding I will be recommending he has additional dedicated CT imaging of the chest to assess the entire field for any evidence of other nodularity and to help characterize the finding on the recent abdominal CT scan a bit better.  I discussed with him that it could be necessary to perform a biopsy of that nodule to confirm if there is any evidence of metastatic kidney cancer given his history. Other findings on the CT scan included the presence of small bilateral nonobstructing stones.  There is no hydronephrosis present.  With the size and location of the stones seen, I do not think it is likely that would be associated with his back pain as there is no sign of obstruction at this time.  I think more likely his pain is musculoskeletal given his description of symptoms as well as his prior history of significant back related pathology requiring surgical intervention.

## 2024-06-05 NOTE — HISTORY OF PRESENT ILLNESS
[FreeTextEntry1] : Dustin Colvin returns to the office today.  He is 68 years old with history of kidney stones and chronic issues back pain.  He has undergone several different spine surgeries in order to help address his pain.  He is back today with a complaint of bilateral lower back pain in the area of his iliac crests.  He had some recent urinary symptoms as well and about 2 weeks ago had undergone a urine culture.  The urine culture had shown Citrobacter present and was treated with a 7-day course of antibiotic.  He is just now in the midst of the antibiotic treatment however.  He has not had any fevers or chills.  No nausea or vomiting.  The patient also recently underwent a CT scan to evaluate his back pain in the setting of prior known kidney stone disease as well as prior history of kidney cancer status post a right laparoscopic partial nephrectomy.  His pathology from that was a clear-cell renal cell carcinoma.

## 2024-06-05 NOTE — LETTER BODY
[Dear  ___] : Dear  [unfilled], [Courtesy Letter:] : I had the pleasure of seeing your patient, [unfilled], in my office today. [Please see my note below.] : Please see my note below. [FreeTextEntry2] : [place] [address] [address]    [FreeTextEntry3] : Sincerely,      Nehemiah Ritter MD, FACS Director of Urology Services, [facility] Chief of Urology, [facility]  of Urology   [facility], [place] [id number] P: [phone] F: [phone] [facility]

## 2024-06-06 ENCOUNTER — OUTPATIENT (OUTPATIENT)
Dept: OUTPATIENT SERVICES | Facility: HOSPITAL | Age: 69
LOS: 1 days | End: 2024-06-06
Payer: COMMERCIAL

## 2024-06-06 ENCOUNTER — APPOINTMENT (OUTPATIENT)
Dept: CT IMAGING | Facility: IMAGING CENTER | Age: 69
End: 2024-06-06
Payer: MEDICARE

## 2024-06-06 DIAGNOSIS — N20.0 CALCULUS OF KIDNEY: Chronic | ICD-10-CM

## 2024-06-06 DIAGNOSIS — Z96.653 PRESENCE OF ARTIFICIAL KNEE JOINT, BILATERAL: Chronic | ICD-10-CM

## 2024-06-06 DIAGNOSIS — Z98.890 OTHER SPECIFIED POSTPROCEDURAL STATES: Chronic | ICD-10-CM

## 2024-06-06 DIAGNOSIS — Z98.89 OTHER SPECIFIED POSTPROCEDURAL STATES: Chronic | ICD-10-CM

## 2024-06-06 DIAGNOSIS — Z98.1 ARTHRODESIS STATUS: Chronic | ICD-10-CM

## 2024-06-06 DIAGNOSIS — Z90.5 ACQUIRED ABSENCE OF KIDNEY: Chronic | ICD-10-CM

## 2024-06-06 DIAGNOSIS — Z00.8 ENCOUNTER FOR OTHER GENERAL EXAMINATION: ICD-10-CM

## 2024-06-06 DIAGNOSIS — Z90.49 ACQUIRED ABSENCE OF OTHER SPECIFIED PARTS OF DIGESTIVE TRACT: Chronic | ICD-10-CM

## 2024-06-06 DIAGNOSIS — Z96.651 PRESENCE OF RIGHT ARTIFICIAL KNEE JOINT: Chronic | ICD-10-CM

## 2024-06-06 DIAGNOSIS — Z95.1 PRESENCE OF AORTOCORONARY BYPASS GRAFT: Chronic | ICD-10-CM

## 2024-06-06 DIAGNOSIS — G62.9 POLYNEUROPATHY, UNSPECIFIED: Chronic | ICD-10-CM

## 2024-06-06 DIAGNOSIS — Z96.643 PRESENCE OF ARTIFICIAL HIP JOINT, BILATERAL: Chronic | ICD-10-CM

## 2024-06-06 PROCEDURE — 71250 CT THORAX DX C-: CPT

## 2024-06-06 PROCEDURE — 71250 CT THORAX DX C-: CPT | Mod: 26

## 2024-06-13 ENCOUNTER — APPOINTMENT (OUTPATIENT)
Dept: DERMATOLOGY | Facility: CLINIC | Age: 69
End: 2024-06-13

## 2024-06-17 ENCOUNTER — APPOINTMENT (OUTPATIENT)
Dept: ORTHOPEDIC SURGERY | Facility: CLINIC | Age: 69
End: 2024-06-17
Payer: MEDICARE

## 2024-06-17 VITALS — HEIGHT: 77 IN | BODY MASS INDEX: 37.19 KG/M2 | WEIGHT: 315 LBS

## 2024-06-17 DIAGNOSIS — Z98.1 ARTHRODESIS STATUS: ICD-10-CM

## 2024-06-17 DIAGNOSIS — M54.50 LOW BACK PAIN, UNSPECIFIED: ICD-10-CM

## 2024-06-17 PROCEDURE — 72080 X-RAY EXAM THORACOLMB 2/> VW: CPT

## 2024-06-17 PROCEDURE — 99213 OFFICE O/P EST LOW 20 MIN: CPT

## 2024-06-17 PROCEDURE — 72040 X-RAY EXAM NECK SPINE 2-3 VW: CPT

## 2024-06-17 NOTE — ASSESSMENT
[FreeTextEntry1] : 67 M C3-4 ACDF and thoracolumbar fusion PT for lower back and Lumabr radic making back pain worst Pain management referral  FU 6 weeks

## 2024-06-17 NOTE — HISTORY OF PRESENT ILLNESS
[Neck] : neck [Lower back] : lower back [Sharp] : sharp [Shooting] : shooting [Stabbing] : stabbing [de-identified] : 6/17/24: Tried PT for 3 weeks, stopped due to more pain.   05/06/2024: pain has slightly improved  04/15/2024: pain has improved significantly.  Hip pain now biggest issue.  Difficulty ambulating secondary to it.  fine when laying down. worst when moving hips or walking.   03/07/2024: pain has improved.  Balance improved significantly. using cane in house. No longer balancing on walls in shower.  Hands are improving as well.  NO neck pain.   Having some right buttock pain jerry changing positions as well.   02/12/2023: pain has improved after sx. Feels balance slightly better as well. Still using walker. Wearing c-collar.  Takin 1 oxycodone at night.  NO issues with eating.  No repeat episodes of SOB   12/11/2023:  pain has improved, but c/o LROM Still with numbness and tingling in hands as well as balance issues 11/02/2023: pain has improved, but c/o LROM  10/19/23: Here today for follow up. Prior to OR had syncope and fell.  Broke through His T12-L1 disc spac.  Given his DISH it was an unstable injury requiring surical intervention.  Revision Fusion from T9-Pelvis. Has been in vic till 5 days ago doing well.  NOticed some drainage from incision. Overall just some mild discomfort.   7/24/23: CT results  Symptoms are same.  Still with difficult with balance and numbness tingling in bilatearl hands,   7/12/23: MRI results   6.15.2023 Patient has a history of lower back pain. He states he's had 3 surgeries on the back, 2 of the surgeries were done in may of 2022. His first surgery was in 2012. Patient was seen for pain throughout the R leg by Dr. Mena on June 1. 2023 where he was told the pain is coming from the back. Numbness/tingling in bilateral feet.  Had history of bilateral carpal tunnel release cubital tunnel release for hand numbness and tingling which resolved those symptoms. No neck pain. ISsues with balance and gait. SUes walker at baseline. Jerry closes eyes as difficulty staying upright.   hx of multiple spinal surgeries by Dr. Xie.  L3-Pelvis [] : no [de-identified] : x-ray MRI CT

## 2024-06-17 NOTE — IMAGING
[de-identified] : surgicial incision well healed Antalgic gait Suture tails rmeoved 5/5 Strength in BLLE and BLUE  Baseline neurologic exam   x-ray ap/lateral cervical spine shows hardware in good position. no loosening.   NO pain with ER/IR hip today  [Implants in position] : Implants in position [No loosening of hardware] : No loosening of hardware [Fusion intact] : Fusion intact

## 2024-06-20 ENCOUNTER — APPOINTMENT (OUTPATIENT)
Dept: DERMATOLOGY | Facility: CLINIC | Age: 69
End: 2024-06-20
Payer: MEDICARE

## 2024-06-20 DIAGNOSIS — L57.0 ACTINIC KERATOSIS: ICD-10-CM

## 2024-06-20 PROCEDURE — 17000 DESTRUCT PREMALG LESION: CPT

## 2024-06-20 NOTE — HISTORY OF PRESENT ILLNESS
[FreeTextEntry1] : rough growth  [de-identified] : had rough growth above lip that was treated with liquid nitrogen has slight residual area

## 2024-06-20 NOTE — ASSESSMENT
[FreeTextEntry1] : Favor AK, nearly resolved treat with LN again today - Procedure note: Cryotherapy  Verbal consent obtained. Risks/benefits/alternatives discussed including but not limited to risk of pain, inflammatory and blistering reaction, infection, risk of permanent scar and/or dyspigmentation, recurrence, possible lack of efficacy and need for repeat treatments. Site confirmed.  lesion(s) treated with cryotherapy: liquid nitrogen x 2 rapid freeze-slow thaw cycles. Discussed wound care instructions. Patient tolerated well with no immediate complications.  Discussed rtc if not resolving/regrowing

## 2024-07-09 ENCOUNTER — NON-APPOINTMENT (OUTPATIENT)
Age: 69
End: 2024-07-09

## 2024-07-10 ENCOUNTER — APPOINTMENT (OUTPATIENT)
Dept: THORACIC SURGERY | Facility: CLINIC | Age: 69
End: 2024-07-10
Payer: MEDICARE

## 2024-07-10 VITALS
DIASTOLIC BLOOD PRESSURE: 85 MMHG | OXYGEN SATURATION: 96 % | WEIGHT: 315 LBS | HEART RATE: 87 BPM | HEIGHT: 77 IN | RESPIRATION RATE: 17 BRPM | SYSTOLIC BLOOD PRESSURE: 144 MMHG | BODY MASS INDEX: 37.19 KG/M2

## 2024-07-10 DIAGNOSIS — R91.1 SOLITARY PULMONARY NODULE: ICD-10-CM

## 2024-07-10 PROCEDURE — 99204 OFFICE O/P NEW MOD 45 MIN: CPT

## 2024-07-18 ENCOUNTER — APPOINTMENT (OUTPATIENT)
Dept: PAIN MANAGEMENT | Facility: CLINIC | Age: 69
End: 2024-07-18

## 2024-07-26 NOTE — H&P ADULT. - PROBLEM SELECTOR PLAN 4
PROGRESS NOTE    SUBJECTIVE:   Bossman Kidd is a 80 y.o. male seen for a follow up visit regarding [unfilled]    79 y/o CM here for rash on upper thigh on left leg.  It has been there for several weeks.  He denies any ticks on him recently.  He has tried cortisone cream and it has not helped.  It itches some, but does not hurt or burn.    Past Medical History, Past Surgical History, Family history, Social History, and Medications were all reviewed with the patient today and updated as necessary.       Current Outpatient Medications   Medication Sig Dispense Refill    ketoconazole (NIZORAL) 2 % cream Apply topically daily. 60 g 0    finasteride (PROSCAR) 5 MG tablet Take 1 tablet by mouth daily 90 tablet 1    famotidine (PEPCID) 40 MG tablet Take 1 tablet by mouth every evening 90 tablet 1    tamsulosin (FLOMAX) 0.4 MG capsule Take 1 capsule by mouth daily 90 capsule 1    gabapentin (NEURONTIN) 300 MG capsule Take 1 capsule by mouth 2 times daily for 180 days. 180 capsule 1    atorvastatin (LIPITOR) 20 MG tablet Take 1 tablet by mouth daily TAKE ONE(1) TABLET DAILY. 90 tablet 1    allopurinol (ZYLOPRIM) 100 MG tablet Take 1 tablet by mouth daily Take 1 Tab by mouth daily. Indications: treatment to prevent acute gou t attack 90 tablet 1    escitalopram (LEXAPRO) 20 MG tablet Take 1 tablet by mouth daily 90 tablet 1    ondansetron (ZOFRAN-ODT) 4 MG disintegrating tablet Take 1 tablet by mouth 3 times daily as needed for Nausea or Vomiting 21 tablet 0    aspirin 81 MG EC tablet Take by mouth daily      Cholecalciferol 50 MCG (2000 UT) CAPS Take by mouth daily      cyanocobalamin 1000 MCG tablet Take 1 tablet by mouth daily      folic acid (FOLVITE) 400 MCG tablet Take 1 tablet by mouth daily       No current facility-administered medications for this visit.     Allergies   Allergen Reactions    Milk (Cow) Other (See Comments)     lactouse intolerance pt states on 10/8/2020 OV he is not lactose intolerant    
Restart allopurinol when able to take PO

## 2024-08-15 ENCOUNTER — APPOINTMENT (OUTPATIENT)
Dept: PAIN MANAGEMENT | Facility: CLINIC | Age: 69
End: 2024-08-15
Payer: MEDICARE

## 2024-08-15 VITALS — HEIGHT: 77 IN | BODY MASS INDEX: 37.19 KG/M2 | WEIGHT: 315 LBS

## 2024-08-15 DIAGNOSIS — M54.50 LOW BACK PAIN, UNSPECIFIED: ICD-10-CM

## 2024-08-15 DIAGNOSIS — M54.17 RADICULOPATHY, LUMBOSACRAL REGION: ICD-10-CM

## 2024-08-15 PROCEDURE — 99204 OFFICE O/P NEW MOD 45 MIN: CPT

## 2024-08-15 PROCEDURE — 99214 OFFICE O/P EST MOD 30 MIN: CPT

## 2024-08-15 NOTE — PHYSICAL EXAM
[de-identified] : Constitutional; Appears well, no apparent distress Ability to communicate: Normal  Respiratory: non-labored breathing Skin: No rash noted Head: Normocephalic, atraumatic Neck: no visible thyroid enlargement Eyes: Extraocular movements intact Neurologic: Alert and oriented x3 Psychiatric: normal mood, affect and behavior [] : uses walker

## 2024-08-15 NOTE — PHYSICAL EXAM
[de-identified] : Constitutional; Appears well, no apparent distress Ability to communicate: Normal  Respiratory: non-labored breathing Skin: No rash noted Head: Normocephalic, atraumatic Neck: no visible thyroid enlargement Eyes: Extraocular movements intact Neurologic: Alert and oriented x3 Psychiatric: normal mood, affect and behavior [] : uses walker

## 2024-08-15 NOTE — DISCUSSION/SUMMARY
[de-identified] : After discussing various treatment options with the patient including but not limited to oral medications, physical therapy, exercise modalities as well as interventional spinal injections, we have decided with the following plan:  - Continue Home exercises, stretching, activity modification, physical therapy, and conservative care. - MRI report and/or images was reviewed and discussed with the patient. - Recommend B/L L5-S1 Transforaminal Epidural Steroid Injection under fluoroscopic guidance with image. - The risks, benefits and alternatives of the proposed procedure were explained in detail with the patient. The risks outlined include but are not limited to infection, bleeding, post-dural puncture headache, nerve injury, a temporary increase in pain, failure to resolve symptoms, allergic reaction, symptom recurrence, and possible elevation of blood sugar in diabetics. All questions were answered to patient's apparent satisfaction and he/she verbalized an understanding. - Patient is presenting with acute/sub-acute radicular pain with impairment in ADLs and functionality.  The pain has not responded to conservative care including NSAID therapy and/or physical therapy.  There is no bleeding tendency, unstable medical condition, or systemic infection. - Follow up in 1-2 weeks post injection for re-evaluation. *pt takes 81mg Aspirin

## 2024-08-15 NOTE — HISTORY OF PRESENT ILLNESS
[Lower back] : lower back [8] : 8 [Sharp] : sharp [Shooting] : shooting [Intermittent] : intermittent [Household chores] : household chores [Nothing helps with pain getting better] : Nothing helps with pain getting better [Standing] : standing [Walking] : walking [FreeTextEntry1] : Initial HPI  08/15/2024:  Pain started 2 months ago is on the b/l lower back described as an achy pain worse wiht getting up and down from a chair. Seeing a neurologist for neuropathy and had EMG showing pinched nerve. Saw Dr. Duc Hughes who recommended ESIs.    MRI 7/6/23 independently reviewed: L4-5, L5-S1 b/l NF stenosis  Conservative Care: Has tried PT which made it worse.  Pain Medications: Tylenol PRN  Past Injections: None Spine surgery: C3-4 ACDF and Thoracolumbar fusion  Blood thinners: *pt takes 81mg Aspirin  Weight 405lbs  [] : no [de-identified] : l mri

## 2024-08-15 NOTE — DISCUSSION/SUMMARY
[de-identified] : After discussing various treatment options with the patient including but not limited to oral medications, physical therapy, exercise modalities as well as interventional spinal injections, we have decided with the following plan:  - Continue Home exercises, stretching, activity modification, physical therapy, and conservative care. - MRI report and/or images was reviewed and discussed with the patient. - Recommend B/L L5-S1 Transforaminal Epidural Steroid Injection under fluoroscopic guidance with image. - The risks, benefits and alternatives of the proposed procedure were explained in detail with the patient. The risks outlined include but are not limited to infection, bleeding, post-dural puncture headache, nerve injury, a temporary increase in pain, failure to resolve symptoms, allergic reaction, symptom recurrence, and possible elevation of blood sugar in diabetics. All questions were answered to patient's apparent satisfaction and he/she verbalized an understanding. - Patient is presenting with acute/sub-acute radicular pain with impairment in ADLs and functionality.  The pain has not responded to conservative care including NSAID therapy and/or physical therapy.  There is no bleeding tendency, unstable medical condition, or systemic infection. - Follow up in 1-2 weeks post injection for re-evaluation. *pt takes 81mg Aspirin

## 2024-08-15 NOTE — HISTORY OF PRESENT ILLNESS
[Lower back] : lower back [8] : 8 [Sharp] : sharp [Shooting] : shooting [Intermittent] : intermittent [Household chores] : household chores [Nothing helps with pain getting better] : Nothing helps with pain getting better [Standing] : standing [Walking] : walking [FreeTextEntry1] : Initial HPI  08/15/2024:  Pain started 2 months ago is on the b/l lower back described as an achy pain worse wiht getting up and down from a chair. Seeing a neurologist for neuropathy and had EMG showing pinched nerve. Saw Dr. Duc Hughes who recommended ESIs.    MRI 7/6/23 independently reviewed: L4-5, L5-S1 b/l NF stenosis  Conservative Care: Has tried PT which made it worse.  Pain Medications: Tylenol PRN  Past Injections: None Spine surgery: C3-4 ACDF and Thoracolumbar fusion  Blood thinners: *pt takes 81mg Aspirin  Weight 405lbs  [] : no [de-identified] : l mri

## 2024-08-19 ENCOUNTER — APPOINTMENT (OUTPATIENT)
Dept: ORTHOPEDIC SURGERY | Facility: CLINIC | Age: 69
End: 2024-08-19

## 2024-09-05 ENCOUNTER — APPOINTMENT (OUTPATIENT)
Dept: PAIN MANAGEMENT | Facility: CLINIC | Age: 69
End: 2024-09-05
Payer: MEDICARE

## 2024-09-05 DIAGNOSIS — M54.17 RADICULOPATHY, LUMBOSACRAL REGION: ICD-10-CM

## 2024-09-05 PROCEDURE — J3490M: CUSTOM

## 2024-09-05 PROCEDURE — 64483 NJX AA&/STRD TFRM EPI L/S 1: CPT | Mod: 50

## 2024-09-05 NOTE — PROCEDURE
[FreeTextEntry3] : Date of Service: 09/05/2024   Account: [id number]  Patient: FRANCO RIVERO   YOB: 1955  Age: 68 year   Surgeon:                                                        Malachi Hughes D.O.   Assistant:                                                        None.  Pre-Operative Diagnosis:                            Lumbosacral radiculitis (M54.17)  Post-Operative Diagnosis:                          Same  Procedure:                                                    Right L5-S1 transforaminal epidural steroid injection                                                                          Left L5-S1 transforaminal epidural steroid injection under fluoroscopic guidance.  Anesthesia:                                                    MAC with Local   This procedure was carried out using fluoroscopic guidance.  The risks and benefits of the procedure were discussed extensively with the patient.  The consent of the patient was obtained and the following procedure was performed. The patient was placed in the prone position on the fluoroscopic table and the lumbar area was prepped and draped in a sterile fashion. A timeout was performed with all essential staff present and the site and side were verified.  The left L5-S1 neural foramen was then identified on left oblique "joy dog" anatomical view at the 6 o'clock position using fluoroscopic guidance, and the area was marked. The overlying skin and subcutaneous structures were anesthetized using sterile technique with 1% Lidocaine.  A 22-gauge spinal needle was directed toward the inferior (6 o'clock) position of the pedicle, which formed the roof of the identified foramen.  Once in the epidural space, after negative aspiration for heme and CSF, 1cc of Omnipaque contrast was injected under live fluoroscopy to confirm epidural location and assess filling defects and rule out intravascular needle placement.   The following contrast flow and epidurogram was observed: no intravascular or intrathecal flow pattern was noted.  No blood or CSF was aspirated. Contrast spread appeared to outline the left L5 nerve root and spread medially into the epidural space.    After this, an injectate of 1 cc preservative free normal saline plus 6 mg of betamethasone and 1 cc of 0.25% bupivacaine was injected in the epidural space.  The right L5-S1 neural foramen was then identified on right oblique "joy dog" anatomical view at the 6 o'clock position using fluoroscopic guidance, and the area was marked. The overlying skin and subcutaneous structures were anesthetized using sterile technique with 1% Lidocaine.  A 22-gauge spinal needle was directed toward the inferior (6 o'clock) position of the pedicle, which formed the roof of the identified foramen.  Once in the epidural space, after negative aspiration for heme and CSF, 1cc of Omnipaque contrast was injected under live fluoroscopy to confirm epidural location and assess filling defects and rule out intravascular needle placement.   The following contrast flow and epidurogram was observed: no intravascular or intrathecal flow pattern was noted.  No blood or CSF was aspirated. Contrast spread appeared to outline the right L5 nerve root and spread medially into the epidural space.    After this, an injectate of 1 cc preservative free normal saline plus 6 mg of betamethasone and 1 cc of 0.25% bupivacaine was injected in the epidural space.  The needle was subsequently removed.  Vital signs remained normal.  Pulse oximeter was used throughout the procedure and the patient's pulse and oxygen saturation remained within normal limits.  The patient tolerated the procedure well.  There were no complications.  The patient was instructed to apply ice over the injection sites for twenty minutes every two hours for the next 24 to 48 hours.  The patient was also instructed to contact me immediately if there were any problems.  Malachi Hughes D.O.

## 2024-09-19 ENCOUNTER — APPOINTMENT (OUTPATIENT)
Dept: PAIN MANAGEMENT | Facility: CLINIC | Age: 69
End: 2024-09-19
Payer: MEDICARE

## 2024-09-19 VITALS — BODY MASS INDEX: 37.19 KG/M2 | HEIGHT: 77 IN | WEIGHT: 315 LBS

## 2024-09-19 DIAGNOSIS — M51.36 OTHER INTERVERTEBRAL DISC DEGENERATION, LUMBAR REGION: ICD-10-CM

## 2024-09-19 DIAGNOSIS — M54.50 LOW BACK PAIN, UNSPECIFIED: ICD-10-CM

## 2024-09-19 PROCEDURE — 99214 OFFICE O/P EST MOD 30 MIN: CPT

## 2024-09-19 NOTE — DISCUSSION/SUMMARY
[de-identified] : After discussing various treatment options with the patient including but not limited to oral medications, physical therapy, exercise modalities as well as interventional spinal injections, we have decided with the following plan:  - Continue Home exercises, stretching, activity modification, physical therapy, and conservative care. - MRI report and/or images was reviewed and discussed with the patient. - Recommend Caudal Epidural Steroid Injection under fluoroscopic guidance with image. - The risks, benefits and alternatives of the proposed procedure were explained in detail with the patient. The risks outlined include but are not limited to infection, bleeding, post-dural puncture headache, nerve injury, a temporary increase in pain, failure to resolve symptoms, allergic reaction, symptom recurrence, and possible elevation of blood sugar in diabetics. All questions were answered to patient's apparent satisfaction and he/she verbalized an understanding. - Patient is presenting with acute/sub-acute radicular pain with impairment in ADLs and functionality.  The pain has not responded to conservative care including NSAID therapy and/or physical therapy.  There is no bleeding tendency, unstable medical condition, or systemic infection. - Follow up in 1-2 weeks post injection for re-evaluation. *pt takes 81mg Aspirin

## 2024-09-19 NOTE — PHYSICAL EXAM
[de-identified] : Constitutional; Appears well, no apparent distress Ability to communicate: Normal  Respiratory: non-labored breathing Skin: No rash noted Head: Normocephalic, atraumatic Neck: no visible thyroid enlargement Eyes: Extraocular movements intact Neurologic: Alert and oriented x3 Psychiatric: normal mood, affect and behavior [] : no ecchymosis

## 2024-09-19 NOTE — DISCUSSION/SUMMARY
[de-identified] : After discussing various treatment options with the patient including but not limited to oral medications, physical therapy, exercise modalities as well as interventional spinal injections, we have decided with the following plan:  - Continue Home exercises, stretching, activity modification, physical therapy, and conservative care. - MRI report and/or images was reviewed and discussed with the patient. - Recommend Caudal Epidural Steroid Injection under fluoroscopic guidance with image. - The risks, benefits and alternatives of the proposed procedure were explained in detail with the patient. The risks outlined include but are not limited to infection, bleeding, post-dural puncture headache, nerve injury, a temporary increase in pain, failure to resolve symptoms, allergic reaction, symptom recurrence, and possible elevation of blood sugar in diabetics. All questions were answered to patient's apparent satisfaction and he/she verbalized an understanding. - Patient is presenting with acute/sub-acute radicular pain with impairment in ADLs and functionality.  The pain has not responded to conservative care including NSAID therapy and/or physical therapy.  There is no bleeding tendency, unstable medical condition, or systemic infection. - Follow up in 1-2 weeks post injection for re-evaluation. *pt takes 81mg Aspirin

## 2024-09-19 NOTE — HISTORY OF PRESENT ILLNESS
[Lower back] : lower back [8] : 8 [Sharp] : sharp [Shooting] : shooting [Intermittent] : intermittent [Household chores] : household chores [Nothing helps with pain getting better] : Nothing helps with pain getting better [Standing] : standing [Walking] : walking [6] : 6 [Dull/Aching] : dull/aching [Constant] : constant [Injection therapy] : injection therapy [FreeTextEntry1] : 09/19/2024: s/p B/L L5-S1 TFESI on 9/05/24 with >70% relief and improvement of ADLs. Pain has been much better but started to return 2 days ago.   Initial HPI  08/15/2024:  Pain started 2 months ago is on the b/l lower back described as an achy pain worse with getting up and down from a chair. Seeing a neurologist for neuropathy and had EMG showing pinched nerve. Saw Dr. Duc Hughes who recommended ESIs.    MRI 7/6/23 independently reviewed: L4-5, L5-S1 b/l NF stenosis  Conservative Care: Has tried PT which made it worse.  Pain Medications: Tylenol PRN  Past Injections: None Spine surgery: C3-4 ACDF and Thoracolumbar fusion  Blood thinners: *pt takes 81mg Aspirin  Weight 405lbs  [] : no [de-identified] : l mri

## 2024-09-19 NOTE — PHYSICAL EXAM
[de-identified] : Constitutional; Appears well, no apparent distress Ability to communicate: Normal  Respiratory: non-labored breathing Skin: No rash noted Head: Normocephalic, atraumatic Neck: no visible thyroid enlargement Eyes: Extraocular movements intact Neurologic: Alert and oriented x3 Psychiatric: normal mood, affect and behavior [] : no ecchymosis

## 2024-09-19 NOTE — HISTORY OF PRESENT ILLNESS
[Lower back] : lower back [8] : 8 [Sharp] : sharp [Shooting] : shooting [Intermittent] : intermittent [Household chores] : household chores [Nothing helps with pain getting better] : Nothing helps with pain getting better [Standing] : standing [Walking] : walking [6] : 6 [Dull/Aching] : dull/aching [Constant] : constant [Injection therapy] : injection therapy [FreeTextEntry1] : 09/19/2024: s/p B/L L5-S1 TFESI on 9/05/24 with >70% relief and improvement of ADLs. Pain has been much better but started to return 2 days ago.   Initial HPI  08/15/2024:  Pain started 2 months ago is on the b/l lower back described as an achy pain worse with getting up and down from a chair. Seeing a neurologist for neuropathy and had EMG showing pinched nerve. Saw Dr. Duc Hughes who recommended ESIs.    MRI 7/6/23 independently reviewed: L4-5, L5-S1 b/l NF stenosis  Conservative Care: Has tried PT which made it worse.  Pain Medications: Tylenol PRN  Past Injections: None Spine surgery: C3-4 ACDF and Thoracolumbar fusion  Blood thinners: *pt takes 81mg Aspirin  Weight 405lbs  [] : no [de-identified] : l mri

## 2024-10-01 ENCOUNTER — APPOINTMENT (OUTPATIENT)
Dept: PAIN MANAGEMENT | Facility: CLINIC | Age: 69
End: 2024-10-01

## 2024-10-07 NOTE — ED PROCEDURE NOTE - CPROC ED POST PROC CARE GUIDE1
Verbal/written post procedure instructions were given to patient/caregiver./Instructed patient/caregiver to follow-up with primary care physician./Instructed patient/caregiver regarding signs and symptoms of infection./Elevate the injured extremity as instructed./Keep the cast/splint/dressing clean and dry. patient

## 2024-10-11 NOTE — DISCHARGE NOTE ADULT - DISCHARGE TO
Nina Rolon  : 1987  Primary: [employer] [place] [employer])  Secondary:  [facility]  [address] [place] [address]  Phone: [phone]  Fax: [phone] Plan Frequency: 1x/week for 90 days    Plan of Care/Certification Expiration Date: 25        Plan of Care/Certification Expiration Date:  Plan of Care/Certification Expiration Date: 25    Frequency/Duration: Plan Frequency: 1x/week for 90 days      Time In/Out:   Time In: 08  Time Out: 0840      PT Visit Info:         Visit Count:  1    OUTPATIENT PHYSICAL THERAPY:   Treatment Note 10/11/2024       Episode  (oncology rehab)               Treatment Diagnosis:     Stiffness of right shoulder, not elsewhere classified  Stiffness of left shoulder, not elsewhere classified  Postmastectomy lymphedema syndrome  Medical/Referring Diagnosis:    Other specified personal risk factors, not elsewhere classified [Z91.89]  Malignant neoplasm of unspecified site of right female breast [C50.911]  Estrogen receptor positive status (ER+) [Z17.0]      Referring Physician:  Evelin Cruz MD MD Orders:  PT Eval and Treat oncology rehab  Return MD Appt:   Dr. Flores 24  Date of Onset:  Onset Date: 10/12/23      Allergies:   Adhesive tape and Diphenhydramine  Restrictions/Precautions:   lymphedema      Interventions Planned (Treatment may consist of any combination of the following):     See Assessment Note    Subjective Comments:   The patient reports she is doing well since radiation has ended.  Initial Pain Level::     0/10  Post Session Pain Level:       0/10  Medications Last Reviewed:  10/11/2024  Updated Objective Findings:   as below  DATE 6/17/24 6/27/24 10/11/24 10/11/24    RIGHT RIGHT RIGHT LEFT   MCP 20.9 20.7 20.5 20.4   WRIST 18 17.9 18 17.8   10 cm 25.2 24.9 24.5 24.8   20 cm 31.6 31.2 31.7 31.5   30 cm 40.9 40.3 39.7 37.5   40 cm 46.5 46.6 45.1 45.5   50 cm              Right flexion 180, abduction 180, 
[facility]

## 2024-10-14 ENCOUNTER — APPOINTMENT (OUTPATIENT)
Dept: PAIN MANAGEMENT | Facility: CLINIC | Age: 69
End: 2024-10-14

## 2024-10-16 NOTE — PHYSICAL THERAPY INITIAL EVALUATION ADULT - GROSSLY INTACT, SENSORY
Writer ALEXIS for pt informing her that the current PHP is filled and that she will be added to the waitlist and called when there is an opening.   
Grossly Intact

## 2024-10-22 ENCOUNTER — APPOINTMENT (OUTPATIENT)
Dept: DERMATOLOGY | Facility: CLINIC | Age: 69
End: 2024-10-22
Payer: MEDICARE

## 2024-10-22 DIAGNOSIS — L57.0 ACTINIC KERATOSIS: ICD-10-CM

## 2024-10-22 DIAGNOSIS — L82.1 OTHER SEBORRHEIC KERATOSIS: ICD-10-CM

## 2024-10-22 PROCEDURE — 17003 DESTRUCT PREMALG LES 2-14: CPT

## 2024-10-22 PROCEDURE — 17000 DESTRUCT PREMALG LESION: CPT

## 2024-10-27 NOTE — H&P PST ADULT - NEUROLOGICAL
cranial nerves II-XII intact Pt presents to ED accompanied by mother speaking in clear complete sentences c/o pain to R ankle  s/p fall and twist after running yesterday. Pt denies LOC or head trauma. Pt able to move affected toes on affected extremity. Per mother pt received ibuprofen 10 ml at 10 am this morning prior to ED arrival, mother also applied ice to area with some relief. Pt up to date with all his vaccines. COREY details…

## 2024-11-21 NOTE — H&P PST ADULT - PRO INTERPRETER NEED 2
Patient previously schedule for an appointment with Dr. Muñoz on 24 for discussion of left DAMIAN which has been canceled and note patient received care elsewhere. Dental clearance has been received and if patient wishes to schedule with Dr. Muñoz an appointment would need to be scheduled.    To who it may concern:    Patient is to undergo total joint replacement surgery with Dr. Nash Muñoz.  Before surgery can be scheduled, it is imperative that our office know that patient is in good oral standing and is given dental clearance prior to surgery.    Please complete the following:    Dental Office and phone number: [facility] [phone]    Dental provider completing this letter: Martha Fermin    Date of last dental exam/cleanin24    Any oral infection, abscess or severe periodontal disease: none    Any other pertinent oral concerns: non    Any recommended dental work: no    From a dental perspective, is patient cleared to proceed with total joint surgery?     yes  Comment:     Dental letter labeled and sent to [facility].     
English

## 2024-12-12 ENCOUNTER — APPOINTMENT (OUTPATIENT)
Dept: PAIN MANAGEMENT | Facility: CLINIC | Age: 69
End: 2024-12-12
Payer: MEDICARE

## 2024-12-12 DIAGNOSIS — M54.17 RADICULOPATHY, LUMBOSACRAL REGION: ICD-10-CM

## 2024-12-12 PROCEDURE — J3490M: CUSTOM

## 2024-12-12 PROCEDURE — 62323 NJX INTERLAMINAR LMBR/SAC: CPT

## 2024-12-19 ENCOUNTER — APPOINTMENT (OUTPATIENT)
Dept: DERMATOLOGY | Facility: CLINIC | Age: 69
End: 2024-12-19

## 2024-12-24 PROBLEM — F10.90 ALCOHOL USE: Status: ACTIVE | Noted: 2017-10-11

## 2024-12-30 ENCOUNTER — APPOINTMENT (OUTPATIENT)
Dept: CT IMAGING | Facility: IMAGING CENTER | Age: 69
End: 2024-12-30
Payer: MEDICARE

## 2024-12-30 ENCOUNTER — OUTPATIENT (OUTPATIENT)
Dept: OUTPATIENT SERVICES | Facility: HOSPITAL | Age: 69
LOS: 1 days | End: 2024-12-30
Payer: COMMERCIAL

## 2024-12-30 DIAGNOSIS — Z90.49 ACQUIRED ABSENCE OF OTHER SPECIFIED PARTS OF DIGESTIVE TRACT: Chronic | ICD-10-CM

## 2024-12-30 DIAGNOSIS — Z95.1 PRESENCE OF AORTOCORONARY BYPASS GRAFT: Chronic | ICD-10-CM

## 2024-12-30 DIAGNOSIS — Z96.651 PRESENCE OF RIGHT ARTIFICIAL KNEE JOINT: Chronic | ICD-10-CM

## 2024-12-30 DIAGNOSIS — Z98.890 OTHER SPECIFIED POSTPROCEDURAL STATES: Chronic | ICD-10-CM

## 2024-12-30 DIAGNOSIS — G62.9 POLYNEUROPATHY, UNSPECIFIED: Chronic | ICD-10-CM

## 2024-12-30 DIAGNOSIS — Z98.89 OTHER SPECIFIED POSTPROCEDURAL STATES: Chronic | ICD-10-CM

## 2024-12-30 DIAGNOSIS — Z98.1 ARTHRODESIS STATUS: Chronic | ICD-10-CM

## 2024-12-30 DIAGNOSIS — R91.1 SOLITARY PULMONARY NODULE: ICD-10-CM

## 2024-12-30 DIAGNOSIS — Z96.643 PRESENCE OF ARTIFICIAL HIP JOINT, BILATERAL: Chronic | ICD-10-CM

## 2024-12-30 DIAGNOSIS — Z96.653 PRESENCE OF ARTIFICIAL KNEE JOINT, BILATERAL: Chronic | ICD-10-CM

## 2024-12-30 DIAGNOSIS — N20.0 CALCULUS OF KIDNEY: Chronic | ICD-10-CM

## 2024-12-30 DIAGNOSIS — Z90.5 ACQUIRED ABSENCE OF KIDNEY: Chronic | ICD-10-CM

## 2024-12-30 PROCEDURE — 71250 CT THORAX DX C-: CPT

## 2024-12-30 PROCEDURE — 71250 CT THORAX DX C-: CPT | Mod: 26

## 2025-01-02 ENCOUNTER — APPOINTMENT (OUTPATIENT)
Dept: PAIN MANAGEMENT | Facility: CLINIC | Age: 70
End: 2025-01-02
Payer: MEDICARE

## 2025-01-02 VITALS — WEIGHT: 315 LBS | BODY MASS INDEX: 37.19 KG/M2 | HEIGHT: 77 IN

## 2025-01-02 DIAGNOSIS — M54.50 LOW BACK PAIN, UNSPECIFIED: ICD-10-CM

## 2025-01-02 DIAGNOSIS — M51.369: ICD-10-CM

## 2025-01-02 DIAGNOSIS — M54.17 RADICULOPATHY, LUMBOSACRAL REGION: ICD-10-CM

## 2025-01-02 PROCEDURE — 99214 OFFICE O/P EST MOD 30 MIN: CPT

## 2025-01-02 RX ORDER — METFORMIN HYDROCHLORIDE 500 MG/1
500 TABLET, COATED ORAL
Refills: 0 | Status: ACTIVE | COMMUNITY

## 2025-01-02 NOTE — DIETITIAN INITIAL EVALUATION ADULT - PROBLEM SELECTOR PROBLEM 6
Physical Therapy - defer  Order received, chart reviewed, attempted to see pt however she is YAIR for MRI. Will check back later as able.     Preventive measure

## 2025-01-15 ENCOUNTER — NON-APPOINTMENT (OUTPATIENT)
Age: 70
End: 2025-01-15

## 2025-01-15 ENCOUNTER — APPOINTMENT (OUTPATIENT)
Dept: THORACIC SURGERY | Facility: CLINIC | Age: 70
End: 2025-01-15
Payer: MEDICARE

## 2025-01-15 VITALS
WEIGHT: 315 LBS | HEIGHT: 77 IN | BODY MASS INDEX: 37.19 KG/M2 | HEART RATE: 73 BPM | OXYGEN SATURATION: 98 % | SYSTOLIC BLOOD PRESSURE: 133 MMHG | DIASTOLIC BLOOD PRESSURE: 84 MMHG | RESPIRATION RATE: 17 BRPM

## 2025-01-15 DIAGNOSIS — R91.1 SOLITARY PULMONARY NODULE: ICD-10-CM

## 2025-01-15 PROCEDURE — 99214 OFFICE O/P EST MOD 30 MIN: CPT

## 2025-01-16 ENCOUNTER — APPOINTMENT (OUTPATIENT)
Dept: UROLOGY | Facility: CLINIC | Age: 70
End: 2025-01-16
Payer: MEDICARE

## 2025-01-16 DIAGNOSIS — Z85.528 PERSONAL HISTORY OF OTHER MALIGNANT NEOPLASM OF KIDNEY: ICD-10-CM

## 2025-01-16 DIAGNOSIS — N20.0 CALCULUS OF KIDNEY: ICD-10-CM

## 2025-01-16 DIAGNOSIS — N28.89 OTHER SPECIFIED DISORDERS OF KIDNEY AND URETER: ICD-10-CM

## 2025-01-16 PROCEDURE — G2211 COMPLEX E/M VISIT ADD ON: CPT

## 2025-01-16 PROCEDURE — 99214 OFFICE O/P EST MOD 30 MIN: CPT

## 2025-02-25 NOTE — DISCHARGE NOTE NURSING/CASE MANAGEMENT/SOCIAL WORK - PATIENT PORTAL LINK FT
Requested medication(s) are due for refill today: Yes  Patient has already received a courtesy refill: No  Other reason request has been forwarded to provider: per protocol     
You can access the FollowMyHealth Patient Portal offered by [facility] by registering at the following website: [email or web address]. By joining [facility]’s FollowMyHealth portal, you will also be able to view your health information using other applications (apps) compatible with our system.

## 2025-03-20 ENCOUNTER — APPOINTMENT (OUTPATIENT)
Dept: PAIN MANAGEMENT | Facility: CLINIC | Age: 70
End: 2025-03-20
Payer: MEDICARE

## 2025-03-20 DIAGNOSIS — M54.17 RADICULOPATHY, LUMBOSACRAL REGION: ICD-10-CM

## 2025-03-20 PROCEDURE — 62323 NJX INTERLAMINAR LMBR/SAC: CPT

## 2025-03-20 PROCEDURE — J3490M: CUSTOM

## 2025-03-25 ENCOUNTER — APPOINTMENT (OUTPATIENT)
Dept: DERMATOLOGY | Facility: CLINIC | Age: 70
End: 2025-03-25

## 2025-03-25 DIAGNOSIS — L57.0 ACTINIC KERATOSIS: ICD-10-CM

## 2025-03-25 PROCEDURE — 17003 DESTRUCT PREMALG LES 2-14: CPT

## 2025-03-25 PROCEDURE — 99213 OFFICE O/P EST LOW 20 MIN: CPT | Mod: 25

## 2025-03-25 PROCEDURE — 17000 DESTRUCT PREMALG LESION: CPT

## 2025-03-25 RX ORDER — FLUOROURACIL 50 MG/G
5 CREAM TOPICAL
Qty: 1 | Refills: 2 | Status: ACTIVE | COMMUNITY
Start: 2025-03-25 | End: 1900-01-01

## 2025-03-31 NOTE — DIETITIAN INITIAL EVALUATION ADULT - WEIGHT (KG)
Name of Medication(s) Requested:  Requested Prescriptions     Pending Prescriptions Disp Refills    warfarin (COUMADIN) 10 MG tablet 90 tablet 1     Sig: Take 1 tablet by mouth daily    warfarin (COUMADIN) 2.5 MG tablet 90 tablet 1     Sig: Take 1 tablet by mouth daily       Medication is on current medication list Yes    Dosage and directions were verified? Yes    Quantity verified: 90 day supply     Pharmacy Verified?  Yes    Last Appointment:  11/14/2024    Future appts:  Future Appointments   Date Time Provider Department Center   4/2/2025 10:00 AM SCHEDULE, MONTSE [facility] DEP   4/7/2025 11:40 AM Francine Garcia MD [facility] DE[facility]        (If no appt send self scheduling link. .REFILLAPPT)  Scheduling request sent?     [] Yes  [] No    Does patient need updated?  [] Yes  [] No     94.3

## 2025-04-03 ENCOUNTER — APPOINTMENT (OUTPATIENT)
Dept: PAIN MANAGEMENT | Facility: CLINIC | Age: 70
End: 2025-04-03
Payer: MEDICARE

## 2025-04-03 VITALS — HEIGHT: 77 IN | BODY MASS INDEX: 37.19 KG/M2 | WEIGHT: 315 LBS

## 2025-04-03 DIAGNOSIS — M54.50 LOW BACK PAIN, UNSPECIFIED: ICD-10-CM

## 2025-04-03 DIAGNOSIS — M51.369: ICD-10-CM

## 2025-04-03 DIAGNOSIS — M54.17 RADICULOPATHY, LUMBOSACRAL REGION: ICD-10-CM

## 2025-04-03 PROCEDURE — 99214 OFFICE O/P EST MOD 30 MIN: CPT

## 2025-04-09 ENCOUNTER — EMERGENCY (EMERGENCY)
Facility: HOSPITAL | Age: 70
LOS: 1 days | End: 2025-04-09
Attending: EMERGENCY MEDICINE
Payer: COMMERCIAL

## 2025-04-09 VITALS
DIASTOLIC BLOOD PRESSURE: 73 MMHG | OXYGEN SATURATION: 98 % | RESPIRATION RATE: 18 BRPM | HEART RATE: 92 BPM | SYSTOLIC BLOOD PRESSURE: 156 MMHG

## 2025-04-09 VITALS
WEIGHT: 315 LBS | TEMPERATURE: 98 F | SYSTOLIC BLOOD PRESSURE: 144 MMHG | HEIGHT: 77 IN | HEART RATE: 80 BPM | OXYGEN SATURATION: 96 % | RESPIRATION RATE: 18 BRPM | DIASTOLIC BLOOD PRESSURE: 88 MMHG

## 2025-04-09 DIAGNOSIS — Z98.890 OTHER SPECIFIED POSTPROCEDURAL STATES: Chronic | ICD-10-CM

## 2025-04-09 DIAGNOSIS — Z95.1 PRESENCE OF AORTOCORONARY BYPASS GRAFT: Chronic | ICD-10-CM

## 2025-04-09 DIAGNOSIS — Z90.5 ACQUIRED ABSENCE OF KIDNEY: Chronic | ICD-10-CM

## 2025-04-09 DIAGNOSIS — Z98.1 ARTHRODESIS STATUS: Chronic | ICD-10-CM

## 2025-04-09 DIAGNOSIS — Z98.89 OTHER SPECIFIED POSTPROCEDURAL STATES: Chronic | ICD-10-CM

## 2025-04-09 DIAGNOSIS — N20.0 CALCULUS OF KIDNEY: Chronic | ICD-10-CM

## 2025-04-09 DIAGNOSIS — Z96.651 PRESENCE OF RIGHT ARTIFICIAL KNEE JOINT: Chronic | ICD-10-CM

## 2025-04-09 DIAGNOSIS — Z96.653 PRESENCE OF ARTIFICIAL KNEE JOINT, BILATERAL: Chronic | ICD-10-CM

## 2025-04-09 DIAGNOSIS — Z90.49 ACQUIRED ABSENCE OF OTHER SPECIFIED PARTS OF DIGESTIVE TRACT: Chronic | ICD-10-CM

## 2025-04-09 DIAGNOSIS — Z96.643 PRESENCE OF ARTIFICIAL HIP JOINT, BILATERAL: Chronic | ICD-10-CM

## 2025-04-09 DIAGNOSIS — G62.9 POLYNEUROPATHY, UNSPECIFIED: Chronic | ICD-10-CM

## 2025-04-09 LAB
ALBUMIN SERPL ELPH-MCNC: 4.3 G/DL — SIGNIFICANT CHANGE UP (ref 3.3–5)
ALP SERPL-CCNC: 107 U/L — SIGNIFICANT CHANGE UP (ref 40–120)
ALT FLD-CCNC: 12 U/L — SIGNIFICANT CHANGE UP (ref 10–45)
ANION GAP SERPL CALC-SCNC: 16 MMOL/L — SIGNIFICANT CHANGE UP (ref 5–17)
AST SERPL-CCNC: 12 U/L — SIGNIFICANT CHANGE UP (ref 10–40)
BASOPHILS # BLD AUTO: 0.05 K/UL — SIGNIFICANT CHANGE UP (ref 0–0.2)
BASOPHILS NFR BLD AUTO: 0.5 % — SIGNIFICANT CHANGE UP (ref 0–2)
BILIRUB SERPL-MCNC: 0.6 MG/DL — SIGNIFICANT CHANGE UP (ref 0.2–1.2)
BUN SERPL-MCNC: 19 MG/DL — SIGNIFICANT CHANGE UP (ref 7–23)
CALCIUM SERPL-MCNC: 9.8 MG/DL — SIGNIFICANT CHANGE UP (ref 8.4–10.5)
CHLORIDE SERPL-SCNC: 100 MMOL/L — SIGNIFICANT CHANGE UP (ref 96–108)
CO2 SERPL-SCNC: 26 MMOL/L — SIGNIFICANT CHANGE UP (ref 22–31)
CREAT SERPL-MCNC: 0.65 MG/DL — SIGNIFICANT CHANGE UP (ref 0.5–1.3)
EGFR: 102 ML/MIN/1.73M2 — SIGNIFICANT CHANGE UP
EGFR: 102 ML/MIN/1.73M2 — SIGNIFICANT CHANGE UP
EOSINOPHIL # BLD AUTO: 0.21 K/UL — SIGNIFICANT CHANGE UP (ref 0–0.5)
EOSINOPHIL NFR BLD AUTO: 2.2 % — SIGNIFICANT CHANGE UP (ref 0–6)
GLUCOSE SERPL-MCNC: 121 MG/DL — HIGH (ref 70–99)
HCT VFR BLD CALC: 42.7 % — SIGNIFICANT CHANGE UP (ref 39–50)
HGB BLD-MCNC: 14.4 G/DL — SIGNIFICANT CHANGE UP (ref 13–17)
IMM GRANULOCYTES NFR BLD AUTO: 0.4 % — SIGNIFICANT CHANGE UP (ref 0–0.9)
LYMPHOCYTES # BLD AUTO: 1.87 K/UL — SIGNIFICANT CHANGE UP (ref 1–3.3)
LYMPHOCYTES # BLD AUTO: 19.2 % — SIGNIFICANT CHANGE UP (ref 13–44)
MCHC RBC-ENTMCNC: 28.4 PG — SIGNIFICANT CHANGE UP (ref 27–34)
MCHC RBC-ENTMCNC: 33.7 G/DL — SIGNIFICANT CHANGE UP (ref 32–36)
MCV RBC AUTO: 84.2 FL — SIGNIFICANT CHANGE UP (ref 80–100)
MONOCYTES # BLD AUTO: 0.68 K/UL — SIGNIFICANT CHANGE UP (ref 0–0.9)
MONOCYTES NFR BLD AUTO: 7 % — SIGNIFICANT CHANGE UP (ref 2–14)
NEUTROPHILS # BLD AUTO: 6.9 K/UL — SIGNIFICANT CHANGE UP (ref 1.8–7.4)
NEUTROPHILS NFR BLD AUTO: 70.7 % — SIGNIFICANT CHANGE UP (ref 43–77)
NRBC BLD AUTO-RTO: 0 /100 WBCS — SIGNIFICANT CHANGE UP (ref 0–0)
PLATELET # BLD AUTO: 213 K/UL — SIGNIFICANT CHANGE UP (ref 150–400)
POTASSIUM SERPL-MCNC: 3.7 MMOL/L — SIGNIFICANT CHANGE UP (ref 3.5–5.3)
POTASSIUM SERPL-SCNC: 3.7 MMOL/L — SIGNIFICANT CHANGE UP (ref 3.5–5.3)
PROT SERPL-MCNC: 7.3 G/DL — SIGNIFICANT CHANGE UP (ref 6–8.3)
RBC # BLD: 5.07 M/UL — SIGNIFICANT CHANGE UP (ref 4.2–5.8)
RBC # FLD: 13.8 % — SIGNIFICANT CHANGE UP (ref 10.3–14.5)
SODIUM SERPL-SCNC: 142 MMOL/L — SIGNIFICANT CHANGE UP (ref 135–145)
URATE SERPL-MCNC: 8.8 MG/DL — SIGNIFICANT CHANGE UP (ref 3.4–8.8)
WBC # BLD: 9.75 K/UL — SIGNIFICANT CHANGE UP (ref 3.8–10.5)
WBC # FLD AUTO: 9.75 K/UL — SIGNIFICANT CHANGE UP (ref 3.8–10.5)

## 2025-04-09 PROCEDURE — 99284 EMERGENCY DEPT VISIT MOD MDM: CPT | Mod: 25

## 2025-04-09 PROCEDURE — 36415 COLL VENOUS BLD VENIPUNCTURE: CPT

## 2025-04-09 PROCEDURE — 73630 X-RAY EXAM OF FOOT: CPT

## 2025-04-09 PROCEDURE — 80053 COMPREHEN METABOLIC PANEL: CPT

## 2025-04-09 PROCEDURE — 73630 X-RAY EXAM OF FOOT: CPT | Mod: 26,RT

## 2025-04-09 PROCEDURE — 85025 COMPLETE CBC W/AUTO DIFF WBC: CPT

## 2025-04-09 PROCEDURE — 99284 EMERGENCY DEPT VISIT MOD MDM: CPT

## 2025-04-09 PROCEDURE — 73610 X-RAY EXAM OF ANKLE: CPT | Mod: 26,RT

## 2025-04-09 PROCEDURE — 84550 ASSAY OF BLOOD/URIC ACID: CPT

## 2025-04-09 PROCEDURE — 73610 X-RAY EXAM OF ANKLE: CPT

## 2025-04-09 RX ORDER — PREDNISONE 20 MG/1
40 TABLET ORAL ONCE
Refills: 0 | Status: COMPLETED | OUTPATIENT
Start: 2025-04-09 | End: 2025-04-09

## 2025-04-09 RX ORDER — OXYCODONE HYDROCHLORIDE 30 MG/1
5 TABLET ORAL ONCE
Refills: 0 | Status: DISCONTINUED | OUTPATIENT
Start: 2025-04-09 | End: 2025-04-09

## 2025-04-09 RX ORDER — ACETAMINOPHEN 500 MG/5ML
650 LIQUID (ML) ORAL ONCE
Refills: 0 | Status: COMPLETED | OUTPATIENT
Start: 2025-04-09 | End: 2025-04-09

## 2025-04-09 RX ORDER — OXYCODONE HYDROCHLORIDE 30 MG/1
1 TABLET ORAL
Qty: 8 | Refills: 0
Start: 2025-04-09

## 2025-04-09 RX ADMIN — OXYCODONE HYDROCHLORIDE 5 MILLIGRAM(S): 30 TABLET ORAL at 19:54

## 2025-04-09 RX ADMIN — PREDNISONE 40 MILLIGRAM(S): 20 TABLET ORAL at 19:53

## 2025-04-09 RX ADMIN — Medication 650 MILLIGRAM(S): at 18:06

## 2025-04-10 RX ORDER — PREDNISONE 20 MG/1
1 TABLET ORAL
Qty: 5 | Refills: 0
Start: 2025-04-10

## 2025-04-24 ENCOUNTER — INPATIENT (INPATIENT)
Facility: HOSPITAL | Age: 70
LOS: 12 days | Discharge: NURSING, REHAB OR LONG-TERM CARE | DRG: 229 | End: 2025-05-07
Attending: GENERAL ACUTE CARE HOSPITAL | Admitting: STUDENT IN AN ORGANIZED HEALTH CARE EDUCATION/TRAINING PROGRAM
Payer: COMMERCIAL

## 2025-04-24 VITALS
HEART RATE: 88 BPM | TEMPERATURE: 98 F | DIASTOLIC BLOOD PRESSURE: 94 MMHG | RESPIRATION RATE: 17 BRPM | HEIGHT: 77 IN | WEIGHT: 315 LBS | OXYGEN SATURATION: 99 % | SYSTOLIC BLOOD PRESSURE: 166 MMHG

## 2025-04-24 DIAGNOSIS — Z98.1 ARTHRODESIS STATUS: Chronic | ICD-10-CM

## 2025-04-24 DIAGNOSIS — N20.0 CALCULUS OF KIDNEY: Chronic | ICD-10-CM

## 2025-04-24 DIAGNOSIS — Z96.643 PRESENCE OF ARTIFICIAL HIP JOINT, BILATERAL: Chronic | ICD-10-CM

## 2025-04-24 DIAGNOSIS — G62.9 POLYNEUROPATHY, UNSPECIFIED: Chronic | ICD-10-CM

## 2025-04-24 DIAGNOSIS — Z98.89 OTHER SPECIFIED POSTPROCEDURAL STATES: Chronic | ICD-10-CM

## 2025-04-24 DIAGNOSIS — Z98.890 OTHER SPECIFIED POSTPROCEDURAL STATES: Chronic | ICD-10-CM

## 2025-04-24 DIAGNOSIS — Z96.651 PRESENCE OF RIGHT ARTIFICIAL KNEE JOINT: Chronic | ICD-10-CM

## 2025-04-24 DIAGNOSIS — Z90.49 ACQUIRED ABSENCE OF OTHER SPECIFIED PARTS OF DIGESTIVE TRACT: Chronic | ICD-10-CM

## 2025-04-24 DIAGNOSIS — Z96.653 PRESENCE OF ARTIFICIAL KNEE JOINT, BILATERAL: Chronic | ICD-10-CM

## 2025-04-24 DIAGNOSIS — Z90.5 ACQUIRED ABSENCE OF KIDNEY: Chronic | ICD-10-CM

## 2025-04-24 DIAGNOSIS — Z95.1 PRESENCE OF AORTOCORONARY BYPASS GRAFT: Chronic | ICD-10-CM

## 2025-04-24 LAB
ALBUMIN SERPL ELPH-MCNC: 4.3 G/DL — SIGNIFICANT CHANGE UP (ref 3.3–5)
ALP SERPL-CCNC: 103 U/L — SIGNIFICANT CHANGE UP (ref 40–120)
ALT FLD-CCNC: 14 U/L — SIGNIFICANT CHANGE UP (ref 10–45)
ANION GAP SERPL CALC-SCNC: 16 MMOL/L — SIGNIFICANT CHANGE UP (ref 5–17)
AST SERPL-CCNC: 16 U/L — SIGNIFICANT CHANGE UP (ref 10–40)
BASOPHILS # BLD AUTO: 0.08 K/UL — SIGNIFICANT CHANGE UP (ref 0–0.2)
BASOPHILS NFR BLD AUTO: 0.7 % — SIGNIFICANT CHANGE UP (ref 0–2)
BILIRUB SERPL-MCNC: 0.8 MG/DL — SIGNIFICANT CHANGE UP (ref 0.2–1.2)
BUN SERPL-MCNC: 18 MG/DL — SIGNIFICANT CHANGE UP (ref 7–23)
CALCIUM SERPL-MCNC: 9.9 MG/DL — SIGNIFICANT CHANGE UP (ref 8.4–10.5)
CHLORIDE SERPL-SCNC: 100 MMOL/L — SIGNIFICANT CHANGE UP (ref 96–108)
CO2 SERPL-SCNC: 24 MMOL/L — SIGNIFICANT CHANGE UP (ref 22–31)
CREAT SERPL-MCNC: 0.65 MG/DL — SIGNIFICANT CHANGE UP (ref 0.5–1.3)
EGFR: 102 ML/MIN/1.73M2 — SIGNIFICANT CHANGE UP
EGFR: 102 ML/MIN/1.73M2 — SIGNIFICANT CHANGE UP
EOSINOPHIL # BLD AUTO: 0.15 K/UL — SIGNIFICANT CHANGE UP (ref 0–0.5)
EOSINOPHIL NFR BLD AUTO: 1.2 % — SIGNIFICANT CHANGE UP (ref 0–6)
FLUAV AG NPH QL: SIGNIFICANT CHANGE UP
FLUBV AG NPH QL: SIGNIFICANT CHANGE UP
GAS PNL BLDV: SIGNIFICANT CHANGE UP
GLUCOSE SERPL-MCNC: 126 MG/DL — HIGH (ref 70–99)
HCT VFR BLD CALC: 46.2 % — SIGNIFICANT CHANGE UP (ref 39–50)
HGB BLD-MCNC: 15.4 G/DL — SIGNIFICANT CHANGE UP (ref 13–17)
IMM GRANULOCYTES NFR BLD AUTO: 0.6 % — SIGNIFICANT CHANGE UP (ref 0–0.9)
LYMPHOCYTES # BLD AUTO: 1.66 K/UL — SIGNIFICANT CHANGE UP (ref 1–3.3)
LYMPHOCYTES # BLD AUTO: 13.7 % — SIGNIFICANT CHANGE UP (ref 13–44)
MCHC RBC-ENTMCNC: 28.1 PG — SIGNIFICANT CHANGE UP (ref 27–34)
MCHC RBC-ENTMCNC: 33.3 G/DL — SIGNIFICANT CHANGE UP (ref 32–36)
MCV RBC AUTO: 84.2 FL — SIGNIFICANT CHANGE UP (ref 80–100)
MONOCYTES # BLD AUTO: 0.69 K/UL — SIGNIFICANT CHANGE UP (ref 0–0.9)
MONOCYTES NFR BLD AUTO: 5.7 % — SIGNIFICANT CHANGE UP (ref 2–14)
NEUTROPHILS # BLD AUTO: 9.51 K/UL — HIGH (ref 1.8–7.4)
NEUTROPHILS NFR BLD AUTO: 78.1 % — HIGH (ref 43–77)
NRBC BLD AUTO-RTO: 0 /100 WBCS — SIGNIFICANT CHANGE UP (ref 0–0)
NT-PROBNP SERPL-SCNC: 213 PG/ML — SIGNIFICANT CHANGE UP (ref 0–300)
PLATELET # BLD AUTO: 247 K/UL — SIGNIFICANT CHANGE UP (ref 150–400)
POTASSIUM SERPL-MCNC: 3.6 MMOL/L — SIGNIFICANT CHANGE UP (ref 3.5–5.3)
POTASSIUM SERPL-SCNC: 3.6 MMOL/L — SIGNIFICANT CHANGE UP (ref 3.5–5.3)
PROT SERPL-MCNC: 7.2 G/DL — SIGNIFICANT CHANGE UP (ref 6–8.3)
RBC # BLD: 5.49 M/UL — SIGNIFICANT CHANGE UP (ref 4.2–5.8)
RBC # FLD: 13.7 % — SIGNIFICANT CHANGE UP (ref 10.3–14.5)
RSV RNA NPH QL NAA+NON-PROBE: SIGNIFICANT CHANGE UP
SARS-COV-2 RNA SPEC QL NAA+PROBE: SIGNIFICANT CHANGE UP
SODIUM SERPL-SCNC: 140 MMOL/L — SIGNIFICANT CHANGE UP (ref 135–145)
SOURCE RESPIRATORY: SIGNIFICANT CHANGE UP
TROPONIN T, HIGH SENSITIVITY RESULT: 16 NG/L — SIGNIFICANT CHANGE UP (ref 0–51)
TROPONIN T, HIGH SENSITIVITY RESULT: 16 NG/L — SIGNIFICANT CHANGE UP (ref 0–51)
WBC # BLD: 12.16 K/UL — HIGH (ref 3.8–10.5)
WBC # FLD AUTO: 12.16 K/UL — HIGH (ref 3.8–10.5)

## 2025-04-24 PROCEDURE — 99285 EMERGENCY DEPT VISIT HI MDM: CPT

## 2025-04-24 PROCEDURE — 71045 X-RAY EXAM CHEST 1 VIEW: CPT | Mod: 26

## 2025-04-24 PROCEDURE — 93010 ELECTROCARDIOGRAM REPORT: CPT

## 2025-04-24 RX ADMIN — Medication 1000 MILLILITER(S): at 18:54

## 2025-04-24 NOTE — ED PROVIDER NOTE - ATTENDING CONTRIBUTION TO CARE
69-year-old male past medical history of hypertension, hyperlipidemia, diabetes, CAD s/p CABG, CHF presenting with Lightheadedness in the setting of a syncopal episode prior to arrival today when he went to sit down in a chair after smoking a pipe prior complaining of chest pressure prior to the syncopal episode had a similar situation several years ago but was related to being on too much blood pressure medications and was hypotensive at that time.  Patient was also exposed to someone COVID recently but no URI symptoms negative RVP EKG with no changes will likely need cardiac workup echo cardiac monitoring 69-year- CAD s/p CABG (3 vessels, 08/2017 at [facility]), HFrEF (LV systolic function grossly normal on TTE 09/2023), T2DM (not on insulin), ASIYA on CPAP, HTN, HLD, renal mass s/p resection who presents presenting with Lightheadedness in the setting of a syncopal episode prior to arrival today when he went to sit down in a chair after smoking a pipe prior complaining of chest pressure prior to the syncopal episode had a similar situation several years ago but was related to being on too much blood pressure medications and was hypotensive at that time.  Patient was also exposed to someone COVID recently but no URI symptoms negative RVP EKG with no changes will likely need cardiac workup echo cardiac monitoring

## 2025-04-24 NOTE — ED PROVIDER NOTE - RAPID ASSESSMENT
69-year-old male past medical history of hypertension, hyperlipidemia, diabetes, CAD s/p CABG, CHF presenting with shortness of breath.  Patient reports that he was outside smoking a cigar and felt well this afternoon.  Patient endorses walking into his house and immediately feeling lightheaded.  Lightheadedness was associated with chest pressure, shortness of breath and generalized weakness.    **Patient was rapidly assessed by me, Johny East PA-C. A limited history was obtained. The patient will be seen and further examined/worked up in the main ED and their care will be completed by the main ED team. Receiving team will follow up on labs, analgesia, any clinical imaging, and perform reassessment and disposition of the patient as clinically indicated. All decisions regarding the progression of care will be made at their discretion.

## 2025-04-24 NOTE — ED PROVIDER NOTE - PROGRESS NOTE DETAILS
Gonsalo JACOB PGY1: Unable to reach Dr. Cobb. Will admit to hospitalist Gonsalo JACOB PGY1: neg trop. Patient not CDU candidate for AM stress echo at this time due to history of CHF & CABG. Spoke with private cards, Dr. Jamie dewey to admit under Dr. Cobb.

## 2025-04-24 NOTE — ED ADULT NURSE REASSESSMENT NOTE - NS ED NURSE REASSESS COMMENT FT1
Report received from Kate MOSES. Pt AXOX4 breathing unlabored on room air and speaking in complete sentences. Pt updated on plan of care; awaiting dispo. Safety and comfort measures maintained. Bed in lowest position. Bed rails in appropriate positions. Call bell within reach.

## 2025-04-24 NOTE — ED PROVIDER NOTE - CADM POA PRESS ULCER
Called to inform parents of results. No answer. Left detailed VM including f/u appt and ent contact if there are concerns or questions.    ----- Message from Roldan Marques MD sent at 2022  2:34 PM CST -----  Patient is anatomic candidate for cochlear implant.  To be reviewed in clinic further.     No

## 2025-04-24 NOTE — ED ADULT TRIAGE NOTE - BMI (KG/M2)
"Anesthesia Post Evaluation    Patient: Ned Oliveira    Procedure(s) Performed: Procedure(s) (LRB):  REPAIR, HERNIA, INGUINAL, WITHOUT HISTORY OF PRIOR REPAIR, AGE 5 YEARS OR OLDER Open Right with Mesh (Right)    Final Anesthesia Type: general  Patient location during evaluation: PACU  Patient participation: Yes- Able to Participate  Level of consciousness: awake and alert  Post-procedure vital signs: reviewed and stable  Pain management: adequate  Airway patency: patent  PONV status at discharge: No PONV  Anesthetic complications: no      Cardiovascular status: blood pressure returned to baseline and hemodynamically stable  Respiratory status: unassisted, spontaneous ventilation and room air  Hydration status: euvolemic  Follow-up not needed.        Visit Vitals  /68   Pulse (!) 58   Temp 36.5 °C (97.7 °F) (Oral)   Resp 16   Ht 5' 4" (1.626 m)   Wt 65.3 kg (144 lb)   SpO2 97%   BMI 24.72 kg/m²       Pain/Sigrid Score: Pain Rating Prior to Med Admin: 4 (2/19/2019  8:25 AM)  Sigrid Score: 10 (2/19/2019  9:01 AM)        " 42.3

## 2025-04-24 NOTE — ED ADULT NURSE NOTE - NSFALLRISKINTERV_ED_ALL_ED
Assistance OOB with selected safe patient handling equipment if applicable/Assistance with ambulation/Communicate fall risk and risk factors to all staff, patient, and family/Monitor gait and stability/Provide visual cue: yellow wristband, yellow gown, etc/Reinforce activity limits and safety measures with patient and family/Call bell, personal items and telephone in reach/Instruct patient to call for assistance before getting out of bed/chair/stretcher/Non-slip footwear applied when patient is off stretcher/[place] to call system/Physically safe environment - no spills, clutter or unnecessary equipment/Purposeful Proactive Rounding/Room/bathroom lighting operational, light cord in reach

## 2025-04-24 NOTE — ED ADULT NURSE NOTE - OBJECTIVE STATEMENT
Patient presenting to ED for chest pressure and sob since 2pm today. Patient states he was coming in from smoking a cigar outside when the symptoms started. Hx HTN, HLD, DM, CAD s/p CABG, CHF. Patient also states that he was around someone over the weekend who tested positive for COVID. Patient A&Ox4, ambulatory with assist x1. Patient placed on cardiac monitor. No signs of acute distress at this time.

## 2025-04-25 DIAGNOSIS — R55 SYNCOPE AND COLLAPSE: ICD-10-CM

## 2025-04-25 LAB
GLUCOSE BLDC GLUCOMTR-MCNC: 138 MG/DL — HIGH (ref 70–99)
GLUCOSE BLDC GLUCOMTR-MCNC: 150 MG/DL — HIGH (ref 70–99)
GLUCOSE BLDC GLUCOMTR-MCNC: 159 MG/DL — HIGH (ref 70–99)
GLUCOSE BLDC GLUCOMTR-MCNC: 189 MG/DL — HIGH (ref 70–99)

## 2025-04-25 PROCEDURE — 70450 CT HEAD/BRAIN W/O DYE: CPT | Mod: 26

## 2025-04-25 PROCEDURE — 93970 EXTREMITY STUDY: CPT | Mod: 26

## 2025-04-25 RX ORDER — DEXTROSE 50 % IN WATER 50 %
25 SYRINGE (ML) INTRAVENOUS ONCE
Refills: 0 | Status: DISCONTINUED | OUTPATIENT
Start: 2025-04-25 | End: 2025-05-07

## 2025-04-25 RX ORDER — ISOSORBIDE MONONITRATE 60 MG/1
30 TABLET, EXTENDED RELEASE ORAL DAILY
Refills: 0 | Status: DISCONTINUED | OUTPATIENT
Start: 2025-04-25 | End: 2025-05-07

## 2025-04-25 RX ORDER — FLUOROURACIL 5 MG/G
1 CREAM TOPICAL
Refills: 0 | DISCHARGE

## 2025-04-25 RX ORDER — INFLUENZA A VIRUS A/IDAHO/07/2018 (H1N1) ANTIGEN (MDCK CELL DERIVED, PROPIOLACTONE INACTIVATED, INFLUENZA A VIRUS A/INDIANA/08/2018 (H3N2) ANTIGEN (MDCK CELL DERIVED, PROPIOLACTONE INACTIVATED), INFLUENZA B VIRUS B/SINGAPORE/INFTT-16-0610/2016 ANTIGEN (MDCK CELL DERIVED, PROPIOLACTONE INACTIVATED), INFLUENZA B VIRUS B/IOWA/06/2017 ANTIGEN (MDCK CELL DERIVED, PROPIOLACTONE INACTIVATED) 15; 15; 15; 15 UG/.5ML; UG/.5ML; UG/.5ML; UG/.5ML
0.5 INJECTION, SUSPENSION INTRAMUSCULAR ONCE
Refills: 0 | Status: DISCONTINUED | OUTPATIENT
Start: 2025-04-25 | End: 2025-05-07

## 2025-04-25 RX ORDER — INSULIN LISPRO 100 U/ML
INJECTION, SOLUTION INTRAVENOUS; SUBCUTANEOUS AT BEDTIME
Refills: 0 | Status: DISCONTINUED | OUTPATIENT
Start: 2025-04-25 | End: 2025-05-07

## 2025-04-25 RX ORDER — CARVEDILOL 3.12 MG/1
3.12 TABLET, FILM COATED ORAL EVERY 12 HOURS
Refills: 0 | Status: DISCONTINUED | OUTPATIENT
Start: 2025-04-25 | End: 2025-05-07

## 2025-04-25 RX ORDER — GABAPENTIN 400 MG/1
1 CAPSULE ORAL
Refills: 0 | DISCHARGE

## 2025-04-25 RX ORDER — ASPIRIN 325 MG
81 TABLET ORAL DAILY
Refills: 0 | Status: DISCONTINUED | OUTPATIENT
Start: 2025-04-25 | End: 2025-05-07

## 2025-04-25 RX ORDER — INSULIN LISPRO 100 U/ML
INJECTION, SOLUTION INTRAVENOUS; SUBCUTANEOUS
Refills: 0 | Status: DISCONTINUED | OUTPATIENT
Start: 2025-04-25 | End: 2025-05-07

## 2025-04-25 RX ORDER — B1/B2/B3/B5/B6/B12/VIT C/FOLIC 500-0.5 MG
1 TABLET ORAL DAILY
Refills: 0 | Status: DISCONTINUED | OUTPATIENT
Start: 2025-04-25 | End: 2025-05-07

## 2025-04-25 RX ORDER — SODIUM CHLORIDE 9 G/1000ML
1000 INJECTION, SOLUTION INTRAVENOUS
Refills: 0 | Status: DISCONTINUED | OUTPATIENT
Start: 2025-04-25 | End: 2025-05-07

## 2025-04-25 RX ORDER — LOSARTAN POTASSIUM 100 MG/1
50 TABLET, FILM COATED ORAL DAILY
Refills: 0 | Status: DISCONTINUED | OUTPATIENT
Start: 2025-04-25 | End: 2025-05-07

## 2025-04-25 RX ORDER — GABAPENTIN 400 MG/1
300 CAPSULE ORAL EVERY 8 HOURS
Refills: 0 | Status: DISCONTINUED | OUTPATIENT
Start: 2025-04-25 | End: 2025-05-07

## 2025-04-25 RX ORDER — GLUCAGON 3 MG/1
1 POWDER NASAL ONCE
Refills: 0 | Status: DISCONTINUED | OUTPATIENT
Start: 2025-04-25 | End: 2025-05-07

## 2025-04-25 RX ORDER — ISOSORBIDE MONONITRATE 60 MG/1
1 TABLET, EXTENDED RELEASE ORAL
Refills: 0 | DISCHARGE

## 2025-04-25 RX ORDER — FENOFIBRATE 160 MG/1
48 TABLET ORAL DAILY
Refills: 0 | Status: DISCONTINUED | OUTPATIENT
Start: 2025-04-25 | End: 2025-05-07

## 2025-04-25 RX ORDER — BUMETANIDE 1 MG/1
2 TABLET ORAL
Refills: 0 | Status: DISCONTINUED | OUTPATIENT
Start: 2025-04-25 | End: 2025-05-07

## 2025-04-25 RX ORDER — ATORVASTATIN CALCIUM 80 MG/1
40 TABLET, FILM COATED ORAL AT BEDTIME
Refills: 0 | Status: DISCONTINUED | OUTPATIENT
Start: 2025-04-25 | End: 2025-05-07

## 2025-04-25 RX ORDER — DEXTROSE 50 % IN WATER 50 %
12.5 SYRINGE (ML) INTRAVENOUS ONCE
Refills: 0 | Status: DISCONTINUED | OUTPATIENT
Start: 2025-04-25 | End: 2025-05-07

## 2025-04-25 RX ORDER — HEPARIN SODIUM 1000 [USP'U]/ML
5000 INJECTION INTRAVENOUS; SUBCUTANEOUS EVERY 8 HOURS
Refills: 0 | Status: DISCONTINUED | OUTPATIENT
Start: 2025-04-25 | End: 2025-05-07

## 2025-04-25 RX ORDER — DEXTROSE 50 % IN WATER 50 %
15 SYRINGE (ML) INTRAVENOUS ONCE
Refills: 0 | Status: DISCONTINUED | OUTPATIENT
Start: 2025-04-25 | End: 2025-05-07

## 2025-04-25 RX ADMIN — GABAPENTIN 300 MILLIGRAM(S): 400 CAPSULE ORAL at 21:27

## 2025-04-25 RX ADMIN — HEPARIN SODIUM 5000 UNIT(S): 1000 INJECTION INTRAVENOUS; SUBCUTANEOUS at 21:27

## 2025-04-25 RX ADMIN — Medication 100 MILLIGRAM(S): at 21:27

## 2025-04-25 RX ADMIN — BUMETANIDE 2 MILLIGRAM(S): 1 TABLET ORAL at 14:32

## 2025-04-25 RX ADMIN — CARVEDILOL 3.12 MILLIGRAM(S): 3.12 TABLET, FILM COATED ORAL at 17:28

## 2025-04-25 RX ADMIN — GABAPENTIN 300 MILLIGRAM(S): 400 CAPSULE ORAL at 14:32

## 2025-04-25 RX ADMIN — Medication 100 MILLILITER(S): at 19:26

## 2025-04-25 RX ADMIN — ISOSORBIDE MONONITRATE 30 MILLIGRAM(S): 60 TABLET, EXTENDED RELEASE ORAL at 14:32

## 2025-04-25 RX ADMIN — HEPARIN SODIUM 5000 UNIT(S): 1000 INJECTION INTRAVENOUS; SUBCUTANEOUS at 14:32

## 2025-04-25 RX ADMIN — INSULIN LISPRO 1: 100 INJECTION, SOLUTION INTRAVENOUS; SUBCUTANEOUS at 11:59

## 2025-04-25 RX ADMIN — LOSARTAN POTASSIUM 50 MILLIGRAM(S): 100 TABLET, FILM COATED ORAL at 17:28

## 2025-04-25 RX ADMIN — FENOFIBRATE 48 MILLIGRAM(S): 160 TABLET ORAL at 17:55

## 2025-04-25 RX ADMIN — Medication 1 TABLET(S): at 17:28

## 2025-04-25 RX ADMIN — ATORVASTATIN CALCIUM 40 MILLIGRAM(S): 80 TABLET, FILM COATED ORAL at 21:27

## 2025-04-25 RX ADMIN — Medication 81 MILLIGRAM(S): at 14:32

## 2025-04-25 NOTE — H&P ADULT - NSICDXFAMILYHX_GEN_ALL_CORE_FT
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 indicating definite hepatocellular carcinoma. 2. The 2.3 x 1.6 cm subcapsular lesion in segment 2/3 of the liver has imaging characteristic consistent with an hemangioma. 3. Atrophic liver with nodular surface morphology relating to cirrhosis. 4. A 2.5 x 2.1 cm nonenhancing cyst with a mural nodule in the superior pole of the left kidney is a Bosniak 2F cyst. It has been present since 2017.       5. Small right pleural effusion. Small ascites. Mild right basal atelectasis. 6. Other findings as described above. PROCEDURES:  CT-guided liver biopsy 6/10/2022 [facility]. PATHOLOGY:   FINAL DIAGNOSIS: 6/10/2022  Liver, core needle biopsies:     Hepatocellular carcinoma. Microscopic Examination:   Sections demonstrate core needle biopsies of liver parenchyma which are   involved by a malignant neoplasm. The neoplastic cells resemble   hepatocytes with nuclear pleomorphism, prominent nucleoli and abundant   cytoplasm. The background liver appears cirrhotic. Bile is present. A   battery of immunoperoxidase stains* is performed with adequate   controls. The tumor cells are strongly positive for CAM 5.2.  CDX2,   pancytokeratin, cytokeratin 7, cytokeratin 20 and TTF-1 are negative.    These findings are most consistent with hepatocellular carcinoma. GENETICS:  None    MOLECULAR:  None    ASSESSMENT/PLAN:    1: Diagnosis: 77-year-old gentleman with an incidental finding of a liver lesion with diagnosis showing hepatocellular carcinoma. Background of cirrhosis. See above history. Received TACE therapy at [address] [place] [address] 11/30/2022. Recent imaging including the March MRI shows progressive worsening including enlargement of the original tumor and multifocal liver disease. 2) Prognosis / Disease Status: Recurrent/progressive hepatocellular carcinoma as above. Discussed systemic treatment options. 3) Work-up:    Labs: CBC, CMP and alpha-fetoprotein , PT/INR, hepatitis B and C serologies. Serum ammonia level 3/9/2023               imaging: See above in particular the recent CT abdomen pelvis on 2/14 and more importantly the MRI of the abdomen on 2/28/2023   Procedures: Liver biopsy 6/10/2022   Consults: General surgery for port                              Nurse practitioner for chemotherapy teaching. Other: Assessment of patient's child/Domingo score  Encephalopathy grade =0  Ascites = slight on imaging therefore 2 points  Albumin =  PT INR=  Total bilirubin (per PBC only)=    4) Symptom Management: Reports some discomfort in his right abdomen. Adequate pain control taking oxycodone 5 mg 3 times daily. 5) Supportive care provided. Level of care is appropriate. Teaching done today. Reviewed his recurrent disease status and treatment options. Agrees to specific chemotherapy teaching which will be scheduled. 6) Treatment goal:      Treatment plan:     11/30/2022 [facility]ACE at [facility]. Failed. Now with multifocal disease in the liver. Discussed NCCN systemic treatment options:           (Preferred regimens)  -Atezolizumab with bevacizumab category 1 for patients with child-Domingo class A only.   Treatment plan entered 3/9/2023 with final approval pending calculation of his child Carlos Alberto Delacruzgrace score.  Tentative start date 3/22/2023. Needs port. -Tremelimumab-acti + durvalumab (category 1)    Other recommendations include sorafenib if child Dominog class A or lenvatinib, durvalumab    7) Medications reviewed. Prescriptions today: None            Orders Placed This Encounter   Medications    prochlorperazine (COMPAZINE) 10 MG tablet     Sig: Take 1 tablet by mouth every 6 hours as needed (nausea)     Dispense:  30 tablet     Refill:  1    ondansetron (ZOFRAN-ODT) 8 MG TBDP disintegrating tablet     Sig: Place 1 tablet under the tongue every 8 hours as needed for Nausea or Vomiting     Dispense:  10 tablet     Refill:  2          OARRS: Reviewed. Patient should have approximate i1-2 more weeks of his 5 mg oxycodone  Controlled Substance Monitoring:    Acute and Chronic Pain Monitoring:   RX Monitoring 2/22/2023   Attestation -   Periodic Controlled Substance Monitoring Possible medication side effects, risk of tolerance/dependence & alternative treatments discussed. 8) Research Options:      Not applicable      9) Other:       Patient evaluated by neurology 1/10/2023 at [facility] due to 1 to 2 years of minimally progressive cognitive decline. Mental status testing showed evidence of some cognitive impairment but did not meet criteria for dementia and 0.6 mostly to frontal subcortical regional impairments. Recommendations included no change in medications. Discussed physical activity and speech therapy. Check labs with B vitamins and repeat ammonia level. 10) Follow Up: Follow-up 3/22 to start Tecentriq with bevacizumab every 3 weeks. Follow-up with me April 12 for course #2. Armida Alfredo MD                                        Child-Domingo Score  Parameters                                 Score/Points    1              2             3  Encephalopathy grade                                     none         1-2           3-4  Ascites absent      slight        moderate  Albumin gm/dl                                                   >3.5          2.8-3.5    <2.8  Prothrombin time      Seconds over control                                     <4             4-6           >6      INR                                                                 <1.7         4-10         > 10  Bilirubin mg/dl (for PBC)    Class A = 5-6 points  Class B = 7-9  Class C = 10-15

## 2025-04-25 NOTE — DIETITIAN INITIAL EVALUATION ADULT - PERTINENT MEDS FT
MEDICATIONS  (STANDING):  allopurinol 100 milliGRAM(s) Oral at bedtime  aspirin enteric coated 81 milliGRAM(s) Oral daily  atorvastatin 40 milliGRAM(s) Oral at bedtime  buMETAnide 2 milliGRAM(s) Oral two times a day  carvedilol 3.125 milliGRAM(s) Oral every 12 hours  dextrose 5%. 1000 milliLiter(s) (100 mL/Hr) IV Continuous <Continuous>  dextrose 5%. 1000 milliLiter(s) (50 mL/Hr) IV Continuous <Continuous>  dextrose 50% Injectable 25 Gram(s) IV Push once  dextrose 50% Injectable 12.5 Gram(s) IV Push once  dextrose 50% Injectable 25 Gram(s) IV Push once  fenofibrate Tablet 48 milliGRAM(s) Oral daily  gabapentin 300 milliGRAM(s) Oral every 8 hours  glucagon  Injectable 1 milliGRAM(s) IntraMuscular once  heparin   Injectable 5000 Unit(s) SubCutaneous every 8 hours  influenza  Vaccine (HIGH DOSE) 0.5 milliLiter(s) IntraMuscular once  insulin lispro (ADMELOG) corrective regimen sliding scale   SubCutaneous three times a day before meals  insulin lispro (ADMELOG) corrective regimen sliding scale   SubCutaneous at bedtime  isosorbide   mononitrate ER Tablet (IMDUR) 30 milliGRAM(s) Oral daily  losartan 50 milliGRAM(s) Oral daily  multivitamin 1 Tablet(s) Oral daily    MEDICATIONS  (PRN):  dextrose Oral Gel 15 Gram(s) Oral once PRN Blood Glucose LESS THAN 70 milliGRAM(s)/deciliter

## 2025-04-25 NOTE — PHYSICAL THERAPY INITIAL EVALUATION ADULT - RISK REDUCTION/PREVENTION, PT EVAL
PT recommending home health and rolling walker. Met with pt and he declined home health. He stated he will be going to his daughter's house. Notified Dr. Clara Crystal. Rolling walker from [employer] delivered to pt at bedside and he signed for it. Is son is at bedside and will transport pt home.           Kristian Rios BSN RN  Care Management  Pager: [phone] risk factors

## 2025-04-25 NOTE — PATIENT PROFILE ADULT - FALL HARM RISK - HARM RISK INTERVENTIONS
Assistance with ambulation/Assistance OOB with selected safe patient handling equipment/Communicate Risk of Fall with Harm to all staff/Discuss with provider need for PT consult/Monitor gait and stability/Provide patient with walking aids - walker, cane, crutches/Reinforce activity limits and safety measures with patient and family/Sit up slowly, dangle for a short time, stand at bedside before walking/Tailored Fall Risk Interventions/Visual Cue: Yellow wristband and red socks/Bed in lowest position, wheels locked, appropriate side rails in place/Call bell, personal items and telephone in reach/Instruct patient to call for assistance before getting out of bed or chair/Non-slip footwear when patient is out of bed/[place] to call system/Physically safe environment - no spills, clutter or unnecessary equipment/Purposeful Proactive Rounding/Room/bathroom lighting operational, light cord in reach

## 2025-04-25 NOTE — DIETITIAN INITIAL EVALUATION ADULT - NSFNSADHERENCEPTAFT_GEN_A_CORE
Congestive Heart Failure: on Bumex per H&P  DM: on Metformin and glimepiride to regulate blood glucose PTA. No recent HbA1c in file  On Prednisone per H&P (might affect blood glucose)

## 2025-04-25 NOTE — PHARMACOTHERAPY INTERVENTION NOTE - COMMENTS
Performed medication reconciliation and home medication list updated in prescription writer/outpatient medication review. Medications verified with patient and pharmacy.     Home Pharmacy: [facility] in [facility]  Allergies: None, confirmed    Home Medications:  allopurinol 100 mg oral tablet: 1 tab(s) orally once a day (at bedtime)  aspirin 81 mg oral delayed release tablet: 1 tab(s) orally once a day  atorvastatin 40 mg oral tablet: 1 tab(s) orally once a day (at bedtime)  bumetanide 2 mg oral tablet: 1 tab(s) orally 2 times a day  calcium with vitamin D3: 1 tab orally once a day  carvedilol 3.125 mg oral tablet: 1 tab(s) orally every 12 hours  Centrum multivitamin 1 tab daily:   fenofibrate 54 mg oral tablet: 1 tab(s) orally once a day  fluorouracil 5% topical cream: Apply topically to affected area 2 times a day (face)  gabapentin 300 mg oral capsule: 1 cap(s) orally 4 times a day  glimepiride 2 mg oral tablet: 1 tab(s) orally once a day with breakfast  isosorbide mononitrate 30 mg oral tablet, extended release: 1 tab(s) orally once a day (in the morning)  losartan 50 mg oral tablet: 1 tab(s) orally once a day  magnesium oxide 400 mg oral tablet: 2 tab(s) orally once a day  metFORMIN 500 mg oral tablet, extended release: 1 tab(s) orally 2 times a day  Vitamin B12 1000 mcg daily:     ------------------------------------------------------------------------------------------------------------     MEDICATIONS  (STANDING):  allopurinol 100 milliGRAM(s) Oral at bedtime  aspirin enteric coated 81 milliGRAM(s) Oral daily  atorvastatin 40 milliGRAM(s) Oral at bedtime  buMETAnide 2 milliGRAM(s) Oral two times a day  carvedilol 3.125 milliGRAM(s) Oral every 12 hours  gabapentin 300 milliGRAM(s) Oral every 8 hours  isosorbide   mononitrate ER Tablet (IMDUR) 30 milliGRAM(s) Oral daily  losartan 50 milliGRAM(s) Oral daily    ------------------------------------------------------------------------------------------------------------     Recommendations:   Add fenofibrate 48 mg (available in pharmacy) once a day as alternative to patient's home dose.  Add multivitamin, magox 800mg, vitamin b12.  Increase gabapentin 300mg 3 times a day to 4 times a day.    Duc (Alden Cotton - PharmD, BCPS  Transitions of Care Pharmacist  Available on [employer] Teams (Preferred)

## 2025-04-25 NOTE — DIETITIAN INITIAL EVALUATION ADULT - DIET TYPE
consider DASH Consistent Carbohydrate diet to regulate blood glucose in house, fluids per team. RD remains available to adjust diet as needed.

## 2025-04-25 NOTE — DIETITIAN INITIAL EVALUATION ADULT - ENERGY INTAKE
Pt reports good appetite/PO intake for breakfast today, eating french toast, egg and juice. Menu at bedside, pt is aware of menu ordering procedure in house, has been ordering preferred foods as needed since admission. Pt declining protein supplements and fortified foods when offered, will offer trial of Liquid Protein Supplement 1x daily (100 kcal, 15 gm protein).

## 2025-04-25 NOTE — DIETITIAN INITIAL EVALUATION ADULT - OTHER CALCULATIONS
Fluid needs deferred to team. Energy and protein needs based on lBW of 208lb in consideration of BMI >40 and Increased nutrient needs.

## 2025-04-25 NOTE — PHYSICAL THERAPY INITIAL EVALUATION ADULT - PERTINENT HX OF CURRENT PROBLEM, REHAB EVAL
69yoM PMHx CAD s/p CABG (3 vessels, 08/2017 at [facility]), HFrEF (LV systolic function grossly normal on TTE 09/2023), T2DM (not on insulin), ASIYA on CPAP, HTN, HLD, renal mass s/p resection p/w shortness of breath.  Patient reports that he was outside smoking a cigar and felt well this afternoon.  Patient endorses walking into his house and immediately feeling lightheaded.  Lightheadedness was associated with chest pressure, shortness of breath and generalized weakness.    Pending CT head. dopplers of LE's. states ambulated earlier to bathroom    Dx:	Lightheadedness/SOB/syncope- HsT neg x2, no EKG changes

## 2025-04-25 NOTE — DIETITIAN INITIAL EVALUATION ADULT - ADD RECOMMEND
-- Continue MVI with addition of Vitamin C, pending no medical contraindications, for micronutrient support/aid wound healing.   -- Monitor PO intake, GI tolerance, skin integrity, labs, weight, and bowel movement regularity.   -- Honor dietary preferences as expressed as able.   -- BMI >40 alert placed in chart.

## 2025-04-25 NOTE — DIETITIAN INITIAL EVALUATION ADULT - OTHER INFO
- DM: ordered for ISS to regulate blood glucose in house  - Congestive Heart Failure: Bumex  - Micronutrient/Other supplementation: MVI

## 2025-04-25 NOTE — DIETITIAN INITIAL EVALUATION ADULT - PERTINENT LABORATORY DATA
04-24    140  |  100  |  18  ----------------------------<  126[H]  3.6   |  24  |  0.65    Ca    9.9      24 Apr 2025 17:41    TPro  7.2  /  Alb  4.3  /  TBili  0.8  /  DBili  x   /  AST  16  /  ALT  14  /  AlkPhos  103  04-24    CAPILLARY BLOOD GLUCOSE  POCT Blood Glucose.: 189 mg/dL (25 Apr 2025 11:46)  POCT Blood Glucose.: 159 mg/dL (25 Apr 2025 07:25)

## 2025-04-25 NOTE — H&P ADULT - ASSESSMENT
69 Male PMH CAD s/p CABG (3 vessels, 08/2017 at [facility]), HF, ASIYA, T2DM (not on insulin), ASIYA on CPAP, HTN, HLD, renal mass s/p resection  presenting with cp /near syncope   pt had been in his usual state of state yesterday   went shopping and ran some errands   came back home around 2 pm    had a ciggar outside  and started feeling " lightheaded ... as he was going back to sit in his chair he " felt " about to pass out .. but no focal neuro complaints ..   sat dwn in chair , called family for help   started feeling substernal cp /sob   cp : 5/6 substeral , no palpiations .. no radiation , assoitcated with sob   pain ahd been present since onset till this am  however severity has subsided to 3/10  FS at the time presuambly was NL ( pt has a sensor )     no fever   no chills  no cough   no N/v/D  ACS ruled out   admitted for further palafox     near syncope : cont tele   neuro check   check CT head   orthostatics   monitor FS     chest pain : atypical   check TTE  cont tele   pt did not have pain when he was found to have CAD and needeCABG !     Hx of CHF ; cont current bumex   not in [place] chf   fu with cardio: d/w      dyspnea : consitder PE   check [facility] duplex       DM: monitor FS     ASIYA : cont bipap

## 2025-04-25 NOTE — DIETITIAN INITIAL EVALUATION ADULT - PHYSCIAL ASSESSMENT
Drug Dosing Weight  Height (cm): 195.6 (24 Apr 2025 16:13)  Weight (kg): 161.9 (24 Apr 2025 16:13)  BMI (kg/m2): 42.3 (24 Apr 2025 16:13)    Daily weight (standing or bed scale): none documented thus far     Weight history:   Previous RD notes: 392lb (1/26/24), 375lb (11/10/23), 388lb (3/23/23), 380lb (3/16/21)   Per pt: 357lb/162kg PTA, reports weight himself everyday due to Heart Failure. States he was 403lb during maria esther, reports weight loss for 4 months through dietary change: 46lb/11.4%   ** Weight fluctuations expected in setting of fluid shifts with Heart Failure. RD will continue to monitor wt trends.     IBW: 208lb, 172% IBW/obese

## 2025-04-25 NOTE — DIETITIAN INITIAL EVALUATION ADULT - EDUCATION DIETARY MODIFICATIONS
Encouraged PO intake as tolerated with emphasis on protein foods as tolerated. Educated pt on foods rich in protein and encouraged consumption as able. Reviewed CHF diet education. Discussed Na restriction, foods high in Na to avoid, reading food labels, tips for limiting Na in your diet. Reviewed daily weights, Wt gain parameters to contact MD. Discussed Na intake in relation to fluid retention, edema and Wt gain. Pt verbalized understanding but declines Heart Failure Nutrition Therapy Handout when offered, made aware RD remains available upon request and will follow-up per protocol./(1) partially meets; needs review/practice/verbalization

## 2025-04-25 NOTE — DIETITIAN INITIAL EVALUATION ADULT - ORAL NUTRITION SUPPLEMENTS
Recommend trial of Liquid Protein Supplement 1x daily (100 kcal, 15 gm protein) to provide additional calories and nutrients to encourage PO intake while in house/aid wound healing.

## 2025-04-25 NOTE — H&P ADULT - HISTORY OF PRESENT ILLNESS
69 Male PMH CAD s/p CABG (3 vessels, 08/2017 at [facility]), HF, T2DM (not on insulin), ASIYA on CPAP, HTN, HLD, renal mass s/p resection who presents presenting with Lightheadedness in the setting of a syncopal episode prior to arrival today when he went to sit down in a chair after smoking a pipe prior complaining of chest pressure prior to the syncopal episode had a similar situation several years ago but was related to being on too much blood pressure medications and was hypotensive at that time.  Patient was also exposed to someone COVID recently but no URI symptoms negative RVP EKG with no changes will likely need cardiac workup echo cardiac monitoring. 69 Male PMH CAD s/p CABG (3 vessels, 08/2017 at [facility]), HF, ASIYA, T2DM (not on insulin), ASIYA on CPAP, HTN, HLD, renal mass s/p resection  presenting with cp /near syncope   pt had been in his usual state of state yesterday   went shopping and ran some errands   came back home around 2 pm    had a ciggar outside  and started feeling " lightheaded ... as he was going back to sit in his chair he " felt " about to pass out .. but no focal neuro complaints ..   sat dwn in chair , called family for help   started feeling substernal cp /sob   cp : 5/6 substeral , no palpiations .. no radiation , assoitcated with sob   pain ahd been present since onset till this am  however severity has subsided to 3/10  FS at the time presuambly was NL ( pt has a sensor )     no fever   no chills  no cough   no N/v/D  ACS ruled out   admitted for further palafox

## 2025-04-25 NOTE — CONSULT NOTE ADULT - SUBJECTIVE AND OBJECTIVE BOX
Olaf Zhao MD  Interventional Cardiology / Endovascular Specialist  [place] Office : [address] [place] [address] [place] [address]  Tel:   [place] Office : [address] [facility] [place] [address]  Tel: [phone]    H&P  69 Male PMH CAD s/p CABG (3 vessels, 2017 at [facility]), HF, ASIYA, T2DM (not on insulin), ASIYA on CPAP, HTN, HLD, renal mass s/p resection  presenting with cp /near syncope   pt had been in his usual state of state yesterday   went shopping and ran some errands   came back home around 2 pm    had a ciggar outside  and started feeling " lightheaded ... as he was going back to sit in his chair he " felt " about to pass out .. but no focal neuro complaints ..   sat dwn in chair , called family for help   started feeling substernal cp /sob   cp : 5/6 substeral , no palpiations .. no radiation , assoitcated with sob   pain ahd been present since onset till this am  however severity has subsided to 3/10  FS at the time presuambly was NL ( pt has a sensor )     no fever   no chills  no cough   no N/v/D  ACS ruled out   admitted for further palafox    HISTORY OF PRESENTING ILLNESS:      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      Essential hypertension      CAD (coronary artery disease)  - s/p cabg x3      Hypercholesterolemia      ASIYA (obstructive sleep apnea)  initially dx  ; CPAP      Paralytic ileus  post op THR  & post op laminectomy      Type 2 diabetes mellitus      Right carpal tunnel syndrome      Cubital tunnel syndrome, right      Trigger finger of right hand      Morbid obesity with body mass index (BMI) of 40.0 or higher      H/O CHF      Kidney mass      Cervical herniated disc      Renal cancer      Disease of spinal cord, unspecified      Peripheral neuropathy      Myelopathy      Morbid obesity      S/P Left Inguinal Hernia Repair      History of Total Hip Replacement  - bilateral THR      S/P tonsillectomy and adenoidectomy  as child      H/O lithotripsy  x1      S/P revision of total knee, right  x2-  &       S/P lumbar discectomy  - ,L5  Aug 2013      S/P CABG x 3  17      S/P lumbar laminectomy  Fusion L3-L5       Kidney stone  s/w ESWL pt unsure site      Neuropathy  Bilateral Feet since 2012      History of bilateral hip replacements      History of hernia repair      History of lumbar fusion      History of cholecystectomy      History of bilateral knee replacement      History of lymph node biopsy      S/p bilateral carpal tunnel release      History of partial nephrectomy      H/O elbow surgery      History of thoracic spinal fusion          SOCIAL HISTORY: Substance Use (street drugs): ( x ) never used  (  ) other:    FAMILY HISTORY:  Family history of coronary artery disease  HLD/Angina. Fatal MI age 73.    Family history of diabetes mellitus type II  mother- - hyperlipidemia and Hypertension.    Family history of pancreatic cancer (Sibling)  brother     Family history of coronary artery disease  brother- age 50+- Coronary Artery Stents        REVIEW OF SYSTEMS:  CONSTITUTIONAL: No fever, weight loss, or fatigue  EYES: No eye pain, visual disturbances, or discharge  ENMT:  No difficulty hearing, tinnitus, vertigo; No sinus or throat pain  BREASTS: No pain, masses, or nipple discharge  GASTROINTESTINAL: No abdominal or epigastric pain. No nausea, vomiting, or hematemesis; No diarrhea or constipation. No melena or hematochezia.  GENITOURINARY: No dysuria, frequency, hematuria, or incontinence  NEUROLOGICAL: No headaches, memory loss, loss of strength, numbness, or tremors  ENDOCRINE: No heat or cold intolerance; No hair loss  MUSCULOSKELETAL: No joint pain or swelling; No muscle, back, or extremity pain  PSYCHIATRIC: No depression, anxiety, mood swings, or difficulty sleeping  HEME/LYMPH: No easy bruising, or bleeding gums  All others negative    MEDICATIONS:  aspirin enteric coated 81 milliGRAM(s) Oral daily  buMETAnide 2 milliGRAM(s) Oral two times a day  carvedilol 3.125 milliGRAM(s) Oral every 12 hours  heparin   Injectable 5000 Unit(s) SubCutaneous every 8 hours  isosorbide   mononitrate ER Tablet (IMDUR) 30 milliGRAM(s) Oral daily  losartan 50 milliGRAM(s) Oral daily        gabapentin 300 milliGRAM(s) Oral every 8 hours      allopurinol 100 milliGRAM(s) Oral at bedtime  atorvastatin 40 milliGRAM(s) Oral at bedtime  dextrose 50% Injectable 25 Gram(s) IV Push once  dextrose 50% Injectable 12.5 Gram(s) IV Push once  dextrose 50% Injectable 25 Gram(s) IV Push once  dextrose Oral Gel 15 Gram(s) Oral once PRN  fenofibrate Tablet 48 milliGRAM(s) Oral daily  glucagon  Injectable 1 milliGRAM(s) IntraMuscular once  insulin lispro (ADMELOG) corrective regimen sliding scale   SubCutaneous three times a day before meals  insulin lispro (ADMELOG) corrective regimen sliding scale   SubCutaneous at bedtime    dextrose 5%. 1000 milliLiter(s) IV Continuous <Continuous>  dextrose 5%. 1000 milliLiter(s) IV Continuous <Continuous>  influenza  Vaccine (HIGH DOSE) 0.5 milliLiter(s) IntraMuscular once  multivitamin 1 Tablet(s) Oral daily      FAMILY HISTORY:  Family history of coronary artery disease  HLD/Angina. Fatal MI age 73.    Family history of diabetes mellitus type II  mother- - hyperlipidemia and Hypertension.    Family history of pancreatic cancer (Sibling)  brother     Family history of coronary artery disease  brother- age 50+- Coronary Artery Stents          Allergies    No Known Allergies    Intolerances    	      PHYSICAL EXAM:  T(C): 36.4 (25 @ 11:21), Max: 36.7 (25 @ 16:13)  HR: 64 (25 @ 14:04) (64 - 88)  BP: 158/80 (25 @ 14:04) (134/80 - 172/80)  RR: 18 (25 @ 14:04) (16 - 18)  SpO2: 97% (25 @ 14:04) (95% - 99%)  Wt(kg): --  I&O's Summary      GENERAL: NAD  EYES: conjunctiva and sclera clear  ENMT: No tonsillar erythema, exudates, or enlargement  Cardiovascular: Normal S1 S2, No JVD, No murmurs, No edema  Respiratory: Lungs clear to auscultation	  Gastrointestinal:  Soft, Non-tender, + BS	    LABS:	 	    CARDIAC MARKERS:                                  15.4   12.16 )-----------( 247      ( 2025 17:41 )             46.2     -    140  |  100  |  18  ----------------------------<  126[H]  3.6   |  24  |  0.65    Ca    9.9      2025 17:41    TPro  7.2  /  Alb  4.3  /  TBili  0.8  /  DBili  x   /  AST  16  /  ALT  14  /  AlkPhos  103  04-24    proBNP:   Lipid Profile:   HgA1c:   TSH:     Consultant(s) Notes Reviewed:  [x ] YES  [ ] NO    Care Discussed with Consultants/Other Providers [ x] YES  [ ] NO    Imaging Personally Reviewed independently:  [x] YES  [ ] NO    All labs, radiologic studies, vitals, orders and medications list reviewed. Patient is seen and examined at bedside. Case discussed with medical team.    ASSESSMENT/PLAN:

## 2025-04-25 NOTE — DIETITIAN INITIAL EVALUATION ADULT - ORAL INTAKE PTA/DIET HISTORY
Pt confirms no known food allergies, was eating well with no changes in appetite. Pt reports following therapeutic diet and eating healthier food choices at home, cut down on sweets (cookies, cake, chocolate), use salt free seasoning instead of salt and limit fluids consumption. Denies chewing or swallowing issues. Denies GI distress. Denies protein supplement use, on Centrum MVI, Vitamin B12, Calcium + Vitamin D per H&P.

## 2025-04-26 LAB
A1C WITH ESTIMATED AVERAGE GLUCOSE RESULT: 6.9 % — HIGH (ref 4–5.6)
ALBUMIN SERPL ELPH-MCNC: 3.6 G/DL — SIGNIFICANT CHANGE UP (ref 3.3–5)
ALP SERPL-CCNC: 82 U/L — SIGNIFICANT CHANGE UP (ref 40–120)
ALT FLD-CCNC: 12 U/L — SIGNIFICANT CHANGE UP (ref 10–45)
AMPHET UR-MCNC: NEGATIVE — SIGNIFICANT CHANGE UP
ANION GAP SERPL CALC-SCNC: 13 MMOL/L — SIGNIFICANT CHANGE UP (ref 5–17)
AST SERPL-CCNC: 12 U/L — SIGNIFICANT CHANGE UP (ref 10–40)
BARBITURATES UR SCN-MCNC: NEGATIVE — SIGNIFICANT CHANGE UP
BASOPHILS # BLD AUTO: 0.05 K/UL — SIGNIFICANT CHANGE UP (ref 0–0.2)
BASOPHILS NFR BLD AUTO: 0.7 % — SIGNIFICANT CHANGE UP (ref 0–2)
BENZODIAZ UR-MCNC: NEGATIVE — SIGNIFICANT CHANGE UP
BILIRUB SERPL-MCNC: 0.6 MG/DL — SIGNIFICANT CHANGE UP (ref 0.2–1.2)
BUN SERPL-MCNC: 14 MG/DL — SIGNIFICANT CHANGE UP (ref 7–23)
CALCIUM SERPL-MCNC: 9 MG/DL — SIGNIFICANT CHANGE UP (ref 8.4–10.5)
CHLORIDE SERPL-SCNC: 104 MMOL/L — SIGNIFICANT CHANGE UP (ref 96–108)
CHOLEST SERPL-MCNC: 135 MG/DL — SIGNIFICANT CHANGE UP
CO2 SERPL-SCNC: 24 MMOL/L — SIGNIFICANT CHANGE UP (ref 22–31)
COCAINE METAB.OTHER UR-MCNC: NEGATIVE — SIGNIFICANT CHANGE UP
CREAT SERPL-MCNC: 0.57 MG/DL — SIGNIFICANT CHANGE UP (ref 0.5–1.3)
EGFR: 106 ML/MIN/1.73M2 — SIGNIFICANT CHANGE UP
EGFR: 106 ML/MIN/1.73M2 — SIGNIFICANT CHANGE UP
EOSINOPHIL # BLD AUTO: 0.15 K/UL — SIGNIFICANT CHANGE UP (ref 0–0.5)
EOSINOPHIL NFR BLD AUTO: 2 % — SIGNIFICANT CHANGE UP (ref 0–6)
ESTIMATED AVERAGE GLUCOSE: 151 MG/DL — HIGH (ref 68–114)
FOLATE SERPL-MCNC: 14.9 NG/ML — SIGNIFICANT CHANGE UP
GLUCOSE BLDC GLUCOMTR-MCNC: 134 MG/DL — HIGH (ref 70–99)
GLUCOSE BLDC GLUCOMTR-MCNC: 150 MG/DL — HIGH (ref 70–99)
GLUCOSE BLDC GLUCOMTR-MCNC: 158 MG/DL — HIGH (ref 70–99)
GLUCOSE BLDC GLUCOMTR-MCNC: 250 MG/DL — HIGH (ref 70–99)
GLUCOSE SERPL-MCNC: 130 MG/DL — HIGH (ref 70–99)
HCT VFR BLD CALC: 40.4 % — SIGNIFICANT CHANGE UP (ref 39–50)
HDLC SERPL-MCNC: 27 MG/DL — LOW
HGB BLD-MCNC: 13.4 G/DL — SIGNIFICANT CHANGE UP (ref 13–17)
IMM GRANULOCYTES NFR BLD AUTO: 0.3 % — SIGNIFICANT CHANGE UP (ref 0–0.9)
LDLC SERPL-MCNC: 70 MG/DL — SIGNIFICANT CHANGE UP
LIPID PNL WITH DIRECT LDL SERPL: 70 MG/DL — SIGNIFICANT CHANGE UP
LYMPHOCYTES # BLD AUTO: 1.6 K/UL — SIGNIFICANT CHANGE UP (ref 1–3.3)
LYMPHOCYTES # BLD AUTO: 21.1 % — SIGNIFICANT CHANGE UP (ref 13–44)
MAGNESIUM SERPL-MCNC: 1.8 MG/DL — SIGNIFICANT CHANGE UP (ref 1.6–2.6)
MCHC RBC-ENTMCNC: 28 PG — SIGNIFICANT CHANGE UP (ref 27–34)
MCHC RBC-ENTMCNC: 33.2 G/DL — SIGNIFICANT CHANGE UP (ref 32–36)
MCV RBC AUTO: 84.3 FL — SIGNIFICANT CHANGE UP (ref 80–100)
METHADONE UR-MCNC: NEGATIVE — SIGNIFICANT CHANGE UP
MONOCYTES # BLD AUTO: 0.56 K/UL — SIGNIFICANT CHANGE UP (ref 0–0.9)
MONOCYTES NFR BLD AUTO: 7.4 % — SIGNIFICANT CHANGE UP (ref 2–14)
NEUTROPHILS # BLD AUTO: 5.19 K/UL — SIGNIFICANT CHANGE UP (ref 1.8–7.4)
NEUTROPHILS NFR BLD AUTO: 68.5 % — SIGNIFICANT CHANGE UP (ref 43–77)
NONHDLC SERPL-MCNC: 109 MG/DL — SIGNIFICANT CHANGE UP
NRBC BLD AUTO-RTO: 0 /100 WBCS — SIGNIFICANT CHANGE UP (ref 0–0)
OPIATES UR-MCNC: NEGATIVE — SIGNIFICANT CHANGE UP
OXYCODONE UR-MCNC: NEGATIVE — SIGNIFICANT CHANGE UP
PCP SPEC-MCNC: SIGNIFICANT CHANGE UP
PCP UR-MCNC: NEGATIVE — SIGNIFICANT CHANGE UP
PLATELET # BLD AUTO: 203 K/UL — SIGNIFICANT CHANGE UP (ref 150–400)
POTASSIUM SERPL-MCNC: 3.4 MMOL/L — LOW (ref 3.5–5.3)
POTASSIUM SERPL-SCNC: 3.4 MMOL/L — LOW (ref 3.5–5.3)
PROT SERPL-MCNC: 6.3 G/DL — SIGNIFICANT CHANGE UP (ref 6–8.3)
RBC # BLD: 4.79 M/UL — SIGNIFICANT CHANGE UP (ref 4.2–5.8)
RBC # FLD: 13.8 % — SIGNIFICANT CHANGE UP (ref 10.3–14.5)
SODIUM SERPL-SCNC: 141 MMOL/L — SIGNIFICANT CHANGE UP (ref 135–145)
THC UR QL: NEGATIVE — SIGNIFICANT CHANGE UP
TRIGL SERPL-MCNC: 236 MG/DL — HIGH
TSH SERPL-MCNC: 1.18 UIU/ML — SIGNIFICANT CHANGE UP (ref 0.27–4.2)
VIT B12 SERPL-MCNC: 844 PG/ML — SIGNIFICANT CHANGE UP (ref 232–1245)
WBC # BLD: 7.57 K/UL — SIGNIFICANT CHANGE UP (ref 3.8–10.5)
WBC # FLD AUTO: 7.57 K/UL — SIGNIFICANT CHANGE UP (ref 3.8–10.5)

## 2025-04-26 PROCEDURE — 93010 ELECTROCARDIOGRAM REPORT: CPT

## 2025-04-26 RX ADMIN — Medication 81 MILLIGRAM(S): at 12:43

## 2025-04-26 RX ADMIN — GABAPENTIN 300 MILLIGRAM(S): 400 CAPSULE ORAL at 14:53

## 2025-04-26 RX ADMIN — HEPARIN SODIUM 5000 UNIT(S): 1000 INJECTION INTRAVENOUS; SUBCUTANEOUS at 20:57

## 2025-04-26 RX ADMIN — GABAPENTIN 300 MILLIGRAM(S): 400 CAPSULE ORAL at 05:11

## 2025-04-26 RX ADMIN — HEPARIN SODIUM 5000 UNIT(S): 1000 INJECTION INTRAVENOUS; SUBCUTANEOUS at 05:11

## 2025-04-26 RX ADMIN — CARVEDILOL 3.12 MILLIGRAM(S): 3.12 TABLET, FILM COATED ORAL at 17:27

## 2025-04-26 RX ADMIN — Medication 100 MILLIGRAM(S): at 20:57

## 2025-04-26 RX ADMIN — LOSARTAN POTASSIUM 50 MILLIGRAM(S): 100 TABLET, FILM COATED ORAL at 05:11

## 2025-04-26 RX ADMIN — GABAPENTIN 300 MILLIGRAM(S): 400 CAPSULE ORAL at 20:57

## 2025-04-26 RX ADMIN — ATORVASTATIN CALCIUM 40 MILLIGRAM(S): 80 TABLET, FILM COATED ORAL at 20:57

## 2025-04-26 RX ADMIN — HEPARIN SODIUM 5000 UNIT(S): 1000 INJECTION INTRAVENOUS; SUBCUTANEOUS at 14:53

## 2025-04-26 RX ADMIN — FENOFIBRATE 48 MILLIGRAM(S): 160 TABLET ORAL at 12:44

## 2025-04-26 RX ADMIN — CARVEDILOL 3.12 MILLIGRAM(S): 3.12 TABLET, FILM COATED ORAL at 05:11

## 2025-04-26 RX ADMIN — BUMETANIDE 2 MILLIGRAM(S): 1 TABLET ORAL at 05:11

## 2025-04-26 RX ADMIN — INSULIN LISPRO 1: 100 INJECTION, SOLUTION INTRAVENOUS; SUBCUTANEOUS at 08:23

## 2025-04-26 RX ADMIN — ISOSORBIDE MONONITRATE 30 MILLIGRAM(S): 60 TABLET, EXTENDED RELEASE ORAL at 12:43

## 2025-04-26 RX ADMIN — Medication 1 TABLET(S): at 12:43

## 2025-04-26 RX ADMIN — BUMETANIDE 2 MILLIGRAM(S): 1 TABLET ORAL at 14:53

## 2025-04-26 RX ADMIN — Medication 40 MILLIEQUIVALENT(S): at 19:06

## 2025-04-26 RX ADMIN — INSULIN LISPRO 2: 100 INJECTION, SOLUTION INTRAVENOUS; SUBCUTANEOUS at 17:27

## 2025-04-26 NOTE — PROGRESS NOTE ADULT - ASSESSMENT
EKG No in chart Previous LBBB    Echo < from: TTE W or WO Ultrasound Enhancing Agent (09.21.23 @ 12:00) >   1. Left ventricular endocardium is not well visualized; however, the left ventricular systolic function appears grossly normal.   2. The right ventricle is not well visualized. Probably normal systolic function. Tricuspid annular plane systolic excursion (TAPSE) is 2.2 cm (normal >=1.7 cm).   3. Technically difficult image quality.   4. The left atrium is normal in size.    < end of copied text >    Assessment and Plan     1) Syncope : repeat EKG , Monitor on tele , Get echo , orthos -khoa  , CT head -khoa     2) CP: Get echo , will plan for [facility] monday     3) HTN c/w coreg , Imdur losartan

## 2025-04-26 NOTE — PROGRESS NOTE ADULT - ASSESSMENT
69 Male PMH CAD s/p CABG (3 vessels, 08/2017 at [facility]), HF, ASIYA, T2DM (not on insulin), ASIYA on CPAP, HTN, HLD, renal mass s/p resection  presenting with cp /near syncope   pt had been in his usual state of state yesterday   went shopping and ran some errands   came back home around 2 pm    had a ciggar outside  and started feeling " lightheaded ... as he was going back to sit in his chair he " felt " about to pass out .. but no focal neuro complaints ..   sat dwn in chair , called family for help   started feeling substernal cp /sob   cp : 5/6 substeral , no palpiations .. no radiation , assoitcated with sob   pain ahd been present since onset till this am  however severity has subsided to 3/10  FS at the time presuambly was NL ( pt has a sensor )     no fever   no chills  no cough   no N/v/D  ACS ruled out   admitted for further palafox     near syncope : cont tele   neuro check   check CT head : neg   orthostatics : mildly positive ,.monitor   monitor FS     chest pain : atypical   check TTE:  fu   cont tele   pt did not have pain when he was found to have CAD and needeCABG !   d/w /David Zhao  : planned Cath on monday   check EKG and trop : one episode of pain now atypical     Hx of CHF ; cont current bumex   not in [place] chf       dyspnea : consitder PE   check VA duplex : neg      DM: monitor FS     ASIYA : cont bipap

## 2025-04-27 LAB
ADD ON TEST-SPECIMEN IN LAB: SIGNIFICANT CHANGE UP
ANION GAP SERPL CALC-SCNC: 15 MMOL/L — SIGNIFICANT CHANGE UP (ref 5–17)
BUN SERPL-MCNC: 16 MG/DL — SIGNIFICANT CHANGE UP (ref 7–23)
CALCIUM SERPL-MCNC: 8.7 MG/DL — SIGNIFICANT CHANGE UP (ref 8.4–10.5)
CHLORIDE SERPL-SCNC: 103 MMOL/L — SIGNIFICANT CHANGE UP (ref 96–108)
CO2 SERPL-SCNC: 23 MMOL/L — SIGNIFICANT CHANGE UP (ref 22–31)
CREAT SERPL-MCNC: 0.56 MG/DL — SIGNIFICANT CHANGE UP (ref 0.5–1.3)
EGFR: 107 ML/MIN/1.73M2 — SIGNIFICANT CHANGE UP
EGFR: 107 ML/MIN/1.73M2 — SIGNIFICANT CHANGE UP
GLUCOSE BLDC GLUCOMTR-MCNC: 130 MG/DL — HIGH (ref 70–99)
GLUCOSE BLDC GLUCOMTR-MCNC: 131 MG/DL — HIGH (ref 70–99)
GLUCOSE BLDC GLUCOMTR-MCNC: 150 MG/DL — HIGH (ref 70–99)
GLUCOSE BLDC GLUCOMTR-MCNC: 163 MG/DL — HIGH (ref 70–99)
GLUCOSE SERPL-MCNC: 126 MG/DL — HIGH (ref 70–99)
MAGNESIUM SERPL-MCNC: 1.7 MG/DL — SIGNIFICANT CHANGE UP (ref 1.6–2.6)
POTASSIUM SERPL-MCNC: 3.3 MMOL/L — LOW (ref 3.5–5.3)
POTASSIUM SERPL-SCNC: 3.3 MMOL/L — LOW (ref 3.5–5.3)
SODIUM SERPL-SCNC: 141 MMOL/L — SIGNIFICANT CHANGE UP (ref 135–145)
TROPONIN T, HIGH SENSITIVITY RESULT: 16 NG/L — SIGNIFICANT CHANGE UP (ref 0–51)
TROPONIN T, HIGH SENSITIVITY RESULT: 21 NG/L — SIGNIFICANT CHANGE UP (ref 0–51)

## 2025-04-27 RX ADMIN — HEPARIN SODIUM 5000 UNIT(S): 1000 INJECTION INTRAVENOUS; SUBCUTANEOUS at 16:05

## 2025-04-27 RX ADMIN — HEPARIN SODIUM 5000 UNIT(S): 1000 INJECTION INTRAVENOUS; SUBCUTANEOUS at 21:32

## 2025-04-27 RX ADMIN — CARVEDILOL 3.12 MILLIGRAM(S): 3.12 TABLET, FILM COATED ORAL at 05:00

## 2025-04-27 RX ADMIN — LOSARTAN POTASSIUM 50 MILLIGRAM(S): 100 TABLET, FILM COATED ORAL at 05:00

## 2025-04-27 RX ADMIN — Medication 81 MILLIGRAM(S): at 11:42

## 2025-04-27 RX ADMIN — ISOSORBIDE MONONITRATE 30 MILLIGRAM(S): 60 TABLET, EXTENDED RELEASE ORAL at 11:41

## 2025-04-27 RX ADMIN — ATORVASTATIN CALCIUM 40 MILLIGRAM(S): 80 TABLET, FILM COATED ORAL at 21:32

## 2025-04-27 RX ADMIN — CARVEDILOL 3.12 MILLIGRAM(S): 3.12 TABLET, FILM COATED ORAL at 17:56

## 2025-04-27 RX ADMIN — GABAPENTIN 300 MILLIGRAM(S): 400 CAPSULE ORAL at 05:00

## 2025-04-27 RX ADMIN — BUMETANIDE 2 MILLIGRAM(S): 1 TABLET ORAL at 16:05

## 2025-04-27 RX ADMIN — GABAPENTIN 300 MILLIGRAM(S): 400 CAPSULE ORAL at 21:31

## 2025-04-27 RX ADMIN — INSULIN LISPRO 1: 100 INJECTION, SOLUTION INTRAVENOUS; SUBCUTANEOUS at 11:41

## 2025-04-27 RX ADMIN — BUMETANIDE 2 MILLIGRAM(S): 1 TABLET ORAL at 05:00

## 2025-04-27 RX ADMIN — Medication 1 TABLET(S): at 11:41

## 2025-04-27 RX ADMIN — HEPARIN SODIUM 5000 UNIT(S): 1000 INJECTION INTRAVENOUS; SUBCUTANEOUS at 04:58

## 2025-04-27 RX ADMIN — GABAPENTIN 300 MILLIGRAM(S): 400 CAPSULE ORAL at 16:04

## 2025-04-27 RX ADMIN — FENOFIBRATE 48 MILLIGRAM(S): 160 TABLET ORAL at 11:42

## 2025-04-27 RX ADMIN — Medication 40 MILLIEQUIVALENT(S): at 17:56

## 2025-04-27 RX ADMIN — Medication 100 MILLIGRAM(S): at 21:32

## 2025-04-27 NOTE — PROGRESS NOTE ADULT - ASSESSMENT
69 Male PMH CAD s/p CABG (3 vessels, 08/2017 at [facility]), HF, ASIYA, T2DM (not on insulin), ASIYA on CPAP, HTN, HLD, renal mass s/p resection  presenting with cp /near syncope   pt had been in his usual state of state yesterday   went shopping and ran some errands   came back home around 2 pm    had a ciggar outside  and started feeling " lightheaded ... as he was going back to sit in his chair he " felt " about to pass out .. but no focal neuro complaints ..   sat dwn in chair , called family for help   started feeling substernal cp /sob   cp : 5/6 substeral , no palpiations .. no radiation , assoitcated with sob   pain ahd been present since onset till this am  however severity has subsided to 3/10  FS at the time presuambly was NL ( pt has a sensor )     no fever   no chills  no cough   no N/v/D  ACS ruled out   admitted for further palafox     near syncope : cont tele   neuro check   check CT head : neg   orthostatics : mildly positive ,.monitor   monitor FS     chest pain : atypical   check TTE:  fu   cont tele   pt did not have pain when he was found to have CAD and needeCABG !   d/w /David Zhao  : planned Cath on monday   check EKG and trop : one episode of pain now atypical     Hx of CHF ; cont current bumex   not in [place] chf       dyspnea : consider PE   check VA duplex : neg      DM: monitor FS     ASIYA : cont bipap

## 2025-04-28 ENCOUNTER — RESULT REVIEW (OUTPATIENT)
Age: 70
End: 2025-04-28

## 2025-04-28 LAB
ANION GAP SERPL CALC-SCNC: 12 MMOL/L — SIGNIFICANT CHANGE UP (ref 5–17)
BUN SERPL-MCNC: 16 MG/DL — SIGNIFICANT CHANGE UP (ref 7–23)
CALCIUM SERPL-MCNC: 9 MG/DL — SIGNIFICANT CHANGE UP (ref 8.4–10.5)
CHLORIDE SERPL-SCNC: 105 MMOL/L — SIGNIFICANT CHANGE UP (ref 96–108)
CO2 SERPL-SCNC: 23 MMOL/L — SIGNIFICANT CHANGE UP (ref 22–31)
CREAT SERPL-MCNC: 0.57 MG/DL — SIGNIFICANT CHANGE UP (ref 0.5–1.3)
EGFR: 106 ML/MIN/1.73M2 — SIGNIFICANT CHANGE UP
EGFR: 106 ML/MIN/1.73M2 — SIGNIFICANT CHANGE UP
GLUCOSE BLDC GLUCOMTR-MCNC: 135 MG/DL — HIGH (ref 70–99)
GLUCOSE BLDC GLUCOMTR-MCNC: 138 MG/DL — HIGH (ref 70–99)
GLUCOSE BLDC GLUCOMTR-MCNC: 143 MG/DL — HIGH (ref 70–99)
GLUCOSE BLDC GLUCOMTR-MCNC: 166 MG/DL — HIGH (ref 70–99)
GLUCOSE SERPL-MCNC: 127 MG/DL — HIGH (ref 70–99)
MAGNESIUM SERPL-MCNC: 1.8 MG/DL — SIGNIFICANT CHANGE UP (ref 1.6–2.6)
POTASSIUM SERPL-MCNC: 3.3 MMOL/L — LOW (ref 3.5–5.3)
POTASSIUM SERPL-SCNC: 3.3 MMOL/L — LOW (ref 3.5–5.3)
SODIUM SERPL-SCNC: 140 MMOL/L — SIGNIFICANT CHANGE UP (ref 135–145)

## 2025-04-28 PROCEDURE — 93306 TTE W/DOPPLER COMPLETE: CPT | Mod: 26

## 2025-04-28 RX ADMIN — Medication 1 TABLET(S): at 11:57

## 2025-04-28 RX ADMIN — FENOFIBRATE 48 MILLIGRAM(S): 160 TABLET ORAL at 11:57

## 2025-04-28 RX ADMIN — ISOSORBIDE MONONITRATE 30 MILLIGRAM(S): 60 TABLET, EXTENDED RELEASE ORAL at 11:56

## 2025-04-28 RX ADMIN — Medication 81 MILLIGRAM(S): at 11:56

## 2025-04-28 RX ADMIN — LOSARTAN POTASSIUM 50 MILLIGRAM(S): 100 TABLET, FILM COATED ORAL at 05:37

## 2025-04-28 RX ADMIN — ATORVASTATIN CALCIUM 40 MILLIGRAM(S): 80 TABLET, FILM COATED ORAL at 22:20

## 2025-04-28 RX ADMIN — CARVEDILOL 3.12 MILLIGRAM(S): 3.12 TABLET, FILM COATED ORAL at 17:50

## 2025-04-28 RX ADMIN — BUMETANIDE 2 MILLIGRAM(S): 1 TABLET ORAL at 05:36

## 2025-04-28 RX ADMIN — HEPARIN SODIUM 5000 UNIT(S): 1000 INJECTION INTRAVENOUS; SUBCUTANEOUS at 22:20

## 2025-04-28 RX ADMIN — GABAPENTIN 300 MILLIGRAM(S): 400 CAPSULE ORAL at 22:21

## 2025-04-28 RX ADMIN — BUMETANIDE 2 MILLIGRAM(S): 1 TABLET ORAL at 13:21

## 2025-04-28 RX ADMIN — HEPARIN SODIUM 5000 UNIT(S): 1000 INJECTION INTRAVENOUS; SUBCUTANEOUS at 05:36

## 2025-04-28 RX ADMIN — Medication 100 MILLIGRAM(S): at 22:20

## 2025-04-28 RX ADMIN — CARVEDILOL 3.12 MILLIGRAM(S): 3.12 TABLET, FILM COATED ORAL at 05:37

## 2025-04-28 RX ADMIN — Medication 40 MILLIEQUIVALENT(S): at 09:53

## 2025-04-28 RX ADMIN — GABAPENTIN 300 MILLIGRAM(S): 400 CAPSULE ORAL at 13:20

## 2025-04-28 RX ADMIN — HEPARIN SODIUM 5000 UNIT(S): 1000 INJECTION INTRAVENOUS; SUBCUTANEOUS at 13:21

## 2025-04-28 RX ADMIN — GABAPENTIN 300 MILLIGRAM(S): 400 CAPSULE ORAL at 05:38

## 2025-04-28 NOTE — ADVANCED PRACTICE NURSE CONSULT - REASON FOR CONSULT
Wound care consult initiated by RN to assess patient's skin for a possible deep tissue injury on B/L buttocks, present on admission     History of Present Illness:   69 Male PMH CAD s/p CABG (3 vessels, 08/2017 at [facility]), HF, ASIYA, T2DM (not on insulin), ASIYA on CPAP, HTN, HLD, renal mass s/p resection  presenting with cp /near syncope   pt had been in his usual state of state yesterday   went shopping and ran some errands   came back home around 2 pm    had a ciggar outside  and started feeling " lightheaded ... as he was going back to sit in his chair he " felt " about to pass out .. but no focal neuro complaints ..   sat dwn in chair , called family for help   started feeling substernal cp /sob   cp : 5/6 substeral , no palpiations .. no radiation , assoitcated with sob   pain ahd been present since onset till this am  however severity has subsided to 3/10  FS at the time presuambly was NL ( pt has a sensor )

## 2025-04-28 NOTE — ADVANCED PRACTICE NURSE CONSULT - RECOMMEDATIONS
Impression:    moisture associated dermatitis   B/L buttocks/sacral hyperpigmentation   B/L heel hyperpigmentation   B/L heel xerosis    Recommendations:    1) turn and position q2 and PRN utilizing offloading assistive devices  2) routine pericare daily and PRN soiling  3) encourage optimal nutrition  4) waffle cushion or pillow on seat when oob to chair  5) B/L LE complete cair air fluidized boots or alessandro-lock pillow to offload heels/feet  6) triad protective barrier cream to B/L buttocks/sacrum/gluteal cleft daily and PRN soiling  7) incontinence management - consider external urinary catheter to divert urine from skin if incontinent  8) sween 24 moisturizer to dry skin daily     Plan discussed with AURELIA Mcarthur on unit      For questions/comments regarding the recommendations in this consult, please contact Racquel Alberto via [employer] Teams. Wound care will not actively follow. For new concerns, please enter new consult. Thank you!

## 2025-04-28 NOTE — PROGRESS NOTE ADULT - ASSESSMENT
EKG No in chart Previous LBBB    Echo 4/25 - EF mild to moderate reduced     < end of copied text >    Assessment and Plan     1) Syncope : repeat EKG , Monitor on tele , echo shows mild to mod LV dysfunction  , orthos -khoa  , CT head -khoa     2) CP: echo shows mild to mod LV dysfunction  , will plan for [facility] tomorrow    3) HTN c/w coreg , Imdur losartan

## 2025-04-28 NOTE — PROGRESS NOTE ADULT - ASSESSMENT
69 Male PMH CAD s/p CABG (3 vessels, 08/2017 at [facility]), HF, ASIYA, T2DM (not on insulin), ASIYA on CPAP, HTN, HLD, renal mass s/p resection  presenting with cp /near syncope   pt had been in his usual state of state yesterday   went shopping and ran some errands   came back home around 2 pm    had a ciggar outside  and started feeling " lightheaded ... as he was going back to sit in his chair he " felt " about to pass out .. but no focal neuro complaints ..   sat dwn in chair , called family for help   started feeling substernal cp /sob   cp : 5/6 substeral , no palpiations .. no radiation , assoitcated with sob   pain ahd been present since onset till this am  however severity has subsided to 3/10  FS at the time presuambly was NL ( pt has a sensor )     no fever   no chills  no cough   no N/v/D  ACS ruled out   admitted for further palafox     near syncope : cont tele   neuro check   check CT head : neg   orthostatics : mildly positive ,.monitor   monitor FS     chest pain : atypical   check TTE:  wll expedite   cont tele   pt did not have pain when he was found to have CAD and need CABG !   d/w /David Zhao  : planned Cath today       Hx of CHF ; cont current bumex   not in [place] chf       dyspnea : consider PE   check VA duplex : neg      DM: monitor FS     ASIYA : cont bipap

## 2025-04-28 NOTE — ADVANCED PRACTICE NURSE CONSULT - ASSESSMENT
Patient encountered on [address] [place]. When wound care RN arrived on unit, patient was found sitting in chair at bedside. Patient was alert and oriented and gave consent to skin consult. Mr Colvin is able to stand independently with the use of a rolling walker for additional support, staff assistance x 1 was provided as needed. Once standing, the wound care RN was able to visualize moist skin within the gluteal cleft with white macerated skin present as well as peripheral erythema, area measures approximately 6cm x 4cm x 0cm - consistent with prolonged/repeated exposure to moisture, moisture associated dermatitis, however, pressure involvement cannot be ruled out at this time. B/L heels with xerosis and hyperpigmentation measuring approximately 3cm x 3cm x 0cm - initial phases of a deep tissue injury cannot be ruled out at this time. Once consult was complete, patient was educated regarding the need for routine turning and positioning to prevent pressure injuries and patient was assisted back to chair with pillow under patient for comfort and cushioning.

## 2025-04-29 LAB
ANION GAP SERPL CALC-SCNC: 15 MMOL/L — SIGNIFICANT CHANGE UP (ref 5–17)
BUN SERPL-MCNC: 14 MG/DL — SIGNIFICANT CHANGE UP (ref 7–23)
CALCIUM SERPL-MCNC: 8.8 MG/DL — SIGNIFICANT CHANGE UP (ref 8.4–10.5)
CHLORIDE SERPL-SCNC: 103 MMOL/L — SIGNIFICANT CHANGE UP (ref 96–108)
CO2 SERPL-SCNC: 24 MMOL/L — SIGNIFICANT CHANGE UP (ref 22–31)
CREAT SERPL-MCNC: 0.56 MG/DL — SIGNIFICANT CHANGE UP (ref 0.5–1.3)
EGFR: 107 ML/MIN/1.73M2 — SIGNIFICANT CHANGE UP
EGFR: 107 ML/MIN/1.73M2 — SIGNIFICANT CHANGE UP
GLUCOSE BLDC GLUCOMTR-MCNC: 127 MG/DL — HIGH (ref 70–99)
GLUCOSE BLDC GLUCOMTR-MCNC: 136 MG/DL — HIGH (ref 70–99)
GLUCOSE BLDC GLUCOMTR-MCNC: 153 MG/DL — HIGH (ref 70–99)
GLUCOSE BLDC GLUCOMTR-MCNC: 161 MG/DL — HIGH (ref 70–99)
GLUCOSE SERPL-MCNC: 122 MG/DL — HIGH (ref 70–99)
POTASSIUM SERPL-MCNC: 3.3 MMOL/L — LOW (ref 3.5–5.3)
POTASSIUM SERPL-SCNC: 3.3 MMOL/L — LOW (ref 3.5–5.3)
SODIUM SERPL-SCNC: 142 MMOL/L — SIGNIFICANT CHANGE UP (ref 135–145)

## 2025-04-29 PROCEDURE — 93455 CORONARY ART/GRFT ANGIO S&I: CPT | Mod: 26,59

## 2025-04-29 PROCEDURE — 99152 MOD SED SAME PHYS/QHP 5/>YRS: CPT

## 2025-04-29 PROCEDURE — 92928 PRQ TCAT PLMT NTRAC ST 1 LES: CPT | Mod: RC

## 2025-04-29 PROCEDURE — 92937 PRQ TRLUML REVSC CAB GRF 1: CPT | Mod: LC

## 2025-04-29 PROCEDURE — 93010 ELECTROCARDIOGRAM REPORT: CPT

## 2025-04-29 RX ORDER — CLOPIDOGREL BISULFATE 75 MG/1
75 TABLET, FILM COATED ORAL DAILY
Refills: 0 | Status: DISCONTINUED | OUTPATIENT
Start: 2025-04-30 | End: 2025-05-07

## 2025-04-29 RX ADMIN — BUMETANIDE 2 MILLIGRAM(S): 1 TABLET ORAL at 05:47

## 2025-04-29 RX ADMIN — Medication 100 MILLIGRAM(S): at 22:01

## 2025-04-29 RX ADMIN — HEPARIN SODIUM 5000 UNIT(S): 1000 INJECTION INTRAVENOUS; SUBCUTANEOUS at 22:00

## 2025-04-29 RX ADMIN — Medication 40 MILLIEQUIVALENT(S): at 22:00

## 2025-04-29 RX ADMIN — Medication 81 MILLIGRAM(S): at 11:11

## 2025-04-29 RX ADMIN — HEPARIN SODIUM 5000 UNIT(S): 1000 INJECTION INTRAVENOUS; SUBCUTANEOUS at 05:47

## 2025-04-29 RX ADMIN — CARVEDILOL 3.12 MILLIGRAM(S): 3.12 TABLET, FILM COATED ORAL at 17:48

## 2025-04-29 RX ADMIN — LOSARTAN POTASSIUM 50 MILLIGRAM(S): 100 TABLET, FILM COATED ORAL at 05:47

## 2025-04-29 RX ADMIN — GABAPENTIN 300 MILLIGRAM(S): 400 CAPSULE ORAL at 05:52

## 2025-04-29 RX ADMIN — INSULIN LISPRO 1: 100 INJECTION, SOLUTION INTRAVENOUS; SUBCUTANEOUS at 11:13

## 2025-04-29 RX ADMIN — ATORVASTATIN CALCIUM 40 MILLIGRAM(S): 80 TABLET, FILM COATED ORAL at 22:00

## 2025-04-29 RX ADMIN — GABAPENTIN 300 MILLIGRAM(S): 400 CAPSULE ORAL at 15:59

## 2025-04-29 RX ADMIN — Medication 1 TABLET(S): at 11:12

## 2025-04-29 RX ADMIN — Medication 40 MILLIEQUIVALENT(S): at 22:03

## 2025-04-29 RX ADMIN — FENOFIBRATE 48 MILLIGRAM(S): 160 TABLET ORAL at 11:12

## 2025-04-29 RX ADMIN — CARVEDILOL 3.12 MILLIGRAM(S): 3.12 TABLET, FILM COATED ORAL at 05:47

## 2025-04-29 RX ADMIN — ISOSORBIDE MONONITRATE 30 MILLIGRAM(S): 60 TABLET, EXTENDED RELEASE ORAL at 11:12

## 2025-04-29 RX ADMIN — GABAPENTIN 300 MILLIGRAM(S): 400 CAPSULE ORAL at 22:01

## 2025-04-29 NOTE — DISCHARGE NOTE PROVIDER - NSDCFUADDINST_GEN_ALL_CORE_FT
Diet: Consistent Carbohydrate diet with low Salt and low cholesterol   Supplement: liquid protein supplement  1 per day

## 2025-04-29 NOTE — DISCHARGE NOTE PROVIDER - CARE PROVIDER_API CALL
Elaine Jaime  [address] [address]  Phone: [phone]  Fax: [phone]  Follow Up Time: 1 week    Olaf Zhao  Interventional Cardiology  [address] [place] [address]  Phone: [phone]  Fax: [phone]  Follow Up Time: 1 week   Elaine Jaime  Family Medicine  [address] [place] [address]  Phone: [phone]  Fax: [phone]  Follow Up Time: 1 week    Marcos Kamara  Cardiovascular Disease  [address] [place] [address]  Phone: [phone]  Fax: [phone]  Follow Up Time: 1 week    Jackie Ross  Cardiovascular Disease  [address] [place] [address], [address]  [place] [address]  Phone: [phone]  Fax: [phone]  Follow Up Time: 1 week

## 2025-04-29 NOTE — DISCHARGE NOTE PROVIDER - NSDCFUADDAPPT_GEN_ALL_CORE_FT
APPTS ARE READY TO BE MADE: [x] YES    Best Family or Patient Contact (if needed):    Additional Information about above appointments (if needed):    1:   2:   3:     Other comments or requests:    APPTS ARE READY TO BE MADE: [X] YES    Best Family or Patient Contact (if needed):    Additional Information about above appointments (if needed):    1: Follow-up with primary care doctor within one week.  2: Follow-up with cardiologist.  3: Followup w/ Dr Kamara, and with Dr Ross ([facility] - EP).    Other comments or requests:    APPTS ARE READY TO BE MADE: [X] YES    Best Family or Patient Contact (if needed):    Additional Information about above appointments (if needed):    1: Follow-up with primary care doctor within one week.  2: Follow-up with cardiologist.  3: Followup w/ Dr Kamara, and with Dr Ross ([facility] - EP).  4. Follow-up with pulmonologist outpatient.    Other comments or requests:    APPTS ARE READY TO BE MADE: [X] YES    Best Family or Patient Contact (if needed):    Additional Information about above appointments (if needed):    1: Follow-up with primary care doctor within one week.  2: Follow-up with cardiologist.  3: Followup w/ Dr Kamara, and with Dr Ross ([facility] - ).  4. Follow-up with pulmonologist outpatient.    Other comments or requests:   Patient is being transferred. Caregiver will arrange follow up.

## 2025-04-29 NOTE — DISCHARGE NOTE PROVIDER - CARE PROVIDERS DIRECT ADDRESSES
[email or web address]

## 2025-04-29 NOTE — DISCHARGE NOTE PROVIDER - HOSPITAL COURSE
Cardiac Rehab (STEMI/NSTEMI/ACS/Unstable Angina/CHF/Chronic Stable Angina/Heart Surgery (CABG,Valve)/Post PCI):              *Education on benefits of Cardiac Rehab provided to patient: Yes         *Referral and Prescription Given for Cardiac Rehab : Yes         *Pt given list of locations & instructed to contact their insurance company to review list of participating providers: Yes         *Pt instructed to bring Cardiac Rehab prescription with them to Cardiology Follow up appointment for assistance with enrollment: Yes         *Pt discharged with copies detail cardiovascular history, medications, testing/treatments: Yes   HPI:        Hospital Course:      Important Medication Changes and Reason:  See med rec     Active or Pending Issues Requiring Follow-up:  Outpatient follow up with cardiology   Outpatient follow up with PCP    Advanced Directives:   [ ] Full code  [ ] DNR  [ ] Hospice    Discharge Diagnoses:  syncope                                                  Cardiac Rehab (STEMI/NSTEMI/ACS/Unstable Angina/CHF/Chronic Stable Angina/Heart Surgery (CABG,Valve)/Post PCI):              *Education on benefits of Cardiac Rehab provided to patient: Yes         *Referral and Prescription Given for Cardiac Rehab : Yes         *Pt given list of locations & instructed to contact their insurance company to review list of participating providers: Yes         *Pt instructed to bring Cardiac Rehab prescription with them to Cardiology Follow up appointment for assistance with enrollment: Yes         *Pt discharged with copies detail cardiovascular history, medications, testing/treatments: Yes   Hospital Course:  69-year-old male with history of  CAD s/p CABG in 2017, HF, ASIYA on CPAP, T2DM, HTN, HLD, and renal mass s/p  resection, admitted for near-syncope, chest pain, and shortness of breath. He  reported feeling lightheaded and experiencing presyncopal symptoms after exertion followed by substernal chest pain and shortness of breath. His initial chest pain was 5/10 in severity, substernal, non-radiating, and associated with shortness of breath.   CT of head was negative  An echocardiogram  with EF of 45 % and mild to moderate LV dysfunction.   Patient had a  left heart catheterization with  drug-eluting stents placed in the SVG to OM and RCA. His groin site remained stable. The patient's chest pain improved to 3/10. He was started on dual antiplatelet therapy (DAPT) with aspirin 81mg and clopidogrel 75mg.   He will follow up with cardiology (Dr. Olaf Ayala) in two weeks. He is stable for  discharged to [facility]     Important Medication Changes and Reason:  See med rec     Active or Pending Issues Requiring Follow-up:  Outpatient follow up with cardiology   Outpatient follow up with PCP    Advanced Directives:   [x ] Full code  [ ] DNR  [ ] Hospice    Discharge Diagnoses:  syncope  chest pain                                                   Cardiac Rehab (STEMI/NSTEMI/ACS/Unstable Angina/CHF/Chronic Stable Angina/Heart Surgery (CABG,Valve)/Post PCI):              *Education on benefits of Cardiac Rehab provided to patient: Yes         *Referral and Prescription Given for Cardiac Rehab : Yes         *Pt given list of locations & instructed to contact their insurance company to review list of participating providers: Yes         *Pt instructed to bring Cardiac Rehab prescription with them to Cardiology Follow up appointment for assistance with enrollment: Yes         *Pt discharged with copies detail cardiovascular history, medications, testing/treatments: Yes   Hospital Course: 69-year-old male with history of  CAD s/p CABG in 2017, HF, ASIYA on CPAP, T2DM, HTN, HLD, and renal mass s/p  resection, admitted for near-syncope, chest pain, and shortness of breath. He  reported feeling lightheaded and experiencing presyncopal symptoms after exertion followed by substernal chest pain and shortness of breath.   Lightheadedness/SOB/syncope- HsT neg x2, no EKG changes; s/p PCI 1 FRANCESCA to SVG to OM, 1 FRANCESCA to RCA.   Patient is s/p Micra implant 5/5/2025, cleared by EP for dc. Follow-up with cardiology and electrophysiology outpatient.     Important Medication Changes and Reason:  See med rec     Active or Pending Issues Requiring Follow-up:  Outpatient follow up with cardiology   Outpatient follow up with PCP    Advanced Directives:   [X] Full code  [ ] DNR  [ ] Hospice    Discharge Diagnoses:  syncope  chest pain                                                   Cardiac Rehab (STEMI/NSTEMI/ACS/Unstable Angina/CHF/Chronic Stable Angina/Heart Surgery (CABG,Valve)/Post PCI):              *Education on benefits of Cardiac Rehab provided to patient: Yes         *Referral and Prescription Given for Cardiac Rehab : Yes         *Pt given list of locations & instructed to contact their insurance company to review list of participating providers: Yes         *Pt instructed to bring Cardiac Rehab prescription with them to Cardiology Follow up appointment for assistance with enrollment: Yes         *Pt discharged with copies detail cardiovascular history, medications, testing/treatments: Yes   Hospital Course: 69-year-old male with history of  CAD s/p CABG in 2017, HF, ASIYA on CPAP, T2DM, HTN, HLD, and renal mass s/p  resection, admitted for near-syncope, chest pain, and shortness of breath. He  reported feeling lightheaded and experiencing presyncopal symptoms after exertion followed by substernal chest pain and shortness of breath.   Lightheadedness/SOB/syncope- HsT neg x2, no EKG changes; s/p PCI 1 FRANCESCA to SVG to OM, 1 FRANCESCA to RCA.   Patient is s/p Micra implant 5/5/2025, cleared by EP for dc. Follow-up with cardiology and electrophysiology outpatient.   Patient seen by pulmonology and CT chest done 5/7/2025 for cough/phlegm, RVP negative and CT chest without pneumonia. Follow-up with pulmonology outpatient for nodules.    Important Medication Changes and Reason:  See med rec     Active or Pending Issues Requiring Follow-up:  Outpatient follow up with cardiology   Outpatient follow up with PCP  Follow-up with pulmonologist.     Advanced Directives:   [X] Full code  [ ] DNR  [ ] Hospice    Discharge Diagnoses:  syncope  chest pain                   Request from Dr. Cobb to facilitate patient discharge. Medication reconciliation reviewed, revised and resolved with Dr. Cobb, who has medically cleared patient for discharge with follow-up as advised.                                 Cardiac Rehab (STEMI/NSTEMI/ACS/Unstable Angina/CHF/Chronic Stable Angina/Heart Surgery (CABG,Valve)/Post PCI):              *Education on benefits of Cardiac Rehab provided to patient: Yes         *Referral and Prescription Given for Cardiac Rehab : Yes         *Pt given list of locations & instructed to contact their insurance company to review list of participating providers: Yes         *Pt instructed to bring Cardiac Rehab prescription with them to Cardiology Follow up appointment for assistance with enrollment: Yes         *Pt discharged with copies detail cardiovascular history, medications, testing/treatments: Yes

## 2025-04-29 NOTE — DISCHARGE NOTE PROVIDER - NSDCACTIVITY_GEN_ALL_CORE
Do not drive or operate machinery/Walking - Indoors allowed/No heavy lifting/straining/Activity as tolerated

## 2025-04-29 NOTE — PROGRESS NOTE ADULT - ASSESSMENT
69 Male PMH CAD s/p CABG (3 vessels, 08/2017 at [facility]), HF, ASIYA, T2DM (not on insulin), ASIYA on CPAP, HTN, HLD, renal mass s/p resection  presenting with cp /near syncope   pt had been in his usual state of state yesterday   went shopping and ran some errands   came back home around 2 pm    had a ciggar outside  and started feeling " lightheaded ... as he was going back to sit in his chair he " felt " about to pass out .. but no focal neuro complaints ..   sat dwn in chair , called family for help   started feeling substernal cp /sob   cp : 5/6 substeral , no palpiations .. no radiation , assoitcated with sob   pain ahd been present since onset till this am  however severity has subsided to 3/10  FS at the time presuambly was NL ( pt has a sensor )     no fever   no chills  no cough   no N/v/D  ACS ruled out   admitted for further palafox     near syncope : cont tele   neuro check   check CT head : neg   orthostatics : mildly positive ,.monitor   monitor FS     chest pain : atypical   check TTE:   Left ventricular systolic function is mildly to moderately decreased.  pt did not have pain when he was found to have CAD and need CABG !   cath : Successful PCI with FRANCESCA to the SVG to OM distal anastamosis and native  distal RCA.  DAPT for 6 mths        Hx of CHF ; cont current bumex   not in [place] [facility]       dyspnea : consider PE   check [facility] duplex : neg      DM: monitor FS     ASIYA : cont bipap  No Exposure

## 2025-04-29 NOTE — DISCHARGE NOTE PROVIDER - NSDCFUSCHEDAPPT_GEN_ALL_CORE_FT
[facility]  DERM [id number] [address]  Scheduled Appointment: 05/20/2025

## 2025-04-29 NOTE — DISCHARGE NOTE PROVIDER - CONDITIONS AT DISCHARGE
discharge to [facility]  Request from Dr. Cobb to facilitate patient discharge. Medication reconciliation reviewed, revised and resolved with Dr. Cobb, who has medically cleared patient for discharge with follow-up as advised.

## 2025-04-29 NOTE — DISCHARGE NOTE PROVIDER - NSDCQMERRANDS_GEN_ALL_CORE
Head,  normocephalic,  atraumatic,  Face,  Face within normal limits,  Ears,  External ears within normal limits,  Nose/Nasopharynx,  External nose  normal appearance,  nares patent,  no nasal discharge,  Mouth and Throat,  Oral cavity appearance normal, Lips,  Appearance normal  Yes

## 2025-04-29 NOTE — DISCHARGE NOTE PROVIDER - NSDCCPCAREPLAN_GEN_ALL_CORE_FT
PRINCIPAL DISCHARGE DIAGNOSIS  Diagnosis: Syncope  Assessment and Plan of Treatment:       SECONDARY DISCHARGE DIAGNOSES  Diagnosis: Encounter for cardiac rehabilitation  Assessment and Plan of Treatment: We have provided you with a prescription for cardiac rehab which is medically supervised exercise program for your heart and has been shown to improve the quantity and quality of life of people with heart disease like yours. You should attend cardiac rehab 3 times per week for 12 weeks. We have provided you with a list of nearby facilities. Please call your insurance carrier to determine which of these facilities are covered under your plan. Please bring this prescription with you to your follow up appointment with your cardiologist who can then further assist you to enroll into a cardiac rehab program.     PRINCIPAL DISCHARGE DIAGNOSIS  Diagnosis: Syncope  Assessment and Plan of Treatment: Head CT negative   Echo with EF of 45 % with  mild to moderate LV dysfunction  s/p cardiac cath with stents to the SVG and OM, 1 stent to the RCA  Outpatient follow up with cardiology      SECONDARY DISCHARGE DIAGNOSES  Diagnosis: Encounter for cardiac rehabilitation  Assessment and Plan of Treatment: We have provided you with a prescription for cardiac rehab which is medically supervised exercise program for your heart and has been shown to improve the quantity and quality of life of people with heart disease like yours. You should attend cardiac rehab 3 times per week for 12 weeks. We have provided you with a list of nearby facilities. Please call your insurance carrier to determine which of these facilities are covered under your plan. Please bring this prescription with you to your follow up appointment with your cardiologist who can then further assist you to enroll into a cardiac rehab program.     PRINCIPAL DISCHARGE DIAGNOSIS  Diagnosis: Syncope  Assessment and Plan of Treatment: Echo with EF of 45 % with  mild to moderate LV dysfunction  s/p cardiac cath with stents to the SVG and OM, 1 stent to the RCA  Outpatient follow up with cardiology and electrophysiology.   Status post Micra implant 5/5/2025.      SECONDARY DISCHARGE DIAGNOSES  Diagnosis: Encounter for cardiac rehabilitation  Assessment and Plan of Treatment: We have provided you with a prescription for cardiac rehab which is medically supervised exercise program for your heart and has been shown to improve the quantity and quality of life of people with heart disease like yours. You should attend cardiac rehab 3 times per week for 12 weeks. We have provided you with a list of nearby facilities. Please call your insurance carrier to determine which of these facilities are covered under your plan. Please bring this prescription with you to your follow up appointment with your cardiologist who can then further assist you to enroll into a cardiac rehab program.     PRINCIPAL DISCHARGE DIAGNOSIS  Diagnosis: Syncope  Assessment and Plan of Treatment: Echo with EF of 45 % with  mild to moderate LV dysfunction  s/p cardiac cath with stents to the SVG and OM, 1 stent to the RCA  Outpatient follow up with cardiology and electrophysiology.   Status post Micra implant 5/5/2025.      SECONDARY DISCHARGE DIAGNOSES  Diagnosis: Cough  Assessment and Plan of Treatment: CT chest done 5/7/2025, no pneumonia on imaging.  Imaging revealed Nonspecific 2 x 0.9 cm   right lower lobe peripheral opacity. (301, 79), not significantly changed   since 5/5/2024, and new compared to 7/17/2023. Follow-up with pulmonologist outpatient.    Diagnosis: Encounter for cardiac rehabilitation  Assessment and Plan of Treatment: We have provided you with a prescription for cardiac rehab which is medically supervised exercise program for your heart and has been shown to improve the quantity and quality of life of people with heart disease like yours. You should attend cardiac rehab 3 times per week for 12 weeks. We have provided you with a list of nearby facilities. Please call your insurance carrier to determine which of these facilities are covered under your plan. Please bring this prescription with you to your follow up appointment with your cardiologist who can then further assist you to enroll into a cardiac rehab program.

## 2025-04-29 NOTE — DISCHARGE NOTE PROVIDER - NSDCMRMEDTOKEN_GEN_ALL_CORE_FT
allopurinol 100 mg oral tablet: 1 tab(s) orally once a day (at bedtime)  aspirin 81 mg oral delayed release tablet: 1 tab(s) orally once a day  atorvastatin 40 mg oral tablet: 1 tab(s) orally once a day (at bedtime)  bumetanide 2 mg oral tablet: 1 tab(s) orally 2 times a day  calcium with vitamin D3: 1 tab orally once a day  carvedilol 3.125 mg oral tablet: 1 tab(s) orally every 12 hours  Centrum multivitamin 1 tab daily:   fenofibrate 54 mg oral tablet: 1 tab(s) orally once a day  fluorouracil 5% topical cream: Apply topically to affected area 2 times a day (face)  gabapentin 300 mg oral capsule: 1 cap(s) orally 4 times a day  glimepiride 2 mg oral tablet: 1 tab(s) orally once a day with breakfast  isosorbide mononitrate 30 mg oral tablet, extended release: 1 tab(s) orally once a day (in the morning)  losartan 50 mg oral tablet: 1 tab(s) orally once a day  magnesium oxide 400 mg oral tablet: 2 tab(s) orally once a day  metFORMIN 500 mg oral tablet, extended release: 1 tab(s) orally 2 times a day  Senna 8.6 mg oral tablet: 2 tab(s) orally once a day (at bedtime) MDD: 2  traMADol 50 mg oral tablet: 1 tab(s) orally every 8 hours MDD: 3  Vitamin B12 1000 mcg daily:    allopurinol 100 mg oral tablet: 1 tab(s) orally once a day (at bedtime)  aspirin 81 mg oral delayed release tablet: 1 tab(s) orally once a day  atorvastatin 40 mg oral tablet: 1 tab(s) orally once a day (at bedtime)  bumetanide 2 mg oral tablet: 1 tab(s) orally 2 times a day  calcium with vitamin D3: 1 tab orally once a day  Cardiac Rehab: Three times a week for 12 weeks post PCI  carvedilol 3.125 mg oral tablet: 1 tab(s) orally every 12 hours  Centrum multivitamin 1 tab daily:   clopidogrel 75 mg oral tablet: 1 tab(s) orally once a day  fenofibrate 48 mg oral tablet: 1 tab(s) orally once a day  fluorouracil 5% topical cream: Apply topically to affected area 2 times a day (face)  gabapentin 300 mg oral capsule: 1 cap(s) orally 4 times a day  glimepiride 2 mg oral tablet: 1 tab(s) orally once a day with breakfast  insulin lispro 100 units/mL injectable solution: 1 unit(s) injectable 3 times a day (before meals) 1 Unit(s) if Glucose 151 - 200  2 Unit(s) if Glucose 201 - 250  3 Unit(s) if Glucose 251 - 300  4 Unit(s) if Glucose 301 - 350  5 Unit(s) if Glucose 351 - 400  6 Unit(s) if Glucose Greater Than 400  insulin lispro 100 units/mL injectable solution: 1 unit(s) injectable once a day (at bedtime) 0 Unit(s) if Glucose 0 - 250  1 Unit(s) if Glucose 251 - 300  2 Unit(s) if Glucose 301 - 350  3 Unit(s) if Glucose 351 - 400  4 Unit(s) if Glucose 400  isosorbide mononitrate 30 mg oral tablet, extended release: 1 tab(s) orally once a day (in the morning)  losartan 50 mg oral tablet: 1 tab(s) orally once a day  magnesium oxide 400 mg oral tablet: 2 tab(s) orally once a day  metFORMIN 500 mg oral tablet, extended release: 1 tab(s) orally 2 times a day  Multiple Vitamins oral tablet: 1 tab(s) orally once a day  Senna 8.6 mg oral tablet: 2 tab(s) orally once a day (at bedtime) MDD: 2  traMADol 50 mg oral tablet: 1 tab(s) orally every 8 hours MDD: 3  Vitamin B12 1000 mcg daily:    allopurinol 100 mg oral tablet: 1 tab(s) orally once a day (at bedtime)  aspirin 81 mg oral delayed release tablet: 1 tab(s) orally once a day  atorvastatin 40 mg oral tablet: 1 tab(s) orally once a day (at bedtime)  bumetanide 2 mg oral tablet: 1 tab(s) orally 2 times a day  calcium with vitamin D3: 1 tab orally once a day  Cardiac Rehab: Three times a week for 12 weeks post PCI  carvedilol 3.125 mg oral tablet: 1 tab(s) orally every 12 hours  Centrum multivitamin 1 tab daily:   clopidogrel 75 mg oral tablet: 1 tab(s) orally once a day  fenofibrate 48 mg oral tablet: 1 tab(s) orally once a day  fluorouracil 5% topical cream: Apply topically to affected area 2 times a day (face)  gabapentin 300 mg oral capsule: 1 cap(s) orally 4 times a day  insulin lispro 100 units/mL injectable solution: 1 unit(s) injectable 3 times a day (before meals) 1 Unit(s) if Glucose 151 - 200  2 Unit(s) if Glucose 201 - 250  3 Unit(s) if Glucose 251 - 300  4 Unit(s) if Glucose 301 - 350  5 Unit(s) if Glucose 351 - 400  6 Unit(s) if Glucose Greater Than 400  insulin lispro 100 units/mL injectable solution: 1 unit(s) injectable once a day (at bedtime) 0 Unit(s) if Glucose 0 - 250  1 Unit(s) if Glucose 251 - 300  2 Unit(s) if Glucose 301 - 350  3 Unit(s) if Glucose 351 - 400  4 Unit(s) if Glucose 400  isosorbide mononitrate 30 mg oral tablet, extended release: 1 tab(s) orally once a day (in the morning)  losartan 50 mg oral tablet: 1 tab(s) orally once a day  magnesium oxide 400 mg oral tablet: 2 tab(s) orally once a day  Multiple Vitamins oral tablet: 1 tab(s) orally once a day  Senna 8.6 mg oral tablet: 2 tab(s) orally once a day (at bedtime) MDD: 2  traMADol 50 mg oral tablet: 1 tab(s) orally every 8 hours MDD: 3  Vitamin B12 1000 mcg daily:

## 2025-04-29 NOTE — PROGRESS NOTE ADULT - ASSESSMENT
Echo 4/25 - EF mild to moderate reduced     < end of copied text >    Assessment and Plan     1) Syncope :  Monitor on tele , echo shows mild to mod LV dysfunction  , orthos -khoa  , CT head -khoa     2) CP: echo shows mild to mod LV dysfunction  ,   LHC today    3) HTN c/w coreg , Imdur losartan

## 2025-04-29 NOTE — DISCHARGE NOTE PROVIDER - DETAILS OF MALNUTRITION DIAGNOSIS/DIAGNOSES
As noted above, plan to increase Entresto   This patient has been assessed with a concern for Malnutrition and was treated during this hospitalization for the following Nutrition diagnosis/diagnoses:     -  04/25/2025: Morbid obesity (BMI > 40)

## 2025-04-29 NOTE — DISCHARGE NOTE PROVIDER - PROVIDER TOKENS
PROVIDER:[TOKEN:[[id number]],FOLLOWUP:[1 week]],PROVIDER:[TOKEN:[[id number]],FOLLOWUP:[1 week]] PROVIDER:[TOKEN:[[id number]],FOLLOWUP:[1 week]],PROVIDER:[TOKEN:[[id number]],FOLLOWUP:[1 week]],PROVIDER:[TOKEN:[[id number]],FOLLOWUP:[1 week]]

## 2025-04-30 LAB
ANION GAP SERPL CALC-SCNC: 15 MMOL/L — SIGNIFICANT CHANGE UP (ref 5–17)
BUN SERPL-MCNC: 12 MG/DL — SIGNIFICANT CHANGE UP (ref 7–23)
CALCIUM SERPL-MCNC: 9.2 MG/DL — SIGNIFICANT CHANGE UP (ref 8.4–10.5)
CHLORIDE SERPL-SCNC: 105 MMOL/L — SIGNIFICANT CHANGE UP (ref 96–108)
CO2 SERPL-SCNC: 22 MMOL/L — SIGNIFICANT CHANGE UP (ref 22–31)
CREAT SERPL-MCNC: 0.55 MG/DL — SIGNIFICANT CHANGE UP (ref 0.5–1.3)
EGFR: 107 ML/MIN/1.73M2 — SIGNIFICANT CHANGE UP
EGFR: 107 ML/MIN/1.73M2 — SIGNIFICANT CHANGE UP
GLUCOSE BLDC GLUCOMTR-MCNC: 132 MG/DL — HIGH (ref 70–99)
GLUCOSE BLDC GLUCOMTR-MCNC: 136 MG/DL — HIGH (ref 70–99)
GLUCOSE BLDC GLUCOMTR-MCNC: 145 MG/DL — HIGH (ref 70–99)
GLUCOSE BLDC GLUCOMTR-MCNC: 162 MG/DL — HIGH (ref 70–99)
GLUCOSE SERPL-MCNC: 108 MG/DL — HIGH (ref 70–99)
POTASSIUM SERPL-MCNC: 3.5 MMOL/L — SIGNIFICANT CHANGE UP (ref 3.5–5.3)
POTASSIUM SERPL-SCNC: 3.5 MMOL/L — SIGNIFICANT CHANGE UP (ref 3.5–5.3)
SODIUM SERPL-SCNC: 142 MMOL/L — SIGNIFICANT CHANGE UP (ref 135–145)

## 2025-04-30 PROCEDURE — 99231 SBSQ HOSP IP/OBS SF/LOW 25: CPT

## 2025-04-30 RX ORDER — INSULIN LISPRO 100 U/ML
1 INJECTION, SOLUTION INTRAVENOUS; SUBCUTANEOUS
Qty: 0 | Refills: 0 | DISCHARGE
Start: 2025-04-30

## 2025-04-30 RX ORDER — B1/B2/B3/B5/B6/B12/VIT C/FOLIC 500-0.5 MG
1 TABLET ORAL
Qty: 0 | Refills: 0 | DISCHARGE
Start: 2025-04-30

## 2025-04-30 RX ORDER — CLOPIDOGREL BISULFATE 75 MG/1
1 TABLET, FILM COATED ORAL
Qty: 0 | Refills: 0 | DISCHARGE
Start: 2025-04-30

## 2025-04-30 RX ORDER — FENOFIBRATE 160 MG/1
1 TABLET ORAL
Qty: 0 | Refills: 0 | DISCHARGE
Start: 2025-04-30

## 2025-04-30 RX ORDER — FENOFIBRATE 160 MG/1
1 TABLET ORAL
Refills: 0 | DISCHARGE

## 2025-04-30 RX ORDER — SENNA 187 MG
2 TABLET ORAL AT BEDTIME
Refills: 0 | Status: DISCONTINUED | OUTPATIENT
Start: 2025-04-30 | End: 2025-05-07

## 2025-04-30 RX ADMIN — FENOFIBRATE 48 MILLIGRAM(S): 160 TABLET ORAL at 11:29

## 2025-04-30 RX ADMIN — ATORVASTATIN CALCIUM 40 MILLIGRAM(S): 80 TABLET, FILM COATED ORAL at 21:51

## 2025-04-30 RX ADMIN — Medication 81 MILLIGRAM(S): at 11:29

## 2025-04-30 RX ADMIN — GABAPENTIN 300 MILLIGRAM(S): 400 CAPSULE ORAL at 14:37

## 2025-04-30 RX ADMIN — GABAPENTIN 300 MILLIGRAM(S): 400 CAPSULE ORAL at 21:51

## 2025-04-30 RX ADMIN — CARVEDILOL 3.12 MILLIGRAM(S): 3.12 TABLET, FILM COATED ORAL at 05:13

## 2025-04-30 RX ADMIN — BUMETANIDE 2 MILLIGRAM(S): 1 TABLET ORAL at 05:13

## 2025-04-30 RX ADMIN — CARVEDILOL 3.12 MILLIGRAM(S): 3.12 TABLET, FILM COATED ORAL at 17:02

## 2025-04-30 RX ADMIN — Medication 100 MILLIGRAM(S): at 21:51

## 2025-04-30 RX ADMIN — HEPARIN SODIUM 5000 UNIT(S): 1000 INJECTION INTRAVENOUS; SUBCUTANEOUS at 14:38

## 2025-04-30 RX ADMIN — HEPARIN SODIUM 5000 UNIT(S): 1000 INJECTION INTRAVENOUS; SUBCUTANEOUS at 21:52

## 2025-04-30 RX ADMIN — Medication 2 TABLET(S): at 21:51

## 2025-04-30 RX ADMIN — ISOSORBIDE MONONITRATE 30 MILLIGRAM(S): 60 TABLET, EXTENDED RELEASE ORAL at 11:28

## 2025-04-30 RX ADMIN — Medication 1 TABLET(S): at 11:28

## 2025-04-30 RX ADMIN — HEPARIN SODIUM 5000 UNIT(S): 1000 INJECTION INTRAVENOUS; SUBCUTANEOUS at 05:13

## 2025-04-30 RX ADMIN — GABAPENTIN 300 MILLIGRAM(S): 400 CAPSULE ORAL at 05:17

## 2025-04-30 RX ADMIN — CLOPIDOGREL BISULFATE 75 MILLIGRAM(S): 75 TABLET, FILM COATED ORAL at 11:28

## 2025-04-30 RX ADMIN — LOSARTAN POTASSIUM 50 MILLIGRAM(S): 100 TABLET, FILM COATED ORAL at 05:13

## 2025-04-30 RX ADMIN — BUMETANIDE 2 MILLIGRAM(S): 1 TABLET ORAL at 14:38

## 2025-04-30 NOTE — PROGRESS NOTE ADULT - ASSESSMENT
Echo 4/25 - EF mild to moderate reduced     < end of copied text >    Assessment and Plan     1) Syncope :  Monitor on tele , echo shows mild to mod LV dysfunction  , orthos -khoa  , CT head -khoa     2) CP: echo shows mild to mod LV dysfunction  , s/p  LHC   s/p PCI SVG to OM and RCA  cont asa and plavix     3) HTN c/w coreg , Imdur losartan

## 2025-04-30 NOTE — PROGRESS NOTE ADULT - ASSESSMENT
69 Male PMH CAD s/p CABG (3 vessels, 08/2017 at [facility]), HF, ASIYA, T2DM (not on insulin), ASIYA on CPAP, HTN, HLD, renal mass s/p resection  presenting with cp /near syncope   pt had been in his usual state of state yesterday   went shopping and ran some errands   came back home around 2 pm    had a ciggar outside  and started feeling " lightheaded ... as he was going back to sit in his chair he " felt " about to pass out .. but no focal neuro complaints ..   sat dwn in chair , called family for help   started feeling substernal cp /sob   cp : 5/6 substeral , no palpiations .. no radiation , assoitcated with sob   pain ahd been present since onset till this am  however severity has subsided to 3/10  FS at the time presuambly was NL ( pt has a sensor )     no fever   no chills  no cough   no N/v/D  ACS ruled out   admitted for further palafox     near syncope : cont tele   neuro check   check CT head : neg   orthostatics : mildly positive ,.monitor   monitor FS     chest pain : atypical   check TTE:   Left ventricular systolic function is mildly to moderately decreased.  pt did not have pain when he was found to have CAD and need CABG !   cath : Successful PCI with FRANCESCA to the SVG to OM distal anastamosis and native  distal RCA.  DAPT for 6 mths      Hx of CHF ; cont current bumex   not in [place] chf       dyspnea : consider PE   check [facility] duplex : neg      DM: monitor FS     ASIYA : cont bipap     discussed at length with pt ,  with son at bedside and then with daughter on phone !  d/w acp and cm   pt now is agreeable to go to Rehab other than [facility]

## 2025-04-30 NOTE — PROGRESS NOTE ADULT - ASSESSMENT
Assessment/Plan: 69-year-old male past medical history of hypertension, hyperlipidemia, diabetes, CAD s/p CABG, CHF presenting with shortness of breath.   -4/29: 1 FRANCESCA to SVG to OM, 1 FRANCESCA to RCA by DR. Robertson. ( angioseal)   - Groin site stable.   - Continue DAPT (aspirin 81mg and clopidogrel 75mg).  - Continue atorvastatin  - Recommend a heart healthy diet which includes a variety of fruits and vegetables, whole grains, low fat dairy products, legumes and skinless poulty and fish; food prepared with little or no salt and minimize processed foods  - Avoid using NSAIDs  (Aleve, Motrin, ibuprofen, naproxen) while on DAPT, please utilize Tylenol for pain control (not to exceed 4gm in 24 hours)  - Care per primary team  - f/u with cards Dr Olaf Ayala in 2 weeks post  discharge.       Assessment/Plan: 69-year-old male past medical history of hypertension, hyperlipidemia, diabetes, CAD s/p CABG, CHF presenting with shortness of breath.   -4/29: 1 FRANCESCA to SVG to OM, 1 FRANCESCA to RCA by DR. Robertson. ( angioseal)   - Groin site stable.   - Continue DAPT (aspirin 81mg and clopidogrel 75mg).  - Continue atorvastatin   - reduced EF ~45% on ARB c/w Losartan and Imdur.  - Recommend a heart healthy diet which includes a variety of fruits and vegetables, whole grains, low fat dairy products, legumes and skinless poulty and fish; food prepared with little or no salt and minimize processed foods  - Avoid using NSAIDs  (Aleve, Motrin, ibuprofen, naproxen) while on DAPT, please utilize Tylenol for pain control (not to exceed 4gm in 24 hours)  - Care per primary team  - f/u with cards Dr Olaf Ayala in 2 weeks post  discharge.

## 2025-05-01 LAB
ANION GAP SERPL CALC-SCNC: 13 MMOL/L — SIGNIFICANT CHANGE UP (ref 5–17)
ANION GAP SERPL CALC-SCNC: 16 MMOL/L — SIGNIFICANT CHANGE UP (ref 5–17)
BUN SERPL-MCNC: 16 MG/DL — SIGNIFICANT CHANGE UP (ref 7–23)
BUN SERPL-MCNC: 17 MG/DL — SIGNIFICANT CHANGE UP (ref 7–23)
CALCIUM SERPL-MCNC: 9 MG/DL — SIGNIFICANT CHANGE UP (ref 8.4–10.5)
CALCIUM SERPL-MCNC: 9.5 MG/DL — SIGNIFICANT CHANGE UP (ref 8.4–10.5)
CHLORIDE SERPL-SCNC: 102 MMOL/L — SIGNIFICANT CHANGE UP (ref 96–108)
CHLORIDE SERPL-SCNC: 104 MMOL/L — SIGNIFICANT CHANGE UP (ref 96–108)
CO2 SERPL-SCNC: 21 MMOL/L — LOW (ref 22–31)
CO2 SERPL-SCNC: 23 MMOL/L — SIGNIFICANT CHANGE UP (ref 22–31)
CREAT SERPL-MCNC: 0.59 MG/DL — SIGNIFICANT CHANGE UP (ref 0.5–1.3)
CREAT SERPL-MCNC: 0.65 MG/DL — SIGNIFICANT CHANGE UP (ref 0.5–1.3)
EGFR: 102 ML/MIN/1.73M2 — SIGNIFICANT CHANGE UP
EGFR: 102 ML/MIN/1.73M2 — SIGNIFICANT CHANGE UP
EGFR: 105 ML/MIN/1.73M2 — SIGNIFICANT CHANGE UP
EGFR: 105 ML/MIN/1.73M2 — SIGNIFICANT CHANGE UP
GLUCOSE BLDC GLUCOMTR-MCNC: 138 MG/DL — HIGH (ref 70–99)
GLUCOSE BLDC GLUCOMTR-MCNC: 140 MG/DL — HIGH (ref 70–99)
GLUCOSE BLDC GLUCOMTR-MCNC: 154 MG/DL — HIGH (ref 70–99)
GLUCOSE BLDC GLUCOMTR-MCNC: 162 MG/DL — HIGH (ref 70–99)
GLUCOSE SERPL-MCNC: 125 MG/DL — HIGH (ref 70–99)
GLUCOSE SERPL-MCNC: 125 MG/DL — HIGH (ref 70–99)
HCT VFR BLD CALC: 39.3 % — SIGNIFICANT CHANGE UP (ref 39–50)
HGB BLD-MCNC: 13.1 G/DL — SIGNIFICANT CHANGE UP (ref 13–17)
MAGNESIUM SERPL-MCNC: 1.6 MG/DL — SIGNIFICANT CHANGE UP (ref 1.6–2.6)
MAGNESIUM SERPL-MCNC: 1.8 MG/DL — SIGNIFICANT CHANGE UP (ref 1.6–2.6)
MCHC RBC-ENTMCNC: 27.6 PG — SIGNIFICANT CHANGE UP (ref 27–34)
MCHC RBC-ENTMCNC: 33.3 G/DL — SIGNIFICANT CHANGE UP (ref 32–36)
MCV RBC AUTO: 82.9 FL — SIGNIFICANT CHANGE UP (ref 80–100)
NRBC BLD AUTO-RTO: 0 /100 WBCS — SIGNIFICANT CHANGE UP (ref 0–0)
PLATELET # BLD AUTO: 153 K/UL — SIGNIFICANT CHANGE UP (ref 150–400)
POTASSIUM SERPL-MCNC: 3.3 MMOL/L — LOW (ref 3.5–5.3)
POTASSIUM SERPL-MCNC: 3.9 MMOL/L — SIGNIFICANT CHANGE UP (ref 3.5–5.3)
POTASSIUM SERPL-SCNC: 3.3 MMOL/L — LOW (ref 3.5–5.3)
POTASSIUM SERPL-SCNC: 3.9 MMOL/L — SIGNIFICANT CHANGE UP (ref 3.5–5.3)
RBC # BLD: 4.74 M/UL — SIGNIFICANT CHANGE UP (ref 4.2–5.8)
RBC # FLD: 13.7 % — SIGNIFICANT CHANGE UP (ref 10.3–14.5)
SODIUM SERPL-SCNC: 139 MMOL/L — SIGNIFICANT CHANGE UP (ref 135–145)
SODIUM SERPL-SCNC: 140 MMOL/L — SIGNIFICANT CHANGE UP (ref 135–145)
WBC # BLD: 6.29 K/UL — SIGNIFICANT CHANGE UP (ref 3.8–10.5)
WBC # FLD AUTO: 6.29 K/UL — SIGNIFICANT CHANGE UP (ref 3.8–10.5)

## 2025-05-01 PROCEDURE — 99232 SBSQ HOSP IP/OBS MODERATE 35: CPT

## 2025-05-01 RX ORDER — MAGNESIUM SULFATE 500 MG/ML
1 SYRINGE (ML) INJECTION ONCE
Refills: 0 | Status: COMPLETED | OUTPATIENT
Start: 2025-05-01 | End: 2025-05-01

## 2025-05-01 RX ADMIN — HEPARIN SODIUM 5000 UNIT(S): 1000 INJECTION INTRAVENOUS; SUBCUTANEOUS at 14:18

## 2025-05-01 RX ADMIN — CARVEDILOL 3.12 MILLIGRAM(S): 3.12 TABLET, FILM COATED ORAL at 05:40

## 2025-05-01 RX ADMIN — GABAPENTIN 300 MILLIGRAM(S): 400 CAPSULE ORAL at 21:22

## 2025-05-01 RX ADMIN — ATORVASTATIN CALCIUM 40 MILLIGRAM(S): 80 TABLET, FILM COATED ORAL at 21:22

## 2025-05-01 RX ADMIN — BUMETANIDE 2 MILLIGRAM(S): 1 TABLET ORAL at 14:18

## 2025-05-01 RX ADMIN — LOSARTAN POTASSIUM 50 MILLIGRAM(S): 100 TABLET, FILM COATED ORAL at 05:40

## 2025-05-01 RX ADMIN — Medication 100 MILLIGRAM(S): at 21:22

## 2025-05-01 RX ADMIN — BUMETANIDE 2 MILLIGRAM(S): 1 TABLET ORAL at 05:39

## 2025-05-01 RX ADMIN — Medication 81 MILLIGRAM(S): at 11:43

## 2025-05-01 RX ADMIN — HEPARIN SODIUM 5000 UNIT(S): 1000 INJECTION INTRAVENOUS; SUBCUTANEOUS at 05:40

## 2025-05-01 RX ADMIN — CLOPIDOGREL BISULFATE 75 MILLIGRAM(S): 75 TABLET, FILM COATED ORAL at 11:43

## 2025-05-01 RX ADMIN — ISOSORBIDE MONONITRATE 30 MILLIGRAM(S): 60 TABLET, EXTENDED RELEASE ORAL at 11:43

## 2025-05-01 RX ADMIN — GABAPENTIN 300 MILLIGRAM(S): 400 CAPSULE ORAL at 14:19

## 2025-05-01 RX ADMIN — HEPARIN SODIUM 5000 UNIT(S): 1000 INJECTION INTRAVENOUS; SUBCUTANEOUS at 21:22

## 2025-05-01 RX ADMIN — Medication 40 MILLIEQUIVALENT(S): at 06:37

## 2025-05-01 RX ADMIN — GABAPENTIN 300 MILLIGRAM(S): 400 CAPSULE ORAL at 05:42

## 2025-05-01 RX ADMIN — CARVEDILOL 3.12 MILLIGRAM(S): 3.12 TABLET, FILM COATED ORAL at 17:35

## 2025-05-01 RX ADMIN — Medication 100 GRAM(S): at 11:43

## 2025-05-01 RX ADMIN — INSULIN LISPRO 1: 100 INJECTION, SOLUTION INTRAVENOUS; SUBCUTANEOUS at 11:44

## 2025-05-01 RX ADMIN — Medication 40 MILLIEQUIVALENT(S): at 11:43

## 2025-05-01 RX ADMIN — Medication 1 TABLET(S): at 11:42

## 2025-05-01 RX ADMIN — FENOFIBRATE 48 MILLIGRAM(S): 160 TABLET ORAL at 11:42

## 2025-05-01 NOTE — PROGRESS NOTE ADULT - ASSESSMENT
69 Male PMH CAD s/p CABG (3 vessels, 08/2017 at [facility]), HF, ASIYA, T2DM (not on insulin), ASIYA on CPAP, HTN, HLD, renal mass s/p resection  presenting with cp /near syncope   pt had been in his usual state of state yesterday   went shopping and ran some errands   came back home around 2 pm    had a ciggar outside  and started feeling " lightheaded ... as he was going back to sit in his chair he " felt " about to pass out .. but no focal neuro complaints ..   sat dwn in chair , called family for help   started feeling substernal cp /sob   cp : 5/6 substeral , no palpiations .. no radiation , assoitcated with sob   pain ahd been present since onset till this am  however severity has subsided to 3/10  FS at the time presuambly was NL ( pt has a sensor )     no fever   no chills  no cough   no N/v/D  ACS ruled out   admitted for further palafox     near syncope : cont tele   neuro check   check CT head : neg   orthostatics : mildly positive ,.monitor   monitor FS   pt " had an episode of dizziness while walking with PT" : no episodes on monitor at the time .  neg orthostatics   this AM had 5 beats of wide complex   consulted EP and neuro       chest pain : atypical   check TTE:   Left ventricular systolic function is mildly to moderately decreased.  pt did not have pain when he was found to have CAD and need CABG !   cath : Successful PCI with FRANCESCA to the SVG to OM distal anastamosis and native  distal RCA.  DAPT for 6 mths      Hx of CHF ; cont current bumex   not in [place] chf       dyspnea : consider PE   check VA duplex : neg      DM: monitor FS     ASIYA : cont bipap     discussed at length with pt

## 2025-05-01 NOTE — PROGRESS NOTE ADULT - ASSESSMENT
Assessment/Plan:   HPI:  69 Male PMH CAD s/p CABG (3 vessels, 08/2017 at [facility]), HF, ASIYA, T2DM (not on insulin), ASIYA on CPAP, HTN, HLD, renal mass s/p resection  presenting with cp /near syncope     # CAD  -s/p FRANCESCA SVG-OM x 1, FRANCESCA RCA x 1 via RFA s/p Angioseal 4/29/25 without complication  - Radial  site stable.   - Continue DAPT (aspirin 81mg and clopidogrel 75mg)    #HLD  - Continue statin    #HTN  - continue BB, ARB    #Encounter for cardiac rehabilitation  -reviewed with patient benefits of cardiac rehabilitation program post stent placement  -reviewed with patient prescription provided to be brought to outpatient followup to discuss plan with outpatient cardiac rehab program  -reviewed with patient not to self-enroll until followup with cardiologist      - Recommend a heart healthy diet which includes a variety of fruits and vegetables, whole grains, low fat dairy products, legumes and skinless poultry and fish; food prepared with little or no salt and minimize processed foods  - Avoid using NSAIDs  (Aleve, Motrin, ibuprofen, naproxen) while on DAPT, please utilize Tylenol for pain control (not to exceed 4gm in 24 hours)  - Patient aware to take DAPT  as prescribed and DO NOT STOP taking without consulting cardiologist first or STENT/s WILL CLOSE  - Reviewed and reinforced with patient:  site complications ( eg: bleeding, excruciating pain at the procedural site, large swelling ball size-  extremity numbness, tingling, temperature change), or CHEST PAIN; pt aware that if any of those occur he/she must call cardiologist IMMEDIATELY or 911 or go to nearest emergency room   - Reviewed and reinforced a heart healthy diet, Smoking Cessation  - Prescription for cardiac rehab placed in pt. chart with location information after reviewed with pt.   - Patient verbalizes understanding of ALL OF THE ABOVE, and gives positive feedback   -please make sure DAPT is prescribed to pt's preferred pharmacy on dc   -f/u appt in 2 weeks post dc with outpt cardiologist  - Keep Mg >2 K>4   - cont to monitor on tele  - all other care as per primary     Stephanie De La Fuente [facility]  Invasive Cardiology    Please check [email or web address] password [email or web address] for cardiology service schedule and contact information via TEAMS.

## 2025-05-01 NOTE — CONSULT NOTE ADULT - SUBJECTIVE AND OBJECTIVE BOX
EP    DATE OF SERVICE: 25    HISTORY OF PRESENT ILLNESS: HPI:  Patient is a 70 y/o Male with PMH of CAD s/p CABG (3 vessels, 2017 at [facility]), HF, ASIYA, T2DM, ASIYA on CPAP, HTN, HLD, renal mass s/p resection who presented with chest pain and near syncope found to have mildly reduced LVEF 45% now s/p cardiac cath resulting in Successful PCI with FRANCESCA to the SVG to OM distal anastamosis and native distal RCA. EP consulted for NSVT and an abnormal EKG. Patient reports getting up from sitting in chair after smoking a cigar and suddenly felt lightheaded after taking 2 steps and quickly sat down. He did not pass out. He reports passing out 2 other times in the past after getting up from bed post surgeries in hospital most recently 2 year ago. He was told each time was due to low BP. He reports feeling lightheaded standing up out of bed today. Denies palpitations or syncope.    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      Essential hypertension      CAD (coronary artery disease)  - s/p cabg x3      Hypercholesterolemia      ASIYA (obstructive sleep apnea)  initially dx  ; CPAP      Paralytic ileus  post op THR  & post op laminectomy      Type 2 diabetes mellitus      Right carpal tunnel syndrome      Cubital tunnel syndrome, right      Trigger finger of right hand      Morbid obesity with body mass index (BMI) of 40.0 or higher      H/O CHF      Kidney mass      Cervical herniated disc      Renal cancer      Disease of spinal cord, unspecified      Peripheral neuropathy      Myelopathy      Morbid obesity      S/P Left Inguinal Hernia Repair      History of Total Hip Replacement  - bilateral THR      S/P tonsillectomy and adenoidectomy  as child      H/O lithotripsy  x1      S/P revision of total knee, right  x2-  &       S/P lumbar discectomy  - ,L5  Aug 2013      S/P CABG x 3  17      S/P lumbar laminectomy  Fusion L3-L5       Kidney stone  s/w ESWL pt unsure site      Neuropathy  Bilateral Feet since       History of bilateral hip replacements      History of hernia repair      History of lumbar fusion      History of cholecystectomy      History of bilateral knee replacement      History of lymph node biopsy      S/p bilateral carpal tunnel release      History of partial nephrectomy      H/O elbow surgery      History of thoracic spinal fusion      MEDICATIONS:  MEDICATIONS  (STANDING):  allopurinol 100 milliGRAM(s) Oral at bedtime  aspirin enteric coated 81 milliGRAM(s) Oral daily  atorvastatin 40 milliGRAM(s) Oral at bedtime  buMETAnide 2 milliGRAM(s) Oral two times a day  carvedilol 3.125 milliGRAM(s) Oral every 12 hours  clopidogrel Tablet 75 milliGRAM(s) Oral daily  dextrose 5%. 1000 milliLiter(s) (100 mL/Hr) IV Continuous <Continuous>  dextrose 5%. 1000 milliLiter(s) (50 mL/Hr) IV Continuous <Continuous>  dextrose 50% Injectable 25 Gram(s) IV Push once  dextrose 50% Injectable 12.5 Gram(s) IV Push once  dextrose 50% Injectable 25 Gram(s) IV Push once  fenofibrate Tablet 48 milliGRAM(s) Oral daily  gabapentin 300 milliGRAM(s) Oral every 8 hours  glucagon  Injectable 1 milliGRAM(s) IntraMuscular once  heparin   Injectable 5000 Unit(s) SubCutaneous every 8 hours  influenza  Vaccine (HIGH DOSE) 0.5 milliLiter(s) IntraMuscular once  insulin lispro (ADMELOG) corrective regimen sliding scale   SubCutaneous three times a day before meals  insulin lispro (ADMELOG) corrective regimen sliding scale   SubCutaneous at bedtime  isosorbide   mononitrate ER Tablet (IMDUR) 30 milliGRAM(s) Oral daily  losartan 50 milliGRAM(s) Oral daily  multivitamin 1 Tablet(s) Oral daily  senna 2 Tablet(s) Oral at bedtime  sodium chloride 0.9%. 500 milliLiter(s) (100 mL/Hr) IV Continuous <Continuous>      Allergies    No Known Allergies    Intolerances      FAMILY HISTORY:  Family history of coronary artery disease  HLD/Angina. Fatal MI age 73.    Family history of diabetes mellitus type II  mother- - hyperlipidemia and Hypertension.    Family history of pancreatic cancer (Sibling)  brother     Family history of coronary artery disease  brother- age 50+- Coronary Artery Stents      Non-contributary for premature coronary disease or sudden cardiac death    SOCIAL HISTORY:    [ ] Non-smoker  [x ] Smoker  [ ] Alcohol    FLU VACCINE THIS YEAR STARTS IN AUGUST:  [ ] Yes    [ ] No    IF OVER 65 HAVE YOU EVER HAD A PNA VACCINE:  [ ] Yes    [ ] No       [ ] N/A      REVIEW OF SYSTEMS:  [x ]chest pain  [ x ]shortness of breath  [  ]palpitations  [  ]syncope  [x ]near syncope [ ]upper extremity weakness   [ ] lower extremity weakness  [  ]diplopia  [  ]altered mental status   [  ]fevers  [ ]chills [ ]nausea  [ ]vomiting  [  ]dysphagia    [ ]abdominal pain  [ ]melena  [ ]BRBPR    [  ]epistaxis  [  ]rash    [ ]lower extremity edema        [X] All others negative	  [ ] Unable to obtain      LABS:	 	    CARDIAC MARKERS:                              13.1   6.29  )-----------( 153      ( 01 May 2025 05:33 )             39.3     Hb Trend: 13.1<--    05-    140  |  104  |  16  ----------------------------<  125[H]  3.3[L]   |  23  |  0.59    Ca    9.0      01 May 2025 05:34  Mg     1.6           Creatinine Trend: 0.59<--, 0.55<--, 0.56<--, 0.57<--, 0.56<--, 0.57<--    Coags:      proBNP:   Lipid Profile:   HgA1c:   TSH:         PHYSICAL EXAM:  T(C): 36.6 (25 @ 10:50), Max: 36.8 (25 @ 21:07)  HR: 70 (25 @ 10:50) (60 - 75)  BP: 101/67 (25 @ 10:50) (101/67 - 134/78)  RR: 18 (25 @ 10:50) (18 - 18)  SpO2: 97% (25 @ 10:50) (93% - 99%)  Wt(kg): --   BMI (kg/m2): 42.3 (25 @ 16:13)  I&O's Summary    2025 07  -  01 May 2025 07:00  --------------------------------------------------------  IN: 480 mL / OUT: 450 mL / NET: 30 mL    01 May 2025 07:  -  01 May 2025 15:04  --------------------------------------------------------  IN: 500 mL / OUT: 0 mL / NET: 500 mL        Gen: NAD  HEENT:  (-)icterus (-)pallor  CV: N S1 S2 1/6 JODY (+)2 Pulses B/l  Resp:  Clear to auscultation B/L, normal effort  GI: (+) BS Soft, NT, ND  Lymph:  (-)Edema, (-)obvious lymphadenopathy  Skin: Warm to touch, Normal turgor  Psych: Appropriate mood and affect      TELEMETRY: NSR, brief NSVT	      ECG: SR, AZ prolongation, bifascicular block   	    RADIOLOGY:         CXR: < from: Xray Chest 1 View AP/PA (25 @ 18:40) >  IMPRESSION:  Clear lungs.    < end of copied text >    < from: TTE W or WO Ultrasound Enhancing Agent (25 @ 14:46) >  CONCLUSIONS:      1. Technically difficult image quality.   2. Definity ultrasound enhancing agent was given for enhanced left ventricular opacification and improved delineation of the left ventricular endocardial borders.   3. Left ventricular systolic function is mildly to moderately decreased.   4. The right ventricle is not well visualized.   5. Consider an alternate imaging modality.    < end of copied text >    < from: Cardiac Catheterization (25 @ 12:17) >  Procedures Performed   Procedures:              1. Arterial Access - Right Femoral     2.    Diagnostic Coronary Angiography   3.    Diagnostic Graft Angiography   4.    Ultrasound Guided Access   5.    PCI: FRANCESCA   6.    PCI: FRANCESCA   7.    AngioSeal     Indications:               ACS greater than 24 hours   Lab Visit Indication:      ACS greater than 24 hours   PCI Status:                elective     Conclusions:   Successful PCI with FRANCESCA to the SVG to OM distal anastamosis and native  distal RCA.  DAPT for 6 mths    Diagnostic Findings:     Coronary Angiography   The coronary circulation is right dominant.      LM   Left main artery: Angiography shows mild atherosclerosis.      LAD   Left anterior descending artery: There is a 100% stenosis.      CX   Circumflex: There is a 100 % stenosis.      RCA   Distal right coronary artery: There is a 70 % stenosis.      Graft Angiography   SVG graft to First obtuse marginal: There is a 90 % stenosis in the  distal anastomosis portion of the segment.  LIMA graft to Mid left anterior descending: Angiography shows no  disease.  SVG graft to Right posterior descending artery: Angiography shows  complete occlusion.    < end of copied text >      ASSESSMENT/PLAN: Patient is a 70 y/o Male with PMH of CAD s/p CABG (3 vessels, 2017 at [facility]), HF, ASIYA, T2DM, ASIYA on CPAP, HTN, HLD, renal mass s/p resection who presented with chest pain and near syncope found to have mildly reduced LVEF 45% now s/p cardiac cath resulting in Successful PCI with FRANCESCA to the SVG to OM distal anastamosis and native distal RCA. EP consulted for NSVT and an abnormal EKG.    - Interventional cardio eval noted s/p PCI. Continue ASA, Lipitor, and Plavix for CAD  - TTE noted with EF 45%  - Currently on Bumex, Coreg, Imdur, Losartan for HTN/CAD/Systolic CHF. May need dose adjustments given component of orthostatic hypotension - defer to cardiology/primary team  - Continue Coreg given NSVT and LV dysfunction  - Given NSVT, Bifascicular block, near syncope/multiple prior syncopal episodes - recommend EP study to look for arrhythmia requiring PPM/ICD  - Patient follows with cardiologist Marcos Diaz PA-C  Pager: [phone]

## 2025-05-01 NOTE — PROVIDER CONTACT NOTE (OTHER) - BACKGROUND
69 year old male admitted for syncope and collapse. PMH T2DM, myelopathy, renal cancer, paralytic ileus, gout, kidney mass etc.

## 2025-05-01 NOTE — CONSULT NOTE ADULT - SUBJECTIVE AND OBJECTIVE BOX
Neurology Consult    Reason for Consult: Patient is a 69y old  Male who presents with a chief complaint of Syncope (2025 13:34)      HPI:  69 Male PMH CAD s/p CABG (3 vessels, 2017 at [facility]), HF, ASIYA, T2DM (not on insulin), ASIYA on CPAP, HTN, HLD, renal mass s/p resection  presenting with cp /near syncope   pt had been in his usual state of state yesterday   went shopping and ran some errands   came back home around 2 pm    had a ciggar outside  and started feeling " lightheaded ... as he was going back to sit in his chair he " felt " about to pass out .. but no focal neuro complaints ..   sat dwn in chair , called family for help   started feeling substernal cp /sob   cp : 5/6 substeral , no palpiations .. no radiation , assoitcated with sob   pain ahd been present since onset till this am  however severity has subsided to 3/10  FS at the time presuambly was NL ( pt has a sensor )     no fever   no chills  no cough   no N/v/D  ACS ruled out       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      Essential hypertension      CAD (coronary artery disease)  - s/p cabg x3      Hypercholesterolemia      ASIYA (obstructive sleep apnea)  initially dx  ; CPAP      Paralytic ileus  post op THR  & post op laminectomy      Type 2 diabetes mellitus      Right carpal tunnel syndrome      Cubital tunnel syndrome, right      Trigger finger of right hand      Morbid obesity with body mass index (BMI) of 40.0 or higher      H/O CHF      Kidney mass      Cervical herniated disc      Renal cancer      Disease of spinal cord, unspecified      Peripheral neuropathy      Myelopathy      Morbid obesity      S/P Left Inguinal Hernia Repair      History of Total Hip Replacement  - bilateral THR      S/P tonsillectomy and adenoidectomy  as child      H/O lithotripsy  x1      S/P revision of total knee, right  x2-  &       S/P lumbar discectomy  - ,L5  Aug 2013      S/P CABG x 3  17      S/P lumbar laminectomy  Fusion L3-L5       Kidney stone  s/w ESWL pt unsure site      Neuropathy  Bilateral Feet since       History of bilateral hip replacements      History of hernia repair      History of lumbar fusion      History of cholecystectomy      History of bilateral knee replacement      History of lymph node biopsy      S/p bilateral carpal tunnel release      History of partial nephrectomy      H/O elbow surgery      History of thoracic spinal fusion          Allergies: Allergies    No Known Allergies    Intolerances        Social History: Denies toxic habits including tobacco, ETOH or illicit drugs.    Family History: FAMILY HISTORY:  Family history of coronary artery disease  HLD/Angina. Fatal MI age 73.    Family history of diabetes mellitus type II  mother- - hyperlipidemia and Hypertension.    Family history of pancreatic cancer (Sibling)  brother     Family history of coronary artery disease  brother- age 50+- Coronary Artery Stents    . No family history of strokes    Medications: MEDICATIONS  (STANDING):  allopurinol 100 milliGRAM(s) Oral at bedtime  aspirin enteric coated 81 milliGRAM(s) Oral daily  atorvastatin 40 milliGRAM(s) Oral at bedtime  buMETAnide 2 milliGRAM(s) Oral two times a day  carvedilol 3.125 milliGRAM(s) Oral every 12 hours  clopidogrel Tablet 75 milliGRAM(s) Oral daily  dextrose 5%. 1000 milliLiter(s) (100 mL/Hr) IV Continuous <Continuous>  dextrose 5%. 1000 milliLiter(s) (50 mL/Hr) IV Continuous <Continuous>  dextrose 50% Injectable 25 Gram(s) IV Push once  dextrose 50% Injectable 12.5 Gram(s) IV Push once  dextrose 50% Injectable 25 Gram(s) IV Push once  fenofibrate Tablet 48 milliGRAM(s) Oral daily  gabapentin 300 milliGRAM(s) Oral every 8 hours  glucagon  Injectable 1 milliGRAM(s) IntraMuscular once  heparin   Injectable 5000 Unit(s) SubCutaneous every 8 hours  influenza  Vaccine (HIGH DOSE) 0.5 milliLiter(s) IntraMuscular once  insulin lispro (ADMELOG) corrective regimen sliding scale   SubCutaneous three times a day before meals  insulin lispro (ADMELOG) corrective regimen sliding scale   SubCutaneous at bedtime  isosorbide   mononitrate ER Tablet (IMDUR) 30 milliGRAM(s) Oral daily  losartan 50 milliGRAM(s) Oral daily  multivitamin 1 Tablet(s) Oral daily  senna 2 Tablet(s) Oral at bedtime  sodium chloride 0.9%. 500 milliLiter(s) (100 mL/Hr) IV Continuous <Continuous>    MEDICATIONS  (PRN):  dextrose Oral Gel 15 Gram(s) Oral once PRN Blood Glucose LESS THAN 70 milliGRAM(s)/deciliter      Review of Systems:  CONSTITUTIONAL:  No weight loss, fever, chills, weakness or fatigue.  HEENT:  Eyes:  No visual loss, blurred vision, double vision or yellow sclera. Ears, Nose, Throat:  No hearing loss, sneezing, congestion, runny nose or sore throat.  SKIN:  No rash or itching.  CARDIOVASCULAR:  No chest pain, chest pressure or chest discomfort. No palpitations or edema.  RESPIRATORY:  No shortness of breath, cough or sputum.  GASTROINTESTINAL:  No anorexia, nausea, vomiting or diarrhea. No abdominal pain or blood.  GENITOURINARY:  No burning on urination or incontinence   NEUROLOGICAL:  No headache, dizziness, syncope, paralysis, ataxia, numbness or tingling in the extremities. No change in bowel or bladder control. no limb weakness. no vision changes.   MUSCULOSKELETAL:  No muscle, back pain, joint pain or stiffness.  HEMATOLOGIC:  No anemia, bleeding or bruising.  LYMPHATICS:  No enlarged nodes. No history of splenectomy.  PSYCHIATRIC:  No history of depression or anxiety.  ENDOCRINOLOGIC:  No reports of sweating, cold or heat intolerance. No polyuria or polydipsia.      Vitals:  Vital Signs Last 24 Hrs  T(C): 36.3 (01 May 2025 16:20), Max: 36.8 (2025 21:07)  T(F): 97.3 (01 May 2025 16:20), Max: 98.3 (2025 21:07)  HR: 65 (01 May 2025 16:39) (60 - 75)  BP: 107/68 (01 May 2025 16:20) (101/67 - 134/78)  BP(mean): --  RR: 18 (01 May 2025 16:20) (18 - 18)  SpO2: 97% (01 May 2025 16:39) (93% - 99%)    Parameters below as of 01 May 2025 16:20  Patient On (Oxygen Delivery Method): room air    Orthostatic VS    25 @ 14:22  Lying BP: Orthostatic BP (Lying Systolic): 120/Orthostatic BP (Lying Diastolic (mm Hg)): 73 HR: Orthostatic Pulse (Heart Rate (beats/min)): 71   Sitting BP: Orthostatic BP (Sitting Systolic): 132/Orthostatic BP (Sitting Diastolic (mm Hg)): 75 HR: Orthostatic Pulse (Heart Rate (beats/min)): 75  Standing BP: Orthostatic BP (Standing Systolic): 108/Orthostatic BP (Standing Diastolic (mm Hg)): 71 HR: Orthostatic Pulse (Heart Rate (beats/min)): 76  Site: Orthostatic BP/Pulse (Site): upper right arm   Mode: Orthostatic BP/ Pulse (Mode): electronic    25 @ 15:27  Lying BP: Orthostatic BP (Lying Systolic): 123/Orthostatic BP (Lying Diastolic (mm Hg)): 70 HR: Orthostatic Pulse (Heart Rate (beats/min)): 68   Sitting BP: Orthostatic BP (Sitting Systolic): 129/Orthostatic BP (Sitting Diastolic (mm Hg)): 76 HR: Orthostatic Pulse (Heart Rate (beats/min)): 73  Standing BP: Orthostatic BP (Standing Systolic): 137/Orthostatic BP (Standing Diastolic (mm Hg)): 82 HR: Orthostatic Pulse (Heart Rate (beats/min)): 82  Site: --   Mode: --        General Exam:   General Appearance: Appropriately dressed and in no acute distress       Head: Normocephalic, atraumatic and no dysmorphic features  Ear, Nose, and Throat: Moist mucous membranes  CVS: S1S2+  Resp: No SOB, no wheeze or rhonchi  GI: soft NT/ND  Extremities: No edema or cyanosis  Skin: No bruises or rashes     Neurological Exam:  Mental Status: Awake, alert and oriented x 3.  Able to follow simple and complex verbal commands. Able to name and repeat. fluent speech. No obvious aphasia or dysarthria noted.   Cranial Nerves: PERRL, EOMI, VFFC, sensation V1-V3 intact,  no obvious facial asymmetry, equal elevation of palate, scm/trap 5/5, tongue is midline on protrusion. no obvious papilledema on fundoscopic exam. hearing is grossly intact.   Motor: Normal bulk, tone and strength throughout. Fine finger movements were intact and symmetric.   Sensation: Intact to light touch and pinprick throughout decerase distally LE and uppers   Reflexes: 1+ throughout at biceps, brachioradialis, triceps, patellars and ankles bilaterally and equal. No clonus. R toe and L toe were both downgoing.  Coordination:  F-N and H-S intact. no dysmetria   Gait:  no limitations on gait. able to tandemination: No dysmetria on FNF or HKS  Gait: walker     Data/Labs/Imaging which I personally reviewed.     Labs:     CBC Full  -  ( 01 May 2025 05:33 )  WBC Count : 6.29 K/uL  RBC Count : 4.74 M/uL  Hemoglobin : 13.1 g/dL  Hematocrit : 39.3 %  Platelet Count - Automated : 153 K/uL  Mean Cell Volume : 82.9 fl  Mean Cell Hemoglobin : 27.6 pg  Mean Cell Hemoglobin Concentration : 33.3 g/dL  Auto Neutrophil # : x  Auto Lymphocyte # : x  Auto Monocyte # : x  Auto Eosinophil # : x  Auto Basophil # : x  Auto Neutrophil % : x  Auto Lymphocyte % : x  Auto Monocyte % : x  Auto Eosinophil % : x  Auto Basophil % : x        140  |  104  |  16  ----------------------------<  125[H]  3.3[L]   |  23  |  0.59    Ca    9.0      01 May 2025 05:34  Mg     1.6     05          Urinalysis Basic - ( 01 May 2025 05:34 )    Color: x / Appearance: x / SG: x / pH: x  Gluc: 125 mg/dL / Ketone: x  / Bili: x / Urobili: x   Blood: x / Protein: x / Nitrite: x   Leuk Esterase: x / RBC: x / WBC x   Sq Epi: x / Non Sq Epi: x / Bacteria: x

## 2025-05-02 LAB
ANION GAP SERPL CALC-SCNC: 14 MMOL/L — SIGNIFICANT CHANGE UP (ref 5–17)
BUN SERPL-MCNC: 15 MG/DL — SIGNIFICANT CHANGE UP (ref 7–23)
CALCIUM SERPL-MCNC: 9.5 MG/DL — SIGNIFICANT CHANGE UP (ref 8.4–10.5)
CHLORIDE SERPL-SCNC: 103 MMOL/L — SIGNIFICANT CHANGE UP (ref 96–108)
CO2 SERPL-SCNC: 23 MMOL/L — SIGNIFICANT CHANGE UP (ref 22–31)
CREAT SERPL-MCNC: 0.55 MG/DL — SIGNIFICANT CHANGE UP (ref 0.5–1.3)
EGFR: 107 ML/MIN/1.73M2 — SIGNIFICANT CHANGE UP
EGFR: 107 ML/MIN/1.73M2 — SIGNIFICANT CHANGE UP
GLUCOSE BLDC GLUCOMTR-MCNC: 130 MG/DL — HIGH (ref 70–99)
GLUCOSE BLDC GLUCOMTR-MCNC: 140 MG/DL — HIGH (ref 70–99)
GLUCOSE BLDC GLUCOMTR-MCNC: 146 MG/DL — HIGH (ref 70–99)
GLUCOSE BLDC GLUCOMTR-MCNC: 211 MG/DL — HIGH (ref 70–99)
GLUCOSE SERPL-MCNC: 114 MG/DL — HIGH (ref 70–99)
MAGNESIUM SERPL-MCNC: 1.9 MG/DL — SIGNIFICANT CHANGE UP (ref 1.6–2.6)
POTASSIUM SERPL-MCNC: 3.6 MMOL/L — SIGNIFICANT CHANGE UP (ref 3.5–5.3)
POTASSIUM SERPL-SCNC: 3.6 MMOL/L — SIGNIFICANT CHANGE UP (ref 3.5–5.3)
SODIUM SERPL-SCNC: 140 MMOL/L — SIGNIFICANT CHANGE UP (ref 135–145)

## 2025-05-02 RX ORDER — SENNA 187 MG
2 TABLET ORAL ONCE
Refills: 0 | Status: COMPLETED | OUTPATIENT
Start: 2025-05-02 | End: 2025-05-02

## 2025-05-02 RX ADMIN — ATORVASTATIN CALCIUM 40 MILLIGRAM(S): 80 TABLET, FILM COATED ORAL at 21:05

## 2025-05-02 RX ADMIN — Medication 1 TABLET(S): at 12:13

## 2025-05-02 RX ADMIN — GABAPENTIN 300 MILLIGRAM(S): 400 CAPSULE ORAL at 21:06

## 2025-05-02 RX ADMIN — HEPARIN SODIUM 5000 UNIT(S): 1000 INJECTION INTRAVENOUS; SUBCUTANEOUS at 05:19

## 2025-05-02 RX ADMIN — LOSARTAN POTASSIUM 50 MILLIGRAM(S): 100 TABLET, FILM COATED ORAL at 05:19

## 2025-05-02 RX ADMIN — HEPARIN SODIUM 5000 UNIT(S): 1000 INJECTION INTRAVENOUS; SUBCUTANEOUS at 13:47

## 2025-05-02 RX ADMIN — Medication 2 TABLET(S): at 12:13

## 2025-05-02 RX ADMIN — INSULIN LISPRO 2: 100 INJECTION, SOLUTION INTRAVENOUS; SUBCUTANEOUS at 12:14

## 2025-05-02 RX ADMIN — ISOSORBIDE MONONITRATE 30 MILLIGRAM(S): 60 TABLET, EXTENDED RELEASE ORAL at 12:13

## 2025-05-02 RX ADMIN — HEPARIN SODIUM 5000 UNIT(S): 1000 INJECTION INTRAVENOUS; SUBCUTANEOUS at 21:05

## 2025-05-02 RX ADMIN — CARVEDILOL 3.12 MILLIGRAM(S): 3.12 TABLET, FILM COATED ORAL at 18:00

## 2025-05-02 RX ADMIN — BUMETANIDE 2 MILLIGRAM(S): 1 TABLET ORAL at 05:19

## 2025-05-02 RX ADMIN — GABAPENTIN 300 MILLIGRAM(S): 400 CAPSULE ORAL at 13:46

## 2025-05-02 RX ADMIN — Medication 2 TABLET(S): at 21:05

## 2025-05-02 RX ADMIN — BUMETANIDE 2 MILLIGRAM(S): 1 TABLET ORAL at 13:47

## 2025-05-02 RX ADMIN — Medication 81 MILLIGRAM(S): at 12:12

## 2025-05-02 RX ADMIN — GABAPENTIN 300 MILLIGRAM(S): 400 CAPSULE ORAL at 05:18

## 2025-05-02 RX ADMIN — CARVEDILOL 3.12 MILLIGRAM(S): 3.12 TABLET, FILM COATED ORAL at 05:19

## 2025-05-02 RX ADMIN — Medication 100 MILLIGRAM(S): at 21:06

## 2025-05-02 RX ADMIN — CLOPIDOGREL BISULFATE 75 MILLIGRAM(S): 75 TABLET, FILM COATED ORAL at 12:13

## 2025-05-02 RX ADMIN — FENOFIBRATE 48 MILLIGRAM(S): 160 TABLET ORAL at 12:14

## 2025-05-02 NOTE — PROGRESS NOTE ADULT - ASSESSMENT
Echo 4/25 - EF mild to moderate reduced     < end of copied text >    Assessment and Plan     1) Syncope :  Monitor on tele , echo shows mild to mod LV dysfunction  , orthos -khoa  , CT head -khoa , NSVT for EP study     2) CP: echo shows mild to mod LV dysfunction  , s/p  LHC   s/p PCI SVG to OM and RCA  cont asa and plavix     3) HTN c/w coreg , Imdur losartan

## 2025-05-02 NOTE — PROGRESS NOTE ADULT - ASSESSMENT
69 Male PMH CAD s/p CABG (3 vessels, 08/2017 at [facility]), HF, ASIYA, T2DM (not on insulin), ASIYA on CPAP, HTN, HLD, renal mass s/p resection  presenting with cp /near syncope   pt had been in his usual state of state yesterday   went shopping and ran some errands   came back home around 2 pm    had a ciggar outside  and started feeling " lightheaded ... as he was going back to sit in his chair he " felt " about to pass out .. but no focal neuro complaints ..   sat dwn in chair , called family for help   started feeling substernal cp /sob   cp : 5/6 substeral , no palpiations .. no radiation , assoitcated with sob   pain ahd been present since onset till this am  however severity has subsided to 3/10  FS at the time presuambly was NL ( pt has a sensor )     no fever   no chills  no cough   no N/v/D  ACS ruled out   admitted for further palafox     near syncope : cont tele   neuro check   check CT head : neg   orthostatics : mildly positive ,.monitor   monitor FS   pt " had an episode of dizziness while walking with PT" : no episodes on monitor at the time .  neg orthostatics   this AM had 5 beats of wide complex   consulted EP and neuro   Neuro : can check MRI   d/w EP :  plan for EP study nex t week         chest pain : atypical   check TTE:   Left ventricular systolic function is mildly to moderately decreased.  pt did not have pain when he was found to have CAD and need CABG !   cath : Successful PCI with FRANCESCA to the SVG to OM distal anastamosis and native  distal RCA.  DAPT for 6 mths      Hx of CHF ; cont current bumex   not in [place] chf       dyspnea : consider PE   check VA duplex : neg      DM: monitor FS     ASIYA : cont bipap     discussed at length with pt

## 2025-05-03 LAB
ANION GAP SERPL CALC-SCNC: 16 MMOL/L — SIGNIFICANT CHANGE UP (ref 5–17)
BUN SERPL-MCNC: 19 MG/DL — SIGNIFICANT CHANGE UP (ref 7–23)
CALCIUM SERPL-MCNC: 9.4 MG/DL — SIGNIFICANT CHANGE UP (ref 8.4–10.5)
CHLORIDE SERPL-SCNC: 102 MMOL/L — SIGNIFICANT CHANGE UP (ref 96–108)
CO2 SERPL-SCNC: 22 MMOL/L — SIGNIFICANT CHANGE UP (ref 22–31)
CREAT SERPL-MCNC: 0.59 MG/DL — SIGNIFICANT CHANGE UP (ref 0.5–1.3)
EGFR: 105 ML/MIN/1.73M2 — SIGNIFICANT CHANGE UP
EGFR: 105 ML/MIN/1.73M2 — SIGNIFICANT CHANGE UP
GLUCOSE BLDC GLUCOMTR-MCNC: 132 MG/DL — HIGH (ref 70–99)
GLUCOSE BLDC GLUCOMTR-MCNC: 136 MG/DL — HIGH (ref 70–99)
GLUCOSE BLDC GLUCOMTR-MCNC: 146 MG/DL — HIGH (ref 70–99)
GLUCOSE BLDC GLUCOMTR-MCNC: 243 MG/DL — HIGH (ref 70–99)
GLUCOSE SERPL-MCNC: 128 MG/DL — HIGH (ref 70–99)
HCT VFR BLD CALC: 40.1 % — SIGNIFICANT CHANGE UP (ref 39–50)
HGB BLD-MCNC: 13.5 G/DL — SIGNIFICANT CHANGE UP (ref 13–17)
MCHC RBC-ENTMCNC: 28.1 PG — SIGNIFICANT CHANGE UP (ref 27–34)
MCHC RBC-ENTMCNC: 33.7 G/DL — SIGNIFICANT CHANGE UP (ref 32–36)
MCV RBC AUTO: 83.4 FL — SIGNIFICANT CHANGE UP (ref 80–100)
NRBC BLD AUTO-RTO: 0 /100 WBCS — SIGNIFICANT CHANGE UP (ref 0–0)
PLATELET # BLD AUTO: 168 K/UL — SIGNIFICANT CHANGE UP (ref 150–400)
POTASSIUM SERPL-MCNC: 3.2 MMOL/L — LOW (ref 3.5–5.3)
POTASSIUM SERPL-SCNC: 3.2 MMOL/L — LOW (ref 3.5–5.3)
RBC # BLD: 4.81 M/UL — SIGNIFICANT CHANGE UP (ref 4.2–5.8)
RBC # FLD: 13.8 % — SIGNIFICANT CHANGE UP (ref 10.3–14.5)
SODIUM SERPL-SCNC: 140 MMOL/L — SIGNIFICANT CHANGE UP (ref 135–145)
WBC # BLD: 6.77 K/UL — SIGNIFICANT CHANGE UP (ref 3.8–10.5)
WBC # FLD AUTO: 6.77 K/UL — SIGNIFICANT CHANGE UP (ref 3.8–10.5)

## 2025-05-03 PROCEDURE — 70553 MRI BRAIN STEM W/O & W/DYE: CPT | Mod: 26

## 2025-05-03 RX ADMIN — GABAPENTIN 300 MILLIGRAM(S): 400 CAPSULE ORAL at 05:34

## 2025-05-03 RX ADMIN — INSULIN LISPRO 2: 100 INJECTION, SOLUTION INTRAVENOUS; SUBCUTANEOUS at 17:28

## 2025-05-03 RX ADMIN — FENOFIBRATE 48 MILLIGRAM(S): 160 TABLET ORAL at 13:49

## 2025-05-03 RX ADMIN — BUMETANIDE 2 MILLIGRAM(S): 1 TABLET ORAL at 05:35

## 2025-05-03 RX ADMIN — Medication 81 MILLIGRAM(S): at 11:48

## 2025-05-03 RX ADMIN — HEPARIN SODIUM 5000 UNIT(S): 1000 INJECTION INTRAVENOUS; SUBCUTANEOUS at 21:32

## 2025-05-03 RX ADMIN — BUMETANIDE 2 MILLIGRAM(S): 1 TABLET ORAL at 13:39

## 2025-05-03 RX ADMIN — ISOSORBIDE MONONITRATE 30 MILLIGRAM(S): 60 TABLET, EXTENDED RELEASE ORAL at 11:48

## 2025-05-03 RX ADMIN — HEPARIN SODIUM 5000 UNIT(S): 1000 INJECTION INTRAVENOUS; SUBCUTANEOUS at 13:40

## 2025-05-03 RX ADMIN — GABAPENTIN 300 MILLIGRAM(S): 400 CAPSULE ORAL at 13:39

## 2025-05-03 RX ADMIN — CARVEDILOL 3.12 MILLIGRAM(S): 3.12 TABLET, FILM COATED ORAL at 05:35

## 2025-05-03 RX ADMIN — Medication 1 TABLET(S): at 11:48

## 2025-05-03 RX ADMIN — GABAPENTIN 300 MILLIGRAM(S): 400 CAPSULE ORAL at 21:32

## 2025-05-03 RX ADMIN — CARVEDILOL 3.12 MILLIGRAM(S): 3.12 TABLET, FILM COATED ORAL at 17:27

## 2025-05-03 RX ADMIN — ATORVASTATIN CALCIUM 40 MILLIGRAM(S): 80 TABLET, FILM COATED ORAL at 21:32

## 2025-05-03 RX ADMIN — LOSARTAN POTASSIUM 50 MILLIGRAM(S): 100 TABLET, FILM COATED ORAL at 05:35

## 2025-05-03 RX ADMIN — HEPARIN SODIUM 5000 UNIT(S): 1000 INJECTION INTRAVENOUS; SUBCUTANEOUS at 05:35

## 2025-05-03 RX ADMIN — CLOPIDOGREL BISULFATE 75 MILLIGRAM(S): 75 TABLET, FILM COATED ORAL at 11:48

## 2025-05-03 RX ADMIN — Medication 100 MILLIGRAM(S): at 21:32

## 2025-05-03 NOTE — PROGRESS NOTE ADULT - ASSESSMENT
69 Male PMH CAD s/p CABG (3 vessels, 08/2017 at [facility]), HF, ASIYA, T2DM (not on insulin), ASIYA on CPAP, HTN, HLD, renal mass s/p resection  presenting with cp /near syncope   pt had been in his usual state of state yesterday   went shopping and ran some errands   came back home around 2 pm    had a ciggar outside  and started feeling " lightheaded ... as he was going back to sit in his chair he " felt " about to pass out .. but no focal neuro complaints ..   sat dwn in chair , called family for help   started feeling substernal cp /sob   cp : 5/6 substeral , no palpiations .. no radiation , assoitcated with sob   pain ahd been present since onset till this am  however severity has subsided to 3/10  FS at the time presuambly was NL ( pt has a sensor )     no fever   no chills  no cough   no N/v/D  ACS ruled out   admitted for further palafox     near syncope : cont tele   neuro check   check CT head : neg   orthostatics : mildly positive ,.monitor   monitor FS   pt " had an episode of dizziness while walking with PT" : no episodes on monitor at the time .  neg orthostatics   this AM had 5 beats of wide complex   consulted EP and neuro   MRI neg   d/w EP :  plan for EP study      chest pain : atypical   check TTE:   Left ventricular systolic function is mildly to moderately decreased.  pt did not have pain when he was found to have CAD and need CABG !   cath : Successful PCI with FRANCESCA to the SVG to OM distal anastamosis and native  distal RCA.  DAPT for 6 mths      Hx of CHF ; cont current bumex   not in [place] chf       dyspnea : consider PE   check VA duplex : neg      DM: monitor FS     ASIYA : cont bipap     discussed at length with pt

## 2025-05-04 LAB
ANION GAP SERPL CALC-SCNC: 13 MMOL/L — SIGNIFICANT CHANGE UP (ref 5–17)
BLD GP AB SCN SERPL QL: NEGATIVE — SIGNIFICANT CHANGE UP
BUN SERPL-MCNC: 22 MG/DL — SIGNIFICANT CHANGE UP (ref 7–23)
CALCIUM SERPL-MCNC: 9.3 MG/DL — SIGNIFICANT CHANGE UP (ref 8.4–10.5)
CHLORIDE SERPL-SCNC: 102 MMOL/L — SIGNIFICANT CHANGE UP (ref 96–108)
CO2 SERPL-SCNC: 25 MMOL/L — SIGNIFICANT CHANGE UP (ref 22–31)
CREAT SERPL-MCNC: 0.66 MG/DL — SIGNIFICANT CHANGE UP (ref 0.5–1.3)
EGFR: 102 ML/MIN/1.73M2 — SIGNIFICANT CHANGE UP
EGFR: 102 ML/MIN/1.73M2 — SIGNIFICANT CHANGE UP
GLUCOSE BLDC GLUCOMTR-MCNC: 128 MG/DL — HIGH (ref 70–99)
GLUCOSE BLDC GLUCOMTR-MCNC: 130 MG/DL — HIGH (ref 70–99)
GLUCOSE BLDC GLUCOMTR-MCNC: 135 MG/DL — HIGH (ref 70–99)
GLUCOSE BLDC GLUCOMTR-MCNC: 137 MG/DL — HIGH (ref 70–99)
GLUCOSE SERPL-MCNC: 125 MG/DL — HIGH (ref 70–99)
HCT VFR BLD CALC: 39.3 % — SIGNIFICANT CHANGE UP (ref 39–50)
HGB BLD-MCNC: 13.1 G/DL — SIGNIFICANT CHANGE UP (ref 13–17)
MCHC RBC-ENTMCNC: 27.9 PG — SIGNIFICANT CHANGE UP (ref 27–34)
MCHC RBC-ENTMCNC: 33.3 G/DL — SIGNIFICANT CHANGE UP (ref 32–36)
MCV RBC AUTO: 83.8 FL — SIGNIFICANT CHANGE UP (ref 80–100)
NRBC BLD AUTO-RTO: 0 /100 WBCS — SIGNIFICANT CHANGE UP (ref 0–0)
PLATELET # BLD AUTO: 174 K/UL — SIGNIFICANT CHANGE UP (ref 150–400)
POTASSIUM SERPL-MCNC: 3.3 MMOL/L — LOW (ref 3.5–5.3)
POTASSIUM SERPL-SCNC: 3.3 MMOL/L — LOW (ref 3.5–5.3)
RBC # BLD: 4.69 M/UL — SIGNIFICANT CHANGE UP (ref 4.2–5.8)
RBC # FLD: 14 % — SIGNIFICANT CHANGE UP (ref 10.3–14.5)
RH IG SCN BLD-IMP: POSITIVE — SIGNIFICANT CHANGE UP
SODIUM SERPL-SCNC: 140 MMOL/L — SIGNIFICANT CHANGE UP (ref 135–145)
WBC # BLD: 6.6 K/UL — SIGNIFICANT CHANGE UP (ref 3.8–10.5)
WBC # FLD AUTO: 6.6 K/UL — SIGNIFICANT CHANGE UP (ref 3.8–10.5)

## 2025-05-04 RX ADMIN — BUMETANIDE 2 MILLIGRAM(S): 1 TABLET ORAL at 14:11

## 2025-05-04 RX ADMIN — GABAPENTIN 300 MILLIGRAM(S): 400 CAPSULE ORAL at 05:32

## 2025-05-04 RX ADMIN — GABAPENTIN 300 MILLIGRAM(S): 400 CAPSULE ORAL at 14:12

## 2025-05-04 RX ADMIN — CARVEDILOL 3.12 MILLIGRAM(S): 3.12 TABLET, FILM COATED ORAL at 17:16

## 2025-05-04 RX ADMIN — FENOFIBRATE 48 MILLIGRAM(S): 160 TABLET ORAL at 11:56

## 2025-05-04 RX ADMIN — CLOPIDOGREL BISULFATE 75 MILLIGRAM(S): 75 TABLET, FILM COATED ORAL at 11:56

## 2025-05-04 RX ADMIN — ATORVASTATIN CALCIUM 40 MILLIGRAM(S): 80 TABLET, FILM COATED ORAL at 21:22

## 2025-05-04 RX ADMIN — Medication 81 MILLIGRAM(S): at 11:57

## 2025-05-04 RX ADMIN — HEPARIN SODIUM 5000 UNIT(S): 1000 INJECTION INTRAVENOUS; SUBCUTANEOUS at 14:12

## 2025-05-04 RX ADMIN — Medication 1 TABLET(S): at 14:11

## 2025-05-04 RX ADMIN — HEPARIN SODIUM 5000 UNIT(S): 1000 INJECTION INTRAVENOUS; SUBCUTANEOUS at 05:32

## 2025-05-04 RX ADMIN — LOSARTAN POTASSIUM 50 MILLIGRAM(S): 100 TABLET, FILM COATED ORAL at 05:33

## 2025-05-04 RX ADMIN — HEPARIN SODIUM 5000 UNIT(S): 1000 INJECTION INTRAVENOUS; SUBCUTANEOUS at 21:22

## 2025-05-04 RX ADMIN — ISOSORBIDE MONONITRATE 30 MILLIGRAM(S): 60 TABLET, EXTENDED RELEASE ORAL at 11:57

## 2025-05-04 RX ADMIN — Medication 40 MILLIEQUIVALENT(S): at 11:58

## 2025-05-04 RX ADMIN — BUMETANIDE 2 MILLIGRAM(S): 1 TABLET ORAL at 05:34

## 2025-05-04 RX ADMIN — GABAPENTIN 300 MILLIGRAM(S): 400 CAPSULE ORAL at 21:22

## 2025-05-04 RX ADMIN — Medication 100 MILLIGRAM(S): at 21:22

## 2025-05-04 RX ADMIN — CARVEDILOL 3.12 MILLIGRAM(S): 3.12 TABLET, FILM COATED ORAL at 05:33

## 2025-05-04 NOTE — PROGRESS NOTE ADULT - ASSESSMENT
69 Male PMH CAD s/p CABG (3 vessels, 08/2017 at [facility]), HF, ASIYA, T2DM (not on insulin), ASIYA on CPAP, HTN, HLD, renal mass s/p resection  presenting with cp /near syncope   pt had been in his usual state of state yesterday   went shopping and ran some errands   came back home around 2 pm    had a ciggar outside  and started feeling " lightheaded ... as he was going back to sit in his chair he " felt " about to pass out .. but no focal neuro complaints ..   sat dwn in chair , called family for help   started feeling substernal cp /sob   cp : 5/6 substeral , no palpiations .. no radiation , assoitcated with sob   pain ahd been present since onset till this am  however severity has subsided to 3/10  FS at the time presuambly was NL ( pt has a sensor )     no fever   no chills  no cough   no N/v/D  ACS ruled out   admitted for further palafox     near syncope : cont tele   neuro check   check CT head : neg   orthostatics : mildly positive ,.monitor   monitor FS   pt " had an episode of dizziness while walking with PT" : no episodes on monitor at the time .  neg orthostatics   this AM had 5 beats of wide complex   consulted EP and neuro   MRI neg    plan for EP study likely tmmrw   npo after MN       chest pain : atypical   check TTE:   Left ventricular systolic function is mildly to moderately decreased.  pt did not have pain when he was found to have CAD and need CABG !   cath : Successful PCI with FRANCESCA to the SVG to OM distal anastamosis and native  distal RCA.  DAPT for 6 mths      Hx of CHF ; cont current bumex   not in [place] chf       dyspnea : consider PE   check VA duplex : neg      DM: monitor FS     ASIYA : cont bipap     discussed at length with pt

## 2025-05-04 NOTE — PROGRESS NOTE ADULT - ASSESSMENT
EP ATTENDING    Agree with above. NPO after midnight tonight for EP study tomorrow with Dr Wing Pina

## 2025-05-05 LAB
ANION GAP SERPL CALC-SCNC: 16 MMOL/L — SIGNIFICANT CHANGE UP (ref 5–17)
APTT BLD: 27.6 SEC — SIGNIFICANT CHANGE UP (ref 26.1–36.8)
BUN SERPL-MCNC: 23 MG/DL — SIGNIFICANT CHANGE UP (ref 7–23)
CALCIUM SERPL-MCNC: 9.3 MG/DL — SIGNIFICANT CHANGE UP (ref 8.4–10.5)
CHLORIDE SERPL-SCNC: 101 MMOL/L — SIGNIFICANT CHANGE UP (ref 96–108)
CO2 SERPL-SCNC: 23 MMOL/L — SIGNIFICANT CHANGE UP (ref 22–31)
CREAT SERPL-MCNC: 0.64 MG/DL — SIGNIFICANT CHANGE UP (ref 0.5–1.3)
EGFR: 102 ML/MIN/1.73M2 — SIGNIFICANT CHANGE UP
EGFR: 102 ML/MIN/1.73M2 — SIGNIFICANT CHANGE UP
GLUCOSE BLDC GLUCOMTR-MCNC: 113 MG/DL — HIGH (ref 70–99)
GLUCOSE BLDC GLUCOMTR-MCNC: 141 MG/DL — HIGH (ref 70–99)
GLUCOSE BLDC GLUCOMTR-MCNC: 148 MG/DL — HIGH (ref 70–99)
GLUCOSE BLDC GLUCOMTR-MCNC: 197 MG/DL — HIGH (ref 70–99)
GLUCOSE SERPL-MCNC: 115 MG/DL — HIGH (ref 70–99)
HCT VFR BLD CALC: 41.5 % — SIGNIFICANT CHANGE UP (ref 39–50)
HGB BLD-MCNC: 13.7 G/DL — SIGNIFICANT CHANGE UP (ref 13–17)
INR BLD: 1.05 RATIO — SIGNIFICANT CHANGE UP (ref 0.85–1.16)
MCHC RBC-ENTMCNC: 28.1 PG — SIGNIFICANT CHANGE UP (ref 27–34)
MCHC RBC-ENTMCNC: 33 G/DL — SIGNIFICANT CHANGE UP (ref 32–36)
MCV RBC AUTO: 85.2 FL — SIGNIFICANT CHANGE UP (ref 80–100)
NRBC BLD AUTO-RTO: 0 /100 WBCS — SIGNIFICANT CHANGE UP (ref 0–0)
PLATELET # BLD AUTO: 176 K/UL — SIGNIFICANT CHANGE UP (ref 150–400)
POTASSIUM SERPL-MCNC: 3.5 MMOL/L — SIGNIFICANT CHANGE UP (ref 3.5–5.3)
POTASSIUM SERPL-SCNC: 3.5 MMOL/L — SIGNIFICANT CHANGE UP (ref 3.5–5.3)
PROTHROM AB SERPL-ACNC: 12 SEC — SIGNIFICANT CHANGE UP (ref 9.9–13.4)
RBC # BLD: 4.87 M/UL — SIGNIFICANT CHANGE UP (ref 4.2–5.8)
RBC # FLD: 14.1 % — SIGNIFICANT CHANGE UP (ref 10.3–14.5)
SODIUM SERPL-SCNC: 140 MMOL/L — SIGNIFICANT CHANGE UP (ref 135–145)
WBC # BLD: 7.03 K/UL — SIGNIFICANT CHANGE UP (ref 3.8–10.5)
WBC # FLD AUTO: 7.03 K/UL — SIGNIFICANT CHANGE UP (ref 3.8–10.5)

## 2025-05-05 PROCEDURE — 71045 X-RAY EXAM CHEST 1 VIEW: CPT | Mod: 26

## 2025-05-05 PROCEDURE — 93970 EXTREMITY STUDY: CPT | Mod: 26

## 2025-05-05 PROCEDURE — 93010 ELECTROCARDIOGRAM REPORT: CPT

## 2025-05-05 RX ADMIN — GABAPENTIN 300 MILLIGRAM(S): 400 CAPSULE ORAL at 17:37

## 2025-05-05 RX ADMIN — HEPARIN SODIUM 5000 UNIT(S): 1000 INJECTION INTRAVENOUS; SUBCUTANEOUS at 05:14

## 2025-05-05 RX ADMIN — CARVEDILOL 3.12 MILLIGRAM(S): 3.12 TABLET, FILM COATED ORAL at 05:14

## 2025-05-05 RX ADMIN — ISOSORBIDE MONONITRATE 30 MILLIGRAM(S): 60 TABLET, EXTENDED RELEASE ORAL at 17:32

## 2025-05-05 RX ADMIN — BUMETANIDE 2 MILLIGRAM(S): 1 TABLET ORAL at 05:15

## 2025-05-05 RX ADMIN — GABAPENTIN 300 MILLIGRAM(S): 400 CAPSULE ORAL at 05:14

## 2025-05-05 RX ADMIN — CLOPIDOGREL BISULFATE 75 MILLIGRAM(S): 75 TABLET, FILM COATED ORAL at 17:33

## 2025-05-05 RX ADMIN — Medication 81 MILLIGRAM(S): at 17:32

## 2025-05-05 RX ADMIN — BUMETANIDE 2 MILLIGRAM(S): 1 TABLET ORAL at 17:33

## 2025-05-05 RX ADMIN — Medication 100 MILLIGRAM(S): at 21:21

## 2025-05-05 RX ADMIN — LOSARTAN POTASSIUM 50 MILLIGRAM(S): 100 TABLET, FILM COATED ORAL at 05:14

## 2025-05-05 RX ADMIN — CARVEDILOL 3.12 MILLIGRAM(S): 3.12 TABLET, FILM COATED ORAL at 17:34

## 2025-05-05 RX ADMIN — GABAPENTIN 300 MILLIGRAM(S): 400 CAPSULE ORAL at 21:21

## 2025-05-05 RX ADMIN — ATORVASTATIN CALCIUM 40 MILLIGRAM(S): 80 TABLET, FILM COATED ORAL at 21:21

## 2025-05-05 RX ADMIN — FENOFIBRATE 48 MILLIGRAM(S): 160 TABLET ORAL at 17:33

## 2025-05-05 NOTE — PROGRESS NOTE ADULT - ASSESSMENT
Echo 4/25 - EF mild to moderate reduced     < end of copied text >    Assessment and Plan     1) Syncope :  Monitor on tele , echo shows mild to mod LV dysfunction  , orthos -khoa  , CT head -khoa , EP study -ve for VT , s/p PPM     2) CP: echo shows mild to mod LV dysfunction  , s/p  LHC   s/p PCI SVG to OM and RCA  cont asa and plavix     3) HTN c/w coreg , Imdur losartan

## 2025-05-05 NOTE — PROGRESS NOTE ADULT - ASSESSMENT
69 Male PMH CAD s/p CABG (3 vessels, 08/2017 at [facility]), HF, ASIYA, T2DM (not on insulin), ASIYA on CPAP, HTN, HLD, renal mass s/p resection  presenting with cp /near syncope   pt had been in his usual state of state yesterday   went shopping and ran some errands   came back home around 2 pm    had a ciggar outside  and started feeling " lightheaded ... as he was going back to sit in his chair he " felt " about to pass out .. but no focal neuro complaints ..   sat dwn in chair , called family for help   started feeling substernal cp /sob   cp : 5/6 substeral , no palpiations .. no radiation , assoitcated with sob   pain ahd been present since onset till this am  however severity has subsided to 3/10  FS at the time presuambly was NL ( pt has a sensor )     no fever   no chills  no cough   no N/v/D  ACS ruled out   admitted for further palafox     near syncope : cont tele   neuro check   check CT head : neg   orthostatics : mildly positive ,.monitor   monitor FS   pt " had an episode of dizziness while walking with PT" : no episodes on monitor at the time .  neg orthostatics   this AM had 5 beats of wide complex   consulted EP and neuro   MRI neg   s/p EP : baseline lorraine, long KS and B fiasicular block : PPM leadless         chest pain : atypical   check TTE:   Left ventricular systolic function is mildly to moderately decreased.  pt did not have pain when he was found to have CAD and need CABG !   cath : Successful PCI with FRANCESCA to the SVG to OM distal anastamosis and native  distal RCA.  DAPT for 6 mths      Hx of CHF ; cont current bumex   not in [place] chf       dyspnea : consider PE   check VA duplex : neg      DM: monitor FS     ASIYA : cont bipap     discussed at length with pt

## 2025-05-05 NOTE — CHART NOTE - NSCHARTNOTEFT_GEN_A_CORE
5/5/25   EPS + s/p leadless pacemaker (micra) implant via RFV with right groin suture in place (TO BE REMOVED BY DR MALONE) bedrest while sutures are in place  pending urgent portable CXR  episode of VT intra-op shock x3  sedation with anesthesia     rtn to 6T 615D once anesthesia recovery is completed
EP Brief Operative Note    Diagnosis: Syncope  Procedure: ultrasound vascular access (RFV), venogram bilateral arms, dual chamber leadless pacemaker implant ([employer] AVEIR)  Surgeon: Sae Pina M.D.  Findings:   At baseline, sinus bradycardia precluding up-titration of beta blockers for CHF mgmt.  DE prolongation, bifascicular block.  Intra-op, HV interval 69ms.  Sinus node recovery time normal. No inducible ventricular tachycardia.  PVCs occurring during ventricular burst pacing resulted in iatrogenic R-on-T induction of ventricular fibrillation, which is NOT a diagnostic finding indicative of sudden cardiac arrest risk.  These VF episodes were electrically defibrillated immediately.  Findings of EP study did not indicate requirement for biventricular pacing or ICD insertion, however he remains a candidate for atrial pacing, and may be at risk for Mobitz II AVB due to HV prolongation.    A dual chamber AVEIR system was implanted from the right femoral vein.  During manipulation of the ventricular component, VF was again induced, requiring electrical defibrillation.  Lidocaine was given intravenously, and no further VF was identified.  The atrial component was implanted without incident.  Programmed to AAIR 60+, with VVI backup at 50bpm.    EBL: minimal  Specimens: none  Post-op Diagnosis: same  Assistants: Dr Rain, PGY-7    A/P)   EP study without inducible ventricular *tachycardia*.  Iatrogenic VF treated with electrical defibrillation and eventually IV lidocaine during PPM implant.  Dual chamber AVEIR implanted to support beta blocker titration for CAD/CHF management, with backup ventricular pacing if he ever experiences Mobitz II AV block given long HV interval.  Formal EP note to follow.    Sae Pina M.D.  Cardiac Electrophysiology    office [phone]  pager [phone]

## 2025-05-06 DIAGNOSIS — G47.33 OBSTRUCTIVE SLEEP APNEA (ADULT) (PEDIATRIC): ICD-10-CM

## 2025-05-06 DIAGNOSIS — I25.10 ATHEROSCLEROTIC HEART DISEASE OF NATIVE CORONARY ARTERY WITHOUT ANGINA PECTORIS: ICD-10-CM

## 2025-05-06 DIAGNOSIS — R91.1 SOLITARY PULMONARY NODULE: ICD-10-CM

## 2025-05-06 DIAGNOSIS — R05.9 COUGH, UNSPECIFIED: ICD-10-CM

## 2025-05-06 LAB
ANION GAP SERPL CALC-SCNC: 15 MMOL/L — SIGNIFICANT CHANGE UP (ref 5–17)
BUN SERPL-MCNC: 15 MG/DL — SIGNIFICANT CHANGE UP (ref 7–23)
CALCIUM SERPL-MCNC: 9.1 MG/DL — SIGNIFICANT CHANGE UP (ref 8.4–10.5)
CHLORIDE SERPL-SCNC: 102 MMOL/L — SIGNIFICANT CHANGE UP (ref 96–108)
CO2 SERPL-SCNC: 23 MMOL/L — SIGNIFICANT CHANGE UP (ref 22–31)
CREAT SERPL-MCNC: 0.58 MG/DL — SIGNIFICANT CHANGE UP (ref 0.5–1.3)
EGFR: 106 ML/MIN/1.73M2 — SIGNIFICANT CHANGE UP
EGFR: 106 ML/MIN/1.73M2 — SIGNIFICANT CHANGE UP
FLUAV AG NPH QL: SIGNIFICANT CHANGE UP
FLUBV AG NPH QL: SIGNIFICANT CHANGE UP
GLUCOSE BLDC GLUCOMTR-MCNC: 119 MG/DL — HIGH (ref 70–99)
GLUCOSE BLDC GLUCOMTR-MCNC: 142 MG/DL — HIGH (ref 70–99)
GLUCOSE BLDC GLUCOMTR-MCNC: 156 MG/DL — HIGH (ref 70–99)
GLUCOSE BLDC GLUCOMTR-MCNC: 156 MG/DL — HIGH (ref 70–99)
GLUCOSE SERPL-MCNC: 119 MG/DL — HIGH (ref 70–99)
HCT VFR BLD CALC: 40.1 % — SIGNIFICANT CHANGE UP (ref 39–50)
HGB BLD-MCNC: 13.8 G/DL — SIGNIFICANT CHANGE UP (ref 13–17)
MCHC RBC-ENTMCNC: 28.9 PG — SIGNIFICANT CHANGE UP (ref 27–34)
MCHC RBC-ENTMCNC: 34.4 G/DL — SIGNIFICANT CHANGE UP (ref 32–36)
MCV RBC AUTO: 83.9 FL — SIGNIFICANT CHANGE UP (ref 80–100)
NRBC BLD AUTO-RTO: 0 /100 WBCS — SIGNIFICANT CHANGE UP (ref 0–0)
PLATELET # BLD AUTO: 166 K/UL — SIGNIFICANT CHANGE UP (ref 150–400)
POTASSIUM SERPL-MCNC: 3.4 MMOL/L — LOW (ref 3.5–5.3)
POTASSIUM SERPL-SCNC: 3.4 MMOL/L — LOW (ref 3.5–5.3)
RAPID RVP RESULT: SIGNIFICANT CHANGE UP
RBC # BLD: 4.78 M/UL — SIGNIFICANT CHANGE UP (ref 4.2–5.8)
RBC # FLD: 14.1 % — SIGNIFICANT CHANGE UP (ref 10.3–14.5)
RSV RNA NPH QL NAA+NON-PROBE: SIGNIFICANT CHANGE UP
SARS-COV-2 RNA SPEC QL NAA+PROBE: SIGNIFICANT CHANGE UP
SARS-COV-2 RNA SPEC QL NAA+PROBE: SIGNIFICANT CHANGE UP
SODIUM SERPL-SCNC: 140 MMOL/L — SIGNIFICANT CHANGE UP (ref 135–145)
SOURCE RESPIRATORY: SIGNIFICANT CHANGE UP
WBC # BLD: 7.23 K/UL — SIGNIFICANT CHANGE UP (ref 3.8–10.5)
WBC # FLD AUTO: 7.23 K/UL — SIGNIFICANT CHANGE UP (ref 3.8–10.5)

## 2025-05-06 RX ADMIN — BUMETANIDE 2 MILLIGRAM(S): 1 TABLET ORAL at 05:55

## 2025-05-06 RX ADMIN — ATORVASTATIN CALCIUM 40 MILLIGRAM(S): 80 TABLET, FILM COATED ORAL at 21:26

## 2025-05-06 RX ADMIN — ISOSORBIDE MONONITRATE 30 MILLIGRAM(S): 60 TABLET, EXTENDED RELEASE ORAL at 11:38

## 2025-05-06 RX ADMIN — INSULIN LISPRO 1: 100 INJECTION, SOLUTION INTRAVENOUS; SUBCUTANEOUS at 11:38

## 2025-05-06 RX ADMIN — CARVEDILOL 3.12 MILLIGRAM(S): 3.12 TABLET, FILM COATED ORAL at 05:55

## 2025-05-06 RX ADMIN — Medication 40 MILLIEQUIVALENT(S): at 14:05

## 2025-05-06 RX ADMIN — Medication 1 TABLET(S): at 11:38

## 2025-05-06 RX ADMIN — GABAPENTIN 300 MILLIGRAM(S): 400 CAPSULE ORAL at 21:25

## 2025-05-06 RX ADMIN — BUMETANIDE 2 MILLIGRAM(S): 1 TABLET ORAL at 14:04

## 2025-05-06 RX ADMIN — LOSARTAN POTASSIUM 50 MILLIGRAM(S): 100 TABLET, FILM COATED ORAL at 05:55

## 2025-05-06 RX ADMIN — GABAPENTIN 300 MILLIGRAM(S): 400 CAPSULE ORAL at 14:06

## 2025-05-06 RX ADMIN — Medication 100 MILLIGRAM(S): at 21:25

## 2025-05-06 RX ADMIN — Medication 81 MILLIGRAM(S): at 11:38

## 2025-05-06 RX ADMIN — GABAPENTIN 300 MILLIGRAM(S): 400 CAPSULE ORAL at 05:55

## 2025-05-06 RX ADMIN — CLOPIDOGREL BISULFATE 75 MILLIGRAM(S): 75 TABLET, FILM COATED ORAL at 11:37

## 2025-05-06 RX ADMIN — CARVEDILOL 3.12 MILLIGRAM(S): 3.12 TABLET, FILM COATED ORAL at 16:54

## 2025-05-06 RX ADMIN — FENOFIBRATE 48 MILLIGRAM(S): 160 TABLET ORAL at 11:37

## 2025-05-06 NOTE — CONSULT NOTE ADULT - NS ATTEND AMEND GEN_ALL_CORE FT
Mr Colvin is a pleasant 69yoM here with fainting episodes at home.  Not a clear vasovagal or orthostatic story.  He has history of multivessel CAD, hx of CABG, and recent multivessel PCI.  LVEF% is 40-45%.  EKG with WV prolongation (300ms) and bifascicular block (140ms).  Followup is with Dr Kamara at [facility].    Recommended invasive EP study to determine if he has susceptibility for critical bradycardia / AV block, or inducible ventricular tachycardia.  Potential for transvenous pacemaker or defibrillator implant if EPS+.  If no inducible arrhythmia and normal AV conduction, would implant a loop recorder instead.  Followup w/ Dr Kamara, and with Dr Ross ([facility] - EP).
pt no longer with sob since ppm  cough with green sputum  keep sat>90% w o2 if needed  continue cpap qhs with his own machine  ct chest wo contrast

## 2025-05-06 NOTE — PROGRESS NOTE ADULT - ASSESSMENT
69 Male PMH CAD s/p CABG (3 vessels, 08/2017 at [facility]), HF, ASIYA, T2DM (not on insulin), ASIYA on CPAP, HTN, HLD, renal mass s/p resection  presenting with cp /near syncope   pt had been in his usual state of state yesterday   went shopping and ran some errands   came back home around 2 pm    had a ciggar outside  and started feeling " lightheaded ... as he was going back to sit in his chair he " felt " about to pass out .. but no focal neuro complaints ..   sat dwn in chair , called family for help   started feeling substernal cp /sob   cp : 5/6 substeral , no palpiations .. no radiation , assoitcated with sob   pain ahd been present since onset till this am  however severity has subsided to 3/10  FS at the time presuambly was NL ( pt has a sensor )     no fever   no chills  no cough   no N/v/D  ACS ruled out   admitted for further palafox     near syncope : cont tele   neuro check   check CT head : neg   orthostatics : mildly positive ,.monitor   monitor FS   pt " had an episode of dizziness while walking with PT" : no episodes on monitor at the time .  neg orthostatics   this AM had 5 beats of wide complex     MRI brain neg   s/p EP : baseline lorraine, long VA and B fiasicular block : PPM leadless         chest pain : atypical   check TTE:   Left ventricular systolic function is mildly to moderately decreased.  pt did not have pain when he was found to have CAD and need CABG !   cath : Successful PCI with FRANCESCA to the SVG to OM distal anastamosis and native  distal RCA.  DAPT for 6 mths        Hx of CHF ; cont current bumex   not in [place] chf       dyspnea : consider PE   check VA duplex : neg  now with cough : check CT chest   Check RVP       DM: monitor FS     ASIYA : cont bipap

## 2025-05-06 NOTE — CONSULT NOTE ADULT - SUBJECTIVE AND OBJECTIVE BOX
PULMONARY CONSULT    HPI: 68 y/o M with PMH of CAD s/p CABG (3 vessels, 2017 at [facility]), HF, ASIYA, T2DM (not on insulin), ASIYA on CPAP, HTN, HLD, renal mass s/p resection. Presents with CP/near syncope. Pt had a cigar outside and started feeling lightheaded, as he was going back to sit in his chair he felt likely he was about to pass out. Found to have mildly reduced LVEF 45% now s/p cardiac cath resulting in successful PCI with FRANCESCA to the SVG to OM distal anastamosis and native distal RCA.          PAST MEDICAL & SURGICAL HISTORY:  Gout  Lumbar disc disease with radiculopathy  H/O: osteoarthritis  Obstructive sleep apnea CPAP  Renal calculi  Neuropathy 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  Essential hypertension  CAD (coronary artery disease) - s/p cabg x3  Hypercholesterolemia  ASIYA (obstructive sleep apnea) initially dx  ; CPAP  Paralytic ileus post op THR  & post op laminectomy  Type 2 diabetes mellitus  Right carpal tunnel syndrome  Cubital tunnel syndrome, right  Trigger finger of right hand  Morbid obesity with body mass index (BMI) of 40.0 or higher  H/O CHF  Kidney mass  Cervical herniated disc  Renal cancer  Disease of spinal cord, unspecified  Peripheral neuropathy  Myelopathy  Morbid obesity  S/P Left Inguinal Hernia Repair  History of Total Hip Replacement - bilateral THR  S/P tonsillectomy and adenoidectomy as child  H/O lithotripsy x1  S/P revision of total knee, right x2-  &   S/P lumbar discectomy - ,L5  Aug 2013  S/P CABG x 3 17  S/P lumbar laminectomy Fusion L3-L5   Kidney stone s/w ESWL pt unsure site  Neuropathy Bilateral Feet since   History of bilateral hip replacements  History of hernia repair  History of lumbar fusion  History of cholecystectomy  History of bilateral knee replacement  History of lymph node biopsy  S/p bilateral carpal tunnel release  History of partial nephrectomy  H/O elbow surgery  History of thoracic spinal fusion      Allergies  No Known Allergies      FAMILY HISTORY:  Family history of coronary artery disease  HLD/Angina. Fatal MI age 73.    Family history of diabetes mellitus type II  mother- - hyperlipidemia and Hypertension.    Family history of pancreatic cancer (Sibling)  brother     Family history of coronary artery disease  brother- age 50+- Coronary Artery Stents      Social history:     Review of Systems:  CONSTITUTIONAL: No fever, chills, or fatigue  EYES: No eye pain, visual disturbances, or discharge  ENMT:  No difficulty hearing, tinnitus, vertigo; No sinus or throat pain  NECK: No pain or stiffness  RESPIRATORY: Per above  CARDIOVASCULAR: No chest pain, palpitations, dizziness, or leg swelling  GASTROINTESTINAL: No abdominal or epigastric pain. No nausea, vomiting, or hematemesis; No diarrhea or constipation. No melena or hematochezia.  GENITOURINARY: No dysuria, frequency, hematuria, or incontinence  NEUROLOGICAL: No headaches, memory loss, loss of strength, numbness, or tremors  SKIN: No itching, burning, rashes, or lesions   MUSCULOSKELETAL: No joint pain or swelling; No muscle, back, or extremity pain  PSYCHIATRIC: No depression, anxiety, mood swings, or difficulty sleeping      Medications:  MEDICATIONS  (STANDING):  allopurinol 100 milliGRAM(s) Oral at bedtime  aspirin enteric coated 81 milliGRAM(s) Oral daily  atorvastatin 40 milliGRAM(s) Oral at bedtime  buMETAnide 2 milliGRAM(s) Oral two times a day  carvedilol 3.125 milliGRAM(s) Oral every 12 hours  clopidogrel Tablet 75 milliGRAM(s) Oral daily  dextrose 5%. 1000 milliLiter(s) (100 mL/Hr) IV Continuous <Continuous>  dextrose 5%. 1000 milliLiter(s) (50 mL/Hr) IV Continuous <Continuous>  dextrose 50% Injectable 25 Gram(s) IV Push once  dextrose 50% Injectable 12.5 Gram(s) IV Push once  dextrose 50% Injectable 25 Gram(s) IV Push once  fenofibrate Tablet 48 milliGRAM(s) Oral daily  gabapentin 300 milliGRAM(s) Oral every 8 hours  glucagon  Injectable 1 milliGRAM(s) IntraMuscular once  heparin   Injectable 5000 Unit(s) SubCutaneous every 8 hours  influenza  Vaccine (HIGH DOSE) 0.5 milliLiter(s) IntraMuscular once  insulin lispro (ADMELOG) corrective regimen sliding scale   SubCutaneous three times a day before meals  insulin lispro (ADMELOG) corrective regimen sliding scale   SubCutaneous at bedtime  isosorbide   mononitrate ER Tablet (IMDUR) 30 milliGRAM(s) Oral daily  losartan 50 milliGRAM(s) Oral daily  multivitamin 1 Tablet(s) Oral daily  potassium chloride   Powder 40 milliEquivalent(s) Oral once  senna 2 Tablet(s) Oral at bedtime  sodium chloride 0.9%. 500 milliLiter(s) (100 mL/Hr) IV Continuous <Continuous>    MEDICATIONS  (PRN):  dextrose Oral Gel 15 Gram(s) Oral once PRN Blood Glucose LESS THAN 70 milliGRAM(s)/deciliter            Vital Signs Last 24 Hrs  T(C): 37 (06 May 2025 10:57), Max: 37 (06 May 2025 10:57)  T(F): 98.6 (06 May 2025 10:57), Max: 98.6 (06 May 2025 10:57)  HR: 68 (06 May 2025 10:57) (62 - 69)  BP: 132/82 (06 May 2025 10:57) (109/69 - 155/80)  BP(mean): --  RR: 18 (06 May 2025 10:57) (17 - 18)  SpO2: 96% (06 May 2025 10:57) (96% - 99%)    Parameters below as of 06 May 2025 10:57  Patient On (Oxygen Delivery Method): room air                -05 @ 07:01  -  06 @ 07:00  --------------------------------------------------------  IN: 0 mL / OUT: 800 mL / NET: -800 mL          LABS:                        13.8   7.23  )-----------( 166      ( 06 May 2025 06:42 )             40.1         140  |  102  |  15  ----------------------------<  119[H]  3.4[L]   |  23  |  0.58    Ca    9.1      06 May 2025 06:42            CAPILLARY BLOOD GLUCOSE      POCT Blood Glucose.: 156 mg/dL (06 May 2025 11:16)    PT/INR - ( 05 May 2025 06:54 )   PT: 12.0 sec;   INR: 1.05 ratio         PTT - ( 05 May 2025 06:54 )  PTT:27.6 sec  Urinalysis Basic - ( 06 May 2025 06:42 )    Color: x / Appearance: x / SG: x / pH: x  Gluc: 119 mg/dL / Ketone: x  / Bili: x / Urobili: x   Blood: x / Protein: x / Nitrite: x   Leuk Esterase: x / RBC: x / WBC x   Sq Epi: x / Non Sq Epi: x / Bacteria: x              Physical Examination:    General: No acute distress.      HEENT: Pupils equal, reactive to light.  Symmetric.    PULM: Clear to auscultation bilaterally, no significant sputum production    CVS: S1, S2    ABD: Soft, nondistended, nontender, normoactive bowel sounds, no masses    EXT: No edema, nontender    SKIN: Warm and well perfused, no rashes noted.    NEURO: Alert, oriented, interactive, nonfocal    RADIOLOGY REVIEWED  CXR:    CT chest:    TTE:   PULMONARY CONSULT    HPI: 70 y/o M with PMH of CAD s/p CABG (3 vessels, 2017 at [facility]), HF, ASIYA, T2DM (not on insulin), ASIYA on CPAP, HTN, HLD, renal mass s/p resection. Presents with CP/near syncope. Pt had a cigar outside and started feeling lightheaded, as he was going back to sit in his chair he felt likely he was about to pass out. Found to have mildly reduced LVEF 45% now s/p cardiac cath resulting in successful PCI with FRANCESCA to the SVG to OM distal anastomosis and native distal RCA. S/p dual chamber AVEIR leadless pacing system. Endorses cough with green sputum production. Denies SOB, CP, pleuritic CP.         PAST MEDICAL & SURGICAL HISTORY:  Gout  Lumbar disc disease with radiculopathy  H/O: osteoarthritis  Obstructive sleep apnea CPAP  Renal calculi  Neuropathy 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  Essential hypertension  CAD (coronary artery disease) - s/p cabg x3  Hypercholesterolemia  ASIYA (obstructive sleep apnea) initially dx  ; CPAP  Paralytic ileus post op THR  & post op laminectomy  Type 2 diabetes mellitus  Right carpal tunnel syndrome  Cubital tunnel syndrome, right  Trigger finger of right hand  Morbid obesity with body mass index (BMI) of 40.0 or higher  H/O CHF  Kidney mass  Cervical herniated disc  Renal cancer  Disease of spinal cord, unspecified  Peripheral neuropathy  Myelopathy  Morbid obesity  S/P Left Inguinal Hernia Repair  History of Total Hip Replacement - bilateral THR  S/P tonsillectomy and adenoidectomy as child  H/O lithotripsy x1  S/P revision of total knee, right x2-  &   S/P lumbar discectomy - ,L5  Aug 2013  S/P CABG x 3 17  S/P lumbar laminectomy Fusion L3-L5   Kidney stone s/w ESWL pt unsure site  Neuropathy Bilateral Feet since   History of bilateral hip replacements  History of hernia repair  History of lumbar fusion  History of cholecystectomy  History of bilateral knee replacement  History of lymph node biopsy  S/p bilateral carpal tunnel release  History of partial nephrectomy  H/O elbow surgery  History of thoracic spinal fusion      Allergies  No Known Allergies      FAMILY HISTORY:  Family history of coronary artery disease  HLD/Angina. Fatal MI age 73.    Family history of diabetes mellitus type II  mother- - hyperlipidemia and Hypertension.    Family history of pancreatic cancer (Sibling)  brother     Family history of coronary artery disease  brother- age 50+- Coronary Artery Stents      Social history: current smoker     Review of Systems:  CONSTITUTIONAL: No fever, chills, or fatigue  EYES: No eye pain, visual disturbances, or discharge  ENMT:  No difficulty hearing, tinnitus, vertigo; No sinus or throat pain  NECK: No pain or stiffness  RESPIRATORY: Per above  CARDIOVASCULAR: Per above   GASTROINTESTINAL: No abdominal or epigastric pain. No nausea, vomiting, or hematemesis; No diarrhea or constipation. No melena or hematochezia.  GENITOURINARY: No dysuria, frequency, hematuria, or incontinence  NEUROLOGICAL: Per above   SKIN: No itching, burning, rashes, or lesions   MUSCULOSKELETAL: No joint pain or swelling; No muscle, back, or extremity pain  PSYCHIATRIC: No depression, anxiety, mood swings, or difficulty sleeping      Medications:  MEDICATIONS  (STANDING):  allopurinol 100 milliGRAM(s) Oral at bedtime  aspirin enteric coated 81 milliGRAM(s) Oral daily  atorvastatin 40 milliGRAM(s) Oral at bedtime  buMETAnide 2 milliGRAM(s) Oral two times a day  carvedilol 3.125 milliGRAM(s) Oral every 12 hours  clopidogrel Tablet 75 milliGRAM(s) Oral daily  dextrose 5%. 1000 milliLiter(s) (100 mL/Hr) IV Continuous <Continuous>  dextrose 5%. 1000 milliLiter(s) (50 mL/Hr) IV Continuous <Continuous>  dextrose 50% Injectable 25 Gram(s) IV Push once  dextrose 50% Injectable 12.5 Gram(s) IV Push once  dextrose 50% Injectable 25 Gram(s) IV Push once  fenofibrate Tablet 48 milliGRAM(s) Oral daily  gabapentin 300 milliGRAM(s) Oral every 8 hours  glucagon  Injectable 1 milliGRAM(s) IntraMuscular once  heparin   Injectable 5000 Unit(s) SubCutaneous every 8 hours  influenza  Vaccine (HIGH DOSE) 0.5 milliLiter(s) IntraMuscular once  insulin lispro (ADMELOG) corrective regimen sliding scale   SubCutaneous three times a day before meals  insulin lispro (ADMELOG) corrective regimen sliding scale   SubCutaneous at bedtime  isosorbide   mononitrate ER Tablet (IMDUR) 30 milliGRAM(s) Oral daily  losartan 50 milliGRAM(s) Oral daily  multivitamin 1 Tablet(s) Oral daily  potassium chloride   Powder 40 milliEquivalent(s) Oral once  senna 2 Tablet(s) Oral at bedtime  sodium chloride 0.9%. 500 milliLiter(s) (100 mL/Hr) IV Continuous <Continuous>    MEDICATIONS  (PRN):  dextrose Oral Gel 15 Gram(s) Oral once PRN Blood Glucose LESS THAN 70 milliGRAM(s)/deciliter            Vital Signs Last 24 Hrs  T(C): 37 (06 May 2025 10:57), Max: 37 (06 May 2025 10:57)  T(F): 98.6 (06 May 2025 10:57), Max: 98.6 (06 May 2025 10:57)  HR: 68 (06 May 2025 10:57) (62 - 69)  BP: 132/82 (06 May 2025 10:57) (109/69 - 155/80)  BP(mean): --  RR: 18 (06 May 2025 10:57) (17 - 18)  SpO2: 96% (06 May 2025 10:57) (96% - 99%)    Parameters below as of 06 May 2025 10:57  Patient On (Oxygen Delivery Method): room air                 @ 07:01  -  -06 @ 07:00  --------------------------------------------------------  IN: 0 mL / OUT: 800 mL / NET: -800 mL          LABS:                        13.8   7.23  )-----------( 166      ( 06 May 2025 06:42 )             40.1         140  |  102  |  15  ----------------------------<  119[H]  3.4[L]   |  23  |  0.58    Ca    9.1      06 May 2025 06:42            CAPILLARY BLOOD GLUCOSE      POCT Blood Glucose.: 156 mg/dL (06 May 2025 11:16)    PT/INR - ( 05 May 2025 06:54 )   PT: 12.0 sec;   INR: 1.05 ratio         PTT - ( 05 May 2025 06:54 )  PTT:27.6 sec  Urinalysis Basic - ( 06 May 2025 06:42 )    Color: x / Appearance: x / SG: x / pH: x  Gluc: 119 mg/dL / Ketone: x  / Bili: x / Urobili: x   Blood: x / Protein: x / Nitrite: x   Leuk Esterase: x / RBC: x / WBC x   Sq Epi: x / Non Sq Epi: x / Bacteria: x              Physical Examination:    General: No acute distress.      HEENT: Pupils equal, reactive to light.  Symmetric.    PULM: Clear to auscultation bilaterally, no significant sputum production    CVS: S1, S2    ABD: Soft, nondistended, nontender, normoactive bowel sounds, no masses    EXT: No edema, nontender    SKIN: Warm and well perfused, no rashes noted.    NEURO: Alert, oriented, interactive, nonfocal    RADIOLOGY REVIEWED  CXR:    CT chest:    TTE:

## 2025-05-06 NOTE — CONSULT NOTE ADULT - ASSESSMENT
69 Male with CAD s/p CABG (3 vessels, 08/2017 at [facility]), CHF, ASIYA, T2DM (not on insulin), ASIYA on CPAP, HTN, HLD, renal mass s/p resection, cervical spine disease s/p fusion, lumbar radiculopathy s/p laminectomy L3/5 neuropathy presenting with cp /near syncope    had a ciggar outside  and started feeling " lightheaded ... as he was going back to sit in his chair he " felt " about to pass out .. but no focal neuro complaints ..   states he gets worse when getting up   orthostatics weak +   CTH neg   + NSVT found     Impression:    1) syncope, dizziness in setting of + orthostatics   2) cervical disc disase s/p fusion  3) lumbar spine disease s/p laminectomy L3-5   4) neuropathy     - already on gabapentin [id number]   - trend orthostatics   - f/u EP for NSVT  - if persistant dizziness consider MRI brain but seems to be orthostatic  - hydration if able   - compression stockings    - telemetry  - PT/OT   - check FS, glucose control <180  - GI/DVT ppx  - Counseling on diet, exercise, and medication adherence was done  - Counseling on smoking cessation and alcohol consumption offered when appropriate.  - Pain assessed and judicious use of narcotics when appropriate was discussed.    - Stroke education given when appropriate.  - Importance of fall prevention discussed.   - Differential diagnosis and plan of care discussed with patient and/or family and primary team  - Thank you for allowing me to participate in the care of this patient. Call with questions.   - sees Bernadette Beaulieu neuro outpatient   - spoke with ACP   Minesh Carlisle MD  Vascular Neurology  Office: [phone] 
EKG No in chart Previous LBBB    Echo < from: TTE W or WO Ultrasound Enhancing Agent (09.21.23 @ 12:00) >   1. Left ventricular endocardium is not well visualized; however, the left ventricular systolic function appears grossly normal.   2. The right ventricle is not well visualized. Probably normal systolic function. Tricuspid annular plane systolic excursion (TAPSE) is 2.2 cm (normal >=1.7 cm).   3. Technically difficult image quality.   4. The left atrium is normal in size.    < end of copied text >    Assessment and Plan     1) Syncope : repeat EKG , Monitor on tele , Get echo , get orthos , consider CT head     2) CP: Get echo , will plan for [facility] monday     3) HTN c/w coreg , Imdur losartan       
68 y/o M with PMH of CAD s/p CABG (3 vessels, 08/2017 at [facility]), HF, ASIYA, T2DM (not on insulin), ASIYA on CPAP, HTN, HLD, renal mass s/p resection. Presents with CP/near syncope. Pt had a cigar outside and started feeling lightheaded, as he was going back to sit in his chair he felt likely he was about to pass out. Found to have mildly reduced LVEF 45% now s/p cardiac cath resulting in successful PCI with FRANCESCA to the SVG to OM distal anastomosis and native distal RCA. S/p dual chamber AVEIR leadless pacing system. Endorses cough with green sputum production.

## 2025-05-06 NOTE — PROGRESS NOTE ADULT - ASSESSMENT
69 Male with CAD s/p CABG (3 vessels, 08/2017 at [facility]), CHF, ASIYA, T2DM (not on insulin), ASIYA on CPAP, HTN, HLD, renal mass s/p resection, cervical spine disease s/p fusion, lumbar radiculopathy s/p laminectomy L3/5 neuropathy presenting with cp /near syncope    had a ciggar outside  and started feeling " lightheaded ... as he was going back to sit in his chair he " felt " about to pass out .. but no focal neuro complaints ..   states he gets worse when getting up   orthostatics weak +   CTH neg   + NSVT found   s/p micra PPM     Impression:    1) syncope, dizziness in setting of + orthostatics   2) cervical disc disase s/p fusion  3) lumbar spine disease s/p laminectomy L3-5   4) neuropathy     - already on gabapentin [id number]   - trend orthostatics   - f/u EP for NSVT; s/p micra PPM   - if persistant dizziness consider MRI brain but seems to be orthostatic  - hydration if able   - compression stockings    - telemetry  - PT/OT   - check FS, glucose control <180  - GI/DVT ppx   - seetyler Beaulieu neuro outpatient   - spoke with ACP   Minesh Cralisle MD  Vascular Neurology  Office: [phone]

## 2025-05-06 NOTE — CONSULT NOTE ADULT - PROBLEM SELECTOR RECOMMENDATION 9
-Cough with green sputum production  -CXR grossly clear  -SOB resolved after PCI   -Check CT chest non contrast  -Normoxic, keep sats >90% with o2 PRN

## 2025-05-07 ENCOUNTER — TRANSCRIPTION ENCOUNTER (OUTPATIENT)
Age: 70
End: 2025-05-07

## 2025-05-07 VITALS
DIASTOLIC BLOOD PRESSURE: 80 MMHG | OXYGEN SATURATION: 96 % | RESPIRATION RATE: 18 BRPM | TEMPERATURE: 98 F | SYSTOLIC BLOOD PRESSURE: 150 MMHG | HEART RATE: 78 BPM

## 2025-05-07 LAB
GLUCOSE BLDC GLUCOMTR-MCNC: 137 MG/DL — HIGH (ref 70–99)
GLUCOSE BLDC GLUCOMTR-MCNC: 146 MG/DL — HIGH (ref 70–99)
GLUCOSE BLDC GLUCOMTR-MCNC: 245 MG/DL — HIGH (ref 70–99)

## 2025-05-07 PROCEDURE — A9585: CPT

## 2025-05-07 PROCEDURE — 83036 HEMOGLOBIN GLYCOSYLATED A1C: CPT

## 2025-05-07 PROCEDURE — 80061 LIPID PANEL: CPT

## 2025-05-07 PROCEDURE — 85014 HEMATOCRIT: CPT

## 2025-05-07 PROCEDURE — 70450 CT HEAD/BRAIN W/O DYE: CPT | Mod: MC

## 2025-05-07 PROCEDURE — 83735 ASSAY OF MAGNESIUM: CPT

## 2025-05-07 PROCEDURE — C9604: CPT | Mod: LC

## 2025-05-07 PROCEDURE — 82330 ASSAY OF CALCIUM: CPT

## 2025-05-07 PROCEDURE — C1769: CPT

## 2025-05-07 PROCEDURE — C8929: CPT

## 2025-05-07 PROCEDURE — 97116 GAIT TRAINING THERAPY: CPT

## 2025-05-07 PROCEDURE — C1725: CPT

## 2025-05-07 PROCEDURE — 36415 COLL VENOUS BLD VENIPUNCTURE: CPT

## 2025-05-07 PROCEDURE — 85610 PROTHROMBIN TIME: CPT

## 2025-05-07 PROCEDURE — 82435 ASSAY OF BLOOD CHLORIDE: CPT

## 2025-05-07 PROCEDURE — 94660 CPAP INITIATION&MGMT: CPT

## 2025-05-07 PROCEDURE — 97530 THERAPEUTIC ACTIVITIES: CPT

## 2025-05-07 PROCEDURE — 82607 VITAMIN B-12: CPT

## 2025-05-07 PROCEDURE — 97161 PT EVAL LOW COMPLEX 20 MIN: CPT

## 2025-05-07 PROCEDURE — 85025 COMPLETE CBC W/AUTO DIFF WBC: CPT

## 2025-05-07 PROCEDURE — 87637 SARSCOV2&INF A&B&RSV AMP PRB: CPT

## 2025-05-07 PROCEDURE — 82962 GLUCOSE BLOOD TEST: CPT

## 2025-05-07 PROCEDURE — 93005 ELECTROCARDIOGRAM TRACING: CPT

## 2025-05-07 PROCEDURE — 93619 COMPREHENSIVE EP EVALUATION: CPT

## 2025-05-07 PROCEDURE — C1760: CPT

## 2025-05-07 PROCEDURE — 93455 CORONARY ART/GRFT ANGIO S&I: CPT | Mod: 59

## 2025-05-07 PROCEDURE — 84132 ASSAY OF SERUM POTASSIUM: CPT

## 2025-05-07 PROCEDURE — 70553 MRI BRAIN STEM W/O & W/DYE: CPT | Mod: MC

## 2025-05-07 PROCEDURE — 85018 HEMOGLOBIN: CPT

## 2025-05-07 PROCEDURE — 84295 ASSAY OF SERUM SODIUM: CPT

## 2025-05-07 PROCEDURE — 84484 ASSAY OF TROPONIN QUANT: CPT

## 2025-05-07 PROCEDURE — 0225U NFCT DS DNA&RNA 21 SARSCOV2: CPT

## 2025-05-07 PROCEDURE — 83880 ASSAY OF NATRIURETIC PEPTIDE: CPT

## 2025-05-07 PROCEDURE — C1887: CPT

## 2025-05-07 PROCEDURE — 83605 ASSAY OF LACTIC ACID: CPT

## 2025-05-07 PROCEDURE — 82746 ASSAY OF FOLIC ACID SERUM: CPT

## 2025-05-07 PROCEDURE — 71045 X-RAY EXAM CHEST 1 VIEW: CPT

## 2025-05-07 PROCEDURE — 82947 ASSAY GLUCOSE BLOOD QUANT: CPT

## 2025-05-07 PROCEDURE — 71250 CT THORAX DX C-: CPT | Mod: MC

## 2025-05-07 PROCEDURE — 80307 DRUG TEST PRSMV CHEM ANLYZR: CPT

## 2025-05-07 PROCEDURE — 86901 BLOOD TYPING SEROLOGIC RH(D): CPT

## 2025-05-07 PROCEDURE — 86850 RBC ANTIBODY SCREEN: CPT

## 2025-05-07 PROCEDURE — 85027 COMPLETE CBC AUTOMATED: CPT

## 2025-05-07 PROCEDURE — 0795T TCAT INS 2CHMBR LDLS PM CMPL: CPT

## 2025-05-07 PROCEDURE — 82803 BLOOD GASES ANY COMBINATION: CPT

## 2025-05-07 PROCEDURE — 93970 EXTREMITY STUDY: CPT

## 2025-05-07 PROCEDURE — 85730 THROMBOPLASTIN TIME PARTIAL: CPT

## 2025-05-07 PROCEDURE — 71250 CT THORAX DX C-: CPT | Mod: 26

## 2025-05-07 PROCEDURE — 99285 EMERGENCY DEPT VISIT HI MDM: CPT | Mod: 25

## 2025-05-07 PROCEDURE — C1605: CPT

## 2025-05-07 PROCEDURE — 80053 COMPREHEN METABOLIC PANEL: CPT

## 2025-05-07 PROCEDURE — C1874: CPT

## 2025-05-07 PROCEDURE — 80048 BASIC METABOLIC PNL TOTAL CA: CPT

## 2025-05-07 PROCEDURE — C9600: CPT | Mod: RC

## 2025-05-07 PROCEDURE — 86900 BLOOD TYPING SEROLOGIC ABO: CPT

## 2025-05-07 PROCEDURE — 84443 ASSAY THYROID STIM HORMONE: CPT

## 2025-05-07 PROCEDURE — C1730: CPT

## 2025-05-07 PROCEDURE — C1894: CPT

## 2025-05-07 RX ORDER — METFORMIN HYDROCHLORIDE 850 MG/1
1 TABLET ORAL
Refills: 0 | DISCHARGE

## 2025-05-07 RX ORDER — HEPARIN SODIUM 1000 [USP'U]/ML
5000 INJECTION INTRAVENOUS; SUBCUTANEOUS ONCE
Refills: 0 | Status: COMPLETED | OUTPATIENT
Start: 2025-05-07 | End: 2025-05-07

## 2025-05-07 RX ORDER — GLIMEPIRIDE 4 MG/1
1 TABLET ORAL
Refills: 0 | DISCHARGE

## 2025-05-07 RX ORDER — HEPARIN SODIUM 1000 [USP'U]/ML
5000 INJECTION INTRAVENOUS; SUBCUTANEOUS EVERY 8 HOURS
Refills: 0 | Status: DISCONTINUED | OUTPATIENT
Start: 2025-05-07 | End: 2025-05-07

## 2025-05-07 RX ADMIN — BUMETANIDE 2 MILLIGRAM(S): 1 TABLET ORAL at 13:39

## 2025-05-07 RX ADMIN — INSULIN LISPRO 2: 100 INJECTION, SOLUTION INTRAVENOUS; SUBCUTANEOUS at 17:33

## 2025-05-07 RX ADMIN — CARVEDILOL 3.12 MILLIGRAM(S): 3.12 TABLET, FILM COATED ORAL at 17:17

## 2025-05-07 RX ADMIN — BUMETANIDE 2 MILLIGRAM(S): 1 TABLET ORAL at 05:21

## 2025-05-07 RX ADMIN — CLOPIDOGREL BISULFATE 75 MILLIGRAM(S): 75 TABLET, FILM COATED ORAL at 11:14

## 2025-05-07 RX ADMIN — GABAPENTIN 300 MILLIGRAM(S): 400 CAPSULE ORAL at 05:22

## 2025-05-07 RX ADMIN — CARVEDILOL 3.12 MILLIGRAM(S): 3.12 TABLET, FILM COATED ORAL at 05:21

## 2025-05-07 RX ADMIN — HEPARIN SODIUM 5000 UNIT(S): 1000 INJECTION INTRAVENOUS; SUBCUTANEOUS at 06:27

## 2025-05-07 RX ADMIN — ISOSORBIDE MONONITRATE 30 MILLIGRAM(S): 60 TABLET, EXTENDED RELEASE ORAL at 11:14

## 2025-05-07 RX ADMIN — LOSARTAN POTASSIUM 50 MILLIGRAM(S): 100 TABLET, FILM COATED ORAL at 05:23

## 2025-05-07 RX ADMIN — HEPARIN SODIUM 5000 UNIT(S): 1000 INJECTION INTRAVENOUS; SUBCUTANEOUS at 13:39

## 2025-05-07 RX ADMIN — GABAPENTIN 300 MILLIGRAM(S): 400 CAPSULE ORAL at 15:36

## 2025-05-07 RX ADMIN — FENOFIBRATE 48 MILLIGRAM(S): 160 TABLET ORAL at 11:14

## 2025-05-07 RX ADMIN — Medication 81 MILLIGRAM(S): at 11:14

## 2025-05-07 RX ADMIN — Medication 1 TABLET(S): at 11:14

## 2025-05-07 NOTE — PROGRESS NOTE ADULT - NS ATTEND AMEND GEN_ALL_CORE FT
ct chest wo pna  small nodular atelectasis unchanged from 2024  suggest fu repeat ct 6mo  keep sat>90%

## 2025-05-07 NOTE — DISCHARGE NOTE NURSING/CASE MANAGEMENT/SOCIAL WORK - NSDCFUADDAPPT_GEN_ALL_CORE_FT
APPTS ARE READY TO BE MADE: [X] YES    Best Family or Patient Contact (if needed):    Additional Information about above appointments (if needed):    1: Follow-up with primary care doctor within one week.  2: Follow-up with cardiologist.  3: Followup w/ Dr Kamara, and with Dr Ross ([facility] - EP).  4. Follow-up with pulmonologist outpatient.    Other comments or requests:

## 2025-05-07 NOTE — PROGRESS NOTE ADULT - ASSESSMENT
69 Male PMH CAD s/p CABG (3 vessels, 08/2017 at [facility]), HF, ASIYA, T2DM (not on insulin), ASIYA on CPAP, HTN, HLD, renal mass s/p resection  presenting with cp /near syncope   pt had been in his usual state of state yesterday   went shopping and ran some errands   came back home around 2 pm    had a ciggar outside  and started feeling " lightheaded ... as he was going back to sit in his chair he " felt " about to pass out .. but no focal neuro complaints ..   sat dwn in chair , called family for help   started feeling substernal cp /sob   cp : 5/6 substeral , no palpiations .. no radiation , assoitcated with sob   pain ahd been present since onset till this am  however severity has subsided to 3/10  FS at the time presuambly was NL ( pt has a sensor )     no fever   no chills  no cough   no N/v/D  ACS ruled out   admitted for further palafox     near syncope : cont tele   neuro check   check CT head : neg   orthostatics : mildly positive ,.monitor   monitor FS   pt " had an episode of dizziness while walking with PT" : no episodes on monitor at the time .  neg orthostatics   this AM had 5 beats of wide complex     MRI brain neg   s/p EP : baseline lorraine, long [place] and B fiasicular block : PPM leadless         chest pain : atypical   check TTE:   Left ventricular systolic function is mildly to moderately decreased.  pt did not have pain when he was found to have CAD and need CABG !   cath : Successful PCI with FRANCESCA to the SVG to OM distal anasmosis and native  distal RCA.  DAPT for 6 mths        Hx of CHF ; cont current bumex   not in [place] chf       dyspnea :  check VA duplex : neg  now with cough : check CT chest : no pna   Check RVP : neg for flu and covid       DM: monitor FS     ASIYA : cont bipap       discussed with pt , acp m, pul , cards   and cm   stable for dc

## 2025-05-07 NOTE — PROGRESS NOTE ADULT - PROVIDER SPECIALTY LIST ADULT
Electrophysiology
Electrophysiology
Internal Medicine
Intervent Cardiology
Intervent Cardiology
Neurology
Cardiology
Electrophysiology
Internal Medicine
Neurology
Cardiology
Cardiology
Electrophysiology
Internal Medicine
Cardiology
Internal Medicine
Pulmonology
Internal Medicine

## 2025-05-07 NOTE — DISCHARGE NOTE NURSING/CASE MANAGEMENT/SOCIAL WORK - NSDCVIVACCINE_GEN_ALL_CORE_FT
influenza, high-dose, quadrivalent; 29-Sep-2023 17:22; Jody Coleman (RN); [employer]; [id number] (Exp. Date: 28-Jun-2024); IntraMuscular; Deltoid Left.; 0.7 milliLiter(s); VIS (VIS Published: 06-Aug-2021, VIS Presented: 29-Sep-2023);   Tdap; 13-Feb-2022 09:56; Benoit Kessler (RN); [employer]; [id number] (Exp. Date: 09-Sep-2023); IntraMuscular; Deltoid Right.; 0.5 milliLiter(s); VIS (VIS Published: 09-May-2013, VIS Presented: 13-Feb-2022);

## 2025-05-07 NOTE — DISCHARGE NOTE NURSING/CASE MANAGEMENT/SOCIAL WORK - PATIENT PORTAL LINK FT
You can access the FollowMyHealth Patient Portal offered by [facility] by registering at the following website: [email or web address]. By joining [employer]’s FollowMyHealth portal, you will also be able to view your health information using other applications (apps) compatible with our system.

## 2025-05-07 NOTE — PROGRESS NOTE ADULT - PROBLEM SELECTOR PLAN 1
-Cough with green sputum production  -CXR grossly clear  -SOB resolved after PCI   -Check CT chest non contrast  -Normoxic, keep sats >90% with o2 PRN.

## 2025-05-07 NOTE — PROGRESS NOTE ADULT - NUTRITIONAL ASSESSMENT
This patient has been assessed with a concern for Malnutrition and has been determined to have a diagnosis/diagnoses of Morbid obesity (BMI > 40).    This patient is being managed with:   Diet NPO-  NPO for Procedure/Test     NPO Start Date: 04-May-2025   NPO Start Time: 23:00  Entered: May  3 2025 12:10PM    Diet DASH/TLC-  Sodium & Cholesterol Restricted  Consistent Carbohydrate {No Snacks} (CSTCHO)  Liquid Protein Supplement     Qty per Day:  1  Entered: Apr 25 2025  4:13PM    The following pending diet order is being considered for treatment of Morbid obesity (BMI > 40):  Diet DASH/TLC-  Sodium & Cholesterol Restricted  Consistent Carbohydrate {No Snacks} (CSTCHO)  Liquid Protein Supplement     Qty per Day:  1  Entered: Apr 25 2025  4:10PM  
This patient has been assessed with a concern for Malnutrition and has been determined to have a diagnosis/diagnoses of Morbid obesity (BMI > 40).    This patient is being managed with:   Diet DASH/TLC-  Sodium & Cholesterol Restricted  Consistent Carbohydrate {No Snacks} (CSTCHO)  Liquid Protein Supplement     Qty per Day:  1  Entered: Apr 25 2025  4:13PM    The following pending diet order is being considered for treatment of Morbid obesity (BMI > 40):  Diet DASH/TLC-  Sodium & Cholesterol Restricted  Consistent Carbohydrate {No Snacks} (CSTCHO)  Liquid Protein Supplement     Qty per Day:  1  Entered: Apr 25 2025  4:10PM  
This patient has been assessed with a concern for Malnutrition and has been determined to have a diagnosis/diagnoses of Morbid obesity (BMI > 40).    This patient is being managed with:   Diet DASH/TLC-  Sodium & Cholesterol Restricted  Consistent Carbohydrate {No Snacks} (CSTCHO)  Liquid Protein Supplement     Qty per Day:  1  Entered: Apr 25 2025  4:13PM    The following pending diet order is being considered for treatment of Morbid obesity (BMI > 40):  Diet DASH/TLC-  Sodium & Cholesterol Restricted  Consistent Carbohydrate {No Snacks} (CSTCHO)  Liquid Protein Supplement     Qty per Day:  1  Entered: Apr 25 2025  4:10PM  
This patient has been assessed with a concern for Malnutrition and has been determined to have a diagnosis/diagnoses of Morbid obesity (BMI > 40).    This patient is being managed with:   Diet NPO-  NPO for Procedure/Test     NPO Start Date: 04-May-2025   NPO Start Time: 23:00  Entered: May  3 2025 12:10PM    Diet DASH/TLC-  Sodium & Cholesterol Restricted  Consistent Carbohydrate {No Snacks} (CSTCHO)  Liquid Protein Supplement     Qty per Day:  1  Entered: Apr 25 2025  4:13PM    The following pending diet order is being considered for treatment of Morbid obesity (BMI > 40):  Diet DASH/TLC-  Sodium & Cholesterol Restricted  Consistent Carbohydrate {No Snacks} (CSTCHO)  Liquid Protein Supplement     Qty per Day:  1  Entered: Apr 25 2025  4:10PM  
This patient has been assessed with a concern for Malnutrition and has been determined to have a diagnosis/diagnoses of Morbid obesity (BMI > 40).    This patient is being managed with:   Diet DASH/TLC-  Sodium & Cholesterol Restricted  Consistent Carbohydrate {No Snacks} (CSTCHO)  Liquid Protein Supplement     Qty per Day:  1  Entered: Apr 25 2025  4:13PM    The following pending diet order is being considered for treatment of Morbid obesity (BMI > 40):  Diet DASH/TLC-  Sodium & Cholesterol Restricted  Consistent Carbohydrate {No Snacks} (CSTCHO)  Liquid Protein Supplement     Qty per Day:  1  Entered: Apr 25 2025  4:10PM  

## 2025-05-07 NOTE — PROGRESS NOTE ADULT - TIME BILLING
pt , acp ,m nurse
as above
pt, daughter in [facility] , [facility]
pt, son , acp , ep and cards   with daughter in law and with CM
as above

## 2025-05-07 NOTE — PROGRESS NOTE ADULT - SUBJECTIVE AND OBJECTIVE BOX
Olaf Zhao MD  Interventional Cardiology / Endovascular Specialist  [place] Office : [address] [place] [address] [place] [address]  Tel:   [place] Office : [address] [facility] [place] [address]  Tel: [phone]    Pt lying in bed in [facility]   	  MEDICATIONS:  aspirin enteric coated 81 milliGRAM(s) Oral daily  buMETAnide 2 milliGRAM(s) Oral two times a day  carvedilol 3.125 milliGRAM(s) Oral every 12 hours  heparin   Injectable 5000 Unit(s) SubCutaneous every 8 hours  isosorbide   mononitrate ER Tablet (IMDUR) 30 milliGRAM(s) Oral daily  losartan 50 milliGRAM(s) Oral daily        gabapentin 300 milliGRAM(s) Oral every 8 hours      allopurinol 100 milliGRAM(s) Oral at bedtime  atorvastatin 40 milliGRAM(s) Oral at bedtime  dextrose 50% Injectable 25 Gram(s) IV Push once  dextrose 50% Injectable 12.5 Gram(s) IV Push once  dextrose 50% Injectable 25 Gram(s) IV Push once  dextrose Oral Gel 15 Gram(s) Oral once PRN  fenofibrate Tablet 48 milliGRAM(s) Oral daily  glucagon  Injectable 1 milliGRAM(s) IntraMuscular once  insulin lispro (ADMELOG) corrective regimen sliding scale   SubCutaneous three times a day before meals  insulin lispro (ADMELOG) corrective regimen sliding scale   SubCutaneous at bedtime    dextrose 5%. 1000 milliLiter(s) IV Continuous <Continuous>  dextrose 5%. 1000 milliLiter(s) IV Continuous <Continuous>  influenza  Vaccine (HIGH DOSE) 0.5 milliLiter(s) IntraMuscular once  multivitamin 1 Tablet(s) Oral daily  sodium chloride 0.9%. 500 milliLiter(s) IV Continuous <Continuous>      PAST MEDICAL/SURGICAL HISTORY  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      Essential hypertension      CAD (coronary artery disease)  2017- s/p cabg x3      Hypercholesterolemia      ASIYA (obstructive sleep apnea)  initially dx  ; CPAP      Paralytic ileus  post op THR  & post op laminectomy      Type 2 diabetes mellitus      Right carpal tunnel syndrome      Cubital tunnel syndrome, right      Trigger finger of right hand      Morbid obesity with body mass index (BMI) of 40.0 or higher      H/O CHF      Kidney mass      Cervical herniated disc      Renal cancer      Disease of spinal cord, unspecified      Peripheral neuropathy      Myelopathy      Morbid obesity      S/P Left Inguinal Hernia Repair      History of Total Hip Replacement  - bilateral THR      S/P tonsillectomy and adenoidectomy  as child      H/O lithotripsy  x1      S/P revision of total knee, right  x2-  &       S/P lumbar discectomy  - ,L5  Aug 2013      S/P CABG x 3  17      S/P lumbar laminectomy  Fusion L3-L5       Kidney stone  s/w ESWL pt unsure site      Neuropathy  Bilateral Feet since       History of bilateral hip replacements      History of hernia repair      History of lumbar fusion      History of cholecystectomy      History of bilateral knee replacement      History of lymph node biopsy      S/p bilateral carpal tunnel release      History of partial nephrectomy      H/O elbow surgery      History of thoracic spinal fusion          SOCIAL HISTORY: Substance Use (street drugs): ( x ) never used  (  ) other:    FAMILY HISTORY:  Family history of coronary artery disease  HLD/Angina. Fatal MI age 73.    Family history of diabetes mellitus type II  mother- - hyperlipidemia and Hypertension.    Family history of pancreatic cancer (Sibling)  brother     Family history of coronary artery disease  brother- age 50+- Coronary Artery Stents        REVIEW OF SYSTEMS:  CONSTITUTIONAL: No fever, weight loss, or fatigue  EYES: No eye pain, visual disturbances, or discharge  ENMT:  No difficulty hearing, tinnitus, vertigo; No sinus or throat pain  BREASTS: No pain, masses, or nipple discharge  GASTROINTESTINAL: No abdominal or epigastric pain. No nausea, vomiting, or hematemesis; No diarrhea or constipation. No melena or hematochezia.  GENITOURINARY: No dysuria, frequency, hematuria, or incontinence  NEUROLOGICAL: No headaches, memory loss, loss of strength, numbness, or tremors  ENDOCRINE: No heat or cold intolerance; No hair loss  MUSCULOSKELETAL: No joint pain or swelling; No muscle, back, or extremity pain  PSYCHIATRIC: No depression, anxiety, mood swings, or difficulty sleeping  HEME/LYMPH: No easy bruising, or bleeding gums  All others negative    PHYSICAL EXAM:  T(C): 36.6 (25 @ 21:04), Max: 36.6 (25 @ 04:22)  HR: 66 (25 @ 21:04) (64 - 81)  BP: 115/73 (04-26-25 @ 21:04) (115/73 - 134/66)  RR: 18 (25 @ 21:04) (18 - 18)  SpO2: 95% (25 @ 21:04) (95% - 98%)  Wt(kg): --  I&O's Summary    2025 07:  -  2025 07:00  --------------------------------------------------------  IN: 740 mL / OUT: 0 mL / NET: 740 mL    2025 07:01  -  2025 23:24  --------------------------------------------------------  IN: 480 mL / OUT: 200 mL / NET: 280 mL              GENERAL: NAD  EYES: conjunctiva and sclera clear  ENMT: No tonsillar erythema, exudates, or enlargement  Cardiovascular: Normal S1 S2, No JVD, No murmurs, No edema  Respiratory: Lungs clear to auscultation	  Gastrointestinal:  Soft, Non-tender, + BS                              13.4   7.57  )-----------( 203      ( 2025 05:50 )             40.4         141  |  104  |  14  ----------------------------<  130[H]  3.4[L]   |  24  |  0.57    Ca    9.0      2025 05:50  Mg     1.8         TPro  6.3  /  Alb  3.6  /  TBili  0.6  /  DBili  x   /  AST  12  /  ALT  12  /  AlkPhos  82      proBNP:   Lipid Profile:   HgA1c:   TSH: Thyroid Stimulating Hormone, Serum: 1.18 uIU/mL ( @ 05:50)      Consultant(s) Notes Reviewed:  [x ] YES  [ ] NO    Care Discussed with Consultants/Other Providers [ x] YES  [ ] NO    Imaging Personally Reviewed independently:  [x] YES  [ ] NO    All labs, radiologic studies, vitals, orders and medications list reviewed. Patient is seen and examined at bedside. Case discussed with medical team.              
  Olaf Zhao MD  Interventional Cardiology / Endovascular Specialist  [place] Office : [address] [place] [address] [place] [address]  Tel:   [place] Office : [address] [facility] [place] [address]  Tel: [phone]    Pt is lying in bed comfortable not in distress, no chest pains no SOB no palpitations  	  MEDICATIONS:  aspirin enteric coated 81 milliGRAM(s) Oral daily  buMETAnide 2 milliGRAM(s) Oral two times a day  carvedilol 3.125 milliGRAM(s) Oral every 12 hours  clopidogrel Tablet 75 milliGRAM(s) Oral daily  heparin   Injectable 5000 Unit(s) SubCutaneous every 8 hours  isosorbide   mononitrate ER Tablet (IMDUR) 30 milliGRAM(s) Oral daily  losartan 50 milliGRAM(s) Oral daily        gabapentin 300 milliGRAM(s) Oral every 8 hours    senna 2 Tablet(s) Oral at bedtime    allopurinol 100 milliGRAM(s) Oral at bedtime  atorvastatin 40 milliGRAM(s) Oral at bedtime  dextrose 50% Injectable 25 Gram(s) IV Push once  dextrose 50% Injectable 12.5 Gram(s) IV Push once  dextrose 50% Injectable 25 Gram(s) IV Push once  dextrose Oral Gel 15 Gram(s) Oral once PRN  fenofibrate Tablet 48 milliGRAM(s) Oral daily  glucagon  Injectable 1 milliGRAM(s) IntraMuscular once  insulin lispro (ADMELOG) corrective regimen sliding scale   SubCutaneous three times a day before meals  insulin lispro (ADMELOG) corrective regimen sliding scale   SubCutaneous at bedtime    dextrose 5%. 1000 milliLiter(s) IV Continuous <Continuous>  dextrose 5%. 1000 milliLiter(s) IV Continuous <Continuous>  influenza  Vaccine (HIGH DOSE) 0.5 milliLiter(s) IntraMuscular once  multivitamin 1 Tablet(s) Oral daily  sodium chloride 0.9%. 500 milliLiter(s) IV Continuous <Continuous>      PAST MEDICAL/SURGICAL HISTORY  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      Essential hypertension      CAD (coronary artery disease)  2017- s/p cabg x3      Hypercholesterolemia      ASIYA (obstructive sleep apnea)  initially dx  ; CPAP      Paralytic ileus  post op THR  & post op laminectomy      Type 2 diabetes mellitus      Right carpal tunnel syndrome      Cubital tunnel syndrome, right      Trigger finger of right hand      Morbid obesity with body mass index (BMI) of 40.0 or higher      H/O CHF      Kidney mass      Cervical herniated disc      Renal cancer      Disease of spinal cord, unspecified      Peripheral neuropathy      Myelopathy      Morbid obesity      S/P Left Inguinal Hernia Repair      History of Total Hip Replacement  - bilateral THR      S/P tonsillectomy and adenoidectomy  as child      H/O lithotripsy  x1      S/P revision of total knee, right  x2-  &       S/P lumbar discectomy  - ,L5  Aug 2013      S/P CABG x 3  17      S/P lumbar laminectomy  Fusion L3-L5       Kidney stone  s/w ESWL pt unsure site      Neuropathy  Bilateral Feet since       History of bilateral hip replacements      History of hernia repair      History of lumbar fusion      History of cholecystectomy      History of bilateral knee replacement      History of lymph node biopsy      S/p bilateral carpal tunnel release      History of partial nephrectomy      H/O elbow surgery      History of thoracic spinal fusion          SOCIAL HISTORY: Substance Use (street drugs): ( x ) never used  (  ) other:    FAMILY HISTORY:  Family history of coronary artery disease  HLD/Angina. Fatal MI age 73.    Family history of diabetes mellitus type II  mother- - hyperlipidemia and Hypertension.    Family history of pancreatic cancer (Sibling)  brother     Family history of coronary artery disease  brother- age 50+- Coronary Artery Stents        REVIEW OF SYSTEMS:  CONSTITUTIONAL: No fever, weight loss, or fatigue  EYES: No eye pain, visual disturbances, or discharge  ENMT:  No difficulty hearing, tinnitus, vertigo; No sinus or throat pain  BREASTS: No pain, masses, or nipple discharge  GASTROINTESTINAL: No abdominal or epigastric pain. No nausea, vomiting, or hematemesis; No diarrhea or constipation. No melena or hematochezia.  GENITOURINARY: No dysuria, frequency, hematuria, or incontinence  NEUROLOGICAL: No headaches, memory loss, loss of strength, numbness, or tremors  ENDOCRINE: No heat or cold intolerance; No hair loss  MUSCULOSKELETAL: No joint pain or swelling; No muscle, back, or extremity pain  PSYCHIATRIC: No depression, anxiety, mood swings, or difficulty sleeping  HEME/LYMPH: No easy bruising, or bleeding gums  All others negative    PHYSICAL EXAM:  T(C): 36.4 (25 @ 14:16), Max: 36.7 (05-04-25 @ 20:36)  HR: 62 (25 @ 14:16) (58 - 71)  BP: 155/80 (25 @ 14:16) (107/63 - 155/80)  RR: 18 (25 @ 14:16) (10 - 18)  SpO2: 99% (25 @ 14:16) (95% - 100%)  Wt(kg): --  I&O's Summary    04 May 2025 07:  -  05 May 2025 07:00  --------------------------------------------------------  IN: 500 mL / OUT: 0 mL / NET: 500 mL    05 May 2025 07:01  -  05 May 2025 16:13  --------------------------------------------------------  IN: 0 mL / OUT: 400 mL / NET: -400 mL      Height (cm): 195.6 ( @ 07:38)  Weight (kg): 161.9 ( @ 07:38)  BMI (kg/m2): 42.3 ( @ 07:38)  BSA (m2): 2.86 ( @ 07:38)      GENERAL: NAD  EYES:   PERRLA   ENMT:   Moist mucous membranes, Good dentition, No lesions  Cardiovascular: Normal S1 S2, No JVD, No murmurs, No edema  Respiratory: Lungs clear to auscultation	  Gastrointestinal:  Soft, Non-tender, + BS	  Extremities: no edema                              13.7   7.03  )-----------( 176      ( 05 May 2025 06:54 )             41.5         140  |  101  |  23  ----------------------------<  115[H]  3.5   |  23  |  0.64    Ca    9.3      05 May 2025 06:54      proBNP:   Lipid Profile:   HgA1c:   TSH:     Consultant(s) Notes Reviewed:  [x ] YES  [ ] NO    Care Discussed with Consultants/Other Providers [ x] YES  [ ] NO    Imaging Personally Reviewed independently:  [x] YES  [ ] NO    All labs, radiologic studies, vitals, orders and medications list reviewed. Patient is seen and examined at bedside. Case discussed with medical team.              
  Olaf Zhao MD  Interventional Cardiology / Endovascular Specialist  [place] Office : [address] [place] [address] [place] [address]  Tel:   [place] Office : [address] [facility] [place] [address]  Tel: [phone]    Pt is lying in bed comfortable not in distress, no chest pains no SOB no palpitations  	  MEDICATIONS:  aspirin enteric coated 81 milliGRAM(s) Oral daily  buMETAnide 2 milliGRAM(s) Oral two times a day  carvedilol 3.125 milliGRAM(s) Oral every 12 hours  clopidogrel Tablet 75 milliGRAM(s) Oral daily  heparin   Injectable 5000 Unit(s) SubCutaneous every 8 hours  isosorbide   mononitrate ER Tablet (IMDUR) 30 milliGRAM(s) Oral daily  losartan 50 milliGRAM(s) Oral daily        gabapentin 300 milliGRAM(s) Oral every 8 hours    senna 2 Tablet(s) Oral at bedtime    allopurinol 100 milliGRAM(s) Oral at bedtime  atorvastatin 40 milliGRAM(s) Oral at bedtime  dextrose 50% Injectable 25 Gram(s) IV Push once  dextrose 50% Injectable 12.5 Gram(s) IV Push once  dextrose 50% Injectable 25 Gram(s) IV Push once  dextrose Oral Gel 15 Gram(s) Oral once PRN  fenofibrate Tablet 48 milliGRAM(s) Oral daily  glucagon  Injectable 1 milliGRAM(s) IntraMuscular once  insulin lispro (ADMELOG) corrective regimen sliding scale   SubCutaneous three times a day before meals  insulin lispro (ADMELOG) corrective regimen sliding scale   SubCutaneous at bedtime    dextrose 5%. 1000 milliLiter(s) IV Continuous <Continuous>  dextrose 5%. 1000 milliLiter(s) IV Continuous <Continuous>  influenza  Vaccine (HIGH DOSE) 0.5 milliLiter(s) IntraMuscular once  multivitamin 1 Tablet(s) Oral daily  sodium chloride 0.9%. 500 milliLiter(s) IV Continuous <Continuous>      PAST MEDICAL/SURGICAL HISTORY  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      Essential hypertension      CAD (coronary artery disease)  2017- s/p cabg x3      Hypercholesterolemia      ASIYA (obstructive sleep apnea)  initially dx  ; CPAP      Paralytic ileus  post op THR  & post op laminectomy      Type 2 diabetes mellitus      Right carpal tunnel syndrome      Cubital tunnel syndrome, right      Trigger finger of right hand      Morbid obesity with body mass index (BMI) of 40.0 or higher      H/O CHF      Kidney mass      Cervical herniated disc      Renal cancer      Disease of spinal cord, unspecified      Peripheral neuropathy      Myelopathy      Morbid obesity      S/P Left Inguinal Hernia Repair      History of Total Hip Replacement  - bilateral THR      S/P tonsillectomy and adenoidectomy  as child      H/O lithotripsy  x1      S/P revision of total knee, right  x2-  &       S/P lumbar discectomy  - ,L5  Aug 2013      S/P CABG x 3  17      S/P lumbar laminectomy  Fusion L3-L5       Kidney stone  s/w ESWL pt unsure site      Neuropathy  Bilateral Feet since       History of bilateral hip replacements      History of hernia repair      History of lumbar fusion      History of cholecystectomy      History of bilateral knee replacement      History of lymph node biopsy      S/p bilateral carpal tunnel release      History of partial nephrectomy      H/O elbow surgery      History of thoracic spinal fusion          SOCIAL HISTORY: Substance Use (street drugs): ( x ) never used  (  ) other:    FAMILY HISTORY:  Family history of coronary artery disease  HLD/Angina. Fatal MI age 73.    Family history of diabetes mellitus type II  mother- - hyperlipidemia and Hypertension.    Family history of pancreatic cancer (Sibling)  brother     Family history of coronary artery disease  brother- age 50+- Coronary Artery Stents        REVIEW OF SYSTEMS:  CONSTITUTIONAL: No fever, weight loss, or fatigue  EYES: No eye pain, visual disturbances, or discharge  ENMT:  No difficulty hearing, tinnitus, vertigo; No sinus or throat pain  BREASTS: No pain, masses, or nipple discharge  GASTROINTESTINAL: No abdominal or epigastric pain. No nausea, vomiting, or hematemesis; No diarrhea or constipation. No melena or hematochezia.  GENITOURINARY: No dysuria, frequency, hematuria, or incontinence  NEUROLOGICAL: No headaches, memory loss, loss of strength, numbness, or tremors  ENDOCRINE: No heat or cold intolerance; No hair loss  MUSCULOSKELETAL: No joint pain or swelling; No muscle, back, or extremity pain  PSYCHIATRIC: No depression, anxiety, mood swings, or difficulty sleeping  HEME/LYMPH: No easy bruising, or bleeding gums  All others negative    PHYSICAL EXAM:  T(C): 37 (25 @ 10:57), Max: 37 (25 @ 10:57)  HR: 68 (25 @ 10:57) (63 - 69)  BP: 132/82 (25 @ 10:57) (109/69 - 153/81)  RR: 18 (25 @ 10:57) (17 - 18)  SpO2: 96% (25 @ 10:57) (96% - 99%)  Wt(kg): --  I&O's Summary    05 May 2025 07:01  -  06 May 2025 07:00  --------------------------------------------------------  IN: 0 mL / OUT: 800 mL / NET: -800 mL    06 May 2025 07:01  -  06 May 2025 15:24  --------------------------------------------------------  IN: 500 mL / OUT: 0 mL / NET: 500 mL      GENERAL: NAD  EYES:   PERRLA   ENMT:   Moist mucous membranes, Good dentition, No lesions  Cardiovascular: Normal S1 S2, No JVD, No murmurs, No edema  Respiratory: Lungs clear to auscultation	  Gastrointestinal:  Soft, Non-tender, + BS	  Extremities: no edema                              13.8   7.23  )-----------( 166      ( 06 May 2025 06:42 )             40.1         140  |  102  |  15  ----------------------------<  119[H]  3.4[L]   |  23  |  0.58    Ca    9.1      06 May 2025 06:42      proBNP:   Lipid Profile:   HgA1c:   TSH:     Consultant(s) Notes Reviewed:  [x ] YES  [ ] NO    Care Discussed with Consultants/Other Providers [ x] YES  [ ] NO    Imaging Personally Reviewed independently:  [x] YES  [ ] NO    All labs, radiologic studies, vitals, orders and medications list reviewed. Patient is seen and examined at bedside. Case discussed with medical team.              
EP      HISTORY OF PRESENT ILLNESS: HPI:  Patient is a 68 y/o Male with PMH of CAD s/p CABG (3 vessels, 08/2017 at [facility]), HF, ASIYA, T2DM, ASIYA on CPAP, HTN, HLD, renal mass s/p resection who presented with chest pain and near syncope found to have mildly reduced LVEF 45% now s/p cardiac cath resulting in Successful PCI with FRANCESCA to the SVG to OM distal anastamosis and native distal RCA. EP consulted for NSVT and an abnormal EKG. Patient reports getting up from sitting in chair after smoking a cigar and suddenly felt lightheaded after taking 2 steps and quickly sat down.  He reports lightheadedness / near syncope/ and syncope at home under low levels or exertion.  Anxious about risk of injury, and lack of prodrome to help him avoid this.  5/3 no events overnight  5/4 tele stable , ROS -   5/5- no events overnight.  awaiting EPS/ device implant today.  5/6- resting in bed.  s/p dual chamber AVEIR yesterday.  no pain at right hip site.  Date of service 5/7- resting in recliner, no new issues.    PAST MEDICAL & SURGICAL HISTORY:  Gout  Lumbar disc disease with radiculopathy  H/O: osteoarthritis  Obstructive sleep apnea  CPAP  Renal calculi  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  Essential hypertension  CAD (coronary artery disease)  2017- s/p cabg x3  Hypercholesterolemia  ASIYA (obstructive sleep apnea)  initially dx 1997 ; CPAP  Paralytic ileus  post op THR 2009 & post op laminectomy  Type 2 diabetes mellitus  Right carpal tunnel syndrome  Cubital tunnel syndrome, right  Trigger finger of right hand  Morbid obesity with body mass index (BMI) of 40.0 or higher  H/O CHF  Kidney mass  Cervical herniated disc  Renal cancer  Disease of spinal cord, unspecified  Peripheral neuropathy  Myelopathy  Morbid obesity  S/P Left Inguinal Hernia Repair  History of Total Hip Replacement  2009- bilateral THR  S/P tonsillectomy and adenoidectomy  as child  H/O lithotripsy  x1  S/P revision of total knee, right  x2- 2012 & 2014  S/P lumbar discectomy  2012- ,L5  Aug 2013  S/P CABG x 3  8/29/17  S/P lumbar laminectomy  Fusion L3-L5 2012  Kidney stone  s/w ESWL pt unsure site  Neuropathy  Bilateral Feet since 2012  History of bilateral hip replacements  History of hernia repair  History of lumbar fusion  History of cholecystectomy  History of bilateral knee replacement  History of lymph node biopsy  S/p bilateral carpal tunnel release  History of partial nephrectomy  H/O elbow surgery  History of thoracic spinal fusion        allopurinol 100 milliGRAM(s) Oral at bedtime  aspirin enteric coated 81 milliGRAM(s) Oral daily  atorvastatin 40 milliGRAM(s) Oral at bedtime  buMETAnide 2 milliGRAM(s) Oral two times a day  carvedilol 3.125 milliGRAM(s) Oral every 12 hours  clopidogrel Tablet 75 milliGRAM(s) Oral daily  dextrose 5%. 1000 milliLiter(s) IV Continuous <Continuous>  dextrose 5%. 1000 milliLiter(s) IV Continuous <Continuous>  dextrose 50% Injectable 25 Gram(s) IV Push once  dextrose 50% Injectable 12.5 Gram(s) IV Push once  dextrose 50% Injectable 25 Gram(s) IV Push once  dextrose Oral Gel 15 Gram(s) Oral once PRN  fenofibrate Tablet 48 milliGRAM(s) Oral daily  gabapentin 300 milliGRAM(s) Oral every 8 hours  glucagon  Injectable 1 milliGRAM(s) IntraMuscular once  heparin   Injectable 5000 Unit(s) SubCutaneous every 8 hours  influenza  Vaccine (HIGH DOSE) 0.5 milliLiter(s) IntraMuscular once  insulin lispro (ADMELOG) corrective regimen sliding scale   SubCutaneous three times a day before meals  insulin lispro (ADMELOG) corrective regimen sliding scale   SubCutaneous at bedtime  isosorbide   mononitrate ER Tablet (IMDUR) 30 milliGRAM(s) Oral daily  losartan 50 milliGRAM(s) Oral daily  multivitamin 1 Tablet(s) Oral daily  senna 2 Tablet(s) Oral at bedtime  sodium chloride 0.9%. 500 milliLiter(s) IV Continuous <Continuous>                            13.8   7.23  )-----------( 166      ( 06 May 2025 06:42 )             40.1       05-06    140  |  102  |  15  ----------------------------<  119[H]  3.4[L]   |  23  |  0.58    Ca    9.1      06 May 2025 06:42    T(C): 36.6 (05-07-25 @ 08:06), Max: 37 (05-06-25 @ 10:57)  HR: 64 (05-07-25 @ 08:06) (55 - 69)  BP: 105/63 (05-07-25 @ 08:06) (105/63 - 132/82)  RR: 18 (05-07-25 @ 08:06) (18 - 18)  SpO2: 95% (05-07-25 @ 08:06) (95% - 97%)  Wt(kg): --    I&O's Summary    06 May 2025 07:01  -  07 May 2025 07:00  --------------------------------------------------------  IN: 500 mL / OUT: 500 mL / NET: 0 mL    07 May 2025 07:01  -  07 May 2025 10:35  --------------------------------------------------------  IN: 280 mL / OUT: 0 mL / NET: 280 mL    Gen: NAD  HEENT:  (-)icterus (-)pallor  CV: N S1 S2 1/6 JODY (+)2 Pulses B/l  Resp:  Clear to auscultation B/L, normal effort  GI: (+) BS Soft, NT, ND  Lymph:  (-)Edema, (-)obvious lymphadenopathy  Skin: Warm to touch, Normal turgor. no hematoma in right inguinal crease.  Psych: Appropriate mood and affect    TELEMETRY: NSR, atrial paced rhythm.     ECG: SR, CO prolongation, bifascicular block   	    RADIOLOGY:         CXR: < from: Xray Chest 1 View AP/PA (04.24.25 @ 18:40) >  IMPRESSION:  Clear lungs.    < end of copied text >    < from: TTE W or WO Ultrasound Enhancing Agent (04.28.25 @ 14:46) >  CONCLUSIONS:      1. Technically difficult image quality.   2. Definity ultrasound enhancing agent was given for enhanced left ventricular opacification and improved delineation of the left ventricular endocardial borders.   3. Left ventricular systolic function is mildly to moderately decreased.   4. The right ventricle is not well visualized.   5. Consider an alternate imaging modality.    < end of copied text >    < from: Cardiac Catheterization (04.29.25 @ 12:17) >  Procedures Performed   Procedures:              1. Arterial Access - Right Femoral     2.    Diagnostic Coronary Angiography   3.    Diagnostic Graft Angiography   4.    Ultrasound Guided Access   5.    PCI: FRANCESCA   6.    PCI: FRANCESCA   7.    AngioSeal     Indications:               ACS greater than 24 hours   Lab Visit Indication:      ACS greater than 24 hours   PCI Status:                elective     Conclusions:   Successful PCI with FRANCESCA to the SVG to OM distal anastamosis and native  distal RCA.  DAPT for 6 mths    Diagnostic Findings:     Coronary Angiography   The coronary circulation is right dominant.      LM   Left main artery: Angiography shows mild atherosclerosis.      LAD   Left anterior descending artery: There is a 100% stenosis.      CX   Circumflex: There is a 100 % stenosis.      RCA   Distal right coronary artery: There is a 70 % stenosis.      Graft Angiography   SVG graft to First obtuse marginal: There is a 90 % stenosis in the  distal anastomosis portion of the segment.  LIMA graft to Mid left anterior descending: Angiography shows no  disease.  SVG graft to Right posterior descending artery: Angiography shows  complete occlusion.    < end of copied text >      ASSESSMENT/PLAN: Patient is a 68 y/o Male with PMH of CAD s/p CABG (3 vessels, 08/2017 at [facility]), HF, ASIYA, T2DM, ASIYA on CPAP, HTN, HLD, renal mass s/p resection who presented with chest pain and near syncope found to have mildly reduced LVEF 45% now s/p cardiac cath resulting in Successful PCI with FRANCESCA to the SVG to OM distal anastamosis and native distal RCA. EP consulted for NSVT and an abnormal EKG.    Interventional cardio eval noted s/p PCI. Continue ASA, Lipitor, and Plavix for CAD  TTE noted with EF 45%  Currently on Bumex, Coreg, Imdur, Losartan for HTN/CAD/Systolic CHF. May need dose adjustments given component of orthostatic hypotension - defer to cardiology/primary team  Continue Coreg given NSVT and LV dysfunction  s/p dual chamber AVEIR leadless pacing system.  Programmed to AAIR 60+ with VVIR 50 backup.  Can up-titrate beta blockers now, with no limitation by bradycardia.  From my perspective, OK to be discharged to rehab.  Followup w/ Dr Kamara, and with Dr Ross ([facility] - EP).    Sae Pina M.D.  Cardiac Electrophysiology  [phone]
EP      HISTORY OF PRESENT ILLNESS: HPI:  Patient is a 70 y/o Male with PMH of CAD s/p CABG (3 vessels, 08/2017 at [facility]), HF, ASIYA, T2DM, ASIYA on CPAP, HTN, HLD, renal mass s/p resection who presented with chest pain and near syncope found to have mildly reduced LVEF 45% now s/p cardiac cath resulting in Successful PCI with FRANCESCA to the SVG to OM distal anastamosis and native distal RCA. EP consulted for NSVT and an abnormal EKG. Patient reports getting up from sitting in chair after smoking a cigar and suddenly felt lightheaded after taking 2 steps and quickly sat down.  He reports lightheadedness / near syncope/ and syncope at home under low levels or exertion.  Anxious about risk of injury, and lack of prodrome to help him avoid this.  5/3 no events overnight  5/4 tele stable , ROS -   5/5- no events overnight.  awaiting EPS/ device implant today.  Date of service 5/6- resting in bed.  s/p dual chamber AVEIR yesterday.  no pain at right hip site.    PAST MEDICAL & SURGICAL HISTORY:  Gout  Lumbar disc disease with radiculopathy  H/O: osteoarthritis  Obstructive sleep apnea  CPAP  Renal calculi  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  Essential hypertension  CAD (coronary artery disease)  2017- s/p cabg x3  Hypercholesterolemia  ASIYA (obstructive sleep apnea)  initially dx 1997 ; CPAP  Paralytic ileus  post op THR 2009 & post op laminectomy  Type 2 diabetes mellitus  Right carpal tunnel syndrome  Cubital tunnel syndrome, right  Trigger finger of right hand  Morbid obesity with body mass index (BMI) of 40.0 or higher  H/O CHF  Kidney mass  Cervical herniated disc  Renal cancer  Disease of spinal cord, unspecified  Peripheral neuropathy  Myelopathy  Morbid obesity  S/P Left Inguinal Hernia Repair  History of Total Hip Replacement  2009- bilateral THR  S/P tonsillectomy and adenoidectomy  as child  H/O lithotripsy  x1  S/P revision of total knee, right  x2- 2012 & 2014  S/P lumbar discectomy  2012- ,L5  Aug 2013  S/P CABG x 3  8/29/17  S/P lumbar laminectomy  Fusion L3-L5 2012  Kidney stone  s/w ESWL pt unsure site  Neuropathy  Bilateral Feet since 2012  History of bilateral hip replacements  History of hernia repair  History of lumbar fusion  History of cholecystectomy  History of bilateral knee replacement  History of lymph node biopsy  S/p bilateral carpal tunnel release  History of partial nephrectomy  H/O elbow surgery  History of thoracic spinal fusion        allopurinol 100 milliGRAM(s) Oral at bedtime  aspirin enteric coated 81 milliGRAM(s) Oral daily  atorvastatin 40 milliGRAM(s) Oral at bedtime  buMETAnide 2 milliGRAM(s) Oral two times a day  carvedilol 3.125 milliGRAM(s) Oral every 12 hours  clopidogrel Tablet 75 milliGRAM(s) Oral daily  dextrose 5%. 1000 milliLiter(s) IV Continuous <Continuous>  dextrose 5%. 1000 milliLiter(s) IV Continuous <Continuous>  dextrose 50% Injectable 25 Gram(s) IV Push once  dextrose 50% Injectable 12.5 Gram(s) IV Push once  dextrose 50% Injectable 25 Gram(s) IV Push once  dextrose Oral Gel 15 Gram(s) Oral once PRN  fenofibrate Tablet 48 milliGRAM(s) Oral daily  gabapentin 300 milliGRAM(s) Oral every 8 hours  glucagon  Injectable 1 milliGRAM(s) IntraMuscular once  heparin   Injectable 5000 Unit(s) SubCutaneous every 8 hours  influenza  Vaccine (HIGH DOSE) 0.5 milliLiter(s) IntraMuscular once  insulin lispro (ADMELOG) corrective regimen sliding scale   SubCutaneous three times a day before meals  insulin lispro (ADMELOG) corrective regimen sliding scale   SubCutaneous at bedtime  isosorbide   mononitrate ER Tablet (IMDUR) 30 milliGRAM(s) Oral daily  losartan 50 milliGRAM(s) Oral daily  multivitamin 1 Tablet(s) Oral daily  senna 2 Tablet(s) Oral at bedtime  sodium chloride 0.9%. 500 milliLiter(s) IV Continuous <Continuous>                            13.8   7.23  )-----------( 166      ( 06 May 2025 06:42 )             40.1       05-06    140  |  102  |  15  ----------------------------<  119[H]  3.4[L]   |  23  |  0.58    Ca    9.1      06 May 2025 06:42    T(C): 37 (05-06-25 @ 10:57), Max: 37 (05-06-25 @ 10:57)  HR: 68 (05-06-25 @ 10:57) (62 - 71)  BP: 132/82 (05-06-25 @ 10:57) (109/69 - 155/80)  RR: 18 (05-06-25 @ 10:57) (10 - 18)  SpO2: 96% (05-06-25 @ 10:57) (95% - 99%)  Wt(kg): --    I&O's Summary    05 May 2025 07:01  -  06 May 2025 07:00  --------------------------------------------------------  IN: 0 mL / OUT: 800 mL / NET: -800 mL      Gen: NAD  HEENT:  (-)icterus (-)pallor  CV: N S1 S2 1/6 JODY (+)2 Pulses B/l  Resp:  Clear to auscultation B/L, normal effort  GI: (+) BS Soft, NT, ND  Lymph:  (-)Edema, (-)obvious lymphadenopathy  Skin: Warm to touch, Normal turgor. no hematoma in right inguinal crease.  Psych: Appropriate mood and affect    TELEMETRY: NSR, atrial paced rhythm.     ECG: SR, NE prolongation, bifascicular block   	    RADIOLOGY:         CXR: < from: Xray Chest 1 View AP/PA (04.24.25 @ 18:40) >  IMPRESSION:  Clear lungs.    < end of copied text >    < from: TTE W or WO Ultrasound Enhancing Agent (04.28.25 @ 14:46) >  CONCLUSIONS:      1. Technically difficult image quality.   2. Definity ultrasound enhancing agent was given for enhanced left ventricular opacification and improved delineation of the left ventricular endocardial borders.   3. Left ventricular systolic function is mildly to moderately decreased.   4. The right ventricle is not well visualized.   5. Consider an alternate imaging modality.    < end of copied text >    < from: Cardiac Catheterization (04.29.25 @ 12:17) >  Procedures Performed   Procedures:              1. Arterial Access - Right Femoral     2.    Diagnostic Coronary Angiography   3.    Diagnostic Graft Angiography   4.    Ultrasound Guided Access   5.    PCI: FRANCESCA   6.    PCI: FRANCESCA   7.    AngioSeal     Indications:               ACS greater than 24 hours   Lab Visit Indication:      ACS greater than 24 hours   PCI Status:                elective     Conclusions:   Successful PCI with FRANCESCA to the SVG to OM distal anastamosis and native  distal RCA.  DAPT for 6 mths    Diagnostic Findings:     Coronary Angiography   The coronary circulation is right dominant.      LM   Left main artery: Angiography shows mild atherosclerosis.      LAD   Left anterior descending artery: There is a 100% stenosis.      CX   Circumflex: There is a 100 % stenosis.      RCA   Distal right coronary artery: There is a 70 % stenosis.      Graft Angiography   SVG graft to First obtuse marginal: There is a 90 % stenosis in the  distal anastomosis portion of the segment.  LIMA graft to Mid left anterior descending: Angiography shows no  disease.  SVG graft to Right posterior descending artery: Angiography shows  complete occlusion.    < end of copied text >      ASSESSMENT/PLAN: Patient is a 70 y/o Male with PMH of CAD s/p CABG (3 vessels, 08/2017 at [facility]), HF, ASIYA, T2DM, ASIYA on CPAP, HTN, HLD, renal mass s/p resection who presented with chest pain and near syncope found to have mildly reduced LVEF 45% now s/p cardiac cath resulting in Successful PCI with FRANCESCA to the SVG to OM distal anastamosis and native distal RCA. EP consulted for NSVT and an abnormal EKG.    Interventional cardio eval noted s/p PCI. Continue ASA, Lipitor, and Plavix for CAD  TTE noted with EF 45%  Currently on Bumex, Coreg, Imdur, Losartan for HTN/CAD/Systolic CHF. May need dose adjustments given component of orthostatic hypotension - defer to cardiology/primary team  Continue Coreg given NSVT and LV dysfunction  s/p dual chamber AVEIR leadless pacing system.  Programmed to AAIR 60+ with VVIR 50 backup.  Can up-titrate beta blockers now, with no limitation by bradycardia.  From my perspective, OK to be discharged to rehab.  Followup w/ Dr Kamara, and with Dr Ross ([facility] - EP).    Sae Pina M.D.  Cardiac Electrophysiology  [phone]
EP    DATE OF SERVICE: 05-02-25    HISTORY OF PRESENT ILLNESS: HPI:  Patient is a 68 y/o Male with PMH of CAD s/p CABG (3 vessels, 08/2017 at [facility]), HF, ASIYA, T2DM, ASIYA on CPAP, HTN, HLD, renal mass s/p resection who presented with chest pain and near syncope found to have mildly reduced LVEF 45% now s/p cardiac cath resulting in Successful PCI with FRANCESCA to the SVG to OM distal anastamosis and native distal RCA. EP consulted for NSVT and an abnormal EKG. Patient reports getting up from sitting in chair after smoking a cigar and suddenly felt lightheaded after taking 2 steps and quickly sat down.  He reports lightheadedness / near syncope/ and syncope at home under low levels or exertion.  Anxious about risk of injury, and lack of prodrome to help him avoid this.    PAST MEDICAL & SURGICAL HISTORY:  Gout  Lumbar disc disease with radiculopathy  H/O: osteoarthritis  Obstructive sleep apnea  CPAP  Renal calculi  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  Essential hypertension  CAD (coronary artery disease)  2017- s/p cabg x3  Hypercholesterolemia  ASIYA (obstructive sleep apnea)  initially dx 1997 ; CPAP  Paralytic ileus  post op THR 2009 & post op laminectomy  Type 2 diabetes mellitus  Right carpal tunnel syndrome  Cubital tunnel syndrome, right  Trigger finger of right hand  Morbid obesity with body mass index (BMI) of 40.0 or higher  H/O CHF  Kidney mass  Cervical herniated disc  Renal cancer  Disease of spinal cord, unspecified  Peripheral neuropathy  Myelopathy  Morbid obesity  S/P Left Inguinal Hernia Repair  History of Total Hip Replacement  2009- bilateral THR  S/P tonsillectomy and adenoidectomy  as child  H/O lithotripsy  x1  S/P revision of total knee, right  x2- 2012 & 2014  S/P lumbar discectomy  2012- ,L5  Aug 2013  S/P CABG x 3  8/29/17  S/P lumbar laminectomy  Fusion L3-L5 2012  Kidney stone  s/w ESWL pt unsure site  Neuropathy  Bilateral Feet since 2012  History of bilateral hip replacements  History of hernia repair  History of lumbar fusion  History of cholecystectomy  History of bilateral knee replacement  History of lymph node biopsy  S/p bilateral carpal tunnel release  History of partial nephrectomy  H/O elbow surgery  History of thoracic spinal fusion    allopurinol 100 milliGRAM(s) Oral at bedtime  aspirin enteric coated 81 milliGRAM(s) Oral daily  atorvastatin 40 milliGRAM(s) Oral at bedtime  buMETAnide 2 milliGRAM(s) Oral two times a day  carvedilol 3.125 milliGRAM(s) Oral every 12 hours  clopidogrel Tablet 75 milliGRAM(s) Oral daily  dextrose 5%. 1000 milliLiter(s) IV Continuous <Continuous>  dextrose 5%. 1000 milliLiter(s) IV Continuous <Continuous>  dextrose 50% Injectable 25 Gram(s) IV Push once  dextrose 50% Injectable 12.5 Gram(s) IV Push once  dextrose 50% Injectable 25 Gram(s) IV Push once  dextrose Oral Gel 15 Gram(s) Oral once PRN  fenofibrate Tablet 48 milliGRAM(s) Oral daily  gabapentin 300 milliGRAM(s) Oral every 8 hours  glucagon  Injectable 1 milliGRAM(s) IntraMuscular once  heparin   Injectable 5000 Unit(s) SubCutaneous every 8 hours  influenza  Vaccine (HIGH DOSE) 0.5 milliLiter(s) IntraMuscular once  insulin lispro (ADMELOG) corrective regimen sliding scale   SubCutaneous three times a day before meals  insulin lispro (ADMELOG) corrective regimen sliding scale   SubCutaneous at bedtime  isosorbide   mononitrate ER Tablet (IMDUR) 30 milliGRAM(s) Oral daily  losartan 50 milliGRAM(s) Oral daily  multivitamin 1 Tablet(s) Oral daily  senna 2 Tablet(s) Oral at bedtime  sodium chloride 0.9%. 500 milliLiter(s) IV Continuous <Continuous>                            13.1   6.29  )-----------( 153      ( 01 May 2025 05:33 )             39.3       05-02    140  |  103  |  15  ----------------------------<  114[H]  3.6   |  23  |  0.55    Ca    9.5      02 May 2025 06:32  Mg     1.9     05-02    T(C): 36.4 (05-02-25 @ 10:59), Max: 36.4 (05-02-25 @ 04:25)  HR: 70 (05-02-25 @ 10:59) (62 - 70)  BP: 119/79 (05-02-25 @ 10:59) (107/68 - 137/85)  RR: 18 (05-02-25 @ 10:59) (18 - 18)  SpO2: 97% (05-02-25 @ 10:59) (97% - 98%)  Wt(kg): --    I&O's Summary    01 May 2025 07:01  -  02 May 2025 07:00  --------------------------------------------------------  IN: 500 mL / OUT: 450 mL / NET: 50 mL        Gen: NAD  HEENT:  (-)icterus (-)pallor  CV: N S1 S2 1/6 JODY (+)2 Pulses B/l  Resp:  Clear to auscultation B/L, normal effort  GI: (+) BS Soft, NT, ND  Lymph:  (-)Edema, (-)obvious lymphadenopathy  Skin: Warm to touch, Normal turgor  Psych: Appropriate mood and affect      TELEMETRY: NSR, brief NSVT	      ECG: SR, OR prolongation, bifascicular block   	    RADIOLOGY:         CXR: < from: Xray Chest 1 View AP/PA (04.24.25 @ 18:40) >  IMPRESSION:  Clear lungs.    < end of copied text >    < from: TTE W or WO Ultrasound Enhancing Agent (04.28.25 @ 14:46) >  CONCLUSIONS:      1. Technically difficult image quality.   2. Definity ultrasound enhancing agent was given for enhanced left ventricular opacification and improved delineation of the left ventricular endocardial borders.   3. Left ventricular systolic function is mildly to moderately decreased.   4. The right ventricle is not well visualized.   5. Consider an alternate imaging modality.    < end of copied text >    < from: Cardiac Catheterization (04.29.25 @ 12:17) >  Procedures Performed   Procedures:              1. Arterial Access - Right Femoral     2.    Diagnostic Coronary Angiography   3.    Diagnostic Graft Angiography   4.    Ultrasound Guided Access   5.    PCI: FRANCESCA   6.    PCI: FRANCESCA   7.    AngioSeal     Indications:               ACS greater than 24 hours   Lab Visit Indication:      ACS greater than 24 hours   PCI Status:                elective     Conclusions:   Successful PCI with FRANCESCA to the SVG to OM distal anastamosis and native  distal RCA.  DAPT for 6 mths    Diagnostic Findings:     Coronary Angiography   The coronary circulation is right dominant.      LM   Left main artery: Angiography shows mild atherosclerosis.      LAD   Left anterior descending artery: There is a 100% stenosis.      CX   Circumflex: There is a 100 % stenosis.      RCA   Distal right coronary artery: There is a 70 % stenosis.      Graft Angiography   SVG graft to First obtuse marginal: There is a 90 % stenosis in the  distal anastomosis portion of the segment.  LIMA graft to Mid left anterior descending: Angiography shows no  disease.  SVG graft to Right posterior descending artery: Angiography shows  complete occlusion.    < end of copied text >      ASSESSMENT/PLAN: Patient is a 68 y/o Male with PMH of CAD s/p CABG (3 vessels, 08/2017 at [facility]), HF, ASIYA, T2DM, ASIYA on CPAP, HTN, HLD, renal mass s/p resection who presented with chest pain and near syncope found to have mildly reduced LVEF 45% now s/p cardiac cath resulting in Successful PCI with FRANCESCA to the SVG to OM distal anastamosis and native distal RCA. EP consulted for NSVT and an abnormal EKG.    Interventional cardio eval noted s/p PCI. Continue ASA, Lipitor, and Plavix for CAD  TTE noted with EF 45%  Currently on Bumex, Coreg, Imdur, Losartan for HTN/CAD/Systolic CHF. May need dose adjustments given component of orthostatic hypotension - defer to cardiology/primary team  Continue Coreg given NSVT and LV dysfunction    He has history of multivessel CAD, hx of CABG, and recent multivessel PCI.  LVEF% is 40-45%.  EKG with OR prolongation (300ms) and bifascicular block (140ms).    Recommended invasive EP study to determine if he has susceptibility for critical bradycardia / AV block, or inducible ventricular tachycardia.  Potential for transvenous pacemaker or defibrillator implant if EPS+.  If no inducible arrhythmia and normal AV conduction, would implant a loop recorder instead.  Followup w/ Dr Kamara, and with Dr Ross ([facility] - EP).    NPO Sunday night.  EP study / Device on Monday morning.  Will order Type+Screen x 2 for Sunday.    Sae Pina M.D.  Cardiac Electrophysiology  [phone]
EP    DATE OF SERVICE: 05-04-25    HISTORY OF PRESENT ILLNESS: HPI:  Patient is a 70 y/o Male with PMH of CAD s/p CABG (3 vessels, 08/2017 at [facility]), HF, AISYA, T2DM, ASIYA on CPAP, HTN, HLD, renal mass s/p resection who presented with chest pain and near syncope found to have mildly reduced LVEF 45% now s/p cardiac cath resulting in Successful PCI with FRANCESCA to the SVG to OM distal anastamosis and native distal RCA. EP consulted for NSVT and an abnormal EKG. Patient reports getting up from sitting in chair after smoking a cigar and suddenly felt lightheaded after taking 2 steps and quickly sat down.  He reports lightheadedness / near syncope/ and syncope at home under low levels or exertion.  Anxious about risk of injury, and lack of prodrome to help him avoid this.  5/3 no events overnight  5/4 tele stable , ROS -     PAST MEDICAL & SURGICAL HISTORY:  Gout  Lumbar disc disease with radiculopathy  H/O: osteoarthritis  Obstructive sleep apnea  CPAP  Renal calculi  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  Essential hypertension  CAD (coronary artery disease)  2017- s/p cabg x3  Hypercholesterolemia  ASIYA (obstructive sleep apnea)  initially dx 1997 ; CPAP  Paralytic ileus  post op THR 2009 & post op laminectomy  Type 2 diabetes mellitus  Right carpal tunnel syndrome  Cubital tunnel syndrome, right  Trigger finger of right hand  Morbid obesity with body mass index (BMI) of 40.0 or higher  H/O CHF  Kidney mass  Cervical herniated disc  Renal cancer  Disease of spinal cord, unspecified  Peripheral neuropathy  Myelopathy  Morbid obesity  S/P Left Inguinal Hernia Repair  History of Total Hip Replacement  2009- bilateral THR  S/P tonsillectomy and adenoidectomy  as child  H/O lithotripsy  x1  S/P revision of total knee, right  x2- 2012 & 2014  S/P lumbar discectomy  2012- ,L5  Aug 2013  S/P CABG x 3  8/29/17  S/P lumbar laminectomy  Fusion L3-L5 2012  Kidney stone  s/w ESWL pt unsure site  Neuropathy  Bilateral Feet since 2012  History of bilateral hip replacements  History of hernia repair  History of lumbar fusion  History of cholecystectomy  History of bilateral knee replacement  History of lymph node biopsy  S/p bilateral carpal tunnel release  History of partial nephrectomy  H/O elbow surgery  History of thoracic spinal fusion             allopurinol 100 milliGRAM(s) Oral at bedtime  aspirin enteric coated 81 milliGRAM(s) Oral daily  atorvastatin 40 milliGRAM(s) Oral at bedtime  buMETAnide 2 milliGRAM(s) Oral two times a day  carvedilol 3.125 milliGRAM(s) Oral every 12 hours  clopidogrel Tablet 75 milliGRAM(s) Oral daily  dextrose 5%. 1000 milliLiter(s) IV Continuous <Continuous>  dextrose 5%. 1000 milliLiter(s) IV Continuous <Continuous>  dextrose 50% Injectable 25 Gram(s) IV Push once  dextrose 50% Injectable 12.5 Gram(s) IV Push once  dextrose 50% Injectable 25 Gram(s) IV Push once  dextrose Oral Gel 15 Gram(s) Oral once PRN  fenofibrate Tablet 48 milliGRAM(s) Oral daily  gabapentin 300 milliGRAM(s) Oral every 8 hours  glucagon  Injectable 1 milliGRAM(s) IntraMuscular once  heparin   Injectable 5000 Unit(s) SubCutaneous every 8 hours  influenza  Vaccine (HIGH DOSE) 0.5 milliLiter(s) IntraMuscular once  insulin lispro (ADMELOG) corrective regimen sliding scale   SubCutaneous three times a day before meals  insulin lispro (ADMELOG) corrective regimen sliding scale   SubCutaneous at bedtime  isosorbide   mononitrate ER Tablet (IMDUR) 30 milliGRAM(s) Oral daily  losartan 50 milliGRAM(s) Oral daily  multivitamin 1 Tablet(s) Oral daily  potassium chloride    Tablet ER 40 milliEquivalent(s) Oral once  senna 2 Tablet(s) Oral at bedtime  sodium chloride 0.9%. 500 milliLiter(s) IV Continuous <Continuous>                            13.1   6.60  )-----------( 174      ( 04 May 2025 06:14 )             39.3       Hemoglobin: 13.1 g/dL (05-04 @ 06:14)  Hemoglobin: 13.5 g/dL (05-03 @ 06:52)  Hemoglobin: 13.1 g/dL (05-01 @ 05:33)      05-04    140  |  102  |  22  ----------------------------<  125[H]  3.3[L]   |  25  |  0.66    Ca    9.3      04 May 2025 06:14      Creatinine Trend: 0.66<--, 0.59<--, 0.55<--, 0.65<--, 0.59<--, 0.55<--    COAGS:           T(C): 36.3 (05-04-25 @ 04:16), Max: 36.4 (05-03-25 @ 11:45)  HR: 59 (05-04-25 @ 04:16) (59 - 75)  BP: 137/86 (05-04-25 @ 04:16) (102/67 - 137/86)  RR: 18 (05-04-25 @ 04:16) (18 - 18)  SpO2: 98% (05-04-25 @ 04:16) (96% - 98%)  Wt(kg): --    I&O's Summary    03 May 2025 07:01  -  04 May 2025 07:00  --------------------------------------------------------  IN: 0 mL / OUT: 450 mL / NET: -450 mL      I&O's Summary    Gen: NAD  HEENT:  (-)icterus (-)pallor  CV: N S1 S2 1/6 JODY (+)2 Pulses B/l  Resp:  Clear to auscultation B/L, normal effort  GI: (+) BS Soft, NT, ND  Lymph:  (-)Edema, (-)obvious lymphadenopathy  Skin: Warm to touch, Normal turgor  Psych: Appropriate mood and affect      TELEMETRY: NSR, brief NSVT	      ECG: SR, HI prolongation, bifascicular block   	    RADIOLOGY:         CXR: < from: Xray Chest 1 View AP/PA (04.24.25 @ 18:40) >  IMPRESSION:  Clear lungs.    < end of copied text >    < from: TTE W or WO Ultrasound Enhancing Agent (04.28.25 @ 14:46) >  CONCLUSIONS:      1. Technically difficult image quality.   2. Definity ultrasound enhancing agent was given for enhanced left ventricular opacification and improved delineation of the left ventricular endocardial borders.   3. Left ventricular systolic function is mildly to moderately decreased.   4. The right ventricle is not well visualized.   5. Consider an alternate imaging modality.    < end of copied text >    < from: Cardiac Catheterization (04.29.25 @ 12:17) >  Procedures Performed   Procedures:              1. Arterial Access - Right Femoral     2.    Diagnostic Coronary Angiography   3.    Diagnostic Graft Angiography   4.    Ultrasound Guided Access   5.    PCI: FRANCESCA   6.    PCI: FRANCESCA   7.    AngioSeal     Indications:               ACS greater than 24 hours   Lab Visit Indication:      ACS greater than 24 hours   PCI Status:                elective     Conclusions:   Successful PCI with FRANCESCA to the SVG to OM distal anastamosis and native  distal RCA.  DAPT for 6 mths    Diagnostic Findings:     Coronary Angiography   The coronary circulation is right dominant.      LM   Left main artery: Angiography shows mild atherosclerosis.      LAD   Left anterior descending artery: There is a 100% stenosis.      CX   Circumflex: There is a 100 % stenosis.      RCA   Distal right coronary artery: There is a 70 % stenosis.      Graft Angiography   SVG graft to First obtuse marginal: There is a 90 % stenosis in the  distal anastomosis portion of the segment.  LIMA graft to Mid left anterior descending: Angiography shows no  disease.  SVG graft to Right posterior descending artery: Angiography shows  complete occlusion.    < end of copied text >      ASSESSMENT/PLAN: Patient is a 70 y/o Male with PMH of CAD s/p CABG (3 vessels, 08/2017 at [facility]), HF, ASIYA, T2DM, ASIYA on CPAP, HTN, HLD, renal mass s/p resection who presented with chest pain and near syncope found to have mildly reduced LVEF 45% now s/p cardiac cath resulting in Successful PCI with FRANCESCA to the SVG to OM distal anastamosis and native distal RCA. EP consulted for NSVT and an abnormal EKG.    Interventional cardio eval noted s/p PCI. Continue ASA, Lipitor, and Plavix for CAD  TTE noted with EF 45%  Currently on Bumex, Coreg, Imdur, Losartan for HTN/CAD/Systolic CHF. May need dose adjustments given component of orthostatic hypotension - defer to cardiology/primary team  Continue Coreg given NSVT and LV dysfunction    He has history of multivessel CAD, hx of CABG, and recent multivessel PCI.  LVEF% is 40-45%.  EKG with HI prolongation (300ms) and bifascicular block (140ms).    Recommended invasive EP study to determine if he has susceptibility for critical bradycardia / AV block, or inducible ventricular tachycardia.  Potential for transvenous pacemaker or defibrillator implant if EPS+.  If no inducible arrhythmia and normal AV conduction, would implant a loop recorder instead.  Followup w/ Dr Kamara, and with Dr Ross ([facility] - EP).    NPO Sunday night.  EP study / Device on Monday morning.  obtain Type+Screen x 2 for Sunday.   
Olaf Zhao MD  Interventional Cardiology / Advance Heart Failure and Cardiac Transplant Specialist  [place] Office : [address] [place] [address] [place] [address]  Tel:   [place] Office : [address] [facility] [place] [address]  Tel: [phone]       Pt is lying in bed comfortable not in distress, no chest pains no SOB no palpitations  	  MEDICATIONS:  aspirin enteric coated 81 milliGRAM(s) Oral daily  buMETAnide 2 milliGRAM(s) Oral two times a day  carvedilol 3.125 milliGRAM(s) Oral every 12 hours  heparin   Injectable 5000 Unit(s) SubCutaneous every 8 hours  isosorbide   mononitrate ER Tablet (IMDUR) 30 milliGRAM(s) Oral daily  losartan 50 milliGRAM(s) Oral daily        gabapentin 300 milliGRAM(s) Oral every 8 hours      allopurinol 100 milliGRAM(s) Oral at bedtime  atorvastatin 40 milliGRAM(s) Oral at bedtime  dextrose 50% Injectable 25 Gram(s) IV Push once  dextrose 50% Injectable 12.5 Gram(s) IV Push once  dextrose 50% Injectable 25 Gram(s) IV Push once  dextrose Oral Gel 15 Gram(s) Oral once PRN  fenofibrate Tablet 48 milliGRAM(s) Oral daily  glucagon  Injectable 1 milliGRAM(s) IntraMuscular once  insulin lispro (ADMELOG) corrective regimen sliding scale   SubCutaneous three times a day before meals  insulin lispro (ADMELOG) corrective regimen sliding scale   SubCutaneous at bedtime    dextrose 5%. 1000 milliLiter(s) IV Continuous <Continuous>  dextrose 5%. 1000 milliLiter(s) IV Continuous <Continuous>  influenza  Vaccine (HIGH DOSE) 0.5 milliLiter(s) IntraMuscular once  multivitamin 1 Tablet(s) Oral daily  sodium chloride 0.9%. 500 milliLiter(s) IV Continuous <Continuous>      PAST MEDICAL/SURGICAL HISTORY  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      Essential hypertension      CAD (coronary artery disease)  2017- s/p cabg x3      Hypercholesterolemia      ASIYA (obstructive sleep apnea)  initially dx  ; CPAP      Paralytic ileus  post op THR  & post op laminectomy      Type 2 diabetes mellitus      Right carpal tunnel syndrome      Cubital tunnel syndrome, right      Trigger finger of right hand      Morbid obesity with body mass index (BMI) of 40.0 or higher      H/O CHF      Kidney mass      Cervical herniated disc      Renal cancer      Disease of spinal cord, unspecified      Peripheral neuropathy      Myelopathy      Morbid obesity      S/P Left Inguinal Hernia Repair      History of Total Hip Replacement  - bilateral THR      S/P tonsillectomy and adenoidectomy  as child      H/O lithotripsy  x1      S/P revision of total knee, right  x2-  &       S/P lumbar discectomy  - ,L5  Aug 2013      S/P CABG x 3  17      S/P lumbar laminectomy  Fusion L3-L5       Kidney stone  s/w ESWL pt unsure site      Neuropathy  Bilateral Feet since       History of bilateral hip replacements      History of hernia repair      History of lumbar fusion      History of cholecystectomy      History of bilateral knee replacement      History of lymph node biopsy      S/p bilateral carpal tunnel release      History of partial nephrectomy      H/O elbow surgery      History of thoracic spinal fusion          SOCIAL HISTORY: Substance Use (street drugs): ( x ) never used  (  ) other:    FAMILY HISTORY:  Family history of coronary artery disease  HLD/Angina. Fatal MI age 73.    Family history of diabetes mellitus type II  mother- - hyperlipidemia and Hypertension.    Family history of pancreatic cancer (Sibling)  brother     Family history of coronary artery disease  brother- age 50+- Coronary Artery Stents         PHYSICAL EXAM:  T(C): 36.4 (25 @ 11:33), Max: 36.7 (25 @ 04:53)  HR: 69 (25 @ 13:55) (56 - 69)  BP: 110/56 (25 @ 13:55) (107/55 - 133/75)  RR: 16 (25 @ 13:55) (15 - 20)  SpO2: 95% (25 @ 13:55) (94% - 98%)  Wt(kg): --  I&O's Summary    2025 07:01  -  2025 07:00  --------------------------------------------------------  IN: 480 mL / OUT: 0 mL / NET: 480 mL             EYES:   PERRLA   ENMT:   Moist mucous membranes, Good dentition, No lesions  Cardiovascular: Normal S1 S2, No JVD, No murmurs, No edema  Respiratory: Lungs clear to auscultation	  Gastrointestinal:  Soft, Non-tender, + BS	  Extremities: no edema                    142  |  103  |  14  ----------------------------<  122[H]  3.3[L]   |  24  |  0.56    Ca    8.8      2025 06:52  Mg     1.8           proBNP:   Lipid Profile:   HgA1c:   TSH:     Consultant(s) Notes Reviewed:  [x ] YES  [ ] NO    Care Discussed with Consultants/Other Providers [ x] YES  [ ] NO    Imaging Personally Reviewed independently:  [x] YES  [ ] NO    All labs, radiologic studies, vitals, orders and medications list reviewed. Patient is seen and examined at bedside. Case discussed with medical team.        
Date of service: 05-04-25 @ 23:38      Patient is a 69y old  Male who presents with a chief complaint of Syncope (29 Apr 2025 13:34)                                                               INTERVAL HPI/OVERNIGHT EVENTS:    REVIEW OF SYSTEMS:     CONSTITUTIONAL: No weakness, fevers or chills  EYES/ENT: No visual changes , no ear ache   NECK: No pain or stiffness  RESPIRATORY: No cough, wheezing,  No shortness of breath  CARDIOVASCULAR: No chest pain or palpitations  GASTROINTESTINAL: No abdominal pain  . No nausea, vomiting, or hematemesis; No diarrhea or constipation. No melena or hematochezia.  GENITOURINARY: No dysuria, frequency or hematuria  NEUROLOGICAL: No numbness or weakness  SKIN: No itching, burning, rashes, or lesions                                                                                                                                                                                                                                                                                 Medications:  MEDICATIONS  (STANDING):  allopurinol 100 milliGRAM(s) Oral at bedtime  aspirin enteric coated 81 milliGRAM(s) Oral daily  atorvastatin 40 milliGRAM(s) Oral at bedtime  buMETAnide 2 milliGRAM(s) Oral two times a day  carvedilol 3.125 milliGRAM(s) Oral every 12 hours  clopidogrel Tablet 75 milliGRAM(s) Oral daily  dextrose 5%. 1000 milliLiter(s) (100 mL/Hr) IV Continuous <Continuous>  dextrose 5%. 1000 milliLiter(s) (50 mL/Hr) IV Continuous <Continuous>  dextrose 50% Injectable 25 Gram(s) IV Push once  dextrose 50% Injectable 12.5 Gram(s) IV Push once  dextrose 50% Injectable 25 Gram(s) IV Push once  fenofibrate Tablet 48 milliGRAM(s) Oral daily  gabapentin 300 milliGRAM(s) Oral every 8 hours  glucagon  Injectable 1 milliGRAM(s) IntraMuscular once  heparin   Injectable 5000 Unit(s) SubCutaneous every 8 hours  influenza  Vaccine (HIGH DOSE) 0.5 milliLiter(s) IntraMuscular once  insulin lispro (ADMELOG) corrective regimen sliding scale   SubCutaneous three times a day before meals  insulin lispro (ADMELOG) corrective regimen sliding scale   SubCutaneous at bedtime  isosorbide   mononitrate ER Tablet (IMDUR) 30 milliGRAM(s) Oral daily  losartan 50 milliGRAM(s) Oral daily  multivitamin 1 Tablet(s) Oral daily  senna 2 Tablet(s) Oral at bedtime  sodium chloride 0.9%. 500 milliLiter(s) (100 mL/Hr) IV Continuous <Continuous>    MEDICATIONS  (PRN):  dextrose Oral Gel 15 Gram(s) Oral once PRN Blood Glucose LESS THAN 70 milliGRAM(s)/deciliter       Allergies    No Known Allergies    Intolerances      Vital Signs Last 24 Hrs  T(C): 36.7 (04 May 2025 20:36), Max: 36.7 (04 May 2025 10:54)  T(F): 98.1 (04 May 2025 20:36), Max: 98.1 (04 May 2025 20:36)  HR: 60 (04 May 2025 23:20) (59 - 67)  BP: 107/63 (04 May 2025 20:36) (107/63 - 137/86)  BP(mean): --  RR: 18 (04 May 2025 20:36) (18 - 18)  SpO2: 98% (04 May 2025 23:20) (96% - 99%)    Parameters below as of 04 May 2025 20:36  Patient On (Oxygen Delivery Method): room air      CAPILLARY BLOOD GLUCOSE      POCT Blood Glucose.: 128 mg/dL (04 May 2025 21:14)  POCT Blood Glucose.: 135 mg/dL (04 May 2025 17:07)  POCT Blood Glucose.: 130 mg/dL (04 May 2025 11:20)  POCT Blood Glucose.: 137 mg/dL (04 May 2025 07:17)      05-03 @ 07:01  -  05-04 @ 07:00  --------------------------------------------------------  IN: 0 mL / OUT: 450 mL / NET: -450 mL    05-04 @ 07:01  -  05-04 @ 23:39  --------------------------------------------------------  IN: 500 mL / OUT: 0 mL / NET: 500 mL      Physical Exam:    Daily     Daily   General:  Well appearing, NAD, not cachetic  HEENT:  Nonicteric, PERRLA  CV:  RRR, S1S2   Lungs:  CTA B/L, no wheezes, rales, rhonchi  Abdomen:  Soft, non-tender, no distended, positive BS  Extremities:  2+ pulses, no c/c, no edema  Skin:  Warm and dry, no rashes  :  No amaro  Neuro:  AAOx3, non-focal, grossly intact                                                                                                                                                                                                                                                                                                LABS:                               13.1   6.60  )-----------( 174      ( 04 May 2025 06:14 )             39.3                      05-04    140  |  102  |  22  ----------------------------<  125[H]  3.3[L]   |  25  |  0.66    Ca    9.3      04 May 2025 06:14                         RADIOLOGY & ADDITIONAL TESTS         I personally reviewed: [  ]EKG   [  ]CXR    [  ] CT      A/P:         Discussed with :     Ashley consultants' Notes   Time spent :  
Date of service: 25 @ 14:53      Patient is a 69y old  Male who presents with a chief complaint of Syncope (2025 13:34)                                                               INTERVAL HPI/OVERNIGHT EVENTS:    REVIEW OF SYSTEMS:     CONSTITUTIONAL: No weakness, fevers or chills  EYES/ENT: No visual changes , no ear ache   NECK: No pain or stiffness  RESPIRATORY: No cough, wheezing,  No shortness of breath  CARDIOVASCULAR: No chest pain or palpitations  GASTROINTESTINAL: No abdominal pain  . No nausea, vomiting, or hematemesis; No diarrhea or constipation. No melena or hematochezia.  GENITOURINARY: No dysuria, frequency or hematuria  NEUROLOGICAL: No numbness or weakness  SKIN: No itching, burning, rashes, or lesions                                                                                                                                                                                                                                                                                 Medications:  MEDICATIONS  (STANDING):  allopurinol 100 milliGRAM(s) Oral at bedtime  aspirin enteric coated 81 milliGRAM(s) Oral daily  atorvastatin 40 milliGRAM(s) Oral at bedtime  buMETAnide 2 milliGRAM(s) Oral two times a day  carvedilol 3.125 milliGRAM(s) Oral every 12 hours  clopidogrel Tablet 75 milliGRAM(s) Oral daily  dextrose 5%. 1000 milliLiter(s) (100 mL/Hr) IV Continuous <Continuous>  dextrose 5%. 1000 milliLiter(s) (50 mL/Hr) IV Continuous <Continuous>  dextrose 50% Injectable 25 Gram(s) IV Push once  dextrose 50% Injectable 12.5 Gram(s) IV Push once  dextrose 50% Injectable 25 Gram(s) IV Push once  fenofibrate Tablet 48 milliGRAM(s) Oral daily  gabapentin 300 milliGRAM(s) Oral every 8 hours  glucagon  Injectable 1 milliGRAM(s) IntraMuscular once  heparin   Injectable 5000 Unit(s) SubCutaneous every 8 hours  influenza  Vaccine (HIGH DOSE) 0.5 milliLiter(s) IntraMuscular once  insulin lispro (ADMELOG) corrective regimen sliding scale   SubCutaneous three times a day before meals  insulin lispro (ADMELOG) corrective regimen sliding scale   SubCutaneous at bedtime  isosorbide   mononitrate ER Tablet (IMDUR) 30 milliGRAM(s) Oral daily  losartan 50 milliGRAM(s) Oral daily  multivitamin 1 Tablet(s) Oral daily  senna 2 Tablet(s) Oral at bedtime  sodium chloride 0.9%. 500 milliLiter(s) (100 mL/Hr) IV Continuous <Continuous>    MEDICATIONS  (PRN):  dextrose Oral Gel 15 Gram(s) Oral once PRN Blood Glucose LESS THAN 70 milliGRAM(s)/deciliter       Allergies    No Known Allergies    Intolerances      Vital Signs Last 24 Hrs  T(C): 36.4 (07 May 2025 10:51), Max: 36.6 (07 May 2025 08:06)  T(F): 97.5 (07 May 2025 10:51), Max: 97.8 (07 May 2025 08:06)  HR: 72 (07 May 2025 13:40) (55 - 72)  BP: 121/68 (07 May 2025 13:40) (105/63 - 130/81)  BP(mean): --  RR: 18 (07 May 2025 10:51) (18 - 18)  SpO2: 98% (07 May 2025 10:51) (95% - 98%)    Parameters below as of 07 May 2025 10:51  Patient On (Oxygen Delivery Method): room air      CAPILLARY BLOOD GLUCOSE      POCT Blood Glucose.: 146 mg/dL (07 May 2025 11:21)  POCT Blood Glucose.: 137 mg/dL (07 May 2025 07:25)  POCT Blood Glucose.: 156 mg/dL (06 May 2025 21:11)  POCT Blood Glucose.: 119 mg/dL (06 May 2025 17:10)       @ 07:  -   @ 07:00  --------------------------------------------------------  IN: 500 mL / OUT: 500 mL / NET: 0 mL     @ 07:  -   @ 14:53  --------------------------------------------------------  IN: 560 mL / OUT: 0 mL / NET: 560 mL      Physical Exam:    Daily     Daily Weight in k (07 May 2025 07:37)  General:  Well appearing, NAD, not cachetic  HEENT:  Nonicteric, PERRLA  CV:  RRR, S1S2   Lungs:  CTA B/L, no wheezes, rales, rhonchi  Abdomen:  Soft, non-tender, no distended, positive BS  Extremities:  no edema   Neuro:  AAOx3, non-focal, grossly intact                                                                                                                                                                                                                                                                                                LABS:                               13.8   7.23  )-----------( 166      ( 06 May 2025 06:42 )             40.1                      -    140  |  102  |  15  ----------------------------<  119[H]  3.4[L]   |  23  |  0.58    Ca    9.1      06 May 2025 06:42                         RADIOLOGY & ADDITIONAL TESTS         I personally reviewed: [  ]EKG   [  ]CXR    [  ] CT      A/P:         Discussed with :     Ashley consultants' Notes   Time spent :  
Date of service: 25 @ 16:43      Patient is a 69y old  Male who presents with a chief complaint of Syncope and collapse     (2025 15:51)                                                               INTERVAL HPI/OVERNIGHT EVENTS:    REVIEW OF SYSTEMS:     CONSTITUTIONAL: No weakness, fevers or chills  EYES/ENT: No visual changes , no ear ache   NECK: No pain or stiffness  RESPIRATORY: No cough, wheezing,  No shortness of breath  CARDIOVASCULAR: No chest pain or palpitations  GASTROINTESTINAL: No abdominal pain  . No nausea, vomiting, or hematemesis; No diarrhea or constipation. No melena or hematochezia.  GENITOURINARY: No dysuria, frequency or hematuria  NEUROLOGICAL: No numbness or weakness  SKIN: No itching, burning, rashes, or lesions                                                                                                                                                                                                                                                                                 Medications:  MEDICATIONS  (STANDING):  allopurinol 100 milliGRAM(s) Oral at bedtime  aspirin enteric coated 81 milliGRAM(s) Oral daily  atorvastatin 40 milliGRAM(s) Oral at bedtime  buMETAnide 2 milliGRAM(s) Oral two times a day  carvedilol 3.125 milliGRAM(s) Oral every 12 hours  dextrose 5%. 1000 milliLiter(s) (100 mL/Hr) IV Continuous <Continuous>  dextrose 5%. 1000 milliLiter(s) (50 mL/Hr) IV Continuous <Continuous>  dextrose 50% Injectable 25 Gram(s) IV Push once  dextrose 50% Injectable 12.5 Gram(s) IV Push once  dextrose 50% Injectable 25 Gram(s) IV Push once  fenofibrate Tablet 48 milliGRAM(s) Oral daily  gabapentin 300 milliGRAM(s) Oral every 8 hours  glucagon  Injectable 1 milliGRAM(s) IntraMuscular once  heparin   Injectable 5000 Unit(s) SubCutaneous every 8 hours  influenza  Vaccine (HIGH DOSE) 0.5 milliLiter(s) IntraMuscular once  insulin lispro (ADMELOG) corrective regimen sliding scale   SubCutaneous three times a day before meals  insulin lispro (ADMELOG) corrective regimen sliding scale   SubCutaneous at bedtime  isosorbide   mononitrate ER Tablet (IMDUR) 30 milliGRAM(s) Oral daily  losartan 50 milliGRAM(s) Oral daily  multivitamin 1 Tablet(s) Oral daily  sodium chloride 0.9%. 500 milliLiter(s) (100 mL/Hr) IV Continuous <Continuous>    MEDICATIONS  (PRN):  dextrose Oral Gel 15 Gram(s) Oral once PRN Blood Glucose LESS THAN 70 milliGRAM(s)/deciliter       Allergies    No Known Allergies    Intolerances      Vital Signs Last 24 Hrs  T(C): 36.3 (2025 16:20), Max: 36.6 (2025 20:59)  T(F): 97.4 (2025 16:20), Max: 97.9 (2025 04:50)  HR: 69 (2025 16:20) (56 - 79)  BP: 107/65 (2025 16:20) (107/65 - 165/90)  BP(mean): --  RR: 18 (2025 16:20) (18 - 18)  SpO2: 95% (2025 16:20) (95% - 98%)    Parameters below as of 2025 16:20  Patient On (Oxygen Delivery Method): room air      CAPILLARY BLOOD GLUCOSE      POCT Blood Glucose.: 143 mg/dL (2025 11:30)  POCT Blood Glucose.: 138 mg/dL (2025 07:36)  POCT Blood Glucose.: 131 mg/dL (2025 21:16)  POCT Blood Glucose.: 130 mg/dL (2025 17:12)       @ :  -   @ 07:00  --------------------------------------------------------  IN: 480 mL / OUT: 0 mL / NET: 480 mL     @ 07: @ 16:43  --------------------------------------------------------  IN: 480 mL / OUT: 0 mL / NET: 480 mL      Physical Exam:    Daily     Daily Weight in k.3 (2025 07:52)  General:  Well appearing, NAD, not cachetic  HEENT:  Nonicteric, PERRLA  CV:  RRR, S1S2   Lungs:  CTA B/L, no wheezes, rales, rhonchi  Abdomen:  Soft, non-tender, no distended, positive BS  Extremities:  2+ pulses, no c/c, no edema  Skin:  Warm and dry, no rashes  :  No amaro  Neuro:  AAOx3, non-focal, grossly intact                                                                                                                                                                                                                                                                                                LABS:                                140  |  105  |  16  ----------------------------<  127[H]  3.3[L]   |  23  |  0.57    Ca    9.0      2025 06:08  Mg     1.8                              RADIOLOGY & ADDITIONAL TESTS         I personally reviewed: [  ]EKG   [  ]CXR    [  ] CT      A/P:         Discussed with :     Ashley consultants' Notes   Time spent :  
Date of service: 25 @ 22:23      Patient is a 69y old  Male who presents with a chief complaint of Syncope and collapse     (2025 15:51)                                                               INTERVAL HPI/OVERNIGHT EVENTS:    REVIEW OF SYSTEMS:     CONSTITUTIONAL: No weakness, fevers or chills  EYES/ENT: No visual changes , no ear ache   NECK: No pain or stiffness  RESPIRATORY: No cough, wheezing,  No shortness of breath  CARDIOVASCULAR: No chest pain or palpitations  GASTROINTESTINAL: No abdominal pain  . No nausea, vomiting, or hematemesis; No diarrhea or constipation. No melena or hematochezia.  GENITOURINARY: No dysuria, frequency or hematuria  NEUROLOGICAL: No numbness or weakness  SKIN: No itching, burning, rashes, or lesions                                                                                                                                                                                                                                                                                 Medications:  MEDICATIONS  (STANDING):  allopurinol 100 milliGRAM(s) Oral at bedtime  aspirin enteric coated 81 milliGRAM(s) Oral daily  atorvastatin 40 milliGRAM(s) Oral at bedtime  buMETAnide 2 milliGRAM(s) Oral two times a day  carvedilol 3.125 milliGRAM(s) Oral every 12 hours  dextrose 5%. 1000 milliLiter(s) (100 mL/Hr) IV Continuous <Continuous>  dextrose 5%. 1000 milliLiter(s) (50 mL/Hr) IV Continuous <Continuous>  dextrose 50% Injectable 25 Gram(s) IV Push once  dextrose 50% Injectable 12.5 Gram(s) IV Push once  dextrose 50% Injectable 25 Gram(s) IV Push once  fenofibrate Tablet 48 milliGRAM(s) Oral daily  gabapentin 300 milliGRAM(s) Oral every 8 hours  glucagon  Injectable 1 milliGRAM(s) IntraMuscular once  heparin   Injectable 5000 Unit(s) SubCutaneous every 8 hours  influenza  Vaccine (HIGH DOSE) 0.5 milliLiter(s) IntraMuscular once  insulin lispro (ADMELOG) corrective regimen sliding scale   SubCutaneous three times a day before meals  insulin lispro (ADMELOG) corrective regimen sliding scale   SubCutaneous at bedtime  isosorbide   mononitrate ER Tablet (IMDUR) 30 milliGRAM(s) Oral daily  losartan 50 milliGRAM(s) Oral daily  multivitamin 1 Tablet(s) Oral daily  sodium chloride 0.9%. 500 milliLiter(s) (100 mL/Hr) IV Continuous <Continuous>    MEDICATIONS  (PRN):  dextrose Oral Gel 15 Gram(s) Oral once PRN Blood Glucose LESS THAN 70 milliGRAM(s)/deciliter       Allergies    No Known Allergies    Intolerances      Vital Signs Last 24 Hrs  T(C): 36.6 (2025 20:59), Max: 36.6 (2025 04:21)  T(F): 97.8 (2025 20:59), Max: 97.9 (2025 04:21)  HR: 62 (:59) (56 - 94)  BP: 123/70 (2025 20:59) (112/66 - 135/77)  BP(mean): --  RR: 18 (:59) (18 - 18)  SpO2: 96% (:59) (95% - 98%)    Parameters below as of :59  Patient On (Oxygen Delivery Method): room air      CAPILLARY BLOOD GLUCOSE      POCT Blood Glucose.: 131 mg/dL (2025 21:16)  POCT Blood Glucose.: 130 mg/dL (2025 17:12)  POCT Blood Glucose.: 163 mg/dL (2025 11:25)  POCT Blood Glucose.: 150 mg/dL (2025 07:31)       @ 07: @ 07:00  --------------------------------------------------------  IN: 480 mL / OUT: 200 mL / NET: 280 mL     @ 07: @ 22:23  --------------------------------------------------------  IN: 480 mL / OUT: 0 mL / NET: 480 mL      Physical Exam:    Daily     Daily Weight in k.1 (2025 07:39)  General:  Well appearing, NAD, not cachetic  HEENT:  Nonicteric, PERRLA  CV:  RRR, S1S2   Lungs:  CTA B/L, no wheezes, rales, rhonchi  Abdomen:  Soft, non-tender, no distended, positive BS  Extremities:  2+ pulses, no c/c, no edema  Skin:  Warm and dry, no rashes  :  No amaro  Neuro:  AAOx3, non-focal, grossly intact                                                                                                                                                                                                                                                                                                LABS:                               13.4   7.57  )-----------( 203      ( 2025 05:50 )             40.4                          141  |  103  |  16  ----------------------------<  126[H]  3.3[L]   |  23  |  0.56    Ca    8.7      2025 06:10  Mg     1.7         TPro  6.3  /  Alb  3.6  /  TBili  0.6  /  DBili  x   /  AST  12  /  ALT  12  /  AlkPhos  82                         RADIOLOGY & ADDITIONAL TESTS         I personally reviewed: [  ]EKG   [  ]CXR    [  ] CT      A/P:         Discussed with :     Ashley consultants' Notes   Time spent :  
EP    DATE OF SERVICE: 05-03-25    HISTORY OF PRESENT ILLNESS: HPI:  Patient is a 68 y/o Male with PMH of CAD s/p CABG (3 vessels, 08/2017 at [facility]), HF, ASIYA, T2DM, ASIYA on CPAP, HTN, HLD, renal mass s/p resection who presented with chest pain and near syncope found to have mildly reduced LVEF 45% now s/p cardiac cath resulting in Successful PCI with FRANCESCA to the SVG to OM distal anastamosis and native distal RCA. EP consulted for NSVT and an abnormal EKG. Patient reports getting up from sitting in chair after smoking a cigar and suddenly felt lightheaded after taking 2 steps and quickly sat down.  He reports lightheadedness / near syncope/ and syncope at home under low levels or exertion.  Anxious about risk of injury, and lack of prodrome to help him avoid this.  5/3 no events overnight      PAST MEDICAL & SURGICAL HISTORY:  Gout  Lumbar disc disease with radiculopathy  H/O: osteoarthritis  Obstructive sleep apnea  CPAP  Renal calculi  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  Essential hypertension  CAD (coronary artery disease)  2017- s/p cabg x3  Hypercholesterolemia  ASIYA (obstructive sleep apnea)  initially dx 1997 ; CPAP  Paralytic ileus  post op THR 2009 & post op laminectomy  Type 2 diabetes mellitus  Right carpal tunnel syndrome  Cubital tunnel syndrome, right  Trigger finger of right hand  Morbid obesity with body mass index (BMI) of 40.0 or higher  H/O CHF  Kidney mass  Cervical herniated disc  Renal cancer  Disease of spinal cord, unspecified  Peripheral neuropathy  Myelopathy  Morbid obesity  S/P Left Inguinal Hernia Repair  History of Total Hip Replacement  2009- bilateral THR  S/P tonsillectomy and adenoidectomy  as child  H/O lithotripsy  x1  S/P revision of total knee, right  x2- 2012 & 2014  S/P lumbar discectomy  2012- ,L5  Aug 2013  S/P CABG x 3  8/29/17  S/P lumbar laminectomy  Fusion L3-L5 2012  Kidney stone  s/w ESWL pt unsure site  Neuropathy  Bilateral Feet since 2012  History of bilateral hip replacements  History of hernia repair  History of lumbar fusion  History of cholecystectomy  History of bilateral knee replacement  History of lymph node biopsy  S/p bilateral carpal tunnel release  History of partial nephrectomy  H/O elbow surgery  History of thoracic spinal fusion           allopurinol 100 milliGRAM(s) Oral at bedtime  aspirin enteric coated 81 milliGRAM(s) Oral daily  atorvastatin 40 milliGRAM(s) Oral at bedtime  buMETAnide 2 milliGRAM(s) Oral two times a day  carvedilol 3.125 milliGRAM(s) Oral every 12 hours  clopidogrel Tablet 75 milliGRAM(s) Oral daily  dextrose 5%. 1000 milliLiter(s) IV Continuous <Continuous>  dextrose 5%. 1000 milliLiter(s) IV Continuous <Continuous>  dextrose 50% Injectable 25 Gram(s) IV Push once  dextrose 50% Injectable 12.5 Gram(s) IV Push once  dextrose 50% Injectable 25 Gram(s) IV Push once  dextrose Oral Gel 15 Gram(s) Oral once PRN  fenofibrate Tablet 48 milliGRAM(s) Oral daily  gabapentin 300 milliGRAM(s) Oral every 8 hours  glucagon  Injectable 1 milliGRAM(s) IntraMuscular once  heparin   Injectable 5000 Unit(s) SubCutaneous every 8 hours  influenza  Vaccine (HIGH DOSE) 0.5 milliLiter(s) IntraMuscular once  insulin lispro (ADMELOG) corrective regimen sliding scale   SubCutaneous three times a day before meals  insulin lispro (ADMELOG) corrective regimen sliding scale   SubCutaneous at bedtime  isosorbide   mononitrate ER Tablet (IMDUR) 30 milliGRAM(s) Oral daily  losartan 50 milliGRAM(s) Oral daily  multivitamin 1 Tablet(s) Oral daily  senna 2 Tablet(s) Oral at bedtime  sodium chloride 0.9%. 500 milliLiter(s) IV Continuous <Continuous>                            13.5   6.77  )-----------( 168      ( 03 May 2025 06:52 )             40.1       Hemoglobin: 13.5 g/dL (05-03 @ 06:52)  Hemoglobin: 13.1 g/dL (05-01 @ 05:33)      05-03    140  |  102  |  19  ----------------------------<  128[H]  3.2[L]   |  22  |  0.59    Ca    9.4      03 May 2025 06:54  Mg     1.9     05-02      Creatinine Trend: 0.59<--, 0.55<--, 0.65<--, 0.59<--, 0.55<--, 0.56<--    COAGS:           T(C): 36.6 (05-03-25 @ 04:14), Max: 36.6 (05-02-25 @ 21:06)  HR: 65 (05-03-25 @ 10:11) (64 - 77)  BP: 124/77 (05-03-25 @ 04:14) (104/70 - 124/77)  RR: 18 (05-03-25 @ 04:14) (18 - 18)  SpO2: 98% (05-03-25 @ 10:11) (95% - 98%)  Wt(kg): --    I&O's Summary    Gen: NAD  HEENT:  (-)icterus (-)pallor  CV: N S1 S2 1/6 JODY (+)2 Pulses B/l  Resp:  Clear to auscultation B/L, normal effort  GI: (+) BS Soft, NT, ND  Lymph:  (-)Edema, (-)obvious lymphadenopathy  Skin: Warm to touch, Normal turgor  Psych: Appropriate mood and affect      TELEMETRY: NSR, brief NSVT	      ECG: SR, RI prolongation, bifascicular block   	    RADIOLOGY:         CXR: < from: Xray Chest 1 View AP/PA (04.24.25 @ 18:40) >  IMPRESSION:  Clear lungs.    < end of copied text >    < from: TTE W or WO Ultrasound Enhancing Agent (04.28.25 @ 14:46) >  CONCLUSIONS:      1. Technically difficult image quality.   2. Definity ultrasound enhancing agent was given for enhanced left ventricular opacification and improved delineation of the left ventricular endocardial borders.   3. Left ventricular systolic function is mildly to moderately decreased.   4. The right ventricle is not well visualized.   5. Consider an alternate imaging modality.    < end of copied text >    < from: Cardiac Catheterization (04.29.25 @ 12:17) >  Procedures Performed   Procedures:              1. Arterial Access - Right Femoral     2.    Diagnostic Coronary Angiography   3.    Diagnostic Graft Angiography   4.    Ultrasound Guided Access   5.    PCI: FRANCESCA   6.    PCI: FRANCESCA   7.    AngioSeal     Indications:               ACS greater than 24 hours   Lab Visit Indication:      ACS greater than 24 hours   PCI Status:                elective     Conclusions:   Successful PCI with FRANCESCA to the SVG to OM distal anastamosis and native  distal RCA.  DAPT for 6 mths    Diagnostic Findings:     Coronary Angiography   The coronary circulation is right dominant.      LM   Left main artery: Angiography shows mild atherosclerosis.      LAD   Left anterior descending artery: There is a 100% stenosis.      CX   Circumflex: There is a 100 % stenosis.      RCA   Distal right coronary artery: There is a 70 % stenosis.      Graft Angiography   SVG graft to First obtuse marginal: There is a 90 % stenosis in the  distal anastomosis portion of the segment.  LIMA graft to Mid left anterior descending: Angiography shows no  disease.  SVG graft to Right posterior descending artery: Angiography shows  complete occlusion.    < end of copied text >      ASSESSMENT/PLAN: Patient is a 68 y/o Male with PMH of CAD s/p CABG (3 vessels, 08/2017 at [facility]), HF, ASIYA, T2DM, ASIYA on CPAP, HTN, HLD, renal mass s/p resection who presented with chest pain and near syncope found to have mildly reduced LVEF 45% now s/p cardiac cath resulting in Successful PCI with FRANCESCA to the SVG to OM distal anastamosis and native distal RCA. EP consulted for NSVT and an abnormal EKG.    Interventional cardio eval noted s/p PCI. Continue ASA, Lipitor, and Plavix for CAD  TTE noted with EF 45%  Currently on Bumex, Coreg, Imdur, Losartan for HTN/CAD/Systolic CHF. May need dose adjustments given component of orthostatic hypotension - defer to cardiology/primary team  Continue Coreg given NSVT and LV dysfunction    He has history of multivessel CAD, hx of CABG, and recent multivessel PCI.  LVEF% is 40-45%.  EKG with RI prolongation (300ms) and bifascicular block (140ms).    Recommended invasive EP study to determine if he has susceptibility for critical bradycardia / AV block, or inducible ventricular tachycardia.  Potential for transvenous pacemaker or defibrillator implant if EPS+.  If no inducible arrhythmia and normal AV conduction, would implant a loop recorder instead.  Followup w/ Dr Kamara, and with Dr Ross ([facility] - EP).    NPO Sunday night.  EP study / Device on Monday morning.  obtain Type+Screen x 2 for Sunday.   
EP    DATE OF SERVICE: 05-05-25    HISTORY OF PRESENT ILLNESS: HPI:  Patient is a 68 y/o Male with PMH of CAD s/p CABG (3 vessels, 08/2017 at [facility]), HF, ASIYA, T2DM, ASIYA on CPAP, HTN, HLD, renal mass s/p resection who presented with chest pain and near syncope found to have mildly reduced LVEF 45% now s/p cardiac cath resulting in Successful PCI with FRANCESCA to the SVG to OM distal anastamosis and native distal RCA. EP consulted for NSVT and an abnormal EKG. Patient reports getting up from sitting in chair after smoking a cigar and suddenly felt lightheaded after taking 2 steps and quickly sat down.  He reports lightheadedness / near syncope/ and syncope at home under low levels or exertion.  Anxious about risk of injury, and lack of prodrome to help him avoid this.  5/3 no events overnight  5/4 tele stable , ROS -   5/5- no events overnight.  awaiting EPS/ device implant today.    PAST MEDICAL & SURGICAL HISTORY:  Gout  Lumbar disc disease with radiculopathy  H/O: osteoarthritis  Obstructive sleep apnea  CPAP  Renal calculi  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  Essential hypertension  CAD (coronary artery disease)  2017- s/p cabg x3  Hypercholesterolemia  ASIYA (obstructive sleep apnea)  initially dx 1997 ; CPAP  Paralytic ileus  post op THR 2009 & post op laminectomy  Type 2 diabetes mellitus  Right carpal tunnel syndrome  Cubital tunnel syndrome, right  Trigger finger of right hand  Morbid obesity with body mass index (BMI) of 40.0 or higher  H/O CHF  Kidney mass  Cervical herniated disc  Renal cancer  Disease of spinal cord, unspecified  Peripheral neuropathy  Myelopathy  Morbid obesity  S/P Left Inguinal Hernia Repair  History of Total Hip Replacement  2009- bilateral THR  S/P tonsillectomy and adenoidectomy  as child  H/O lithotripsy  x1  S/P revision of total knee, right  x2- 2012 & 2014  S/P lumbar discectomy  2012- ,L5  Aug 2013  S/P CABG x 3  8/29/17  S/P lumbar laminectomy  Fusion L3-L5 2012  Kidney stone  s/w ESWL pt unsure site  Neuropathy  Bilateral Feet since 2012  History of bilateral hip replacements  History of hernia repair  History of lumbar fusion  History of cholecystectomy  History of bilateral knee replacement  History of lymph node biopsy  S/p bilateral carpal tunnel release  History of partial nephrectomy  H/O elbow surgery  History of thoracic spinal fusion        allopurinol 100 milliGRAM(s) Oral at bedtime  aspirin enteric coated 81 milliGRAM(s) Oral daily  atorvastatin 40 milliGRAM(s) Oral at bedtime  buMETAnide 2 milliGRAM(s) Oral two times a day  carvedilol 3.125 milliGRAM(s) Oral every 12 hours  clopidogrel Tablet 75 milliGRAM(s) Oral daily  dextrose 5%. 1000 milliLiter(s) IV Continuous <Continuous>  dextrose 5%. 1000 milliLiter(s) IV Continuous <Continuous>  dextrose 50% Injectable 25 Gram(s) IV Push once  dextrose 50% Injectable 12.5 Gram(s) IV Push once  dextrose 50% Injectable 25 Gram(s) IV Push once  dextrose Oral Gel 15 Gram(s) Oral once PRN  fenofibrate Tablet 48 milliGRAM(s) Oral daily  gabapentin 300 milliGRAM(s) Oral every 8 hours  glucagon  Injectable 1 milliGRAM(s) IntraMuscular once  heparin   Injectable 5000 Unit(s) SubCutaneous every 8 hours  influenza  Vaccine (HIGH DOSE) 0.5 milliLiter(s) IntraMuscular once  insulin lispro (ADMELOG) corrective regimen sliding scale   SubCutaneous three times a day before meals  insulin lispro (ADMELOG) corrective regimen sliding scale   SubCutaneous at bedtime  isosorbide   mononitrate ER Tablet (IMDUR) 30 milliGRAM(s) Oral daily  losartan 50 milliGRAM(s) Oral daily  multivitamin 1 Tablet(s) Oral daily  senna 2 Tablet(s) Oral at bedtime  sodium chloride 0.9%. 500 milliLiter(s) IV Continuous <Continuous>                            13.7   7.03  )-----------( 176      ( 05 May 2025 06:54 )             41.5       05-05    140  |  101  |  23  ----------------------------<  115[H]  3.5   |  23  |  0.64    Ca    9.3      05 May 2025 06:54    T(C): 36.4 (05-05-25 @ 07:38), Max: 36.7 (05-04-25 @ 10:54)  HR: 58 (05-05-25 @ 07:38) (58 - 67)  BP: 134/82 (05-05-25 @ 07:38) (107/63 - 134/82)  RR: 18 (05-05-25 @ 07:38) (18 - 18)  SpO2: 100% (05-05-25 @ 07:38) (96% - 100%)  Wt(kg): --    I&O's Summary    04 May 2025 07:01  -  05 May 2025 07:00  --------------------------------------------------------  IN: 500 mL / OUT: 0 mL / NET: 500 mL    I&O's Summary    Gen: NAD  HEENT:  (-)icterus (-)pallor  CV: N S1 S2 1/6 JODY (+)2 Pulses B/l  Resp:  Clear to auscultation B/L, normal effort  GI: (+) BS Soft, NT, ND  Lymph:  (-)Edema, (-)obvious lymphadenopathy  Skin: Warm to touch, Normal turgor  Psych: Appropriate mood and affect    TELEMETRY: NSR, brief NSVT	      ECG: SR, CO prolongation, bifascicular block   	    RADIOLOGY:         CXR: < from: Xray Chest 1 View AP/PA (04.24.25 @ 18:40) >  IMPRESSION:  Clear lungs.    < end of copied text >    < from: TTE W or WO Ultrasound Enhancing Agent (04.28.25 @ 14:46) >  CONCLUSIONS:      1. Technically difficult image quality.   2. Definity ultrasound enhancing agent was given for enhanced left ventricular opacification and improved delineation of the left ventricular endocardial borders.   3. Left ventricular systolic function is mildly to moderately decreased.   4. The right ventricle is not well visualized.   5. Consider an alternate imaging modality.    < end of copied text >    < from: Cardiac Catheterization (04.29.25 @ 12:17) >  Procedures Performed   Procedures:              1. Arterial Access - Right Femoral     2.    Diagnostic Coronary Angiography   3.    Diagnostic Graft Angiography   4.    Ultrasound Guided Access   5.    PCI: FRANCESCA   6.    PCI: FRANCESCA   7.    AngioSeal     Indications:               ACS greater than 24 hours   Lab Visit Indication:      ACS greater than 24 hours   PCI Status:                elective     Conclusions:   Successful PCI with FRANCESCA to the SVG to OM distal anastamosis and native  distal RCA.  DAPT for 6 mths    Diagnostic Findings:     Coronary Angiography   The coronary circulation is right dominant.      LM   Left main artery: Angiography shows mild atherosclerosis.      LAD   Left anterior descending artery: There is a 100% stenosis.      CX   Circumflex: There is a 100 % stenosis.      RCA   Distal right coronary artery: There is a 70 % stenosis.      Graft Angiography   SVG graft to First obtuse marginal: There is a 90 % stenosis in the  distal anastomosis portion of the segment.  LIMA graft to Mid left anterior descending: Angiography shows no  disease.  SVG graft to Right posterior descending artery: Angiography shows  complete occlusion.    < end of copied text >      ASSESSMENT/PLAN: Patient is a 68 y/o Male with PMH of CAD s/p CABG (3 vessels, 08/2017 at [facility]), HF, ASIYA, T2DM, ASIYA on CPAP, HTN, HLD, renal mass s/p resection who presented with chest pain and near syncope found to have mildly reduced LVEF 45% now s/p cardiac cath resulting in Successful PCI with FRANCESCA to the SVG to OM distal anastamosis and native distal RCA. EP consulted for NSVT and an abnormal EKG.    Interventional cardio eval noted s/p PCI. Continue ASA, Lipitor, and Plavix for CAD  TTE noted with EF 45%  Currently on Bumex, Coreg, Imdur, Losartan for HTN/CAD/Systolic CHF. May need dose adjustments given component of orthostatic hypotension - defer to cardiology/primary team  Continue Coreg given NSVT and LV dysfunction    He has history of multivessel CAD, hx of CABG, and recent multivessel PCI.  LVEF% is 40-45%.  Unable to titrate beta blockers further for CAD + CHF mgmt due to baseline sinus bradycardia.  EKG with CO prolongation (300ms) and bifascicular block (140ms).    Recommended invasive EP study to determine if he has susceptibility for critical bradycardia / AV block, or inducible ventricular tachycardia.  Potential for transvenous pacemaker or defibrillator implant if EPS+.  If no inducible arrhythmia and normal AV conduction, would implant a loop recorder instead.  Followup w/ Dr Kamara, and with Dr Ross ([facility]    Sae Pina M.D.  Cardiac Electrophysiology  [phone]
                                                                                          Interventional Cardiology Post Cath Progress Note:                Subjective:   Patient feels well- no current complaints- Denies chest pain, shortness of breath. Denies pain, numbness, tingling or swelling around (groin) access site    MEDICATIONS  (STANDING):  allopurinol 100 milliGRAM(s) Oral at bedtime  aspirin enteric coated 81 milliGRAM(s) Oral daily  atorvastatin 40 milliGRAM(s) Oral at bedtime  buMETAnide 2 milliGRAM(s) Oral two times a day  carvedilol 3.125 milliGRAM(s) Oral every 12 hours  clopidogrel Tablet 75 milliGRAM(s) Oral daily  dextrose 5%. 1000 milliLiter(s) (100 mL/Hr) IV Continuous <Continuous>  dextrose 5%. 1000 milliLiter(s) (50 mL/Hr) IV Continuous <Continuous>  dextrose 50% Injectable 25 Gram(s) IV Push once  dextrose 50% Injectable 12.5 Gram(s) IV Push once  dextrose 50% Injectable 25 Gram(s) IV Push once  fenofibrate Tablet 48 milliGRAM(s) Oral daily  gabapentin 300 milliGRAM(s) Oral every 8 hours  glucagon  Injectable 1 milliGRAM(s) IntraMuscular once  heparin   Injectable 5000 Unit(s) SubCutaneous every 8 hours  influenza  Vaccine (HIGH DOSE) 0.5 milliLiter(s) IntraMuscular once  insulin lispro (ADMELOG) corrective regimen sliding scale   SubCutaneous three times a day before meals  insulin lispro (ADMELOG) corrective regimen sliding scale   SubCutaneous at bedtime  isosorbide   mononitrate ER Tablet (IMDUR) 30 milliGRAM(s) Oral daily  losartan 50 milliGRAM(s) Oral daily  magnesium sulfate  IVPB 1 Gram(s) IV Intermittent once  multivitamin 1 Tablet(s) Oral daily  potassium chloride    Tablet ER 40 milliEquivalent(s) Oral every 4 hours  senna 2 Tablet(s) Oral at bedtime  sodium chloride 0.9%. 500 milliLiter(s) (100 mL/Hr) IV Continuous <Continuous>    MEDICATIONS  (PRN):  dextrose Oral Gel 15 Gram(s) Oral once PRN Blood Glucose LESS THAN 70 milliGRAM(s)/deciliter      Objective:  Vital Signs Last 24 Hrs  T(C): 36.7 (01 May 2025 05:04), Max: 36.8 (30 Apr 2025 11:23)  T(F): 98 (01 May 2025 05:04), Max: 98.3 (30 Apr 2025 21:07)  HR: 65 (01 May 2025 09:20) (60 - 75)  BP: 116/69 (01 May 2025 05:04) (108/73 - 134/78)  BP(mean): --  RR: 18 (01 May 2025 05:04) (18 - 18)  SpO2: 93% (01 May 2025 09:20) (93% - 99%)    Parameters below as of 01 May 2025 05:04  Patient On (Oxygen Delivery Method): room air      04-30-25 @ 07:01  -  05-01-25 @ 07:00  --------------------------------------------------------  IN: 480 mL / OUT: 450 mL / NET: 30 mL                          13.1   6.29  )-----------( 153      ( 01 May 2025 05:33 )             39.3     05-01    140  |  104  |  16  ----------------------------<  125[H]  3.3[L]   |  23  |  0.59    Ca    9.0      01 May 2025 05:34  Mg     1.6     05-01      Urinalysis Basic - ( 01 May 2025 05:34 )    Color: x / Appearance: x / SG: x / pH: x  Gluc: 125 mg/dL / Ketone: x  / Bili: x / Urobili: x   Blood: x / Protein: x / Nitrite: x   Leuk Esterase: x / RBC: x / WBC x   Sq Epi: x / Non Sq Epi: x / Bacteria: x    Physical Exam:  No apparent distress, alert and oriented times three, appropriate affect  JVD is not elevated, supple  Clear to auscultation with no wheezing, rhonchi or crackles  Regular rate and rhythm with no murmur, rub or gallop  Positive bowel sounds, soft, non-tender, non-distended, no masses/guarding or rebound tenderness    IF groin:  (Right) groin: Soft, non tender, no bleeding or hematoma, clean/dry/intact- RLE/LLE +2 (palpable femoral pulse)  No clubbing, cyanosis or edema, - bruit        
                                                                                          Interventional Cardiology Post Cath Progress Note:                Subjective:   Patient feels well- no current complaints- Denies chest pain, shortness of breath. Denies pain, numbness, tingling or swelling around (groin) access site    Tele 24hrs:      MEDICATIONS  (STANDING):  allopurinol 100 milliGRAM(s) Oral at bedtime  aspirin enteric coated 81 milliGRAM(s) Oral daily  atorvastatin 40 milliGRAM(s) Oral at bedtime  buMETAnide 2 milliGRAM(s) Oral two times a day  carvedilol 3.125 milliGRAM(s) Oral every 12 hours  clopidogrel Tablet 75 milliGRAM(s) Oral daily  dextrose 5%. 1000 milliLiter(s) (100 mL/Hr) IV Continuous <Continuous>  dextrose 5%. 1000 milliLiter(s) (50 mL/Hr) IV Continuous <Continuous>  dextrose 50% Injectable 25 Gram(s) IV Push once  dextrose 50% Injectable 12.5 Gram(s) IV Push once  dextrose 50% Injectable 25 Gram(s) IV Push once  fenofibrate Tablet 48 milliGRAM(s) Oral daily  gabapentin 300 milliGRAM(s) Oral every 8 hours  glucagon  Injectable 1 milliGRAM(s) IntraMuscular once  heparin   Injectable 5000 Unit(s) SubCutaneous every 8 hours  influenza  Vaccine (HIGH DOSE) 0.5 milliLiter(s) IntraMuscular once  insulin lispro (ADMELOG) corrective regimen sliding scale   SubCutaneous three times a day before meals  insulin lispro (ADMELOG) corrective regimen sliding scale   SubCutaneous at bedtime  isosorbide   mononitrate ER Tablet (IMDUR) 30 milliGRAM(s) Oral daily  losartan 50 milliGRAM(s) Oral daily  multivitamin 1 Tablet(s) Oral daily  sodium chloride 0.9%. 500 milliLiter(s) (100 mL/Hr) IV Continuous <Continuous>    MEDICATIONS  (PRN):  dextrose Oral Gel 15 Gram(s) Oral once PRN Blood Glucose LESS THAN 70 milliGRAM(s)/deciliter      Objective:  Vital Signs Last 24 Hrs  T(C): 36.5 (30 Apr 2025 00:04), Max: 36.7 (29 Apr 2025 15:25)  T(F): 97.7 (30 Apr 2025 00:04), Max: 98 (29 Apr 2025 15:25)  HR: 64 (30 Apr 2025 08:42) (57 - 69)  BP: 152/84 (30 Apr 2025 04:45) (107/55 - 152/84)  BP(mean): --  RR: 18 (30 Apr 2025 04:45) (15 - 20)  SpO2: 98% (30 Apr 2025 08:42) (94% - 99%)    Parameters below as of 30 Apr 2025 04:45  Patient On (Oxygen Delivery Method): BiPAP/CPAP        04-29-25 @ 07:01  -  04-30-25 @ 07:00  --------------------------------------------------------  IN: 0 mL / OUT: 450 mL / NET: -450 mL          04-30    142  |  105  |  12  ----------------------------<  108[H]  3.5   |  22  |  0.55    Ca    9.2      30 Apr 2025 06:59        Urinalysis Basic - ( 30 Apr 2025 06:59 )    Color: x / Appearance: x / SG: x / pH: x  Gluc: 108 mg/dL / Ketone: x  / Bili: x / Urobili: x   Blood: x / Protein: x / Nitrite: x   Leuk Esterase: x / RBC: x / WBC x   Sq Epi: x / Non Sq Epi: x / Bacteria: x      Physical Exam:  No apparent distress, alert and oriented times three, appropriate affect  JVD is not elevated, supple  Clear to auscultation with no wheezing, ronchi or crackles  Regular rate and rhythm with no murmur, rub or gallop  Positive bowel sounds, soft, non-tender, non-distended, no masses/guarding or rebound tenderness  Right groin Soft, non tender, no bleeding or hematoma, clean/dry/intact.    < from: TTE W or WO Ultrasound Enhancing Agent (04.28.25 @ 14:46) >  CONCLUSIONS:      1. Technically difficult image quality.   2. Definity ultrasound enhancing agent was given for enhanced left ventricular opacification and improved delineation of the left ventricular endocardial borders.   3. Left ventricular systolic function is mildly to moderately decreased.   4. The right ventricle is not well visualized.   5. Consider an alternate imaging modality.    < end of copied text >        
  Olaf Zhao MD  Interventional Cardiology / Endovascular Specialist  [place] Office : [phone] [place] [address] [place] [address]  Tel:   [place] Office : [phone] [facility] [place] [address]  Tel: [phone]    Pt is lying in bed comfortable not in distress, no chest pains no SOB no palpitations  	  MEDICATIONS:  aspirin enteric coated 81 milliGRAM(s) Oral daily  buMETAnide 2 milliGRAM(s) Oral two times a day  carvedilol 3.125 milliGRAM(s) Oral every 12 hours  clopidogrel Tablet 75 milliGRAM(s) Oral daily  heparin   Injectable 5000 Unit(s) SubCutaneous every 8 hours  isosorbide   mononitrate ER Tablet (IMDUR) 30 milliGRAM(s) Oral daily  losartan 50 milliGRAM(s) Oral daily        gabapentin 300 milliGRAM(s) Oral every 8 hours    senna 2 Tablet(s) Oral at bedtime    allopurinol 100 milliGRAM(s) Oral at bedtime  atorvastatin 40 milliGRAM(s) Oral at bedtime  dextrose 50% Injectable 25 Gram(s) IV Push once  dextrose 50% Injectable 12.5 Gram(s) IV Push once  dextrose 50% Injectable 25 Gram(s) IV Push once  dextrose Oral Gel 15 Gram(s) Oral once PRN  fenofibrate Tablet 48 milliGRAM(s) Oral daily  glucagon  Injectable 1 milliGRAM(s) IntraMuscular once  insulin lispro (ADMELOG) corrective regimen sliding scale   SubCutaneous three times a day before meals  insulin lispro (ADMELOG) corrective regimen sliding scale   SubCutaneous at bedtime    dextrose 5%. 1000 milliLiter(s) IV Continuous <Continuous>  dextrose 5%. 1000 milliLiter(s) IV Continuous <Continuous>  influenza  Vaccine (HIGH DOSE) 0.5 milliLiter(s) IntraMuscular once  multivitamin 1 Tablet(s) Oral daily  sodium chloride 0.9%. 500 milliLiter(s) IV Continuous <Continuous>      PAST MEDICAL/SURGICAL HISTORY  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      Essential hypertension      CAD (coronary artery disease)  2017- s/p cabg x3      Hypercholesterolemia      ASIYA (obstructive sleep apnea)  initially dx  ; CPAP      Paralytic ileus  post op THR  & post op laminectomy      Type 2 diabetes mellitus      Right carpal tunnel syndrome      Cubital tunnel syndrome, right      Trigger finger of right hand      Morbid obesity with body mass index (BMI) of 40.0 or higher      H/O CHF      Kidney mass      Cervical herniated disc      Renal cancer      Disease of spinal cord, unspecified      Peripheral neuropathy      Myelopathy      Morbid obesity      S/P Left Inguinal Hernia Repair      History of Total Hip Replacement  - bilateral THR      S/P tonsillectomy and adenoidectomy  as child      H/O lithotripsy  x1      S/P revision of total knee, right  x2-  &       S/P lumbar discectomy  - ,L5  Aug 2013      S/P CABG x 3  17      S/P lumbar laminectomy  Fusion L3-L5       Kidney stone  s/w ESWL pt unsure site      Neuropathy  Bilateral Feet since       History of bilateral hip replacements      History of hernia repair      History of lumbar fusion      History of cholecystectomy      History of bilateral knee replacement      History of lymph node biopsy      S/p bilateral carpal tunnel release      History of partial nephrectomy      H/O elbow surgery      History of thoracic spinal fusion          SOCIAL HISTORY: Substance Use (street drugs): ( x ) never used  (  ) other:    FAMILY HISTORY:  Family history of coronary artery disease  HLD/Angina. Fatal MI age 73.    Family history of diabetes mellitus type II  mother- - hyperlipidemia and Hypertension.    Family history of pancreatic cancer (Sibling)  brother     Family history of coronary artery disease  brother- age 50+- Coronary Artery Stents        REVIEW OF SYSTEMS:  CONSTITUTIONAL: No fever, weight loss, or fatigue  EYES: No eye pain, visual disturbances, or discharge  ENMT:  No difficulty hearing, tinnitus, vertigo; No sinus or throat pain  BREASTS: No pain, masses, or nipple discharge  GASTROINTESTINAL: No abdominal or epigastric pain. No nausea, vomiting, or hematemesis; No diarrhea or constipation. No melena or hematochezia.  GENITOURINARY: No dysuria, frequency, hematuria, or incontinence  NEUROLOGICAL: No headaches, memory loss, loss of strength, numbness, or tremors  ENDOCRINE: No heat or cold intolerance; No hair loss  MUSCULOSKELETAL: No joint pain or swelling; No muscle, back, or extremity pain  PSYCHIATRIC: No depression, anxiety, mood swings, or difficulty sleeping  HEME/LYMPH: No easy bruising, or bleeding gums  All others negative    PHYSICAL EXAM:  T(C): 36.4 (25 @ 10:51), Max: 36.6 (05-07-25 @ 08:06)  HR: 66 (25 @ 10:51) (55 - 69)  BP: 128/62 (25 @ 10:51) (105/63 - 130/81)  RR: 18 (25 @ 10:51) (18 - 18)  SpO2: 98% (25 @ 10:51) (95% - 98%)  Wt(kg): --  I&O's Summary    06 May 2025 07:  -  07 May 2025 07:00  --------------------------------------------------------  IN: 500 mL / OUT: 500 mL / NET: 0 mL    07 May 2025 07:01  -  07 May 2025 13:32  --------------------------------------------------------  IN: 560 mL / OUT: 0 mL / NET: 560 mL      GENERAL: NAD  EYES:   PERRLA   ENMT:   Moist mucous membranes, Good dentition, No lesions  Cardiovascular: Normal S1 S2, No JVD, No murmurs, No edema  Respiratory: Lungs clear to auscultation	  Gastrointestinal:  Soft, Non-tender, + BS	  Extremities: no edema                              13.8   7.23  )-----------( 166      ( 06 May 2025 06:42 )             40.1         140  |  102  |  15  ----------------------------<  119[H]  3.4[L]   |  23  |  0.58    Ca    9.1      06 May 2025 06:42      proBNP:   Lipid Profile:   HgA1c:   TSH:     Consultant(s) Notes Reviewed:  [x ] YES  [ ] NO    Care Discussed with Consultants/Other Providers [ x] YES  [ ] NO    Imaging Personally Reviewed independently:  [x] YES  [ ] NO    All labs, radiologic studies, vitals, orders and medications list reviewed. Patient is seen and examined at bedside. Case discussed with medical team.              
Date of service: 04-30-25 @ 23:39      Patient is a 69y old  Male who presents with a chief complaint of Syncope (29 Apr 2025 13:34)                                                               INTERVAL HPI/OVERNIGHT EVENTS:    REVIEW OF SYSTEMS:     CONSTITUTIONAL: No weakness, fevers or chills  EYES/ENT: No visual changes , no ear ache   NECK: No pain or stiffness  RESPIRATORY: No cough, wheezing,  No shortness of breath  CARDIOVASCULAR: No chest pain or palpitations  GASTROINTESTINAL: No abdominal pain  . No nausea, vomiting, or hematemesis; No diarrhea or constipation. No melena or hematochezia.  GENITOURINARY: No dysuria, frequency or hematuria  NEUROLOGICAL: No numbness or weakness  SKIN: No itching, burning, rashes, or lesions                                                                                                                                                                                                                                                                                 Medications:  MEDICATIONS  (STANDING):  allopurinol 100 milliGRAM(s) Oral at bedtime  aspirin enteric coated 81 milliGRAM(s) Oral daily  atorvastatin 40 milliGRAM(s) Oral at bedtime  buMETAnide 2 milliGRAM(s) Oral two times a day  carvedilol 3.125 milliGRAM(s) Oral every 12 hours  clopidogrel Tablet 75 milliGRAM(s) Oral daily  dextrose 5%. 1000 milliLiter(s) (100 mL/Hr) IV Continuous <Continuous>  dextrose 5%. 1000 milliLiter(s) (50 mL/Hr) IV Continuous <Continuous>  dextrose 50% Injectable 25 Gram(s) IV Push once  dextrose 50% Injectable 12.5 Gram(s) IV Push once  dextrose 50% Injectable 25 Gram(s) IV Push once  fenofibrate Tablet 48 milliGRAM(s) Oral daily  gabapentin 300 milliGRAM(s) Oral every 8 hours  glucagon  Injectable 1 milliGRAM(s) IntraMuscular once  heparin   Injectable 5000 Unit(s) SubCutaneous every 8 hours  influenza  Vaccine (HIGH DOSE) 0.5 milliLiter(s) IntraMuscular once  insulin lispro (ADMELOG) corrective regimen sliding scale   SubCutaneous three times a day before meals  insulin lispro (ADMELOG) corrective regimen sliding scale   SubCutaneous at bedtime  isosorbide   mononitrate ER Tablet (IMDUR) 30 milliGRAM(s) Oral daily  losartan 50 milliGRAM(s) Oral daily  multivitamin 1 Tablet(s) Oral daily  senna 2 Tablet(s) Oral at bedtime  sodium chloride 0.9%. 500 milliLiter(s) (100 mL/Hr) IV Continuous <Continuous>    MEDICATIONS  (PRN):  dextrose Oral Gel 15 Gram(s) Oral once PRN Blood Glucose LESS THAN 70 milliGRAM(s)/deciliter       Allergies    No Known Allergies    Intolerances      Vital Signs Last 24 Hrs  T(C): 36.8 (30 Apr 2025 21:07), Max: 36.8 (30 Apr 2025 11:23)  T(F): 98.3 (30 Apr 2025 21:07), Max: 98.3 (30 Apr 2025 21:07)  HR: 66 (30 Apr 2025 21:10) (57 - 75)  BP: 118/70 (30 Apr 2025 21:07) (108/73 - 152/84)  BP(mean): --  RR: 18 (30 Apr 2025 21:07) (18 - 18)  SpO2: 95% (30 Apr 2025 21:10) (94% - 98%)    Parameters below as of 30 Apr 2025 21:07  Patient On (Oxygen Delivery Method): room air      CAPILLARY BLOOD GLUCOSE      POCT Blood Glucose.: 162 mg/dL (30 Apr 2025 21:15)  POCT Blood Glucose.: 145 mg/dL (30 Apr 2025 16:59)  POCT Blood Glucose.: 136 mg/dL (30 Apr 2025 11:22)  POCT Blood Glucose.: 132 mg/dL (30 Apr 2025 07:23)      04-29 @ 07:01  -  04-30 @ 07:00  --------------------------------------------------------  IN: 0 mL / OUT: 450 mL / NET: -450 mL    04-30 @ 07:01  -  04-30 @ 23:39  --------------------------------------------------------  IN: 480 mL / OUT: 0 mL / NET: 480 mL      Physical Exam:    Daily     Daily   General:  Well appearing, NAD, not cachetic  HEENT:  Nonicteric, PERRLA  CV:  RRR, S1S2   Lungs:  CTA B/L, no wheezes, rales, rhonchi  Abdomen:  Soft, non-tender, no distended, positive BS  Extremities:  2+ pulses, no c/c, no edema  Skin:  Warm and dry, no rashes  :  No amaro  Neuro:  AAOx3, non-focal, grossly intact                                                                                                                                                                                                                                                                                                LABS:                            04-30    142  |  105  |  12  ----------------------------<  108[H]  3.5   |  22  |  0.55    Ca    9.2      30 Apr 2025 06:59                         RADIOLOGY & ADDITIONAL TESTS         I personally reviewed: [  ]EKG   [  ]CXR    [  ] CT      A/P:         Discussed with :     Ashley consultants' Notes   Time spent :  
Date of service: 05-06-25 @ 15:28      Patient is a 69y old  Male who presents with a chief complaint of Syncope (29 Apr 2025 13:34)                                                               INTERVAL HPI/OVERNIGHT EVENTS:    REVIEW OF SYSTEMS:    cough   sputum with yellow phlegm ?                                                                                                                                                                                                                                                                       Medications:  MEDICATIONS  (STANDING):  allopurinol 100 milliGRAM(s) Oral at bedtime  aspirin enteric coated 81 milliGRAM(s) Oral daily  atorvastatin 40 milliGRAM(s) Oral at bedtime  buMETAnide 2 milliGRAM(s) Oral two times a day  carvedilol 3.125 milliGRAM(s) Oral every 12 hours  clopidogrel Tablet 75 milliGRAM(s) Oral daily  dextrose 5%. 1000 milliLiter(s) (100 mL/Hr) IV Continuous <Continuous>  dextrose 5%. 1000 milliLiter(s) (50 mL/Hr) IV Continuous <Continuous>  dextrose 50% Injectable 25 Gram(s) IV Push once  dextrose 50% Injectable 12.5 Gram(s) IV Push once  dextrose 50% Injectable 25 Gram(s) IV Push once  fenofibrate Tablet 48 milliGRAM(s) Oral daily  gabapentin 300 milliGRAM(s) Oral every 8 hours  glucagon  Injectable 1 milliGRAM(s) IntraMuscular once  heparin   Injectable 5000 Unit(s) SubCutaneous every 8 hours  influenza  Vaccine (HIGH DOSE) 0.5 milliLiter(s) IntraMuscular once  insulin lispro (ADMELOG) corrective regimen sliding scale   SubCutaneous three times a day before meals  insulin lispro (ADMELOG) corrective regimen sliding scale   SubCutaneous at bedtime  isosorbide   mononitrate ER Tablet (IMDUR) 30 milliGRAM(s) Oral daily  losartan 50 milliGRAM(s) Oral daily  multivitamin 1 Tablet(s) Oral daily  senna 2 Tablet(s) Oral at bedtime  sodium chloride 0.9%. 500 milliLiter(s) (100 mL/Hr) IV Continuous <Continuous>    MEDICATIONS  (PRN):  dextrose Oral Gel 15 Gram(s) Oral once PRN Blood Glucose LESS THAN 70 milliGRAM(s)/deciliter       Allergies    No Known Allergies    Intolerances      Vital Signs Last 24 Hrs  T(C): 37 (06 May 2025 10:57), Max: 37 (06 May 2025 10:57)  T(F): 98.6 (06 May 2025 10:57), Max: 98.6 (06 May 2025 10:57)  HR: 68 (06 May 2025 10:57) (63 - 69)  BP: 132/82 (06 May 2025 10:57) (109/69 - 153/81)  BP(mean): --  RR: 18 (06 May 2025 10:57) (17 - 18)  SpO2: 96% (06 May 2025 10:57) (96% - 99%)    Parameters below as of 06 May 2025 10:57  Patient On (Oxygen Delivery Method): room air      CAPILLARY BLOOD GLUCOSE      POCT Blood Glucose.: 156 mg/dL (06 May 2025 11:16)  POCT Blood Glucose.: 142 mg/dL (06 May 2025 07:20)  POCT Blood Glucose.: 197 mg/dL (05 May 2025 21:15)  POCT Blood Glucose.: 113 mg/dL (05 May 2025 17:18)      05-05 @ 07:01  -  05-06 @ 07:00  --------------------------------------------------------  IN: 0 mL / OUT: 800 mL / NET: -800 mL    05-06 @ 07:01  -  05-06 @ 15:28  --------------------------------------------------------  IN: 500 mL / OUT: 0 mL / NET: 500 mL      Physical Exam:    Daily     Daily   General:  Well appearing, NAD, not cachetic  HEENT:  Nonicteric, PERRLA  CV:  RRR, S1S2   Lungs:  CTA B/L, no wheezes, rales, rhonchi  Abdomen:  Soft, non-tender, no distended, positive BS  Extremities:  2+ pulses, no c/c, no edema  Skin:  Warm and dry, no rashes  :  No amaro  Neuro:  AAOx3, non-focal, grossly intact                                                                                                                                                                                                                                                                                                LABS:                               13.8   7.23  )-----------( 166      ( 06 May 2025 06:42 )             40.1                      05-06    140  |  102  |  15  ----------------------------<  119[H]  3.4[L]   |  23  |  0.58    Ca    9.1      06 May 2025 06:42                         RADIOLOGY & ADDITIONAL TESTS         I personally reviewed: [  ]EKG   [  ]CXR    [  ] CT      A/P:         Discussed with :     Ashley consultants' Notes   Time spent :  
Date of service: 25 @ 22:01      Patient is a 69y old  Male who presents with a chief complaint of Syncope and collapse     (2025 15:51)                                                               INTERVAL HPI/OVERNIGHT EVENTS:    REVIEW OF SYSTEMS:     CONSTITUTIONAL: No weakness, fevers or chills  EYES/ENT: No visual changes , no ear ache   NECK: No pain or stiffness  RESPIRATORY: No cough, wheezing,  No shortness of breath  CARDIOVASCULAR: No chest pain or palpitations  GASTROINTESTINAL: No abdominal pain  . No nausea, vomiting, or hematemesis; No diarrhea or constipation. No melena or hematochezia.  GENITOURINARY: No dysuria, frequency or hematuria  NEUROLOGICAL: No numbness or weakness  SKIN: No itching, burning, rashes, or lesions                                                                                                                                                                                                                                                                                 Medications:  MEDICATIONS  (STANDING):  allopurinol 100 milliGRAM(s) Oral at bedtime  aspirin enteric coated 81 milliGRAM(s) Oral daily  atorvastatin 40 milliGRAM(s) Oral at bedtime  buMETAnide 2 milliGRAM(s) Oral two times a day  carvedilol 3.125 milliGRAM(s) Oral every 12 hours  dextrose 5%. 1000 milliLiter(s) (100 mL/Hr) IV Continuous <Continuous>  dextrose 5%. 1000 milliLiter(s) (50 mL/Hr) IV Continuous <Continuous>  dextrose 50% Injectable 25 Gram(s) IV Push once  dextrose 50% Injectable 12.5 Gram(s) IV Push once  dextrose 50% Injectable 25 Gram(s) IV Push once  fenofibrate Tablet 48 milliGRAM(s) Oral daily  gabapentin 300 milliGRAM(s) Oral every 8 hours  glucagon  Injectable 1 milliGRAM(s) IntraMuscular once  heparin   Injectable 5000 Unit(s) SubCutaneous every 8 hours  influenza  Vaccine (HIGH DOSE) 0.5 milliLiter(s) IntraMuscular once  insulin lispro (ADMELOG) corrective regimen sliding scale   SubCutaneous three times a day before meals  insulin lispro (ADMELOG) corrective regimen sliding scale   SubCutaneous at bedtime  isosorbide   mononitrate ER Tablet (IMDUR) 30 milliGRAM(s) Oral daily  losartan 50 milliGRAM(s) Oral daily  multivitamin 1 Tablet(s) Oral daily  potassium chloride    Tablet ER 40 milliEquivalent(s) Oral every 4 hours  sodium chloride 0.9%. 500 milliLiter(s) (100 mL/Hr) IV Continuous <Continuous>    MEDICATIONS  (PRN):  dextrose Oral Gel 15 Gram(s) Oral once PRN Blood Glucose LESS THAN 70 milliGRAM(s)/deciliter       Allergies    No Known Allergies    Intolerances      Vital Signs Last 24 Hrs  T(C): 36.6 (2025 20:56), Max: 36.7 (2025 04:53)  T(F): 97.8 (2025 20:56), Max: 98 (2025 04:53)  HR: 65 (2025 20:56) (57 - 69)  BP: 120/76 (2025 20:56) (107/55 - 143/82)  BP(mean): --  RR: 18 (2025 20:56) (15 - 20)  SpO2: 98% (2025 20:56) (94% - 99%)    Parameters below as of 2025 20:56  Patient On (Oxygen Delivery Method): room air      CAPILLARY BLOOD GLUCOSE      POCT Blood Glucose.: 161 mg/dL (2025 21:09)  POCT Blood Glucose.: 127 mg/dL (2025 17:03)  POCT Blood Glucose.: 153 mg/dL (2025 11:10)  POCT Blood Glucose.: 136 mg/dL (2025 07:29)       @ 07:01  -   @ 07:00  --------------------------------------------------------  IN: 480 mL / OUT: 0 mL / NET: 480 mL      Physical Exam:    Daily     Daily Weight in k.1 (2025 07:41)  General:  Well appearing, NAD, not cachetic  HEENT:  Nonicteric, PERRLA  CV:  RRR, S1S2   Lungs:  CTA B/L, no wheezes, rales, rhonchi  Abdomen:  Soft, non-tender, no distended, positive BS  Extremities:  2+ pulses, no c/c, no edema  Skin:  Warm and dry, no rashes  :  No amaro  Neuro:  AAOx3, non-focal, grossly intact                                                                                                                                                                                                                                                                                                LABS:                                142  |  103  |  14  ----------------------------<  122[H]  3.3[L]   |  24  |  0.56    Ca    8.8      2025 06:52  Mg     1.8                              RADIOLOGY & ADDITIONAL TESTS         I personally reviewed: [  ]EKG   [  ]CXR    [  ] CT      A/P:         Discussed with :     Ashley consultants' Notes   Time spent :  
Neurology      S: patient seen. s/p micra         Medications: MEDICATIONS  (STANDING):  allopurinol 100 milliGRAM(s) Oral at bedtime  aspirin enteric coated 81 milliGRAM(s) Oral daily  atorvastatin 40 milliGRAM(s) Oral at bedtime  buMETAnide 2 milliGRAM(s) Oral two times a day  carvedilol 3.125 milliGRAM(s) Oral every 12 hours  clopidogrel Tablet 75 milliGRAM(s) Oral daily  dextrose 5%. 1000 milliLiter(s) (100 mL/Hr) IV Continuous <Continuous>  dextrose 5%. 1000 milliLiter(s) (50 mL/Hr) IV Continuous <Continuous>  dextrose 50% Injectable 25 Gram(s) IV Push once  dextrose 50% Injectable 12.5 Gram(s) IV Push once  dextrose 50% Injectable 25 Gram(s) IV Push once  fenofibrate Tablet 48 milliGRAM(s) Oral daily  gabapentin 300 milliGRAM(s) Oral every 8 hours  glucagon  Injectable 1 milliGRAM(s) IntraMuscular once  heparin   Injectable 5000 Unit(s) SubCutaneous every 8 hours  influenza  Vaccine (HIGH DOSE) 0.5 milliLiter(s) IntraMuscular once  insulin lispro (ADMELOG) corrective regimen sliding scale   SubCutaneous three times a day before meals  insulin lispro (ADMELOG) corrective regimen sliding scale   SubCutaneous at bedtime  isosorbide   mononitrate ER Tablet (IMDUR) 30 milliGRAM(s) Oral daily  losartan 50 milliGRAM(s) Oral daily  multivitamin 1 Tablet(s) Oral daily  senna 2 Tablet(s) Oral at bedtime  sodium chloride 0.9%. 500 milliLiter(s) (100 mL/Hr) IV Continuous <Continuous>    MEDICATIONS  (PRN):  dextrose Oral Gel 15 Gram(s) Oral once PRN Blood Glucose LESS THAN 70 milliGRAM(s)/deciliter       Vitals:  Vital Signs Last 24 Hrs  T(C): 36.7 (07 May 2025 19:25), Max: 36.7 (07 May 2025 19:25)  T(F): 98 (07 May 2025 19:25), Max: 98 (07 May 2025 19:25)  HR: 78 (07 May 2025 19:25) (55 - 78)  BP: 150/80 (07 May 2025 19:25) (105/63 - 150/80)  BP(mean): --  RR: 18 (07 May 2025 19:25) (18 - 18)  SpO2: 96% (07 May 2025 19:25) (95% - 98%)    Parameters below as of 07 May 2025 19:25  Patient On (Oxygen Delivery Method): room air              Orthostatic VS    05-01-25 @ 14:22  Lying BP: Orthostatic BP (Lying Systolic): 120/Orthostatic BP (Lying Diastolic (mm Hg)): 73 HR: Orthostatic Pulse (Heart Rate (beats/min)): 71   Sitting BP: Orthostatic BP (Sitting Systolic): 132/Orthostatic BP (Sitting Diastolic (mm Hg)): 75 HR: Orthostatic Pulse (Heart Rate (beats/min)): 75  Standing BP: Orthostatic BP (Standing Systolic): 108/Orthostatic BP (Standing Diastolic (mm Hg)): 71 HR: Orthostatic Pulse (Heart Rate (beats/min)): 76  Site: Orthostatic BP/Pulse (Site): upper right arm   Mode: Orthostatic BP/ Pulse (Mode): electronic    04-30-25 @ 15:27  Lying BP: Orthostatic BP (Lying Systolic): 123/Orthostatic BP (Lying Diastolic (mm Hg)): 70 HR: Orthostatic Pulse (Heart Rate (beats/min)): 68   Sitting BP: Orthostatic BP (Sitting Systolic): 129/Orthostatic BP (Sitting Diastolic (mm Hg)): 76 HR: Orthostatic Pulse (Heart Rate (beats/min)): 73  Standing BP: Orthostatic BP (Standing Systolic): 137/Orthostatic BP (Standing Diastolic (mm Hg)): 82 HR: Orthostatic Pulse (Heart Rate (beats/min)): 82  Site: --   Mode: --        General Exam:   General Appearance: Appropriately dressed and in no acute distress       Head: Normocephalic, atraumatic and no dysmorphic features  Ear, Nose, and Throat: Moist mucous membranes  CVS: S1S2+  Resp: No SOB, no wheeze or rhonchi  GI: soft NT/ND  Extremities: No edema or cyanosis  Skin: No bruises or rashes     Neurological Exam:  Mental Status: Awake, alert and oriented x 3.  Able to follow simple and complex verbal commands. Able to name and repeat. fluent speech. No obvious aphasia or dysarthria noted.   Cranial Nerves: PERRL, EOMI, VFFC, sensation V1-V3 intact,  no obvious facial asymmetry, equal elevation of palate, scm/trap 5/5, tongue is midline on protrusion. no obvious papilledema on fundoscopic exam. hearing is grossly intact.   Motor: Normal bulk, tone and strength throughout. Fine finger movements were intact and symmetric.   Sensation: Intact to light touch and pinprick throughout decerase distally LE and uppers   Reflexes: 1+ throughout at biceps, brachioradialis, triceps, patellars and ankles bilaterally and equal. No clonus. R toe and L toe were both downgoing.  Coordination:  F-N and H-S intact. no dysmetria   Gait:  no limitations on gait. able to tandemination: No dysmetria on FNF or HKS  Gait: walker     Data/Labs/Imaging which I personally reviewed.       LABS:                          13.8   7.23  )-----------( 166      ( 06 May 2025 06:42 )             40.1     05-06    140  |  102  |  15  ----------------------------<  119[H]  3.4[L]   |  23  |  0.58    Ca    9.1      06 May 2025 06:42          Urinalysis Basic - ( 06 May 2025 06:42 )    Color: x / Appearance: x / SG: x / pH: x  Gluc: 119 mg/dL / Ketone: x  / Bili: x / Urobili: x   Blood: x / Protein: x / Nitrite: x   Leuk Esterase: x / RBC: x / WBC x   Sq Epi: x / Non Sq Epi: x / Bacteria: x      
Neurology      S: patient seen. s/p micra       Medications: MEDICATIONS  (STANDING):  allopurinol 100 milliGRAM(s) Oral at bedtime  aspirin enteric coated 81 milliGRAM(s) Oral daily  atorvastatin 40 milliGRAM(s) Oral at bedtime  buMETAnide 2 milliGRAM(s) Oral two times a day  carvedilol 3.125 milliGRAM(s) Oral every 12 hours  clopidogrel Tablet 75 milliGRAM(s) Oral daily  dextrose 5%. 1000 milliLiter(s) (100 mL/Hr) IV Continuous <Continuous>  dextrose 5%. 1000 milliLiter(s) (50 mL/Hr) IV Continuous <Continuous>  dextrose 50% Injectable 25 Gram(s) IV Push once  dextrose 50% Injectable 12.5 Gram(s) IV Push once  dextrose 50% Injectable 25 Gram(s) IV Push once  fenofibrate Tablet 48 milliGRAM(s) Oral daily  gabapentin 300 milliGRAM(s) Oral every 8 hours  glucagon  Injectable 1 milliGRAM(s) IntraMuscular once  heparin   Injectable 5000 Unit(s) SubCutaneous every 8 hours  influenza  Vaccine (HIGH DOSE) 0.5 milliLiter(s) IntraMuscular once  insulin lispro (ADMELOG) corrective regimen sliding scale   SubCutaneous three times a day before meals  insulin lispro (ADMELOG) corrective regimen sliding scale   SubCutaneous at bedtime  isosorbide   mononitrate ER Tablet (IMDUR) 30 milliGRAM(s) Oral daily  losartan 50 milliGRAM(s) Oral daily  multivitamin 1 Tablet(s) Oral daily  senna 2 Tablet(s) Oral at bedtime  sodium chloride 0.9%. 500 milliLiter(s) (100 mL/Hr) IV Continuous <Continuous>    MEDICATIONS  (PRN):  dextrose Oral Gel 15 Gram(s) Oral once PRN Blood Glucose LESS THAN 70 milliGRAM(s)/deciliter       Vitals:  Vital Signs Last 24 Hrs  T(C): 37 (06 May 2025 10:57), Max: 37 (06 May 2025 10:57)  T(F): 98.6 (06 May 2025 10:57), Max: 98.6 (06 May 2025 10:57)  HR: 68 (06 May 2025 10:57) (62 - 71)  BP: 132/82 (06 May 2025 10:57) (109/69 - 155/80)  BP(mean): --  RR: 18 (06 May 2025 10:57) (10 - 18)  SpO2: 96% (06 May 2025 10:57) (95% - 99%)    Parameters below as of 06 May 2025 10:57  Patient On (Oxygen Delivery Method): room air            Orthostatic VS    05-01-25 @ 14:22  Lying BP: Orthostatic BP (Lying Systolic): 120/Orthostatic BP (Lying Diastolic (mm Hg)): 73 HR: Orthostatic Pulse (Heart Rate (beats/min)): 71   Sitting BP: Orthostatic BP (Sitting Systolic): 132/Orthostatic BP (Sitting Diastolic (mm Hg)): 75 HR: Orthostatic Pulse (Heart Rate (beats/min)): 75  Standing BP: Orthostatic BP (Standing Systolic): 108/Orthostatic BP (Standing Diastolic (mm Hg)): 71 HR: Orthostatic Pulse (Heart Rate (beats/min)): 76  Site: Orthostatic BP/Pulse (Site): upper right arm   Mode: Orthostatic BP/ Pulse (Mode): electronic    04-30-25 @ 15:27  Lying BP: Orthostatic BP (Lying Systolic): 123/Orthostatic BP (Lying Diastolic (mm Hg)): 70 HR: Orthostatic Pulse (Heart Rate (beats/min)): 68   Sitting BP: Orthostatic BP (Sitting Systolic): 129/Orthostatic BP (Sitting Diastolic (mm Hg)): 76 HR: Orthostatic Pulse (Heart Rate (beats/min)): 73  Standing BP: Orthostatic BP (Standing Systolic): 137/Orthostatic BP (Standing Diastolic (mm Hg)): 82 HR: Orthostatic Pulse (Heart Rate (beats/min)): 82  Site: --   Mode: --        General Exam:   General Appearance: Appropriately dressed and in no acute distress       Head: Normocephalic, atraumatic and no dysmorphic features  Ear, Nose, and Throat: Moist mucous membranes  CVS: S1S2+  Resp: No SOB, no wheeze or rhonchi  GI: soft NT/ND  Extremities: No edema or cyanosis  Skin: No bruises or rashes     Neurological Exam:  Mental Status: Awake, alert and oriented x 3.  Able to follow simple and complex verbal commands. Able to name and repeat. fluent speech. No obvious aphasia or dysarthria noted.   Cranial Nerves: PERRL, EOMI, VFFC, sensation V1-V3 intact,  no obvious facial asymmetry, equal elevation of palate, scm/trap 5/5, tongue is midline on protrusion. no obvious papilledema on fundoscopic exam. hearing is grossly intact.   Motor: Normal bulk, tone and strength throughout. Fine finger movements were intact and symmetric.   Sensation: Intact to light touch and pinprick throughout decerase distally LE and uppers   Reflexes: 1+ throughout at biceps, brachioradialis, triceps, patellars and ankles bilaterally and equal. No clonus. R toe and L toe were both downgoing.  Coordination:  F-N and H-S intact. no dysmetria   Gait:  no limitations on gait. able to tandemination: No dysmetria on FNF or HKS  Gait: walker     Data/Labs/Imaging which I personally reviewed.     Labs:    LABS:                          13.8   7.23  )-----------( 166      ( 06 May 2025 06:42 )             40.1     05-06    140  |  102  |  15  ----------------------------<  119[H]  3.4[L]   |  23  |  0.58    Ca    9.1      06 May 2025 06:42        PT/INR - ( 05 May 2025 06:54 )   PT: 12.0 sec;   INR: 1.05 ratio         PTT - ( 05 May 2025 06:54 )  PTT:27.6 sec  Urinalysis Basic - ( 06 May 2025 06:42 )    Color: x / Appearance: x / SG: x / pH: x  Gluc: 119 mg/dL / Ketone: x  / Bili: x / Urobili: x   Blood: x / Protein: x / Nitrite: x   Leuk Esterase: x / RBC: x / WBC x   Sq Epi: x / Non Sq Epi: x / Bacteria: x            
Olaf Zhao MD  Interventional Cardiology / Advance Heart Failure and Cardiac Transplant Specialist  [place] Office : [address] [place] [address] [place] [address]  Tel:   [place] Office : [address] [facility] [place] [address]  Tel: [phone]       Pt is lying in bed comfortable not in distress, no chest pains no SOB no palpitations  	  MEDICATIONS:  aspirin enteric coated 81 milliGRAM(s) Oral daily  buMETAnide 2 milliGRAM(s) Oral two times a day  carvedilol 3.125 milliGRAM(s) Oral every 12 hours  clopidogrel Tablet 75 milliGRAM(s) Oral daily  heparin   Injectable 5000 Unit(s) SubCutaneous every 8 hours  isosorbide   mononitrate ER Tablet (IMDUR) 30 milliGRAM(s) Oral daily  losartan 50 milliGRAM(s) Oral daily        gabapentin 300 milliGRAM(s) Oral every 8 hours    senna 2 Tablet(s) Oral at bedtime    allopurinol 100 milliGRAM(s) Oral at bedtime  atorvastatin 40 milliGRAM(s) Oral at bedtime  dextrose 50% Injectable 25 Gram(s) IV Push once  dextrose 50% Injectable 12.5 Gram(s) IV Push once  dextrose 50% Injectable 25 Gram(s) IV Push once  dextrose Oral Gel 15 Gram(s) Oral once PRN  fenofibrate Tablet 48 milliGRAM(s) Oral daily  glucagon  Injectable 1 milliGRAM(s) IntraMuscular once  insulin lispro (ADMELOG) corrective regimen sliding scale   SubCutaneous three times a day before meals  insulin lispro (ADMELOG) corrective regimen sliding scale   SubCutaneous at bedtime    dextrose 5%. 1000 milliLiter(s) IV Continuous <Continuous>  dextrose 5%. 1000 milliLiter(s) IV Continuous <Continuous>  influenza  Vaccine (HIGH DOSE) 0.5 milliLiter(s) IntraMuscular once  multivitamin 1 Tablet(s) Oral daily  sodium chloride 0.9%. 500 milliLiter(s) IV Continuous <Continuous>      PAST MEDICAL/SURGICAL HISTORY  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      Essential hypertension      CAD (coronary artery disease)  2017- s/p cabg x3      Hypercholesterolemia      SAIYA (obstructive sleep apnea)  initially dx  ; CPAP      Paralytic ileus  post op THR  & post op laminectomy      Type 2 diabetes mellitus      Right carpal tunnel syndrome      Cubital tunnel syndrome, right      Trigger finger of right hand      Morbid obesity with body mass index (BMI) of 40.0 or higher      H/O CHF      Kidney mass      Cervical herniated disc      Renal cancer      Disease of spinal cord, unspecified      Peripheral neuropathy      Myelopathy      Morbid obesity      S/P Left Inguinal Hernia Repair      History of Total Hip Replacement  - bilateral THR      S/P tonsillectomy and adenoidectomy  as child      H/O lithotripsy  x1      S/P revision of total knee, right  x2-  &       S/P lumbar discectomy  - ,L5  Aug 2013      S/P CABG x 3  17      S/P lumbar laminectomy  Fusion L3-L5       Kidney stone  s/w ESWL pt unsure site      Neuropathy  Bilateral Feet since       History of bilateral hip replacements      History of hernia repair      History of lumbar fusion      History of cholecystectomy      History of bilateral knee replacement      History of lymph node biopsy      S/p bilateral carpal tunnel release      History of partial nephrectomy      H/O elbow surgery      History of thoracic spinal fusion          SOCIAL HISTORY: Substance Use (street drugs): ( x ) never used  (  ) other:    FAMILY HISTORY:  Family history of coronary artery disease  HLD/Angina. Fatal MI age 73.    Family history of diabetes mellitus type II  mother- - hyperlipidemia and Hypertension.    Family history of pancreatic cancer (Sibling)  brother     Family history of coronary artery disease  brother- age 50+- Coronary Artery Stents         PHYSICAL EXAM:  T(C): 36.6 (25 @ 21:06), Max: 36.6 (25 @ 21:06)  HR: 69 (25 @ 21:06) (62 - 77)  BP: 108/66 (25 @ 21:06) (104/70 - 137/85)  RR: 18 (25 @ 21:06) (18 - 18)  SpO2: 96% (25 @ 21:06) (96% - 98%)  Wt(kg): --  I&O's Summary    01 May 2025 07:01  -  02 May 2025 07:00  --------------------------------------------------------  IN: 500 mL / OUT: 450 mL / NET: 50 mL          GENERAL: NAD  EYES:   PERRLA   ENMT:   Moist mucous membranes, Good dentition, No lesions  Cardiovascular: Normal S1 S2, No JVD, No murmurs, No edema  Respiratory: Lungs clear to auscultation	  Gastrointestinal:  Soft, Non-tender, + BS	  Extremities: no edema                                    13.1   6.29  )-----------( 153      ( 01 May 2025 05:33 )             39.3     -    140  |  103  |  15  ----------------------------<  114[H]  3.6   |  23  |  0.55    Ca    9.5      02 May 2025 06:32  Mg     1.9           proBNP:   Lipid Profile:   HgA1c:   TSH:     Consultant(s) Notes Reviewed:  [x ] YES  [ ] NO    Care Discussed with Consultants/Other Providers [ x] YES  [ ] NO    Imaging Personally Reviewed independently:  [x] YES  [ ] NO    All labs, radiologic studies, vitals, orders and medications list reviewed. Patient is seen and examined at bedside. Case discussed with medical team.        
Olaf Zhao MD  Interventional Cardiology / Advance Heart Failure and Cardiac Transplant Specialist  [place] Office : [address] [place] [address] [place] [address]  Tel:   [place] Office : [address] [facility] [place] [address]  Tel: [phone]       Pt is lying in bed comfortable not in distress, no chest pains no SOB no palpitations s/p PCI SVG to OM and RCA   	  MEDICATIONS:  aspirin enteric coated 81 milliGRAM(s) Oral daily  buMETAnide 2 milliGRAM(s) Oral two times a day  carvedilol 3.125 milliGRAM(s) Oral every 12 hours  clopidogrel Tablet 75 milliGRAM(s) Oral daily  heparin   Injectable 5000 Unit(s) SubCutaneous every 8 hours  isosorbide   mononitrate ER Tablet (IMDUR) 30 milliGRAM(s) Oral daily  losartan 50 milliGRAM(s) Oral daily        gabapentin 300 milliGRAM(s) Oral every 8 hours      allopurinol 100 milliGRAM(s) Oral at bedtime  atorvastatin 40 milliGRAM(s) Oral at bedtime  dextrose 50% Injectable 25 Gram(s) IV Push once  dextrose 50% Injectable 12.5 Gram(s) IV Push once  dextrose 50% Injectable 25 Gram(s) IV Push once  dextrose Oral Gel 15 Gram(s) Oral once PRN  fenofibrate Tablet 48 milliGRAM(s) Oral daily  glucagon  Injectable 1 milliGRAM(s) IntraMuscular once  insulin lispro (ADMELOG) corrective regimen sliding scale   SubCutaneous three times a day before meals  insulin lispro (ADMELOG) corrective regimen sliding scale   SubCutaneous at bedtime    dextrose 5%. 1000 milliLiter(s) IV Continuous <Continuous>  dextrose 5%. 1000 milliLiter(s) IV Continuous <Continuous>  influenza  Vaccine (HIGH DOSE) 0.5 milliLiter(s) IntraMuscular once  multivitamin 1 Tablet(s) Oral daily  sodium chloride 0.9%. 500 milliLiter(s) IV Continuous <Continuous>      PAST MEDICAL/SURGICAL HISTORY  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      Essential hypertension      CAD (coronary artery disease)  2017- s/p cabg x3      Hypercholesterolemia      ASIYA (obstructive sleep apnea)  initially dx  ; CPAP      Paralytic ileus  post op THR  & post op laminectomy      Type 2 diabetes mellitus      Right carpal tunnel syndrome      Cubital tunnel syndrome, right      Trigger finger of right hand      Morbid obesity with body mass index (BMI) of 40.0 or higher      H/O CHF      Kidney mass      Cervical herniated disc      Renal cancer      Disease of spinal cord, unspecified      Peripheral neuropathy      Myelopathy      Morbid obesity      S/P Left Inguinal Hernia Repair      History of Total Hip Replacement  - bilateral THR      S/P tonsillectomy and adenoidectomy  as child      H/O lithotripsy  x1      S/P revision of total knee, right  x2-  &       S/P lumbar discectomy  - ,L5  Aug 2013      S/P CABG x 3  17      S/P lumbar laminectomy  Fusion L3-L5       Kidney stone  s/w ESWL pt unsure site      Neuropathy  Bilateral Feet since       History of bilateral hip replacements      History of hernia repair      History of lumbar fusion      History of cholecystectomy      History of bilateral knee replacement      History of lymph node biopsy      S/p bilateral carpal tunnel release      History of partial nephrectomy      H/O elbow surgery      History of thoracic spinal fusion          SOCIAL HISTORY: Substance Use (street drugs): ( x ) never used  (  ) other:    FAMILY HISTORY:  Family history of coronary artery disease  HLD/Angina. Fatal MI age 73.    Family history of diabetes mellitus type II  mother- - hyperlipidemia and Hypertension.    Family history of pancreatic cancer (Sibling)  brother     Family history of coronary artery disease  brother- age 50+- Coronary Artery Stents      PHYSICAL EXAM:  T(C): 36.7 (25 @ 15:27), Max: 36.8 (25 @ 11:23)  HR: 68 (25 @ 15:27) (57 - 72)  BP: 123/70 (25 @ 15:27) (108/73 - 152/84)  RR: 18 (25 @ 15:27) (18 - 18)  SpO2: 97% (25 @ 15:27) (94% - 98%)  Wt(kg): --  I&O's Summary    2025 07:01  -  2025 07:00  --------------------------------------------------------  IN: 0 mL / OUT: 450 mL / NET: -450 mL    2025 07:01  -  2025 17:13  --------------------------------------------------------  IN: 480 mL / OUT: 0 mL / NET: 480 mL         EYES:   PERRLA   ENMT:   Moist mucous membranes, Good dentition, No lesions  Cardiovascular: Normal S1 S2, No JVD, No murmurs, No edema  Respiratory: Lungs clear to auscultation	  Gastrointestinal:  Soft, Non-tender, + BS	  Extremities: right groin ok                     142  |  105  |  12  ----------------------------<  108[H]  3.5   |  22  |  0.55    Ca    9.2      2025 06:59      proBNP:   Lipid Profile:   HgA1c:   TSH:     Consultant(s) Notes Reviewed:  [x ] YES  [ ] NO    Care Discussed with Consultants/Other Providers [ x] YES  [ ] NO    Imaging Personally Reviewed independently:  [x] YES  [ ] NO    All labs, radiologic studies, vitals, orders and medications list reviewed. Patient is seen and examined at bedside. Case discussed with medical team.        
Date of service: 05-01-25 @ 20:42      Patient is a 69y old  Male who presents with a chief complaint of Syncope (29 Apr 2025 13:34)                                                               INTERVAL HPI/OVERNIGHT EVENTS:    REVIEW OF SYSTEMS:     CONSTITUTIONAL: No weakness, fevers or chills  EYES/ENT: No visual changes , no ear ache   NECK: No pain or stiffness  RESPIRATORY: No cough, wheezing,  No shortness of breath  CARDIOVASCULAR: No chest pain or palpitations  GASTROINTESTINAL: No abdominal pain  . No nausea, vomiting, or hematemesis; No diarrhea or constipation. No melena or hematochezia.  GENITOURINARY: No dysuria, frequency or hematuria  NEUROLOGICAL: No numbness or weakness  SKIN: No itching, burning, rashes, or lesions                                                                                                                                                                                                                                                                                 Medications:  MEDICATIONS  (STANDING):  allopurinol 100 milliGRAM(s) Oral at bedtime  aspirin enteric coated 81 milliGRAM(s) Oral daily  atorvastatin 40 milliGRAM(s) Oral at bedtime  buMETAnide 2 milliGRAM(s) Oral two times a day  carvedilol 3.125 milliGRAM(s) Oral every 12 hours  clopidogrel Tablet 75 milliGRAM(s) Oral daily  dextrose 5%. 1000 milliLiter(s) (100 mL/Hr) IV Continuous <Continuous>  dextrose 5%. 1000 milliLiter(s) (50 mL/Hr) IV Continuous <Continuous>  dextrose 50% Injectable 25 Gram(s) IV Push once  dextrose 50% Injectable 12.5 Gram(s) IV Push once  dextrose 50% Injectable 25 Gram(s) IV Push once  fenofibrate Tablet 48 milliGRAM(s) Oral daily  gabapentin 300 milliGRAM(s) Oral every 8 hours  glucagon  Injectable 1 milliGRAM(s) IntraMuscular once  heparin   Injectable 5000 Unit(s) SubCutaneous every 8 hours  influenza  Vaccine (HIGH DOSE) 0.5 milliLiter(s) IntraMuscular once  insulin lispro (ADMELOG) corrective regimen sliding scale   SubCutaneous three times a day before meals  insulin lispro (ADMELOG) corrective regimen sliding scale   SubCutaneous at bedtime  isosorbide   mononitrate ER Tablet (IMDUR) 30 milliGRAM(s) Oral daily  losartan 50 milliGRAM(s) Oral daily  multivitamin 1 Tablet(s) Oral daily  senna 2 Tablet(s) Oral at bedtime  sodium chloride 0.9%. 500 milliLiter(s) (100 mL/Hr) IV Continuous <Continuous>    MEDICATIONS  (PRN):  dextrose Oral Gel 15 Gram(s) Oral once PRN Blood Glucose LESS THAN 70 milliGRAM(s)/deciliter       Allergies    No Known Allergies    Intolerances      Vital Signs Last 24 Hrs  T(C): 36.3 (01 May 2025 16:20), Max: 36.8 (30 Apr 2025 21:07)  T(F): 97.3 (01 May 2025 16:20), Max: 98.3 (30 Apr 2025 21:07)  HR: 65 (01 May 2025 16:39) (60 - 75)  BP: 107/68 (01 May 2025 16:20) (101/67 - 134/78)  BP(mean): --  RR: 18 (01 May 2025 16:20) (18 - 18)  SpO2: 97% (01 May 2025 16:39) (93% - 99%)    Parameters below as of 01 May 2025 16:20  Patient On (Oxygen Delivery Method): room air      CAPILLARY BLOOD GLUCOSE      POCT Blood Glucose.: 140 mg/dL (01 May 2025 17:04)  POCT Blood Glucose.: 154 mg/dL (01 May 2025 11:17)  POCT Blood Glucose.: 138 mg/dL (01 May 2025 07:19)  POCT Blood Glucose.: 162 mg/dL (30 Apr 2025 21:15)      04-30 @ 07:01 - 05-01 @ 07:00  --------------------------------------------------------  IN: 480 mL / OUT: 450 mL / NET: 30 mL    05-01 @ 07:01  -  05-01 @ 20:42  --------------------------------------------------------  IN: 500 mL / OUT: 0 mL / NET: 500 mL      Physical Exam:    Daily     Daily   General:  Well appearing, NAD, not cachetic  HEENT:  Nonicteric, PERRLA  CV:  RRR, S1S2   Lungs:  CTA B/L, no wheezes, rales, rhonchi  Abdomen:  Soft, non-tender, no distended, positive BS  Extremities:  2+ pulses, no c/c, no edema  Skin:  Warm and dry, no rashes  :  No amaro  Neuro:  AAOx3, non-focal, grossly intact                                                                                                                                                                                                                                                                                                LABS:                               13.1   6.29  )-----------( 153      ( 01 May 2025 05:33 )             39.3                      05-01    139  |  102  |  17  ----------------------------<  125[H]  3.9   |  21[L]  |  0.65    Ca    9.5      01 May 2025 17:45  Mg     1.8     05-01                         RADIOLOGY & ADDITIONAL TESTS         I personally reviewed: [  ]EKG   [  ]CXR    [  ] CT      A/P:         Discussed with :     Ashley consultants' Notes   Time spent :  
Date of service: 05-03-25 @ 23:26      Patient is a 69y old  Male who presents with a chief complaint of Syncope (29 Apr 2025 13:34)                                                               INTERVAL HPI/OVERNIGHT EVENTS:    REVIEW OF SYSTEMS:     CONSTITUTIONAL: No weakness, fevers or chills  EYES/ENT: No visual changes , no ear ache   NECK: No pain or stiffness  RESPIRATORY: No cough, wheezing,  No shortness of breath  CARDIOVASCULAR: No chest pain or palpitations  GASTROINTESTINAL: No abdominal pain  . No nausea, vomiting, or hematemesis; No diarrhea or constipation. No melena or hematochezia.  GENITOURINARY: No dysuria, frequency or hematuria  NEUROLOGICAL: No numbness or weakness  SKIN: No itching, burning, rashes, or lesions                                                                                                                                                                                                                                                                                 Medications:  MEDICATIONS  (STANDING):  allopurinol 100 milliGRAM(s) Oral at bedtime  aspirin enteric coated 81 milliGRAM(s) Oral daily  atorvastatin 40 milliGRAM(s) Oral at bedtime  buMETAnide 2 milliGRAM(s) Oral two times a day  carvedilol 3.125 milliGRAM(s) Oral every 12 hours  clopidogrel Tablet 75 milliGRAM(s) Oral daily  dextrose 5%. 1000 milliLiter(s) (100 mL/Hr) IV Continuous <Continuous>  dextrose 5%. 1000 milliLiter(s) (50 mL/Hr) IV Continuous <Continuous>  dextrose 50% Injectable 25 Gram(s) IV Push once  dextrose 50% Injectable 12.5 Gram(s) IV Push once  dextrose 50% Injectable 25 Gram(s) IV Push once  fenofibrate Tablet 48 milliGRAM(s) Oral daily  gabapentin 300 milliGRAM(s) Oral every 8 hours  glucagon  Injectable 1 milliGRAM(s) IntraMuscular once  heparin   Injectable 5000 Unit(s) SubCutaneous every 8 hours  influenza  Vaccine (HIGH DOSE) 0.5 milliLiter(s) IntraMuscular once  insulin lispro (ADMELOG) corrective regimen sliding scale   SubCutaneous three times a day before meals  insulin lispro (ADMELOG) corrective regimen sliding scale   SubCutaneous at bedtime  isosorbide   mononitrate ER Tablet (IMDUR) 30 milliGRAM(s) Oral daily  losartan 50 milliGRAM(s) Oral daily  multivitamin 1 Tablet(s) Oral daily  senna 2 Tablet(s) Oral at bedtime  sodium chloride 0.9%. 500 milliLiter(s) (100 mL/Hr) IV Continuous <Continuous>    MEDICATIONS  (PRN):  dextrose Oral Gel 15 Gram(s) Oral once PRN Blood Glucose LESS THAN 70 milliGRAM(s)/deciliter       Allergies    No Known Allergies    Intolerances      Vital Signs Last 24 Hrs  T(C): 36.4 (03 May 2025 21:06), Max: 36.6 (03 May 2025 04:14)  T(F): 97.5 (03 May 2025 21:06), Max: 97.9 (03 May 2025 04:14)  HR: 64 (03 May 2025 23:08) (61 - 75)  BP: 121/73 (03 May 2025 21:06) (102/67 - 124/77)  BP(mean): --  RR: 18 (03 May 2025 21:06) (18 - 18)  SpO2: 96% (03 May 2025 23:08) (95% - 98%)    Parameters below as of 03 May 2025 21:06  Patient On (Oxygen Delivery Method): room air      CAPILLARY BLOOD GLUCOSE      POCT Blood Glucose.: 136 mg/dL (03 May 2025 21:21)  POCT Blood Glucose.: 243 mg/dL (03 May 2025 17:25)  POCT Blood Glucose.: 132 mg/dL (03 May 2025 11:40)  POCT Blood Glucose.: 146 mg/dL (03 May 2025 07:17)      Physical Exam:    Daily     Daily   General:  Well appearing, NAD, not cachetic  HEENT:  Nonicteric, PERRLA  CV:  RRR, S1S2   Lungs:  CTA B/L, no wheezes, rales, rhonchi  Abdomen:  Soft, non-tender, no distended, positive BS  Extremities:  2+ pulses, no c/c, no edema  Skin:  Warm and dry, no rashes  :  No amaro  Neuro:  AAOx3, non-focal, grossly intact                                                                                                                                                                                                                                                                                                LABS:                               13.5   6.77  )-----------( 168      ( 03 May 2025 06:52 )             40.1                      05-03    140  |  102  |  19  ----------------------------<  128[H]  3.2[L]   |  22  |  0.59    Ca    9.4      03 May 2025 06:54  Mg     1.9     05-02                         RADIOLOGY & ADDITIONAL TESTS         I personally reviewed: [  ]EKG   [  ]CXR    [  ] CT      A/P:         Discussed with :     Ashley consultants' Notes   Time spent :  
Date of service: 05-05-25 @ 14:21      Patient is a 69y old  Male who presents with a chief complaint of Syncope (29 Apr 2025 13:34)                                                               INTERVAL HPI/OVERNIGHT EVENTS:    REVIEW OF SYSTEMS:     CONSTITUTIONAL: No weakness, fevers or chills  RESPIRATORY: No cough, wheezing,  No shortness of breath  CARDIOVASCULAR: No chest pain or palpitations  GASTROINTESTINAL: No abdominal pain  . No nausea, vomiting, or hematemesis; No diarrhea or constipation. No melena or hematochezia.  GENITOURINARY: No dysuria, frequency or hematuria  NEUROLOGICAL: No numbness or weakness                                                                                                                                                                                                                                                                                   Medications:  MEDICATIONS  (STANDING):  allopurinol 100 milliGRAM(s) Oral at bedtime  aspirin enteric coated 81 milliGRAM(s) Oral daily  atorvastatin 40 milliGRAM(s) Oral at bedtime  buMETAnide 2 milliGRAM(s) Oral two times a day  carvedilol 3.125 milliGRAM(s) Oral every 12 hours  clopidogrel Tablet 75 milliGRAM(s) Oral daily  dextrose 5%. 1000 milliLiter(s) (100 mL/Hr) IV Continuous <Continuous>  dextrose 5%. 1000 milliLiter(s) (50 mL/Hr) IV Continuous <Continuous>  dextrose 50% Injectable 25 Gram(s) IV Push once  dextrose 50% Injectable 12.5 Gram(s) IV Push once  dextrose 50% Injectable 25 Gram(s) IV Push once  fenofibrate Tablet 48 milliGRAM(s) Oral daily  gabapentin 300 milliGRAM(s) Oral every 8 hours  glucagon  Injectable 1 milliGRAM(s) IntraMuscular once  heparin   Injectable 5000 Unit(s) SubCutaneous every 8 hours  influenza  Vaccine (HIGH DOSE) 0.5 milliLiter(s) IntraMuscular once  insulin lispro (ADMELOG) corrective regimen sliding scale   SubCutaneous three times a day before meals  insulin lispro (ADMELOG) corrective regimen sliding scale   SubCutaneous at bedtime  isosorbide   mononitrate ER Tablet (IMDUR) 30 milliGRAM(s) Oral daily  losartan 50 milliGRAM(s) Oral daily  multivitamin 1 Tablet(s) Oral daily  senna 2 Tablet(s) Oral at bedtime  sodium chloride 0.9%. 500 milliLiter(s) (100 mL/Hr) IV Continuous <Continuous>    MEDICATIONS  (PRN):  dextrose Oral Gel 15 Gram(s) Oral once PRN Blood Glucose LESS THAN 70 milliGRAM(s)/deciliter       Allergies    No Known Allergies    Intolerances      Vital Signs Last 24 Hrs  T(C): 36.4 (05 May 2025 14:16), Max: 36.7 (04 May 2025 20:36)  T(F): 97.6 (05 May 2025 14:16), Max: 98.1 (04 May 2025 20:36)  HR: 62 (05 May 2025 14:16) (58 - 71)  BP: 155/80 (05 May 2025 14:16) (107/63 - 155/80)  BP(mean): 101 (05 May 2025 07:38) (101 - 101)  RR: 18 (05 May 2025 14:16) (10 - 18)  SpO2: 99% (05 May 2025 14:16) (95% - 100%)    Parameters below as of 05 May 2025 14:16  Patient On (Oxygen Delivery Method): room air      CAPILLARY BLOOD GLUCOSE      POCT Blood Glucose.: 148 mg/dL (05 May 2025 11:59)  POCT Blood Glucose.: 141 mg/dL (05 May 2025 07:52)  POCT Blood Glucose.: 128 mg/dL (04 May 2025 21:14)  POCT Blood Glucose.: 135 mg/dL (04 May 2025 17:07)      05-04 @ 07:01  -  05-05 @ 07:00  --------------------------------------------------------  IN: 500 mL / OUT: 0 mL / NET: 500 mL    05-05 @ 07:01  -  05-05 @ 14:21  --------------------------------------------------------  IN: 0 mL / OUT: 400 mL / NET: -400 mL      Physical Exam:    Daily Height in cm: 195.58 (05 May 2025 07:38)    Daily   General: NAD   HEENT:  Nonicteric, PERRLA  CV:  RRR, S1S2   Lungs:  CTA B/L, no wheezes, rales, rhonchi  Abdomen:  Soft, non-tender, no distended, positive BS  Extremities: no edema                                                                                                                                                                                                                                                                                              LABS:                               13.7   7.03  )-----------( 176      ( 05 May 2025 06:54 )             41.5                      05-05    140  |  101  |  23  ----------------------------<  115[H]  3.5   |  23  |  0.64    Ca    9.3      05 May 2025 06:54                         RADIOLOGY & ADDITIONAL TESTS         I personally reviewed: [  ]EKG   [  ]CXR    [  ] CT      A/P:         Discussed with :     Ashley consultants' Notes   Time spent :  
Olaf Zhao MD  Interventional Cardiology / Advance Heart Failure and Cardiac Transplant Specialist  [place] Office : [address] [place] [address] [place] [address]  Tel:   [place] Office : [address] [facility] [place] [address]  Tel: [phone]       Pt is lying in bed comfortable not in distress, no chest pains no SOB no palpitations  	  MEDICATIONS:  aspirin enteric coated 81 milliGRAM(s) Oral daily  buMETAnide 2 milliGRAM(s) Oral two times a day  carvedilol 3.125 milliGRAM(s) Oral every 12 hours  heparin   Injectable 5000 Unit(s) SubCutaneous every 8 hours  isosorbide   mononitrate ER Tablet (IMDUR) 30 milliGRAM(s) Oral daily  losartan 50 milliGRAM(s) Oral daily        gabapentin 300 milliGRAM(s) Oral every 8 hours      allopurinol 100 milliGRAM(s) Oral at bedtime  atorvastatin 40 milliGRAM(s) Oral at bedtime  dextrose 50% Injectable 25 Gram(s) IV Push once  dextrose 50% Injectable 12.5 Gram(s) IV Push once  dextrose 50% Injectable 25 Gram(s) IV Push once  dextrose Oral Gel 15 Gram(s) Oral once PRN  fenofibrate Tablet 48 milliGRAM(s) Oral daily  glucagon  Injectable 1 milliGRAM(s) IntraMuscular once  insulin lispro (ADMELOG) corrective regimen sliding scale   SubCutaneous three times a day before meals  insulin lispro (ADMELOG) corrective regimen sliding scale   SubCutaneous at bedtime    dextrose 5%. 1000 milliLiter(s) IV Continuous <Continuous>  dextrose 5%. 1000 milliLiter(s) IV Continuous <Continuous>  influenza  Vaccine (HIGH DOSE) 0.5 milliLiter(s) IntraMuscular once  multivitamin 1 Tablet(s) Oral daily  sodium chloride 0.9%. 500 milliLiter(s) IV Continuous <Continuous>      PAST MEDICAL/SURGICAL HISTORY  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      Essential hypertension      CAD (coronary artery disease)  2017- s/p cabg x3      Hypercholesterolemia      ASIYA (obstructive sleep apnea)  initially dx  ; CPAP      Paralytic ileus  post op THR  & post op laminectomy      Type 2 diabetes mellitus      Right carpal tunnel syndrome      Cubital tunnel syndrome, right      Trigger finger of right hand      Morbid obesity with body mass index (BMI) of 40.0 or higher      H/O CHF      Kidney mass      Cervical herniated disc      Renal cancer      Disease of spinal cord, unspecified      Peripheral neuropathy      Myelopathy      Morbid obesity      S/P Left Inguinal Hernia Repair      History of Total Hip Replacement  - bilateral THR      S/P tonsillectomy and adenoidectomy  as child      H/O lithotripsy  x1      S/P revision of total knee, right  x2-  &       S/P lumbar discectomy  - ,L5  Aug 2013      S/P CABG x 3  17      S/P lumbar laminectomy  Fusion L3-L5       Kidney stone  s/w ESWL pt unsure site      Neuropathy  Bilateral Feet since       History of bilateral hip replacements      History of hernia repair      History of lumbar fusion      History of cholecystectomy      History of bilateral knee replacement      History of lymph node biopsy      S/p bilateral carpal tunnel release      History of partial nephrectomy      H/O elbow surgery      History of thoracic spinal fusion          SOCIAL HISTORY: Substance Use (street drugs): ( x ) never used  (  ) other:    FAMILY HISTORY:  Family history of coronary artery disease  HLD/Angina. Fatal MI age 73.    Family history of diabetes mellitus type II  mother- - hyperlipidemia and Hypertension.    Family history of pancreatic cancer (Sibling)  brother     Family history of coronary artery disease  brother- age 50+- Coronary Artery Stents            PHYSICAL EXAM:  T(C): 36.3 (25 @ 16:20), Max: 36.6 (25 @ 20:59)  HR: 69 (25 @ 16:20) (56 - 79)  BP: 107/65 (25 @ 16:20) (107/65 - 165/90)  RR: 18 (25 @ 16:20) (18 - 18)  SpO2: 95% (25 @ 16:20) (95% - 98%)  Wt(kg): --  I&O's Summary    2025 07:  -  2025 07:00  --------------------------------------------------------  IN: 480 mL / OUT: 0 mL / NET: 480 mL    2025 07: 18:18  --------------------------------------------------------  IN: 480 mL / OUT: 0 mL / NET: 480 mL          GENERAL: NAD  EYES:   PERRLA   ENMT:   Moist mucous membranes, Good dentition, No lesions  Cardiovascular: Normal S1 S2, No JVD, No murmurs, No edema  Respiratory: b/l rhonchi   Gastrointestinal:  Soft, Non-tender, + BS	  Extremities: no edema                    140  |  105  |  16  ----------------------------<  127[H]  3.3[L]   |  23  |  0.57    Ca    9.0      2025 06:08  Mg     1.8           proBNP:   Lipid Profile:   HgA1c:   TSH:     Consultant(s) Notes Reviewed:  [x ] YES  [ ] NO    Care Discussed with Consultants/Other Providers [ x] YES  [ ] NO    Imaging Personally Reviewed independently:  [x] YES  [ ] NO    All labs, radiologic studies, vitals, orders and medications list reviewed. Patient is seen and examined at bedside. Case discussed with medical team.        
Date of service: 05-02-25 @ 14:31      Patient is a 69y old  Male who presents with a chief complaint of Syncope (29 Apr 2025 13:34)                                                               INTERVAL HPI/OVERNIGHT EVENTS:    REVIEW OF SYSTEMS:     became " dizzy " as he was walking with PT   VS stable at the time                                                                                                                                                                                                                                                                                       Medications:  MEDICATIONS  (STANDING):  allopurinol 100 milliGRAM(s) Oral at bedtime  aspirin enteric coated 81 milliGRAM(s) Oral daily  atorvastatin 40 milliGRAM(s) Oral at bedtime  buMETAnide 2 milliGRAM(s) Oral two times a day  carvedilol 3.125 milliGRAM(s) Oral every 12 hours  clopidogrel Tablet 75 milliGRAM(s) Oral daily  dextrose 5%. 1000 milliLiter(s) (100 mL/Hr) IV Continuous <Continuous>  dextrose 5%. 1000 milliLiter(s) (50 mL/Hr) IV Continuous <Continuous>  dextrose 50% Injectable 25 Gram(s) IV Push once  dextrose 50% Injectable 12.5 Gram(s) IV Push once  dextrose 50% Injectable 25 Gram(s) IV Push once  fenofibrate Tablet 48 milliGRAM(s) Oral daily  gabapentin 300 milliGRAM(s) Oral every 8 hours  glucagon  Injectable 1 milliGRAM(s) IntraMuscular once  heparin   Injectable 5000 Unit(s) SubCutaneous every 8 hours  influenza  Vaccine (HIGH DOSE) 0.5 milliLiter(s) IntraMuscular once  insulin lispro (ADMELOG) corrective regimen sliding scale   SubCutaneous three times a day before meals  insulin lispro (ADMELOG) corrective regimen sliding scale   SubCutaneous at bedtime  isosorbide   mononitrate ER Tablet (IMDUR) 30 milliGRAM(s) Oral daily  losartan 50 milliGRAM(s) Oral daily  multivitamin 1 Tablet(s) Oral daily  senna 2 Tablet(s) Oral at bedtime  sodium chloride 0.9%. 500 milliLiter(s) (100 mL/Hr) IV Continuous <Continuous>    MEDICATIONS  (PRN):  dextrose Oral Gel 15 Gram(s) Oral once PRN Blood Glucose LESS THAN 70 milliGRAM(s)/deciliter       Allergies    No Known Allergies    Intolerances      Vital Signs Last 24 Hrs  T(C): 36.4 (02 May 2025 10:59), Max: 36.4 (02 May 2025 04:25)  T(F): 97.6 (02 May 2025 10:59), Max: 97.6 (02 May 2025 10:59)  HR: 73 (02 May 2025 12:08) (62 - 73)  BP: 104/70 (02 May 2025 12:08) (104/70 - 137/85)  BP(mean): --  RR: 18 (02 May 2025 10:59) (18 - 18)  SpO2: 97% (02 May 2025 12:08) (97% - 98%)    Parameters below as of 02 May 2025 12:08  Patient On (Oxygen Delivery Method): room air      CAPILLARY BLOOD GLUCOSE      POCT Blood Glucose.: 211 mg/dL (02 May 2025 11:21)  POCT Blood Glucose.: 140 mg/dL (02 May 2025 07:18)  POCT Blood Glucose.: 162 mg/dL (01 May 2025 21:06)  POCT Blood Glucose.: 140 mg/dL (01 May 2025 17:04)      05-01 @ 07:01  -  05-02 @ 07:00  --------------------------------------------------------  IN: 500 mL / OUT: 450 mL / NET: 50 mL      Physical Exam:    Daily     Daily   General:  Well appearing, NAD, not cachetic  HEENT:  Nonicteric, PERRLA  CV:  RRR, S1S2   Lungs:  CTA B/L, no wheezes, rales, rhonchi  Abdomen:  Soft, non-tender, no distended, positive BS  Extremities: no edema                                                                                                                                                                                                                                                                                    LABS:                               13.1   6.29  )-----------( 153      ( 01 May 2025 05:33 )             39.3                      05-02    140  |  103  |  15  ----------------------------<  114[H]  3.6   |  23  |  0.55    Ca    9.5      02 May 2025 06:32  Mg     1.9     05-02                         RADIOLOGY & ADDITIONAL TESTS         I personally reviewed: [  ]EKG   [  ]CXR    [  ] CT      A/P:         Discussed with :     Ashley consultants' Notes   Time spent :  
Date of service: 25 @ 23:25      Patient is a 69y old  Male who presents with a chief complaint of Syncope and collapse     (2025 15:51)                                                               INTERVAL HPI/OVERNIGHT EVENTS:    REVIEW OF SYSTEMS:     mild cp   brifef seconda ?   no sob                                                                                                                                                                                                                                                                        Medications:  MEDICATIONS  (STANDING):  allopurinol 100 milliGRAM(s) Oral at bedtime  aspirin enteric coated 81 milliGRAM(s) Oral daily  atorvastatin 40 milliGRAM(s) Oral at bedtime  buMETAnide 2 milliGRAM(s) Oral two times a day  carvedilol 3.125 milliGRAM(s) Oral every 12 hours  dextrose 5%. 1000 milliLiter(s) (100 mL/Hr) IV Continuous <Continuous>  dextrose 5%. 1000 milliLiter(s) (50 mL/Hr) IV Continuous <Continuous>  dextrose 50% Injectable 25 Gram(s) IV Push once  dextrose 50% Injectable 12.5 Gram(s) IV Push once  dextrose 50% Injectable 25 Gram(s) IV Push once  fenofibrate Tablet 48 milliGRAM(s) Oral daily  gabapentin 300 milliGRAM(s) Oral every 8 hours  glucagon  Injectable 1 milliGRAM(s) IntraMuscular once  heparin   Injectable 5000 Unit(s) SubCutaneous every 8 hours  influenza  Vaccine (HIGH DOSE) 0.5 milliLiter(s) IntraMuscular once  insulin lispro (ADMELOG) corrective regimen sliding scale   SubCutaneous three times a day before meals  insulin lispro (ADMELOG) corrective regimen sliding scale   SubCutaneous at bedtime  isosorbide   mononitrate ER Tablet (IMDUR) 30 milliGRAM(s) Oral daily  losartan 50 milliGRAM(s) Oral daily  multivitamin 1 Tablet(s) Oral daily  sodium chloride 0.9%. 500 milliLiter(s) (100 mL/Hr) IV Continuous <Continuous>    MEDICATIONS  (PRN):  dextrose Oral Gel 15 Gram(s) Oral once PRN Blood Glucose LESS THAN 70 milliGRAM(s)/deciliter       Allergies    No Known Allergies    Intolerances      Vital Signs Last 24 Hrs  T(C): 36.6 (2025 21:04), Max: 36.6 (2025 04:22)  T(F): 97.9 (2025 21:04), Max: 97.9 (2025 04:22)  HR: 66 (2025 21:04) (64 - 80)  BP: 115/73 (2025 21:04) (115/73 - 134/66)  BP(mean): --  RR: 18 (2025 21:04) (18 - 18)  SpO2: 95% (2025 21:04) (95% - 98%)    Parameters below as of 2025 21:04  Patient On (Oxygen Delivery Method): room air      CAPILLARY BLOOD GLUCOSE      POCT Blood Glucose.: 134 mg/dL (2025 21:22)  POCT Blood Glucose.: 250 mg/dL (2025 17:12)  POCT Blood Glucose.: 150 mg/dL (2025 11:32)  POCT Blood Glucose.: 158 mg/dL (2025 07:26)       @ 07: @ 07:00  --------------------------------------------------------  IN: 740 mL / OUT: 0 mL / NET: 740 mL     @ 07: @ 23:25  --------------------------------------------------------  IN: 480 mL / OUT: 200 mL / NET: 280 mL      Physical Exam:    Daily     Daily Weight in k.1 (2025 07:32)  General:  Well appearing, NAD, not cachetic  HEENT:  Nonicteric, PERRLA  CV:  RRR, S1S2   Lungs:  CTA B/L, no wheezes, rales, rhonchi  Abdomen:  Soft, non-tender, no distended, positive BS  Extremities:  2+ pulses, no c/c, no edema  Skin:  Warm and dry, no rashes  :  No amaro  Neuro:  AAOx3, non-focal, grossly intact                                                                                                                                                                                                                                                                                                LABS:                               13.4   7.57  )-----------( 203      ( 2025 05:50 )             40.4                          141  |  104  |  14  ----------------------------<  130[H]  3.4[L]   |  24  |  0.57    Ca    9.0      2025 05:50  Mg     1.8         TPro  6.3  /  Alb  3.6  /  TBili  0.6  /  DBili  x   /  AST  12  /  ALT  12  /  AlkPhos  82                         RADIOLOGY & ADDITIONAL TESTS         I personally reviewed: [  ]EKG   [  ]CXR    [  ] CT      A/P:         Discussed with :     Ashley consultants' Notes   Time spent :  
Follow-up Pulm Progress Note    No new respiratory events overnight.  Denies SOB/CP.   still with greenish sputum production     Medications:  MEDICATIONS  (STANDING):  allopurinol 100 milliGRAM(s) Oral at bedtime  aspirin enteric coated 81 milliGRAM(s) Oral daily  atorvastatin 40 milliGRAM(s) Oral at bedtime  buMETAnide 2 milliGRAM(s) Oral two times a day  carvedilol 3.125 milliGRAM(s) Oral every 12 hours  clopidogrel Tablet 75 milliGRAM(s) Oral daily  dextrose 5%. 1000 milliLiter(s) (100 mL/Hr) IV Continuous <Continuous>  dextrose 5%. 1000 milliLiter(s) (50 mL/Hr) IV Continuous <Continuous>  dextrose 50% Injectable 25 Gram(s) IV Push once  dextrose 50% Injectable 12.5 Gram(s) IV Push once  dextrose 50% Injectable 25 Gram(s) IV Push once  fenofibrate Tablet 48 milliGRAM(s) Oral daily  gabapentin 300 milliGRAM(s) Oral every 8 hours  glucagon  Injectable 1 milliGRAM(s) IntraMuscular once  heparin   Injectable 5000 Unit(s) SubCutaneous every 8 hours  influenza  Vaccine (HIGH DOSE) 0.5 milliLiter(s) IntraMuscular once  insulin lispro (ADMELOG) corrective regimen sliding scale   SubCutaneous three times a day before meals  insulin lispro (ADMELOG) corrective regimen sliding scale   SubCutaneous at bedtime  isosorbide   mononitrate ER Tablet (IMDUR) 30 milliGRAM(s) Oral daily  losartan 50 milliGRAM(s) Oral daily  multivitamin 1 Tablet(s) Oral daily  senna 2 Tablet(s) Oral at bedtime  sodium chloride 0.9%. 500 milliLiter(s) (100 mL/Hr) IV Continuous <Continuous>    MEDICATIONS  (PRN):  dextrose Oral Gel 15 Gram(s) Oral once PRN Blood Glucose LESS THAN 70 milliGRAM(s)/deciliter          Vital Signs Last 24 Hrs  T(C): 36.4 (07 May 2025 10:51), Max: 37 (06 May 2025 10:57)  T(F): 97.5 (07 May 2025 10:51), Max: 98.6 (06 May 2025 10:57)  HR: 66 (07 May 2025 10:51) (55 - 69)  BP: 128/62 (07 May 2025 10:51) (105/63 - 132/82)  BP(mean): --  RR: 18 (07 May 2025 10:51) (18 - 18)  SpO2: 98% (07 May 2025 10:51) (95% - 98%)    Parameters below as of 07 May 2025 10:51  Patient On (Oxygen Delivery Method): room air              05-06 @ 07:01  -  05-07 @ 07:00  --------------------------------------------------------  IN: 500 mL / OUT: 500 mL / NET: 0 mL          LABS:                        13.8   7.23  )-----------( 166      ( 06 May 2025 06:42 )             40.1     05-06    140  |  102  |  15  ----------------------------<  119[H]  3.4[L]   |  23  |  0.58    Ca    9.1      06 May 2025 06:42            CAPILLARY BLOOD GLUCOSE      POCT Blood Glucose.: 137 mg/dL (07 May 2025 07:25)      Urinalysis Basic - ( 06 May 2025 06:42 )    Color: x / Appearance: x / SG: x / pH: x  Gluc: 119 mg/dL / Ketone: x  / Bili: x / Urobili: x   Blood: x / Protein: x / Nitrite: x   Leuk Esterase: x / RBC: x / WBC x   Sq Epi: x / Non Sq Epi: x / Bacteria: x            Physical Examination:  PULM: Clear to auscultation bilaterally  CVS: S1, S2 heard    RADIOLOGY REVIEWED  CXR: grossly clear

## 2025-05-07 NOTE — PROGRESS NOTE ADULT - ASSESSMENT
69 Male with CAD s/p CABG (3 vessels, 08/2017 at [facility]), CHF, ASIYA, T2DM (not on insulin), ASIYA on CPAP, HTN, HLD, renal mass s/p resection, cervical spine disease s/p fusion, lumbar radiculopathy s/p laminectomy L3/5 neuropathy presenting with cp /near syncope    had a ciggar outside  and started feeling " lightheaded ... as he was going back to sit in his chair he " felt " about to pass out .. but no focal neuro complaints ..   states he gets worse when getting up   orthostatics weak +   CTH neg   + NSVT found   s/p micra PPM     Impression:    1) syncope, dizziness in setting of + orthostatics   2) cervical disc disase s/p fusion  3) lumbar spine disease s/p laminectomy L3-5   4) neuropathy     - already on gabapentin [id number]   - trend orthostatics   - f/u EP for NSVT; s/p micra PPM   - if persistant dizziness consider MRI brain but seems to be orthostatic  - hydration if able   - compression stockings    - telemetry  - PT/OT   - check FS, glucose control <180  - GI/DVT ppx   - seetyler Beaulieu neuro outpatient   dc planning ELISSA Carlisle MD  Vascular Neurology  Office: [phone]

## 2025-05-07 NOTE — DISCHARGE NOTE NURSING/CASE MANAGEMENT/SOCIAL WORK - FINANCIAL ASSISTANCE
[facility] provides services at a reduced cost to those who are determined to be eligible through [facility]’s financial assistance program. Information regarding [facility]’s financial assistance program can be found by going to [email or web address] or by calling [phone].

## 2025-05-07 NOTE — PROGRESS NOTE ADULT - ASSESSMENT
70 y/o M with PMH of CAD s/p CABG (3 vessels, 08/2017 at [facility]), HF, ASIYA, T2DM (not on insulin), ASIYA on CPAP, HTN, HLD, renal mass s/p resection. Presents with CP/near syncope. Pt had a cigar outside and started feeling lightheaded, as he was going back to sit in his chair he felt likely he was about to pass out. Found to have mildly reduced LVEF 45% now s/p cardiac cath resulting in successful PCI with FRANCESCA to the SVG to OM distal anastomosis and native distal RCA. S/p dual chamber AVEIR leadless pacing system. Endorses cough with green sputum production.

## 2025-05-20 ENCOUNTER — APPOINTMENT (OUTPATIENT)
Dept: DERMATOLOGY | Facility: CLINIC | Age: 70
End: 2025-05-20

## 2025-06-09 NOTE — ED ADULT NURSE NOTE - PHONE #
Patient: Tee Lehman Date: 2025  : 1947   77 year old male       Chief Complaint   Patient presents with   •  Symptoms     3 weeks = Urinary concern. Patient states he has to constantly use restroom.   Patient states he does not know if allergies might be affecting.     Patient has HX of enlarged prostate.     No blood in urine. No itchiness. Urine smell is normal. No burning sensation.   No fever symptoms. Pelvic pain comes and goes. Lower back pain as well that comes and goes.         Patient comes in today for follow-up of acute and chronic issues.    Regarding BPH, patient has been dealing with increased urinary frequency and some degree to urinary incontinence at nighttime.  Patient says he is just thirsty urinating significantly more than usual in the last few months.  Patient is not following up with any urologist.  He does not take any particular medications for this.  Patient denies any recent changes in diet or lifestyle as is.  Denies any dysuria.  Denies any hematuria.  Denies any rash or trauma.  Denies any discharge.     Symptoms         Review of Systems   Genitourinary:  Positive for dysuria.       ROS:    Symptoms include no fever, no chills, no generalized muscle aches, no anorexia, no nasal discharge, no nasal passage blockage, no cough, no wheezing, no dyspnea, no shortness of breath, no chest pain, no hoarseness, no sore throat, no headache, no acute vision changes, no nausea, no vomiting, no diarrhea, no constipation, no abdominal pain, no lower back pain, no dysuria, no change in urinary frequency, no feelings of urinary urgency, no pain, no rash, no suicidal or homicidal ideation, no arthralgias and no localized joint swelling unless mentioned above in hpi.    All other systems reviewed and negative.    Current Outpatient Medications   Medication Sig Dispense Refill   • cetirizine (ZyrTEC) 5 MG tablet Take 5 mg by mouth daily.     • tamsulosin (FLOMAX) 0.4 MG Cap Take 1  [phone] capsule by mouth daily. 30 capsule 2   • losartan (COZAAR) 50 MG tablet TAKE 1 TABLET BY MOUTH DAILY 100 tablet 2   • sildenafil (VIAGRA) 100 MG tablet Take 1 tablet by mouth as needed for Erectile Dysfunction. 15 tablet 5     No current facility-administered medications for this visit.       ALLERGIES:  No Known Allergies    Past Medical History:   Diagnosis Date   • Essential (primary) hypertension        History reviewed. No pertinent surgical history.    Social History     Socioeconomic History   • Marital status: /Civil Union     Spouse name: Not on file   • Number of children: Not on file   • Years of education: Not on file   • Highest education level: Not on file   Occupational History   • Not on file   Tobacco Use   • Smoking status: Never   • Smokeless tobacco: Never   Vaping Use   • Vaping status: never used   Substance and Sexual Activity   • Alcohol use: Not Currently   • Drug use: Never   • Sexual activity: Not on file   Other Topics Concern   • Not on file   Social History Narrative   • Not on file     Social Drivers of Health     Financial Resource Strain: Not on file   Food Insecurity: Not on file   Transportation Needs: Not on file   Physical Activity: Not on file   Stress: Not on file   Social Connections: Not on file   Feeling safe in your relationship: Not on file       History reviewed. No pertinent family history.    Visit Vitals  BP (!) 140/90 (BP Location: LUE - Left upper extremity, Patient Position: Sitting, Cuff Size: Regular)   Pulse 85   Ht 5' 10\" (1.778 m)   Wt 82 kg (180 lb 12.4 oz)   SpO2 99%   BMI 25.94 kg/m²       Physical Exam  Vitals reviewed.   Constitutional:       General: He is not in acute distress.     Appearance: He is well-developed. He is not diaphoretic.   HENT:      Head: Normocephalic and atraumatic.   Eyes:      General: No scleral icterus.        Right eye: No discharge.         Left eye: No discharge.      Extraocular Movements: Extraocular movements intact.       Conjunctiva/sclera: Conjunctivae normal.   Neck:      Trachea: No tracheal deviation.   Cardiovascular:      Rate and Rhythm: Normal rate and regular rhythm.      Heart sounds: Normal heart sounds.   Pulmonary:      Effort: Pulmonary effort is normal. No respiratory distress.      Breath sounds: Normal breath sounds. No wheezing.   Abdominal:      General: Bowel sounds are normal.      Palpations: Abdomen is soft.      Tenderness: There is no abdominal tenderness. There is no rebound.   Musculoskeletal:         General: No tenderness. Normal range of motion.      Cervical back: Normal range of motion and neck supple.      Right lower leg: No edema.      Left lower leg: No edema.   Skin:     General: Skin is warm and dry.      Coloration: Skin is not jaundiced.      Findings: No erythema.   Neurological:      Mental Status: He is alert and oriented to person, place, and time. Mental status is at baseline.   Psychiatric:         Behavior: Behavior normal.         Judgment: Judgment normal.           Assessment and plan:    Benign prostatic hyperplasia with urinary frequency (Primary)  Patient most likely presenting with symptoms of BPH with urinary frequency.  No obvious evidence of UTI on clinical history/urine dipstick in the office.  He does have some microscopic blood.  Will do comprehensive blood work as listed below.  Will get him connected with urology.  Will do a trial of Flomax.  Patient educated about use and side effect profile.    - CBC with Automated Differential; Future  - Comprehensive Metabolic Panel; Future  - Lipid Panel With Reflex; Future  - Thyroid Stimulating Hormone; Future  - SERVICE TO UROLOGY    Urinary incontinence, unspecified type  Most likely due to worsening muscle control and increased urinary frequency from BPH.  Will get him connected with urology for further evaluation.    - CBC with Automated Differential; Future  - Comprehensive Metabolic Panel; Future  - Lipid Panel With  Reflex; Future  - Thyroid Stimulating Hormone; Future  - SERVICE TO UROLOGY    Polyuria  See above.    - CBC with Automated Differential; Future  - Comprehensive Metabolic Panel; Future  - Lipid Panel With Reflex; Future  - Thyroid Stimulating Hormone; Future  - POCT Urine Dip Auto  - SERVICE TO UROLOGY    Polydipsia  Will check chemistries as listed below.  Based on results, further recommendations can be made.  He does not have history of diabetes.    - CBC with Automated Differential; Future  - Comprehensive Metabolic Panel; Future  - Lipid Panel With Reflex; Future  - Thyroid Stimulating Hormone; Future    Essential hypertension  Blood pressure blood on the higher side.  Monitor for now.  Titrate medication as needed.  Will defer to PCP.  Will do blood work as listed below.    - CBC with Automated Differential; Future  - Comprehensive Metabolic Panel; Future  - Lipid Panel With Reflex; Future  - Thyroid Stimulating Hormone; Future    Healthcare maintenance  Age-appropriate immunizations recommendations made.     Health Maintenance Summary     COVID-19 Vaccine (8 - 2024-25 season)  Overdue since 4/18/2025    Traditional Medicare- Medicare Wellness Visit (Yearly)  Overdue since 5/1/2025    Depression Screening (Yearly)  Overdue since 5/9/2025    Hepatitis C Screening (Once)  Never done    DTaP/Tdap/Td Vaccine (4 - Td or Tdap)  Next due on 2/15/2033    Influenza Vaccine   Completed    Shingles Vaccine   Completed    Respiratory Syncytial Virus (RSV) Vaccine 60+   Completed    Pneumococcal Vaccine 50+   Completed    Hepatitis A Vaccine   Aged Out    Meningococcal Vaccine   Aged Out    Hepatitis B Vaccine (For Physician/[facility] Discussion)   Aged Out    Meningococcal Serogroup B Vaccine   Aged Out    HPV Vaccine   Aged Out          - CBC with Automated Differential; Future  - Comprehensive Metabolic Panel; Future  - Lipid Panel With Reflex; Future  - Thyroid Stimulating Hormone; Future      I am providing longitudinal  care for said chronic conditions and for many if not all, that the status is ongoing.       Return if symptoms worsen or fail to improve.    Mario Nagel MD  Primary Care  [facility]   [facility]

## 2025-06-24 NOTE — DIETITIAN INITIAL EVALUATION ADULT - HEIGHT FOR BMI (FEET)
Patient: Clay Orellana  Medical Record Number: [id number]  YOB: 1943  PCP: Sachin Meza MD    Reason for visit: Lumbar follow up, post op visit    HISTORY OF CHIEF COMPLAINT:    Clay Orellana is a very pleasant 82 year old male who presents today for: Lumbar follow up, post op visit  S/p 6/11/25: Dr. Pringle: L3 and L4 laminectomies with medial facetectomies, partial L5 laminectomy with medical facetectomy, neuro monitoring  Hx of 6/17/24: Right  Shunt insertion   The patient endorses improvement since surgical intervention.  No incisional complaints.  He is pleased with his progress.  No progression of upper extremity symptoms, his numbness, tingling and weakness are stable.    Past Medical History[1]   Past Surgical History[2]   Family History[3]   Social Hx on file[4]   Allergies[5]   Current Medications:  Current Medications[6]     REVIEW OF SYSTEMS   Comprehensive review of systems done. Negative except what is outlined in the above HPI.     PHYSICAL EXAMIMATION    vitals were not taken for this visit.   GENERAL: Very pleasant patient is in no apparent distress. Sitting comfortably in the examination chair.   HEENT: Normocephalic, atraumatic.  RESPIRATORY RATE: Easy and Even  SKIN: Warm and dry  NEURO: Awake, alert and orientated. Speech fluent, comprehension intact, answering questions appropriately.     SPINE:  Gait/Coordination: Gait deferred  Moving bilateral upper and lower extremities spontaneously to full resistance     Upper extremity strength:    Deltoid  Biceps  Triceps     Intrinsics      Right 5 5 5 5 5     Left 5 5 5 5 5     Lower Extremity Strength:     Iliopsoas  Hamstrings   Quads    D-flexion P-flexion Great Toe   Right       5         5       5         5 5 5   Left       5         5       5         5 5 5     Incision: CDI without edema, erythema or discharge. Residual dermabond in place. Healing well.     DATA:   None    IMAGING:   No recent imaging    MEDICAL DECISION  MAKING:     ASSESSMENT and PLAN:    ICD-10-CM    1. S/p 6/11/25: Dr. Pringle: L3 and L4 laminectomies with medial facetectomies, partial L5 laminectomy with medical facetectomy, neuro monitoring  Z98.890       2. S/p 6/17/24: Dr. Pringle: Right  shunt insertion  Z98.2       3. Post-operative state  Z98.890         PLAN:   1. Medication: None prescribed  2. Imaging:    - Reviewed today:    -No recent imaging   - Ordered today:    -None  3. Activity:    -Continue home PT   -No lifting greater than 10-15 lbs   -Encouraged walking   -No excessive bending, lifting, straining, reaching or twisting   -Continue to monitor incision for healing.  If any warning/red flag signs/symptoms, including but not limited to, fevers or drainage, follow-up as stated below  -No submerging incision site under water. No baths, swimming or hot tubs  4. Follow up in 4 weeks with Dr. Pringle for 6 week post op visit or call or follow up sooner or go to the ED for any new, worsening or concerning signs or symptoms     I discussed the plan with the patient. The patient agrees with the plan, verbalized understanding and is appreciative. All questions were sought out and thoroughly answered to satisfaction.       Total visit time: 30 minutes  More than 50% spent coordinating care, providing patient education, discussing activity and counseling.    Teodora Castle M.S., PA-C  [address] [address]  [phone]  6/24/2025 1:48 PM    Dragon speech recognition software was used to prepare this note. If a word or phrase is confusing, it is likely due to a failure of recognition. Please contact me with any questions or clarifications.         [1]   Past Medical History:   Acute nausea with nonbilious vomiting    Anxiety    Arrhythmia    Arthritis    Back problem    Bilateral inguinal hernia without obstruction or gangrene    BPH (benign prostatic hyperplasia)    Calculus of kidney     Essential hypertension    High blood pressure    High cholesterol    Muscle weakness    Neuropathy    Stroke (HCC)    TIA    Visual impairment   [2]   Past Surgical History:  Procedure Laterality Date    Anesth,pacemaker insertion  08/2023    Appendectomy      Appendectomy      Cardiac pacemaker placement      Colonoscopy      Colonoscopy N/A 05/03/2022    Procedure: COLONOSCOPY;  Surgeon: Sudarshan Gilbert MD;  Location:  ENDOSCOPY    Colonoscopy      Cyst removal N/A 1990    Cyst removed behind back of neck    Cyst removal Right 11/05/2020    Removed from right upper back - just below shoulder    Hernia surgery  July 2022    Other surgical history  06/17/2024    RIGHT VENTRICULOPERITONEAL SHUNT INSERTION    Other surgical history      Head shunt sx    Skin surgery  2020    Vasectomy  1980   [3]   Family History  Problem Relation Age of Onset    Heart Attack Father     Diabetes Mother     Other (DM) Sister     Other (ALS) Brother     No Known Problems Maternal Grandmother     No Known Problems Maternal Grandfather     No Known Problems Paternal Grandmother     No Known Problems Paternal Grandfather     Other (smoker) Brother     Other (MS) Sister     No Known Problems Son     No Known Problems Daughter    [4]   Social History  Socioeconomic History    Marital status:    Tobacco Use    Smoking status: Former     Types: Cigarettes     Passive exposure: Never    Smokeless tobacco: Never    Tobacco comments:     Has not smoked for about 20 years   Vaping Use    Vaping status: Never Used   Substance and Sexual Activity    Alcohol use: Not Currently     Alcohol/week: 4.0 standard drinks of alcohol     Types: 4 Cans of beer per week    Drug use: Never   Other Topics Concern    Caffeine Concern No    Exercise No    Seat Belt Yes    Special Diet No    Stress Concern Yes    Weight Concern No   [5]   Allergies  Allergen Reactions    Kiwi Extract ITCHING     Inner ears itch   [6]   Current Outpatient Medications    Medication Sig Dispense Refill    oxyCODONE 5 MG Oral Tab Take 1 tablet (5 mg total) by mouth every 4 (four) hours as needed for Pain. 60 tablet 0    aspirin 81 MG Oral Tab EC Take 1 tablet (81 mg total) by mouth in the morning.      acidophilus-pectin Oral Cap Take 1 capsule by mouth daily.      cyclobenzaprine 5 MG Oral Tab Take 0.5 tablets (2.5 mg total) by mouth every 6 (six) hours as needed for Muscle spasms. 30 tablet 0    docusate sodium 100 MG Oral Cap Take 100 mg by mouth 2 (two) times daily. 30 capsule 0    XARELTO 20 MG Oral Tab TAKE 1 TABLET BY MOUTH EVERY  NIGHT 90 tablet 3    METOPROLOL TARTRATE 25 MG Oral Tab TAKE 1 TABLET BY MOUTH TWICE  DAILY 180 tablet 3    donepezil 10 MG Oral Tab Take 1 tablet (10 mg total) by mouth nightly. 90 tablet 3    atorvastatin 40 MG Oral Tab Take 1 tablet (40 mg total) by mouth nightly. 90 tablet 3    fluticasone-umeclidin-vilant (TRELEGY ELLIPTA) 100-62.5-25 MCG/ACT Inhalation Aerosol Powder, Breath Activated Inhale 1 puff into the lungs daily. 180 each 3    furosemide 20 MG Oral Tab Take 1 tablet (20 mg total) by mouth daily.      flecainide 100 MG Oral Tab Take 1 tablet (100 mg total) by mouth 2 (two) times daily. 180 tablet 3    tamsulosin 0.4 MG Oral Cap Take 1 capsule (0.4 mg total) by mouth After dinner. 90 capsule 3    finasteride 5 MG Oral Tab Take 1 tablet (5 mg total) by mouth daily. 90 tablet 2    sertraline 25 MG Oral Tab Take 1 tablet (25 mg total) by mouth daily. 90 tablet 3    spironolactone 25 MG Oral Tab 1 tab po MWF 90 tablet 3    amLODIPine 2.5 MG Oral Tab Take 1 tablet (2.5 mg total) by mouth daily.      Cholecalciferol 125 MCG (5000 UT) Oral Tab Take 1 tablet (5,000 Units total) by mouth 3 (three) times a week. Three times a week. MWF      cyanocobalamin 500 MCG Oral Tab Take 1 tablet (500 mcg total) by mouth in the morning.      Multiple Vitamins-Minerals (MULTI-VITAMIN/MINERALS) Oral Tab Take 1 tablet by mouth in the morning.      acetaminophen  500 MG Oral Tab Take 1 tablet (500 mg total) by mouth every 6 (six) hours as needed.        6
